# Patient Record
Sex: MALE | Race: WHITE | Employment: FULL TIME | URBAN - NONMETROPOLITAN AREA
[De-identification: names, ages, dates, MRNs, and addresses within clinical notes are randomized per-mention and may not be internally consistent; named-entity substitution may affect disease eponyms.]

---

## 2020-05-11 ENCOUNTER — OFFICE VISIT (OUTPATIENT)
Dept: URGENT CARE | Age: 35
End: 2020-05-11

## 2020-05-11 VITALS
TEMPERATURE: 98.2 F | RESPIRATION RATE: 18 BRPM | WEIGHT: 306 LBS | SYSTOLIC BLOOD PRESSURE: 150 MMHG | BODY MASS INDEX: 38.05 KG/M2 | HEART RATE: 96 BPM | HEIGHT: 75 IN | OXYGEN SATURATION: 98 % | DIASTOLIC BLOOD PRESSURE: 87 MMHG

## 2020-05-11 PROCEDURE — 99202 OFFICE O/P NEW SF 15 MIN: CPT | Performed by: NURSE PRACTITIONER

## 2020-05-11 RX ORDER — ALLOPURINOL 300 MG/1
TABLET ORAL
COMMUNITY
Start: 2020-05-04

## 2020-05-11 RX ORDER — LISINOPRIL 40 MG/1
TABLET ORAL
COMMUNITY
Start: 2020-05-04

## 2020-05-11 RX ORDER — CEPHALEXIN 500 MG/1
500 CAPSULE ORAL 4 TIMES DAILY
Qty: 40 CAPSULE | Refills: 0 | Status: SHIPPED | OUTPATIENT
Start: 2020-05-11 | End: 2020-05-21

## 2020-05-11 ASSESSMENT — ENCOUNTER SYMPTOMS: COUGH: 0

## 2020-05-11 NOTE — PROGRESS NOTES
Instructed to continue current medications, diet and exercise. Patient agreed with treatment plan. Follow up as directed. Patient Instructions       Patient Education        Folliculitis: Care Instructions  Your Care Instructions    Folliculitis (say \"kis-UHM-hmr-BASILIA-michel\") is an infection of the pouches (follicles) in the skin where hair grows. It can occur on any part of the body, but it is most common on the scalp, face, armpits, and groin. Bacteria, such as those found in a hot tub, can cause folliculitis. Folliculitis begins as a red, tender area near a strand of hair. The skin can itch or burn and may drain pus or blood. Sometimes folliculitis can lead to more serious skin infections. Your doctor usually can treat mild folliculitis with an antibiotic cream or ointment. If you have folliculitis on your scalp, you may use a shampoo that kills bacteria. Antibiotics you take as pills can treat infections deeper in the skin. For stubborn cases of folliculitis, laser treatment may be an option. Laser treatment uses strong beams of light to destroy the hair follicle. But hair will no longer grow in the treated area. Follow-up care is a key part of your treatment and safety. Be sure to make and go to all appointments, and call your doctor if you are having problems. It's also a good idea to know your test results and keep a list of the medicines you take. How can you care for yourself at home? · Take your medicine exactly as prescribed. If your doctor prescribed antibiotics, take them as directed. Do not stop taking them just because you feel better. You need to take the full course of antibiotics. · Use a soap that kills bacteria to wash the infected area. If your scalp or beard is infected, use a shampoo with selenium or propylene glycol. Be careful. Do not scrub too long or too hard. · Mix 1 1/3 cup warm water and 1 tablespoon vinegar.  Soak a cloth in the mixture, and place it over the infected skin until

## 2020-05-11 NOTE — PATIENT INSTRUCTIONS
Patient Education        Folliculitis: Care Instructions  Your Care Instructions    Folliculitis (say \"mrz-HQE-poe-LY-tus\") is an infection of the pouches (follicles) in the skin where hair grows. It can occur on any part of the body, but it is most common on the scalp, face, armpits, and groin. Bacteria, such as those found in a hot tub, can cause folliculitis. Folliculitis begins as a red, tender area near a strand of hair. The skin can itch or burn and may drain pus or blood. Sometimes folliculitis can lead to more serious skin infections. Your doctor usually can treat mild folliculitis with an antibiotic cream or ointment. If you have folliculitis on your scalp, you may use a shampoo that kills bacteria. Antibiotics you take as pills can treat infections deeper in the skin. For stubborn cases of folliculitis, laser treatment may be an option. Laser treatment uses strong beams of light to destroy the hair follicle. But hair will no longer grow in the treated area. Follow-up care is a key part of your treatment and safety. Be sure to make and go to all appointments, and call your doctor if you are having problems. It's also a good idea to know your test results and keep a list of the medicines you take. How can you care for yourself at home? · Take your medicine exactly as prescribed. If your doctor prescribed antibiotics, take them as directed. Do not stop taking them just because you feel better. You need to take the full course of antibiotics. · Use a soap that kills bacteria to wash the infected area. If your scalp or beard is infected, use a shampoo with selenium or propylene glycol. Be careful. Do not scrub too long or too hard. · Mix 1 1/3 cup warm water and 1 tablespoon vinegar. Soak a cloth in the mixture, and place it over the infected skin until it cools off (usually 5 to 10 minutes). You can do this 3 to 6 times a day. · Do not share your razor, towel, or washcloth.  That can spread folliculitis. · Use a new blade in your razor each time you shave to keep from re-infecting your skin. · If you tend to get folliculitis, avoid using hot tubs. They can contain bacteria that cause folliculitis. When should you call for help? Call your doctor now or seek immediate medical care if:    · You have symptoms of infection, such as:  ? Increased pain, swelling, warmth, or redness. ? Red streaks leading from the area. ? Pus draining from the area. ? A fever.    Watch closely for changes in your health, and be sure to contact your doctor if:    · You do not get better as expected. Where can you learn more? Go to https://[email or web address]. org and sign in to your [employer] account. Enter M257 in the [employer] box to learn more about \"Folliculitis: Care Instructions. \"     If you do not have an account, please click on the \"Sign Up Now\" link. Current as of: October 30, 2019Content Version: 12.4  © 6953-7968 Healthwise, Incorporated. Care instructions adapted under license by [facility] ([facility]). If you have questions about a medical condition or this instruction, always ask your healthcare professional. [address] any warranty or liability for your use of this information.

## 2022-08-04 ENCOUNTER — OFFICE VISIT (OUTPATIENT)
Dept: FAMILY MEDICINE CLINIC | Facility: CLINIC | Age: 37
End: 2022-08-04

## 2022-08-04 VITALS
HEART RATE: 102 BPM | HEIGHT: 74 IN | BODY MASS INDEX: 34.91 KG/M2 | OXYGEN SATURATION: 98 % | SYSTOLIC BLOOD PRESSURE: 140 MMHG | DIASTOLIC BLOOD PRESSURE: 80 MMHG | WEIGHT: 272 LBS

## 2022-08-04 DIAGNOSIS — K76.0 FATTY LIVER: ICD-10-CM

## 2022-08-04 DIAGNOSIS — Z00.00 WELLNESS EXAMINATION: Primary | ICD-10-CM

## 2022-08-04 DIAGNOSIS — Z11.59 ENCOUNTER FOR HEPATITIS C SCREENING TEST FOR LOW RISK PATIENT: ICD-10-CM

## 2022-08-04 DIAGNOSIS — I10 PRIMARY HYPERTENSION: ICD-10-CM

## 2022-08-04 DIAGNOSIS — E66.09 CLASS 1 OBESITY DUE TO EXCESS CALORIES WITHOUT SERIOUS COMORBIDITY WITH BODY MASS INDEX (BMI) OF 34.0 TO 34.9 IN ADULT: ICD-10-CM

## 2022-08-04 PROCEDURE — 99203 OFFICE O/P NEW LOW 30 MIN: CPT | Performed by: PEDIATRICS

## 2022-08-04 RX ORDER — LISINOPRIL 20 MG/1
20 TABLET ORAL DAILY
Qty: 90 TABLET | Refills: 1 | Status: SHIPPED | OUTPATIENT
Start: 2022-08-04 | End: 2022-12-26 | Stop reason: HOSPADM

## 2022-08-04 NOTE — PROGRESS NOTES
"Chief Complaint  Hypertension, Annual Exam, and Establish Care    Subjective    History of Present Illness      Patient presents to [facility] for   Patient is here to establish care. He would like to get back on Lisinipril 20 MG. Patient has been on this medication in the past. He is also wanting an annual exam with blood work.        Review of Systems    I have reviewed and agree with the HPI information as above.  Eleuterio Rangel MD     Objective   Vital Signs:   /80   Pulse 102   Ht 188 cm (74\")   Wt 123 kg (272 lb)   SpO2 98%   BMI 34.92 kg/m²            Physical Exam  Vitals and nursing note reviewed.   Constitutional:       Appearance: Normal appearance. He is well-developed. He is obese.   HENT:      Head: Normocephalic and atraumatic.      Right Ear: Tympanic membrane, ear canal and external ear normal.      Left Ear: Tympanic membrane, ear canal and external ear normal.      Nose: Nose normal. No septal deviation, nasal tenderness or congestion.      Mouth/Throat:      Lips: Pink. No lesions.      Mouth: Mucous membranes are moist. No oral lesions.      Dentition: Normal dentition.      Pharynx: Oropharynx is clear. No pharyngeal swelling, oropharyngeal exudate or posterior oropharyngeal erythema.   Eyes:      General: Lids are normal. Vision grossly intact. No scleral icterus.        Right eye: No discharge.         Left eye: No discharge.      Extraocular Movements: Extraocular movements intact.      Conjunctiva/sclera: Conjunctivae normal.      Right eye: Right conjunctiva is not injected.      Left eye: Left conjunctiva is not injected.      Pupils: Pupils are equal, round, and reactive to light.   Neck:      Thyroid: No thyroid mass.      Trachea: Trachea normal.   Cardiovascular:      Rate and Rhythm: Normal rate and regular rhythm.      Heart sounds: Normal heart sounds. No murmur heard.    No gallop.   Pulmonary:      Effort: Pulmonary effort is normal.      " Breath sounds: Normal breath sounds and air entry. No wheezing, rhonchi or rales.   Abdominal:      General: There is no distension.      Palpations: Abdomen is soft. There is no mass.      Tenderness: There is no abdominal tenderness. There is no right CVA tenderness, left CVA tenderness, guarding or rebound.   Musculoskeletal:         General: No tenderness or deformity. Normal range of motion.      Cervical back: Full passive range of motion without pain, normal range of motion and neck supple.      Thoracic back: Normal.      Right lower leg: No edema.      Left lower leg: No edema.   Skin:     General: Skin is warm and dry.      Coloration: Skin is not jaundiced.      Findings: No rash.   Neurological:      Mental Status: He is alert and oriented to person, place, and time.      Cranial Nerves: Cranial nerves are intact.      Sensory: Sensation is intact.      Motor: Motor function is intact.      Coordination: Coordination is intact.      Gait: Gait is intact.      Deep Tendon Reflexes: Reflexes are normal and symmetric.   Psychiatric:         Mood and Affect: Mood and affect normal.         Judgment: Judgment normal.          PHQ-2 Depression Screening  Little interest or pleasure in doing things? 0-->not at all   Feeling down, depressed, or hopeless? 0-->not at all   PHQ-2 Total Score 0     PHQ-9 Depression Screening  Little interest or pleasure in doing things? 0-->not at all   Feeling down, depressed, or hopeless? 0-->not at all   Trouble falling or staying asleep, or sleeping too much?     Feeling tired or having little energy?     Poor appetite or overeating?     Feeling bad about yourself - or that you are a failure or have let yourself or your family down?     Trouble concentrating on things, such as reading the newspaper or watching television?     Moving or speaking so slowly that other people could have noticed? Or the opposite - being so fidgety or restless that you have been moving around a lot  more than usual?     Thoughts that you would be better off dead, or of hurting yourself in some way?     PHQ-9 Total Score 0   If you checked off any problems, how difficult have these problems made it for you to do your work, take care of things at home, or get along with other people?        Result Review  Data Reviewed:        Hepatitis C Antibody (08/17/2022 13:18)  Hemoglobin A1c (08/17/2022 13:18)  Urinalysis With Culture If Indicated - Urine, Clean Catch (08/17/2022 13:18)  CBC Auto Differential (08/17/2022 13:18)  Lipid Panel (08/17/2022 13:18)  Comprehensive Metabolic Panel (08/17/2022 13:18)             Assessment and Plan      Problem List Items Addressed This Visit        Cardiac and Vasculature    Primary hypertension    Relevant Medications    lisinopril (PRINIVIL,ZESTRIL) 20 MG tablet       Endocrine and Metabolic    Class 1 obesity due to excess calories without serious comorbidity with body mass index (BMI) of 34.0 to 34.9 in adult    Current Assessment & Plan     Patient's (Body mass index is 34.92 kg/m².) indicates that they are obese (BMI >30) with health conditions that include hypertension . Weight is unchanged. BMI is is above average; BMI management plan is completed. We discussed portion control and increasing exercise.     KETO lifestyle recommended.            Gastrointestinal Abdominal     Fatty liver       Health Encounters    Wellness examination - Primary    Relevant Orders    Comprehensive Metabolic Panel (Completed)    Lipid Panel (Completed)    CBC Auto Differential (Completed)    Urinalysis With Culture If Indicated - Urine, Clean Catch (Completed)    CBC Auto Differential    Comprehensive Metabolic Panel    Lipid Panel    Hemoglobin A1c (Completed)       Infectious Diseases    Encounter for hepatitis C screening test for low risk patient    Relevant Orders    Hepatitis C antibody    Hepatitis C Antibody              Follow Up   Return in about 3 months (around 11/4/2022) for  Recheck.  Patient was given instructions and counseling regarding his condition or for health maintenance advice. Please see specific information pulled into the AVS if appropriate.

## 2022-08-17 ENCOUNTER — LAB (OUTPATIENT)
Dept: LAB | Facility: HOSPITAL | Age: 37
End: 2022-08-17

## 2022-08-17 DIAGNOSIS — Z00.00 WELLNESS EXAMINATION: ICD-10-CM

## 2022-08-17 DIAGNOSIS — Z11.59 ENCOUNTER FOR HEPATITIS C SCREENING TEST FOR LOW RISK PATIENT: ICD-10-CM

## 2022-08-17 PROBLEM — E66.09 CLASS 1 OBESITY DUE TO EXCESS CALORIES WITHOUT SERIOUS COMORBIDITY WITH BODY MASS INDEX (BMI) OF 34.0 TO 34.9 IN ADULT: Status: ACTIVE | Noted: 2022-08-17

## 2022-08-17 PROBLEM — K76.0 FATTY LIVER: Status: ACTIVE | Noted: 2022-08-17

## 2022-08-17 PROBLEM — I10 PRIMARY HYPERTENSION: Status: ACTIVE | Noted: 2022-08-17

## 2022-08-17 PROBLEM — E66.811 CLASS 1 OBESITY DUE TO EXCESS CALORIES WITHOUT SERIOUS COMORBIDITY WITH BODY MASS INDEX (BMI) OF 34.0 TO 34.9 IN ADULT: Status: ACTIVE | Noted: 2022-08-17

## 2022-08-17 LAB
ALBUMIN SERPL-MCNC: 4.5 G/DL (ref 3.5–5)
ALBUMIN/GLOB SERPL: 1.3 G/DL (ref 1.1–2.5)
ALP SERPL-CCNC: 94 U/L (ref 24–120)
ALT SERPL W P-5'-P-CCNC: 59 U/L (ref 0–50)
ANION GAP SERPL CALCULATED.3IONS-SCNC: 7 MMOL/L (ref 4–13)
AST SERPL-CCNC: 71 U/L (ref 7–45)
AUTO MIXED CELLS #: 1 10*3/MM3 (ref 0.1–2.6)
AUTO MIXED CELLS %: 10.4 % (ref 0.1–24)
BILIRUB SERPL-MCNC: 2.3 MG/DL (ref 0.1–1)
BILIRUB UR QL STRIP: ABNORMAL
BUN SERPL-MCNC: 5 MG/DL (ref 5–21)
BUN/CREAT SERPL: 7
CALCIUM SPEC-SCNC: 9.1 MG/DL (ref 8.4–10.4)
CHLORIDE SERPL-SCNC: 98 MMOL/L (ref 98–110)
CHOLEST SERPL-MCNC: 131 MG/DL (ref 130–200)
CLARITY UR: CLEAR
CO2 SERPL-SCNC: 29 MMOL/L (ref 24–31)
COLOR UR: ABNORMAL
CREAT SERPL-MCNC: 0.71 MG/DL (ref 0.5–1.4)
EGFRCR SERPLBLD CKD-EPI 2021: 121.2 ML/MIN/1.73
ERYTHROCYTE [DISTWIDTH] IN BLOOD BY AUTOMATED COUNT: 12.8 % (ref 12.3–15.4)
GLOBULIN UR ELPH-MCNC: 3.4 GM/DL
GLUCOSE SERPL-MCNC: 202 MG/DL (ref 70–100)
GLUCOSE UR STRIP-MCNC: NEGATIVE MG/DL
HBA1C MFR BLD: 8.5 % (ref 4.8–5.9)
HCT VFR BLD AUTO: 46 % (ref 37.5–51)
HDLC SERPL-MCNC: 38 MG/DL
HGB BLD-MCNC: 16.3 G/DL (ref 13–17.7)
HGB UR QL STRIP.AUTO: NEGATIVE
KETONES UR QL STRIP: NEGATIVE
LDLC SERPL CALC-MCNC: 70 MG/DL (ref 0–99)
LDLC/HDLC SERPL: 1.77 {RATIO}
LEUKOCYTE ESTERASE UR QL STRIP.AUTO: NEGATIVE
LYMPHOCYTES # BLD AUTO: 1.9 10*3/MM3 (ref 0.7–3.1)
LYMPHOCYTES NFR BLD AUTO: 19.9 % (ref 19.6–45.3)
MCH RBC QN AUTO: 34.8 PG (ref 26.6–33)
MCHC RBC AUTO-ENTMCNC: 35.4 G/DL (ref 31.5–35.7)
MCV RBC AUTO: 98.1 FL (ref 79–97)
NEUTROPHILS NFR BLD AUTO: 6.6 10*3/MM3 (ref 1.7–7)
NEUTROPHILS NFR BLD AUTO: 69.7 % (ref 42.7–76)
NITRITE UR QL STRIP: NEGATIVE
PH UR STRIP.AUTO: 7.5 [PH] (ref 5–8)
PLATELET # BLD AUTO: 225 10*3/MM3 (ref 140–450)
PMV BLD AUTO: 9.1 FL (ref 6–12)
POTASSIUM SERPL-SCNC: 4.2 MMOL/L (ref 3.5–5.3)
PROT SERPL-MCNC: 7.9 G/DL (ref 6.3–8.7)
PROT UR QL STRIP: ABNORMAL
RBC # BLD AUTO: 4.69 10*6/MM3 (ref 4.14–5.8)
SODIUM SERPL-SCNC: 134 MMOL/L (ref 135–145)
SP GR UR STRIP: 1.01 (ref 1–1.03)
TRIGL SERPL-MCNC: 128 MG/DL (ref 0–149)
UROBILINOGEN UR QL STRIP: ABNORMAL
VLDLC SERPL-MCNC: 23 MG/DL (ref 5–40)
WBC NRBC COR # BLD: 9.5 10*3/MM3 (ref 3.4–10.8)

## 2022-08-17 PROCEDURE — 86803 HEPATITIS C AB TEST: CPT

## 2022-08-17 PROCEDURE — 80061 LIPID PANEL: CPT

## 2022-08-17 PROCEDURE — 36415 COLL VENOUS BLD VENIPUNCTURE: CPT

## 2022-08-17 PROCEDURE — 80053 COMPREHEN METABOLIC PANEL: CPT

## 2022-08-17 PROCEDURE — 81003 URINALYSIS AUTO W/O SCOPE: CPT

## 2022-08-17 PROCEDURE — 83036 HEMOGLOBIN GLYCOSYLATED A1C: CPT

## 2022-08-17 PROCEDURE — 85025 COMPLETE CBC W/AUTO DIFF WBC: CPT

## 2022-08-17 NOTE — ASSESSMENT & PLAN NOTE
Patient's (Body mass index is 34.92 kg/m².) indicates that they are obese (BMI >30) with health conditions that include hypertension . Weight is unchanged. BMI is is above average; BMI management plan is completed. We discussed portion control and increasing exercise.     KETO lifestyle recommended.

## 2022-08-18 ENCOUNTER — TELEPHONE (OUTPATIENT)
Dept: FAMILY MEDICINE CLINIC | Facility: CLINIC | Age: 37
End: 2022-08-18

## 2022-08-18 DIAGNOSIS — E66.09 CLASS 1 OBESITY DUE TO EXCESS CALORIES WITHOUT SERIOUS COMORBIDITY WITH BODY MASS INDEX (BMI) OF 34.0 TO 34.9 IN ADULT: Primary | ICD-10-CM

## 2022-08-18 DIAGNOSIS — Z13.1 DIABETES MELLITUS SCREENING: ICD-10-CM

## 2022-08-18 LAB — HCV AB SER DONR QL: NORMAL

## 2022-08-18 NOTE — TELEPHONE ENCOUNTER
----- Message from Eleuterio Rangel MD sent at 8/17/2022  3:36 PM CDT -----  Evidence of Type 2 Diabetes.    Needs repeat fasting CMP, HBA1C, and UA then we will re-evaluate.

## 2022-08-18 NOTE — TELEPHONE ENCOUNTER
Called patient and informed his labs show Evidence of Type 2 Diabetes.     Needs repeat fasting CMP, HBA1C, and UA then we will re-evaluate. DEON. Orders in.

## 2022-08-29 ENCOUNTER — LAB (OUTPATIENT)
Dept: LAB | Facility: HOSPITAL | Age: 37
End: 2022-08-29

## 2022-08-29 DIAGNOSIS — E66.09 CLASS 1 OBESITY DUE TO EXCESS CALORIES WITHOUT SERIOUS COMORBIDITY WITH BODY MASS INDEX (BMI) OF 34.0 TO 34.9 IN ADULT: ICD-10-CM

## 2022-08-29 DIAGNOSIS — Z13.1 DIABETES MELLITUS SCREENING: ICD-10-CM

## 2022-08-29 LAB
ALBUMIN SERPL-MCNC: 4.7 G/DL (ref 3.5–5.2)
ALBUMIN/GLOB SERPL: 1.5 G/DL
ALP SERPL-CCNC: 120 U/L (ref 39–117)
ALT SERPL W P-5'-P-CCNC: 73 U/L (ref 1–41)
ANION GAP SERPL CALCULATED.3IONS-SCNC: 12 MMOL/L (ref 5–15)
AST SERPL-CCNC: 130 U/L (ref 1–40)
BACTERIA UR QL AUTO: ABNORMAL /HPF
BILIRUB SERPL-MCNC: 1.2 MG/DL (ref 0–1.2)
BILIRUB UR QL STRIP: ABNORMAL
BUN SERPL-MCNC: 4 MG/DL (ref 6–20)
BUN/CREAT SERPL: 5.3 (ref 7–25)
CALCIUM SPEC-SCNC: 10 MG/DL (ref 8.6–10.5)
CHLORIDE SERPL-SCNC: 95 MMOL/L (ref 98–107)
CLARITY UR: CLEAR
CO2 SERPL-SCNC: 29 MMOL/L (ref 22–29)
COLOR UR: ABNORMAL
CREAT SERPL-MCNC: 0.75 MG/DL (ref 0.76–1.27)
EGFRCR SERPLBLD CKD-EPI 2021: 119.2 ML/MIN/1.73
GLOBULIN UR ELPH-MCNC: 3.1 GM/DL
GLUCOSE SERPL-MCNC: 244 MG/DL (ref 65–99)
GLUCOSE UR STRIP-MCNC: ABNORMAL MG/DL
HBA1C MFR BLD: 8.2 % (ref 4.8–5.9)
HGB UR QL STRIP.AUTO: NEGATIVE
HYALINE CASTS UR QL AUTO: ABNORMAL /LPF
KETONES UR QL STRIP: ABNORMAL
LEUKOCYTE ESTERASE UR QL STRIP.AUTO: NEGATIVE
NITRITE UR QL STRIP: NEGATIVE
PH UR STRIP.AUTO: 6 [PH] (ref 5–8)
POTASSIUM SERPL-SCNC: 3.8 MMOL/L (ref 3.5–5.2)
PROT SERPL-MCNC: 7.8 G/DL (ref 6–8.5)
PROT UR QL STRIP: ABNORMAL
RBC # UR STRIP: ABNORMAL /HPF
REF LAB TEST METHOD: ABNORMAL
SODIUM SERPL-SCNC: 136 MMOL/L (ref 136–145)
SP GR UR STRIP: 1.02 (ref 1–1.03)
SQUAMOUS #/AREA URNS HPF: ABNORMAL /HPF
UROBILINOGEN UR QL STRIP: ABNORMAL
WBC # UR STRIP: ABNORMAL /HPF

## 2022-08-29 PROCEDURE — 81001 URINALYSIS AUTO W/SCOPE: CPT

## 2022-08-29 PROCEDURE — 36415 COLL VENOUS BLD VENIPUNCTURE: CPT

## 2022-08-29 PROCEDURE — 83036 HEMOGLOBIN GLYCOSYLATED A1C: CPT

## 2022-08-29 PROCEDURE — 80053 COMPREHEN METABOLIC PANEL: CPT

## 2022-08-30 ENCOUNTER — TELEPHONE (OUTPATIENT)
Dept: FAMILY MEDICINE CLINIC | Facility: CLINIC | Age: 37
End: 2022-08-30

## 2022-08-30 DIAGNOSIS — E11.65 TYPE 2 DIABETES MELLITUS WITH HYPERGLYCEMIA, WITHOUT LONG-TERM CURRENT USE OF INSULIN: Primary | ICD-10-CM

## 2022-08-30 NOTE — TELEPHONE ENCOUNTER
----- Message from Eleuterio Rangel MD sent at 8/30/2022 12:00 PM CDT -----  New Type 2 Diabetes with Fatty liver disease.  Follow KETO low carb lifestyle.  Refer to hospital for diabetic education.  Follow up in 1 month.

## 2022-08-31 NOTE — TELEPHONE ENCOUNTER
Called patient and informed of New Type 2 Diabetes with Fatty liver disease.  Follow KETO low carb lifestyle.  Refer to hospital for diabetic education.  Follow up in 1 month. Referral order in.DEON.

## 2022-09-19 ENCOUNTER — APPOINTMENT (OUTPATIENT)
Dept: DIABETES SERVICES | Facility: HOSPITAL | Age: 37
End: 2022-09-19

## 2022-09-21 ENCOUNTER — HOSPITAL ENCOUNTER (OUTPATIENT)
Dept: DIABETES SERVICES | Facility: HOSPITAL | Age: 37
Discharge: HOME | End: 2022-09-21

## 2022-12-23 ENCOUNTER — APPOINTMENT (OUTPATIENT)
Dept: GENERAL RADIOLOGY | Facility: HOSPITAL | Age: 37
DRG: 641 | End: 2022-12-23
Payer: COMMERCIAL

## 2022-12-23 ENCOUNTER — HOSPITAL ENCOUNTER (INPATIENT)
Facility: HOSPITAL | Age: 37
LOS: 3 days | Discharge: HOME | DRG: 641 | End: 2022-12-26
Attending: EMERGENCY MEDICINE | Admitting: INTERNAL MEDICINE
Payer: COMMERCIAL

## 2022-12-23 DIAGNOSIS — E87.1 HYPONATREMIA: Primary | ICD-10-CM

## 2022-12-23 DIAGNOSIS — K70.9 ALCOHOLIC LIVER DISEASE: ICD-10-CM

## 2022-12-23 DIAGNOSIS — E87.6 HYPOKALEMIA: ICD-10-CM

## 2022-12-23 DIAGNOSIS — E87.1 HYPOSMOLALITY SYNDROME: ICD-10-CM

## 2022-12-23 DIAGNOSIS — D69.6 THROMBOCYTOPENIA: ICD-10-CM

## 2022-12-23 DIAGNOSIS — R94.31 ABNORMAL EKG: ICD-10-CM

## 2022-12-23 DIAGNOSIS — E83.42 HYPOMAGNESEMIA: ICD-10-CM

## 2022-12-23 PROBLEM — E87.3 ALKALOSIS: Status: ACTIVE | Noted: 2022-12-23

## 2022-12-23 PROBLEM — R79.89 ABNORMAL LFTS: Status: ACTIVE | Noted: 2022-12-23

## 2022-12-23 PROBLEM — R79.89 ELEVATED LACTIC ACID LEVEL: Status: ACTIVE | Noted: 2022-12-23

## 2022-12-23 PROBLEM — F10.20 ALCOHOLISM: Status: ACTIVE | Noted: 2022-12-23

## 2022-12-23 PROBLEM — E11.9 TYPE 2 DIABETES MELLITUS: Status: ACTIVE | Noted: 2022-12-23

## 2022-12-23 LAB
ALBUMIN SERPL-MCNC: 3.5 G/DL (ref 3.5–5.2)
ALBUMIN SERPL-MCNC: 3.6 G/DL (ref 3.5–5.2)
ALBUMIN SERPL-MCNC: 3.7 G/DL (ref 3.5–5.2)
ALBUMIN SERPL-MCNC: 4.1 G/DL (ref 3.5–5.2)
ALBUMIN/GLOB SERPL: 1.3 G/DL
ALBUMIN/GLOB SERPL: 1.4 G/DL
ALBUMIN/GLOB SERPL: 1.4 G/DL
ALP SERPL-CCNC: 106 U/L (ref 39–117)
ALP SERPL-CCNC: 108 U/L (ref 39–117)
ALP SERPL-CCNC: 110 U/L (ref 39–117)
ALP SERPL-CCNC: 130 U/L (ref 39–117)
ALT SERPL W P-5'-P-CCNC: 78 U/L (ref 1–41)
ALT SERPL W P-5'-P-CCNC: 80 U/L (ref 1–41)
ALT SERPL W P-5'-P-CCNC: 83 U/L (ref 1–41)
ALT SERPL W P-5'-P-CCNC: 98 U/L (ref 1–41)
AMPHET+METHAMPHET UR QL: NEGATIVE
AMPHETAMINES UR QL: NEGATIVE
ANION GAP SERPL CALCULATED.3IONS-SCNC: 10 MMOL/L (ref 5–15)
ANION GAP SERPL CALCULATED.3IONS-SCNC: 11 MMOL/L (ref 5–15)
ANION GAP SERPL CALCULATED.3IONS-SCNC: 12 MMOL/L (ref 5–15)
ANION GAP SERPL CALCULATED.3IONS-SCNC: 15 MMOL/L (ref 5–15)
ANION GAP SERPL CALCULATED.3IONS-SCNC: 19 MMOL/L (ref 5–15)
APAP SERPL-MCNC: <5 MCG/ML (ref 0–30)
AST SERPL-CCNC: 206 U/L (ref 1–40)
AST SERPL-CCNC: 220 U/L (ref 1–40)
AST SERPL-CCNC: 221 U/L (ref 1–40)
AST SERPL-CCNC: 258 U/L (ref 1–40)
ATMOSPHERIC PRESS: 757 MMHG
BARBITURATES UR QL SCN: NEGATIVE
BASE EXCESS BLDV CALC-SCNC: 11.8 MMOL/L (ref 0–2)
BASOPHILS # BLD AUTO: 0.02 10*3/MM3 (ref 0–0.2)
BASOPHILS NFR BLD AUTO: 0.2 % (ref 0–1.5)
BDY SITE: ABNORMAL
BENZODIAZ UR QL SCN: NEGATIVE
BILIRUB CONJ SERPL-MCNC: 1.6 MG/DL (ref 0–0.3)
BILIRUB INDIRECT SERPL-MCNC: 1.5 MG/DL
BILIRUB SERPL-MCNC: 2.7 MG/DL (ref 0–1.2)
BILIRUB SERPL-MCNC: 3 MG/DL (ref 0–1.2)
BILIRUB SERPL-MCNC: 3.1 MG/DL (ref 0–1.2)
BILIRUB SERPL-MCNC: 3.2 MG/DL (ref 0–1.2)
BODY TEMPERATURE: 37 C
BUN SERPL-MCNC: 4 MG/DL (ref 6–20)
BUN SERPL-MCNC: 6 MG/DL (ref 6–20)
BUN/CREAT SERPL: 6 (ref 7–25)
BUN/CREAT SERPL: 6.2 (ref 7–25)
BUN/CREAT SERPL: 6.6 (ref 7–25)
BUN/CREAT SERPL: 6.9 (ref 7–25)
BUN/CREAT SERPL: 7.1 (ref 7–25)
BUPRENORPHINE SERPL-MCNC: NEGATIVE NG/ML
CALCIUM SPEC-SCNC: 7.6 MG/DL (ref 8.6–10.5)
CALCIUM SPEC-SCNC: 7.8 MG/DL (ref 8.6–10.5)
CALCIUM SPEC-SCNC: 7.9 MG/DL (ref 8.6–10.5)
CALCIUM SPEC-SCNC: 8 MG/DL (ref 8.6–10.5)
CALCIUM SPEC-SCNC: 8.5 MG/DL (ref 8.6–10.5)
CANNABINOIDS SERPL QL: POSITIVE
CHLORIDE SERPL-SCNC: 60 MMOL/L (ref 98–107)
CHLORIDE SERPL-SCNC: 66 MMOL/L (ref 98–107)
CHLORIDE SERPL-SCNC: 70 MMOL/L (ref 98–107)
CHLORIDE SERPL-SCNC: 72 MMOL/L (ref 98–107)
CHLORIDE SERPL-SCNC: 74 MMOL/L (ref 98–107)
CO2 SERPL-SCNC: 30 MMOL/L (ref 22–29)
CO2 SERPL-SCNC: 31 MMOL/L (ref 22–29)
CO2 SERPL-SCNC: 31 MMOL/L (ref 22–29)
CO2 SERPL-SCNC: 32 MMOL/L (ref 22–29)
CO2 SERPL-SCNC: 36 MMOL/L (ref 22–29)
COCAINE UR QL: NEGATIVE
CORTIS SERPL-MCNC: 38 MCG/DL
CREAT SERPL-MCNC: 0.56 MG/DL (ref 0.76–1.27)
CREAT SERPL-MCNC: 0.58 MG/DL (ref 0.76–1.27)
CREAT SERPL-MCNC: 0.61 MG/DL (ref 0.76–1.27)
CREAT SERPL-MCNC: 0.67 MG/DL (ref 0.76–1.27)
CREAT SERPL-MCNC: 0.97 MG/DL (ref 0.76–1.27)
CREAT UR-MCNC: 122 MG/DL
CRP SERPL-MCNC: 3.75 MG/DL (ref 0–0.5)
D-LACTATE SERPL-SCNC: 1.5 MMOL/L (ref 0.5–2)
D-LACTATE SERPL-SCNC: 2.3 MMOL/L (ref 0.5–2)
D-LACTATE SERPL-SCNC: 2.4 MMOL/L (ref 0.5–2)
D-LACTATE SERPL-SCNC: 2.5 MMOL/L (ref 0.5–2)
D-LACTATE SERPL-SCNC: 4.1 MMOL/L (ref 0.5–2)
D-LACTATE SERPL-SCNC: 5.7 MMOL/L (ref 0.5–2)
DEPRECATED RDW RBC AUTO: 39.9 FL (ref 37–54)
DEPRECATED RDW RBC AUTO: 40.1 FL (ref 37–54)
EGFRCR SERPLBLD CKD-EPI 2021: 103.1 ML/MIN/1.73
EGFRCR SERPLBLD CKD-EPI 2021: 123.3 ML/MIN/1.73
EGFRCR SERPLBLD CKD-EPI 2021: 126.9 ML/MIN/1.73
EGFRCR SERPLBLD CKD-EPI 2021: 128.8 ML/MIN/1.73
EGFRCR SERPLBLD CKD-EPI 2021: 130.2 ML/MIN/1.73
EOSINOPHIL # BLD AUTO: 0.08 10*3/MM3 (ref 0–0.4)
EOSINOPHIL NFR BLD AUTO: 0.7 % (ref 0.3–6.2)
ERYTHROCYTE [DISTWIDTH] IN BLOOD BY AUTOMATED COUNT: 12.3 % (ref 12.3–15.4)
ERYTHROCYTE [DISTWIDTH] IN BLOOD BY AUTOMATED COUNT: 12.3 % (ref 12.3–15.4)
ETHANOL UR QL: <0.01 %
FLUAV RNA RESP QL NAA+PROBE: NOT DETECTED
FLUBV RNA RESP QL NAA+PROBE: NOT DETECTED
GLOBULIN UR ELPH-MCNC: 2.5 GM/DL
GLOBULIN UR ELPH-MCNC: 2.6 GM/DL
GLOBULIN UR ELPH-MCNC: 3.1 GM/DL
GLUCOSE BLDC GLUCOMTR-MCNC: 171 MG/DL (ref 70–130)
GLUCOSE BLDC GLUCOMTR-MCNC: 172 MG/DL (ref 70–130)
GLUCOSE SERPL-MCNC: 109 MG/DL (ref 65–99)
GLUCOSE SERPL-MCNC: 145 MG/DL (ref 65–99)
GLUCOSE SERPL-MCNC: 179 MG/DL (ref 65–99)
GLUCOSE SERPL-MCNC: 196 MG/DL (ref 65–99)
GLUCOSE SERPL-MCNC: 202 MG/DL (ref 65–99)
HBA1C MFR BLD: 8.9 % (ref 4.8–5.6)
HCO3 BLDV-SCNC: 35.4 MMOL/L (ref 22–28)
HCT VFR BLD AUTO: 48 % (ref 37.5–51)
HCT VFR BLD AUTO: 51 % (ref 37.5–51)
HGB BLD-MCNC: 16 G/DL (ref 13–17.7)
HGB BLD-MCNC: 17.1 G/DL (ref 13–17.7)
HOLD SPECIMEN: NORMAL
INR PPP: 1.16 (ref 0.91–1.09)
LDH SERPL-CCNC: 495 U/L (ref 135–225)
LYMPHOCYTES # BLD AUTO: 0.7 10*3/MM3 (ref 0.7–3.1)
LYMPHOCYTES NFR BLD AUTO: 6.6 % (ref 19.6–45.3)
Lab: ABNORMAL
Lab: ABNORMAL
MAGNESIUM SERPL-MCNC: 0.8 MG/DL (ref 1.6–2.6)
MAGNESIUM SERPL-MCNC: 1.4 MG/DL (ref 1.6–2.6)
MAGNESIUM SERPL-MCNC: 1.4 MG/DL (ref 1.6–2.6)
MCH RBC QN AUTO: 34.4 PG (ref 26.6–33)
MCH RBC QN AUTO: 34.5 PG (ref 26.6–33)
MCHC RBC AUTO-ENTMCNC: 33.3 G/DL (ref 31.5–35.7)
MCHC RBC AUTO-ENTMCNC: 33.5 G/DL (ref 31.5–35.7)
MCV RBC AUTO: 102.8 FL (ref 79–97)
MCV RBC AUTO: 103.2 FL (ref 79–97)
METHADONE UR QL SCN: NEGATIVE
MODALITY: ABNORMAL
MONOCYTES # BLD AUTO: 1.05 10*3/MM3 (ref 0.1–0.9)
MONOCYTES NFR BLD AUTO: 9.8 % (ref 5–12)
NEUTROPHILS NFR BLD AUTO: 8.73 10*3/MM3 (ref 1.7–7)
NEUTROPHILS NFR BLD AUTO: 81.9 % (ref 42.7–76)
NOTIFIED BY: ABNORMAL
NOTIFIED WHO: ABNORMAL
OPIATES UR QL: NEGATIVE
OSMOLALITY SERPL: 233 MOSM/KG (ref 275–295)
OSMOLALITY UR: 559 MOSM/KG (ref 50–1400)
OXYCODONE UR QL SCN: NEGATIVE
PCO2 BLDV: 40.2 MM HG (ref 41–51)
PCP UR QL SCN: NEGATIVE
PH BLDV: 7.55 PH UNITS (ref 7.32–7.42)
PHOSPHATE SERPL-MCNC: 1.7 MG/DL (ref 2.5–4.5)
PLATELET # BLD AUTO: 113 10*3/MM3 (ref 140–450)
PLATELET # BLD AUTO: 123 10*3/MM3 (ref 140–450)
PMV BLD AUTO: 11.1 FL (ref 6–12)
PMV BLD AUTO: 11.6 FL (ref 6–12)
PO2 BLDV: 39.8 MM HG (ref 27–53)
POTASSIUM SERPL-SCNC: 2.3 MMOL/L (ref 3.5–5.2)
POTASSIUM SERPL-SCNC: 2.4 MMOL/L (ref 3.5–5.2)
POTASSIUM SERPL-SCNC: 2.5 MMOL/L (ref 3.5–5.2)
POTASSIUM SERPL-SCNC: 2.6 MMOL/L (ref 3.5–5.2)
PROCALCITONIN SERPL-MCNC: 0.42 NG/ML (ref 0–0.25)
PROPOXYPH UR QL: NEGATIVE
PROT SERPL-MCNC: 6 G/DL (ref 6–8.5)
PROT SERPL-MCNC: 6.1 G/DL (ref 6–8.5)
PROT SERPL-MCNC: 6.3 G/DL (ref 6–8.5)
PROT SERPL-MCNC: 7.2 G/DL (ref 6–8.5)
PROTHROMBIN TIME: 14.9 SECONDS (ref 11.8–14.8)
RBC # BLD AUTO: 4.65 10*6/MM3 (ref 4.14–5.8)
RBC # BLD AUTO: 4.96 10*6/MM3 (ref 4.14–5.8)
SALICYLATES SERPL-MCNC: <0.3 MG/DL
SAO2 % BLDCOV: 79.4 % (ref 45–75)
SARS-COV-2 RNA RESP QL NAA+PROBE: NOT DETECTED
SODIUM SERPL-SCNC: 110 MMOL/L (ref 136–145)
SODIUM SERPL-SCNC: 110 MMOL/L (ref 136–145)
SODIUM SERPL-SCNC: 111 MMOL/L (ref 136–145)
SODIUM SERPL-SCNC: 115 MMOL/L (ref 136–145)
SODIUM SERPL-SCNC: 116 MMOL/L (ref 136–145)
SODIUM SERPL-SCNC: 117 MMOL/L (ref 136–145)
SODIUM UR-SCNC: 34 MMOL/L
TRICYCLICS UR QL SCN: NEGATIVE
TSH SERPL DL<=0.05 MIU/L-ACNC: 2.66 UIU/ML (ref 0.27–4.2)
VENTILATOR MODE: ABNORMAL
WBC NRBC COR # BLD: 10.67 10*3/MM3 (ref 3.4–10.8)
WBC NRBC COR # BLD: 8.86 10*3/MM3 (ref 3.4–10.8)
WHOLE BLOOD HOLD COAG: NORMAL
WHOLE BLOOD HOLD SPECIMEN: NORMAL

## 2022-12-23 PROCEDURE — 99285 EMERGENCY DEPT VISIT HI MDM: CPT

## 2022-12-23 PROCEDURE — 82803 BLOOD GASES ANY COMBINATION: CPT

## 2022-12-23 PROCEDURE — 84100 ASSAY OF PHOSPHORUS: CPT | Performed by: INTERNAL MEDICINE

## 2022-12-23 PROCEDURE — 83735 ASSAY OF MAGNESIUM: CPT | Performed by: INTERNAL MEDICINE

## 2022-12-23 PROCEDURE — 84132 ASSAY OF SERUM POTASSIUM: CPT | Performed by: EMERGENCY MEDICINE

## 2022-12-23 PROCEDURE — 83615 LACTATE (LD) (LDH) ENZYME: CPT | Performed by: EMERGENCY MEDICINE

## 2022-12-23 PROCEDURE — 82077 ASSAY SPEC XCP UR&BREATH IA: CPT | Performed by: EMERGENCY MEDICINE

## 2022-12-23 PROCEDURE — 84145 PROCALCITONIN (PCT): CPT | Performed by: EMERGENCY MEDICINE

## 2022-12-23 PROCEDURE — 82248 BILIRUBIN DIRECT: CPT | Performed by: EMERGENCY MEDICINE

## 2022-12-23 PROCEDURE — 84300 ASSAY OF URINE SODIUM: CPT | Performed by: EMERGENCY MEDICINE

## 2022-12-23 PROCEDURE — 82962 GLUCOSE BLOOD TEST: CPT

## 2022-12-23 PROCEDURE — 25010000002 POTASSIUM CHLORIDE PER 2 MEQ: Performed by: EMERGENCY MEDICINE

## 2022-12-23 PROCEDURE — 83935 ASSAY OF URINE OSMOLALITY: CPT | Performed by: EMERGENCY MEDICINE

## 2022-12-23 PROCEDURE — 86140 C-REACTIVE PROTEIN: CPT | Performed by: EMERGENCY MEDICINE

## 2022-12-23 PROCEDURE — 93005 ELECTROCARDIOGRAM TRACING: CPT | Performed by: EMERGENCY MEDICINE

## 2022-12-23 PROCEDURE — 83036 HEMOGLOBIN GLYCOSYLATED A1C: CPT | Performed by: INTERNAL MEDICINE

## 2022-12-23 PROCEDURE — 83930 ASSAY OF BLOOD OSMOLALITY: CPT | Performed by: EMERGENCY MEDICINE

## 2022-12-23 PROCEDURE — 84443 ASSAY THYROID STIM HORMONE: CPT | Performed by: EMERGENCY MEDICINE

## 2022-12-23 PROCEDURE — 83605 ASSAY OF LACTIC ACID: CPT | Performed by: STUDENT IN AN ORGANIZED HEALTH CARE EDUCATION/TRAINING PROGRAM

## 2022-12-23 PROCEDURE — 71045 X-RAY EXAM CHEST 1 VIEW: CPT

## 2022-12-23 PROCEDURE — 80053 COMPREHEN METABOLIC PANEL: CPT | Performed by: EMERGENCY MEDICINE

## 2022-12-23 PROCEDURE — 93010 ELECTROCARDIOGRAM REPORT: CPT | Performed by: INTERNAL MEDICINE

## 2022-12-23 PROCEDURE — 80306 DRUG TEST PRSMV INSTRMNT: CPT | Performed by: EMERGENCY MEDICINE

## 2022-12-23 PROCEDURE — 82570 ASSAY OF URINE CREATININE: CPT | Performed by: EMERGENCY MEDICINE

## 2022-12-23 PROCEDURE — 84295 ASSAY OF SERUM SODIUM: CPT | Performed by: EMERGENCY MEDICINE

## 2022-12-23 PROCEDURE — 0 POTASSIUM CHLORIDE PER 2 MEQ: Performed by: INTERNAL MEDICINE

## 2022-12-23 PROCEDURE — 85025 COMPLETE CBC W/AUTO DIFF WBC: CPT | Performed by: EMERGENCY MEDICINE

## 2022-12-23 PROCEDURE — 36415 COLL VENOUS BLD VENIPUNCTURE: CPT | Performed by: FAMILY MEDICINE

## 2022-12-23 PROCEDURE — 80179 DRUG ASSAY SALICYLATE: CPT | Performed by: EMERGENCY MEDICINE

## 2022-12-23 PROCEDURE — 83605 ASSAY OF LACTIC ACID: CPT | Performed by: EMERGENCY MEDICINE

## 2022-12-23 PROCEDURE — 80074 ACUTE HEPATITIS PANEL: CPT | Performed by: EMERGENCY MEDICINE

## 2022-12-23 PROCEDURE — 25010000002 THIAMINE PER 100 MG: Performed by: EMERGENCY MEDICINE

## 2022-12-23 PROCEDURE — 82533 TOTAL CORTISOL: CPT | Performed by: EMERGENCY MEDICINE

## 2022-12-23 PROCEDURE — 25010000002 MAGNESIUM SULFATE 2 GM/50ML SOLUTION: Performed by: EMERGENCY MEDICINE

## 2022-12-23 PROCEDURE — 63710000001 INSULIN LISPRO (HUMAN) PER 5 UNITS: Performed by: FAMILY MEDICINE

## 2022-12-23 PROCEDURE — 83735 ASSAY OF MAGNESIUM: CPT | Performed by: EMERGENCY MEDICINE

## 2022-12-23 PROCEDURE — 85027 COMPLETE CBC AUTOMATED: CPT | Performed by: FAMILY MEDICINE

## 2022-12-23 PROCEDURE — 25010000002 ONDANSETRON PER 1 MG: Performed by: FAMILY MEDICINE

## 2022-12-23 PROCEDURE — 85610 PROTHROMBIN TIME: CPT | Performed by: EMERGENCY MEDICINE

## 2022-12-23 PROCEDURE — 93005 ELECTROCARDIOGRAM TRACING: CPT | Performed by: INTERNAL MEDICINE

## 2022-12-23 PROCEDURE — 25010000002 ENOXAPARIN PER 10 MG: Performed by: FAMILY MEDICINE

## 2022-12-23 PROCEDURE — 80143 DRUG ASSAY ACETAMINOPHEN: CPT | Performed by: EMERGENCY MEDICINE

## 2022-12-23 PROCEDURE — 87636 SARSCOV2 & INF A&B AMP PRB: CPT | Performed by: EMERGENCY MEDICINE

## 2022-12-23 RX ORDER — ENOXAPARIN SODIUM 150 MG/ML
1 INJECTION SUBCUTANEOUS EVERY 12 HOURS
Status: DISCONTINUED | OUTPATIENT
Start: 2022-12-23 | End: 2022-12-23

## 2022-12-23 RX ORDER — SODIUM CHLORIDE 0.9 % (FLUSH) 0.9 %
10 SYRINGE (ML) INJECTION AS NEEDED
Status: DISCONTINUED | OUTPATIENT
Start: 2022-12-23 | End: 2022-12-26 | Stop reason: HOSPADM

## 2022-12-23 RX ORDER — MAGNESIUM SULFATE HEPTAHYDRATE 40 MG/ML
4 INJECTION, SOLUTION INTRAVENOUS AS NEEDED
Status: DISCONTINUED | OUTPATIENT
Start: 2022-12-23 | End: 2022-12-26 | Stop reason: HOSPADM

## 2022-12-23 RX ORDER — NICOTINE POLACRILEX 4 MG
15 LOZENGE BUCCAL
Status: DISCONTINUED | OUTPATIENT
Start: 2022-12-23 | End: 2022-12-26 | Stop reason: HOSPADM

## 2022-12-23 RX ORDER — CHLORHEXIDINE GLUCONATE 500 MG/1
1 CLOTH TOPICAL ONCE
Status: COMPLETED | OUTPATIENT
Start: 2022-12-23 | End: 2022-12-23

## 2022-12-23 RX ORDER — ENOXAPARIN SODIUM 100 MG/ML
40 INJECTION SUBCUTANEOUS EVERY 24 HOURS
Status: DISCONTINUED | OUTPATIENT
Start: 2022-12-24 | End: 2022-12-26 | Stop reason: HOSPADM

## 2022-12-23 RX ORDER — DESMOPRESSIN ACETATE 4 UG/ML
2 INJECTION, SOLUTION INTRAVENOUS; SUBCUTANEOUS EVERY 6 HOURS PRN
Status: DISCONTINUED | OUTPATIENT
Start: 2022-12-23 | End: 2022-12-25

## 2022-12-23 RX ORDER — DEXTROSE MONOHYDRATE 50 MG/ML
40 INJECTION, SOLUTION INTRAVENOUS CONTINUOUS
Status: DISCONTINUED | OUTPATIENT
Start: 2022-12-23 | End: 2022-12-24

## 2022-12-23 RX ORDER — POTASSIUM CHLORIDE 14.9 MG/ML
20 INJECTION INTRAVENOUS ONCE
Status: COMPLETED | OUTPATIENT
Start: 2022-12-23 | End: 2022-12-23

## 2022-12-23 RX ORDER — SODIUM CHLORIDE 9 MG/ML
40 INJECTION, SOLUTION INTRAVENOUS AS NEEDED
Status: DISCONTINUED | OUTPATIENT
Start: 2022-12-23 | End: 2022-12-26 | Stop reason: HOSPADM

## 2022-12-23 RX ORDER — CHLORHEXIDINE GLUCONATE 500 MG/1
1 CLOTH TOPICAL EVERY 24 HOURS
Status: DISCONTINUED | OUTPATIENT
Start: 2022-12-24 | End: 2022-12-25

## 2022-12-23 RX ORDER — MAGNESIUM SULFATE HEPTAHYDRATE 40 MG/ML
2 INJECTION, SOLUTION INTRAVENOUS AS NEEDED
Status: DISCONTINUED | OUTPATIENT
Start: 2022-12-23 | End: 2022-12-26 | Stop reason: HOSPADM

## 2022-12-23 RX ORDER — ENOXAPARIN SODIUM 100 MG/ML
40 INJECTION SUBCUTANEOUS EVERY 24 HOURS
Status: DISCONTINUED | OUTPATIENT
Start: 2022-12-23 | End: 2022-12-23

## 2022-12-23 RX ORDER — POTASSIUM CHLORIDE 750 MG/1
40 CAPSULE, EXTENDED RELEASE ORAL AS NEEDED
Status: DISCONTINUED | OUTPATIENT
Start: 2022-12-23 | End: 2022-12-26 | Stop reason: HOSPADM

## 2022-12-23 RX ORDER — DEXTROSE MONOHYDRATE 25 G/50ML
25 INJECTION, SOLUTION INTRAVENOUS
Status: DISCONTINUED | OUTPATIENT
Start: 2022-12-23 | End: 2022-12-26 | Stop reason: HOSPADM

## 2022-12-23 RX ORDER — POTASSIUM CHLORIDE 29.8 MG/ML
20 INJECTION INTRAVENOUS
Status: DISCONTINUED | OUTPATIENT
Start: 2022-12-23 | End: 2022-12-26 | Stop reason: HOSPADM

## 2022-12-23 RX ORDER — ACETAMINOPHEN 325 MG/1
650 TABLET ORAL EVERY 4 HOURS PRN
Status: DISCONTINUED | OUTPATIENT
Start: 2022-12-23 | End: 2022-12-26 | Stop reason: HOSPADM

## 2022-12-23 RX ORDER — ONDANSETRON 2 MG/ML
4 INJECTION INTRAMUSCULAR; INTRAVENOUS EVERY 6 HOURS PRN
Status: DISCONTINUED | OUTPATIENT
Start: 2022-12-23 | End: 2022-12-26 | Stop reason: HOSPADM

## 2022-12-23 RX ORDER — 3% SODIUM CHLORIDE 3 G/100ML
40 INJECTION, SOLUTION INTRAVENOUS CONTINUOUS
Status: DISPENSED | OUTPATIENT
Start: 2022-12-23 | End: 2022-12-24

## 2022-12-23 RX ORDER — MAGNESIUM SULFATE HEPTAHYDRATE 40 MG/ML
2 INJECTION, SOLUTION INTRAVENOUS ONCE
Status: COMPLETED | OUTPATIENT
Start: 2022-12-23 | End: 2022-12-23

## 2022-12-23 RX ORDER — POTASSIUM CHLORIDE 1.5 G/1.77G
40 POWDER, FOR SOLUTION ORAL AS NEEDED
Status: DISCONTINUED | OUTPATIENT
Start: 2022-12-23 | End: 2022-12-26 | Stop reason: HOSPADM

## 2022-12-23 RX ORDER — DEXTROSE MONOHYDRATE 50 MG/ML
6 INJECTION, SOLUTION INTRAVENOUS CONTINUOUS PRN
Status: DISCONTINUED | OUTPATIENT
Start: 2022-12-23 | End: 2022-12-25

## 2022-12-23 RX ORDER — SODIUM CHLORIDE 0.9 % (FLUSH) 0.9 %
10 SYRINGE (ML) INJECTION EVERY 12 HOURS SCHEDULED
Status: DISCONTINUED | OUTPATIENT
Start: 2022-12-23 | End: 2022-12-26 | Stop reason: HOSPADM

## 2022-12-23 RX ORDER — SODIUM CHLORIDE 9 MG/ML
75 INJECTION, SOLUTION INTRAVENOUS CONTINUOUS
Status: DISCONTINUED | OUTPATIENT
Start: 2022-12-23 | End: 2022-12-24

## 2022-12-23 RX ORDER — ACETAMINOPHEN 500 MG
1000 TABLET ORAL ONCE
Status: DISCONTINUED | OUTPATIENT
Start: 2022-12-23 | End: 2022-12-23

## 2022-12-23 RX ORDER — FAMOTIDINE 10 MG/ML
20 INJECTION, SOLUTION INTRAVENOUS EVERY 12 HOURS SCHEDULED
Status: DISCONTINUED | OUTPATIENT
Start: 2022-12-23 | End: 2022-12-25 | Stop reason: CLARIF

## 2022-12-23 RX ORDER — INSULIN LISPRO 100 [IU]/ML
2-7 INJECTION, SOLUTION INTRAVENOUS; SUBCUTANEOUS
Status: DISCONTINUED | OUTPATIENT
Start: 2022-12-23 | End: 2022-12-26 | Stop reason: HOSPADM

## 2022-12-23 RX ADMIN — SODIUM CHLORIDE 75 ML/HR: 9 INJECTION, SOLUTION INTRAVENOUS at 15:45

## 2022-12-23 RX ADMIN — ONDANSETRON 4 MG: 2 INJECTION INTRAMUSCULAR; INTRAVENOUS at 04:40

## 2022-12-23 RX ADMIN — Medication 10 ML: at 20:01

## 2022-12-23 RX ADMIN — POTASSIUM CHLORIDE 20 MEQ: 29.8 INJECTION, SOLUTION INTRAVENOUS at 16:58

## 2022-12-23 RX ADMIN — POTASSIUM CHLORIDE 20 MEQ: 14.9 INJECTION, SOLUTION INTRAVENOUS at 03:42

## 2022-12-23 RX ADMIN — POTASSIUM & SODIUM PHOSPHATES POWDER PACK 280-160-250 MG 2 PACKET: 280-160-250 PACK at 15:48

## 2022-12-23 RX ADMIN — SODIUM CHLORIDE 1000 ML: 9 INJECTION, SOLUTION INTRAVENOUS at 01:41

## 2022-12-23 RX ADMIN — SODIUM CHLORIDE 40 ML/HR: 3 INJECTION, SOLUTION INTRAVENOUS at 03:21

## 2022-12-23 RX ADMIN — Medication 10 ML: at 12:43

## 2022-12-23 RX ADMIN — POTASSIUM CHLORIDE 20 MEQ: 29.8 INJECTION, SOLUTION INTRAVENOUS at 18:57

## 2022-12-23 RX ADMIN — INSULIN LISPRO 3 UNITS: 100 INJECTION, SOLUTION INTRAVENOUS; SUBCUTANEOUS at 07:32

## 2022-12-23 RX ADMIN — THIAMINE HYDROCHLORIDE 100 MG: 100 INJECTION, SOLUTION INTRAMUSCULAR; INTRAVENOUS at 03:08

## 2022-12-23 RX ADMIN — CHLORHEXIDINE GLUCONATE 1 APPLICATION: 500 CLOTH TOPICAL at 15:13

## 2022-12-23 RX ADMIN — FAMOTIDINE 20 MG: 10 INJECTION INTRAVENOUS at 20:01

## 2022-12-23 RX ADMIN — INSULIN LISPRO 2 UNITS: 100 INJECTION, SOLUTION INTRAVENOUS; SUBCUTANEOUS at 17:03

## 2022-12-23 RX ADMIN — ENOXAPARIN SODIUM 40 MG: 100 INJECTION SUBCUTANEOUS at 06:12

## 2022-12-23 RX ADMIN — POTASSIUM CHLORIDE 20 MEQ: 29.8 INJECTION, SOLUTION INTRAVENOUS at 15:13

## 2022-12-23 RX ADMIN — MAGNESIUM SULFATE HEPTAHYDRATE 2 G: 2 INJECTION, SOLUTION INTRAVENOUS at 04:03

## 2022-12-23 RX ADMIN — DEXTROSE MONOHYDRATE 40 ML/HR: 50 INJECTION, SOLUTION INTRAVENOUS at 13:17

## 2022-12-23 NOTE — ED NOTES
Pt given toothbrush per request.   Pt resting comfortably with no distress noted. Resp even and unlabored. Skin warm and dry. Call light within reach.

## 2022-12-23 NOTE — ED PROVIDER NOTES
Subjective   History of Present Illness  Patient is a diabetic and that is diet controlled recently has been binge drinking and has been feeling bad came to the ER to feel better there is no chest pain there is no shortness of breath there is no fever there is no abdominal pain there is no headaches just does not feel good    Illness  Location:  Generalized malaise drinking alcohol  Severity:  Moderate  Onset quality:  Gradual  Timing:  Constant  Progression:  Worsening  Chronicity:  New  Associated symptoms: fatigue, myalgias and nausea    Associated symptoms: no abdominal pain, no chest pain, no congestion, no cough, no diarrhea, no ear pain, no fever, no headaches, no loss of consciousness, no rash, no rhinorrhea, no shortness of breath, no sore throat, no vomiting and no wheezing        Review of Systems   Constitutional: Positive for fatigue. Negative for fever.   HENT: Negative.  Negative for congestion, ear pain, rhinorrhea and sore throat.    Eyes: Negative.    Respiratory: Negative.  Negative for cough, shortness of breath and wheezing.    Cardiovascular: Negative.  Negative for chest pain.   Gastrointestinal: Positive for nausea. Negative for abdominal pain, diarrhea and vomiting.   Endocrine: Negative.    Genitourinary: Negative.    Musculoskeletal: Positive for myalgias.   Skin: Negative.  Negative for rash.   Neurological: Negative.  Negative for loss of consciousness and headaches.   Hematological: Negative.    All other systems reviewed and are negative.      Past Medical History:   Diagnosis Date   • Diabetes mellitus (HCC)    • Gout    • Hypertension        No Known Allergies    Past Surgical History:   Procedure Laterality Date   • VASECTOMY         History reviewed. No pertinent family history.    Social History     Socioeconomic History   • Marital status: Single   Tobacco Use   • Smoking status: Some Days     Packs/day: 0.50     Years: 10.00     Pack years: 5.00     Types: Cigarettes     Start  date: 2010   • Smokeless tobacco: Never   Vaping Use   • Vaping Use: Never used   Substance and Sexual Activity   • Alcohol use: Yes     Alcohol/week: 5.0 standard drinks     Types: 5 Cans of beer per week   • Drug use: Never   • Sexual activity: Yes           Objective   Physical Exam  Vitals and nursing note reviewed. Exam conducted with a chaperone present.   Constitutional:       General: He is awake. He is not in acute distress.     Appearance: Normal appearance. He is well-developed. He is morbidly obese. He is ill-appearing. He is not toxic-appearing.   HENT:      Head: Normocephalic and atraumatic.      Jaw: No trismus.      Comments: Patient has a very large face no buffalo hump     Nose:      Right Nostril: No epistaxis.      Left Nostril: No epistaxis.   Eyes:      General: Lids are normal. No scleral icterus.     Conjunctiva/sclera: Conjunctivae normal.      Pupils: Pupils are equal, round, and reactive to light.   Neck:      Vascular: No hepatojugular reflux or JVD.   Cardiovascular:      Rate and Rhythm: Tachycardia present. Rhythm irregular.      Chest Wall: PMI is not displaced.      Pulses: Normal pulses. No decreased pulses.      Heart sounds: Normal heart sounds. No murmur heard.   No diastolic murmur is present.  Pulmonary:      Effort: Pulmonary effort is normal. No accessory muscle usage or respiratory distress.      Breath sounds: Normal breath sounds. No stridor. No decreased breath sounds, wheezing or rhonchi.   Abdominal:      General: Abdomen is protuberant. Bowel sounds are decreased. There is no distension or abdominal bruit.      Palpations: Abdomen is soft. There is no shifting dullness, fluid wave, mass or pulsatile mass.      Tenderness: There is no abdominal tenderness. There is no right CVA tenderness, left CVA tenderness, guarding or rebound.      Hernia: No hernia is present.   Musculoskeletal:         General: No swelling. Normal range of motion.      Cervical back: Normal range  of motion and neck supple. No rigidity.      Right lower le+ Edema present.      Left lower le+ Edema present.      Comments: Homans' sign is negative   Skin:     General: Skin is warm and dry.      Capillary Refill: Capillary refill takes less than 2 seconds.      Coloration: Skin is not cyanotic, jaundiced, mottled or pale.   Neurological:      General: No focal deficit present.      Mental Status: He is alert and oriented to person, place, and time. Mental status is at baseline.      GCS: GCS eye subscore is 4. GCS verbal subscore is 5. GCS motor subscore is 6.      Cranial Nerves: Cranial nerves are intact and 2-12 are intact. No cranial nerve deficit or facial asymmetry.      Sensory: Sensation is intact.      Motor: Motor function is intact. No weakness or tremor.      Deep Tendon Reflexes: Reflexes are normal and symmetric.      Comments: Moving all 4 extremities spontaneously and on command.   Psychiatric:         Behavior: Behavior normal. Behavior is cooperative.         Procedures           ED Course  ED Course as of 22 0355   Fri Dec 23, 2022   0158 His EKG has got some baseline artifact there is some PVCs which are noted but he is got sinus arrhythmia mixed with some PACs and 1 thing this is A. fib [TS]   0343 Elevated procalcitonin is secondary to chronic liver disease there is no clinical evidence of sepsis as lactic acid is not up because of sepsis may be hypoperfusion the patient is diabetic may be taking metformin we do not have the complete list available to us. [TS]   0355 There is no family member available I have discussed the guarded prognosis of this patient with the patient especially with very low sodium and electrolyte abnormalities the etiology of which will have to be worked up. [TS]      ED Course User Index  [TS] Lorenzo Quiñonez MD                                           MDM  Number of Diagnoses or Management Options  Abnormal EKG  Alcoholic liver disease  (HCC)  Hypokalemia  Hypomagnesemia  Hyponatremia  Hyposmolality syndrome  Thrombocytopenia (HCC)  Diagnosis management comments: Patient with generalized fatigue and malaise and weakness and drinking alcohol  This patient came with generalized weakness differential could be a toxic metabolic etiology possible exposure to environmental toxins or new medications.  This could be secondary to hypoglycemia, hyperglycemia, diabetic ketoacidosis, drug overdose, ethanol intoxication, possibility of thiamine deficiency cannot be excluded generalized weakness could be because of hypothermia hyponatremia hypernatremia organ failure liver failure kidney failure are among  some of the differentials.         Amount and/or Complexity of Data Reviewed  Clinical lab tests: ordered and reviewed  Tests in the radiology section of CPT®: ordered and reviewed  Tests in the medicine section of CPT®: reviewed and ordered  Decide to obtain previous medical records or to obtain history from someone other than the patient: yes  Review and summarize past medical records: yes  Discuss the patient with other providers: yes  Independent visualization of images, tracings, or specimens: yes    Risk of Complications, Morbidity, and/or Mortality  Presenting problems: high  Diagnostic procedures: high  Management options: high  General comments: This patient came in with generalized weakness he states he had drank alcohol on a regular basis.  Work-up was initiated to IV access is obtained.  Lab work-up was obtained on this patient.  His EKG shows ectopy I1 thing he is in A. fib with more looks like PACs with a sinus arrhythmia.  But the patient has enough metabolic electrolyte abnormality to have cardiac dysrhythmias.  His serum osmolality was checked and there is no osmolar gap between measured and calculated serum osmolality therefore the possibility of methylene ethylene glycol and methanol are less likely especially with a mildly elevated anion  gap and alkalotic venous blood gas.  The patient's alcohol level is not toxic.  He has got pretty significant hyponatremia and hypokalemia along with hypomagnesemia.  His liver enzymes are elevated with elevated bilirubin related to his alcohol binge drinking with a MCV of 102 and thrombocytopenia and mild prolongation of pro time.  Overall the patient may have drank excessive water for some reason versus this could be acute inappropriate secretion of antidiuretic hormone.  His urine electrolytes and osmolality are pending to further delineate this problem.  For now I have placed him on hypertonic saline after giving a liter normal saline his electrolytes are being repleted with potassium and magnesium.  I have also ordered a desmopressin clamp since the risk of this gentleman developing rapid correction of sodium and untoward effect of rapid correction are very high especially with him being alcoholic and a younger person.  He needs to be admitted to the unit I have discussed this case with the hospitalist.        Final diagnoses:   Hyponatremia   Hypokalemia   Hypomagnesemia   Hyposmolality syndrome   Abnormal EKG   Thrombocytopenia (HCC)   Alcoholic liver disease (HCC)       ED Disposition  ED Disposition     ED Disposition   Decision to Admit    Condition   --    Comment   Level of Care: Critical Care [6]   Diagnosis: Hyponatremia [[id number]]   Admitting Physician: GABRIEL HESTER [[id number]]   Attending Physician: GABRIEL HESTER [[id number]]   Certification: I Certify That Inpatient Hospital Services Are Medically Necessary For Greater Than 2 Midnights               No follow-up provider specified.       Medication List      No changes were made to your prescriptions during this visit.          Lorenzo Quiñonez MD  12/23/22 0344       Lorenzo Quiñonez MD  12/23/22 0346       Lorenzo Quiñonez MD  12/23/22 0357

## 2022-12-23 NOTE — PAYOR COMM NOTE
12/23/22 [facility] [phone]    [phone]    ER ADMIT TO INPATIENT ON 12/23/22. FAXING FOR INPATIENT REVIEW.            Love Howell (37 y.o. Male)     Date of Birth   1985    Social Security Number       Address   [address] [place] [address]    Home Phone   [phone]    MRN   [id number]       Jew   Unknown    Marital Status   Single                            Admission Date   12/23/22    Admission Type   Emergency    Admitting Provider   Roderick Field MD    Attending Provider   Roderick Field MD    Department, Room/Bed   [facility] Emergency Department, 21/21       Discharge Date       Discharge Disposition       Discharge Destination                               Attending Provider: Roderick Field MD    Allergies: No Known Allergies    Isolation: None   Infection: None   Code Status: CPR    Ht: 190.5 cm (75\")   Wt: 118 kg (260 lb)    Admission Cmt: None   Principal Problem: Hyponatremia [E87.1]                 Active Insurance as of 12/23/2022     Primary Coverage     Payor Plan Insurance Group Employer/Plan Group    R UMR [id number]     Payor Plan Address Payor Plan Phone Number Payor Plan Fax Number Effective Dates    [address] [address] [phone]  8/1/2020 - None Entered    [facility] [id number]       Subscriber Name Subscriber Birth Date Member ID       LOVE HOWELL 1985 [id number]                 Emergency Contacts          No emergency contacts on file.           [facility] Encounter Date/Time: 12/23/2022 [facility]0   Hospital Account: [id number]    MRN: [id number]   Patient:  Love Howell   Contact Serial #: [id number]   SSN:          ENCOUNTER             Patient Class: Inpatient   Unit: [facility] ED   Hospital Service: Medicine     Bed: 21/21   Admitting Provider: Roderick Field*   Referring Physician: Liset Klein   Attending Provider: Roderick Field  [place]*   Adm Diagnosis: Hyponatremia [E87.1]               PATIENT          Name: Love Howell : 1985 (37 yrs)   Address: [address] [place] [address] Sex: Male   City: [place] [address]   County: [facility]   Marital Status: SINGLE Ethnicity: NOT                                                                              Race: WHITE   Primary Care Provider: Provider, No Known Patients Phone: Home Phone: [phone]     Mobile Phone: [phone]   EMERGENCY CONTACT   Contact Name Legal Guardian? Relationship to Patient Home Phone Work Phone   1. *No Contact Specified*  2. *No Contact Specified*                          GUARANTOR            Guarantor: Love Howell     : 1985   Address: [address] [place] [address] Sex: Male     [place] [address]     Relation to Patient: Self       Home Phone: [phone]   Guarantor ID: [id number]       Work Phone:     GUARANTOR EMPLOYER   Employer: [email or web address]         Status: FULL TIME   COVERAGE          PRIMARY INSURANCE   Payor: UMR Plan: UMR   Group Number: [id number] Insurance Type: INDEMNITY   Subscriber Name: LOVE HOWELL Subscriber : 1985   Subscriber ID: [id number] Coverage Address: [facility] [address]  [place] [address]   Pat. Rel. to Subscriber: Self Coverage Phone: [phone]   SECONDARY INSURANCE   Payor: N/A Plan: N/A   Group Number:   Insurance Type:     Subscriber Name:   Subscriber :     Subscriber ID:   Coverage Address:     Pat. Rel. to Subscriber:   Coverage Phone:        Contact Serial # [id number])       2022    Chart ID (No chart ID available)              Lorenzo Quiñonez MD   Physician  Specialty:  Emergency Medicine  ED Provider Notes      Addendum  Date of Service:  22  Creation Time:  22     Expand AllCollapse All       Subjective      History of Present Illness  Patient is a diabetic and that is diet controlled recently has been binge drinking and has been feeling bad came to the ER to feel  better there is no chest pain there is no shortness of breath there is no fever there is no abdominal pain there is no headaches just does not feel good     Illness  Location:  Generalized malaise drinking alcohol  Severity:  Moderate  Onset quality:  Gradual  Timing:  Constant  Progression:  Worsening  Chronicity:  New  Associated symptoms: fatigue, myalgias and nausea    Associated symptoms: no abdominal pain, no chest pain, no congestion, no cough, no diarrhea, no ear pain, no fever, no headaches, no loss of consciousness, no rash, no rhinorrhea, no shortness of breath, no sore throat, no vomiting and no wheezing          Review of Systems   Constitutional: Positive for fatigue. Negative for fever.   HENT: Negative.  Negative for congestion, ear pain, rhinorrhea and sore throat.    Eyes: Negative.    Respiratory: Negative.  Negative for cough, shortness of breath and wheezing.    Cardiovascular: Negative.  Negative for chest pain.   Gastrointestinal: Positive for nausea. Negative for abdominal pain, diarrhea and vomiting.   Endocrine: Negative.    Genitourinary: Negative.    Musculoskeletal: Positive for myalgias.   Skin: Negative.  Negative for rash.   Neurological: Negative.  Negative for loss of consciousness and headaches.   Hematological: Negative.    All other systems reviewed and are negative.        Medical History        Past Medical History:   Diagnosis Date   • Diabetes mellitus (HCC)     • Gout     • Hypertension               Objective      Physical Exam  Vitals and nursing note reviewed. Exam conducted with a chaperone present.   Constitutional:       General: He is awake. He is not in acute distress.     Appearance: Normal appearance. He is well-developed. He is morbidly obese. He is ill-appearing. He is not toxic-appearing.   HENT:      Head: Normocephalic and atraumatic.      Jaw: No trismus.      Comments: Patient has a very large face no buffalo hump     Nose:      Right Nostril: No epistaxis.       Left Nostril: No epistaxis.   Eyes:      General: Lids are normal. No scleral icterus.     Conjunctiva/sclera: Conjunctivae normal.      Pupils: Pupils are equal, round, and reactive to light.   Neck:      Vascular: No hepatojugular reflux or JVD.   Cardiovascular:      Rate and Rhythm: Tachycardia present. Rhythm irregular.      Chest Wall: PMI is not displaced.      Pulses: Normal pulses. No decreased pulses.      Heart sounds: Normal heart sounds. No murmur heard.   No diastolic murmur is present.  Pulmonary:      Effort: Pulmonary effort is normal. No accessory muscle usage or respiratory distress.      Breath sounds: Normal breath sounds. No stridor. No decreased breath sounds, wheezing or rhonchi.   Abdominal:      General: Abdomen is protuberant. Bowel sounds are decreased. There is no distension or abdominal bruit.      Palpations: Abdomen is soft. There is no shifting dullness, fluid wave, mass or pulsatile mass.      Tenderness: There is no abdominal tenderness. There is no right CVA tenderness, left CVA tenderness, guarding or rebound.      Hernia: No hernia is present.   Musculoskeletal:         General: No swelling. Normal range of motion.      Cervical back: Normal range of motion and neck supple. No rigidity.      Right lower le+ Edema present.      Left lower le+ Edema present.      Comments: Homans' sign is negative   Skin:     General: Skin is warm and dry.      Capillary Refill: Capillary refill takes less than 2 seconds.      Coloration: Skin is not cyanotic, jaundiced, mottled or pale.   Neurological:      General: No focal deficit present.      Mental Status: He is alert and oriented to person, place, and time. Mental status is at baseline.      GCS: GCS eye subscore is 4. GCS verbal subscore is 5. GCS motor subscore is 6.      Cranial Nerves: Cranial nerves are intact and 2-12 are intact. No cranial nerve deficit or facial asymmetry.      Sensory: Sensation is intact.      Motor:  Motor function is intact. No weakness or tremor.      Deep Tendon Reflexes: Reflexes are normal and symmetric.      Comments: Moving all 4 extremities spontaneously and on command.   Psychiatric:         Behavior: Behavior normal. Behavior is cooperative.            Procedures                 ED Course   as of 12/23/22 0355   Fri Dec 23, 2022   0158 His EKG has got some baseline artifact there is some PVCs which are noted but he is got sinus arrhythmia mixed with some PACs and 1 thing this is A. fib [TS]   0343 Elevated procalcitonin is secondary to chronic liver disease there is no clinical evidence of sepsis as lactic acid is not up because of sepsis may be hypoperfusion the patient is diabetic may be taking metformin we do not have the complete list available to us. [TS]   0355 There is no family member available I have discussed the guarded prognosis of this patient with the patient especially with very low sodium and electrolyte abnormalities the etiology of which will have to be worked up. [TS]       ED Course User Index  [TS] Lorenzo Quiñonez MD                                    MDM  Number of Diagnoses or Management Options  Abnormal EKG  Alcoholic liver disease (HCC)  Hypokalemia  Hypomagnesemia  Hyponatremia  Hyposmolality syndrome  Thrombocytopenia (HCC)  Diagnosis management comments: Patient with generalized fatigue and malaise and weakness and drinking alcohol  This patient came with generalized weakness differential could be a toxic metabolic etiology possible exposure to environmental toxins or new medications.  This could be secondary to hypoglycemia, hyperglycemia, diabetic ketoacidosis, drug overdose, ethanol intoxication, possibility of thiamine deficiency cannot be excluded generalized weakness could be because of hypothermia hyponatremia hypernatremia organ failure liver failure kidney failure are among  some of the differentials.     Risk of Complications, Morbidity, and/or  Mortality  Presenting problems: high  Diagnostic procedures: high  Management options: high  General comments: This patient came in with generalized weakness he states he had drank alcohol on a regular basis.  Work-up was initiated to IV access is obtained.  Lab work-up was obtained on this patient.  His EKG shows ectopy I1 thing he is in A. fib with more looks like PACs with a sinus arrhythmia.  But the patient has enough metabolic electrolyte abnormality to have cardiac dysrhythmias.  His serum osmolality was checked and there is no osmolar gap between measured and calculated serum osmolality therefore the possibility of methylene ethylene glycol and methanol are less likely especially with a mildly elevated anion gap and alkalotic venous blood gas.  The patient's alcohol level is not toxic.  He has got pretty significant hyponatremia and hypokalemia along with hypomagnesemia.  His liver enzymes are elevated with elevated bilirubin related to his alcohol binge drinking with a MCV of 102 and thrombocytopenia and mild prolongation of pro time.  Overall the patient may have drank excessive water for some reason versus this could be acute inappropriate secretion of antidiuretic hormone.  His urine electrolytes and osmolality are pending to further delineate this problem.  For now I have placed him on hypertonic saline after giving a liter normal saline his electrolytes are being repleted with potassium and magnesium.  I have also ordered a desmopressin clamp since the risk of this gentleman developing rapid correction of sodium and untoward effect of rapid correction are very high especially with him being alcoholic and a younger person.  He needs to be admitted to the unit I have discussed this case with the hospitalist.           Final diagnoses:   Hyponatremia   Hypokalemia   Hypomagnesemia   Hyposmolality syndrome   Abnormal EKG   Thrombocytopenia (HCC)   Alcoholic liver disease (HCC)         ED Disposition          ED Disposition             ED Disposition   Decision to Admit    Condition   --    Comment   Level of Care: Critical Care [6]   Diagnosis: Hyponatremia [198519]   Admitting Physician: GABRIEL KLEIN [[id number]]   Attending Physician: GABRIEL KLEIN [[id number]]   Certification: I Certify That Inpatient Hospital Services Are Medically Necessary For Greater Than 2 Midnights            Lorenzo Quiñonez MD  12/23/22 0344           Gabriel Klein DO   Physician  Medicine  H&P      Signed  Date of Service:  12/23/22 0428  Creation Time:  12/23/22 0428     Signed        [facility]  HISTORY AND PHYSICAL     Date of Admission: 12/23/2022  Primary Care Physician: Provider, No Known     Subjective   Primary Historian: patient     Chief Complaint: Numbness in his feet and hands, does not feel good, nausea with vomiting, diarrhea     History of Present Illness  Patient is remarkably poor historian, asked him why he came to the hospital he said his feet and hands had numbness in them and he just did not feel good.  Upon further questioning he complained of nausea but no vomiting he did have diarrhea he is actually been vomiting since he has been in the hospital.  He denies shortness of breath and chest pain.  Denies abdominal pain.  He notes he is eating okay.  He has some swelling in his legs some.  He states he does drink alcohol.  He had 2 beers yesterday.  The alcohol screen is negative.           Review of Systems   Constitutional: Negative.    HENT: Negative.    Eyes: Negative.    Respiratory: Negative.    Cardiovascular: Positive for leg swelling.   Gastrointestinal: Positive for nausea and vomiting.   Endocrine: Negative.    Genitourinary: Negative.    Musculoskeletal: Negative.    Skin: Negative.    Allergic/Immunologic: Negative.    Neurological: Negative.    Hematological: Negative.    Psychiatric/Behavioral: Negative.                    Past Medical History:   Medical History        Past Medical History:   Diagnosis Date   • Diabetes mellitus (HCC)     • Gout     • Hypertension           Vital Signs: /90   Pulse 112   Temp 99.2 °F (37.3 °C)   Resp 20   Ht 190.5 cm (75\")   Wt 118 kg (260 lb)   SpO2 96%   BMI 32.50 kg/m²   Physical Exam  Vitals and nursing note reviewed.   Constitutional:       Appearance: Normal appearance.   HENT:      Head: Normocephalic and atraumatic.      Right Ear: External ear normal.      Left Ear: External ear normal.      Nose: Nose normal.      Mouth/Throat:      Mouth: Mucous membranes are moist.   Eyes:      Extraocular Movements: Extraocular movements intact.      Conjunctiva/sclera: Conjunctivae normal.      Pupils: Pupils are equal, round, and reactive to light.   Cardiovascular:      Rate and Rhythm: Normal rate and regular rhythm.      Pulses: Normal pulses.      Heart sounds: No murmur heard.    No friction rub. No gallop.   Pulmonary:      Effort: Pulmonary effort is normal.      Breath sounds: Normal breath sounds.   Abdominal:      General: Bowel sounds are normal.      Palpations: Abdomen is soft.   Musculoskeletal:         General: Normal range of motion.      Cervical back: Normal range of motion and neck supple.   Skin:     General: Skin is warm and dry.      Capillary Refill: Capillary refill takes less than 2 seconds.   Neurological:      General: No focal deficit present.      Mental Status: He is alert and oriented to person, place, and time.      Cranial Nerves: No cranial nerve deficit.   Psychiatric:         Mood and Affect: Mood normal.         Behavior: Behavior normal.      Results Reviewed:           Lab Results (last 24 hours)      Procedure Component Value Units Date/Time     Potassium [[id number]]  (Abnormal) Collected: 12/23/22 0332     Specimen: Blood Updated: 12/23/22 0359       Potassium 2.3 mmol/L       Sodium [[id number]]  (Abnormal) Collected: 12/23/22 0332     Specimen:  Blood Updated: 12/23/22 0359       Sodium 110 mmol/L       TSH [[id number]]  (Normal) Collected: 12/23/22 0129     Specimen: Blood from Arm, Right Updated: 12/23/22 0334       TSH 2.660 uIU/mL       Magnesium [[id number]]  (Abnormal) Collected: 12/23/22 0129     Specimen: Blood from Arm, Right Updated: 12/23/22 0327       Magnesium 0.8 mg/dL       Lactate Dehydrogenase [[id number]]  (Abnormal) Collected: 12/23/22 0129     Specimen: Blood from Arm, Right Updated: 12/23/22 0327        U/L       Cortisol [[id number]] Collected: 12/23/22 0129     Specimen: Blood from Arm, Right Updated: 12/23/22 0310     Hepatitis Panel, Acute [[id number]] Collected: 12/23/22 0129     Specimen: Blood from Arm, Right Updated: 12/23/22 0310     CBC & Differential [[id number]]  (Abnormal) Collected: 12/23/22 0129     Specimen: Blood from Arm, Right Updated: 12/23/22 0308     Narrative:       The following orders were created for panel order CBC & Differential.  Procedure                               Abnormality         Status                     ---------                               -----------         ------                     CBC Auto Differential[[id number]]        Abnormal            Final result                  Please view results for these tests on the individual orders.     CBC Auto Differential [[id number]]  (Abnormal) Collected: 12/23/22 0129     Specimen: Blood from Arm, Right Updated: 12/23/22 0308       WBC 10.67 10*3/mm3         RBC 4.96 10*6/mm3         Hemoglobin 17.1 g/dL         Hematocrit 51.0 %         .8 fL         MCH 34.5 pg         MCHC 33.5 g/dL         RDW 12.3 %         RDW-SD 39.9 fl         MPV 11.6 fL         Platelets 123 10*3/mm3         Neutrophil % 81.9 %         Lymphocyte % 6.6 %         Monocyte % 9.8 %         Eosinophil % 0.7 %         Basophil % 0.2 %         Neutrophils, Absolute 8.73 10*3/mm3         Lymphocytes, Absolute 0.70 10*3/mm3         Monocytes, Absolute 1.05 10*3/mm3          Eosinophils, Absolute 0.08 10*3/mm3         Basophils, Absolute 0.02 10*3/mm3       [place] Draw [[id number]] Collected: 12/23/22 0132     Specimen: Blood from Arm, Right Updated: 12/23/22 0245     Narrative:       The following orders were created for panel order [place] Draw.  Procedure                               Abnormality         Status                     ---------                               -----------         ------                     [place] Blood Culture Eder...[[id number]]                      Final result                  Please view results for these tests on the individual orders.     [place] Blood Culture Bottle Set [[id number]] Collected: 12/23/22 0132     Specimen: Blood from Arm, Right Updated: 12/23/22 0245       Extra Tube Hold for add-ons.       Comment: Auto resulted.        Osmolality, Serum [[id number]]  (Abnormal) Collected: 12/23/22 0129     Specimen: Blood from Arm, Right Updated: 12/23/22 0242       Osmolality 233 mOsm/kg       Lactic Acid, Plasma [[id number]]  (Abnormal) Collected: 12/23/22 0129     Specimen: Blood from Arm, Right Updated: 12/23/22 0237       Lactate 5.7 mmol/L       Comprehensive Metabolic Panel [[id number]]  (Abnormal) Collected: 12/23/22 0129     Specimen: Blood from Arm, Right Updated: 12/23/22 0237       Glucose 196 mg/dL         BUN 4 mg/dL         Creatinine 0.67 mg/dL         Sodium 110 mmol/L         Potassium 2.6 mmol/L         Chloride 60 mmol/L         CO2 31.0 mmol/L         Calcium 8.5 mg/dL         Total Protein 7.2 g/dL         Albumin 4.10 g/dL         ALT (SGPT) 98 U/L         AST (SGOT) 258 U/L         Alkaline Phosphatase 130 U/L         Total Bilirubin 3.2 mg/dL         Globulin 3.1 gm/dL         A/G Ratio 1.3 g/dL         BUN/Creatinine Ratio 6.0       Anion Gap 19.0 mmol/L         eGFR 123.3 mL/min/1.73         Comment: National Kidney Foundation and American Society of Nephrology (ASN) Task Force recommended calculation based on the Chronic Kidney  Disease Epidemiology Collaboration (CKD-EPI) equation refit without adjustment for race.        Narrative:       GFR Normal >60  Chronic Kidney Disease <60  Kidney Failure <15        Procalcitonin [[id number]]  (Abnormal) Collected: 12/23/22 0129     Specimen: Blood from Arm, Right Updated: 12/23/22 0230       Procalcitonin 0.42 ng/mL       Narrative:       As a Marker for Sepsis (Non-Neonates):     1. <0.5 ng/mL represents a low risk of severe sepsis and/or septic shock.  2. >2 ng/mL represents a high risk of severe sepsis and/or septic shock.     As a Marker for Lower Respiratory Tract Infections that require antibiotic therapy:     PCT on Admission    Antibiotic Therapy       6-12 Hrs later     >0.5                Strongly Recommended  >0.25 - <0.5        Recommended   0.1 - 0.25          Discouraged              Remeasure/reassess PCT  <0.1                Strongly Discouraged     Remeasure/reassess PCT     As 28 day mortality risk marker: \"Change in Procalcitonin Result\" (>80% or <=80%) if Day 0 (or Day 1) and Day 4 values are available. Refer to http://www.[email or web address]-pct-calculator.com     Change in PCT <=80%  A decrease of PCT levels below or equal to 80% defines a positive change in PCT test result representing a higher risk for 28-day all-cause mortality of patients diagnosed with severe sepsis for septic shock.     Change in PCT >80%  A decrease of PCT levels of more than 80% defines a negative change in PCT result representing a lower risk for 28-day all-cause mortality of patients diagnosed with severe sepsis or septic shock.         [place] Draw [[id number]] Collected: 12/23/22 0125     Specimen: Blood from Arm, Right Updated: 12/23/22 0230     Narrative:       The following orders were created for panel order [place] Draw.  Procedure                               Abnormality         Status                     ---------                               -----------         ------                     Green Top  (Gel)[[id number]]                                  Final result               Lavender Top[[id number]]                                     Final result               Red Top[[id number]]                                          Final result               [place] Blood Culture Eder...[[id number]]                      Final result               Gray Top[[id number]]                                         In process                 Light Blue Top[[id number]]                                   Final result                  Please view results for these tests on the individual orders.     [place] Blood Culture Bottle Set [[id number]] Collected: 12/23/22 0125     Specimen: Blood from Arm, Right Updated: 12/23/22 0230       Extra Tube Hold for add-ons.       Comment: Auto resulted.        Green Top (Gel) [[id number]] Collected: 12/23/22 0129     Specimen: Blood from Arm, Right Updated: 12/23/22 0230       Extra Tube Hold for add-ons.       Comment: Auto resulted.        Lavender Top [[id number]] Collected: 12/23/22 0129     Specimen: Blood from Arm, Right Updated: 12/23/22 0230       Extra Tube hold for add-on       Comment: Auto resulted        Red Top [[id number]] Collected: 12/23/22 0129     Specimen: Blood from Arm, Right Updated: 12/23/22 0230       Extra Tube Hold for add-ons.       Comment: Auto resulted.        Light Blue Top [[id number]] Collected: 12/23/22 0129     Specimen: Blood from Arm, Right Updated: 12/23/22 0230       Extra Tube Hold for add-ons.       Comment: Auto resulted        C-reactive Protein [[id number]]  (Abnormal) Collected: 12/23/22 0129     Specimen: Blood from Arm, Right Updated: 12/23/22 0226       C-Reactive Protein 3.75 mg/dL       Salicylate Level [[id number]]  (Normal) Collected: 12/23/22 0129     Specimen: Blood from Arm, Right Updated: 12/23/22 0226       Salicylate <0.3 mg/dL       Acetaminophen Level [[id number]]  (Normal) Collected: 12/23/22 0129     Specimen: Blood from Arm, Right Updated: 12/23/22  0226       Acetaminophen <5.0 mcg/mL       COVID PRE-OP / PRE-PROCEDURE SCREENING ORDER (NO ISOLATION) - Swab, Nasal Cavity [[id number]]  (Normal) Collected: 12/23/22 0134     Specimen: Swab from Nasal Cavity Updated: 12/23/22 0224     Narrative:       The following orders were created for panel order COVID PRE-OP / PRE-PROCEDURE SCREENING ORDER (NO ISOLATION) - Swab, Nasal Cavity.  Procedure                               Abnormality         Status                     ---------                               -----------         ------                     COVID-19 and FLU A/B PCR...[[id number]]  Normal              Final result                  Please view results for these tests on the individual orders.     COVID-19 and FLU A/B PCR - Swab, Nasopharynx [[id number]]  (Normal) Collected: 12/23/22 0134     Specimen: Swab from Nasopharynx Updated: 12/23/22 0224       COVID19 Not Detected       Influenza A PCR Not Detected       Influenza B PCR Not Detected     Narrative:       Fact sheet for providers: https://www.fda.gov/media/620240/download     Fact sheet for patients: https://www.fda.gov/media/316883/download     Test performed by PCR.     Blood Gas, Venous - [[id number]]  (Abnormal) Collected: 12/23/22 0219     Specimen: Venous Blood Updated: 12/23/22 0220       Site OTHER       pH, Venous 7.553 pH Units         Comment: 86 Value above critical limit          pCO2, Venous 40.2 mm Hg         Comment: 84 Value below reference range          pO2, Venous 39.8 mm Hg         HCO3, Venous 35.4 mmol/L         Comment: 83 Value above reference range          Base Excess, Venous 11.8 mmol/L         Comment: 83 Value above reference range          O2 Saturation, Venous 79.4 %         Comment: 83 Value above reference range          Temperature 37.0 C         Barometric Pressure for Blood Gas 757 mmHg         Modality Room Air       Ventilator Mode NA       Notified Who DR. PARRA       Notified By [id number]       Notified Time  12/23/2022 02:25       Collected by [id number]       Comment: Meter: [id number]     :  [id number]        Ethanol [[id number]] Collected: 12/23/22 0129     Specimen: Blood from Arm, Right Updated: 12/23/22 0219       Ethanol % <0.010 %       Narrative:       Not for legal purposes. Chain of Custody not followed.      Protime-INR [[id number]]  (Abnormal) Collected: 12/23/22 0129     Specimen: Blood from Arm, Right Updated: 12/23/22 0214       Protime 14.9 Seconds         INR 1.16     Gray Top [[id number]] Collected: 12/23/22 0129     Specimen: Blood from Arm, Right Updated: 12/23/22 0159                   Imaging Results (Last 24 Hours)      Procedure Component Value Units Date/Time     XR Chest 1 View [[id number]] Resulted: 12/23/22 0155       Updated: 12/23/22 0156          I have personally reviewed and interpreted the radiology studies and ECG obtained at time of admission.      Assessment / Plan      Assessment:        Active Hospital Problems     Diagnosis     • **Hyponatremia     • Hypokalemia     • Type 2 diabetes mellitus (HCC)     Lactic acidosis     Plan:   The patient will be admitted to my service here at [facility].      Admit  Hypertonic saline was started by the emergency room physician  Placed potassium and magnesium  Code Status/Advanced Care Plan: Full  cmp in AM  Serial BMP  Repeat lactic acid  zofran for nauea   electolyte replacement protocol     The patient's surrogate decision maker.  Patient was not next to kin or DURABLE POWER OF        Patient discussed with patient.     Estimated length of stay is 3 days.      The patient was seen and examined by me on [id number] at note I will call him back     Electronically signed by Liset Klein DO, 12/23/22, 04:37 CST.      Appointment Information    PACS Images     Radiology Images  Study Result    Narrative & Impression   EXAMINATION: XR CHEST 1 VW-     12/23/2022 1:55 AM CST     HISTORY: weakness     A frontal moderately  lordotic view of the chest is obtained. There is no  previous study for comparison.     The lungs are poorly expanded with atelectatic changes, more on the  right side.     There is mild elevation right diaphragm.     There is no pleural effusion, pulmonary congestion or pneumothorax.     The heart size is not evaluated due to the AP projection.     There is no acute bony abnormality.     IMPRESSION:  1. The poor lung expansion but no active cardiopulmonary disease.  This report was finalized on 12/23/2022 06:30 by Dr. Vj Hsu MD.      Result Notes  Component   Ref Range & Units 05:30   (12/23/22) 03:32   (12/23/22) 03:32   (12/23/22) 01:29   (12/23/22) 3 mo ago   (8/29/22) 4 mo ago   (8/17/22)   Glucose   65 - 99 mg/dL 179 High     196 High   244 High   202 High  R    BUN   6 - 20 mg/dL 4 Low     4 Low   4 Low   5 R    Creatinine   0.76 - 1.27 mg/dL 0.56 Low     0.67 Low   0.75 Low   0.71 R    Sodium   136 - 145 mmol/L 111 Low Critical    110 Low Critical   110 Low Critical   136  134 Low  R    Potassium   3.5 - 5.2 mmol/L 2.5 Low Critical   2.3 Low Critical    2.6 Low Critical   3.8  4.2 R    Chloride   98 - 107 mmol/L 66 Low               22 0601 -- 111 20 147/82 95   12/23/22 0501 -- 129 Abnormal  22 136/62 93   12/23/22 0431 -- 139 Abnormal  25 146/95 94   12/23/22 0401 -- 112 23 151/90 96   12/23/22 0346 -- 94 -- 137/87 98   12/23/22 0331 -- 112 -- 158/81 95   12/23/22 0316 -- 102 -- 156/68 94   12/23/22 0301 -- 120 -- 159/84 94   12/23/22 0246 -- 100 -- 137/87 94   12/23/22 0231 -- 100 -- 149/84 94   12/23/22 0216 -- 109 -- 149/79 97   12/23/22 0201 -- 102 -- 134/124 Abnormal  95   12/23/22 0146 -- 86 -- 147/82 95   12/23/22 0131 -- 56 -- 152/72 95   12/23/22 0125 99.2 (37.3) 108 20 148/89 96                   Current Facility-Administered Medications   Medication Dose Route Frequency Provider Last Rate Last Admin   • acetaminophen (TYLENOL) tablet 650 mg  650 mg Oral Q4H GALEN Liset Klein DO        • desmopressin (DDAVP) injection 2 mcg  2 mcg Intravenous Q6H PRN Lorenzo Quiñonez MD       • dextrose (D50W) (25 g/50 mL) IV injection 25 g  25 g Intravenous Q15 Min PRN Liset Klein DO       • dextrose (D5W) 5 % infusion 507 mL  6 mL/kg (Ideal) Intravenous Continuous PRN Lorenzo Quiñonez MD       • dextrose (GLUTOSE) oral gel 15 g  15 g Oral Q15 Min PRN Liset Klein DO       • Enoxaparin Sodium (LOVENOX) syringe 40 mg  40 mg Subcutaneous Q24H Liset Klein DO   40 mg at 12/23/22 0612   • glucagon (human recombinant) (GLUCAGEN DIAGNOSTIC) injection 1 mg  1 mg Intramuscular Q15 Min PRN Liset Klein DO       • Insulin Lispro (humaLOG) injection 2-7 Units  2-7 Units Subcutaneous TID AC Liset Klein DO   3 Units at 12/23/22 0732   • ondansetron (ZOFRAN) injection 4 mg  4 mg Intravenous Q6H PRN Liset Klein DO   4 mg at 12/23/22 0440   • ondansetron (ZOFRAN) injection 4 mg  4 mg Intravenous Q6H PRN Liset Klein DO       • sodium chloride (HYPERTONIC) 3 % infusion  40 mL/hr Intravenous Continuous Lorenzo Quiñonez MD 40 mL/hr at 12/23/22 0321 40 mL/hr at 12/23/22 0321   • sodium chloride 0.9 % flush 10 mL  10 mL Intravenous Q12H Liset Klein DO       • sodium chloride 0.9 % flush 10 mL  10 mL Intravenous PRN Liset Klein DO       • sodium chloride 0.9 % infusion 40 mL  40 mL Intravenous PRN Liset Klein DO         Current Outpatient Medications   Medication Sig Dispense Refill   • lisinopril (PRINIVIL,ZESTRIL) 20 MG tablet Take 1 tablet by mouth Daily. 90 tablet 1

## 2022-12-23 NOTE — PROGRESS NOTES
7-year-old man admitted last night presenting with numbness in her feet hands.  Patient reported malaise and had symptoms of nausea with vomiting as well as diarrhea  On diagnostic studies, he has several severe electrolyte abnormalities including:  Hypokalemia  Hyponatremia/hypochloremia (normal thyroid; urine sodium 34, urine osmolality is 559)  Hypomagnesemia (0.8)  Hypocalcemia    Patient had lactic acidosis  Alkalosis based on pH of 7.55 on venous blood gas.  He has elevated ALT AST  Hyperosmolality    I requested for stat CMP  We discontinued the 3% hypertonic solution and started on D5 water  Patient reportedly drinks beer.  Last drink was the day prior to admission.  Alcohol screen is negative    Urine drug screen positive for marijuana    I personally reviewed the EKG.  Patient based on this EKG she has atrial fibrillation, nonspecific ST-T wave changes.      Past medical history/comorbidities includes:  Hypertension, diabetes, gout    Current medications reviewed  Chlorhexidine Gluconate Cloth, 1 application, Topical, Once  [START ON 12/24/2022] Chlorhexidine Gluconate Cloth, 1 application, Topical, Q24H  enoxaparin, 40 mg, Subcutaneous, Q24H  insulin lispro, 2-7 Units, Subcutaneous, TID AC  mupirocin, 1 application, Each Nare, BID  sodium chloride, 10 mL, Intravenous, Q12H      Started the patient on correction protocol for magnesium and potassium, phosphorus  Awaiting new sets of labs  Continue sliding scale insulin  Will place on CIWA protocol    Current problem list includes:      Obesity with BMI of 34  Alcoholism  History of fatty liver      Discussed with nurse Hernandez  Critical care time spent greater than 30 minutes  Subsequent plan will depend on new sets of labs that were ordered by me.      I had additional conversation with patient and family at bedside separate from the earlier note    Lab work came back.    Lab Results (most recent)     Procedure Component Value Units Date/Time    Hepatic  Function Panel [[id number]]  (Abnormal) Collected: 12/23/22 1344    Specimen: Blood Updated: 12/23/22 1536     Total Protein 6.0 g/dL      Albumin 3.50 g/dL      ALT (SGPT) 78 U/L      AST (SGOT) 206 U/L      Alkaline Phosphatase 106 U/L      Total Bilirubin 3.1 mg/dL      Bilirubin, Direct 1.6 mg/dL      Bilirubin, Indirect 1.5 mg/dL     Creatinine Urine Random (kidney function) GFR component - Urine, Clean Catch [[id number]] Collected: 12/23/22 0434    Specimen: Urine, Clean Catch Updated: 12/23/22 1521     Creatinine, Urine 122.0 mg/dL     Narrative:      Reference intervals for random urine have not been established.  Clinical usage is dependent upon physician's interpretation in combination with other laboratory tests.       Cortisol [[id number]] Collected: 12/23/22 0129    Specimen: Blood from Arm, Right Updated: 12/23/22 1519     Cortisol 38.00 mcg/dL     Narrative:      Cortisol Reference Ranges:    Cortisol 6AM - 10AM Range: 6.02-18.40 mcg/dl  Cortisol 4PM - 8PM Range: 2.68-10.50 mcg/dl      Results may be falsely increased if patient taking Biotin.      Phosphorus [[id number]]  (Abnormal) Collected: 12/23/22 1344    Specimen: Blood Updated: 12/23/22 1510     Phosphorus 1.7 mg/dL     Magnesium [[id number]]  (Abnormal) Collected: 12/23/22 1344    Specimen: Blood Updated: 12/23/22 1504     Magnesium 1.4 mg/dL     Basic Metabolic Panel [[id number]]  (Abnormal) Collected: 12/23/22 1344    Specimen: Blood Updated: 12/23/22 1501     Glucose 145 mg/dL      BUN 4 mg/dL      Creatinine 0.58 mg/dL      Sodium 116 mmol/L      Potassium 2.4 mmol/L      Chloride 72 mmol/L      CO2 32.0 mmol/L      Calcium 7.8 mg/dL      BUN/Creatinine Ratio 6.9     Anion Gap 12.0 mmol/L      eGFR 128.8 mL/min/1.73      Comment: National Kidney Foundation and American Society of Nephrology (ASN) Task Force recommended calculation based on the Chronic Kidney Disease Epidemiology Collaboration (CKD-EPI) equation refit without adjustment for  race.       Narrative:      GFR Normal >60  Chronic Kidney Disease <60  Kidney Failure <15      STAT Lactic Acid, Reflex [[id number]]  (Abnormal) Collected: 12/23/22 1344    Specimen: Blood Updated: 12/23/22 1458     Lactate 2.5 mmol/L     Magnesium [[id number]]  (Abnormal) Collected: 12/23/22 1101    Specimen: Blood Updated: 12/23/22 1322     Magnesium 1.4 mg/dL     Comprehensive Metabolic Panel [[id number]]  (Abnormal) Collected: 12/23/22 1101    Specimen: Blood Updated: 12/23/22 1127     Glucose 109 mg/dL      BUN 4 mg/dL      Creatinine 0.61 mg/dL      Sodium 117 mmol/L      Potassium 2.5 mmol/L      Comment: Slight hemolysis detected by analyzer. Results may be affected.        Chloride 70 mmol/L      CO2 36.0 mmol/L      Calcium 8.0 mg/dL      Total Protein 6.1 g/dL      Albumin 3.60 g/dL      ALT (SGPT) 80 U/L      AST (SGOT) 221 U/L      Alkaline Phosphatase 108 U/L      Total Bilirubin 3.0 mg/dL      Globulin 2.5 gm/dL      A/G Ratio 1.4 g/dL      BUN/Creatinine Ratio 6.6     Anion Gap 11.0 mmol/L      eGFR 126.9 mL/min/1.73      Comment: National Kidney Foundation and American Society of Nephrology (ASN) Task Force recommended calculation based on the Chronic Kidney Disease Epidemiology Collaboration (CKD-EPI) equation refit without adjustment for race.       Narrative:      GFR Normal >60  Chronic Kidney Disease <60  Kidney Failure <15      Lactic Acid, Plasma [[id number]]  (Abnormal) Collected: 12/23/22 1102    Specimen: Blood Updated: 12/23/22 1126     Lactate 2.3 mmol/L     POC Glucose Once [[id number]]  (Abnormal) Collected: 12/23/22 0706    Specimen: Blood Updated: 12/23/22 0718     Glucose 172 mg/dL      Comment: : [id number] Macho NicoleMeter ID: [id number]       CBC Auto Differential [[id number]]  (Abnormal) Collected: 12/23/22 0530    Specimen: Blood Updated: 12/23/22 0647     WBC 8.86 10*3/mm3      RBC 4.65 10*6/mm3      Hemoglobin 16.0 g/dL      Hematocrit 48.0 %      .2 fL      MCH  34.4 pg      MCHC 33.3 g/dL      RDW 12.3 %      RDW-SD 40.1 fl      MPV 11.1 fL      Platelets 113 10*3/mm3     Narrative:      Differential cancelled per protocol.    Comprehensive Metabolic Panel [[id number]]  (Abnormal) Collected: 12/23/22 0530    Specimen: Blood Updated: 12/23/22 0601     Glucose 179 mg/dL      BUN 4 mg/dL      Creatinine 0.56 mg/dL      Sodium 111 mmol/L      Potassium 2.5 mmol/L      Chloride 66 mmol/L      CO2 30.0 mmol/L      Calcium 7.9 mg/dL      Total Protein 6.3 g/dL      Albumin 3.70 g/dL      ALT (SGPT) 83 U/L      AST (SGOT) 220 U/L      Alkaline Phosphatase 110 U/L      Total Bilirubin 2.7 mg/dL      Globulin 2.6 gm/dL      A/G Ratio 1.4 g/dL      BUN/Creatinine Ratio 7.1     Anion Gap 15.0 mmol/L      eGFR 130.2 mL/min/1.73      Comment: National Kidney Foundation and American Society of Nephrology (ASN) Task Force recommended calculation based on the Chronic Kidney Disease Epidemiology Collaboration (CKD-EPI) equation refit without adjustment for race.       Narrative:      GFR Normal >60  Chronic Kidney Disease <60  Kidney Failure <15      [place] Draw [[id number]] Collected: 12/23/22 0125    Specimen: Blood from Arm, Right Updated: 12/23/22 0530    Narrative:      The following orders were created for panel order [place] Draw.  Procedure                               Abnormality         Status                     ---------                               -----------         ------                     Green Top (Gel)[[id number]]                                  Final result               Lavender Top[[id number]]                                     Final result               Red Top[[id number]]                                          Final result               [place] Blood Culture Eder...[[id number]]                      Final result               Gray Top[[id number]]                                         Final result               Light Blue Top[[id number]]                                    Final result                 Please view results for these tests on the individual orders.    Gray Top [[id number]] Collected: 12/23/22 0129    Specimen: Blood from Arm, Right Updated: 12/23/22 0530     Extra Tube Hold for add-ons.     Comment: Auto resulted.       Sodium, Urine, Random - Urine, Clean Catch [[id number]] Collected: 12/23/22 0434    Specimen: Urine, Clean Catch Updated: 12/23/22 0516     Sodium, Urine 34 mmol/L     Narrative:      Reference intervals for random urine have not been established.  Clinical usage is dependent upon physician's interpretation in combination with other laboratory tests.       STAT Lactic Acid, Reflex [[id number]]  (Abnormal) Collected: 12/23/22 0433    Specimen: Blood Updated: 12/23/22 0512     Lactate 4.1 mmol/L     Osmolality, Urine - Urine, Clean Catch [[id number]]  (Normal) Collected: 12/23/22 0434    Specimen: Urine, Clean Catch Updated: 12/23/22 0511     Osmolality, Urine 559 mOsm/kg     Urine Drug Screen - Urine, Clean Catch [[id number]]  (Abnormal) Collected: 12/23/22 0434    Specimen: Urine, Clean Catch Updated: 12/23/22 0501     THC, Screen, Urine Positive     Phencyclidine (PCP), Urine Negative     Cocaine Screen, Urine Negative     Methamphetamine, Ur Negative     Opiate Screen Negative     Amphetamine Screen, Urine Negative     Benzodiazepine Screen, Urine Negative     Tricyclic Antidepressants Screen Negative     Methadone Screen, Urine Negative     Barbiturates Screen, Urine Negative     Oxycodone Screen, Urine Negative     Propoxyphene Screen Negative     Buprenorphine, Screen, Urine Negative    Narrative:      Cutoff For Drugs Screened:    Amphetamines               500 ng/ml  Barbiturates               200 ng/ml  Benzodiazepines            150 ng/ml  Cocaine                    150 ng/ml  Methadone                  200 ng/ml  Opiates                    100 ng/ml  Phencyclidine               25 ng/ml  THC                            50 ng/ml  Methamphetamine             500 ng/ml  Tricyclic Antidepressants  300 ng/ml  Oxycodone                  100 ng/ml  Propoxyphene               300 ng/ml  Buprenorphine               10 ng/ml    The normal value for all drugs tested is negative. This report includes unconfirmed screening results, with the cutoff values listed, to be used for medical treatment purposes only.  Unconfirmed results must not be used for non-medical purposes such as employment or legal testing.  Clinical consideration should be applied to any drug of abuse test, particularly when unconfirmed results are used.      Potassium [[id number]]  (Abnormal) Collected: 12/23/22 0332    Specimen: Blood Updated: 12/23/22 0359     Potassium 2.3 mmol/L     Sodium [[id number]]  (Abnormal) Collected: 12/23/22 0332    Specimen: Blood Updated: 12/23/22 0359     Sodium 110 mmol/L     TSH [[id number]]  (Normal) Collected: 12/23/22 0129    Specimen: Blood from Arm, Right Updated: 12/23/22 0334     TSH 2.660 uIU/mL     Lactate Dehydrogenase [[id number]]  (Abnormal) Collected: 12/23/22 0129    Specimen: Blood from Arm, Right Updated: 12/23/22 0327      U/L     Hepatitis Panel, Acute [[id number]] Collected: 12/23/22 0129    Specimen: Blood from Arm, Right Updated: 12/23/22 0310    CBC & Differential [[id number]]  (Abnormal) Collected: 12/23/22 0129    Specimen: Blood from Arm, Right Updated: 12/23/22 0308    Narrative:      The following orders were created for panel order CBC & Differential.  Procedure                               Abnormality         Status                     ---------                               -----------         ------                     CBC Auto Differential[[id number]]        Abnormal            Final result                 Please view results for these tests on the individual orders.    CBC Auto Differential [[id number]]  (Abnormal) Collected: 12/23/22 0129    Specimen: Blood from Arm, Right Updated: 12/23/22 0308     WBC 10.67 10*3/mm3      RBC 4.96  10*6/mm3      Hemoglobin 17.1 g/dL      Hematocrit 51.0 %      .8 fL      MCH 34.5 pg      MCHC 33.5 g/dL      RDW 12.3 %      RDW-SD 39.9 fl      MPV 11.6 fL      Platelets 123 10*3/mm3      Neutrophil % 81.9 %      Lymphocyte % 6.6 %      Monocyte % 9.8 %      Eosinophil % 0.7 %      Basophil % 0.2 %      Neutrophils, Absolute 8.73 10*3/mm3      Lymphocytes, Absolute 0.70 10*3/mm3      Monocytes, Absolute 1.05 10*3/mm3      Eosinophils, Absolute 0.08 10*3/mm3      Basophils, Absolute 0.02 10*3/mm3     [facility] Draw [[id number]] Collected: 12/23/22 0132    Specimen: Blood from Arm, Right Updated: 12/23/22 0245    Narrative:      The following orders were created for panel order [facility] Draw.  Procedure                               Abnormality         Status                     ---------                               -----------         ------                     [facility] Blood Culture Eder...[[id number]]                      Final result                 Please view results for these tests on the individual orders.    [facility] Blood Culture Bottle Set [[id number]] Collected: 12/23/22 0132    Specimen: Blood from Arm, Right Updated: 12/23/22 0245     Extra Tube Hold for add-ons.     Comment: Auto resulted.       Osmolality, Serum [[id number]]  (Abnormal) Collected: 12/23/22 0129    Specimen: Blood from Arm, Right Updated: 12/23/22 0242     Osmolality 233 mOsm/kg     Lactic Acid, Plasma [[id number]]  (Abnormal) Collected: 12/23/22 0129    Specimen: Blood from Arm, Right Updated: 12/23/22 0237     Lactate 5.7 mmol/L     Procalcitonin [[id number]]  (Abnormal) Collected: 12/23/22 0129    Specimen: Blood from Arm, Right Updated: 12/23/22 0230     Procalcitonin 0.42 ng/mL     Narrative:      As a Marker for Sepsis (Non-Neonates):    1. <0.5 ng/mL represents a low risk of severe sepsis and/or septic shock.  2. >2 ng/mL represents a high risk of severe sepsis and/or septic shock.    As a Marker for Lower Respiratory Tract  Infections that require antibiotic therapy:    PCT on Admission    Antibiotic Therapy       6-12 Hrs later    >0.5                Strongly Recommended  >0.25 - <0.5        Recommended   0.1 - 0.25          Discouraged              Remeasure/reassess PCT  <0.1                Strongly Discouraged     Remeasure/reassess PCT    As 28 day mortality risk marker: \"Change in Procalcitonin Result\" (>80% or <=80%) if Day 0 (or Day 1) and Day 4 values are available. Refer to http://[email or web address]-pct-[email or web address]    Change in PCT <=80%  A decrease of PCT levels below or equal to 80% defines a positive change in PCT test result representing a higher risk for 28-day all-cause mortality of patients diagnosed with severe sepsis for septic shock.    Change in PCT >80%  A decrease of PCT levels of more than 80% defines a negative change in PCT result representing a lower risk for 28-day all-cause mortality of patients diagnosed with severe sepsis or septic shock.       [facility] Blood Culture Bottle Set [[id number]] Collected: 12/23/22 0125    Specimen: Blood from Arm, Right Updated: 12/23/22 0230     Extra Tube Hold for add-ons.     Comment: Auto resulted.       Green Top (Gel) [[id number]] Collected: 12/23/22 0129    Specimen: Blood from Arm, Right Updated: 12/23/22 0230     Extra Tube Hold for add-ons.     Comment: Auto resulted.       Lavender Top [[id number]] Collected: 12/23/22 0129    Specimen: Blood from Arm, Right Updated: 12/23/22 0230     Extra Tube hold for add-on     Comment: Auto resulted       Red Top [[id number]] Collected: 12/23/22 0129    Specimen: Blood from Arm, Right Updated: 12/23/22 0230     Extra Tube Hold for add-ons.     Comment: Auto resulted.       Light Blue Top [[id number]] Collected: 12/23/22 0129    Specimen: Blood from Arm, Right Updated: 12/23/22 0230     Extra Tube Hold for add-ons.     Comment: Auto resulted       C-reactive Protein [[id number]]  (Abnormal) Collected: 12/23/22 0129    Specimen: Blood from  Arm, Right Updated: 12/23/22 0226     C-Reactive Protein 3.75 mg/dL     Salicylate Level [[id number]]  (Normal) Collected: 12/23/22 0129    Specimen: Blood from Arm, Right Updated: 12/23/22 0226     Salicylate <0.3 mg/dL     Acetaminophen Level [[id number]]  (Normal) Collected: 12/23/22 0129    Specimen: Blood from Arm, Right Updated: 12/23/22 0226     Acetaminophen <5.0 mcg/mL     COVID PRE-OP / PRE-PROCEDURE SCREENING ORDER (NO ISOLATION) - Swab, Nasal Cavity [[id number]]  (Normal) Collected: 12/23/22 0134    Specimen: Swab from Nasal Cavity Updated: 12/23/22 0224    Narrative:      The following orders were created for panel order COVID PRE-OP / PRE-PROCEDURE SCREENING ORDER (NO ISOLATION) - Swab, Nasal Cavity.  Procedure                               Abnormality         Status                     ---------                               -----------         ------                     COVID-19 and FLU A/B PCR...[[id number]]  Normal              Final result                 Please view results for these tests on the individual orders.    COVID-19 and FLU A/B PCR - Swab, Nasopharynx [[id number]]  (Normal) Collected: 12/23/22 0134    Specimen: Swab from Nasopharynx Updated: 12/23/22 0224     COVID19 Not Detected     Influenza A PCR Not Detected     Influenza B PCR Not Detected    Narrative:      Fact sheet for providers: https://www.fda.gov/media/377089/download    Fact sheet for patients: https://www.fda.gov/media/134955/download    Test performed by PCR.    Blood Gas, Venous - [[id number]]  (Abnormal) Collected: 12/23/22 0219    Specimen: Venous Blood Updated: 12/23/22 0220     Site OTHER     pH, Venous 7.553 pH Units      Comment: 86 Value above critical limit        pCO2, Venous 40.2 mm Hg      Comment: 84 Value below reference range        pO2, Venous 39.8 mm Hg      HCO3, Venous 35.4 mmol/L      Comment: 83 Value above reference range        Base Excess, Venous 11.8 mmol/L      Comment: 83 Value above reference range         O2 Saturation, Venous 79.4 %      Comment: 83 Value above reference range        Temperature 37.0 C      Barometric Pressure for Blood Gas 757 mmHg      Modality Room Air     Ventilator Mode NA     Notified Who DR. PARRA     Notified By [id number]     Notified Time 12/23/2022 02:25     Collected by [id number]     Comment: Meter: [id number]     :  [id number]       Ethanol [[id number]] Collected: 12/23/22 0129    Specimen: Blood from Arm, Right Updated: 12/23/22 0219     Ethanol % <0.010 %     Narrative:      Not for legal purposes. Chain of Custody not followed.     Protime-INR [[id number]]  (Abnormal) Collected: 12/23/22 0129    Specimen: Blood from Arm, Right Updated: 12/23/22 0214     Protime 14.9 Seconds      INR 1.16          1 check EKG.  Telemetry showed atrial fibrillation.  We will confirm.  I will subsequently placed on anticoagulation.  His alcohol intake likely contributory to the rhythm disturbance  2.  Replacement per protocol of electrolytes  3.  Sodium remained stable.  Instead of using hypotonic solution I will use normal saline.  There may be some volume depletion in his situation as well.  We will still use the parameter being use in the protocol for hypertonic solution in terms of rate of increase of sodium.  Case discussed with nurse Hernandez  4.  Patient's concern-he wants meal.  It was the father who was with him who as what is wrong with him.  He did not express any concern about what is wrong with him and what caused him to be in the hospital for his condition.  5 patient is diabetic with poor control.  A1c greater than 8%  Diabetic educator  6.  [facility] protocol patient drinks beer about 5-6 almost daily.  No gross signs of alcohol withdrawal.  Continue on thiamine for prophylaxis.      I learned that he is a  at the [employer].    I will asked nutritionist to help educate on nutrition.  His symptoms could be related to malnutrition due to alcoholism.    Patient at risk of  arrhythmia, seizure due to his electrolyte abnormalities      Additional time spent greater than 15 minutes critical care time      I actually reviewed the follow-up EKG.  Presence of P waves noted.  I reviewed this EKG with one of our cardiologist.  He did not think that this is atrial fibrillation.  There is some aberrancy, there is also PAC.  Therefore, Lovenox was placed back on DVT prophylaxis dosing.  Note that we also reviewed the EKG from earlier and did not believe that there is atrial fibrillation.    Discussed with nurse Hernandez  Additional time spent in the unit reviewing this information took at least 10 minutes.

## 2022-12-23 NOTE — PROGRESS NOTES
Pharmacy Dosing Service  Anticoagulant  Enoxaparin    Assessment/Action/Plan:  Pharmacy consulted to dose Lovenox for Atrial fibrillation  Patient received a prophylactic dose this AM - 40 mg around 0600    Weight 124 kg. Renal function appropriate for standard dosing.   BMI 34    Initiate Lovenox 1 mg/kg (120 mg, rounded) subQ every 12 hours    Will continue to follow     Subjective:  Luciano Christiansen is a 37 y.o. male on Enoxaparin 120 mg SQ every 24 hours for indication of atrial fibrillation.  Objective:  [Ht: 190.5 cm (75\"); Wt: 124 kg (273 lb 2.4 oz); BMI: Body mass index is 34.14 kg/m².]  Estimated Creatinine Clearance: 246.6 mL/min (A) (by C-G formula based on SCr of 0.58 mg/dL (L)). No results found for: DDIMER   Lab Results   Component Value Date    INR 1.16 (H) 12/23/2022    PROTIME 14.9 (H) 12/23/2022      Lab Results   Component Value Date    HGB 16.0 12/23/2022    HGB 17.1 12/23/2022      Lab Results   Component Value Date     (L) 12/23/2022     (L) 12/23/2022       Sidney Dennis, PharmD  12/23/22 16:03 CST      [Feeling Fatigued] : feeling fatigued [Joint Pain] : joint pain [Negative] : Heme/Lymph [FreeTextEntry5] : see history of present illness

## 2022-12-23 NOTE — ED NOTES
Cmp and lactic redrawn.    Called lab for labels, sending now.  -- order populated for a lab collection, no stickers able to be printed in ER.    Obtained correct pt labels and placed on blood tubes, sent to lab.

## 2022-12-23 NOTE — H&P
[facility] Medicine Services  HISTORY AND PHYSICAL    Date of Admission: 12/23/2022  Primary Care Physician: Provider, No Known    Subjective   Primary Historian: patient    Chief Complaint: Numbness in his feet and hands, does not feel good, nausea with vomiting, diarrhea    History of Present Illness  Patient is remarkably poor historian, asked him why he came to the hospital he said his feet and hands had numbness in them and he just did not feel good.  Upon further questioning he complained of nausea but no vomiting he did have diarrhea he is actually been vomiting since he has been in the hospital.  He denies shortness of breath and chest pain.  Denies abdominal pain.  He notes he is eating okay.  He has some swelling in his legs some.  He states he does drink alcohol.  He had 2 beers yesterday.  The alcohol screen is negative.        Review of Systems   Constitutional: Negative.    HENT: Negative.    Eyes: Negative.    Respiratory: Negative.    Cardiovascular: Positive for leg swelling.   Gastrointestinal: Positive for nausea and vomiting.   Endocrine: Negative.    Genitourinary: Negative.    Musculoskeletal: Negative.    Skin: Negative.    Allergic/Immunologic: Negative.    Neurological: Negative.    Hematological: Negative.    Psychiatric/Behavioral: Negative.           Past Medical History:   Past Medical History:   Diagnosis Date   • Diabetes mellitus (HCC)    • Gout    • Hypertension      Past Surgical History:  Past Surgical History:   Procedure Laterality Date   • VASECTOMY       Social History:  reports that he has been smoking cigarettes. He started smoking about 12 years ago. He has a 5.00 pack-year smoking history. He has never used smokeless tobacco. He reports current alcohol use of about 5.0 standard drinks per week. He reports that he does not use drugs.    Family History: Parents are alive and well without medical issues that he is aware    Allergies:  No  Known Allergies    Medications:  Prior to Admission medications    Medication Sig Start Date End Date Taking? Authorizing Provider   lisinopril (PRINIVIL,ZESTRIL) 20 MG tablet Take 1 tablet by mouth Daily. 8/4/22   Eleuterio Rangel MD     I have utilized all available immediate resources to obtain, update, or review the patient's current medications (including all prescriptions, over-the-counter products, herbals, cannabis/cannabidiol products, and vitamin/mineral/dietary (nutritional) supplements).    Objective     Vital Signs: /90   Pulse 112   Temp 99.2 °F (37.3 °C)   Resp 20   Ht 190.5 cm (75\")   Wt 118 kg (260 lb)   SpO2 96%   BMI 32.50 kg/m²   Physical Exam  Vitals and nursing note reviewed.   Constitutional:       Appearance: Normal appearance.   HENT:      Head: Normocephalic and atraumatic.      Right Ear: External ear normal.      Left Ear: External ear normal.      Nose: Nose normal.      Mouth/Throat:      Mouth: Mucous membranes are moist.   Eyes:      Extraocular Movements: Extraocular movements intact.      Conjunctiva/sclera: Conjunctivae normal.      Pupils: Pupils are equal, round, and reactive to light.   Cardiovascular:      Rate and Rhythm: Normal rate and regular rhythm.      Pulses: Normal pulses.      Heart sounds: No murmur heard.    No friction rub. No gallop.   Pulmonary:      Effort: Pulmonary effort is normal.      Breath sounds: Normal breath sounds.   Abdominal:      General: Bowel sounds are normal.      Palpations: Abdomen is soft.   Musculoskeletal:         General: Normal range of motion.      Cervical back: Normal range of motion and neck supple.   Skin:     General: Skin is warm and dry.      Capillary Refill: Capillary refill takes less than 2 seconds.   Neurological:      General: No focal deficit present.      Mental Status: He is alert and oriented to person, place, and time.      Cranial Nerves: No cranial nerve deficit.   Psychiatric:         Mood and Affect:  Mood normal.         Behavior: Behavior normal.              Results Reviewed:  Lab Results (last 24 hours)     Procedure Component Value Units Date/Time    Potassium [[id number]]  (Abnormal) Collected: 12/23/22 0332    Specimen: Blood Updated: 12/23/22 0359     Potassium 2.3 mmol/L     Sodium [[id number]]  (Abnormal) Collected: 12/23/22 0332    Specimen: Blood Updated: 12/23/22 0359     Sodium 110 mmol/L     TSH [[id number]]  (Normal) Collected: 12/23/22 0129    Specimen: Blood from Arm, Right Updated: 12/23/22 0334     TSH 2.660 uIU/mL     Magnesium [[id number]]  (Abnormal) Collected: 12/23/22 0129    Specimen: Blood from Arm, Right Updated: 12/23/22 0327     Magnesium 0.8 mg/dL     Lactate Dehydrogenase [[id number]]  (Abnormal) Collected: 12/23/22 0129    Specimen: Blood from Arm, Right Updated: 12/23/22 0327      U/L     Cortisol [[id number]] Collected: 12/23/22 0129    Specimen: Blood from Arm, Right Updated: 12/23/22 0310    Hepatitis Panel, Acute [[id number]] Collected: 12/23/22 0129    Specimen: Blood from Arm, Right Updated: 12/23/22 0310    CBC & Differential [[id number]]  (Abnormal) Collected: 12/23/22 0129    Specimen: Blood from Arm, Right Updated: 12/23/22 0308    Narrative:      The following orders were created for panel order CBC & Differential.  Procedure                               Abnormality         Status                     ---------                               -----------         ------                     CBC Auto Differential[[id number]]        Abnormal            Final result                 Please view results for these tests on the individual orders.    CBC Auto Differential [[id number]]  (Abnormal) Collected: 12/23/22 0129    Specimen: Blood from Arm, Right Updated: 12/23/22 0308     WBC 10.67 10*3/mm3      RBC 4.96 10*6/mm3      Hemoglobin 17.1 g/dL      Hematocrit 51.0 %      .8 fL      MCH 34.5 pg      MCHC 33.5 g/dL      RDW 12.3 %      RDW-SD 39.9 fl      MPV 11.6 fL       Platelets 123 10*3/mm3      Neutrophil % 81.9 %      Lymphocyte % 6.6 %      Monocyte % 9.8 %      Eosinophil % 0.7 %      Basophil % 0.2 %      Neutrophils, Absolute 8.73 10*3/mm3      Lymphocytes, Absolute 0.70 10*3/mm3      Monocytes, Absolute 1.05 10*3/mm3      Eosinophils, Absolute 0.08 10*3/mm3      Basophils, Absolute 0.02 10*3/mm3     [place] Draw [[id number]] Collected: 12/23/22 0132    Specimen: Blood from Arm, Right Updated: 12/23/22 0245    Narrative:      The following orders were created for panel order [place] Draw.  Procedure                               Abnormality         Status                     ---------                               -----------         ------                     [place] Blood Culture Eder...[[id number]]                      Final result                 Please view results for these tests on the individual orders.    [place] Blood Culture Bottle Set [[id number]] Collected: 12/23/22 0132    Specimen: Blood from Arm, Right Updated: 12/23/22 0245     Extra Tube Hold for add-ons.     Comment: Auto resulted.       Osmolality, Serum [[id number]]  (Abnormal) Collected: 12/23/22 0129    Specimen: Blood from Arm, Right Updated: 12/23/22 0242     Osmolality 233 mOsm/kg     Lactic Acid, Plasma [[id number]]  (Abnormal) Collected: 12/23/22 0129    Specimen: Blood from Arm, Right Updated: 12/23/22 0237     Lactate 5.7 mmol/L     Comprehensive Metabolic Panel [[id number]]  (Abnormal) Collected: 12/23/22 0129    Specimen: Blood from Arm, Right Updated: 12/23/22 0237     Glucose 196 mg/dL      BUN 4 mg/dL      Creatinine 0.67 mg/dL      Sodium 110 mmol/L      Potassium 2.6 mmol/L      Chloride 60 mmol/L      CO2 31.0 mmol/L      Calcium 8.5 mg/dL      Total Protein 7.2 g/dL      Albumin 4.10 g/dL      ALT (SGPT) 98 U/L      AST (SGOT) 258 U/L      Alkaline Phosphatase 130 U/L      Total Bilirubin 3.2 mg/dL      Globulin 3.1 gm/dL      A/G Ratio 1.3 g/dL      BUN/Creatinine Ratio 6.0     Anion Gap 19.0  mmol/L      eGFR 123.3 mL/min/1.73      Comment: National Kidney Foundation and American Society of Nephrology (ASN) Task Force recommended calculation based on the Chronic Kidney Disease Epidemiology Collaboration (CKD-EPI) equation refit without adjustment for race.       Narrative:      GFR Normal >60  Chronic Kidney Disease <60  Kidney Failure <15      Procalcitonin [[id number]]  (Abnormal) Collected: 12/23/22 0129    Specimen: Blood from Arm, Right Updated: 12/23/22 0230     Procalcitonin 0.42 ng/mL     Narrative:      As a Marker for Sepsis (Non-Neonates):    1. <0.5 ng/mL represents a low risk of severe sepsis and/or septic shock.  2. >2 ng/mL represents a high risk of severe sepsis and/or septic shock.    As a Marker for Lower Respiratory Tract Infections that require antibiotic therapy:    PCT on Admission    Antibiotic Therapy       6-12 Hrs later    >0.5                Strongly Recommended  >0.25 - <0.5        Recommended   0.1 - 0.25          Discouraged              Remeasure/reassess PCT  <0.1                Strongly Discouraged     Remeasure/reassess PCT    As 28 day mortality risk marker: \"Change in Procalcitonin Result\" (>80% or <=80%) if Day 0 (or Day 1) and Day 4 values are available. Refer to http://www.[employer]pct-calculator.com    Change in PCT <=80%  A decrease of PCT levels below or equal to 80% defines a positive change in PCT test result representing a higher risk for 28-day all-cause mortality of patients diagnosed with severe sepsis for septic shock.    Change in PCT >80%  A decrease of PCT levels of more than 80% defines a negative change in PCT result representing a lower risk for 28-day all-cause mortality of patients diagnosed with severe sepsis or septic shock.       [place] Draw [[id number]] Collected: 12/23/22 0125    Specimen: Blood from Arm, Right Updated: 12/23/22 0230    Narrative:      The following orders were created for panel order [place] Draw.  Procedure                                Abnormality         Status                     ---------                               -----------         ------                     Green Top (Gel)[[id number]]                                  Final result               Lavender Top[[id number]]                                     Final result               Red Top[[id number]]                                          Final result               [place] Blood Culture Eder...[[id number]]                      Final result               Gray Top[[id number]]                                         In process                 Light Blue Top[[id number]]                                   Final result                 Please view results for these tests on the individual orders.    [place] Blood Culture Bottle Set [[id number]] Collected: 12/23/22 0125    Specimen: Blood from Arm, Right Updated: 12/23/22 0230     Extra Tube Hold for add-ons.     Comment: Auto resulted.       Green Top (Gel) [[id number]] Collected: 12/23/22 0129    Specimen: Blood from Arm, Right Updated: 12/23/22 0230     Extra Tube Hold for add-ons.     Comment: Auto resulted.       Lavender Top [[id number]] Collected: 12/23/22 0129    Specimen: Blood from Arm, Right Updated: 12/23/22 0230     Extra Tube hold for add-on     Comment: Auto resulted       Red Top [[id number]] Collected: 12/23/22 0129    Specimen: Blood from Arm, Right Updated: 12/23/22 0230     Extra Tube Hold for add-ons.     Comment: Auto resulted.       Light Blue Top [[id number]] Collected: 12/23/22 0129    Specimen: Blood from Arm, Right Updated: 12/23/22 0230     Extra Tube Hold for add-ons.     Comment: Auto resulted       C-reactive Protein [[id number]]  (Abnormal) Collected: 12/23/22 0129    Specimen: Blood from Arm, Right Updated: 12/23/22 0226     C-Reactive Protein 3.75 mg/dL     Salicylate Level [[id number]]  (Normal) Collected: 12/23/22 0129    Specimen: Blood from Arm, Right Updated: 12/23/22 0226     Salicylate <0.3 mg/dL      Acetaminophen Level [[id number]]  (Normal) Collected: 12/23/22 0129    Specimen: Blood from Arm, Right Updated: 12/23/22 0226     Acetaminophen <5.0 mcg/mL     COVID PRE-OP / PRE-PROCEDURE SCREENING ORDER (NO ISOLATION) - Swab, Nasal Cavity [[id number]]  (Normal) Collected: 12/23/22 0134    Specimen: Swab from Nasal Cavity Updated: 12/23/22 0224    Narrative:      The following orders were created for panel order COVID PRE-OP / PRE-PROCEDURE SCREENING ORDER (NO ISOLATION) - Swab, Nasal Cavity.  Procedure                               Abnormality         Status                     ---------                               -----------         ------                     COVID-19 and FLU A/B PCR...[[id number]]  Normal              Final result                 Please view results for these tests on the individual orders.    COVID-19 and FLU A/B PCR - Swab, Nasopharynx [[id number]]  (Normal) Collected: 12/23/22 0134    Specimen: Swab from Nasopharynx Updated: 12/23/22 0224     COVID19 Not Detected     Influenza A PCR Not Detected     Influenza B PCR Not Detected    Narrative:      Fact sheet for providers: https://www.fda.gov/media/485480/download    Fact sheet for patients: https://www.fda.gov/media/890273/download    Test performed by PCR.    Blood Gas, Venous - [[id number]]  (Abnormal) Collected: 12/23/22 0219    Specimen: Venous Blood Updated: 12/23/22 0220     Site OTHER     pH, Venous 7.553 pH Units      Comment: 86 Value above critical limit        pCO2, Venous 40.2 mm Hg      Comment: 84 Value below reference range        pO2, Venous 39.8 mm Hg      HCO3, Venous 35.4 mmol/L      Comment: 83 Value above reference range        Base Excess, Venous 11.8 mmol/L      Comment: 83 Value above reference range        O2 Saturation, Venous 79.4 %      Comment: 83 Value above reference range        Temperature 37.0 C      Barometric Pressure for Blood Gas 757 mmHg      Modality Room Air     Ventilator Mode NA     Notified Who   AIDA     Notified By [id number]     Notified Time 12/23/2022 02:25     Collected by [id number]     Comment: Meter: [id number]     :  [id number]       Ethanol [[id number]] Collected: 12/23/22 0129    Specimen: Blood from Arm, Right Updated: 12/23/22 0219     Ethanol % <0.010 %     Narrative:      Not for legal purposes. Chain of Custody not followed.     Protime-INR [[id number]]  (Abnormal) Collected: 12/23/22 0129    Specimen: Blood from Arm, Right Updated: 12/23/22 0214     Protime 14.9 Seconds      INR 1.16    Gray Top [[id number]] Collected: 12/23/22 0129    Specimen: Blood from Arm, Right Updated: 12/23/22 0159        Imaging Results (Last 24 Hours)     Procedure Component Value Units Date/Time    XR Chest 1 View [[id number]] Resulted: 12/23/22 0155     Updated: 12/23/22 0156        I have personally reviewed and interpreted the radiology studies and ECG obtained at time of admission.     Assessment / Plan     Assessment:   Active Hospital Problems    Diagnosis    • **Hyponatremia    • Hypokalemia    • Type 2 diabetes mellitus (HCC)    Lactic acidosis    Plan:   The patient will be admitted to my service here at [facility].     Admit  Hypertonic saline was started by the emergency room physician  Placed potassium and magnesium  Code Status/Advanced Care Plan: Full  cmp in AM  Serial BMP  Repeat lactic acid  zofran for nauea   electolyte replacement protocol    The patient's surrogate decision maker.  Patient was not next to kin or DURABLE POWER OF     Patient discussed with patient.    Estimated length of stay is 3 days.     The patient was seen and examined by me on [id number] at note I will call him back    Electronically signed by Liset Klein DO, 12/23/22, 04:37 CST.

## 2022-12-23 NOTE — PROGRESS NOTES
Pharmacy Dosing Service  Anticoagulant  Enoxaparin    Assessment/Action/Plan:  Updated consult - no longer dosing for atrial fibrillation. Pharmacy to dose Enoxaparin for VTE prophylaxis. Patient received a 40 mg dose this AM around 06:00. Initiated Enoxaparin 40 mg  SQ every 24 hours to start tomorrow morning. Labs/dose reviewed. Pharmacy will continue to monitor renal function and adjust dose accordingly.     Subjective:  Luciano Christiansen is a 37 y.o. male  Objective:  [Ht: 190.5 cm (75\"); Wt: 124 kg (273 lb 2.4 oz); BMI: Body mass index is 34.14 kg/m².]  Estimated Creatinine Clearance: 246.6 mL/min (A) (by C-G formula based on SCr of 0.58 mg/dL (L)).   Lab Results   Component Value Date    INR 1.16 (H) 12/23/2022    PROTIME 14.9 (H) 12/23/2022      Lab Results   Component Value Date    HGB 16.0 12/23/2022    HGB 17.1 12/23/2022    HGB 16.3 08/17/2022      Lab Results   Component Value Date     (L) 12/23/2022     (L) 12/23/2022     08/17/2022         Tony Sarmiento, PharmD  12/23/22 16:54 CST

## 2022-12-23 NOTE — ED NOTES
Mother in lobby wishing to see pt.    Pt labeled as privacy pt, but pt agreeable for mother to be at bedside.

## 2022-12-24 PROBLEM — R79.89 ABNORMAL LFTS: Status: RESOLVED | Noted: 2022-12-23 | Resolved: 2022-12-24

## 2022-12-24 PROBLEM — K70.10 ALCOHOLIC HEPATITIS: Status: ACTIVE | Noted: 2022-12-24

## 2022-12-24 LAB
ALBUMIN SERPL-MCNC: 3.6 G/DL (ref 3.5–5.2)
ALP SERPL-CCNC: 111 U/L (ref 39–117)
ALT SERPL W P-5'-P-CCNC: 78 U/L (ref 1–41)
ANION GAP SERPL CALCULATED.3IONS-SCNC: 10 MMOL/L (ref 5–15)
ANION GAP SERPL CALCULATED.3IONS-SCNC: 11 MMOL/L (ref 5–15)
ANION GAP SERPL CALCULATED.3IONS-SCNC: 11 MMOL/L (ref 5–15)
ANION GAP SERPL CALCULATED.3IONS-SCNC: 9 MMOL/L (ref 5–15)
AST SERPL-CCNC: 160 U/L (ref 1–40)
BILIRUB CONJ SERPL-MCNC: 1.2 MG/DL (ref 0–0.3)
BILIRUB INDIRECT SERPL-MCNC: 0.9 MG/DL
BILIRUB SERPL-MCNC: 2.1 MG/DL (ref 0–1.2)
BUN SERPL-MCNC: 5 MG/DL (ref 6–20)
BUN SERPL-MCNC: 6 MG/DL (ref 6–20)
BUN/CREAT SERPL: 6.3 (ref 7–25)
BUN/CREAT SERPL: 7 (ref 7–25)
BUN/CREAT SERPL: 7 (ref 7–25)
BUN/CREAT SERPL: 8.1 (ref 7–25)
CALCIUM SPEC-SCNC: 7.9 MG/DL (ref 8.6–10.5)
CALCIUM SPEC-SCNC: 7.9 MG/DL (ref 8.6–10.5)
CALCIUM SPEC-SCNC: 8.2 MG/DL (ref 8.6–10.5)
CALCIUM SPEC-SCNC: 8.3 MG/DL (ref 8.6–10.5)
CHLORIDE SERPL-SCNC: 77 MMOL/L (ref 98–107)
CHLORIDE SERPL-SCNC: 82 MMOL/L (ref 98–107)
CHLORIDE SERPL-SCNC: 85 MMOL/L (ref 98–107)
CHLORIDE SERPL-SCNC: 86 MMOL/L (ref 98–107)
CO2 SERPL-SCNC: 30 MMOL/L (ref 22–29)
CO2 SERPL-SCNC: 32 MMOL/L (ref 22–29)
CO2 SERPL-SCNC: 32 MMOL/L (ref 22–29)
CO2 SERPL-SCNC: 33 MMOL/L (ref 22–29)
CREAT SERPL-MCNC: 0.71 MG/DL (ref 0.76–1.27)
CREAT SERPL-MCNC: 0.71 MG/DL (ref 0.76–1.27)
CREAT SERPL-MCNC: 0.74 MG/DL (ref 0.76–1.27)
CREAT SERPL-MCNC: 0.8 MG/DL (ref 0.76–1.27)
EGFRCR SERPLBLD CKD-EPI 2021: 116.9 ML/MIN/1.73
EGFRCR SERPLBLD CKD-EPI 2021: 119.7 ML/MIN/1.73
EGFRCR SERPLBLD CKD-EPI 2021: 121.2 ML/MIN/1.73
EGFRCR SERPLBLD CKD-EPI 2021: 121.2 ML/MIN/1.73
GLUCOSE BLDC GLUCOMTR-MCNC: 144 MG/DL (ref 70–130)
GLUCOSE BLDC GLUCOMTR-MCNC: 169 MG/DL (ref 70–130)
GLUCOSE BLDC GLUCOMTR-MCNC: 174 MG/DL (ref 70–130)
GLUCOSE SERPL-MCNC: 135 MG/DL (ref 65–99)
GLUCOSE SERPL-MCNC: 161 MG/DL (ref 65–99)
GLUCOSE SERPL-MCNC: 167 MG/DL (ref 65–99)
GLUCOSE SERPL-MCNC: 177 MG/DL (ref 65–99)
HAV IGM SERPL QL IA: NEGATIVE
HBV CORE IGM SERPL QL IA: NEGATIVE
HBV SURFACE AG SERPL QL IA: NEGATIVE
HCV AB S/CO SERPL IA: <0.1 S/CO RATIO (ref 0–0.9)
HCV AB SERPL QL IA: NORMAL
MAGNESIUM SERPL-MCNC: 1.7 MG/DL (ref 1.6–2.6)
MAGNESIUM SERPL-MCNC: 2.2 MG/DL (ref 1.6–2.6)
PHOSPHATE SERPL-MCNC: 1.5 MG/DL (ref 2.5–4.5)
PHOSPHATE SERPL-MCNC: 2 MG/DL (ref 2.5–4.5)
POTASSIUM SERPL-SCNC: 2.4 MMOL/L (ref 3.5–5.2)
POTASSIUM SERPL-SCNC: 2.7 MMOL/L (ref 3.5–5.2)
POTASSIUM SERPL-SCNC: 3.2 MMOL/L (ref 3.5–5.2)
POTASSIUM SERPL-SCNC: 3.2 MMOL/L (ref 3.5–5.2)
PROT SERPL-MCNC: 5.9 G/DL (ref 6–8.5)
QT INTERVAL: 396 MS
QTC INTERVAL: 528 MS
SODIUM SERPL-SCNC: 121 MMOL/L (ref 136–145)
SODIUM SERPL-SCNC: 125 MMOL/L (ref 136–145)
SODIUM SERPL-SCNC: 126 MMOL/L (ref 136–145)
SODIUM SERPL-SCNC: 126 MMOL/L (ref 136–145)

## 2022-12-24 PROCEDURE — 25010000002 SODIUM CHLORIDE 0.9 % WITH KCL 20 MEQ 20-0.9 MEQ/L-% SOLUTION: Performed by: INTERNAL MEDICINE

## 2022-12-24 PROCEDURE — 83735 ASSAY OF MAGNESIUM: CPT | Performed by: INTERNAL MEDICINE

## 2022-12-24 PROCEDURE — 82962 GLUCOSE BLOOD TEST: CPT

## 2022-12-24 PROCEDURE — 84100 ASSAY OF PHOSPHORUS: CPT | Performed by: INTERNAL MEDICINE

## 2022-12-24 PROCEDURE — 80048 BASIC METABOLIC PNL TOTAL CA: CPT | Performed by: FAMILY MEDICINE

## 2022-12-24 PROCEDURE — 80076 HEPATIC FUNCTION PANEL: CPT | Performed by: INTERNAL MEDICINE

## 2022-12-24 PROCEDURE — 36415 COLL VENOUS BLD VENIPUNCTURE: CPT | Performed by: FAMILY MEDICINE

## 2022-12-24 PROCEDURE — 0 MAGNESIUM SULFATE 4 GM/100ML SOLUTION: Performed by: INTERNAL MEDICINE

## 2022-12-24 PROCEDURE — 25010000002 ENOXAPARIN PER 10 MG: Performed by: INTERNAL MEDICINE

## 2022-12-24 PROCEDURE — 63710000001 INSULIN LISPRO (HUMAN) PER 5 UNITS: Performed by: FAMILY MEDICINE

## 2022-12-24 RX ORDER — NICOTINE 21 MG/24HR
1 PATCH, TRANSDERMAL 24 HOURS TRANSDERMAL
Status: DISCONTINUED | OUTPATIENT
Start: 2022-12-24 | End: 2022-12-26 | Stop reason: HOSPADM

## 2022-12-24 RX ORDER — SODIUM CHLORIDE AND POTASSIUM CHLORIDE 150; 900 MG/100ML; MG/100ML
75 INJECTION, SOLUTION INTRAVENOUS CONTINUOUS
Status: DISCONTINUED | OUTPATIENT
Start: 2022-12-24 | End: 2022-12-26 | Stop reason: HOSPADM

## 2022-12-24 RX ADMIN — POTASSIUM CHLORIDE 40 MEQ: 10 CAPSULE, COATED, EXTENDED RELEASE ORAL at 05:02

## 2022-12-24 RX ADMIN — POTASSIUM CHLORIDE 40 MEQ: 10 CAPSULE, COATED, EXTENDED RELEASE ORAL at 08:15

## 2022-12-24 RX ADMIN — INSULIN LISPRO 2 UNITS: 100 INJECTION, SOLUTION INTRAVENOUS; SUBCUTANEOUS at 12:45

## 2022-12-24 RX ADMIN — POTASSIUM CHLORIDE 40 MEQ: 10 CAPSULE, COATED, EXTENDED RELEASE ORAL at 12:45

## 2022-12-24 RX ADMIN — THIAMINE HCL TAB 100 MG 100 MG: 100 TAB at 12:45

## 2022-12-24 RX ADMIN — FAMOTIDINE 20 MG: 10 INJECTION INTRAVENOUS at 22:07

## 2022-12-24 RX ADMIN — Medication 10 ML: at 22:08

## 2022-12-24 RX ADMIN — POTASSIUM CHLORIDE 40 MEQ: 10 CAPSULE, COATED, EXTENDED RELEASE ORAL at 01:12

## 2022-12-24 RX ADMIN — POTASSIUM CHLORIDE 40 MEQ: 10 CAPSULE, COATED, EXTENDED RELEASE ORAL at 22:30

## 2022-12-24 RX ADMIN — POTASSIUM & SODIUM PHOSPHATES POWDER PACK 280-160-250 MG 2 PACKET: 280-160-250 PACK at 09:35

## 2022-12-24 RX ADMIN — POTASSIUM & SODIUM PHOSPHATES POWDER PACK 280-160-250 MG 2 PACKET: 280-160-250 PACK at 22:55

## 2022-12-24 RX ADMIN — INSULIN LISPRO 2 UNITS: 100 INJECTION, SOLUTION INTRAVENOUS; SUBCUTANEOUS at 17:40

## 2022-12-24 RX ADMIN — MAGNESIUM SULFATE HEPTAHYDRATE 4 G: 40 INJECTION, SOLUTION INTRAVENOUS at 08:16

## 2022-12-24 RX ADMIN — NICOTINE 1 PATCH: 21 PATCH, EXTENDED RELEASE TRANSDERMAL at 14:28

## 2022-12-24 RX ADMIN — POTASSIUM CHLORIDE 40 MEQ: 10 CAPSULE, COATED, EXTENDED RELEASE ORAL at 03:15

## 2022-12-24 RX ADMIN — FAMOTIDINE 20 MG: 10 INJECTION INTRAVENOUS at 08:15

## 2022-12-24 RX ADMIN — Medication 10 ML: at 08:15

## 2022-12-24 RX ADMIN — ENOXAPARIN SODIUM 40 MG: 100 INJECTION SUBCUTANEOUS at 05:02

## 2022-12-24 RX ADMIN — POTASSIUM CHLORIDE 40 MEQ: 10 CAPSULE, COATED, EXTENDED RELEASE ORAL at 17:40

## 2022-12-24 RX ADMIN — POTASSIUM CHLORIDE AND SODIUM CHLORIDE 75 ML/HR: 900; 150 INJECTION, SOLUTION INTRAVENOUS at 12:45

## 2022-12-24 NOTE — PROGRESS NOTES
1           [facility] Medicine Services  INPATIENT PROGRESS NOTE    Patient Name: Luciano Christiansen  Date of Admission: 12/23/2022  Today's Date: 12/24/22  Length of Stay: 1  Primary Care Physician: Provider, No Known    Subjective   Chief Complaint: Follow-up  HPI   We will are continuing to replace electrolytes.  Potassium still low  Liver function slowly improving  Serum sodium noted at 125.  Previously it was 121.  We are in the right direction using normal saline.  Rate of correction appears to be acceptable.      With low potassium, I think it is reasonable to keep magnesium greater than 2    Patient expressed that he is doing well  He asked about where should his blood sugar be at.  He mentioned ideally between 120-140    Review of Systems     All pertinent negatives and positives are as above. All other systems have been reviewed and are negative unless otherwise stated.     Objective    Temp:  [98.7 °F (37.1 °C)-99.9 °F (37.7 °C)] 98.7 °F (37.1 °C)  Heart Rate:  [] 82  Resp:  [18-20] 19  BP: ()/(49-96) 118/81  Physical Exam   Obese with BMI of 33  Looks better than yesterday  No gross signs of alcohol withdrawal.  GEN: Awake, alert, interactive, in NAD  HEENT: Atraumatic, PERRLA, EOMI, Anicteric, Trachea midline  Lungs: CTAB, no wheezing/rales/rhonchi  Heart: RRR, +S1/s2, no rub  ABD: soft, nt/nd, +BS, no guarding/rebound  Extremities: atraumatic, no cyanosis, no edema  Skin: no rashes or lesions  Neuro: AAOx3, no focal deficits  Psych: normal mood & affect        Results Review:  I have reviewed the labs, radiology results, and diagnostic studies.    Laboratory Data:   Results from last 7 days   Lab Units 12/23/22  0530 12/23/22  0129   WBC 10*3/mm3 8.86 10.67   HEMOGLOBIN g/dL 16.0 17.1   HEMATOCRIT % 48.0 51.0   PLATELETS 10*3/mm3 113* 123*        Results from last 7 days   Lab Units 12/24/22  0635 12/24/22  0009 12/23/22  1840 12/23/22  1344 12/23/22  1101    SODIUM mmol/L 125* 121* 115* 116* 117*   POTASSIUM mmol/L 2.7* 2.4* 2.5* 2.4* 2.5*   CHLORIDE mmol/L 82* 77* 74* 72* 70*   CO2 mmol/L 32.0* 33.0* 31.0* 32.0* 36.0*   BUN mg/dL 5* 5* 6 4* 4*   CREATININE mg/dL 0.80 0.71* 0.97 0.58* 0.61*   CALCIUM mg/dL 7.9* 7.9* 7.6* 7.8* 8.0*   BILIRUBIN mg/dL 2.1*  --   --  3.1* 3.0*   ALK PHOS U/L 111  --   --  106 108   ALT (SGPT) U/L 78*  --   --  78* 80*   AST (SGOT) U/L 160*  --   --  206* 221*   GLUCOSE mg/dL 167* 135* 202* 145* 109*       Culture Data:   No results found for: BLOODCX, URINECX, WOUNDCX, MRSACX, RESPCX, STOOLCX    Radiology Data:   Imaging Results (Last 24 Hours)     ** No results found for the last 24 hours. **          I have reviewed the patient's current medications.     Assessment/Plan     Active Hospital Problems    Diagnosis    • **Hyponatremia-significant    • Alcoholic hepatitis    • Hypokalemia    • Type 2 diabetes mellitus (HCC)    • Hypomagnesemia    • Elevated lactic acid level    • Alkalosis    • Alcoholism (HCC)        Plan:  Replace potassium, phosphorus  Maintain magnesium greater than 2  CIWA protocol  Thiamine prophylaxis  Continue normal saline for hyponatremia  Increase activities as tolerated    Follow chemistry.  We will continue to use protocol for hypertonic solution in terms of monitoring and in the event rate of increase is faster than expected.      Discussed with nurse Vasquez  Patient anticipated to received K-Phos by mouth.  Patient getting magnesium and anticipated to complete this anytime soon    Anticipate moving to telemetry floor later today if remain hemodynamically stable      Chlorhexidine Gluconate Cloth, 1 application, Topical, Q24H  enoxaparin, 40 mg, Subcutaneous, Q24H  famotidine, 20 mg, Intravenous, Q12H  insulin lispro, 2-7 Units, Subcutaneous, TID AC  mupirocin, 1 application, Each Nare, BID  sodium chloride, 10 mL, Intravenous, Q12H          Discharge Planning: To be determined electronically signed by Roderick  Chely Field MD, 12/24/22, 11:25 CST.  Telemetry floor given abnormal

## 2022-12-24 NOTE — PLAN OF CARE
Goal Outcome Evaluation:  Plan of Care Reviewed With: patient, mother        Progress: improving  Outcome Evaluation: Initial RDN eval. Pt reports fair appetite. is on CCHO diet. Pt does say he came to OP DM class in Aug 2022 however tried diet for ~ 1 wk then \"threw it all out.\" He does not desire extensive edu today but is willing to review written handouts. Encouraged Pt to return to DM class and/or OP nutr counseling for further edu. RDN will continue to follow.

## 2022-12-24 NOTE — PLAN OF CARE
Goal Outcome Evaluation:  Plan of Care Reviewed With: patient        Progress: improving  Outcome Evaluation: A&Ox4. Electrolytes being replaced. No acute changes over night. VSS. All safety measures maintained.

## 2022-12-25 ENCOUNTER — APPOINTMENT (OUTPATIENT)
Dept: CARDIOLOGY | Facility: HOSPITAL | Age: 37
DRG: 641 | End: 2022-12-25
Payer: COMMERCIAL

## 2022-12-25 PROBLEM — E83.39 HYPOPHOSPHATEMIA: Status: ACTIVE | Noted: 2022-12-25

## 2022-12-25 PROBLEM — R74.01 TRANSAMINITIS: Status: ACTIVE | Noted: 2022-12-24

## 2022-12-25 LAB
ALBUMIN SERPL-MCNC: 3.8 G/DL (ref 3.5–5.2)
ALP SERPL-CCNC: 128 U/L (ref 39–117)
ALT SERPL W P-5'-P-CCNC: 82 U/L (ref 1–41)
AMMONIA BLD-SCNC: 48 UMOL/L (ref 16–60)
ANION GAP SERPL CALCULATED.3IONS-SCNC: 10 MMOL/L (ref 5–15)
ANION GAP SERPL CALCULATED.3IONS-SCNC: 11 MMOL/L (ref 5–15)
ANION GAP SERPL CALCULATED.3IONS-SCNC: 12 MMOL/L (ref 5–15)
AST SERPL-CCNC: 137 U/L (ref 1–40)
BILIRUB CONJ SERPL-MCNC: 0.9 MG/DL (ref 0–0.3)
BILIRUB INDIRECT SERPL-MCNC: 0.6 MG/DL
BILIRUB SERPL-MCNC: 1.5 MG/DL (ref 0–1.2)
BUN SERPL-MCNC: 6 MG/DL (ref 6–20)
BUN SERPL-MCNC: 6 MG/DL (ref 6–20)
BUN SERPL-MCNC: 9 MG/DL (ref 6–20)
BUN/CREAT SERPL: 11.3 (ref 7–25)
BUN/CREAT SERPL: 6.4 (ref 7–25)
BUN/CREAT SERPL: 9.4 (ref 7–25)
CALCIUM SPEC-SCNC: 8.1 MG/DL (ref 8.6–10.5)
CALCIUM SPEC-SCNC: 8.3 MG/DL (ref 8.6–10.5)
CALCIUM SPEC-SCNC: 8.4 MG/DL (ref 8.6–10.5)
CHLORIDE SERPL-SCNC: 89 MMOL/L (ref 98–107)
CHLORIDE SERPL-SCNC: 92 MMOL/L (ref 98–107)
CHLORIDE SERPL-SCNC: 92 MMOL/L (ref 98–107)
CO2 SERPL-SCNC: 22 MMOL/L (ref 22–29)
CO2 SERPL-SCNC: 27 MMOL/L (ref 22–29)
CO2 SERPL-SCNC: 30 MMOL/L (ref 22–29)
CREAT SERPL-MCNC: 0.64 MG/DL (ref 0.76–1.27)
CREAT SERPL-MCNC: 0.8 MG/DL (ref 0.76–1.27)
CREAT SERPL-MCNC: 0.94 MG/DL (ref 0.76–1.27)
EGFRCR SERPLBLD CKD-EPI 2021: 107.1 ML/MIN/1.73
EGFRCR SERPLBLD CKD-EPI 2021: 116.9 ML/MIN/1.73
EGFRCR SERPLBLD CKD-EPI 2021: 125 ML/MIN/1.73
GLUCOSE BLDC GLUCOMTR-MCNC: 117 MG/DL (ref 70–130)
GLUCOSE BLDC GLUCOMTR-MCNC: 136 MG/DL (ref 70–130)
GLUCOSE SERPL-MCNC: 120 MG/DL (ref 65–99)
GLUCOSE SERPL-MCNC: 130 MG/DL (ref 65–99)
GLUCOSE SERPL-MCNC: 138 MG/DL (ref 65–99)
MAGNESIUM SERPL-MCNC: 2.1 MG/DL (ref 1.6–2.6)
PHOSPHATE SERPL-MCNC: 1.8 MG/DL (ref 2.5–4.5)
PHOSPHATE SERPL-MCNC: 2.3 MG/DL (ref 2.5–4.5)
POTASSIUM SERPL-SCNC: 3.7 MMOL/L (ref 3.5–5.2)
POTASSIUM SERPL-SCNC: 3.9 MMOL/L (ref 3.5–5.2)
POTASSIUM SERPL-SCNC: 4.1 MMOL/L (ref 3.5–5.2)
POTASSIUM SERPL-SCNC: 4.1 MMOL/L (ref 3.5–5.2)
PROT SERPL-MCNC: 6.4 G/DL (ref 6–8.5)
SODIUM SERPL-SCNC: 125 MMOL/L (ref 136–145)
SODIUM SERPL-SCNC: 129 MMOL/L (ref 136–145)
SODIUM SERPL-SCNC: 131 MMOL/L (ref 136–145)

## 2022-12-25 PROCEDURE — 82140 ASSAY OF AMMONIA: CPT | Performed by: INTERNAL MEDICINE

## 2022-12-25 PROCEDURE — 80048 BASIC METABOLIC PNL TOTAL CA: CPT | Performed by: FAMILY MEDICINE

## 2022-12-25 PROCEDURE — 84132 ASSAY OF SERUM POTASSIUM: CPT | Performed by: INTERNAL MEDICINE

## 2022-12-25 PROCEDURE — 25010000002 ENOXAPARIN PER 10 MG: Performed by: INTERNAL MEDICINE

## 2022-12-25 PROCEDURE — 25010000002 SODIUM CHLORIDE 0.9 % WITH KCL 20 MEQ 20-0.9 MEQ/L-% SOLUTION: Performed by: INTERNAL MEDICINE

## 2022-12-25 PROCEDURE — 82962 GLUCOSE BLOOD TEST: CPT

## 2022-12-25 PROCEDURE — 80076 HEPATIC FUNCTION PANEL: CPT | Performed by: INTERNAL MEDICINE

## 2022-12-25 PROCEDURE — 83735 ASSAY OF MAGNESIUM: CPT | Performed by: INTERNAL MEDICINE

## 2022-12-25 PROCEDURE — 84100 ASSAY OF PHOSPHORUS: CPT | Performed by: INTERNAL MEDICINE

## 2022-12-25 PROCEDURE — 36415 COLL VENOUS BLD VENIPUNCTURE: CPT | Performed by: FAMILY MEDICINE

## 2022-12-25 RX ORDER — LORAZEPAM 1 MG/1
1 TABLET ORAL
Status: DISCONTINUED | OUTPATIENT
Start: 2022-12-25 | End: 2022-12-26 | Stop reason: HOSPADM

## 2022-12-25 RX ORDER — LORAZEPAM 2 MG/ML
2 INJECTION INTRAMUSCULAR
Status: DISCONTINUED | OUTPATIENT
Start: 2022-12-25 | End: 2022-12-26 | Stop reason: HOSPADM

## 2022-12-25 RX ORDER — LORAZEPAM 1 MG/1
2 TABLET ORAL
Status: DISCONTINUED | OUTPATIENT
Start: 2022-12-25 | End: 2022-12-26 | Stop reason: HOSPADM

## 2022-12-25 RX ORDER — LORAZEPAM 2 MG/ML
1 INJECTION INTRAMUSCULAR
Status: DISCONTINUED | OUTPATIENT
Start: 2022-12-25 | End: 2022-12-26 | Stop reason: HOSPADM

## 2022-12-25 RX ORDER — FAMOTIDINE 20 MG/1
20 TABLET, FILM COATED ORAL
Status: DISCONTINUED | OUTPATIENT
Start: 2022-12-25 | End: 2022-12-26 | Stop reason: HOSPADM

## 2022-12-25 RX ORDER — IBUPROFEN 800 MG/1
400 TABLET ORAL EVERY 8 HOURS PRN
Status: DISCONTINUED | OUTPATIENT
Start: 2022-12-25 | End: 2022-12-26 | Stop reason: HOSPADM

## 2022-12-25 RX ADMIN — POTASSIUM CHLORIDE AND SODIUM CHLORIDE 75 ML/HR: 900; 150 INJECTION, SOLUTION INTRAVENOUS at 00:35

## 2022-12-25 RX ADMIN — POTASSIUM CHLORIDE AND SODIUM CHLORIDE 75 ML/HR: 900; 150 INJECTION, SOLUTION INTRAVENOUS at 22:01

## 2022-12-25 RX ADMIN — LORAZEPAM 2 MG: 1 TABLET ORAL at 15:44

## 2022-12-25 RX ADMIN — POTASSIUM & SODIUM PHOSPHATES POWDER PACK 280-160-250 MG 2 PACKET: 280-160-250 PACK at 05:20

## 2022-12-25 RX ADMIN — ENOXAPARIN SODIUM 40 MG: 100 INJECTION SUBCUTANEOUS at 05:17

## 2022-12-25 RX ADMIN — THIAMINE HCL TAB 100 MG 100 MG: 100 TAB at 09:20

## 2022-12-25 RX ADMIN — IBUPROFEN 400 MG: 800 TABLET, FILM COATED ORAL at 06:50

## 2022-12-25 RX ADMIN — FAMOTIDINE 20 MG: 10 INJECTION INTRAVENOUS at 09:20

## 2022-12-25 RX ADMIN — FAMOTIDINE 20 MG: 20 TABLET, FILM COATED ORAL at 21:01

## 2022-12-25 RX ADMIN — LORAZEPAM 2 MG: 1 TABLET ORAL at 21:39

## 2022-12-25 RX ADMIN — POTASSIUM CHLORIDE 40 MEQ: 10 CAPSULE, COATED, EXTENDED RELEASE ORAL at 03:49

## 2022-12-25 RX ADMIN — NICOTINE 1 PATCH: 21 PATCH, EXTENDED RELEASE TRANSDERMAL at 09:20

## 2022-12-25 NOTE — PROGRESS NOTES
Pharmacy Dosing Service  Automatic IV to PO Conversion  Pepcid    Assessment/Action/Plan:  Patient meets criteria listed below. Pepcid 20 mg IV every 12 hours has been changed to Pepcid 20 mg PO BID AC.        Subjective:  Luciano Christiansen is a 37 y.o. male who meets the following criteria for IV to PO therapy conversion     Policy Criteria:  Tolerating oral fluids or 40ml/hour of enteral nutrition and oral route not otherwise compromised  Receiving other oral medications on a scheduled basis    Additional Factors Considered:  Anti-emetic usage  Patient disposition per documentation  Disease states or conditions contraindicating oral conversion    Objective:  Diet Order   Procedures    Diet: Regular/House Diet, Diabetic Diets; Consistent Carbohydrate; Texture: Regular Texture (IDDSI 7); Fluid Consistency: Thin (IDDSI 0)       Active Medications    Current Facility-Administered Medications:     acetaminophen (TYLENOL) tablet 650 mg, 650 mg, Oral, Q4H PRN, Liset Klein DO    calcium gluconate 1 g in sodium chloride 0.9 % 100 mL IVPB, 1 g, Intravenous, PRN **AND** calcium gluconate 6 g in sodium chloride 0.9 % 500 mL IVPB, 6 g, Intravenous, PRN **AND** Calcium, , , PRN, Roderick Field MD    dextrose (D50W) (25 g/50 mL) IV injection 25 g, 25 g, Intravenous, Q15 Min PRN, Liset Klein DO    dextrose (GLUTOSE) oral gel 15 g, 15 g, Oral, Q15 Min PRN, Liset Klein DO    Enoxaparin Sodium (LOVENOX) syringe 40 mg, 40 mg, Subcutaneous, Q24H, Roderick Field MD, 40 mg at 12/25/22 0517    famotidine (PEPCID) tablet 20 mg, 20 mg, Oral, BID PLACIDO, Antoine Ruiz DO    glucagon (human recombinant) (GLUCAGEN DIAGNOSTIC) injection 1 mg, 1 mg, Intramuscular, Q15 Min PRN, Liset Klein DO    ibuprofen (ADVIL,MOTRIN) tablet 400 mg, 400 mg, Oral, Q8H PRN, Liset Klein DO, 400 mg at 12/25/22 0650    Insulin Lispro (humaLOG) injection 2-7 Units, 2-7 Units, Subcutaneous, TID AC,  Liset Klein DO, 2 Units at 12/24/22 1740    LORazepam (ATIVAN) tablet 1 mg, 1 mg, Oral, Q2H PRN **OR** LORazepam (ATIVAN) injection 1 mg, 1 mg, Intravenous, Q2H PRN **OR** LORazepam (ATIVAN) tablet 2 mg, 2 mg, Oral, Q1H PRN, 2 mg at 12/25/22 1544 **OR** LORazepam (ATIVAN) injection 2 mg, 2 mg, Intravenous, Q1H PRN **OR** LORazepam (ATIVAN) injection 2 mg, 2 mg, Intravenous, Q15 Min PRN **OR** LORazepam (ATIVAN) injection 2 mg, 2 mg, Intramuscular, Q15 Min PRN, Antoine Ruiz DO    Magnesium Sulfate 2 gram Bolus, followed by 8 gram infusion (total Mg dose 10 grams)- Mg less than or equal to 1mg/dL, 2 g, Intravenous, PRN **OR** Magnesium Sulfate 2 gram / 50mL Infusion (GIVE X 3 BAGS TO EQUAL 6GM TOTAL DOSE) - Mg 1.1 - 1.5 mg/dl, 2 g, Intravenous, PRN **OR** Magnesium Sulfate 4 gram infusion- Mg 1.6-1.9 mg/dL, 4 g, Intravenous, PRNEmir Glenn Riego, MD, Last Rate: 25 mL/hr at 12/24/22 0816, 4 g at 12/24/22 0816    nicotine (NICODERM CQ) 21 MG/24HR patch 1 patch, 1 patch, Transdermal, Q24H, Roderick Field MD, 1 patch at 12/25/22 0920    ondansetron (ZOFRAN) injection 4 mg, 4 mg, Intravenous, Q6H PRN, Liset Klein DO, 4 mg at 12/23/22 0440    ondansetron (ZOFRAN) injection 4 mg, 4 mg, Intravenous, Q6H PRN, Liset Klein DO    Pharmacy to Dose enoxaparin (LOVENOX), , Does not apply, Continuous PRN, Roderick Field MD    potassium & sodium phosphates (PHOS-NAK) 280-160-250 MG packet - for Phosphorus less than 1.25 mg/dL, 2 packet, Oral, Q6H PRN **OR** potassium & sodium phosphates (PHOS-NAK) 280-160-250 MG packet - for Phosphorus 1.25 - 2.5 mg/dL, 2 packet, Oral, Q6H PRN, Roderick Field MD, 2 packet at 12/25/22 0520    potassium chloride (MICRO-K) CR capsule 40 mEq, 40 mEq, Oral, PRN, 40 mEq at 12/25/22 0349 **OR** potassium chloride (KLOR-CON) packet 40 mEq, 40 mEq, Oral, PRN **OR** potassium chloride 20 mEq in 50 mL IVPB, 20 mEq, Intravenous, Q1H PRN,  Roderick Field MD, Last Rate: 50 mL/hr at 12/23/22 1857, 20 mEq at 12/23/22 1857    sodium chloride 0.9 % flush 10 mL, 10 mL, Intravenous, Q12H, Liset Klein DO, 10 mL at 12/24/22 2208    sodium chloride 0.9 % flush 10 mL, 10 mL, Intravenous, PRN, Liset Klein DO    sodium chloride 0.9 % infusion 40 mL, 40 mL, Intravenous, PRN, Liset Klein DO    sodium chloride 0.9 % with KCl 20 mEq/L infusion, 75 mL/hr, Intravenous, Continuous, Roderick Field MD, Last Rate: 75 mL/hr at 12/25/22 0035, 75 mL/hr at 12/25/22 0035    thiamine (VITAMIN B-1) tablet 100 mg, 100 mg, Oral, Daily, Roderick Field MD, 100 mg at 12/25/22 0920    HUNG Remy, PharmD  12/25/2217:21 CST

## 2022-12-25 NOTE — PLAN OF CARE
Goal Outcome Evaluation:  Plan of Care Reviewed With: patient           Outcome Evaluation: BP soft at times, 02 sats WNL on RA, electrolyte protocol in place PRN potassium given x2 and Phos given x2, S-SA  Afl 123-147 up to 174 with PVC and PAC's, no c/o pain.

## 2022-12-25 NOTE — PROGRESS NOTES
[facility] Medicine Services  INPATIENT PROGRESS NOTE    Patient Name: Luciano Christiansen  Date of Admission: 12/23/2022  Today's Date: 12/25/22  Length of Stay: 2  Primary Care Physician: Provider, No Known    Subjective   Chief Complaint: f/u hyponatremia    HPI   Patient seen and evaluated with his mother at bedside.  Patient has been more anxious today.  Per nursing staff he is making some strange comments at times.  Patient has a significant alcohol history but states this feels different than prior withdrawals.  Denies chest pain.    Review of Systems   All pertinent negatives and positives are as above. All other systems have been reviewed and are negative unless otherwise stated.     Objective    Temp:  [98.2 °F (36.8 °C)-99.1 °F (37.3 °C)] 99.1 °F (37.3 °C)  Heart Rate:  [] 76  Resp:  [16-19] 16  BP: ()/(67-96) 135/72  Physical Exam  GEN: Awake, alert, interactive, in NAD  HEENT: PERRLA, EOMI, Anicteric, Trachea midline  Lungs: no wheezing/rales/rhonchi  Heart: RRR, +S1/s2, no rub  ABD: soft, nt/nd, +BS, no guarding/rebound  Extremities: atraumatic, no cyanosis, no edema  Skin: no rashes or petechiae   Neuro: AAOx3, no focal deficits  Psych: normal mood & affect        Results Review:  I have reviewed the labs, radiology results, and diagnostic studies.    Laboratory Data:   Results from last 7 days   Lab Units 12/23/22  0530 12/23/22  0129   WBC 10*3/mm3 8.86 10.67   HEMOGLOBIN g/dL 16.0 17.1   HEMATOCRIT % 48.0 51.0   PLATELETS 10*3/mm3 113* 123*        Results from last 7 days   Lab Units 12/25/22  0937 12/25/22  0530 12/25/22  0006 12/24/22  1811 12/24/22  1210 12/24/22  0635 12/23/22  1840 12/23/22  1344   SODIUM mmol/L  --  125* 129* 126*   < > 125*   < > 116*   POTASSIUM mmol/L 4.1 4.1 3.7 3.2*   < > 2.7*   < > 2.4*   CHLORIDE mmol/L  --  92* 89* 86*   < > 82*   < > 72*   CO2 mmol/L  --  22.0 30.0* 30.0*   < > 32.0*   < > 32.0*   BUN mg/dL  --  6 6 6   < >  5*   < > 4*   CREATININE mg/dL  --  0.64* 0.94 0.74*   < > 0.80   < > 0.58*   CALCIUM mg/dL  --  8.1* 8.4* 8.2*   < > 7.9*   < > 7.8*   BILIRUBIN mg/dL  --   --  1.5*  --   --  2.1*  --  3.1*   ALK PHOS U/L  --   --  128*  --   --  111  --  106   ALT (SGPT) U/L  --   --  82*  --   --  78*  --  78*   AST (SGOT) U/L  --   --  137*  --   --  160*  --  206*   GLUCOSE mg/dL  --  130* 138* 177*   < > 167*   < > 145*    < > = values in this interval not displayed.       Culture Data:   No results found for: BLOODCX, URINECX, WOUNDCX, MRSACX, RESPCX, STOOLCX    Radiology Data:   Imaging Results (Last 24 Hours)     ** No results found for the last 24 hours. **          I have reviewed the patient's current medications.     Assessment/Plan     Active Hospital Problems    Diagnosis    • **Hyponatremia    • Hypophosphatemia    • Transaminitis    • Hypokalemia    • Type 2 diabetes mellitus (HCC)    • Hypomagnesemia    • Alcoholism (HCC)        Plan:  #1 hyponatremia -has been improving with IV fluid resuscitation.  Sodium was 110 on arrival.  Up to 125.  Continue to trend.  Holding lisinopril.  Suspect lobulation from alcohol abuse.    #2 hypophosphatemia -eating better and that is also improving.  Monitor and replace as needed.    #3 hypokalemia -improved.  Getting IV fluids.  Mag level normal.    #4 ?  Arrhythmia -reports of potential type II heart blocks at times.  Also reports of questionable SVT versus A. fib/flutter.  He has a lot of events on monitor.  Most of them seem very suspicious.  Notes from nursing staff that he is often up and ambulating during some these tachycardia events.  Is also frequently playing with his leads which are having trouble staying on.  Not sure if he is having actual arrhythmias or not.  Given his alcoholism we will check a 2D echo to look for any dilated cardiomyopathy.  Continue to monitor and fix electrolytes.  We will ask cardiology to evaluate to see if they suspect any actual arrhythmia.   For now is getting DVT prophylactic Lovenox.  Do not see clear reason for full dose AC as his Chads score would be at 2 and he is only 37 years old unless his echo shows CHF.    #5 EtOH abuse -patient tells me he drinks 8-12 beers a day.  Alcohol level was negative on arrival.  He has been here about 48 hours and seems to be having some increasing confusion and impulsiveness per staff today.  We will start CIWA protocol.  As needed Ativan.    #6 transaminitis -suspect in the setting of EtOH abuse.  Has been improving here with fluid resuscitation and alcohol cessation.    #7 thrombocytopenia -likely in the setting of EtOH abuse and some bone marrow suppression.  113 on last check.  Not anemic and no signs of bleeding noted.  Recheck tomorrow.      Discharge Planning: Ongoing.  Continue to monitor sodium.  Monitor overnight for any further arrhythmias.  Monitor for alcohol withdrawal.  Check 2D echo.  We will follow-up with cardiology evaluation.    Electronically signed by Antoine Ruiz DO, 12/25/22, 12:26 CST.

## 2022-12-26 ENCOUNTER — APPOINTMENT (OUTPATIENT)
Dept: CARDIOLOGY | Facility: HOSPITAL | Age: 37
DRG: 641 | End: 2022-12-26
Payer: COMMERCIAL

## 2022-12-26 ENCOUNTER — READMISSION MANAGEMENT (OUTPATIENT)
Dept: CALL CENTER | Facility: HOSPITAL | Age: 37
End: 2022-12-26

## 2022-12-26 VITALS
HEIGHT: 75 IN | DIASTOLIC BLOOD PRESSURE: 84 MMHG | BODY MASS INDEX: 32.73 KG/M2 | OXYGEN SATURATION: 99 % | TEMPERATURE: 98.1 F | WEIGHT: 263.25 LBS | HEART RATE: 94 BPM | SYSTOLIC BLOOD PRESSURE: 155 MMHG | RESPIRATION RATE: 20 BRPM

## 2022-12-26 LAB
ALBUMIN SERPL-MCNC: 3.8 G/DL (ref 3.5–5.2)
ALBUMIN/GLOB SERPL: 1.5 G/DL
ALP SERPL-CCNC: 116 U/L (ref 39–117)
ALT SERPL W P-5'-P-CCNC: 84 U/L (ref 1–41)
ANION GAP SERPL CALCULATED.3IONS-SCNC: 10 MMOL/L (ref 5–15)
AST SERPL-CCNC: 118 U/L (ref 1–40)
BH CV ECHO MEAS - AO MAX PG: 11.2 MMHG
BH CV ECHO MEAS - AO MEAN PG: 6 MMHG
BH CV ECHO MEAS - AO ROOT DIAM: 3.1 CM
BH CV ECHO MEAS - AO V2 MAX: 167 CM/SEC
BH CV ECHO MEAS - AO V2 VTI: 27.1 CM
BH CV ECHO MEAS - AVA(I,D): 2.31 CM2
BH CV ECHO MEAS - EDV(CUBED): 143.9 ML
BH CV ECHO MEAS - EDV(MOD-SP4): 177 ML
BH CV ECHO MEAS - EF(MOD-SP4): 44.9 %
BH CV ECHO MEAS - ESV(CUBED): 59.3 ML
BH CV ECHO MEAS - ESV(MOD-SP4): 97.5 ML
BH CV ECHO MEAS - FS: 25.6 %
BH CV ECHO MEAS - IVS/LVPW: 1.02 CM
BH CV ECHO MEAS - IVSD: 1.04 CM
BH CV ECHO MEAS - LA DIMENSION: 3.9 CM
BH CV ECHO MEAS - LAT PEAK E' VEL: 14.3 CM/SEC
BH CV ECHO MEAS - LV DIASTOLIC VOL/BSA (35-75): 71.8 CM2
BH CV ECHO MEAS - LV MASS(C)D: 204.6 GRAMS
BH CV ECHO MEAS - LV MAX PG: 6.4 MMHG
BH CV ECHO MEAS - LV MEAN PG: 4 MMHG
BH CV ECHO MEAS - LV SYSTOLIC VOL/BSA (12-30): 39.6 CM2
BH CV ECHO MEAS - LV V1 MAX: 126 CM/SEC
BH CV ECHO MEAS - LV V1 VTI: 19.9 CM
BH CV ECHO MEAS - LVIDD: 5.2 CM
BH CV ECHO MEAS - LVIDS: 3.9 CM
BH CV ECHO MEAS - LVOT AREA: 3.1 CM2
BH CV ECHO MEAS - LVOT DIAM: 2 CM
BH CV ECHO MEAS - LVPWD: 1.02 CM
BH CV ECHO MEAS - MED PEAK E' VEL: 9.9 CM/SEC
BH CV ECHO MEAS - MV A MAX VEL: 51.4 CM/SEC
BH CV ECHO MEAS - MV DEC SLOPE: 1070 CM/SEC2
BH CV ECHO MEAS - MV DEC TIME: 0.16 MSEC
BH CV ECHO MEAS - MV E MAX VEL: 100.4 CM/SEC
BH CV ECHO MEAS - MV E/A: 1.95
BH CV ECHO MEAS - MV P1/2T: 29.3 MSEC
BH CV ECHO MEAS - MVA(P1/2T): 7.5 CM2
BH CV ECHO MEAS - PA V2 MAX: 122 CM/SEC
BH CV ECHO MEAS - RAP SYSTOLE: 5 MMHG
BH CV ECHO MEAS - RV MAX PG: 3.6 MMHG
BH CV ECHO MEAS - RV V1 MAX: 94.6 CM/SEC
BH CV ECHO MEAS - RV V1 VTI: 20.7 CM
BH CV ECHO MEAS - RVSP: 17.5 MMHG
BH CV ECHO MEAS - SI(MOD-SP4): 32.2 ML/M2
BH CV ECHO MEAS - SV(LVOT): 62.5 ML
BH CV ECHO MEAS - SV(MOD-SP4): 79.5 ML
BH CV ECHO MEAS - TR MAX PG: 12.5 MMHG
BH CV ECHO MEAS - TR MAX VEL: 177 CM/SEC
BH CV ECHO MEASUREMENTS AVERAGE E/E' RATIO: 8.3
BILIRUB SERPL-MCNC: 1.3 MG/DL (ref 0–1.2)
BUN SERPL-MCNC: 7 MG/DL (ref 6–20)
BUN/CREAT SERPL: 9.5 (ref 7–25)
CALCIUM SPEC-SCNC: 8.7 MG/DL (ref 8.6–10.5)
CHLORIDE SERPL-SCNC: 95 MMOL/L (ref 98–107)
CO2 SERPL-SCNC: 25 MMOL/L (ref 22–29)
CREAT SERPL-MCNC: 0.74 MG/DL (ref 0.76–1.27)
DEPRECATED RDW RBC AUTO: 48.1 FL (ref 37–54)
EGFRCR SERPLBLD CKD-EPI 2021: 119.7 ML/MIN/1.73
ERYTHROCYTE [DISTWIDTH] IN BLOOD BY AUTOMATED COUNT: 13.5 % (ref 12.3–15.4)
GLOBULIN UR ELPH-MCNC: 2.6 GM/DL
GLUCOSE BLDC GLUCOMTR-MCNC: 113 MG/DL (ref 70–130)
GLUCOSE BLDC GLUCOMTR-MCNC: 172 MG/DL (ref 70–130)
GLUCOSE SERPL-MCNC: 167 MG/DL (ref 65–99)
HCT VFR BLD AUTO: 37.9 % (ref 37.5–51)
HGB BLD-MCNC: 13.6 G/DL (ref 13–17.7)
LEFT ATRIUM VOLUME INDEX: 27.2 ML/M2
LEFT ATRIUM VOLUME: 57 ML
MAGNESIUM SERPL-MCNC: 1.7 MG/DL (ref 1.6–2.6)
MAXIMAL PREDICTED HEART RATE: 183 BPM
MCH RBC QN AUTO: 35 PG (ref 26.6–33)
MCHC RBC AUTO-ENTMCNC: 35.9 G/DL (ref 31.5–35.7)
MCV RBC AUTO: 97.4 FL (ref 79–97)
PHOSPHATE SERPL-MCNC: 2.9 MG/DL (ref 2.5–4.5)
PLATELET # BLD AUTO: 133 10*3/MM3 (ref 140–450)
PMV BLD AUTO: 10.5 FL (ref 6–12)
POTASSIUM SERPL-SCNC: 4.2 MMOL/L (ref 3.5–5.2)
PROT SERPL-MCNC: 6.4 G/DL (ref 6–8.5)
RBC # BLD AUTO: 3.89 10*6/MM3 (ref 4.14–5.8)
SODIUM SERPL-SCNC: 130 MMOL/L (ref 136–145)
SODIUM SERPL-SCNC: 133 MMOL/L (ref 136–145)
STRESS TARGET HR: 156 BPM
WBC NRBC COR # BLD: 6.09 10*3/MM3 (ref 3.4–10.8)

## 2022-12-26 PROCEDURE — 85027 COMPLETE CBC AUTOMATED: CPT | Performed by: INTERNAL MEDICINE

## 2022-12-26 PROCEDURE — 82962 GLUCOSE BLOOD TEST: CPT

## 2022-12-26 PROCEDURE — 25010000002 PERFLUTREN 6.52 MG/ML SUSPENSION: Performed by: INTERNAL MEDICINE

## 2022-12-26 PROCEDURE — 25010000002 ENOXAPARIN PER 10 MG: Performed by: INTERNAL MEDICINE

## 2022-12-26 PROCEDURE — 83735 ASSAY OF MAGNESIUM: CPT | Performed by: INTERNAL MEDICINE

## 2022-12-26 PROCEDURE — 0 MAGNESIUM SULFATE 4 GM/100ML SOLUTION: Performed by: INTERNAL MEDICINE

## 2022-12-26 PROCEDURE — 93306 TTE W/DOPPLER COMPLETE: CPT | Performed by: INTERNAL MEDICINE

## 2022-12-26 PROCEDURE — 36415 COLL VENOUS BLD VENIPUNCTURE: CPT | Performed by: EMERGENCY MEDICINE

## 2022-12-26 PROCEDURE — 84100 ASSAY OF PHOSPHORUS: CPT | Performed by: INTERNAL MEDICINE

## 2022-12-26 PROCEDURE — 80053 COMPREHEN METABOLIC PANEL: CPT | Performed by: INTERNAL MEDICINE

## 2022-12-26 PROCEDURE — 84295 ASSAY OF SERUM SODIUM: CPT | Performed by: EMERGENCY MEDICINE

## 2022-12-26 PROCEDURE — 25010000002 SODIUM CHLORIDE 0.9 % WITH KCL 20 MEQ 20-0.9 MEQ/L-% SOLUTION: Performed by: INTERNAL MEDICINE

## 2022-12-26 PROCEDURE — 93306 TTE W/DOPPLER COMPLETE: CPT

## 2022-12-26 PROCEDURE — 99222 1ST HOSP IP/OBS MODERATE 55: CPT | Performed by: INTERNAL MEDICINE

## 2022-12-26 RX ADMIN — THIAMINE HCL TAB 100 MG 100 MG: 100 TAB at 08:25

## 2022-12-26 RX ADMIN — LORAZEPAM 2 MG: 1 TABLET ORAL at 04:24

## 2022-12-26 RX ADMIN — ENOXAPARIN SODIUM 40 MG: 100 INJECTION SUBCUTANEOUS at 06:04

## 2022-12-26 RX ADMIN — FAMOTIDINE 20 MG: 20 TABLET, FILM COATED ORAL at 08:25

## 2022-12-26 RX ADMIN — MAGNESIUM SULFATE HEPTAHYDRATE 4 G: 40 INJECTION, SOLUTION INTRAVENOUS at 10:19

## 2022-12-26 RX ADMIN — POTASSIUM CHLORIDE AND SODIUM CHLORIDE 75 ML/HR: 900; 150 INJECTION, SOLUTION INTRAVENOUS at 04:11

## 2022-12-26 RX ADMIN — NICOTINE 1 PATCH: 21 PATCH, EXTENDED RELEASE TRANSDERMAL at 08:26

## 2022-12-26 RX ADMIN — PERFLUTREN 8.48 MG: 6.52 INJECTION, SUSPENSION INTRAVENOUS at 15:13

## 2022-12-26 NOTE — PLAN OF CARE
Goal Outcome Evaluation: Will monitor and treat signs of DT's  Problem: Adult Inpatient Plan of Care  Goal: Plan of Care Review  Outcome: Ongoing, Progressing  Flowsheets  Taken 12/25/2022 0458 by Ghazala Diaz RN  Plan of Care Reviewed With: patient  Outcome Evaluation: BP soft at times, 02 sats WNL on RA, electrolyte protocol in place PRN potassium given x2 and Phos given x2, S-SA  Afl 123-147 up to 174 with PVC and PAC's, no c/o pain.  Taken 12/24/2022 1710 by Melania Carmona  Progress: improving  Goal: Patient-Specific Goal (Individualized)  Outcome: Ongoing, Progressing  Goal: Absence of Hospital-Acquired Illness or Injury  Outcome: Ongoing, Progressing  Intervention: Identify and Manage Fall Risk  Recent Flowsheet Documentation  Taken 12/26/2022 0400 by Olinda West RN  Safety Promotion/Fall Prevention: safety round/check completed  Taken 12/26/2022 0200 by Olinda West RN  Safety Promotion/Fall Prevention: safety round/check completed  Taken 12/26/2022 0000 by Olinda West RN  Safety Promotion/Fall Prevention: safety round/check completed  Taken 12/25/2022 2200 by Olinda West RN  Safety Promotion/Fall Prevention: safety round/check completed  Taken 12/25/2022 2100 by Olinda West RN  Safety Promotion/Fall Prevention: safety round/check completed  Taken 12/25/2022 2000 by Olinda West RN  Safety Promotion/Fall Prevention: safety round/check completed  Intervention: Prevent Skin Injury  Recent Flowsheet Documentation  Taken 12/26/2022 0400 by Olinda West RN  Body Position: position changed independently  Taken 12/26/2022 0200 by Olinda West RN  Body Position: position changed independently  Taken 12/26/2022 0000 by Olinda eWst RN  Body Position: position changed independently  Taken 12/25/2022 2200 by Olinda West RN  Body Position:   position changed independently   legs elevated  Taken 12/25/2022 2100 by Olinda West RN  Body Position: position changed independently  Taken  12/25/2022 2000 by Olinda West RN  Body Position: position changed independently  Intervention: Prevent and Manage VTE (Venous Thromboembolism) Risk  Recent Flowsheet Documentation  Taken 12/25/2022 2100 by Olinda West RN  Activity Management:   ambulated outside room   ambulated in room  VTE Prevention/Management: (See MAR--Lovenox) other (see comments)  Range of Motion: active ROM (range of motion) encouraged  Intervention: Prevent Infection  Recent Flowsheet Documentation  Taken 12/26/2022 0400 by Olinda West RN  Infection Prevention:   rest/sleep promoted   single patient room provided  Taken 12/26/2022 0200 by Olinda West RN  Infection Prevention:   rest/sleep promoted   single patient room provided  Taken 12/26/2022 0000 by Olinda West RN  Infection Prevention:   rest/sleep promoted   single patient room provided  Taken 12/25/2022 2200 by Olinda West RN  Infection Prevention:   rest/sleep promoted   single patient room provided  Taken 12/25/2022 2100 by Olinda West RN  Infection Prevention:   rest/sleep promoted   single patient room provided  Taken 12/25/2022 2000 by Olinda West RN  Infection Prevention:   rest/sleep promoted   single patient room provided  Goal: Optimal Comfort and Wellbeing  Outcome: Ongoing, Progressing  Intervention: Provide Person-Centered Care  Recent Flowsheet Documentation  Taken 12/25/2022 2100 by Olinda West RN  Trust Relationship/Rapport:   care explained   choices provided  Goal: Readiness for Transition of Care  Outcome: Ongoing, Progressing     Problem: Fluid Volume Deficit  Goal: Fluid Balance  Outcome: Ongoing, Progressing     Problem: Electrolyte Imbalance  Goal: Electrolyte Balance  Outcome: Ongoing, Progressing

## 2022-12-26 NOTE — NURSING NOTE
I walked in to tell patient he was going to echo. Patient had just woke up and per his mom, he had a \"panic attack\". He did not know where he was or what was going on and ripped out his IV. He re-oriented quickly. He agreed to the echo and left with transport. Once down in echo, he became anxious and the echo tech was in the process of bringing him back to his room when he started pacing the floor by main elevators. Security was present, along with Jaylene, house supervisor when I arrived. Patient told me he swallowed something he shouldn't have and then proceeded to tell me he took a \"gummie\". We got the patient back to his room, where his mom was waiting for him. I asked him what kind of \"gummie\" he took and she told me it was a \"10mg gummie of THC\" that she gave him because he was begging her for one and thought it would help with his detoxing. House supervisor made aware of this. Attempted to stick patient for an IV, after successfully getting the IV in and trying to connect the jloop, patient stood up and jerked the cathlon out. Due to concerns for patient and staff safety, no further IV attempts made at this point, patient agreed to take PO meds to help him calm down. CIWA score at this time was 20, treated per MAR.

## 2022-12-26 NOTE — DISCHARGE SUMMARY
[facility] Medicine Services  DISCHARGE SUMMARY       Date of Admission: 12/23/2022  Date of Discharge:  12/26/2022  Primary Care Physician: Provider, No Known    Presenting Problem/Chief Complaint:  Nausea/vomiting     Final Discharge Diagnoses:  Active Hospital Problems    Diagnosis    • **Hyponatremia    • Hypophosphatemia    • Transaminitis    • Hypokalemia    • Type 2 diabetes mellitus (HCC)    • Hypomagnesemia    • Alcoholism (HCC)        Consults:   #1 Dr Iyer, cardiology    Procedures Performed: none    Pertinent Test Results:   Results for orders placed during the hospital encounter of 12/23/22    Adult Transthoracic Echo Complete W/ Cont if Necessary Per Protocol    Interpretation Summary  •  Left ventricular ejection fraction appears to be 56 - 60%.  •  Left ventricular diastolic function was normal.  •  Normal right ventricular cavity size and systolic function noted.  •  There is no significant (greater than mild) valvular dysfunction.  •  Estimated right ventricular systolic pressure from tricuspid regurgitation is normal (<35 mmHg).      Imaging Results (All)     Procedure Component Value Units Date/Time    XR Chest 1 View [[id number]] Collected: 12/23/22 0629     Updated: 12/23/22 0633    Narrative:      EXAMINATION: XR CHEST 1 VW-     12/23/2022 1:55 AM CST     HISTORY: weakness     A frontal moderately lordotic view of the chest is obtained. There is no  previous study for comparison.     The lungs are poorly expanded with atelectatic changes, more on the  right side.     There is mild elevation right diaphragm.     There is no pleural effusion, pulmonary congestion or pneumothorax.     The heart size is not evaluated due to the AP projection.     There is no acute bony abnormality.       Impression:      1. The poor lung expansion but no active cardiopulmonary disease.  This report was finalized on 12/23/2022 06:30 by Dr. Vj Hsu MD.          LAB  RESULTS:      Lab 12/26/22  0736 12/23/22  2242 12/23/22  1703 12/23/22  1344 12/23/22  1102 12/23/22  0530 12/23/22  0433 12/23/22  0129   WBC 6.09  --   --   --   --  8.86  --  10.67   HEMOGLOBIN 13.6  --   --   --   --  16.0  --  17.1   HEMATOCRIT 37.9  --   --   --   --  48.0  --  51.0   PLATELETS 133*  --   --   --   --  113*  --  123*   NEUTROS ABS  --   --   --   --   --   --   --  8.73*   LYMPHS ABS  --   --   --   --   --   --   --  0.70   MONOS ABS  --   --   --   --   --   --   --  1.05*   EOS ABS  --   --   --   --   --   --   --  0.08   MCV 97.4*  --   --   --   --  103.2*  --  102.8*   CRP  --   --   --   --   --   --   --  3.75*   PROCALCITONIN  --   --   --   --   --   --   --  0.42*   LACTATE  --  1.5 2.4* 2.5* 2.3*  --  4.1* 5.7*   LDH  --   --   --   --   --   --   --  495*   PROTIME  --   --   --   --   --   --   --  14.9*         Lab 12/26/22  1228 12/26/22  0738 12/25/22  1156 12/25/22  0937 12/25/22  0530 12/25/22  0006 12/24/22  1811 12/24/22  1210 12/24/22  0635 12/23/22  1840 12/23/22  1703 12/23/22  1344 12/23/22  0332 12/23/22  0129   SODIUM 133* 130* 131*  --  125* 129* 126*   < > 125*   < >  --  116*   < > 110*   POTASSIUM  --  4.2 3.9 4.1 4.1 3.7 3.2*   < > 2.7*   < >  --  2.4*   < > 2.6*   CHLORIDE  --  95* 92*  --  92* 89* 86*   < > 82*   < >  --  72*   < > 60*   CO2  --  25.0 27.0  --  22.0 30.0* 30.0*   < > 32.0*   < >  --  32.0*   < > 31.0*   ANION GAP  --  10.0 12.0  --  11.0 10.0 10.0   < > 11.0   < >  --  12.0   < > 19.0*   BUN  --  7 9  --  6 6 6   < > 5*   < >  --  4*   < > 4*   CREATININE  --  0.74* 0.80  --  0.64* 0.94 0.74*   < > 0.80   < >  --  0.58*   < > 0.67*   EGFR  --  119.7 116.9  --  125.0 107.1 119.7   < > 116.9   < >  --  128.8   < > 123.3   GLUCOSE  --  167* 120*  --  130* 138* 177*   < > 167*   < >  --  145*   < > 196*   CALCIUM  --  8.7 8.3*  --  8.1* 8.4* 8.2*   < > 7.9*   < >  --  7.8*   < > 8.5*   MAGNESIUM  --  1.7  --   --   --  2.1 2.2  --  1.7  --   --   1.4*   < > 0.8*   PHOSPHORUS  --  2.9 2.3*  --   --  1.8* 1.5*  --  2.0*  --   --  1.7*   < >  --    HEMOGLOBIN A1C  --   --   --   --   --   --   --   --   --   --  8.90*  --   --   --    TSH  --   --   --   --   --   --   --   --   --   --   --   --   --  2.660    < > = values in this interval not displayed.         Lab 12/26/22  0738 12/25/22  0006 12/24/22  0635 12/23/22  1344 12/23/22  1101 12/23/22  0530 12/23/22  0129   TOTAL PROTEIN 6.4 6.4 5.9* 6.0 6.1 6.3 7.2   ALBUMIN 3.80 3.80 3.60 3.50 3.60 3.70 4.10   GLOBULIN 2.6  --   --   --  2.5 2.6 3.1   ALT (SGPT) 84* 82* 78* 78* 80* 83* 98*   AST (SGOT) 118* 137* 160* 206* 221* 220* 258*   BILIRUBIN 1.3* 1.5* 2.1* 3.1* 3.0* 2.7* 3.2*   INDIRECT BILIRUBIN  --  0.6 0.9 1.5  --   --   --    BILIRUBIN DIRECT  --  0.9* 1.2* 1.6*  --   --   --    ALK PHOS 116 128* 111 106 108 110 130*         Lab 12/23/22  0129   PROTIME 14.9*   INR 1.16*                 Brief Urine Lab Results  (Last result in the past 365 days)      Color   Clarity   Blood   Leuk Est   Nitrite   Protein   CREAT   Urine HCG        12/23/22 0434             122.0             Microbiology Results (last 10 days)     Procedure Component Value - Date/Time    COVID PRE-OP / PRE-PROCEDURE SCREENING ORDER (NO ISOLATION) - Swab, Nasal Cavity [[id number]]  (Normal) Collected: 12/23/22 0134    Lab Status: Final result Specimen: Swab from Nasal Cavity Updated: 12/23/22 0224    Narrative:      The following orders were created for panel order COVID PRE-OP / PRE-PROCEDURE SCREENING ORDER (NO ISOLATION) - Swab, Nasal Cavity.  Procedure                               Abnormality         Status                     ---------                               -----------         ------                     COVID-19 and FLU A/B PCR...[[id number]]  Normal              Final result                 Please view results for these tests on the individual orders.    COVID-19 and FLU A/B PCR - Swab, Nasopharynx [[id number]]  (Normal)  Collected: 12/23/22 0134    Lab Status: Final result Specimen: Swab from Nasopharynx Updated: 12/23/22 0224     COVID19 Not Detected     Influenza A PCR Not Detected     Influenza B PCR Not Detected    Narrative:      Fact sheet for providers: https://www.fda.gov/media/382050/download    Fact sheet for patients: https://www.fda.gov/media/908456/download    Test performed by PCR.          Chief Complaint on Day of Discharge:   F/u hyponatremia    History of Present Illness on Day of Discharge:   Patient is feeling well today.  He is eating and drinking well.  No chest pain, dizziness, nausea.  No diarrhea.  No numbness.  He has not had any significant events overnight on telemetry.  He is also not feeling anxious or nervous.  He admits to me last staff that he took a gummy yesterday in the hospital which is why he felt anxious.    Hospital Course:  The patient is a 37 y.o. male with a history of diabetes, hypertension, alcohol abuse.  Patient states he drinks about 8-12 beers a day.  He presented to the hospital on 12/23 with some numbness in his hands and feet after episodes of nausea vomiting and diarrhea.  He seemed significantly dehydrated on arrival and was confused in the ER.  Alcohol level was negative.  However his sodium was 110, his potassium was 2.6, mag 1.4, Phos 2.0.  He was admitted to the hospital on normal saline with potassium fluid resuscitation.  He was in the ICU.  Over the next 48 hours his sodium continue to slowly improve 110-1 15-1 21 in someone.  His sodium is now up to 133 about 72 hours after admission.  His electrolytes been aggressively replaced and his potassium has normalized at 4.2.  His mag is 1.7 and he was replaced today.  His Phos is up to 2.9.  Of note his A1c was found to be 8.9.  He is not on any medications at home.  During the course the hospital stay with diabetic diet his sugars were fine and he did not often require insulin.  Only required sliding scale twice in the last 3  days with just 2 units.  Nothing in the last 48 hours.  Discussed dietary change with him at discharge.  Discussed monitoring sugars and following with PCP for meds if needed.  Would potentially think about metformin.  His transaminases were elevated on arrival but this was felt to be in the setting of his EtOH abuse.  With alcohol cessation and fluid resuscitation his transaminases improved throughout the stay.  His platelet count was also low and improved throughout the stay up to 133 prior to discharge.  Discussed alcohol cessation with patient prior to discharge and outpatient community resources provided to him.  Also of note on initial presentation patient with a few runs of short bursts of SVT.  He was seen by cardiology who feel these are related to his electrolyte issues and acute illness.  He has done better over the last 24 hours.  2D echo was done with normal EF and no significant issues.  They are not recommending any blood thinners or further work-up at this time at discharge as he is otherwise improving and asymptomatic.  Discussed potential Zio patch but patient declined at this time.  We will follow-up with PCP for ongoing care.  Blood pressure is labile here and his lisinopril is on hold given the low blood pressure and sodium levels.  He can follow-up with PCP for further resumption or adjustment if needed.    Condition on Discharge:  Stable/improved    Physical Exam on Discharge:  /84 (BP Location: Right arm, Patient Position: Standing)   Pulse 94   Temp 98.1 °F (36.7 °C) (Oral)   Resp 20   Ht 190.5 cm (75\")   Wt 119 kg (263 lb 4 oz)   SpO2 99%   BMI 32.90 kg/m²   Physical Exam  GEN: Awake, alert, interactive, in NAD  HEENT: PERRLA, EOMI, Anicteric, Trachea midline  Lungs: no wheezing/rales/rhonchi  Heart: RRR, +S1/s2, no rub  ABD: soft, nt/nd, +BS, no guarding/rebound  Extremities: atraumatic, no cyanosis, no edema  Skin: no rashes or petechiae   Neuro: AAOx3, no focal  deficits  Psych: normal mood & affect    Discharge Disposition:  Home or Self Care    Discharge Medications:     Discharge Medications      Stop These Medications    lisinopril 20 MG tablet  Commonly known as: PRINIVIL,ZESTRIL            Discharge Diet:    As prior    Activity at Discharge:    As prior    Discharge Care Plan/Instructions:   Monitor your blood pressure and glucose at home.  Follow-up with PCP.    Follow-up Appointments:   No future appointments.    Test Results Pending at Discharge: none    Electronically signed by Antoine Ruiz DO, 12/26/22, 15:46 CST.    Time: 32 minutes

## 2022-12-26 NOTE — NURSING NOTE
Received report from ESTELA Cartwright.  Labs/orders and POC reviewed.  Bedside checks and handoff skin assessment completed.  Care assumed.    2100-Full assessment completed as documented.  Patient awake and ambulating in room, shower started at this time per patient.  Clean linens provided.  Scheduled medications administered per orders.    2135-In room, PRN ativan 2 mg administered PO for CIWA score of 15.  Snack provided.    2200-IV inserted to Left wrist with 20 gauge angio-cath and tolerated well.  IV fluids infusing.      0000-Patient lying in bed resting quietly.  Denies needs at this time.  Will continue to monitor.    0400-Called to room, and blood and water noted to be on the floor.  Patient had snapped IV tubing in half and allowed blood to back flow out the end of the IV.  IV flushed and noted to flush easily.  New tubing and IV fluids hung and reconnected.  Telemetry replace and clean gown.  Patient  Noted to be a 15 on CIWA protocol and Ativan 2 mg administered PO and tolerated well.  Ambulating in room, pacing.  Will continue to monitor.    0700-Report given to ESTELA Cartwright.  Labs/orders reviewed.  Bedside checks and handoff skin assessment complete with no issues noted.  Care relinquished.

## 2022-12-29 ENCOUNTER — READMISSION MANAGEMENT (OUTPATIENT)
Dept: CALL CENTER | Facility: HOSPITAL | Age: 37
End: 2022-12-29

## 2022-12-29 NOTE — OUTREACH NOTE
Medical Week 1 Survey    Flowsheet Row Responses   [facility] patient discharged from? [place]   Does the patient have one of the following disease processes/diagnoses(primary or secondary)? Other   Week 1 attempt successful? Yes   Call start time 1407   Call end time 1409   Discharge diagnosis N/V, diarrhea, hyponatremia, hypokalemia   Meds reviewed with patient/caregiver? Yes   Does the patient have all medications ordered at discharge? N/A   Is the patient taking all medications as directed (includes completed medication regime)? Yes   Does the patient have a primary care provider?  Yes   Does the patient have an appointment with their PCP within 7 days of discharge? No   Comments regarding PCP Dr. Rangel with [facility]    What is preventing the patient from scheduling follow up appointments within 7 days of discharge? Haven't had time   Nursing Interventions Advised patient to make appointment   Has the patient kept scheduled appointments due by today? N/A   Psychosocial issues? No   Did the patient receive a copy of their discharge instructions? Yes   Nursing interventions Reviewed instructions with patient   What is the patient's perception of their health status since discharge? Improving   Is the patient/caregiver able to teach back signs and symptoms related to disease process for when to call PCP? Yes   Is the patient/caregiver able to teach back signs and symptoms related to disease process for when to call 911? Yes   Is the patient/caregiver able to teach back the hierarchy of who to call/visit for symptoms/problems? PCP, Specialist, Home health nurse, Urgent Care, ED, 911 Yes   If the patient is a current smoker, are they able to teach back resources for cessation? Smoking cessation medications   Week 1 call completed? Yes   Is the patient interested in additional calls from an ambulatory ?  NOTE:  applies to high risk patients requiring additional follow-up. No   Revoked No  further contact(revokes)-requires comment          ALEX PISANO - Registered Nurse

## 2023-01-10 ENCOUNTER — OFFICE VISIT (OUTPATIENT)
Dept: FAMILY MEDICINE CLINIC | Facility: CLINIC | Age: 38
End: 2023-01-10
Payer: COMMERCIAL

## 2023-01-10 VITALS
HEART RATE: 82 BPM | DIASTOLIC BLOOD PRESSURE: 93 MMHG | HEIGHT: 75 IN | WEIGHT: 276 LBS | OXYGEN SATURATION: 98 % | BODY MASS INDEX: 34.32 KG/M2 | SYSTOLIC BLOOD PRESSURE: 159 MMHG

## 2023-01-10 DIAGNOSIS — F10.20 ALCOHOLISM: Primary | ICD-10-CM

## 2023-01-10 DIAGNOSIS — E11.65 TYPE 2 DIABETES MELLITUS WITH HYPERGLYCEMIA, WITHOUT LONG-TERM CURRENT USE OF INSULIN: ICD-10-CM

## 2023-01-10 DIAGNOSIS — I10 PRIMARY HYPERTENSION: ICD-10-CM

## 2023-01-10 DIAGNOSIS — K76.0 FATTY LIVER: ICD-10-CM

## 2023-01-10 DIAGNOSIS — E11.9 TYPE 2 DIABETES MELLITUS WITHOUT COMPLICATION, WITHOUT LONG-TERM CURRENT USE OF INSULIN: ICD-10-CM

## 2023-01-10 PROCEDURE — 99214 OFFICE O/P EST MOD 30 MIN: CPT | Performed by: PEDIATRICS

## 2023-01-10 RX ORDER — LOSARTAN POTASSIUM 50 MG/1
50 TABLET ORAL DAILY
Qty: 30 TABLET | Refills: 2 | Status: SHIPPED | OUTPATIENT
Start: 2023-01-10

## 2023-01-10 NOTE — PROGRESS NOTES
Chief Complaint  Diabetes and Hypertension    Subjective    History of Present Illness      Patient presents to [facility] for   History of Present Illness  Pt is here today to discuss medications for Diabetes type 2 and HTN mediations. Pt was previously on Lisinopril, but was told to discontinue due to electrolyte and sodium levels being low.       Review of Systems    I have reviewed and agree with the HPI information as above.  Marco Antonio Zuleta MA     Objective   Vital Signs:   /93   Pulse 82   Ht 190.5 cm (75\")   Wt 125 kg (276 lb)   SpO2 98%   BMI 34.50 kg/m²     BMI is >= 30 and <35. (Class 1 Obesity). The following options were offered after discussion;: nutrition counseling/recommendations      Physical Exam     KATHERINE-7:      PHQ-2 Depression Screening  Little interest or pleasure in doing things?     Feeling down, depressed, or hopeless?     PHQ-2 Total Score       PHQ-9 Depression Screening  Little interest or pleasure in doing things?     Feeling down, depressed, or hopeless?     Trouble falling or staying asleep, or sleeping too much?     Feeling tired or having little energy?     Poor appetite or overeating?     Feeling bad about yourself - or that you are a failure or have let yourself or your family down?     Trouble concentrating on things, such as reading the newspaper or watching television?     Moving or speaking so slowly that other people could have noticed? Or the opposite - being so fidgety or restless that you have been moving around a lot more than usual?     Thoughts that you would be better off dead, or of hurting yourself in some way?     PHQ-9 Total Score     If you checked off any problems, how difficult have these problems made it for you to do your work, take care of things at home, or get along with other people?        Result Review  Data Reviewed:              ED to Hosp-Admission (Discharged) with Antoine Ruiz DO; Lorenzo Quiñonez MD  (12/23/2022)  ED to Hosp-Admission (Discharged) with Antoine Ruiz DO; Lorenzo Quiñonez MD (12/23/2022)      Sodium (12/26/2022 12:28)  Ammonia (12/25/2022 17:24)  CBC (No Diff) (12/26/2022 07:36)  Comprehensive Metabolic Panel (12/26/2022 07:38)  Magnesium (12/26/2022 07:38)  Phosphorus (12/26/2022 07:38)    Assessment and Plan      There are no diagnoses linked to this encounter.        Follow Up   No follow-ups on file.  Patient was given instructions and counseling regarding his condition or for health maintenance advice. Please see specific information pulled into the AVS if appropriate.

## 2023-01-12 ENCOUNTER — LAB (OUTPATIENT)
Dept: LAB | Facility: HOSPITAL | Age: 38
End: 2023-01-12
Payer: COMMERCIAL

## 2023-01-12 DIAGNOSIS — E11.9 TYPE 2 DIABETES MELLITUS WITHOUT COMPLICATION, WITHOUT LONG-TERM CURRENT USE OF INSULIN: ICD-10-CM

## 2023-01-12 LAB
ALBUMIN SERPL-MCNC: 4.2 G/DL (ref 3.5–5)
ALBUMIN/GLOB SERPL: 1.2 G/DL (ref 1.1–2.5)
ALP SERPL-CCNC: 107 U/L (ref 24–120)
ALT SERPL W P-5'-P-CCNC: 32 U/L (ref 0–50)
ANION GAP SERPL CALCULATED.3IONS-SCNC: 10 MMOL/L (ref 4–13)
AST SERPL-CCNC: 43 U/L (ref 7–45)
AUTO MIXED CELLS #: 0.8 10*3/MM3 (ref 0.1–2.6)
AUTO MIXED CELLS %: 7.2 % (ref 0.1–24)
BILIRUB SERPL-MCNC: 1.1 MG/DL (ref 0.1–1)
BILIRUB UR QL STRIP: NEGATIVE
BUN SERPL-MCNC: 4 MG/DL (ref 5–21)
BUN/CREAT SERPL: 5.9
CALCIUM SPEC-SCNC: 9.4 MG/DL (ref 8.4–10.4)
CHLORIDE SERPL-SCNC: 100 MMOL/L (ref 98–110)
CHOLEST SERPL-MCNC: 143 MG/DL (ref 130–200)
CLARITY UR: CLEAR
CO2 SERPL-SCNC: 29 MMOL/L (ref 24–31)
COLOR UR: YELLOW
CREAT SERPL-MCNC: 0.68 MG/DL (ref 0.5–1.4)
EGFRCR SERPLBLD CKD-EPI 2021: 122 ML/MIN/1.73
ERYTHROCYTE [DISTWIDTH] IN BLOOD BY AUTOMATED COUNT: 15 % (ref 12.3–15.4)
GLOBULIN UR ELPH-MCNC: 3.5 GM/DL
GLUCOSE SERPL-MCNC: 152 MG/DL (ref 70–100)
GLUCOSE UR STRIP-MCNC: NEGATIVE MG/DL
HBA1C MFR BLD: 7 % (ref 4.8–5.9)
HCT VFR BLD AUTO: 40 % (ref 37.5–51)
HDLC SERPL-MCNC: 38 MG/DL
HGB BLD-MCNC: 13.6 G/DL (ref 13–17.7)
HGB UR QL STRIP.AUTO: NEGATIVE
KETONES UR QL STRIP: NEGATIVE
LDLC SERPL CALC-MCNC: 80 MG/DL (ref 0–99)
LDLC/HDLC SERPL: 2.01 {RATIO}
LEUKOCYTE ESTERASE UR QL STRIP.AUTO: NEGATIVE
LYMPHOCYTES # BLD AUTO: 1.7 10*3/MM3 (ref 0.7–3.1)
LYMPHOCYTES NFR BLD AUTO: 15.9 % (ref 19.6–45.3)
MCH RBC QN AUTO: 33.9 PG (ref 26.6–33)
MCHC RBC AUTO-ENTMCNC: 34 G/DL (ref 31.5–35.7)
MCV RBC AUTO: 99.8 FL (ref 79–97)
NEUTROPHILS NFR BLD AUTO: 76.9 % (ref 42.7–76)
NEUTROPHILS NFR BLD AUTO: 8.2 10*3/MM3 (ref 1.7–7)
NITRITE UR QL STRIP: NEGATIVE
PH UR STRIP.AUTO: 7.5 [PH] (ref 5–8)
PLATELET # BLD AUTO: 413 10*3/MM3 (ref 140–450)
PMV BLD AUTO: 8.8 FL (ref 6–12)
POTASSIUM SERPL-SCNC: 4.8 MMOL/L (ref 3.5–5.3)
PROT SERPL-MCNC: 7.7 G/DL (ref 6.3–8.7)
PROT UR QL STRIP: NEGATIVE
RBC # BLD AUTO: 4.01 10*6/MM3 (ref 4.14–5.8)
SODIUM SERPL-SCNC: 139 MMOL/L (ref 135–145)
SP GR UR STRIP: 1.01 (ref 1–1.03)
TRIGL SERPL-MCNC: 144 MG/DL (ref 0–149)
UROBILINOGEN UR QL STRIP: NORMAL
VLDLC SERPL-MCNC: 25 MG/DL (ref 5–40)
WBC NRBC COR # BLD: 10.7 10*3/MM3 (ref 3.4–10.8)

## 2023-01-12 PROCEDURE — 80053 COMPREHEN METABOLIC PANEL: CPT

## 2023-01-12 PROCEDURE — 80061 LIPID PANEL: CPT

## 2023-01-12 PROCEDURE — 82043 UR ALBUMIN QUANTITATIVE: CPT

## 2023-01-12 PROCEDURE — 85025 COMPLETE CBC W/AUTO DIFF WBC: CPT

## 2023-01-12 PROCEDURE — 81003 URINALYSIS AUTO W/O SCOPE: CPT

## 2023-01-12 PROCEDURE — 83036 HEMOGLOBIN GLYCOSYLATED A1C: CPT

## 2023-01-12 PROCEDURE — 36415 COLL VENOUS BLD VENIPUNCTURE: CPT

## 2023-01-13 LAB — ALBUMIN UR-MCNC: <1.2 MG/DL

## 2023-01-21 LAB
QT INTERVAL: 432 MS
QTC INTERVAL: 545 MS

## 2023-02-14 ENCOUNTER — OFFICE VISIT (OUTPATIENT)
Dept: FAMILY MEDICINE CLINIC | Facility: CLINIC | Age: 38
End: 2023-02-14
Payer: COMMERCIAL

## 2023-02-14 VITALS
BODY MASS INDEX: 32.83 KG/M2 | SYSTOLIC BLOOD PRESSURE: 148 MMHG | TEMPERATURE: 98.7 F | WEIGHT: 264 LBS | HEART RATE: 99 BPM | DIASTOLIC BLOOD PRESSURE: 92 MMHG | HEIGHT: 75 IN | OXYGEN SATURATION: 98 % | RESPIRATION RATE: 20 BRPM

## 2023-02-14 DIAGNOSIS — E11.65 TYPE 2 DIABETES MELLITUS WITH HYPERGLYCEMIA, WITHOUT LONG-TERM CURRENT USE OF INSULIN: Primary | ICD-10-CM

## 2023-02-14 DIAGNOSIS — G47.00 INSOMNIA DISORDER RELATED TO KNOWN ORGANIC FACTOR: ICD-10-CM

## 2023-02-14 DIAGNOSIS — I10 PRIMARY HYPERTENSION: ICD-10-CM

## 2023-02-14 DIAGNOSIS — E66.09 CLASS 1 OBESITY DUE TO EXCESS CALORIES WITHOUT SERIOUS COMORBIDITY WITH BODY MASS INDEX (BMI) OF 34.0 TO 34.9 IN ADULT: ICD-10-CM

## 2023-02-14 PROCEDURE — 99213 OFFICE O/P EST LOW 20 MIN: CPT | Performed by: PEDIATRICS

## 2023-02-14 RX ORDER — QUETIAPINE FUMARATE 50 MG/1
50 TABLET, FILM COATED ORAL NIGHTLY
Qty: 30 TABLET | Refills: 2 | Status: SHIPPED | OUTPATIENT
Start: 2023-02-14

## 2023-02-14 RX ORDER — METFORMIN HYDROCHLORIDE 500 MG/1
500 TABLET, EXTENDED RELEASE ORAL
Qty: 30 TABLET | Refills: 2 | Status: SHIPPED | OUTPATIENT
Start: 2023-02-14

## 2023-02-14 NOTE — ASSESSMENT & PLAN NOTE
Patient's (Body mass index is 33 kg/m².) indicates that they are obese (BMI >30) with health conditions that include hypertension and diabetes mellitus . Weight is unchanged. BMI  is above average; BMI management plan is completed. We discussed portion control and increasing exercise.

## 2023-02-14 NOTE — PROGRESS NOTES
"Chief Complaint  Diabetes    Subjective    History of Present Illness      Patient presents to [facility] for   History of Present Illness  Pt is here for a 1 month f/u for Diabetes.       Review of Systems    I have reviewed and agree with the HPI and ROS information as above.  Eleuterio Rangel MD     Objective   Vital Signs:   BP (!) 173/107   Pulse 99   Temp 98.7 °F (37.1 °C)   Resp 20   Ht 190.5 cm (75\")   Wt 120 kg (264 lb)   SpO2 98%   BMI 33.00 kg/m²     BMI is >= 30 and <35. (Class 1 Obesity). The following options were offered after discussion;: exercise counseling/recommendations and nutrition counseling/recommendations      Physical Exam  Vitals and nursing note reviewed.   Constitutional:       Appearance: Normal appearance. He is well-developed. He is obese.   HENT:      Head: Normocephalic and atraumatic.      Right Ear: Tympanic membrane, ear canal and external ear normal.      Left Ear: Tympanic membrane, ear canal and external ear normal.      Nose: Nose normal. No septal deviation, nasal tenderness or congestion.      Mouth/Throat:      Lips: Pink. No lesions.      Mouth: Mucous membranes are moist. No oral lesions.      Dentition: Normal dentition.      Pharynx: Oropharynx is clear. No pharyngeal swelling, oropharyngeal exudate or posterior oropharyngeal erythema.   Eyes:      General: Lids are normal. Vision grossly intact. No scleral icterus.        Right eye: No discharge.         Left eye: No discharge.      Extraocular Movements: Extraocular movements intact.      Conjunctiva/sclera: Conjunctivae normal.      Right eye: Right conjunctiva is not injected.      Left eye: Left conjunctiva is not injected.      Pupils: Pupils are equal, round, and reactive to light.   Neck:      Thyroid: No thyroid mass.      Trachea: Trachea normal.   Cardiovascular:      Rate and Rhythm: Normal rate and regular rhythm.      Heart sounds: Normal heart sounds. No murmur heard.   "  No gallop.   Pulmonary:      Effort: Pulmonary effort is normal.      Breath sounds: Normal breath sounds and air entry. No wheezing, rhonchi or rales.   Abdominal:      General: There is no distension.      Palpations: Abdomen is soft. There is no mass.      Tenderness: There is no abdominal tenderness. There is no right CVA tenderness, left CVA tenderness, guarding or rebound.   Musculoskeletal:         General: No tenderness or deformity. Normal range of motion.      Cervical back: Full passive range of motion without pain, normal range of motion and neck supple.      Thoracic back: Normal.      Right lower leg: No edema.      Left lower leg: No edema.   Skin:     General: Skin is warm and dry.      Coloration: Skin is not jaundiced.      Findings: No rash.   Neurological:      Mental Status: He is alert and oriented to person, place, and time.      Sensory: Sensation is intact.      Motor: Motor function is intact.      Coordination: Coordination is intact.      Gait: Gait is intact.      Deep Tendon Reflexes: Reflexes are normal and symmetric.   Psychiatric:         Mood and Affect: Mood and affect normal.         Judgment: Judgment normal.        PHQ-2 Depression Screening  Little interest or pleasure in doing things? 0-->not at all   Feeling down, depressed, or hopeless? 0-->not at all   PHQ-2 Total Score 0     PHQ-9 Depression Screening  Little interest or pleasure in doing things? 0-->not at all   Feeling down, depressed, or hopeless? 0-->not at all   Trouble falling or staying asleep, or sleeping too much?     Feeling tired or having little energy?     Poor appetite or overeating?     Feeling bad about yourself - or that you are a failure or have let yourself or your family down?     Trouble concentrating on things, such as reading the newspaper or watching television?     Moving or speaking so slowly that other people could have noticed? Or the opposite - being so fidgety or restless that you have been  moving around a lot more than usual?     Thoughts that you would be better off dead, or of hurting yourself in some way?     PHQ-9 Total Score 0   If you checked off any problems, how difficult have these problems made it for you to do your work, take care of things at home, or get along with other people?        Result Review  Data Reviewed:              Urinalysis With Culture If Indicated - Urine, Clean Catch (01/12/2023 12:18)  MicroAlbumin, Urine, Random - Urine, Clean Catch (01/12/2023 12:18)  Lipid Panel (01/12/2023 12:18)  Comprehensive Metabolic Panel (01/12/2023 12:18)  CBC Auto Differential (01/12/2023 12:18)  Hemoglobin A1c (01/12/2023 12:18)       Assessment and Plan      Diagnoses and all orders for this visit:    1. Type 2 diabetes mellitus with hyperglycemia, without long-term current use of insulin (HCC) (Primary)    2. Class 1 obesity due to excess calories without serious comorbidity with body mass index (BMI) of 34.0 to 34.9 in adult  Assessment & Plan:  Patient's (Body mass index is 33 kg/m².) indicates that they are obese (BMI >30) with health conditions that include hypertension and diabetes mellitus . Weight is unchanged. BMI  is above average; BMI management plan is completed. We discussed portion control and increasing exercise.       3. Primary hypertension  Assessment & Plan:  Elevated in the office today.            Follow Up   No follow-ups on file.  Patient was given instructions and counseling regarding his condition or for health maintenance advice. Please see specific information pulled into the AVS if appropriate.

## 2023-04-10 DIAGNOSIS — I10 PRIMARY HYPERTENSION: ICD-10-CM

## 2023-04-10 RX ORDER — LOSARTAN POTASSIUM 50 MG/1
TABLET ORAL
Qty: 30 TABLET | Refills: 0 | Status: SHIPPED | OUTPATIENT
Start: 2023-04-10

## 2023-05-10 DIAGNOSIS — I10 PRIMARY HYPERTENSION: ICD-10-CM

## 2023-05-10 RX ORDER — LOSARTAN POTASSIUM 50 MG/1
TABLET ORAL
Qty: 30 TABLET | Refills: 0 | OUTPATIENT
Start: 2023-05-10

## 2023-07-31 DIAGNOSIS — I10 PRIMARY HYPERTENSION: ICD-10-CM

## 2023-07-31 RX ORDER — LOSARTAN POTASSIUM 50 MG/1
50 TABLET ORAL DAILY
Qty: 30 TABLET | Refills: 0 | OUTPATIENT
Start: 2023-07-31 | End: 2023-08-30

## 2023-07-31 NOTE — OUTREACH NOTE
Prep Survey    Flowsheet Row Responses   [facility] facility patient discharged from? [place]   Is LACE score < 7 ? No   Eligibility Readm Mgmt   Discharge diagnosis N/V, diarrhea, hyponatremia, hypokalemia   Does the patient have one of the following disease processes/diagnoses(primary or secondary)? Other   Does the patient have Home health ordered? No   Is there a DME ordered? No   Prep survey completed? Yes          FELISA Cox Registered Nurse         Yes

## 2023-08-21 DIAGNOSIS — E11.65 TYPE 2 DIABETES MELLITUS WITH HYPERGLYCEMIA, WITHOUT LONG-TERM CURRENT USE OF INSULIN: ICD-10-CM

## 2023-08-21 RX ORDER — METFORMIN HYDROCHLORIDE 500 MG/1
TABLET, EXTENDED RELEASE ORAL
Qty: 30 TABLET | Refills: 0 | Status: SHIPPED | OUTPATIENT
Start: 2023-08-21

## 2023-09-20 DIAGNOSIS — E11.65 TYPE 2 DIABETES MELLITUS WITH HYPERGLYCEMIA, WITHOUT LONG-TERM CURRENT USE OF INSULIN: ICD-10-CM

## 2023-09-20 RX ORDER — METFORMIN HYDROCHLORIDE 500 MG/1
TABLET, EXTENDED RELEASE ORAL
Qty: 30 TABLET | Refills: 0 | OUTPATIENT
Start: 2023-09-20

## 2024-02-17 ENCOUNTER — APPOINTMENT (OUTPATIENT)
Dept: GENERAL RADIOLOGY | Facility: HOSPITAL | Age: 39
End: 2024-02-17
Payer: COMMERCIAL

## 2024-02-17 ENCOUNTER — APPOINTMENT (OUTPATIENT)
Dept: CT IMAGING | Facility: HOSPITAL | Age: 39
End: 2024-02-17
Payer: COMMERCIAL

## 2024-02-17 ENCOUNTER — HOSPITAL ENCOUNTER (INPATIENT)
Facility: HOSPITAL | Age: 39
LOS: 3 days | Discharge: HOME | End: 2024-02-20
Admitting: HOSPITALIST
Payer: COMMERCIAL

## 2024-02-17 DIAGNOSIS — Z74.09 IMPAIRED FUNCTIONAL MOBILITY AND ACTIVITY TOLERANCE: ICD-10-CM

## 2024-02-17 DIAGNOSIS — E87.20 LACTIC ACIDOSIS: ICD-10-CM

## 2024-02-17 DIAGNOSIS — F10.920 ALCOHOLIC INTOXICATION WITHOUT COMPLICATION: ICD-10-CM

## 2024-02-17 DIAGNOSIS — J18.9 COMMUNITY ACQUIRED BILATERAL LOWER LOBE PNEUMONIA: Primary | ICD-10-CM

## 2024-02-17 DIAGNOSIS — R09.02 HYPOXIA: ICD-10-CM

## 2024-02-17 LAB
ALBUMIN SERPL-MCNC: 4.1 G/DL (ref 3.5–5.2)
ALBUMIN/GLOB SERPL: 1.1 G/DL
ALP SERPL-CCNC: 183 U/L (ref 39–117)
ALT SERPL W P-5'-P-CCNC: 109 U/L (ref 1–41)
AMPHET+METHAMPHET UR QL: NEGATIVE
AMPHETAMINES UR QL: NEGATIVE
ANION GAP SERPL CALCULATED.3IONS-SCNC: 21 MMOL/L (ref 5–15)
ANION GAP SERPL CALCULATED.3IONS-SCNC: 22 MMOL/L (ref 5–15)
AST SERPL-CCNC: 128 U/L (ref 1–40)
B PARAPERT DNA SPEC QL NAA+PROBE: NOT DETECTED
B PERT DNA SPEC QL NAA+PROBE: NOT DETECTED
BARBITURATES UR QL SCN: NEGATIVE
BASOPHILS # BLD AUTO: 0.02 10*3/MM3 (ref 0–0.2)
BASOPHILS # BLD AUTO: 0.02 10*3/MM3 (ref 0–0.2)
BASOPHILS NFR BLD AUTO: 0.4 % (ref 0–1.5)
BASOPHILS NFR BLD AUTO: 0.4 % (ref 0–1.5)
BENZODIAZ UR QL SCN: NEGATIVE
BILIRUB SERPL-MCNC: 1.2 MG/DL (ref 0–1.2)
BUN SERPL-MCNC: 3 MG/DL (ref 6–20)
BUN SERPL-MCNC: 4 MG/DL (ref 6–20)
BUN/CREAT SERPL: 4.9 (ref 7–25)
BUN/CREAT SERPL: 7.4 (ref 7–25)
BUPRENORPHINE SERPL-MCNC: NEGATIVE NG/ML
C PNEUM DNA NPH QL NAA+NON-PROBE: NOT DETECTED
CALCIUM SPEC-SCNC: 8.4 MG/DL (ref 8.6–10.5)
CALCIUM SPEC-SCNC: 9.4 MG/DL (ref 8.6–10.5)
CANNABINOIDS SERPL QL: NEGATIVE
CHLORIDE SERPL-SCNC: 87 MMOL/L (ref 98–107)
CHLORIDE SERPL-SCNC: 88 MMOL/L (ref 98–107)
CO2 SERPL-SCNC: 24 MMOL/L (ref 22–29)
CO2 SERPL-SCNC: 27 MMOL/L (ref 22–29)
COCAINE UR QL: NEGATIVE
CREAT SERPL-MCNC: 0.54 MG/DL (ref 0.76–1.27)
CREAT SERPL-MCNC: 0.61 MG/DL (ref 0.76–1.27)
CRP SERPL-MCNC: 4.44 MG/DL (ref 0–0.5)
D DIMER PPP FEU-MCNC: 0.81 MCGFEU/ML (ref 0–0.5)
D-LACTATE SERPL-SCNC: 4.3 MMOL/L (ref 0.5–2)
D-LACTATE SERPL-SCNC: 5 MMOL/L (ref 0.5–2)
D-LACTATE SERPL-SCNC: 6.6 MMOL/L (ref 0.5–2)
D-LACTATE SERPL-SCNC: 6.7 MMOL/L (ref 0.5–2)
DEPRECATED RDW RBC AUTO: 43.1 FL (ref 37–54)
DEPRECATED RDW RBC AUTO: 43.9 FL (ref 37–54)
EGFRCR SERPLBLD CKD-EPI 2021: 125.3 ML/MIN/1.73
EGFRCR SERPLBLD CKD-EPI 2021: 130 ML/MIN/1.73
EOSINOPHIL # BLD AUTO: 0.01 10*3/MM3 (ref 0–0.4)
EOSINOPHIL # BLD AUTO: 0.02 10*3/MM3 (ref 0–0.4)
EOSINOPHIL NFR BLD AUTO: 0.2 % (ref 0.3–6.2)
EOSINOPHIL NFR BLD AUTO: 0.4 % (ref 0.3–6.2)
ERYTHROCYTE [DISTWIDTH] IN BLOOD BY AUTOMATED COUNT: 12.2 % (ref 12.3–15.4)
ERYTHROCYTE [DISTWIDTH] IN BLOOD BY AUTOMATED COUNT: 12.3 % (ref 12.3–15.4)
ERYTHROCYTE [SEDIMENTATION RATE] IN BLOOD: 34 MM/HR (ref 0–15)
ETHANOL UR QL: 0.23 %
FENTANYL UR-MCNC: NEGATIVE NG/ML
FLUAV SUBTYP SPEC NAA+PROBE: NOT DETECTED
FLUBV RNA ISLT QL NAA+PROBE: NOT DETECTED
GEN 5 2HR TROPONIN T REFLEX: 46 NG/L
GLOBULIN UR ELPH-MCNC: 3.6 GM/DL
GLUCOSE BLDC GLUCOMTR-MCNC: 279 MG/DL (ref 70–130)
GLUCOSE BLDC GLUCOMTR-MCNC: 300 MG/DL (ref 70–130)
GLUCOSE BLDC GLUCOMTR-MCNC: 318 MG/DL (ref 70–130)
GLUCOSE BLDC GLUCOMTR-MCNC: 360 MG/DL (ref 70–130)
GLUCOSE SERPL-MCNC: 306 MG/DL (ref 65–99)
GLUCOSE SERPL-MCNC: 366 MG/DL (ref 65–99)
HADV DNA SPEC NAA+PROBE: NOT DETECTED
HCOV 229E RNA SPEC QL NAA+PROBE: NOT DETECTED
HCOV HKU1 RNA SPEC QL NAA+PROBE: NOT DETECTED
HCOV NL63 RNA SPEC QL NAA+PROBE: NOT DETECTED
HCOV OC43 RNA SPEC QL NAA+PROBE: NOT DETECTED
HCT VFR BLD AUTO: 40.1 % (ref 37.5–51)
HCT VFR BLD AUTO: 45.2 % (ref 37.5–51)
HGB BLD-MCNC: 14 G/DL (ref 13–17.7)
HGB BLD-MCNC: 16.2 G/DL (ref 13–17.7)
HMPV RNA NPH QL NAA+NON-PROBE: NOT DETECTED
HPIV1 RNA ISLT QL NAA+PROBE: NOT DETECTED
HPIV2 RNA SPEC QL NAA+PROBE: NOT DETECTED
HPIV3 RNA NPH QL NAA+PROBE: NOT DETECTED
HPIV4 P GENE NPH QL NAA+PROBE: NOT DETECTED
IMM GRANULOCYTES # BLD AUTO: 0.01 10*3/MM3 (ref 0–0.05)
IMM GRANULOCYTES # BLD AUTO: 0.02 10*3/MM3 (ref 0–0.05)
IMM GRANULOCYTES NFR BLD AUTO: 0.2 % (ref 0–0.5)
IMM GRANULOCYTES NFR BLD AUTO: 0.4 % (ref 0–0.5)
LYMPHOCYTES # BLD AUTO: 0.49 10*3/MM3 (ref 0.7–3.1)
LYMPHOCYTES # BLD AUTO: 1.06 10*3/MM3 (ref 0.7–3.1)
LYMPHOCYTES NFR BLD AUTO: 20.5 % (ref 19.6–45.3)
LYMPHOCYTES NFR BLD AUTO: 9.9 % (ref 19.6–45.3)
M PNEUMO IGG SER IA-ACNC: DETECTED
MCH RBC QN AUTO: 33.8 PG (ref 26.6–33)
MCH RBC QN AUTO: 34.2 PG (ref 26.6–33)
MCHC RBC AUTO-ENTMCNC: 34.9 G/DL (ref 31.5–35.7)
MCHC RBC AUTO-ENTMCNC: 35.8 G/DL (ref 31.5–35.7)
MCV RBC AUTO: 95.6 FL (ref 79–97)
MCV RBC AUTO: 96.9 FL (ref 79–97)
METHADONE UR QL SCN: NEGATIVE
MONOCYTES # BLD AUTO: 0.45 10*3/MM3 (ref 0.1–0.9)
MONOCYTES # BLD AUTO: 0.57 10*3/MM3 (ref 0.1–0.9)
MONOCYTES NFR BLD AUTO: 11 % (ref 5–12)
MONOCYTES NFR BLD AUTO: 9.1 % (ref 5–12)
NEUTROPHILS NFR BLD AUTO: 3.48 10*3/MM3 (ref 1.7–7)
NEUTROPHILS NFR BLD AUTO: 3.94 10*3/MM3 (ref 1.7–7)
NEUTROPHILS NFR BLD AUTO: 67.5 % (ref 42.7–76)
NEUTROPHILS NFR BLD AUTO: 80 % (ref 42.7–76)
NRBC BLD AUTO-RTO: 0 /100 WBC (ref 0–0.2)
NRBC BLD AUTO-RTO: 0 /100 WBC (ref 0–0.2)
NT-PROBNP SERPL-MCNC: <36 PG/ML (ref 0–450)
OPIATES UR QL: NEGATIVE
OXYCODONE UR QL SCN: NEGATIVE
PCP UR QL SCN: NEGATIVE
PLATELET # BLD AUTO: 114 10*3/MM3 (ref 140–450)
PLATELET # BLD AUTO: 130 10*3/MM3 (ref 140–450)
PMV BLD AUTO: 9.8 FL (ref 6–12)
PMV BLD AUTO: 9.8 FL (ref 6–12)
POTASSIUM SERPL-SCNC: 3.4 MMOL/L (ref 3.5–5.2)
POTASSIUM SERPL-SCNC: 3.6 MMOL/L (ref 3.5–5.2)
PROT SERPL-MCNC: 7.7 G/DL (ref 6–8.5)
RBC # BLD AUTO: 4.14 10*6/MM3 (ref 4.14–5.8)
RBC # BLD AUTO: 4.73 10*6/MM3 (ref 4.14–5.8)
RHINOVIRUS RNA SPEC NAA+PROBE: NOT DETECTED
RSV RNA NPH QL NAA+NON-PROBE: NOT DETECTED
SARS-COV-2 RNA NPH QL NAA+NON-PROBE: NOT DETECTED
SODIUM SERPL-SCNC: 132 MMOL/L (ref 136–145)
SODIUM SERPL-SCNC: 137 MMOL/L (ref 136–145)
TRICYCLICS UR QL SCN: NEGATIVE
TROPONIN T DELTA: -3 NG/L
TROPONIN T SERPL HS-MCNC: 49 NG/L
WBC NRBC COR # BLD AUTO: 4.93 10*3/MM3 (ref 3.4–10.8)
WBC NRBC COR # BLD AUTO: 5.16 10*3/MM3 (ref 3.4–10.8)

## 2024-02-17 PROCEDURE — 25810000003 LACTATED RINGERS SOLUTION: Performed by: STUDENT IN AN ORGANIZED HEALTH CARE EDUCATION/TRAINING PROGRAM

## 2024-02-17 PROCEDURE — 25510000001 IOPAMIDOL PER 1 ML

## 2024-02-17 PROCEDURE — 25010000002 POTASSIUM CHLORIDE 10 MEQ/100ML SOLUTION: Performed by: HOSPITALIST

## 2024-02-17 PROCEDURE — 93005 ELECTROCARDIOGRAM TRACING: CPT | Performed by: STUDENT IN AN ORGANIZED HEALTH CARE EDUCATION/TRAINING PROGRAM

## 2024-02-17 PROCEDURE — 25010000002 CEFTRIAXONE PER 250 MG

## 2024-02-17 PROCEDURE — 80307 DRUG TEST PRSMV CHEM ANLYZR: CPT | Performed by: STUDENT IN AN ORGANIZED HEALTH CARE EDUCATION/TRAINING PROGRAM

## 2024-02-17 PROCEDURE — 82948 REAGENT STRIP/BLOOD GLUCOSE: CPT

## 2024-02-17 PROCEDURE — 25810000003 SODIUM CHLORIDE 0.9 % SOLUTION 250 ML FLEX CONT

## 2024-02-17 PROCEDURE — 85652 RBC SED RATE AUTOMATED: CPT | Performed by: STUDENT IN AN ORGANIZED HEALTH CARE EDUCATION/TRAINING PROGRAM

## 2024-02-17 PROCEDURE — 63710000001 INSULIN LISPRO (HUMAN) PER 5 UNITS: Performed by: HOSPITALIST

## 2024-02-17 PROCEDURE — 71275 CT ANGIOGRAPHY CHEST: CPT

## 2024-02-17 PROCEDURE — 85379 FIBRIN DEGRADATION QUANT: CPT | Performed by: STUDENT IN AN ORGANIZED HEALTH CARE EDUCATION/TRAINING PROGRAM

## 2024-02-17 PROCEDURE — 0202U NFCT DS 22 TRGT SARS-COV-2: CPT | Performed by: STUDENT IN AN ORGANIZED HEALTH CARE EDUCATION/TRAINING PROGRAM

## 2024-02-17 PROCEDURE — 80053 COMPREHEN METABOLIC PANEL: CPT | Performed by: STUDENT IN AN ORGANIZED HEALTH CARE EDUCATION/TRAINING PROGRAM

## 2024-02-17 PROCEDURE — 83880 ASSAY OF NATRIURETIC PEPTIDE: CPT | Performed by: STUDENT IN AN ORGANIZED HEALTH CARE EDUCATION/TRAINING PROGRAM

## 2024-02-17 PROCEDURE — 83605 ASSAY OF LACTIC ACID: CPT

## 2024-02-17 PROCEDURE — 85025 COMPLETE CBC W/AUTO DIFF WBC: CPT | Performed by: STUDENT IN AN ORGANIZED HEALTH CARE EDUCATION/TRAINING PROGRAM

## 2024-02-17 PROCEDURE — 99285 EMERGENCY DEPT VISIT HI MDM: CPT

## 2024-02-17 PROCEDURE — 25810000003 LACTATED RINGERS SOLUTION

## 2024-02-17 PROCEDURE — 71045 X-RAY EXAM CHEST 1 VIEW: CPT

## 2024-02-17 PROCEDURE — 25010000002 AZITHROMYCIN PER 500 MG

## 2024-02-17 PROCEDURE — 84484 ASSAY OF TROPONIN QUANT: CPT

## 2024-02-17 PROCEDURE — 82077 ASSAY SPEC XCP UR&BREATH IA: CPT | Performed by: STUDENT IN AN ORGANIZED HEALTH CARE EDUCATION/TRAINING PROGRAM

## 2024-02-17 PROCEDURE — 25010000002 ONDANSETRON PER 1 MG: Performed by: STUDENT IN AN ORGANIZED HEALTH CARE EDUCATION/TRAINING PROGRAM

## 2024-02-17 PROCEDURE — 36415 COLL VENOUS BLD VENIPUNCTURE: CPT | Performed by: HOSPITALIST

## 2024-02-17 PROCEDURE — 63710000001 INSULIN REGULAR HUMAN PER 5 UNITS

## 2024-02-17 PROCEDURE — 25010000002 LORAZEPAM PER 2 MG: Performed by: HOSPITALIST

## 2024-02-17 PROCEDURE — 87040 BLOOD CULTURE FOR BACTERIA: CPT | Performed by: STUDENT IN AN ORGANIZED HEALTH CARE EDUCATION/TRAINING PROGRAM

## 2024-02-17 PROCEDURE — 94799 UNLISTED PULMONARY SVC/PX: CPT

## 2024-02-17 PROCEDURE — 85025 COMPLETE CBC W/AUTO DIFF WBC: CPT | Performed by: HOSPITALIST

## 2024-02-17 PROCEDURE — 86140 C-REACTIVE PROTEIN: CPT | Performed by: STUDENT IN AN ORGANIZED HEALTH CARE EDUCATION/TRAINING PROGRAM

## 2024-02-17 PROCEDURE — 25010000002 LORAZEPAM PER 2 MG: Performed by: STUDENT IN AN ORGANIZED HEALTH CARE EDUCATION/TRAINING PROGRAM

## 2024-02-17 PROCEDURE — 93010 ELECTROCARDIOGRAM REPORT: CPT | Performed by: INTERNAL MEDICINE

## 2024-02-17 RX ORDER — POTASSIUM CHLORIDE 7.45 MG/ML
10 INJECTION INTRAVENOUS
Status: COMPLETED | OUTPATIENT
Start: 2024-02-17 | End: 2024-02-18

## 2024-02-17 RX ORDER — BISACODYL 5 MG/1
5 TABLET, DELAYED RELEASE ORAL DAILY PRN
Status: DISCONTINUED | OUTPATIENT
Start: 2024-02-17 | End: 2024-02-20 | Stop reason: HOSPADM

## 2024-02-17 RX ORDER — NICOTINE 21 MG/24HR
1 PATCH, TRANSDERMAL 24 HOURS TRANSDERMAL EVERY 24 HOURS
Status: DISCONTINUED | OUTPATIENT
Start: 2024-02-17 | End: 2024-02-20 | Stop reason: HOSPADM

## 2024-02-17 RX ORDER — LORAZEPAM 1 MG/1
1 TABLET ORAL EVERY 6 HOURS
Status: COMPLETED | OUTPATIENT
Start: 2024-02-18 | End: 2024-02-19

## 2024-02-17 RX ORDER — AMOXICILLIN 250 MG
2 CAPSULE ORAL 2 TIMES DAILY PRN
Status: DISCONTINUED | OUTPATIENT
Start: 2024-02-17 | End: 2024-02-20 | Stop reason: HOSPADM

## 2024-02-17 RX ORDER — FOLIC ACID 1 MG/1
1 TABLET ORAL DAILY
Status: DISCONTINUED | OUTPATIENT
Start: 2024-02-17 | End: 2024-02-20 | Stop reason: HOSPADM

## 2024-02-17 RX ORDER — LORAZEPAM 2 MG/ML
1 INJECTION INTRAMUSCULAR ONCE
Status: COMPLETED | OUTPATIENT
Start: 2024-02-17 | End: 2024-02-17

## 2024-02-17 RX ORDER — IBUPROFEN 600 MG/1
1 TABLET ORAL
Status: DISCONTINUED | OUTPATIENT
Start: 2024-02-17 | End: 2024-02-18

## 2024-02-17 RX ORDER — BISACODYL 10 MG
10 SUPPOSITORY, RECTAL RECTAL DAILY PRN
Status: DISCONTINUED | OUTPATIENT
Start: 2024-02-17 | End: 2024-02-20 | Stop reason: HOSPADM

## 2024-02-17 RX ORDER — LORAZEPAM 2 MG/ML
0.5 INJECTION INTRAMUSCULAR ONCE
Status: DISCONTINUED | OUTPATIENT
Start: 2024-02-17 | End: 2024-02-17

## 2024-02-17 RX ORDER — LORAZEPAM 1 MG/1
2 TABLET ORAL EVERY 6 HOURS
Status: COMPLETED | OUTPATIENT
Start: 2024-02-17 | End: 2024-02-18

## 2024-02-17 RX ORDER — NICOTINE POLACRILEX 4 MG
15 LOZENGE BUCCAL
Status: DISCONTINUED | OUTPATIENT
Start: 2024-02-17 | End: 2024-02-18

## 2024-02-17 RX ORDER — POLYETHYLENE GLYCOL 3350 17 G/17G
17 POWDER, FOR SOLUTION ORAL DAILY PRN
Status: DISCONTINUED | OUTPATIENT
Start: 2024-02-17 | End: 2024-02-20 | Stop reason: HOSPADM

## 2024-02-17 RX ORDER — POTASSIUM CHLORIDE 7.45 MG/ML
10 INJECTION INTRAVENOUS
Status: DISCONTINUED | OUTPATIENT
Start: 2024-02-17 | End: 2024-02-17

## 2024-02-17 RX ORDER — SODIUM CHLORIDE 9 MG/ML
40 INJECTION, SOLUTION INTRAVENOUS AS NEEDED
Status: DISCONTINUED | OUTPATIENT
Start: 2024-02-17 | End: 2024-02-20 | Stop reason: HOSPADM

## 2024-02-17 RX ORDER — INSULIN LISPRO 100 [IU]/ML
2-7 INJECTION, SOLUTION INTRAVENOUS; SUBCUTANEOUS
Status: DISCONTINUED | OUTPATIENT
Start: 2024-02-17 | End: 2024-02-18

## 2024-02-17 RX ORDER — LORAZEPAM 2 MG/ML
2 INJECTION INTRAMUSCULAR
Status: DISCONTINUED | OUTPATIENT
Start: 2024-02-17 | End: 2024-02-20 | Stop reason: HOSPADM

## 2024-02-17 RX ORDER — DEXTROSE MONOHYDRATE 25 G/50ML
25 INJECTION, SOLUTION INTRAVENOUS
Status: DISCONTINUED | OUTPATIENT
Start: 2024-02-17 | End: 2024-02-18

## 2024-02-17 RX ORDER — LORAZEPAM 1 MG/1
2 TABLET ORAL
Status: DISCONTINUED | OUTPATIENT
Start: 2024-02-17 | End: 2024-02-20 | Stop reason: HOSPADM

## 2024-02-17 RX ORDER — POTASSIUM CHLORIDE 20 MEQ/1
40 TABLET, EXTENDED RELEASE ORAL ONCE
Status: COMPLETED | OUTPATIENT
Start: 2024-02-17 | End: 2024-02-17

## 2024-02-17 RX ORDER — LORAZEPAM 2 MG/ML
1 INJECTION INTRAMUSCULAR
Status: DISCONTINUED | OUTPATIENT
Start: 2024-02-17 | End: 2024-02-20 | Stop reason: HOSPADM

## 2024-02-17 RX ORDER — ONDANSETRON 2 MG/ML
4 INJECTION INTRAMUSCULAR; INTRAVENOUS ONCE
Status: COMPLETED | OUTPATIENT
Start: 2024-02-17 | End: 2024-02-17

## 2024-02-17 RX ORDER — SODIUM CHLORIDE 0.9 % (FLUSH) 0.9 %
10 SYRINGE (ML) INJECTION EVERY 12 HOURS SCHEDULED
Status: DISCONTINUED | OUTPATIENT
Start: 2024-02-17 | End: 2024-02-20 | Stop reason: HOSPADM

## 2024-02-17 RX ORDER — METFORMIN HYDROCHLORIDE 500 MG/1
500 TABLET, EXTENDED RELEASE ORAL
COMMUNITY
End: 2024-02-20 | Stop reason: HOSPADM

## 2024-02-17 RX ORDER — IPRATROPIUM BROMIDE AND ALBUTEROL SULFATE 2.5; .5 MG/3ML; MG/3ML
3 SOLUTION RESPIRATORY (INHALATION)
Status: DISCONTINUED | OUTPATIENT
Start: 2024-02-17 | End: 2024-02-20 | Stop reason: HOSPADM

## 2024-02-17 RX ORDER — LORAZEPAM 1 MG/1
1 TABLET ORAL
Status: DISCONTINUED | OUTPATIENT
Start: 2024-02-17 | End: 2024-02-20 | Stop reason: HOSPADM

## 2024-02-17 RX ORDER — SODIUM CHLORIDE 0.9 % (FLUSH) 0.9 %
10 SYRINGE (ML) INJECTION AS NEEDED
Status: DISCONTINUED | OUTPATIENT
Start: 2024-02-17 | End: 2024-02-20 | Stop reason: HOSPADM

## 2024-02-17 RX ADMIN — NICOTINE 1 PATCH: 21 PATCH, EXTENDED RELEASE TRANSDERMAL at 14:42

## 2024-02-17 RX ADMIN — LORAZEPAM 1 MG: 1 TABLET ORAL at 14:42

## 2024-02-17 RX ADMIN — LORAZEPAM 2 MG: 1 TABLET ORAL at 23:58

## 2024-02-17 RX ADMIN — AZITHROMYCIN 500 MG: 500 INJECTION, POWDER, LYOPHILIZED, FOR SOLUTION INTRAVENOUS at 11:34

## 2024-02-17 RX ADMIN — POTASSIUM CHLORIDE 40 MEQ: 1500 TABLET, EXTENDED RELEASE ORAL at 09:42

## 2024-02-17 RX ADMIN — POTASSIUM CHLORIDE 10 MEQ: 7.46 INJECTION, SOLUTION INTRAVENOUS at 22:46

## 2024-02-17 RX ADMIN — IPRATROPIUM BROMIDE AND ALBUTEROL SULFATE 3 ML: .5; 3 SOLUTION RESPIRATORY (INHALATION) at 19:48

## 2024-02-17 RX ADMIN — INSULIN LISPRO 6 UNITS: 100 INJECTION, SOLUTION INTRAVENOUS; SUBCUTANEOUS at 20:30

## 2024-02-17 RX ADMIN — POTASSIUM CHLORIDE 10 MEQ: 7.46 INJECTION, SOLUTION INTRAVENOUS at 20:04

## 2024-02-17 RX ADMIN — IPRATROPIUM BROMIDE AND ALBUTEROL SULFATE 3 ML: .5; 3 SOLUTION RESPIRATORY (INHALATION) at 15:05

## 2024-02-17 RX ADMIN — INSULIN LISPRO 5 UNITS: 100 INJECTION, SOLUTION INTRAVENOUS; SUBCUTANEOUS at 18:26

## 2024-02-17 RX ADMIN — ONDANSETRON 4 MG: 2 INJECTION INTRAMUSCULAR; INTRAVENOUS at 11:32

## 2024-02-17 RX ADMIN — THIAMINE HCL TAB 100 MG 100 MG: 100 TAB at 08:46

## 2024-02-17 RX ADMIN — SODIUM CHLORIDE, POTASSIUM CHLORIDE, SODIUM LACTATE AND CALCIUM CHLORIDE 1000 ML: 600; 310; 30; 20 INJECTION, SOLUTION INTRAVENOUS at 11:19

## 2024-02-17 RX ADMIN — LORAZEPAM 2 MG: 1 TABLET ORAL at 18:26

## 2024-02-17 RX ADMIN — CEFTRIAXONE 1000 MG: 1 INJECTION, POWDER, FOR SOLUTION INTRAMUSCULAR; INTRAVENOUS at 10:56

## 2024-02-17 RX ADMIN — IOPAMIDOL 80 ML: 755 INJECTION, SOLUTION INTRAVENOUS at 09:22

## 2024-02-17 RX ADMIN — SODIUM CHLORIDE, POTASSIUM CHLORIDE, SODIUM LACTATE AND CALCIUM CHLORIDE 1000 ML: 600; 310; 30; 20 INJECTION, SOLUTION INTRAVENOUS at 08:48

## 2024-02-17 RX ADMIN — FOLIC ACID 1 MG: 1 TABLET ORAL at 14:42

## 2024-02-17 RX ADMIN — LORAZEPAM 2 MG: 1 TABLET ORAL at 13:02

## 2024-02-17 RX ADMIN — POTASSIUM CHLORIDE 10 MEQ: 7.46 INJECTION, SOLUTION INTRAVENOUS at 21:30

## 2024-02-17 RX ADMIN — LORAZEPAM 1 MG: 2 INJECTION, SOLUTION INTRAMUSCULAR; INTRAVENOUS at 11:32

## 2024-02-17 RX ADMIN — Medication 10 ML: at 20:04

## 2024-02-17 RX ADMIN — INSULIN LISPRO 4 UNITS: 100 INJECTION, SOLUTION INTRAVENOUS; SUBCUTANEOUS at 14:53

## 2024-02-17 RX ADMIN — POTASSIUM CHLORIDE 10 MEQ: 7.46 INJECTION, SOLUTION INTRAVENOUS at 23:59

## 2024-02-17 RX ADMIN — LORAZEPAM 2 MG: 2 INJECTION, SOLUTION INTRAMUSCULAR; INTRAVENOUS at 20:23

## 2024-02-17 RX ADMIN — LORAZEPAM 1 MG: 2 INJECTION INTRAMUSCULAR; INTRAVENOUS at 16:16

## 2024-02-17 RX ADMIN — INSULIN HUMAN 5 UNITS: 100 INJECTION, SOLUTION PARENTERAL at 10:54

## 2024-02-17 NOTE — ED NOTES
Nursing report ED to floor  Luciano Christiansen  39 y.o.  male    HPI:   Chief Complaint   Patient presents with    Hypertension    Flu Symptoms       Admitting doctor:   Frankie Camacho MD    Consulting provider(s):  Consults       No orders found from 1/19/2024 to 2/18/2024.             Admitting diagnosis:   The primary encounter diagnosis was Community acquired bilateral lower lobe pneumonia. Diagnoses of Hypoxia, Alcoholic intoxication without complication, and Lactic acidosis were also pertinent to this visit.    Code status:   Current Code Status       Date Active Code Status Order ID Comments User Context       2/17/2024 1201 CPR (Attempt to Resuscitate) [id number]  Frankie Camacho MD ED        Question Answer    Code Status (Patient has no pulse and is not breathing) CPR (Attempt to Resuscitate)    Medical Interventions (Patient has pulse or is breathing) Full Support                    Allergies:   Patient has no known allergies.    Intake and Output    Intake/Output Summary (Last 24 hours) at 2/17/2024 1205  Last data filed at 2/17/2024 1131  Gross per 24 hour   Intake 1100 ml   Output --   Net 1100 ml       Weight:       02/17/24  0745   Weight: 130 kg (287 lb)       Most recent vitals:   Vitals:    02/17/24 0749 02/17/24 0849 02/17/24 1102 02/17/24 1144   BP:  (!) 183/89 174/79 146/78   Pulse:  115 112 117   Resp:       Temp:       SpO2: 92% 90% 93% 92%   Weight:       Height:         Oxygen Therapy: .    Active LDAs/IV Access:   Lines, Drains & Airways       Active LDAs       Name Placement date Placement time Site Days    Peripheral IV 02/17/24 0802 Right Antecubital 02/17/24  0802  Antecubital  less than 1    Peripheral IV 02/17/24 Anterior;Left Forearm 02/17/24  --  Forearm  less than 1                    Labs (abnormal labs have a star):   Labs Reviewed   RESPIRATORY PANEL PCR W/ COVID-19 (SARS-COV-2), NP SWAB IN UTM/VTP, 2 HR TAT - Abnormal; Notable for the following components:       Result Value     Mycoplasma pneumo by PCR Detected (*)     All other components within normal limits    Narrative:     In the setting of a positive respiratory panel with a viral infection PLUS a negative procalcitonin without other underlying concern for bacterial infection, consider observing off antibiotics or discontinuation of antibiotics and continue supportive care. If the respiratory panel is positive for atypical bacterial infection (Bordetella pertussis, Chlamydophila pneumoniae, or Mycoplasma pneumoniae), consider antibiotic de-escalation to target atypical bacterial infection.   COMPREHENSIVE METABOLIC PANEL - Abnormal; Notable for the following components:    Glucose 366 (*)     BUN 3 (*)     Creatinine 0.61 (*)     Potassium 3.4 (*)     Chloride 88 (*)     ALT (SGPT) 109 (*)     AST (SGOT) 128 (*)     Alkaline Phosphatase 183 (*)     BUN/Creatinine Ratio 4.9 (*)     Anion Gap 22.0 (*)     All other components within normal limits    Narrative:     GFR Normal >60  Chronic Kidney Disease <60  Kidney Failure <15     D-DIMER, QUANTITATIVE - Abnormal; Notable for the following components:    D-Dimer, Quantitative 0.81 (*)     All other components within normal limits    Narrative:     According to the assay 's published package insert, a normal (<0.50 MCGFEU/mL) D-dimer result in conjunction with a non-high clinical probability assessment, excludes deep vein thrombosis (DVT) and pulmonary embolism (PE) with high sensitivity.    D-dimer values increase with age and this can make VTE exclusion of an older population difficult. To address this, the [employer], based on best available evidence and recent guidelines, recommends that clinicians use age-adjusted D-dimer thresholds in patients greater than 50 years of age with: a) a low probability of PE who do not meet all Pulmonary Embolism Rule Out Criteria, or b) in those with intermediate probability of PE.   The formula for an age-adjusted  "D-dimer cut-off is \"age/100\".  For example, a 60 year old patient would have an age-adjusted cut-off of 0.60 MCGFEU/mL and an 80 year old 0.80 MCGFEU/mL.   C-REACTIVE PROTEIN - Abnormal; Notable for the following components:    C-Reactive Protein 4.44 (*)     All other components within normal limits   SEDIMENTATION RATE - Abnormal; Notable for the following components:    Sed Rate 34 (*)     All other components within normal limits   CBC WITH AUTO DIFFERENTIAL - Abnormal; Notable for the following components:    MCH 34.2 (*)     MCHC 35.8 (*)     RDW 12.2 (*)     Platelets 130 (*)     All other components within normal limits   TROPONIN - Abnormal; Notable for the following components:    HS Troponin T 49 (*)     All other components within normal limits    Narrative:     High Sensitive Troponin T Reference Range:  <14.0 ng/L- Negative Female for AMI  <22.0 ng/L- Negative Male for AMI  >=14 - Abnormal Female indicating possible myocardial injury.  >=22 - Abnormal Male indicating possible myocardial injury.   Clinicians would have to utilize clinical acumen, EKG, Troponin, and serial changes to determine if it is an Acute Myocardial Infarction or myocardial injury due to an underlying chronic condition.        HIGH SENSITIVITIY TROPONIN T 2HR - Abnormal; Notable for the following components:    HS Troponin T 46 (*)     All other components within normal limits    Narrative:     High Sensitive Troponin T Reference Range:  <14.0 ng/L- Negative Female for AMI  <22.0 ng/L- Negative Male for AMI  >=14 - Abnormal Female indicating possible myocardial injury.  >=22 - Abnormal Male indicating possible myocardial injury.   Clinicians would have to utilize clinical acumen, EKG, Troponin, and serial changes to determine if it is an Acute Myocardial Infarction or myocardial injury due to an underlying chronic condition.        LACTIC ACID, PLASMA - Abnormal; Notable for the following components:    Lactate 6.7 (*)     All " other components within normal limits   POCT GLUCOSE FINGERSTICK - Abnormal; Notable for the following components:    Glucose 318 (*)     All other components within normal limits   URINE DRUG SCREEN - Normal    Narrative:     Cutoff For Drugs Screened:    Amphetamines               500 ng/ml  Barbiturates               200 ng/ml  Benzodiazepines            150 ng/ml  Cocaine                    150 ng/ml  Methadone                  200 ng/ml  Opiates                    100 ng/ml  Phencyclidine               25 ng/ml  THC                         50 ng/ml  Methamphetamine            500 ng/ml  Tricyclic Antidepressants  300 ng/ml  Oxycodone                  100 ng/ml  Buprenorphine               10 ng/ml    The normal value for all drugs tested is negative. This report includes unconfirmed screening results, with the cutoff values listed, to be used for medical treatment purposes only.  Unconfirmed results must not be used for non-medical purposes such as employment or legal testing.  Clinical consideration should be applied to any drug of abuse test, particularly when unconfirmed results are used.     BNP (IN-HOUSE) - Normal    Narrative:     This assay is used as an aid in the diagnosis of individuals suspected of having heart failure. It can be used as an aid in the diagnosis of acute decompensated heart failure (ADHF) in patients presenting with signs and symptoms of ADHF to the emergency department (ED). In addition, NT-proBNP of <300 pg/mL indicates ADHF is not likely.    Age Range Result Interpretation  NT-proBNP Concentration (pg/mL:      <50             Positive            >450                   Gray                 300-450                    Negative             <300    50-75           Positive            >900                  Gray                300-900                  Negative            <300      >75             Positive            >1800                  Gray                300-1800                   Negative            <300   FENTANYL, URINE - Normal    Narrative:     Negative Threshold:      Fentanyl 5 ng/mL     The normal value for the drug tested is negative. This report includes final unconfirmed screening results to be used for medical treatment purposes only. Unconfirmed results must not be used for non-medical purposes such as employment or legal testing. Clinical consideration should be applied to any drug of abuse test, particularly when unconfirmed results are used.          BLOOD CULTURE   BLOOD CULTURE   ETHANOL    Narrative:     Not for legal purposes. Chain of Custody not followed.    LACTIC ACID, REFLEX   BASIC METABOLIC PANEL   CBC WITH AUTO DIFFERENTIAL   POCT GLUCOSE FINGERSTICK   POCT GLUCOSE FINGERSTICK   POCT GLUCOSE FINGERSTICK   POCT GLUCOSE FINGERSTICK   POCT GLUCOSE FINGERSTICK   CBC AND DIFFERENTIAL    Narrative:     The following orders were created for panel order CBC & Differential.  Procedure                               Abnormality         Status                     ---------                               -----------         ------                     CBC Auto Differential[[id number]]        Abnormal            Final result                 Please view results for these tests on the individual orders.   CBC AND DIFFERENTIAL    Narrative:     The following orders were created for panel order CBC & Differential.  Procedure                               Abnormality         Status                     ---------                               -----------         ------                     CBC Auto Differential[[id number]]                                                         Please view results for these tests on the individual orders.       Meds given in ED:   Medications   sodium chloride 0.9 % flush 10 mL (has no administration in time range)   thiamine (VITAMIN B-1) tablet 100 mg (100 mg Oral Given 2/17/24 0895)   azithromycin (ZITHROMAX) 500 mg in sodium chloride 0.9 % 250 mL  IVPB-VTB (500 mg Intravenous New Bag 2/17/24 1134)   sodium chloride 0.9 % flush 10 mL (has no administration in time range)   sodium chloride 0.9 % flush 10 mL (has no administration in time range)   sodium chloride 0.9 % infusion 40 mL (has no administration in time range)   Potassium Replacement - Follow Nurse / BPA Driven Protocol (has no administration in time range)   Magnesium Standard Dose Replacement - Follow Nurse / BPA Driven Protocol (has no administration in time range)   Phosphorus Replacement - Follow Nurse / BPA Driven Protocol (has no administration in time range)   Calcium Replacement - Follow Nurse / BPA Driven Protocol (has no administration in time range)   sennosides-docusate (PERICOLACE) 8.6-50 MG per tablet 2 tablet (has no administration in time range)     And   polyethylene glycol (MIRALAX) packet 17 g (has no administration in time range)     And   bisacodyl (DULCOLAX) EC tablet 5 mg (has no administration in time range)     And   bisacodyl (DULCOLAX) suppository 10 mg (has no administration in time range)   nicotine (NICODERM CQ) 21 MG/24HR patch 1 patch (has no administration in time range)   nicotine polacrilex (NICORETTE) gum 4 mg (has no administration in time range)   cefTRIAXone (ROCEPHIN) 1,000 mg in sodium chloride 0.9 % 100 mL IVPB (has no administration in time range)   azithromycin (ZITHROMAX) 500 mg in sodium chloride 0.9 % 250 mL IVPB-VTB (has no administration in time range)   thiamine (VITAMIN B-1) tablet 100 mg (has no administration in time range)   folic acid (FOLVITE) tablet 1 mg (has no administration in time range)   dextrose (GLUTOSE) oral gel 15 g (has no administration in time range)   dextrose (D50W) (25 g/50 mL) IV injection 25 g (has no administration in time range)   glucagon (GLUCAGEN) injection 1 mg (has no administration in time range)   Insulin Lispro (humaLOG) injection 2-7 Units (has no administration in time range)   ipratropium-albuterol (DUO-NEB)  nebulizer solution 3 mL (has no administration in time range)   LORazepam (ATIVAN) tablet 2 mg (has no administration in time range)     Followed by   LORazepam (ATIVAN) tablet 1 mg (has no administration in time range)   LORazepam (ATIVAN) tablet 1 mg (has no administration in time range)     Or   LORazepam (ATIVAN) injection 1 mg (has no administration in time range)     Or   LORazepam (ATIVAN) tablet 2 mg (has no administration in time range)     Or   LORazepam (ATIVAN) injection 2 mg (has no administration in time range)     Or   LORazepam (ATIVAN) injection 2 mg (has no administration in time range)     Or   LORazepam (ATIVAN) injection 2 mg (has no administration in time range)   lactated ringers bolus 1,000 mL (0 mL Intravenous Stopped 2/17/24 1054)   potassium chloride (K-DUR,KLOR-CON) CR tablet 40 mEq (40 mEq Oral Given 2/17/24 0942)   iopamidol (ISOVUE-370) 76 % injection 100 mL (80 mL Intravenous Given 2/17/24 0922)   insulin regular (humuLIN R,novoLIN R) injection 5 Units (5 Units Subcutaneous Given 2/17/24 1054)   cefTRIAXone (ROCEPHIN) 1,000 mg in sodium chloride 0.9 % 100 mL IVPB (0 mg Intravenous Stopped 2/17/24 1131)   lactated ringers bolus 1,000 mL (1,000 mL Intravenous New Bag 2/17/24 1119)   LORazepam (ATIVAN) injection 1 mg (1 mg Intravenous Given 2/17/24 1132)   ondansetron (ZOFRAN) injection 4 mg (4 mg Intravenous Given 2/17/24 1132)           NIH Stroke Scale:       Isolation/Infection(s):  No active isolations   COVID (rule out), Mycoplasma pneumonia     COVID Testing  Collected .  Resulted .    Nursing report ED to floor:  Mental status: .a&ox4  Ambulatory status: .up adlib  Precautions: .none    ED nurse phone extentsion- .. [phone]  Report given to linda amador

## 2024-02-17 NOTE — ED NOTES
Saira SESAY at bedside. Pt states that he drinks a 12 pack of beer a day and is wanting to seek help with stopping alcohol.

## 2024-02-17 NOTE — ED PROVIDER NOTES
"Subjective   History of Present Illness  This patient is a 39-year-old male with PMH significant for hypertension and diabetes who presents to the ER for evaluation of URI symptoms and hypertension.  Patient reports that he has been out of his losartan and metformin for 1 to 2 months as he missed his last follow-up with his PCP, Dr. Rangel.  Reports that his blood pressure has been elevated over the last several days.  Additionally, he is reporting productive cough, congestion, shortness of breath, and generally feeling unwell.  Also endorses headache and intermittent diarrhea.  Patient reports that his drinking has been \"out of control\" for the last several months.  States that he drinks approximately 12 pack of beer daily, with his last drink being 10 hours ago.  He does endorse a history of alcohol withdrawal seizures.  Patient denies any chest pain at this time.  He denies fever or chills.  Has not had any BLE edema.  He has never had a PE or DVT.  He has not been around anyone who has been sick that he is aware of.      Review of Systems   Constitutional:  Positive for fatigue. Negative for chills and fever.   HENT:  Positive for congestion and rhinorrhea.    Respiratory:  Positive for cough and shortness of breath.    Cardiovascular:  Negative for chest pain, palpitations and leg swelling.   Gastrointestinal:  Positive for diarrhea. Negative for abdominal pain, nausea and vomiting.   Genitourinary: Negative.    Musculoskeletal:  Positive for myalgias.   Skin: Negative.    Neurological: Negative.    Psychiatric/Behavioral:  The patient is nervous/anxious.        Past Medical History:   Diagnosis Date    Diabetes mellitus     Gout     Hypertension        No Known Allergies    Past Surgical History:   Procedure Laterality Date    VASECTOMY         History reviewed. No pertinent family history.    Social History     Socioeconomic History    Marital status: Single   Tobacco Use    Smoking status: Some Days     " Packs/day: 0.50     Years: 10.00     Additional pack years: 0.00     Total pack years: 5.00     Types: Cigarettes     Start date: 2010    Smokeless tobacco: Never   Vaping Use    Vaping Use: Never used   Substance and Sexual Activity    Alcohol use: Yes     Alcohol/week: 5.0 standard drinks of alcohol     Types: 5 Cans of beer per week     Comment: 5 drinks/ day    Drug use: Yes     Types: Marijuana    Sexual activity: Yes           Objective   Physical Exam  Constitutional:       Appearance: He is obese.   HENT:      Nose: Congestion present.   Cardiovascular:      Rate and Rhythm: Regular rhythm. Tachycardia present.      Pulses: Normal pulses.      Heart sounds: Normal heart sounds. No murmur heard.  Pulmonary:      Effort: Pulmonary effort is normal. Respiratory distress: Expiratory.      Breath sounds: Wheezing present.   Abdominal:      General: Abdomen is flat. Bowel sounds are normal.      Palpations: Abdomen is soft.   Musculoskeletal:      Right lower leg: No edema.      Left lower leg: No edema.   Skin:     General: Skin is warm and dry.      Capillary Refill: Capillary refill takes less than 2 seconds.   Neurological:      Mental Status: He is alert. Mental status is at baseline.         Procedures           ED Course  ED Course as of 02/17/24 1237   Sat Feb 17, 2024   0847 I evaluated the patient.  He is not tremulous.  He feels like he is starting to withdraw a little bit.  He has had flulike symptoms cough congestion and anxiety.  He says that he has had alcohol withdrawal seizures but has never been hospitalized or intubated.  He says yesterday morning he started to get the shakes and that was when he interpreted as a seizure and had to drink to make it stop.  No fevers.  Has never tried Librium.  No thoughts of hurting himself or killing himself. [AS]   1022 CIWA-Ar for Alcohol Withdrawal - MDCalc  Calculated on Feb 17 2024 11:22 AM  7 points -> Patients with scores =8 typically do not require  medication for withdrawal. [DF]      ED Course User Index  [AS] Thee Arboleda MD  [DF] Saira Santos APRN                                             Medical Decision Making  39-year-old male presenting to the ER for evaluation of hypertension and URI symptoms he also reports heavy alcohol use with his last drink being 10 hours ago.  Differential diagnosis include but not limited to URI, pneumonia, alcohol withdrawal, hypertensive urgency, migraine, HHS, and other.     UDS negative.  Ethanol level 0.23.  BNP normal.  Troponin 49 with repeat pending. No leukocytosis or anemia noted on CBC. Noted hyperglycemia on chemistry panel with glucose 366. He was given 5 units subcutaneous regular insulin.  His chest x-ray showed no findings to suggest pneumonia, so initially, antibiotics were not given.  D-dimer elevated at 0.81; due to tachycardia and SpO2 91 to 92% on RA, CTA ordered indicates no pulmonary embolism, bilateral lower lobe and lingula pneumonia, as well as hepatic steatosis and cholelithiasis.  Respiratory panel + mycoplasma. He was given azithromycin and Rocephin for treatment of CAP.  ALT and AST elevated at 109/128, consistent with hepatic steatosis noted on CT. Lactic elevated at 7.6; anion gap 22.  He has had some nausea, vomiting, and dry heaving prior to arrival and while in the ER which could be contributing to this.  Repeat ordered and is pending after IVF.  He was given 1L LR without improvement in heart rate; remains tachycardic with -120.  SpO2 90% on room air.  Will give additional 1L LR due to elevated lactic.  CIWA currently 6. Discussed case with hospitalist who agreed to admit the patient for treatment of CAP, lactic acidosis, and alcohol intoxication with impending withdrawal.      Problems Addressed:  Alcoholic intoxication without complication: complicated acute illness or injury  Community acquired bilateral lower lobe pneumonia: complicated acute illness or  injury  Hypoxia: complicated acute illness or injury  Lactic acidosis: complicated acute illness or injury    Amount and/or Complexity of Data Reviewed  Labs: ordered.  Radiology: ordered.    Risk  OTC drugs.  Prescription drug management.  Decision regarding hospitalization.            Final diagnoses:   Community acquired bilateral lower lobe pneumonia   Hypoxia   Alcoholic intoxication without complication   Lactic acidosis       ED Disposition  ED Disposition       ED Disposition   Decision to Admit    Condition   --    Comment   Level of Care: Med/Surg [1]   Diagnosis: Community acquired bilateral lower lobe pneumonia [[id number]]   Admitting Physician: JEM THOMAS [[id number]]   Attending Physician: JEM THOMAS [[id number]]   Isolate for COVID?: No [0]   Certification: I Certify That Inpatient Hospital Services Are Medically Necessary For Greater Than 2 Midnights                 No follow-up provider specified.       Medication List      No changes were made to your prescriptions during this visit.            Saira Santos, APRN  02/17/24 1237

## 2024-02-17 NOTE — H&P
"    [facility]  HISTORY AND PHYSICAL    Date of Admission: 2/17/2024  Primary Care Physician: Eleuterio Rangel MD    Subjective   Primary Historian: patient     Chief Complaint: impeding DT , SOB     Hypertension    Flu Symptoms      This patient is a 39-year-old male with PMH significant for hypertension and diabetes alcohol abuse who presents to the ER for evaluation of URI symptoms and hypertension. Patient reports that he has been out of his losartan and metformin for 1 to 2 months as he missed his last follow-up with his PCP, Dr. Rangel. Reports that his blood pressure has been elevated over the last several days. Additionally, he is reporting productive cough, congestion, shortness of breath, and generally feeling unwell. Also endorses headache and intermittent diarrhea. Patient reports that his drinking has been \"out of control\" for the last several months. States that he drinks approximately 12 pack of beer daily, with his last drink being 10 hours ago. He does endorse a history of alcohol withdrawal seizures. Patient denies any chest pain at this time. He denies fever or chills. Has not had any BLE edema. He has never had a PE or DVT. He has not been around anyone who has been sick that he is aware of.   In the ER the patient U[facility] here was unremarkable ethanol level was 0.23 troponin was 49 D-dimer elevated chest CT was showing no PE but bilateral pneumonia liver steatosis cholelithiasis respiratory panel was positive for mycoplasma was given Rocephin and Zithromax ALTs LFTs were elevated consistent with alcoholic hepatitis lactic acid was 7.6 was given IV fluid we will trend his lactic acid started on CIWA his CIWA was 6 will be admitted to the hospital for impending DTs alcohol intoxication lactic acidosis pneumonia      Review of Systems   Otherwise complete ROS reviewed and negative except as mentioned in the HPI.    Past Medical History:   Past " "Medical History:   Diagnosis Date    Diabetes mellitus     Gout     Hypertension      Past Surgical History:  Past Surgical History:   Procedure Laterality Date    VASECTOMY       Social History:  reports that he has been smoking cigarettes. He started smoking about 14 years ago. He has a 5.00 pack-year smoking history. He has never used smokeless tobacco. He reports current alcohol use of about 5.0 standard drinks of alcohol per week. He reports current drug use. Drug: Marijuana.    Family History: family history is not on file.       Allergies:  No Known Allergies    Medications:  Prior to Admission medications    Medication Sig Start Date End Date Taking? Authorizing Provider   losartan (COZAAR) 50 MG tablet Take 1 tablet by mouth Daily for 30 days. 6/20/23 7/20/23  Eleuterio Rangel MD   metFORMIN ER (GLUCOPHAGE-XR) 500 MG 24 hr tablet TAKE 1 TABLET BY MOUTH EVERY DAY WITH BREAKFAST 8/21/23   Eleuterio Rangel MD   QUEtiapine (SEROquel) 50 MG tablet Take 1 tablet by mouth Every Night. 2/14/23   Eleuterio Rangel MD     I have utilized all available immediate resources to obtain, update, or review the patient's current medications (including all prescriptions, over-the-counter products, herbals, cannabis/cannabidiol products, and vitamin/mineral/dietary (nutritional) supplements).    Objective     Vital Signs: /78   Pulse 117   Temp 98.4 °F (36.9 °C)   Resp 20   Ht 190.5 cm (75\")   Wt 130 kg (287 lb)   SpO2 92%   BMI 35.87 kg/m²   Physical Exam  Constitutional:       General: He is in acute distress.   HENT:      Head: Normocephalic and atraumatic.      Nose: Nose normal.   Eyes:      Extraocular Movements: Extraocular movements intact.      Pupils: Pupils are equal, round, and reactive to light.   Cardiovascular:      Rate and Rhythm: Normal rate and regular rhythm.   Pulmonary:      Effort: Respiratory distress present.      Breath sounds: Stridor present.   Abdominal:      General: Abdomen is flat.      " Palpations: Abdomen is soft.   Musculoskeletal:         General: Normal range of motion.      Cervical back: Normal range of motion.   Skin:     Capillary Refill: Capillary refill takes less than 2 seconds.   Neurological:      General: No focal deficit present.              Results Reviewed:  Lab Results (last 24 hours)       Procedure Component Value Units Date/Time    High Sensitivity Troponin T 2Hr [[id number]]  (Abnormal) Collected: 02/17/24 1058    Specimen: Blood from Arm, Right Updated: 02/17/24 1123     HS Troponin T 46 ng/L      Troponin T Delta -3 ng/L     Narrative:      High Sensitive Troponin T Reference Range:  <14.0 ng/L- Negative Female for AMI  <22.0 ng/L- Negative Male for AMI  >=14 - Abnormal Female indicating possible myocardial injury.  >=22 - Abnormal Male indicating possible myocardial injury.   Clinicians would have to utilize clinical acumen, EKG, Troponin, and serial changes to determine if it is an Acute Myocardial Infarction or myocardial injury due to an underlying chronic condition.         Lactic Acid, Plasma [[id number]]  (Abnormal) Collected: 02/17/24 0949    Specimen: Blood from Arm, Right Updated: 02/17/24 1057     Lactate 6.7 mmol/L     POC Glucose Once [[id number]]  (Abnormal) Collected: 02/17/24 0934    Specimen: Blood Updated: 02/17/24 0952     Glucose 318 mg/dL      Comment: : [id number] Doretha Taylor ID: [id number]       Respiratory Panel PCR w/COVID-19(SARS-CoV-2) RACHELLE/CAL/LISETTE/PAD/COR/EUGENIA In-House, NP Swab in UTM/VTM, 2 HR TAT - Swab, Nasopharynx [[id number]]  (Abnormal) Collected: 02/17/24 0836    Specimen: Swab from Nasopharynx Updated: 02/17/24 0937     ADENOVIRUS, PCR Not Detected     Coronavirus 229E Not Detected     Coronavirus HKU1 Not Detected     Coronavirus NL63 Not Detected     Coronavirus OC43 Not Detected     COVID19 Not Detected     Human Metapneumovirus Not Detected     Human Rhinovirus/Enterovirus Not Detected     Influenza A PCR Not Detected      Influenza B PCR Not Detected     Parainfluenza Virus 1 Not Detected     Parainfluenza Virus 2 Not Detected     Parainfluenza Virus 3 Not Detected     Parainfluenza Virus 4 Not Detected     RSV, PCR Not Detected     Bordetella pertussis pcr Not Detected     Bordetella parapertussis PCR Not Detected     Chlamydophila pneumoniae PCR Not Detected     Mycoplasma pneumo by PCR Detected    Narrative:      In the setting of a positive respiratory panel with a viral infection PLUS a negative procalcitonin without other underlying concern for bacterial infection, consider observing off antibiotics or discontinuation of antibiotics and continue supportive care. If the respiratory panel is positive for atypical bacterial infection (Bordetella pertussis, Chlamydophila pneumoniae, or Mycoplasma pneumoniae), consider antibiotic de-escalation to target atypical bacterial infection.    Fentanyl, Urine - Urine, Clean Catch [[id number]]  (Normal) Collected: 02/17/24 0843    Specimen: Urine, Clean Catch Updated: 02/17/24 0914     Fentanyl, Urine Negative    Narrative:      Negative Threshold:      Fentanyl 5 ng/mL     The normal value for the drug tested is negative. This report includes final unconfirmed screening results to be used for medical treatment purposes only. Unconfirmed results must not be used for non-medical purposes such as employment or legal testing. Clinical consideration should be applied to any drug of abuse test, particularly when unconfirmed results are used.           Urine Drug Screen - Urine, Clean Catch [[id number]]  (Normal) Collected: 02/17/24 0843    Specimen: Urine, Clean Catch Updated: 02/17/24 0906     THC, Screen, Urine Negative     Phencyclidine (PCP), Urine Negative     Cocaine Screen, Urine Negative     Methamphetamine, Ur Negative     Opiate Screen Negative     Amphetamine Screen, Urine Negative     Benzodiazepine Screen, Urine Negative     Tricyclic Antidepressants Screen Negative     Methadone  Screen, Urine Negative     Barbiturates Screen, Urine Negative     Oxycodone Screen, Urine Negative     Buprenorphine, Screen, Urine Negative    Narrative:      Cutoff For Drugs Screened:    Amphetamines               500 ng/ml  Barbiturates               200 ng/ml  Benzodiazepines            150 ng/ml  Cocaine                    150 ng/ml  Methadone                  200 ng/ml  Opiates                    100 ng/ml  Phencyclidine               25 ng/ml  THC                         50 ng/ml  Methamphetamine            500 ng/ml  Tricyclic Antidepressants  300 ng/ml  Oxycodone                  100 ng/ml  Buprenorphine               10 ng/ml    The normal value for all drugs tested is negative. This report includes unconfirmed screening results, with the cutoff values listed, to be used for medical treatment purposes only.  Unconfirmed results must not be used for non-medical purposes such as employment or legal testing.  Clinical consideration should be applied to any drug of abuse test, particularly when unconfirmed results are used.      Blood Culture - Blood, Arm, Left [[id number]] Collected: 02/17/24 0854    Specimen: Blood from Arm, Left Updated: 02/17/24 0859    Blood Culture - Blood, Arm, Right [[id number]] Collected: 02/17/24 0850    Specimen: Blood from Arm, Right Updated: 02/17/24 0858    C-reactive Protein [[id number]]  (Abnormal) Collected: 02/17/24 0827    Specimen: Blood Updated: 02/17/24 0855     C-Reactive Protein 4.44 mg/dL     Comprehensive Metabolic Panel [[id number]]  (Abnormal) Collected: 02/17/24 0827    Specimen: Blood Updated: 02/17/24 0855     Glucose 366 mg/dL      BUN 3 mg/dL      Creatinine 0.61 mg/dL      Sodium 137 mmol/L      Potassium 3.4 mmol/L      Chloride 88 mmol/L      CO2 27.0 mmol/L      Calcium 9.4 mg/dL      Total Protein 7.7 g/dL      Albumin 4.1 g/dL      ALT (SGPT) 109 U/L      AST (SGOT) 128 U/L      Alkaline Phosphatase 183 U/L      Total Bilirubin 1.2 mg/dL      Globulin 3.6  gm/dL      A/G Ratio 1.1 g/dL      BUN/Creatinine Ratio 4.9     Anion Gap 22.0 mmol/L      eGFR 125.3 mL/min/1.73     Narrative:      GFR Normal >60  Chronic Kidney Disease <60  Kidney Failure <15      BNP [[id number]]  (Normal) Collected: 02/17/24 0827    Specimen: Blood Updated: 02/17/24 0852     proBNP <36.0 pg/mL     Narrative:      This assay is used as an aid in the diagnosis of individuals suspected of having heart failure. It can be used as an aid in the diagnosis of acute decompensated heart failure (ADHF) in patients presenting with signs and symptoms of ADHF to the emergency department (ED). In addition, NT-proBNP of <300 pg/mL indicates ADHF is not likely.    Age Range Result Interpretation  NT-proBNP Concentration (pg/mL:      <50             Positive            >450                   Gray                 300-450                    Negative             <300    50-75           Positive            >900                  Gray                300-900                  Negative            <300      >75             Positive            >1800                  Gray                300-1800                  Negative            <300    High Sensitivity Troponin T [[id number]]  (Abnormal) Collected: 02/17/24 0827    Specimen: Blood Updated: 02/17/24 0851     HS Troponin T 49 ng/L     Narrative:      High Sensitive Troponin T Reference Range:  <14.0 ng/L- Negative Female for AMI  <22.0 ng/L- Negative Male for AMI  >=14 - Abnormal Female indicating possible myocardial injury.  >=22 - Abnormal Male indicating possible myocardial injury.   Clinicians would have to utilize clinical acumen, EKG, Troponin, and serial changes to determine if it is an Acute Myocardial Infarction or myocardial injury due to an underlying chronic condition.         Ethanol [[id number]] Collected: 02/17/24 0827    Specimen: Blood Updated: 02/17/24 0850     Ethanol % 0.231 %     Narrative:      Not for legal purposes. Chain of Custody not followed.   "   D-dimer, Quantitative [[id number]]  (Abnormal) Collected: 02/17/24 0827    Specimen: Blood Updated: 02/17/24 0845     D-Dimer, Quantitative 0.81 MCGFEU/mL     Narrative:      According to the assay 's published package insert, a normal (<0.50 MCGFEU/mL) D-dimer result in conjunction with a non-high clinical probability assessment, excludes deep vein thrombosis (DVT) and pulmonary embolism (PE) with high sensitivity.    D-dimer values increase with age and this can make VTE exclusion of an older population difficult. To address this, the [employer], based on best available evidence and recent guidelines, recommends that clinicians use age-adjusted D-dimer thresholds in patients greater than 50 years of age with: a) a low probability of PE who do not meet all Pulmonary Embolism Rule Out Criteria, or b) in those with intermediate probability of PE.   The formula for an age-adjusted D-dimer cut-off is \"age/100\".  For example, a 60 year old patient would have an age-adjusted cut-off of 0.60 MCGFEU/mL and an 80 year old 0.80 MCGFEU/mL.    Sedimentation Rate [[id number]]  (Abnormal) Collected: 02/17/24 0827    Specimen: Blood Updated: 02/17/24 0838     Sed Rate 34 mm/hr     CBC & Differential [[id number]]  (Abnormal) Collected: 02/17/24 0827    Specimen: Blood Updated: 02/17/24 0834    Narrative:      The following orders were created for panel order CBC & Differential.  Procedure                               Abnormality         Status                     ---------                               -----------         ------                     CBC Auto Differential[[id number]]        Abnormal            Final result                 Please view results for these tests on the individual orders.    CBC Auto Differential [[id number]]  (Abnormal) Collected: 02/17/24 0827    Specimen: Blood Updated: 02/17/24 0834     WBC 5.16 10*3/mm3      RBC 4.73 10*6/mm3      Hemoglobin 16.2 g/dL      Hematocrit " 45.2 %      MCV 95.6 fL      MCH 34.2 pg      MCHC 35.8 g/dL      RDW 12.2 %      RDW-SD 43.1 fl      MPV 9.8 fL      Platelets 130 10*3/mm3      Neutrophil % 67.5 %      Lymphocyte % 20.5 %      Monocyte % 11.0 %      Eosinophil % 0.4 %      Basophil % 0.4 %      Immature Grans % 0.2 %      Neutrophils, Absolute 3.48 10*3/mm3      Lymphocytes, Absolute 1.06 10*3/mm3      Monocytes, Absolute 0.57 10*3/mm3      Eosinophils, Absolute 0.02 10*3/mm3      Basophils, Absolute 0.02 10*3/mm3      Immature Grans, Absolute 0.01 10*3/mm3      nRBC 0.0 /100 WBC           Imaging Results (Last 24 Hours)       Procedure Component Value Units Date/Time    CT Angiogram Chest [[id number]] Collected: 02/17/24 0941     Updated: 02/17/24 0950    Narrative:      EXAM: CT ANGIOGRAM CHEST- 2/17/2024 8:10 AM     HISTORY: SOB, elevated DD         DOSE LENGTH PRODUCT: 295.01 mGy.cm mGy cm. Automated exposure control  was also utilized to decrease patient radiation dose.     COMPARISON: None      TECHNIQUE: Helical tomographic images of the chest were obtained after  the administration of intravenous contrast following angiogram protocol.  Additionally, 3D and multiplanar reformatted images as well as MIPS were  provided.       FINDINGS:     Lungs/Pleura: Bilateral lower lobe groundglass as well as small nodular  opacities. This is present to a lesser extent within the lingula. No  sizable pleural effusion. No visible pneumothorax.     Lower Neck: Unremarkable.     Mediastinum/Hilum: No enlarged lymphadenopathy. Patulous esophagus.     Heart/Aorta: No pericardial effusion.     Pulmonary Arteries: Pulmonary arteries are well-opacified to the  segmental level. No pulmonary embolism.     Chest wall/Soft Tissues: Bilateral symmetric gynecomastia. 1 cm  subcutaneous nodule along the left upper back, likely  sebaceous/epidermal inclusion cyst. Smaller similar-appearing nodule  along the right upper back.     Upper Abdomen: Hepatic steatosis.  Cholelithiasis.     Osseous Structures: No acute or suspicious osseous finding.       Impression:         No pulmonary embolism.     Bilateral lower lobe and lingula pneumonia.     Hepatic steatosis and cholelithiasis.           This report was signed and finalized on 2/17/2024 9:47 AM by Darwin Goldman.       XR Chest 1 View [[id number]] Collected: 02/17/24 0827     Updated: 02/17/24 0831    Narrative:      EXAM: XR CHEST 1 VW- 2/17/2024 7:10 AM     HISTORY: cough, congestion, wheezing       COMPARISON: 12/23/2022..     TECHNIQUE: Single frontal radiograph of the chest was obtained.     FINDINGS:     Support Devices: None.     Cardiac and Mediastinal Silhouettes: Normal.     Lungs/Pleura: No focal consolidation. No sizable pleural effusion. No  visible pneumothorax.     Osseous structures: No acute osseous finding.     Other: None.       Impression:         No focal consolidation to suggest pneumonia.           This report was signed and finalized on 2/17/2024 8:28 AM by Darwin Goldman.             I have personally reviewed and interpreted the radiology studies and ECG obtained at time of admission.     Assessment / Plan   Assessment:   Active Hospital Problems    Diagnosis     **Community acquired bilateral lower lobe pneumonia        Treatment Plan  The patient will be admitted to my service here at [facility].   In the ER the patient [facility] here was unremarkable ethanol level was 0.23 troponin was 49 D-dimer elevated chest CT was showing no PE but bilateral pneumonia liver steatosis cholelithiasis respiratory panel was positive for mycoplasma was given Rocephin and Zithromax ALTs LFTs were elevated consistent with alcoholic hepatitis lactic acid was 7.6 was given IV fluid we will trend his lactic acid started on CIWA his CIWA was 6 will be admitted to the hospital for impending DTs alcohol intoxication lactic acidosis pneumonia  Medical Decision Making  Number and Complexity of problems: 3  acute  Differential Diagnosis: As above    Conditions and Status        Condition is unchanged.     [facility] Data  External documents reviewed: None  Cardiac tracing (EKG, telemetry) interpretation: Yes  Radiology interpretation: Yes  Labs reviewed: Yes  Any tests that were considered but not ordered: None     Decision rules/scores evaluated (example [id number], Wells, etc): None     Discussed with: ER     Care Planning  Shared decision making: Patient apprised of current labs, vitals, imaging and treatment plan.  They are agreeable with proceeding with plans as discussed.   Code status and discussions: Full    Disposition  Social Determinants of Health that impact treatment or disposition: None  Estimated length of stay is TBD .     I confirmed that the patient's advanced care plan is present, code status is documented, and a surrogate decision maker is listed in the patient's medical record.     The patient's surrogate decision maker is patient.     The patient was seen and examined by me on 1213 at [facility]     Electronically signed by Frankie Camacho MD, 02/17/24, 12:05 CST.

## 2024-02-17 NOTE — PLAN OF CARE
Goal Outcome Evaluation:  Plan of Care Reviewed With: patient        Progress: declining  Outcome Evaluation: Patient recieved from ED with without complication. Patient alert and oriented x 4. Patient in now on 4L NC sating 93%. Patient was satting 87% 2LNC. Patient heart rate remains elevated in the 110's. Patient CIWA remains >8 with interventions. Patient lactate 5.0 down from 6.6. after bolus from ED. Provider made aware of the above and no new orders were placed. Patient left lung has insp/exp wheeze and stridor noted in middle lobes, right lung is diminished. Patient has productive cough. Patient also has bilateral diabetic foot wounds, WOC consult placed. Patient is requesting rehab services for alcoholism upon discharge. SW consulted. Patient admits to drinking 12 beers a day with the occasional shot of liquor. Patient also vapes THC and smokes half a pack of cigarettes per day.

## 2024-02-18 LAB
ANION GAP SERPL CALCULATED.3IONS-SCNC: 14 MMOL/L (ref 5–15)
BASOPHILS # BLD AUTO: 0.01 10*3/MM3 (ref 0–0.2)
BASOPHILS NFR BLD AUTO: 0.3 % (ref 0–1.5)
BUN SERPL-MCNC: 6 MG/DL (ref 6–20)
BUN/CREAT SERPL: 9 (ref 7–25)
CALCIUM SPEC-SCNC: 8.6 MG/DL (ref 8.6–10.5)
CHLORIDE SERPL-SCNC: 90 MMOL/L (ref 98–107)
CO2 SERPL-SCNC: 26 MMOL/L (ref 22–29)
CREAT SERPL-MCNC: 0.67 MG/DL (ref 0.76–1.27)
D-LACTATE SERPL-SCNC: 4 MMOL/L (ref 0.5–2)
D-LACTATE SERPL-SCNC: 4.1 MMOL/L (ref 0.5–2)
DEPRECATED RDW RBC AUTO: 43 FL (ref 37–54)
EGFRCR SERPLBLD CKD-EPI 2021: 121.8 ML/MIN/1.73
EOSINOPHIL # BLD AUTO: 0 10*3/MM3 (ref 0–0.4)
EOSINOPHIL NFR BLD AUTO: 0 % (ref 0.3–6.2)
ERYTHROCYTE [DISTWIDTH] IN BLOOD BY AUTOMATED COUNT: 12.3 % (ref 12.3–15.4)
GLUCOSE BLDC GLUCOMTR-MCNC: 259 MG/DL (ref 70–130)
GLUCOSE BLDC GLUCOMTR-MCNC: 289 MG/DL (ref 70–130)
GLUCOSE BLDC GLUCOMTR-MCNC: 321 MG/DL (ref 70–130)
GLUCOSE BLDC GLUCOMTR-MCNC: 325 MG/DL (ref 70–130)
GLUCOSE SERPL-MCNC: 296 MG/DL (ref 65–99)
HCT VFR BLD AUTO: 36.7 % (ref 37.5–51)
HGB BLD-MCNC: 13.1 G/DL (ref 13–17.7)
IMM GRANULOCYTES # BLD AUTO: 0.02 10*3/MM3 (ref 0–0.05)
IMM GRANULOCYTES NFR BLD AUTO: 0.5 % (ref 0–0.5)
LYMPHOCYTES # BLD AUTO: 0.85 10*3/MM3 (ref 0.7–3.1)
LYMPHOCYTES NFR BLD AUTO: 21.3 % (ref 19.6–45.3)
MCH RBC QN AUTO: 34.1 PG (ref 26.6–33)
MCHC RBC AUTO-ENTMCNC: 35.7 G/DL (ref 31.5–35.7)
MCV RBC AUTO: 95.6 FL (ref 79–97)
MONOCYTES # BLD AUTO: 0.39 10*3/MM3 (ref 0.1–0.9)
MONOCYTES NFR BLD AUTO: 9.8 % (ref 5–12)
NEUTROPHILS NFR BLD AUTO: 2.72 10*3/MM3 (ref 1.7–7)
NEUTROPHILS NFR BLD AUTO: 68.1 % (ref 42.7–76)
NRBC BLD AUTO-RTO: 0 /100 WBC (ref 0–0.2)
PLATELET # BLD AUTO: 100 10*3/MM3 (ref 140–450)
PMV BLD AUTO: 10.2 FL (ref 6–12)
POTASSIUM SERPL-SCNC: 3.5 MMOL/L (ref 3.5–5.2)
POTASSIUM SERPL-SCNC: 3.9 MMOL/L (ref 3.5–5.2)
RBC # BLD AUTO: 3.84 10*6/MM3 (ref 4.14–5.8)
SODIUM SERPL-SCNC: 130 MMOL/L (ref 136–145)
WBC NRBC COR # BLD AUTO: 3.99 10*3/MM3 (ref 3.4–10.8)

## 2024-02-18 PROCEDURE — 25810000003 SODIUM CHLORIDE 0.9 % SOLUTION 250 ML FLEX CONT: Performed by: HOSPITALIST

## 2024-02-18 PROCEDURE — 63710000001 INSULIN LISPRO (HUMAN) PER 5 UNITS: Performed by: HOSPITALIST

## 2024-02-18 PROCEDURE — 25010000002 AZITHROMYCIN PER 500 MG: Performed by: HOSPITALIST

## 2024-02-18 PROCEDURE — 84132 ASSAY OF SERUM POTASSIUM: CPT | Performed by: HOSPITALIST

## 2024-02-18 PROCEDURE — 25810000003 SODIUM CHLORIDE 0.9 % SOLUTION: Performed by: HOSPITALIST

## 2024-02-18 PROCEDURE — 25010000002 CEFTRIAXONE PER 250 MG: Performed by: HOSPITALIST

## 2024-02-18 PROCEDURE — 85025 COMPLETE CBC W/AUTO DIFF WBC: CPT | Performed by: HOSPITALIST

## 2024-02-18 PROCEDURE — 83605 ASSAY OF LACTIC ACID: CPT

## 2024-02-18 PROCEDURE — 94799 UNLISTED PULMONARY SVC/PX: CPT

## 2024-02-18 PROCEDURE — 94664 DEMO&/EVAL PT USE INHALER: CPT

## 2024-02-18 PROCEDURE — 25010000002 LORAZEPAM PER 2 MG: Performed by: HOSPITALIST

## 2024-02-18 PROCEDURE — 80048 BASIC METABOLIC PNL TOTAL CA: CPT | Performed by: HOSPITALIST

## 2024-02-18 PROCEDURE — 82948 REAGENT STRIP/BLOOD GLUCOSE: CPT

## 2024-02-18 RX ORDER — IBUPROFEN 600 MG/1
1 TABLET ORAL
Status: DISCONTINUED | OUTPATIENT
Start: 2024-02-18 | End: 2024-02-20 | Stop reason: HOSPADM

## 2024-02-18 RX ORDER — SODIUM CHLORIDE 9 MG/ML
150 INJECTION, SOLUTION INTRAVENOUS CONTINUOUS
Status: DISCONTINUED | OUTPATIENT
Start: 2024-02-18 | End: 2024-02-19

## 2024-02-18 RX ORDER — POTASSIUM CHLORIDE 1.5 G/1.58G
40 POWDER, FOR SOLUTION ORAL EVERY 4 HOURS
Status: COMPLETED | OUTPATIENT
Start: 2024-02-18 | End: 2024-02-18

## 2024-02-18 RX ORDER — NICOTINE POLACRILEX 4 MG
15 LOZENGE BUCCAL
Status: DISCONTINUED | OUTPATIENT
Start: 2024-02-18 | End: 2024-02-20 | Stop reason: HOSPADM

## 2024-02-18 RX ORDER — DEXTROSE MONOHYDRATE 25 G/50ML
25 INJECTION, SOLUTION INTRAVENOUS
Status: DISCONTINUED | OUTPATIENT
Start: 2024-02-18 | End: 2024-02-20 | Stop reason: HOSPADM

## 2024-02-18 RX ORDER — BUDESONIDE 0.5 MG/2ML
0.5 INHALANT ORAL
Status: DISCONTINUED | OUTPATIENT
Start: 2024-02-18 | End: 2024-02-20 | Stop reason: HOSPADM

## 2024-02-18 RX ORDER — INSULIN LISPRO 100 [IU]/ML
4-24 INJECTION, SOLUTION INTRAVENOUS; SUBCUTANEOUS
Status: DISCONTINUED | OUTPATIENT
Start: 2024-02-18 | End: 2024-02-20 | Stop reason: HOSPADM

## 2024-02-18 RX ADMIN — INSULIN LISPRO 5 UNITS: 100 INJECTION, SOLUTION INTRAVENOUS; SUBCUTANEOUS at 11:06

## 2024-02-18 RX ADMIN — SODIUM CHLORIDE 150 ML/HR: 9 INJECTION, SOLUTION INTRAVENOUS at 22:20

## 2024-02-18 RX ADMIN — CEFTRIAXONE 1000 MG: 1 INJECTION, POWDER, FOR SOLUTION INTRAMUSCULAR; INTRAVENOUS at 11:06

## 2024-02-18 RX ADMIN — LORAZEPAM 2 MG: 1 TABLET ORAL at 06:56

## 2024-02-18 RX ADMIN — IPRATROPIUM BROMIDE AND ALBUTEROL SULFATE 3 ML: .5; 3 SOLUTION RESPIRATORY (INHALATION) at 11:34

## 2024-02-18 RX ADMIN — INSULIN LISPRO 4 UNITS: 100 INJECTION, SOLUTION INTRAVENOUS; SUBCUTANEOUS at 08:24

## 2024-02-18 RX ADMIN — LORAZEPAM 2 MG: 2 INJECTION, SOLUTION INTRAMUSCULAR; INTRAVENOUS at 11:06

## 2024-02-18 RX ADMIN — LORAZEPAM 1 MG: 1 TABLET ORAL at 17:51

## 2024-02-18 RX ADMIN — INSULIN LISPRO 12 UNITS: 100 INJECTION, SOLUTION INTRAVENOUS; SUBCUTANEOUS at 20:54

## 2024-02-18 RX ADMIN — INSULIN LISPRO 16 UNITS: 100 INJECTION, SOLUTION INTRAVENOUS; SUBCUTANEOUS at 17:51

## 2024-02-18 RX ADMIN — FOLIC ACID 1 MG: 1 TABLET ORAL at 08:24

## 2024-02-18 RX ADMIN — LORAZEPAM 2 MG: 1 TABLET ORAL at 20:55

## 2024-02-18 RX ADMIN — BUDESONIDE 0.5 MG: 0.5 INHALANT RESPIRATORY (INHALATION) at 19:00

## 2024-02-18 RX ADMIN — IPRATROPIUM BROMIDE AND ALBUTEROL SULFATE 3 ML: .5; 3 SOLUTION RESPIRATORY (INHALATION) at 15:06

## 2024-02-18 RX ADMIN — LORAZEPAM 1 MG: 1 TABLET ORAL at 13:26

## 2024-02-18 RX ADMIN — NICOTINE 1 PATCH: 21 PATCH, EXTENDED RELEASE TRANSDERMAL at 13:27

## 2024-02-18 RX ADMIN — THIAMINE HCL TAB 100 MG 100 MG: 100 TAB at 08:24

## 2024-02-18 RX ADMIN — IPRATROPIUM BROMIDE AND ALBUTEROL SULFATE 3 ML: .5; 3 SOLUTION RESPIRATORY (INHALATION) at 06:12

## 2024-02-18 RX ADMIN — AZITHROMYCIN DIHYDRATE 500 MG: 500 INJECTION, POWDER, LYOPHILIZED, FOR SOLUTION INTRAVENOUS at 13:29

## 2024-02-18 RX ADMIN — IPRATROPIUM BROMIDE AND ALBUTEROL SULFATE 3 ML: .5; 3 SOLUTION RESPIRATORY (INHALATION) at 19:04

## 2024-02-18 RX ADMIN — POTASSIUM CHLORIDE 40 MEQ: 1.5 POWDER, FOR SOLUTION ORAL at 13:27

## 2024-02-18 RX ADMIN — POTASSIUM CHLORIDE 40 MEQ: 1.5 POWDER, FOR SOLUTION ORAL at 17:51

## 2024-02-18 NOTE — PROGRESS NOTES
"    [facility] Medicine Services  INPATIENT PROGRESS NOTE    Patient Name: Luciano Christiansen  Date of Admission: 2/17/2024  Today's Date: 02/18/24  Length of Stay: 1  Primary Care Physician: Eleuterio Rangel MD    Subjective   Chief Complaint: impeding DT  HPI   This patient is a 39-year-old male with PMH significant for hypertension and diabetes alcohol abuse who presents to the ER for evaluation of URI symptoms and hypertension. Patient reports that he has been out of his losartan and metformin for 1 to 2 months as he missed his last follow-up with his PCP, Dr. Rangel. Reports that his blood pressure has been elevated over the last several days. Additionally, he is reporting productive cough, congestion, shortness of breath, and generally feeling unwell. Also endorses headache and intermittent diarrhea. Patient reports that his drinking has been \"out of control\" for the last several months. States that he drinks approximately 12 pack of beer daily, with his last drink being 10 hours ago. He does endorse a history of alcohol withdrawal seizures. Patient denies any chest pain at this time. He denies fever or chills. Has not had any BLE edema. He has never had a PE or DVT. He has not been around anyone who has been sick that he is aware of.   In the ER the patient U[place] here was unremarkable ethanol level was 0.23 troponin was 49 D-dimer elevated chest CT was showing no PE but bilateral pneumonia liver steatosis cholelithiasis respiratory panel was positive for mycoplasma was given Rocephin and Zithromax ALTs LFTs were elevated consistent with alcoholic hepatitis lactic acid was 7.6 was given IV fluid we will trend his lactic acid started on CIWA his CIWA was 6 will be admitted to the hospital for impending DTs alcohol intoxication lactic acidosis pneumoni  2/18  Admitted for bilateral pneumonia lactic acidosis hyperglycemia no DKA impending DTs was not compliant with his medications " "positive mycoplasma placed on Rocephin and Zithromax      Review of Systems   All pertinent negatives and positives are as above. All other systems have been reviewed and are negative unless otherwise stated.     Objective    Temp:  [98.7 °F (37.1 °C)-100.3 °F (37.9 °C)] 100.3 °F (37.9 °C)  Heart Rate:  [104-117] 114  Resp:  [16-20] 16  BP: (144-183)/(65-89) 149/65  Physical Exam  Constitutional:       Appearance: Normal appearance.   HENT:      Head: Normocephalic and atraumatic.      Nose: Nose normal.   Eyes:      Pupils: Pupils are equal, round, and reactive to light.   Cardiovascular:      Rate and Rhythm: Normal rate and regular rhythm.   Pulmonary:      Effort: Pulmonary effort is normal.      Breath sounds: Stridor present.   Abdominal:      General: Abdomen is flat.      Palpations: Abdomen is soft.   Musculoskeletal:         General: Normal range of motion.      Cervical back: Normal range of motion.   Skin:     Capillary Refill: Capillary refill takes less than 2 seconds.   Neurological:      General: No focal deficit present.      Mental Status: He is alert.             Results Review:  I have reviewed the labs, radiology results, and diagnostic studies.    Laboratory Data:   Results from last 7 days   Lab Units 02/18/24  0409 02/17/24  1447 02/17/24  0827   WBC 10*3/mm3 3.99 4.93 5.16   HEMOGLOBIN g/dL 13.1 14.0 16.2   HEMATOCRIT % 36.7* 40.1 45.2   PLATELETS 10*3/mm3 100* 114* 130*        Results from last 7 days   Lab Units 02/18/24  0409 02/17/24  1447 02/17/24  0827   SODIUM mmol/L 130* 132* 137   POTASSIUM mmol/L 3.5 3.6 3.4*   CHLORIDE mmol/L 90* 87* 88*   CO2 mmol/L 26.0 24.0 27.0   BUN mg/dL 6 4* 3*   CREATININE mg/dL 0.67* 0.54* 0.61*   CALCIUM mg/dL 8.6 8.4* 9.4   BILIRUBIN mg/dL  --   --  1.2   ALK PHOS U/L  --   --  183*   ALT (SGPT) U/L  --   --  109*   AST (SGOT) U/L  --   --  128*   GLUCOSE mg/dL 296* 306* 366*       Culture Data:   No results found for: \"BLOODCX\", \"URINECX\", \"WOUNDCX\", " "\"MRSACX\", \"RESPCX\", \"STOOLCX\"    Radiology Data:   Imaging Results (Last 24 Hours)       Procedure Component Value Units Date/Time    CT Angiogram Chest [[id number]] Collected: 02/17/24 0941     Updated: 02/17/24 0950    Narrative:      EXAM: CT ANGIOGRAM CHEST- 2/17/2024 8:10 AM     HISTORY: SOB, elevated DD         DOSE LENGTH PRODUCT: 295.01 mGy.cm mGy cm. Automated exposure control  was also utilized to decrease patient radiation dose.     COMPARISON: None      TECHNIQUE: Helical tomographic images of the chest were obtained after  the administration of intravenous contrast following angiogram protocol.  Additionally, 3D and multiplanar reformatted images as well as MIPS were  provided.       FINDINGS:     Lungs/Pleura: Bilateral lower lobe groundglass as well as small nodular  opacities. This is present to a lesser extent within the lingula. No  sizable pleural effusion. No visible pneumothorax.     Lower Neck: Unremarkable.     Mediastinum/Hilum: No enlarged lymphadenopathy. Patulous esophagus.     Heart/Aorta: No pericardial effusion.     Pulmonary Arteries: Pulmonary arteries are well-opacified to the  segmental level. No pulmonary embolism.     Chest wall/Soft Tissues: Bilateral symmetric gynecomastia. 1 cm  subcutaneous nodule along the left upper back, likely  sebaceous/epidermal inclusion cyst. Smaller similar-appearing nodule  along the right upper back.     Upper Abdomen: Hepatic steatosis. Cholelithiasis.     Osseous Structures: No acute or suspicious osseous finding.       Impression:         No pulmonary embolism.     Bilateral lower lobe and lingula pneumonia.     Hepatic steatosis and cholelithiasis.           This report was signed and finalized on 2/17/2024 9:47 AM by Darwin Goldman.       XR Chest 1 View [[id number]] Collected: 02/17/24 0827     Updated: 02/17/24 0831    Narrative:      EXAM: XR CHEST 1 VW- 2/17/2024 7:10 AM     HISTORY: cough, congestion, wheezing       COMPARISON: " "12/23/2022..     TECHNIQUE: Single frontal radiograph of the chest was obtained.     FINDINGS:     Support Devices: None.     Cardiac and Mediastinal Silhouettes: Normal.     Lungs/Pleura: No focal consolidation. No sizable pleural effusion. No  visible pneumothorax.     Osseous structures: No acute osseous finding.     Other: None.       Impression:         No focal consolidation to suggest pneumonia.           This report was signed and finalized on 2/17/2024 8:28 AM by Darwin Goldman.               I have reviewed the patient's current medications.     Assessment/Plan   Assessment  Active Hospital Problems    Diagnosis     **Community acquired bilateral lower lobe pneumonia        Treatment Plan  This patient is a 39-year-old male with PMH significant for hypertension and diabetes alcohol abuse who presents to the ER for evaluation of URI symptoms and hypertension. Patient reports that he has been out of his losartan and metformin for 1 to 2 months as he missed his last follow-up with his PCP, Dr. Rangel. Reports that his blood pressure has been elevated over the last several days. Additionally, he is reporting productive cough, congestion, shortness of breath, and generally feeling unwell. Also endorses headache and intermittent diarrhea. Patient reports that his drinking has been \"out of control\" for the last several months. States that he drinks approximately 12 pack of beer daily, with his last drink being 10 hours ago. He does endorse a history of alcohol withdrawal seizures. Patient denies any chest pain at this time. He denies fever or chills. Has not had any BLE edema. He has never had a PE or DVT. He has not been around anyone who has been sick that he is aware of.   In the ER the patient [place] here was unremarkable ethanol level was 0.23 troponin was 49 D-dimer elevated chest CT was showing no PE but bilateral pneumonia liver steatosis cholelithiasis respiratory panel was positive for mycoplasma was " given Rocephin and Zithromax ALTs LFTs were elevated consistent with alcoholic hepatitis lactic acid was 7.6 was given IV fluid we will trend his lactic acid started on CIWA his CIWA was 6 will be admitted to the hospital for impending DTs alcohol intoxication lactic acidosis pneumoni  2/18  Admitted for bilateral pneumonia lactic acidosis hyperglycemia no DKA impending DTs was not compliant with his medications positive mycoplasma placed on Rocephin and Zithromax    Medical Decision Making  Number and Complexity of problems: 3 acute   Differential Diagnosis: as above     Conditions and Status        Condition is unchanged.     [facility] Data  External documents reviewed: no   Cardiac tracing (EKG, telemetry) interpretation: yes  Radiology interpretation: yes  Labs reviewed: yes  Any tests that were considered but not ordered: yes     Decision rules/scores evaluated (example [id number], Wells, etc): no      Discussed with: patient     Care Planning  Shared decision making: Patient apprised of current labs, vitals, imaging and treatment plan.  They are agreeable with proceeding with plans as discussed.   Code status and discussions: full     Disposition  Social Determinants of Health that impact treatment or disposition: no   I expect the patient to be discharged to home  in few days.     Electronically signed by Frankie Camacho MD, 02/18/24, 07:46 CST.

## 2024-02-18 NOTE — CASE MANAGEMENT/SOCIAL WORK
Discharge Planning Assessment  [facility]     Patient Name: Luciano Christiansen  MRN: [id number]  Today's Date: 2/18/2024    Admit Date: 2/17/2024        Discharge Needs Assessment       Row Name 02/18/24 1231       Living Environment    People in Home alone    Current Living Arrangements home    Potentially Unsafe Housing Conditions none    Primary Care Provided by self    Provides Primary Care For no one    Quality of Family Relationships unable to assess    Able to Return to Prior Arrangements yes       Resource/Environmental Concerns    Resource/Environmental Concerns none    Transportation Concerns none       Transition Planning    Patient/Family Anticipated Services at Transition none    Transportation Anticipated family or friend will provide       Discharge Needs Assessment    Readmission Within the Last 30 Days no previous admission in last 30 days    Equipment Currently Used at Home none    Concerns to be Addressed substance/tobacco abuse/use    Anticipated Changes Related to Illness none    Equipment Needed After Discharge none    Current Discharge Risk substance use/abuse    Discharge Coordination/Progress SW provided patient with a chemical dependency resource packet.  Patient states he is employed so would have to look at outpatient treatment options.  SW encouraged patient to contact outpatient providers tomorrow when they open to schedule appointment as there may be a wait time to be seen.  Patient denies further needs.                   Discharge Plan    No documentation.                 Continued Care and Services - Admitted Since 2/17/2024    Coordination has not been started for this encounter.          Demographic Summary    No documentation.                  Functional Status    No documentation.                  Psychosocial    No documentation.                  Abuse/Neglect    No documentation.                  Legal    No documentation.                  Substance Abuse    No documentation.                   Patient Forms    No documentation.                     CHAPITO BlairW

## 2024-02-18 NOTE — PLAN OF CARE
Goal Outcome Evaluation:  Plan of Care Reviewed With: patient  Progress: no change         Pt with min c/o pain this shift. IV K+ runs infused per orders. Sched Ativan cont Q6H; prn Ativan given x1 earlier this shift. Accucheck per order, ; sliding scale insulin per order. O2 turned down to 2L with sat maintained above 90%. Up with asst x1.Voiding. VSS. Safety maintained.

## 2024-02-18 NOTE — PLAN OF CARE
Goal Outcome Evaluation:  Plan of Care Reviewed With: patient        Progress: no change  Outcome Evaluation: Patient continues to exhibit symptoms of ETOH dts - CIWA protocol in place. Patient has recieved PRN ativan see mar. Patient has had multiple episodes of diarrhea. Wheezing in lungs is more prominent today than yesterday. breathing treatment added per provider. VSS

## 2024-02-19 LAB
ALBUMIN SERPL-MCNC: 3.7 G/DL (ref 3.5–5.2)
ALBUMIN/GLOB SERPL: 1.2 G/DL
ALP SERPL-CCNC: 120 U/L (ref 39–117)
ALT SERPL W P-5'-P-CCNC: 70 U/L (ref 1–41)
ANION GAP SERPL CALCULATED.3IONS-SCNC: 13 MMOL/L (ref 5–15)
AST SERPL-CCNC: 81 U/L (ref 1–40)
BASOPHILS # BLD AUTO: 0.03 10*3/MM3 (ref 0–0.2)
BASOPHILS NFR BLD AUTO: 0.6 % (ref 0–1.5)
BILIRUB SERPL-MCNC: 2 MG/DL (ref 0–1.2)
BUN SERPL-MCNC: 5 MG/DL (ref 6–20)
BUN/CREAT SERPL: 8.1 (ref 7–25)
CALCIUM SPEC-SCNC: 8.8 MG/DL (ref 8.6–10.5)
CHLORIDE SERPL-SCNC: 96 MMOL/L (ref 98–107)
CO2 SERPL-SCNC: 23 MMOL/L (ref 22–29)
CREAT SERPL-MCNC: 0.62 MG/DL (ref 0.76–1.27)
D-LACTATE SERPL-SCNC: 2.2 MMOL/L (ref 0.5–2)
DEPRECATED RDW RBC AUTO: 45.9 FL (ref 37–54)
EGFRCR SERPLBLD CKD-EPI 2021: 124.7 ML/MIN/1.73
EOSINOPHIL # BLD AUTO: 0.02 10*3/MM3 (ref 0–0.4)
EOSINOPHIL NFR BLD AUTO: 0.4 % (ref 0.3–6.2)
ERYTHROCYTE [DISTWIDTH] IN BLOOD BY AUTOMATED COUNT: 12.6 % (ref 12.3–15.4)
GLOBULIN UR ELPH-MCNC: 3.2 GM/DL
GLUCOSE BLDC GLUCOMTR-MCNC: 183 MG/DL (ref 70–130)
GLUCOSE BLDC GLUCOMTR-MCNC: 200 MG/DL (ref 70–130)
GLUCOSE BLDC GLUCOMTR-MCNC: 208 MG/DL (ref 70–130)
GLUCOSE BLDC GLUCOMTR-MCNC: 214 MG/DL (ref 70–130)
GLUCOSE SERPL-MCNC: 206 MG/DL (ref 65–99)
HBA1C MFR BLD: 11.2 % (ref 4.8–5.6)
HCT VFR BLD AUTO: 40.2 % (ref 37.5–51)
HGB BLD-MCNC: 13.8 G/DL (ref 13–17.7)
IMM GRANULOCYTES # BLD AUTO: 0.02 10*3/MM3 (ref 0–0.05)
IMM GRANULOCYTES NFR BLD AUTO: 0.4 % (ref 0–0.5)
LYMPHOCYTES # BLD AUTO: 0.82 10*3/MM3 (ref 0.7–3.1)
LYMPHOCYTES NFR BLD AUTO: 17.7 % (ref 19.6–45.3)
MCH RBC QN AUTO: 34.1 PG (ref 26.6–33)
MCHC RBC AUTO-ENTMCNC: 34.3 G/DL (ref 31.5–35.7)
MCV RBC AUTO: 99.3 FL (ref 79–97)
MONOCYTES # BLD AUTO: 0.49 10*3/MM3 (ref 0.1–0.9)
MONOCYTES NFR BLD AUTO: 10.6 % (ref 5–12)
NEUTROPHILS NFR BLD AUTO: 3.24 10*3/MM3 (ref 1.7–7)
NEUTROPHILS NFR BLD AUTO: 70.3 % (ref 42.7–76)
NRBC BLD AUTO-RTO: 0 /100 WBC (ref 0–0.2)
PLATELET # BLD AUTO: 104 10*3/MM3 (ref 140–450)
PMV BLD AUTO: 9.8 FL (ref 6–12)
POTASSIUM SERPL-SCNC: 3.6 MMOL/L (ref 3.5–5.2)
POTASSIUM SERPL-SCNC: 3.9 MMOL/L (ref 3.5–5.2)
PROT SERPL-MCNC: 6.9 G/DL (ref 6–8.5)
RBC # BLD AUTO: 4.05 10*6/MM3 (ref 4.14–5.8)
SODIUM SERPL-SCNC: 132 MMOL/L (ref 136–145)
WBC NRBC COR # BLD AUTO: 4.62 10*3/MM3 (ref 3.4–10.8)

## 2024-02-19 PROCEDURE — 84132 ASSAY OF SERUM POTASSIUM: CPT | Performed by: HOSPITALIST

## 2024-02-19 PROCEDURE — 63710000001 INSULIN LISPRO (HUMAN) PER 5 UNITS: Performed by: HOSPITALIST

## 2024-02-19 PROCEDURE — 25810000003 SODIUM CHLORIDE 0.9 % SOLUTION: Performed by: HOSPITALIST

## 2024-02-19 PROCEDURE — 82948 REAGENT STRIP/BLOOD GLUCOSE: CPT

## 2024-02-19 PROCEDURE — 83036 HEMOGLOBIN GLYCOSYLATED A1C: CPT | Performed by: HOSPITALIST

## 2024-02-19 PROCEDURE — 97161 PT EVAL LOW COMPLEX 20 MIN: CPT

## 2024-02-19 PROCEDURE — 83605 ASSAY OF LACTIC ACID: CPT | Performed by: HOSPITALIST

## 2024-02-19 PROCEDURE — 25010000002 CEFTRIAXONE PER 250 MG: Performed by: HOSPITALIST

## 2024-02-19 PROCEDURE — 85025 COMPLETE CBC W/AUTO DIFF WBC: CPT | Performed by: HOSPITALIST

## 2024-02-19 PROCEDURE — 94799 UNLISTED PULMONARY SVC/PX: CPT

## 2024-02-19 PROCEDURE — 25010000002 MORPHINE PER 10 MG: Performed by: STUDENT IN AN ORGANIZED HEALTH CARE EDUCATION/TRAINING PROGRAM

## 2024-02-19 PROCEDURE — 25810000003 SODIUM CHLORIDE 0.9 % SOLUTION 250 ML FLEX CONT: Performed by: HOSPITALIST

## 2024-02-19 PROCEDURE — 80053 COMPREHEN METABOLIC PANEL: CPT | Performed by: HOSPITALIST

## 2024-02-19 PROCEDURE — 94664 DEMO&/EVAL PT USE INHALER: CPT

## 2024-02-19 PROCEDURE — 25010000002 AZITHROMYCIN PER 500 MG: Performed by: HOSPITALIST

## 2024-02-19 PROCEDURE — 25010000002 LORAZEPAM PER 2 MG: Performed by: HOSPITALIST

## 2024-02-19 RX ORDER — IPRATROPIUM BROMIDE AND ALBUTEROL SULFATE 2.5; .5 MG/3ML; MG/3ML
3 SOLUTION RESPIRATORY (INHALATION) EVERY 4 HOURS PRN
Status: DISCONTINUED | OUTPATIENT
Start: 2024-02-19 | End: 2024-02-20 | Stop reason: HOSPADM

## 2024-02-19 RX ORDER — POTASSIUM CHLORIDE 750 MG/1
40 CAPSULE, EXTENDED RELEASE ORAL EVERY 4 HOURS
Qty: 8 CAPSULE | Refills: 0 | Status: COMPLETED | OUTPATIENT
Start: 2024-02-19 | End: 2024-02-19

## 2024-02-19 RX ORDER — MORPHINE SULFATE 2 MG/ML
1 INJECTION, SOLUTION INTRAMUSCULAR; INTRAVENOUS ONCE
Status: COMPLETED | OUTPATIENT
Start: 2024-02-19 | End: 2024-02-19

## 2024-02-19 RX ORDER — INSULIN ASPART 100 [IU]/ML
15 INJECTION, SUSPENSION SUBCUTANEOUS 2 TIMES DAILY WITH MEALS
Status: DISCONTINUED | OUTPATIENT
Start: 2024-02-19 | End: 2024-02-20 | Stop reason: HOSPADM

## 2024-02-19 RX ORDER — HYDROCODONE BITARTRATE AND ACETAMINOPHEN 5; 325 MG/1; MG/1
2 TABLET ORAL EVERY 6 HOURS PRN
Status: DISCONTINUED | OUTPATIENT
Start: 2024-02-19 | End: 2024-02-20 | Stop reason: HOSPADM

## 2024-02-19 RX ADMIN — LORAZEPAM 1 MG: 1 TABLET ORAL at 00:14

## 2024-02-19 RX ADMIN — AZITHROMYCIN DIHYDRATE 500 MG: 500 INJECTION, POWDER, LYOPHILIZED, FOR SOLUTION INTRAVENOUS at 12:44

## 2024-02-19 RX ADMIN — INSULIN LISPRO 4 UNITS: 100 INJECTION, SOLUTION INTRAVENOUS; SUBCUTANEOUS at 21:15

## 2024-02-19 RX ADMIN — INSULIN LISPRO 8 UNITS: 100 INJECTION, SOLUTION INTRAVENOUS; SUBCUTANEOUS at 11:38

## 2024-02-19 RX ADMIN — FOLIC ACID 1 MG: 1 TABLET ORAL at 09:15

## 2024-02-19 RX ADMIN — LORAZEPAM 1 MG: 1 TABLET ORAL at 15:36

## 2024-02-19 RX ADMIN — BUDESONIDE 0.5 MG: 0.5 INHALANT RESPIRATORY (INHALATION) at 06:08

## 2024-02-19 RX ADMIN — NICOTINE 1 PATCH: 21 PATCH, EXTENDED RELEASE TRANSDERMAL at 15:23

## 2024-02-19 RX ADMIN — HYDROCODONE BITARTRATE AND ACETAMINOPHEN 2 TABLET: 5; 325 TABLET ORAL at 21:15

## 2024-02-19 RX ADMIN — LORAZEPAM 2 MG: 2 INJECTION, SOLUTION INTRAMUSCULAR; INTRAVENOUS at 07:54

## 2024-02-19 RX ADMIN — THIAMINE HCL TAB 100 MG 100 MG: 100 TAB at 09:15

## 2024-02-19 RX ADMIN — MICONAZOLE NITRATE 1 APPLICATION: 20 CREAM TOPICAL at 21:15

## 2024-02-19 RX ADMIN — LORAZEPAM 2 MG: 2 INJECTION, SOLUTION INTRAMUSCULAR; INTRAVENOUS at 01:50

## 2024-02-19 RX ADMIN — Medication 10 ML: at 20:10

## 2024-02-19 RX ADMIN — POTASSIUM CHLORIDE 40 MEQ: 10 CAPSULE, COATED, EXTENDED RELEASE ORAL at 11:12

## 2024-02-19 RX ADMIN — IPRATROPIUM BROMIDE AND ALBUTEROL SULFATE 3 ML: .5; 3 SOLUTION RESPIRATORY (INHALATION) at 14:13

## 2024-02-19 RX ADMIN — LORAZEPAM 1 MG: 1 TABLET ORAL at 13:01

## 2024-02-19 RX ADMIN — INSULIN LISPRO 8 UNITS: 100 INJECTION, SOLUTION INTRAVENOUS; SUBCUTANEOUS at 08:28

## 2024-02-19 RX ADMIN — IPRATROPIUM BROMIDE AND ALBUTEROL SULFATE 3 ML: .5; 3 SOLUTION RESPIRATORY (INHALATION) at 06:08

## 2024-02-19 RX ADMIN — POTASSIUM CHLORIDE 40 MEQ: 10 CAPSULE, COATED, EXTENDED RELEASE ORAL at 15:24

## 2024-02-19 RX ADMIN — LORAZEPAM 1 MG: 1 TABLET ORAL at 20:10

## 2024-02-19 RX ADMIN — LORAZEPAM 1 MG: 2 INJECTION INTRAMUSCULAR; INTRAVENOUS at 17:18

## 2024-02-19 RX ADMIN — SODIUM CHLORIDE 150 ML/HR: 9 INJECTION, SOLUTION INTRAVENOUS at 05:36

## 2024-02-19 RX ADMIN — BUDESONIDE 0.5 MG: 0.5 INHALANT RESPIRATORY (INHALATION) at 20:18

## 2024-02-19 RX ADMIN — IPRATROPIUM BROMIDE AND ALBUTEROL SULFATE 3 ML: .5; 3 SOLUTION RESPIRATORY (INHALATION) at 20:18

## 2024-02-19 RX ADMIN — HYDROCODONE BITARTRATE AND ACETAMINOPHEN 2 TABLET: 5; 325 TABLET ORAL at 09:12

## 2024-02-19 RX ADMIN — LORAZEPAM 1 MG: 1 TABLET ORAL at 05:36

## 2024-02-19 RX ADMIN — LORAZEPAM 2 MG: 2 INJECTION, SOLUTION INTRAMUSCULAR; INTRAVENOUS at 06:45

## 2024-02-19 RX ADMIN — INSULIN LISPRO 8 UNITS: 100 INJECTION, SOLUTION INTRAVENOUS; SUBCUTANEOUS at 16:35

## 2024-02-19 RX ADMIN — CEFTRIAXONE 1000 MG: 1 INJECTION, POWDER, FOR SOLUTION INTRAMUSCULAR; INTRAVENOUS at 11:13

## 2024-02-19 RX ADMIN — MORPHINE SULFATE 1 MG: 2 INJECTION, SOLUTION INTRAMUSCULAR; INTRAVENOUS at 03:40

## 2024-02-19 RX ADMIN — LORAZEPAM 2 MG: 2 INJECTION, SOLUTION INTRAMUSCULAR; INTRAVENOUS at 03:27

## 2024-02-19 NOTE — PLAN OF CARE
Goal Outcome Evaluation:  Plan of Care Reviewed With: patient        Progress: improving  Outcome Evaluation: Patient presented alert and Ox4 upon arrival. Pt was standing in room, he sat down and stood back up and went to bathroom to wash face. Pt then expressed he doesn't feel he needs PT at this time. Education was provided on getting out of bed and moving around the room for functional endurance. Physical therapy signing off at this time.      Anticipated Discharge Disposition (PT): home

## 2024-02-19 NOTE — PROGRESS NOTES
"    [facility] Medicine Services  INPATIENT PROGRESS NOTE    Patient Name: Luciano Christiansen  Date of Admission: 2/17/2024  Today's Date: 02/19/24  Length of Stay: 2  Primary Care Physician: Eleuterio Rangel MD    Subjective   Chief Complaint: impeding DT  Hypertension    Flu Symptoms       This patient is a 39-year-old male with PMH significant for hypertension and diabetes alcohol abuse who presents to the ER for evaluation of URI symptoms and hypertension. Patient reports that he has been out of his losartan and metformin for 1 to 2 months as he missed his last follow-up with his PCP, Dr. Rangel. Reports that his blood pressure has been elevated over the last several days. Additionally, he is reporting productive cough, congestion, shortness of breath, and generally feeling unwell. Also endorses headache and intermittent diarrhea. Patient reports that his drinking has been \"out of control\" for the last several months. States that he drinks approximately 12 pack of beer daily, with his last drink being 10 hours ago. He does endorse a history of alcohol withdrawal seizures. Patient denies any chest pain at this time. He denies fever or chills. Has not had any BLE edema. He has never had a PE or DVT. He has not been around anyone who has been sick that he is aware of.   In the ER the patient [place] here was unremarkable ethanol level was 0.23 troponin was 49 D-dimer elevated chest CT was showing no PE but bilateral pneumonia liver steatosis cholelithiasis respiratory panel was positive for mycoplasma was given Rocephin and Zithromax ALTs LFTs were elevated consistent with alcoholic hepatitis lactic acid was 7.6 was given IV fluid we will trend his lactic acid started on CIWA his CIWA was 6 will be admitted to the hospital for impending DTs alcohol intoxication lactic acidosis pneumoni  2/18  Admitted for bilateral pneumonia lactic acidosis hyperglycemia no DKA impending DTs was not " compliant with his medications positive mycoplasma placed on Rocephin and Zithromax,   2/19  Patient complaining of swelling and pain his lactic acidosis has resolved down to 2.2.  With his lactic acidosis however will avoid restarting metformin his A1c 11.2 I will start him on insulin 70/30 that is more affordable for him he does have a history of gout I will Hep-Lock his IV fluids and started Norco as needed we will plan to send him home tomorrow      Review of Systems   All pertinent negatives and positives are as above. All other systems have been reviewed and are negative unless otherwise stated.     Objective    Temp:  [98 °F (36.7 °C)-99.6 °F (37.6 °C)] 98.6 °F (37 °C)  Heart Rate:  [] 112  Resp:  [18-22] 20  BP: (131-176)/(67-90) 176/90  Physical Exam  Constitutional:       Appearance: Normal appearance.   HENT:      Head: Normocephalic and atraumatic.      Nose: Nose normal.   Eyes:      Pupils: Pupils are equal, round, and reactive to light.   Cardiovascular:      Rate and Rhythm: Normal rate and regular rhythm.   Pulmonary:      Effort: Pulmonary effort is normal.      Breath sounds: Stridor present.   Abdominal:      General: Abdomen is flat.      Palpations: Abdomen is soft.   Musculoskeletal:         General: Normal range of motion.      Cervical back: Normal range of motion.   Skin:     Capillary Refill: Capillary refill takes less than 2 seconds.   Neurological:      General: No focal deficit present.      Mental Status: He is alert.             Results Review:  I have reviewed the labs, radiology results, and diagnostic studies.    Laboratory Data:   Results from last 7 days   Lab Units 02/19/24  0847 02/18/24  0409 02/17/24  1447   WBC 10*3/mm3 4.62 3.99 4.93   HEMOGLOBIN g/dL 13.8 13.1 14.0   HEMATOCRIT % 40.2 36.7* 40.1   PLATELETS 10*3/mm3 104* 100* 114*        Results from last 7 days   Lab Units 02/19/24  0847 02/18/24  1918 02/18/24  0409 02/17/24  1447 02/17/24  0827   SODIUM mmol/L  "132*  --  130* 132* 137   POTASSIUM mmol/L 3.6 3.9 3.5 3.6 3.4*   CHLORIDE mmol/L 96*  --  90* 87* 88*   CO2 mmol/L 23.0  --  26.0 24.0 27.0   BUN mg/dL 5*  --  6 4* 3*   CREATININE mg/dL 0.62*  --  0.67* 0.54* 0.61*   CALCIUM mg/dL 8.8  --  8.6 8.4* 9.4   BILIRUBIN mg/dL 2.0*  --   --   --  1.2   ALK PHOS U/L 120*  --   --   --  183*   ALT (SGPT) U/L 70*  --   --   --  109*   AST (SGOT) U/L 81*  --   --   --  128*   GLUCOSE mg/dL 206*  --  296* 306* 366*       Culture Data:   No results found for: \"BLOODCX\", \"URINECX\", \"WOUNDCX\", \"MRSACX\", \"RESPCX\", \"STOOLCX\"    Radiology Data:   Imaging Results (Last 24 Hours)       ** No results found for the last 24 hours. **            I have reviewed the patient's current medications.     Assessment/Plan   Assessment  Active Hospital Problems    Diagnosis     **Community acquired bilateral lower lobe pneumonia        Treatment Plan  This patient is a 39-year-old male with PMH significant for hypertension and diabetes alcohol abuse who presents to the ER for evaluation of URI symptoms and hypertension. Patient reports that he has been out of his losartan and metformin for 1 to 2 months as he missed his last follow-up with his PCP, Dr. Rangel. Reports that his blood pressure has been elevated over the last several days. Additionally, he is reporting productive cough, congestion, shortness of breath, and generally feeling unwell. Also endorses headache and intermittent diarrhea. Patient reports that his drinking has been \"out of control\" for the last several months. States that he drinks approximately 12 pack of beer daily, with his last drink being 10 hours ago. He does endorse a history of alcohol withdrawal seizures. Patient denies any chest pain at this time. He denies fever or chills. Has not had any BLE edema. He has never had a PE or DVT. He has not been around anyone who has been sick that he is aware of.   In the ER the patient [place] here was unremarkable ethanol level " was 0.23 troponin was 49 D-dimer elevated chest CT was showing no PE but bilateral pneumonia liver steatosis cholelithiasis respiratory panel was positive for mycoplasma was given Rocephin and Zithromax ALTs LFTs were elevated consistent with alcoholic hepatitis lactic acid was 7.6 was given IV fluid we will trend his lactic acid started on CIWA his CIWA was 6 will be admitted to the hospital for impending DTs alcohol intoxication lactic acidosis pneumoni  2/18  Admitted for bilateral pneumonia lactic acidosis hyperglycemia no DKA impending DTs was not compliant with his medications positive mycoplasma placed on Rocephin and Zithromax    Medical Decision Making  Number and Complexity of problems: 3 acute   Differential Diagnosis: as above     Conditions and Status        Condition is unchanged.     [facility] Data  External documents reviewed: no   Cardiac tracing (EKG, telemetry) interpretation: yes  Radiology interpretation: yes  Labs reviewed: yes  Any tests that were considered but not ordered: yes     Decision rules/scores evaluated (example [id number], Wells, etc): no      Discussed with: patient     Care Planning  Shared decision making: Patient apprised of current labs, vitals, imaging and treatment plan.  They are agreeable with proceeding with plans as discussed.   Code status and discussions: full     Disposition  Social Determinants of Health that impact treatment or disposition: no   I expect the patient to be discharged to home  in few days.     Electronically signed by Frankie Camacho MD, 02/19/24, 11:06 CST.

## 2024-02-19 NOTE — CONSULTS
[facility]  INPATIENT WOUND & OSTOMY CONSULTATION    Today's Date: 02/19/24    Patient Name: Luciano Christiansen  MRN: [id number]  CSN: [id number]  PCP: Eleuterio Rangel MD  Referring Provider:   Consulting Provider (From admission, onward)      Start Ordered     Status Ordering Provider    02/17/24 1329 02/17/24 1329  Inpatient Wound Care MD Consult  Once        Specialty:  Wound Care  Provider:  Yvette Henderson APRN    Acknowledged FRANKIE CAMACHO           Attending Provider: Frankie Camacho MD  Length of Stay: 2    SUBJECTIVE   Chief Complaint: Diabetic foot wounds    HPI: Luciano Christiansen, a 39 y.o.male, presents with a past medical history of ***.  A full past medical history as listed below.  ***    Inpatient wound care consulted due to ***      Visit Dx:    ICD-10-CM ICD-9-CM   1. Community acquired bilateral lower lobe pneumonia  J18.9 486   2. Hypoxia  R09.02 799.02   3. Alcoholic intoxication without complication  F10.920 305.00   4. Lactic acidosis  E87.20 276.2       Hospital Problem List:     Community acquired bilateral lower lobe pneumonia      History:   Past Medical History:   Diagnosis Date    Diabetes mellitus     Gout     Hypertension      Past Surgical History:   Procedure Laterality Date    VASECTOMY       Social History     Socioeconomic History    Marital status: Single   Tobacco Use    Smoking status: Some Days     Packs/day: 0.50     Years: 14.00     Additional pack years: 0.00     Total pack years: 7.00     Types: Cigarettes     Start date: 2010    Smokeless tobacco: Never   Vaping Use    Vaping Use: Every day    Substances: THC   Substance and Sexual Activity    Alcohol use: Yes     Alcohol/week: 12.0 standard drinks of alcohol     Types: 12 Cans of beer per week     Comment: 12 cans of beer a day    Drug use: Yes     Types: Marijuana    Sexual activity: Yes     History reviewed. No pertinent family history.    Allergies:  No Known Allergies    Medications:    Current  Facility-Administered Medications:     azithromycin (ZITHROMAX) 500 mg in sodium chloride 0.9 % 250 mL IVPB-VTB, 500 mg, Intravenous, Q24H, Frankie Camacho MD, 500 mg at 02/18/24 1329    sennosides-docusate (PERICOLACE) 8.6-50 MG per tablet 2 tablet, 2 tablet, Oral, BID PRN **AND** polyethylene glycol (MIRALAX) packet 17 g, 17 g, Oral, Daily PRN **AND** bisacodyl (DULCOLAX) EC tablet 5 mg, 5 mg, Oral, Daily PRN **AND** bisacodyl (DULCOLAX) suppository 10 mg, 10 mg, Rectal, Daily PRN, Frankie Camacho MD    budesonide (PULMICORT) nebulizer solution 0.5 mg, 0.5 mg, Nebulization, BID - RT, Frankie Camacho MD, 0.5 mg at 02/19/24 0608    Calcium Replacement - Follow Nurse / BPA Driven Protocol, , Does not apply, PRN, Frankie Camacho MD    cefTRIAXone (ROCEPHIN) 1,000 mg in sodium chloride 0.9 % 100 mL IVPB, 1,000 mg, Intravenous, Q24H, Frankie Camacho MD, Last Rate: 200 mL/hr at 02/19/24 1113, 1,000 mg at 02/19/24 1113    dextrose (D50W) (25 g/50 mL) IV injection 25 g, 25 g, Intravenous, Q15 Min PRN, Frankie Camacho MD    dextrose (GLUTOSE) oral gel 15 g, 15 g, Oral, Q15 Min PRN, Frankie Camacho MD    folic acid (FOLVITE) tablet 1 mg, 1 mg, Oral, Daily, Frankie Camacho MD, 1 mg at 02/19/24 0915    glucagon (GLUCAGEN) injection 1 mg, 1 mg, Intramuscular, Q15 Min PRN, Frankie Camacho MD    HYDROcodone-acetaminophen (NORCO) 5-325 MG per tablet 2 tablet, 2 tablet, Oral, Q6H PRN, Frankie Camacho MD, 2 tablet at 02/19/24 0912    insulin aspart prot-insulin aspart (novoLOG 70/30) injection 15 Units, 15 Units, Subcutaneous, BID With Meals, Frankie Camacho MD    Insulin Lispro (humaLOG) injection 4-24 Units, 4-24 Units, Subcutaneous, 4x Daily AC & at Bedtime, Frankie Camacho MD, 8 Units at 02/19/24 1138    ipratropium-albuterol (DUO-NEB) nebulizer solution 3 mL, 3 mL, Nebulization, 4x Daily - RT, Frankie Camacho MD, 3 mL at 02/19/24 0608    LORazepam (ATIVAN) tablet 1 mg, 1 mg, Oral, Q1H PRN, 1 mg at 02/17/24 6382 **OR** LORazepam  (ATIVAN) injection 1 mg, 1 mg, Intravenous, Q1H PRN, 1 mg at 02/17/24 1616 **OR** LORazepam (ATIVAN) tablet 2 mg, 2 mg, Oral, Q1H PRN, 2 mg at 02/18/24 2055 **OR** LORazepam (ATIVAN) injection 2 mg, 2 mg, Intravenous, Q1H PRN, 2 mg at 02/19/24 0754 **OR** LORazepam (ATIVAN) injection 2 mg, 2 mg, Intravenous, Q15 Min PRN, 2 mg at 02/18/24 1106 **OR** LORazepam (ATIVAN) injection 2 mg, 2 mg, Intramuscular, Q15 Min PRN, Frankie Camacho MD    Magnesium Standard Dose Replacement - Follow Nurse / BPA Driven Protocol, , Does not apply, Janice MAJANO Fakhri, MD    nicotine (NICODERM CQ) 21 MG/24HR patch 1 patch, 1 patch, Transdermal, Q24H, Frankie Camacho MD, 1 patch at 02/18/24 1327    nicotine polacrilex (NICORETTE) gum 4 mg, 4 mg, Mouth/Throat, Q1H PRN, Frankie Camacho MD    Phosphorus Replacement - Follow Nurse / BPA Driven Protocol, , Does not apply, Janice MAJANO Fakhri, MD    potassium chloride (MICRO-K/KLOR-CON) CR capsule, 40 mEq, Oral, Q4H, Frankie Camacho MD, 40 mEq at 02/19/24 1112    Potassium Replacement - Follow Nurse / BPA Driven Protocol, , Does not apply, Janice MAJANO Fakhri, MD    [COMPLETED] Insert Peripheral IV, , , Once **AND** sodium chloride 0.9 % flush 10 mL, 10 mL, Intravenous, PRN, Thee Arboleda MD    sodium chloride 0.9 % flush 10 mL, 10 mL, Intravenous, Q12H, Frankie Camacho MD, 10 mL at 02/17/24 2004    sodium chloride 0.9 % flush 10 mL, 10 mL, Intravenous, PRNJanice Fakhri, MD    sodium chloride 0.9 % infusion 40 mL, 40 mL, Intravenous, PRN, Frankie Camacho MD    thiamine (VITAMIN B-1) tablet 100 mg, 100 mg, Oral, Daily, Thee Arboleda MD, 100 mg at 02/19/24 0915    Review of Systems: ***  Review of Systems      OBJECTIVE     Vitals:    02/19/24 1120   BP: 161/82   Pulse: 118   Resp: 20   Temp: 98.1 °F (36.7 °C)   SpO2: 90%       PHYSICAL EXAM: ***  Physical Exam       Results Review:  Lab Results (last 48 hours)       Procedure Component Value Units Date/Time    POC  Glucose Once [[id number]]  (Abnormal) Collected: 02/19/24 1132    Specimen: Blood Updated: 02/19/24 1144     Glucose 214 mg/dL      Comment: : [id number] Janice Ayon ID: [id number]       Hemoglobin A1c [[id number]]  (Abnormal) Collected: 02/19/24 0847    Specimen: Blood Updated: 02/19/24 1015     Hemoglobin A1C 11.20 %     Narrative:      Hemoglobin A1C Ranges:    Increased Risk for Diabetes  5.7% to 6.4%  Diabetes                     >= 6.5%  Diabetic Goal                < 7.0%    Lactic Acid, Plasma [[id number]]  (Abnormal) Collected: 02/19/24 0847    Specimen: Blood Updated: 02/19/24 0935     Lactate 2.2 mmol/L     Comprehensive Metabolic Panel [[id number]]  (Abnormal) Collected: 02/19/24 0847    Specimen: Blood Updated: 02/19/24 0931     Glucose 206 mg/dL      BUN 5 mg/dL      Creatinine 0.62 mg/dL      Sodium 132 mmol/L      Potassium 3.6 mmol/L      Chloride 96 mmol/L      CO2 23.0 mmol/L      Calcium 8.8 mg/dL      Total Protein 6.9 g/dL      Albumin 3.7 g/dL      ALT (SGPT) 70 U/L      AST (SGOT) 81 U/L      Alkaline Phosphatase 120 U/L      Total Bilirubin 2.0 mg/dL      Globulin 3.2 gm/dL      A/G Ratio 1.2 g/dL      BUN/Creatinine Ratio 8.1     Anion Gap 13.0 mmol/L      eGFR 124.7 mL/min/1.73     Narrative:      GFR Normal >60  Chronic Kidney Disease <60  Kidney Failure <15      CBC & Differential [[id number]]  (Abnormal) Collected: 02/19/24 0847    Specimen: Blood Updated: 02/19/24 0907    Narrative:      The following orders were created for panel order CBC & Differential.  Procedure                               Abnormality         Status                     ---------                               -----------         ------                     CBC Auto Differential[[id number]]        Abnormal            Final result                 Please view results for these tests on the individual orders.    CBC Auto Differential [[id number]]  (Abnormal) Collected: 02/19/24 0847    Specimen: Blood Updated:  02/19/24 0907     WBC 4.62 10*3/mm3      RBC 4.05 10*6/mm3      Hemoglobin 13.8 g/dL      Hematocrit 40.2 %      MCV 99.3 fL      MCH 34.1 pg      MCHC 34.3 g/dL      RDW 12.6 %      RDW-SD 45.9 fl      MPV 9.8 fL      Platelets 104 10*3/mm3      Neutrophil % 70.3 %      Lymphocyte % 17.7 %      Monocyte % 10.6 %      Eosinophil % 0.4 %      Basophil % 0.6 %      Immature Grans % 0.4 %      Neutrophils, Absolute 3.24 10*3/mm3      Lymphocytes, Absolute 0.82 10*3/mm3      Monocytes, Absolute 0.49 10*3/mm3      Eosinophils, Absolute 0.02 10*3/mm3      Basophils, Absolute 0.03 10*3/mm3      Immature Grans, Absolute 0.02 10*3/mm3      nRBC 0.0 /100 WBC     Blood Culture - Blood, Arm, Left [[id number]]  (Normal) Collected: 02/17/24 0854    Specimen: Blood from Arm, Left Updated: 02/19/24 0900     Blood Culture No growth at 2 days    Blood Culture - Blood, Arm, Right [[id number]]  (Normal) Collected: 02/17/24 0850    Specimen: Blood from Arm, Right Updated: 02/19/24 0900     Blood Culture No growth at 2 days    POC Glucose Once [[id number]]  (Abnormal) Collected: 02/19/24 0741    Specimen: Blood Updated: 02/19/24 0752     Glucose 200 mg/dL      Comment: : [id number] Janice CastgeMeter ID: [id number]       POC Glucose Once [[id number]]  (Abnormal) Collected: 02/18/24 2043    Specimen: Blood Updated: 02/18/24 2054     Glucose 259 mg/dL      Comment: : [id number] Sy EmmaMeter ID: [id number]       Potassium [[id number]]  (Normal) Collected: 02/18/24 1918    Specimen: Blood Updated: 02/18/24 1942     Potassium 3.9 mmol/L     POC Glucose Once [[id number]]  (Abnormal) Collected: 02/18/24 1732    Specimen: Blood Updated: 02/18/24 1743     Glucose 321 mg/dL      Comment: : [id number] Julio VenitaMeter ID: [id number]       POC Glucose Once [[id number]]  (Abnormal) Collected: 02/18/24 1100    Specimen: Blood Updated: 02/18/24 1111     Glucose 325 mg/dL      Comment: : [id number] Hemal Taylor ID: [id number]       POC  Glucose Once [[id number]]  (Abnormal) Collected: 02/18/24 0817    Specimen: Blood Updated: 02/18/24 0828     Glucose 289 mg/dL      Comment: : [id number] Patricia Davenport ID: [id number]       CBC & Differential [[id number]]  (Abnormal) Collected: 02/18/24 0409    Specimen: Blood Updated: 02/18/24 0504    Narrative:      The following orders were created for panel order CBC & Differential.  Procedure                               Abnormality         Status                     ---------                               -----------         ------                     CBC Auto Differential[[id number]]        Abnormal            Final result                 Please view results for these tests on the individual orders.    CBC Auto Differential [[id number]]  (Abnormal) Collected: 02/18/24 0409    Specimen: Blood Updated: 02/18/24 0504     WBC 3.99 10*3/mm3      RBC 3.84 10*6/mm3      Hemoglobin 13.1 g/dL      Hematocrit 36.7 %      MCV 95.6 fL      MCH 34.1 pg      MCHC 35.7 g/dL      RDW 12.3 %      RDW-SD 43.0 fl      MPV 10.2 fL      Platelets 100 10*3/mm3      Neutrophil % 68.1 %      Lymphocyte % 21.3 %      Monocyte % 9.8 %      Eosinophil % 0.0 %      Basophil % 0.3 %      Immature Grans % 0.5 %      Neutrophils, Absolute 2.72 10*3/mm3      Lymphocytes, Absolute 0.85 10*3/mm3      Monocytes, Absolute 0.39 10*3/mm3      Eosinophils, Absolute 0.00 10*3/mm3      Basophils, Absolute 0.01 10*3/mm3      Immature Grans, Absolute 0.02 10*3/mm3      nRBC 0.0 /100 WBC     Basic Metabolic Panel [[id number]]  (Abnormal) Collected: 02/18/24 0409    Specimen: Blood Updated: 02/18/24 0502     Glucose 296 mg/dL      BUN 6 mg/dL      Creatinine 0.67 mg/dL      Sodium 130 mmol/L      Potassium 3.5 mmol/L      Chloride 90 mmol/L      CO2 26.0 mmol/L      Calcium 8.6 mg/dL      BUN/Creatinine Ratio 9.0     Anion Gap 14.0 mmol/L      eGFR 121.8 mL/min/1.73     Narrative:      GFR Normal >60  Chronic Kidney Disease <60  Kidney Failure <15       STAT Lactic Acid, Reflex [[id number]]  (Abnormal) Collected: 02/18/24 0409    Specimen: Blood Updated: 02/18/24 0502     Lactate 4.0 mmol/L     STAT Lactic Acid, Reflex [[id number]]  (Abnormal) Collected: 02/18/24 0021    Specimen: Blood Updated: 02/18/24 0052     Lactate 4.1 mmol/L     POC Glucose Once [[id number]]  (Abnormal) Collected: 02/17/24 2019    Specimen: Blood Updated: 02/17/24 2040     Glucose 360 mg/dL      Comment: : [id number] Zen CroninenMeter ID: [id number]       POC Glucose Once [[id number]]  (Abnormal) Collected: 02/17/24 1810    Specimen: Blood Updated: 02/17/24 1821     Glucose 300 mg/dL      Comment: : [id number] Patricia CastellanoothyMeter ID: [id number]       STAT Lactic Acid, Reflex [[id number]]  (Abnormal) Collected: 02/17/24 1750    Specimen: Blood Updated: 02/17/24 1819     Lactate 4.3 mmol/L     STAT Lactic Acid, Reflex [[id number]]  (Abnormal) Collected: 02/17/24 1447    Specimen: Blood Updated: 02/17/24 1534     Lactate 5.0 mmol/L     Basic Metabolic Panel [[id number]]  (Abnormal) Collected: 02/17/24 1447    Specimen: Blood from Arm, Right Updated: 02/17/24 1528     Glucose 306 mg/dL      BUN 4 mg/dL      Creatinine 0.54 mg/dL      Sodium 132 mmol/L      Potassium 3.6 mmol/L      Chloride 87 mmol/L      CO2 24.0 mmol/L      Calcium 8.4 mg/dL      BUN/Creatinine Ratio 7.4     Anion Gap 21.0 mmol/L      eGFR 130.0 mL/min/1.73     Narrative:      GFR Normal >60  Chronic Kidney Disease <60  Kidney Failure <15      CBC & Differential [[id number]]  (Abnormal) Collected: 02/17/24 1447    Specimen: Blood from Arm, Right Updated: 02/17/24 1524    Narrative:      The following orders were created for panel order CBC & Differential.  Procedure                               Abnormality         Status                     ---------                               -----------         ------                     CBC Auto Differential[[id number]]        Abnormal            Final result                 Please  view results for these tests on the individual orders.    CBC Auto Differential [[id number]]  (Abnormal) Collected: 02/17/24 1447    Specimen: Blood from Arm, Right Updated: 02/17/24 1524     WBC 4.93 10*3/mm3      RBC 4.14 10*6/mm3      Hemoglobin 14.0 g/dL      Hematocrit 40.1 %      MCV 96.9 fL      MCH 33.8 pg      MCHC 34.9 g/dL      RDW 12.3 %      RDW-SD 43.9 fl      MPV 9.8 fL      Platelets 114 10*3/mm3      Neutrophil % 80.0 %      Lymphocyte % 9.9 %      Monocyte % 9.1 %      Eosinophil % 0.2 %      Basophil % 0.4 %      Immature Grans % 0.4 %      Neutrophils, Absolute 3.94 10*3/mm3      Lymphocytes, Absolute 0.49 10*3/mm3      Monocytes, Absolute 0.45 10*3/mm3      Eosinophils, Absolute 0.01 10*3/mm3      Basophils, Absolute 0.02 10*3/mm3      Immature Grans, Absolute 0.02 10*3/mm3      nRBC 0.0 /100 WBC     POC Glucose Once [[id number]]  (Abnormal) Collected: 02/17/24 1441    Specimen: Blood Updated: 02/17/24 1453     Glucose 279 mg/dL      Comment: : [id number] Ramo (Matson) JessicaMeter ID: [id number]       STAT Lactic Acid, Reflex [[id number]]  (Abnormal) Collected: 02/17/24 1208    Specimen: Blood from Arm, Right Updated: 02/17/24 1234     Lactate 6.6 mmol/L           Imaging Results (Last 72 Hours)       Procedure Component Value Units Date/Time    CT Angiogram Chest [[id number]] Collected: 02/17/24 0941     Updated: 02/17/24 0950    Narrative:      EXAM: CT ANGIOGRAM CHEST- 2/17/2024 8:10 AM     HISTORY: SOB, elevated DD         DOSE LENGTH PRODUCT: 295.01 mGy.cm mGy cm. Automated exposure control  was also utilized to decrease patient radiation dose.     COMPARISON: None      TECHNIQUE: Helical tomographic images of the chest were obtained after  the administration of intravenous contrast following angiogram protocol.  Additionally, 3D and multiplanar reformatted images as well as MIPS were  provided.       FINDINGS:     Lungs/Pleura: Bilateral lower lobe groundglass as well as small  nodular  opacities. This is present to a lesser extent within the lingula. No  sizable pleural effusion. No visible pneumothorax.     Lower Neck: Unremarkable.     Mediastinum/Hilum: No enlarged lymphadenopathy. Patulous esophagus.     Heart/Aorta: No pericardial effusion.     Pulmonary Arteries: Pulmonary arteries are well-opacified to the  segmental level. No pulmonary embolism.     Chest wall/Soft Tissues: Bilateral symmetric gynecomastia. 1 cm  subcutaneous nodule along the left upper back, likely  sebaceous/epidermal inclusion cyst. Smaller similar-appearing nodule  along the right upper back.     Upper Abdomen: Hepatic steatosis. Cholelithiasis.     Osseous Structures: No acute or suspicious osseous finding.       Impression:         No pulmonary embolism.     Bilateral lower lobe and lingula pneumonia.     Hepatic steatosis and cholelithiasis.           This report was signed and finalized on 2/17/2024 9:47 AM by Darwin Goldman.       XR Chest 1 View [[id number]] Collected: 02/17/24 0827     Updated: 02/17/24 0831    Narrative:      EXAM: XR CHEST 1 VW- 2/17/2024 7:10 AM     HISTORY: cough, congestion, wheezing       COMPARISON: 12/23/2022..     TECHNIQUE: Single frontal radiograph of the chest was obtained.     FINDINGS:     Support Devices: None.     Cardiac and Mediastinal Silhouettes: Normal.     Lungs/Pleura: No focal consolidation. No sizable pleural effusion. No  visible pneumothorax.     Osseous structures: No acute osseous finding.     Other: None.       Impression:         No focal consolidation to suggest pneumonia.           This report was signed and finalized on 2/17/2024 8:28 AM by Darwin Goldman.                  ASSESSMENT/PLAN       Examination and evaluation of wound(s) was performed.    Pared callus of bilateral medial hallux with use of scalpel.  Removed calluses measuring 1x1cm bilaterally. No wounds present under callus.       DIAGNOSIS:   Tinea Pedis   Type 2 diabetes mellitus with  other skin complication    PLAN:   Orders placed for wound care and pressure/moisture management as listed below.   Pared callus of bilateral medial hallux. No wound present under callus. No dressings needed.     miconazole (MICOTIN) 2 % cream 1 Application   [[id number]]  Order Details  Ordered Dose: 1 Application Route: Topical Frequency: Every 12 Hours Scheduled   Admin Dose: 1 Application      Scheduled Start Date/Time: 02/19/24 1315 End Date/Time: -- First Dose: As Scheduled      Admin Instructions:   Apply to rash of feet              Discussed findings and treatment plan including risks, benefits, and treatment options with patient in detail. Patient agreed with treatment plan.      This document has been electronically signed by LÁZARO Tavarez on 2/19/2024 12:15 CST

## 2024-02-19 NOTE — PLAN OF CARE
Goal Outcome Evaluation:    VSS, A/O x 4, on room air.  Pt has been given po/iv ativan for agitation and restlessness.  Pt appears to be less anxious this afternoon.  Pt had a shower and bedding changed twice.  Pt is having diarrhea and voiding regularly.  Pt completed two IV abx and is now saline locked.  CIWA completed q2hrs.  Safety maintained.

## 2024-02-19 NOTE — NURSING NOTE
[facility]  INPATIENT WOUND & OSTOMY CARE    Today's Date: 02/19/24    Patient Name: Luciano Christiansen  MRN: [id number]  CSN: [id number]  PCP: Eleuterio Rangel MD  Attending Provider: Frankie Camacho MD  Length of Stay: 2    Wound care consulted for foot ulcers. See Yvette Henderson's note for details. She shaved bilateral great toe calluses down. No wounds under calluses. No wound care orders needed. Yvette is going to order treatment for athletes foot.      Inpatient wound care will continue to follow during hospital stay.  Please contact if any issues or concerns arise.     This document has been electronically signed by Carolynn Weiss RN on 2/19/2024 12:16 CST

## 2024-02-19 NOTE — PLAN OF CARE
Goal Outcome Evaluation:  Plan of Care Reviewed With: patient        Progress: no change  Outcome Evaluation: Patient continues to exhibit symptoms of ETOH dts  - CIWA protocol in place. Patient has recieved PRN ativan. Patient has recieved pain meds x1. Voiding. VSS. Safety maintained.

## 2024-02-19 NOTE — THERAPY DISCHARGE NOTE
Patient Name: Luciano Christiansen  : 1985    MRN: [id number]                              Today's Date: 2024       Admit Date: 2024    Visit Dx:     ICD-10-CM ICD-9-CM   1. Community acquired bilateral lower lobe pneumonia  J18.9 486   2. Hypoxia  R09.02 799.02   3. Alcoholic intoxication without complication  F10.920 305.00   4. Lactic acidosis  E87.20 276.2   5. Impaired functional mobility and activity tolerance [Z74.09]  Z74.09 V49.89     Patient Active Problem List   Diagnosis    Wellness examination    Encounter for hepatitis C screening test for low risk patient    Primary hypertension    Class 1 obesity due to excess calories without serious comorbidity with body mass index (BMI) of 34.0 to 34.9 in adult    Fatty liver    Hyponatremia    Hypokalemia    Type 2 diabetes mellitus    Hypomagnesemia    Elevated lactic acid level    Alkalosis    Alcoholism    Transaminitis    Hypophosphatemia    Insomnia disorder related to known organic factor    Community acquired bilateral lower lobe pneumonia     Past Medical History:   Diagnosis Date    Diabetes mellitus     Gout     Hypertension      Past Surgical History:   Procedure Laterality Date    VASECTOMY        General Information       Row Name 24 0954          Physical Therapy Time and Intention    Document Type evaluation  -ZAC (veronica) ELISEO (merissa) ZAC (c)     Mode of Treatment physical therapy  -ZAC (r) ELISEO (merissa) ZAC (c)       Row Name 24 0954          General Information    Patient Profile Reviewed yes  -ZAC (veronica) ELISEO (merissa) ZAC (c)     Prior Level of Function independent:;dressing;bathing;ADL's;all household mobility;community mobility;driving  -ZAC (veroncia) ELISEO (merissa) ZAC (c)     Barriers to Rehab medically complex  -ZAC (veronica) ELISEO (merissa) ZAC (c)       Row Name 24 0954          Living Environment    People in Home alone  -ZAC (veronica) ELISEO (merissa) ZAC (c)       Row Name 24 0954          Cognition    Orientation Status (Cognition) oriented x 4  -ZAC (veronica) ELISEO (merissa) ZAC (c)                User Key  (r) = Recorded By, (t) = Taken By, (c) = Cosigned By      Initials Name Provider Type    Ruthy Payne, PT Physical Therapist    Beto James, PT Student PT Student                   Mobility       Row Name 02/19/24 1032          Bed Mobility    Comment, (Bed Mobility) Pt was standing upon arrival  -JE (r) AJ (t) JE (c)       Row Name 02/19/24 1032          Sit-Stand Transfer    Sit-Stand [place] (Transfers) independent  -JE (r) AJ (t) JE (c)       Row Name 02/19/24 1032          Gait/Stairs (Locomotion)    [place] Level (Gait) independent  -JE (r) AJ (t) JE (c)     Distance in Feet (Gait) Pt ambulated independently in room  -JE (r) AJ (t) JE (c)               User Key  (r) = Recorded By, (t) = Taken By, (c) = Cosigned By      Initials Name Provider Type    Ruthy Payne, PT Physical Therapist    Beto James, PT Student PT Student                   Obj/Interventions       Row Name 02/19/24 0954          Balance    Balance Assessment standing dynamic balance  -JE (r) AJ (t) JE (c)     Dynamic Standing Balance independent  -JE (r) AJ (t) JE (c)               User Key  (r) = Recorded By, (t) = Taken By, (c) = Cosigned By      Initials Name Provider Type    Ruthy Payne, PT Physical Therapist    Beto James, PT Student PT Student                   Goals/Plan    No documentation.                  Clinical Impression       Row Name 02/19/24 0954          Pain    Pretreatment Pain Rating 0/10 - no pain  -JE (r) AJ (t) JE (c)       Row Name 02/19/24 0954          Plan of Care Review    Plan of Care Reviewed With patient  -JE (r) AJ (t) JE (c)     Progress improving  -JE (r) AJ (t) JE (c)     Outcome Evaluation Patient presented alert and Ox4 upon arrival. Pt was standing in room, he sat down and stood back up and went to bathroom to wash face. Pt then expressed he doesn't feel he needs PT at this time. Education was provided on getting out of bed and moving around the  room for functional endurance. Physical therapy signing off at this time.  -JE (r) AJ (t) JE (c)       Row Name 02/19/24 0954          Therapy Assessment/Plan (PT)    Patient/Family Therapy Goals Statement (PT) go home  -JE (r) AJ (t) JE (c)     Criteria for Skilled Interventions Met (PT) no problems identified which require skilled intervention  -JE (r) AJ (t) JE (c)     Therapy Frequency (PT) evaluation only  -JE (r) AJ (t) JE (c)       Row Name 02/19/24 0954          Vital Signs    Pre Patient Position Standing  -JE (r) AJ (t) JE (c)     Post Patient Position Standing  -JE (r) AJ (t) JE (c)       Row Name 02/19/24 0954          Positioning and Restraints    Pre-Treatment Position standing in room  -JE (r) AJ (t) JE (c)     Post Treatment Position other  standing in room  -JE (r) AJ (t) JE (c)               User Key  (r) = Recorded By, (t) = Taken By, (c) = Cosigned By      Initials Name Provider Type    Ruthy Payne, PT Physical Therapist    Beto James, PT Student PT Student                   Outcome Measures       Row Name 02/19/24 0954 02/19/24 0830       How much help from another person do you currently need...    Turning from your back to your side while in flat bed without using bedrails? 4  -JE (r) AJ (t) JE (c) 4  -PS    Moving from lying on back to sitting on the side of a flat bed without bedrails? 4  -JE (r) AJ (t) JE (c) 4  -PS    Moving to and from a bed to a chair (including a wheelchair)? 4  -JE (r) AJ (t) JE (c) 4  -PS    Standing up from a chair using your arms (e.g., wheelchair, bedside chair)? 4  -JE (r) AJ (t) JE (c) 4  -PS    Climbing 3-5 steps with a railing? 4  -JE (r) AJ (t) JE (c) 4  -PS    To walk in hospital room? 4  -JE (r) AJ (t) JE (c) 4  -PS    AM-PAC 6 Clicks Score (PT) 24  -JE (r) AJ (t) 24  -PS    Highest Level of Mobility Goal 8 --> Walked 250 feet or more  -ZAC (veronica) ELISEO (t) 8 --> Walked 250 feet or more  -PS      Row Name 02/19/24 0746          Functional Assessment     Outcome Measure Options AM-PAC 6 Clicks Basic Mobility (PT)  -JE (r) AJ (t) JE (c)               User Key  (r) = Recorded By, (t) = Taken By, (c) = Cosigned By      Initials Name Provider Type    Ruthy Payne, PT Physical Therapist    Bernie Coleman, RN Registered Nurse    Beto James, PT Student PT Student                  Physical Therapy Education       Title: PT OT SLP Therapies (In Progress)       Topic: Physical Therapy (In Progress)       Point: Mobility training (Not Started)       Learner Progress:  Not documented in this visit.              Point: Home exercise program (Not Started)       Learner Progress:  Not documented in this visit.              Point: Body mechanics (Done)       Learning Progress Summary             Patient Acceptance, E, VU by ELISEO at 2/19/2024 1038    Comment: PT role in care, staying physically active                         Point: Precautions (Not Started)       Learner Progress:  Not documented in this visit.                              User Key       Initials Effective Dates Name Provider Type Discipline     12/14/23 -  Beto Bryant, PT Student PT Student PT                  PT Recommendation and Plan     Plan of Care Reviewed With: patient  Progress: improving  Outcome Evaluation: Patient presented alert and Ox4 upon arrival. Pt was standing in room, he sat down and stood back up and went to bathroom to wash face. Pt then expressed he doesn't feel he needs PT at this time. Education was provided on getting out of bed and moving around the room for functional endurance. Physical therapy signing off at this time.     Time Calculation:         PT Charges       Row Name 02/19/24 0954             Time Calculation    Start Time 0954  10min chart review  -JE (r) AJ (t) JE (c)      Stop Time 1005  -JE (r) AJ (t) JE (c)      Time Calculation (min) 11 min  -JE (r) AJ (t)      PT Received On 02/19/24  -JE (r) AJ (t) JE (c)         Untimed Charges    PT Eval/Re-eval  Minutes 21  -JE (r) AJ (t) JE (c)         Total Minutes    Untimed Charges Total Minutes 21  -JE (r) AJ (t)       Total Minutes 21  -JE (r) AJ (t)                User Key  (r) = Recorded By, (t) = Taken By, (c) = Cosigned By      Initials Name Provider Type    Ruthy Payne, PT Physical Therapist    Beto James, PT Student PT Student                      PT G-Codes  Outcome Measure Options: AM-PAC 6 Clicks Basic Mobility (PT)  AM-PAC 6 Clicks Score (PT): 24    PT Discharge Summary  Anticipated Discharge Disposition (PT): home    Beto Bryant, PT Student  2/19/2024

## 2024-02-20 VITALS
BODY MASS INDEX: 35.68 KG/M2 | SYSTOLIC BLOOD PRESSURE: 158 MMHG | OXYGEN SATURATION: 94 % | WEIGHT: 287 LBS | HEIGHT: 75 IN | HEART RATE: 94 BPM | DIASTOLIC BLOOD PRESSURE: 83 MMHG | TEMPERATURE: 97.6 F | RESPIRATION RATE: 18 BRPM

## 2024-02-20 PROBLEM — J15.4 PNEUMONIA DUE TO STREPTOCOCCUS: Status: ACTIVE | Noted: 2024-02-20

## 2024-02-20 LAB
ALBUMIN SERPL-MCNC: 3.5 G/DL (ref 3.5–5.2)
ALBUMIN/GLOB SERPL: 1.2 G/DL
ALP SERPL-CCNC: 103 U/L (ref 39–117)
ALT SERPL W P-5'-P-CCNC: 62 U/L (ref 1–41)
ANION GAP SERPL CALCULATED.3IONS-SCNC: 11 MMOL/L (ref 5–15)
AST SERPL-CCNC: 76 U/L (ref 1–40)
BASOPHILS # BLD AUTO: 0.02 10*3/MM3 (ref 0–0.2)
BASOPHILS NFR BLD AUTO: 0.5 % (ref 0–1.5)
BILIRUB SERPL-MCNC: 2.1 MG/DL (ref 0–1.2)
BUN SERPL-MCNC: 7 MG/DL (ref 6–20)
BUN/CREAT SERPL: 12.1 (ref 7–25)
CALCIUM SPEC-SCNC: 8.3 MG/DL (ref 8.6–10.5)
CHLORIDE SERPL-SCNC: 100 MMOL/L (ref 98–107)
CO2 SERPL-SCNC: 24 MMOL/L (ref 22–29)
CREAT SERPL-MCNC: 0.58 MG/DL (ref 0.76–1.27)
DEPRECATED RDW RBC AUTO: 47.8 FL (ref 37–54)
EGFRCR SERPLBLD CKD-EPI 2021: 127.2 ML/MIN/1.73
EOSINOPHIL # BLD AUTO: 0.11 10*3/MM3 (ref 0–0.4)
EOSINOPHIL NFR BLD AUTO: 2.9 % (ref 0.3–6.2)
ERYTHROCYTE [DISTWIDTH] IN BLOOD BY AUTOMATED COUNT: 13.1 % (ref 12.3–15.4)
GLOBULIN UR ELPH-MCNC: 3 GM/DL
GLUCOSE BLDC GLUCOMTR-MCNC: 113 MG/DL (ref 70–130)
GLUCOSE SERPL-MCNC: 125 MG/DL (ref 65–99)
HCT VFR BLD AUTO: 36.7 % (ref 37.5–51)
HGB BLD-MCNC: 12.6 G/DL (ref 13–17.7)
IMM GRANULOCYTES # BLD AUTO: 0.01 10*3/MM3 (ref 0–0.05)
IMM GRANULOCYTES NFR BLD AUTO: 0.3 % (ref 0–0.5)
LYMPHOCYTES # BLD AUTO: 0.89 10*3/MM3 (ref 0.7–3.1)
LYMPHOCYTES NFR BLD AUTO: 23.7 % (ref 19.6–45.3)
MCH RBC QN AUTO: 34.4 PG (ref 26.6–33)
MCHC RBC AUTO-ENTMCNC: 34.3 G/DL (ref 31.5–35.7)
MCV RBC AUTO: 100.3 FL (ref 79–97)
MONOCYTES # BLD AUTO: 0.58 10*3/MM3 (ref 0.1–0.9)
MONOCYTES NFR BLD AUTO: 15.4 % (ref 5–12)
NEUTROPHILS NFR BLD AUTO: 2.15 10*3/MM3 (ref 1.7–7)
NEUTROPHILS NFR BLD AUTO: 57.2 % (ref 42.7–76)
NRBC BLD AUTO-RTO: 0 /100 WBC (ref 0–0.2)
PLATELET # BLD AUTO: 115 10*3/MM3 (ref 140–450)
PMV BLD AUTO: 9.5 FL (ref 6–12)
POTASSIUM SERPL-SCNC: 3.8 MMOL/L (ref 3.5–5.2)
PROT SERPL-MCNC: 6.5 G/DL (ref 6–8.5)
QT INTERVAL: 344 MS
QTC INTERVAL: 469 MS
RBC # BLD AUTO: 3.66 10*6/MM3 (ref 4.14–5.8)
SODIUM SERPL-SCNC: 135 MMOL/L (ref 136–145)
WBC NRBC COR # BLD AUTO: 3.76 10*3/MM3 (ref 3.4–10.8)

## 2024-02-20 PROCEDURE — 80053 COMPREHEN METABOLIC PANEL: CPT | Performed by: HOSPITALIST

## 2024-02-20 PROCEDURE — 94799 UNLISTED PULMONARY SVC/PX: CPT

## 2024-02-20 PROCEDURE — 94664 DEMO&/EVAL PT USE INHALER: CPT

## 2024-02-20 PROCEDURE — 25010000002 CEFTRIAXONE PER 250 MG: Performed by: HOSPITALIST

## 2024-02-20 PROCEDURE — 82948 REAGENT STRIP/BLOOD GLUCOSE: CPT

## 2024-02-20 PROCEDURE — 85025 COMPLETE CBC W/AUTO DIFF WBC: CPT | Performed by: HOSPITALIST

## 2024-02-20 RX ORDER — INSULIN ASPART 100 [IU]/ML
15 INJECTION, SUSPENSION SUBCUTANEOUS 2 TIMES DAILY WITH MEALS
Qty: 120 ML | Refills: 12 | Status: SHIPPED | OUTPATIENT
Start: 2024-02-20

## 2024-02-20 RX ORDER — AZITHROMYCIN 1 G/1
1 POWDER, FOR SUSPENSION ORAL ONCE
Qty: 1 PACKET | Refills: 0 | Status: SHIPPED | OUTPATIENT
Start: 2024-02-20 | End: 2024-02-20

## 2024-02-20 RX ORDER — HYDROCODONE BITARTRATE AND ACETAMINOPHEN 5; 325 MG/1; MG/1
2 TABLET ORAL EVERY 6 HOURS PRN
Qty: 21 TABLET | Refills: 0 | Status: SHIPPED | OUTPATIENT
Start: 2024-02-20 | End: 2024-02-24

## 2024-02-20 RX ORDER — CEFUROXIME AXETIL 500 MG/1
500 TABLET ORAL 2 TIMES DAILY
Qty: 10 TABLET | Refills: 0 | Status: SHIPPED | OUTPATIENT
Start: 2024-02-20

## 2024-02-20 RX ORDER — ALBUTEROL SULFATE 90 UG/1
2 AEROSOL, METERED RESPIRATORY (INHALATION) EVERY 4 HOURS PRN
Qty: 18 G | Refills: 0 | Status: SHIPPED | OUTPATIENT
Start: 2024-02-20

## 2024-02-20 RX ADMIN — THIAMINE HCL TAB 100 MG 100 MG: 100 TAB at 08:32

## 2024-02-20 RX ADMIN — IPRATROPIUM BROMIDE AND ALBUTEROL SULFATE 3 ML: .5; 3 SOLUTION RESPIRATORY (INHALATION) at 09:56

## 2024-02-20 RX ADMIN — LORAZEPAM 2 MG: 1 TABLET ORAL at 00:02

## 2024-02-20 RX ADMIN — LORAZEPAM 1 MG: 1 TABLET ORAL at 02:44

## 2024-02-20 RX ADMIN — IPRATROPIUM BROMIDE AND ALBUTEROL SULFATE 3 ML: .5; 3 SOLUTION RESPIRATORY (INHALATION) at 05:50

## 2024-02-20 RX ADMIN — MICONAZOLE NITRATE 1 APPLICATION: 20 CREAM TOPICAL at 08:34

## 2024-02-20 RX ADMIN — LORAZEPAM 1 MG: 1 TABLET ORAL at 06:12

## 2024-02-20 RX ADMIN — Medication 10 ML: at 08:34

## 2024-02-20 RX ADMIN — FOLIC ACID 1 MG: 1 TABLET ORAL at 08:32

## 2024-02-20 RX ADMIN — HYDROCODONE BITARTRATE AND ACETAMINOPHEN 2 TABLET: 5; 325 TABLET ORAL at 06:13

## 2024-02-20 RX ADMIN — BUDESONIDE 0.5 MG: 0.5 INHALANT RESPIRATORY (INHALATION) at 05:50

## 2024-02-20 RX ADMIN — CEFTRIAXONE SODIUM 2000 MG: 2 INJECTION, POWDER, FOR SOLUTION INTRAMUSCULAR; INTRAVENOUS at 08:33

## 2024-02-20 NOTE — DISCHARGE SUMMARY
"      [facility] Medicine Services  DISCHARGE SUMMARY       Date of Admission: 2/17/2024  Date of Discharge:  2/20/2024  Primary Care Physician: Eleuterio Rangel MD    Presenting Problem/History of Present Illness:    This patient is a 39-year-old male with PMH significant for hypertension and diabetes alcohol abuse who presents to the ER for evaluation of URI symptoms and hypertension. Patient reports that he has been out of his losartan and metformin for 1 to 2 months as he missed his last follow-up with his PCP, Dr. Rangel. Reports that his blood pressure has been elevated over the last several days. Additionally, he is reporting productive cough, congestion, shortness of breath, and generally feeling unwell. Also endorses headache and intermittent diarrhea. Patient reports that his drinking has been \"out of control\" for the last several months. States that he drinks approximately 12 pack of beer daily, with his last drink being 10 hours ago. He does endorse a history of alcohol withdrawal seizures. Patient denies any chest pain at this time. He denies fever or chills. Has not had any BLE edema. He has never had a PE or DVT. He has not been around anyone who has been sick that he is aware of.   In the ER the patient [place] here was unremarkable ethanol level was 0.23 troponin was 49 D-dimer elevated chest CT was showing no PE but bilateral pneumonia liver steatosis cholelithiasis respiratory panel was positive for mycoplasma was given Rocephin and Zithromax ALTs LFTs were elevated consistent with alcoholic hepatitis lactic acid was 7.6 was given IV fluid we will trend his lactic acid started on CIWA his CIWA was 6 will be admitted to the hospital for impending DTs alcohol intoxication lactic acidosis pneumoni  2/18  Admitted for bilateral pneumonia lactic acidosis hyperglycemia no DKA impending DTs was not compliant with his medications positive mycoplasma placed on Rocephin and " Zithromax,   2/19  Patient complaining of swelling and pain his lactic acidosis has resolved down to 2.2.  With his lactic acidosis however will avoid restarting metformin his A1c 11.2 I will start him on insulin 70/30 that is more affordable for him he does have a history of gout I will Hep-Lock his IV fluids and started Norco as needed we will plan to send him home tomorrow  2/20  Feeling much better ready to go home we will go ahead and discharge home    Final Discharge Diagnoses:  Active Hospital Problems    Diagnosis     **Community acquired bilateral lower lobe pneumonia     Pneumonia due to Streptococcus    Lactic acidosis  Impending DT  Tobacco abuse  Type 2 diabetes noncompliance    Consults: None    Procedures Performed: None    Pertinent Test Results:   Results for orders placed during the hospital encounter of 12/23/22    Adult Transthoracic Echo Complete W/ Cont if Necessary Per Protocol    Interpretation Summary    Left ventricular ejection fraction appears to be 56 - 60%.    Left ventricular diastolic function was normal.    Normal right ventricular cavity size and systolic function noted.    There is no significant (greater than mild) valvular dysfunction.    Estimated right ventricular systolic pressure from tricuspid regurgitation is normal (<35 mmHg).      Imaging Results (All)       Procedure Component Value Units Date/Time    CT Angiogram Chest [[id number]] Collected: 02/17/24 0941     Updated: 02/17/24 0950    Narrative:      EXAM: CT ANGIOGRAM CHEST- 2/17/2024 8:10 AM     HISTORY: SOB, elevated DD         DOSE LENGTH PRODUCT: 295.01 mGy.cm mGy cm. Automated exposure control  was also utilized to decrease patient radiation dose.     COMPARISON: None      TECHNIQUE: Helical tomographic images of the chest were obtained after  the administration of intravenous contrast following angiogram protocol.  Additionally, 3D and multiplanar reformatted images as well as MIPS were  provided.        FINDINGS:     Lungs/Pleura: Bilateral lower lobe groundglass as well as small nodular  opacities. This is present to a lesser extent within the lingula. No  sizable pleural effusion. No visible pneumothorax.     Lower Neck: Unremarkable.     Mediastinum/Hilum: No enlarged lymphadenopathy. Patulous esophagus.     Heart/Aorta: No pericardial effusion.     Pulmonary Arteries: Pulmonary arteries are well-opacified to the  segmental level. No pulmonary embolism.     Chest wall/Soft Tissues: Bilateral symmetric gynecomastia. 1 cm  subcutaneous nodule along the left upper back, likely  sebaceous/epidermal inclusion cyst. Smaller similar-appearing nodule  along the right upper back.     Upper Abdomen: Hepatic steatosis. Cholelithiasis.     Osseous Structures: No acute or suspicious osseous finding.       Impression:         No pulmonary embolism.     Bilateral lower lobe and lingula pneumonia.     Hepatic steatosis and cholelithiasis.           This report was signed and finalized on 2/17/2024 9:47 AM by Darwin Goldman.       XR Chest 1 View [[id number]] Collected: 02/17/24 0827     Updated: 02/17/24 0831    Narrative:      EXAM: XR CHEST 1 VW- 2/17/2024 7:10 AM     HISTORY: cough, congestion, wheezing       COMPARISON: 12/23/2022..     TECHNIQUE: Single frontal radiograph of the chest was obtained.     FINDINGS:     Support Devices: None.     Cardiac and Mediastinal Silhouettes: Normal.     Lungs/Pleura: No focal consolidation. No sizable pleural effusion. No  visible pneumothorax.     Osseous structures: No acute osseous finding.     Other: None.       Impression:         No focal consolidation to suggest pneumonia.           This report was signed and finalized on 2/17/2024 8:28 AM by Darwin Goldman.             LAB RESULTS:      Lab 02/20/24  0657 02/19/24  0847 02/18/24  0409 02/18/24  0021 02/17/24  1750 02/17/24  1447 02/17/24  0949 02/17/24  0827   WBC 3.76 4.62 3.99  --   --  4.93  --  5.16   HEMOGLOBIN 12.6*  13.8 13.1  --   --  14.0  --  16.2   HEMATOCRIT 36.7* 40.2 36.7*  --   --  40.1  --  45.2   PLATELETS 115* 104* 100*  --   --  114*  --  130*   NEUTROS ABS 2.15 3.24 2.72  --   --  3.94  --  3.48   IMMATURE GRANS (ABS) 0.01 0.02 0.02  --   --  0.02  --  0.01   LYMPHS ABS 0.89 0.82 0.85  --   --  0.49*  --  1.06   MONOS ABS 0.58 0.49 0.39  --   --  0.45  --  0.57   EOS ABS 0.11 0.02 0.00  --   --  0.01  --  0.02   .3* 99.3* 95.6  --   --  96.9  --  95.6   SED RATE  --   --   --   --   --   --   --  34*   CRP  --   --   --   --   --   --   --  4.44*   LACTATE  --  2.2* 4.0* 4.1* 4.3* 5.0*   < >  --    D DIMER QUANT  --   --   --   --   --   --   --  0.81*    < > = values in this interval not displayed.         Lab 02/20/24  0657 02/19/24  1757 02/19/24  0847 02/18/24  1918 02/18/24  0409 02/17/24  1447 02/17/24  0827   SODIUM 135*  --  132*  --  130* 132* 137   POTASSIUM 3.8 3.9 3.6 3.9 3.5 3.6 3.4*   CHLORIDE 100  --  96*  --  90* 87* 88*   CO2 24.0  --  23.0  --  26.0 24.0 27.0   ANION GAP 11.0  --  13.0  --  14.0 21.0* 22.0*   BUN 7  --  5*  --  6 4* 3*   CREATININE 0.58*  --  0.62*  --  0.67* 0.54* 0.61*   EGFR 127.2  --  124.7  --  121.8 130.0 125.3   GLUCOSE 125*  --  206*  --  296* 306* 366*   CALCIUM 8.3*  --  8.8  --  8.6 8.4* 9.4   HEMOGLOBIN A1C  --   --  11.20*  --   --   --   --          Lab 02/20/24  0657 02/19/24  0847 02/17/24  0827   TOTAL PROTEIN 6.5 6.9 7.7   ALBUMIN 3.5 3.7 4.1   GLOBULIN 3.0 3.2 3.6   ALT (SGPT) 62* 70* 109*   AST (SGOT) 76* 81* 128*   BILIRUBIN 2.1* 2.0* 1.2   ALK PHOS 103 120* 183*         Lab 02/17/24  1058 02/17/24  0827   PROBNP  --  <36.0   HSTROP T 46* 49*                 Brief Urine Lab Results       None          Microbiology Results (last 10 days)       Procedure Component Value - Date/Time    Blood Culture - Blood, Arm, Left [[id number]]  (Normal) Collected: 02/17/24 0854    Lab Status: Preliminary result Specimen: Blood from Arm, Left Updated: 02/20/24 0900      Blood Culture No growth at 3 days    Blood Culture - Blood, Arm, Right [[id number]]  (Normal) Collected: 02/17/24 0850    Lab Status: Preliminary result Specimen: Blood from Arm, Right Updated: 02/20/24 0900     Blood Culture No growth at 3 days    Respiratory Panel PCR w/COVID-19(SARS-CoV-2) RACHELLE/CAL/LISETTE/PAD/COR/EUGENIA In-House, NP Swab in UTM/VTM, 2 HR TAT - Swab, Nasopharynx [[id number]]  (Abnormal) Collected: 02/17/24 0836    Lab Status: Final result Specimen: Swab from Nasopharynx Updated: 02/17/24 0937     ADENOVIRUS, PCR Not Detected     Coronavirus 229E Not Detected     Coronavirus HKU1 Not Detected     Coronavirus NL63 Not Detected     Coronavirus OC43 Not Detected     COVID19 Not Detected     Human Metapneumovirus Not Detected     Human Rhinovirus/Enterovirus Not Detected     Influenza A PCR Not Detected     Influenza B PCR Not Detected     Parainfluenza Virus 1 Not Detected     Parainfluenza Virus 2 Not Detected     Parainfluenza Virus 3 Not Detected     Parainfluenza Virus 4 Not Detected     RSV, PCR Not Detected     Bordetella pertussis pcr Not Detected     Bordetella parapertussis PCR Not Detected     Chlamydophila pneumoniae PCR Not Detected     Mycoplasma pneumo by PCR Detected    Narrative:      In the setting of a positive respiratory panel with a viral infection PLUS a negative procalcitonin without other underlying concern for bacterial infection, consider observing off antibiotics or discontinuation of antibiotics and continue supportive care. If the respiratory panel is positive for atypical bacterial infection (Bordetella pertussis, Chlamydophila pneumoniae, or Mycoplasma pneumoniae), consider antibiotic de-escalation to target atypical bacterial infection.            Hospital Course:     This patient is a 39-year-old male with PMH significant for hypertension and diabetes alcohol abuse who presents to the ER for evaluation of URI symptoms and hypertension. Patient reports that he has been out of  "his losartan and metformin for 1 to 2 months as he missed his last follow-up with his PCP, Dr. Rangel. Reports that his blood pressure has been elevated over the last several days. Additionally, he is reporting productive cough, congestion, shortness of breath, and generally feeling unwell. Also endorses headache and intermittent diarrhea. Patient reports that his drinking has been \"out of control\" for the last several months. States that he drinks approximately 12 pack of beer daily, with his last drink being 10 hours ago. He does endorse a history of alcohol withdrawal seizures. Patient denies any chest pain at this time. He denies fever or chills. Has not had any BLE edema. He has never had a PE or DVT. He has not been around anyone who has been sick that he is aware of.   In the ER the patient [place] here was unremarkable ethanol level was 0.23 troponin was 49 D-dimer elevated chest CT was showing no PE but bilateral pneumonia liver steatosis cholelithiasis respiratory panel was positive for mycoplasma was given Rocephin and Zithromax ALTs LFTs were elevated consistent with alcoholic hepatitis lactic acid was 7.6 was given IV fluid we will trend his lactic acid started on CIWA his CIWA was 6 will be admitted to the hospital for impending DTs alcohol intoxication lactic acidosis pneumoni  2/18  Admitted for bilateral pneumonia lactic acidosis hyperglycemia no DKA impending DTs was not compliant with his medications positive mycoplasma placed on Rocephin and Zithromax,   2/19  Patient complaining of swelling and pain his lactic acidosis has resolved down to 2.2.  With his lactic acidosis however will avoid restarting metformin his A1c 11.2 I will start him on insulin 70/30 that is more affordable for him he does have a history of gout I will Hep-Lock his IV fluids and started Norco as needed we will plan to send him home tomorrow  2/20  Feeling much better ready to go home we will go ahead and discharge " "home    Physical Exam on Discharge:  /83 (BP Location: Right arm, Patient Position: Lying)   Pulse 94   Temp 97.6 °F (36.4 °C) (Oral)   Resp 18   Ht 190.5 cm (75\")   Wt 130 kg (287 lb)   SpO2 94%   BMI 35.87 kg/m²   Physical Exam  Constitutional:       Appearance: Normal appearance.   HENT:      Head: Normocephalic.      Nose: Nose normal.   Eyes:      Pupils: Pupils are equal, round, and reactive to light.   Cardiovascular:      Rate and Rhythm: Normal rate and regular rhythm.   Pulmonary:      Breath sounds: Stridor present. Wheezing present.   Abdominal:      General: Abdomen is flat.      Palpations: Abdomen is soft.   Musculoskeletal:         General: Normal range of motion.      Cervical back: Normal range of motion.   Skin:     Capillary Refill: Capillary refill takes less than 2 seconds.   Neurological:      General: No focal deficit present.      Mental Status: He is alert.           Condition on Discharge: Stable    Discharge Disposition:  Home or Self Care    Discharge Medications:     Discharge Medications        New Medications        Instructions Start Date   albuterol sulfate  (90 Base) MCG/ACT inhaler  Commonly known as: PROVENTIL HFA;VENTOLIN HFA;PROAIR HFA   2 puffs, Inhalation, Every 4 Hours PRN      azithromycin 1 g powder  Commonly known as: Zithromax   1 g, Oral, Once      cefuroxime 500 MG tablet  Commonly known as: CEFTIN   500 mg, Oral, 2 Times Daily      HYDROcodone-acetaminophen 5-325 MG per tablet  Commonly known as: NORCO   2 tablets, Oral, Every 6 Hours PRN      insulin aspart prot-insulin aspart (70-30) 100 UNIT/ML injection  Commonly known as: novoLOG 70/30   15 Units, Subcutaneous, 2 Times Daily With Meals, add             Stop These Medications      metFORMIN  MG 24 hr tablet  Commonly known as: GLUCOPHAGE-XR              Discharge Diet:     Activity at Discharge:     Follow-up Appointments:   No future appointments.    Test Results Pending at Discharge: " None    Electronically signed by Frankie Camacho MD, 02/20/24, 10:45 CST.    Time: 45 minutes.

## 2024-02-20 NOTE — PROGRESS NOTES
"    [facility] Medicine Services  INPATIENT PROGRESS NOTE    Patient Name: Luciano Christiansen  Date of Admission: 2/17/2024  Today's Date: 02/20/24  Length of Stay: 3  Primary Care Physician: Eleuterio Rangel MD    Subjective   Chief Complaint: impeding DT  Hypertension    Flu Symptoms       This patient is a 39-year-old male with PMH significant for hypertension and diabetes alcohol abuse who presents to the ER for evaluation of URI symptoms and hypertension. Patient reports that he has been out of his losartan and metformin for 1 to 2 months as he missed his last follow-up with his PCP, Dr. Rangel. Reports that his blood pressure has been elevated over the last several days. Additionally, he is reporting productive cough, congestion, shortness of breath, and generally feeling unwell. Also endorses headache and intermittent diarrhea. Patient reports that his drinking has been \"out of control\" for the last several months. States that he drinks approximately 12 pack of beer daily, with his last drink being 10 hours ago. He does endorse a history of alcohol withdrawal seizures. Patient denies any chest pain at this time. He denies fever or chills. Has not had any BLE edema. He has never had a PE or DVT. He has not been around anyone who has been sick that he is aware of.   In the ER the patient [facility] here was unremarkable ethanol level was 0.23 troponin was 49 D-dimer elevated chest CT was showing no PE but bilateral pneumonia liver steatosis cholelithiasis respiratory panel was positive for mycoplasma was given Rocephin and Zithromax ALTs LFTs were elevated consistent with alcoholic hepatitis lactic acid was 7.6 was given IV fluid we will trend his lactic acid started on CIWA his CIWA was 6 will be admitted to the hospital for impending DTs alcohol intoxication lactic acidosis pneumoni  2/18  Admitted for bilateral pneumonia lactic acidosis hyperglycemia no DKA impending DTs was not " compliant with his medications positive mycoplasma placed on Rocephin and Zithromax,   2/19  Patient complaining of swelling and pain his lactic acidosis has resolved down to 2.2.  With his lactic acidosis however will avoid restarting metformin his A1c 11.2 I will start him on insulin 70/30 that is more affordable for him he does have a history of gout I will Hep-Lock his IV fluids and started Norco as needed we will plan to send him home tomorrow  2/20  Feeling much better ready to go home we will go ahead and discharge home    Review of Systems   All pertinent negatives and positives are as above. All other systems have been reviewed and are negative unless otherwise stated.     Objective    Temp:  [97.6 °F (36.4 °C)-99.4 °F (37.4 °C)] (P) 97.6 °F (36.4 °C)  Heart Rate:  [] (P) 95  Resp:  [18-22] (P) 18  BP: (140-161)/(82-98) (P) 158/83  Physical Exam  Constitutional:       Appearance: Normal appearance.   HENT:      Head: Normocephalic and atraumatic.      Nose: Nose normal.   Eyes:      Pupils: Pupils are equal, round, and reactive to light.   Cardiovascular:      Rate and Rhythm: Normal rate and regular rhythm.   Pulmonary:      Effort: Pulmonary effort is normal.      Breath sounds: Stridor present.   Abdominal:      General: Abdomen is flat.      Palpations: Abdomen is soft.   Musculoskeletal:         General: Normal range of motion.      Cervical back: Normal range of motion.   Skin:     Capillary Refill: Capillary refill takes less than 2 seconds.   Neurological:      General: No focal deficit present.      Mental Status: He is alert.             Results Review:  I have reviewed the labs, radiology results, and diagnostic studies.    Laboratory Data:   Results from last 7 days   Lab Units 02/20/24  0657 02/19/24  0847 02/18/24  0409   WBC 10*3/mm3 3.76 4.62 3.99   HEMOGLOBIN g/dL 12.6* 13.8 13.1   HEMATOCRIT % 36.7* 40.2 36.7*   PLATELETS 10*3/mm3 115* 104* 100*        Results from last 7 days   Lab  "Units 02/20/24  0657 02/19/24  1757 02/19/24  0847 02/18/24  1918 02/18/24  0409 02/17/24  1447 02/17/24  0827   SODIUM mmol/L 135*  --  132*  --  130*   < > 137   POTASSIUM mmol/L 3.8 3.9 3.6   < > 3.5   < > 3.4*   CHLORIDE mmol/L 100  --  96*  --  90*   < > 88*   CO2 mmol/L 24.0  --  23.0  --  26.0   < > 27.0   BUN mg/dL 7  --  5*  --  6   < > 3*   CREATININE mg/dL 0.58*  --  0.62*  --  0.67*   < > 0.61*   CALCIUM mg/dL 8.3*  --  8.8  --  8.6   < > 9.4   BILIRUBIN mg/dL 2.1*  --  2.0*  --   --   --  1.2   ALK PHOS U/L 103  --  120*  --   --   --  183*   ALT (SGPT) U/L 62*  --  70*  --   --   --  109*   AST (SGOT) U/L 76*  --  81*  --   --   --  128*   GLUCOSE mg/dL 125*  --  206*  --  296*   < > 366*    < > = values in this interval not displayed.       Culture Data:   No results found for: \"BLOODCX\", \"URINECX\", \"WOUNDCX\", \"MRSACX\", \"RESPCX\", \"STOOLCX\"    Radiology Data:   Imaging Results (Last 24 Hours)       ** No results found for the last 24 hours. **            I have reviewed the patient's current medications.     Assessment/Plan   Assessment  Active Hospital Problems    Diagnosis     **Community acquired bilateral lower lobe pneumonia        Treatment Plan  This patient is a 39-year-old male with PMH significant for hypertension and diabetes alcohol abuse who presents to the ER for evaluation of URI symptoms and hypertension. Patient reports that he has been out of his losartan and metformin for 1 to 2 months as he missed his last follow-up with his PCP, Dr. Rangel. Reports that his blood pressure has been elevated over the last several days. Additionally, he is reporting productive cough, congestion, shortness of breath, and generally feeling unwell. Also endorses headache and intermittent diarrhea. Patient reports that his drinking has been \"out of control\" for the last several months. States that he drinks approximately 12 pack of beer daily, with his last drink being 10 hours ago. He does endorse a " history of alcohol withdrawal seizures. Patient denies any chest pain at this time. He denies fever or chills. Has not had any BLE edema. He has never had a PE or DVT. He has not been around anyone who has been sick that he is aware of.   In the ER the patient [facility] here was unremarkable ethanol level was 0.23 troponin was 49 D-dimer elevated chest CT was showing no PE but bilateral pneumonia liver steatosis cholelithiasis respiratory panel was positive for mycoplasma was given Rocephin and Zithromax ALTs LFTs were elevated consistent with alcoholic hepatitis lactic acid was 7.6 was given IV fluid we will trend his lactic acid started on CIWA his CIWA was 6 will be admitted to the hospital for impending DTs alcohol intoxication lactic acidosis pneumoni  2/18  Admitted for bilateral pneumonia lactic acidosis hyperglycemia no DKA impending DTs was not compliant with his medications positive mycoplasma placed on Rocephin and Zithromax    Medical Decision Making  Number and Complexity of problems: 3 acute   Differential Diagnosis: as above     Conditions and Status        Condition is unchanged.     [facility] Data  External documents reviewed: no   Cardiac tracing (EKG, telemetry) interpretation: yes  Radiology interpretation: yes  Labs reviewed: yes  Any tests that were considered but not ordered: yes     Decision rules/scores evaluated (example [id number], [place], etc): no      Discussed with: patient     Care Planning  Shared decision making: Patient apprised of current labs, vitals, imaging and treatment plan.  They are agreeable with proceeding with plans as discussed.   Code status and discussions: full     Disposition  Social Determinants of Health that impact treatment or disposition: no   I expect the patient to be discharged to home  in few days.     Electronically signed by Frankie Camacho MD, 02/20/24, 08:38 CST.

## 2024-02-20 NOTE — PLAN OF CARE
Goal Outcome Evaluation:  Plan of Care Reviewed With: patient        Progress: no change       Pt with complaints of right hand pain during shift; see MAR. New IV started this shift; IID. PRN ativan given per [facility] protocol throughout shift. Up with SBA. Voiding; incontinent at times. Accu check. SCDs for VTE prevention. Droplet precautions maintained. Safety maintained.

## 2024-02-21 ENCOUNTER — TRANSITIONAL CARE MANAGEMENT TELEPHONE ENCOUNTER (OUTPATIENT)
Dept: CALL CENTER | Facility: HOSPITAL | Age: 39
End: 2024-02-21
Payer: COMMERCIAL

## 2024-02-21 ENCOUNTER — READMISSION MANAGEMENT (OUTPATIENT)
Dept: CALL CENTER | Facility: HOSPITAL | Age: 39
End: 2024-02-21
Payer: COMMERCIAL

## 2024-02-21 NOTE — OUTREACH NOTE
Call Center TCM Note      Flowsheet Row Responses   [facility] patient discharged from? [place]   Does the patient have one of the following disease processes/diagnoses(primary or secondary)? Pneumonia   TCM attempt successful? No   Unsuccessful attempts Attempt 1  [No updated verbal release on file from PCP group]            Luli Perez RN    2/21/2024, 12:18 CST

## 2024-02-21 NOTE — OUTREACH NOTE
Call Center TCM Note      Flowsheet Row Responses   [facility] patient discharged from? [place]   Does the patient have one of the following disease processes/diagnoses(primary or secondary)? Pneumonia   TCM attempt successful? No   Unsuccessful attempts Attempt 2            Luli Perez RN    2/21/2024, 13:43 CST

## 2024-02-21 NOTE — OUTREACH NOTE
Prep Survey      Flowsheet Row Responses   [facility] patient discharged from? [place]   Is LACE score < 7 ? No   Eligibility [facility]   Date of Admission 02/17/24   Date of Discharge 02/20/24   Discharge Disposition Home or Self Care   Discharge diagnosis bilateral pneumonia   Does the patient have one of the following disease processes/diagnoses(primary or secondary)? Pneumonia   Does the patient have Home health ordered? No   Is there a DME ordered? No   Prep survey completed? Yes            Helen QUINN - Registered Nurse

## 2024-02-22 ENCOUNTER — TRANSITIONAL CARE MANAGEMENT TELEPHONE ENCOUNTER (OUTPATIENT)
Dept: CALL CENTER | Facility: HOSPITAL | Age: 39
End: 2024-02-22
Payer: COMMERCIAL

## 2024-02-22 LAB
BACTERIA SPEC AEROBE CULT: NORMAL
BACTERIA SPEC AEROBE CULT: NORMAL

## 2024-02-22 NOTE — OUTREACH NOTE
Call Center TCM Note      Flowsheet Row Responses   [facility] patient discharged from? [place]   Does the patient have one of the following disease processes/diagnoses(primary or secondary)? Pneumonia   TCM attempt successful? No   Unsuccessful attempts Attempt 3   Discharge diagnosis bilateral pneumonia            Kristina Shah RN    2/22/2024, 14:14 CST

## 2024-04-21 ENCOUNTER — HOSPITAL ENCOUNTER (INPATIENT)
Facility: HOSPITAL | Age: 39
LOS: 3 days | Discharge: HOME | End: 2024-04-25
Attending: EMERGENCY MEDICINE | Admitting: INTERNAL MEDICINE
Payer: COMMERCIAL

## 2024-04-21 DIAGNOSIS — F10.930 ALCOHOL WITHDRAWAL SYNDROME WITHOUT COMPLICATION: ICD-10-CM

## 2024-04-21 DIAGNOSIS — F10.929 ALCOHOLIC INTOXICATION WITH COMPLICATION: ICD-10-CM

## 2024-04-21 DIAGNOSIS — E08.10 DIABETIC KETOACIDOSIS WITHOUT COMA ASSOCIATED WITH DIABETES MELLITUS DUE TO UNDERLYING CONDITION: ICD-10-CM

## 2024-04-21 DIAGNOSIS — K92.2 UGI BLEED: Primary | ICD-10-CM

## 2024-04-21 DIAGNOSIS — K92.2 GASTROINTESTINAL HEMORRHAGE, UNSPECIFIED GASTROINTESTINAL HEMORRHAGE TYPE: ICD-10-CM

## 2024-04-21 PROCEDURE — 99291 CRITICAL CARE FIRST HOUR: CPT

## 2024-04-22 ENCOUNTER — APPOINTMENT (OUTPATIENT)
Dept: ULTRASOUND IMAGING | Facility: HOSPITAL | Age: 39
End: 2024-04-22
Payer: COMMERCIAL

## 2024-04-22 ENCOUNTER — APPOINTMENT (OUTPATIENT)
Dept: GENERAL RADIOLOGY | Facility: HOSPITAL | Age: 39
End: 2024-04-22
Payer: COMMERCIAL

## 2024-04-22 ENCOUNTER — APPOINTMENT (OUTPATIENT)
Dept: CT IMAGING | Facility: HOSPITAL | Age: 39
End: 2024-04-22
Payer: COMMERCIAL

## 2024-04-22 PROBLEM — F10.129 ALCOHOL INTOXICATION IN ACTIVE ALCOHOLIC: Status: ACTIVE | Noted: 2024-04-22

## 2024-04-22 PROBLEM — E11.10 DIABETIC KETOACIDOSIS ASSOCIATED WITH TYPE 2 DIABETES MELLITUS: Status: ACTIVE | Noted: 2024-04-22

## 2024-04-22 PROBLEM — E11.00 TYPE 2 DIABETES MELLITUS WITH HYPEROSMOLAR HYPERGLYCEMIC STATE (HHS): Status: ACTIVE | Noted: 2022-12-23

## 2024-04-22 PROBLEM — K92.2 GI BLEED: Status: ACTIVE | Noted: 2024-04-22

## 2024-04-22 LAB
ACETONE BLD QL: NEGATIVE
ALBUMIN SERPL-MCNC: 3.7 G/DL (ref 3.5–5.2)
ALBUMIN SERPL-MCNC: 3.8 G/DL (ref 3.5–5.2)
ALBUMIN/GLOB SERPL: 1 G/DL
ALBUMIN/GLOB SERPL: 1.2 G/DL
ALP SERPL-CCNC: 235 U/L (ref 39–117)
ALP SERPL-CCNC: 247 U/L (ref 39–117)
ALT SERPL W P-5'-P-CCNC: 113 U/L (ref 1–41)
ALT SERPL W P-5'-P-CCNC: 116 U/L (ref 1–41)
ANION GAP SERPL CALCULATED.3IONS-SCNC: 16 MMOL/L (ref 5–15)
ANION GAP SERPL CALCULATED.3IONS-SCNC: 17 MMOL/L (ref 5–15)
ANION GAP SERPL CALCULATED.3IONS-SCNC: 26 MMOL/L (ref 5–15)
ANION GAP SERPL CALCULATED.3IONS-SCNC: 32 MMOL/L (ref 5–15)
ANION GAP SERPL CALCULATED.3IONS-SCNC: 39 MMOL/L (ref 5–15)
ANION GAP SERPL CALCULATED.3IONS-SCNC: 43 MMOL/L (ref 5–15)
ANISOCYTOSIS BLD QL: ABNORMAL
APTT PPP: 37.9 SECONDS (ref 24.5–36)
APTT PPP: 38.2 SECONDS (ref 24.5–36)
AST SERPL-CCNC: 252 U/L (ref 1–40)
AST SERPL-CCNC: 257 U/L (ref 1–40)
ATMOSPHERIC PRESS: 755 MMHG
BACTERIA UR QL AUTO: NORMAL /HPF
BASE EXCESS BLDV CALC-SCNC: -9.3 MMOL/L (ref 0–2)
BASOPHILS # BLD AUTO: 0.03 10*3/MM3 (ref 0–0.2)
BASOPHILS # BLD MANUAL: 0.1 10*3/MM3 (ref 0–0.2)
BASOPHILS NFR BLD AUTO: 0.4 % (ref 0–1.5)
BASOPHILS NFR BLD AUTO: 0.5 % (ref 0–1.5)
BASOPHILS NFR BLD AUTO: 0.5 % (ref 0–1.5)
BASOPHILS NFR BLD MANUAL: 2 % (ref 0–1.5)
BDY SITE: ABNORMAL
BILIRUB SERPL-MCNC: 5.6 MG/DL (ref 0–1.2)
BILIRUB SERPL-MCNC: 5.7 MG/DL (ref 0–1.2)
BILIRUB UR QL STRIP: NEGATIVE
BODY TEMPERATURE: 37
BUN SERPL-MCNC: 10 MG/DL (ref 6–20)
BUN SERPL-MCNC: 10 MG/DL (ref 6–20)
BUN SERPL-MCNC: 12 MG/DL (ref 6–20)
BUN SERPL-MCNC: 12 MG/DL (ref 6–20)
BUN SERPL-MCNC: 13 MG/DL (ref 6–20)
BUN SERPL-MCNC: 14 MG/DL (ref 6–20)
BUN/CREAT SERPL: 13.9 (ref 7–25)
BUN/CREAT SERPL: 17.1 (ref 7–25)
BUN/CREAT SERPL: 18.2 (ref 7–25)
BUN/CREAT SERPL: 20.3 (ref 7–25)
BUN/CREAT SERPL: 20.7 (ref 7–25)
BUN/CREAT SERPL: 21.2 (ref 7–25)
CALCIUM SPEC-SCNC: 7.5 MG/DL (ref 8.6–10.5)
CALCIUM SPEC-SCNC: 7.9 MG/DL (ref 8.6–10.5)
CALCIUM SPEC-SCNC: 8 MG/DL (ref 8.6–10.5)
CALCIUM SPEC-SCNC: 8 MG/DL (ref 8.6–10.5)
CALCIUM SPEC-SCNC: 8.1 MG/DL (ref 8.6–10.5)
CALCIUM SPEC-SCNC: 8.5 MG/DL (ref 8.6–10.5)
CHLORIDE SERPL-SCNC: 76 MMOL/L (ref 98–107)
CHLORIDE SERPL-SCNC: 83 MMOL/L (ref 98–107)
CHLORIDE SERPL-SCNC: 89 MMOL/L (ref 98–107)
CHLORIDE SERPL-SCNC: 90 MMOL/L (ref 98–107)
CHLORIDE SERPL-SCNC: 95 MMOL/L (ref 98–107)
CHLORIDE SERPL-SCNC: 97 MMOL/L (ref 98–107)
CLARITY UR: CLEAR
CO2 SERPL-SCNC: 15 MMOL/L (ref 22–29)
CO2 SERPL-SCNC: 15 MMOL/L (ref 22–29)
CO2 SERPL-SCNC: 17 MMOL/L (ref 22–29)
CO2 SERPL-SCNC: 21 MMOL/L (ref 22–29)
CO2 SERPL-SCNC: 28 MMOL/L (ref 22–29)
CO2 SERPL-SCNC: 28 MMOL/L (ref 22–29)
COHGB MFR BLD: 2.5 % (ref 0–5)
COLOR UR: YELLOW
CREAT SERPL-MCNC: 0.55 MG/DL (ref 0.76–1.27)
CREAT SERPL-MCNC: 0.58 MG/DL (ref 0.76–1.27)
CREAT SERPL-MCNC: 0.64 MG/DL (ref 0.76–1.27)
CREAT SERPL-MCNC: 0.66 MG/DL (ref 0.76–1.27)
CREAT SERPL-MCNC: 0.7 MG/DL (ref 0.76–1.27)
CREAT SERPL-MCNC: 0.72 MG/DL (ref 0.76–1.27)
D-LACTATE SERPL-SCNC: 14.2 MMOL/L (ref 0.5–2)
D-LACTATE SERPL-SCNC: 18.6 MMOL/L (ref 0.5–2)
D-LACTATE SERPL-SCNC: 23.2 MMOL/L (ref 0.5–2)
D-LACTATE SERPL-SCNC: 24.7 MMOL/L (ref 0.5–2)
D-LACTATE SERPL-SCNC: 8.5 MMOL/L (ref 0.5–2)
D-LACTATE SERPL-SCNC: 8.9 MMOL/L (ref 0.5–2)
DEPRECATED RDW RBC AUTO: 60.8 FL (ref 37–54)
DEPRECATED RDW RBC AUTO: 61.4 FL (ref 37–54)
DEPRECATED RDW RBC AUTO: 61.4 FL (ref 37–54)
DEPRECATED RDW RBC AUTO: 61.6 FL (ref 37–54)
DEVELOPER EXPIRATION DATE: ABNORMAL
DEVELOPER LOT NUMBER: 261
EGFRCR SERPLBLD CKD-EPI 2021: 119.2 ML/MIN/1.73
EGFRCR SERPLBLD CKD-EPI 2021: 120.2 ML/MIN/1.73
EGFRCR SERPLBLD CKD-EPI 2021: 122.4 ML/MIN/1.73
EGFRCR SERPLBLD CKD-EPI 2021: 123.5 ML/MIN/1.73
EGFRCR SERPLBLD CKD-EPI 2021: 127.2 ML/MIN/1.73
EGFRCR SERPLBLD CKD-EPI 2021: 129.3 ML/MIN/1.73
EOSINOPHIL # BLD AUTO: 0 10*3/MM3 (ref 0–0.4)
EOSINOPHIL # BLD AUTO: 0.01 10*3/MM3 (ref 0–0.4)
EOSINOPHIL # BLD AUTO: 0.14 10*3/MM3 (ref 0–0.4)
EOSINOPHIL NFR BLD AUTO: 0 % (ref 0.3–6.2)
EOSINOPHIL NFR BLD AUTO: 0.2 % (ref 0.3–6.2)
EOSINOPHIL NFR BLD AUTO: 2 % (ref 0.3–6.2)
ERYTHROCYTE [DISTWIDTH] IN BLOOD BY AUTOMATED COUNT: 15.8 % (ref 12.3–15.4)
ERYTHROCYTE [DISTWIDTH] IN BLOOD BY AUTOMATED COUNT: 15.9 % (ref 12.3–15.4)
ERYTHROCYTE [DISTWIDTH] IN BLOOD BY AUTOMATED COUNT: 15.9 % (ref 12.3–15.4)
ERYTHROCYTE [DISTWIDTH] IN BLOOD BY AUTOMATED COUNT: 16.1 % (ref 12.3–15.4)
ETHANOL UR QL: 0.38 %
EXPIRATION DATE: ABNORMAL
FECAL OCCULT BLOOD SCREEN, POC: POSITIVE
GIANT PLATELETS: ABNORMAL
GLOBULIN UR ELPH-MCNC: 3.2 GM/DL
GLOBULIN UR ELPH-MCNC: 3.7 GM/DL
GLUCOSE BLDC GLUCOMTR-MCNC: 131 MG/DL (ref 70–130)
GLUCOSE BLDC GLUCOMTR-MCNC: 140 MG/DL (ref 70–130)
GLUCOSE BLDC GLUCOMTR-MCNC: 158 MG/DL (ref 70–130)
GLUCOSE BLDC GLUCOMTR-MCNC: 168 MG/DL (ref 70–130)
GLUCOSE BLDC GLUCOMTR-MCNC: 169 MG/DL (ref 70–130)
GLUCOSE BLDC GLUCOMTR-MCNC: 182 MG/DL (ref 70–130)
GLUCOSE BLDC GLUCOMTR-MCNC: 204 MG/DL (ref 70–130)
GLUCOSE BLDC GLUCOMTR-MCNC: 216 MG/DL (ref 70–130)
GLUCOSE BLDC GLUCOMTR-MCNC: 231 MG/DL (ref 70–130)
GLUCOSE BLDC GLUCOMTR-MCNC: 269 MG/DL (ref 70–130)
GLUCOSE BLDC GLUCOMTR-MCNC: 290 MG/DL (ref 70–130)
GLUCOSE BLDC GLUCOMTR-MCNC: 326 MG/DL (ref 70–130)
GLUCOSE BLDC GLUCOMTR-MCNC: 354 MG/DL (ref 70–130)
GLUCOSE BLDC GLUCOMTR-MCNC: 374 MG/DL (ref 70–130)
GLUCOSE BLDC GLUCOMTR-MCNC: 433 MG/DL (ref 70–130)
GLUCOSE BLDC GLUCOMTR-MCNC: 433 MG/DL (ref 70–130)
GLUCOSE BLDC GLUCOMTR-MCNC: 445 MG/DL (ref 70–130)
GLUCOSE BLDC GLUCOMTR-MCNC: 510 MG/DL (ref 70–130)
GLUCOSE BLDC GLUCOMTR-MCNC: 513 MG/DL (ref 70–130)
GLUCOSE SERPL-MCNC: 140 MG/DL (ref 65–99)
GLUCOSE SERPL-MCNC: 187 MG/DL (ref 65–99)
GLUCOSE SERPL-MCNC: 216 MG/DL (ref 65–99)
GLUCOSE SERPL-MCNC: 322 MG/DL (ref 65–99)
GLUCOSE SERPL-MCNC: 430 MG/DL (ref 65–99)
GLUCOSE SERPL-MCNC: 509 MG/DL (ref 65–99)
GLUCOSE SERPL-MCNC: 657 MG/DL (ref 65–99)
GLUCOSE UR STRIP-MCNC: ABNORMAL MG/DL
HBA1C MFR BLD: 10.4 % (ref 4.8–5.6)
HCO3 BLDV-SCNC: 15.7 MMOL/L (ref 22–28)
HCT VFR BLD AUTO: 37.7 % (ref 37.5–51)
HCT VFR BLD AUTO: 41 % (ref 37.5–51)
HCT VFR BLD AUTO: 41.7 % (ref 37.5–51)
HCT VFR BLD AUTO: 44.4 % (ref 37.5–51)
HGB BLD-MCNC: 12.2 G/DL (ref 13–17.7)
HGB BLD-MCNC: 12.6 G/DL (ref 13–17.7)
HGB BLD-MCNC: 13.3 G/DL (ref 13–17.7)
HGB BLD-MCNC: 13.6 G/DL (ref 13–17.7)
HGB BLD-MCNC: 14.1 G/DL (ref 13–17.7)
HGB BLD-MCNC: 14.6 G/DL (ref 13–17.7)
HGB BLDA-MCNC: 14.5 G/DL (ref 14–18)
HGB UR QL STRIP.AUTO: ABNORMAL
HYALINE CASTS UR QL AUTO: NORMAL /LPF
IMM GRANULOCYTES # BLD AUTO: 0.03 10*3/MM3 (ref 0–0.05)
IMM GRANULOCYTES # BLD AUTO: 0.04 10*3/MM3 (ref 0–0.05)
IMM GRANULOCYTES # BLD AUTO: 0.05 10*3/MM3 (ref 0–0.05)
IMM GRANULOCYTES NFR BLD AUTO: 0.5 % (ref 0–0.5)
IMM GRANULOCYTES NFR BLD AUTO: 0.6 % (ref 0–0.5)
IMM GRANULOCYTES NFR BLD AUTO: 0.7 % (ref 0–0.5)
INR PPP: 1.5 (ref 0.91–1.09)
INR PPP: 1.56 (ref 0.91–1.09)
KETONES UR QL STRIP: ABNORMAL
LEUKOCYTE ESTERASE UR QL STRIP.AUTO: NEGATIVE
LIPASE SERPL-CCNC: 26 U/L (ref 13–60)
LYMPHOCYTES # BLD AUTO: 0.77 10*3/MM3 (ref 0.7–3.1)
LYMPHOCYTES # BLD AUTO: 0.87 10*3/MM3 (ref 0.7–3.1)
LYMPHOCYTES # BLD AUTO: 1.02 10*3/MM3 (ref 0.7–3.1)
LYMPHOCYTES # BLD MANUAL: 0.5 10*3/MM3 (ref 0.7–3.1)
LYMPHOCYTES NFR BLD AUTO: 12 % (ref 19.6–45.3)
LYMPHOCYTES NFR BLD AUTO: 13.6 % (ref 19.6–45.3)
LYMPHOCYTES NFR BLD AUTO: 14.4 % (ref 19.6–45.3)
LYMPHOCYTES NFR BLD MANUAL: 4.1 % (ref 5–12)
Lab: 261
Lab: ABNORMAL
MACROCYTES BLD QL SMEAR: ABNORMAL
MAGNESIUM SERPL-MCNC: 1.4 MG/DL (ref 1.6–2.6)
MAGNESIUM SERPL-MCNC: 1.5 MG/DL (ref 1.6–2.6)
MAGNESIUM SERPL-MCNC: 1.6 MG/DL (ref 1.6–2.6)
MAGNESIUM SERPL-MCNC: 1.9 MG/DL (ref 1.6–2.6)
MAGNESIUM SERPL-MCNC: 2.3 MG/DL (ref 1.6–2.6)
MAGNESIUM SERPL-MCNC: 2.4 MG/DL (ref 1.6–2.6)
MCH RBC QN AUTO: 34.1 PG (ref 26.6–33)
MCH RBC QN AUTO: 34.2 PG (ref 26.6–33)
MCH RBC QN AUTO: 34.2 PG (ref 26.6–33)
MCH RBC QN AUTO: 35.3 PG (ref 26.6–33)
MCHC RBC AUTO-ENTMCNC: 32.9 G/DL (ref 31.5–35.7)
MCHC RBC AUTO-ENTMCNC: 33.2 G/DL (ref 31.5–35.7)
MCHC RBC AUTO-ENTMCNC: 33.4 G/DL (ref 31.5–35.7)
MCHC RBC AUTO-ENTMCNC: 33.8 G/DL (ref 31.5–35.7)
MCV RBC AUTO: 102.2 FL (ref 79–97)
MCV RBC AUTO: 103 FL (ref 79–97)
MCV RBC AUTO: 104 FL (ref 79–97)
MCV RBC AUTO: 104.5 FL (ref 79–97)
METHGB BLD QL: 0.7 % (ref 0–3)
MODALITY: ABNORMAL
MONOCYTES # BLD AUTO: 0.72 10*3/MM3 (ref 0.1–0.9)
MONOCYTES # BLD AUTO: 0.74 10*3/MM3 (ref 0.1–0.9)
MONOCYTES # BLD AUTO: 1.12 10*3/MM3 (ref 0.1–0.9)
MONOCYTES # BLD: 0.2 10*3/MM3 (ref 0.1–0.9)
MONOCYTES NFR BLD AUTO: 11.3 % (ref 5–12)
MONOCYTES NFR BLD AUTO: 11.5 % (ref 5–12)
MONOCYTES NFR BLD AUTO: 15.8 % (ref 5–12)
NEGATIVE CONTROL: NEGATIVE
NEUTROPHILS # BLD AUTO: 4.07 10*3/MM3 (ref 1.7–7)
NEUTROPHILS NFR BLD AUTO: 4.73 10*3/MM3 (ref 1.7–7)
NEUTROPHILS NFR BLD AUTO: 4.74 10*3/MM3 (ref 1.7–7)
NEUTROPHILS NFR BLD AUTO: 4.82 10*3/MM3 (ref 1.7–7)
NEUTROPHILS NFR BLD AUTO: 66.7 % (ref 42.7–76)
NEUTROPHILS NFR BLD AUTO: 74.1 % (ref 42.7–76)
NEUTROPHILS NFR BLD AUTO: 75.2 % (ref 42.7–76)
NEUTROPHILS NFR BLD MANUAL: 79.6 % (ref 42.7–76)
NEUTS BAND NFR BLD MANUAL: 4.1 % (ref 0–5)
NITRITE UR QL STRIP: NEGATIVE
NRBC BLD AUTO-RTO: 0 /100 WBC (ref 0–0.2)
OSMOLALITY SERPL: 394 MOSM/KG (ref 275–295)
OXYHGB MFR BLDV: 76.6 % (ref 60–85)
PCO2 BLDV: 31 MM HG (ref 41–51)
PH BLDV: 7.31 PH UNITS (ref 7.32–7.42)
PH UR STRIP.AUTO: 5.5 [PH] (ref 5–8)
PHOSPHATE SERPL-MCNC: 0.9 MG/DL (ref 2.5–4.5)
PHOSPHATE SERPL-MCNC: 1.3 MG/DL (ref 2.5–4.5)
PHOSPHATE SERPL-MCNC: 1.3 MG/DL (ref 2.5–4.5)
PHOSPHATE SERPL-MCNC: 1.6 MG/DL (ref 2.5–4.5)
PHOSPHATE SERPL-MCNC: 2.5 MG/DL (ref 2.5–4.5)
PHOSPHATE SERPL-MCNC: 3.8 MG/DL (ref 2.5–4.5)
PLATELET # BLD AUTO: 100 10*3/MM3 (ref 140–450)
PLATELET # BLD AUTO: 107 10*3/MM3 (ref 140–450)
PLATELET # BLD AUTO: 122 10*3/MM3 (ref 140–450)
PLATELET # BLD AUTO: 76 10*3/MM3 (ref 140–450)
PMV BLD AUTO: 11.2 FL (ref 6–12)
PMV BLD AUTO: 11.3 FL (ref 6–12)
PO2 BLDV: 51 MM HG (ref 27–53)
POLYCHROMASIA BLD QL SMEAR: ABNORMAL
POSITIVE CONTROL: POSITIVE
POTASSIUM BLDV-SCNC: 3.1 MMOL/L (ref 3.5–5.2)
POTASSIUM SERPL-SCNC: 3.1 MMOL/L (ref 3.5–5.2)
POTASSIUM SERPL-SCNC: 3.1 MMOL/L (ref 3.5–5.2)
POTASSIUM SERPL-SCNC: 3.2 MMOL/L (ref 3.5–5.2)
POTASSIUM SERPL-SCNC: 3.4 MMOL/L (ref 3.5–5.2)
POTASSIUM SERPL-SCNC: 3.6 MMOL/L (ref 3.5–5.2)
POTASSIUM SERPL-SCNC: 3.7 MMOL/L (ref 3.5–5.2)
PROT SERPL-MCNC: 6.9 G/DL (ref 6–8.5)
PROT SERPL-MCNC: 7.5 G/DL (ref 6–8.5)
PROT UR QL STRIP: NEGATIVE
PROTHROMBIN TIME: 18.7 SECONDS (ref 11.8–14.8)
PROTHROMBIN TIME: 19.3 SECONDS (ref 11.8–14.8)
RBC # BLD AUTO: 3.69 10*6/MM3 (ref 4.14–5.8)
RBC # BLD AUTO: 3.98 10*6/MM3 (ref 4.14–5.8)
RBC # BLD AUTO: 3.99 10*6/MM3 (ref 4.14–5.8)
RBC # BLD AUTO: 4.27 10*6/MM3 (ref 4.14–5.8)
RBC # UR STRIP: NORMAL /HPF
REF LAB TEST METHOD: NORMAL
SAO2 % BLDCOV: 79.1 % (ref 45–75)
SMALL PLATELETS BLD QL SMEAR: ABNORMAL
SODIUM BLDV-SCNC: 138 MMOL/L (ref 136–145)
SODIUM SERPL-SCNC: 134 MMOL/L (ref 136–145)
SODIUM SERPL-SCNC: 137 MMOL/L (ref 136–145)
SODIUM SERPL-SCNC: 137 MMOL/L (ref 136–145)
SODIUM SERPL-SCNC: 138 MMOL/L (ref 136–145)
SODIUM SERPL-SCNC: 140 MMOL/L (ref 136–145)
SODIUM SERPL-SCNC: 141 MMOL/L (ref 136–145)
SP GR UR STRIP: 1.03 (ref 1–1.03)
SQUAMOUS #/AREA URNS HPF: NORMAL /HPF
STOMATOCYTES BLD QL SMEAR: ABNORMAL
TARGETS BLD QL SMEAR: ABNORMAL
UROBILINOGEN UR QL STRIP: ABNORMAL
VARIANT LYMPHS NFR BLD MANUAL: 10.2 % (ref 19.6–45.3)
VENTILATOR MODE: ABNORMAL
WBC # UR STRIP: NORMAL /HPF
WBC MORPH BLD: NORMAL
WBC NRBC COR # BLD AUTO: 4.86 10*3/MM3 (ref 3.4–10.8)
WBC NRBC COR # BLD AUTO: 6.39 10*3/MM3 (ref 3.4–10.8)
WBC NRBC COR # BLD AUTO: 6.41 10*3/MM3 (ref 3.4–10.8)
WBC NRBC COR # BLD AUTO: 7.09 10*3/MM3 (ref 3.4–10.8)

## 2024-04-22 PROCEDURE — 85025 COMPLETE CBC W/AUTO DIFF WBC: CPT | Performed by: NURSE PRACTITIONER

## 2024-04-22 PROCEDURE — 82270 OCCULT BLOOD FECES: CPT | Performed by: EMERGENCY MEDICINE

## 2024-04-22 PROCEDURE — 82948 REAGENT STRIP/BLOOD GLUCOSE: CPT

## 2024-04-22 PROCEDURE — 83036 HEMOGLOBIN GLYCOSYLATED A1C: CPT | Performed by: EMERGENCY MEDICINE

## 2024-04-22 PROCEDURE — 82820 HEMOGLOBIN-OXYGEN AFFINITY: CPT

## 2024-04-22 PROCEDURE — 25010000002 THIAMINE PER 100 MG: Performed by: STUDENT IN AN ORGANIZED HEALTH CARE EDUCATION/TRAINING PROGRAM

## 2024-04-22 PROCEDURE — 0 INSULIN REGULAR HUMAN PER 5 UNITS: Performed by: STUDENT IN AN ORGANIZED HEALTH CARE EDUCATION/TRAINING PROGRAM

## 2024-04-22 PROCEDURE — 74178 CT ABD&PLV WO CNTR FLWD CNTR: CPT

## 2024-04-22 PROCEDURE — 0 INSULIN REGULAR HUMAN PER 5 UNITS: Performed by: EMERGENCY MEDICINE

## 2024-04-22 PROCEDURE — 25010000002 ONDANSETRON PER 1 MG: Performed by: EMERGENCY MEDICINE

## 2024-04-22 PROCEDURE — 83735 ASSAY OF MAGNESIUM: CPT | Performed by: EMERGENCY MEDICINE

## 2024-04-22 PROCEDURE — 36415 COLL VENOUS BLD VENIPUNCTURE: CPT | Performed by: EMERGENCY MEDICINE

## 2024-04-22 PROCEDURE — 82009 KETONE BODYS QUAL: CPT | Performed by: EMERGENCY MEDICINE

## 2024-04-22 PROCEDURE — 85730 THROMBOPLASTIN TIME PARTIAL: CPT | Performed by: EMERGENCY MEDICINE

## 2024-04-22 PROCEDURE — 82805 BLOOD GASES W/O2 SATURATION: CPT

## 2024-04-22 PROCEDURE — 76705 ECHO EXAM OF ABDOMEN: CPT

## 2024-04-22 PROCEDURE — 25010000002 POTASSIUM CHLORIDE 10 MEQ/100ML SOLUTION: Performed by: NURSE PRACTITIONER

## 2024-04-22 PROCEDURE — 85025 COMPLETE CBC W/AUTO DIFF WBC: CPT | Performed by: EMERGENCY MEDICINE

## 2024-04-22 PROCEDURE — 80053 COMPREHEN METABOLIC PANEL: CPT | Performed by: EMERGENCY MEDICINE

## 2024-04-22 PROCEDURE — 25010000002 OCTREOTIDE PER 25 MCG: Performed by: STUDENT IN AN ORGANIZED HEALTH CARE EDUCATION/TRAINING PROGRAM

## 2024-04-22 PROCEDURE — 85025 COMPLETE CBC W/AUTO DIFF WBC: CPT | Performed by: STUDENT IN AN ORGANIZED HEALTH CARE EDUCATION/TRAINING PROGRAM

## 2024-04-22 PROCEDURE — 84100 ASSAY OF PHOSPHORUS: CPT | Performed by: EMERGENCY MEDICINE

## 2024-04-22 PROCEDURE — 85730 THROMBOPLASTIN TIME PARTIAL: CPT | Performed by: STUDENT IN AN ORGANIZED HEALTH CARE EDUCATION/TRAINING PROGRAM

## 2024-04-22 PROCEDURE — 25010000002 THIAMINE PER 100 MG: Performed by: EMERGENCY MEDICINE

## 2024-04-22 PROCEDURE — 25810000003 SODIUM CHLORIDE 0.9 % SOLUTION: Performed by: EMERGENCY MEDICINE

## 2024-04-22 PROCEDURE — 63710000001 INSULIN REGULAR HUMAN PER 5 UNITS: Performed by: EMERGENCY MEDICINE

## 2024-04-22 PROCEDURE — 82947 ASSAY GLUCOSE BLOOD QUANT: CPT | Performed by: STUDENT IN AN ORGANIZED HEALTH CARE EDUCATION/TRAINING PROGRAM

## 2024-04-22 PROCEDURE — 84100 ASSAY OF PHOSPHORUS: CPT | Performed by: STUDENT IN AN ORGANIZED HEALTH CARE EDUCATION/TRAINING PROGRAM

## 2024-04-22 PROCEDURE — 25010000002 MAGNESIUM SULFATE 2 GM/50ML SOLUTION: Performed by: INTERNAL MEDICINE

## 2024-04-22 PROCEDURE — 83690 ASSAY OF LIPASE: CPT | Performed by: EMERGENCY MEDICINE

## 2024-04-22 PROCEDURE — 25010000002 LORAZEPAM PER 2 MG: Performed by: STUDENT IN AN ORGANIZED HEALTH CARE EDUCATION/TRAINING PROGRAM

## 2024-04-22 PROCEDURE — 99222 1ST HOSP IP/OBS MODERATE 55: CPT | Performed by: NURSE PRACTITIONER

## 2024-04-22 PROCEDURE — 25010000002 POTASSIUM CHLORIDE PER 2 MEQ OF POTASSIUM: Performed by: STUDENT IN AN ORGANIZED HEALTH CARE EDUCATION/TRAINING PROGRAM

## 2024-04-22 PROCEDURE — 85610 PROTHROMBIN TIME: CPT | Performed by: STUDENT IN AN ORGANIZED HEALTH CARE EDUCATION/TRAINING PROGRAM

## 2024-04-22 PROCEDURE — 82077 ASSAY SPEC XCP UR&BREATH IA: CPT | Performed by: EMERGENCY MEDICINE

## 2024-04-22 PROCEDURE — 25810000003 SODIUM CHLORIDE 0.9 % SOLUTION 250 ML FLEX CONT: Performed by: INTERNAL MEDICINE

## 2024-04-22 PROCEDURE — 85018 HEMOGLOBIN: CPT | Performed by: STUDENT IN AN ORGANIZED HEALTH CARE EDUCATION/TRAINING PROGRAM

## 2024-04-22 PROCEDURE — 25010000002 ONDANSETRON PER 1 MG: Performed by: NURSE PRACTITIONER

## 2024-04-22 PROCEDURE — 71045 X-RAY EXAM CHEST 1 VIEW: CPT

## 2024-04-22 PROCEDURE — 83930 ASSAY OF BLOOD OSMOLALITY: CPT | Performed by: EMERGENCY MEDICINE

## 2024-04-22 PROCEDURE — 83735 ASSAY OF MAGNESIUM: CPT | Performed by: STUDENT IN AN ORGANIZED HEALTH CARE EDUCATION/TRAINING PROGRAM

## 2024-04-22 PROCEDURE — 81001 URINALYSIS AUTO W/SCOPE: CPT | Performed by: EMERGENCY MEDICINE

## 2024-04-22 PROCEDURE — 85610 PROTHROMBIN TIME: CPT | Performed by: EMERGENCY MEDICINE

## 2024-04-22 PROCEDURE — 25010000002 POTASSIUM CHLORIDE 10 MEQ/100ML SOLUTION: Performed by: INTERNAL MEDICINE

## 2024-04-22 PROCEDURE — 85007 BL SMEAR W/DIFF WBC COUNT: CPT | Performed by: NURSE PRACTITIONER

## 2024-04-22 PROCEDURE — 25510000001 IOPAMIDOL 61 % SOLUTION: Performed by: INTERNAL MEDICINE

## 2024-04-22 PROCEDURE — 83605 ASSAY OF LACTIC ACID: CPT | Performed by: STUDENT IN AN ORGANIZED HEALTH CARE EDUCATION/TRAINING PROGRAM

## 2024-04-22 RX ORDER — ONDANSETRON 2 MG/ML
4 INJECTION INTRAMUSCULAR; INTRAVENOUS ONCE
Status: COMPLETED | OUTPATIENT
Start: 2024-04-22 | End: 2024-04-22

## 2024-04-22 RX ORDER — DEXTROSE AND SODIUM CHLORIDE 5; .45 G/100ML; G/100ML
150 INJECTION, SOLUTION INTRAVENOUS CONTINUOUS PRN
Status: DISCONTINUED | OUTPATIENT
Start: 2024-04-22 | End: 2024-04-23

## 2024-04-22 RX ORDER — LORAZEPAM 2 MG/ML
1 INJECTION INTRAMUSCULAR EVERY 6 HOURS
Qty: 8 ML | Refills: 0 | Status: COMPLETED | OUTPATIENT
Start: 2024-04-23 | End: 2024-04-24

## 2024-04-22 RX ORDER — SODIUM CHLORIDE AND POTASSIUM CHLORIDE 150; 900 MG/100ML; MG/100ML
250 INJECTION, SOLUTION INTRAVENOUS CONTINUOUS PRN
Status: DISCONTINUED | OUTPATIENT
Start: 2024-04-22 | End: 2024-04-22 | Stop reason: SDUPTHER

## 2024-04-22 RX ORDER — SODIUM CHLORIDE 0.9 % (FLUSH) 0.9 %
10 SYRINGE (ML) INJECTION AS NEEDED
Status: DISCONTINUED | OUTPATIENT
Start: 2024-04-22 | End: 2024-04-25 | Stop reason: HOSPADM

## 2024-04-22 RX ORDER — SODIUM CHLORIDE AND POTASSIUM CHLORIDE 300; 900 MG/100ML; MG/100ML
250 INJECTION, SOLUTION INTRAVENOUS CONTINUOUS PRN
Status: DISCONTINUED | OUTPATIENT
Start: 2024-04-22 | End: 2024-04-22 | Stop reason: SDUPTHER

## 2024-04-22 RX ORDER — ALBUTEROL SULFATE 90 UG/1
2 AEROSOL, METERED RESPIRATORY (INHALATION) EVERY 4 HOURS PRN
Status: DISCONTINUED | OUTPATIENT
Start: 2024-04-22 | End: 2024-04-22 | Stop reason: CLARIF

## 2024-04-22 RX ORDER — SODIUM CHLORIDE AND POTASSIUM CHLORIDE 150; 450 MG/100ML; MG/100ML
250 INJECTION, SOLUTION INTRAVENOUS CONTINUOUS PRN
Status: DISCONTINUED | OUTPATIENT
Start: 2024-04-22 | End: 2024-04-23

## 2024-04-22 RX ORDER — DEXTROSE MONOHYDRATE, SODIUM CHLORIDE, AND POTASSIUM CHLORIDE 50; 2.98; 9 G/1000ML; G/1000ML; G/1000ML
150 INJECTION, SOLUTION INTRAVENOUS CONTINUOUS PRN
Status: DISCONTINUED | OUTPATIENT
Start: 2024-04-22 | End: 2024-04-23

## 2024-04-22 RX ORDER — PANTOPRAZOLE SODIUM 40 MG/10ML
40 INJECTION, POWDER, LYOPHILIZED, FOR SOLUTION INTRAVENOUS EVERY 12 HOURS SCHEDULED
Status: DISCONTINUED | OUTPATIENT
Start: 2024-04-22 | End: 2024-04-25 | Stop reason: HOSPADM

## 2024-04-22 RX ORDER — FOLIC ACID 1 MG/1
1 TABLET ORAL DAILY
Status: DISCONTINUED | OUTPATIENT
Start: 2024-04-22 | End: 2024-04-25 | Stop reason: HOSPADM

## 2024-04-22 RX ORDER — SODIUM CHLORIDE 9 MG/ML
250 INJECTION, SOLUTION INTRAVENOUS CONTINUOUS PRN
Status: DISCONTINUED | OUTPATIENT
Start: 2024-04-22 | End: 2024-04-22 | Stop reason: SDUPTHER

## 2024-04-22 RX ORDER — LORAZEPAM 1 MG/1
2 TABLET ORAL
Status: DISCONTINUED | OUTPATIENT
Start: 2024-04-22 | End: 2024-04-25 | Stop reason: HOSPADM

## 2024-04-22 RX ORDER — LORAZEPAM 2 MG/ML
4 INJECTION INTRAMUSCULAR
Status: DISCONTINUED | OUTPATIENT
Start: 2024-04-22 | End: 2024-04-25 | Stop reason: HOSPADM

## 2024-04-22 RX ORDER — POTASSIUM CHLORIDE 7.45 MG/ML
10 INJECTION INTRAVENOUS
Status: COMPLETED | OUTPATIENT
Start: 2024-04-22 | End: 2024-04-22

## 2024-04-22 RX ORDER — SODIUM CHLORIDE AND POTASSIUM CHLORIDE 150; 900 MG/100ML; MG/100ML
250 INJECTION, SOLUTION INTRAVENOUS CONTINUOUS PRN
Status: DISCONTINUED | OUTPATIENT
Start: 2024-04-22 | End: 2024-04-23

## 2024-04-22 RX ORDER — DEXTROSE MONOHYDRATE, SODIUM CHLORIDE, AND POTASSIUM CHLORIDE 50; 2.98; 4.5 G/1000ML; G/1000ML; G/1000ML
150 INJECTION, SOLUTION INTRAVENOUS CONTINUOUS PRN
Status: DISCONTINUED | OUTPATIENT
Start: 2024-04-22 | End: 2024-04-22 | Stop reason: SDUPTHER

## 2024-04-22 RX ORDER — CHLORHEXIDINE GLUCONATE 500 MG/1
1 CLOTH TOPICAL EVERY 24 HOURS
Status: DISCONTINUED | OUTPATIENT
Start: 2024-04-23 | End: 2024-04-25

## 2024-04-22 RX ORDER — DEXTROSE MONOHYDRATE, SODIUM CHLORIDE, AND POTASSIUM CHLORIDE 50; 1.49; 9 G/1000ML; G/1000ML; G/1000ML
150 INJECTION, SOLUTION INTRAVENOUS CONTINUOUS PRN
Status: DISCONTINUED | OUTPATIENT
Start: 2024-04-22 | End: 2024-04-22 | Stop reason: SDUPTHER

## 2024-04-22 RX ORDER — SODIUM CHLORIDE 9 MG/ML
40 INJECTION, SOLUTION INTRAVENOUS AS NEEDED
Status: DISCONTINUED | OUTPATIENT
Start: 2024-04-22 | End: 2024-04-22 | Stop reason: SDUPTHER

## 2024-04-22 RX ORDER — IBUPROFEN 600 MG/1
1 TABLET ORAL
Status: DISCONTINUED | OUTPATIENT
Start: 2024-04-22 | End: 2024-04-22 | Stop reason: SDUPTHER

## 2024-04-22 RX ORDER — MULTIPLE VITAMINS W/ MINERALS TAB 9MG-400MCG
1 TAB ORAL DAILY
Status: DISCONTINUED | OUTPATIENT
Start: 2024-04-22 | End: 2024-04-25 | Stop reason: HOSPADM

## 2024-04-22 RX ORDER — NITROGLYCERIN 0.4 MG/1
0.4 TABLET SUBLINGUAL
Status: DISCONTINUED | OUTPATIENT
Start: 2024-04-22 | End: 2024-04-25 | Stop reason: HOSPADM

## 2024-04-22 RX ORDER — LORAZEPAM 2 MG/ML
2 INJECTION INTRAMUSCULAR EVERY 6 HOURS
Qty: 4 ML | Refills: 0 | Status: COMPLETED | OUTPATIENT
Start: 2024-04-22 | End: 2024-04-22

## 2024-04-22 RX ORDER — LORAZEPAM 2 MG/ML
1 INJECTION INTRAMUSCULAR
Status: DISCONTINUED | OUTPATIENT
Start: 2024-04-22 | End: 2024-04-25 | Stop reason: HOSPADM

## 2024-04-22 RX ORDER — PANTOPRAZOLE SODIUM 40 MG/10ML
80 INJECTION, POWDER, LYOPHILIZED, FOR SOLUTION INTRAVENOUS ONCE
Status: COMPLETED | OUTPATIENT
Start: 2024-04-22 | End: 2024-04-22

## 2024-04-22 RX ORDER — CHLORDIAZEPOXIDE HYDROCHLORIDE 25 MG/1
50 CAPSULE, GELATIN COATED ORAL EVERY 6 HOURS SCHEDULED
Qty: 40 CAPSULE | Refills: 0 | Status: DISCONTINUED | OUTPATIENT
Start: 2024-04-22 | End: 2024-04-22

## 2024-04-22 RX ORDER — DEXTROSE MONOHYDRATE, SODIUM CHLORIDE, AND POTASSIUM CHLORIDE 50; 1.49; 4.5 G/1000ML; G/1000ML; G/1000ML
150 INJECTION, SOLUTION INTRAVENOUS CONTINUOUS PRN
Status: DISCONTINUED | OUTPATIENT
Start: 2024-04-22 | End: 2024-04-22 | Stop reason: SDUPTHER

## 2024-04-22 RX ORDER — NICOTINE POLACRILEX 4 MG
15 LOZENGE BUCCAL
Status: DISCONTINUED | OUTPATIENT
Start: 2024-04-22 | End: 2024-04-22 | Stop reason: SDUPTHER

## 2024-04-22 RX ORDER — SODIUM CHLORIDE 9 MG/ML
40 INJECTION, SOLUTION INTRAVENOUS AS NEEDED
Status: DISCONTINUED | OUTPATIENT
Start: 2024-04-22 | End: 2024-04-25 | Stop reason: HOSPADM

## 2024-04-22 RX ORDER — SODIUM CHLORIDE 450 MG/100ML
250 INJECTION, SOLUTION INTRAVENOUS CONTINUOUS PRN
Status: DISCONTINUED | OUTPATIENT
Start: 2024-04-22 | End: 2024-04-23

## 2024-04-22 RX ORDER — DEXTROSE AND SODIUM CHLORIDE 5; .45 G/100ML; G/100ML
150 INJECTION, SOLUTION INTRAVENOUS CONTINUOUS PRN
Status: DISCONTINUED | OUTPATIENT
Start: 2024-04-22 | End: 2024-04-22 | Stop reason: SDUPTHER

## 2024-04-22 RX ORDER — DEXTROSE MONOHYDRATE, SODIUM CHLORIDE, AND POTASSIUM CHLORIDE 50; 1.49; 4.5 G/1000ML; G/1000ML; G/1000ML
150 INJECTION, SOLUTION INTRAVENOUS CONTINUOUS PRN
Status: DISCONTINUED | OUTPATIENT
Start: 2024-04-22 | End: 2024-04-23

## 2024-04-22 RX ORDER — LORAZEPAM 2 MG/ML
2 INJECTION INTRAMUSCULAR
Status: DISCONTINUED | OUTPATIENT
Start: 2024-04-22 | End: 2024-04-25 | Stop reason: HOSPADM

## 2024-04-22 RX ORDER — DEXTROSE MONOHYDRATE 25 G/50ML
10-50 INJECTION, SOLUTION INTRAVENOUS
Status: DISCONTINUED | OUTPATIENT
Start: 2024-04-22 | End: 2024-04-23 | Stop reason: SDUPTHER

## 2024-04-22 RX ORDER — SODIUM CHLORIDE AND POTASSIUM CHLORIDE 300; 900 MG/100ML; MG/100ML
250 INJECTION, SOLUTION INTRAVENOUS CONTINUOUS PRN
Status: DISCONTINUED | OUTPATIENT
Start: 2024-04-22 | End: 2024-04-23

## 2024-04-22 RX ORDER — SODIUM CHLORIDE AND POTASSIUM CHLORIDE 150; 450 MG/100ML; MG/100ML
250 INJECTION, SOLUTION INTRAVENOUS CONTINUOUS PRN
Status: DISCONTINUED | OUTPATIENT
Start: 2024-04-22 | End: 2024-04-22 | Stop reason: SDUPTHER

## 2024-04-22 RX ORDER — SODIUM CHLORIDE 0.9 % (FLUSH) 0.9 %
10 SYRINGE (ML) INJECTION EVERY 12 HOURS SCHEDULED
Status: DISCONTINUED | OUTPATIENT
Start: 2024-04-22 | End: 2024-04-25 | Stop reason: HOSPADM

## 2024-04-22 RX ORDER — LORAZEPAM 1 MG/1
1 TABLET ORAL
Status: DISCONTINUED | OUTPATIENT
Start: 2024-04-22 | End: 2024-04-25 | Stop reason: HOSPADM

## 2024-04-22 RX ORDER — MAGNESIUM SULFATE HEPTAHYDRATE 40 MG/ML
2 INJECTION, SOLUTION INTRAVENOUS
Status: COMPLETED | OUTPATIENT
Start: 2024-04-22 | End: 2024-04-22

## 2024-04-22 RX ORDER — LORAZEPAM 1 MG/1
4 TABLET ORAL
Status: DISCONTINUED | OUTPATIENT
Start: 2024-04-22 | End: 2024-04-25 | Stop reason: HOSPADM

## 2024-04-22 RX ORDER — DEXTROSE AND SODIUM CHLORIDE 5; .9 G/100ML; G/100ML
150 INJECTION, SOLUTION INTRAVENOUS CONTINUOUS PRN
Status: DISCONTINUED | OUTPATIENT
Start: 2024-04-22 | End: 2024-04-22 | Stop reason: SDUPTHER

## 2024-04-22 RX ORDER — DEXTROSE MONOHYDRATE, SODIUM CHLORIDE, AND POTASSIUM CHLORIDE 50; 2.98; 4.5 G/1000ML; G/1000ML; G/1000ML
150 INJECTION, SOLUTION INTRAVENOUS CONTINUOUS PRN
Status: DISCONTINUED | OUTPATIENT
Start: 2024-04-22 | End: 2024-04-23

## 2024-04-22 RX ORDER — DEXTROSE MONOHYDRATE, SODIUM CHLORIDE, AND POTASSIUM CHLORIDE 50; 1.49; 9 G/1000ML; G/1000ML; G/1000ML
150 INJECTION, SOLUTION INTRAVENOUS CONTINUOUS PRN
Status: DISCONTINUED | OUTPATIENT
Start: 2024-04-22 | End: 2024-04-23

## 2024-04-22 RX ORDER — ONDANSETRON 2 MG/ML
4 INJECTION INTRAMUSCULAR; INTRAVENOUS EVERY 6 HOURS PRN
Status: DISCONTINUED | OUTPATIENT
Start: 2024-04-22 | End: 2024-04-25 | Stop reason: HOSPADM

## 2024-04-22 RX ORDER — ALBUTEROL SULFATE 2.5 MG/3ML
2.5 SOLUTION RESPIRATORY (INHALATION) EVERY 4 HOURS PRN
Status: DISCONTINUED | OUTPATIENT
Start: 2024-04-22 | End: 2024-04-25 | Stop reason: HOSPADM

## 2024-04-22 RX ORDER — DEXTROSE MONOHYDRATE 25 G/50ML
10-50 INJECTION, SOLUTION INTRAVENOUS
Status: DISCONTINUED | OUTPATIENT
Start: 2024-04-22 | End: 2024-04-22 | Stop reason: SDUPTHER

## 2024-04-22 RX ORDER — POTASSIUM CHLORIDE 7.45 MG/ML
10 INJECTION INTRAVENOUS
Status: COMPLETED | OUTPATIENT
Start: 2024-04-22 | End: 2024-04-23

## 2024-04-22 RX ORDER — DEXTROSE MONOHYDRATE, SODIUM CHLORIDE, AND POTASSIUM CHLORIDE 50; 2.98; 9 G/1000ML; G/1000ML; G/1000ML
150 INJECTION, SOLUTION INTRAVENOUS CONTINUOUS PRN
Status: DISCONTINUED | OUTPATIENT
Start: 2024-04-22 | End: 2024-04-22 | Stop reason: SDUPTHER

## 2024-04-22 RX ORDER — CHLORHEXIDINE GLUCONATE 500 MG/1
1 CLOTH TOPICAL ONCE
Status: COMPLETED | OUTPATIENT
Start: 2024-04-22 | End: 2024-04-22

## 2024-04-22 RX ORDER — THIAMINE HYDROCHLORIDE 100 MG/ML
200 INJECTION, SOLUTION INTRAMUSCULAR; INTRAVENOUS EVERY 8 HOURS SCHEDULED
Status: DISCONTINUED | OUTPATIENT
Start: 2024-04-22 | End: 2024-04-25 | Stop reason: HOSPADM

## 2024-04-22 RX ORDER — SODIUM CHLORIDE 450 MG/100ML
250 INJECTION, SOLUTION INTRAVENOUS CONTINUOUS PRN
Status: DISCONTINUED | OUTPATIENT
Start: 2024-04-22 | End: 2024-04-22 | Stop reason: SDUPTHER

## 2024-04-22 RX ORDER — SODIUM CHLORIDE 9 MG/ML
250 INJECTION, SOLUTION INTRAVENOUS CONTINUOUS PRN
Status: DISCONTINUED | OUTPATIENT
Start: 2024-04-22 | End: 2024-04-23

## 2024-04-22 RX ORDER — DEXTROSE AND SODIUM CHLORIDE 5; .9 G/100ML; G/100ML
150 INJECTION, SOLUTION INTRAVENOUS CONTINUOUS PRN
Status: DISCONTINUED | OUTPATIENT
Start: 2024-04-22 | End: 2024-04-23

## 2024-04-22 RX ORDER — NICOTINE POLACRILEX 4 MG
15 LOZENGE BUCCAL
Status: DISCONTINUED | OUTPATIENT
Start: 2024-04-22 | End: 2024-04-23 | Stop reason: SDUPTHER

## 2024-04-22 RX ORDER — INSULIN ASPART 100 [IU]/ML
15 INJECTION, SUSPENSION SUBCUTANEOUS 2 TIMES DAILY WITH MEALS
COMMUNITY
End: 2024-05-03

## 2024-04-22 RX ADMIN — POTASSIUM PHOSPHATE, MONOBASIC AND POTASSIUM PHOSPHATE, DIBASIC 15 MMOL: 224; 236 INJECTION, SOLUTION, CONCENTRATE INTRAVENOUS at 17:54

## 2024-04-22 RX ADMIN — PANTOPRAZOLE SODIUM 40 MG: 40 INJECTION, POWDER, FOR SOLUTION INTRAVENOUS at 12:18

## 2024-04-22 RX ADMIN — LORAZEPAM 2 MG: 2 INJECTION, SOLUTION INTRAMUSCULAR; INTRAVENOUS at 12:47

## 2024-04-22 RX ADMIN — POTASSIUM CHLORIDE, DEXTROSE MONOHYDRATE AND SODIUM CHLORIDE 150 ML/HR: 300; 5; 450 INJECTION, SOLUTION INTRAVENOUS at 18:54

## 2024-04-22 RX ADMIN — MAGNESIUM SULFATE HEPTAHYDRATE 2 G: 2 INJECTION, SOLUTION INTRAVENOUS at 10:31

## 2024-04-22 RX ADMIN — POTASSIUM CHLORIDE 10 MEQ: 7.46 INJECTION, SOLUTION INTRAVENOUS at 10:32

## 2024-04-22 RX ADMIN — OCTREOTIDE ACETATE 50 MCG/HR: 500 INJECTION, SOLUTION INTRAVENOUS; SUBCUTANEOUS at 06:24

## 2024-04-22 RX ADMIN — LORAZEPAM 2 MG: 2 INJECTION, SOLUTION INTRAMUSCULAR; INTRAVENOUS at 09:34

## 2024-04-22 RX ADMIN — LORAZEPAM 2 MG: 2 INJECTION, SOLUTION INTRAMUSCULAR; INTRAVENOUS at 15:54

## 2024-04-22 RX ADMIN — POTASSIUM CHLORIDE 250 ML/HR: 149 INJECTION, SOLUTION, CONCENTRATE INTRAVENOUS at 11:15

## 2024-04-22 RX ADMIN — SODIUM CHLORIDE 1000 ML/HR: 9 INJECTION, SOLUTION INTRAVENOUS at 04:35

## 2024-04-22 RX ADMIN — PANTOPRAZOLE SODIUM 80 MG: 40 INJECTION, POWDER, FOR SOLUTION INTRAVENOUS at 01:32

## 2024-04-22 RX ADMIN — MAGNESIUM SULFATE HEPTAHYDRATE 2 G: 2 INJECTION, SOLUTION INTRAVENOUS at 11:14

## 2024-04-22 RX ADMIN — FOLIC ACID 1 MG: 1 TABLET ORAL at 09:34

## 2024-04-22 RX ADMIN — POTASSIUM CHLORIDE 10 MEQ: 7.46 INJECTION, SOLUTION INTRAVENOUS at 14:22

## 2024-04-22 RX ADMIN — POTASSIUM CHLORIDE, DEXTROSE MONOHYDRATE AND SODIUM CHLORIDE 150 ML/HR: 300; 5; 450 INJECTION, SOLUTION INTRAVENOUS at 15:03

## 2024-04-22 RX ADMIN — POTASSIUM CHLORIDE 10 MEQ: 7.46 INJECTION, SOLUTION INTRAVENOUS at 15:04

## 2024-04-22 RX ADMIN — SODIUM CHLORIDE 4.5 UNITS/HR: 9 INJECTION, SOLUTION INTRAVENOUS at 03:15

## 2024-04-22 RX ADMIN — Medication 10 ML: at 03:30

## 2024-04-22 RX ADMIN — CHLORHEXIDINE GLUCONATE 1 APPLICATION: 500 CLOTH TOPICAL at 03:29

## 2024-04-22 RX ADMIN — POTASSIUM CHLORIDE 10 MEQ: 7.46 INJECTION, SOLUTION INTRAVENOUS at 15:53

## 2024-04-22 RX ADMIN — POTASSIUM CHLORIDE 10 MEQ: 7.46 INJECTION, SOLUTION INTRAVENOUS at 17:47

## 2024-04-22 RX ADMIN — IOPAMIDOL 100 ML: 612 INJECTION, SOLUTION INTRAVENOUS at 20:39

## 2024-04-22 RX ADMIN — THIAMINE HYDROCHLORIDE 200 MG: 100 INJECTION, SOLUTION INTRAMUSCULAR; INTRAVENOUS at 05:34

## 2024-04-22 RX ADMIN — POTASSIUM CHLORIDE 10 MEQ: 7.46 INJECTION, SOLUTION INTRAVENOUS at 12:19

## 2024-04-22 RX ADMIN — Medication 10 ML: at 21:07

## 2024-04-22 RX ADMIN — THIAMINE HYDROCHLORIDE 200 MG: 100 INJECTION, SOLUTION INTRAMUSCULAR; INTRAVENOUS at 21:06

## 2024-04-22 RX ADMIN — SODIUM CHLORIDE 1000 ML: 9 INJECTION, SOLUTION INTRAVENOUS at 01:49

## 2024-04-22 RX ADMIN — OCTREOTIDE ACETATE 50 MCG/HR: 500 INJECTION, SOLUTION INTRAVENOUS; SUBCUTANEOUS at 15:05

## 2024-04-22 RX ADMIN — ONDANSETRON 4 MG: 2 INJECTION INTRAMUSCULAR; INTRAVENOUS at 08:27

## 2024-04-22 RX ADMIN — LORAZEPAM 2 MG: 2 INJECTION, SOLUTION INTRAMUSCULAR; INTRAVENOUS at 21:06

## 2024-04-22 RX ADMIN — FOLIC ACID 1 MG: 5 INJECTION, SOLUTION INTRAMUSCULAR; INTRAVENOUS; SUBCUTANEOUS at 02:25

## 2024-04-22 RX ADMIN — THIAMINE HYDROCHLORIDE 100 MG: 100 INJECTION, SOLUTION INTRAMUSCULAR; INTRAVENOUS at 01:52

## 2024-04-22 RX ADMIN — Medication 10 ML: at 09:34

## 2024-04-22 RX ADMIN — POTASSIUM PHOSPHATE, MONOBASIC AND POTASSIUM PHOSPHATE, DIBASIC 15 MMOL: 224; 236 INJECTION, SOLUTION, CONCENTRATE INTRAVENOUS at 21:58

## 2024-04-22 RX ADMIN — CHLORDIAZEPOXIDE HYDROCHLORIDE 50 MG: 25 CAPSULE ORAL at 12:18

## 2024-04-22 RX ADMIN — ONDANSETRON 4 MG: 2 INJECTION INTRAMUSCULAR; INTRAVENOUS at 01:30

## 2024-04-22 RX ADMIN — THIAMINE HYDROCHLORIDE 200 MG: 100 INJECTION, SOLUTION INTRAMUSCULAR; INTRAVENOUS at 14:26

## 2024-04-22 RX ADMIN — MAGNESIUM SULFATE HEPTAHYDRATE 2 G: 2 INJECTION, SOLUTION INTRAVENOUS at 13:30

## 2024-04-22 RX ADMIN — INSULIN HUMAN 10 UNITS: 100 INJECTION, SOLUTION PARENTERAL at 01:43

## 2024-04-22 RX ADMIN — PANTOPRAZOLE SODIUM 40 MG: 40 INJECTION, POWDER, FOR SOLUTION INTRAVENOUS at 21:06

## 2024-04-22 RX ADMIN — Medication 1 TABLET: at 09:34

## 2024-04-22 RX ADMIN — Medication 1 APPLICATION: at 04:06

## 2024-04-22 RX ADMIN — LORAZEPAM 2 MG: 2 INJECTION, SOLUTION INTRAMUSCULAR; INTRAVENOUS at 04:06

## 2024-04-22 RX ADMIN — CHLORDIAZEPOXIDE HYDROCHLORIDE 50 MG: 25 CAPSULE ORAL at 05:34

## 2024-04-22 RX ADMIN — SODIUM CHLORIDE 8.2 UNITS/HR: 9 INJECTION, SOLUTION INTRAVENOUS at 15:08

## 2024-04-22 RX ADMIN — Medication 1 APPLICATION: at 09:34

## 2024-04-22 RX ADMIN — POTASSIUM CHLORIDE 10 MEQ: 7.46 INJECTION, SOLUTION INTRAVENOUS at 23:30

## 2024-04-22 RX ADMIN — ONDANSETRON 4 MG: 2 INJECTION INTRAMUSCULAR; INTRAVENOUS at 00:23

## 2024-04-22 RX ADMIN — POTASSIUM PHOSPHATE, MONOBASIC AND POTASSIUM PHOSPHATE, DIBASIC 15 MMOL: 224; 236 INJECTION, SOLUTION, CONCENTRATE INTRAVENOUS at 15:03

## 2024-04-22 RX ADMIN — SODIUM CHLORIDE 1000 ML: 9 INJECTION, SOLUTION INTRAVENOUS at 00:21

## 2024-04-22 RX ADMIN — Medication 1 APPLICATION: at 21:06

## 2024-04-22 RX ADMIN — POTASSIUM CHLORIDE 250 ML/HR: 149 INJECTION, SOLUTION, CONCENTRATE INTRAVENOUS at 05:38

## 2024-04-22 NOTE — ED NOTES
"Pt admits to drinking appx 40 oz of malt liqour beer, states \"I know I have a drinking problem\"   "

## 2024-04-22 NOTE — CASE MANAGEMENT/SOCIAL WORK
Discharge Planning Assessment   [place]     Patient Name: Luciano Christiansen  MRN: [id number]  Today's Date: 4/22/2024    Admit Date: 4/21/2024        Discharge Needs Assessment       Row Name 04/22/24 1009       Living Environment    Current Living Arrangements home    Primary Care Provided by self    Quality of Family Relationships unable to assess    Able to Return to Prior Arrangements yes       Resource/Environmental Concerns    Transportation Concerns none       Transportation Needs    In the past 12 months, has lack of transportation kept you from medical appointments or from getting medications? no    In the past 12 months, has lack of transportation kept you from meetings, work, or from getting things needed for daily living? No       Food Insecurity    Within the past 12 months, you worried that your food would run out before you got the money to buy more. Never true    Within the past 12 months, the food you bought just didn't last and you didn't have money to get more. Never true       Transition Planning    Patient/Family Anticipates Transition to home with family    Patient/Family Anticipated Services at Transition     Transportation Anticipated family or friend will provide       Discharge Needs Assessment    Readmission Within the Last 30 Days no previous admission in last 30 days    Equipment Currently Used at Home none    Concerns to be Addressed substance/tobacco abuse/use;discharge planning    Anticipated Changes Related to Illness none    Equipment Needed After Discharge none    Outpatient/Agency/Support Group Needs outpatient substance abuse treatment    Discharge Facility/Level of Care Needs substance abuse facility    Current Discharge Risk substance use/abuse    Discharge Coordination/Progress Familiar with patient from previous admit.  Patient was provided with chemical dependency packet during previous admit 2/17 - 2/20/24.  Can provide another chemical dependency packet during  this admission.                   Discharge Plan    No documentation.                 Continued Care and Services - Admitted Since 4/21/2024    No active coordination exists for this encounter.          Demographic Summary    No documentation.                  Functional Status    No documentation.                  Psychosocial    No documentation.                  Abuse/Neglect    No documentation.                  Legal    No documentation.                  Substance Abuse    No documentation.                  Patient Forms    No documentation.                     MICHELLE Blair

## 2024-04-22 NOTE — H&P
[facility] Critical Care Medicine Services  HISTORY AND PHYSICAL    Date of Admission: 4/21/2024  Primary Care Physician: Eleuterio Rangel MD    Subjective   Primary Historian: Patient    Chief Complaint:  vomiting    History of Present Illness  Patient presents to the hospital with chief complaint of vomiting blood.  He states that is mostly just bloody fluid.  He has some epigastric pain and has had multiple episodes of vomiting.  Upon evaluation in the emergency department he is FOBT positive however hemoglobin is stable.  At this time today discussed the case with gastroenterology who suggested admission for endoscopy once the patient is not vomiting anymore.  Further evaluation showed that the patient was in significant DKA and therefore admitted to the ICU for DKA treatment but will also be seen by gastroenterology while the patient is here.  Patient is overall hemodynamically stable and doing well at this time.  Unfortunately the patient is still drinking alcohol and currently in inebriated on exam.        Review of Systems   Otherwise complete ROS reviewed and negative except as mentioned in the HPI.    Past Medical History:   Past Medical History:   Diagnosis Date    Diabetes mellitus     Gout     Hypertension      Past Surgical History:  Past Surgical History:   Procedure Laterality Date    VASECTOMY       Social History:  reports that he has been smoking cigarettes. He started smoking about 14 years ago. He has a 7.2 pack-year smoking history. He has never used smokeless tobacco. He reports current alcohol use of about 12.0 standard drinks of alcohol per week. He reports current drug use. Drug: Marijuana.    Family History: family history is not on file.   Reviewed and noncontributory    Allergies:  No Known Allergies    Medications:  Prior to Admission medications    Medication Sig Start Date End Date Taking? Authorizing Provider   albuterol sulfate  (90 Base) MCG/ACT  "inhaler Inhale 2 puffs Every 4 (Four) Hours As Needed for Wheezing. 2/20/24   Frankie Camacho MD   cefuroxime (CEFTIN) 500 MG tablet Take 1 tablet by mouth 2 (Two) Times a Day. 2/20/24   Frankie Camacho MD   insulin aspart prot-insulin aspart (novoLOG 70/30) (70-30) 100 UNIT/ML injection Inject 15 Units under the skin into the appropriate area as directed 2 (Two) Times a Day With Meals. add 2/20/24   Frankie Camacho MD     I have utilized all available immediate resources to obtain, update, or review the patient's current medications (including all prescriptions, over-the-counter products, herbals, cannabis/cannabidiol products, and vitamin/mineral/dietary (nutritional) supplements).    Objective     Vital Signs: /70   Pulse 106   Temp 97.4 °F (36.3 °C) (Axillary)   Resp 22   Ht 188 cm (74\")   Wt 127 kg (280 lb)   SpO2 98%   BMI 35.95 kg/m²   Physical Exam   General:  Awake, looks stated age, and pleasant  HEENT: PEERLA, EOM intact, oral mucosa is moist.  Skin: No rash, no jaundice, no ulcer.  Neck: no lymphadenopathy  CVS: Tachycardic rate, +S1, +S2, no murmurs or rubs.  Lungs: No abnormal respiratory sounds. No tachypnea.  Abdomen: Nondistended, +BS, Nontender  Extremity: No leg edema. No cyanosis or clubbing.  Neurology: Alert and responsive and oriented to person, place, and time. No gross motor or sensorial deficits.      Results Reviewed:  Lab Results (last 24 hours)       Procedure Component Value Units Date/Time    Hemoglobin A1c [[id number]]  (Abnormal) Collected: 04/22/24 0020    Specimen: Blood Updated: 04/22/24 0238     Hemoglobin A1C 10.40 %     Narrative:      Hemoglobin A1C Ranges:    Increased Risk for Diabetes  5.7% to 6.4%  Diabetes                     >= 6.5%  Diabetic Goal                < 7.0%    Phosphorus [[id number]]  (Normal) Collected: 04/22/24 0020    Specimen: Blood Updated: 04/22/24 0234     Phosphorus 3.8 mg/dL     Magnesium [[id number]]  (Normal) Collected: 04/22/24 0020    " Specimen: Blood Updated: 04/22/24 0231     Magnesium 1.6 mg/dL     Ketone Bodies, Serum (Not performed at [facility]) [[id number]]  (Normal) Collected: 04/22/24 0020    Specimen: Blood Updated: 04/22/24 0133    Narrative:      The following orders were created for panel order Ketone Bodies, Serum (Not performed at [facility]).  Procedure                               Abnormality         Status                     ---------                               -----------         ------                     Acetone[[id number]]                      Normal              Final result                 Please view results for these tests on the individual orders.    Acetone [[id number]]  (Normal) Collected: 04/22/24 0020    Specimen: Blood Updated: 04/22/24 0133     Acetone Negative    POC Occult Blood Stool [[id number]]  (Abnormal) Resulted: 04/22/24 0101    Specimen: Stool Updated: 04/22/24 0130     Fecal Occult Blood Positive     Lot Number [id number]     Expiration Date 09/30/2024     DEVELOPER LOT NUMBER [id number]     DEVELOPER EXPIRATION DATE 9,302,024     Positive Control Positive     Negative Control Negative    Blood Gas, Venous With Co-Ox [[id number]]  (Abnormal) Collected: 04/22/24 0121    Specimen: Venous Blood Updated: 04/22/24 0123     Site Nurse/Dr Draw     pH, Venous 7.312 pH Units      Comment: 84 Value below reference range        pCO2, Venous 31.0 mm Hg      Comment: 84 Value below reference range        pO2, Venous 51.0 mm Hg      HCO3, Venous 15.7 mmol/L      Comment: 84 Value below reference range        Base Excess, Venous -9.3 mmol/L      Comment: 84 Value below reference range        O2 Saturation, Venous 79.1 %      Comment: 83 Value above reference range        Hemoglobin, Blood Gas 14.5 g/dL      Oxyhemoglobin Venous 76.6 %      Methemoglobin Venous 0.7 %      Carboxyhemoglobin Venous 2.5 %      Temperature 37.0     Sodium, Venous 138 mmol/L      Potassium, Venous 3.1 mmol/L      Comment: 84 Value below reference range         Barometric Pressure for Blood Gas 755 mmHg      Modality Room Air     Ventilator Mode NA     Collected by RN DRAW     Comment: Meter: [id number]     :  [id number]       Comprehensive Metabolic Panel [[id number]]  (Abnormal) Collected: 04/22/24 0020    Specimen: Blood Updated: 04/22/24 0108     Glucose 657 mg/dL      BUN 10 mg/dL      Creatinine 0.55 mg/dL      Sodium 134 mmol/L      Potassium 3.7 mmol/L      Comment: Slight hemolysis detected by analyzer. Result may be falsely elevated.        Chloride 76 mmol/L      CO2 15.0 mmol/L      Calcium 8.5 mg/dL      Total Protein 7.5 g/dL      Albumin 3.8 g/dL      ALT (SGPT) 116 U/L      AST (SGOT) 257 U/L      Comment: Slight hemolysis detected by analyzer. Result may be falsely elevated.        Alkaline Phosphatase 247 U/L      Total Bilirubin 5.7 mg/dL      Globulin 3.7 gm/dL      A/G Ratio 1.0 g/dL      BUN/Creatinine Ratio 18.2     Anion Gap 43.0 mmol/L      eGFR 129.3 mL/min/1.73     Narrative:      GFR Normal >60  Chronic Kidney Disease <60  Kidney Failure <15      Lipase [[id number]]  (Normal) Collected: 04/22/24 0020    Specimen: Blood Updated: 04/22/24 0046     Lipase 26 U/L     Ethanol [[id number]] Collected: 04/22/24 0020    Specimen: Blood Updated: 04/22/24 0045     Ethanol % 0.380 %     Narrative:      Not for legal purposes. Chain of Custody not followed.     Protime-INR [[id number]]  (Abnormal) Collected: 04/22/24 0020    Specimen: Blood Updated: 04/22/24 0042     Protime 18.7 Seconds      INR 1.50    aPTT [[id number]]  (Abnormal) Collected: 04/22/24 0020    Specimen: Blood Updated: 04/22/24 0042     PTT 37.9 seconds     Urinalysis With Microscopic If Indicated (No Culture) - Urine, Clean Catch [[id number]]  (Abnormal) Collected: 04/22/24 0020    Specimen: Urine, Clean Catch Updated: 04/22/24 0036     Color, UA Yellow     Appearance, UA Clear     pH, UA 5.5     Specific Gravity, UA 1.028     Glucose, UA >=1000 mg/dL (3+)     Ketones, UA 15 mg/dL  (1+)     Bilirubin, UA Negative     Blood, UA Trace     Protein, UA Negative     Leuk Esterase, UA Negative     Nitrite, UA Negative     Urobilinogen, UA 1.0 E.U./dL    Urinalysis, Microscopic Only - Urine, Clean Catch [[id number]] Collected: 04/22/24 0020    Specimen: Urine, Clean Catch Updated: 04/22/24 0036     RBC, UA 0-2 /HPF      WBC, UA None Seen /HPF      Bacteria, UA None Seen /HPF      Squamous Epithelial Cells, UA None Seen /HPF      Hyaline Casts, UA None Seen /LPF      Methodology Automated Microscopy    CBC & Differential [[id number]]  (Abnormal) Collected: 04/22/24 0020    Specimen: Blood Updated: 04/22/24 0032    Narrative:      The following orders were created for panel order CBC & Differential.  Procedure                               Abnormality         Status                     ---------                               -----------         ------                     CBC Auto Differential[[id number]]        Abnormal            Final result                 Please view results for these tests on the individual orders.    CBC Auto Differential [[id number]]  (Abnormal) Collected: 04/22/24 0020    Specimen: Blood Updated: 04/22/24 0032     WBC 6.41 10*3/mm3      RBC 4.27 10*6/mm3      Hemoglobin 14.6 g/dL      Hematocrit 44.4 %      .0 fL      MCH 34.2 pg      MCHC 32.9 g/dL      RDW 15.9 %      RDW-SD 61.6 fl      MPV 11.2 fL      Platelets 122 10*3/mm3      Neutrophil % 75.2 %      Lymphocyte % 12.0 %      Monocyte % 11.5 %      Eosinophil % 0.2 %      Basophil % 0.5 %      Immature Grans % 0.6 %      Neutrophils, Absolute 4.82 10*3/mm3      Lymphocytes, Absolute 0.77 10*3/mm3      Monocytes, Absolute 0.74 10*3/mm3      Eosinophils, Absolute 0.01 10*3/mm3      Basophils, Absolute 0.03 10*3/mm3      Immature Grans, Absolute 0.04 10*3/mm3      nRBC 0.0 /100 WBC           Imaging Results (Last 24 Hours)       Procedure Component Value Units Date/Time    XR Chest 1 View [[id number]] Resulted: 04/22/24  0213     Updated: 04/22/24 0217          I have personally reviewed and interpreted the radiology studies and ECG obtained at time of admission.     Assessment / Plan   Assessment:   Active Hospital Problems    Diagnosis     **GI bleed        Treatment Plan  The patient will be admitted to the critical care service here at [facility].    Medical Decision Making  Number and Complexity of problems: High  Differential Diagnosis:   NEURO:  Alert and oriented x 4 no abnormal neurological findings    PULMONARY:  No acute abnormal pulmonary findings    CARDIOVASCULAR:  Sinus tachycardia likely secondary to acute GI bleed and dehydration as well as alcohol intoxication    F/E/N/GI:  GI bleed  - Start patient on Sandostatin and Protonix  - N.p.o.  - IV fluids started per DKA protocol as below  - Thiamine and folic acid given  - Encourage alcohol cessation    # Transaminitis  # Hyperbilirubinemia  Likely secondary to alcohol abuse  Monitor closely  Lactic acid is 24.7 will need significant hydration this lactic acid is likely the reason for the very high anion gap    Concern for DTs  Librium 50 mg every 6 hours  CIWA protocol  Thiamine and folic acid daily      RENAL:  No acute renal abnormalities    HEME/ONC:  GI bleed as above however hemoglobin is stable will hold off of any blood products at this time    Thrombocytopenia is likely secondary to chronic alcohol abuse    INFECTIOUS DISEASE:  No acute infections are apparent    ENDOCRINE:  DKA  - Insulin drip  - IV fluids per protocol  - Glucose checks every hour while on drip  - Will need close monitoring for improvement and titration off the drip  - It is highly likely that this could also be from [facility] as the patient's osmolality is 394 which is significantly hyperosmolar however the treatment is the same as above    DVT Prophylaxis: SCDs  GI Prophylaxis: Protonix  Code Status: Full code  Risk:  This patient is at high risk for deterioration secondary to  multitude of issues including GI bleed, DKA versus HHS, lactic acidosis, severe metabolic acidosis    Conditions and Status        Condition is unchanged.     [facility] Data  External documents reviewed: Care Everywhere  Cardiac tracing (EKG, telemetry) interpretation: Sinus tachycardia  Radiology interpretation: Pending official read  Labs reviewed: Yes  Any tests that were considered but not ordered: No     Decision rules/scores evaluated (example [id number], Wells, etc): N/A     Discussed with: Patient       Disposition  Social Determinants of Health that impact treatment or disposition: Alcohol abuse  Estimated length of stay is 2+ days.     I confirmed that the patient's advanced care plan is present, code status is documented, and a surrogate decision maker is listed in the patient's medical record.     The patient's surrogate decision maker is mother.     The patient was seen and examined by me on 4/22/2024 at 0304.    Electronically signed by Konrad Rod DO, 04/22/24, 03:04 CDT.    My full attention was spent providing medically necessary critical   care to this patient as detailed in my note. I spent a total of 35  minutes providing critical care to the patient, which does not   include any teaching or procedure time.

## 2024-04-22 NOTE — PLAN OF CARE
Goal Outcome Evaluation:  Patient is a new admission.   Teaching limited due to medical condition; will need reinforement, but verbalizes understanding.

## 2024-04-22 NOTE — CONSULTS
"[facility] Gastroenterology  Initial Inpatient Consult Note    Referring Provider: No ref. provider found    Date of Admission: 4/21/2024  Date of Service:  04/22/24    Reason for Consultation: GI bleed    Subjective     History of present illness:      This is a 39-year-old male patient presented to [facility] ER early this morning with complaints of hematemesis.  Blood described as bright red.  He did experience retching yesterday.  He does have a history of acid reflux that he reports is worse than normal.  He has epigastric pain since onset of vomiting.  He has continued to experience nausea as well as emesis since admission.  No further signs of blood loss.  Mr. Christiansen was discharged from our facility 2/20/2024 at which time he was treated for pneumonia.  He reports he has been on a \"[place]\" since his discharge.  He has a history of consuming at least a bottle of vodka per day in the past.  Currently he reports consuming 12 beers per day.  Platelets are noted to be low, no imaging of the liver.  Blood sugars on admission were over 500.  He denies previous colonoscopy or endoscopy evaluation.  He reports being told in the past he does have a early onset liver disease but has not had a formal workup.    Past Medical History:  Past Medical History:   Diagnosis Date    Diabetes mellitus     Gout     Hypertension        Past Surgical History:  Past Surgical History:   Procedure Laterality Date    VASECTOMY          Social History:   Social History     Tobacco Use    Smoking status: Some Days     Current packs/day: 0.50     Average packs/day: 0.5 packs/day for 14.3 years (7.2 ttl pk-yrs)     Types: Cigarettes     Start date: 2010    Smokeless tobacco: Never   Substance Use Topics    Alcohol use: Yes     Alcohol/week: 12.0 standard drinks of alcohol     Types: 12 Cans of beer per week     Comment: 12 cans of beer a day        Family History:  History reviewed. No pertinent family history.    Home " Meds:  Medications Prior to Admission   Medication Sig Dispense Refill Last Dose    albuterol sulfate  (90 Base) MCG/ACT inhaler Inhale 2 puffs Every 4 (Four) Hours As Needed for Wheezing. 18 g 0     cefuroxime (CEFTIN) 500 MG tablet Take 1 tablet by mouth 2 (Two) Times a Day. 10 tablet 0     insulin aspart prot-insulin aspart (novoLOG 70/30) (70-30) 100 UNIT/ML injection Inject 15 Units under the skin into the appropriate area as directed 2 (Two) Times a Day With Meals. add 120 mL 12        Current Meds:     Current Facility-Administered Medications:     albuterol (PROVENTIL) nebulizer solution 0.083% 2.5 mg/3mL, 2.5 mg, Nebulization, Q4H PRN, Brandtmaneni Konrad, DO    Calcium Replacement - Follow Nurse / BPA Driven Protocol, , Does not apply, PRN, Brandtmaneni, Sundeep, DO    chlordiazePOXIDE (LIBRIUM) capsule 50 mg, 50 mg, Oral, Q6H, Brandtmaneni Sundeep, DO, 50 mg at 04/22/24 0534    [START ON 4/23/2024] Chlorhexidine Gluconate Cloth 2 % pads 1 Application, 1 Application, Topical, Q24H, Brandtmaneni Sundeep, DO    dextrose (D50W) (25 g/50 mL) IV injection 10-50 mL, 10-50 mL, Intravenous, Q15 Min PRN, Brandtmaneni Sundeep, DO    dextrose (GLUTOSE) oral gel 15 g, 15 g, Oral, Q15 Min PRN, Brandtmaneni, Sundeep, DO    dextrose 5 % and sodium chloride 0.45 % infusion, 150 mL/hr, Intravenous, Continuous PRN, Mummaneni, Sundeep, DO    dextrose 5 % and sodium chloride 0.45 % with KCl 20 mEq/L infusion, 150 mL/hr, Intravenous, Continuous PRN, Brandtmaneni, Sundeep, DO    dextrose 5 % and sodium chloride 0.45 % with KCl 40 mEq/L infusion, 150 mL/hr, Intravenous, Continuous PRN, Brandtmaneni, Sundeep, DO    dextrose 5 % and sodium chloride 0.9 % infusion, 150 mL/hr, Intravenous, Continuous PRN, Brandtmaneni, Sundeep, DO    dextrose 5 % and sodium chloride 0.9 % with KCl 20 mEq/L infusion, 150 mL/hr, Intravenous, Continuous PRN, Verneni, Sundeep, DO    dextrose 5 % and sodium chloride 0.9 % with KCl 40 mEq/L infusion, 150 mL/hr,  Intravenous, Continuous PRN, Mummaneni, Sundeep, DO    folic acid (FOLVITE) tablet 1 mg, 1 mg, Oral, Daily, Mummaneni, Sundeep, DO    glucagon (GLUCAGEN) injection 1 mg, 1 mg, Intramuscular, Q15 Min PRN, Mummaneni, Sundeep, DO    insulin regular (HumuLIN R,NovoLIN R) 100 Units in sodium chloride 0.9 % 100 mL (1 Units/mL) infusion, 0-100 Units/hr, Intravenous, Titrated, Mummaneni, Sundeep, DO, Last Rate: 5.2 mL/hr at 04/22/24 0711, 5.2 Units/hr at 04/22/24 0711    LORazepam (ATIVAN) injection 2 mg, 2 mg, Intravenous, Q6H, 2 mg at 04/22/24 0406 **FOLLOWED BY** [START ON 4/23/2024] LORazepam (ATIVAN) injection 1 mg, 1 mg, Intravenous, Q6H, Mummaneni, Sundeep, DO    LORazepam (ATIVAN) tablet 1 mg, 1 mg, Oral, Q1H PRN **OR** LORazepam (ATIVAN) injection 1 mg, 1 mg, Intravenous, Q1H PRN **OR** LORazepam (ATIVAN) tablet 2 mg, 2 mg, Oral, Q1H PRN **OR** LORazepam (ATIVAN) injection 2 mg, 2 mg, Intravenous, Q1H PRN **OR** LORazepam (ATIVAN) injection 2 mg, 2 mg, Intravenous, Q15 Min PRN **OR** LORazepam (ATIVAN) injection 2 mg, 2 mg, Intramuscular, Q15 Min PRN **OR** LORazepam (ATIVAN) tablet 4 mg, 4 mg, Oral, Q1H PRN **OR** LORazepam (ATIVAN) injection 4 mg, 4 mg, Intravenous, Q1H PRN, Mummaneni, Sundeep, DO    Magnesium Standard Dose Replacement - Follow Nurse / BPA Driven Protocol, , Does not apply, PRN, Mummaneni, Sundeep, DO    magnesium sulfate 2g/50 mL (PREMIX) infusion, 2 g, Intravenous, Q2H, Wes Valdez MD    multivitamin with minerals 1 tablet, 1 tablet, Oral, Daily, Mummaneni, Sundeep, DO    mupirocin (BACTROBAN) 2 % nasal ointment 1 Application, 1 application , Each Nare, BID, Marcel Sundeep, , 1 Application at 04/22/24 0406    nitroglycerin (NITROSTAT) SL tablet 0.4 mg, 0.4 mg, Sublingual, Q5 Min PRN, Marcel Sundeep,     [COMPLETED] octreotide (SANDOSTATIN) bolus from bag 5 mcg/mL solution 50 mcg, 50 mcg, Intravenous, Once, 50 mcg at 04/22/24 0614 **FOLLOWED BY** octreotide (sandoSTATIN)  500 mcg in sodium chloride 0.9 % 100 mL (5 mcg/mL) infusion, 50 mcg/hr, Intravenous, Continuous, Mummaneni, Sundeep, DO, Last Rate: 10 mL/hr at 04/22/24 0624, 50 mcg/hr at 04/22/24 0624    ondansetron (ZOFRAN) injection 4 mg, 4 mg, Intravenous, Q6H PRN, Vernon Morales, APRN, 4 mg at 04/22/24 0827    pantoprazole (PROTONIX) injection 40 mg, 40 mg, Intravenous, Q12H, Mummaneni, Sundeep, DO    Phosphorus Replacement - Follow Nurse / BPA Driven Protocol, , Does not apply, PRN, Mummaneni, Sundeep, DO    Potassium Replacement - Follow Nurse / BPA Driven Protocol, , Does not apply, PRN, Mummaneni, Sundeep, DO    sodium chloride 0.45 % 1,000 mL with potassium chloride 40 mEq infusion, 250 mL/hr, Intravenous, Continuous PRN, Mummaneni, Sundeep, DO, Last Rate: 250 mL/hr at 04/22/24 0538, 250 mL/hr at 04/22/24 0538    sodium chloride 0.45 % infusion, 250 mL/hr, Intravenous, Continuous PRN, Mummaneni, Sundeep, DO    sodium chloride 0.45 % with KCl 20 mEq/L infusion, 250 mL/hr, Intravenous, Continuous PRN, Mummaneni, Sundeep, DO    sodium chloride 0.9 % flush 10 mL, 10 mL, Intravenous, Q12H, Mummaneni, Sundeep, DO, 10 mL at 04/22/24 0330    sodium chloride 0.9 % flush 10 mL, 10 mL, Intravenous, PRN, Mummaneni, Sundeep, DO    sodium chloride 0.9 % flush 10 mL, 10 mL, Intravenous, Q12H, Mummaneni, Sundeep, DO, 10 mL at 04/22/24 0330    sodium chloride 0.9 % flush 10 mL, 10 mL, Intravenous, PRN, Mummaneni, Sundeep, DO    sodium chloride 0.9 % infusion 40 mL, 40 mL, Intravenous, PRN, Mummaneni, Sundeep, DO    sodium chloride 0.9 % infusion, 250 mL/hr, Intravenous, Continuous PRN, Mummaneni, Sundeep, DO    sodium chloride 0.9 % with KCl 20 mEq/L infusion, 250 mL/hr, Intravenous, Continuous PRN, Mummaneni, Sundeep, DO    sodium chloride 0.9 % with KCl 40 mEq/L infusion, 250 mL/hr, Intravenous, Continuous PRN, Mummaneni, Sundeep, DO    thiamine (B-1) injection 200 mg, 200 mg, Intravenous, Q8H, 200 mg at 04/22/24 0534 **FOLLOWED  BY** [START ON 4/27/2024] thiamine (VITAMIN B-1) tablet 100 mg, 100 mg, Oral, Daily, Mummaneni, Sundeep, DO    Allergies:  No Known Allergies    Vital Signs  Temp:  [97.4 °F (36.3 °C)-98.3 °F (36.8 °C)] 98.3 °F (36.8 °C)  Heart Rate:  [101-115] 113  Resp:  [20-24] 24  BP: ()/(51-81) 145/69  Body mass index is 36.43 kg/m².    Intake/Output Summary (Last 24 hours) at 4/22/2024 0833  Last data filed at 4/22/2024 0624  Gross per 24 hour   Intake 1305.94 ml   Output 575 ml   Net 730.94 ml     No intake/output data recorded.    Review of Systems     Constitution:  negative for chills, fatigue and fevers  Eyes:  negative for blurriness and change of vision  ENT:   negative for sore throat and voice change  Respiratory: negative for  cough and shortness of air  Cardiovascular:  Negative for chest pain or palpitations  Gastrointestinal:  negative for  See HPI  Genitourinary:  negative for  blood in urine and painful urination  Integument: negative for  rash and redness  Hematologic / Lymphatic: negative for  excessive bleeding and easy bruising  Musculoskeletal: negative for  joint pain and joint stiffness out of the ordinary  Neurological:  negative for  seizures and speech abnormality  Behavioral/Psych:  negative for  anxiety and depression out of the ordinary  Endocrine: Positive for  diabetes and negative weight loss, unintended  Allergies / Immunologic:  negative for  anaphylaxis and rash      Objective     Physical Exam:  General :    Alert, cooperative, in no acute distress   Head:    Normocephalic, without obvious abnormality, atraumatic   Eyes:            Lids and lashes normal, conjunctivae and sclerae normal, no   Icterus, conjunctival pallor   Throat:   No oral lesions, no thrush, oral mucosa moist, posterior oropharynx clear   Neck:   No adenopathy, supple, trachea midline, no thyromegaly, no   carotid bruit, no JVD   Lungs:     Clear to auscultation, respirations regular, even and                    unlabored    Heart:    Regular rhythm and normal rate, normal S1 and S2, no            murmur   Chest Wall:    No abnormalities observed   Abdomen:     Normal bowel sounds, no masses, no organomegaly, soft        nontender, nondistended, no guarding, no rebound                 tenderness   Rectal:     Deferred   Extremities:   No edema, no cyanosis   Skin:   No open lesions, bruising or rash   Lymph nodes:   No palpable cervical adenopathy   Psychiatric:  Judgement and insight: normal   Orientation to person place and time: normal   Mood and affect: normal        Results Review:    I have reviewed all of the patients current test results  Results from last 7 days   Lab Units 04/22/24  0544 04/22/24  0331 04/22/24  0020   WBC 10*3/mm3 7.09 6.39 6.41   HEMOGLOBIN g/dL 13.6 14.1 14.6   HEMATOCRIT % 41.0 41.7 44.4   PLATELETS 10*3/mm3 100* 107* 122*       Results from last 7 days   Lab Units 04/22/24  0751 04/22/24  0533 04/22/24  0331 04/22/24  0121 04/22/24  0020   SODIUM mmol/L 138  --  137  --  134*   SODIUM, VENOUS mmol/L  --   --   --  138  --    POTASSIUM mmol/L 3.1*  --  3.1*  --  3.7   POTASSIUM, VENOUS mmol/L  --   --   --  3.1*  --    CHLORIDE mmol/L 89*  --  83*  --  76*   CO2 mmol/L 17.0*  --  15.0*  --  15.0*   BUN mg/dL 12  --  10  --  10   CREATININE mg/dL 0.70*  --  0.72*  --  0.55*   CALCIUM mg/dL 7.5*  --  8.0*  --  8.5*   BILIRUBIN mg/dL  --   --  5.6*  --  5.7*   ALK PHOS U/L  --   --  235*  --  247*   ALT (SGPT) U/L  --   --  113*  --  116*   AST (SGOT) U/L  --   --  252*  --  257*   GLUCOSE mg/dL 322* 430* 509*  --  657*       Results from last 7 days   Lab Units 04/22/24  0544 04/22/24  0020   INR  1.56* 1.50*       Lab Results   Lab Value Date/Time    LIPASE 26 04/22/2024 0020       Radiology:    Imaging Results (Last 24 Hours)       Procedure Component Value Units Date/Time    XR Chest 1 View [[id number]] Collected: 04/22/24 0703     Updated: 04/22/24 0708    Narrative:      EXAM: XR CHEST 1 VW-       DATE: 4/22/2024 1:13 AM     HISTORY: Assess for Fluid Overload       COMPARISON: 2/17/2024.     TECHNIQUE:  Frontal view(s) of the chest submitted.     FINDINGS:    There are linear opacities at the left lower lung likely atelectasis.  Subtle nodularity left lower lung also noted. Probable bilateral lower  lobe multifocal pneumonia noted on CT of the chest from 2/17/2024. No  effusion or pneumothorax is seen. Heart and mediastinum are  unremarkable. No evidence for edema is seen.          Impression:         1. Linear opacity on the left likely due to atelectasis. Opacity at the  left lung base worrisome for pneumonia.     This report was signed and finalized on 4/22/2024 7:05 AM by Óscar Orozco.                 Assessment & Plan       GI bleed      Impression/Plan    Hematemesis  Nausea  DKA-type 2 diabetes  Alcohol abuse  Thrombocytopenia    He currently has octreotide infusing.  He denies the regular use of anti-inflammatories.  Protonix IV twice daily is scheduled.  I went ahead and added as needed Zofran.  I do recommend Mr. Christiansen to be strict NPO and utilize mouth swab for complaints of dry mouth.  He will need EGD evaluation however he continues to experience emesis and I would recommend this be more controlled prior to undergoing sedation.  Hopefully improvement in blood sugars/zofran will help.  Hemoglobin is stable therefore I do not feel EGD needs to be urgent.  Timing pending patient progress.     No imaging on this admission.   CTA in February showed hepatic steatosis.  Suspect decreased platelets is from excessive alcohol use.  I will go ahead and order an ultrasound of his liver for further characterization.      Electronically signed by LÁZARO Abreu, 04/22/24, 8:33 AM CDT.         LÁZARO Abreu  04/22/24  08:33 CDT

## 2024-04-22 NOTE — ED PROVIDER NOTES
"Subjective   History of Present Illness  Pt presents to the  with report of etoh consumption and vomiting.  States that he vomited blood tonight - pt does not give a good description of this - states that \"it's mostly just fluid\".  Reports some epigastric pain.  Pt states he has vomited several times.  No f/c.  No cough/SOB.  States he drinks daily.          Review of Systems   Constitutional:  Negative for fever.   Respiratory:  Negative for shortness of breath.    Cardiovascular:  Negative for chest pain.   Gastrointestinal:  Positive for abdominal pain, nausea and vomiting.   Genitourinary:  Negative for dysuria.   Skin:  Negative for rash.   All other systems reviewed and are negative.      Past Medical History:   Diagnosis Date    Diabetes mellitus     Gout     Hypertension        No Known Allergies    Past Surgical History:   Procedure Laterality Date    VASECTOMY         History reviewed. No pertinent family history.    Social History     Socioeconomic History    Marital status: Single   Tobacco Use    Smoking status: Some Days     Current packs/day: 0.50     Average packs/day: 0.5 packs/day for 14.3 years (7.2 ttl pk-yrs)     Types: Cigarettes     Start date: 2010    Smokeless tobacco: Never   Vaping Use    Vaping status: Every Day    Substances: THC   Substance and Sexual Activity    Alcohol use: Yes     Alcohol/week: 12.0 standard drinks of alcohol     Types: 12 Cans of beer per week     Comment: 12 cans of beer a day    Drug use: Yes     Types: Marijuana    Sexual activity: Yes           Objective   Physical Exam  Vitals and nursing note reviewed.   Constitutional:       General: He is not in acute distress.     Appearance: Normal appearance.   HENT:      Head: Normocephalic and atraumatic.      Nose: Nose normal.      Mouth/Throat:      Mouth: Mucous membranes are moist.   Eyes:      Extraocular Movements: Extraocular movements intact.      Pupils: Pupils are equal, round, and reactive to light. "   Cardiovascular:      Rate and Rhythm: Normal rate and regular rhythm.      Pulses: Normal pulses.      Heart sounds: Normal heart sounds.   Pulmonary:      Effort: Pulmonary effort is normal.      Breath sounds: Normal breath sounds.   Abdominal:      General: Abdomen is flat. Bowel sounds are normal.      Palpations: Abdomen is soft.      Tenderness: There is no abdominal tenderness. There is no guarding.   Musculoskeletal:      Right lower leg: No edema.      Left lower leg: No edema.   Skin:     General: Skin is warm and dry.      Capillary Refill: Capillary refill takes less than 2 seconds.   Neurological:      Mental Status: He is alert.   Psychiatric:      Comments: Appears intoxicated           Procedures           ED Course      Labs Reviewed   COMPREHENSIVE METABOLIC PANEL - Abnormal; Notable for the following components:       Result Value    Glucose 657 (*)     Creatinine 0.55 (*)     Sodium 134 (*)     Chloride 76 (*)     CO2 15.0 (*)     Calcium 8.5 (*)     ALT (SGPT) 116 (*)     AST (SGOT) 257 (*)     Alkaline Phosphatase 247 (*)     Total Bilirubin 5.7 (*)     Anion Gap 43.0 (*)     All other components within normal limits    Narrative:     GFR Normal >60  Chronic Kidney Disease <60  Kidney Failure <15     PROTIME-INR - Abnormal; Notable for the following components:    Protime 18.7 (*)     INR 1.50 (*)     All other components within normal limits   APTT - Abnormal; Notable for the following components:    PTT 37.9 (*)     All other components within normal limits   URINALYSIS W/ MICROSCOPIC IF INDICATED (NO CULTURE) - Abnormal; Notable for the following components:    Glucose, UA >=1000 mg/dL (3+) (*)     Ketones, UA 15 mg/dL (1+) (*)     Blood, UA Trace (*)     All other components within normal limits   CBC WITH AUTO DIFFERENTIAL - Abnormal; Notable for the following components:    .0 (*)     MCH 34.2 (*)     RDW 15.9 (*)     RDW-SD 61.6 (*)     Platelets 122 (*)     Lymphocyte % 12.0  (*)     Eosinophil % 0.2 (*)     Immature Grans % 0.6 (*)     All other components within normal limits   BLOOD GAS, VENOUS W/CO-OXIMETRY - Abnormal; Notable for the following components:    pH, Venous 7.312 (*)     pCO2, Venous 31.0 (*)     HCO3, Venous 15.7 (*)     Base Excess, Venous -9.3 (*)     O2 Saturation, Venous 79.1 (*)     Potassium, Venous 3.1 (*)     All other components within normal limits   POCT OCCULT BLOOD STOOL (ED ONLY) - Abnormal; Notable for the following components:    Fecal Occult Blood Positive (*)     All other components within normal limits   LIPASE - Normal   ACETONE - Normal   ETHANOL    Narrative:     Not for legal purposes. Chain of Custody not followed.    URINALYSIS, MICROSCOPIC ONLY   BLOOD GAS, VENOUS W/CO-OXIMETRY   COMPREHENSIVE METABOLIC PANEL   PHOSPHORUS   MAGNESIUM   OSMOLALITY, SERUM   HEMOGLOBIN A1C   BASIC METABOLIC PANEL   BASIC METABOLIC PANEL   MAGNESIUM   MAGNESIUM   PHOSPHORUS   PHOSPHORUS   CBC WITH AUTO DIFFERENTIAL   PHOSPHORUS   MAGNESIUM   HEMOGLOBIN A1C   LACTIC ACID, PLASMA   CBC AND DIFFERENTIAL    Narrative:     The following orders were created for panel order CBC & Differential.  Procedure                               Abnormality         Status                     ---------                               -----------         ------                     CBC Auto Differential[[id number]]        Abnormal            Final result                 Please view results for these tests on the individual orders.   KETONE BODIES SERUM    Narrative:     The following orders were created for panel order Ketone Bodies, Serum (Not performed at [place]).  Procedure                               Abnormality         Status                     ---------                               -----------         ------                     Acetone[[id number]]                      Normal              Final result                 Please view results for these tests on the individual orders.    CBC AND DIFFERENTIAL    Narrative:     The following orders were created for panel order CBC & Differential.  Procedure                               Abnormality         Status                     ---------                               -----------         ------                     CBC Auto Differential[[id number]]                                                         Please view results for these tests on the individual orders.                                            Medical Decision Making  0100 - Pt did have episode of vomiting in EC with dark material noted.  No brb.  + gastroccult    0221 - Pt stable in EC att.  He is noted to have very high etoh c/w his reported intake.  + UGI bleeding.  H/H nml att.  Was given 80mg IV protonix. Pt was given 100mg thiamine and 1mg folate IV in EC.  He is noted to have hyperglycemia and metabolic acidosis with very high gap.  Pt was started on glucommander - had also been given 10u of SQ insulin.  Was given IVFs in EC.  D/w Dr. White regarding UGIB and have d/w Dr Rod for admit/further mgmt.     Amount and/or Complexity of Data Reviewed  Labs: ordered.  Radiology: ordered.    Risk  OTC drugs.  Prescription drug management.  Decision regarding hospitalization.    Critical Care  Total time providing critical care: 35 minutes        Final diagnoses:   UGI bleed   Alcoholic intoxication with complication   Diabetic ketoacidosis without coma associated with diabetes mellitus due to underlying condition       ED Disposition  ED Disposition       ED Disposition   Decision to Admit    Condition   --    Comment   Level of Care: Critical Care [6]   Diagnosis: GI bleed [[id number]]   Admitting Physician: ROSE ROD [[id number]]   Attending Physician: ROSE ROD [[id number]]   Certification: I Certify That Inpatient Hospital Services Are Medically Necessary For Greater Than 2 Midnights                 No follow-up provider specified.       Medication List      No changes  were made to your prescriptions during this visit.            Adrien Louis,   04/22/24 0011       Adrien Louis,   04/22/24 0104       Adrien Louis, DO  04/22/24 0224

## 2024-04-22 NOTE — PROGRESS NOTES
[facility] Intensivist Services  INPATIENT PROGRESS NOTE    Patient Name: Luciano Christiansen  Date of Admission: 4/21/2024  Today's Date: 04/22/24  Length of Stay: 0  Primary Care Physician: Eleuterio Rangel MD    Subjective   Chief Complaint: Vomiting       The patient is a 39-year-old male with past medical history of alcohol abuse, hypertension, obesity and type 2 diabetes with insulin dependence who presented to [facility] ED on 4/21/2024 with complaints of vomiting blood.  He reported on the evening prior to admission he drink an alcoholic beverage and had epigastric pain.  He subsequently vomited a large amount of coffee ground vomiting.  His vomiting was intractable thus he came to the ED for further evaluation.  Evaluation in the ED did reveal positive FOBT with stable hemoglobin.  He had numerous episodes of vomiting while in the ED despite receiving antiemetics.  He was found to be inebriated with an ethanol level of 0.38.  He was found to have glycemia with initial blood glucose of 654.  He additionally had positive ketones in his urine and an elevated lactic acid-24.7.  He was acidotic with a CO2 of 15.  He was therefore admitted to the ICU for DKA, upper GI bleeding, and alcohol intoxication.  Octreotide and regular insulin drips were initiated.    4/22/2024-He has continued to have vomiting, which is improving per his report.  Electrolyte derangements are currently being replenished per appropriate protocols.  Insulin drip continues per Glucomander protocol.  GI has seen and evaluated the patient-plans for EGD when vomiting has stabilized.  Ultrasound of the liver ordered per GI based on transaminitis with hx of fatty liver. No current evidence of withdrawal. He continues to remain NPO. Tachycardia ongoing and likely multifactorial.          Review of Systems   Constitutional:  Positive for appetite change and fatigue.   Gastrointestinal:  Positive for nausea and vomiting.       All pertinent negatives and positives are as above. All other systems have been reviewed and are negative unless otherwise stated.     Objective    Temp:  [97.4 °F (36.3 °C)-98.3 °F (36.8 °C)] 98.3 °F (36.8 °C)  Heart Rate:  [101-115] 113  Resp:  [20-24] 24  BP: ()/(51-81) 145/69  Physical Exam  Vitals and nursing note reviewed.   Constitutional:       Appearance: He is obese. He is ill-appearing.   HENT:      Head: Normocephalic and atraumatic.      Mouth/Throat:      Mouth: Mucous membranes are dry.   Eyes:      Pupils: Pupils are equal, round, and reactive to light.   Cardiovascular:      Rate and Rhythm: Regular rhythm. Tachycardia present.      Pulses: Normal pulses.      Heart sounds: Normal heart sounds.   Pulmonary:      Effort: Pulmonary effort is normal. No respiratory distress.      Breath sounds: Normal breath sounds. No wheezing or rales.   Abdominal:      General: Abdomen is protuberant. Bowel sounds are normal. There is distension.      Palpations: Abdomen is soft.      Tenderness: There is abdominal tenderness in the epigastric area.      Comments: Softly distended abdomen   Musculoskeletal:         General: No swelling. Normal range of motion.      Cervical back: Normal range of motion and neck supple.   Skin:     General: Skin is warm.      Coloration: Skin is not jaundiced.   Neurological:      General: No focal deficit present.      Mental Status: He is alert.             Results Review:    Result Review:  I have personally reviewed the results from the time of this admission to 4/22/2024 12:08 CDT and agree with these findings:  [x]  Laboratory list / accordion  []  Microbiology  []  Radiology  [x]  EKG/Telemetry   [x]  Cardiology/Vascular   []  Pathology  []  Old records  []  Other:  Most notable findings include:     Lab Results   Component Value Date    GLUCOSE 322 (H) 04/22/2024    BUN 12 04/22/2024    CREATININE 0.70 (L) 04/22/2024    EGFR 120.2 04/22/2024    BCR 17.1 04/22/2024     K 3.1 (L) 04/22/2024    CO2 17.0 (L) 04/22/2024    CALCIUM 7.5 (L) 04/22/2024    ALBUMIN 3.7 04/22/2024    BILITOT 5.6 (H) 04/22/2024     (H) 04/22/2024     (H) 04/22/2024      WBC   Date Value Ref Range Status   04/22/2024 7.09 3.40 - 10.80 10*3/mm3 Final     RBC   Date Value Ref Range Status   04/22/2024 3.98 (L) 4.14 - 5.80 10*6/mm3 Final     Hemoglobin   Date Value Ref Range Status   04/22/2024 13.6 13.0 - 17.7 g/dL Final     Hematocrit   Date Value Ref Range Status   04/22/2024 41.0 37.5 - 51.0 % Final     MCV   Date Value Ref Range Status   04/22/2024 103.0 (H) 79.0 - 97.0 fL Final     MCH   Date Value Ref Range Status   04/22/2024 34.2 (H) 26.6 - 33.0 pg Final     MCHC   Date Value Ref Range Status   04/22/2024 33.2 31.5 - 35.7 g/dL Final     RDW   Date Value Ref Range Status   04/22/2024 15.9 (H) 12.3 - 15.4 % Final     RDW-SD   Date Value Ref Range Status   04/22/2024 61.4 (H) 37.0 - 54.0 fl Final     MPV   Date Value Ref Range Status   04/22/2024 11.3 6.0 - 12.0 fL Final     Platelets   Date Value Ref Range Status   04/22/2024 100 (L) 140 - 450 10*3/mm3 Final     Neutrophil %   Date Value Ref Range Status   04/22/2024 66.7 42.7 - 76.0 % Final     Lymphocyte %   Date Value Ref Range Status   04/22/2024 14.4 (L) 19.6 - 45.3 % Final     Monocyte %   Date Value Ref Range Status   04/22/2024 15.8 (H) 5.0 - 12.0 % Final     Eosinophil %   Date Value Ref Range Status   04/22/2024 2.0 0.3 - 6.2 % Final     Basophil %   Date Value Ref Range Status   04/22/2024 0.4 0.0 - 1.5 % Final     Immature Grans %   Date Value Ref Range Status   04/22/2024 0.7 (H) 0.0 - 0.5 % Final     Neutrophils, Absolute   Date Value Ref Range Status   04/22/2024 4.73 1.70 - 7.00 10*3/mm3 Final     Lymphocytes, Absolute   Date Value Ref Range Status   04/22/2024 1.02 0.70 - 3.10 10*3/mm3 Final     Monocytes, Absolute   Date Value Ref Range Status   04/22/2024 1.12 (H) 0.10 - 0.90 10*3/mm3 Final     Eosinophils, Absolute   Date  "Value Ref Range Status   04/22/2024 0.14 0.00 - 0.40 10*3/mm3 Final     Basophils, Absolute   Date Value Ref Range Status   04/22/2024 0.03 0.00 - 0.20 10*3/mm3 Final     Immature Grans, Absolute   Date Value Ref Range Status   04/22/2024 0.05 0.00 - 0.05 10*3/mm3 Final     nRBC   Date Value Ref Range Status   04/22/2024 0.0 0.0 - 0.2 /100 WBC Final      UA          4/22/2024    00:20   Urinalysis   Squamous Epithelial Cells, UA None Seen    Specific Gravity, UA 1.028    Ketones, UA 15 mg/dL (1+)    Blood, UA Trace    Leukocytes, UA Negative    Nitrite, UA Negative    RBC, UA 0-2    WBC, UA None Seen    Bacteria, UA None Seen           Lab 04/22/24  0751 04/22/24  0533 04/22/24  0331   LACTATE 18.6* 23.2* 24.7*             Lab 04/22/24  0020   HEMOGLOBIN A1C 10.40*            Culture Data:   No results found for: \"BLOODCX\", \"URINECX\", \"WOUNDCX\", \"MRSACX\", \"RESPCX\", \"STOOLCX\"    I have reviewed the patient's current medications.     Assessment/Plan   Assessment  Active Hospital Problems    Diagnosis     **GI bleed     Diabetic ketoacidosis associated with type 2 diabetes mellitus     Alcohol intoxication in active alcoholic     Hypophosphatemia     Transaminitis     Alcoholism     Hypokalemia     Hypomagnesemia     Elevated lactic acid level     Fatty liver          Treatment Plan  1.) GI bleeding  -NPO  -Continue octreotide drip  -GI following-plan for EGD when nausea and vomiting is stabilized  -Every 6 hour H&H's  -Protonix 40 mg every 12 hours  -As needed antiemetics    2.) DKA with type 2 DM  -Insulin drip per Glucomander  -IV fluids per insulin drip protocol  -Continue to treat electrolyte derangements associated with hyperglycemia correction per protocol  -Trend lactate-most recent 18.6  -BMP every 4 hours  -Continue close monitoring in telemetry  -Diabetic educator consult for further education prior to discharge    3.)Alcoholism with alcohol abuse  -Admit ethanol level 0.38  -Scheduled librium 50 mg po Q6 " hours  -CIWA protocol has been ordered with ativan  -Watch for s/s of withdrawal  -seizure precautions  -Daily supplementation of folic acid and thiamine    4.)Fatty liver/transaminitis  -likely secondary to excessive ETOH intake  -monitor coags and plt counts  -GI following  -US of the liver ordered-results currently pending  -Trend LFT's daily  -Will need reinforcement of ETOH cessation and avoidance when able    Disposition  Social Determinants of Health that impact treatment or disposition: alcoholism  Home vs. Rehab facility when medically stable      Total critical care time: 64 minutes    Medical Decision Making  Number and Complexity of problems: 4  Differential Diagnosis: alcohol ketosis, HHS, starvation ketosis, gastritis, esphageal varices, duodenal or peptic ulcer disease      Due to a high probability of clinically significant, life threatening deterioration, the patient required my highest level of preparedness to intervene emergently and I personally spent this critical care time directly and personally managing the patient.      This critical care time included obtaining a history; examining the patient; pulse oximetry; ordering and review of studies; arranging urgent treatment with development of a management plan; evaluation of patient's response to treatment; frequent reassessment; and, discussions with other providers.     This critical care time was performed to assess and manage the high probability of imminent, life-threatening deterioration that could result in multi-organ failure. It was exclusive of separately billable procedures and treating other patients and teaching time.     Please see MDM section and the rest of the note for further information on patient assessment and treatment.     This note was performed using Dragon voice recognition software.  Errors of dictation may be introduced accidentally.  Words and phrases may be mis-transcribed. If there is any clarity needed please  contact the physician directly.  Use context to determine any abnormalities that may exist.       Electronically signed by LÁZARO Maurer on 4/22/2024 at 12:08 CDT

## 2024-04-22 NOTE — ED NOTES
Nursing report ED to floor  Luciano Christiansen  39 y.o.  male    HPI:   Chief Complaint   Patient presents with    Vomiting Blood    Alcohol Intoxication       Admitting doctor:   Konrad Rod DO    Consulting provider(s):  Consults       No orders found for last 30 day(s).             Admitting diagnosis:   The primary encounter diagnosis was UGI bleed. Diagnoses of Alcoholic intoxication with complication and Diabetic ketoacidosis without coma associated with diabetes mellitus due to underlying condition were also pertinent to this visit.    Code status:   Current Code Status       Date Active Code Status Order ID Comments User Context       4/22/2024 0217 CPR (Attempt to Resuscitate) [id number]  Konrad Rod DO ED        Question Answer    Code Status (Patient has no pulse and is not breathing) CPR (Attempt to Resuscitate)    Medical Interventions (Patient has pulse or is breathing) Full Support                    Allergies:   Patient has no known allergies.    Intake and Output    Intake/Output Summary (Last 24 hours) at 4/22/2024 0230  Last data filed at 4/22/2024 0225  Gross per 24 hour   Intake 100 ml   Output --   Net 100 ml       Weight:       04/21/24  2339   Weight: 127 kg (280 lb)       Most recent vitals:   Vitals:    04/21/24 2348 04/22/24 0201 04/22/24 0210 04/22/24 0216   BP: 121/70 135/71  110/70   BP Location:       Patient Position:       Pulse: 101 104  106   Resp:       Temp:   97.4 °F (36.3 °C)    TempSrc:   Axillary    SpO2: 96% 95%  98%   Weight:       Height:         Oxygen Therapy: .    Active LDAs/IV Access:   Lines, Drains & Airways       Active LDAs       Name Placement date Placement time Site Days    Peripheral IV 04/22/24 0021 Left Antecubital 04/22/24  0021  Antecubital  less than 1    Peripheral IV 04/22/24 0149 Posterior;Right Hand 04/22/24  0149  Hand  less than 1                    Labs (abnormal labs have a star):   Labs Reviewed   COMPREHENSIVE METABOLIC PANEL -  Abnormal; Notable for the following components:       Result Value    Glucose 657 (*)     Creatinine 0.55 (*)     Sodium 134 (*)     Chloride 76 (*)     CO2 15.0 (*)     Calcium 8.5 (*)     ALT (SGPT) 116 (*)     AST (SGOT) 257 (*)     Alkaline Phosphatase 247 (*)     Total Bilirubin 5.7 (*)     Anion Gap 43.0 (*)     All other components within normal limits    Narrative:     GFR Normal >60  Chronic Kidney Disease <60  Kidney Failure <15     PROTIME-INR - Abnormal; Notable for the following components:    Protime 18.7 (*)     INR 1.50 (*)     All other components within normal limits   APTT - Abnormal; Notable for the following components:    PTT 37.9 (*)     All other components within normal limits   URINALYSIS W/ MICROSCOPIC IF INDICATED (NO CULTURE) - Abnormal; Notable for the following components:    Glucose, UA >=1000 mg/dL (3+) (*)     Ketones, UA 15 mg/dL (1+) (*)     Blood, UA Trace (*)     All other components within normal limits   CBC WITH AUTO DIFFERENTIAL - Abnormal; Notable for the following components:    .0 (*)     MCH 34.2 (*)     RDW 15.9 (*)     RDW-SD 61.6 (*)     Platelets 122 (*)     Lymphocyte % 12.0 (*)     Eosinophil % 0.2 (*)     Immature Grans % 0.6 (*)     All other components within normal limits   BLOOD GAS, VENOUS W/CO-OXIMETRY - Abnormal; Notable for the following components:    pH, Venous 7.312 (*)     pCO2, Venous 31.0 (*)     HCO3, Venous 15.7 (*)     Base Excess, Venous -9.3 (*)     O2 Saturation, Venous 79.1 (*)     Potassium, Venous 3.1 (*)     All other components within normal limits   POCT OCCULT BLOOD STOOL (ED ONLY) - Abnormal; Notable for the following components:    Fecal Occult Blood Positive (*)     All other components within normal limits   LIPASE - Normal   ACETONE - Normal   ETHANOL    Narrative:     Not for legal purposes. Chain of Custody not followed.    URINALYSIS, MICROSCOPIC ONLY   BLOOD GAS, VENOUS W/CO-OXIMETRY   COMPREHENSIVE METABOLIC PANEL    PHOSPHORUS   MAGNESIUM   OSMOLALITY, SERUM   HEMOGLOBIN A1C   BASIC METABOLIC PANEL   BASIC METABOLIC PANEL   MAGNESIUM   MAGNESIUM   PHOSPHORUS   PHOSPHORUS   CBC WITH AUTO DIFFERENTIAL   PHOSPHORUS   MAGNESIUM   HEMOGLOBIN A1C   LACTIC ACID, PLASMA   CBC AND DIFFERENTIAL    Narrative:     The following orders were created for panel order CBC & Differential.  Procedure                               Abnormality         Status                     ---------                               -----------         ------                     CBC Auto Differential[[id number]]        Abnormal            Final result                 Please view results for these tests on the individual orders.   KETONE BODIES SERUM    Narrative:     The following orders were created for panel order Ketone Bodies, Serum (Not performed at [place]).  Procedure                               Abnormality         Status                     ---------                               -----------         ------                     Acetone[[id number]]                      Normal              Final result                 Please view results for these tests on the individual orders.   CBC AND DIFFERENTIAL    Narrative:     The following orders were created for panel order CBC & Differential.  Procedure                               Abnormality         Status                     ---------                               -----------         ------                     CBC Auto Differential[[id number]]                                                         Please view results for these tests on the individual orders.       Meds given in ED:   Medications   folic acid 1 mg in sodium chloride 0.9 % 50 mL IVPB (1 mg Intravenous New Bag 4/22/24 0225)   sodium chloride 0.9 % flush 10 mL (has no administration in time range)   sodium chloride 0.9 % flush 10 mL (has no administration in time range)   sodium chloride 0.9 % infusion 40 mL (has no administration in time range)    dextrose (GLUTOSE) oral gel 15 g (has no administration in time range)   dextrose (D50W) (25 g/50 mL) IV injection 10-50 mL (has no administration in time range)   glucagon (GLUCAGEN) injection 1 mg (has no administration in time range)   sodium chloride 0.9 % bolus (has no administration in time range)   sodium chloride 0.9 % infusion (has no administration in time range)   sodium chloride 0.9 % with KCl 20 mEq/L infusion (has no administration in time range)   sodium chloride 0.9 % with KCl 40 mEq/L infusion (has no administration in time range)   dextrose 5 % and sodium chloride 0.9 % infusion (has no administration in time range)   dextrose 5 % and sodium chloride 0.9 % with KCl 20 mEq/L infusion (has no administration in time range)   dextrose 5 % and sodium chloride 0.9 % with KCl 40 mEq/L infusion (has no administration in time range)   sodium chloride 0.45 % infusion (has no administration in time range)   sodium chloride 0.45 % with KCl 20 mEq/L infusion (has no administration in time range)   sodium chloride 0.45 % 1,000 mL with potassium chloride 40 mEq infusion (has no administration in time range)   dextrose 5 % and sodium chloride 0.45 % infusion (has no administration in time range)   dextrose 5 % and sodium chloride 0.45 % with KCl 20 mEq/L infusion (has no administration in time range)   dextrose 5 % and sodium chloride 0.45 % with KCl 40 mEq/L infusion (has no administration in time range)   insulin regular (HumuLIN R,NovoLIN R) 100 Units in sodium chloride 0.9 % 100 mL (1 Units/mL) infusion (has no administration in time range)   Potassium Replacement - Follow Nurse / BPA Driven Protocol (has no administration in time range)   Magnesium Standard Dose Replacement - Follow Nurse / BPA Driven Protocol (has no administration in time range)   Phosphorus Replacement - Follow Nurse / BPA Driven Protocol (has no administration in time range)   Calcium Replacement - Follow Nurse / BPA Driven Protocol  (has no administration in time range)   sodium chloride 0.9 % flush 10 mL (has no administration in time range)   sodium chloride 0.9 % flush 10 mL (has no administration in time range)   sodium chloride 0.9 % bolus 1,000 mL ( Intravenous Currently Infusing 4/22/24 0219)   ondansetron (ZOFRAN) injection 4 mg (4 mg Intravenous Given 4/22/24 0023)   pantoprazole (PROTONIX) injection 80 mg (80 mg Intravenous Given 4/22/24 0132)   ondansetron (ZOFRAN) injection 4 mg (4 mg Intravenous Given 4/22/24 0130)   sodium chloride 0.9 % bolus 1,000 mL ( Intravenous Currently Infusing 4/22/24 0219)   thiamine (B-1) 100 mg in sodium chloride 0.9 % 100 mL IVPB (0 mg Intravenous Stopped 4/22/24 0225)   insulin regular (humuLIN R,novoLIN R) injection 10 Units (10 Units Subcutaneous Given 4/22/24 0143)     dextrose 5 % and sodium chloride 0.45 %, 150 mL/hr  dextrose 5 % and sodium chloride 0.45 % with KCl 20 mEq/L, 150 mL/hr  dextrose 5 % and sodium chloride 0.45 % with KCl 40 mEq/L, 150 mL/hr  dextrose 5 % and sodium chloride 0.9 %, 150 mL/hr  dextrose 5 % and sodium chloride 0.9 % with KCl 20 mEq, 150 mL/hr  dextrose 5% and sodium chloride 0.9% with KCl 40 mEq/L, 150 mL/hr  insulin, 0-100 Units/hr  sodium chloride 0.45 % 1,000 mL with potassium chloride 40 mEq infusion, 250 mL/hr  sodium chloride, 250 mL/hr  sodium chloride 0.45 % with KCl 20 mEq, 250 mL/hr  sodium chloride, 1,000 mL/hr  sodium chloride, 250 mL/hr  sodium chloride 0.9 % with KCl 20 mEq, 250 mL/hr  sodium chloride 0.9 % with KCl 40 mEq/L, 250 mL/hr         NIH Stroke Scale:       Isolation/Infection(s):  No active isolations   No active infections     COVID Testing  Collected .  Resulted .    Nursing report ED to floor:  Mental status: . AxOx4    Ambulatory status: . standby  Precautions: .    ED nurse phone extentsion- ..

## 2024-04-23 LAB
ALBUMIN SERPL-MCNC: 3.6 G/DL (ref 3.5–5.2)
ALBUMIN/GLOB SERPL: 1.2 G/DL
ALP SERPL-CCNC: 184 U/L (ref 39–117)
ALT SERPL W P-5'-P-CCNC: 96 U/L (ref 1–41)
ANION GAP SERPL CALCULATED.3IONS-SCNC: 11 MMOL/L (ref 5–15)
ANION GAP SERPL CALCULATED.3IONS-SCNC: 12 MMOL/L (ref 5–15)
ANION GAP SERPL CALCULATED.3IONS-SCNC: 14 MMOL/L (ref 5–15)
ANION GAP SERPL CALCULATED.3IONS-SCNC: 16 MMOL/L (ref 5–15)
AST SERPL-CCNC: 249 U/L (ref 1–40)
BASOPHILS # BLD AUTO: 0.03 10*3/MM3 (ref 0–0.2)
BASOPHILS NFR BLD AUTO: 0.8 % (ref 0–1.5)
BILIRUB CONJ SERPL-MCNC: 7 MG/DL (ref 0–0.3)
BILIRUB SERPL-MCNC: 9.9 MG/DL (ref 0–1.2)
BUN SERPL-MCNC: 10 MG/DL (ref 6–20)
BUN SERPL-MCNC: 10 MG/DL (ref 6–20)
BUN SERPL-MCNC: 11 MG/DL (ref 6–20)
BUN SERPL-MCNC: 8 MG/DL (ref 6–20)
BUN/CREAT SERPL: 19.5 (ref 7–25)
BUN/CREAT SERPL: 22 (ref 7–25)
BUN/CREAT SERPL: 23.3 (ref 7–25)
BUN/CREAT SERPL: 23.3 (ref 7–25)
CALCIUM SPEC-SCNC: 7.5 MG/DL (ref 8.6–10.5)
CALCIUM SPEC-SCNC: 7.7 MG/DL (ref 8.6–10.5)
CALCIUM SPEC-SCNC: 7.8 MG/DL (ref 8.6–10.5)
CALCIUM SPEC-SCNC: 7.9 MG/DL (ref 8.6–10.5)
CHLORIDE SERPL-SCNC: 100 MMOL/L (ref 98–107)
CHLORIDE SERPL-SCNC: 96 MMOL/L (ref 98–107)
CHLORIDE SERPL-SCNC: 98 MMOL/L (ref 98–107)
CHLORIDE SERPL-SCNC: 99 MMOL/L (ref 98–107)
CO2 SERPL-SCNC: 24 MMOL/L (ref 22–29)
CO2 SERPL-SCNC: 25 MMOL/L (ref 22–29)
CO2 SERPL-SCNC: 28 MMOL/L (ref 22–29)
CO2 SERPL-SCNC: 29 MMOL/L (ref 22–29)
CREAT SERPL-MCNC: 0.41 MG/DL (ref 0.76–1.27)
CREAT SERPL-MCNC: 0.43 MG/DL (ref 0.76–1.27)
CREAT SERPL-MCNC: 0.43 MG/DL (ref 0.76–1.27)
CREAT SERPL-MCNC: 0.5 MG/DL (ref 0.76–1.27)
D-LACTATE SERPL-SCNC: 5.2 MMOL/L (ref 0.5–2)
DEPRECATED RDW RBC AUTO: 63.3 FL (ref 37–54)
EGFRCR SERPLBLD CKD-EPI 2021: 133.1 ML/MIN/1.73
EGFRCR SERPLBLD CKD-EPI 2021: 139.3 ML/MIN/1.73
EGFRCR SERPLBLD CKD-EPI 2021: 139.3 ML/MIN/1.73
EGFRCR SERPLBLD CKD-EPI 2021: 141.3 ML/MIN/1.73
EOSINOPHIL # BLD AUTO: 0.05 10*3/MM3 (ref 0–0.4)
EOSINOPHIL NFR BLD AUTO: 1.3 % (ref 0.3–6.2)
ERYTHROCYTE [DISTWIDTH] IN BLOOD BY AUTOMATED COUNT: 16.4 % (ref 12.3–15.4)
GLOBULIN UR ELPH-MCNC: 3 GM/DL
GLUCOSE BLDC GLUCOMTR-MCNC: 102 MG/DL (ref 70–130)
GLUCOSE BLDC GLUCOMTR-MCNC: 122 MG/DL (ref 70–130)
GLUCOSE BLDC GLUCOMTR-MCNC: 136 MG/DL (ref 70–130)
GLUCOSE BLDC GLUCOMTR-MCNC: 139 MG/DL (ref 70–130)
GLUCOSE BLDC GLUCOMTR-MCNC: 157 MG/DL (ref 70–130)
GLUCOSE BLDC GLUCOMTR-MCNC: 160 MG/DL (ref 70–130)
GLUCOSE BLDC GLUCOMTR-MCNC: 183 MG/DL (ref 70–130)
GLUCOSE BLDC GLUCOMTR-MCNC: 278 MG/DL (ref 70–130)
GLUCOSE BLDC GLUCOMTR-MCNC: 315 MG/DL (ref 70–130)
GLUCOSE SERPL-MCNC: 151 MG/DL (ref 65–99)
GLUCOSE SERPL-MCNC: 151 MG/DL (ref 65–99)
GLUCOSE SERPL-MCNC: 153 MG/DL (ref 65–99)
GLUCOSE SERPL-MCNC: 248 MG/DL (ref 65–99)
HCT VFR BLD AUTO: 35.7 % (ref 37.5–51)
HGB BLD-MCNC: 12 G/DL (ref 13–17.7)
LYMPHOCYTES # BLD AUTO: 0.85 10*3/MM3 (ref 0.7–3.1)
LYMPHOCYTES NFR BLD AUTO: 22.1 % (ref 19.6–45.3)
MAGNESIUM SERPL-MCNC: 1.8 MG/DL (ref 1.6–2.6)
MAGNESIUM SERPL-MCNC: 2.1 MG/DL (ref 1.6–2.6)
MAGNESIUM SERPL-MCNC: 2.2 MG/DL (ref 1.6–2.6)
MAGNESIUM SERPL-MCNC: 2.2 MG/DL (ref 1.6–2.6)
MCH RBC QN AUTO: 34.8 PG (ref 26.6–33)
MCHC RBC AUTO-ENTMCNC: 33.6 G/DL (ref 31.5–35.7)
MCV RBC AUTO: 103.5 FL (ref 79–97)
MONOCYTES # BLD AUTO: 0.56 10*3/MM3 (ref 0.1–0.9)
MONOCYTES NFR BLD AUTO: 14.5 % (ref 5–12)
NEUTROPHILS NFR BLD AUTO: 2.34 10*3/MM3 (ref 1.7–7)
NEUTROPHILS NFR BLD AUTO: 60.8 % (ref 42.7–76)
PHOSPHATE SERPL-MCNC: 1.5 MG/DL (ref 2.5–4.5)
PHOSPHATE SERPL-MCNC: 1.5 MG/DL (ref 2.5–4.5)
PHOSPHATE SERPL-MCNC: 1.7 MG/DL (ref 2.5–4.5)
PHOSPHATE SERPL-MCNC: 2 MG/DL (ref 2.5–4.5)
PLATELET # BLD AUTO: 65 10*3/MM3 (ref 140–450)
PMV BLD AUTO: 11.3 FL (ref 6–12)
POTASSIUM SERPL-SCNC: 3.4 MMOL/L (ref 3.5–5.2)
POTASSIUM SERPL-SCNC: 3.5 MMOL/L (ref 3.5–5.2)
POTASSIUM SERPL-SCNC: 3.5 MMOL/L (ref 3.5–5.2)
POTASSIUM SERPL-SCNC: 4.4 MMOL/L (ref 3.5–5.2)
PROT SERPL-MCNC: 6.6 G/DL (ref 6–8.5)
RBC # BLD AUTO: 3.45 10*6/MM3 (ref 4.14–5.8)
SODIUM SERPL-SCNC: 136 MMOL/L (ref 136–145)
SODIUM SERPL-SCNC: 137 MMOL/L (ref 136–145)
SODIUM SERPL-SCNC: 139 MMOL/L (ref 136–145)
SODIUM SERPL-SCNC: 140 MMOL/L (ref 136–145)
WBC NRBC COR # BLD AUTO: 3.85 10*3/MM3 (ref 3.4–10.8)

## 2024-04-23 PROCEDURE — 25010000002 LORAZEPAM PER 2 MG: Performed by: STUDENT IN AN ORGANIZED HEALTH CARE EDUCATION/TRAINING PROGRAM

## 2024-04-23 PROCEDURE — 0 INSULIN REGULAR HUMAN PER 5 UNITS: Performed by: STUDENT IN AN ORGANIZED HEALTH CARE EDUCATION/TRAINING PROGRAM

## 2024-04-23 PROCEDURE — 85025 COMPLETE CBC W/AUTO DIFF WBC: CPT | Performed by: NURSE PRACTITIONER

## 2024-04-23 PROCEDURE — 36415 COLL VENOUS BLD VENIPUNCTURE: CPT | Performed by: STUDENT IN AN ORGANIZED HEALTH CARE EDUCATION/TRAINING PROGRAM

## 2024-04-23 PROCEDURE — 99232 SBSQ HOSP IP/OBS MODERATE 35: CPT | Performed by: NURSE PRACTITIONER

## 2024-04-23 PROCEDURE — 82248 BILIRUBIN DIRECT: CPT | Performed by: NURSE PRACTITIONER

## 2024-04-23 PROCEDURE — 84100 ASSAY OF PHOSPHORUS: CPT | Performed by: STUDENT IN AN ORGANIZED HEALTH CARE EDUCATION/TRAINING PROGRAM

## 2024-04-23 PROCEDURE — 84100 ASSAY OF PHOSPHORUS: CPT | Performed by: NURSE PRACTITIONER

## 2024-04-23 PROCEDURE — 83605 ASSAY OF LACTIC ACID: CPT | Performed by: NURSE PRACTITIONER

## 2024-04-23 PROCEDURE — 25010000002 POTASSIUM CHLORIDE 10 MEQ/100ML SOLUTION: Performed by: NURSE PRACTITIONER

## 2024-04-23 PROCEDURE — 25010000002 THIAMINE PER 100 MG: Performed by: STUDENT IN AN ORGANIZED HEALTH CARE EDUCATION/TRAINING PROGRAM

## 2024-04-23 PROCEDURE — 25810000003 SODIUM CHLORIDE 0.9 % SOLUTION 250 ML FLEX CONT: Performed by: NURSE PRACTITIONER

## 2024-04-23 PROCEDURE — 82948 REAGENT STRIP/BLOOD GLUCOSE: CPT

## 2024-04-23 PROCEDURE — 25010000002 OCTREOTIDE PER 25 MCG: Performed by: STUDENT IN AN ORGANIZED HEALTH CARE EDUCATION/TRAINING PROGRAM

## 2024-04-23 PROCEDURE — 83735 ASSAY OF MAGNESIUM: CPT | Performed by: STUDENT IN AN ORGANIZED HEALTH CARE EDUCATION/TRAINING PROGRAM

## 2024-04-23 PROCEDURE — 83735 ASSAY OF MAGNESIUM: CPT | Performed by: NURSE PRACTITIONER

## 2024-04-23 PROCEDURE — 80053 COMPREHEN METABOLIC PANEL: CPT | Performed by: STUDENT IN AN ORGANIZED HEALTH CARE EDUCATION/TRAINING PROGRAM

## 2024-04-23 PROCEDURE — 63710000001 INSULIN GLARGINE PER 5 UNITS: Performed by: NURSE PRACTITIONER

## 2024-04-23 PROCEDURE — 63710000001 INSULIN LISPRO (HUMAN) PER 5 UNITS: Performed by: NURSE PRACTITIONER

## 2024-04-23 RX ORDER — NICOTINE POLACRILEX 4 MG
15 LOZENGE BUCCAL
Status: DISCONTINUED | OUTPATIENT
Start: 2024-04-23 | End: 2024-04-25 | Stop reason: HOSPADM

## 2024-04-23 RX ORDER — DEXTROSE MONOHYDRATE 25 G/50ML
25 INJECTION, SOLUTION INTRAVENOUS
Status: DISCONTINUED | OUTPATIENT
Start: 2024-04-23 | End: 2024-04-25 | Stop reason: HOSPADM

## 2024-04-23 RX ORDER — INSULIN LISPRO 100 [IU]/ML
3-14 INJECTION, SOLUTION INTRAVENOUS; SUBCUTANEOUS
Status: DISCONTINUED | OUTPATIENT
Start: 2024-04-23 | End: 2024-04-25 | Stop reason: HOSPADM

## 2024-04-23 RX ADMIN — POTASSIUM PHOSPHATE, MONOBASIC AND POTASSIUM PHOSPHATE, DIBASIC 15 MMOL: 224; 236 INJECTION, SOLUTION, CONCENTRATE INTRAVENOUS at 11:11

## 2024-04-23 RX ADMIN — INSULIN GLARGINE 20 UNITS: 100 INJECTION, SOLUTION SUBCUTANEOUS at 11:04

## 2024-04-23 RX ADMIN — INSULIN LISPRO 8 UNITS: 100 INJECTION, SOLUTION INTRAVENOUS; SUBCUTANEOUS at 21:01

## 2024-04-23 RX ADMIN — PANTOPRAZOLE SODIUM 40 MG: 40 INJECTION, POWDER, FOR SOLUTION INTRAVENOUS at 11:08

## 2024-04-23 RX ADMIN — Medication 1 APPLICATION: at 11:08

## 2024-04-23 RX ADMIN — LORAZEPAM 1 MG: 2 INJECTION INTRAMUSCULAR; INTRAVENOUS at 11:08

## 2024-04-23 RX ADMIN — POTASSIUM CHLORIDE 10 MEQ: 7.46 INJECTION, SOLUTION INTRAVENOUS at 00:49

## 2024-04-23 RX ADMIN — OCTREOTIDE ACETATE 50 MCG/HR: 500 INJECTION, SOLUTION INTRAVENOUS; SUBCUTANEOUS at 11:11

## 2024-04-23 RX ADMIN — SODIUM CHLORIDE 5.8 UNITS/HR: 9 INJECTION, SOLUTION INTRAVENOUS at 04:46

## 2024-04-23 RX ADMIN — POTASSIUM CHLORIDE 10 MEQ: 7.46 INJECTION, SOLUTION INTRAVENOUS at 02:39

## 2024-04-23 RX ADMIN — LORAZEPAM 1 MG: 2 INJECTION INTRAMUSCULAR; INTRAVENOUS at 21:01

## 2024-04-23 RX ADMIN — FOLIC ACID 1 MG: 1 TABLET ORAL at 11:10

## 2024-04-23 RX ADMIN — Medication 10 ML: at 21:01

## 2024-04-23 RX ADMIN — THIAMINE HYDROCHLORIDE 200 MG: 100 INJECTION, SOLUTION INTRAMUSCULAR; INTRAVENOUS at 16:21

## 2024-04-23 RX ADMIN — POTASSIUM CHLORIDE, DEXTROSE MONOHYDRATE AND SODIUM CHLORIDE 150 ML/HR: 150; 5; 450 INJECTION, SOLUTION INTRAVENOUS at 02:01

## 2024-04-23 RX ADMIN — LORAZEPAM 1 MG: 2 INJECTION INTRAMUSCULAR; INTRAVENOUS at 04:12

## 2024-04-23 RX ADMIN — PANTOPRAZOLE SODIUM 40 MG: 40 INJECTION, POWDER, FOR SOLUTION INTRAVENOUS at 21:00

## 2024-04-23 RX ADMIN — CHLORHEXIDINE GLUCONATE 1 APPLICATION: 500 CLOTH TOPICAL at 04:12

## 2024-04-23 RX ADMIN — INSULIN LISPRO 10 UNITS: 100 INJECTION, SOLUTION INTRAVENOUS; SUBCUTANEOUS at 17:44

## 2024-04-23 RX ADMIN — Medication 1 APPLICATION: at 21:01

## 2024-04-23 RX ADMIN — LORAZEPAM 1 MG: 2 INJECTION INTRAMUSCULAR; INTRAVENOUS at 05:48

## 2024-04-23 RX ADMIN — THIAMINE HYDROCHLORIDE 200 MG: 100 INJECTION, SOLUTION INTRAMUSCULAR; INTRAVENOUS at 21:00

## 2024-04-23 RX ADMIN — POTASSIUM CHLORIDE 10 MEQ: 7.46 INJECTION, SOLUTION INTRAVENOUS at 01:37

## 2024-04-23 RX ADMIN — Medication 1 TABLET: at 11:10

## 2024-04-23 RX ADMIN — POTASSIUM PHOSPHATE, MONOBASIC AND POTASSIUM PHOSPHATE, DIBASIC 15 MMOL: 224; 236 INJECTION, SOLUTION, CONCENTRATE INTRAVENOUS at 06:52

## 2024-04-23 RX ADMIN — THIAMINE HYDROCHLORIDE 200 MG: 100 INJECTION, SOLUTION INTRAMUSCULAR; INTRAVENOUS at 05:49

## 2024-04-23 RX ADMIN — LORAZEPAM 1 MG: 2 INJECTION INTRAMUSCULAR; INTRAVENOUS at 16:21

## 2024-04-23 RX ADMIN — OCTREOTIDE ACETATE 50 MCG/HR: 500 INJECTION, SOLUTION INTRAVENOUS; SUBCUTANEOUS at 00:56

## 2024-04-23 NOTE — PLAN OF CARE
Goal Outcome Evaluation:  Plan of Care Reviewed With: patient        Progress: improving  Outcome Evaluation: Initial nutrition assessment secondary to DM education consult. Pt presented to the ED 4/21 with intractable vomiting with blood. He was intoxicated as well. Pt dx'd with GI bleed, as well as, DM2 with DKA. He has been on an insulin drip. Diet advanced from NPO to a clear liquid diet today after breakfast. He has had DM diet education in the past as an inpatient, as well as, has attended DM class at this facility. Current A1c is 10.4. He was very sleepy when this RD visited his room, could barely keep his eyes open. He was not interested in DM diet education at this time. Did briefly discuss the need to start watching his diet closer. Did leave some DM diet handouts in pt's room for him to look at per his request. Did encourage pt to let staff know if he has diet questions. He said he was ready to eat something. He reports he had not been able to eat much for the last couple days, but his appetite was fine prior to that. His diet has now been advanced to full liquids. Plans for an endoscopy tomorrow, therefore, he will be NPO after midnight. He is also interested in chemical dependency treatment facility per SW note. Will follow.

## 2024-04-23 NOTE — PROGRESS NOTES
[facility] Intensivist Services  INPATIENT PROGRESS NOTE    Patient Name: Luciano Christiansen  Date of Admission: 4/21/2024  Today's Date: 04/23/24  Length of Stay: 1  Primary Care Physician: Eleuterio Rangel MD    Subjective   Chief Complaint: Vomiting       The patient is a 39-year-old male with past medical history of alcohol abuse, hypertension, obesity and type 2 diabetes with insulin dependence who presented to [facility] ED on 4/21/2024 with complaints of vomiting blood.  He reported on the evening prior to admission he drink an alcoholic beverage and had epigastric pain.  He subsequently vomited a large amount of coffee ground vomiting.  His vomiting was intractable thus he came to the ED for further evaluation.  Evaluation in the ED did reveal positive FOBT with stable hemoglobin.  He had numerous episodes of vomiting while in the ED despite receiving antiemetics.  He was found to be inebriated with an ethanol level of 0.38.  He was found to have glycemia with initial blood glucose of 654.  He additionally had positive ketones in his urine and an elevated lactic acid-24.7.  He was acidotic with a CO2 of 15.  He was therefore admitted to the ICU for DKA, upper GI bleeding, and alcohol intoxication.  Octreotide and regular insulin drips were initiated.    4/22/2024-He has continued to have vomiting, which is improving per his report.  Electrolyte derangements are currently being replenished per appropriate protocols.  Insulin drip continues per Glucomander protocol.  GI has seen and evaluated the patient-plans for EGD when vomiting has stabilized.  Ultrasound of the liver ordered per GI based on transaminitis with hx of fatty liver. No current evidence of withdrawal. He continues to remain NPO. Tachycardia ongoing and likely multifactorial.      4/23/24-Anion gap has closed with most recent 12. Lactate remains elevated but improved-5.2. Mild nausea without vomiting, not further evidence  of hematemesis. H&H remains relatively stable. Gastro plans for EGD today or tomorrow. Insulin gtt will be turned off and he will be transitioned to subcutaneous insulin. Octreotide gtt continues with Q12 hour Protonix. He remains on [facility] protocol for alcohol abuse with potential withdrawal. He is on 2LNC and vital signs are stable. Plan to transition to med/surg when bed available.         Review of Systems   Constitutional:  Positive for fatigue.   Gastrointestinal:  Positive for nausea.      All pertinent negatives and positives are as above. All other systems have been reviewed and are negative unless otherwise stated.     Objective    Temp:  [98.3 °F (36.8 °C)-99.1 °F (37.3 °C)] 99 °F (37.2 °C)  Heart Rate:  [] 90  Resp:  [18-30] 22  BP: (103-169)/() 139/79  Physical Exam  Vitals and nursing note reviewed.   Constitutional:       Appearance: He is obese. He is ill-appearing.   HENT:      Head: Normocephalic and atraumatic.      Mouth/Throat:      Mouth: Mucous membranes are dry.   Eyes:      Pupils: Pupils are equal, round, and reactive to light.   Cardiovascular:      Rate and Rhythm: Regular rhythm. Tachycardia present.      Pulses: Normal pulses.      Heart sounds: Normal heart sounds.   Pulmonary:      Effort: Pulmonary effort is normal. No respiratory distress.      Breath sounds: Normal breath sounds. No wheezing or rales.   Abdominal:      General: Abdomen is protuberant. Bowel sounds are normal. There is distension.      Palpations: Abdomen is soft.      Tenderness: There is no abdominal tenderness.      Comments: Softly distended abdomen   Musculoskeletal:         General: No swelling. Normal range of motion.      Cervical back: Normal range of motion and neck supple.   Skin:     General: Skin is warm.      Coloration: Skin is not jaundiced.   Neurological:      General: No focal deficit present.      Mental Status: He is alert.             Results Review:    Result Review:  I have  personally reviewed the results from the time of this admission to 4/23/2024 10:09 CDT and agree with these findings:  [x]  Laboratory list / accordion  []  Microbiology  []  Radiology  [x]  EKG/Telemetry   [x]  Cardiology/Vascular   []  Pathology  []  Old records  []  Other:  Most notable findings include:     Lab Results   Component Value Date    GLUCOSE 151 (H) 04/23/2024    BUN 10 04/23/2024    CREATININE 0.43 (L) 04/23/2024    EGFR 139.3 04/23/2024    BCR 23.3 04/23/2024    K 3.5 04/23/2024    CO2 28.0 04/23/2024    CALCIUM 7.8 (L) 04/23/2024    ALBUMIN 3.7 04/22/2024    BILITOT 5.6 (H) 04/22/2024     (H) 04/22/2024     (H) 04/22/2024      WBC   Date Value Ref Range Status   04/23/2024 3.85 3.40 - 10.80 10*3/mm3 Final     RBC   Date Value Ref Range Status   04/23/2024 3.45 (L) 4.14 - 5.80 10*6/mm3 Final     Hemoglobin   Date Value Ref Range Status   04/23/2024 12.0 (L) 13.0 - 17.7 g/dL Final     Hematocrit   Date Value Ref Range Status   04/23/2024 35.7 (L) 37.5 - 51.0 % Final     MCV   Date Value Ref Range Status   04/23/2024 103.5 (H) 79.0 - 97.0 fL Final     MCH   Date Value Ref Range Status   04/23/2024 34.8 (H) 26.6 - 33.0 pg Final     MCHC   Date Value Ref Range Status   04/23/2024 33.6 31.5 - 35.7 g/dL Final     RDW   Date Value Ref Range Status   04/23/2024 16.4 (H) 12.3 - 15.4 % Final     RDW-SD   Date Value Ref Range Status   04/23/2024 63.3 (H) 37.0 - 54.0 fl Final     MPV   Date Value Ref Range Status   04/23/2024 11.3 6.0 - 12.0 fL Final     Platelets   Date Value Ref Range Status   04/23/2024 65 (L) 140 - 450 10*3/mm3 Final     Neutrophil %   Date Value Ref Range Status   04/23/2024 60.8 42.7 - 76.0 % Final     Lymphocyte %   Date Value Ref Range Status   04/23/2024 22.1 19.6 - 45.3 % Final     Monocyte %   Date Value Ref Range Status   04/23/2024 14.5 (H) 5.0 - 12.0 % Final     Eosinophil %   Date Value Ref Range Status   04/23/2024 1.3 0.3 - 6.2 % Final     Basophil %   Date Value  "Ref Range Status   04/23/2024 0.8 0.0 - 1.5 % Final     Immature Grans %   Date Value Ref Range Status   04/22/2024 0.7 (H) 0.0 - 0.5 % Final     Neutrophils, Absolute   Date Value Ref Range Status   04/23/2024 2.34 1.70 - 7.00 10*3/mm3 Final     Lymphocytes, Absolute   Date Value Ref Range Status   04/23/2024 0.85 0.70 - 3.10 10*3/mm3 Final     Monocytes, Absolute   Date Value Ref Range Status   04/23/2024 0.56 0.10 - 0.90 10*3/mm3 Final     Eosinophils, Absolute   Date Value Ref Range Status   04/23/2024 0.05 0.00 - 0.40 10*3/mm3 Final     Basophils, Absolute   Date Value Ref Range Status   04/23/2024 0.03 0.00 - 0.20 10*3/mm3 Final     Immature Grans, Absolute   Date Value Ref Range Status   04/22/2024 0.05 0.00 - 0.05 10*3/mm3 Final     nRBC   Date Value Ref Range Status   04/22/2024 0.0 0.0 - 0.2 /100 WBC Final      UA          4/22/2024    00:20   Urinalysis   Squamous Epithelial Cells, UA None Seen    Specific Gravity, UA 1.028    Ketones, UA 15 mg/dL (1+)    Blood, UA Trace    Leukocytes, UA Negative    Nitrite, UA Negative    RBC, UA 0-2    WBC, UA None Seen    Bacteria, UA None Seen           Lab 04/23/24  0750 04/22/24  2129 04/22/24  1739 04/22/24  1249 04/22/24  0751   LACTATE 5.2* 8.5* 8.9* 14.2* 18.6*             Lab 04/22/24  0020   HEMOGLOBIN A1C 10.40*            Culture Data:   No results found for: \"BLOODCX\", \"URINECX\", \"WOUNDCX\", \"MRSACX\", \"RESPCX\", \"STOOLCX\"    I have reviewed the patient's current medications.     Assessment/Plan   Assessment  Active Hospital Problems    Diagnosis     **GI bleed     Diabetic ketoacidosis associated with type 2 diabetes mellitus     Alcohol intoxication in active alcoholic     Hypophosphatemia     Transaminitis     Alcoholism     Hypokalemia     Hypomagnesemia     Elevated lactic acid level     Fatty liver          Treatment Plan  1.) GI bleeding  -NPO  -Continue octreotide drip  -GI following-plan for EGD when nausea and vomiting is stabilized-today or " tomorrow  -Every 6 hour H&H's  -Protonix 40 mg every 12 hours  -As needed antiemetics    2.) DKA with type 2 DM  -D/C insulin gtt and transition to subcutaneous insulin   -Continue to treat electrolyte derangements associated with hyperglycemia correction per protocol  -Trend lactate-most recent 5.2  -BMP daily  -Continue close monitoring in telemetry due to electrolyte derangements  -Diabetic educator consult for further education prior to discharge    3.)Alcoholism with alcohol abuse  -Admit ethanol level 0.38  -CIWA protocol has been ordered with ativan  -Watch for s/s of withdrawal  -seizure precautions  -Daily supplementation of folic acid and thiamine    4.)Fatty liver/transaminitis  -likely secondary to excessive ETOH intake  -monitor coags and plt counts  -GI following  -US of the liver ordered-fatty liver infiltration and cholelithiasis-no evidence of obstruction  -CT of the abdomen and pelvis-severe diffuse fatty liver infiltration, gallstones without cholecystitis  -Trend LFT's daily-downtrending  -Will need reinforcement of ETOH cessation and avoidance when able    Disposition  Social Determinants of Health that impact treatment or disposition: alcoholism  Home vs. Rehab facility when medically stable      Total critical care time: 51 minutes    Medical Decision Making  Number and Complexity of problems: 4  Differential Diagnosis: alcohol ketosis, HHS, starvation ketosis, gastritis, esphageal varices, duodenal or peptic ulcer disease      Due to a high probability of clinically significant, life threatening deterioration, the patient required my highest level of preparedness to intervene emergently and I personally spent this critical care time directly and personally managing the patient.      This critical care time included obtaining a history; examining the patient; pulse oximetry; ordering and review of studies; arranging urgent treatment with development of a management plan; evaluation of  patient's response to treatment; frequent reassessment; and, discussions with other providers.     This critical care time was performed to assess and manage the high probability of imminent, life-threatening deterioration that could result in multi-organ failure. It was exclusive of separately billable procedures and treating other patients and teaching time.     Please see MDM section and the rest of the note for further information on patient assessment and treatment.     This note was performed using Dragon voice recognition software.  Errors of dictation may be introduced accidentally.  Words and phrases may be mis-transcribed. If there is any clarity needed please contact the physician directly.  Use context to determine any abnormalities that may exist.       Electronically signed by LÁZARO Maurer on 4/23/2024 at 10:09 CDT

## 2024-04-23 NOTE — PLAN OF CARE
Goal Outcome Evaluation:      Patient verbally expresses understanding.  Education ongoing.

## 2024-04-23 NOTE — CASE MANAGEMENT/SOCIAL WORK
Continued Stay Note   [place]     Patient Name: Luciano Christiansen  MRN: [id number]  Today's Date: 4/23/2024    Admit Date: 4/21/2024        Discharge Plan       Row Name 04/23/24 1883       Plan    Plan Comments Social service consult received re: referral to chemical dependency treatment.  SW provided patient with a chemical dependency packet.  Patient stated he was interested in a referral and was interested in [facility] as they have a 7 day program.  SW contacted [email or web address] directly, on speakerphone, in the presence of patient and was informed they did not take indirect referrals and the patient would have to contact them directly.  SW informed patient that [facility] does take hospital referrals, however, they are a 30 day program.  Patient declined referral to other providers at this time.  Patient stated he would look over chemical dependency treatment options this evening.                   Discharge Codes    No documentation.                       MICHELLE Blair

## 2024-04-23 NOTE — PROGRESS NOTES
[facility]  Inpatient Progress Note      Date of Admission: 4/21/2024  Date of Service:  04/23/24    Reason for Follow Up: Nausea/vomiting    Subjective     Subjective:   Patient lying in bed without complaints of abdominal pain.  He did have some retching last evening but no emesis.  He is denying abdominal pain.  He has been NPO.    Current Facility-Administered Medications:     albuterol (PROVENTIL) nebulizer solution 0.083% 2.5 mg/3mL, 2.5 mg, Nebulization, Q4H PRN, Konrad Rod DO    Calcium Replacement - Follow Nurse / BPA Driven Protocol, , Does not apply, PRN, Konrad Rod,     Chlorhexidine Gluconate Cloth 2 % pads 1 Application, 1 Application, Topical, Q24H, Marcel Konrad, DO, 1 Application at 04/23/24 0412    dextrose (D50W) (25 g/50 mL) IV injection 25 g, 25 g, Intravenous, Q15 Min PRN, Janel Grimes APRN    dextrose (GLUTOSE) oral gel 15 g, 15 g, Oral, Q15 Min PRN, Janel Grimes APRN    folic acid (FOLVITE) tablet 1 mg, 1 mg, Oral, Daily, Marcel Konrad, DO, 1 mg at 04/23/24 1110    glucagon (GLUCAGEN) injection 1 mg, 1 mg, Intramuscular, Q15 Min PRN, Konrad Rod DO    [START ON 4/24/2024] insulin glargine (LANTUS, SEMGLEE) injection 20 Units, 20 Units, Subcutaneous, Nightly, Janel Grimes APRN    insulin regular (humuLIN R,novoLIN R) injection 2-7 Units, 2-7 Units, Subcutaneous, Q6H, Janel Grimes APRN    [COMPLETED] LORazepam (ATIVAN) injection 2 mg, 2 mg, Intravenous, Q6H, 2 mg at 04/22/24 2106 **FOLLOWED BY** LORazepam (ATIVAN) injection 1 mg, 1 mg, Intravenous, Q6H, Konrad Rod DO, 1 mg at 04/23/24 1108    LORazepam (ATIVAN) tablet 1 mg, 1 mg, Oral, Q1H PRN **OR** LORazepam (ATIVAN) injection 1 mg, 1 mg, Intravenous, Q1H PRN, 1 mg at 04/23/24 0548 **OR** LORazepam (ATIVAN) tablet 2 mg, 2 mg, Oral, Q1H PRN **OR** LORazepam (ATIVAN) injection 2 mg, 2 mg, Intravenous, Q1H PRN **OR** LORazepam (ATIVAN) injection 2  mg, 2 mg, Intravenous, Q15 Min PRN, 2 mg at 04/22/24 1247 **OR** LORazepam (ATIVAN) injection 2 mg, 2 mg, Intramuscular, Q15 Min PRN **OR** LORazepam (ATIVAN) tablet 4 mg, 4 mg, Oral, Q1H PRN **OR** LORazepam (ATIVAN) injection 4 mg, 4 mg, Intravenous, Q1H PRN, Mummaneni, Sundeep, DO    Magnesium Standard Dose Replacement - Follow Nurse / BPA Driven Protocol, , Does not apply, PRN, Mummaneni, Konrad, DO    multivitamin with minerals 1 tablet, 1 tablet, Oral, Daily, Mummaneni, Sundeep, DO, 1 tablet at 04/23/24 1110    mupirocin (BACTROBAN) 2 % nasal ointment 1 Application, 1 application , Each Nare, BID, Mummaneni, Sundeep, DO, 1 Application at 04/23/24 1108    nitroglycerin (NITROSTAT) SL tablet 0.4 mg, 0.4 mg, Sublingual, Q5 Min PRN, Mummaneni, Sundeep, DO    [COMPLETED] octreotide (SANDOSTATIN) bolus from bag 5 mcg/mL solution 50 mcg, 50 mcg, Intravenous, Once, 50 mcg at 04/22/24 0614 **FOLLOWED BY** octreotide (sandoSTATIN) 500 mcg in sodium chloride 0.9 % 100 mL (5 mcg/mL) infusion, 50 mcg/hr, Intravenous, Continuous, Mummaneni, Sundeep, DO, Last Rate: 10 mL/hr at 04/23/24 1111, 50 mcg/hr at 04/23/24 1111    ondansetron (ZOFRAN) injection 4 mg, 4 mg, Intravenous, Q6H PRN, Vernon Morales, APRN, 4 mg at 04/22/24 0827    pantoprazole (PROTONIX) injection 40 mg, 40 mg, Intravenous, Q12H, Mummaneni Sundeep, DO, 40 mg at 04/23/24 1108    Phosphorus Replacement - Follow Nurse / BPA Driven Protocol, , Does not apply, PRN, Mummaneni, Sundeep, DO    potassium phosphates 15 mmol in sodium chloride 0.9 % 250 mL infusion, 15 mmol, Intravenous, Q3H, Neema Dan, APRN, 15 mmol at 04/23/24 1111    sodium chloride 0.9 % flush 10 mL, 10 mL, Intravenous, Q12H, [facility], Sundeep, DO, 10 mL at 04/22/24 2107    sodium chloride 0.9 % flush 10 mL, 10 mL, Intravenous, PRN, [facility], Sundeep, DO    sodium chloride 0.9 % flush 10 mL, 10 mL, Intravenous, Q12H, [facility], Sundeep, DO, 10 mL at 04/22/24 2107    sodium chloride  0.9 % flush 10 mL, 10 mL, Intravenous, PRN, Mummaneni, Sundeep, DO    sodium chloride 0.9 % infusion 40 mL, 40 mL, Intravenous, PRN, Mummaneni, Sundeep, DO    thiamine (B-1) injection 200 mg, 200 mg, Intravenous, Q8H, 200 mg at 04/23/24 0549 **FOLLOWED BY** [START ON 4/27/2024] thiamine (VITAMIN B-1) tablet 100 mg, 100 mg, Oral, Daily, Mummaneni, Sundeep, DO    Review of Systems     Constitution:  negative for chills, fatigue and fevers  Eyes:  negative for blurriness and change of vision  ENT:   negative for sore throat and voice change  Respiratory: negative for  cough and shortness of air  Cardiovascular:  Negative for chest pain or palpitations  Gastrointestinal:  negative for  See HPI  Genitourinary:  negative for  blood in urine and painful urination  Integument: negative for  rash and redness  Hematologic / Lymphatic: negative for  excessive bleeding and easy bruising  Musculoskeletal: negative for  joint pain and joint stiffness out of the ordinary  Neurological:  negative for  seizures and speech abnormality  Behavioral/Psych:  negative for  anxiety and depression out of the ordinary  Endocrine: Positive for  diabetes and negative of weight loss, unintended  Allergies / Immunologic:  negative for  anaphylaxis and rash        Objective     Vital Signs  Temp:  [98.3 °F (36.8 °C)-99.1 °F (37.3 °C)] 99 °F (37.2 °C)  Heart Rate:  [] 90  Resp:  [18-30] 22  BP: (103-169)/() 139/79  Body mass index is 36.43 kg/m².    Intake/Output Summary (Last 24 hours) at 4/23/2024 1159  Last data filed at 4/23/2024 0400  Gross per 24 hour   Intake 5715.91 ml   Output 700 ml   Net 5015.91 ml     No intake/output data recorded.       Physical Exam:   General: patient awake, alert and cooperative   Eyes: Normal lids and lashes, no scleral icterus   Neck: supple, normal ROM   Skin: warm and dry, not jaundiced   Cardiovascular: regular rhythm and rate, no murmurs auscultated   Pulm: clear to auscultation bilaterally,  regular and unlabored   Abdomen: soft, nontender, nondistended; normal bowel sounds   Rectal: deferred   Extremities: no rash or edema   Psychiatric: Normal mood and behavior; memory intact         Results Review:    I have reviewed all of the patients current test results  Results from last 7 days   Lab Units 04/23/24  0439 04/22/24  2129 04/22/24  1739 04/22/24  1249 04/22/24  0544   WBC 10*3/mm3 3.85  --  4.86  --  7.09   HEMOGLOBIN g/dL 12.0* 12.2* 12.6*   < > 13.6   HEMATOCRIT % 35.7*  --  37.7  --  41.0   PLATELETS 10*3/mm3 65*  --  76*  --  100*    < > = values in this interval not displayed.       Results from last 7 days   Lab Units 04/23/24  0750 04/23/24  0439 04/23/24  0005 04/22/24  0533 04/22/24  0331 04/22/24  0121 04/22/24  0020   SODIUM mmol/L 139 140 137   < > 137  --  134*   SODIUM, VENOUS   --   --   --   --   --    < >  --    POTASSIUM mmol/L 3.5 3.4* 3.5   < > 3.1*  --  3.7   POTASSIUM, VENOUS   --   --   --   --   --    < >  --    CHLORIDE mmol/L 99 100 98   < > 83*  --  76*   CO2 mmol/L 28.0 29.0 25.0   < > 15.0*  --  15.0*   BUN mg/dL 10 10 11   < > 10  --  10   CREATININE mg/dL 0.43* 0.43* 0.50*   < > 0.72*  --  0.55*   CALCIUM mg/dL 7.8* 7.5* 7.7*   < > 8.0*  --  8.5*   BILIRUBIN mg/dL  --   --   --   --  5.6*  --  5.7*   ALK PHOS U/L  --   --   --   --  235*  --  247*   ALT (SGPT) U/L  --   --   --   --  113*  --  116*   AST (SGOT) U/L  --   --   --   --  252*  --  257*   GLUCOSE mg/dL 151* 151* 153*   < > 509*  --  657*    < > = values in this interval not displayed.       Results from last 7 days   Lab Units 04/22/24  0544 04/22/24  0020   INR  1.56* 1.50*       Lab Results   Lab Value Date/Time    LIPASE 26 04/22/2024 0020       Radiology:    Imaging Results (Last 24 Hours)       Procedure Component Value Units Date/Time    CT Abdomen Pelvis With & Without Contrast [[id number]] Collected: 04/22/24 2046     Updated: 04/22/24 2051    Narrative:      EXAMINATION: CT ABDOMEN PELVIS W WO  CONTRAST-      4/22/2024 7:26 PM     HISTORY: Nausea/vomiting; K92.2-Gastrointestinal hemorrhage,  unspecified; F10.929-Alcohol use, unspecified with intoxication,  unspecified; E08.10-Diabetes mellitus due to underlying condition with  ketoacidosis without coma     In order to have a CT radiation dose as low as reasonably achievable  Automated Exposure Control was utilized for adjustment of the mA and/or  KV according to patient size.     CT Dose DLP = 1670.17 mGy.cm.  (If there are multiple studies performed at the same time this  represents the total dose).     Abdomen/pelvis CT without and with IV contrast injection.  Axial, sagittal, and coronal sequences.     Noncontrast imaging shows no renal or ureteral stone.  There is minimal arterial calcification.  Calcified gallstones are present.  There is severe diffuse fatty infiltration throughout the enlarged and  somewhat nodular liver.     The postcontrast images show normal heart size.  There is mild atelectasis at the lung bases.     No sign of cholecystitis or biliary dilation.     Normal pancreas and spleen.  Normal adrenal glands and kidneys.     Normal size abdominal aorta.  No bowel dilation.  There is a small amount of fluid in the stomach.  The stomach is nondilated.     No diverticulitis or colitis.       Impression:      1. Severe diffuse fatty infiltration of the liver.  2. Gallstones with no sign of cholecystitis.  3. No gastric distention or bowel dilation to indicate obstruction..     This report was signed and finalized on 4/22/2024 8:48 PM by Dr. Harmeet Jarrell MD.                 Assessment & Plan       GI bleed    Fatty liver    Hypokalemia    Hypomagnesemia    Elevated lactic acid level    Alcoholism    Transaminitis    Hypophosphatemia    Diabetic ketoacidosis associated with type 2 diabetes mellitus    Alcohol intoxication in active alcoholic      Impression/Plan    Nausea  Thrombocytopenia  Alcoholism  DKA    Patient can be started on clear  liquids today then remain strict n.p.o. after midnight.  Continue octreotide for now.  Imaging not indicative of cirrhosis.  Thrombocytopenia likely related to suppression from alcohol use.  Suspect nausea and vomiting related to alcohol use as well as uncontrolled blood sugars.  Hemoglobin is currently stable.  Plan on endoscopy tomorrow with Dr. White.  Continue PPI twice daily as currently ordered.  Further orders/recommendation pending patient's progress.    Electronically signed by LÁZARO Abreu, 04/23/24, 11:59 AM CDT.       LÁZARO Abreu  04/23/24  11:59 CDT

## 2024-04-24 LAB
ALBUMIN SERPL-MCNC: 3.2 G/DL (ref 3.5–5.2)
ALP SERPL-CCNC: 147 U/L (ref 39–117)
ALT SERPL W P-5'-P-CCNC: 87 U/L (ref 1–41)
ANION GAP SERPL CALCULATED.3IONS-SCNC: 10 MMOL/L (ref 5–15)
ANION GAP SERPL CALCULATED.3IONS-SCNC: 12 MMOL/L (ref 5–15)
AST SERPL-CCNC: 211 U/L (ref 1–40)
BASOPHILS # BLD AUTO: 0.03 10*3/MM3 (ref 0–0.2)
BASOPHILS NFR BLD AUTO: 0.8 % (ref 0–1.5)
BILIRUB CONJ SERPL-MCNC: 7.2 MG/DL (ref 0–0.3)
BILIRUB INDIRECT SERPL-MCNC: 2.2 MG/DL
BILIRUB SERPL-MCNC: 9.4 MG/DL (ref 0–1.2)
BUN SERPL-MCNC: 8 MG/DL (ref 6–20)
BUN SERPL-MCNC: 8 MG/DL (ref 6–20)
BUN/CREAT SERPL: 16.7 (ref 7–25)
BUN/CREAT SERPL: 20.5 (ref 7–25)
CALCIUM SPEC-SCNC: 7.9 MG/DL (ref 8.6–10.5)
CALCIUM SPEC-SCNC: 8.3 MG/DL (ref 8.6–10.5)
CHLORIDE SERPL-SCNC: 98 MMOL/L (ref 98–107)
CHLORIDE SERPL-SCNC: 98 MMOL/L (ref 98–107)
CO2 SERPL-SCNC: 28 MMOL/L (ref 22–29)
CO2 SERPL-SCNC: 29 MMOL/L (ref 22–29)
CREAT SERPL-MCNC: 0.39 MG/DL (ref 0.76–1.27)
CREAT SERPL-MCNC: 0.48 MG/DL (ref 0.76–1.27)
DEPRECATED RDW RBC AUTO: 64 FL (ref 37–54)
EGFRCR SERPLBLD CKD-EPI 2021: 134.7 ML/MIN/1.73
EGFRCR SERPLBLD CKD-EPI 2021: 143.4 ML/MIN/1.73
EOSINOPHIL # BLD AUTO: 0.08 10*3/MM3 (ref 0–0.4)
EOSINOPHIL NFR BLD AUTO: 2.3 % (ref 0.3–6.2)
ERYTHROCYTE [DISTWIDTH] IN BLOOD BY AUTOMATED COUNT: 16.5 % (ref 12.3–15.4)
GLUCOSE BLDC GLUCOMTR-MCNC: 167 MG/DL (ref 70–130)
GLUCOSE BLDC GLUCOMTR-MCNC: 171 MG/DL (ref 70–130)
GLUCOSE BLDC GLUCOMTR-MCNC: 176 MG/DL (ref 70–130)
GLUCOSE BLDC GLUCOMTR-MCNC: 245 MG/DL (ref 70–130)
GLUCOSE SERPL-MCNC: 177 MG/DL (ref 65–99)
GLUCOSE SERPL-MCNC: 240 MG/DL (ref 65–99)
HCT VFR BLD AUTO: 34.2 % (ref 37.5–51)
HGB BLD-MCNC: 11.3 G/DL (ref 13–17.7)
LYMPHOCYTES # BLD AUTO: 1.06 10*3/MM3 (ref 0.7–3.1)
LYMPHOCYTES NFR BLD AUTO: 29.9 % (ref 19.6–45.3)
MAGNESIUM SERPL-MCNC: 1.8 MG/DL (ref 1.6–2.6)
MCH RBC QN AUTO: 34.6 PG (ref 26.6–33)
MCHC RBC AUTO-ENTMCNC: 33 G/DL (ref 31.5–35.7)
MCV RBC AUTO: 104.6 FL (ref 79–97)
MONOCYTES # BLD AUTO: 0.4 10*3/MM3 (ref 0.1–0.9)
MONOCYTES NFR BLD AUTO: 11.3 % (ref 5–12)
NEUTROPHILS NFR BLD AUTO: 1.96 10*3/MM3 (ref 1.7–7)
NEUTROPHILS NFR BLD AUTO: 55.1 % (ref 42.7–76)
PHOSPHATE SERPL-MCNC: 1.4 MG/DL (ref 2.5–4.5)
PHOSPHATE SERPL-MCNC: 1.5 MG/DL (ref 2.5–4.5)
PLATELET # BLD AUTO: 64 10*3/MM3 (ref 140–450)
PMV BLD AUTO: 12.1 FL (ref 6–12)
POTASSIUM SERPL-SCNC: 3.4 MMOL/L (ref 3.5–5.2)
POTASSIUM SERPL-SCNC: 3.6 MMOL/L (ref 3.5–5.2)
PROT SERPL-MCNC: 6 G/DL (ref 6–8.5)
RBC # BLD AUTO: 3.27 10*6/MM3 (ref 4.14–5.8)
SODIUM SERPL-SCNC: 137 MMOL/L (ref 136–145)
SODIUM SERPL-SCNC: 138 MMOL/L (ref 136–145)
WBC NRBC COR # BLD AUTO: 3.55 10*3/MM3 (ref 3.4–10.8)

## 2024-04-24 PROCEDURE — 25810000003 SODIUM CHLORIDE 0.9 % SOLUTION 250 ML FLEX CONT: Performed by: INTERNAL MEDICINE

## 2024-04-24 PROCEDURE — 85025 COMPLETE CBC W/AUTO DIFF WBC: CPT | Performed by: NURSE PRACTITIONER

## 2024-04-24 PROCEDURE — 84100 ASSAY OF PHOSPHORUS: CPT | Performed by: NURSE PRACTITIONER

## 2024-04-24 PROCEDURE — 82948 REAGENT STRIP/BLOOD GLUCOSE: CPT

## 2024-04-24 PROCEDURE — 99232 SBSQ HOSP IP/OBS MODERATE 35: CPT | Performed by: NURSE PRACTITIONER

## 2024-04-24 PROCEDURE — 80048 BASIC METABOLIC PNL TOTAL CA: CPT | Performed by: INTERNAL MEDICINE

## 2024-04-24 PROCEDURE — 80076 HEPATIC FUNCTION PANEL: CPT | Performed by: NURSE PRACTITIONER

## 2024-04-24 PROCEDURE — 63710000001 INSULIN GLARGINE PER 5 UNITS: Performed by: NURSE PRACTITIONER

## 2024-04-24 PROCEDURE — 63710000001 INSULIN LISPRO (HUMAN) PER 5 UNITS: Performed by: NURSE PRACTITIONER

## 2024-04-24 PROCEDURE — 25810000003 SODIUM CHLORIDE 0.9 % SOLUTION 250 ML FLEX CONT: Performed by: FAMILY MEDICINE

## 2024-04-24 PROCEDURE — 25010000002 ONDANSETRON PER 1 MG: Performed by: NURSE PRACTITIONER

## 2024-04-24 PROCEDURE — 25010000002 THIAMINE PER 100 MG: Performed by: STUDENT IN AN ORGANIZED HEALTH CARE EDUCATION/TRAINING PROGRAM

## 2024-04-24 PROCEDURE — 84100 ASSAY OF PHOSPHORUS: CPT | Performed by: INTERNAL MEDICINE

## 2024-04-24 PROCEDURE — 80048 BASIC METABOLIC PNL TOTAL CA: CPT | Performed by: NURSE PRACTITIONER

## 2024-04-24 PROCEDURE — 83735 ASSAY OF MAGNESIUM: CPT | Performed by: NURSE PRACTITIONER

## 2024-04-24 PROCEDURE — 25010000002 LORAZEPAM PER 2 MG: Performed by: STUDENT IN AN ORGANIZED HEALTH CARE EDUCATION/TRAINING PROGRAM

## 2024-04-24 RX ORDER — POTASSIUM CHLORIDE 750 MG/1
40 CAPSULE, EXTENDED RELEASE ORAL EVERY 4 HOURS
Status: COMPLETED | OUTPATIENT
Start: 2024-04-24 | End: 2024-04-25

## 2024-04-24 RX ORDER — PENTOXIFYLLINE 400 MG/1
400 TABLET, EXTENDED RELEASE ORAL
Status: DISCONTINUED | OUTPATIENT
Start: 2024-04-24 | End: 2024-04-25 | Stop reason: HOSPADM

## 2024-04-24 RX ADMIN — POTASSIUM PHOSPHATE, MONOBASIC AND POTASSIUM PHOSPHATE, DIBASIC 15 MMOL: 224; 236 INJECTION, SOLUTION, CONCENTRATE INTRAVENOUS at 19:54

## 2024-04-24 RX ADMIN — POTASSIUM PHOSPHATE, MONOBASIC AND POTASSIUM PHOSPHATE, DIBASIC 15 MMOL: 224; 236 INJECTION, SOLUTION, CONCENTRATE INTRAVENOUS at 08:54

## 2024-04-24 RX ADMIN — Medication 1 TABLET: at 08:42

## 2024-04-24 RX ADMIN — PENTOXIFYLLINE 400 MG: 400 TABLET, EXTENDED RELEASE ORAL at 17:07

## 2024-04-24 RX ADMIN — Medication 10 ML: at 20:54

## 2024-04-24 RX ADMIN — THIAMINE HYDROCHLORIDE 200 MG: 100 INJECTION, SOLUTION INTRAMUSCULAR; INTRAVENOUS at 21:33

## 2024-04-24 RX ADMIN — THIAMINE HYDROCHLORIDE 200 MG: 100 INJECTION, SOLUTION INTRAMUSCULAR; INTRAVENOUS at 05:12

## 2024-04-24 RX ADMIN — INSULIN LISPRO 3 UNITS: 100 INJECTION, SOLUTION INTRAVENOUS; SUBCUTANEOUS at 17:06

## 2024-04-24 RX ADMIN — Medication 10 ML: at 08:42

## 2024-04-24 RX ADMIN — FOLIC ACID 1 MG: 1 TABLET ORAL at 08:42

## 2024-04-24 RX ADMIN — LORAZEPAM 1 MG: 2 INJECTION INTRAMUSCULAR; INTRAVENOUS at 04:43

## 2024-04-24 RX ADMIN — POTASSIUM PHOSPHATE, MONOBASIC AND POTASSIUM PHOSPHATE, DIBASIC 15 MMOL: 224; 236 INJECTION, SOLUTION, CONCENTRATE INTRAVENOUS at 23:40

## 2024-04-24 RX ADMIN — INSULIN LISPRO 3 UNITS: 100 INJECTION, SOLUTION INTRAVENOUS; SUBCUTANEOUS at 20:54

## 2024-04-24 RX ADMIN — INSULIN LISPRO 3 UNITS: 100 INJECTION, SOLUTION INTRAVENOUS; SUBCUTANEOUS at 13:37

## 2024-04-24 RX ADMIN — POTASSIUM PHOSPHATE, MONOBASIC AND POTASSIUM PHOSPHATE, DIBASIC 15 MMOL: 224; 236 INJECTION, SOLUTION, CONCENTRATE INTRAVENOUS at 06:48

## 2024-04-24 RX ADMIN — POTASSIUM CHLORIDE 40 MEQ: 750 CAPSULE, EXTENDED RELEASE ORAL at 19:52

## 2024-04-24 RX ADMIN — ONDANSETRON 4 MG: 2 INJECTION INTRAMUSCULAR; INTRAVENOUS at 00:53

## 2024-04-24 RX ADMIN — THIAMINE HYDROCHLORIDE 200 MG: 100 INJECTION, SOLUTION INTRAMUSCULAR; INTRAVENOUS at 13:37

## 2024-04-24 RX ADMIN — CHLORHEXIDINE GLUCONATE 1 APPLICATION: 500 CLOTH TOPICAL at 04:43

## 2024-04-24 RX ADMIN — INSULIN GLARGINE 20 UNITS: 100 INJECTION, SOLUTION SUBCUTANEOUS at 20:54

## 2024-04-24 RX ADMIN — LORAZEPAM 1 MG: 2 INJECTION INTRAMUSCULAR; INTRAVENOUS at 21:32

## 2024-04-24 RX ADMIN — LORAZEPAM 1 MG: 2 INJECTION INTRAMUSCULAR; INTRAVENOUS at 17:06

## 2024-04-24 RX ADMIN — PANTOPRAZOLE SODIUM 40 MG: 40 INJECTION, POWDER, FOR SOLUTION INTRAVENOUS at 08:42

## 2024-04-24 RX ADMIN — POTASSIUM CHLORIDE 40 MEQ: 750 CAPSULE, EXTENDED RELEASE ORAL at 23:40

## 2024-04-24 RX ADMIN — LORAZEPAM 1 MG: 2 INJECTION INTRAMUSCULAR; INTRAVENOUS at 09:00

## 2024-04-24 RX ADMIN — PANTOPRAZOLE SODIUM 40 MG: 40 INJECTION, POWDER, FOR SOLUTION INTRAVENOUS at 20:54

## 2024-04-24 RX ADMIN — INSULIN LISPRO 10 UNITS: 100 INJECTION, SOLUTION INTRAVENOUS; SUBCUTANEOUS at 08:49

## 2024-04-24 RX ADMIN — Medication 1 APPLICATION: at 08:42

## 2024-04-24 NOTE — PROGRESS NOTES
[facility] Intensivist Services  INPATIENT PROGRESS NOTE    Patient Name: Luciano Christiansen  Date of Admission: 4/21/2024  Today's Date: 04/24/24  Length of Stay: 2  Primary Care Physician: Eleuterio Rangel MD    Subjective   Chief Complaint: Vomiting       The patient is a 39-year-old male with past medical history of alcohol abuse, hypertension, obesity and type 2 diabetes with insulin dependence who presented to [facility] ED on 4/21/2024 with complaints of vomiting blood.  He reported on the evening prior to admission he drink an alcoholic beverage and had epigastric pain.  He subsequently vomited a large amount of coffee ground vomiting.  His vomiting was intractable thus he came to the ED for further evaluation.  Evaluation in the ED did reveal positive FOBT with stable hemoglobin.  He had numerous episodes of vomiting while in the ED despite receiving antiemetics.  He was found to be inebriated with an ethanol level of 0.38.  He was found to have glycemia with initial blood glucose of 654.  He additionally had positive ketones in his urine and an elevated lactic acid-24.7.  He was acidotic with a CO2 of 15.  He was therefore admitted to the ICU for DKA, upper GI bleeding, and alcohol intoxication.  Octreotide and regular insulin drips were initiated.    4/22/2024-He has continued to have vomiting, which is improving per his report.  Electrolyte derangements are currently being replenished per appropriate protocols.  Insulin drip continues per Glucomander protocol.  GI has seen and evaluated the patient-plans for EGD when vomiting has stabilized.  Ultrasound of the liver ordered per GI based on transaminitis with hx of fatty liver. No current evidence of withdrawal. He continues to remain NPO. Tachycardia ongoing and likely multifactorial.      4/23/24-Anion gap has closed with most recent 12. Lactate remains elevated but improved-5.2. Mild nausea without vomiting, not further evidence  of hematemesis. H&H remains relatively stable. Gastro plans for EGD today or tomorrow. Insulin gtt will be turned off and he will be transitioned to subcutaneous insulin. Octreotide gtt continues with Q12 hour Protonix. He remains on [facility] protocol for alcohol abuse with potential withdrawal. He is on 2LNC and vital signs are stable. Plan to transition to med/surg when bed available.     4/24 - pt scheduled for EGD today, GI cancelled for hypophosphatemia. Currently repleting. No further signs of bleeding. Off insulin gtt.     Review of Systems   Constitutional:  Positive for fatigue.   Gastrointestinal:  Positive for nausea.      All pertinent negatives and positives are as above. All other systems have been reviewed and are negative unless otherwise stated.     Objective    Temp:  [98 °F (36.7 °C)-99 °F (37.2 °C)] 99 °F (37.2 °C)  Heart Rate:  [78-96] 81  Resp:  [18-26] 26  BP: (115-148)/(52-98) 122/52  Physical Exam  Vitals and nursing note reviewed.   Constitutional:       Appearance: He is obese.   HENT:      Head: Normocephalic and atraumatic.   Eyes:      Pupils: Pupils are equal, round, and reactive to light.   Cardiovascular:      Rate and Rhythm: Regular rhythm. Tachycardia present.      Pulses: Normal pulses.      Heart sounds: Normal heart sounds.   Pulmonary:      Effort: Pulmonary effort is normal.   Abdominal:      General: Abdomen is protuberant. Bowel sounds are normal.      Palpations: Abdomen is soft.      Tenderness: There is no abdominal tenderness.      Comments: Softly distended abdomen   Musculoskeletal:         General: Swelling present. Normal range of motion.      Cervical back: Normal range of motion and neck supple.   Skin:     General: Skin is warm.      Coloration: Skin is jaundiced.   Neurological:      General: No focal deficit present.      Mental Status: He is alert and oriented to person, place, and time.   Psychiatric:         Mood and Affect: Affect is flat.             Results  Review:    Result Review:  I have personally reviewed the results from the time of this admission to 4/24/2024 10:51 CDT and agree with these findings:  [x]  Laboratory list / accordion  []  Microbiology  []  Radiology  [x]  EKG/Telemetry   []  Cardiology/Vascular   []  Pathology  []  Old records  []  Other:  Most notable findings include:     Lab Results   Component Value Date    GLUCOSE 240 (H) 04/24/2024    BUN 8 04/24/2024    CREATININE 0.48 (L) 04/24/2024    EGFR 134.7 04/24/2024    BCR 16.7 04/24/2024    K 3.6 04/24/2024    CO2 29.0 04/24/2024    CALCIUM 7.9 (L) 04/24/2024    ALBUMIN 3.2 (L) 04/24/2024    BILITOT 9.4 (H) 04/24/2024     (H) 04/24/2024    ALT 87 (H) 04/24/2024      WBC   Date Value Ref Range Status   04/24/2024 3.55 3.40 - 10.80 10*3/mm3 Final     RBC   Date Value Ref Range Status   04/24/2024 3.27 (L) 4.14 - 5.80 10*6/mm3 Final     Hemoglobin   Date Value Ref Range Status   04/24/2024 11.3 (L) 13.0 - 17.7 g/dL Final     Hematocrit   Date Value Ref Range Status   04/24/2024 34.2 (L) 37.5 - 51.0 % Final     MCV   Date Value Ref Range Status   04/24/2024 104.6 (H) 79.0 - 97.0 fL Final     MCH   Date Value Ref Range Status   04/24/2024 34.6 (H) 26.6 - 33.0 pg Final     MCHC   Date Value Ref Range Status   04/24/2024 33.0 31.5 - 35.7 g/dL Final     RDW   Date Value Ref Range Status   04/24/2024 16.5 (H) 12.3 - 15.4 % Final     RDW-SD   Date Value Ref Range Status   04/24/2024 64.0 (H) 37.0 - 54.0 fl Final     MPV   Date Value Ref Range Status   04/24/2024 12.1 (H) 6.0 - 12.0 fL Final     Platelets   Date Value Ref Range Status   04/24/2024 64 (L) 140 - 450 10*3/mm3 Final     Neutrophil %   Date Value Ref Range Status   04/24/2024 55.1 42.7 - 76.0 % Final     Lymphocyte %   Date Value Ref Range Status   04/24/2024 29.9 19.6 - 45.3 % Final     Monocyte %   Date Value Ref Range Status   04/24/2024 11.3 5.0 - 12.0 % Final     Eosinophil %   Date Value Ref Range Status   04/24/2024 2.3 0.3 - 6.2 %  Final     Basophil %   Date Value Ref Range Status   04/24/2024 0.8 0.0 - 1.5 % Final     Immature Grans %   Date Value Ref Range Status   04/22/2024 0.7 (H) 0.0 - 0.5 % Final     Neutrophils, Absolute   Date Value Ref Range Status   04/24/2024 1.96 1.70 - 7.00 10*3/mm3 Final     Lymphocytes, Absolute   Date Value Ref Range Status   04/24/2024 1.06 0.70 - 3.10 10*3/mm3 Final     Monocytes, Absolute   Date Value Ref Range Status   04/24/2024 0.40 0.10 - 0.90 10*3/mm3 Final     Eosinophils, Absolute   Date Value Ref Range Status   04/24/2024 0.08 0.00 - 0.40 10*3/mm3 Final     Basophils, Absolute   Date Value Ref Range Status   04/24/2024 0.03 0.00 - 0.20 10*3/mm3 Final     Immature Grans, Absolute   Date Value Ref Range Status   04/22/2024 0.05 0.00 - 0.05 10*3/mm3 Final     nRBC   Date Value Ref Range Status   04/22/2024 0.0 0.0 - 0.2 /100 WBC Final      UA          4/22/2024    00:20   Urinalysis   Squamous Epithelial Cells, UA None Seen    Specific Gravity, UA 1.028    Ketones, UA 15 mg/dL (1+)    Blood, UA Trace    Leukocytes, UA Negative    Nitrite, UA Negative    RBC, UA 0-2    WBC, UA None Seen    Bacteria, UA None Seen           Lab 04/23/24  0750 04/22/24  2129 04/22/24  1739 04/22/24  1249 04/22/24  0751   LACTATE 5.2* 8.5* 8.9* 14.2* 18.6*             Lab 04/22/24  0020   HEMOGLOBIN A1C 10.40*      I have reviewed the patient's current medications.     Assessment/Plan   Assessment  Active Hospital Problems    Diagnosis     **GI bleed     Diabetic ketoacidosis associated with type 2 diabetes mellitus     Alcohol intoxication in active alcoholic     Hypophosphatemia     Transaminitis     Alcoholism     Hypokalemia     Hypomagnesemia     Elevated lactic acid level     Fatty liver          Treatment Plan  1.) GI bleeding  -EGD cancelled today d/t hypophosphotemia, currently repleting   -H&H stable past 24-48hr  -Protonix 40 mg every 12 hours  -As needed antiemetics    2.) DKA with type 2 DM  -cont basal and  correciton, Lantis 20 units BID, moderate sliding scale  -Continue to treat electrolyte derangements associated with hyperglycemia correction per protocol  -BMP daily  -Diabetic educator consult for further education prior to discharge    3.)Alcoholism with alcohol abuse  -Admit ethanol level 0.38  -CIWA protocol has been ordered with ativan  -Watch for s/s of withdrawal  -seizure precautions  -Daily supplementation of folic acid and thiamine    4.)Fatty liver/transaminitis  -likely secondary to excessive ETOH intake  -monitor coags and plt counts, would not transfuse plt unless signs of active bleeding  -GI following  -US of the liver ordered-fatty liver infiltration and cholelithiasis-no evidence of obstruction  -CT of the abdomen and pelvis-severe diffuse fatty liver infiltration, gallstones without cholecystitis  -Trend LFT's daily-downtrending  -Will need reinforcement of ETOH cessation and avoidance when able    Disposition  35 min spent on pt care      Please see MDM section and the rest of the note for further information on patient assessment and treatment.     This note was performed using Dragon voice recognition software.  Errors of dictation may be introduced accidentally.  Words and phrases may be mis-transcribed. If there is any clarity needed please contact the physician directly.  Use context to determine any abnormalities that may exist.          Electronically signed by LÁZARO Pabon on 4/24/2024 at 10:51 CDT

## 2024-04-24 NOTE — PROGRESS NOTES
[facility]  Inpatient Progress Note      Date of Admission: 4/21/2024  Date of Service:  04/24/24    Reason for Follow Up: Hematemesis    Subjective     Subjective:   Patient is lying in bed.  N.p.o. in preparation for possible EGD for today.  Patient has been incontinent of bowel.  No signs of GI blood loss.  He has continued to have some nausea no further emesis.  Insulin drip has been DC'd.    Current Facility-Administered Medications:     albuterol (PROVENTIL) nebulizer solution 0.083% 2.5 mg/3mL, 2.5 mg, Nebulization, Q4H PRN, Brandtmaneni Sundeep, DO    Calcium Replacement - Follow Nurse / BPA Driven Protocol, , Does not apply, PRN, Mummaneni Sundeep, DO    Chlorhexidine Gluconate Cloth 2 % pads 1 Application, 1 Application, Topical, Q24H, Brandtmaneni Sundeep, DO, 1 Application at 04/24/24 0443    dextrose (D50W) (25 g/50 mL) IV injection 25 g, 25 g, Intravenous, Q15 Min PRN, Janel Grimes APRN    dextrose (GLUTOSE) oral gel 15 g, 15 g, Oral, Q15 Min PRN, Janel Grimes APRN    folic acid (FOLVITE) tablet 1 mg, 1 mg, Oral, Daily, Mummaneni Sundeep, DO, 1 mg at 04/24/24 0842    glucagon (GLUCAGEN) injection 1 mg, 1 mg, Intramuscular, Q15 Min PRN, Mummaneni Sundeep, DO    insulin glargine (LANTUS, SEMGLEE) injection 20 Units, 20 Units, Subcutaneous, Nightly, Janel Grimes APRN    Insulin Lispro (humaLOG) injection 3-14 Units, 3-14 Units, Subcutaneous, 4x Daily AC & at Bedtime, Janel Grimes APRN, 10 Units at 04/24/24 0849    [COMPLETED] LORazepam (ATIVAN) injection 2 mg, 2 mg, Intravenous, Q6H, 2 mg at 04/22/24 2106 **FOLLOWED BY** LORazepam (ATIVAN) injection 1 mg, 1 mg, Intravenous, Q6H, Brandtmansolange Sundeep, DO, 1 mg at 04/24/24 0900    LORazepam (ATIVAN) tablet 1 mg, 1 mg, Oral, Q1H PRN **OR** LORazepam (ATIVAN) injection 1 mg, 1 mg, Intravenous, Q1H PRN, 1 mg at 04/23/24 0548 **OR** LORazepam (ATIVAN) tablet 2 mg, 2 mg, Oral, Q1H PRN **OR** LORazepam (ATIVAN)  injection 2 mg, 2 mg, Intravenous, Q1H PRN **OR** LORazepam (ATIVAN) injection 2 mg, 2 mg, Intravenous, Q15 Min PRN, 2 mg at 04/22/24 1247 **OR** LORazepam (ATIVAN) injection 2 mg, 2 mg, Intramuscular, Q15 Min PRN **OR** LORazepam (ATIVAN) tablet 4 mg, 4 mg, Oral, Q1H PRN **OR** LORazepam (ATIVAN) injection 4 mg, 4 mg, Intravenous, Q1H PRN, Mummaneni, Sundeep, DO    Magnesium Standard Dose Replacement - Follow Nurse / BPA Driven Protocol, , Does not apply, PRN, Mummaneni, Sundeep, DO    multivitamin with minerals 1 tablet, 1 tablet, Oral, Daily, Mummaneni, Sundeep, DO, 1 tablet at 04/24/24 0842    mupirocin (BACTROBAN) 2 % nasal ointment 1 Application, 1 application , Each Nare, BID, Mummaneni, Sundeep, DO, 1 Application at 04/24/24 0842    nitroglycerin (NITROSTAT) SL tablet 0.4 mg, 0.4 mg, Sublingual, Q5 Min PRN, Mummaneni, Sundeep, DO    ondansetron (ZOFRAN) injection 4 mg, 4 mg, Intravenous, Q6H PRN, Vernon Morales, APRN, 4 mg at 04/24/24 0053    pantoprazole (PROTONIX) injection 40 mg, 40 mg, Intravenous, Q12H, Mummaneni, Sundeep, DO, 40 mg at 04/24/24 0842    Phosphorus Replacement - Follow Nurse / BPA Driven Protocol, , Does not apply, PRN, Mummaneni, Sundeep, DO    potassium phosphates 15 mmol in sodium chloride 0.9 % 250 mL infusion, 15 mmol, Intravenous, Q3H, Wes Valdez MD, 15 mmol at 04/24/24 0854    sodium chloride 0.9 % flush 10 mL, 10 mL, Intravenous, Q12H, Mummaneni, Sundeep, DO, 10 mL at 04/24/24 0842    sodium chloride 0.9 % flush 10 mL, 10 mL, Intravenous, PRN, Mummaneni, Konrad, DO    sodium chloride 0.9 % flush 10 mL, 10 mL, Intravenous, Q12H, Mummaneni, Sundeep, DO, 10 mL at 04/24/24 0842    sodium chloride 0.9 % flush 10 mL, 10 mL, Intravenous, PRN, Mummaneni, Sundeep, DO    sodium chloride 0.9 % infusion 40 mL, 40 mL, Intravenous, PRN, Mummaneni, Sundeep, DO    thiamine (B-1) injection 200 mg, 200 mg, Intravenous, Q8H, 200 mg at 04/24/24 0512 **FOLLOWED BY** [START ON  4/27/2024] thiamine (VITAMIN B-1) tablet 100 mg, 100 mg, Oral, Daily, Mummaneni, Sundeep, DO    Review of Systems     Constitution:  negative for chills, positive fatigue and negative fevers  Eyes:  negative for blurriness and change of vision  ENT:   negative for sore throat and voice change  Respiratory: negative for  cough and shortness of air  Cardiovascular:  Negative for chest pain or palpitations  Gastrointestinal:  negative for  See HPI  Genitourinary:  negative for  blood in urine and painful urination  Integument: negative for  rash and redness  Hematologic / Lymphatic: negative for  excessive bleeding and easy bruising  Musculoskeletal: negative for  joint pain and joint stiffness out of the ordinary  Neurological:  negative for  seizures and speech abnormality  Behavioral/Psych:  negative for  anxiety and depression out of the ordinary  Endocrine: negative for  diabetes and weight loss, unintended  Allergies / Immunologic:  negative for  anaphylaxis and rash        Objective     Vital Signs  Temp:  [98 °F (36.7 °C)-99 °F (37.2 °C)] 99 °F (37.2 °C)  Heart Rate:  [78-96] 81  Resp:  [18-26] 26  BP: (115-148)/(50-98) 122/52  Body mass index is 36.43 kg/m².  No intake or output data in the 24 hours ending 04/24/24 0952  No intake/output data recorded.       Physical Exam:   General: patient awake, cooperative, ill-appearing   Eyes: Normal lids and lashes, no scleral icterus   Neck: supple, normal ROM   Skin: warm and dry, not jaundiced   Cardiovascular: regular rhythm and rate, no murmurs auscultated   Pulm: clear to auscultation bilaterally, regular and unlabored   Abdomen: soft, nontender, nondistended; normal bowel sounds   Rectal: deferred   Extremities: no rash or edema   Psychiatric: Normal mood and behavior; memory intact         Results Review:    I have reviewed all of the patients current test results  Results from last 7 days   Lab Units 04/24/24  0202 04/23/24  0439 04/22/24  6879 04/22/24  9119    WBC 10*3/mm3 3.55 3.85  --  4.86   HEMOGLOBIN g/dL 11.3* 12.0* 12.2* 12.6*   HEMATOCRIT % 34.2* 35.7*  --  37.7   PLATELETS 10*3/mm3 64* 65*  --  76*       Results from last 7 days   Lab Units 04/24/24  0202 04/23/24  1445 04/23/24  0750 04/22/24  0533 04/22/24  0331   SODIUM mmol/L 137 136 139   < > 137   POTASSIUM mmol/L 3.6 4.4 3.5   < > 3.1*   CHLORIDE mmol/L 98 96* 99   < > 83*   CO2 mmol/L 29.0 24.0 28.0   < > 15.0*   BUN mg/dL 8 8 10   < > 10   CREATININE mg/dL 0.48* 0.41* 0.43*   < > 0.72*   CALCIUM mg/dL 7.9* 7.9* 7.8*   < > 8.0*   BILIRUBIN mg/dL 9.4* 9.9*  --   --  5.6*   ALK PHOS U/L 147* 184*  --   --  235*   ALT (SGPT) U/L 87* 96*  --   --  113*   AST (SGOT) U/L 211* 249*  --   --  252*   GLUCOSE mg/dL 240* 248* 151*   < > 509*    < > = values in this interval not displayed.       Results from last 7 days   Lab Units 04/22/24  0544 04/22/24  0020   INR  1.56* 1.50*       Lab Results   Lab Value Date/Time    LIPASE 26 04/22/2024 0020       Radiology:    Imaging Results (Last 24 Hours)       ** No results found for the last 24 hours. **              Assessment & Plan       GI bleed    Fatty liver    Hypokalemia    Hypomagnesemia    Elevated lactic acid level    Alcoholism    Transaminitis    Hypophosphatemia    Diabetic ketoacidosis associated with type 2 diabetes mellitus    Alcohol intoxication in active alcoholic      Impression/Plan    Hematemesis  Transaminates  Hyperbilirubinemia   alcohol abuse    Hemoglobin is stable.  No further signs of hematemesis since admission.  Blood sugars have improved with insulin drip being DC'd.  EGD planned for today will be canceled due to patient's low phosphorus level.  His liver enzymes are improving.  Bilirubin are elevated, no jaundice.  No abnormality to biliary system, no fevers, no elevation of WBC.  Due to stability of hgb and no further emesis EGD not felt to be urgent.  He can resume previous diet.  Further recommendations pending Dr White assessment.        Electronically signed by LÁZARO Abreu, 04/24/24, 9:52 AM CDT.       LÁZARO Abreu  04/24/24  09:52 CDT

## 2024-04-24 NOTE — PLAN OF CARE
Goal Outcome Evaluation:  Plan of Care Reviewed With: patient        Progress: no change  Outcome Evaluation: Patient tx from ICU, A&OX4, VSS. No c/o pain. CIWA score 0. Voiding, NSR on tele. Phosphorous re-draw awating results. Full liquid diet, NPO at 12a, for EGD 4/25. On room air and , achs accuchecks. call light in reach, safety maintained.

## 2024-04-24 NOTE — PLAN OF CARE
Problem: Adult Inpatient Plan of Care  Goal: Plan of Care Review  Outcome: Ongoing, Progressing  Goal: Patient-Specific Goal (Individualized)  Outcome: Ongoing, Progressing  Goal: Absence of Hospital-Acquired Illness or Injury  Outcome: Ongoing, Progressing  Intervention: Identify and Manage Fall Risk  Recent Flowsheet Documentation  Taken 4/24/2024 0600 by Adrien Cline RN  Safety Promotion/Fall Prevention:   safety round/check completed   room organization consistent   fall prevention program maintained   clutter free environment maintained   assistive device/personal items within reach  Taken 4/24/2024 0400 by Adrien Cline RN  Safety Promotion/Fall Prevention:   safety round/check completed   room organization consistent   fall prevention program maintained   clutter free environment maintained   assistive device/personal items within reach  Taken 4/24/2024 0200 by Adrien Cline RN  Safety Promotion/Fall Prevention:   safety round/check completed   room organization consistent   fall prevention program maintained   clutter free environment maintained   assistive device/personal items within reach  Taken 4/24/2024 0000 by Adrien Cline RN  Safety Promotion/Fall Prevention:   safety round/check completed   room organization consistent   fall prevention program maintained   clutter free environment maintained   assistive device/personal items within reach  Taken 4/23/2024 2300 by Adrien Cline RN  Safety Promotion/Fall Prevention:   safety round/check completed   room organization consistent   fall prevention program maintained   clutter free environment maintained   assistive device/personal items within reach  Taken 4/23/2024 2200 by Adrien Cline RN  Safety Promotion/Fall Prevention:   safety round/check completed   room organization consistent   fall prevention program maintained   clutter free environment maintained   assistive device/personal items within reach  Taken 4/23/2024 2100 by Marlyn  ESTELA Jurado  Safety Promotion/Fall Prevention:   safety round/check completed   room organization consistent   fall prevention program maintained   clutter free environment maintained   assistive device/personal items within reach  Taken 4/23/2024 2000 by Adrien Cline RN  Safety Promotion/Fall Prevention:   safety round/check completed   room organization consistent   fall prevention program maintained   clutter free environment maintained   assistive device/personal items within reach  Taken 4/23/2024 1900 by Adrien Cline RN  Safety Promotion/Fall Prevention:   safety round/check completed   room organization consistent   fall prevention program maintained   clutter free environment maintained   assistive device/personal items within reach  Intervention: Prevent Skin Injury  Recent Flowsheet Documentation  Taken 4/24/2024 0200 by Adrien Cline RN  Body Position: position changed independently  Taken 4/24/2024 0000 by Adrien Cline RN  Body Position: position changed independently  Taken 4/23/2024 2200 by Adrien Cline RN  Body Position: position changed independently  Taken 4/23/2024 2000 by Adrien Cline RN  Body Position: position changed independently  Skin Protection:   adhesive use limited   incontinence pads utilized   transparent dressing maintained   skin-to-skin areas padded   skin-to-device areas padded  Intervention: Prevent and Manage VTE (Venous Thromboembolism) Risk  Recent Flowsheet Documentation  Taken 4/23/2024 2000 by Adrien Cline RN  Activity Management: activity encouraged  VTE Prevention/Management: patient refused intervention  Intervention: Prevent Infection  Recent Flowsheet Documentation  Taken 4/24/2024 0600 by Adrien Cline RN  Infection Prevention:   cohorting utilized   single patient room provided   rest/sleep promoted  Taken 4/24/2024 0400 by Adrien Cline RN  Infection Prevention:   cohorting utilized   single patient room provided   rest/sleep promoted  Taken  4/24/2024 0200 by Adrien Cline RN  Infection Prevention:   cohorting utilized   single patient room provided   rest/sleep promoted  Taken 4/24/2024 0000 by Adrien Cline RN  Infection Prevention:   cohorting utilized   single patient room provided   rest/sleep promoted  Taken 4/23/2024 2300 by Adrien Cline RN  Infection Prevention:   cohorting utilized   single patient room provided   rest/sleep promoted  Taken 4/23/2024 2200 by Adrien Cline RN  Infection Prevention:   cohorting utilized   single patient room provided   rest/sleep promoted  Taken 4/23/2024 2100 by Adrien Cline RN  Infection Prevention:   cohorting utilized   single patient room provided   rest/sleep promoted  Taken 4/23/2024 2000 by Adrien Cline RN  Infection Prevention:   cohorting utilized   single patient room provided   rest/sleep promoted  Taken 4/23/2024 1900 by Adrien Cline RN  Infection Prevention:   cohorting utilized   single patient room provided   rest/sleep promoted  Goal: Optimal Comfort and Wellbeing  Outcome: Ongoing, Progressing  Intervention: Provide Person-Centered Care  Recent Flowsheet Documentation  Taken 4/23/2024 2000 by Adrien Cline RN  Trust Relationship/Rapport:   care explained   choices provided   empathic listening provided   questions answered   reassurance provided   thoughts/feelings acknowledged  Goal: Readiness for Transition of Care  Outcome: Ongoing, Progressing   Goal Outcome Evaluation:

## 2024-04-24 NOTE — PLAN OF CARE
Problem: Adult Inpatient Plan of Care  Goal: Plan of Care Review  Outcome: Ongoing, Progressing  Goal: Patient-Specific Goal (Individualized)  Outcome: Ongoing, Progressing  Goal: Absence of Hospital-Acquired Illness or Injury  Outcome: Ongoing, Progressing  Intervention: Identify and Manage Fall Risk  Recent Flowsheet Documentation  Taken 4/24/2024 0800 by Luis Cota RN  Safety Promotion/Fall Prevention: safety round/check completed  Intervention: Prevent Skin Injury  Recent Flowsheet Documentation  Taken 4/24/2024 0800 by Luis Cota RN  Body Position: position changed independently  Intervention: Prevent and Manage VTE (Venous Thromboembolism) Risk  Recent Flowsheet Documentation  Taken 4/24/2024 0800 by Luis Cota RN  Activity Management: activity encouraged  Intervention: Prevent Infection  Recent Flowsheet Documentation  Taken 4/24/2024 0800 by Luis Cota RN  Infection Prevention:   single patient room provided   hand hygiene promoted  Goal: Optimal Comfort and Wellbeing  Outcome: Ongoing, Progressing  Goal: Readiness for Transition of Care  Outcome: Ongoing, Progressing     Problem: Skin Injury Risk Increased  Goal: Skin Health and Integrity  Outcome: Ongoing, Progressing     Problem: Adjustment to Illness (Sepsis/Septic Shock)  Goal: Optimal Coping  Outcome: Ongoing, Progressing     Problem: Bleeding (Sepsis/Septic Shock)  Goal: Absence of Bleeding  Outcome: Ongoing, Progressing     Problem: Glycemic Control Impaired (Sepsis/Septic Shock)  Goal: Blood Glucose Level Within Desired Range  Outcome: Ongoing, Progressing     Problem: Infection Progression (Sepsis/Septic Shock)  Goal: Absence of Infection Signs and Symptoms  Outcome: Ongoing, Progressing  Intervention: Initiate Sepsis Management  Recent Flowsheet Documentation  Taken 4/24/2024 0800 by Luis Cota RN  Infection Prevention:   single patient room provided   hand hygiene promoted  Intervention: Promote Recovery  Recent  Flowsheet Documentation  Taken 4/24/2024 0800 by Luis Cota RN  Activity Management: activity encouraged     Problem: Nutrition Impaired (Sepsis/Septic Shock)  Goal: Optimal Nutrition Intake  Outcome: Ongoing, Progressing     Problem: Fall Injury Risk  Goal: Absence of Fall and Fall-Related Injury  Outcome: Ongoing, Progressing  Intervention: Identify and Manage Contributors  Recent Flowsheet Documentation  Taken 4/24/2024 0800 by Luis Cota RN  Medication Review/Management: medications reviewed  Intervention: Promote Injury-Free Environment  Recent Flowsheet Documentation  Taken 4/24/2024 0800 by Luis Cota RN  Safety Promotion/Fall Prevention: safety round/check completed   Goal Outcome Evaluation:

## 2024-04-25 ENCOUNTER — APPOINTMENT (OUTPATIENT)
Dept: ULTRASOUND IMAGING | Facility: HOSPITAL | Age: 39
End: 2024-04-25
Payer: COMMERCIAL

## 2024-04-25 VITALS
TEMPERATURE: 98.1 F | OXYGEN SATURATION: 94 % | DIASTOLIC BLOOD PRESSURE: 55 MMHG | HEIGHT: 74 IN | SYSTOLIC BLOOD PRESSURE: 118 MMHG | WEIGHT: 303 LBS | HEART RATE: 85 BPM | RESPIRATION RATE: 20 BRPM | BODY MASS INDEX: 38.89 KG/M2

## 2024-04-25 PROBLEM — K80.20 CALCULUS OF GALLBLADDER WITHOUT CHOLECYSTITIS WITHOUT OBSTRUCTION: Status: ACTIVE | Noted: 2024-04-25

## 2024-04-25 LAB
ALBUMIN SERPL-MCNC: 3.3 G/DL (ref 3.5–5.2)
ALP SERPL-CCNC: 177 U/L (ref 39–117)
ALT SERPL W P-5'-P-CCNC: 100 U/L (ref 1–41)
ANION GAP SERPL CALCULATED.3IONS-SCNC: 11 MMOL/L (ref 5–15)
ANISOCYTOSIS BLD QL: ABNORMAL
AST SERPL-CCNC: 220 U/L (ref 1–40)
BASOPHILS # BLD MANUAL: 0.14 10*3/MM3 (ref 0–0.2)
BASOPHILS NFR BLD MANUAL: 3.1 % (ref 0–1.5)
BILIRUB CONJ SERPL-MCNC: 9.5 MG/DL (ref 0–0.3)
BILIRUB INDIRECT SERPL-MCNC: 2.7 MG/DL
BILIRUB SERPL-MCNC: 12.2 MG/DL (ref 0–1.2)
BUN SERPL-MCNC: 6 MG/DL (ref 6–20)
BUN/CREAT SERPL: 18.8 (ref 7–25)
CALCIUM SPEC-SCNC: 8.3 MG/DL (ref 8.6–10.5)
CHLORIDE SERPL-SCNC: 100 MMOL/L (ref 98–107)
CO2 SERPL-SCNC: 25 MMOL/L (ref 22–29)
CREAT SERPL-MCNC: 0.32 MG/DL (ref 0.76–1.27)
DEPRECATED RDW RBC AUTO: 65.1 FL (ref 37–54)
EGFRCR SERPLBLD CKD-EPI 2021: 152.3 ML/MIN/1.73
EOSINOPHIL # BLD MANUAL: 0.14 10*3/MM3 (ref 0–0.4)
EOSINOPHIL NFR BLD MANUAL: 3.1 % (ref 0.3–6.2)
ERYTHROCYTE [DISTWIDTH] IN BLOOD BY AUTOMATED COUNT: 17.1 % (ref 12.3–15.4)
GIANT PLATELETS: ABNORMAL
GLUCOSE BLDC GLUCOMTR-MCNC: 121 MG/DL (ref 70–130)
GLUCOSE BLDC GLUCOMTR-MCNC: 122 MG/DL (ref 70–130)
GLUCOSE BLDC GLUCOMTR-MCNC: 127 MG/DL (ref 70–130)
GLUCOSE BLDC GLUCOMTR-MCNC: 133 MG/DL (ref 70–130)
GLUCOSE SERPL-MCNC: 125 MG/DL (ref 65–99)
HCT VFR BLD AUTO: 36.9 % (ref 37.5–51)
HGB BLD-MCNC: 12.2 G/DL (ref 13–17.7)
LYMPHOCYTES # BLD MANUAL: 1.36 10*3/MM3 (ref 0.7–3.1)
LYMPHOCYTES NFR BLD MANUAL: 11.3 % (ref 5–12)
MACROCYTES BLD QL SMEAR: ABNORMAL
MAGNESIUM SERPL-MCNC: 1.8 MG/DL (ref 1.6–2.6)
MCH RBC QN AUTO: 35 PG (ref 26.6–33)
MCHC RBC AUTO-ENTMCNC: 33.1 G/DL (ref 31.5–35.7)
MCV RBC AUTO: 105.7 FL (ref 79–97)
MONOCYTES # BLD: 0.52 10*3/MM3 (ref 0.1–0.9)
NEUTROPHILS # BLD AUTO: 2.4 10*3/MM3 (ref 1.7–7)
NEUTROPHILS NFR BLD MANUAL: 41.2 % (ref 42.7–76)
NEUTS BAND NFR BLD MANUAL: 11.3 % (ref 0–5)
NEUTS VAC BLD QL SMEAR: ABNORMAL
NRBC SPEC MANUAL: 1 /100 WBC (ref 0–0.2)
PHOSPHATE SERPL-MCNC: 1.8 MG/DL (ref 2.5–4.5)
PHOSPHATE SERPL-MCNC: 2 MG/DL (ref 2.5–4.5)
PLATELET # BLD AUTO: 103 10*3/MM3 (ref 140–450)
PMV BLD AUTO: 11.7 FL (ref 6–12)
POLYCHROMASIA BLD QL SMEAR: ABNORMAL
POTASSIUM SERPL-SCNC: 4 MMOL/L (ref 3.5–5.2)
PROT SERPL-MCNC: 6.4 G/DL (ref 6–8.5)
RBC # BLD AUTO: 3.49 10*6/MM3 (ref 4.14–5.8)
SMALL PLATELETS BLD QL SMEAR: ABNORMAL
SODIUM SERPL-SCNC: 136 MMOL/L (ref 136–145)
STOMATOCYTES BLD QL SMEAR: ABNORMAL
VARIANT LYMPHS NFR BLD MANUAL: 26.8 % (ref 19.6–45.3)
VARIANT LYMPHS NFR BLD MANUAL: 3.1 % (ref 0–5)
WBC NRBC COR # BLD AUTO: 4.56 10*3/MM3 (ref 3.4–10.8)

## 2024-04-25 PROCEDURE — 80076 HEPATIC FUNCTION PANEL: CPT | Performed by: NURSE PRACTITIONER

## 2024-04-25 PROCEDURE — 25010000002 ONDANSETRON PER 1 MG: Performed by: NURSE PRACTITIONER

## 2024-04-25 PROCEDURE — 83735 ASSAY OF MAGNESIUM: CPT | Performed by: NURSE PRACTITIONER

## 2024-04-25 PROCEDURE — 85025 COMPLETE CBC W/AUTO DIFF WBC: CPT | Performed by: NURSE PRACTITIONER

## 2024-04-25 PROCEDURE — 25810000003 SODIUM CHLORIDE 0.9 % SOLUTION 250 ML FLEX CONT: Performed by: FAMILY MEDICINE

## 2024-04-25 PROCEDURE — 84100 ASSAY OF PHOSPHORUS: CPT | Performed by: FAMILY MEDICINE

## 2024-04-25 PROCEDURE — 25010000002 THIAMINE PER 100 MG: Performed by: STUDENT IN AN ORGANIZED HEALTH CARE EDUCATION/TRAINING PROGRAM

## 2024-04-25 PROCEDURE — 85007 BL SMEAR W/DIFF WBC COUNT: CPT | Performed by: NURSE PRACTITIONER

## 2024-04-25 PROCEDURE — 99232 SBSQ HOSP IP/OBS MODERATE 35: CPT | Performed by: NURSE PRACTITIONER

## 2024-04-25 PROCEDURE — 82948 REAGENT STRIP/BLOOD GLUCOSE: CPT

## 2024-04-25 PROCEDURE — 80048 BASIC METABOLIC PNL TOTAL CA: CPT | Performed by: NURSE PRACTITIONER

## 2024-04-25 PROCEDURE — 76705 ECHO EXAM OF ABDOMEN: CPT

## 2024-04-25 RX ORDER — LANOLIN ALCOHOL/MO/W.PET/CERES
100 CREAM (GRAM) TOPICAL DAILY
Qty: 30 TABLET | Refills: 2 | Status: ON HOLD | OUTPATIENT
Start: 2024-04-27

## 2024-04-25 RX ORDER — PANTOPRAZOLE SODIUM 40 MG/1
40 TABLET, DELAYED RELEASE ORAL DAILY
Qty: 30 TABLET | Refills: 0 | Status: SHIPPED | OUTPATIENT
Start: 2024-04-25 | End: 2024-04-25

## 2024-04-25 RX ORDER — LANOLIN ALCOHOL/MO/W.PET/CERES
100 CREAM (GRAM) TOPICAL DAILY
Qty: 30 TABLET | Refills: 2 | Status: SHIPPED | OUTPATIENT
Start: 2024-04-27 | End: 2024-04-25

## 2024-04-25 RX ORDER — FOLIC ACID 1 MG/1
1 TABLET ORAL DAILY
Qty: 30 TABLET | Refills: 2 | Status: SHIPPED | OUTPATIENT
Start: 2024-04-26 | End: 2024-04-25

## 2024-04-25 RX ORDER — PENTOXIFYLLINE 400 MG/1
400 TABLET, EXTENDED RELEASE ORAL
Qty: 90 TABLET | Refills: 2 | Status: ON HOLD | OUTPATIENT
Start: 2024-04-26

## 2024-04-25 RX ORDER — LORAZEPAM 1 MG/1
1 TABLET ORAL EVERY 8 HOURS
Qty: 12 TABLET | Refills: 0 | Status: SHIPPED | OUTPATIENT
Start: 2024-04-25 | End: 2024-04-25

## 2024-04-25 RX ORDER — PENTOXIFYLLINE 400 MG/1
400 TABLET, EXTENDED RELEASE ORAL
Qty: 90 TABLET | Refills: 2 | Status: SHIPPED | OUTPATIENT
Start: 2024-04-26 | End: 2024-04-25

## 2024-04-25 RX ORDER — FOLIC ACID 1 MG/1
1 TABLET ORAL DAILY
Qty: 30 TABLET | Refills: 2 | Status: ON HOLD | OUTPATIENT
Start: 2024-04-26

## 2024-04-25 RX ORDER — LORAZEPAM 1 MG/1
1 TABLET ORAL EVERY 8 HOURS
Qty: 12 TABLET | Refills: 0 | Status: SHIPPED | OUTPATIENT
Start: 2024-04-25 | End: 2024-04-29

## 2024-04-25 RX ORDER — PANTOPRAZOLE SODIUM 40 MG/1
40 TABLET, DELAYED RELEASE ORAL DAILY
Qty: 30 TABLET | Refills: 0 | Status: SHIPPED | OUTPATIENT
Start: 2024-04-25 | End: 2024-05-03 | Stop reason: SDUPTHER

## 2024-04-25 RX ADMIN — FOLIC ACID 1 MG: 1 TABLET ORAL at 08:04

## 2024-04-25 RX ADMIN — THIAMINE HYDROCHLORIDE 200 MG: 100 INJECTION, SOLUTION INTRAMUSCULAR; INTRAVENOUS at 05:18

## 2024-04-25 RX ADMIN — PENTOXIFYLLINE 400 MG: 400 TABLET, EXTENDED RELEASE ORAL at 11:25

## 2024-04-25 RX ADMIN — PENTOXIFYLLINE 400 MG: 400 TABLET, EXTENDED RELEASE ORAL at 17:08

## 2024-04-25 RX ADMIN — PANTOPRAZOLE SODIUM 40 MG: 40 INJECTION, POWDER, FOR SOLUTION INTRAVENOUS at 08:04

## 2024-04-25 RX ADMIN — Medication 10 ML: at 08:04

## 2024-04-25 RX ADMIN — Medication 1 TABLET: at 08:04

## 2024-04-25 RX ADMIN — ONDANSETRON 4 MG: 2 INJECTION INTRAMUSCULAR; INTRAVENOUS at 12:29

## 2024-04-25 RX ADMIN — THIAMINE HYDROCHLORIDE 200 MG: 100 INJECTION, SOLUTION INTRAMUSCULAR; INTRAVENOUS at 13:57

## 2024-04-25 RX ADMIN — PENTOXIFYLLINE 400 MG: 400 TABLET, EXTENDED RELEASE ORAL at 08:04

## 2024-04-25 RX ADMIN — LORAZEPAM 1 MG: 1 TABLET ORAL at 11:25

## 2024-04-25 RX ADMIN — LORAZEPAM 1 MG: 1 TABLET ORAL at 17:10

## 2024-04-25 RX ADMIN — ONDANSETRON 4 MG: 2 INJECTION INTRAMUSCULAR; INTRAVENOUS at 17:10

## 2024-04-25 RX ADMIN — POTASSIUM PHOSPHATE, MONOBASIC AND POTASSIUM PHOSPHATE, DIBASIC 15 MMOL: 224; 236 INJECTION, SOLUTION, CONCENTRATE INTRAVENOUS at 09:57

## 2024-04-25 RX ADMIN — POTASSIUM CHLORIDE 40 MEQ: 750 CAPSULE, EXTENDED RELEASE ORAL at 04:54

## 2024-04-25 RX ADMIN — LORAZEPAM 1 MG: 1 TABLET ORAL at 13:57

## 2024-04-25 NOTE — DISCHARGE SUMMARY
[facility] Medicine Services  DISCHARGE SUMMARY       Date of Admission: 4/21/2024  Date of Discharge:  4/25/2024  Primary Care Physician: Eleuterio Rangel MD    Discharge Diagnoses:  Active Hospital Problems    Diagnosis     **GI bleed     Calculus of gallbladder without cholecystitis without obstruction     Diabetic ketoacidosis associated with type 2 diabetes mellitus     Alcohol intoxication in active alcoholic     Hypophosphatemia     Transaminitis     Alcoholism     Hypokalemia     Hypomagnesemia     Elevated lactic acid level     Fatty liver          Presenting Problem/History of Present Illness:  GI bleed [K92.2]     Chief Complaint on Day of Discharge:   No complaint    History of Present Illness on Day of Discharge:   The patient is doing well today.  No further hematemesis noted.  No active GI bleeding noted H&H is stable.  The patient has been placed on Trental.  The patient is unable to be treated with steroids for alcoholic hepatitis because of recent DKA and unstable blood sugars prior to admission.  The patient was placed on Trental 3 times daily.  He is appropriate for discharge home and has been given multiple resources for detoxification and outpatient alcohol rehabilitation.  He of course has been instructed not to consume alcohol and will be going home with his mother.  He will be given 4 days of scheduled lorazepam and is to follow-up with his primary care physician ASAP and with a rehabilitation facility ASAP after discharge.    Hospital Course  Patient presents to the hospital with chief complaint of vomiting blood. He states that is mostly just bloody fluid. He has some epigastric pain and has had multiple episodes of vomiting. Upon evaluation in the emergency department he is FOBT positive however hemoglobin is stable. At this time today discussed the case with gastroenterology who suggested admission for endoscopy once the patient is not vomiting anymore.  Further evaluation showed that the patient was in significant DKA and therefore admitted to the ICU for DKA treatment but will also be seen by gastroenterology while the patient is here. Patient is overall hemodynamically stable and doing well at this time. Unfortunately the patient is still drinking alcohol and currently in inebriated on exam.   PULMONARY:  No acute abnormal pulmonary findings     CARDIOVASCULAR:  Sinus tachycardia likely secondary to acute GI bleed and dehydration as well as alcohol intoxication     F/E/N/GI:  GI bleed  - Start patient on Sandostatin and Protonix  - N.p.o.  - IV fluids started per DKA protocol as below  - Thiamine and folic acid given  - Encourage alcohol cessation     # Transaminitis  # Hyperbilirubinemia  Likely secondary to alcohol abuse  Monitor closely  Lactic acid is 24.7 will need significant hydration this lactic acid is likely the reason for the very high anion gap     Concern for DTs  Librium 50 mg every 6 hours  CIWA protocol  Thiamine and folic acid daily        RENAL:  No acute renal abnormalities     HEME/ONC:  GI bleed as above however hemoglobin is stable will hold off of any blood products at this time     Thrombocytopenia is likely secondary to chronic alcohol abuse     INFECTIOUS DISEASE:  No acute infections are apparent     ENDOCRINE:  DKA  - Insulin drip  - IV fluids per protocol  - Glucose checks every hour while on drip  - Will need close monitoring for improvement and titration off the drip  - It is highly likely that this could also be from HHS as the patient's osmolality is 394 which is significantly hyperosmolar however the treatment is the same as above    The patient was admitted to the intensive care unit.  He continued to have vomiting which was improved.  Electrolyte derangement was replaced per protocol.  Insulin drip continued per Glucomander protocol.  GI saw and evaluated the patient and planned an EGD.  Ultrasound of the liver was ordered  because of transaminitis and elevated bilirubin.  There is extensive fatty infiltration of the liver noted as well as cholelithiasis without obstruction.  A repeat ultrasound of th bilirubin level.  This ruled out obstructive condition.  e right upper quadrant was performed prior to discharge because of increased bilirubin.  This ruled out obstruction.  The patient's diabetic ketoacidosis resolved and he was advanced to a diabetic diet.  He was advanced to full liquids on 4/23.  The patient's anion gap closed as of 4/23.  Lactate remained elevated secondary to hepatic disease.  Insulin drip was discontinued and he was transitioned to subcutaneous insulin.  He continued on IV Protonix.  Gastroenterology noted I hepatitis discriminant factor of 43 which was felt to be severe and Diprivan steroids however it was felt to be hazardous to use steroids in this patient who is just now recovering from diabetic ketoacidosis.  He was placed on Trental 400 mg p.o. 3 times daily.  The patient plans to return home with his mother.  He has been asked to follow-up with his primary care physician early next week.  He has also been advised to follow-up with outpatient alcohol rehab very closely.    Consults:   Gastroenterology:  Impression/Plan     Hematemesis  Nausea  DKA-type 2 diabetes  Alcohol abuse  Thrombocytopenia     He currently has octreotide infusing.  He denies the regular use of anti-inflammatories.  Protonix IV twice daily is scheduled.  I went ahead and added as needed Zofran.  I do recommend Mr. Christiansen to be strict NPO and utilize mouth swab for complaints of dry mouth.  He will need EGD evaluation however he continues to experience emesis and I would recommend this be more controlled prior to undergoing sedation.  Hopefully improvement in blood sugars/zofran will help.  Hemoglobin is stable therefore I do not feel EGD needs to be urgent.  Timing pending patient progress.     No imaging on this admission.   CTA in  "February showed hepatic steatosis.  Suspect decreased platelets is from excessive alcohol use.  I will go ahead and order an ultrasound of his liver for further characterization.       Electronically signed by LÁZARO Abreu, 04/22/24, 8:33 AM CDT.            LÁZARO Abreu  I have reviewed this documentation and agree. The entirety of the patient's presentation can be explained by N/V due to DKA and ETOH intoxication, leading to severe gastritis and esophagitis and/or Kirsten-Maldonado tear. We will proceed with EGD once patient's global status has improved.      Thee White MD      Result Review    Result Review:  I have personally reviewed the results from the time of this admission to 4/25/2024 17:42 CDT and agree with these findings:  []  Laboratory  []  Microbiology  []  Radiology  []  EKG/Telemetry   []  Cardiology/Vascular   []  Pathology  []  Old records  []  Other:    Condition on Discharge:    Stable with resolved DKA    Physical Exam on Discharge:  /55 (BP Location: Left arm, Patient Position: Lying)   Pulse 85   Temp 98.1 °F (36.7 °C) (Oral)   Resp 20   Ht 188 cm (74\")   Wt (!) 137 kg (303 lb)   SpO2 94%   BMI 38.90 kg/m²   Physical Exam       Constitutional:       Appearance: He is obese.   HENT:      Head: Normocephalic and atraumatic.   Eyes:      Pupils: Pupils are equal, round, and reactive to light.  Conjunctivae reveal scleral icterus.  Cardiovascular:      Rate and Rhythm: Regular rhythm.      Pulses: Normal pulses.      Heart sounds: Normal heart sounds.   Pulmonary:      Effort: Pulmonary effort is normal.   Abdominal:      General: Abdomen is protuberant. Bowel sounds are normal.      Palpations: Abdomen is soft.      Tenderness: There is no abdominal tenderness.      Comments: Softly distended abdomen   Musculoskeletal:         General: Swelling present. Normal range of motion.      Cervical back: Normal range of motion and neck supple.   Skin:     " General: Skin is warm.      Coloration: Skin is jaundiced.   Neurological:      General: No focal deficit present.      Mental Status: He is alert and oriented to person, place, and time.  No evidence of asterixis.  Psychiatric:         Mood and Affect: Affect is flat.    Discharge Disposition:  Home or Self Care    Discharge Medications:     Discharge Medications        New Medications        Instructions Start Date   folic acid 1 MG tablet  Commonly known as: FOLVITE   1 mg, Oral, Daily   Start Date: April 26, 2024     LORazepam 1 MG tablet  Commonly known as: ATIVAN   1 mg, Oral, Every 8 Hours      pantoprazole 40 MG EC tablet  Commonly known as: PROTONIX   40 mg, Oral, Daily      pentoxifylline 400 MG CR tablet  Commonly known as: TRENtal   400 mg, Oral, 3 Times Daily With Meals   Start Date: April 26, 2024     thiamine 100 MG tablet  Commonly known as: VITAMIN B1   100 mg, Oral, Daily   Start Date: April 27, 2024            Continue These Medications        Instructions Start Date   albuterol sulfate  (90 Base) MCG/ACT inhaler  Commonly known as: PROVENTIL HFA;VENTOLIN HFA;PROAIR HFA   2 puffs, Inhalation, Every 4 Hours PRN      insulin aspart prot-insulin aspart (70-30) 100 UNIT/ML injection  Commonly known as: novoLOG 70/30   15 Units, Subcutaneous, 2 Times Daily With Meals               Discharge Diet:   Diet Instructions       Diet: Diabetic Diets; Consistent Carbohydrate; Thin (IDDSI 0)      Discharge Diet: Diabetic Diets    Diabetic Diet: Consistent Carbohydrate    Fluid Consistency: Thin (IDDSI 0)            Discharge Care Plan / Instructions:   Discharge home    Activity at Discharge:   Activity Instructions       Activity as Tolerated              Follow-up Appointments:  Follow-up with alcohol rehab program immediately after discharge  Follow-up with PCP early next week    Electronically signed by Nadeem Winchester DO, 04/25/24, 17:42 CDT.    Time: Discharge over 30 min    Part of this note  may be an electronic transcription/translation of spoken language to printed text using the Dragon Dictation system.

## 2024-04-25 NOTE — PROGRESS NOTES
[facility]  Inpatient Progress Note      Date of Admission: 4/21/2024  Date of Service:  04/25/24    Reason for Follow Up: Hematemesis    Subjective     Subjective:   Patient has been transferred to [facility] and is out of the unit.  He is lying in bed, no family present at bedside.  Reports improvement in nausea, no further emesis.  He is denying abdominal pain.    Current Facility-Administered Medications:     albuterol (PROVENTIL) nebulizer solution 0.083% 2.5 mg/3mL, 2.5 mg, Nebulization, Q4H PRN, Marcel, Sundeep, DO    Calcium Replacement - Follow Nurse / BPA Driven Protocol, , Does not apply, PRN, Mummaneni, Sundeep, DO    dextrose (D50W) (25 g/50 mL) IV injection 25 g, 25 g, Intravenous, Q15 Min PRN, Janel Grimes APRN    dextrose (GLUTOSE) oral gel 15 g, 15 g, Oral, Q15 Min PRN, Janel Grimes APRN    folic acid (FOLVITE) tablet 1 mg, 1 mg, Oral, Daily, Mummaneni, Sundeep, DO, 1 mg at 04/25/24 0804    glucagon (GLUCAGEN) injection 1 mg, 1 mg, Intramuscular, Q15 Min PRN, Brandtmaneni, Sundeep, DO    insulin glargine (LANTUS, SEMGLEE) injection 20 Units, 20 Units, Subcutaneous, Nightly, Janel Grimes APRN, 20 Units at 04/24/24 2054    Insulin Lispro (humaLOG) injection 3-14 Units, 3-14 Units, Subcutaneous, 4x Daily AC & at Bedtime, Janel Grimes APRN, 3 Units at 04/24/24 2054    LORazepam (ATIVAN) tablet 1 mg, 1 mg, Oral, Q1H PRN **OR** LORazepam (ATIVAN) injection 1 mg, 1 mg, Intravenous, Q1H PRN, 1 mg at 04/23/24 0548 **OR** LORazepam (ATIVAN) tablet 2 mg, 2 mg, Oral, Q1H PRN **OR** LORazepam (ATIVAN) injection 2 mg, 2 mg, Intravenous, Q1H PRN **OR** LORazepam (ATIVAN) injection 2 mg, 2 mg, Intravenous, Q15 Min PRN, 2 mg at 04/22/24 1247 **OR** LORazepam (ATIVAN) injection 2 mg, 2 mg, Intramuscular, Q15 Min PRN **OR** LORazepam (ATIVAN) tablet 4 mg, 4 mg, Oral, Q1H PRN **OR** LORazepam (ATIVAN) injection 4 mg, 4 mg, Intravenous, Q1H PRN, Marcel Konrad,  DO    Magnesium Standard Dose Replacement - Follow Nurse / BPA Driven Protocol, , Does not apply, PRN, Mummaneni, Konrad, DO    multivitamin with minerals 1 tablet, 1 tablet, Oral, Daily, Mummaneni, Sundeep, DO, 1 tablet at 04/25/24 0804    nitroglycerin (NITROSTAT) SL tablet 0.4 mg, 0.4 mg, Sublingual, Q5 Min PRN, Mummaneni, Sundeep, DO    ondansetron (ZOFRAN) injection 4 mg, 4 mg, Intravenous, Q6H PRN, Vernon Morales, APRN, 4 mg at 04/24/24 0053    pantoprazole (PROTONIX) injection 40 mg, 40 mg, Intravenous, Q12H, Mummaneni, Sundeep, DO, 40 mg at 04/25/24 0804    pentoxifylline (TRENtal) CR tablet 400 mg, 400 mg, Oral, TID With Meals, Thee White MD, 400 mg at 04/25/24 0804    Phosphorus Replacement - Follow Nurse / BPA Driven Protocol, , Does not apply, PRN, Mummaneni, Sundeep, DO    potassium phosphates 15 mmol in sodium chloride 0.9 % 250 mL infusion, 15 mmol, Intravenous, Once, Nadeem Winchester, DO    sodium chloride 0.9 % flush 10 mL, 10 mL, Intravenous, Q12H, Mummaneni, Sundeep, DO, 10 mL at 04/25/24 0804    sodium chloride 0.9 % flush 10 mL, 10 mL, Intravenous, PRN, Mummaneni, Sundeep, DO    sodium chloride 0.9 % flush 10 mL, 10 mL, Intravenous, Q12H, Mummaneni, Sundeep, DO, 10 mL at 04/25/24 0804    sodium chloride 0.9 % flush 10 mL, 10 mL, Intravenous, PRN, Mummaneni, Sundeep, DO    sodium chloride 0.9 % infusion 40 mL, 40 mL, Intravenous, PRN, Mummaneni, Sundeep, DO    thiamine (B-1) injection 200 mg, 200 mg, Intravenous, Q8H, 200 mg at 04/25/24 0518 **FOLLOWED BY** [START ON 4/27/2024] thiamine (VITAMIN B-1) tablet 100 mg, 100 mg, Oral, Daily, Mummaneni, Sundeep, DO    Review of Systems     Constitution:  negative for chills, fatigue and fevers  Eyes:  negative for blurriness and change of vision  ENT:   negative for sore throat and voice change  Respiratory: negative for  cough and shortness of air  Cardiovascular:  Negative for chest pain or palpitations  Gastrointestinal:  negative  for  See HPI  Genitourinary:  negative for  blood in urine and painful urination  Integument: negative for  rash and redness  Hematologic / Lymphatic: negative for  excessive bleeding and easy bruising  Musculoskeletal: negative for  joint pain and joint stiffness out of the ordinary  Neurological:  negative for  seizures and speech abnormality  Behavioral/Psych:  negative for  anxiety and depression out of the ordinary  Endocrine: negative for  diabetes and weight loss, unintended  Allergies / Immunologic:  negative for  anaphylaxis and rash        Objective     Vital Signs  Temp:  [97.8 °F (36.6 °C)-99.8 °F (37.7 °C)] 98.3 °F (36.8 °C)  Heart Rate:  [84-98] 84  Resp:  [24] 24  BP: (109-133)/(49-74) 120/62  Body mass index is 38.9 kg/m².    Intake/Output Summary (Last 24 hours) at 4/25/2024 0923  Last data filed at 4/24/2024 1750  Gross per 24 hour   Intake 360 ml   Output --   Net 360 ml     No intake/output data recorded.       Physical Exam:   General: patient awake, alert and cooperative   Eyes: Normal lids and lashes, no scleral icterus   Neck: supple, normal ROM   Skin: warm and dry, not jaundiced   Cardiovascular: regular rhythm and rate, no murmurs auscultated   Pulm: clear to auscultation bilaterally, regular and unlabored   Abdomen: soft, nontender, nondistended; normal bowel sounds   Rectal: deferred   Extremities: no rash or edema   Psychiatric: Normal mood and behavior; memory intact         Results Review:    I have reviewed all of the patients current test results  Results from last 7 days   Lab Units 04/25/24  0835 04/24/24  0202 04/23/24  0439   WBC 10*3/mm3 4.56 3.55 3.85   HEMOGLOBIN g/dL 12.2* 11.3* 12.0*   HEMATOCRIT % 36.9* 34.2* 35.7*   PLATELETS 10*3/mm3 103* 64* 65*       Results from last 7 days   Lab Units 04/25/24  0835 04/24/24  1807 04/24/24  0202 04/23/24  1445   SODIUM mmol/L 136 138 137 136   POTASSIUM mmol/L 4.0 3.4* 3.6 4.4   CHLORIDE mmol/L 100 98 98 96*   CO2 mmol/L 25.0 28.0  29.0 24.0   BUN mg/dL 6 8 8 8   CREATININE mg/dL 0.32* 0.39* 0.48* 0.41*   CALCIUM mg/dL 8.3* 8.3* 7.9* 7.9*   BILIRUBIN mg/dL 12.2*  --  9.4* 9.9*   ALK PHOS U/L 177*  --  147* 184*   ALT (SGPT) U/L 100*  --  87* 96*   AST (SGOT) U/L 220*  --  211* 249*   GLUCOSE mg/dL 125* 177* 240* 248*       Results from last 7 days   Lab Units 04/22/24  0544 04/22/24  0020   INR  1.56* 1.50*       Lab Results   Lab Value Date/Time    LIPASE 26 04/22/2024 0020       Radiology:    Imaging Results (Last 24 Hours)       ** No results found for the last 24 hours. **              Assessment & Plan       GI bleed    Fatty liver    Hypokalemia    Hypomagnesemia    Elevated lactic acid level    Alcoholism    Transaminitis    Hypophosphatemia    Diabetic ketoacidosis associated with type 2 diabetes mellitus    Alcohol intoxication in active alcoholic      Impression/Plan    Hyperbilirubinemia  Transaminitis  Alcohol abuse  Hematemesis    Hemoglobin has been stable, no further signs of GI blood loss.  No plans on EGD at this time.  Bilirubin noted to have increased as well as liver enzymes.  Trental was started yesterday as it was not felt to initiate steroid therapy for alcoholic hepatitis in light of DKA.  He is not complaining of upper abdominal pain, but I will go ahead and order repeat abdominal ultrasound.  Continue PPI IV twice daily as previously prescribed.  Further recommendation pending patient's progress as well as results of ordered testing.    Electronically signed by LÁZARO Abreu, 04/25/24, 9:23 AM CDT.       LÁZARO Abreu  04/25/24  09:23 CDT

## 2024-04-25 NOTE — PLAN OF CARE
"Goal Outcome Evaluation:  Plan of Care Reviewed With: patient        Progress: improving  Outcome Evaluation: A&OX4, VSS. No c/o pain, CIWA scale score of 3, prn Ativan given per pt request-stated he felt \"anxious and restless\", relief after given. On room air and , NSR on tele, assist x stand-by to ambulate, voiding. C/o nausea x1, prn Zofran given with relief. US Gallbladder today, no change from previous US. EGD put on hold d/t electrolyte imbalance, diet upgraded to GI low fiber/cons carb, tolerated well. ACHS accuchecks w/SSI. X1 phos replacement given per protocol, await re-draw lab. alarms set, call light in reach. safety maintained and continue to monitor.                               "

## 2024-04-25 NOTE — PLAN OF CARE
Goal Outcome Evaluation:      Patient resting well overnight. Up SBA to bathroom. No CO pain. CIWA WNL. Flat affect. Safety maintained.

## 2024-04-26 ENCOUNTER — TRANSITIONAL CARE MANAGEMENT TELEPHONE ENCOUNTER (OUTPATIENT)
Dept: CALL CENTER | Facility: HOSPITAL | Age: 39
End: 2024-04-26
Payer: COMMERCIAL

## 2024-04-26 ENCOUNTER — READMISSION MANAGEMENT (OUTPATIENT)
Dept: CALL CENTER | Facility: HOSPITAL | Age: 39
End: 2024-04-26
Payer: COMMERCIAL

## 2024-04-26 NOTE — OUTREACH NOTE
Call Center TCM Note      Flowsheet Row Responses   [facility] patient discharged from? [place]   Does the patient have one of the following disease processes/diagnoses(primary or secondary)? Other   TCM attempt successful? No  [NO one on VR]   Unsuccessful attempts Attempt 1            Kristina Shah RN    4/26/2024, 12:14 CDT

## 2024-04-26 NOTE — OUTREACH NOTE
Call Center TCM Note      Flowsheet Row Responses   [facility] patient discharged from? [place]   Does the patient have one of the following disease processes/diagnoses(primary or secondary)? Other   TCM attempt successful? No   Unsuccessful attempts Attempt 2            Kristina Shah RN    4/26/2024, 14:40 CDT

## 2024-04-26 NOTE — OUTREACH NOTE
Prep Survey      Flowsheet Row Responses   [facility] patient discharged from? [place]   Is LACE score < 7 ? No   Eligibility [facility]   Date of Admission 04/21/24   Date of Discharge 04/25/24   Discharge Disposition Home or Self Care   Discharge diagnosis GI bleed   Does the patient have one of the following disease processes/diagnoses(primary or secondary)? Other   Does the patient have Home health ordered? No   Is there a DME ordered? No   Prep survey completed? Yes            Helen QUINN - Registered Nurse

## 2024-04-28 ENCOUNTER — TRANSITIONAL CARE MANAGEMENT TELEPHONE ENCOUNTER (OUTPATIENT)
Dept: CALL CENTER | Facility: HOSPITAL | Age: 39
End: 2024-04-28
Payer: COMMERCIAL

## 2024-04-28 NOTE — OUTREACH NOTE
Call Center TCM Note      Flowsheet Row Responses   [facility] patient discharged from? [place]   Does the patient have one of the following disease processes/diagnoses(primary or secondary)? Other   TCM attempt successful? No   Unsuccessful attempts Attempt 3            Jenna Jackson, ESTELA    4/28/2024, 09:34 EDT

## 2024-05-03 ENCOUNTER — TELEPHONE (OUTPATIENT)
Dept: FAMILY MEDICINE CLINIC | Facility: CLINIC | Age: 39
End: 2024-05-03

## 2024-05-03 ENCOUNTER — OFFICE VISIT (OUTPATIENT)
Dept: FAMILY MEDICINE CLINIC | Facility: CLINIC | Age: 39
End: 2024-05-03
Payer: COMMERCIAL

## 2024-05-03 VITALS
SYSTOLIC BLOOD PRESSURE: 137 MMHG | BODY MASS INDEX: 40.43 KG/M2 | DIASTOLIC BLOOD PRESSURE: 76 MMHG | TEMPERATURE: 98.6 F | OXYGEN SATURATION: 97 % | HEIGHT: 74 IN | HEART RATE: 101 BPM | WEIGHT: 315 LBS

## 2024-05-03 DIAGNOSIS — Z09 HOSPITAL DISCHARGE FOLLOW-UP: Primary | ICD-10-CM

## 2024-05-03 DIAGNOSIS — Z79.4 TYPE 2 DIABETES MELLITUS WITH HYPERGLYCEMIA, WITH LONG-TERM CURRENT USE OF INSULIN: ICD-10-CM

## 2024-05-03 DIAGNOSIS — F10.20 ALCOHOLISM: ICD-10-CM

## 2024-05-03 DIAGNOSIS — E11.65 TYPE 2 DIABETES MELLITUS WITH HYPERGLYCEMIA, WITH LONG-TERM CURRENT USE OF INSULIN: ICD-10-CM

## 2024-05-03 DIAGNOSIS — K76.0 FATTY LIVER: ICD-10-CM

## 2024-05-03 DIAGNOSIS — D69.6 THROMBOCYTOPENIA: ICD-10-CM

## 2024-05-03 DIAGNOSIS — K92.0 HEMATEMESIS, UNSPECIFIED WHETHER NAUSEA PRESENT: ICD-10-CM

## 2024-05-03 DIAGNOSIS — K80.20 CALCULUS OF GALLBLADDER WITHOUT CHOLECYSTITIS WITHOUT OBSTRUCTION: ICD-10-CM

## 2024-05-03 DIAGNOSIS — R74.01 TRANSAMINITIS: ICD-10-CM

## 2024-05-03 DIAGNOSIS — G47.00 INSOMNIA DISORDER RELATED TO KNOWN ORGANIC FACTOR: ICD-10-CM

## 2024-05-03 DIAGNOSIS — E66.01 CLASS 3 SEVERE OBESITY DUE TO EXCESS CALORIES WITH SERIOUS COMORBIDITY AND BODY MASS INDEX (BMI) OF 40.0 TO 44.9 IN ADULT: ICD-10-CM

## 2024-05-03 DIAGNOSIS — I10 PRIMARY HYPERTENSION: ICD-10-CM

## 2024-05-03 PROCEDURE — 99214 OFFICE O/P EST MOD 30 MIN: CPT

## 2024-05-03 RX ORDER — METFORMIN HYDROCHLORIDE 500 MG/1
500 TABLET, EXTENDED RELEASE ORAL
Qty: 30 TABLET | Refills: 0 | Status: ON HOLD | OUTPATIENT
Start: 2024-05-03 | End: 2024-05-06

## 2024-05-03 RX ORDER — INSULIN GLARGINE 100 [IU]/ML
5 INJECTION, SOLUTION SUBCUTANEOUS NIGHTLY
Qty: 3 ML | Refills: 0 | Status: ON HOLD | OUTPATIENT
Start: 2024-05-03

## 2024-05-03 RX ORDER — PROCHLORPERAZINE 25 MG/1
SUPPOSITORY RECTAL
Qty: 3 EACH | Refills: 0 | Status: ON HOLD | OUTPATIENT
Start: 2024-05-03

## 2024-05-03 RX ORDER — QUETIAPINE FUMARATE 50 MG/1
50 TABLET, FILM COATED ORAL NIGHTLY
Qty: 30 TABLET | Refills: 0 | Status: ON HOLD | OUTPATIENT
Start: 2024-05-03

## 2024-05-03 RX ORDER — PANTOPRAZOLE SODIUM 40 MG/1
40 TABLET, DELAYED RELEASE ORAL DAILY
Qty: 30 TABLET | Refills: 2 | Status: ON HOLD | OUTPATIENT
Start: 2024-05-03

## 2024-05-03 NOTE — TELEPHONE ENCOUNTER
Called pt on Jossy MERRITT's behalf and confirmed . Confirmed that pt is taking SSI novolog. Informed him of Jossy's recommendations: stop SSI, start metformin, start Lantus nightly. Pt vu all, no further questions.

## 2024-05-03 NOTE — PROGRESS NOTES
Transitional Care Follow Up Visit  Subjective     Luciano Christiansen is a 39 y.o. male who presents for a transitional care management visit.    Within 48 business hours after discharge our office contacted him via telephone to coordinate his care and needs.      I reviewed and discussed the details of that call along with the discharge summary, hospital problems, inpatient lab results, inpatient diagnostic studies, and consultation reports with Luciano.     Current outpatient and discharge medications have been reconciled for the patient.  Reviewed by: LÁZARO Martino          4/26/2024     4:19 AM   Date of TCM Phone Call   [facility]   Date of Admission 4/21/2024   Date of Discharge 4/25/2024   Discharge Disposition Home or Self Care     Risk for Readmission (LACE) Score: 11 (4/25/2024  5:00 AM)      History of Present Illness  Pt here for hospital f/u. Pt was in [facility] on 4/21/24 due to a GI bleed.      Course During Hospital Stay:  4/21-4/25.     The following portions of the patient's history were reviewed and updated as appropriate: allergies, current medications, past family history, past medical history, past social history, past surgical history, and problem list.    Review of Systems    Objective   Physical Exam  Vitals and nursing note reviewed.   Constitutional:       General: He is not in acute distress.     Appearance: Normal appearance. He is obese. He is not ill-appearing.   HENT:      Head: Normocephalic and atraumatic.      Right Ear: External ear normal.      Left Ear: External ear normal.      Nose: Nose normal.   Eyes:      Conjunctiva/sclera: Conjunctivae normal.   Cardiovascular:      Rate and Rhythm: Normal rate and regular rhythm.      Pulses: Normal pulses.      Heart sounds: Normal heart sounds.   Pulmonary:      Effort: Pulmonary effort is normal.      Breath sounds: Normal breath sounds.   Skin:     General: Skin is warm and dry.   Neurological:      Mental Status:  He is alert and oriented to person, place, and time. Mental status is at baseline.      GCS: GCS eye subscore is 4. GCS verbal subscore is 5. GCS motor subscore is 6.   Psychiatric:         Mood and Affect: Mood normal.         Behavior: Behavior normal.         Thought Content: Thought content normal.         Judgment: Judgment normal.       Class 3 Severe Obesity (BMI >=40). Obesity-related health conditions include the following: hypertension, diabetes mellitus, and GERD. Obesity is unchanged. BMI is is above average; BMI management plan is completed. We discussed portion control, increasing exercise, and pharmacologic options including Metformin .     Assessment & Plan   Problems Addressed this Visit          Cardiac and Vasculature    Primary hypertension       Gastrointestinal Abdominal     Fatty liver    Relevant Orders    Ambulatory Referral to Gastroenterology    Transaminitis    Relevant Orders    Ambulatory Referral to Gastroenterology    Comprehensive Metabolic Panel    Calculus of gallbladder without cholecystitis without obstruction       Mental Health    Alcoholism    Relevant Orders    Ambulatory Referral to Gastroenterology    Ambulatory Referral to Case Management       Sleep    Insomnia disorder related to known organic factor    Relevant Medications    QUEtiapine (SEROquel) 50 MG tablet     Other Visit Diagnoses       Hospital discharge follow-up    -  Primary    Type 2 diabetes mellitus with hyperglycemia, with long-term current use of insulin        Relevant Medications    Continuous Glucose Sensor (Dexcom G6 Sensor)    insulin glargine (Lantus) 100 UNIT/ML injection    metFORMIN ER (GLUCOPHAGE-XR) 500 MG 24 hr tablet    Other Relevant Orders    Comprehensive Metabolic Panel    Urinalysis With Culture If Indicated -    MicroAlbumin, Urine, Random - Urine, Clean Catch    Ambulatory Referral to Diabetic Education    Lipid Panel    Class 3 severe obesity due to excess calories with serious  comorbidity and body mass index (BMI) of 40.0 to 44.9 in adult        Relevant Orders    Lipid Panel    Hematemesis, unspecified whether nausea present        Relevant Medications    pantoprazole (PROTONIX) 40 MG EC tablet    Other Relevant Orders    Ambulatory Referral to Gastroenterology    CBC Auto Differential    Thrombocytopenia        Relevant Orders    CBC Auto Differential          Diagnoses         Codes Comments    Hospital discharge follow-up    -  Primary ICD-10-CM: Z09  ICD-9-CM: V67.59     Type 2 diabetes mellitus with hyperglycemia, with long-term current use of insulin     ICD-10-CM: E11.65, Z79.4  ICD-9-CM: 250.00, 790.29, V58.67     Primary hypertension     ICD-10-CM: I10  ICD-9-CM: 401.9     Class 3 severe obesity due to excess calories with serious comorbidity and body mass index (BMI) of 40.0 to 44.9 in adult     ICD-10-CM: E66.01, Z68.41  ICD-9-CM: 278.01, V85.41     Transaminitis     ICD-10-CM: R74.01  ICD-9-CM: 790.4     Fatty liver     ICD-10-CM: K76.0  ICD-9-CM: 571.8     Alcoholism     ICD-10-CM: F10.20  ICD-9-CM: 303.90     Calculus of gallbladder without cholecystitis without obstruction     ICD-10-CM: K80.20  ICD-9-CM: 574.20     Hematemesis, unspecified whether nausea present     ICD-10-CM: K92.0  ICD-9-CM: 578.0     Insomnia disorder related to known organic factor     ICD-10-CM: G47.00  ICD-9-CM: 327.00     Thrombocytopenia     ICD-10-CM: D69.6  ICD-9-CM: 287.5             Results Reviewed:  ED to Hosp-Admission (Discharged) with Nadeem Winchester DO; Adrien Louis DO (04/21/2024)   Hemoglobin A1c (04/22/2024 00:20)   Comprehensive Metabolic Panel (04/23/2024 14:45)   US Gallbladder (04/25/2024 13:10)  CT Abdomen Pelvis With & Without Contrast (04/22/2024 20:39)  US Liver (04/22/2024 10:25)  XR Chest 1 View (04/22/2024 02:17)    Patient seen today for hospital follow-up.  Was recently admitted from 4/21-4/25. he was admitted for DKA and GI bleed, initially presented for  vomiting blood.  He was found to be intoxicated, admits to being an alcoholic.  States he does have plans to follow-up outpatient at a rehab facility once he gets everything at his home settled.  I will go ahead and place a case management referral today as well as a diabetes educator referral.  I will also try to get him a Dexcom approved through insurance, however he does have a glucometer that he purchased on his own OTC.  His most recent A1c on 4/22 was 10.4.  He was being given Lantus and sliding scale NovoLog in the hospital.  States he was sent home on sliding scale insulin and has been compliant with this, was not sent home on long-acting.  He was also sent home on Protonix which we will continue.  I will go ahead and refer to GI as he was found to have some fatty liver and transaminitis.  There were no obvious findings consistent with cirrhosis on imaging.  He did have some thrombocytopenia on his CBC which they attributed to chronic alcoholism.  GI was consulted inpatient, ultimately an endoscopy was not performed as his symptoms resolved with medications.  Patient states since discharge she has been fatigued but has not been vomiting, is still using alcohol but very minimal at this point per his report.  I would like to repeat some labs today, he states he will have to return to have these done on Monday.  I will include a urine microalbumin as well as a lipid panel, I would like to see him back in 2 weeks for recheck and complete a wellness at that time as well.  He will write down his blood sugars and perform checks appropriately.  We discussed diet and exercise, decreasing carb intake.  I am starting the patient on metformin  mg twice daily as well as Lantus 5 units nightly, will discontinue sliding scale insulin at this time.  Will avoid Ozempic and like medications at this time due to gallbladder and liver issues.  Requested that he bring a blood sugar diary to his next appointment with him for  me to review.  He did request that we restart him on his Seroquel as he has been experiencing some insomnia.    Plan:  1.  Stop sliding scale insulin, NovoLog  2.  Start Lantus 5 units nightly, blood glucose checks with diary  3.  Start Metformin  mg daily  4.  Case management referral for alcoholism  5.  Diabetes educator referral  6.  Dexcom ordered  7.  Diet and exercise changes, low-carb  8.  Continue Protonix 40 mg daily  9.  GI referral  10.  Labs pending  11.  Start Seroquel 50 mg nightly for sleep  12.  Follow-up in 2 weeks for annual wellness and DM check

## 2024-05-03 NOTE — PROGRESS NOTES
"Chief Complaint  Hospital Follow Up Visit    Subjective    {Problem List  Visit Diagnosis   Encounters  Notes  Medications  Labs  Result Review Imaging  Media :23}    Luciano Christiansen presents to [facility]  History of Present Illness  Pt here for hospital f/u. Pt was in [facility] on 4/21/24 due to a GI bleed.       Objective   Vital Signs:  /76   Pulse 101   Temp 98.6 °F (37 °C) (Temporal)   Ht 188 cm (74\")   Wt (!) 150 kg (330 lb)   SpO2 97%   BMI 42.37 kg/m²   Estimated body mass index is 42.37 kg/m² as calculated from the following:    Height as of this encounter: 188 cm (74\").    Weight as of this encounter: 150 kg (330 lb).       Class 2 Severe Obesity (BMI >=35 and <=39.9). Obesity-related health conditions include the following: hypertension, diabetes mellitus, and GERD. Obesity is unchanged. BMI is is above average; BMI management plan is completed. We discussed portion control and increasing exercise.      Physical Exam   Result Review :{Labs  Result Review  Imaging  Med Tab  Media  Procedures :23}    {The following data was reviewed by (Optional):47832}  {Ambulatory Labs (Optional):23504}  {Data reviewed (Optional):92674:::1}             Assessment and Plan {CC Problem List  Visit Diagnosis   ROS  Review (Popup)  Health Maintenance  Quality  BestPractice  Medications  SmartSets  SnapShot Encounters  Media :23}    There are no diagnoses linked to this encounter.       {Time Spent (Optional):41255}  Follow Up {Instructions Charge Capture  Follow-up Communications :23}    No follow-ups on file.  Patient was given instructions and counseling regarding his condition or for health maintenance advice. Please see specific information pulled into the AVS if appropriate.         "

## 2024-05-05 ENCOUNTER — HOSPITAL ENCOUNTER (INPATIENT)
Facility: HOSPITAL | Age: 39
LOS: 3 days | Discharge: HOME | DRG: 917 | End: 2024-05-09
Attending: INTERNAL MEDICINE | Admitting: INTERNAL MEDICINE
Payer: COMMERCIAL

## 2024-05-05 ENCOUNTER — APPOINTMENT (OUTPATIENT)
Dept: CT IMAGING | Facility: HOSPITAL | Age: 39
DRG: 917 | End: 2024-05-05
Payer: COMMERCIAL

## 2024-05-05 ENCOUNTER — APPOINTMENT (OUTPATIENT)
Dept: GENERAL RADIOLOGY | Facility: HOSPITAL | Age: 39
DRG: 917 | End: 2024-05-05
Payer: COMMERCIAL

## 2024-05-05 DIAGNOSIS — G31.2 ALCOHOLIC ENCEPHALOPATHY: Primary | ICD-10-CM

## 2024-05-05 DIAGNOSIS — Z74.09 IMPAIRED FUNCTIONAL MOBILITY AND ACTIVITY TOLERANCE: ICD-10-CM

## 2024-05-05 DIAGNOSIS — K70.31 ALCOHOLIC CIRRHOSIS OF LIVER WITH ASCITES: ICD-10-CM

## 2024-05-05 DIAGNOSIS — E87.1 HYPONATREMIA: ICD-10-CM

## 2024-05-05 DIAGNOSIS — E87.6 HYPOKALEMIA: ICD-10-CM

## 2024-05-05 LAB
ALBUMIN SERPL-MCNC: 3.1 G/DL (ref 3.5–5.2)
ALBUMIN/GLOB SERPL: 0.8 G/DL
ALP SERPL-CCNC: 254 U/L (ref 39–117)
ALT SERPL W P-5'-P-CCNC: 94 U/L (ref 1–41)
AMMONIA BLD-SCNC: 222 UMOL/L (ref 16–60)
ANION GAP SERPL CALCULATED.3IONS-SCNC: 24 MMOL/L (ref 5–15)
APTT PPP: 56.5 SECONDS (ref 24.5–36)
ARTERIAL PATENCY WRIST A: POSITIVE
AST SERPL-CCNC: 195 U/L (ref 1–40)
ATMOSPHERIC PRESS: 752 MMHG
BASE EXCESS BLDA CALC-SCNC: -8.5 MMOL/L (ref 0–2)
BASOPHILS # BLD AUTO: 0.07 10*3/MM3 (ref 0–0.2)
BASOPHILS NFR BLD AUTO: 0.7 % (ref 0–1.5)
BDY SITE: ABNORMAL
BILIRUB SERPL-MCNC: 18.2 MG/DL (ref 0–1.2)
BODY TEMPERATURE: 37
BUN SERPL-MCNC: 11 MG/DL (ref 6–20)
BUN/CREAT SERPL: 12.8 (ref 7–25)
CA-I BLD-MCNC: 4.19 MG/DL (ref 4.6–5.4)
CALCIUM SPEC-SCNC: 8.3 MG/DL (ref 8.6–10.5)
CHLORIDE SERPL-SCNC: 84 MMOL/L (ref 98–107)
CO2 SERPL-SCNC: 12 MMOL/L (ref 22–29)
COHGB MFR BLD: 1.9 % (ref 0–5)
CREAT SERPL-MCNC: 0.86 MG/DL (ref 0.76–1.27)
D-LACTATE SERPL-SCNC: 11.6 MMOL/L (ref 0.5–2)
D-LACTATE SERPL-SCNC: 6.2 MMOL/L (ref 0.5–2)
DEPRECATED RDW RBC AUTO: 61.5 FL (ref 37–54)
EGFRCR SERPLBLD CKD-EPI 2021: 113 ML/MIN/1.73
EOSINOPHIL # BLD AUTO: 0.02 10*3/MM3 (ref 0–0.4)
EOSINOPHIL NFR BLD AUTO: 0.2 % (ref 0.3–6.2)
ERYTHROCYTE [DISTWIDTH] IN BLOOD BY AUTOMATED COUNT: 16.8 % (ref 12.3–15.4)
ETHANOL UR QL: 0.17 %
FLUAV RNA RESP QL NAA+PROBE: NOT DETECTED
FLUBV RNA RESP QL NAA+PROBE: NOT DETECTED
GEN 5 2HR TROPONIN T REFLEX: 67 NG/L
GLOBULIN UR ELPH-MCNC: 3.7 GM/DL
GLUCOSE SERPL-MCNC: 233 MG/DL (ref 65–99)
HCO3 BLDA-SCNC: 15.4 MMOL/L (ref 20–26)
HCT VFR BLD AUTO: 32.4 % (ref 37.5–51)
HCT VFR BLD CALC: 33.4 % (ref 38–51)
HGB BLD-MCNC: 11.4 G/DL (ref 13–17.7)
HGB BLDA-MCNC: 10.9 G/DL (ref 14–18)
IMM GRANULOCYTES # BLD AUTO: 0.3 10*3/MM3 (ref 0–0.05)
IMM GRANULOCYTES NFR BLD AUTO: 2.8 % (ref 0–0.5)
INR PPP: 1.88 (ref 0.91–1.09)
LIPASE SERPL-CCNC: 20 U/L (ref 13–60)
LYMPHOCYTES # BLD AUTO: 0.46 10*3/MM3 (ref 0.7–3.1)
LYMPHOCYTES NFR BLD AUTO: 4.3 % (ref 19.6–45.3)
Lab: ABNORMAL
Lab: ABNORMAL
MAGNESIUM SERPL-MCNC: 1.8 MG/DL (ref 1.6–2.6)
MCH RBC QN AUTO: 35.3 PG (ref 26.6–33)
MCHC RBC AUTO-ENTMCNC: 35.2 G/DL (ref 31.5–35.7)
MCV RBC AUTO: 100.3 FL (ref 79–97)
METHGB BLD QL: 0.6 % (ref 0–3)
MODALITY: ABNORMAL
MONOCYTES # BLD AUTO: 0.97 10*3/MM3 (ref 0.1–0.9)
MONOCYTES NFR BLD AUTO: 9 % (ref 5–12)
NEUTROPHILS NFR BLD AUTO: 8.93 10*3/MM3 (ref 1.7–7)
NEUTROPHILS NFR BLD AUTO: 83 % (ref 42.7–76)
NOTIFIED BY: ABNORMAL
NOTIFIED WHO: ABNORMAL
NRBC BLD AUTO-RTO: 0 /100 WBC (ref 0–0.2)
NT-PROBNP SERPL-MCNC: 306.6 PG/ML (ref 0–450)
OXYHGB MFR BLDV: 90.8 % (ref 94–99)
PCO2 BLDA: 26.7 MM HG (ref 35–45)
PCO2 TEMP ADJ BLD: 26.7 MM HG (ref 35–45)
PH BLDA: 7.37 PH UNITS (ref 7.35–7.45)
PH, TEMP CORRECTED: 7.37 PH UNITS (ref 7.35–7.45)
PHOSPHATE SERPL-MCNC: 1.6 MG/DL (ref 2.5–4.5)
PHOSPHATE SERPL-MCNC: 1.8 MG/DL (ref 2.5–4.5)
PLATELET # BLD AUTO: 349 10*3/MM3 (ref 140–450)
PMV BLD AUTO: 10.9 FL (ref 6–12)
PO2 BLDA: 66.6 MM HG (ref 83–108)
PO2 TEMP ADJ BLD: 66.6 MM HG (ref 83–108)
POTASSIUM BLDA-SCNC: 2.5 MMOL/L (ref 3.5–5.2)
POTASSIUM SERPL-SCNC: 2.6 MMOL/L (ref 3.5–5.2)
PROCALCITONIN SERPL-MCNC: 0.45 NG/ML (ref 0–0.25)
PROT SERPL-MCNC: 6.8 G/DL (ref 6–8.5)
PROTHROMBIN TIME: 22.4 SECONDS (ref 11.8–14.8)
RBC # BLD AUTO: 3.23 10*6/MM3 (ref 4.14–5.8)
SAO2 % BLDCOA: 93.1 % (ref 94–99)
SARS-COV-2 RNA RESP QL NAA+PROBE: NOT DETECTED
SODIUM BLDA-SCNC: 123 MMOL/L (ref 136–145)
SODIUM SERPL-SCNC: 120 MMOL/L (ref 136–145)
TROPONIN T DELTA: -1 NG/L
TROPONIN T SERPL HS-MCNC: 68 NG/L
VENTILATOR MODE: ABNORMAL
WBC NRBC COR # BLD AUTO: 10.75 10*3/MM3 (ref 3.4–10.8)

## 2024-05-05 PROCEDURE — 70450 CT HEAD/BRAIN W/O DYE: CPT

## 2024-05-05 PROCEDURE — 25010000002 POTASSIUM CHLORIDE 10 MEQ/100ML SOLUTION: Performed by: NURSE PRACTITIONER

## 2024-05-05 PROCEDURE — 25010000002 LORAZEPAM PER 2 MG: Performed by: STUDENT IN AN ORGANIZED HEALTH CARE EDUCATION/TRAINING PROGRAM

## 2024-05-05 PROCEDURE — 25010000002 THIAMINE HCL 200 MG/2ML SOLUTION 2 ML VIAL: Performed by: NURSE PRACTITIONER

## 2024-05-05 PROCEDURE — 71275 CT ANGIOGRAPHY CHEST: CPT

## 2024-05-05 PROCEDURE — 74177 CT ABD & PELVIS W/CONTRAST: CPT

## 2024-05-05 PROCEDURE — 84100 ASSAY OF PHOSPHORUS: CPT | Performed by: NURSE PRACTITIONER

## 2024-05-05 PROCEDURE — 87636 SARSCOV2 & INF A&B AMP PRB: CPT | Performed by: NURSE PRACTITIONER

## 2024-05-05 PROCEDURE — 25010000002 POTASSIUM CHLORIDE 10 MEQ/100ML SOLUTION: Performed by: INTERNAL MEDICINE

## 2024-05-05 PROCEDURE — 85730 THROMBOPLASTIN TIME PARTIAL: CPT | Performed by: NURSE PRACTITIONER

## 2024-05-05 PROCEDURE — 93005 ELECTROCARDIOGRAM TRACING: CPT | Performed by: NURSE PRACTITIONER

## 2024-05-05 PROCEDURE — 83605 ASSAY OF LACTIC ACID: CPT | Performed by: NURSE PRACTITIONER

## 2024-05-05 PROCEDURE — 36415 COLL VENOUS BLD VENIPUNCTURE: CPT

## 2024-05-05 PROCEDURE — 25510000001 IOPAMIDOL PER 1 ML: Performed by: NURSE PRACTITIONER

## 2024-05-05 PROCEDURE — 82805 BLOOD GASES W/O2 SATURATION: CPT

## 2024-05-05 PROCEDURE — 85610 PROTHROMBIN TIME: CPT | Performed by: NURSE PRACTITIONER

## 2024-05-05 PROCEDURE — 99285 EMERGENCY DEPT VISIT HI MDM: CPT

## 2024-05-05 PROCEDURE — 82375 ASSAY CARBOXYHB QUANT: CPT

## 2024-05-05 PROCEDURE — 83735 ASSAY OF MAGNESIUM: CPT | Performed by: NURSE PRACTITIONER

## 2024-05-05 PROCEDURE — 25810000003 SODIUM CHLORIDE 0.9 % SOLUTION: Performed by: NURSE PRACTITIONER

## 2024-05-05 PROCEDURE — 87040 BLOOD CULTURE FOR BACTERIA: CPT | Performed by: NURSE PRACTITIONER

## 2024-05-05 PROCEDURE — 84484 ASSAY OF TROPONIN QUANT: CPT | Performed by: NURSE PRACTITIONER

## 2024-05-05 PROCEDURE — 82077 ASSAY SPEC XCP UR&BREATH IA: CPT | Performed by: NURSE PRACTITIONER

## 2024-05-05 PROCEDURE — 36600 WITHDRAWAL OF ARTERIAL BLOOD: CPT

## 2024-05-05 PROCEDURE — 83050 HGB METHEMOGLOBIN QUAN: CPT

## 2024-05-05 PROCEDURE — 25010000002 LORAZEPAM PER 2 MG: Performed by: NURSE PRACTITIONER

## 2024-05-05 PROCEDURE — 80053 COMPREHEN METABOLIC PANEL: CPT | Performed by: NURSE PRACTITIONER

## 2024-05-05 PROCEDURE — 83690 ASSAY OF LIPASE: CPT | Performed by: NURSE PRACTITIONER

## 2024-05-05 PROCEDURE — 83880 ASSAY OF NATRIURETIC PEPTIDE: CPT | Performed by: NURSE PRACTITIONER

## 2024-05-05 PROCEDURE — 25010000002 PIPERACILLIN SOD-TAZOBACTAM PER 1 G: Performed by: NURSE PRACTITIONER

## 2024-05-05 PROCEDURE — 84145 PROCALCITONIN (PCT): CPT | Performed by: NURSE PRACTITIONER

## 2024-05-05 PROCEDURE — 85025 COMPLETE CBC W/AUTO DIFF WBC: CPT | Performed by: NURSE PRACTITIONER

## 2024-05-05 PROCEDURE — 82140 ASSAY OF AMMONIA: CPT | Performed by: NURSE PRACTITIONER

## 2024-05-05 PROCEDURE — 71045 X-RAY EXAM CHEST 1 VIEW: CPT

## 2024-05-05 RX ORDER — LORAZEPAM 2 MG/ML
1 INJECTION INTRAMUSCULAR ONCE
Status: COMPLETED | OUTPATIENT
Start: 2024-05-05 | End: 2024-05-05

## 2024-05-05 RX ORDER — SODIUM CHLORIDE 0.9 % (FLUSH) 0.9 %
10 SYRINGE (ML) INJECTION AS NEEDED
Status: DISCONTINUED | OUTPATIENT
Start: 2024-05-05 | End: 2024-05-09 | Stop reason: HOSPADM

## 2024-05-05 RX ORDER — LORAZEPAM 2 MG/ML
INJECTION INTRAMUSCULAR
Status: DISPENSED
Start: 2024-05-05 | End: 2024-05-06

## 2024-05-05 RX ORDER — POTASSIUM CHLORIDE 7.45 MG/ML
10 INJECTION INTRAVENOUS ONCE
Status: COMPLETED | OUTPATIENT
Start: 2024-05-05 | End: 2024-05-05

## 2024-05-05 RX ORDER — LORAZEPAM 2 MG/ML
2 INJECTION INTRAMUSCULAR ONCE
Status: COMPLETED | OUTPATIENT
Start: 2024-05-05 | End: 2024-05-05

## 2024-05-05 RX ADMIN — POTASSIUM CHLORIDE 10 MEQ: 7.46 INJECTION, SOLUTION INTRAVENOUS at 19:23

## 2024-05-05 RX ADMIN — LORAZEPAM 1 MG: 2 INJECTION, SOLUTION INTRAMUSCULAR; INTRAVENOUS at 23:40

## 2024-05-05 RX ADMIN — SODIUM CHLORIDE 500 ML: 9 INJECTION, SOLUTION INTRAVENOUS at 23:04

## 2024-05-05 RX ADMIN — LORAZEPAM 1 MG: 2 INJECTION, SOLUTION INTRAMUSCULAR; INTRAVENOUS at 20:07

## 2024-05-05 RX ADMIN — POTASSIUM CHLORIDE 10 MEQ: 7.46 INJECTION, SOLUTION INTRAVENOUS at 21:05

## 2024-05-05 RX ADMIN — THIAMINE HYDROCHLORIDE 100 MG: 100 INJECTION, SOLUTION INTRAMUSCULAR; INTRAVENOUS at 21:05

## 2024-05-05 RX ADMIN — LORAZEPAM 2 MG: 2 INJECTION, SOLUTION INTRAMUSCULAR; INTRAVENOUS at 22:02

## 2024-05-05 RX ADMIN — IOPAMIDOL 100 ML: 755 INJECTION, SOLUTION INTRAVENOUS at 22:06

## 2024-05-05 RX ADMIN — PIPERACILLIN AND TAZOBACTAM 4.5 G: 4; .5 INJECTION, POWDER, FOR SOLUTION INTRAVENOUS at 23:06

## 2024-05-05 NOTE — ED PROVIDER NOTES
Subjective   History of Present Illness  Patient is a 39-year-old male who presents to the ER via EMS due to a fall.  Patient states that he has felt lightheaded and because of the lightheadedness he has sustained mechanical falls.  Patient appears lethargic and obtunded on exam.  He is not answering all questions when asked.  He has no family at bedside.  Per review patient had a recent admission April 21, 2024 to April 25, 2024 for history of GI bleeding.  He has history of alcoholic cirrhosis.    Hospital Course    Patient presented to the ER with complaints of vomiting up blood.  The patient was admitted to the intensive care unit.  He continued to have vomiting which was improved.  Electrolyte derangement was replaced per protocol.  Insulin drip continued per Glucomander protocol.  GI saw and evaluated the patient and planned an EGD.  Ultrasound of the liver was ordered because of transaminitis and elevated bilirubin.  There is extensive fatty infiltration of the liver noted as well as cholelithiasis without obstruction.  A repeat ultrasound of th bilirubin level.  This ruled out obstructive condition.  US right upper quadrant was performed prior to discharge because of increased bilirubin.  This ruled out obstruction.  The patient's diabetic ketoacidosis resolved and he was advanced to a diabetic diet.  He was advanced to full liquids on 4/23.  The patient's anion gap closed as of 4/23.  Lactate remained elevated secondary to hepatic disease.  Insulin drip was discontinued and he was transitioned to subcutaneous insulin.  He continued on IV Protonix.  Gastroenterology noted I hepatitis discriminant factor of 43 which was felt to be severe and Diprivan steroids however it was felt to be hazardous to use steroids in this patient who is just now recovering from diabetic ketoacidosis.  He was placed on Trental 400 mg p.o. 3 times daily.  The patient plans to return home with his mother.  He has been asked to  follow-up with his primary care physician early next week.  He has also been advised to follow-up with outpatient alcohol rehab very closely.  See admission note for complete details.    Today patient denies any vomiting or abdominal pain.  He has not been passing any black or bloody stools.  He complains of falling and feeling lightheaded.  He appears as stated above attended and not answering all questions fully.  No family at bedside on exam.  Past medical history significant for diabetes, gout, hypertension, alcoholism, alcoholic cirrhosis          Review of Systems   Unable to perform ROS: Mental status change   Constitutional:  Positive for fatigue.   HENT: Negative.     Respiratory: Negative.     Cardiovascular: Negative.  Negative for chest pain.   Gastrointestinal:  Positive for abdominal distention. Negative for abdominal pain, blood in stool, constipation, diarrhea, nausea and vomiting.   Musculoskeletal: Negative.  Negative for back pain.   Skin: Negative.    Neurological:  Positive for dizziness, weakness and light-headedness.   Psychiatric/Behavioral:  Positive for agitation and confusion.    All other systems reviewed and are negative.      Past Medical History:   Diagnosis Date    Diabetes mellitus     Gout     Hypertension        No Known Allergies    Past Surgical History:   Procedure Laterality Date    VASECTOMY         History reviewed. No pertinent family history.    Social History     Socioeconomic History    Marital status: Single   Tobacco Use    Smoking status: Some Days     Current packs/day: 0.50     Average packs/day: 0.5 packs/day for 14.3 years (7.2 ttl pk-yrs)     Types: Cigarettes     Start date: 2010    Smokeless tobacco: Never   Vaping Use    Vaping status: Every Day    Substances: THC   Substance and Sexual Activity    Alcohol use: Yes     Alcohol/week: 12.0 standard drinks of alcohol     Types: 12 Cans of beer per week     Comment: REPORTS TWO BEERS TODAY    Drug use: Yes      Types: Marijuana    Sexual activity: Yes           Objective   Physical Exam  Vitals and nursing note reviewed.   Constitutional:       General: He is not in acute distress.     Appearance: He is well-developed. He is obese. He is ill-appearing and toxic-appearing. He is not diaphoretic.   HENT:      Head: Normocephalic and atraumatic.      Right Ear: External ear normal.      Left Ear: External ear normal.      Nose: Nose normal.      Mouth/Throat:      Pharynx: Oropharynx is clear.   Eyes:      General: Scleral icterus present.      Extraocular Movements: Extraocular movements intact.      Pupils: Pupils are equal, round, and reactive to light.   Neck:      Thyroid: No thyromegaly.      Vascular: No JVD.   Cardiovascular:      Rate and Rhythm: Normal rate and regular rhythm.      Pulses: Normal pulses.      Heart sounds: Normal heart sounds. No murmur heard.  Pulmonary:      Effort: Pulmonary effort is normal. No respiratory distress.      Breath sounds: Rales present. No wheezing.   Chest:      Chest wall: No tenderness.   Abdominal:      General: Bowel sounds are normal. There is distension.      Palpations: Abdomen is soft. There is no mass.      Tenderness: There is no abdominal tenderness. There is no guarding or rebound.   Musculoskeletal:         General: Normal range of motion.      Cervical back: Normal range of motion and neck supple.   Lymphadenopathy:      Cervical: No cervical adenopathy.   Skin:     General: Skin is warm and dry.      Coloration: Skin is jaundiced. Skin is not pale.      Findings: No erythema or rash.   Neurological:      Mental Status: He is alert and oriented to person, place, and time.      Cranial Nerves: No cranial nerve deficit.      Coordination: Coordination normal.      Deep Tendon Reflexes: Reflexes are normal and symmetric.   Psychiatric:      Comments: Patient is alert to person and place, unable to state time    Patient does not follow commands when asked, he is very  lethargic on exam         Procedures           ED Course  ED Course as of 05/06/24 0006   Sun May 05, 2024   1905 Carboxyhemoglobin: 1.9 [AJ]   1905 Potassium, OR(!): 2.5 [AJ]      ED Course User Index  [AJ] Sarah Castanon DO                                             Medical Decision Making  Patient is a 39-year-old male who presents to the ER via EMS due to a fall.  Patient states that he has felt lightheaded and because of the lightheadedness he has sustained mechanical falls.  Patient appears lethargic and obtunded on exam.  He is not answering all questions when asked.  He has no family at bedside.      Per review patient had a recent admission April 21, 2024 to April 25, 2024 for history of GI bleeding.  He has history of alcoholic cirrhosis.    Hospital Course    Patient presented to the ER with complaints of vomiting up blood.  The patient was admitted to the intensive care unit.  He continued to have vomiting which was improved.  Electrolyte derangement was replaced per protocol.  Insulin drip continued per Glucomander protocol.  GI saw and evaluated the patient and planned an EGD.  Ultrasound of the liver was ordered because of transaminitis and elevated bilirubin.  There is extensive fatty infiltration of the liver noted as well as cholelithiasis without obstruction.  A repeat ultrasound of th bilirubin level.  This ruled out obstructive condition.  US right upper quadrant was performed prior to discharge because of increased bilirubin.  This ruled out obstruction.  The patient's diabetic ketoacidosis resolved and he was advanced to a diabetic diet.  He was advanced to full liquids on 4/23.  The patient's anion gap closed as of 4/23.  Lactate remained elevated secondary to hepatic disease.  Insulin drip was discontinued and he was transitioned to subcutaneous insulin.  He continued on IV Protonix.  Gastroenterology noted I hepatitis discriminant factor of 43 which was felt to be severe and Diprivan  steroids however it was felt to be hazardous to use steroids in this patient who is just now recovering from diabetic ketoacidosis.  He was placed on Trental 400 mg p.o. 3 times daily.  The patient plans to return home with his mother.  He has been asked to follow-up with his primary care physician early next week.  He has also been advised to follow-up with outpatient alcohol rehab very closely.  See admission note for complete details.    Today patient denies any vomiting or abdominal pain.  He has not been passing any black or bloody stools.  He complains of falling and feeling lightheaded.  He appears as stated above attended and not answering all questions fully.  No family at bedside on exam.  Past medical history significant for diabetes, gout, hypertension, alcoholism, alcoholic cirrhosis    Differential diagnosis: Encephalopathy due to cirrhosis, intracranial hemorrhage, skull fracture, sepsis, and other    MELDNa/MELD-Na Score for Liver Cirrhosis - MDCalc  Calculated on May 06 2024 12:46 AM  30 points -> MELD-Na Score  27 - 32% -> Estimated 90-Day Mortality    Labs Reviewed  COMPREHENSIVE METABOLIC PANEL - Abnormal; Notable for the following components:     Glucose                       233 (*)                Sodium                        120 (*)                Potassium                     2.6 (*)                Chloride                      84 (*)                 CO2                           12.0 (*)               Calcium                       8.3 (*)                Albumin                       3.1 (*)                ALT (SGPT)                    94 (*)                 AST (SGOT)                    195 (*)                Alkaline Phosphatase          254 (*)                Total Bilirubin               18.2 (*)               Anion Gap                     24.0 (*)            All other components within normal limits         Narrative: GFR Normal >60                  Chronic Kidney Disease <60                   Kidney Failure <15                    APTT - Abnormal; Notable for the following components:     PTT                           56.5 (*)            All other components within normal limits  PROTIME-INR - Abnormal; Notable for the following components:     Protime                       22.4 (*)               INR                           1.88 (*)            All other components within normal limits  SINGLE HS TROPONIN T - Abnormal; Notable for the following components:     HS Troponin T                 68 (*)              All other components within normal limits         Narrative: High Sensitive Troponin T Reference Range:                  <14.0 ng/L- Negative Female for AMI                  <22.0 ng/L- Negative Male for AMI                  >=14 - Abnormal Female indicating possible myocardial injury.                  >=22 - Abnormal Male indicating possible myocardial injury.                   Clinicians would have to utilize clinical acumen, EKG, Troponin, and serial changes to determine if it is an Acute Myocardial Infarction or myocardial injury due to an underlying chronic condition.                                       LACTIC ACID, PLASMA - Abnormal; Notable for the following components:     Lactate                       11.6 (*)            All other components within normal limits  PROCALCITONIN - Abnormal; Notable for the following components:     Procalcitonin                 0.45 (*)            All other components within normal limits         Narrative: As a Marker for Sepsis (Non-Neonates):                                    1. <0.5 ng/mL represents a low risk of severe sepsis and/or septic shock.                  2. >2 ng/mL represents a high risk of severe sepsis and/or septic shock.                                    As a Marker for Lower Respiratory Tract Infections that require antibiotic therapy:                                    PCT on Admission    Antibiotic Therapy       6-12 Hrs  "later                                    >0.5                Strongly Recommended                  >0.25 - <0.5        Recommended                   0.1 - 0.25          Discouraged              Remeasure/reassess PCT                  <0.1                Strongly Discouraged     Remeasure/reassess PCT                                    As 28 day mortality risk marker: \"Change in Procalcitonin Result\" (>80% or <=80%) if Day 0 (or Day 1) and Day 4 values are available. Refer to http://www.[facility]-pct-calculator.com                                    Change in PCT <=80%                  A decrease of PCT levels below or equal to 80% defines a positive change in PCT test result representing a higher risk for 28-day all-cause mortality of patients diagnosed with severe sepsis for septic shock.                                    Change in PCT >80%                  A decrease of PCT levels of more than 80% defines a negative change in PCT result representing a lower risk for 28-day all-cause mortality of patients diagnosed with severe sepsis or septic shock.                     AMMONIA - Abnormal; Notable for the following components:     Ammonia                       222 (*)             All other components within normal limits  CBC WITH AUTO DIFFERENTIAL - Abnormal; Notable for the following components:     RBC                           3.23 (*)               Hemoglobin                    11.4 (*)               Hematocrit                    32.4 (*)               MCV                           100.3 (*)               MCH                           35.3 (*)               RDW                           16.8 (*)               RDW-SD                        61.5 (*)               Neutrophil %                  83.0 (*)               Lymphocyte %                  4.3 (*)                Eosinophil %                  0.2 (*)                Immature Grans %              2.8 (*)                Neutrophils, Absolute         8.93 (*)      "          Lymphocytes, Absolute         0.46 (*)               Monocytes, Absolute           0.97 (*)               Immature Grans, Absolute      0.30 (*)            All other components within normal limits  PHOSPHORUS - Abnormal; Notable for the following components:     Phosphorus                    1.6 (*)             All other components within normal limits  BLOOD GAS, ARTERIAL W/CO-OXIMETRY - Abnormal; Notable for the following components:     pCO2, Arterial                26.7 (*)               pO2, Arterial                 66.6 (*)               HCO3, Arterial                15.4 (*)               Base Excess, Arterial         -8.5 (*)               O2 Saturation, Arterial       93.1 (*)               Hemoglobin, Blood Gas         10.9 (*)               Hematocrit, Blood Gas         33.4 (*)               Oxyhemoglobin                 90.8 (*)               Sodium, Arterial              123 (*)                Potassium, Arterial           2.5 (*)                Ionized Calcium               4.19 (*)               pCO2, Temperature Corrected   26.7 (*)               pO2, Temperature Corrected    66.6 (*)            All other components within normal limits  HIGH SENSITIVITIY TROPONIN T 2HR - Abnormal; Notable for the following components:     HS Troponin T                 67 (*)              All other components within normal limits         Narrative: High Sensitive Troponin T Reference Range:                  <14.0 ng/L- Negative Female for AMI                  <22.0 ng/L- Negative Male for AMI                  >=14 - Abnormal Female indicating possible myocardial injury.                  >=22 - Abnormal Male indicating possible myocardial injury.                   Clinicians would have to utilize clinical acumen, EKG, Troponin, and serial changes to determine if it is an Acute Myocardial Infarction or myocardial injury due to an underlying chronic condition.                                       LACTIC  ACID, REFLEX - Abnormal; Notable for the following components:     Lactate                       6.2 (*)             All other components within normal limits  COVID-19 AND FLU A/B, NP SWAB IN TRANSPORT MEDIA 1 HR TAT - Normal         Narrative: Fact sheet for providers: https://www.fda.gov/media/715246/download                                    Fact sheet for patients: https://www.fda.gov/media/654843/download                                    Test performed by PCR.  LIPASE - Normal  BNP (IN-HOUSE) - Normal         Narrative: This assay is used as an aid in the diagnosis of individuals suspected of having heart failure. It can be used as an aid in the diagnosis of acute decompensated heart failure (ADHF) in patients presenting with signs and symptoms of ADHF to the emergency department (ED). In addition, NT-proBNP of <300 pg/mL indicates ADHF is not likely.                                    Age Range Result Interpretation  NT-proBNP Concentration (pg/mL:                                                      <50             Positive            >450                                   Gray                 300-450                                    Negative             <300                                    50-75           Positive            >900                                  Gaming                300-900                                  Negative            <300                                                      >75             Positive            >1800                                  Gaming                300-1800                                  Negative            <300  MAGNESIUM - Normal  BLOOD CULTURE  BLOOD CULTURE  BLOOD GAS, ARTERIAL W/CO-OXIMETRY  ETHANOL         Narrative: Not for legal purposes. Chain of Custody not followed.   PHOSPHORUS  PHOSPHORUS  LACTIC ACID, REFLEX  POCT GLUCOSE FINGERSTICK  CBC AND DIFFERENTIAL     XR Chest 1 View   Final Result         Low lung volumes with mild perihilar  bibasilar parenchymal opacities,    likely atelectasis. No focal consolidation to suggest pneumonia                   This report was signed and finalized on 5/5/2024 7:00 PM by Darwin Goldman.          CT Head Without Contrast    (Results Pending)  CT Angiogram Chest    (Results Pending)  CT Abdomen Pelvis With Contrast    (Results Pending)     CT of the head shows no acute intracranial hemorrhage, no midline shift or mass effect.  The territorial gray-white matter differentiation is maintained throughout.  The ventricles and sulci are commensurate with age.  CTA of the chest shows no acute pulmonary embolism, no acute changes.  CT of the abdomen pelvis shows diverticulosis without acute diverticulitis.  No bowel obstruction.  No free air.  No acute appendicitis.    Patient has received IV fluids, iv potassium, thiamine, and Zosyn in the emergency department.  Patient's abnormal labs are described above. Dr. Castanon reviewed labs and case and agrees with treatment plan. Patient has alcoholic encephalopathy. Plan is to admit to the ICU to the intensivist.  See their note for details.    Amount and/or Complexity of Data Reviewed  Labs: ordered. Decision-making details documented in ED Course.  Radiology: ordered. Decision-making details documented in ED Course.  ECG/medicine tests: ordered. Decision-making details documented in ED Course.  Discussion of management or test interpretation with external provider(s): Discussed with intensivist    Risk  OTC drugs.  Prescription drug management.        Final diagnoses:   Alcoholic encephalopathy   Alcoholic cirrhosis of liver with ascites   Hypokalemia   Hyponatremia       ED Disposition  ED Disposition       ED Disposition   Decision to Admit    Condition   --    Comment   Level of Care: Critical Care [6]   Diagnosis: Alcoholic encephalopathy [666064]   Admitting Physician: DEMARCO GERARDO [[id number]]   Attending Physician: DEMARCO GERARDO [[id number]]   Certification: I  Certify That Inpatient Hospital Services Are Medically Necessary For Greater Than 2 Midnights                 No follow-up provider specified.       Medication List      No changes were made to your prescriptions during this visit.            Sheela Gaytan, APRN  05/06/24 0006

## 2024-05-06 ENCOUNTER — REFERRAL TRIAGE (OUTPATIENT)
Dept: CASE MANAGEMENT | Facility: OTHER | Age: 39
End: 2024-05-06
Payer: COMMERCIAL

## 2024-05-06 PROBLEM — G31.2 ALCOHOLIC ENCEPHALOPATHY: Status: ACTIVE | Noted: 2024-05-06

## 2024-05-06 LAB
ALBUMIN SERPL-MCNC: 3.1 G/DL (ref 3.5–5.2)
ALBUMIN/GLOB SERPL: 0.8 G/DL
ALP SERPL-CCNC: 255 U/L (ref 39–117)
ALT SERPL W P-5'-P-CCNC: 98 U/L (ref 1–41)
AMMONIA BLD-SCNC: 185 UMOL/L (ref 16–60)
AMMONIA BLD-SCNC: 226 UMOL/L (ref 16–60)
ANION GAP SERPL CALCULATED.3IONS-SCNC: 16 MMOL/L (ref 5–15)
ANISOCYTOSIS BLD QL: ABNORMAL
AST SERPL-CCNC: 210 U/L (ref 1–40)
BILIRUB SERPL-MCNC: 17.7 MG/DL (ref 0–1.2)
BUN SERPL-MCNC: 10 MG/DL (ref 6–20)
BUN/CREAT SERPL: 20 (ref 7–25)
CA-I BLD-MCNC: 4.19 MG/DL (ref 4.6–5.4)
CALCIUM SPEC-SCNC: 8.8 MG/DL (ref 8.6–10.5)
CHLORIDE SERPL-SCNC: 89 MMOL/L (ref 98–107)
CO2 SERPL-SCNC: 18 MMOL/L (ref 22–29)
CREAT SERPL-MCNC: 0.5 MG/DL (ref 0.76–1.27)
D-LACTATE SERPL-SCNC: 2.2 MMOL/L (ref 0.5–2)
D-LACTATE SERPL-SCNC: 2.2 MMOL/L (ref 0.5–2)
D-LACTATE SERPL-SCNC: 3.5 MMOL/L (ref 0.5–2)
D-LACTATE SERPL-SCNC: 3.6 MMOL/L (ref 0.5–2)
DEPRECATED RDW RBC AUTO: 59.9 FL (ref 37–54)
EGFRCR SERPLBLD CKD-EPI 2021: 133.1 ML/MIN/1.73
EOSINOPHIL # BLD MANUAL: 0.1 10*3/MM3 (ref 0–0.4)
EOSINOPHIL NFR BLD MANUAL: 1 % (ref 0.3–6.2)
ERYTHROCYTE [DISTWIDTH] IN BLOOD BY AUTOMATED COUNT: 16.7 % (ref 12.3–15.4)
GLOBULIN UR ELPH-MCNC: 3.9 GM/DL
GLUCOSE SERPL-MCNC: 173 MG/DL (ref 65–99)
HCT VFR BLD AUTO: 32.1 % (ref 37.5–51)
HEMOCCULT STL QL: POSITIVE
HGB BLD-MCNC: 11.5 G/DL (ref 13–17.7)
LYMPHOCYTES # BLD MANUAL: 0.4 10*3/MM3 (ref 0.7–3.1)
LYMPHOCYTES NFR BLD MANUAL: 7 % (ref 5–12)
Lab: ABNORMAL
MACROCYTES BLD QL SMEAR: ABNORMAL
MAGNESIUM SERPL-MCNC: 2.7 MG/DL (ref 1.6–2.6)
MCH RBC QN AUTO: 35 PG (ref 26.6–33)
MCHC RBC AUTO-ENTMCNC: 35.8 G/DL (ref 31.5–35.7)
MCV RBC AUTO: 97.6 FL (ref 79–97)
MONOCYTES # BLD: 0.71 10*3/MM3 (ref 0.1–0.9)
NEUTROPHILS # BLD AUTO: 8.89 10*3/MM3 (ref 1.7–7)
NEUTROPHILS NFR BLD MANUAL: 83 % (ref 42.7–76)
NEUTS BAND NFR BLD MANUAL: 5 % (ref 0–5)
PHOSPHATE SERPL-MCNC: 1.8 MG/DL (ref 2.5–4.5)
PHOSPHATE SERPL-MCNC: 2.5 MG/DL (ref 2.5–4.5)
PLAT MORPH BLD: NORMAL
PLATELET # BLD AUTO: 307 10*3/MM3 (ref 140–450)
PMV BLD AUTO: 10.8 FL (ref 6–12)
POLYCHROMASIA BLD QL SMEAR: ABNORMAL
POTASSIUM SERPL-SCNC: 2.9 MMOL/L (ref 3.5–5.2)
POTASSIUM SERPL-SCNC: 3.4 MMOL/L (ref 3.5–5.2)
PROT SERPL-MCNC: 7 G/DL (ref 6–8.5)
RBC # BLD AUTO: 3.29 10*6/MM3 (ref 4.14–5.8)
SODIUM SERPL-SCNC: 123 MMOL/L (ref 136–145)
TARGETS BLD QL SMEAR: ABNORMAL
VARIANT LYMPHS NFR BLD MANUAL: 4 % (ref 19.6–45.3)
WBC MORPH BLD: NORMAL
WBC NRBC COR # BLD AUTO: 10.1 10*3/MM3 (ref 3.4–10.8)

## 2024-05-06 PROCEDURE — 83605 ASSAY OF LACTIC ACID: CPT | Performed by: NURSE PRACTITIONER

## 2024-05-06 PROCEDURE — 84100 ASSAY OF PHOSPHORUS: CPT | Performed by: NURSE PRACTITIONER

## 2024-05-06 PROCEDURE — 85007 BL SMEAR W/DIFF WBC COUNT: CPT | Performed by: NURSE PRACTITIONER

## 2024-05-06 PROCEDURE — 82140 ASSAY OF AMMONIA: CPT | Performed by: NURSE PRACTITIONER

## 2024-05-06 PROCEDURE — 82330 ASSAY OF CALCIUM: CPT

## 2024-05-06 PROCEDURE — 25010000002 SODIUM CHLORIDE 0.9 % WITH KCL 20 MEQ 20-0.9 MEQ/L-% SOLUTION: Performed by: NURSE PRACTITIONER

## 2024-05-06 PROCEDURE — 82272 OCCULT BLD FECES 1-3 TESTS: CPT | Performed by: NURSE PRACTITIONER

## 2024-05-06 PROCEDURE — 25010000002 THIAMINE HCL 200 MG/2ML SOLUTION: Performed by: NURSE PRACTITIONER

## 2024-05-06 PROCEDURE — 25810000003 SODIUM CHLORIDE 0.9 % SOLUTION 250 ML FLEX CONT: Performed by: NURSE PRACTITIONER

## 2024-05-06 PROCEDURE — 80053 COMPREHEN METABOLIC PANEL: CPT | Performed by: NURSE PRACTITIONER

## 2024-05-06 PROCEDURE — 25010000002 POTASSIUM CHLORIDE 10 MEQ/100ML SOLUTION: Performed by: NURSE PRACTITIONER

## 2024-05-06 PROCEDURE — 84132 ASSAY OF SERUM POTASSIUM: CPT | Performed by: NURSE PRACTITIONER

## 2024-05-06 PROCEDURE — 85025 COMPLETE CBC W/AUTO DIFF WBC: CPT | Performed by: NURSE PRACTITIONER

## 2024-05-06 PROCEDURE — 83735 ASSAY OF MAGNESIUM: CPT | Performed by: NURSE PRACTITIONER

## 2024-05-06 PROCEDURE — 36415 COLL VENOUS BLD VENIPUNCTURE: CPT | Performed by: NURSE PRACTITIONER

## 2024-05-06 RX ORDER — NICOTINE POLACRILEX 4 MG
15 LOZENGE BUCCAL
Status: CANCELLED | OUTPATIENT
Start: 2024-05-06

## 2024-05-06 RX ORDER — CHLORHEXIDINE GLUCONATE 500 MG/1
1 CLOTH TOPICAL ONCE
Status: COMPLETED | OUTPATIENT
Start: 2024-05-06 | End: 2024-05-06

## 2024-05-06 RX ORDER — LORAZEPAM 1 MG/1
2 TABLET ORAL EVERY 6 HOURS PRN
Status: DISCONTINUED | OUTPATIENT
Start: 2024-05-06 | End: 2024-05-09 | Stop reason: HOSPADM

## 2024-05-06 RX ORDER — POTASSIUM CHLORIDE 7.45 MG/ML
10 INJECTION INTRAVENOUS
Status: COMPLETED | OUTPATIENT
Start: 2024-05-06 | End: 2024-05-06

## 2024-05-06 RX ORDER — DEXTROSE MONOHYDRATE 25 G/50ML
25 INJECTION, SOLUTION INTRAVENOUS
Status: CANCELLED | OUTPATIENT
Start: 2024-05-06

## 2024-05-06 RX ORDER — LACTULOSE 20 G/30ML
20 SOLUTION ORAL 3 TIMES DAILY
Status: DISCONTINUED | OUTPATIENT
Start: 2024-05-06 | End: 2024-05-08

## 2024-05-06 RX ORDER — SODIUM CHLORIDE 0.9 % (FLUSH) 0.9 %
10 SYRINGE (ML) INJECTION EVERY 12 HOURS SCHEDULED
Status: DISCONTINUED | OUTPATIENT
Start: 2024-05-06 | End: 2024-05-09 | Stop reason: HOSPADM

## 2024-05-06 RX ORDER — POTASSIUM CHLORIDE 29.8 MG/ML
20 INJECTION INTRAVENOUS
Status: DISCONTINUED | OUTPATIENT
Start: 2024-05-06 | End: 2024-05-06 | Stop reason: SDUPTHER

## 2024-05-06 RX ORDER — NITROGLYCERIN 0.4 MG/1
0.4 TABLET SUBLINGUAL
Status: DISCONTINUED | OUTPATIENT
Start: 2024-05-06 | End: 2024-05-09 | Stop reason: HOSPADM

## 2024-05-06 RX ORDER — POTASSIUM CHLORIDE 7.45 MG/ML
10 INJECTION INTRAVENOUS
Status: DISCONTINUED | OUTPATIENT
Start: 2024-05-06 | End: 2024-05-06

## 2024-05-06 RX ORDER — LACTULOSE 10 G/15ML
300 SOLUTION ORAL ONCE
Status: COMPLETED | OUTPATIENT
Start: 2024-05-06 | End: 2024-05-06

## 2024-05-06 RX ORDER — IBUPROFEN 600 MG/1
1 TABLET ORAL
Status: CANCELLED | OUTPATIENT
Start: 2024-05-06

## 2024-05-06 RX ORDER — THIAMINE HYDROCHLORIDE 100 MG/ML
200 INJECTION, SOLUTION INTRAMUSCULAR; INTRAVENOUS EVERY 8 HOURS SCHEDULED
Status: DISCONTINUED | OUTPATIENT
Start: 2024-05-06 | End: 2024-05-09 | Stop reason: HOSPADM

## 2024-05-06 RX ORDER — POTASSIUM CHLORIDE 750 MG/1
40 CAPSULE, EXTENDED RELEASE ORAL EVERY 4 HOURS
Status: COMPLETED | OUTPATIENT
Start: 2024-05-06 | End: 2024-05-06

## 2024-05-06 RX ORDER — SODIUM CHLORIDE 9 MG/ML
40 INJECTION, SOLUTION INTRAVENOUS AS NEEDED
Status: DISCONTINUED | OUTPATIENT
Start: 2024-05-06 | End: 2024-05-09 | Stop reason: HOSPADM

## 2024-05-06 RX ORDER — CHLORHEXIDINE GLUCONATE 500 MG/1
1 CLOTH TOPICAL EVERY 24 HOURS
Status: DISCONTINUED | OUTPATIENT
Start: 2024-05-07 | End: 2024-05-07 | Stop reason: HOSPADM

## 2024-05-06 RX ORDER — SODIUM CHLORIDE AND POTASSIUM CHLORIDE 150; 900 MG/100ML; MG/100ML
100 INJECTION, SOLUTION INTRAVENOUS CONTINUOUS
Status: DISCONTINUED | OUTPATIENT
Start: 2024-05-06 | End: 2024-05-08

## 2024-05-06 RX ORDER — SODIUM CHLORIDE 0.9 % (FLUSH) 0.9 %
10 SYRINGE (ML) INJECTION AS NEEDED
Status: DISCONTINUED | OUTPATIENT
Start: 2024-05-06 | End: 2024-05-09 | Stop reason: HOSPADM

## 2024-05-06 RX ORDER — LANOLIN ALCOHOL/MO/W.PET/CERES
3 CREAM (GRAM) TOPICAL NIGHTLY PRN
Status: DISCONTINUED | OUTPATIENT
Start: 2024-05-06 | End: 2024-05-09 | Stop reason: HOSPADM

## 2024-05-06 RX ORDER — IBUPROFEN 400 MG/1
400 TABLET ORAL EVERY 4 HOURS PRN
Status: DISCONTINUED | OUTPATIENT
Start: 2024-05-06 | End: 2024-05-09 | Stop reason: HOSPADM

## 2024-05-06 RX ADMIN — POTASSIUM CHLORIDE AND SODIUM CHLORIDE 100 ML/HR: 900; 150 INJECTION, SOLUTION INTRAVENOUS at 10:49

## 2024-05-06 RX ADMIN — Medication 1 APPLICATION: at 02:04

## 2024-05-06 RX ADMIN — THIAMINE HYDROCHLORIDE 200 MG: 100 INJECTION, SOLUTION INTRAMUSCULAR; INTRAVENOUS at 21:48

## 2024-05-06 RX ADMIN — LORAZEPAM 2 MG: 1 TABLET ORAL at 23:27

## 2024-05-06 RX ADMIN — CHLORHEXIDINE GLUCONATE 1 APPLICATION: 500 CLOTH TOPICAL at 02:04

## 2024-05-06 RX ADMIN — LACTULOSE 300 ML: 10 SOLUTION ORAL; RECTAL at 02:05

## 2024-05-06 RX ADMIN — Medication 10 ML: at 02:04

## 2024-05-06 RX ADMIN — Medication 1 APPLICATION: at 21:32

## 2024-05-06 RX ADMIN — POTASSIUM CHLORIDE 10 MEQ: 7.46 INJECTION, SOLUTION INTRAVENOUS at 08:08

## 2024-05-06 RX ADMIN — THIAMINE HYDROCHLORIDE 200 MG: 100 INJECTION, SOLUTION INTRAMUSCULAR; INTRAVENOUS at 14:06

## 2024-05-06 RX ADMIN — POTASSIUM CHLORIDE 40 MEQ: 750 CAPSULE, EXTENDED RELEASE ORAL at 21:32

## 2024-05-06 RX ADMIN — POTASSIUM CHLORIDE 10 MEQ: 7.46 INJECTION, SOLUTION INTRAVENOUS at 06:09

## 2024-05-06 RX ADMIN — POTASSIUM CHLORIDE 40 MEQ: 750 CAPSULE, EXTENDED RELEASE ORAL at 13:32

## 2024-05-06 RX ADMIN — Medication 3 MG: at 23:27

## 2024-05-06 RX ADMIN — Medication 10 ML: at 21:32

## 2024-05-06 RX ADMIN — Medication 10 ML: at 08:16

## 2024-05-06 RX ADMIN — POTASSIUM PHOSPHATE, MONOBASIC AND POTASSIUM PHOSPHATE, DIBASIC 15 MMOL: 224; 236 INJECTION, SOLUTION, CONCENTRATE INTRAVENOUS at 02:04

## 2024-05-06 RX ADMIN — FOLIC ACID 1 MG: 5 INJECTION, SOLUTION INTRAMUSCULAR; INTRAVENOUS; SUBCUTANEOUS at 08:16

## 2024-05-06 RX ADMIN — POTASSIUM & SODIUM PHOSPHATES POWDER PACK 280-160-250 MG 2 PACKET: 280-160-250 PACK at 14:06

## 2024-05-06 RX ADMIN — LACTULOSE 20 G: 20 SOLUTION ORAL at 21:32

## 2024-05-06 RX ADMIN — POTASSIUM CHLORIDE 10 MEQ: 7.46 INJECTION, SOLUTION INTRAVENOUS at 07:11

## 2024-05-06 RX ADMIN — LACTULOSE 20 G: 20 SOLUTION ORAL at 16:55

## 2024-05-06 RX ADMIN — Medication 1 APPLICATION: at 08:16

## 2024-05-06 RX ADMIN — POTASSIUM CHLORIDE 10 MEQ: 7.46 INJECTION, SOLUTION INTRAVENOUS at 04:49

## 2024-05-06 RX ADMIN — LACTULOSE 20 G: 20 SOLUTION ORAL at 08:16

## 2024-05-06 RX ADMIN — THIAMINE HYDROCHLORIDE 200 MG: 100 INJECTION, SOLUTION INTRAMUSCULAR; INTRAVENOUS at 06:10

## 2024-05-06 RX ADMIN — POTASSIUM CHLORIDE 40 MEQ: 750 CAPSULE, EXTENDED RELEASE ORAL at 17:48

## 2024-05-06 RX ADMIN — POTASSIUM CHLORIDE AND SODIUM CHLORIDE 100 ML/HR: 900; 150 INJECTION, SOLUTION INTRAVENOUS at 02:04

## 2024-05-06 RX ADMIN — POTASSIUM CHLORIDE AND SODIUM CHLORIDE 100 ML/HR: 900; 150 INJECTION, SOLUTION INTRAVENOUS at 21:13

## 2024-05-06 NOTE — ED NOTES
Applied fall bracelet to pt right wrist. Bed in locked lowest position, call light attached to rail, door open, both rails up, bed alarm on

## 2024-05-06 NOTE — PLAN OF CARE
Goal Outcome Evaluation:                                    Pt A&Ox4 most of shift. Sleepy but easy to arouse. Electrolytes replaced as needed. CIWA scale continued.

## 2024-05-06 NOTE — CASE MANAGEMENT/SOCIAL WORK
Discharge Planning Assessment  [facility]     Patient Name: Luciano Christiansen  MRN: [id number]  Today's Date: 5/6/2024    Admit Date: 5/5/2024        Discharge Needs Assessment       Row Name 05/06/24 1118       Living Environment    People in Home child(kathryn), adult    Name(s) of People in Home Jossy    Current Living Arrangements home    Primary Care Provided by self    Provides Primary Care For no one    Family Caregiver if Needed child(kathryn), adult    Family Caregiver Names Jossy    Able to Return to Prior Arrangements yes       Resource/Environmental Concerns    Resource/Environmental Concerns none       Transition Planning    Patient/Family Anticipates Transition to home with family    Transportation Anticipated family or friend will provide       Discharge Needs Assessment    Readmission Within the Last 30 Days no previous admission in last 30 days    Equipment Currently Used at Home none    Concerns to be Addressed no discharge needs identified    Equipment Needed After Discharge none    Discharge Coordination/Progress spoke to patient who lives with daughter and works so is independent at home prior to illness;has RX coverage and PCP; will follow for DC needs                   Discharge Plan    No documentation.                 Continued Care and Services - Admitted Since 5/5/2024    No active coordination exists for this encounter.          Demographic Summary    No documentation.                  Functional Status    No documentation.                  Psychosocial    No documentation.                  Abuse/Neglect    No documentation.                  Legal    No documentation.                  Substance Abuse    No documentation.                  Patient Forms    No documentation.                     Jenna Coelho RN

## 2024-05-06 NOTE — ED NOTES
Nursing report ED to floor  Luciano Christiansen  39 y.o.  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 Diagnoses of Alcoholic cirrhosis of liver with ascites, Hypokalemia, and Hyponatremia were also pertinent to this visit.    Code status:   Current Code Status       Date Active Code Status Order ID Comments User Context       5/6/2024 0011 CPR (Attempt to Resuscitate) [id number]  Wes Hilton APRN ED        Question Answer    Code Status (Patient has no pulse and is not breathing) CPR (Attempt to Resuscitate)    Medical Interventions (Patient has pulse or is breathing) Full Support                    Allergies:   Patient has no known allergies.    Intake and Output    Intake/Output Summary (Last 24 hours) at 5/6/2024 0026  Last data filed at 5/5/2024 2351  Gross per 24 hour   Intake 400 ml   Output --   Net 400 ml       Weight:       05/05/24  1833   Weight: (!) 157 kg (346 lb 3.2 oz)       Most recent vitals:   Vitals:    05/05/24 2216 05/05/24 2231 05/05/24 2316 05/05/24 2331   BP: 162/93 165/91 156/91 158/100   BP Location:    Right arm   Patient Position:    Lying   Pulse: 97 95 95 103   Resp:    20   Temp:       TempSrc:       SpO2: 94% 93% 95% 95%   Weight:       Height:         Oxygen Therapy: .    Active LDAs/IV Access:   Lines, Drains & Airways       Active LDAs       Name Placement date Placement time Site Days    Peripheral IV 05/05/24 1840 Right Antecubital 05/05/24 1840  Antecubital  less than 1    Peripheral IV 05/05/24 1845 Left Antecubital 05/05/24 1845  Antecubital  less than 1                    Labs (abnormal labs have a star):   Labs Reviewed   COMPREHENSIVE  METABOLIC PANEL - Abnormal; Notable for the following components:       Result Value    Glucose 233 (*)     Sodium 120 (*)     Potassium 2.6 (*)     Chloride 84 (*)     CO2 12.0 (*)     Calcium 8.3 (*)     Albumin 3.1 (*)     ALT (SGPT) 94 (*)     AST (SGOT) 195 (*)     Alkaline Phosphatase 254 (*)     Total Bilirubin 18.2 (*)     Anion Gap 24.0 (*)     All other components within normal limits    Narrative:     GFR Normal >60  Chronic Kidney Disease <60  Kidney Failure <15     APTT - Abnormal; Notable for the following components:    PTT 56.5 (*)     All other components within normal limits   PROTIME-INR - Abnormal; Notable for the following components:    Protime 22.4 (*)     INR 1.88 (*)     All other components within normal limits   SINGLE HS TROPONIN T - Abnormal; Notable for the following components:    HS Troponin T 68 (*)     All other components within normal limits    Narrative:     High Sensitive Troponin T Reference Range:  <14.0 ng/L- Negative Female for AMI  <22.0 ng/L- Negative Male for AMI  >=14 - Abnormal Female indicating possible myocardial injury.  >=22 - Abnormal Male indicating possible myocardial injury.   Clinicians would have to utilize clinical acumen, EKG, Troponin, and serial changes to determine if it is an Acute Myocardial Infarction or myocardial injury due to an underlying chronic condition.        LACTIC ACID, PLASMA - Abnormal; Notable for the following components:    Lactate 11.6 (*)     All other components within normal limits   PROCALCITONIN - Abnormal; Notable for the following components:    Procalcitonin 0.45 (*)     All other components within normal limits    Narrative:     As a Marker for Sepsis (Non-Neonates):    1. <0.5 ng/mL represents a low risk of severe sepsis and/or septic shock.  2. >2 ng/mL represents a high risk of severe sepsis and/or septic shock.    As a Marker for Lower Respiratory Tract Infections that require antibiotic therapy:    PCT on Admission     "Antibiotic Therapy       6-12 Hrs later    >0.5                Strongly Recommended  >0.25 - <0.5        Recommended   0.1 - 0.25          Discouraged              Remeasure/reassess PCT  <0.1                Strongly Discouraged     Remeasure/reassess PCT    As 28 day mortality risk marker: \"Change in Procalcitonin Result\" (>80% or <=80%) if Day 0 (or Day 1) and Day 4 values are available. Refer to http://[email or web address]-pct-calculator.com    Change in PCT <=80%  A decrease of PCT levels below or equal to 80% defines a positive change in PCT test result representing a higher risk for 28-day all-cause mortality of patients diagnosed with severe sepsis for septic shock.    Change in PCT >80%  A decrease of PCT levels of more than 80% defines a negative change in PCT result representing a lower risk for 28-day all-cause mortality of patients diagnosed with severe sepsis or septic shock.      AMMONIA - Abnormal; Notable for the following components:    Ammonia 222 (*)     All other components within normal limits   CBC WITH AUTO DIFFERENTIAL - Abnormal; Notable for the following components:    RBC 3.23 (*)     Hemoglobin 11.4 (*)     Hematocrit 32.4 (*)     .3 (*)     MCH 35.3 (*)     RDW 16.8 (*)     RDW-SD 61.5 (*)     Neutrophil % 83.0 (*)     Lymphocyte % 4.3 (*)     Eosinophil % 0.2 (*)     Immature Grans % 2.8 (*)     Neutrophils, Absolute 8.93 (*)     Lymphocytes, Absolute 0.46 (*)     Monocytes, Absolute 0.97 (*)     Immature Grans, Absolute 0.30 (*)     All other components within normal limits   PHOSPHORUS - Abnormal; Notable for the following components:    Phosphorus 1.6 (*)     All other components within normal limits   BLOOD GAS, ARTERIAL W/CO-OXIMETRY - Abnormal; Notable for the following components:    pCO2, Arterial 26.7 (*)     pO2, Arterial 66.6 (*)     HCO3, Arterial 15.4 (*)     Base Excess, Arterial -8.5 (*)     O2 Saturation, Arterial 93.1 (*)     Hemoglobin, Blood Gas 10.9 (*)     Hematocrit, " Blood Gas 33.4 (*)     Oxyhemoglobin 90.8 (*)     Sodium, Arterial 123 (*)     Potassium, Arterial 2.5 (*)     Ionized Calcium 4.19 (*)     pCO2, Temperature Corrected 26.7 (*)     pO2, Temperature Corrected 66.6 (*)     All other components within normal limits   HIGH SENSITIVITIY TROPONIN T 2HR - Abnormal; Notable for the following components:    HS Troponin T 67 (*)     All other components within normal limits    Narrative:     High Sensitive Troponin T Reference Range:  <14.0 ng/L- Negative Female for AMI  <22.0 ng/L- Negative Male for AMI  >=14 - Abnormal Female indicating possible myocardial injury.  >=22 - Abnormal Male indicating possible myocardial injury.   Clinicians would have to utilize clinical acumen, EKG, Troponin, and serial changes to determine if it is an Acute Myocardial Infarction or myocardial injury due to an underlying chronic condition.        LACTIC ACID, REFLEX - Abnormal; Notable for the following components:    Lactate 6.2 (*)     All other components within normal limits   PHOSPHORUS - Abnormal; Notable for the following components:    Phosphorus 1.8 (*)     All other components within normal limits   COVID-19 AND FLU A/B, NP SWAB IN TRANSPORT MEDIA 1 HR TAT - Normal    Narrative:     Fact sheet for providers: https://www.fda.gov/media/130011/download    Fact sheet for patients: https://www.fda.gov/media/055160/download    Test performed by PCR.   LIPASE - Normal   BNP (IN-HOUSE) - Normal    Narrative:     This assay is used as an aid in the diagnosis of individuals suspected of having heart failure. It can be used as an aid in the diagnosis of acute decompensated heart failure (ADHF) in patients presenting with signs and symptoms of ADHF to the emergency department (ED). In addition, NT-proBNP of <300 pg/mL indicates ADHF is not likely.    Age Range Result Interpretation  NT-proBNP Concentration (pg/mL:      <50             Positive            >450                   Gaming                  300-450                    Negative             <300    50-75           Positive            >900                  Gray                300-900                  Negative            <300      >75             Positive            >1800                  Gray                300-1800                  Negative            <300   MAGNESIUM - Normal   BLOOD CULTURE   BLOOD CULTURE   BLOOD GAS, ARTERIAL W/CO-OXIMETRY   ETHANOL    Narrative:     Not for legal purposes. Chain of Custody not followed.    PHOSPHORUS   LACTIC ACID, REFLEX   CBC WITH AUTO DIFFERENTIAL   COMPREHENSIVE METABOLIC PANEL   LACTIC ACID, PLASMA   CALCIUM, IONIZED   MAGNESIUM   AMMONIA   POCT GLUCOSE FINGERSTICK   CBC AND DIFFERENTIAL    Narrative:     The following orders were created for panel order CBC & Differential.  Procedure                               Abnormality         Status                     ---------                               -----------         ------                     CBC Auto Differential[[id number]]        Abnormal            Final result                 Please view results for these tests on the individual orders.       Meds given in ED:   Medications   sodium chloride 0.9 % flush 10 mL (has no administration in time range)   Potassium Replacement - Follow Nurse / BPA Driven Protocol (has no administration in time range)   Magnesium Standard Dose Replacement - Follow Nurse / BPA Driven Protocol (has no administration in time range)   Phosphorus Replacement - Follow Nurse / BPA Driven Protocol (has no administration in time range)   Calcium Replacement - Follow Nurse / BPA Driven Protocol (has no administration in time range)   nitroglycerin (NITROSTAT) SL tablet 0.4 mg (has no administration in time range)   sodium chloride 0.9 % flush 10 mL (has no administration in time range)   sodium chloride 0.9 % flush 10 mL (has no administration in time range)   sodium chloride 0.9 % infusion 40 mL (has no administration in time range)    mupirocin (BACTROBAN) 2 % nasal ointment 1 Application (has no administration in time range)   Chlorhexidine Gluconate Cloth 2 % pads 1 Application (has no administration in time range)   Chlorhexidine Gluconate Cloth 2 % pads 1 Application (has no administration in time range)   ibuprofen (ADVIL,MOTRIN) tablet 400 mg (has no administration in time range)   potassium chloride 20 mEq in 50 mL IVPB (has no administration in time range)   potassium chloride 10 mEq in 100 mL IVPB (0 mEq Intravenous Stopped 5/5/24 2053)   LORazepam (ATIVAN) injection 1 mg (1 mg Intravenous Given 5/5/24 2007)   potassium chloride 10 mEq in 100 mL IVPB (0 mEq Intravenous Stopped 5/5/24 2303)   thiamine (B-1) 100 mg in sodium chloride 0.9 % 100 mL IVPB (0 mg Intravenous Stopped 5/5/24 2142)   sodium chloride 0.9 % bolus 500 mL ( Intravenous Currently Infusing 5/5/24 2351)   piperacillin-tazobactam (ZOSYN) 4.5 g IVPB in 100 mL NS MBP (CD) (0 g Intravenous Stopped 5/5/24 2351)   LORazepam (ATIVAN) injection 2 mg (2 mg Intravenous Given 5/5/24 2202)   iopamidol (ISOVUE-370) 76 % injection 100 mL (100 mL Intravenous Given 5/5/24 2206)   LORazepam (ATIVAN) injection 1 mg (1 mg Intravenous Given 5/5/24 2340)           NIH Stroke Scale:       Isolation/Infection(s):  No active isolations   No active infections     COVID Testing  Collected .  Resulted .    Nursing report ED to floor:  Mental status: .confused x 3, states name  Ambulatory status: .FALL risk  Precautions: .    ED nurse phone extentsion- ..

## 2024-05-06 NOTE — PLAN OF CARE
Pt lethargic and unable to follow commands upon arrival. As the shift progressed, pt was more alert, following commands, & only disorient to place. Pt states he is very tired. CIWA protocol in place. Electrolyte replacement protocol followed.     VSS, sat >94% on RA.     Safety maintained.

## 2024-05-06 NOTE — H&P
[facility]ivist [facility]    Date of Admission: 5/5/2024  Date of Note: 05/06/24  Primary Care Physician: Jossy Christiansen APRN    History   39 y.o. male admitted 5/5/2024 with Alcoholic encephalopathy who also  has a past medical history of Diabetes mellitus, Gout, and Hypertension.  Patient presented to the ER via EMS after having multiple falls and altered mental status.  He had a recent admission in April with a GI bleed.  Known alcoholic cirrhosis.  On initial lab testing in the ED patient was found to have a lactic acid of 11.6, ammonia level of 222, several electrolyte derangements.  Patient received fluid bolus and Zosyn in the ER.  Underwent pan CT scan of the head chest abdomen pelvis, all without any acute significant findings.  Blood cultures obtained and pending.  Respiratory viral panel negative.  Patient remained obtunded.  The ICU team was asked to evaluate the patient for critical care management.    Past Medical History     Active and Resolved Problems  Active Hospital Problems    Diagnosis  POA    **Alcoholic encephalopathy [G31.2]  Yes      Resolved Hospital Problems   No resolved problems to display.       Past Medical History:   Past Medical History:   Diagnosis Date    Diabetes mellitus     Gout     Hypertension        Prior Surgeries: He  has a past surgical history that includes Vasectomy.    Past Surgical History:   Past Surgical History:   Procedure Laterality Date    VASECTOMY         Social and Family History     Family History:  family history is not on file.    Tobacco/Social History:  reports that he has been smoking cigarettes. He started smoking about 14 years ago. He has a 7.2 pack-year smoking history. He has never used smokeless tobacco. He reports current alcohol use of about 12.0 standard drinks of alcohol per week. He reports current drug use. Drug: Marijuana.    Allergies     Allergies:   He has No Known Allergies.    No Known  Allergies    Labs     Basic Labs:  Common labs          4/24/2024    02:02 4/24/2024    18:07 4/25/2024    08:35 5/5/2024    18:52 5/5/2024    19:03   Common Labs   Glucose 240  177  125  233     BUN 8  8  6  11     Creatinine 0.48  0.39  0.32  0.86     Sodium 137  138  136  120  123    Potassium 3.6  3.4  4.0  2.6     Chloride 98  98  100  84     Calcium 7.9  8.3  8.3  8.3     Albumin 3.2   3.3  3.1     Total Bilirubin 9.4   12.2  18.2     Alkaline Phosphatase 147   177  254     AST (SGOT) 211   220  195     ALT (SGPT) 87   100  94     WBC 3.55   4.56  10.75     Hemoglobin 11.3   12.2  11.4     Hematocrit 34.2   36.9  32.4     Platelets 64   103  349         Diabetic:  A1C Last 3 Results          2/19/2024    08:47 4/22/2024    00:20   HGBA1C Last 3 Results   Hemoglobin A1C 11.20  10.40        Inpatient Medications     Medications: Scheduled Meds:Chlorhexidine Gluconate Cloth, 1 Application, Topical, Once  [START ON 5/7/2024] Chlorhexidine Gluconate Cloth, 1 Application, Topical, Q24H  lactulose, 300 mL, Rectal, Once  mupirocin, 1 Application, Each Nare, BID  potassium phosphate, 15 mmol, Intravenous, Once  sodium chloride, 10 mL, Intravenous, Q12H      Continuous Infusions:   PRN Meds:.  Calcium Replacement - Follow Nurse / BPA Driven Protocol    ibuprofen    Magnesium Standard Dose Replacement - Follow Nurse / BPA Driven Protocol    nitroglycerin    Phosphorus Replacement - Follow Nurse / BPA Driven Protocol    Potassium Replacement - Follow Nurse / BPA Driven Protocol    [COMPLETED] Insert Peripheral IV **AND** sodium chloride    sodium chloride    sodium chloride    I have reviewed the patient's current medications.   Outpatient Medications     Outpatient Medications:   No outpatient medications have been marked as taking for the 5/5/24 encounter (Hospital Encounter).       Current Antibiotics     This patient does not have an active medication from one of the medication groupers.    Exam     Vitals: His   "height is 188 cm (74\") and weight is 150 kg (331 lb 5.6 oz) (abnormal). His axillary temperature is 97.7 °F (36.5 °C). His blood pressure is 149/81 and his pulse is 93. His respiration is 20 and oxygen saturation is 89% (abnormal).     GENERAL: Obtunded   SKIN:  Warm, jaundiced   EYES:  Pupils equal, round and reactive to light.  Scleral icterus   HEAD:  Normocephalic.  NECK:  Supple   RESP:  Lungs clear to auscultation. Good airflow. Normal respiratory effort.   CARDIAC:  Regular rate and rhythm. Normal S1,S2. No edema  GI:  Soft, protuberant, normal bowel sounds  MSK:  Normal muscle bulk, tone  NEUROLOGICAL: Awakens to voice, no focal deficit  PSYCHIATRIC: Obtunded    Results and Cultures Review     Result Review:  I have personally reviewed the results from the time of this admission to 5/6/2024 01:05 CDT and agree with these findings:  [x]  Laboratory list / accordion  [x]  Microbiology  [x]  Radiology  [x]  EKG/Telemetry   []  Cardiology/Vascular   []  Pathology  [x]  Old records  []  Other:  Most notable findings include: Ammonia 222, lactic acid 11.6, potassium 2.6      Culture Data:   Blood culture pending    Assessment/Plan       Hepatic encephalopathy  -Ammonia severely elevated, will order lactulose, will likely have to be an enema initially as patient is obtunded  -Will correct hypokalemia to prevent further ammonia production  -Repeat lactic acid down to 6.2, continue IV fluid hydration  - thiamine and folic acid  -Avoid sedatives and benzodiazepines until encephalopathy resolves    DVT prophylaxis:    Mechanical DVT prophylaxis orders are present.        Total critical care time: Approximately 35 minutes    Due to a high probability of clinically significant, life threatening deterioration, the patient required my highest level of preparedness to intervene emergently and I personally spent this critical care time directly and personally managing the patient.     This critical care time included " obtaining a history; examining the patient; pulse oximetry; ordering and review of studies; arranging urgent treatment with development of a management plan; evaluation of patient's response to treatment; frequent reassessment; and, discussions with other providers.    This critical care time was performed to assess and manage the high probability of imminent, life-threatening deterioration that could result in multi-organ failure. It was exclusive of separately billable procedures and treating other patients and teaching time.    Please see MDM section and the rest of the note for further information on patient assessment and treatment.    Part of this note may be an electronic transcription/translation of spoken language to printed text using the Dragon Dictation System.    Electronically signed by LÁZARO Pabon on 5/6/2024 at 01:05 CDT

## 2024-05-06 NOTE — PROGRESS NOTES
"Pharmacy Dosing Service  Antimicrobial Dosing  Zosyn    Assessment/Action/Plan:  Zosyn 3.375 gm IV Once for Sepsis has been adjusted to 4.5 gm IV Once for patient with ABW > 120 kg/BMI > 40 kg/m2.        Subjective:  Luciano Christiansen is a 39 y.o. male     Objective:  Ht: 188 cm (74\"); Wt: (!) 157 kg (346 lb 3.2 oz); BMI: Body mass index is 44.45 kg/m².]  Estimated Creatinine Clearance: 182.7 mL/min (by C-G formula based on SCr of 0.86 mg/dL).   Creatinine   Date Value Ref Range Status   05/05/2024 0.86 0.76 - 1.27 mg/dL Final      Lab Results   Component Value Date    WBC 10.75 05/05/2024      Baseline culture results:  Microbiology Results (last 10 days)       Procedure Component Value - Date/Time    COVID-19 and FLU A/B PCR, 1 HR TAT - Swab, Nasopharynx [[id number]]  (Normal) Collected: 05/05/24 1908    Lab Status: Final result Specimen: Swab from Nasopharynx Updated: 05/05/24 1939     COVID19 Not Detected     Influenza A PCR Not Detected     Influenza B PCR Not Detected    Narrative:      Fact sheet for providers: https://www.fda.gov/media/147298/download    Fact sheet for patients: https://www.fda.gov/media/427135/download    Test performed by PCR.            Rg Troy, Mark  05/05/24 21:44 CDT    "

## 2024-05-06 NOTE — ED NOTES
The following fall interventions were initiated:    [x] Patient and/or family given education   [x] Call light within reach and educated on how to use   [x] Bed rails up per protocol    [x] Bed locked and in the lowest position   [x] Bed alarm set and on loudest setting   [x] Fall wrist band applied   [] Non skid footwear applied   [x] Room free of clutter   [x] Patient items within reach   [x] Adequate lighting provided  [] Falls sign present    [] Patient moved closer to nursing station   [] Restraints applied

## 2024-05-07 PROBLEM — E11.9 TYPE 2 DIABETES MELLITUS: Status: ACTIVE | Noted: 2024-05-07

## 2024-05-07 LAB
ALBUMIN SERPL-MCNC: 2.9 G/DL (ref 3.5–5.2)
ALBUMIN/GLOB SERPL: 0.8 G/DL
ALP SERPL-CCNC: 233 U/L (ref 39–117)
ALT SERPL W P-5'-P-CCNC: 91 U/L (ref 1–41)
AMMONIA BLD-SCNC: 148 UMOL/L (ref 16–60)
ANION GAP SERPL CALCULATED.3IONS-SCNC: 10 MMOL/L (ref 5–15)
AST SERPL-CCNC: 222 U/L (ref 1–40)
BASOPHILS # BLD AUTO: 0.08 10*3/MM3 (ref 0–0.2)
BASOPHILS NFR BLD AUTO: 1 % (ref 0–1.5)
BILIRUB SERPL-MCNC: 20.3 MG/DL (ref 0–1.2)
BUN SERPL-MCNC: 8 MG/DL (ref 6–20)
BUN/CREAT SERPL: 20 (ref 7–25)
CALCIUM SPEC-SCNC: 9 MG/DL (ref 8.6–10.5)
CHLORIDE SERPL-SCNC: 101 MMOL/L (ref 98–107)
CO2 SERPL-SCNC: 24 MMOL/L (ref 22–29)
CREAT SERPL-MCNC: 0.4 MG/DL (ref 0.76–1.27)
DEPRECATED RDW RBC AUTO: 67.8 FL (ref 37–54)
DEPRECATED RDW RBC AUTO: 70.8 FL (ref 37–54)
EGFRCR SERPLBLD CKD-EPI 2021: 142.3 ML/MIN/1.73
EOSINOPHIL # BLD AUTO: 0.09 10*3/MM3 (ref 0–0.4)
EOSINOPHIL NFR BLD AUTO: 1.2 % (ref 0.3–6.2)
ERYTHROCYTE [DISTWIDTH] IN BLOOD BY AUTOMATED COUNT: 18.3 % (ref 12.3–15.4)
ERYTHROCYTE [DISTWIDTH] IN BLOOD BY AUTOMATED COUNT: 18.6 % (ref 12.3–15.4)
GLOBULIN UR ELPH-MCNC: 3.5 GM/DL
GLUCOSE BLDC GLUCOMTR-MCNC: 133 MG/DL (ref 70–130)
GLUCOSE BLDC GLUCOMTR-MCNC: 138 MG/DL (ref 70–130)
GLUCOSE BLDC GLUCOMTR-MCNC: 165 MG/DL (ref 70–130)
GLUCOSE BLDC GLUCOMTR-MCNC: 205 MG/DL (ref 70–130)
GLUCOSE BLDC GLUCOMTR-MCNC: 57 MG/DL (ref 70–130)
GLUCOSE SERPL-MCNC: 154 MG/DL (ref 65–99)
HCT VFR BLD AUTO: 31.1 % (ref 37.5–51)
HCT VFR BLD AUTO: 32.1 % (ref 37.5–51)
HGB BLD-MCNC: 10.4 G/DL (ref 13–17.7)
HGB BLD-MCNC: 10.9 G/DL (ref 13–17.7)
IMM GRANULOCYTES # BLD AUTO: 0.06 10*3/MM3 (ref 0–0.05)
IMM GRANULOCYTES NFR BLD AUTO: 0.8 % (ref 0–0.5)
INR PPP: 1.97 (ref 0.91–1.09)
LYMPHOCYTES # BLD AUTO: 0.73 10*3/MM3 (ref 0.7–3.1)
LYMPHOCYTES NFR BLD AUTO: 9.5 % (ref 19.6–45.3)
MCH RBC QN AUTO: 34.6 PG (ref 26.6–33)
MCH RBC QN AUTO: 34.8 PG (ref 26.6–33)
MCHC RBC AUTO-ENTMCNC: 33.4 G/DL (ref 31.5–35.7)
MCHC RBC AUTO-ENTMCNC: 34 G/DL (ref 31.5–35.7)
MCV RBC AUTO: 101.9 FL (ref 79–97)
MCV RBC AUTO: 104 FL (ref 79–97)
MONOCYTES # BLD AUTO: 0.77 10*3/MM3 (ref 0.1–0.9)
MONOCYTES NFR BLD AUTO: 10 % (ref 5–12)
NEUTROPHILS NFR BLD AUTO: 5.96 10*3/MM3 (ref 1.7–7)
NEUTROPHILS NFR BLD AUTO: 77.5 % (ref 42.7–76)
NRBC BLD AUTO-RTO: 0 /100 WBC (ref 0–0.2)
PHOSPHATE SERPL-MCNC: 1.4 MG/DL (ref 2.5–4.5)
PHOSPHATE SERPL-MCNC: 2 MG/DL (ref 2.5–4.5)
PLATELET # BLD AUTO: 258 10*3/MM3 (ref 140–450)
PLATELET # BLD AUTO: 275 10*3/MM3 (ref 140–450)
PMV BLD AUTO: 10.6 FL (ref 6–12)
PMV BLD AUTO: 10.9 FL (ref 6–12)
POTASSIUM SERPL-SCNC: 3.6 MMOL/L (ref 3.5–5.2)
POTASSIUM SERPL-SCNC: 3.7 MMOL/L (ref 3.5–5.2)
PROT SERPL-MCNC: 6.4 G/DL (ref 6–8.5)
PROTHROMBIN TIME: 23.2 SECONDS (ref 11.8–14.8)
RBC # BLD AUTO: 2.99 10*6/MM3 (ref 4.14–5.8)
RBC # BLD AUTO: 3.15 10*6/MM3 (ref 4.14–5.8)
SODIUM SERPL-SCNC: 135 MMOL/L (ref 136–145)
WBC NRBC COR # BLD AUTO: 7.69 10*3/MM3 (ref 3.4–10.8)
WBC NRBC COR # BLD AUTO: 7.79 10*3/MM3 (ref 3.4–10.8)

## 2024-05-07 PROCEDURE — 85610 PROTHROMBIN TIME: CPT | Performed by: NURSE PRACTITIONER

## 2024-05-07 PROCEDURE — 85025 COMPLETE CBC W/AUTO DIFF WBC: CPT | Performed by: NURSE PRACTITIONER

## 2024-05-07 PROCEDURE — 84100 ASSAY OF PHOSPHORUS: CPT | Performed by: NURSE PRACTITIONER

## 2024-05-07 PROCEDURE — 25010000002 THIAMINE HCL 200 MG/2ML SOLUTION: Performed by: NURSE PRACTITIONER

## 2024-05-07 PROCEDURE — 36415 COLL VENOUS BLD VENIPUNCTURE: CPT | Performed by: NURSE PRACTITIONER

## 2024-05-07 PROCEDURE — 82948 REAGENT STRIP/BLOOD GLUCOSE: CPT

## 2024-05-07 PROCEDURE — 82140 ASSAY OF AMMONIA: CPT | Performed by: NURSE PRACTITIONER

## 2024-05-07 PROCEDURE — 63710000001 INSULIN LISPRO (HUMAN) PER 5 UNITS: Performed by: NURSE PRACTITIONER

## 2024-05-07 PROCEDURE — 25010000002 SODIUM CHLORIDE 0.9 % WITH KCL 20 MEQ 20-0.9 MEQ/L-% SOLUTION: Performed by: NURSE PRACTITIONER

## 2024-05-07 PROCEDURE — 80053 COMPREHEN METABOLIC PANEL: CPT | Performed by: NURSE PRACTITIONER

## 2024-05-07 PROCEDURE — 84132 ASSAY OF SERUM POTASSIUM: CPT | Performed by: NURSE PRACTITIONER

## 2024-05-07 PROCEDURE — 85027 COMPLETE CBC AUTOMATED: CPT | Performed by: NURSE PRACTITIONER

## 2024-05-07 RX ORDER — INSULIN LISPRO 100 [IU]/ML
2-9 INJECTION, SOLUTION INTRAVENOUS; SUBCUTANEOUS
Status: DISCONTINUED | OUTPATIENT
Start: 2024-05-07 | End: 2024-05-09 | Stop reason: HOSPADM

## 2024-05-07 RX ORDER — POTASSIUM CHLORIDE 750 MG/1
40 CAPSULE, EXTENDED RELEASE ORAL EVERY 4 HOURS
Status: COMPLETED | OUTPATIENT
Start: 2024-05-07 | End: 2024-05-07

## 2024-05-07 RX ORDER — DEXTROSE MONOHYDRATE 25 G/50ML
25 INJECTION, SOLUTION INTRAVENOUS
Status: DISCONTINUED | OUTPATIENT
Start: 2024-05-07 | End: 2024-05-07 | Stop reason: SDUPTHER

## 2024-05-07 RX ORDER — DEXTROSE MONOHYDRATE 25 G/50ML
25 INJECTION, SOLUTION INTRAVENOUS
Status: DISCONTINUED | OUTPATIENT
Start: 2024-05-07 | End: 2024-05-09 | Stop reason: HOSPADM

## 2024-05-07 RX ORDER — NICOTINE POLACRILEX 4 MG
15 LOZENGE BUCCAL
Status: DISCONTINUED | OUTPATIENT
Start: 2024-05-07 | End: 2024-05-09 | Stop reason: HOSPADM

## 2024-05-07 RX ORDER — NICOTINE POLACRILEX 4 MG
15 LOZENGE BUCCAL
Status: DISCONTINUED | OUTPATIENT
Start: 2024-05-07 | End: 2024-05-07 | Stop reason: SDUPTHER

## 2024-05-07 RX ADMIN — LORAZEPAM 2 MG: 1 TABLET ORAL at 15:59

## 2024-05-07 RX ADMIN — POTASSIUM CHLORIDE 40 MEQ: 750 CAPSULE, EXTENDED RELEASE ORAL at 09:35

## 2024-05-07 RX ADMIN — Medication 10 ML: at 09:36

## 2024-05-07 RX ADMIN — LACTULOSE 20 G: 20 SOLUTION ORAL at 15:52

## 2024-05-07 RX ADMIN — LACTULOSE 20 G: 20 SOLUTION ORAL at 09:35

## 2024-05-07 RX ADMIN — Medication 1 APPLICATION: at 09:35

## 2024-05-07 RX ADMIN — LORAZEPAM 2 MG: 1 TABLET ORAL at 09:37

## 2024-05-07 RX ADMIN — THIAMINE HYDROCHLORIDE 200 MG: 100 INJECTION, SOLUTION INTRAMUSCULAR; INTRAVENOUS at 20:44

## 2024-05-07 RX ADMIN — THIAMINE HYDROCHLORIDE 200 MG: 100 INJECTION, SOLUTION INTRAMUSCULAR; INTRAVENOUS at 05:37

## 2024-05-07 RX ADMIN — RIFAXIMIN 550 MG: 550 TABLET ORAL at 20:45

## 2024-05-07 RX ADMIN — FOLIC ACID 1 MG: 5 INJECTION, SOLUTION INTRAMUSCULAR; INTRAVENOUS; SUBCUTANEOUS at 09:35

## 2024-05-07 RX ADMIN — POTASSIUM CHLORIDE AND SODIUM CHLORIDE 100 ML/HR: 900; 150 INJECTION, SOLUTION INTRAVENOUS at 20:44

## 2024-05-07 RX ADMIN — POTASSIUM CHLORIDE 40 MEQ: 750 CAPSULE, EXTENDED RELEASE ORAL at 05:37

## 2024-05-07 RX ADMIN — POTASSIUM & SODIUM PHOSPHATES POWDER PACK 280-160-250 MG 2 PACKET: 280-160-250 PACK at 05:37

## 2024-05-07 RX ADMIN — INSULIN LISPRO 4 UNITS: 100 INJECTION, SOLUTION INTRAVENOUS; SUBCUTANEOUS at 17:30

## 2024-05-07 RX ADMIN — RIFAXIMIN 550 MG: 550 TABLET ORAL at 12:03

## 2024-05-07 RX ADMIN — LACTULOSE 20 G: 20 SOLUTION ORAL at 20:45

## 2024-05-07 RX ADMIN — CHLORHEXIDINE GLUCONATE 1 APPLICATION: 500 CLOTH TOPICAL at 04:51

## 2024-05-07 RX ADMIN — THIAMINE HYDROCHLORIDE 200 MG: 100 INJECTION, SOLUTION INTRAMUSCULAR; INTRAVENOUS at 15:49

## 2024-05-07 RX ADMIN — POTASSIUM & SODIUM PHOSPHATES POWDER PACK 280-160-250 MG 2 PACKET: 280-160-250 PACK at 15:49

## 2024-05-07 NOTE — PROGRESS NOTES
[facility] Intensivist Services  INPATIENT PROGRESS NOTE    Patient Name: Luciano Christiansen  Date of Admission: 5/5/2024  Today's Date: 05/07/24  Length of Stay: 1  Primary Care Physician: Jossy Christiansen APRN    Subjective   Chief Complaint: fall/lightheadedness  Weakness - Generalized  Associated symptoms include fatigue.   Dizziness  Associated symptoms include fatigue.   Fall       The patient is a 39-year-old male with a past medical history of type 2 diabetes, alcoholism, gout, and alcoholic cirrhosis who presented to [facility] ED on 5/5/2024 with complaints of falling and feeling lightheaded. The patient presented via EMS after having multiple falls and AMS. Of note-he was recently admitted to this facility for GI bleed in April.  Further ED workup the patient was found to have an initial lactic acid of 11.6, ammonia 222 multiple electrolyte derangements.  The patient received IV fluid bolus and Zosyn in the ED.  He underwent pan CTs of the head chest abdomen and pelvis without any acute findings.  Blood cultures were obtained on 5/5/2024.  RPP negative.  The patient was admitted to intensive care for further management and evaluation.  He was given lactulose for his ammonia-most recent ammonia level 148.  Lactulose continues rifaximin 550 mg twice daily ordered.  PT/INR ordered to calculate Maddrey to r/o alcoholic hepatitis.  A.m. bilirubin 20.3.  His lactate has improved to 2.2.  Hypophosphatemia has improved to 1.4.  Other electrolyte derangements have been replaced per appropriate protocols.  His mental status is significantly improved.  He will be transition to a carb conscious diet and subcutaneous insulin has been ordered.  IV normal saline continues.  His vital signs remained stable.  He is running NSR per cardiac monitor.  With improvement of his electrolyte derangements, lactate, ammonia level and mental status he is felt to be stable to be transferred to a medical  unit.        Review of Systems   Constitutional:  Positive for appetite change and fatigue.   Neurological:  Positive for dizziness.      All pertinent negatives and positives are as above. All other systems have been reviewed and are negative unless otherwise stated.     Objective    Temp:  [98 °F (36.7 °C)-98.3 °F (36.8 °C)] 98.3 °F (36.8 °C)  Heart Rate:  [] 91  Resp:  [20] 20  BP: (100-165)/(63-95) 139/76  Physical Exam  Vitals reviewed.   Constitutional:       Appearance: He is obese. He is ill-appearing.   HENT:      Head: Normocephalic and atraumatic.      Nose: Nose normal.      Mouth/Throat:      Mouth: Mucous membranes are dry.   Eyes:      General: Scleral icterus present.      Pupils: Pupils are equal, round, and reactive to light.   Cardiovascular:      Rate and Rhythm: Tachycardia present.      Pulses: Normal pulses.      Heart sounds: Normal heart sounds.   Pulmonary:      Effort: Pulmonary effort is normal.      Breath sounds: Normal breath sounds.   Abdominal:      General: Bowel sounds are normal. There is distension.      Tenderness: There is no abdominal tenderness.   Musculoskeletal:      Cervical back: Normal range of motion and neck supple.      Right lower leg: Edema present.      Left lower leg: Edema present.   Skin:     General: Skin is warm.      Coloration: Skin is jaundiced.   Neurological:      General: No focal deficit present.      Mental Status: He is easily aroused.      Motor: Weakness present.             Results Review:    CT Angiogram Chest    Result Date: 5/6/2024  1. No evidence of pulmonary embolism. No aortic aneurysm or dissection. 2. Atheromatous change of coronary arteries, predominantly the left coronary artery and branches. 3. Moderate cardiomegaly. 4. The lungs are poorly expanded but unremarkable.  The above study was initially reviewed and reported by [facility]. I do not find any discrepancies.        This report was signed and finalized on 5/6/2024 8:35 AM by  Dr. Vj Hsu MD.      CT Abdomen Pelvis With Contrast    Result Date: 5/6/2024  1. No acute abnormality of the abdomen or pelvis to account for patient's symptoms. 2. Cholelithiasis. No finding to suggest cholecystitis. 3. Fatty infiltration of the liver. Hepatosplenomegaly. No significant change. 4. Nonspecific inguinal lymphadenopathy which appear moderately more pronounced than the previous study. Etiology and clinical significance is not certain.  The above study was initially reviewed and reported by [facility]. I do not find any critical discrepancies.             This report was signed and finalized on 5/6/2024 7:39 AM by Dr. Vj Hsu MD.      CT Head Without Contrast    Result Date: 5/6/2024  1. Limited study due to significant motion artifacts. A subtle lesion may be obscured. No significant acute intracranial abnormality. 2. If patient's symptoms persist a follow-up examination or MR imaging of the brain may be obtained when the patient is able to cooperate.  The above study was initially reviewed and reported by [facility]. I do not find any discrepancies.             This report was signed and finalized on 5/6/2024 6:27 AM by Dr. Vj Hsu MD.      XR Chest 1 View    Result Date: 5/5/2024   Low lung volumes with mild perihilar bibasilar parenchymal opacities, likely atelectasis. No focal consolidation to suggest pneumonia    This report was signed and finalized on 5/5/2024 7:00 PM by Darwin Goldman.       Result Review:  I have personally reviewed the results from the time of this admission to 5/7/2024 10:52 CDT and agree with these findings:  [x]  Laboratory list / accordion  [x]  Microbiology  [x]  Radiology  [x]  EKG/Telemetry   []  Cardiology/Vascular   []  Pathology  []  Old records  []  Other:  Most notable findings include:   WBC   Date Value Ref Range Status   05/07/2024 7.79 3.40 - 10.80 10*3/mm3 Final     RBC   Date Value Ref Range Status   05/07/2024 3.15 (L) 4.14 - 5.80  10*6/mm3 Final     Hemoglobin   Date Value Ref Range Status   05/07/2024 10.9 (L) 13.0 - 17.7 g/dL Final     Hematocrit   Date Value Ref Range Status   05/07/2024 32.1 (L) 37.5 - 51.0 % Final     MCV   Date Value Ref Range Status   05/07/2024 101.9 (H) 79.0 - 97.0 fL Final     MCH   Date Value Ref Range Status   05/07/2024 34.6 (H) 26.6 - 33.0 pg Final     MCHC   Date Value Ref Range Status   05/07/2024 34.0 31.5 - 35.7 g/dL Final     RDW   Date Value Ref Range Status   05/07/2024 18.3 (H) 12.3 - 15.4 % Final     RDW-SD   Date Value Ref Range Status   05/07/2024 67.8 (H) 37.0 - 54.0 fl Final     MPV   Date Value Ref Range Status   05/07/2024 10.6 6.0 - 12.0 fL Final     Platelets   Date Value Ref Range Status   05/07/2024 275 140 - 450 10*3/mm3 Final     Neutrophil %   Date Value Ref Range Status   05/05/2024 83.0 (H) 42.7 - 76.0 % Final     Lymphocyte %   Date Value Ref Range Status   05/05/2024 4.3 (L) 19.6 - 45.3 % Final     Monocyte %   Date Value Ref Range Status   05/05/2024 9.0 5.0 - 12.0 % Final     Eosinophil %   Date Value Ref Range Status   05/05/2024 0.2 (L) 0.3 - 6.2 % Final     Basophil %   Date Value Ref Range Status   05/05/2024 0.7 0.0 - 1.5 % Final     Immature Grans %   Date Value Ref Range Status   05/05/2024 2.8 (H) 0.0 - 0.5 % Final     Neutrophils Absolute   Date Value Ref Range Status   05/06/2024 8.89 (H) 1.70 - 7.00 10*3/mm3 Final     Neutrophils, Absolute   Date Value Ref Range Status   05/05/2024 8.93 (H) 1.70 - 7.00 10*3/mm3 Final     Lymphocytes, Absolute   Date Value Ref Range Status   05/05/2024 0.46 (L) 0.70 - 3.10 10*3/mm3 Final     Monocytes, Absolute   Date Value Ref Range Status   05/05/2024 0.97 (H) 0.10 - 0.90 10*3/mm3 Final     Eosinophils Absolute   Date Value Ref Range Status   05/06/2024 0.10 0.00 - 0.40 10*3/mm3 Final     Eosinophils, Absolute   Date Value Ref Range Status   05/05/2024 0.02 0.00 - 0.40 10*3/mm3 Final     Basophils, Absolute   Date Value Ref Range Status    05/05/2024 0.07 0.00 - 0.20 10*3/mm3 Final     Immature Grans, Absolute   Date Value Ref Range Status   05/05/2024 0.30 (H) 0.00 - 0.05 10*3/mm3 Final     nRBC   Date Value Ref Range Status   05/05/2024 0.0 0.0 - 0.2 /100 WBC Final      Lab Results   Component Value Date    GLUCOSE 154 (H) 05/07/2024    BUN 8 05/07/2024    CREATININE 0.40 (L) 05/07/2024    EGFR 142.3 05/07/2024    BCR 20.0 05/07/2024    K 3.6 05/07/2024    CO2 24.0 05/07/2024    CALCIUM 9.0 05/07/2024    ALBUMIN 2.9 (L) 05/07/2024    BILITOT 20.3 (H) 05/07/2024     (H) 05/07/2024    ALT 91 (H) 05/07/2024          Lab 05/06/24  1815 05/06/24  1233 05/06/24  0638 05/06/24  0227 05/05/24  2323   LACTATE 2.2* 2.2* 3.5* 3.6* 6.2*          Culture Data:   Blood Culture   Date Value Ref Range Status   05/05/2024 No growth at 24 hours  Preliminary   05/05/2024 No growth at 24 hours  Preliminary       I have reviewed the patient's current medications.     Assessment/Plan   Assessment  Active Hospital Problems    Diagnosis     **Alcoholic encephalopathy     Type 2 diabetes mellitus     Alcohol intoxication in active alcoholic     Alcoholism        Medical Decision Making  Number and Complexity of problems: 2  Differential Diagnosis: Hepatic failure, DKA, biliary obstruction, alcoholic hepatitis    Conditions and Status        Condition is improving.     [facility] Data  External documents reviewed: N/A  Cardiac tracing (EKG, telemetry) interpretation: Sinus tachycardia  Radiology interpretation: Reviewed from 5/6/24  Labs reviewed: 5/7/2024  Any tests that were considered but not ordered: US of the abdomen     Decision rules/scores evaluated (example [id number], Wells, etc): Maddrey pending PT/INR     Discussed with: Attending, nursing and patient     Care Planning  Shared decision making: N/A  Code status and discussions: NA    Treatment Plan  Alcohol encephalopathy   -Continue lactulose 20 g 3 times daily   -Xifaxin 550 mg p.o. twice daily   -Continue  IVF's   -Trend chemistries daily   -Daily PT/INR's-r/o alcoholic hepatitis   -Continue to monitor electrolyte derangements and replace per protocols   -Monitor neurovascular status Q4 hours    Alcoholism   -Alcohol withdrawal protocol with CIWA   -Thiamin IV then po   -Folic acid   -Seizure precautions   -Fall precautions   -Would benefit from rehab inpatient setting    Type 2 Diabetes   -Mentation has improved with initiate carb conscious    -Medium dose correctional protocol   -Hypoglycemic protocol   -Accuchecks AC and HS        Disposition  Social Determinants of Health that impact treatment or disposition: Ongoing ETOH use and lack of social support  I expect the patient to be discharged to home vs. rehab in 3-5 days.     Electronically signed by LÁZARO Maurer on 5/7/2024 at 10:52 CDT

## 2024-05-07 NOTE — PLAN OF CARE
Problem: Adult Inpatient Plan of Care  Goal: Plan of Care Review  Outcome: Ongoing, Progressing  Goal: Patient-Specific Goal (Individualized)  Outcome: Ongoing, Progressing  Goal: Absence of Hospital-Acquired Illness or Injury  Outcome: Ongoing, Progressing  Intervention: Identify and Manage Fall Risk  Recent Flowsheet Documentation  Taken 5/7/2024 0500 by Layla Bell RN  Safety Promotion/Fall Prevention: safety round/check completed  Taken 5/7/2024 0400 by Layla Bell RN  Safety Promotion/Fall Prevention:   safety round/check completed   activity supervised  Taken 5/7/2024 0300 by Layla Bell RN  Safety Promotion/Fall Prevention: safety round/check completed  Taken 5/7/2024 0200 by Layla Bell RN  Safety Promotion/Fall Prevention: safety round/check completed  Taken 5/7/2024 0100 by Layla Bell RN  Safety Promotion/Fall Prevention: safety round/check completed  Taken 5/7/2024 0000 by Layla Bell RN  Safety Promotion/Fall Prevention:   safety round/check completed   activity supervised  Taken 5/6/2024 2300 by Layla Bell RN  Safety Promotion/Fall Prevention: safety round/check completed  Taken 5/6/2024 2200 by Layla Bell RN  Safety Promotion/Fall Prevention: safety round/check completed  Taken 5/6/2024 2100 by Layla Bell RN  Safety Promotion/Fall Prevention: safety round/check completed  Taken 5/6/2024 2000 by Layla Bell RN  Safety Promotion/Fall Prevention:   safety round/check completed   activity supervised  Taken 5/6/2024 1900 by Layla Bell RN  Safety Promotion/Fall Prevention: safety round/check completed  Intervention: Prevent Skin Injury  Recent Flowsheet Documentation  Taken 5/7/2024 0400 by Layla Bell RN  Body Position: position changed independently  Taken 5/7/2024 0200 by Layla Bell RN  Body Position: position changed independently  Taken 5/7/2024 0000 by Layla Bell RN  Body Position: position changed independently  Taken  5/6/2024 2200 by Layla Bell RN  Body Position: position changed independently  Taken 5/6/2024 2000 by Layla Bell RN  Body Position: position changed independently  Intervention: Prevent and Manage VTE (Venous Thromboembolism) Risk  Recent Flowsheet Documentation  Taken 5/7/2024 0400 by Layla Bell RN  Activity Management: bedrest  VTE Prevention/Management:   bilateral   sequential compression devices on  Range of Motion: ROM (range of motion) performed  Taken 5/7/2024 0000 by Layla Bell RN  Activity Management: bedrest  VTE Prevention/Management:   bilateral   sequential compression devices on  Range of Motion: ROM (range of motion) performed  Taken 5/6/2024 2000 by Layla Bell RN  Activity Management: bedrest  VTE Prevention/Management:   bilateral   sequential compression devices on  Range of Motion: ROM (range of motion) performed  Intervention: Prevent Infection  Recent Flowsheet Documentation  Taken 5/7/2024 0400 by Layla Bell RN  Infection Prevention: personal protective equipment utilized  Taken 5/7/2024 0000 by Layla Bell RN  Infection Prevention: personal protective equipment utilized  Taken 5/6/2024 2000 by Layla Bell RN  Infection Prevention: personal protective equipment utilized  Goal: Optimal Comfort and Wellbeing  Outcome: Ongoing, Progressing  Intervention: Provide Person-Centered Care  Recent Flowsheet Documentation  Taken 5/7/2024 0400 by Layla Bell RN  Trust Relationship/Rapport:   care explained   choices provided  Taken 5/7/2024 0000 by Layla Bell RN  Trust Relationship/Rapport:   care explained   choices provided  Taken 5/6/2024 2000 by Layla Bell RN  Trust Relationship/Rapport:   care explained   choices provided  Goal: Readiness for Transition of Care  Outcome: Ongoing, Progressing     Problem: Skin Injury Risk Increased  Goal: Skin Health and Integrity  Outcome: Ongoing, Progressing  Intervention: Optimize Skin  Protection  Recent Flowsheet Documentation  Taken 5/7/2024 0400 by Layla Bell RN  Head of Bed (HOB) Positioning: HOB elevated  Taken 5/7/2024 0000 by Layla Bell RN  Head of Bed (HOB) Positioning: HOB elevated  Taken 5/6/2024 2000 by Layla Bell RN  Head of Bed (HOB) Positioning: HOB elevated     Problem: Fall Injury Risk  Goal: Absence of Fall and Fall-Related Injury  Outcome: Ongoing, Progressing  Intervention: Identify and Manage Contributors  Recent Flowsheet Documentation  Taken 5/7/2024 0400 by Layla Bell RN  Medication Review/Management: medications reviewed  Taken 5/7/2024 0000 by Layla Bell RN  Medication Review/Management: medications reviewed  Taken 5/6/2024 2000 by Layla Bell RN  Medication Review/Management: medications reviewed  Intervention: Promote Injury-Free Environment  Recent Flowsheet Documentation  Taken 5/7/2024 0500 by Layla Bell RN  Safety Promotion/Fall Prevention: safety round/check completed  Taken 5/7/2024 0400 by Layla Bell RN  Safety Promotion/Fall Prevention:   safety round/check completed   activity supervised  Taken 5/7/2024 0300 by Layla Bell RN  Safety Promotion/Fall Prevention: safety round/check completed  Taken 5/7/2024 0200 by Layla Bell RN  Safety Promotion/Fall Prevention: safety round/check completed  Taken 5/7/2024 0100 by Layla Bell RN  Safety Promotion/Fall Prevention: safety round/check completed  Taken 5/7/2024 0000 by Layla Bell RN  Safety Promotion/Fall Prevention:   safety round/check completed   activity supervised  Taken 5/6/2024 2300 by Layla Bell RN  Safety Promotion/Fall Prevention: safety round/check completed  Taken 5/6/2024 2200 by Layla Bell RN  Safety Promotion/Fall Prevention: safety round/check completed  Taken 5/6/2024 2100 by Layla Bell RN  Safety Promotion/Fall Prevention: safety round/check completed  Taken 5/6/2024 2000 by Layla Bell RN  Safety  Promotion/Fall Prevention:   safety round/check completed   activity supervised  Taken 5/6/2024 1900 by Layla Bell, RN  Safety Promotion/Fall Prevention: safety round/check completed   Goal Outcome Evaluation:

## 2024-05-07 NOTE — CASE MANAGEMENT/SOCIAL WORK
Continued Stay Note  [facility]     Patient Name: Luciano Christiansen  MRN: [id number]  Today's Date: 5/7/2024    Admit Date: 5/5/2024        Discharge Plan       Row Name 05/07/24 1040       Plan    Plan Comments Social service consult received for rehab placement.  Patient has had several admits lately and SW has discussed chemical dependency treatment with him during these admits.  Last admit patient wanted to go to [facility] but [facility] requires the individual wanting treatment contact them personally and will not accept referrals from third parties.  Patient declines [facility] as it a 30 day program.  Patient was wanting to know details about other chemical dependency providers in regard to length of stay.  Patient was slow to respond to questions and eventually asked if we could follow up with him when he was feeling better.  SW encouraged patient to take initiative and reach out to chemical dependency providers himself when he felt up to it as well.  Patient has a cell phone with him.  It does not require an outside provider to make a referral.  Will follow up with patient at another time.  Motivation level to seek chemical dependency treatment is low.  Chemical dependency treatment provider list left on bedside table.                     Discharge Codes    No documentation.                       MICHELLE Blair

## 2024-05-07 NOTE — PROGRESS NOTES
Patient is a 39-year-old man who asked to follow through on his care after an intensive care unit stay for acute alcoholic encephalopathy.  Patient reportedly had a positive alcohol level  Ammonia level on admission was 226  Lactic acid 11.6  I am told that patient is not septic but received dose of Zosyn.  I am told that he no longer needs this  Patient also on CIWA protocol but has no symptoms of alcohol withdrawal as per discussion with MONA Heath who endorsed this patient to me    Patient has an elevated troponin although unclear as to its etiology.  As per discussion with MONA Heath, there is no ST-T wave changes, there is no acute kidney injury and patient did not report any chest pain.  Patient was hyponatremic at 120.  Today his sodium is 135  Ammonia level has improved at 148 on lactulose  Lactic acid has improved to 2.2 from 11.6  Phosphorus is being corrected as per protocol    I am told that patient had recent hospitalization.  He was hospitalized from April 21 to April 25 and reportedly had gone back to drinking  From that hospitalization he was diagnosed with GI bleed  Calculus of the gallbladder  Diabetes with ketoacidosis  Alcohol intoxication  Hypophosphatemia  Transaminitis  Alcoholism  Hypokalemia  Hypomagnesemia  Elevated lactic acid and fatty liver.    Current problem list includes:  Alcoholic intoxication with encephalopathy possibly compounded by metabolic issues such as hyponatremia and hyperammonemia  Lactic acidosis  Hyperammonemia  Electrolyte disturbance  I will follow through care in the medical floor  Will continue on CIWA protocol and monitor for any signs of alcohol withdrawal.  Replace electrolytes as needed  Pressure 144/88, respiratory rate 20, pulse rate 86, temperature 98.3 and saturation in the mid 90s.

## 2024-05-08 LAB
ALBUMIN SERPL-MCNC: 2.7 G/DL (ref 3.5–5.2)
ALBUMIN/GLOB SERPL: 0.9 G/DL
ALP SERPL-CCNC: 210 U/L (ref 39–117)
ALT SERPL W P-5'-P-CCNC: 77 U/L (ref 1–41)
AMMONIA BLD-SCNC: 111 UMOL/L (ref 16–60)
ANION GAP SERPL CALCULATED.3IONS-SCNC: 9 MMOL/L (ref 5–15)
AST SERPL-CCNC: 181 U/L (ref 1–40)
BASOPHILS # BLD AUTO: 0.06 10*3/MM3 (ref 0–0.2)
BASOPHILS NFR BLD AUTO: 0.8 % (ref 0–1.5)
BILIRUB SERPL-MCNC: 22.3 MG/DL (ref 0–1.2)
BUN SERPL-MCNC: 8 MG/DL (ref 6–20)
BUN/CREAT SERPL: 34.8 (ref 7–25)
CALCIUM SPEC-SCNC: 8.4 MG/DL (ref 8.6–10.5)
CHLORIDE SERPL-SCNC: 102 MMOL/L (ref 98–107)
CO2 SERPL-SCNC: 25 MMOL/L (ref 22–29)
CREAT SERPL-MCNC: 0.23 MG/DL (ref 0.76–1.27)
DEPRECATED RDW RBC AUTO: 71.7 FL (ref 37–54)
EGFRCR SERPLBLD CKD-EPI 2021: 168.2 ML/MIN/1.73
EOSINOPHIL # BLD AUTO: 0.14 10*3/MM3 (ref 0–0.4)
EOSINOPHIL NFR BLD AUTO: 1.8 % (ref 0.3–6.2)
ERYTHROCYTE [DISTWIDTH] IN BLOOD BY AUTOMATED COUNT: 18.5 % (ref 12.3–15.4)
GLOBULIN UR ELPH-MCNC: 3.1 GM/DL
GLUCOSE BLDC GLUCOMTR-MCNC: 106 MG/DL (ref 70–130)
GLUCOSE BLDC GLUCOMTR-MCNC: 117 MG/DL (ref 70–130)
GLUCOSE BLDC GLUCOMTR-MCNC: 119 MG/DL (ref 70–130)
GLUCOSE BLDC GLUCOMTR-MCNC: 121 MG/DL (ref 70–130)
GLUCOSE SERPL-MCNC: 107 MG/DL (ref 65–99)
HCT VFR BLD AUTO: 31.3 % (ref 37.5–51)
HGB BLD-MCNC: 10.1 G/DL (ref 13–17.7)
IMM GRANULOCYTES # BLD AUTO: 0.06 10*3/MM3 (ref 0–0.05)
IMM GRANULOCYTES NFR BLD AUTO: 0.8 % (ref 0–0.5)
INR PPP: 2.17 (ref 0.91–1.09)
LYMPHOCYTES # BLD AUTO: 0.85 10*3/MM3 (ref 0.7–3.1)
LYMPHOCYTES NFR BLD AUTO: 11.1 % (ref 19.6–45.3)
MCH RBC QN AUTO: 34.1 PG (ref 26.6–33)
MCHC RBC AUTO-ENTMCNC: 32.3 G/DL (ref 31.5–35.7)
MCV RBC AUTO: 105.7 FL (ref 79–97)
MONOCYTES # BLD AUTO: 0.69 10*3/MM3 (ref 0.1–0.9)
MONOCYTES NFR BLD AUTO: 9 % (ref 5–12)
NEUTROPHILS NFR BLD AUTO: 5.88 10*3/MM3 (ref 1.7–7)
NEUTROPHILS NFR BLD AUTO: 76.5 % (ref 42.7–76)
PHOSPHATE SERPL-MCNC: 2.4 MG/DL (ref 2.5–4.5)
PLATELET # BLD AUTO: 216 10*3/MM3 (ref 140–450)
PMV BLD AUTO: 11 FL (ref 6–12)
POTASSIUM SERPL-SCNC: 3.7 MMOL/L (ref 3.5–5.2)
PROT SERPL-MCNC: 5.8 G/DL (ref 6–8.5)
PROTHROMBIN TIME: 25 SECONDS (ref 11.8–14.8)
RBC # BLD AUTO: 2.96 10*6/MM3 (ref 4.14–5.8)
SODIUM SERPL-SCNC: 136 MMOL/L (ref 136–145)
WBC NRBC COR # BLD AUTO: 7.68 10*3/MM3 (ref 3.4–10.8)

## 2024-05-08 PROCEDURE — 82140 ASSAY OF AMMONIA: CPT | Performed by: INTERNAL MEDICINE

## 2024-05-08 PROCEDURE — 84100 ASSAY OF PHOSPHORUS: CPT | Performed by: INTERNAL MEDICINE

## 2024-05-08 PROCEDURE — 85025 COMPLETE CBC W/AUTO DIFF WBC: CPT | Performed by: NURSE PRACTITIONER

## 2024-05-08 PROCEDURE — 25010000002 THIAMINE HCL 200 MG/2ML SOLUTION: Performed by: NURSE PRACTITIONER

## 2024-05-08 PROCEDURE — 82948 REAGENT STRIP/BLOOD GLUCOSE: CPT

## 2024-05-08 PROCEDURE — 25010000002 SODIUM CHLORIDE 0.9 % WITH KCL 20 MEQ 20-0.9 MEQ/L-% SOLUTION: Performed by: NURSE PRACTITIONER

## 2024-05-08 PROCEDURE — 85610 PROTHROMBIN TIME: CPT | Performed by: NURSE PRACTITIONER

## 2024-05-08 PROCEDURE — 25010000002 FUROSEMIDE PER 20 MG: Performed by: INTERNAL MEDICINE

## 2024-05-08 PROCEDURE — 80053 COMPREHEN METABOLIC PANEL: CPT | Performed by: NURSE PRACTITIONER

## 2024-05-08 RX ORDER — LACTULOSE 20 G/30ML
10 SOLUTION ORAL DAILY
Status: DISCONTINUED | OUTPATIENT
Start: 2024-05-09 | End: 2024-05-09 | Stop reason: HOSPADM

## 2024-05-08 RX ORDER — PENTOXIFYLLINE 400 MG/1
400 TABLET, EXTENDED RELEASE ORAL
Status: DISCONTINUED | OUTPATIENT
Start: 2024-05-08 | End: 2024-05-09 | Stop reason: HOSPADM

## 2024-05-08 RX ORDER — FUROSEMIDE 10 MG/ML
40 INJECTION INTRAMUSCULAR; INTRAVENOUS EVERY 12 HOURS SCHEDULED
Status: DISCONTINUED | OUTPATIENT
Start: 2024-05-08 | End: 2024-05-09 | Stop reason: HOSPADM

## 2024-05-08 RX ADMIN — LACTULOSE 20 G: 20 SOLUTION ORAL at 09:04

## 2024-05-08 RX ADMIN — POTASSIUM CHLORIDE AND SODIUM CHLORIDE 100 ML/HR: 900; 150 INJECTION, SOLUTION INTRAVENOUS at 09:05

## 2024-05-08 RX ADMIN — FUROSEMIDE 40 MG: 10 INJECTION, SOLUTION INTRAVENOUS at 15:47

## 2024-05-08 RX ADMIN — PENTOXIFYLLINE 400 MG: 400 TABLET, EXTENDED RELEASE ORAL at 18:02

## 2024-05-08 RX ADMIN — LORAZEPAM 2 MG: 1 TABLET ORAL at 06:39

## 2024-05-08 RX ADMIN — THIAMINE HYDROCHLORIDE 200 MG: 100 INJECTION, SOLUTION INTRAMUSCULAR; INTRAVENOUS at 21:27

## 2024-05-08 RX ADMIN — RIFAXIMIN 550 MG: 550 TABLET ORAL at 09:04

## 2024-05-08 RX ADMIN — LORAZEPAM 2 MG: 1 TABLET ORAL at 17:29

## 2024-05-08 RX ADMIN — RIFAXIMIN 550 MG: 550 TABLET ORAL at 21:27

## 2024-05-08 RX ADMIN — THIAMINE HYDROCHLORIDE 200 MG: 100 INJECTION, SOLUTION INTRAMUSCULAR; INTRAVENOUS at 15:47

## 2024-05-08 RX ADMIN — Medication 3 MG: at 23:48

## 2024-05-08 RX ADMIN — FOLIC ACID 1 MG: 5 INJECTION, SOLUTION INTRAMUSCULAR; INTRAVENOUS; SUBCUTANEOUS at 09:04

## 2024-05-08 RX ADMIN — Medication 10 ML: at 09:05

## 2024-05-08 RX ADMIN — THIAMINE HYDROCHLORIDE 200 MG: 100 INJECTION, SOLUTION INTRAMUSCULAR; INTRAVENOUS at 05:56

## 2024-05-08 RX ADMIN — LORAZEPAM 2 MG: 1 TABLET ORAL at 23:48

## 2024-05-08 NOTE — PLAN OF CARE
Goal Outcome Evaluation:  Plan of Care Reviewed With: patient        Progress: no change     Pt alert and oriented x4. VSS. No c/o pain. ANTON. PPP. SCDs for VTE. , satting at  99% on room air. Tolerating diabetic diet. Skin jaundice, intact. Voiding via [place]. Up x 1-2 to [facility]. Last BM 5/7, multiple bouts of diarrhea. BS monitored, insulin given per order. Bed alarm set.  Call light within reach. Safety maintained. Plan of care ongoing. No changes this shift.

## 2024-05-08 NOTE — PROGRESS NOTES
"1         [facility] Medicine Services  INPATIENT PROGRESS NOTE    Patient Name: Luciano Christiansen  Date of Admission: 5/5/2024  Today's Date: 05/08/24  Length of Stay: 2  Primary Care Physician: Jossy Christiansen APRN    Subjective   Chief Complaint: f/u   HPI   Patient was transferred yesterday to medical floor following intensive care stay for acute alcoholic encephalopathy.  She had hyperammonemia (226)  Lactic acidosis (11.6)  Patient was started on lactulose  I reviewed records including CT scan of the abdomen and ultrasound of the liver.  There has not been any mention that he has cirrhotic finding on his liver.  Patient has been described to have fatty infiltration of the liver.    Nurse informed me the patient has several episodes of diarrhea.  Patient on lactulose 30 g 3 times daily.  He has had 6 episodes of diarrhea.  Bite of this potassium is 3.7, chloride 102, bicarb 25 creatinine 0.23.    Patient is stable hemoglobin and normal white count  Transaminase noted to be trending down.    \"I just want to get well enough that I may be released from here.  I would like to go to a rehab.\"    He is also requesting when the Harris catheter can be removed.      Review of Systems   All pertinent negatives and positives are as above. All other systems have been reviewed and are negative unless otherwise stated.     Objective    Temp:  [98.1 °F (36.7 °C)-98.6 °F (37 °C)] 98.3 °F (36.8 °C)  Heart Rate:  [83-94] 94  Resp:  [20-22] 22  BP: (122-145)/(70-98) 129/70  Physical Exam  Patient is heavyset  His BMI is 40  He is deeply jaundiced.  His total bilirubin is 22.  His CT of the abdomen and pelvis with focus on the right upper quadrant describes a moderately distended gallbladder with several small radiopaque gallstone without suggestive evidence of cholecystitis.  Gallbladder ultrasound as of April 25 showed cholelithiasis.    Liver/Gallbladder/Bile ducts: Liver is diffusely fatty " infiltrated. No  focal hepatic lesion is seen. There is cholelithiasis. Gallbladder wall  is 0.3 cm. Common bile duct is 0.4 cm..     Awake  Drowsy  No distress  Noted infusing fluids with potassium  Noted also patient has edema  Obese protuberant abdomen  He has a Harris catheter with tea colored urine      Results Review:  I have reviewed the labs, radiology results, and diagnostic studies.    Laboratory Data:   Results from last 7 days   Lab Units 05/08/24 0444 05/07/24  1150 05/07/24  0311   WBC 10*3/mm3 7.68 7.69 7.79   HEMOGLOBIN g/dL 10.1* 10.4* 10.9*   HEMATOCRIT % 31.3* 31.1* 32.1*   PLATELETS 10*3/mm3 216 258 275        Results from last 7 days   Lab Units 05/08/24 0444 05/07/24  1150 05/07/24  0311 05/06/24  1233 05/06/24  0227   SODIUM mmol/L 136  --  135*  --  123*   POTASSIUM mmol/L 3.7 3.7 3.6   < > 3.4*   CHLORIDE mmol/L 102  --  101  --  89*   CO2 mmol/L 25.0  --  24.0  --  18.0*   BUN mg/dL 8  --  8  --  10   CREATININE mg/dL 0.23*  --  0.40*  --  0.50*   CALCIUM mg/dL 8.4*  --  9.0  --  8.8   BILIRUBIN mg/dL 22.3*  --  20.3*  --  17.7*   ALK PHOS U/L 210*  --  233*  --  255*   ALT (SGPT) U/L 77*  --  91*  --  98*   AST (SGOT) U/L 181*  --  222*  --  210*   GLUCOSE mg/dL 107*  --  154*  --  173*    < > = values in this interval not displayed.       Culture Data:   Blood Culture   Date Value Ref Range Status   05/05/2024 No growth at 2 days  Preliminary   05/05/2024 No growth at 2 days  Preliminary       Radiology Data:   Imaging Results (Last 24 Hours)       ** No results found for the last 24 hours. **            I have reviewed the patient's current medications.     Assessment/Plan   Assessment  Active Hospital Problems    Diagnosis     **Alcoholic encephalopathy     Type 2 diabetes mellitus     Alcohol intoxication in active alcoholic     Alcoholism              Medical Decision Making  Number and Complexity of problems:   Problem list:  Alcoholic intoxication with encephalopathy possibly  compounded by metabolic issues such as hyponatremia and hyperammonemia  Lactic acidosis-resolved  Hyperammonemia  Electrolyte disturbance (hyponatremia-resolved); hypophosphatemia improved  Alcoholic hepatitis the patient with sialosis  Alcoholism-recurrent hospitalization related to this.  Fatty liver/diagnosis-approximately 20% of patients will progress to cirrhosis.  Diabetes mellitus type 2 insulin treated.  A1c as of April 22 was 10.4 but 2 months ill earlier than this was 11.2%  Questionable medical compliance.  Macrocytosis with anemia in the setting of alcohol liver disease    Treatment Plan  I cut back on lactulose.  I reviewed prior records and found no evidence of cirrhotic liver.  Goal would be to have 3-4 bowel movement  I rechecked patient's ammonia level--this is now slowly improving   I learned that from his admission February 17 February 20, patient had been placed on CIWA protocol and was admitted for impending DT.  Patient also has A1c of 11.2%.  He was admitted for community-acquired pneumonia at that time.  Otherwise, vital signs are stable.  Blood cultures no growth to date, COVID-19, influenza a and B are negative  From a prior hospitalization, GI service had seen the patient.  They mentioned that patient was initiated on parenteral as patient was in DKA.  Up-to-date information appears to have prednisolone as the primary medication.    Glucocorticoids  Indications and contraindications -- In addition to general supportive care, we suggest treatment with glucocorticoids (40 mg per day) for patients with severe alcoholic hepatitis (DF ?32 (calculator 1)) [48]. However, not all authorities agree with this recommendation, instead favoring pentoxifylline in certain populations in light of inconclusive evidence of glucocorticoid efficacy [49]. Prednisolone has traditionally been given, provided there are no contraindications to its use (eg, active bacterial or fungal infection or chronic hepatitis  C virus or hepatitis B virus infection) [10].  Glucocorticoids must be used cautiously, particularly in patients who are unlikely to return for follow-up after discharge from the hospital. In the absence of a clinician guiding treatment, these patients are at risk for continuing glucocorticoids long term (with all of the associated side effects). In such patients, the benefits of glucocorticoids must be weighed against the risk of the patient's failing to receive follow-up medical care.    I will consult GI service for further recommendation.  I will start back on pentoxifylline 400 mg 3 times a day  I will  diurese him.  It might be better to keep the Harris catheter at this time as we diurese him to monitor input output and due to limited mobility   I discontinued IV fluid    Palliative care consult    Continue on sliding scale insulin    Medications reviewed.    folic acid 1 mg in sodium chloride 0.9 % 50 mL IVPB, 1 mg, Intravenous, Daily  insulin lispro, 2-9 Units, Subcutaneous, 4x Daily AC & at Bedtime  [START ON 5/9/2024] lactulose, 10 g, Oral, Daily  rifAXIMin, 550 mg, Oral, BID  sodium chloride, 10 mL, Intravenous, Q12H  thiamine (B-1) IV, 200 mg, Intravenous, Q8H   Followed by  [START ON 5/11/2024] thiamine, 100 mg, Oral, Daily    Ammonia level right now is 111 (148<--226)    Conditions and Status     Fair  Prognosis poor       MDM Data  External documents reviewed: reviewed epic record  Cardiac tracing (EKG, telemetry) interpretation: -  Radiology interpretation: reviewed multiple info as mentioned above  Labs reviewed: Y  Any tests that were considered but not ordered: none     Decision rules/scores evaluated (example [id number], Wells, etc):          Discussed with: Patient, Dr. Winchester taking care of this patient yesterday     Care Planning  Shared decision making: Patient unable to carry out discussion  Code status and discussions: Full code  Advance care planning    Disposition  Social Determinants  of Health that impact treatment or disposition: None identified at this time    I expect the patient to be discharged to  in ?    Electronically signed by Roderick Field MD, 05/08/24, 12:04 CDT.

## 2024-05-09 VITALS
OXYGEN SATURATION: 95 % | RESPIRATION RATE: 20 BRPM | TEMPERATURE: 98.2 F | WEIGHT: 315 LBS | HEART RATE: 84 BPM | SYSTOLIC BLOOD PRESSURE: 109 MMHG | DIASTOLIC BLOOD PRESSURE: 48 MMHG | BODY MASS INDEX: 39.17 KG/M2 | HEIGHT: 75 IN

## 2024-05-09 LAB
ALBUMIN SERPL-MCNC: 2.8 G/DL (ref 3.5–5.2)
ALBUMIN/GLOB SERPL: 0.8 G/DL
ALP SERPL-CCNC: 205 U/L (ref 39–117)
ALT SERPL W P-5'-P-CCNC: 80 U/L (ref 1–41)
AMMONIA BLD-SCNC: 83 UMOL/L (ref 16–60)
ANION GAP SERPL CALCULATED.3IONS-SCNC: 14 MMOL/L (ref 5–15)
AST SERPL-CCNC: 195 U/L (ref 1–40)
BASOPHILS # BLD AUTO: 0.08 10*3/MM3 (ref 0–0.2)
BASOPHILS NFR BLD AUTO: 0.8 % (ref 0–1.5)
BILIRUB SERPL-MCNC: 26.2 MG/DL (ref 0–1.2)
BUN SERPL-MCNC: 10 MG/DL (ref 6–20)
BUN/CREAT SERPL: 27 (ref 7–25)
CALCIUM SPEC-SCNC: 8.4 MG/DL (ref 8.6–10.5)
CHLORIDE SERPL-SCNC: 101 MMOL/L (ref 98–107)
CO2 SERPL-SCNC: 23 MMOL/L (ref 22–29)
CREAT SERPL-MCNC: 0.37 MG/DL (ref 0.76–1.27)
DEPRECATED RDW RBC AUTO: 71.5 FL (ref 37–54)
EGFRCR SERPLBLD CKD-EPI 2021: 145.7 ML/MIN/1.73
EOSINOPHIL # BLD AUTO: 0.14 10*3/MM3 (ref 0–0.4)
EOSINOPHIL NFR BLD AUTO: 1.3 % (ref 0.3–6.2)
ERYTHROCYTE [DISTWIDTH] IN BLOOD BY AUTOMATED COUNT: 18.4 % (ref 12.3–15.4)
GLOBULIN UR ELPH-MCNC: 3.4 GM/DL
GLUCOSE BLDC GLUCOMTR-MCNC: 106 MG/DL (ref 70–130)
GLUCOSE BLDC GLUCOMTR-MCNC: 85 MG/DL (ref 70–130)
GLUCOSE SERPL-MCNC: 116 MG/DL (ref 65–99)
HCT VFR BLD AUTO: 32.5 % (ref 37.5–51)
HGB BLD-MCNC: 10.6 G/DL (ref 13–17.7)
IMM GRANULOCYTES # BLD AUTO: 0.09 10*3/MM3 (ref 0–0.05)
IMM GRANULOCYTES NFR BLD AUTO: 0.9 % (ref 0–0.5)
INR PPP: 2.13 (ref 0.91–1.09)
LYMPHOCYTES # BLD AUTO: 1 10*3/MM3 (ref 0.7–3.1)
LYMPHOCYTES NFR BLD AUTO: 9.6 % (ref 19.6–45.3)
MCH RBC QN AUTO: 34.5 PG (ref 26.6–33)
MCHC RBC AUTO-ENTMCNC: 32.6 G/DL (ref 31.5–35.7)
MCV RBC AUTO: 105.9 FL (ref 79–97)
MONOCYTES # BLD AUTO: 0.88 10*3/MM3 (ref 0.1–0.9)
MONOCYTES NFR BLD AUTO: 8.4 % (ref 5–12)
NEUTROPHILS NFR BLD AUTO: 79 % (ref 42.7–76)
NEUTROPHILS NFR BLD AUTO: 8.28 10*3/MM3 (ref 1.7–7)
PLATELET # BLD AUTO: 220 10*3/MM3 (ref 140–450)
PMV BLD AUTO: 10.9 FL (ref 6–12)
POTASSIUM SERPL-SCNC: 3.6 MMOL/L (ref 3.5–5.2)
PROT SERPL-MCNC: 6.2 G/DL (ref 6–8.5)
PROTHROMBIN TIME: 24.7 SECONDS (ref 11.8–14.8)
QT INTERVAL: 420 MS
QTC INTERVAL: 522 MS
RBC # BLD AUTO: 3.07 10*6/MM3 (ref 4.14–5.8)
SODIUM SERPL-SCNC: 138 MMOL/L (ref 136–145)
WBC NRBC COR # BLD AUTO: 10.47 10*3/MM3 (ref 3.4–10.8)

## 2024-05-09 PROCEDURE — 85025 COMPLETE CBC W/AUTO DIFF WBC: CPT | Performed by: NURSE PRACTITIONER

## 2024-05-09 PROCEDURE — 97161 PT EVAL LOW COMPLEX 20 MIN: CPT

## 2024-05-09 PROCEDURE — 99253 IP/OBS CNSLTJ NEW/EST LOW 45: CPT | Performed by: INTERNAL MEDICINE

## 2024-05-09 PROCEDURE — 82140 ASSAY OF AMMONIA: CPT | Performed by: INTERNAL MEDICINE

## 2024-05-09 PROCEDURE — 99222 1ST HOSP IP/OBS MODERATE 55: CPT

## 2024-05-09 PROCEDURE — 82948 REAGENT STRIP/BLOOD GLUCOSE: CPT

## 2024-05-09 PROCEDURE — 25010000002 THIAMINE HCL 200 MG/2ML SOLUTION: Performed by: NURSE PRACTITIONER

## 2024-05-09 PROCEDURE — 80053 COMPREHEN METABOLIC PANEL: CPT | Performed by: NURSE PRACTITIONER

## 2024-05-09 PROCEDURE — 25010000002 FUROSEMIDE PER 20 MG: Performed by: INTERNAL MEDICINE

## 2024-05-09 PROCEDURE — 85610 PROTHROMBIN TIME: CPT | Performed by: NURSE PRACTITIONER

## 2024-05-09 RX ORDER — LANOLIN ALCOHOL/MO/W.PET/CERES
100 CREAM (GRAM) TOPICAL DAILY
Qty: 30 TABLET | Refills: 0 | Status: SHIPPED | OUTPATIENT
Start: 2024-05-11

## 2024-05-09 RX ORDER — LACTULOSE 20 G/30ML
10 SOLUTION ORAL DAILY
Qty: 450 ML | Refills: 0 | Status: SHIPPED | OUTPATIENT
Start: 2024-05-10

## 2024-05-09 RX ORDER — LANOLIN ALCOHOL/MO/W.PET/CERES
3 CREAM (GRAM) TOPICAL NIGHTLY PRN
Qty: 30 TABLET | Refills: 0 | Status: SHIPPED | OUTPATIENT
Start: 2024-05-09

## 2024-05-09 RX ADMIN — THIAMINE HYDROCHLORIDE 200 MG: 100 INJECTION, SOLUTION INTRAMUSCULAR; INTRAVENOUS at 09:43

## 2024-05-09 RX ADMIN — THIAMINE HYDROCHLORIDE 200 MG: 100 INJECTION, SOLUTION INTRAMUSCULAR; INTRAVENOUS at 15:48

## 2024-05-09 RX ADMIN — Medication 10 ML: at 09:43

## 2024-05-09 RX ADMIN — PENTOXIFYLLINE 400 MG: 400 TABLET, EXTENDED RELEASE ORAL at 11:16

## 2024-05-09 RX ADMIN — FOLIC ACID 1 MG: 5 INJECTION, SOLUTION INTRAMUSCULAR; INTRAVENOUS; SUBCUTANEOUS at 09:43

## 2024-05-09 RX ADMIN — LACTULOSE 10 G: 20 SOLUTION ORAL at 08:29

## 2024-05-09 RX ADMIN — FUROSEMIDE 40 MG: 10 INJECTION, SOLUTION INTRAVENOUS at 09:43

## 2024-05-09 RX ADMIN — RIFAXIMIN 550 MG: 550 TABLET ORAL at 08:29

## 2024-05-09 RX ADMIN — IBUPROFEN 400 MG: 400 TABLET, FILM COATED ORAL at 11:15

## 2024-05-09 RX ADMIN — PENTOXIFYLLINE 400 MG: 400 TABLET, EXTENDED RELEASE ORAL at 08:28

## 2024-05-09 RX ADMIN — LORAZEPAM 2 MG: 1 TABLET ORAL at 08:29

## 2024-05-09 NOTE — DISCHARGE SUMMARY
[facility] Medicine Services  DISCHARGE SUMMARY       Date of Admission: 5/5/2024  Date of Discharge:  5/9/2024  Primary Care Physician: Jossy Christiansen APRN    Presenting Problem/History of Present Illness:  Multiple falls and altered mental status    Final Discharge Diagnoses:  Alcoholic intoxication with encephalopathy possibly compounded by metabolic issues such as hyponatremia and hyperammonemia  Lactic acidosis-resolved  Jaundice related to alcoholic hepatitis  Hyperammonemia  Electrolyte disturbance (hyponatremia-resolved); hypophosphatemia   Alcoholic hepatitis the patient with steatosis   alcoholism-recurrent hospitalization related to this.  Fatty liver/diagnosis-approximately 20% of patients will progress to cirrhosis as per up-to-date literature reviewed  Diabetes mellitus type 2 insulin treated.  A1c as of April 22 was 10.4 but 2 months earlier was 11.2%  Questionable medical compliance.  Macrocytosis with anemia in the setting of alcohol liver disease  Fluid retention in the setting of hypoalbuminemia      Consults:   GI service  Palliative care  Procedures Performed:   None    Pertinent Test Results:   Results for orders placed during the hospital encounter of 12/23/22    Adult Transthoracic Echo Complete W/ Cont if Necessary Per Protocol    Interpretation Summary    Left ventricular ejection fraction appears to be 56 - 60%.    Left ventricular diastolic function was normal.    Normal right ventricular cavity size and systolic function noted.    There is no significant (greater than mild) valvular dysfunction.    Estimated right ventricular systolic pressure from tricuspid regurgitation is normal (<35 mmHg).      Imaging Results (All)       Procedure Component Value Units Date/Time    CT Angiogram Chest [[id number]] Collected: 05/06/24 0650     Updated: 05/06/24 0839    Narrative:      EXAMINATION: CT ANGIOGRAM CHEST-      5/5/2024 8:40 PM     HISTORY:  shortness of breath; syncope     In order to have a CT radiation dose as low as reasonably achievable  Automated Exposure Control was utilized for adjustment of the mA and/or  KV according to patient size.     Total DLP = 2483.25 mGy.cm     CT angiography of the chest tube performed after intravenous contrast  enhancement.     Images are acquired in axial plane and subsequent 2D reconstruction  coronal and sagittal planes and 3D maximum intensity projection  reconstruction.     The comparison is made with the previous study dated 2/17/2024.     There is suboptimal opacification of the pulmonary arteries and branches  due to delayed image acquisition and suboptimal positioning.     No filling defect in the central pulmonary arteries to suggest a  pulmonary embolism. There is suboptimal opacification of segmental and  subsegmental branches of pulmonary arteries. No filling defect in the  visualized/opacified pulmonary arterial bed.     RV/LV ratio is 46/58 which is normal. No finding to suggest right heart  strain.     Normal thoracic aorta.     Atheromatous changes of the coronary arteries are seen, more pronounced  in the distal left coronary artery and the proximal LAD branch of the  left coronary artery.     There is moderate cardiomegaly.     There is no evidence of mediastinal or hilar mass or lymphadenopathy.     Limited visualized soft tissues of the neck are unremarkable.     There are few nonspecific fatty infiltrated axillary lymph nodes.     There is evidence of mediastinal lipomatosis which may be due to  patient's generalized body habitus.     The lungs are poorly expanded.     There are atelectatic changes bilaterally.     The expiratory phase central airway is patent.     The abdomen is separately reviewed and reported.     Images reviewed in bone window show no acute bony abnormality.       Impression:      1. No evidence of pulmonary embolism. No aortic aneurysm or dissection.  2. Atheromatous change  of coronary arteries, predominantly the left  coronary artery and branches.  3. Moderate cardiomegaly.  4. The lungs are poorly expanded but unremarkable.     The above study was initially reviewed and reported by [employer]. I do not  find any discrepancies.                       This report was signed and finalized on 5/6/2024 8:35 AM by Dr. Vj Hsu MD.       CT Abdomen Pelvis With Contrast [[id number]] Collected: 05/06/24 0651     Updated: 05/06/24 0742    Narrative:      EXAMINATION: CT ABDOMEN PELVIS W CONTRAST-      5/5/2024 8:40 PM     HISTORY: abdominal pain     In order to have a CT radiation dose as low as reasonably achievable  Automated Exposure Control was utilized for adjustment of the mA and/or  KV according to patient size.     Total DLP = 2483.25 mGy.cm     The CT scan of the abdomen and pelvis should performed after intravenous  contrast enhancement.     Images are acquired in axial plane and subsequent reconstruction in  coronal and sagittal planes.     The comparison is made with the previous study dated 4/22/2024.     The CT scan of the abdomen and pelvis should performed after intravenous  contrast enhancement.     The images are acquired in axial plane and subsequent reconstruction in  coronal and sagittal planes.     Comparison is made with the previous study dated 4/22/2024.     The lung bases included in the study are unremarkable except for  significant respiratory motion artifacts. The chest is separately  reviewed and reported.     There is fatty infiltration of the liver with moderate hepatomegaly.  There is moderate splenomegaly. The spleen measures 16 cm x 17.6 x 8.5  cm.     Moderately distended gallbladder is seen with several small radiopaque  gallstones similar to the previous study. No finding to suggest  cholecystitis.     The pancreas is normal.     The adrenal glands bilaterally are normal.     The kidneys bilaterally are normal. No mass. No calculi. No  hydronephrosis.  Limited visualized ureters are unremarkable. The urinary  bladder is moderately distended. No intrinsic abnormality.     The stomach and duodenum are normal. Small bowel is nondistended. No  finding to suggest appendicitis. Small amount of stool and gas is seen  in the colon. No finding to suggest obstruction.     Normal abdominal aorta. Atheromatous changes of the common iliac  arteries bilaterally.     There is no evidence of abdominal or pelvic lymphadenopathy. Nonspecific  borderline prominent inguinal lymph nodes are seen which appear  moderately more pronounced since the previous study. A left inguinal  lymph node, image #104 in series 6 and image #35 and series 7, measures  14 mm in short axis. It measures 1 cm in the previous study.     Images reviewed in bone windows show chronic degenerative changes of the  lumbar spine with multilevel severe degenerative disc changes. This is  similar to the previous study.       Impression:      1. No acute abnormality of the abdomen or pelvis to account for  patient's symptoms.  2. Cholelithiasis. No finding to suggest cholecystitis.  3. Fatty infiltration of the liver. Hepatosplenomegaly. No significant  change.  4. Nonspecific inguinal lymphadenopathy which appear moderately more  pronounced than the previous study. Etiology and clinical significance  is not certain.     The above study was initially reviewed and reported by [employer]. I do not  find any critical discrepancies.                                      This report was signed and finalized on 5/6/2024 7:39 AM by Dr. Vj Hsu MD.       CT Head Without Contrast [[id number]] Collected: 05/06/24 0623     Updated: 05/06/24 0630    Narrative:      EXAMINATION: CT HEAD WO CONTRAST-      5/5/2024 8:40 PM     HISTORY: altered mental status; fall     In order to have a CT radiation dose as low as reasonably achievable  Automated Exposure Control was utilized for adjustment of the mA and/or  KV according to  patient size.     Total DLP = 3677.43 mGy.cm     The CT scan of the head is performed without intravenous contrast  enhancement.     The images are acquired in axial plane and subsequent reconstruction in  coronal and sagittal planes.     There is no previous similar study for comparison.     There is significant motion artifacts which may obscure a subtle  abnormality or lesion.     There is no evidence of a mass. There is no midline shift.     The ventricles, the basal cisterns and the cortical sulci are  unremarkable and age appropriate.     Due to significant motion artifacts the gray-white matter  differentiation is not well appreciated. A subtle abnormality may be  obscured.     There is no evidence of an intracranial hemorrhage or hematoma.     Posterior fossa structures appear unremarkable.     Images reviewed in bone window show no evidence of an acute skull  fracture. However a subtle nondisplaced fracture may be obscured due to  motion artifacts. Mucosal thickening involving the ethmoid and maxillary  sinuses are seen. The mastoid air cells are clear.       Impression:      1. Limited study due to significant motion artifacts. A subtle lesion  may be obscured. No significant acute intracranial abnormality.  2. If patient's symptoms persist a follow-up examination or MR imaging  of the brain may be obtained when the patient is able to cooperate.     The above study was initially reviewed and reported by [employer]. I do not  find any discrepancies.                                      This report was signed and finalized on 5/6/2024 6:27 AM by Dr. Vj Hsu MD.       XR Chest 1 View [[id number]] Collected: 05/05/24 1900     Updated: 05/05/24 1904    Narrative:      EXAM: XR CHEST 1 VW- 5/5/2024 5:56 PM     HISTORY: shortness of breath       COMPARISON: 4/22/2024.     TECHNIQUE: Single frontal radiograph of the chest was obtained.     FINDINGS:     Support Devices: None.     Cardiac and Mediastinal  Silhouettes: Normal.     Lungs/Pleura: Lung volumes are low with mild perihilar and bibasilar  parenchymal opacities. No focal lung consolidation. No sizable pleural  effusion. No visible pneumothorax.     Osseous structures: No acute osseous finding.     Other: None.       Impression:         Low lung volumes with mild perihilar bibasilar parenchymal opacities,  likely atelectasis. No focal consolidation to suggest pneumonia           This report was signed and finalized on 5/5/2024 7:00 PM by Darwin Goldman.             LAB RESULTS:      Lab 05/09/24  1050 05/08/24  0444 05/07/24  1150 05/07/24  0311 05/06/24  1815 05/06/24  1233 05/06/24  0638 05/06/24  0227 05/05/24  2323 05/05/24  1852   WBC 10.47 7.68 7.69 7.79  --   --   --  10.10  --  10.75   HEMOGLOBIN 10.6* 10.1* 10.4* 10.9*  --   --   --  11.5*  --  11.4*   HEMATOCRIT 32.5* 31.3* 31.1* 32.1*  --   --   --  32.1*  --  32.4*   PLATELETS 220 216 258 275  --   --   --  307  --  349   NEUTROS ABS 8.28* 5.88 5.96  --   --   --   --  8.89*  --  8.93*   IMMATURE GRANS (ABS) 0.09* 0.06* 0.06*  --   --   --   --   --   --  0.30*   LYMPHS ABS 1.00 0.85 0.73  --   --   --   --   --   --  0.46*   MONOS ABS 0.88 0.69 0.77  --   --   --   --   --   --  0.97*   EOS ABS 0.14 0.14 0.09  --   --   --   --  0.10  --  0.02   .9* 105.7* 104.0* 101.9*  --   --   --  97.6*  --  100.3*   PROCALCITONIN  --   --   --   --   --   --   --   --   --  0.45*   LACTATE  --   --   --   --  2.2* 2.2* 3.5* 3.6* 6.2* 11.6*   PROTIME 24.7* 25.0* 23.2*  --   --   --   --   --   --  22.4*   APTT  --   --   --   --   --   --   --   --   --  56.5*         Lab 05/09/24  1050 05/08/24  0444 05/07/24  1150 05/07/24  0311 05/06/24  1233 05/06/24  0638 05/06/24  0416 05/06/24  0227 05/05/24  2323 05/05/24  1903 05/05/24  1852   0000   SODIUM 138 136  --  135*  --   --   --  123*  --   --  120*  --    SODIUM, ARTERIAL  --   --   --   --   --   --   --   --   --  123*  --   --    POTASSIUM 3.6  3.7 3.7 3.6 2.9*  --   --  3.4*  --   --  2.6*  --    CHLORIDE 101 102  --  101  --   --   --  89*  --   --  84*  --    CO2 23.0 25.0  --  24.0  --   --   --  18.0*  --   --  12.0*  --    ANION GAP 14.0 9.0  --  10.0  --   --   --  16.0*  --   --  24.0*  --    BUN 10 8  --  8  --   --   --  10  --   --  11  --    CREATININE 0.37* 0.23*  --  0.40*  --   --   --  0.50*  --   --  0.86  --    EGFR 145.7 168.2  --  142.3  --   --   --  133.1  --   --  113.0  --    GLUCOSE 116* 107*  --  154*  --   --   --  173*  --   --  233*  --    CALCIUM 8.4* 8.4*  --  9.0  --   --   --  8.8  --   --  8.3*  --    IONIZED CALCIUM  --   --   --   --   --   --  4.19*  --   --  4.19*  --    < >   MAGNESIUM  --   --   --   --   --   --   --  2.7*  --   --  1.8  --    PHOSPHORUS  --  2.4* 2.0* 1.4* 1.8* 2.5  --   --    < >  --  1.6*  --     < > = values in this interval not displayed.         Lab 05/09/24  1050 05/08/24  0444 05/07/24  0311 05/06/24  0227 05/05/24  1852   TOTAL PROTEIN 6.2 5.8* 6.4 7.0 6.8   ALBUMIN 2.8* 2.7* 2.9* 3.1* 3.1*   GLOBULIN 3.4 3.1 3.5 3.9 3.7   ALT (SGPT) 80* 77* 91* 98* 94*   AST (SGOT) 195* 181* 222* 210* 195*   BILIRUBIN 26.2* 22.3* 20.3* 17.7* 18.2*   ALK PHOS 205* 210* 233* 255* 254*   LIPASE  --   --   --   --  20         Lab 05/09/24  1050 05/08/24  0444 05/07/24  1150 05/05/24  2102 05/05/24  1852   PROBNP  --   --   --   --  306.6   HSTROP T  --   --   --  67* 68*   PROTIME 24.7* 25.0* 23.2*  --  22.4*   INR 2.13* 2.17* 1.97*  --  1.88*                 Lab 05/05/24  1903   PH, ARTERIAL 7.370   PCO2, ARTERIAL 26.7*   PO2 ART 66.6*   O2 SATURATION ART 93.1*   HCO3 ART 15.4*   BASE EXCESS ART -8.5*   CARBOXYHEMOGLOBIN 1.9     Brief Urine Lab Results  (Last result in the past 365 days)        Color   Clarity   Blood   Leuk Est   Nitrite   Protein   CREAT   Urine HCG        04/22/24 0020 Yellow   Clear   Trace   Negative   Negative   Negative                 Microbiology Results (last 10 days)        Procedure Component Value - Date/Time    Blood Culture - Blood, Arm, Left [[id number]]  (Normal) Collected: 05/05/24 2213    Lab Status: Preliminary result Specimen: Blood from Arm, Left Updated: 05/08/24 2230     Blood Culture No growth at 3 days    Blood Culture - Blood, Arm, Right [[id number]]  (Normal) Collected: 05/05/24 2213    Lab Status: Preliminary result Specimen: Blood from Arm, Right Updated: 05/08/24 2230     Blood Culture No growth at 3 days    COVID-19 and FLU A/B PCR, 1 HR TAT - Swab, Nasopharynx [[id number]]  (Normal) Collected: 05/05/24 1908    Lab Status: Final result Specimen: Swab from Nasopharynx Updated: 05/05/24 1939     COVID19 Not Detected     Influenza A PCR Not Detected     Influenza B PCR Not Detected    Narrative:      Fact sheet for providers: https://www.fda.gov/media/391406/download    Fact sheet for patients: https://www.fda.gov/media/233513/download    Test performed by PCR.            Hospital Course:   I spoke to Dr. White this morning before he had seen the patient physically.  I directly asked him if he has anything else to offer or if transferring him to a tertiary facility can make a difference.  He emphasized to me that patient's main problem is continued alcohol use.  He is not a candidate for transplant.   He is not a good candidate for steroid use.  He is at increased risk for hyperglycemia/DKA similar to prior hospital admission in April.  Patient has poor prognosis with high discriminant factor as well as MELD score.    In our discussion, I perceived that the only thing holding him from being discharge is social condition.    I went to see him with social service Buffy.  Patient's intention is to go to alcohol rehab facility.  He told me  before he does this, he has few things he has to address outside the hospital.  Social service Buffy printed once again resources for his perusal.    Patient told me that he will go to his mom's place upon discharge prior to going to  "alcohol rehab facility.    Currently he is not exhibiting any signs of withdrawal.  He is deeply jaundiced similar to yesterday.  He had 5 bowel movements already today.  His potassium remains stable, bicarb 23, glucose 116    I resumed pentoxifylline yesterday from his home meds.  Dr. White and I discussed about it.  No plans on steroid.    Despite an elevated ammonia level (111) which is significantly improved compared to admission of 226, patient is able to communicate appropriately and make sound plan during our conversation.  He was able to follow commands.  He was able to get out of bed and stand.  I will have PT to evaluate to make further recommendation if needed for durable medical equipment to make the discharge as safe as possible.    Patient counseled on alcohol cessation.  All questions were answered to the best of my abilities  Appreciate input from social service Buffy who was present with me at bedside  Appreciate discussion with Dr. White who had seen the patient in consult.  Patient plans to go to his mother's house upon discharge.  I am concerned on the effects of sedative given his liver disease.    Addendum: Patient was assessed by therapist.  He walked 120 feet with decreased gait speed, decreased step length, increased B OS and increased lateral weight shifting.  Home health service was offered but declined per discussion with nurse Webster.        Physical Exam on Discharge:  /48 (BP Location: Left arm, Patient Position: Lying) Comment: Nurse notified  Pulse 84   Temp 98.2 °F (36.8 °C) (Oral)   Resp 20   Ht 190.5 cm (75\")   Wt (!) 146 kg (321 lb)   SpO2 95%   BMI 40.12 kg/m²   Physical Exam  Awake alert and oriented x 3  Able to communicate very well.  Exhibits adequate understanding of condition and recommendation.  No distress  Able to sit at the side of bed.  He was able to stand and subsequently work with therapist walking 120 feet in the hallway.  Noted also patient has " edema  Obese protuberant abdomen  Grossly nonfocal exam         Condition on Discharge: Fair    Discharge Disposition:  Home or Self Care    Discharge Medications:     Discharge Medications        New Medications        Instructions Start Date   lactulose 20 GM/30ML solution solution   10 g, Oral, Daily   Start Date: May 10, 2024     melatonin 3 MG tablet   3 mg, Oral, Nightly PRN      riFAXIMin 550 MG tablet  Commonly known as: XIFAXAN   550 mg, Oral, 2 Times Daily             Changes to Medications        Instructions Start Date   thiamine 100 MG tablet  Commonly known as: VITAMIN B1  What changed: Another medication with the same name was added. Make sure you understand how and when to take each.   100 mg, Oral, Daily      thiamine 100 MG tablet  Commonly known as: VITAMIN B1  What changed: You were already taking a medication with the same name, and this prescription was added. Make sure you understand how and when to take each.   100 mg, Oral, Daily   Start Date: May 11, 2024            Continue These Medications        Instructions Start Date   albuterol sulfate  (90 Base) MCG/ACT inhaler  Commonly known as: PROVENTIL HFA;VENTOLIN HFA;PROAIR HFA   2 puffs, Inhalation, Every 4 Hours PRN      Dexcom G6 Sensor   Does not apply, Every 10 Days      folic acid 1 MG tablet  Commonly known as: FOLVITE   1 mg, Oral, Daily      insulin glargine 100 UNIT/ML injection  Commonly known as: Lantus   5 Units, Subcutaneous, Nightly      pantoprazole 40 MG EC tablet  Commonly known as: PROTONIX   40 mg, Oral, Daily      pentoxifylline 400 MG CR tablet  Commonly known as: TRENtal   400 mg, Oral, 3 Times Daily With Meals      QUEtiapine 50 MG tablet  Commonly known as: SEROquel   50 mg, Oral, Nightly               This patient has current or prior documentation of an left ventricular ejection fraction (LVEF) of less than or equal to 40%.-Not applicable    Results for orders placed during the hospital encounter of  12/23/22    Adult Transthoracic Echo Complete W/ Cont if Necessary Per Protocol    Interpretation Summary    Left ventricular ejection fraction appears to be 56 - 60%.    Left ventricular diastolic function was normal.    Normal right ventricular cavity size and systolic function noted.    There is no significant (greater than mild) valvular dysfunction.    Estimated right ventricular systolic pressure from tricuspid regurgitation is normal (<35 mmHg).          Discharge Diet:   Diet Instructions       Diet: Diabetic Diets; Consistent Carbohydrate; Thin (IDDSI 0)      Discharge Diet: Diabetic Diets    Diabetic Diet: Consistent Carbohydrate    Fluid Consistency: Thin (IDDSI 0)            Activity at Discharge:   Activity Instructions       Gradually Increase Activity Until at Pre-Hospitalization Level              Follow-up Appointments:   Primary care provider within 1 week  GI service per the recommendation  Future Appointments   Date Time Provider Department Center   5/20/2024  1:00 PM Jossy Christiansen APRN [facility]       Test Results Pending at Discharge: None    Electronically signed by Roderick Field MD, 05/09/24, 17:15 CDT.    Time: Greater than 45 minutes.

## 2024-05-09 NOTE — THERAPY EVALUATION
Patient Name: Luciano Christiansen  : 1985    MRN: [id number]                              Today's Date: 2024       Admit Date: 2024    Visit Dx:     ICD-10-CM ICD-9-CM   1. Alcoholic encephalopathy  G31.2 291.2   2. Alcoholic cirrhosis of liver with ascites  K70.31 571.2   3. Hypokalemia  E87.6 276.8   4. Hyponatremia  E87.1 276.1   5. Impaired functional mobility and activity tolerance [Z74.09]  Z74.09 V49.89     Patient Active Problem List   Diagnosis    Wellness examination    Encounter for hepatitis C screening test for low risk patient    Primary hypertension    Class 1 obesity due to excess calories without serious comorbidity with body mass index (BMI) of 34.0 to 34.9 in adult    Fatty liver    Hyponatremia    Hypokalemia    Hypomagnesemia    Elevated lactic acid level    Alkalosis    Alcoholism    Transaminitis    Hypophosphatemia    Insomnia disorder related to known organic factor    Community acquired bilateral lower lobe pneumonia    Pneumonia due to Streptococcus    GI bleed    Diabetic ketoacidosis associated with type 2 diabetes mellitus    Alcohol intoxication in active alcoholic    Calculus of gallbladder without cholecystitis without obstruction    Alcoholic encephalopathy    Type 2 diabetes mellitus     Past Medical History:   Diagnosis Date    Diabetes mellitus     Gout     Hypertension      Past Surgical History:   Procedure Laterality Date    VASECTOMY        General Information       Row Name 24 1405          Physical Therapy Time and Intention    Document Type evaluation;other (see comments)  see MAR  -JE     Mode of Treatment physical therapy  -JE       Row Name 24 1405          General Information    Patient Profile Reviewed yes  -JE     Prior Level of Function independent:;all household mobility;community mobility;ADL's;home management;driving;shopping;work  -JE     Existing Precautions/Restrictions fall  -JE     Barriers to Rehab medically complex;impaired sensation   -       Row Name 05/09/24 1405          Living Environment    People in Home child(kathryn), dependent;other (see comments)  dtr is 16 yrs old; reports they will be staying w/ his mother at discharge  -ZAC     Name(s) of People in Home dtr- Jossy; mother Ghazala  -       Row Name 05/09/24 1405          Home Main Entrance    Number of Stairs, Main Entrance three  -     Stair Railings, Main Entrance railing on right side (ascending)  -       Row Name 05/09/24 1405          Stairs Within Home, Primary    Number of Stairs, Within Home, Primary none  -       Row Name 05/09/24 1405          Cognition    Orientation Status (Cognition) oriented to;person;place;situation;verbal cues/prompts needed for orientation;time  initially stated it was March  -       Row Name 05/09/24 1405          Safety Issues, Functional Mobility    Safety Issues Affecting Function (Mobility) safety precaution awareness  -ZAC     Impairments Affecting Function (Mobility) balance;endurance/activity tolerance;strength;sensation/sensory awareness  -               User Key  (r) = Recorded By, (t) = Taken By, (c) = Cosigned By      Initials Name Provider Type    Ruthy Payne, PT Physical Therapist                   Mobility       Row Name 05/09/24 1405          Bed Mobility    Bed Mobility supine-sit  -     Supine-Sit Breeding (Bed Mobility) independent  -     Assistive Device (Bed Mobility) head of bed elevated  -       Row Name 05/09/24 1405          Sit-Stand Transfer    Sit-Stand Breeding (Transfers) contact guard;verbal cues  -ZAC     Comment, (Sit-Stand Transfer) increase effort to complete sit to stand  -[facility] Name 05/09/24 1405          Gait/Stairs (Locomotion)    Breeding Level (Gait) contact guard;verbal cues  -     Distance in Feet (Gait) 120  -ZAC     Deviations/Abnormal Patterns (Gait) gait speed decreased;base of support, wide;stride length decreased  -ZAC     Comment, (Gait/Stairs) increase lateral wt  shifting  -               User Key  (r) = Recorded By, (t) = Taken By, (c) = Cosigned By      Initials Name Provider Type    Ruthy Payne, PT Physical Therapist                   Obj/Interventions       Row Name 05/09/24 1405          Range of Motion Comprehensive    Comment, General Range of Motion AROM all 4 extremities WFLs  -       Row Name 05/09/24 1405          Strength Comprehensive (MMT)    Comment, General Manual Muscle Testing (MMT) Assessment grossly 4/5 except B hip flexors 4-/5  -       Row Name 05/09/24 1405          Motor Skills    Motor Skills other (see comments)  intact finger opposition and FTN B  -       Row Name 05/09/24 1405          Balance    Balance Assessment sitting static balance;sitting dynamic balance;sit to stand dynamic balance;standing static balance;standing dynamic balance  -     Static Sitting Balance independent  -     Dynamic Sitting Balance independent  -     Position, Sitting Balance unsupported;sitting edge of bed  -     Sit to Stand Dynamic Balance contact guard;verbal cues  -     Static Standing Balance contact guard  -     Dynamic Standing Balance contact guard  -     Position/Device Used, Standing Balance unsupported  -       Row Name 05/09/24 1405          Sensory Assessment (Somatosensory)    Sensory Assessment (Somatosensory) other (see comments)  reports his feet feel like they fell asleep, reports this is due to his diabetes  -               User Key  (r) = Recorded By, (t) = Taken By, (c) = Cosigned By      Initials Name Provider Type    Ruthy Payne, PT Physical Therapist                   Goals/Plan       Row Name 05/09/24 1405          Bed Mobility Goal 1 (PT)    Activity/Assistive Device (Bed Mobility Goal 1, PT) bed mobility activities, all  -     [place] Level/Cues Needed (Bed Mobility Goal 1, PT) independent  -     Time Frame (Bed Mobility Goal 1, PT) long term goal (LTG);10 days  -     Progress/Outcomes (Bed  Mobility Goal 1, PT) goal ongoing  -[employer]       Row Name 05/09/24 1405          Transfer Goal 1 (PT)    Activity/Assistive Device (Transfer Goal 1, PT) sit-to-stand/stand-to-sit;bed-to-chair/chair-to-bed  -JE     [place] Level/Cues Needed (Transfer Goal 1, PT) standby assist;independent  -JE     Time Frame (Transfer Goal 1, PT) long term goal (LTG);10 days  -JE     Progress/Outcome (Transfer Goal 1, PT) goal ongoing  -[employer]       Row Name 05/09/24 1405          Gait Training Goal 1 (PT)    Activity/Assistive Device (Gait Training Goal 1, PT) gait (walking locomotion);decrease fall risk;improve balance and speed;increase endurance/gait distance;increase energy conservation  -JE     [place] Level (Gait Training Goal 1, PT) standby assist;independent  -JE     Distance (Gait Training Goal 1, PT) 150-200 ft  -JE     Time Frame (Gait Training Goal 1, PT) long term goal (LTG);10 days  -       Row Name 05/09/24 1405          Stairs Goal 1 (PT)    Activity/Assistive Device (Stairs Goal 1, PT) ascending stairs;descending stairs;using handrail, right;step-to-step;decrease fall risk;improve balance and speed  -     [place] Level/Cues Needed (Stairs Goal 1, PT) contact guard required;standby assist  -     Number of Stairs (Stairs Goal 1, PT) 3  -JE     Time Frame (Stairs Goal 1, PT) long term goal (LTG);10 days  -JE     Progress/Outcome (Stairs Goal 1, PT) goal ongoing  -[employer]       Row Name 05/09/24 1405          Patient Education Goal (PT)    Activity (Patient Education Goal, PT) HEP for LE strengthening, energy conservation techniques, breathing and postural ex  -JE     [place]/Cues/Accuracy (Memory Goal 2, PT) demonstrates adequately;independent;verbalizes understanding  -JE     Time Frame (Patient Education Goal, PT) long term goal (LTG);10 days  -JE     Progress/Outcome (Patient Education Goal, PT) goal ongoing  -[employer]       Row Name 05/09/24 1405          Therapy Assessment/Plan (PT)    Planned Therapy  Interventions (PT) balance training;bed mobility training;gait training;home exercise program;postural re-education;patient/family education;stair training;strengthening;transfer training;other (see comments)  safety/falls prevention, energy conservation techniques, breathing ex  -               User Key  (r) = Recorded By, (t) = Taken By, (c) = Cosigned By      Initials Name Provider Type    Ruthy Payne, PT Physical Therapist                   Clinical Impression       Row Name 05/09/24 0957          Pain    Pretreatment Pain Rating 0/10 - no pain  -     Posttreatment Pain Rating 0/10 - no pain  -     Pre/Posttreatment Pain Comment reports his head hurt a little earlier  -       Row Name 05/09/24 6526          Plan of Care Review    Plan of Care Reviewed With patient  -     Progress improving  -     Outcome Evaluation PT eval completed.  Pt w/ flat affect, however pleasant and agreeable to therapy.  Oriented X 4, however required cues for month, initially stating it was March.  Pt w/ generalized weakness and decrease activity tolerance.  Performed bed mobility of supine to sit I, tfers sit to/from stand w/ CGA w/ increase effort to complete.  Required education to increase safety awareness and reduce fall risk.  Pt performed gait ~ 120 ft w/ decrease gait speed, decrease step length, increase SADIA and increase lateral wt shifting.  Pt will benefit from continued PT services to improve activity tolerance, balance, safety awareness, and to safely progress to I mobility.  Will follow for progress and needs.  Discussed HH services w/ pt denying needs.  -       Row Name 05/09/24 9714          Therapy Assessment/Plan (PT)    Patient/Family Therapy Goals Statement (PT) return to prior level of function  -     Rehab Potential (PT) good, to achieve stated therapy goals  -     Criteria for Skilled Interventions Met (PT) yes;meets criteria;skilled treatment is necessary  -     Therapy Frequency (PT)  2 times/day  -     Predicted Duration of Therapy Intervention (PT) until discharge or goals achieved  -       Row Name 05/09/24 1405          Vital Signs    Pretreatment Heart Rate (beats/min) 82  -JE     Intratreatment Heart Rate (beats/min) 90  -JE     Posttreatment Heart Rate (beats/min) 80  -JE     O2 Delivery Pre Treatment room air  -JE     O2 Delivery Intra Treatment room air  -JE     O2 Delivery Post Treatment room air  -JE     Pre Patient Position Side Lying  -JE     Intra Patient Position Standing  -JE     Post Patient Position Sitting  -JE       Row Name 05/09/24 1405          Positioning and Restraints    Pre-Treatment Position in bed  -JE     Post Treatment Position bathroom  -     Bathroom notified nsg;call light within reach  on toilet  -               User Key  (r) = Recorded By, (t) = Taken By, (c) = Cosigned By      Initials Name Provider Type    Ruthy Payne, PT Physical Therapist                   Outcome Measures       Row Name 05/09/24 1405 05/09/24 0828       How much help from another person do you currently need...    Turning from your back to your side while in flat bed without using bedrails? 3  -JE 4  -MS    Moving from lying on back to sitting on the side of a flat bed without bedrails? 3  -JE 3  -MS    Moving to and from a bed to a chair (including a wheelchair)? 3  -JE 3  -MS    Standing up from a chair using your arms (e.g., wheelchair, bedside chair)? 3  -JE 3  -MS    Climbing 3-5 steps with a railing? 3  -JE 2  -MS    To walk in hospital room? 3  -JE 3  -MS    AM-PAC 6 Clicks Score (PT) 18  -JE 18  -MS    Highest Level of Mobility Goal 6 --> Walk 10 steps or more  - 6 --> Walk 10 steps or more  -MS      Row Name 05/09/24 1405          Functional Assessment    Outcome Measure Options AM-PAC 6 Clicks Basic Mobility (PT)  -               User Key  (r) = Recorded By, (t) = Taken By, (c) = Cosigned By      Initials Name Provider Type    Ruthy Payne, PT Physical  Therapist    Beronica Souza, RN Registered Nurse                                   PT Recommendation and Plan  Planned Therapy Interventions (PT): balance training, bed mobility training, gait training, home exercise program, postural re-education, patient/family education, stair training, strengthening, transfer training, other (see comments) (safety/falls prevention, energy conservation techniques, breathing ex)  Plan of Care Reviewed With: patient  Progress: improving  Outcome Evaluation: PT eval completed.  Pt w/ flat affect, however pleasant and agreeable to therapy.  Oriented X 4, however required cues for month, initially stating it was March.  Pt w/ generalized weakness and decrease activity tolerance.  Performed bed mobility of supine to sit I, tfers sit to/from stand w/ CGA w/ increase effort to complete.  Required education to increase safety awareness and reduce fall risk.  Pt performed gait ~ 120 ft w/ decrease gait speed, decrease step length, increase SADIA and increase lateral wt shifting.  Pt will benefit from continued PT services to improve activity tolerance, balance, safety awareness, and to safely progress to I mobility.  Will follow for progress and needs.  Discussed HH services w/ pt denying needs.     Time Calculation:         PT Charges       Row Name 05/09/24 1456             Time Calculation    Start Time 1405  -      Stop Time 1444  -      Time Calculation (min) 39 min  -      PT Received On 05/09/24  -      PT Goal Re-Cert Due Date 05/19/24  -                User Key  (r) = Recorded By, (t) = Taken By, (c) = Cosigned By      Initials Name Provider Type    Ruthy Payne, PT Physical Therapist                  Therapy Charges for Today       Code Description Service Date Service Provider Modifiers Qty    [id number] HC PT EVAL LOW COMPLEXITY 3 5/9/2024 Ruthy Zaragoza, PT GP 1            PT G-Codes  Outcome Measure Options: AM-PAC 6 Clicks Basic Mobility (PT)  AM-PAC 6  Clicks Score (PT): 18  PT Discharge Summary  Anticipated Discharge Disposition (PT): home with assist    Ruthy Zaragoza, PT  5/9/2024

## 2024-05-09 NOTE — CONSULTS
[facility]  Initial Consult    Attending Physician: Roderick Field*  Referring Provider: Roderick Field MD     Patient Name: Luciano Christiansen  Date of Admission: 5/5/2024  Today's Date: 05/09/24     Reason for Referral: Goals of Care/Advance Care Planning    Code Status and Medical Interventions:   Ordered at: 05/06/24 0011     Code Status (Patient has no pulse and is not breathing):    CPR (Attempt to Resuscitate)     Medical Interventions (Patient has pulse or is breathing):    Full Support        Subjective     HPI: 39 y.o. male with past medical history including alcohol abuse, diabetes mellitus, gout and hypertension.  According to chart review he has been hospitalized multiple times in the last 3 months.  He was hospitalized from 2/17/2024-2/20/2024 due to hypertension and URI symptoms.  It is noted he was admitted for impending DTs due to alcohol intoxication and pneumonia.  He was placed on CIWA protocol and treated with antibiotics and was discharged home.  He was hospitalized again from 4/21/2024-4/25/2024 due to GI bleed.  It is noted his hemoglobin was stable however was found to have significant DKA.  He was evaluated by GI during hospitalization and recommended EGD once overall status improved.  It is noted he was discharged home with orders to follow-up with PCP and outpatient alcohol rehab.  Patient presented to [facility] on 5/5/2024 related to fall.  According to ED note he reported feeling lightheaded and sustained mechanical fall.  Work-up in ED revealed multiple abnormalities in labs including high-sensitivity troponin T 68, sodium 120, potassium 2.6, phosphorus 1.6, albumin 3.1, alkaline phosphatase 254, , ALT 94, total bilirubin 18.2, ammonia 222, lactate 11.6, procalcitonin 0.45, hemoglobin 11.4 and hematocrit 32.4.  Ethanol was 0.170.  ABGs obtained while on room air revealing pH 7.370, pCO2 26.7 and pO2 66.6.  Chest x-ray  "revealed low lung volumes with mild perihilar bibasilar parenchymal opacities likely atelectasis.  Negative for COVID-19 and influenza A/B.  CT of abdomen and pelvis completed revealing fatty infiltration of liver with hepatosplenomegaly, cholelithiasis without findings to suggest cholecystitis and nonspecific inguinal lymphadenopathy which appears moderately more pronounced than previous study.  CTA of chest completed which was negative for evidence of PE although noted atheromatous change of coronary arteries and moderate cardiomegaly.  CT of head completed and no significant abnormalities visualized although noted to be limited study due to significant motion artifacts.  He was admitted to the critical care unit for further work-up and treatment.  He received lactulose enema and was started on PO lactulose once able to tolerate PO and had improvement in ammonia levels although remain elevated.  Intensivist noted he would benefit from rehab inpatient setting.  SW consulted and provided copy of chemical dependency treatment providers per chart review.  Electrolytes were replaced and noticed his mental status improved and he was transferred to the medical floor on 5/7/2024.  GI has been consulted and evaluation pending.  No labs from today available for review with exception of blood glucose levels.  Labs collected on 5/8/2024 revealed LFTs remain elevated however trending downward.  Total bilirubin remains elevated at 22.3.  Ammonia level trending downward however remains elevated at 111.  Palliative care has been consulted to discuss goals of care/advance care planning.  He is lying in bed, awake and in no apparent distress.  Oriented x 4.  Reports he is \"tired\" and has a headache.  No visitors currently present.     Advance Care Planning   Advanced Directives: Patient reports not having an advance directive. Information provided.    Advance Care Planning Discussion: Spoke with Mr. Christiansen regarding goals of care. " " Explored his understanding of prognosis.  Shared he is aware his liver is not \"great\" however is unaware of future plans.  Reports interest in alcohol cessation and was given resources per .  Shared he does not want to go somewhere for a \"long time\" however was unable to quantify.  Attempted to discuss prognosis and explore his understanding better however he would drift to sleep during discussion.  He did become more alert and was able to have brief discussion regarding medical priorities.  Shared he has not discussed in the past.  Discussed CPR including indications, risks and benefits.  He stated \"That does not sound like something I am interested in.\"  Briefly discussed intubation as well.  After further discussion he shared he is not sure of his wishes.  Encouraged him to discuss further so his wishes could be followed.  He demonstrated understanding.  We also discussed importance of completing documentation naming HCS as he expressed wishes for his mother to make decisions on his behalf if he were unable however has adult children.  Reports he might be interested in completing this afternoon or at a later time when his headache has improved.  Mr. Christiansen does not appear to have good prognostic awareness.  He denied any questions and/or concerns.   The patient receives support from his parent. Patient's children are his next of kin.    Due to the palliative care topics discussed including goals of care, treatment options, and medical priorities we will establish an advance care plan.       Review of Systems   Neurological:  Positive for headaches.     Pain Assessment  CPOT Facial Expression: 0-->relaxed, neutral  CPOT Body Movements: 0-->absence of movements  CPOT Muscle Tension: 0-->relaxed  Ventilator Compliance/Vocalization: 0-->talking in normal tone or no sound  CPOT Score: 0  Past Medical History:   Diagnosis Date    Diabetes mellitus     Gout     Hypertension       Past Surgical History:   Procedure " Laterality Date    VASECTOMY        Social History     Socioeconomic History    Marital status: Single   Tobacco Use    Smoking status: Some Days     Current packs/day: 0.50     Average packs/day: 0.5 packs/day for 14.4 years (7.2 ttl pk-yrs)     Types: Cigarettes     Start date: 2010    Smokeless tobacco: Never   Vaping Use    Vaping status: Every Day    Substances: THC   Substance and Sexual Activity    Alcohol use: Yes     Alcohol/week: 12.0 standard drinks of alcohol     Types: 12 Cans of beer per week     Comment: REPORTS TWO BEERS TODAY    Drug use: Yes     Types: Marijuana    Sexual activity: Yes     History reviewed. No pertinent family history.   No Known Allergies    Objective   Diagnostics: Reviewed    Intake/Output Summary (Last 24 hours) at 5/9/2024 0803  Last data filed at 5/8/2024 1849  Gross per 24 hour   Intake 1520 ml   Output 2550 ml   Net -1030 ml       Current medications patient is presently taking including all prescriptions, over-the-counter, herbals and vitamin/mineral/dietary (nutritional) supplements with reviewed including route, type, dose and frequency and are current per MAR at time of dictation.  Current Facility-Administered Medications   Medication Dose Route Frequency Provider Last Rate Last Admin    Calcium Replacement - Follow Nurse / BPA Driven Protocol   Does not apply PRN Wes Hilton APRN        dextrose (D50W) (25 g/50 mL) IV injection 25 g  25 g Intravenous Q15 Min PRN Janel Grimes APRN        dextrose (GLUTOSE) oral gel 15 g  15 g Oral Q15 Min PRN Janel Grimes APRN        folic acid 1 mg in sodium chloride 0.9 % 50 mL IVPB  1 mg Intravenous Daily Wes Hilton APRN 100 mL/hr at 05/08/24 0904 1 mg at 05/08/24 0904    furosemide (LASIX) injection 40 mg  40 mg Intravenous Q12H Roderick Field MD   40 mg at 05/08/24 1547    ibuprofen (ADVIL,MOTRIN) tablet 400 mg  400 mg Oral Q4H PRN Wes Hilton APRN        Insulin Lispro (humaLOG)  injection 2-9 Units  2-9 Units Subcutaneous 4x Daily AC & at Bedtime Janel Grimes APRN   4 Units at 05/07/24 1730    lactulose solution 10 g  10 g Oral Daily Roderick Field MD        LORazepam (ATIVAN) tablet 2 mg  2 mg Oral Q6H PRN Jim oBo APRN   2 mg at 05/08/24 2348    Magnesium Standard Dose Replacement - Follow Nurse / BPA Driven Protocol   Does not apply PRN Wes Hilton APRN        melatonin tablet 3 mg  3 mg Oral Nightly PRN Wes Hilton APRN   3 mg at 05/08/24 2348    nitroglycerin (NITROSTAT) SL tablet 0.4 mg  0.4 mg Sublingual Q5 Min PRN Wes Hilton APRN        pentoxifylline (TRENtal) CR tablet 400 mg  400 mg Oral TID With Meals Roderick Field MD   400 mg at 05/08/24 1802    Phosphorus Replacement - Follow Nurse / BPA Driven Protocol   Does not apply PRN Wes Hilton APRN        Potassium Replacement - Follow Nurse / BPA Driven Protocol   Does not apply PRN Wes Hilton APRN        riFAXIMin (XIFAXAN) tablet 550 mg  550 mg Oral BID Janel Grimes APRN   550 mg at 05/08/24 2127    sodium chloride 0.9 % flush 10 mL  10 mL Intravenous PRN Wes Hilton APRN        sodium chloride 0.9 % flush 10 mL  10 mL Intravenous Q12H Wes Hilton APRN   10 mL at 05/08/24 0905    sodium chloride 0.9 % flush 10 mL  10 mL Intravenous PRN Wes Hilton APRN        sodium chloride 0.9 % infusion 40 mL  40 mL Intravenous PRN Wes Hilton APRN        thiamine (B-1) injection 200 mg  200 mg Intravenous Q8H Wes Hilton APRN   200 mg at 05/08/24 2127    Followed by    [START ON 5/11/2024] thiamine (VITAMIN B-1) tablet 100 mg  100 mg Oral Daily Wes Hilton APRN            Calcium Replacement - Follow Nurse / BPA Driven Protocol    dextrose    dextrose    ibuprofen    LORazepam    Magnesium Standard Dose Replacement - Follow Nurse / BPA Driven Protocol    melatonin    nitroglycerin    Phosphorus Replacement - Follow Nurse / BPA Driven  "Protocol    Potassium Replacement - Follow Nurse / BPA Driven Protocol    [COMPLETED] Insert Peripheral IV **AND** sodium chloride    sodium chloride    sodium chloride  Assessment   /48 (BP Location: Left arm, Patient Position: Lying) Comment: Nurse notified  Pulse 84   Temp 98.2 °F (36.8 °C) (Oral)   Resp 20   Ht 190.5 cm (75\")   Wt (!) 146 kg (321 lb)   SpO2 95%   BMI 40.12 kg/m²     Physical Exam  Vitals and nursing note reviewed.   Constitutional:       General: He is awake. He is not in acute distress.     Appearance: He is morbidly obese. He is ill-appearing.   HENT:      Head: Normocephalic and atraumatic.   Eyes:      General: Lids are normal. Scleral icterus present.      Extraocular Movements: Extraocular movements intact.   Neck:      Vascular: No JVD.      Trachea: Trachea normal.   Cardiovascular:      Rate and Rhythm: Normal rate.   Pulmonary:      Effort: Pulmonary effort is normal.   Musculoskeletal:      Cervical back: Neck supple.   Skin:     General: Skin is warm and dry.      Coloration: Skin is jaundiced.   Neurological:      Mental Status: He is alert and oriented to person, place, and time.   Psychiatric:         Mood and Affect: Affect is flat.     Functional status: Palliative Performance Scale Score: Performance 80% based on the following measures: Ambulation: Full ambulation, Activity and Evidence of Disease: Normal activity with effort, some evidence of disease, Self-Care: Fully independent,  Intake: Normal or reduced, LOC: Full.  Nutritional status: Albumin 2.8. Body mass index is 40.12 kg/m².  Patient status: Disease state: Controlled with current treatments.    Active Hospital Problems    Diagnosis     **Alcoholic encephalopathy     Type 2 diabetes mellitus     Alcohol intoxication in active alcoholic     Alcoholism      Impression/Problem List:  Alcoholic encephalopathy  Alcoholic hepatitis ([facility] Discriminant Function 82.1)    - Hyperbilirubinemia and transaminitis "   Alcoholism   Fatty liver per CT and ultrasound  Hyperammonemia      Type 2 diabetes mellitus  Morbid obesity (BMI 40.12)      Plan / Recommendations     Palliative Care Encounter   Goals of care include CODE STATUS CPR with full support interventions.    Prognosis is poor long-term secondary to alcoholic encephalopathy, alcoholic hepatitis, fatty liver, morbid obesity and other comorbidities listed above.    Spoke with Mr. Christiansen regarding goals of care.  Details of discussion above.     Attempted to discuss medical priorities including CPR and intubation.  Reports he has not discussed previously.  Encouraged ongoing discussions to ensure his wishes were followed.      SW provided resources for rehabilitation for alcoholism per chart review.      He expressed wishes for his mother to make decisions on his behalf if he were unable however has adult children.  Discussed importance of completing advance directive/HCS designation form.  He demonstrated understanding.   Reports he might be interested in completing this afternoon or at a later time when his headache has improved.       Mr. Christiansen does not appear to have good prognostic awareness.  He denied any questions and/or concerns.       Thank you for allowing us to participate in patient's plan of care. Palliative Care Team will continue to follow patient. Will plan to follow up tomorrow or sooner if needed.    Time spent:60 minutes spent reviewing medical and medication records, assessing and examining patient, discussing with family, answering questions, providing some guidance about a plan and documentation of care, and coordinating care with other healthcare members, with > 50% time spent face to face.   Electronically signed by, LÁZARO Stokes, 05/09/24.

## 2024-05-09 NOTE — CASE MANAGEMENT/SOCIAL WORK
Continued Stay Note   [facility]     Patient Name: Luciano Christiansen  MRN: [id number]  Today's Date: 5/9/2024    Admit Date: 5/5/2024    Plan: Home   Discharge Plan       Row Name 05/09/24 1402       Plan    Plan Home    Final Discharge Disposition Code 01 - home or self-care    Final Note Met with patient in room, accompanied by Dr. Field. Patient plans to go to his mother's home at discharge and says he will begin arrangements for alcohol dependency treatment. SW provided resource guide to patient. Patient asked that guide be included in discharge paperwork so placed in hard chart at nurse's station. Patient denies any other needs.             ROYCE Galindo

## 2024-05-09 NOTE — PLAN OF CARE
Goal Outcome Evaluation:  Plan of Care Reviewed With: patient        Progress: improving  Outcome Evaluation: PT eval completed.  Pt w/ flat affect, however pleasant and agreeable to therapy.  Oriented X 4, however required cues for month, initially stating it was March.  Pt w/ generalized weakness and decrease activity tolerance.  Performed bed mobility of supine to sit I, tfers sit to/from stand w/ CGA w/ increase effort to complete.  Required education to increase safety awareness and reduce fall risk.  Pt performed gait ~ 120 ft w/ decrease gait speed, decrease step length, increase SADIA and increase lateral wt shifting.  Pt will benefit from continued PT services to improve activity tolerance, balance, safety awareness, and to safely progress to I mobility.  Will follow for progress and needs.  Discussed HH services w/ pt denying needs.      Anticipated Discharge Disposition (PT): home with assist

## 2024-05-09 NOTE — PLAN OF CARE
Goal Outcome Evaluation:  Plan of Care Reviewed With: patient        Progress: no change     Pt alert and oriented x4. VSS. No c/o pain. ANTON. PPP. SCDs for VTE. , satting at  99% on room air. Tolerating diabetic diet. Skin jaundice, intact. Harris catheter pulled per  verbal order, pt due to void. Up x 1-2 to BSC. Last BM 5/8. BS monitored. Bed alarm set.  Call light within reach. Safety maintained. Plan of care ongoing. No changes this shift

## 2024-05-09 NOTE — CONSULTS
Inpatient Gastroenterology Consult  Referring Provider: Roderick Field*  Gastroenterologist of Record: None  Reason for Consultation: Alcoholic hepatitis.    Subjective     History of present illness: This is a patient well-known to the GI service.  He presented just a few weeks ago with severe diabetic ketoacidosis nausea and vomiting.  The stomach symptoms resolved as his ketoacidosis did.  He was severely jaundiced at that time.  His hepatitis discriminant factor was very high.  We discussed possibility of steroid therapy and dismissed this on the basis both of his diabetes with tendency to diabetic ketoacidosis and likely poor compliance.  He was, however, started on Trental.  He returned this past day with disorientation.  He was found to have a high ammonia level.  He was treated with lactulose and rifaximin.  He feels much better today.    Past Medical History:  Past Medical History:   Diagnosis Date    Diabetes mellitus     Gout     Hypertension        Past Surgical History:  Past Surgical History:   Procedure Laterality Date    VASECTOMY          Social History:   Social History     Tobacco Use    Smoking status: Some Days     Current packs/day: 0.50     Average packs/day: 0.5 packs/day for 14.4 years (7.2 ttl pk-yrs)     Types: Cigarettes     Start date: 2010    Smokeless tobacco: Never   Substance Use Topics    Alcohol use: Yes     Alcohol/week: 12.0 standard drinks of alcohol     Types: 12 Cans of beer per week     Comment: REPORTS TWO BEERS TODAY        Family History:  History reviewed. No pertinent family history.    Home Meds:  Medications Prior to Admission   Medication Sig Dispense Refill Last Dose    folic acid (FOLVITE) 1 MG tablet Take 1 tablet by mouth Daily. 30 tablet 2 Past Week    insulin glargine (Lantus) 100 UNIT/ML injection Inject 5 Units under the skin into the appropriate area as directed Every Night. 3 mL 0 Past Week    pantoprazole (PROTONIX)  40 MG EC tablet Take 1 tablet by mouth Daily. 30 tablet 2 Past Week    pentoxifylline (TRENtal) 400 MG CR tablet Take 1 tablet by mouth 3 (Three) Times a Day With Meals. 90 tablet 2 Past Week    QUEtiapine (SEROquel) 50 MG tablet Take 1 tablet by mouth Every Night. 30 tablet 0 Past Week    thiamine (VITAMIN B1) 100 MG tablet Take 1 tablet by mouth Daily. 30 tablet 2 Past Week    albuterol sulfate  (90 Base) MCG/ACT inhaler Inhale 2 puffs Every 4 (Four) Hours As Needed for Wheezing. 18 g 0 Unknown    Continuous Glucose Sensor (Dexcom G6 Sensor) Use Every 10 (Ten) Days. 3 each 0        Current Meds:   folic acid 1 mg in sodium chloride 0.9 % 50 mL IVPB, 1 mg, Intravenous, Daily  furosemide, 40 mg, Intravenous, Q12H  insulin lispro, 2-9 Units, Subcutaneous, 4x Daily AC & at Bedtime  lactulose, 10 g, Oral, Daily  pentoxifylline, 400 mg, Oral, TID With Meals  rifAXIMin, 550 mg, Oral, BID  sodium chloride, 10 mL, Intravenous, Q12H  thiamine (B-1) IV, 200 mg, Intravenous, Q8H   Followed by  [START ON 5/11/2024] thiamine, 100 mg, Oral, Daily        Allergies:  No Known Allergies    Review of Systems  Pertinent items are noted in HPI, all other systems reviewed and negative    Objective     Vital Signs  Temp:  [97.9 °F (36.6 °C)-99.1 °F (37.3 °C)] 98.2 °F (36.8 °C)  Heart Rate:  [84-89] 84  Resp:  [18-22] 20  BP: (109-130)/(48-67) 109/48    Physical Exam:     General Appearance:  Cooperative, somewhat flat affect.  No acute distress.   Head:  Normocephalic, without obvious abnormality, atraumatic   Eyes:  Lids and lashes normal, conjunctivae and sclerae icteric, corneas clear, PERRLA   Ears:  Ears appear intact with no abnormalities noted   Throat:  No oral lesions, no thrush, oral mucosa moist   Neck:  No adenopathy, supple, trachea midline, no thyromegaly, no carotid bruit, no JVD   Back:  No kyphosis present, no scoliosis present, no skin lesions, erythema or scars, no tenderness to percussion or palpation, range  "of motion normal   Lungs:  Clear to auscultation, respirations regular, even and unlabored    Heart:  Regular rhythm and normal rate, normal S1 and S2, no murmur, no gallop, no rub, no click   Chest Wall:  No abnormalities observed   Abdomen:  Hepatomegaly present.  No particular tenderness.  No ascites.   Rectal:  Deferred   Extremities:  Moves all extremities well, no edema, no cyanosis, no redness   Pulses:  Pulses palpable and equal bilaterally   Skin:  No bleeding, bruising or rash   Lymph nodes:  No palpable adenopathy   Neurologic:  Cranial nerves 2 - 12 grossly intact, sensation intact, DTR present and equal bilaterally.  He does not have asterixis today.                                                                               WBC No results found for: \"WBCS\"   HGB Hemoglobin   Date Value Ref Range Status   05/09/2024 10.6 (L) 13.0 - 17.7 g/dL Final   05/08/2024 10.1 (L) 13.0 - 17.7 g/dL Final   05/07/2024 10.4 (L) 13.0 - 17.7 g/dL Final   05/07/2024 10.9 (L) 13.0 - 17.7 g/dL Final      HCT Hematocrit   Date Value Ref Range Status   05/09/2024 32.5 (L) 37.5 - 51.0 % Final   05/08/2024 31.3 (L) 37.5 - 51.0 % Final   05/07/2024 31.1 (L) 37.5 - 51.0 % Final   05/07/2024 32.1 (L) 37.5 - 51.0 % Final      Platelets No results found for: \"LABPLAT\"   MCV MCV   Date Value Ref Range Status   05/09/2024 105.9 (H) 79.0 - 97.0 fL Final   05/08/2024 105.7 (H) 79.0 - 97.0 fL Final   05/07/2024 104.0 (H) 79.0 - 97.0 fL Final   05/07/2024 101.9 (H) 79.0 - 97.0 fL Final          Sodium Sodium   Date Value Ref Range Status   05/09/2024 138 136 - 145 mmol/L Final   05/08/2024 136 136 - 145 mmol/L Final   05/07/2024 135 (L) 136 - 145 mmol/L Final      Potassium Potassium   Date Value Ref Range Status   05/09/2024 3.6 3.5 - 5.2 mmol/L Final   05/08/2024 3.7 3.5 - 5.2 mmol/L Final   05/07/2024 3.7 3.5 - 5.2 mmol/L Final   05/07/2024 3.6 3.5 - 5.2 mmol/L Final      Chloride Chloride   Date Value Ref Range Status   05/09/2024 " 101 98 - 107 mmol/L Final   05/08/2024 102 98 - 107 mmol/L Final   05/07/2024 101 98 - 107 mmol/L Final      CO2 CO2   Date Value Ref Range Status   05/09/2024 23.0 22.0 - 29.0 mmol/L Final   05/08/2024 25.0 22.0 - 29.0 mmol/L Final   05/07/2024 24.0 22.0 - 29.0 mmol/L Final      BUN BUN   Date Value Ref Range Status   05/09/2024 10 6 - 20 mg/dL Final   05/08/2024 8 6 - 20 mg/dL Final   05/07/2024 8 6 - 20 mg/dL Final      Creatinine Creatinine   Date Value Ref Range Status   05/09/2024 0.37 (L) 0.76 - 1.27 mg/dL Final   05/08/2024 0.23 (L) 0.76 - 1.27 mg/dL Final   05/07/2024 0.40 (L) 0.76 - 1.27 mg/dL Final      Calcium Calcium   Date Value Ref Range Status   05/09/2024 8.4 (L) 8.6 - 10.5 mg/dL Final   05/08/2024 8.4 (L) 8.6 - 10.5 mg/dL Final   05/07/2024 9.0 8.6 - 10.5 mg/dL Final      Albumin Albumin   Date Value Ref Range Status   05/09/2024 2.8 (L) 3.5 - 5.2 g/dL Final   05/08/2024 2.7 (L) 3.5 - 5.2 g/dL Final   05/07/2024 2.9 (L) 3.5 - 5.2 g/dL Final      AST  ALT  PT/INR:   AST (SGOT)   Date Value Ref Range Status   05/09/2024 195 (H) 1 - 40 U/L Final   05/08/2024 181 (H) 1 - 40 U/L Final   05/07/2024 222 (H) 1 - 40 U/L Final     ALT (SGPT)   Date Value Ref Range Status   05/09/2024 80 (H) 1 - 41 U/L Final   05/08/2024 77 (H) 1 - 41 U/L Final   05/07/2024 91 (H) 1 - 41 U/L Final     Protime   Date Value Ref Range Status   05/09/2024 24.7 (H) 11.8 - 14.8 Seconds Final   05/08/2024 25.0 (H) 11.8 - 14.8 Seconds Final   05/07/2024 23.2 (H) 11.8 - 14.8 Seconds Final   /  INR   Date Value Ref Range Status   05/09/2024 2.13 (H) 0.91 - 1.09 Final   05/08/2024 2.17 (H) 0.91 - 1.09 Final   05/07/2024 1.97 (H) 0.91 - 1.09 Final            Imaging Results (Last 72 Hours)       ** No results found for the last 72 hours. **            Result Review:  I have personally reviewed the results from the time of this admission to 5/9/2024 13:04 CDT and agree with these findings:  [x]  Laboratory list / accordion  []   "Microbiology  [x]  Radiology  []  EKG/Telemetry   []  Cardiology/Vascular   []  Pathology  [x]  Old records  []  Other:  Most notable findings include: Hepatosplenomegaly      Assessment & Plan       Alcoholic encephalopathy    Alcoholism    Alcohol intoxication in active alcoholic    Type 2 diabetes mellitus      Patient has severe alcoholic hepatitis and was brought in for hepatic encephalopathy.  He also was intoxicated at the time of his admission and was severely intoxicated at the time of his previous admission.  His hepatic encephalopathy has improved to the point that he does not have asterixis.  He is not reporting any visual hallucinations or any signs of incipient delirium tremens.  His liver status is unchanged from last hospitalization.  Unfortunately, with a noncompliant diabetic patient, Trental is the best medication we have.  It would also help if he made any sincere efforts to stop drinking at this time.  Otherwise, as the patient puts it, \"I can take my medication just as well at home as I can in the hospital\".    I discussed the patients findings and my recommendations with patient and consulting provider    Thee White MD  05/09/24  13:04 CDT    DISCLAIMER:    This physician works through a [employer] as an inpatient consultant gastroenterologist only and has no outpatient clinic for patient follow up.  Any results not available at time of inpatient discharge and/or GI clinic follow up should be managed by the hospitalist team, PCP, or outpatient gastroenterologist.            "

## 2024-05-10 ENCOUNTER — TRANSITIONAL CARE MANAGEMENT TELEPHONE ENCOUNTER (OUTPATIENT)
Dept: CALL CENTER | Facility: HOSPITAL | Age: 39
End: 2024-05-10
Payer: COMMERCIAL

## 2024-05-10 ENCOUNTER — READMISSION MANAGEMENT (OUTPATIENT)
Dept: CALL CENTER | Facility: HOSPITAL | Age: 39
End: 2024-05-10
Payer: COMMERCIAL

## 2024-05-10 LAB
BACTERIA SPEC AEROBE CULT: NORMAL
BACTERIA SPEC AEROBE CULT: NORMAL

## 2024-05-10 NOTE — THERAPY DISCHARGE NOTE
Acute Care - Physical Therapy Discharge Summary  [facility]       Patient Name: Luciano Christiansen  : 1985  MRN: [id number]    Today's Date: 5/10/2024                 Admit Date: 2024      PT Recommendation and Plan    Visit Dx:    ICD-10-CM ICD-9-CM   1. Alcoholic encephalopathy  G31.2 291.2   2. Alcoholic cirrhosis of liver with ascites  K70.31 571.2   3. Hypokalemia  E87.6 276.8   4. Hyponatremia  E87.1 276.1   5. Impaired functional mobility and activity tolerance [Z74.09]  Z74.09 V49.89                PT Rehab Goals       Row Name 05/10/24 1000             Bed Mobility Goal 1 (PT)    Activity/Assistive Device (Bed Mobility Goal 1, PT) bed mobility activities, all  -AB      [place] Level/Cues Needed (Bed Mobility Goal 1, PT) independent  -AB      Time Frame (Bed Mobility Goal 1, PT) long term goal (LTG);10 days  -AB      Progress/Outcomes (Bed Mobility Goal 1, PT) goal not met  -AB         Transfer Goal 1 (PT)    Activity/Assistive Device (Transfer Goal 1, PT) sit-to-stand/stand-to-sit;bed-to-chair/chair-to-bed  -AB      [place] Level/Cues Needed (Transfer Goal 1, PT) standby assist;independent  -AB      Time Frame (Transfer Goal 1, PT) long term goal (LTG);10 days  -AB      Progress/Outcome (Transfer Goal 1, PT) goal not met  -AB         Gait Training Goal 1 (PT)    Activity/Assistive Device (Gait Training Goal 1, PT) gait (walking locomotion);decrease fall risk;improve balance and speed;increase endurance/gait distance;increase energy conservation  -AB      [place] Level (Gait Training Goal 1, PT) standby assist;independent  -AB      Distance (Gait Training Goal 1, PT) 150-200 ft  -AB      Time Frame (Gait Training Goal 1, PT) long term goal (LTG);10 days  -AB      Progress/Outcome (Gait Training Goal 1, PT) goal not met  -AB         Stairs Goal 1 (PT)    Activity/Assistive Device (Stairs Goal 1, PT) ascending stairs;descending stairs;using handrail, right;step-to-step;decrease fall  risk;improve balance and speed  -AB      [place] Level/Cues Needed (Stairs Goal 1, PT) contact guard required;standby assist  -AB      Number of Stairs (Stairs Goal 1, PT) 3  -AB      Time Frame (Stairs Goal 1, PT) long term goal (LTG);10 days  -AB      Progress/Outcome (Stairs Goal 1, PT) goal not met  -AB         Patient Education Goal (PT)    Activity (Patient Education Goal, PT) HEP for LE strengthening, energy conservation techniques, breathing and postural ex  -AB      [place]/Cues/Accuracy (Memory Goal 2, PT) demonstrates adequately;independent;verbalizes understanding  -AB      Time Frame (Patient Education Goal, PT) long term goal (LTG);10 days  -AB      Progress/Outcome (Patient Education Goal, PT) goal not met  -AB                User Key  (r) = Recorded By, (t) = Taken By, (c) = Cosigned By      Initials Name Provider Type Discipline    Carla Cook, PTA Physical Therapist Assistant PT                        PT Discharge Summary  Anticipated Discharge Disposition (PT): home with assist  Reason for Discharge: Discharge from facility  Outcomes Achieved: Discharge from facility occurred on same date as evluation, Refer to plan of care for updates on goals achieved  Discharge Destination: Home with assist      Carla Duval PTA   5/10/2024

## 2024-05-12 ENCOUNTER — TRANSITIONAL CARE MANAGEMENT TELEPHONE ENCOUNTER (OUTPATIENT)
Dept: CALL CENTER | Facility: HOSPITAL | Age: 39
End: 2024-05-12
Payer: COMMERCIAL

## 2024-05-16 ENCOUNTER — TELEPHONE (OUTPATIENT)
Dept: CASE MANAGEMENT | Facility: OTHER | Age: 39
End: 2024-05-16
Payer: COMMERCIAL

## 2024-05-20 ENCOUNTER — TELEPHONE (OUTPATIENT)
Dept: FAMILY MEDICINE CLINIC | Facility: CLINIC | Age: 39
End: 2024-05-20
Payer: COMMERCIAL

## 2024-05-20 NOTE — TELEPHONE ENCOUNTER
Tried to call to reschedule appt and inform of no show policy, number listed is not a working number.

## 2024-05-21 ENCOUNTER — TELEPHONE (OUTPATIENT)
Dept: CASE MANAGEMENT | Facility: OTHER | Age: 39
End: 2024-05-21
Payer: COMMERCIAL

## 2024-05-24 ENCOUNTER — TELEPHONE (OUTPATIENT)
Dept: CASE MANAGEMENT | Facility: OTHER | Age: 39
End: 2024-05-24
Payer: COMMERCIAL

## 2024-06-05 ENCOUNTER — TELEPHONE (OUTPATIENT)
Dept: FAMILY MEDICINE CLINIC | Facility: CLINIC | Age: 39
End: 2024-06-05
Payer: COMMERCIAL

## 2024-06-05 NOTE — TELEPHONE ENCOUNTER
Mailed a letter to pt regarding overdue lab orders for a urinalysis with culture if indicated, microalbumin  check and a lipid panel lab which have not been completed.

## 2024-06-27 ENCOUNTER — TELEPHONE (OUTPATIENT)
Dept: FAMILY MEDICINE CLINIC | Facility: CLINIC | Age: 39
End: 2024-06-27
Payer: COMMERCIAL

## 2024-06-27 DIAGNOSIS — Z79.4 TYPE 2 DIABETES MELLITUS WITH HYPERGLYCEMIA, WITH LONG-TERM CURRENT USE OF INSULIN: ICD-10-CM

## 2024-06-27 DIAGNOSIS — E11.65 TYPE 2 DIABETES MELLITUS WITH HYPERGLYCEMIA, WITH LONG-TERM CURRENT USE OF INSULIN: ICD-10-CM

## 2024-06-27 RX ORDER — INSULIN GLARGINE 100 [IU]/ML
5 INJECTION, SOLUTION SUBCUTANEOUS NIGHTLY
Qty: 3 ML | Refills: 0 | Status: SHIPPED | OUTPATIENT
Start: 2024-06-27

## 2024-06-27 NOTE — TELEPHONE ENCOUNTER
"Pt called to reschedule apt. Transferred to office d/t leg swelling extending to testicles and hands. It is painful now that he has driven and attempted to go to work. Denies tingling, redness. Advised pt to go to ER or UC to be seen and to elevated affected areas. Pt states \"my testicles are huge after sleeping on the floor last night.\" Swelling makes it difficult for pt to use bathroom and drive. Again, advised pt to proceed to urgent care at the very least, preferably ER.  "

## 2024-06-27 NOTE — TELEPHONE ENCOUNTER
Caller: Luciano Christiansen    Relationship: Self    Best call back number: [phone]    Requested Prescriptions:   Requested Prescriptions     Pending Prescriptions Disp Refills    insulin glargine (Lantus) 100 UNIT/ML injection 3 mL 0     Sig: Inject 5 Units under the skin into the appropriate area as directed Every Night.        Pharmacy where request should be sent: [facility] #[id number] - [place], [place] - [address] AT [facility]CROSS [facility] [phone] [facility] [phone]      Last office visit with prescribing clinician: 5/3/2024   Last telemedicine visit with prescribing clinician: Visit date not found   Next office visit with prescribing clinician: 7/1/2024     Additional details provided by patient: STATES THAT HE'S GOT SOME SWELLING IN HIS LEG, NOT ABLE TO WALK TO MAKE IT TO APPOINTMENT. WANTED TO SPEAK TO CLINICAL STAFF ABOUT CLINICAL ADVICE.    Does the patient have less than a 3 day supply:  [x] Yes  [] No    Would you like a call back once the refill request has been completed: [] Yes [x] No    If the office needs to give you a call back, can they leave a voicemail: [] Yes [x] No    Gisela Birmingham   06/27/24 13:40 CDT

## 2024-07-04 ENCOUNTER — HOSPITAL ENCOUNTER (INPATIENT)
Facility: HOSPITAL | Age: 39
LOS: 5 days | Discharge: HOME | End: 2024-07-09
Attending: FAMILY MEDICINE | Admitting: INTERNAL MEDICINE
Payer: COMMERCIAL

## 2024-07-04 ENCOUNTER — APPOINTMENT (OUTPATIENT)
Dept: CT IMAGING | Facility: HOSPITAL | Age: 39
End: 2024-07-04
Payer: COMMERCIAL

## 2024-07-04 ENCOUNTER — APPOINTMENT (OUTPATIENT)
Dept: GENERAL RADIOLOGY | Facility: HOSPITAL | Age: 39
End: 2024-07-04
Payer: COMMERCIAL

## 2024-07-04 DIAGNOSIS — E83.42 HYPOMAGNESEMIA: ICD-10-CM

## 2024-07-04 DIAGNOSIS — F10.920 ALCOHOLIC INTOXICATION WITHOUT COMPLICATION: Primary | ICD-10-CM

## 2024-07-04 DIAGNOSIS — E80.6 HYPERBILIRUBINEMIA: ICD-10-CM

## 2024-07-04 DIAGNOSIS — R10.84 GENERALIZED ABDOMINAL PAIN: ICD-10-CM

## 2024-07-04 DIAGNOSIS — R17 JAUNDICE: ICD-10-CM

## 2024-07-04 DIAGNOSIS — R79.89 ELEVATED TROPONIN: ICD-10-CM

## 2024-07-04 DIAGNOSIS — E87.1 HYPONATREMIA: ICD-10-CM

## 2024-07-04 DIAGNOSIS — E87.6 HYPOKALEMIA: ICD-10-CM

## 2024-07-04 DIAGNOSIS — E83.39 HYPOPHOSPHATEMIA: ICD-10-CM

## 2024-07-04 DIAGNOSIS — F10.220 ALCOHOL INTOXICATION IN ACTIVE ALCOHOLIC WITHOUT COMPLICATION: ICD-10-CM

## 2024-07-04 PROBLEM — K70.10 ALCOHOLIC STEATOHEPATITIS: Status: ACTIVE | Noted: 2024-07-04

## 2024-07-04 PROBLEM — F10.939 ALCOHOL WITHDRAWAL: Status: ACTIVE | Noted: 2024-07-04

## 2024-07-04 LAB
ALBUMIN SERPL-MCNC: 3.5 G/DL (ref 3.5–5.2)
ALP SERPL-CCNC: 241 U/L (ref 39–117)
ALT SERPL W P-5'-P-CCNC: 41 U/L (ref 1–41)
AMMONIA BLD-SCNC: 125 UMOL/L (ref 16–60)
ANION GAP SERPL CALCULATED.3IONS-SCNC: 17 MMOL/L (ref 5–15)
APTT PPP: 49.3 SECONDS (ref 24.5–36)
AST SERPL-CCNC: 106 U/L (ref 1–40)
BACTERIA UR QL AUTO: NORMAL /HPF
BASOPHILS # BLD AUTO: 0.03 10*3/MM3 (ref 0–0.2)
BASOPHILS NFR BLD AUTO: 0.4 % (ref 0–1.5)
BILIRUB CONJ SERPL-MCNC: 3.4 MG/DL (ref 0–0.3)
BILIRUB INDIRECT SERPL-MCNC: 2.7 MG/DL
BILIRUB SERPL-MCNC: 6.1 MG/DL (ref 0–1.2)
BILIRUB UR QL STRIP: ABNORMAL
BUN SERPL-MCNC: 7 MG/DL (ref 6–20)
BUN/CREAT SERPL: 10.8 (ref 7–25)
CALCIUM SPEC-SCNC: 8 MG/DL (ref 8.6–10.5)
CHLORIDE SERPL-SCNC: 85 MMOL/L (ref 98–107)
CLARITY UR: CLEAR
CO2 SERPL-SCNC: 26 MMOL/L (ref 22–29)
COLOR UR: ABNORMAL
CREAT SERPL-MCNC: 0.65 MG/DL (ref 0.76–1.27)
D-LACTATE SERPL-SCNC: 4.9 MMOL/L (ref 0.5–2)
D-LACTATE SERPL-SCNC: 6.4 MMOL/L (ref 0.5–2)
D-LACTATE SERPL-SCNC: 7.3 MMOL/L (ref 0.5–2)
D-LACTATE SERPL-SCNC: 7.4 MMOL/L (ref 0.5–2)
DEPRECATED RDW RBC AUTO: 59.8 FL (ref 37–54)
EGFRCR SERPLBLD CKD-EPI 2021: 122.9 ML/MIN/1.73
EOSINOPHIL # BLD AUTO: 0.07 10*3/MM3 (ref 0–0.4)
EOSINOPHIL NFR BLD AUTO: 1 % (ref 0.3–6.2)
ERYTHROCYTE [DISTWIDTH] IN BLOOD BY AUTOMATED COUNT: 16.6 % (ref 12.3–15.4)
ETHANOL UR QL: 0.17 %
GEN 5 2HR TROPONIN T REFLEX: 56 NG/L
GLUCOSE SERPL-MCNC: 137 MG/DL (ref 65–99)
GLUCOSE UR STRIP-MCNC: NEGATIVE MG/DL
HCT VFR BLD AUTO: 25.9 % (ref 37.5–51)
HGB BLD-MCNC: 9 G/DL (ref 13–17.7)
HGB UR QL STRIP.AUTO: NEGATIVE
HYALINE CASTS UR QL AUTO: NORMAL /LPF
IMM GRANULOCYTES # BLD AUTO: 0.03 10*3/MM3 (ref 0–0.05)
IMM GRANULOCYTES NFR BLD AUTO: 0.4 % (ref 0–0.5)
INR PPP: 1.56 (ref 0.91–1.09)
KETONES UR QL STRIP: NEGATIVE
LEUKOCYTE ESTERASE UR QL STRIP.AUTO: ABNORMAL
LYMPHOCYTES # BLD AUTO: 0.87 10*3/MM3 (ref 0.7–3.1)
LYMPHOCYTES NFR BLD AUTO: 12.7 % (ref 19.6–45.3)
MAGNESIUM SERPL-MCNC: 1.5 MG/DL (ref 1.6–2.6)
MCH RBC QN AUTO: 34.1 PG (ref 26.6–33)
MCHC RBC AUTO-ENTMCNC: 34.7 G/DL (ref 31.5–35.7)
MCV RBC AUTO: 98.1 FL (ref 79–97)
MONOCYTES # BLD AUTO: 1.06 10*3/MM3 (ref 0.1–0.9)
MONOCYTES NFR BLD AUTO: 15.5 % (ref 5–12)
NEUTROPHILS NFR BLD AUTO: 4.77 10*3/MM3 (ref 1.7–7)
NEUTROPHILS NFR BLD AUTO: 70 % (ref 42.7–76)
NITRITE UR QL STRIP: POSITIVE
NRBC BLD AUTO-RTO: 0 /100 WBC (ref 0–0.2)
NT-PROBNP SERPL-MCNC: 242.9 PG/ML (ref 0–450)
PH UR STRIP.AUTO: 6 [PH] (ref 5–8)
PHOSPHATE SERPL-MCNC: 2 MG/DL (ref 2.5–4.5)
PLATELET # BLD AUTO: 177 10*3/MM3 (ref 140–450)
PMV BLD AUTO: 9.4 FL (ref 6–12)
POTASSIUM SERPL-SCNC: 2.6 MMOL/L (ref 3.5–5.2)
POTASSIUM SERPL-SCNC: 2.7 MMOL/L (ref 3.5–5.2)
PROT SERPL-MCNC: 7.1 G/DL (ref 6–8.5)
PROT UR QL STRIP: ABNORMAL
PROTHROMBIN TIME: 19.3 SECONDS (ref 11.8–14.8)
RBC # BLD AUTO: 2.64 10*6/MM3 (ref 4.14–5.8)
RBC # UR STRIP: NORMAL /HPF
REF LAB TEST METHOD: NORMAL
SODIUM SERPL-SCNC: 128 MMOL/L (ref 136–145)
SP GR UR STRIP: 1.02 (ref 1–1.03)
SQUAMOUS #/AREA URNS HPF: NORMAL /HPF
TROPONIN T DELTA: -1 NG/L
TROPONIN T SERPL HS-MCNC: 57 NG/L
UROBILINOGEN UR QL STRIP: ABNORMAL
WBC # UR STRIP: NORMAL /HPF
WBC NRBC COR # BLD AUTO: 6.83 10*3/MM3 (ref 3.4–10.8)

## 2024-07-04 PROCEDURE — 80048 BASIC METABOLIC PNL TOTAL CA: CPT | Performed by: FAMILY MEDICINE

## 2024-07-04 PROCEDURE — 83735 ASSAY OF MAGNESIUM: CPT | Performed by: FAMILY MEDICINE

## 2024-07-04 PROCEDURE — 80076 HEPATIC FUNCTION PANEL: CPT | Performed by: FAMILY MEDICINE

## 2024-07-04 PROCEDURE — 85610 PROTHROMBIN TIME: CPT | Performed by: FAMILY MEDICINE

## 2024-07-04 PROCEDURE — 74176 CT ABD & PELVIS W/O CONTRAST: CPT

## 2024-07-04 PROCEDURE — 84484 ASSAY OF TROPONIN QUANT: CPT | Performed by: FAMILY MEDICINE

## 2024-07-04 PROCEDURE — 83605 ASSAY OF LACTIC ACID: CPT | Performed by: FAMILY MEDICINE

## 2024-07-04 PROCEDURE — 25010000002 FUROSEMIDE PER 20 MG: Performed by: FAMILY MEDICINE

## 2024-07-04 PROCEDURE — 71045 X-RAY EXAM CHEST 1 VIEW: CPT

## 2024-07-04 PROCEDURE — 36415 COLL VENOUS BLD VENIPUNCTURE: CPT | Performed by: FAMILY MEDICINE

## 2024-07-04 PROCEDURE — 25010000002 LORAZEPAM PER 2 MG: Performed by: FAMILY MEDICINE

## 2024-07-04 PROCEDURE — 99285 EMERGENCY DEPT VISIT HI MDM: CPT

## 2024-07-04 PROCEDURE — 25010000002 THIAMINE HCL 200 MG/2ML SOLUTION: Performed by: FAMILY MEDICINE

## 2024-07-04 PROCEDURE — 85025 COMPLETE CBC W/AUTO DIFF WBC: CPT | Performed by: FAMILY MEDICINE

## 2024-07-04 PROCEDURE — 25010000002 MAGNESIUM SULFATE 2 GM/50ML SOLUTION: Performed by: FAMILY MEDICINE

## 2024-07-04 PROCEDURE — 25010000002 POTASSIUM CHLORIDE 10 MEQ/100ML SOLUTION: Performed by: FAMILY MEDICINE

## 2024-07-04 PROCEDURE — 84100 ASSAY OF PHOSPHORUS: CPT | Performed by: FAMILY MEDICINE

## 2024-07-04 PROCEDURE — 82077 ASSAY SPEC XCP UR&BREATH IA: CPT | Performed by: FAMILY MEDICINE

## 2024-07-04 PROCEDURE — 25010000002 POTASSIUM CHLORIDE PER 2 MEQ: Performed by: FAMILY MEDICINE

## 2024-07-04 PROCEDURE — 85730 THROMBOPLASTIN TIME PARTIAL: CPT | Performed by: FAMILY MEDICINE

## 2024-07-04 PROCEDURE — 82140 ASSAY OF AMMONIA: CPT | Performed by: NURSE PRACTITIONER

## 2024-07-04 PROCEDURE — P9047 ALBUMIN (HUMAN), 25%, 50ML: HCPCS | Performed by: NURSE PRACTITIONER

## 2024-07-04 PROCEDURE — 84132 ASSAY OF SERUM POTASSIUM: CPT | Performed by: INTERNAL MEDICINE

## 2024-07-04 PROCEDURE — 83880 ASSAY OF NATRIURETIC PEPTIDE: CPT | Performed by: FAMILY MEDICINE

## 2024-07-04 PROCEDURE — 25010000002 ONDANSETRON PER 1 MG: Performed by: NURSE PRACTITIONER

## 2024-07-04 PROCEDURE — 25810000003 SODIUM CHLORIDE 0.9 % SOLUTION: Performed by: FAMILY MEDICINE

## 2024-07-04 PROCEDURE — 25010000002 THIAMINE HCL 200 MG/2ML SOLUTION 2 ML VIAL: Performed by: NURSE PRACTITIONER

## 2024-07-04 PROCEDURE — 81001 URINALYSIS AUTO W/SCOPE: CPT | Performed by: FAMILY MEDICINE

## 2024-07-04 PROCEDURE — 25010000002 LORAZEPAM PER 2 MG: Performed by: NURSE PRACTITIONER

## 2024-07-04 PROCEDURE — 25010000002 ALBUMIN HUMAN 25% PER 50 ML: Performed by: NURSE PRACTITIONER

## 2024-07-04 RX ORDER — CHLORHEXIDINE GLUCONATE 500 MG/1
1 CLOTH TOPICAL EVERY 24 HOURS
Status: DISCONTINUED | OUTPATIENT
Start: 2024-07-05 | End: 2024-07-07

## 2024-07-04 RX ORDER — POTASSIUM CHLORIDE 7.45 MG/ML
10 INJECTION INTRAVENOUS
Status: COMPLETED | OUTPATIENT
Start: 2024-07-04 | End: 2024-07-04

## 2024-07-04 RX ORDER — SODIUM CHLORIDE 0.9 % (FLUSH) 0.9 %
10 SYRINGE (ML) INJECTION AS NEEDED
Status: DISCONTINUED | OUTPATIENT
Start: 2024-07-04 | End: 2024-07-09 | Stop reason: HOSPADM

## 2024-07-04 RX ORDER — NITROGLYCERIN 0.4 MG/1
0.4 TABLET SUBLINGUAL
Status: DISCONTINUED | OUTPATIENT
Start: 2024-07-04 | End: 2024-07-09 | Stop reason: HOSPADM

## 2024-07-04 RX ORDER — LORAZEPAM 1 MG/1
2 TABLET ORAL
Status: DISCONTINUED | OUTPATIENT
Start: 2024-07-04 | End: 2024-07-05

## 2024-07-04 RX ORDER — LORAZEPAM 2 MG/ML
4 INJECTION INTRAMUSCULAR
Status: DISCONTINUED | OUTPATIENT
Start: 2024-07-04 | End: 2024-07-05

## 2024-07-04 RX ORDER — FOLIC ACID 1 MG/1
1 TABLET ORAL DAILY
Status: DISCONTINUED | OUTPATIENT
Start: 2024-07-04 | End: 2024-07-09 | Stop reason: HOSPADM

## 2024-07-04 RX ORDER — ALBUMIN (HUMAN) 12.5 G/50ML
25 SOLUTION INTRAVENOUS ONCE
Status: COMPLETED | OUTPATIENT
Start: 2024-07-04 | End: 2024-07-04

## 2024-07-04 RX ORDER — LORAZEPAM 2 MG/ML
2 INJECTION INTRAMUSCULAR
Status: DISCONTINUED | OUTPATIENT
Start: 2024-07-04 | End: 2024-07-05

## 2024-07-04 RX ORDER — IBUPROFEN 800 MG/1
400 TABLET ORAL EVERY 4 HOURS PRN
Status: DISCONTINUED | OUTPATIENT
Start: 2024-07-04 | End: 2024-07-07

## 2024-07-04 RX ORDER — LORAZEPAM 2 MG/ML
1 INJECTION INTRAMUSCULAR
Status: DISCONTINUED | OUTPATIENT
Start: 2024-07-04 | End: 2024-07-05

## 2024-07-04 RX ORDER — ONDANSETRON 4 MG/1
4 TABLET, ORALLY DISINTEGRATING ORAL EVERY 6 HOURS PRN
Status: DISCONTINUED | OUTPATIENT
Start: 2024-07-04 | End: 2024-07-09 | Stop reason: HOSPADM

## 2024-07-04 RX ORDER — AMOXICILLIN 250 MG
2 CAPSULE ORAL 2 TIMES DAILY
Status: DISCONTINUED | OUTPATIENT
Start: 2024-07-04 | End: 2024-07-06

## 2024-07-04 RX ORDER — LORAZEPAM 1 MG/1
1 TABLET ORAL
Status: DISCONTINUED | OUTPATIENT
Start: 2024-07-04 | End: 2024-07-05

## 2024-07-04 RX ORDER — LORAZEPAM 2 MG/ML
1 INJECTION INTRAMUSCULAR EVERY 6 HOURS
Status: DISCONTINUED | OUTPATIENT
Start: 2024-07-05 | End: 2024-07-04

## 2024-07-04 RX ORDER — FUROSEMIDE 10 MG/ML
80 INJECTION INTRAMUSCULAR; INTRAVENOUS ONCE
Status: COMPLETED | OUTPATIENT
Start: 2024-07-04 | End: 2024-07-04

## 2024-07-04 RX ORDER — SODIUM CHLORIDE 9 MG/ML
40 INJECTION, SOLUTION INTRAVENOUS AS NEEDED
Status: DISCONTINUED | OUTPATIENT
Start: 2024-07-04 | End: 2024-07-09 | Stop reason: HOSPADM

## 2024-07-04 RX ORDER — CHLORHEXIDINE GLUCONATE 500 MG/1
1 CLOTH TOPICAL ONCE
Status: DISCONTINUED | OUTPATIENT
Start: 2024-07-04 | End: 2024-07-07

## 2024-07-04 RX ORDER — LORAZEPAM 2 MG/ML
2 INJECTION INTRAMUSCULAR EVERY 6 HOURS
Status: DISCONTINUED | OUTPATIENT
Start: 2024-07-04 | End: 2024-07-04

## 2024-07-04 RX ORDER — POTASSIUM CHLORIDE 29.8 MG/ML
20 INJECTION INTRAVENOUS ONCE
Status: DISCONTINUED | OUTPATIENT
Start: 2024-07-04 | End: 2024-07-04

## 2024-07-04 RX ORDER — POTASSIUM CHLORIDE 750 MG/1
40 CAPSULE, EXTENDED RELEASE ORAL EVERY 4 HOURS
Qty: 12 CAPSULE | Refills: 0 | Status: COMPLETED | OUTPATIENT
Start: 2024-07-04 | End: 2024-07-05

## 2024-07-04 RX ORDER — POLYETHYLENE GLYCOL 3350 17 G/17G
17 POWDER, FOR SOLUTION ORAL DAILY PRN
Status: DISCONTINUED | OUTPATIENT
Start: 2024-07-04 | End: 2024-07-06

## 2024-07-04 RX ORDER — LACTULOSE 20 G/30ML
20 SOLUTION ORAL 3 TIMES DAILY
Status: DISCONTINUED | OUTPATIENT
Start: 2024-07-04 | End: 2024-07-05

## 2024-07-04 RX ORDER — BISACODYL 5 MG/1
5 TABLET, DELAYED RELEASE ORAL DAILY PRN
Status: DISCONTINUED | OUTPATIENT
Start: 2024-07-04 | End: 2024-07-06

## 2024-07-04 RX ORDER — DEXMEDETOMIDINE HYDROCHLORIDE 4 UG/ML
.2-1.5 INJECTION, SOLUTION INTRAVENOUS
Status: DISCONTINUED | OUTPATIENT
Start: 2024-07-04 | End: 2024-07-06

## 2024-07-04 RX ORDER — LORAZEPAM 1 MG/1
4 TABLET ORAL
Status: DISCONTINUED | OUTPATIENT
Start: 2024-07-04 | End: 2024-07-05

## 2024-07-04 RX ORDER — SODIUM CHLORIDE 0.9 % (FLUSH) 0.9 %
10 SYRINGE (ML) INJECTION EVERY 12 HOURS SCHEDULED
Status: DISCONTINUED | OUTPATIENT
Start: 2024-07-04 | End: 2024-07-09 | Stop reason: HOSPADM

## 2024-07-04 RX ORDER — ONDANSETRON 2 MG/ML
4 INJECTION INTRAMUSCULAR; INTRAVENOUS EVERY 6 HOURS PRN
Status: DISCONTINUED | OUTPATIENT
Start: 2024-07-04 | End: 2024-07-09 | Stop reason: HOSPADM

## 2024-07-04 RX ORDER — SODIUM CHLORIDE 9 MG/ML
500 INJECTION, SOLUTION INTRAVENOUS ONCE
Status: COMPLETED | OUTPATIENT
Start: 2024-07-04 | End: 2024-07-04

## 2024-07-04 RX ORDER — THIAMINE HYDROCHLORIDE 100 MG/ML
200 INJECTION, SOLUTION INTRAMUSCULAR; INTRAVENOUS ONCE
Status: COMPLETED | OUTPATIENT
Start: 2024-07-04 | End: 2024-07-04

## 2024-07-04 RX ORDER — LORAZEPAM 2 MG/ML
1 INJECTION INTRAMUSCULAR ONCE
Status: COMPLETED | OUTPATIENT
Start: 2024-07-04 | End: 2024-07-04

## 2024-07-04 RX ORDER — THIAMINE HYDROCHLORIDE 100 MG/ML
200 INJECTION, SOLUTION INTRAMUSCULAR; INTRAVENOUS EVERY 8 HOURS SCHEDULED
Status: DISCONTINUED | OUTPATIENT
Start: 2024-07-07 | End: 2024-07-09 | Stop reason: HOSPADM

## 2024-07-04 RX ORDER — MAGNESIUM SULFATE HEPTAHYDRATE 40 MG/ML
2 INJECTION, SOLUTION INTRAVENOUS ONCE
Status: COMPLETED | OUTPATIENT
Start: 2024-07-04 | End: 2024-07-04

## 2024-07-04 RX ORDER — BISACODYL 10 MG
10 SUPPOSITORY, RECTAL RECTAL DAILY PRN
Status: DISCONTINUED | OUTPATIENT
Start: 2024-07-04 | End: 2024-07-06

## 2024-07-04 RX ADMIN — POTASSIUM CHLORIDE 10 MEQ: 7.46 INJECTION, SOLUTION INTRAVENOUS at 13:50

## 2024-07-04 RX ADMIN — MAGNESIUM SULFATE HEPTAHYDRATE 2 G: 2 INJECTION, SOLUTION INTRAVENOUS at 11:05

## 2024-07-04 RX ADMIN — Medication 10 ML: at 21:19

## 2024-07-04 RX ADMIN — POTASSIUM CHLORIDE 10 MEQ: 7.46 INJECTION, SOLUTION INTRAVENOUS at 12:12

## 2024-07-04 RX ADMIN — LORAZEPAM 1 MG: 2 INJECTION, SOLUTION INTRAMUSCULAR; INTRAVENOUS at 10:32

## 2024-07-04 RX ADMIN — SENNOSIDES AND DOCUSATE SODIUM 2 TABLET: 50; 8.6 TABLET ORAL at 20:43

## 2024-07-04 RX ADMIN — FUROSEMIDE 80 MG: 10 INJECTION, SOLUTION INTRAMUSCULAR; INTRAVENOUS at 11:14

## 2024-07-04 RX ADMIN — LACTULOSE 20 G: 20 SOLUTION ORAL at 17:28

## 2024-07-04 RX ADMIN — Medication 10 ML: at 14:07

## 2024-07-04 RX ADMIN — THIAMINE HYDROCHLORIDE 200 MG: 100 INJECTION, SOLUTION INTRAMUSCULAR; INTRAVENOUS at 11:06

## 2024-07-04 RX ADMIN — ALBUMIN (HUMAN) 25 G: 0.25 INJECTION, SOLUTION INTRAVENOUS at 14:06

## 2024-07-04 RX ADMIN — LORAZEPAM 2 MG: 2 INJECTION, SOLUTION INTRAMUSCULAR; INTRAVENOUS at 14:05

## 2024-07-04 RX ADMIN — POTASSIUM CHLORIDE 40 MEQ: 750 CAPSULE, EXTENDED RELEASE ORAL at 21:01

## 2024-07-04 RX ADMIN — THIAMINE HYDROCHLORIDE 500 MG: 100 INJECTION, SOLUTION INTRAMUSCULAR; INTRAVENOUS at 21:01

## 2024-07-04 RX ADMIN — FOLIC ACID 1 MG: 5 INJECTION, SOLUTION INTRAMUSCULAR; INTRAVENOUS; SUBCUTANEOUS at 10:22

## 2024-07-04 RX ADMIN — LACTULOSE 20 G: 20 SOLUTION ORAL at 20:44

## 2024-07-04 RX ADMIN — Medication 1 APPLICATION: at 14:07

## 2024-07-04 RX ADMIN — ONDANSETRON 4 MG: 2 INJECTION INTRAMUSCULAR; INTRAVENOUS at 21:18

## 2024-07-04 RX ADMIN — SODIUM CHLORIDE 500 ML/HR: 9 INJECTION, SOLUTION INTRAVENOUS at 10:26

## 2024-07-04 RX ADMIN — Medication 1 APPLICATION: at 20:43

## 2024-07-04 RX ADMIN — DEXMEDETOMIDINE HYDROCHLORIDE 0.2 MCG/KG/HR: 4 INJECTION, SOLUTION INTRAVENOUS at 19:39

## 2024-07-04 NOTE — ED PROVIDER NOTES
"CHIEF COMPLAINT  Chief Complaint   Patient presents with    Withdrawal    Edema     HPI  Luciano Christiansen is a 39 y.o. white male who presents with alcoholism.  Patient states he has been binge drinking for the past 2 weeks.  He drinks malt liquor and vodka.  Patient drinks at least 1/5 or more daily.  Patient states he is here today because he wants to feel \"better\".  He states he has been tremoring.  His last drink was last night around 10 PM of vodka and malt liquor.  Patient has been also exposed to strep throat.  No fevers chills or night sweats.  Positive nausea but no vomiting or diarrhea at this time.  Patient states prior to that he was sober for about 2 to 3 weeks.      EMS PRE-ARRIVAL TREATMENT:       REVIEW OF SYSTEMS  CONSTITUTIONAL:  No complaints of fever, chills,or weakness  EYES: Positive for jaundice   ENT: No complaints of sore throat or ear pain  CARDIOVASCULAR: Mild chest discomfort with bilateral lower leg swelling  RESPIRATORY:  No complaints of cough or shortness of breath  GI: Positive for nausea and vomiting and abdominal distention   MUSCULOSKELETAL:  No complaints of back pain  SKIN:  No complaints of rash  NEUROLOGIC:  No complaints of headache, focal weakness, or sensory changes  ENDOCRINE:  No complaints of polyuria or polydipsia  LYMPHATIC:  No complaints of swollen glands  GENITOURINARY: No complaints of urinary frequency or hematuria      PAST MEDICAL HISTORY  Past Medical History:   Diagnosis Date    Diabetes mellitus     Gout     Hypertension        FAMILY HISTORY  No family history on file.    SOCIAL HISTORY  Social History     Socioeconomic History    Marital status: Single   Tobacco Use    Smoking status: Some Days     Current packs/day: 0.50     Average packs/day: 0.5 packs/day for 14.5 years (7.3 ttl pk-yrs)     Types: Cigarettes     Start date: 2010    Smokeless tobacco: Never   Vaping Use    Vaping status: Every Day    Substances: Nicotine, THC    Devices: Disposable    " Passive vaping exposure: Yes   Substance and Sexual Activity    Alcohol use: Yes     Alcohol/week: 12.0 standard drinks of alcohol     Types: 12 Cans of beer per week     Comment: REPORTS TWO BEERS TODAY    Drug use: Yes     Types: Marijuana    Sexual activity: Yes       IMMUNIZATION HISTORY  Deferred to primary care physician.    SURGICAL HISTORY  Past Surgical History:   Procedure Laterality Date    VASECTOMY         CURRENT MEDICATIONS    Current Facility-Administered Medications:     furosemide (LASIX) injection 80 mg, 80 mg, Intravenous, Once, Justino Walker Jr., MD    magnesium sulfate 2g/50 mL (PREMIX) infusion, 2 g, Intravenous, Once, Justino Walker Jr., MD, 2 g at 07/04/24 1105    potassium chloride 20 mEq in 50 mL IVPB, 20 mEq, Intravenous, Once, Justino Walker Jr., MD, Last Rate: 25 mL/hr at 07/04/24 1105, 20 mEq at 07/04/24 1105    [COMPLETED] Insert Peripheral IV, , , Once **AND** sodium chloride 0.9 % flush 10 mL, 10 mL, Intravenous, PRN, Justino Walker Jr., MD    Current Outpatient Medications:     albuterol sulfate  (90 Base) MCG/ACT inhaler, Inhale 2 puffs Every 4 (Four) Hours As Needed for Wheezing., Disp: 18 g, Rfl: 0    Continuous Glucose Sensor (Dexcom G6 Sensor), Use Every 10 (Ten) Days., Disp: 3 each, Rfl: 0    folic acid (FOLVITE) 1 MG tablet, Take 1 tablet by mouth Daily., Disp: 30 tablet, Rfl: 2    insulin glargine (Lantus) 100 UNIT/ML injection, Inject 5 Units under the skin into the appropriate area as directed Every Night., Disp: 3 mL, Rfl: 0    lactulose 20 GM/30ML solution solution, Take 15 mL by mouth Daily., Disp: 450 mL, Rfl: 0    melatonin 3 MG tablet, Take 1 tablet by mouth At Night As Needed for Sleep., Disp: 30 tablet, Rfl: 0    pantoprazole (PROTONIX) 40 MG EC tablet, Take 1 tablet by mouth Daily., Disp: 30 tablet, Rfl: 2    pentoxifylline (TRENtal) 400 MG CR tablet, Take 1 tablet by mouth 3 (Three) Times a Day With Meals., Disp: 90 tablet, Rfl:  "2    QUEtiapine (SEROquel) 50 MG tablet, Take 1 tablet by mouth Every Night., Disp: 30 tablet, Rfl: 0    thiamine (VITAMIN B1) 100 MG tablet, Take 1 tablet by mouth Daily., Disp: 30 tablet, Rfl: 2    thiamine (VITAMIN B1) 100 MG tablet, Take 1 tablet by mouth Daily., Disp: 30 tablet, Rfl: 0    ALLERGIES  No Known Allergies    PHYSICAL EXAM  VITAL SIGNS:   /57   Pulse 101   Temp 97.7 °F (36.5 °C)   Resp 20   Ht 188 cm (74\")   Wt (!) 159 kg (350 lb)   SpO2 99%   BMI 44.94 kg/m²     Constitutional: Well developed. Well nourished. Alert.  HEENT: Normocephalic. Atraumatic. Oropharynx clear. Bilateral external ears normal. Oropharynx moist. Uvula in the midline position. Nose normal.     Eyes: PERRLA. EOMI. positive jaundice is noted    Neck: Supple. Normal range of motion. No tenderness. Trachea midline.    Cardiovascular: 1 S2 with a diastolic murmur with bilateral lower extremity pitting edema.    Thorax & Lungs: Equal bilateral breath sounds. No respiratory distress. No retractions. No wheezes. No rales. No rhonchi.     Skin: Warm and very jaundiced. No erythema. No rash. Normal color.     Abdomen: Distended abdomen mildly rigid.  Positive fluid wave is noted.  Fusilli tender.  But no rebound or guarding.    Extremities: Intact distal pulses. No edema. No tenderness. No deformities noted.     Musculoskeletal: Bilateral lower extremity pitting edema is noted.       Neurologic: Alert & appropriate. Normal motor function. Normal sensory function. No focal deficits noted. Cranial nerves intact. Speech is clear.         RADIOLOGY/PROCEDURES        XR Chest 1 View   Final Result   1. No acute disease.                       This report was signed and finalized on 7/4/2024 10:40 AM by Dr. Harmeet Jarrell MD.                 FUTURE APPOINTMENTS     No future appointments.           COURSE & MEDICAL DECISION MAKING     Patient's partial differential diagnosis can include:    Withdrawal, alcohol intoxication, " alcohol dependence, alcohol hepatitis, pancreatitis, electrolyte abnormalities, and others    Due to patient's electrolyte abnormalities and intoxication from alcohol as well as symptoms, patient will be admitted to the ICU.  Discussed this with Dr. Joshi who will admit the patient to the ICU.  Due to patient's electrolyte abnormality will start to replace the magnesium, potassium        Patient's level of risk: High        CRITICAL CARE    CRITICAL CARE: No    CRITICAL CARE TIME: None        Also Old charts were reviewed per [facility] EMR.  Pertinent details are summarized above.  All laboratory, radiologic, and EKG studies that were performed in the Emergency Department were a necessary part of the evaluation needed to exclude unstable or  emergent medical conditions:     Patient was hemodynamically and neurologically stable in the ED.   Pertinent studies were reviewed as above.     Recent Results (from the past 24 hour(s))   Protime-INR    Collection Time: 07/04/24 10:20 AM    Specimen: Blood   Result Value Ref Range    Protime 19.3 (H) 11.8 - 14.8 Seconds    INR 1.56 (H) 0.91 - 1.09   aPTT    Collection Time: 07/04/24 10:20 AM    Specimen: Blood   Result Value Ref Range    PTT 49.3 (H) 24.5 - 36.0 seconds   Ethanol    Collection Time: 07/04/24 10:20 AM    Specimen: Blood   Result Value Ref Range    Ethanol % 0.175 %   Basic Metabolic Panel    Collection Time: 07/04/24 10:20 AM    Specimen: Blood   Result Value Ref Range    Glucose 137 (H) 65 - 99 mg/dL    BUN 7 6 - 20 mg/dL    Creatinine 0.65 (L) 0.76 - 1.27 mg/dL    Sodium 128 (L) 136 - 145 mmol/L    Potassium 2.7 (L) 3.5 - 5.2 mmol/L    Chloride 85 (L) 98 - 107 mmol/L    CO2 26.0 22.0 - 29.0 mmol/L    Calcium 8.0 (L) 8.6 - 10.5 mg/dL    BUN/Creatinine Ratio 10.8 7.0 - 25.0    Anion Gap 17.0 (H) 5.0 - 15.0 mmol/L    eGFR 122.9 >60.0 mL/min/1.73   Hepatic Function Panel    Collection Time: 07/04/24 10:20 AM    Specimen: Blood   Result Value Ref Range    Total  Protein 7.1 6.0 - 8.5 g/dL    Albumin 3.5 3.5 - 5.2 g/dL    ALT (SGPT) 41 1 - 41 U/L    AST (SGOT) 106 (H) 1 - 40 U/L    Alkaline Phosphatase 241 (H) 39 - 117 U/L    Total Bilirubin 6.1 (H) 0.0 - 1.2 mg/dL    Bilirubin, Direct 3.4 (H) 0.0 - 0.3 mg/dL    Bilirubin, Indirect 2.7 mg/dL   BNP    Collection Time: 07/04/24 10:20 AM    Specimen: Blood   Result Value Ref Range    proBNP 242.9 0.0 - 450.0 pg/mL   Single High Sensitivity Troponin T    Collection Time: 07/04/24 10:20 AM    Specimen: Blood   Result Value Ref Range    HS Troponin T 57 (C) <22 ng/L   Lactic Acid, Plasma    Collection Time: 07/04/24 10:20 AM    Specimen: Blood   Result Value Ref Range    Lactate 7.4 (C) 0.5 - 2.0 mmol/L   Magnesium    Collection Time: 07/04/24 10:20 AM    Specimen: Blood   Result Value Ref Range    Magnesium 1.5 (L) 1.6 - 2.6 mg/dL   Phosphorus    Collection Time: 07/04/24 10:20 AM    Specimen: Blood   Result Value Ref Range    Phosphorus 2.0 (L) 2.5 - 4.5 mg/dL   CBC Auto Differential    Collection Time: 07/04/24 10:20 AM    Specimen: Blood   Result Value Ref Range    WBC 6.83 3.40 - 10.80 10*3/mm3    RBC 2.64 (L) 4.14 - 5.80 10*6/mm3    Hemoglobin 9.0 (L) 13.0 - 17.7 g/dL    Hematocrit 25.9 (L) 37.5 - 51.0 %    MCV 98.1 (H) 79.0 - 97.0 fL    MCH 34.1 (H) 26.6 - 33.0 pg    MCHC 34.7 31.5 - 35.7 g/dL    RDW 16.6 (H) 12.3 - 15.4 %    RDW-SD 59.8 (H) 37.0 - 54.0 fl    MPV 9.4 6.0 - 12.0 fL    Platelets 177 140 - 450 10*3/mm3    Neutrophil % 70.0 42.7 - 76.0 %    Lymphocyte % 12.7 (L) 19.6 - 45.3 %    Monocyte % 15.5 (H) 5.0 - 12.0 %    Eosinophil % 1.0 0.3 - 6.2 %    Basophil % 0.4 0.0 - 1.5 %    Immature Grans % 0.4 0.0 - 0.5 %    Neutrophils, Absolute 4.77 1.70 - 7.00 10*3/mm3    Lymphocytes, Absolute 0.87 0.70 - 3.10 10*3/mm3    Monocytes, Absolute 1.06 (H) 0.10 - 0.90 10*3/mm3    Eosinophils, Absolute 0.07 0.00 - 0.40 10*3/mm3    Basophils, Absolute 0.03 0.00 - 0.20 10*3/mm3    Immature Grans, Absolute 0.03 0.00 - 0.05 10*3/mm3     nRBC 0.0 0.0 - 0.2 /100 WBC       The patient received:  Medications   sodium chloride 0.9 % flush 10 mL (has no administration in time range)   magnesium sulfate 2g/50 mL (PREMIX) infusion (2 g Intravenous New Bag 7/4/24 1105)   potassium chloride 20 mEq in 50 mL IVPB (20 mEq Intravenous New Bag 7/4/24 1105)   furosemide (LASIX) injection 80 mg (has no administration in time range)   LORazepam (ATIVAN) injection 1 mg (1 mg Intravenous Given 7/4/24 1032)   sodium chloride 0.9 % infusion (500 mL/hr Intravenous New Bag 7/4/24 1026)   folic acid 1 mg in sodium chloride 0.9 % 50 mL IVPB (0 mg Intravenous Stopped 7/4/24 1103)   thiamine (B-1) injection 200 mg (200 mg Intravenous Given 7/4/24 1106)            Disposition: Admit      Dragon disclaimer:  Part of this note may be an electronic transcription/translation of spoken language to printed text using the Dragon Dictation System.     I have reviewed the patient’s prescription history via a prescription monitoring program.  This information is consistent with my knowledge of the patient’s controlled substance use history.    Patient evaluate during Coronavirus Pandemic. Isolation practices followed according to [facility] policy.     FINAL IMPRESSION   Diagnosis Plan   1. Alcoholic intoxication without complication        2. Hyponatremia        3. Hypomagnesemia        4. Hypokalemia        5. Hyperbilirubinemia        6. Hypophosphatemia        7. Elevated troponin        8. Jaundice              MD Aaron Florence Jr, Thomas Mark Jr., MD  07/04/24 [id number]

## 2024-07-04 NOTE — H&P
[facility] Intensivist Services    Date of Admission: 7/4/2024  Date of Note: 07/04/24  Primary Care Physician: Jossy Christiansen APRN    History     39 y.o. male with EtOH abuse/alcoholism and alcoholic steatohepatitis, presented to the hospital today with concern for withdrawal.  Patient states he previously had gotten sober after last hospitalization, but reports going back to binge drinking malt liquor and vodka.  States he drinks 1/5 of vodka a day.  Patient noticed he was tremulous this morning after not drinking since 10 PM yesterday evening.  He presented to the ER, and found to have electrolyte abnormalities including potassium of 2.7, magnesium of 1.5, and sodium of 128.  Lactic acid elevated at 7.4.  Bilirubin 6.1, , INR 1.56.  Afebrile, WBC normal, no sign of infectious process.  Ethanol level 0.175.  Given patient's electrolyte and abnormalities and intoxication with concern for withdrawal, the intensivist team was requested to admit the patient for critical care management.  Upon arrival to the ICU, patient drowsy, no distress.  Vital signs stable, no pressors.  No oxygenation issues.  With significant jaundice.  Abdomen distended and lower extremities edematous.  Complaining of malaise, feeling unwell..  Past Medical History     Active and Resolved Problems  Active Hospital Problems    Diagnosis  POA    **Alcohol withdrawal [F10.939]  Yes    Alcoholic steatohepatitis [K70.10]  Unknown      Resolved Hospital Problems   No resolved problems to display.       Past Medical History:   Past Medical History:   Diagnosis Date    Diabetes mellitus     Gout     Hypertension        Prior Surgeries: He  has a past surgical history that includes Vasectomy.    Past Surgical History:   Past Surgical History:   Procedure Laterality Date    VASECTOMY         Social and Family History     Family History:  family history is not on file.    Tobacco/Social History:  reports  that he has been smoking cigarettes. He started smoking about 14 years ago. He has a 7.3 pack-year smoking history. He has never used smokeless tobacco. He reports current alcohol use of about 12.0 standard drinks of alcohol per week. He reports current drug use. Drug: Marijuana.    Allergies     Allergies:   He has No Known Allergies.    No Known Allergies    Labs     Basic Labs:  Common labs          5/8/2024    04:44 5/9/2024    10:50 7/4/2024    10:20   Common Labs   Glucose 107  116  137    BUN 8  10  7    Creatinine 0.23  0.37  0.65    Sodium 136  138  128    Potassium 3.7  3.6  2.7    Chloride 102  101  85    Calcium 8.4  8.4  8.0    Albumin 2.7  2.8  3.5    Total Bilirubin 22.3  26.2  6.1    Alkaline Phosphatase 210  205  241    AST (SGOT) 181  195  106    ALT (SGPT) 77  80  41    WBC 7.68  10.47  6.83    Hemoglobin 10.1  10.6  9.0    Hematocrit 31.3  32.5  25.9    Platelets 216  220  177        Diabetic:  A1C Last 3 Results          2/19/2024    08:47 4/22/2024    00:20   HGBA1C Last 3 Results   Hemoglobin A1C 11.20  10.40        Inpatient Medications     Medications: Scheduled Meds:Chlorhexidine Gluconate Cloth, 1 Application, Topical, Once  [START ON 7/5/2024] Chlorhexidine Gluconate Cloth, 1 Application, Topical, Q24H  folic acid, 1 mg, Oral, Daily  lactulose, 20 g, Oral, TID  mupirocin, 1 Application, Each Nare, BID  senna-docusate sodium, 2 tablet, Oral, BID  sodium chloride, 10 mL, Intravenous, Q12H  thiamine (B-1) IV, 500 mg, Intravenous, Q8H   Followed by  [START ON 7/7/2024] thiamine (B-1) IV, 200 mg, Intravenous, Q8H   Followed by  [START ON 7/11/2024] thiamine, 100 mg, Oral, Daily      Continuous Infusions:   PRN Meds:.  senna-docusate sodium **AND** polyethylene glycol **AND** bisacodyl **AND** bisacodyl    ibuprofen    LORazepam **OR** LORazepam **OR** LORazepam **OR** LORazepam **OR** LORazepam **OR** LORazepam **OR** LORazepam **OR** LORazepam    Magnesium Standard Dose Replacement - Follow  "Nurse / BPA Driven Protocol    nitroglycerin    ondansetron ODT **OR** ondansetron    [COMPLETED] Insert Peripheral IV **AND** sodium chloride    sodium chloride    sodium chloride    I have reviewed the patient's current medications.   Outpatient Medications     Outpatient Medications:   No outpatient medications have been marked as taking for the 7/4/24 encounter (Hospital Encounter).       Current Antibiotics     This patient does not have an active medication from one of the medication groupers.    Exam     Vitals: His  height is 188 cm (74\") and weight is 158 kg (348 lb 5.2 oz) (abnormal). His oral temperature is 98 °F (36.7 °C). His blood pressure is 128/62 and his pulse is 105. His respiration is 20 and oxygen saturation is 97%.     GENERAL: Drowsy, NAD  SKIN:  Warm, jaundiced   EYES:  Pupils equal, round and reactive to light.  Scleral icterus  HEAD:  Normocephalic.  RESP:  Lungs clear to auscultation. Good airflow. Normal respiratory effort.   CARDIAC:  Regular rate and rhythm. Normal S1,S2.  Anasarca   GI: Firm, distended   MSK:  Normal muscle bulk, tone  NEUROLOGICAL:  No focal neurological deficits.      Results and Cultures Review     Result Review:  I have personally reviewed the results from the time of this admission to 7/4/2024 17:36 CDT and agree with these findings:  [x]  Laboratory list / accordion  []  Microbiology  [x]  Radiology  [x]  EKG/Telemetry   [x]  Cardiology/Vascular   []  Pathology  [x]  Old records  []  Other:  Most notable findings include: Lactic acid 7.4, ammonia 125, sodium 128, potassium 2.7, magnesium 1.5, INR 1.56, bilirubin 6.1, ethanol level 0.175        Assessment/Plan   39-year-old male with alcoholism, alcoholic steatohepatitis admitted to ICU with alcoholic intoxication, electrolyte abnormalities, metabolic/hepatic encephalopathy, and elevated lactic acid level.  Concerns for withdrawal.    Alcoholism/alcohol withdrawal  -Ethanol level as above, intoxicated  -Patient is " tremulous and feeling unwell  -Initiate CIWA protocol, folic acid, thiamine  -As needed Ativan    Hepatic encephalopathy  -Ammonia level 125  -Start lactulose 3 times daily    Lactic acidemia  -Obtain CT abdomen pelvis, consistent with body wall and mesenteric edema, volume overload  -Give concentrated albumin for vascular resuscitation  -Trend lactic acid, last 6.4, down from 7.4    Hypokalemia  -Repleting potassium, follow with serial metabolic panel and further repletion as indicated    Hyponatremia, beer Potomania  -Hyponatremia likely from beer potomania, will improve with nutrition    Hypomagnesemia  -Replating magnesium, follow serial metabolic panel and further patient as indicated    VTE Prophylaxis:    Mechanical VTE prophylaxis orders are present.    CODE STATUS: Full resuscitation    Total critical care time: Approximately 45 minutes    Due to a high probability of clinically significant, life threatening deterioration, the patient required my highest level of preparedness to intervene emergently and I personally spent this critical care time directly and personally managing the patient.     This critical care time included obtaining a history; examining the patient; pulse oximetry; ordering and review of studies; arranging urgent treatment with development of a management plan; evaluation of patient's response to treatment; frequent reassessment; and, discussions with other providers.    This critical care time was performed to assess and manage the high probability of imminent, life-threatening deterioration that could result in multi-organ failure. It was exclusive of separately billable procedures and treating other patients and teaching time.    Please see MDM section and the rest of the note for further information on patient assessment and treatment.    Part of this note may be an electronic transcription/translation of spoken language to printed text using the Dragon Dictation  System.    Electronically signed by LÁZARO Pabon on 7/4/2024 at 18:07 CDT

## 2024-07-05 ENCOUNTER — APPOINTMENT (OUTPATIENT)
Dept: CARDIOLOGY | Facility: HOSPITAL | Age: 39
End: 2024-07-05
Payer: COMMERCIAL

## 2024-07-05 LAB
ANION GAP SERPL CALCULATED.3IONS-SCNC: 13 MMOL/L (ref 5–15)
BASOPHILS # BLD AUTO: 0.01 10*3/MM3 (ref 0–0.2)
BASOPHILS NFR BLD AUTO: 0.2 % (ref 0–1.5)
BH CV ECHO MEAS - AO MAX PG: 12.8 MMHG
BH CV ECHO MEAS - AO MEAN PG: 6.7 MMHG
BH CV ECHO MEAS - AO ROOT DIAM: 3.2 CM
BH CV ECHO MEAS - AO V2 MAX: 179 CM/SEC
BH CV ECHO MEAS - AO V2 VTI: 30.4 CM
BH CV ECHO MEAS - AVA(I,D): 3.9 CM2
BH CV ECHO MEAS - EDV(CUBED): 331.4 ML
BH CV ECHO MEAS - EDV(MOD-SP2): 209 ML
BH CV ECHO MEAS - EDV(MOD-SP4): 186 ML
BH CV ECHO MEAS - EF(MOD-BP): 62.3 %
BH CV ECHO MEAS - EF(MOD-SP2): 58.4 %
BH CV ECHO MEAS - EF(MOD-SP4): 65.8 %
BH CV ECHO MEAS - ESV(CUBED): 57.5 ML
BH CV ECHO MEAS - ESV(MOD-SP2): 86.9 ML
BH CV ECHO MEAS - ESV(MOD-SP4): 63.6 ML
BH CV ECHO MEAS - FS: 44.2 %
BH CV ECHO MEAS - IVS/LVPW: 0.7 CM
BH CV ECHO MEAS - IVSD: 0.64 CM
BH CV ECHO MEAS - LA DIMENSION: 5 CM
BH CV ECHO MEAS - LAT PEAK E' VEL: 13.2 CM/SEC
BH CV ECHO MEAS - LV MASS(C)D: 229 GRAMS
BH CV ECHO MEAS - LV MAX PG: 8.2 MMHG
BH CV ECHO MEAS - LV MEAN PG: 5 MMHG
BH CV ECHO MEAS - LV V1 MAX: 143 CM/SEC
BH CV ECHO MEAS - LV V1 VTI: 26.2 CM
BH CV ECHO MEAS - LVIDD: 6.9 CM
BH CV ECHO MEAS - LVIDS: 3.9 CM
BH CV ECHO MEAS - LVOT AREA: 4.5 CM2
BH CV ECHO MEAS - LVOT DIAM: 2.4 CM
BH CV ECHO MEAS - LVPWD: 0.91 CM
BH CV ECHO MEAS - MED PEAK E' VEL: 8.7 CM/SEC
BH CV ECHO MEAS - MV A MAX VEL: 60.6 CM/SEC
BH CV ECHO MEAS - MV DEC TIME: 0.16 SEC
BH CV ECHO MEAS - MV E MAX VEL: 138 CM/SEC
BH CV ECHO MEAS - MV E/A: 2.28
BH CV ECHO MEAS - RAP SYSTOLE: 15 MMHG
BH CV ECHO MEAS - RVSP: 32.8 MMHG
BH CV ECHO MEAS - SV(LVOT): 118.5 ML
BH CV ECHO MEAS - SV(MOD-SP2): 122.1 ML
BH CV ECHO MEAS - SV(MOD-SP4): 122.4 ML
BH CV ECHO MEAS - TAPSE (>1.6): 2.25 CM
BH CV ECHO MEAS - TR MAX PG: 17.8 MMHG
BH CV ECHO MEAS - TR MAX VEL: 211 CM/SEC
BH CV ECHO MEASUREMENTS AVERAGE E/E' RATIO: 12.6
BH CV XLRA - RV BASE: 5.1 CM
BH CV XLRA - RV LENGTH: 9.3 CM
BH CV XLRA - RV MID: 4.3 CM
BH CV XLRA - TDI S': 13.2 CM/SEC
BUN SERPL-MCNC: 5 MG/DL (ref 6–20)
BUN/CREAT SERPL: 8.1 (ref 7–25)
CALCIUM SPEC-SCNC: 7.8 MG/DL (ref 8.6–10.5)
CHLORIDE SERPL-SCNC: 90 MMOL/L (ref 98–107)
CO2 SERPL-SCNC: 29 MMOL/L (ref 22–29)
CREAT SERPL-MCNC: 0.62 MG/DL (ref 0.76–1.27)
D-LACTATE SERPL-SCNC: 2.1 MMOL/L (ref 0.5–2)
D-LACTATE SERPL-SCNC: 3.3 MMOL/L (ref 0.5–2)
DEPRECATED RDW RBC AUTO: 61.7 FL (ref 37–54)
EGFRCR SERPLBLD CKD-EPI 2021: 124.7 ML/MIN/1.73
EOSINOPHIL # BLD AUTO: 0.08 10*3/MM3 (ref 0–0.4)
EOSINOPHIL NFR BLD AUTO: 2 % (ref 0.3–6.2)
ERYTHROCYTE [DISTWIDTH] IN BLOOD BY AUTOMATED COUNT: 17.1 % (ref 12.3–15.4)
GLUCOSE BLDC GLUCOMTR-MCNC: 135 MG/DL (ref 70–130)
GLUCOSE SERPL-MCNC: 114 MG/DL (ref 65–99)
HCT VFR BLD AUTO: 22.4 % (ref 37.5–51)
HCT VFR BLD AUTO: 23.8 % (ref 37.5–51)
HCT VFR BLD AUTO: 24.2 % (ref 37.5–51)
HCT VFR BLD AUTO: 24.6 % (ref 37.5–51)
HEMOCCULT STL QL: POSITIVE
HGB BLD-MCNC: 7.7 G/DL (ref 13–17.7)
HGB BLD-MCNC: 8.1 G/DL (ref 13–17.7)
HGB BLD-MCNC: 8.2 G/DL (ref 13–17.7)
HGB BLD-MCNC: 8.3 G/DL (ref 13–17.7)
IMM GRANULOCYTES # BLD AUTO: 0.01 10*3/MM3 (ref 0–0.05)
IMM GRANULOCYTES NFR BLD AUTO: 0.2 % (ref 0–0.5)
LEFT ATRIUM VOLUME INDEX: 33.4 ML/M2
LEFT ATRIUM VOLUME: 91.8 ML
LYMPHOCYTES # BLD AUTO: 0.72 10*3/MM3 (ref 0.7–3.1)
LYMPHOCYTES NFR BLD AUTO: 18 % (ref 19.6–45.3)
MAGNESIUM SERPL-MCNC: 1.4 MG/DL (ref 1.6–2.6)
MCH RBC QN AUTO: 34.1 PG (ref 26.6–33)
MCHC RBC AUTO-ENTMCNC: 34.4 G/DL (ref 31.5–35.7)
MCV RBC AUTO: 99.1 FL (ref 79–97)
MONOCYTES # BLD AUTO: 0.56 10*3/MM3 (ref 0.1–0.9)
MONOCYTES NFR BLD AUTO: 14 % (ref 5–12)
NEUTROPHILS NFR BLD AUTO: 2.63 10*3/MM3 (ref 1.7–7)
NEUTROPHILS NFR BLD AUTO: 65.6 % (ref 42.7–76)
NRBC BLD AUTO-RTO: 0 /100 WBC (ref 0–0.2)
PHOSPHATE SERPL-MCNC: 2.1 MG/DL (ref 2.5–4.5)
PLATELET # BLD AUTO: 131 10*3/MM3 (ref 140–450)
PMV BLD AUTO: 10.2 FL (ref 6–12)
POTASSIUM SERPL-SCNC: 2.6 MMOL/L (ref 3.5–5.2)
POTASSIUM SERPL-SCNC: 3 MMOL/L (ref 3.5–5.2)
POTASSIUM SERPL-SCNC: 3.2 MMOL/L (ref 3.5–5.2)
RBC # BLD AUTO: 2.26 10*6/MM3 (ref 4.14–5.8)
SODIUM SERPL-SCNC: 132 MMOL/L (ref 136–145)
WBC NRBC COR # BLD AUTO: 4.01 10*3/MM3 (ref 3.4–10.8)

## 2024-07-05 PROCEDURE — 0T9B70Z DRAINAGE OF BLADDER WITH DRAINAGE DEVICE, VIA NATURAL OR ARTIFICIAL OPENING: ICD-10-PCS | Performed by: INTERNAL MEDICINE

## 2024-07-05 PROCEDURE — 85018 HEMOGLOBIN: CPT | Performed by: NURSE PRACTITIONER

## 2024-07-05 PROCEDURE — 93306 TTE W/DOPPLER COMPLETE: CPT | Performed by: INTERNAL MEDICINE

## 2024-07-05 PROCEDURE — 83605 ASSAY OF LACTIC ACID: CPT | Performed by: FAMILY MEDICINE

## 2024-07-05 PROCEDURE — 25010000002 LORAZEPAM PER 2 MG: Performed by: NURSE PRACTITIONER

## 2024-07-05 PROCEDURE — 25510000001 PERFLUTREN PROTEIN A MICROSPH SUSPENSION: Performed by: INTERNAL MEDICINE

## 2024-07-05 PROCEDURE — 84100 ASSAY OF PHOSPHORUS: CPT | Performed by: NURSE PRACTITIONER

## 2024-07-05 PROCEDURE — 82948 REAGENT STRIP/BLOOD GLUCOSE: CPT

## 2024-07-05 PROCEDURE — 84132 ASSAY OF SERUM POTASSIUM: CPT | Performed by: INTERNAL MEDICINE

## 2024-07-05 PROCEDURE — 82272 OCCULT BLD FECES 1-3 TESTS: CPT | Performed by: NURSE PRACTITIONER

## 2024-07-05 PROCEDURE — 85014 HEMATOCRIT: CPT | Performed by: NURSE PRACTITIONER

## 2024-07-05 PROCEDURE — 80048 BASIC METABOLIC PNL TOTAL CA: CPT | Performed by: NURSE PRACTITIONER

## 2024-07-05 PROCEDURE — 93306 TTE W/DOPPLER COMPLETE: CPT

## 2024-07-05 PROCEDURE — 25010000002 MAGNESIUM SULFATE 2 GM/50ML SOLUTION: Performed by: INTERNAL MEDICINE

## 2024-07-05 PROCEDURE — 25010000002 THIAMINE HCL 200 MG/2ML SOLUTION 2 ML VIAL: Performed by: NURSE PRACTITIONER

## 2024-07-05 PROCEDURE — 85025 COMPLETE CBC W/AUTO DIFF WBC: CPT | Performed by: NURSE PRACTITIONER

## 2024-07-05 PROCEDURE — 83735 ASSAY OF MAGNESIUM: CPT | Performed by: NURSE PRACTITIONER

## 2024-07-05 PROCEDURE — 84132 ASSAY OF SERUM POTASSIUM: CPT | Performed by: NURSE PRACTITIONER

## 2024-07-05 RX ORDER — HYDROXYZINE HYDROCHLORIDE 25 MG/1
25 TABLET, FILM COATED ORAL 3 TIMES DAILY PRN
Status: DISCONTINUED | OUTPATIENT
Start: 2024-07-05 | End: 2024-07-09 | Stop reason: HOSPADM

## 2024-07-05 RX ORDER — POTASSIUM CHLORIDE 750 MG/1
40 CAPSULE, EXTENDED RELEASE ORAL EVERY 4 HOURS
Status: COMPLETED | OUTPATIENT
Start: 2024-07-05 | End: 2024-07-05

## 2024-07-05 RX ORDER — MAGNESIUM SULFATE HEPTAHYDRATE 40 MG/ML
2 INJECTION, SOLUTION INTRAVENOUS
Status: COMPLETED | OUTPATIENT
Start: 2024-07-05 | End: 2024-07-05

## 2024-07-05 RX ORDER — LACTULOSE 20 G/30ML
20 SOLUTION ORAL DAILY
Status: DISCONTINUED | OUTPATIENT
Start: 2024-07-06 | End: 2024-07-06

## 2024-07-05 RX ORDER — PANTOPRAZOLE SODIUM 40 MG/10ML
40 INJECTION, POWDER, LYOPHILIZED, FOR SOLUTION INTRAVENOUS EVERY 12 HOURS SCHEDULED
Status: DISCONTINUED | OUTPATIENT
Start: 2024-07-05 | End: 2024-07-06 | Stop reason: SDUPTHER

## 2024-07-05 RX ADMIN — FOLIC ACID 1 MG: 1 TABLET ORAL at 08:58

## 2024-07-05 RX ADMIN — POTASSIUM CHLORIDE 40 MEQ: 750 CAPSULE, EXTENDED RELEASE ORAL at 17:32

## 2024-07-05 RX ADMIN — PANTOPRAZOLE SODIUM 40 MG: 40 INJECTION, POWDER, FOR SOLUTION INTRAVENOUS at 10:22

## 2024-07-05 RX ADMIN — THIAMINE HYDROCHLORIDE 500 MG: 100 INJECTION, SOLUTION INTRAMUSCULAR; INTRAVENOUS at 21:00

## 2024-07-05 RX ADMIN — LACTULOSE 20 G: 20 SOLUTION ORAL at 10:19

## 2024-07-05 RX ADMIN — THIAMINE HYDROCHLORIDE 500 MG: 100 INJECTION, SOLUTION INTRAMUSCULAR; INTRAVENOUS at 14:27

## 2024-07-05 RX ADMIN — Medication 1 APPLICATION: at 20:40

## 2024-07-05 RX ADMIN — Medication 10 ML: at 20:40

## 2024-07-05 RX ADMIN — IBUPROFEN 400 MG: 800 TABLET, FILM COATED ORAL at 10:31

## 2024-07-05 RX ADMIN — THIAMINE HYDROCHLORIDE 500 MG: 100 INJECTION, SOLUTION INTRAMUSCULAR; INTRAVENOUS at 06:21

## 2024-07-05 RX ADMIN — MAGNESIUM SULFATE HEPTAHYDRATE 2 G: 2 INJECTION, SOLUTION INTRAVENOUS at 06:49

## 2024-07-05 RX ADMIN — MAGNESIUM SULFATE HEPTAHYDRATE 2 G: 2 INJECTION, SOLUTION INTRAVENOUS at 02:45

## 2024-07-05 RX ADMIN — HUMAN ALBUMIN MICROSPHERES AND PERFLUTREN 0.66 MG: 10; .22 INJECTION, SOLUTION INTRAVENOUS at 10:54

## 2024-07-05 RX ADMIN — CHLORHEXIDINE GLUCONATE 1 APPLICATION: 500 CLOTH TOPICAL at 04:28

## 2024-07-05 RX ADMIN — HYDROXYZINE HYDROCHLORIDE 25 MG: 25 TABLET ORAL at 20:49

## 2024-07-05 RX ADMIN — MAGNESIUM SULFATE HEPTAHYDRATE 2 G: 2 INJECTION, SOLUTION INTRAVENOUS at 04:28

## 2024-07-05 RX ADMIN — POTASSIUM CHLORIDE 40 MEQ: 750 CAPSULE, EXTENDED RELEASE ORAL at 02:14

## 2024-07-05 RX ADMIN — PANTOPRAZOLE SODIUM 40 MG: 40 INJECTION, POWDER, FOR SOLUTION INTRAVENOUS at 20:40

## 2024-07-05 RX ADMIN — POTASSIUM CHLORIDE 40 MEQ: 750 CAPSULE, EXTENDED RELEASE ORAL at 12:28

## 2024-07-05 RX ADMIN — LORAZEPAM 1 MG: 2 INJECTION, SOLUTION INTRAMUSCULAR; INTRAVENOUS at 09:45

## 2024-07-05 RX ADMIN — POTASSIUM CHLORIDE 40 MEQ: 750 CAPSULE, EXTENDED RELEASE ORAL at 20:39

## 2024-07-05 RX ADMIN — POTASSIUM CHLORIDE 40 MEQ: 750 CAPSULE, EXTENDED RELEASE ORAL at 06:21

## 2024-07-05 RX ADMIN — Medication 10 ML: at 09:01

## 2024-07-05 RX ADMIN — Medication 1 APPLICATION: at 09:01

## 2024-07-05 NOTE — CONSULTS
[facility]  Initial Inpatient Consult Note    Referring Provider: Hospitalist Physician    Reason for Consultation: Anemia    Subjective     History of present illness:    39 y.o. male with EtOH abuse/alcoholism and alcoholic steatohepatitis, presented to the hospital today with concern for withdrawal.  Patient states he previously had gotten sober after last hospitalization, but reports going back to binge drinking malt liquor and vodka.  States he drinks 1/5 of vodka a day.  Patient noticed he was tremulous this morning after not drinking since 10 PM yesterday evening.  He presented to the ER, and found to have electrolyte abnormalities including potassium of 2.7, magnesium of 1.5, and sodium of 128.  Lactic acid elevated at 7.4.  Bilirubin 6.1, , INR 1.56.  Afebrile, WBC normal, no sign of infectious process.  Ethanol level 0.175.  Given patient's electrolyte and abnormalities and intoxication with concern for withdrawal, the intensivist team was requested to admit the patient for critical care management.  Upon arrival to the ICU, patient drowsy, no distress.  Vital signs stable, no pressors.  No oxygenation issues.  With significant jaundice.  Abdomen distended and lower extremities edematous.  Complaining of malaise, feeling unwell..     Patient at bedside tolerating a regular diet he says he has not had any history of GI bleeding and has had no prior studies    Past Medical History:  Past Medical History:   Diagnosis Date    Diabetes mellitus     Gout     Hypertension      Past Surgical History:  Past Surgical History:   Procedure Laterality Date    VASECTOMY        Social History:   Social History     Tobacco Use    Smoking status: Some Days     Current packs/day: 0.50     Average packs/day: 0.5 packs/day for 14.5 years (7.3 ttl pk-yrs)     Types: Cigarettes     Start date: 2010    Smokeless tobacco: Never   Substance Use Topics    Alcohol use: Yes     Alcohol/week: 12.0  standard drinks of alcohol     Types: 12 Cans of beer per week     Comment: REPORTS TWO BEERS TODAY      Family History:  History reviewed. No pertinent family history.    Home Meds:  Medications Prior to Admission   Medication Sig Dispense Refill Last Dose    folic acid (FOLVITE) 1 MG tablet Take 1 tablet by mouth Daily. 30 tablet 2 Past Week    insulin glargine (Lantus) 100 UNIT/ML injection Inject 5 Units under the skin into the appropriate area as directed Every Night. 3 mL 0 Past Week    pantoprazole (PROTONIX) 40 MG EC tablet Take 1 tablet by mouth Daily. 30 tablet 2 Past Week    pentoxifylline (TRENtal) 400 MG CR tablet Take 1 tablet by mouth 3 (Three) Times a Day With Meals. 90 tablet 2 Past Week    QUEtiapine (SEROquel) 50 MG tablet Take 1 tablet by mouth Every Night. 30 tablet 0 Past Week    thiamine (VITAMIN B1) 100 MG tablet Take 1 tablet by mouth Daily. 30 tablet 0 Past Week    albuterol sulfate  (90 Base) MCG/ACT inhaler Inhale 2 puffs Every 4 (Four) Hours As Needed for Wheezing. (Patient not taking: Reported on 7/5/2024) 18 g 0 Not Taking    lactulose 20 GM/30ML solution solution Take 15 mL by mouth Daily. (Patient not taking: Reported on 7/5/2024) 450 mL 0 Not Taking    melatonin 3 MG tablet Take 1 tablet by mouth At Night As Needed for Sleep. (Patient not taking: Reported on 7/5/2024) 30 tablet 0 Not Taking     Current Meds:   Chlorhexidine Gluconate Cloth, 1 Application, Topical, Once  Chlorhexidine Gluconate Cloth, 1 Application, Topical, Q24H  folic acid, 1 mg, Oral, Daily  lactulose, 20 g, Oral, TID  mupirocin, 1 Application, Each Nare, BID  pantoprazole, 40 mg, Intravenous, Q12H  potassium chloride ER, 40 mEq, Oral, Q4H  senna-docusate sodium, 2 tablet, Oral, BID  sodium chloride, 10 mL, Intravenous, Q12H  thiamine (B-1) IV, 500 mg, Intravenous, Q8H   Followed by  [START ON 7/7/2024] thiamine (B-1) IV, 200 mg, Intravenous, Q8H   Followed by  [START ON 7/11/2024] thiamine, 100 mg, Oral,  Daily      Allergies:  No Known Allergies  Review of Systems  Pertinent items are noted in HPI, all other systems reviewed and negative         Vital Signs  Temp:  [98 °F (36.7 °C)-98.5 °F (36.9 °C)] 98.5 °F (36.9 °C)  Heart Rate:  [] 99  Resp:  [16-21] 20  BP: ()/(50-77) 133/62  Physical Exam:  General Appearance:    Alert, cooperative, in no acute distress   Head:    Normocephalic, without obvious abnormality, atraumatic   Eyes:          conjunctivae and sclerae normal, no   icterus   Throat:   no thrush, oral mucosa moist   Neck:   Supple, no adenopathy   Lungs:     Clear to auscultation bilaterally    Heart:    Regular rhythm and normal rate    Chest Wall:    No abnormalities observed   Abdomen:     Soft, nondistended, nontender; normal bowel sounds   Extremities:   no edema, no redness   Skin:   No bruising or rash   Psychiatric:  normal mood and insight     Results Review:  [x]  Laboratory   [x]  Radiology  []  Pathology      I reviewed the patient's new clinical results.    Results from last 7 days   Lab Units 07/05/24  0029 07/04/24  1020   WBC 10*3/mm3 4.01 6.83   HEMOGLOBIN g/dL 7.7* 9.0*   HEMATOCRIT % 22.4* 25.9*   PLATELETS 10*3/mm3 131* 177     Results from last 7 days   Lab Units 07/05/24  0956 07/05/24  0028 07/04/24  1947 07/04/24  1020   SODIUM mmol/L  --  132*  --  128*   POTASSIUM mmol/L 3.0* 2.6* 2.6* 2.7*   CHLORIDE mmol/L  --  90*  --  85*   CO2 mmol/L  --  29.0  --  26.0   BUN mg/dL  --  5*  --  7   CREATININE mg/dL  --  0.62*  --  0.65*   CALCIUM mg/dL  --  7.8*  --  8.0*   BILIRUBIN mg/dL  --   --   --  6.1*   ALK PHOS U/L  --   --   --  241*   ALT (SGPT) U/L  --   --   --  41   AST (SGOT) U/L  --   --   --  106*   GLUCOSE mg/dL  --  114*  --  137*     Results from last 7 days   Lab Units 07/04/24  1020   INR  1.56*     Lab Results   Lab Value Date/Time    LIPASE 20 05/05/2024 1852    LIPASE 26 04/22/2024 0020       Radiology:  CT Abdomen Pelvis Without Contrast   Final Result        Increased nonspecific mesenteric and retroperitoneal edema, this however   can be seen with volume overload given the presence of diffuse increased   body wall anasarca.       Increased size of abdominal and pelvic lymph nodes, some of the iliac   chain and inguinal lymph nodes of which are enlarged. These could be   reactive, however given interval enlargement follow-up is recommended if   tissue diagnosis is not obtained to exclude neoplastic process.       Splenomegaly which can be seen with portal hypertension.       Moderately distended bladder.       Cholelithiasis.               This report was signed and finalized on 7/4/2024 3:56 PM by Darwin Goldman.          XR Chest 1 View   Final Result   1. No acute disease.                       This report was signed and finalized on 7/4/2024 10:40 AM by Dr. Harmeet Jarrell MD.               Assessment & Plan     Active Hospital Problems    Diagnosis     **Alcohol withdrawal     Alcoholic steatohepatitis         Plan:  Patient examined chart reviewed  Significant fatty liver by imaging alcohol abuse and probable alcoholic hepatitis  Patient is tolerating a regular diet  Recommend to monitor him clinically over the weekend if he is still around Monday we will consider endoscopy have looked back on his imaging studies over the past 2 years he has had 6+ CT scans none of them showed any varices  PPI twice a day  Monitor for Dts  Dr. Barrett takes over GI service Monday            I discussed the patients findings and my recommendations with patient and nursing staff.    Electronically signed by Riky Kumar MD, 07/05/24, 12:01 PM CDT.

## 2024-07-05 NOTE — PROGRESS NOTES
[facility]  INPATIENT PROGRESS NOTE    Patient Name: Luciano Christiansen  Date of Admission: 7/4/2024  Today's Date: 07/05/24  Length of Stay: 1  Primary Care anemia last Monday has been a while.  He is on manage checking on he actually saw her first immensely on the saline repot anytime you want you just seconds: Patient is succulent hello second grade had a box of their second to more boxes of the house to take a whole box not added: Jossy Christiansen, LÁZARO    Subjective   Withdrawal       39 y.o. male with EtOH abuse/alcoholism and alcoholic steatohepatitis, presented to the hospital today with concern for withdrawal.  Patient states he previously had gotten sober after last hospitalization, but reports going back to binge drinking malt liquor and vodka.  States he drinks 1/5 of vodka a day.  Patient noticed he was tremulous this morning after not drinking since 10 PM yesterday evening.  He presented to the ER, and found to have electrolyte abnormalities including potassium of 2.7, magnesium of 1.5, and sodium of 128.  Lactic acid elevated at 7.4.  Bilirubin 6.1, , INR 1.56.  Afebrile, WBC normal, no sign of infectious process.  Ethanol level 0.175.  Given patient's electrolyte and abnormalities and intoxication with concern for withdrawal, the intensivist team was requested to admit the patient for critical care management.  Upon arrival to the ICU, patient drowsy, no distress.  Vital signs stable, no pressors.  No oxygenation issues.  With significant jaundice.  Abdomen distended and lower extremities edematous.  Complaining of malaise, feeling unwell..     Interval Update  7/5: Still drowsy, oriented.  No distress.  Lactate trend down to 2.1.  H&H trended down overnight it was 7.7 and 22.4 this morning.  Report of black diarrhea with lactulose administration.  Concern for upper GI bleed.  Diarrhea IV until today    Review of Systems   All pertinent  negatives and positives are as above. All other systems have been reviewed and are negative unless otherwise stated.     Objective    Temp:  [98 °F (36.7 °C)-98.7 °F (37.1 °C)] 98.7 °F (37.1 °C)  Heart Rate:  [] 100  Resp:  [16-21] 20  BP: ()/(50-77) 143/69  Physical Exam  Vitals and nursing note reviewed.   Constitutional:       General: He is not in acute distress.  HENT:      Head: Normocephalic.   Eyes:      General: Scleral icterus present.      Pupils: Pupils are equal, round, and reactive to light.   Cardiovascular:      Rate and Rhythm: Normal rate and regular rhythm.      Pulses: Normal pulses.   Pulmonary:      Effort: Pulmonary effort is normal.      Breath sounds: Normal breath sounds.   Abdominal:      General: Abdomen is protuberant. Bowel sounds are normal.      Comments: Firm   Musculoskeletal:         General: Normal range of motion.      Right lower leg: Edema present.      Left lower leg: Edema present.   Skin:     General: Skin is warm.      Coloration: Skin is jaundiced.   Neurological:      General: No focal deficit present.      Mental Status: He is alert and oriented to person, place, and time.             Results Review:  CT Abdomen Pelvis Without Contrast    Result Date: 7/4/2024   Increased nonspecific mesenteric and retroperitoneal edema, this however can be seen with volume overload given the presence of diffuse increased body wall anasarca.  Increased size of abdominal and pelvic lymph nodes, some of the iliac chain and inguinal lymph nodes of which are enlarged. These could be reactive, however given interval enlargement follow-up is recommended if tissue diagnosis is not obtained to exclude neoplastic process.  Splenomegaly which can be seen with portal hypertension.  Moderately distended bladder.  Cholelithiasis.      This report was signed and finalized on 7/4/2024 3:56 PM by Darwin Goldman.      XR Chest 1 View    Result Date: 7/4/2024  1. No acute disease.      This  report was signed and finalized on 7/4/2024 10:40 AM by Dr. Harmeet Jarrell MD.       Result Review:  I have personally reviewed the results from the time of this admission to 7/5/2024 14:49 CDT and agree with these findings:  [x]  Laboratory list / accordion  []  Microbiology  [x]  Radiology  [x]  EKG/Telemetry   []  Cardiology/Vascular   []  Pathology  []  Old records  []  Other:  Most notable findings include: Lactic acid 2.1, hemoglobin 7.7    I have reviewed the patient's current medications.     Assessment/Plan   Assessment  Active Hospital Problems    Diagnosis     **Alcohol withdrawal     Alcoholic steatohepatitis    39-year-old male with alcoholism, alcoholic steatohepatitis admitted to ICU with alcoholic intoxication, electrolyte abnormalities, metabolic/hepatic encephalopathy, and elevated lactic acid level.       Alcoholism/alcohol withdrawal  -Patient stable, no delirium or tremens   -Continue folic acid, thiamine  -As needed Ativan     Hepatic encephalopathy  -Had multiple bowel movements with lactulose, will decrease it to daily  -Check ammonia level in the morning     3. Lactic acidemia  -Lactic acid 2.1 today    4. Hypokalemia  -Continues to require potassium repletion follow with serial metabolic panel and further repletion as indicated -likely from GI loss     Hyponatremia, beer Potomania  -Hyponatremia likely from beer potomania, improved to 132 today  -Continue to encourage nutrition    6. Hypomagnesemia  -Repleting magnesium, low again this morning at 1.4    7.  Anemia -delusional versus acute blood loss  -Suspect upper GI bleed given dark stools  -Consult GI to evaluate  -Obtain fecal occult blood    VTE Prophylaxis:     Mechanical VTE prophylaxis orders are present.     CODE STATUS: Full resuscitation     Total critical care time: Approximately 40 minutes     Due to a high probability of clinically significant, life threatening deterioration, the patient required my highest level of  preparedness to intervene emergently and I personally spent this critical care time directly and personally managing the patient.      This critical care time included obtaining a history; examining the patient; pulse oximetry; ordering and review of studies; arranging urgent treatment with development of a management plan; evaluation of patient's response to treatment; frequent reassessment; and, discussions with other providers.     This critical care time was performed to assess and manage the high probability of imminent, life-threatening deterioration that could result in multi-organ failure. It was exclusive of separately billable procedures and treating other patients and teaching time.     Please see MDM section and the rest of the note for further information on patient assessment and treatment.     Part of this note may be an electronic transcription/translation of spoken language to printed text using the Dragon Dictation System.    Electronically signed by LÁZARO Pabon on 7/5/2024 at 15:12 CDT

## 2024-07-05 NOTE — CASE MANAGEMENT/SOCIAL WORK
Discharge Planning Assessment  [facility]     Patient Name: Luciano Christiansen  MRN: [id number]  Today's Date: 7/5/2024    Admit Date: 7/4/2024        Discharge Needs Assessment       Row Name 07/05/24 0943       Living Environment    Current Living Arrangements home    Primary Care Provided by self    Provides Primary Care For no one    Family Caregiver if Needed parent(s)    Quality of Family Relationships supportive    Able to Return to Prior Arrangements yes       Resource/Environmental Concerns    Transportation Concerns none       Food Insecurity    Within the past 12 months, you worried that your food would run out before you got the money to buy more. Never true    Within the past 12 months, the food you bought just didn't last and you didn't have money to get more. Never true       Transition Planning    Patient/Family Anticipates Transition to home with family    Patient/Family Anticipated Services at Transition none    Transportation Anticipated family or friend will provide       Discharge Needs Assessment    Readmission Within the Last 30 Days no previous admission in last 30 days    Equipment Currently Used at Home none    Concerns to be Addressed substance/tobacco abuse/use    Anticipated Changes Related to Illness none    Equipment Needed After Discharge none    Outpatient/Agency/Support Group Needs outpatient substance abuse treatment    Discharge Facility/Level of Care Needs substance abuse facility    Current Discharge Risk substance use/abuse;non-[facility]    Discharge Coordination/Progress Familiar with patient from previous admits.  Patient resides alone and will go to his mother's home also when he is not feeling well.  Patient has been provided with numerous chemical dependency packets throughout his admits.  Patient has a PCP and RX coverage.  From previous admit in May,  staff has met with patient numerous times throughout his admits to offer assistance in arranging chemical  dependency treatment which he defers and states he will arrange on his own.                   Discharge Plan    No documentation.                 Continued Care and Services - Admitted Since 7/4/2024    No active coordination exists for this encounter.          Demographic Summary    No documentation.                  Functional Status    No documentation.                  Psychosocial    No documentation.                  Abuse/Neglect    No documentation.                  Legal    No documentation.                  Substance Abuse    No documentation.                  Patient Forms    No documentation.                     CHAPITO BlairW

## 2024-07-05 NOTE — PLAN OF CARE
Goal Outcome Evaluation:  Plan of Care Reviewed With: caregiver      Progress: no change  Outcome Evaluation: RDN assessment completed. Pt admitted with alcohol withdrawl. During ICU rounds RN reports pt has a scrotal wound. Per review of progress notes pt had stopped drinking after his last hospitalization,however had alcohol james of malt liquior and vodka over the past two weeks. Per weight report pt has had a 61 lbs wt gain over the past four months. HgbA1c 10.4% (4/22/24) diet modified to CCHO diet. Boost Gluocse Control BID. Pt may benefit from folic acid,zinc,vitmain D and B-12 due to etoh use.

## 2024-07-05 NOTE — PLAN OF CARE
Goal Outcome Evaluation:  Plan of Care Reviewed With: patient        Progress: improving          A&Ox4, CIWA 5, calm on precedex, sinus/tach, 2 L, multiple BM, severe edema. Precedex @0.2, potassium and mag replaced per protocol. Harris placed for urinary retention and prevention of skin breakdown.

## 2024-07-06 LAB
ALBUMIN SERPL-MCNC: 3 G/DL (ref 3.5–5.2)
AMMONIA BLD-SCNC: 135 UMOL/L (ref 16–60)
AMPHET+METHAMPHET UR QL: NEGATIVE
AMPHETAMINES UR QL: NEGATIVE
ANION GAP SERPL CALCULATED.3IONS-SCNC: 7 MMOL/L (ref 5–15)
BARBITURATES UR QL SCN: NEGATIVE
BASOPHILS # BLD AUTO: 0.02 10*3/MM3 (ref 0–0.2)
BASOPHILS NFR BLD AUTO: 0.3 % (ref 0–1.5)
BENZODIAZ UR QL SCN: POSITIVE
BUN SERPL-MCNC: 8 MG/DL (ref 6–20)
BUN/CREAT SERPL: 9.9 (ref 7–25)
BUPRENORPHINE SERPL-MCNC: NEGATIVE NG/ML
CALCIUM SPEC-SCNC: 8.2 MG/DL (ref 8.6–10.5)
CANNABINOIDS SERPL QL: POSITIVE
CHLORIDE SERPL-SCNC: 95 MMOL/L (ref 98–107)
CO2 SERPL-SCNC: 31 MMOL/L (ref 22–29)
COCAINE UR QL: NEGATIVE
CREAT SERPL-MCNC: 0.81 MG/DL (ref 0.76–1.27)
DEPRECATED RDW RBC AUTO: 64.6 FL (ref 37–54)
EGFRCR SERPLBLD CKD-EPI 2021: 115 ML/MIN/1.73
EOSINOPHIL # BLD AUTO: 0.19 10*3/MM3 (ref 0–0.4)
EOSINOPHIL NFR BLD AUTO: 3.1 % (ref 0.3–6.2)
ERYTHROCYTE [DISTWIDTH] IN BLOOD BY AUTOMATED COUNT: 17.2 % (ref 12.3–15.4)
FENTANYL UR-MCNC: NEGATIVE NG/ML
GLUCOSE BLDC GLUCOMTR-MCNC: 107 MG/DL (ref 70–130)
GLUCOSE BLDC GLUCOMTR-MCNC: 116 MG/DL (ref 70–130)
GLUCOSE SERPL-MCNC: 99 MG/DL (ref 65–99)
HCT VFR BLD AUTO: 23.2 % (ref 37.5–51)
HCT VFR BLD AUTO: 25.2 % (ref 37.5–51)
HGB BLD-MCNC: 7.8 G/DL (ref 13–17.7)
HGB BLD-MCNC: 8.1 G/DL (ref 13–17.7)
IMM GRANULOCYTES # BLD AUTO: 0.03 10*3/MM3 (ref 0–0.05)
IMM GRANULOCYTES NFR BLD AUTO: 0.5 % (ref 0–0.5)
LYMPHOCYTES # BLD AUTO: 0.7 10*3/MM3 (ref 0.7–3.1)
LYMPHOCYTES NFR BLD AUTO: 11.5 % (ref 19.6–45.3)
MAGNESIUM SERPL-MCNC: 1.9 MG/DL (ref 1.6–2.6)
MCH RBC QN AUTO: 34.4 PG (ref 26.6–33)
MCHC RBC AUTO-ENTMCNC: 33.6 G/DL (ref 31.5–35.7)
MCV RBC AUTO: 102.2 FL (ref 79–97)
METHADONE UR QL SCN: NEGATIVE
MONOCYTES # BLD AUTO: 0.62 10*3/MM3 (ref 0.1–0.9)
MONOCYTES NFR BLD AUTO: 10.2 % (ref 5–12)
NEUTROPHILS NFR BLD AUTO: 4.52 10*3/MM3 (ref 1.7–7)
NEUTROPHILS NFR BLD AUTO: 74.4 % (ref 42.7–76)
NRBC BLD AUTO-RTO: 0 /100 WBC (ref 0–0.2)
OPIATES UR QL: NEGATIVE
OXYCODONE UR QL SCN: NEGATIVE
PCP UR QL SCN: NEGATIVE
PHOSPHATE SERPL-MCNC: 1.4 MG/DL (ref 2.5–4.5)
PHOSPHATE SERPL-MCNC: 1.6 MG/DL (ref 2.5–4.5)
PLATELET # BLD AUTO: 120 10*3/MM3 (ref 140–450)
PMV BLD AUTO: 10.6 FL (ref 6–12)
POTASSIUM SERPL-SCNC: 3.4 MMOL/L (ref 3.5–5.2)
POTASSIUM SERPL-SCNC: 3.8 MMOL/L (ref 3.5–5.2)
RBC # BLD AUTO: 2.27 10*6/MM3 (ref 4.14–5.8)
SODIUM SERPL-SCNC: 133 MMOL/L (ref 136–145)
TRICYCLICS UR QL SCN: NEGATIVE
WBC NRBC COR # BLD AUTO: 6.08 10*3/MM3 (ref 3.4–10.8)

## 2024-07-06 PROCEDURE — 82948 REAGENT STRIP/BLOOD GLUCOSE: CPT

## 2024-07-06 PROCEDURE — 84100 ASSAY OF PHOSPHORUS: CPT | Performed by: NURSE PRACTITIONER

## 2024-07-06 PROCEDURE — 84132 ASSAY OF SERUM POTASSIUM: CPT | Performed by: NURSE PRACTITIONER

## 2024-07-06 PROCEDURE — 36415 COLL VENOUS BLD VENIPUNCTURE: CPT | Performed by: NURSE PRACTITIONER

## 2024-07-06 PROCEDURE — 25010000002 FUROSEMIDE PER 20 MG: Performed by: INTERNAL MEDICINE

## 2024-07-06 PROCEDURE — 25010000002 MAGNESIUM SULFATE 2 GM/50ML SOLUTION: Performed by: INTERNAL MEDICINE

## 2024-07-06 PROCEDURE — 99221 1ST HOSP IP/OBS SF/LOW 40: CPT | Performed by: UROLOGY

## 2024-07-06 PROCEDURE — 85014 HEMATOCRIT: CPT | Performed by: NURSE PRACTITIONER

## 2024-07-06 PROCEDURE — 80307 DRUG TEST PRSMV CHEM ANLYZR: CPT | Performed by: FAMILY MEDICINE

## 2024-07-06 PROCEDURE — 85025 COMPLETE CBC W/AUTO DIFF WBC: CPT | Performed by: NURSE PRACTITIONER

## 2024-07-06 PROCEDURE — 83735 ASSAY OF MAGNESIUM: CPT | Performed by: NURSE PRACTITIONER

## 2024-07-06 PROCEDURE — 80069 RENAL FUNCTION PANEL: CPT | Performed by: NURSE PRACTITIONER

## 2024-07-06 PROCEDURE — 82140 ASSAY OF AMMONIA: CPT | Performed by: NURSE PRACTITIONER

## 2024-07-06 PROCEDURE — 85018 HEMOGLOBIN: CPT | Performed by: NURSE PRACTITIONER

## 2024-07-06 PROCEDURE — 25010000002 THIAMINE HCL 200 MG/2ML SOLUTION 2 ML VIAL: Performed by: NURSE PRACTITIONER

## 2024-07-06 RX ORDER — BISACODYL 10 MG
10 SUPPOSITORY, RECTAL RECTAL DAILY PRN
Status: DISCONTINUED | OUTPATIENT
Start: 2024-07-06 | End: 2024-07-09 | Stop reason: HOSPADM

## 2024-07-06 RX ORDER — POTASSIUM CHLORIDE 750 MG/1
40 CAPSULE, EXTENDED RELEASE ORAL EVERY 4 HOURS
Status: COMPLETED | OUTPATIENT
Start: 2024-07-06 | End: 2024-07-06

## 2024-07-06 RX ORDER — BISACODYL 5 MG/1
5 TABLET, DELAYED RELEASE ORAL DAILY PRN
Status: DISCONTINUED | OUTPATIENT
Start: 2024-07-06 | End: 2024-07-09 | Stop reason: HOSPADM

## 2024-07-06 RX ORDER — QUETIAPINE FUMARATE 25 MG/1
50 TABLET, FILM COATED ORAL NIGHTLY
Status: DISCONTINUED | OUTPATIENT
Start: 2024-07-06 | End: 2024-07-09 | Stop reason: HOSPADM

## 2024-07-06 RX ORDER — LACTULOSE 20 G/30ML
20 SOLUTION ORAL 2 TIMES DAILY
Status: DISCONTINUED | OUTPATIENT
Start: 2024-07-06 | End: 2024-07-09 | Stop reason: HOSPADM

## 2024-07-06 RX ORDER — LACTULOSE 20 G/30ML
10 SOLUTION ORAL DAILY
Status: DISCONTINUED | OUTPATIENT
Start: 2024-07-06 | End: 2024-07-06

## 2024-07-06 RX ORDER — AMOXICILLIN 250 MG
2 CAPSULE ORAL NIGHTLY PRN
Status: DISCONTINUED | OUTPATIENT
Start: 2024-07-06 | End: 2024-07-09 | Stop reason: HOSPADM

## 2024-07-06 RX ORDER — FUROSEMIDE 10 MG/ML
40 INJECTION INTRAMUSCULAR; INTRAVENOUS ONCE
Status: COMPLETED | OUTPATIENT
Start: 2024-07-06 | End: 2024-07-06

## 2024-07-06 RX ORDER — TRAMADOL HYDROCHLORIDE 50 MG/1
50 TABLET ORAL EVERY 6 HOURS PRN
Status: DISCONTINUED | OUTPATIENT
Start: 2024-07-06 | End: 2024-07-07

## 2024-07-06 RX ORDER — NICOTINE 21 MG/24HR
1 PATCH, TRANSDERMAL 24 HOURS TRANSDERMAL
Status: DISCONTINUED | OUTPATIENT
Start: 2024-07-06 | End: 2024-07-09 | Stop reason: HOSPADM

## 2024-07-06 RX ORDER — PENTOXIFYLLINE 400 MG/1
400 TABLET, EXTENDED RELEASE ORAL
Status: DISCONTINUED | OUTPATIENT
Start: 2024-07-06 | End: 2024-07-09 | Stop reason: HOSPADM

## 2024-07-06 RX ORDER — MAGNESIUM SULFATE HEPTAHYDRATE 40 MG/ML
2 INJECTION, SOLUTION INTRAVENOUS ONCE
Status: COMPLETED | OUTPATIENT
Start: 2024-07-06 | End: 2024-07-06

## 2024-07-06 RX ORDER — POLYETHYLENE GLYCOL 3350 17 G/17G
17 POWDER, FOR SOLUTION ORAL DAILY PRN
Status: DISCONTINUED | OUTPATIENT
Start: 2024-07-06 | End: 2024-07-09 | Stop reason: HOSPADM

## 2024-07-06 RX ORDER — PANTOPRAZOLE SODIUM 40 MG/1
40 TABLET, DELAYED RELEASE ORAL
Status: DISCONTINUED | OUTPATIENT
Start: 2024-07-07 | End: 2024-07-09 | Stop reason: HOSPADM

## 2024-07-06 RX ORDER — NYSTATIN 100000 U/G
1 CREAM TOPICAL EVERY 12 HOURS SCHEDULED
Status: DISCONTINUED | OUTPATIENT
Start: 2024-07-06 | End: 2024-07-08

## 2024-07-06 RX ORDER — POTASSIUM CHLORIDE 750 MG/1
30 CAPSULE, EXTENDED RELEASE ORAL ONCE
Status: COMPLETED | OUTPATIENT
Start: 2024-07-06 | End: 2024-07-06

## 2024-07-06 RX ADMIN — FUROSEMIDE 40 MG: 10 INJECTION, SOLUTION INTRAVENOUS at 08:58

## 2024-07-06 RX ADMIN — LACTULOSE 20 G: 20 SOLUTION ORAL at 08:58

## 2024-07-06 RX ADMIN — TRAMADOL HYDROCHLORIDE 50 MG: 50 TABLET ORAL at 17:13

## 2024-07-06 RX ADMIN — CHLORHEXIDINE GLUCONATE 1 APPLICATION: 500 CLOTH TOPICAL at 03:54

## 2024-07-06 RX ADMIN — THIAMINE HYDROCHLORIDE 500 MG: 100 INJECTION, SOLUTION INTRAMUSCULAR; INTRAVENOUS at 21:32

## 2024-07-06 RX ADMIN — PENTOXIFYLLINE 400 MG: 400 TABLET, EXTENDED RELEASE ORAL at 17:07

## 2024-07-06 RX ADMIN — POTASSIUM CHLORIDE 40 MEQ: 750 CAPSULE, EXTENDED RELEASE ORAL at 01:12

## 2024-07-06 RX ADMIN — NYSTATIN 1 APPLICATION: 100000 CREAM TOPICAL at 21:33

## 2024-07-06 RX ADMIN — Medication 1 APPLICATION: at 08:59

## 2024-07-06 RX ADMIN — Medication 10 ML: at 08:59

## 2024-07-06 RX ADMIN — MAGNESIUM SULFATE HEPTAHYDRATE 2 G: 2 INJECTION, SOLUTION INTRAVENOUS at 08:59

## 2024-07-06 RX ADMIN — QUETIAPINE FUMARATE 50 MG: 25 TABLET, FILM COATED ORAL at 21:32

## 2024-07-06 RX ADMIN — POTASSIUM & SODIUM PHOSPHATES POWDER PACK 280-160-250 MG 2 PACKET: 280-160-250 PACK at 18:57

## 2024-07-06 RX ADMIN — FOLIC ACID 1 MG: 1 TABLET ORAL at 08:58

## 2024-07-06 RX ADMIN — POTASSIUM & SODIUM PHOSPHATES POWDER PACK 280-160-250 MG 2 PACKET: 280-160-250 PACK at 06:08

## 2024-07-06 RX ADMIN — POTASSIUM CHLORIDE 40 MEQ: 750 CAPSULE, EXTENDED RELEASE ORAL at 05:52

## 2024-07-06 RX ADMIN — IBUPROFEN 400 MG: 800 TABLET, FILM COATED ORAL at 03:54

## 2024-07-06 RX ADMIN — THIAMINE HYDROCHLORIDE 500 MG: 100 INJECTION, SOLUTION INTRAMUSCULAR; INTRAVENOUS at 05:52

## 2024-07-06 RX ADMIN — NICOTINE 1 PATCH: 14 PATCH, EXTENDED RELEASE TRANSDERMAL at 17:06

## 2024-07-06 RX ADMIN — HYDROXYZINE HYDROCHLORIDE 25 MG: 25 TABLET ORAL at 03:54

## 2024-07-06 RX ADMIN — POTASSIUM CHLORIDE 30 MEQ: 750 CAPSULE, EXTENDED RELEASE ORAL at 08:58

## 2024-07-06 RX ADMIN — THIAMINE HYDROCHLORIDE 500 MG: 100 INJECTION, SOLUTION INTRAMUSCULAR; INTRAVENOUS at 16:07

## 2024-07-06 RX ADMIN — PANTOPRAZOLE SODIUM 40 MG: 40 INJECTION, POWDER, FOR SOLUTION INTRAVENOUS at 08:58

## 2024-07-06 RX ADMIN — LACTULOSE 20 G: 20 SOLUTION ORAL at 21:32

## 2024-07-06 RX ADMIN — RIFAXIMIN 550 MG: 550 TABLET ORAL at 21:32

## 2024-07-06 NOTE — PLAN OF CARE
Goal Outcome Evaluation:      Pt A&O x 4 on 4L O2 with NC. Wound care consult called in for edematous scrotum. Spoke to Anastasia with wound care and was advised to put consult on Yvette's list for Monday. Pt had 4 liquid stools light brown in color. Potassium replacement started after result of 3.0.

## 2024-07-06 NOTE — PROGRESS NOTES
[facility]  INPATIENT PROGRESS NOTE    Patient Name: Luciano Christiansen  Date of Admission: 7/4/2024  Today's Date: 07/06/24  Length of Stay: 2  Primary Care Physician: Jossy Christiansen APRN    Subjective   Chief Complaint: Alcohol abuse/hepatic encephalopathy/alcohol liver disease  HPI   Blood pressure stable, afebrile.  Patient received 40 of Lasix in the unit.  Urine looks concentrated.  Hemoglobin stable, DC serial Hemoccult.  Elevated ammonium, start Xifaxan, increase lactulose.  Scrotal edema/fungus, start nystatin . Patient asked something for pain, start Ultram.    Review of Systems   Constitutional:  Positive for activity change, appetite change and fatigue. Negative for chills and fever.   HENT:  Negative for hearing loss, nosebleeds, tinnitus and trouble swallowing.    Eyes:  Negative for visual disturbance.   Respiratory:  Negative for cough, chest tightness, shortness of breath and wheezing.    Cardiovascular:  Positive for leg swelling. Negative for chest pain and palpitations.   Gastrointestinal:  Negative for abdominal distention, abdominal pain, blood in stool, constipation, diarrhea, nausea and vomiting.   Endocrine: Negative for cold intolerance, heat intolerance, polydipsia, polyphagia and polyuria.   Genitourinary:  Negative for decreased urine volume, difficulty urinating, dysuria, flank pain, frequency and hematuria.   Musculoskeletal:  Positive for arthralgias, gait problem and myalgias. Negative for joint swelling.   Skin:  Negative for rash.   Allergic/Immunologic: Negative for immunocompromised state.   Neurological:  Positive for weakness. Negative for dizziness, syncope, light-headedness and headaches.   Hematological:  Negative for adenopathy. Does not bruise/bleed easily.   Psychiatric/Behavioral:  Negative for confusion and sleep disturbance. The patient is not nervous/anxious.         All pertinent negatives and positives are as  above. All other systems have been reviewed and are negative unless otherwise stated.     Objective    Temp:  [97.9 °F (36.6 °C)-98.7 °F (37.1 °C)] 98.3 °F (36.8 °C)  Heart Rate:  [88-97] 89  Resp:  [16-27] 18  BP: (111-141)/(49-72) 133/55      Intake/Output Summary (Last 24 hours) at 7/6/2024 1523  Last data filed at 7/6/2024 1200  Gross per 24 hour   Intake 1420 ml   Output 325 ml   Net 1095 ml      Physical Exam  Vitals and nursing note reviewed.   Constitutional:       Comments: Chronically ill.   HENT:      Head: Normocephalic.   Eyes:      Conjunctiva/sclera: Conjunctivae normal.      Pupils: Pupils are equal, round, and reactive to light.   Cardiovascular:      Rate and Rhythm: Normal rate and regular rhythm.      Heart sounds: Normal heart sounds.   Pulmonary:      Effort: Pulmonary effort is normal. No respiratory distress.      Breath sounds: Normal breath sounds.      Comments: Patient is on room air.  Abdominal:      General: Bowel sounds are normal. There is no distension.      Palpations: Abdomen is soft.      Tenderness: There is no abdominal tenderness.      Comments: Morbid obesity   Musculoskeletal:         General: No swelling.      Cervical back: Neck supple.      Right lower leg: Edema present.      Left lower leg: Edema present.   Skin:     General: Skin is warm and dry.      Capillary Refill: Capillary refill takes 2 to 3 seconds.      Findings: No rash.      Comments: Scrotum edema.   Neurological:      Mental Status: He is alert and oriented to person, place, and time.      Motor: Weakness present.      Coordination: Coordination abnormal.      Gait: Gait abnormal.   Psychiatric:         Mood and Affect: Mood normal.         Behavior: Behavior normal.           Results Review:  I have reviewed the labs, radiology results, and diagnostic studies.    Laboratory Data:   Results from last 7 days   Lab Units 07/06/24  1047 07/06/24  0456 07/05/24  2311 07/05/24  1209 07/05/24  0029 07/04/24  1020  "  WBC 10*3/mm3  --  6.08  --   --  4.01 6.83   HEMOGLOBIN g/dL 8.1* 7.8* 8.1*   < > 7.7* 9.0*   HEMATOCRIT % 25.2* 23.2* 24.6*   < > 22.4* 25.9*   PLATELETS 10*3/mm3  --  120*  --   --  131* 177    < > = values in this interval not displayed.        Results from last 7 days   Lab Units 07/06/24  1047 07/06/24  0455 07/05/24  2311 07/05/24  0956 07/05/24  0028 07/04/24  1947 07/04/24  1020   SODIUM mmol/L  --  133*  --   --  132*  --  128*   POTASSIUM mmol/L 3.8 3.4* 3.2*   < > 2.6*   < > 2.7*   CHLORIDE mmol/L  --  95*  --   --  90*  --  85*   CO2 mmol/L  --  31.0*  --   --  29.0  --  26.0   BUN mg/dL  --  8  --   --  5*  --  7   CREATININE mg/dL  --  0.81  --   --  0.62*  --  0.65*   CALCIUM mg/dL  --  8.2*  --   --  7.8*  --  8.0*   BILIRUBIN mg/dL  --   --   --   --   --   --  6.1*   ALK PHOS U/L  --   --   --   --   --   --  241*   ALT (SGPT) U/L  --   --   --   --   --   --  41   AST (SGOT) U/L  --   --   --   --   --   --  106*   GLUCOSE mg/dL  --  99  --   --  114*  --  137*    < > = values in this interval not displayed.       Culture Data:   No results found for: \"BLOODCX\", \"URINECX\", \"WOUNDCX\", \"MRSACX\", \"RESPCX\", \"STOOLCX\"    Radiology Data:   Imaging Results (Last 24 Hours)       ** No results found for the last 24 hours. **            I have reviewed the patient's current medications.     Assessment/Plan   Assessment  Active Hospital Problems    Diagnosis     **Alcohol withdrawal     BMI 40.0-44.9, adult     Alcoholic steatohepatitis     Type 2 diabetes mellitus     Alcoholic encephalopathy     Transaminitis     Hyponatremia     Hypokalemia     Hypomagnesemia     Elevated lactic acid level     Alcoholism     Primary hypertension     Fatty liver      HPI .39 y.o. male with EtOH abuse/alcoholism and alcoholic steatohepatitis, presented to the hospital today with concern for withdrawal. Patient states he previously had gotten sober after last hospitalization, but reports going back to binge drinking malt " liquor and vodka. States he drinks 1/5 of vodka a day. Patient noticed he was tremulous this morning after not drinking since 10 PM yesterday evening. He presented to the ER, and found to have electrolyte abnormalities including potassium of 2.7, magnesium of 1.5, and sodium of 128. Lactic acid elevated at 7.4. Bilirubin 6.1, , INR 1.56. Afebrile, WBC normal, no sign of infectious process. Ethanol level 0.175. Given patient's electrolyte and abnormalities and intoxication with concern for withdrawal, the intensivist team was requested to admit the patient for critical care management. Upon arrival to the ICU, patient drowsy, no distress. Vital signs stable, no pressors. No oxygenation issues. With significant jaundice. Abdomen distended and lower extremities edematous. Complaining of malaise, feeling unwell..     Treatment Plan    Alcohol abuse/alcohol withdrawal.  Patient drink malt liquor and vodka, 1/5 of vodka a day.  Patient is off Precedex 7/6/2024.  Ciwa protocol.  Folic acid . Thiamine.  Ativan as needed.  Possible endoscopy on Monday.      Hepatic encephalopathy.  Ammonia level 135.  Increase lactulose.  Add Xifaxan.    Hypokalemia.  Resolved.    Hyponatremia.  Improving.    Hypomagnesia.  2 g magnesium today.  Magnesium in AM.    Anemia/possible GI bleed/dark stool.  Fecal occult positive.  Hemoglobin stable.  GI   consult.  CT scan abdomen pelvic-Increased nonspecific mesenteric and retroperitoneal edema-  can be seen with volume overload given the presence of diffuse increased body wall anasarca, Increased size of abdominal and pelvic lymph nodes- some of the iliac chain and inguinal lymph nodes of which are enlarged- could be reactive, Splenomegaly which can be seen with portal hypertension, Moderately distended bladder, Cholelithiasis.        COPD.  Chest x-ray-No acute disease.   Patient is on room air.    Nausea/reflux . Protonix.  Zofran as needed.    Chronic smoker. Nicotine patch.   Discussed patient cutting back and stopping .    Alcohol fatty liver/jaundice/elevated liver.  MELD Score (Original, Pre-2016, Model for End-Stage Liver Disease) - MDCalc  Calculated on Jul 06 2024 3:37 PM  18 points -> Original MELD Score (Pre-2016)*  6.0% -> Estimated 3-Month Mortality    Scrotum edema/scrotal skin damage/lower extremity edema.  Urology consult.  Wound care consult.  Status post 40 of Lasix today.  Start patient on nystatin.  Urology recommend- Use towels to prop up scrotum.  Urology signed off.    Peripheral vascular disease.  Trental.  Nitro as needed.  Echocardiogram-ejection fraction 61 to 65%, tricuspid regurgitation.    Anxiety/depression.  Seroquel at night.  Atarax as needed.    Hypoxia.  Patient is on room air.    Pain.  Patient request . Ultram as needed    SCD.    Nutrition.  Regular/house diet.  Boost supplement.    Deconditioning.  PT consult.    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    Medical Decision Making  Number and Complexity of problems: Alcohol abuse/alcohol withdrawal/anemia/hepatic encephalopathy/edema/scrotum edema  Differential Diagnosis: None    Conditions and Status        Condition is unchanged.     [facility] Data  External documents reviewed: Transfer notes  Cardiac tracing (EKG, telemetry) interpretation: Sinus rhythm  Radiology interpretation: Echocardiogram/chest x-ray/CT scan  Labs reviewed: Laboratory  Any tests that were considered but not ordered: None     Decision rules/scores evaluated (example [id number], Wells, etc): None     Discussed with: Patient     Care Planning  Shared decision making: Patient  Code status and discussions: Full code    Disposition  Social Determinants of Health that impact treatment or disposition: From home  2 to 5 days    Electronically signed by Peter Tabor MD, 07/06/24, 15:23 CDT.

## 2024-07-06 NOTE — CONSULTS
[facility]   Consult Note    Patient Name: Luciano Christiansen  : 1985  MRN: [id number]  Primary Care Physician:  Jossy Christiansen APRN  Referring Physician: No Known Provider  Date of admission: 2024    Consults  Subjective   Subjective     Reason for Consult/ Chief Complaint: Scrotal swelling    History of Present Illness      39 y.o. male with a history of alcohol abuse and alcoholic steatohepatitis.  He was admitted yesterday for alcohol withdrawal.  Patient does have jaundice with distended abdomen and Kniffen get lower extremity edema.    Review of Systems     Personal History     Past Medical History:   Diagnosis Date    Diabetes mellitus     Gout     Hypertension        Past Surgical History:   Procedure Laterality Date    VASECTOMY         Family History: family history is not on file. Otherwise pertinent FHx was reviewed and not pertinent to current issue.    Social History:  reports that he has been smoking cigarettes. He started smoking about 14 years ago. He has a 7.3 pack-year smoking history. He has never used smokeless tobacco. He reports current alcohol use of about 12.0 standard drinks of alcohol per week. He reports current drug use. Drug: Marijuana.    Home Medications:   QUEtiapine, folic acid, insulin glargine, pantoprazole, pentoxifylline, and thiamine    Allergies:  No Known Allergies    Objective    Objective     Vitals:  Temp:  [97.9 °F (36.6 °C)-98.7 °F (37.1 °C)] 98.6 °F (37 °C)  Heart Rate:  [] 90  Resp:  [16-27] 20  BP: (111-150)/(49-74) 127/62  Flow (L/min):  [4] 4    Physical Exam  Eyes:      General: Scleral icterus present.   Cardiovascular:      Rate and Rhythm: Normal rate.   Pulmonary:      Effort: Pulmonary effort is normal.   Abdominal:      General: There is distension.      Comments: Soft tissue edema even into the lower abdomen   Genitourinary:     Comments: Large soft tissue edema of the penis and scrotum Harris catheter in place with yellow urine  Skin:      Coloration: Skin is jaundiced.      Comments: Significant lower extremity edema extending all the way into the thighs   Neurological:      Mental Status: He is alert.         Result Review    Result Review:  I have personally reviewed the results from the time of this admission to 7/6/2024 11:33 CDT and agree with these findings:  [x]  Laboratory list / accordion  []  Microbiology  []  Radiology  []  EKG/Telemetry   []  Cardiology/Vascular   []  Pathology  [x]  Old records  []  Other:  Most notable findings include: CT independent review    The CT scan of the abdomen/pelvis done with and without contrast is available for me to review.  Treatment recommendations require an independent review.  First I scanned the liver, spleen, and bowel pattern.  The retroperitoneum including the major vessels and lymphatic packages are briefly reviewed.  This film has been reviewed by the radiologist to determine any nonurologic abnormalities that are present.  The kidneys are closely inspected for size, symmetry, contour, parenchymal thickness, perinephric reaction, presence of calcifications, and intrarenal dilation of the collecting system.  The ureters are inspected for their course, caliber, and any calcifications.  The bladder is inspected for its thickness, size, and presence of any calcifications.  This scan shows:    The right kidney appears normal on this contrasted CT scan.  The renal parenchymal is normal in thickness.  There are no solid masses or cysts.  There is no hydronephrosis.  There are no stones.      The left kidney appears normal on this contrasted CT scan.  The renal parenchymal is normal in thickness.  There are no solid masses or cysts.  There is no hydronephrosis.  There are no stones.      The bladder appears distended.        Large amount of soft tissue anasarca as well as edema in the mesentery and retroperitoneum.  Assessment & Plan   Assessment / Plan     Brief Patient Summary:  Luciano  is a 39  y.o. male who  penile and scrotal edema can Ashu to diffuse body wall anasarca    Active Hospital Problems:  Active Hospital Problems    Diagnosis     **Alcohol withdrawal     Alcoholic steatohepatitis      Plan:   Use towels to prop up scrotum.  There is no  intervention needed.  The process that is going out there is body giving him diffuse body wall edema and anasarca is also taking place in his scrotum and penis.  Urology will sign off.    Antoine Horton MD

## 2024-07-06 NOTE — PLAN OF CARE
Goal Outcome Evaluation:  Plan of Care Reviewed With: patient        Progress: no change  Outcome Evaluation: Pt transfer from unit; BLE edema and scrotal edema present, pt refusing to keep legs and scrotum elevated at this time; PRN electrolytes per protocol; up with standby; F/C in place to BSD; IID; CIWA protocol in place; safety maintained.

## 2024-07-06 NOTE — PLAN OF CARE
Goal Outcome Evaluation:  Plan of Care Reviewed With: patient        Progress: improving     A&Ox4, CIWA 3, 4L NC, sinus, atarax x2 and ibuprofen x1 given, potassium and phos replaced. Minimal UOP - discussed with LÁZARO Hammer.

## 2024-07-06 NOTE — PROGRESS NOTES
[facility], [place], operated by [facility]  Inpatient Progress Note    Reason for Follow Up: Anemia    Subjective     Interval History:   Tolerated all 3 meals yesterday    Current Facility-Administered Medications:     sennosides-docusate (PERICOLACE) 8.6-50 MG per tablet 2 tablet, 2 tablet, Oral, BID, 2 tablet at 07/04/24 2043 **AND** polyethylene glycol (MIRALAX) packet 17 g, 17 g, Oral, Daily PRN **AND** bisacodyl (DULCOLAX) EC tablet 5 mg, 5 mg, Oral, Daily PRN **AND** bisacodyl (DULCOLAX) suppository 10 mg, 10 mg, Rectal, Daily PRN, Wes Hilton APRN    Calcium Replacement - Follow Nurse / BPA Driven Protocol, , Does not apply, PRN, Neema Dan APRN    Chlorhexidine Gluconate Cloth 2 % pads 1 Application, 1 Application, Topical, Once, Wes Hilton APRN    Chlorhexidine Gluconate Cloth 2 % pads 1 Application, 1 Application, Topical, Q24H, Wes Hilton APRN, 1 Application at 07/06/24 0354    dexmedetomidine (PRECEDEX) 400 mcg in 100 mL NS infusion, 0.2-1.5 mcg/kg/hr, Intravenous, Titrated, Reyes Joshi MD, Stopped at 07/05/24 0555    folic acid (FOLVITE) tablet 1 mg, 1 mg, Oral, Daily, Wes Hilton APRN, 1 mg at 07/05/24 0858    hydrOXYzine (ATARAX) tablet 25 mg, 25 mg, Oral, TID PRN, Reyes Joshi MD, 25 mg at 07/06/24 0354    ibuprofen (ADVIL,MOTRIN) tablet 400 mg, 400 mg, Oral, Q4H PRN, Wes Hilton APRN, 400 mg at 07/06/24 0354    lactulose solution 20 g, 20 g, Oral, Daily, Wes Hilton APRN    Magnesium Standard Dose Replacement - Follow Nurse / BPA Driven Protocol, , Does not apply, PRN, Wes Hilton APRN    mupirocin (BACTROBAN) 2 % nasal ointment 1 Application, 1 Application, Each Nare, BID, Wes Hilton APRN, 1 Application at 07/05/24 2040    nitroglycerin (NITROSTAT) SL tablet 0.4 mg, 0.4 mg, Sublingual, Q5 Min PRN, Wes Hilton APRN    ondansetron ODT (ZOFRAN-ODT) disintegrating tablet 4 mg, 4 mg, Oral, Q6H PRN **OR** ondansetron (ZOFRAN) injection 4  mg, 4 mg, Intravenous, Q6H PRN, Wes Hilton APRN, 4 mg at 07/04/24 2118    pantoprazole (PROTONIX) injection 40 mg, 40 mg, Intravenous, Q12H, Wes Hilton APRN, 40 mg at 07/05/24 2040    Phosphorus Replacement - Follow Nurse / BPA Driven Protocol, , Does not apply, Sy MAJANO Tonya D, APRN    Potassium Replacement - Follow Nurse / BPA Driven Protocol, , Does not apply, Sy MAJANO Tonya D, APRN    [COMPLETED] Insert Peripheral IV, , , Once **AND** sodium chloride 0.9 % flush 10 mL, 10 mL, Intravenous, PRN, Justino Walker Jr., MD    sodium chloride 0.9 % flush 10 mL, 10 mL, Intravenous, Q12H, Wes Hilton APRN, 10 mL at 07/05/24 2040    sodium chloride 0.9 % flush 10 mL, 10 mL, Intravenous, PRN, Wes Hilton APRN    sodium chloride 0.9 % infusion 40 mL, 40 mL, Intravenous, PRN, Wes Hilton APRN    thiamine (B-1) 500 mg in sodium chloride 0.9 % 100 mL IVPB, 500 mg, Intravenous, Q8H, Last Rate: 200 mL/hr at 07/06/24 0552, 500 mg at 07/06/24 0552 **FOLLOWED BY** [START ON 7/7/2024] thiamine (B-1) injection 200 mg, 200 mg, Intravenous, Q8H **FOLLOWED BY** [START ON 7/11/2024] Thiamine Mononitrate tablet 100 mg, 100 mg, Oral, Daily, Wes Hilton APRN  Review of Systems:    All systems were reviewed and negative except for that previously mentioned in the HPI    Objective     Vital Signs  Temp:  [97.9 °F (36.6 °C)-98.7 °F (37.1 °C)] 98.6 °F (37 °C)  Heart Rate:  [] 90  Resp:  [16-27] 20  BP: (111-150)/(49-74) 127/62  Body mass index is 43.82 kg/m².    Intake/Output Summary (Last 24 hours) at 7/6/2024 0758  Last data filed at 7/6/2024 0600  Gross per 24 hour   Intake 820 ml   Output 550 ml   Net 270 ml     No intake/output data recorded.     Physical Exam:   General: patient awake, alert and cooperative   Eyes: Normal lids and lashes, no scleral icterus   Neck: supple, normal ROM   Skin: warm and dry, not jaundiced   Cardiovascular: regular rhythm and rate, no murmurs  auscultated   Pulm: clear to auscultation bilaterally, regular and unlabored   Abdomen: soft, nontender, nondistended; normal bowel sounds   Rectal: deferred   Extremities: no rash or edema   Psychiatric: Normal mood and behavior; memory intact     Results Review:     I reviewed the patient's new clinical results.    Results from last 7 days   Lab Units 07/06/24 0456 07/05/24 2311 07/05/24  1747 07/05/24  1209 07/05/24  0029 07/04/24  1020   WBC 10*3/mm3 6.08  --   --   --  4.01 6.83   HEMOGLOBIN g/dL 7.8* 8.1* 8.2*   < > 7.7* 9.0*   HEMATOCRIT % 23.2* 24.6* 24.2*   < > 22.4* 25.9*   PLATELETS 10*3/mm3 120*  --   --   --  131* 177    < > = values in this interval not displayed.     Results from last 7 days   Lab Units 07/06/24 0455 07/05/24 2311 07/05/24  0956 07/05/24  0028 07/04/24  1947 07/04/24  1020   SODIUM mmol/L 133*  --   --  132*  --  128*   POTASSIUM mmol/L 3.4* 3.2* 3.0* 2.6*   < > 2.7*   CHLORIDE mmol/L 95*  --   --  90*  --  85*   CO2 mmol/L 31.0*  --   --  29.0  --  26.0   BUN mg/dL 8  --   --  5*  --  7   CREATININE mg/dL 0.81  --   --  0.62*  --  0.65*   CALCIUM mg/dL 8.2*  --   --  7.8*  --  8.0*   BILIRUBIN mg/dL  --   --   --   --   --  6.1*   ALK PHOS U/L  --   --   --   --   --  241*   ALT (SGPT) U/L  --   --   --   --   --  41   AST (SGOT) U/L  --   --   --   --   --  106*   GLUCOSE mg/dL 99  --   --  114*  --  137*    < > = values in this interval not displayed.     Results from last 7 days   Lab Units 07/04/24  1020   INR  1.56*     Lab Results   Lab Value Date/Time    LIPASE 20 05/05/2024 1852    LIPASE 26 04/22/2024 0020       Radiology:  [unfilled]        Assessment:     Active Hospital Problems    Diagnosis     **Alcohol withdrawal     Alcoholic steatohepatitis         Plan:     Continue to watch for withdrawals  Will consider EGD on Monday depending on patient's status and consent  Will evaluate tomorrow  Dr. Zheng is on-call starting Monday for gastroenterology  I discussed the  patients findings and my recommendations with patient and nursing staff.      Electronically signed by Riky Kumar MD, 07/06/24, 7:58 AM CDT.

## 2024-07-06 NOTE — PROGRESS NOTES
[facility] Intensivist Services  INPATIENT PROGRESS NOTE    Patient Name: Luciano Christiansen  Date of Admission: 7/4/2024  Today's Date: 07/06/24  Length of Stay: 2  Primary Care Provider: Jossy Christiansen APRN    Subjective   Withdrawal       39 y.o. male with EtOH abuse/alcoholism and alcoholic steatohepatitis, presented to the hospital today with concern for withdrawal.  Patient states he previously had gotten sober after last hospitalization, but reports going back to binge drinking malt liquor and vodka.  States he drinks 1/5 of vodka a day.  Patient noticed he was tremulous this morning after not drinking since 10 PM yesterday evening.  He presented to the ER, and found to have electrolyte abnormalities including potassium of 2.7, magnesium of 1.5, and sodium of 128.  Lactic acid elevated at 7.4.  Bilirubin 6.1, , INR 1.56.  Afebrile, WBC normal, no sign of infectious process.  Ethanol level 0.175.  Given patient's electrolyte and abnormalities and intoxication with concern for withdrawal, the intensivist team was requested to admit the patient for critical care management.  Upon arrival to the ICU, patient drowsy, no distress.  Vital signs stable, no pressors.  No oxygenation issues.  With significant jaundice.  Abdomen distended and lower extremities edematous.  Complaining of malaise, feeling unwell..     Interval Update  7/5: Still drowsy, oriented.  No distress.  Lactate trend down to 2.1.  H&H trended down overnight it was 7.7 and 22.4 this morning.  Report of black diarrhea with lactulose administration.  Concern for upper GI bleed.  Diarrhea IV until today    7/6/2024: Patient now sitting up in the chair in no apparent distress.  He is H&H are 8.1 and 25.2 this morning.  We continue to trend his H&H after patient had episode of black diarrhea.  Gastroenterology has seen the patient and recommend EGD on Monday.  Urology has also seen this patient for penile and  scrotal edema.  His only complaint is pain in the genital region due to the swelling.  He does admit to drinking approximately 1/5 of vodka a day    Review of Systems   All pertinent negatives and positives are as above. All other systems have been reviewed and are negative unless otherwise stated.     Objective    Temp:  [97.9 °F (36.6 °C)-98.7 °F (37.1 °C)] 98.3 °F (36.8 °C)  Heart Rate:  [88-97] 89  Resp:  [16-27] 18  BP: (111-141)/(49-72) 133/55  Physical Exam  Vitals and nursing note reviewed.   Constitutional:       General: He is not in acute distress.     Appearance: He is obese.   HENT:      Head: Normocephalic.   Eyes:      General: Scleral icterus present.      Extraocular Movements: Extraocular movements intact.      Pupils: Pupils are equal, round, and reactive to light.   Cardiovascular:      Rate and Rhythm: Normal rate and regular rhythm.      Pulses: Normal pulses.      Heart sounds: Normal heart sounds. No murmur heard.  Pulmonary:      Effort: Pulmonary effort is normal. No respiratory distress.      Breath sounds: No stridor. Wheezing present.   Abdominal:      General: Abdomen is protuberant. Bowel sounds are normal.      Comments: Firm   Musculoskeletal:         General: Normal range of motion.      Cervical back: Neck supple.      Right lower leg: Edema present.      Left lower leg: Edema present.   Skin:     General: Skin is warm.      Capillary Refill: Capillary refill takes less than 2 seconds.      Coloration: Skin is jaundiced.   Neurological:      General: No focal deficit present.      Mental Status: He is alert and oriented to person, place, and time. Mental status is at baseline.   Psychiatric:         Mood and Affect: Mood normal.         Behavior: Behavior normal.             Results Review:  CT Abdomen Pelvis Without Contrast    Result Date: 7/4/2024   Increased nonspecific mesenteric and retroperitoneal edema, this however can be seen with volume overload given the presence of  diffuse increased body wall anasarca.  Increased size of abdominal and pelvic lymph nodes, some of the iliac chain and inguinal lymph nodes of which are enlarged. These could be reactive, however given interval enlargement follow-up is recommended if tissue diagnosis is not obtained to exclude neoplastic process.  Splenomegaly which can be seen with portal hypertension.  Moderately distended bladder.  Cholelithiasis.      This report was signed and finalized on 7/4/2024 3:56 PM by Darwin Goldman.       Result Review:  I have personally reviewed the results from the time of this admission to 7/6/2024 14:59 CDT and agree with these findings:  [x]  Laboratory list / accordion  []  Microbiology  [x]  Radiology  [x]  EKG/Telemetry   []  Cardiology/Vascular   []  Pathology  []  Old records  []  Other:  Most notable findings include: Lactic acid 2.1, hemoglobin 8.1, hematocrit 25.2.  CT abdomen/pelvis showed nonspecific mesenteric and retroperitoneal edema.    I have reviewed the patient's current medications.     Assessment/Plan   Assessment  Active Hospital Problems    Diagnosis     **Alcohol withdrawal     Alcoholic steatohepatitis    39-year-old male with alcoholism, alcoholic steatohepatitis admitted to ICU with alcoholic intoxication, electrolyte abnormalities, metabolic/hepatic encephalopathy, and elevated lactic acid level.       Alcoholism/alcohol withdrawal  -Reports drinking 1/5 of vodka a day  -Patient stable, no delirium or tremens   -Continue folic acid, thiamine  -Continue as needed Ativan     Hepatic encephalopathy  -Improved, does not appear encephalopathic this morning  -Remains on daily lactulose    3. Lactic acidemia  -Lactic acid 2.1 today    4. Hypokalemia  -Improved, potassium 3.8 this morning  -Continue serial metabolic panels and further repletion as indicated   -likely from GI loss     Hyponatremia, beer Potomania  -Hyponatremia likely from beer potomania,  -Improving, sodium 133  -Continue to  encourage nutrition    6. Hypomagnesemia  -Repleting magnesium, low again this morning at 1.4    7.  Anemia -dilutional versus acute blood loss  -Suspect upper GI bleed given dark stools  -GI following and plan on doing EGD on Monday  -Fecal occult blood positive    VTE Prophylaxis:     Mechanical VTE prophylaxis orders are present.     CODE STATUS: Full resuscitation      Please see MDM section and the rest of the note for further information on patient assessment and treatment.    Patient is stable to be transferred out of the ICU today.  I have spoken with Dr. Boateng, hospitalist, who has accepted this patient.  He will be moving to room 361.  Dr. Tabor will be the attending physician.     Part of this note may be an electronic transcription/translation of spoken language to printed text using the Dragon Dictation System.    Electronically signed by Aryan Erwin PA-C on 7/6/2024 at 14:59 CDT

## 2024-07-07 LAB
ALBUMIN SERPL-MCNC: 3 G/DL (ref 3.5–5.2)
ALBUMIN/GLOB SERPL: 1 G/DL
ALP SERPL-CCNC: 203 U/L (ref 39–117)
ALT SERPL W P-5'-P-CCNC: 26 U/L (ref 1–41)
AMMONIA BLD-SCNC: 111 UMOL/L (ref 16–60)
ANION GAP SERPL CALCULATED.3IONS-SCNC: 11 MMOL/L (ref 5–15)
AST SERPL-CCNC: 60 U/L (ref 1–40)
BASOPHILS # BLD AUTO: 0.02 10*3/MM3 (ref 0–0.2)
BASOPHILS NFR BLD AUTO: 0.4 % (ref 0–1.5)
BILIRUB SERPL-MCNC: 5.6 MG/DL (ref 0–1.2)
BUN SERPL-MCNC: 9 MG/DL (ref 6–20)
BUN/CREAT SERPL: 8.7 (ref 7–25)
CALCIUM SPEC-SCNC: 8.1 MG/DL (ref 8.6–10.5)
CHLORIDE SERPL-SCNC: 95 MMOL/L (ref 98–107)
CHOLEST SERPL-MCNC: 115 MG/DL (ref 0–200)
CO2 SERPL-SCNC: 28 MMOL/L (ref 22–29)
CREAT SERPL-MCNC: 1.03 MG/DL (ref 0.76–1.27)
DEPRECATED RDW RBC AUTO: 66.3 FL (ref 37–54)
EGFRCR SERPLBLD CKD-EPI 2021: 94.8 ML/MIN/1.73
EOSINOPHIL # BLD AUTO: 0.17 10*3/MM3 (ref 0–0.4)
EOSINOPHIL NFR BLD AUTO: 3.8 % (ref 0.3–6.2)
ERYTHROCYTE [DISTWIDTH] IN BLOOD BY AUTOMATED COUNT: 17.2 % (ref 12.3–15.4)
GLOBULIN UR ELPH-MCNC: 3.1 GM/DL
GLUCOSE SERPL-MCNC: 90 MG/DL (ref 65–99)
HBA1C MFR BLD: 4.6 % (ref 4.8–5.6)
HCT VFR BLD AUTO: 23 % (ref 37.5–51)
HDLC SERPL-MCNC: 25 MG/DL (ref 40–60)
HGB BLD-MCNC: 7.4 G/DL (ref 13–17.7)
IMM GRANULOCYTES # BLD AUTO: 0.02 10*3/MM3 (ref 0–0.05)
IMM GRANULOCYTES NFR BLD AUTO: 0.4 % (ref 0–0.5)
LDLC SERPL CALC-MCNC: 69 MG/DL (ref 0–100)
LDLC/HDLC SERPL: 2.72 {RATIO}
LYMPHOCYTES # BLD AUTO: 0.86 10*3/MM3 (ref 0.7–3.1)
LYMPHOCYTES NFR BLD AUTO: 19.3 % (ref 19.6–45.3)
MAGNESIUM SERPL-MCNC: 1.7 MG/DL (ref 1.6–2.6)
MCH RBC QN AUTO: 33.6 PG (ref 26.6–33)
MCHC RBC AUTO-ENTMCNC: 32.2 G/DL (ref 31.5–35.7)
MCV RBC AUTO: 104.5 FL (ref 79–97)
MONOCYTES # BLD AUTO: 0.51 10*3/MM3 (ref 0.1–0.9)
MONOCYTES NFR BLD AUTO: 11.5 % (ref 5–12)
NEUTROPHILS NFR BLD AUTO: 2.87 10*3/MM3 (ref 1.7–7)
NEUTROPHILS NFR BLD AUTO: 64.6 % (ref 42.7–76)
NRBC BLD AUTO-RTO: 0 /100 WBC (ref 0–0.2)
PHOSPHATE SERPL-MCNC: 1.7 MG/DL (ref 2.5–4.5)
PHOSPHATE SERPL-MCNC: 2.4 MG/DL (ref 2.5–4.5)
PLATELET # BLD AUTO: 118 10*3/MM3 (ref 140–450)
PMV BLD AUTO: 10.2 FL (ref 6–12)
POTASSIUM SERPL-SCNC: 3.4 MMOL/L (ref 3.5–5.2)
POTASSIUM SERPL-SCNC: 3.6 MMOL/L (ref 3.5–5.2)
PROT SERPL-MCNC: 6.1 G/DL (ref 6–8.5)
RBC # BLD AUTO: 2.2 10*6/MM3 (ref 4.14–5.8)
SODIUM SERPL-SCNC: 134 MMOL/L (ref 136–145)
T4 FREE SERPL-MCNC: 1.27 NG/DL (ref 0.93–1.7)
TRIGL SERPL-MCNC: 110 MG/DL (ref 0–150)
TSH SERPL DL<=0.05 MIU/L-ACNC: 9.67 UIU/ML (ref 0.27–4.2)
VLDLC SERPL-MCNC: 21 MG/DL (ref 5–40)
WBC NRBC COR # BLD AUTO: 4.45 10*3/MM3 (ref 3.4–10.8)

## 2024-07-07 PROCEDURE — 84443 ASSAY THYROID STIM HORMONE: CPT | Performed by: FAMILY MEDICINE

## 2024-07-07 PROCEDURE — 25010000002 FUROSEMIDE PER 20 MG: Performed by: FAMILY MEDICINE

## 2024-07-07 PROCEDURE — 85025 COMPLETE CBC W/AUTO DIFF WBC: CPT | Performed by: FAMILY MEDICINE

## 2024-07-07 PROCEDURE — 80053 COMPREHEN METABOLIC PANEL: CPT | Performed by: FAMILY MEDICINE

## 2024-07-07 PROCEDURE — 84100 ASSAY OF PHOSPHORUS: CPT | Performed by: FAMILY MEDICINE

## 2024-07-07 PROCEDURE — 84439 ASSAY OF FREE THYROXINE: CPT | Performed by: FAMILY MEDICINE

## 2024-07-07 PROCEDURE — 82140 ASSAY OF AMMONIA: CPT | Performed by: FAMILY MEDICINE

## 2024-07-07 PROCEDURE — 25010000002 THIAMINE HCL 200 MG/2ML SOLUTION: Performed by: NURSE PRACTITIONER

## 2024-07-07 PROCEDURE — 83036 HEMOGLOBIN GLYCOSYLATED A1C: CPT | Performed by: FAMILY MEDICINE

## 2024-07-07 PROCEDURE — 83735 ASSAY OF MAGNESIUM: CPT | Performed by: FAMILY MEDICINE

## 2024-07-07 PROCEDURE — 84132 ASSAY OF SERUM POTASSIUM: CPT | Performed by: FAMILY MEDICINE

## 2024-07-07 PROCEDURE — 25010000002 THIAMINE HCL 200 MG/2ML SOLUTION 2 ML VIAL: Performed by: NURSE PRACTITIONER

## 2024-07-07 PROCEDURE — 80061 LIPID PANEL: CPT | Performed by: FAMILY MEDICINE

## 2024-07-07 PROCEDURE — 25810000003 SODIUM CHLORIDE 0.9 % SOLUTION 250 ML FLEX CONT: Performed by: FAMILY MEDICINE

## 2024-07-07 RX ORDER — FUROSEMIDE 10 MG/ML
40 INJECTION INTRAMUSCULAR; INTRAVENOUS EVERY 12 HOURS
Status: DISCONTINUED | OUTPATIENT
Start: 2024-07-07 | End: 2024-07-09 | Stop reason: HOSPADM

## 2024-07-07 RX ORDER — CHLORDIAZEPOXIDE HYDROCHLORIDE 5 MG/1
10 CAPSULE, GELATIN COATED ORAL 4 TIMES DAILY PRN
Status: DISCONTINUED | OUTPATIENT
Start: 2024-07-07 | End: 2024-07-07

## 2024-07-07 RX ORDER — POTASSIUM CHLORIDE 750 MG/1
40 CAPSULE, EXTENDED RELEASE ORAL EVERY 4 HOURS
Status: COMPLETED | OUTPATIENT
Start: 2024-07-07 | End: 2024-07-07

## 2024-07-07 RX ORDER — HYDROCODONE BITARTRATE AND ACETAMINOPHEN 5; 325 MG/1; MG/1
1 TABLET ORAL EVERY 6 HOURS PRN
Status: DISCONTINUED | OUTPATIENT
Start: 2024-07-07 | End: 2024-07-09 | Stop reason: HOSPADM

## 2024-07-07 RX ORDER — CHLORDIAZEPOXIDE HYDROCHLORIDE 10 MG/1
10 CAPSULE, GELATIN COATED ORAL EVERY 8 HOURS SCHEDULED
Status: DISCONTINUED | OUTPATIENT
Start: 2024-07-07 | End: 2024-07-09 | Stop reason: HOSPADM

## 2024-07-07 RX ORDER — BENZONATATE 100 MG/1
200 CAPSULE ORAL 3 TIMES DAILY PRN
Status: DISCONTINUED | OUTPATIENT
Start: 2024-07-07 | End: 2024-07-09 | Stop reason: HOSPADM

## 2024-07-07 RX ADMIN — LACTULOSE 20 G: 20 SOLUTION ORAL at 09:36

## 2024-07-07 RX ADMIN — RIFAXIMIN 550 MG: 550 TABLET ORAL at 22:12

## 2024-07-07 RX ADMIN — NICOTINE 1 PATCH: 14 PATCH, EXTENDED RELEASE TRANSDERMAL at 15:06

## 2024-07-07 RX ADMIN — POTASSIUM PHOSPHATE, MONOBASIC AND POTASSIUM PHOSPHATE, DIBASIC 15 MMOL: 224; 236 INJECTION, SOLUTION, CONCENTRATE INTRAVENOUS at 05:56

## 2024-07-07 RX ADMIN — POTASSIUM CHLORIDE 40 MEQ: 750 CAPSULE, EXTENDED RELEASE ORAL at 23:38

## 2024-07-07 RX ADMIN — TRAMADOL HYDROCHLORIDE 50 MG: 50 TABLET ORAL at 06:17

## 2024-07-07 RX ADMIN — RIFAXIMIN 550 MG: 550 TABLET ORAL at 09:34

## 2024-07-07 RX ADMIN — POTASSIUM CHLORIDE 40 MEQ: 750 CAPSULE, EXTENDED RELEASE ORAL at 22:11

## 2024-07-07 RX ADMIN — THIAMINE HYDROCHLORIDE 200 MG: 100 INJECTION, SOLUTION INTRAMUSCULAR; INTRAVENOUS at 22:11

## 2024-07-07 RX ADMIN — HYDROCODONE BITARTRATE AND ACETAMINOPHEN 1 TABLET: 5; 325 TABLET ORAL at 15:01

## 2024-07-07 RX ADMIN — FUROSEMIDE 40 MG: 10 INJECTION, SOLUTION INTRAVENOUS at 15:00

## 2024-07-07 RX ADMIN — THIAMINE HYDROCHLORIDE 500 MG: 100 INJECTION, SOLUTION INTRAMUSCULAR; INTRAVENOUS at 06:29

## 2024-07-07 RX ADMIN — NYSTATIN 1 APPLICATION: 100000 CREAM TOPICAL at 22:12

## 2024-07-07 RX ADMIN — CHLORDIAZEPOXIDE HYDROCHLORIDE 10 MG: 5 CAPSULE ORAL at 22:11

## 2024-07-07 RX ADMIN — FOLIC ACID 1 MG: 1 TABLET ORAL at 09:34

## 2024-07-07 RX ADMIN — Medication 10 ML: at 09:36

## 2024-07-07 RX ADMIN — HYDROXYZINE HYDROCHLORIDE 25 MG: 25 TABLET ORAL at 06:17

## 2024-07-07 RX ADMIN — Medication 10 ML: at 22:12

## 2024-07-07 RX ADMIN — THIAMINE HYDROCHLORIDE 200 MG: 100 INJECTION, SOLUTION INTRAMUSCULAR; INTRAVENOUS at 15:11

## 2024-07-07 RX ADMIN — PENTOXIFYLLINE 400 MG: 400 TABLET, EXTENDED RELEASE ORAL at 18:42

## 2024-07-07 RX ADMIN — QUETIAPINE FUMARATE 50 MG: 25 TABLET, FILM COATED ORAL at 22:12

## 2024-07-07 RX ADMIN — PENTOXIFYLLINE 400 MG: 400 TABLET, EXTENDED RELEASE ORAL at 11:48

## 2024-07-07 RX ADMIN — BENZONATATE 200 MG: 100 CAPSULE ORAL at 11:48

## 2024-07-07 RX ADMIN — LACTULOSE 20 G: 20 SOLUTION ORAL at 22:11

## 2024-07-07 RX ADMIN — CHLORDIAZEPOXIDE HYDROCHLORIDE 10 MG: 5 CAPSULE ORAL at 15:01

## 2024-07-07 RX ADMIN — NYSTATIN 1 APPLICATION: 100000 CREAM TOPICAL at 09:35

## 2024-07-07 RX ADMIN — POTASSIUM CHLORIDE 40 MEQ: 750 CAPSULE, EXTENDED RELEASE ORAL at 09:34

## 2024-07-07 RX ADMIN — PENTOXIFYLLINE 400 MG: 400 TABLET, EXTENDED RELEASE ORAL at 09:35

## 2024-07-07 RX ADMIN — POTASSIUM CHLORIDE 40 MEQ: 750 CAPSULE, EXTENDED RELEASE ORAL at 11:48

## 2024-07-07 NOTE — PROGRESS NOTES
[facility] Medicine Services  INPATIENT PROGRESS NOTE    Patient Name: Luciano Christiansen  Date of Admission: 7/4/2024  Today's Date: 07/07/24  Length of Stay: 3  Primary Care Physician: Jossy Christiansen APRN    Subjective   Chief Complaint: Alcohol abuse/hepatic encephalopathy/alcohol liver disease   HPI   Blood pressure stable, afebrile.  Net urine output -1200.  Last drink was July 4.Potassium decreased.  Sodium is improving.  Magnesium is normal.  White blood cells normal.  Slight decrease in hemoglobin.  Slight decrease in platelets.    Review of Systems   Constitutional:  Positive for activity change, appetite change and fatigue. Negative for chills and fever.   HENT:  Negative for hearing loss, nosebleeds, tinnitus and trouble swallowing.    Eyes:  Negative for visual disturbance.   Respiratory:  Negative for cough, chest tightness, shortness of breath and wheezing.    Cardiovascular:  Positive for leg swelling. Negative for chest pain and palpitations.   Gastrointestinal:  Negative for abdominal distention, abdominal pain, blood in stool, constipation, diarrhea, nausea and vomiting.   Endocrine: Negative for cold intolerance, heat intolerance, polydipsia, polyphagia and polyuria.   Genitourinary:  Negative for decreased urine volume, difficulty urinating, dysuria, flank pain, frequency and hematuria.   Musculoskeletal:  Positive for arthralgias, gait problem and myalgias. Negative for joint swelling.   Skin:  Negative for rash.   Allergic/Immunologic: Negative for immunocompromised state.   Neurological:  Positive for weakness. Negative for dizziness, syncope, light-headedness and headaches.   Hematological:  Negative for adenopathy. Does not bruise/bleed easily.   Psychiatric/Behavioral:  Negative for confusion and sleep disturbance. The patient is not nervous/anxious.    All pertinent negatives and positives are as above. All other systems have been reviewed and are  negative unless otherwise stated.     Objective    Temp:  [97.3 °F (36.3 °C)-98.7 °F (37.1 °C)] 97.3 °F (36.3 °C)  Heart Rate:  [87-95] 93  Resp:  [18] 18  BP: (111-133)/(47-59) 123/59    .  Intake/Output Summary (Last 24 hours) at 7/7/2024 1413  Last data filed at 7/7/2024 1134  Gross per 24 hour   Intake 240 ml   Output 1475 ml   Net -1235 ml      Physical Exam  Vitals and nursing note reviewed.   Constitutional:       Comments: Chronically ill.   HENT:      Head: Normocephalic.   Eyes:      Conjunctiva/sclera: Conjunctivae normal.      Pupils: Pupils are equal, round, and reactive to light.   Cardiovascular:      Rate and Rhythm: Normal rate and regular rhythm.      Heart sounds: Normal heart sounds.   Pulmonary:      Effort: Pulmonary effort is normal. No respiratory distress.      Breath sounds: Normal breath sounds.      Comments: Patient is on room air.  Abdominal:      General: Bowel sounds are normal. There is no distension.      Palpations: Abdomen is soft.      Tenderness: There is no abdominal tenderness.      Comments: Morbid obesity   Musculoskeletal:         General: No swelling.      Cervical back: Neck supple.      Right lower leg: Edema present.      Left lower leg: Edema present.   Skin:     General: Skin is warm and dry.      Capillary Refill: Capillary refill takes 2 to 3 seconds.      Findings: No rash.      Comments: Scrotum edema.   Neurological:      Mental Status: He is alert and oriented to person, place, and time.      Motor: Weakness present.      Coordination: Coordination abnormal.      Gait: Gait abnormal.   Psychiatric:         Mood and Affect: Mood normal.         Behavior: Behavior normal.       Results Review:  I have reviewed the labs, radiology results, and diagnostic studies.    Laboratory Data:   Results from last 7 days   Lab Units 07/07/24  0535 07/06/24  1047 07/06/24  0456 07/05/24  1209 07/05/24  0029   WBC 10*3/mm3 4.45  --  6.08  --  4.01   HEMOGLOBIN g/dL 7.4* 8.1*  "7.8*   < > 7.7*   HEMATOCRIT % 23.0* 25.2* 23.2*   < > 22.4*   PLATELETS 10*3/mm3 118*  --  120*  --  131*    < > = values in this interval not displayed.        Results from last 7 days   Lab Units 07/07/24  0535 07/06/24  1047 07/06/24  0455 07/05/24  0956 07/05/24  0028 07/04/24  1947 07/04/24  1020   SODIUM mmol/L 134*  --  133*  --  132*  --  128*   POTASSIUM mmol/L 3.4* 3.8 3.4*   < > 2.6*   < > 2.7*   CHLORIDE mmol/L 95*  --  95*  --  90*  --  85*   CO2 mmol/L 28.0  --  31.0*  --  29.0  --  26.0   BUN mg/dL 9  --  8  --  5*  --  7   CREATININE mg/dL 1.03  --  0.81  --  0.62*  --  0.65*   CALCIUM mg/dL 8.1*  --  8.2*  --  7.8*  --  8.0*   BILIRUBIN mg/dL 5.6*  --   --   --   --   --  6.1*   ALK PHOS U/L 203*  --   --   --   --   --  241*   ALT (SGPT) U/L 26  --   --   --   --   --  41   AST (SGOT) U/L 60*  --   --   --   --   --  106*   GLUCOSE mg/dL 90  --  99  --  114*  --  137*    < > = values in this interval not displayed.       Culture Data:   No results found for: \"BLOODCX\", \"URINECX\", \"WOUNDCX\", \"MRSACX\", \"RESPCX\", \"STOOLCX\"    Radiology Data:   Imaging Results (Last 24 Hours)       ** No results found for the last 24 hours. **            I have reviewed the patient's current medications.     Assessment/Plan   Assessment  Active Hospital Problems    Diagnosis     **Alcohol withdrawal     BMI 40.0-44.9, adult     Alcoholic steatohepatitis     Type 2 diabetes mellitus     Alcoholic encephalopathy     Transaminitis     Hyponatremia     Hypokalemia     Hypomagnesemia     Elevated lactic acid level     Alcoholism     Primary hypertension     Fatty liver      HPI .39 y.o. male with EtOH abuse/alcoholism and alcoholic steatohepatitis, presented to the hospital today with concern for withdrawal. Patient states he previously had gotten sober after last hospitalization, but reports going back to binge drinking malt liquor and vodka. States he drinks 1/5 of vodka a day. Patient noticed he was tremulous this " morning after not drinking since 10 PM yesterday evening. He presented to the ER, and found to have electrolyte abnormalities including potassium of 2.7, magnesium of 1.5, and sodium of 128. Lactic acid elevated at 7.4. Bilirubin 6.1, , INR 1.56. Afebrile, WBC normal, no sign of infectious process. Ethanol level 0.175. Given patient's electrolyte and abnormalities and intoxication with concern for withdrawal, the intensivist team was requested to admit the patient for critical care management. Upon arrival to the ICU, patient drowsy, no distress. Vital signs stable, no pressors. No oxygenation issues. With significant jaundice. Abdomen distended and lower extremities edematous. Complaining of malaise, feeling unwell..      Treatment Plan     Alcohol abuse/alcohol withdrawal.  Patient drink malt liquor and vodka, 1/5 of vodka a day.  Patient is off Precedex 7/6/2024.  Ciwa protocol.  Folic acid . Thiamine.  Ativan as needed.   Last drink was July 4.  Start Librium around-the-clock.    Scrotum edema/scrotal skin damage/lower extremity edema.  Urology consult.  Wound care consult.   Start patient on Nystatin for yeast.  Urology recommend- Use towels to prop up scrotum.  Urology signed off.  IV Lasix.    GI bleed/anemia.  No sign of acute bleed.  Slight decrease hemoglobin.  GI has been consulted.  Hemoccult positive  Possible endoscopy on Monday.    Thrombocytopenia .  Slight decrease platelets.     Hepatic encephalopathy.  Ammonia level improving.  Continue lactulose.  Continue Xifaxan.     Hypokalemia.  Potassium p.o .  Potassium replacement protocol.     Hyponatremia.  Improving.     Hypomagnesia.  Resolved.  Magnesium replacement protocol.     Anemia/possible GI bleed/dark stool.  Fecal occult positive.  Hemoglobin stable.  GI   consult.  CT scan abdomen pelvic-Increased nonspecific mesenteric and retroperitoneal edema-  can be seen with volume overload given the presence of diffuse increased body wall  anasarca, Increased size of abdominal and pelvic lymph nodes- some of the iliac chain and inguinal lymph nodes of which are enlarged- could be reactive, Splenomegaly which can be seen with portal hypertension, Moderately distended bladder, Cholelithiasis.         COPD.  Chest x-ray-No acute disease.   Patient is on room air.     Nausea/reflux . Protonix.  Zofran as needed.     Chronic smoker. Nicotine patch.  Discussed patient cutting back and stopping .     Alcohol fatty liver/jaundice/elevated liver.  Liver enzymes improving.  MELD Score (Original, Pre-2016, Model for End-Stage Liver Disease) - MDCalc  Calculated on Jul 06 2024 3:37 PM  18 points -> Original MELD Score (Pre-2016)*  6.0% -> Estimated 3-Month Mortality     Peripheral vascular disease.  Trental.  Nitro as needed.  Echocardiogram-ejection fraction 61 to 65%, tricuspid regurgitation.     Anxiety/depression.  Seroquel at night.  Atarax as needed.     Hypoxia.  Patient is on room air.     Pain.  Norco as needed.  UDS-positive for THC and benzo.     SCD.     Nutrition.  Regular/house diet.  Boost supplement.     Deconditioning.  PT consult.     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  Consult social referral  for alcohol Anonymous.     Medical Decision Making  Number and Complexity of problems: Alcohol abuse/alcohol withdrawal/anemia/hepatic encephalopathy/edema/scrotum edema  Differential Diagnosis: None     Conditions and Status        Condition is unchanged.     [facility] Data  External documents reviewed: Transfer notes  Cardiac tracing (EKG, telemetry) interpretation: Sinus rhythm  Radiology interpretation: Echocardiogram/chest x-ray/CT scan  Labs reviewed: Laboratory  Any tests that were considered but not ordered: None     Decision rules/scores evaluated (example [id number], Wells, etc): None     Discussed with: Patient     Care Planning  Shared decision making: Patient  Code status and discussions: Full code      Disposition  Social Determinants of Health that impact treatment or disposition: From home  2 to 5 days    Electronically signed by Peter Tabor MD, 07/07/24, 14:06 CDT.

## 2024-07-07 NOTE — PLAN OF CARE
Goal Outcome Evaluation:  Plan of Care Reviewed With: patient        Progress: no change  Outcome Evaluation: Pt c/o back and scrotal pain, medicated with PRN meds; up with standby assist; F/C remains to BSD; IV lasix given; librium added to med schedule; edema remains to BLE and scrotum; IID; monitoring electrolyte labs, PRN protocols in place; safety maintained.

## 2024-07-07 NOTE — PROGRESS NOTES
[facility]  Inpatient Progress Note    Reason for Follow Up: Anemia possible GI bleed    Subjective     Interval History:   Tolerated diet yesterday  Patient somewhat groggy this morning does not give a good history    Current Facility-Administered Medications:     sennosides-docusate (PERICOLACE) 8.6-50 MG per tablet 2 tablet, 2 tablet, Oral, Nightly PRN **AND** polyethylene glycol (MIRALAX) packet 17 g, 17 g, Oral, Daily PRN **AND** bisacodyl (DULCOLAX) EC tablet 5 mg, 5 mg, Oral, Daily PRN **AND** bisacodyl (DULCOLAX) suppository 10 mg, 10 mg, Rectal, Daily PRN, Peter Tabor MD    Calcium Replacement - Follow Nurse / BPA Driven Protocol, , Does not apply, PRN, Neema Dan APRN    folic acid (FOLVITE) tablet 1 mg, 1 mg, Oral, Daily, Wes Hilton APRN, 1 mg at 07/06/24 0858    hydrOXYzine (ATARAX) tablet 25 mg, 25 mg, Oral, TID PRN, Reyes Joshi MD, 25 mg at 07/07/24 0617    ibuprofen (ADVIL,MOTRIN) tablet 400 mg, 400 mg, Oral, Q4H PRN, Wes Hilton APRN, 400 mg at 07/06/24 0354    lactulose solution 20 g, 20 g, Oral, BID, Peter Tabor MD, 20 g at 07/06/24 2132    Magnesium Standard Dose Replacement - Follow Nurse / BPA Driven Protocol, , Does not apply, PRN, Wes Hilton APRN    nicotine (NICODERM CQ) 14 MG/24HR patch 1 patch, 1 patch, Transdermal, Q24H, Peter Tabor MD, 1 patch at 07/06/24 1706    nitroglycerin (NITROSTAT) SL tablet 0.4 mg, 0.4 mg, Sublingual, Q5 Min PRN, Wes Hilton APRN    nystatin (MYCOSTATIN) 115289 UNIT/GM cream 1 Application, 1 Application, Topical, Q12H, Peter Tabor MD, 1 Application at 07/06/24 2133    ondansetron ODT (ZOFRAN-ODT) disintegrating tablet 4 mg, 4 mg, Oral, Q6H PRN **OR** ondansetron (ZOFRAN) injection 4 mg, 4 mg, Intravenous, Q6H PRN, Wes Hilotn APRN, 4 mg at 07/04/24 2118    pantoprazole (PROTONIX) EC tablet 40 mg, 40 mg, Oral, Q AM, Wes Hilton APRN    pentoxifylline (TRENtal) CR tablet 400  mg, 400 mg, Oral, TID With Meals, Wes Hilton APRN, 400 mg at 07/06/24 1707    Phosphorus Replacement - Follow Nurse / BPA Driven Protocol, , Does not apply, PRSy SHEIKH Tonya D, APRN    potassium chloride (MICRO-K/KLOR-CON) CR capsule, 40 mEq, Oral, Q4H, Peter Tabor MD    potassium phosphates 15 mmol in sodium chloride 0.9 % 250 mL infusion, 15 mmol, Intravenous, Once, Peter Tabor MD, 15 mmol at 07/07/24 0556    Potassium Replacement - Follow Nurse / BPA Driven Protocol, , Does not apply, Sy MAJANO Tonya D, APRN    QUEtiapine (SEROquel) tablet 50 mg, 50 mg, Oral, Nightly, Wes Hilton APRN, 50 mg at 07/06/24 2132    riFAXIMin (XIFAXAN) tablet 550 mg, 550 mg, Oral, Q12H, Peter Tabor MD, 550 mg at 07/06/24 2132    [COMPLETED] Insert Peripheral IV, , , Once **AND** sodium chloride 0.9 % flush 10 mL, 10 mL, Intravenous, PRN, Justino Walker Jr., MD    sodium chloride 0.9 % flush 10 mL, 10 mL, Intravenous, Q12H, Wes Hilton APRN, 10 mL at 07/06/24 0859    sodium chloride 0.9 % flush 10 mL, 10 mL, Intravenous, PRN, Wes Hilton APRN    sodium chloride 0.9 % infusion 40 mL, 40 mL, Intravenous, PRN, Wes Hilton APRN    thiamine (B-1) 500 mg in sodium chloride 0.9 % 100 mL IVPB, 500 mg, Intravenous, Q8H, Last Rate: 200 mL/hr at 07/07/24 0629, 500 mg at 07/07/24 0629 **FOLLOWED BY** thiamine (B-1) injection 200 mg, 200 mg, Intravenous, Q8H **FOLLOWED BY** [START ON 7/11/2024] thiamine (VITAMIN B-1) tablet 100 mg, 100 mg, Oral, Daily, Hilton, Wes, APRN    traMADol (ULTRAM) tablet 50 mg, 50 mg, Oral, Q6H PRN, Peter Tabor MD, 50 mg at 07/07/24 0617  Review of Systems:    All systems were reviewed and negative except for that previously mentioned in the HPI    Objective     Vital Signs  Temp:  [97.5 °F (36.4 °C)-98.7 °F (37.1 °C)] 97.5 °F (36.4 °C)  Heart Rate:  [87-95] 95  Resp:  [17-19] 18  BP: (111-136)/(47-64) 112/47  Body mass index is 43.82 kg/m².    Intake/Output  Summary (Last 24 hours) at 7/7/2024 0825  Last data filed at 7/7/2024 0732  Gross per 24 hour   Intake 640 ml   Output 1450 ml   Net -810 ml     I/O this shift:  In: -   Out: 250 [Urine:250]     Physical Exam:   General: patient awake, alert and cooperative   Eyes: Normal lids and lashes, no scleral icterus   Neck: supple, normal ROM   Skin: warm and dry, not jaundiced   Cardiovascular: regular rhythm and rate, no murmurs auscultated   Pulm: clear to auscultation bilaterally, regular and unlabored   Abdomen: soft, nontender, nondistended; normal bowel sounds   Rectal: deferred   Extremities: no rash or edema   Psychiatric: Normal mood and behavior; memory intact     Results Review:     I reviewed the patient's new clinical results.    Results from last 7 days   Lab Units 07/07/24  0535 07/06/24  1047 07/06/24  0456 07/05/24  1209 07/05/24  0029   WBC 10*3/mm3 4.45  --  6.08  --  4.01   HEMOGLOBIN g/dL 7.4* 8.1* 7.8*   < > 7.7*   HEMATOCRIT % 23.0* 25.2* 23.2*   < > 22.4*   PLATELETS 10*3/mm3 118*  --  120*  --  131*    < > = values in this interval not displayed.     Results from last 7 days   Lab Units 07/07/24  0535 07/06/24  1047 07/06/24  0455 07/05/24  0956 07/05/24  0028 07/04/24  1947 07/04/24  1020   SODIUM mmol/L 134*  --  133*  --  132*  --  128*   POTASSIUM mmol/L 3.4* 3.8 3.4*   < > 2.6*   < > 2.7*   CHLORIDE mmol/L 95*  --  95*  --  90*  --  85*   CO2 mmol/L 28.0  --  31.0*  --  29.0  --  26.0   BUN mg/dL 9  --  8  --  5*  --  7   CREATININE mg/dL 1.03  --  0.81  --  0.62*  --  0.65*   CALCIUM mg/dL 8.1*  --  8.2*  --  7.8*  --  8.0*   BILIRUBIN mg/dL 5.6*  --   --   --   --   --  6.1*   ALK PHOS U/L 203*  --   --   --   --   --  241*   ALT (SGPT) U/L 26  --   --   --   --   --  41   AST (SGOT) U/L 60*  --   --   --   --   --  106*   GLUCOSE mg/dL 90  --  99  --  114*  --  137*    < > = values in this interval not displayed.     Results from last 7 days   Lab Units 07/04/24  1020   INR  1.56*     Lab  Results   Lab Value Date/Time    LIPASE 20 05/05/2024 1852    LIPASE 26 04/22/2024 0020       Radiology:  [unfilled]        Assessment:     Active Hospital Problems    Diagnosis     **Alcohol withdrawal     BMI 40.0-44.9, adult     Alcoholic steatohepatitis     Type 2 diabetes mellitus     Alcoholic encephalopathy     Transaminitis     Hyponatremia     Hypokalemia     Hypomagnesemia     Elevated lactic acid level     Alcoholism     Primary hypertension     Fatty liver         Plan:   I did discuss the possibility of endoscopy he was unsure and wanted to go back to sleep  We will reevaluate him tomorrow as far as his mental status  Watch for Dts  Monitor H&H  PPIs prophylaxis  Dr. Gustafson takes over gastro coverage starting tomorrow    I discussed the patients findings and my recommendations with patient.      Electronically signed by Riky Kumar MD, 07/07/24, 8:25 AM CDT.

## 2024-07-08 LAB
ALBUMIN SERPL-MCNC: 3 G/DL (ref 3.5–5.2)
ALBUMIN/GLOB SERPL: 0.8 G/DL
ALP SERPL-CCNC: 198 U/L (ref 39–117)
ALT SERPL W P-5'-P-CCNC: 26 U/L (ref 1–41)
AMMONIA BLD-SCNC: 102 UMOL/L (ref 16–60)
ANION GAP SERPL CALCULATED.3IONS-SCNC: 12 MMOL/L (ref 5–15)
AST SERPL-CCNC: 55 U/L (ref 1–40)
BASOPHILS # BLD AUTO: 0.01 10*3/MM3 (ref 0–0.2)
BASOPHILS NFR BLD AUTO: 0.2 % (ref 0–1.5)
BILIRUB SERPL-MCNC: 6 MG/DL (ref 0–1.2)
BUN SERPL-MCNC: 8 MG/DL (ref 6–20)
BUN/CREAT SERPL: 8.1 (ref 7–25)
CALCIUM SPEC-SCNC: 8.4 MG/DL (ref 8.6–10.5)
CHLORIDE SERPL-SCNC: 98 MMOL/L (ref 98–107)
CO2 SERPL-SCNC: 27 MMOL/L (ref 22–29)
CREAT SERPL-MCNC: 0.99 MG/DL (ref 0.76–1.27)
DEPRECATED RDW RBC AUTO: 66.5 FL (ref 37–54)
EGFRCR SERPLBLD CKD-EPI 2021: 99.4 ML/MIN/1.73
EOSINOPHIL # BLD AUTO: 0.16 10*3/MM3 (ref 0–0.4)
EOSINOPHIL NFR BLD AUTO: 3 % (ref 0.3–6.2)
ERYTHROCYTE [DISTWIDTH] IN BLOOD BY AUTOMATED COUNT: 17.5 % (ref 12.3–15.4)
GLOBULIN UR ELPH-MCNC: 3.6 GM/DL
GLUCOSE SERPL-MCNC: 84 MG/DL (ref 65–99)
HCT VFR BLD AUTO: 24.7 % (ref 37.5–51)
HGB BLD-MCNC: 8 G/DL (ref 13–17.7)
IMM GRANULOCYTES # BLD AUTO: 0.03 10*3/MM3 (ref 0–0.05)
IMM GRANULOCYTES NFR BLD AUTO: 0.6 % (ref 0–0.5)
LYMPHOCYTES # BLD AUTO: 0.82 10*3/MM3 (ref 0.7–3.1)
LYMPHOCYTES NFR BLD AUTO: 15.1 % (ref 19.6–45.3)
MCH RBC QN AUTO: 33.6 PG (ref 26.6–33)
MCHC RBC AUTO-ENTMCNC: 32.4 G/DL (ref 31.5–35.7)
MCV RBC AUTO: 103.8 FL (ref 79–97)
MONOCYTES # BLD AUTO: 0.56 10*3/MM3 (ref 0.1–0.9)
MONOCYTES NFR BLD AUTO: 10.3 % (ref 5–12)
NEUTROPHILS NFR BLD AUTO: 3.84 10*3/MM3 (ref 1.7–7)
NEUTROPHILS NFR BLD AUTO: 70.8 % (ref 42.7–76)
NRBC BLD AUTO-RTO: 0 /100 WBC (ref 0–0.2)
PLATELET # BLD AUTO: 139 10*3/MM3 (ref 140–450)
PMV BLD AUTO: 10 FL (ref 6–12)
POTASSIUM SERPL-SCNC: 3.5 MMOL/L (ref 3.5–5.2)
POTASSIUM SERPL-SCNC: 3.5 MMOL/L (ref 3.5–5.2)
POTASSIUM SERPL-SCNC: 3.7 MMOL/L (ref 3.5–5.2)
PROT SERPL-MCNC: 6.6 G/DL (ref 6–8.5)
RBC # BLD AUTO: 2.38 10*6/MM3 (ref 4.14–5.8)
SODIUM SERPL-SCNC: 137 MMOL/L (ref 136–145)
WBC NRBC COR # BLD AUTO: 5.42 10*3/MM3 (ref 3.4–10.8)

## 2024-07-08 PROCEDURE — 84132 ASSAY OF SERUM POTASSIUM: CPT | Performed by: FAMILY MEDICINE

## 2024-07-08 PROCEDURE — 25010000002 FUROSEMIDE PER 20 MG: Performed by: FAMILY MEDICINE

## 2024-07-08 PROCEDURE — 25010000002 THIAMINE HCL 200 MG/2ML SOLUTION: Performed by: NURSE PRACTITIONER

## 2024-07-08 PROCEDURE — 85025 COMPLETE CBC W/AUTO DIFF WBC: CPT | Performed by: FAMILY MEDICINE

## 2024-07-08 PROCEDURE — 80053 COMPREHEN METABOLIC PANEL: CPT | Performed by: FAMILY MEDICINE

## 2024-07-08 PROCEDURE — 82140 ASSAY OF AMMONIA: CPT | Performed by: FAMILY MEDICINE

## 2024-07-08 RX ORDER — POTASSIUM CHLORIDE 750 MG/1
40 CAPSULE, EXTENDED RELEASE ORAL EVERY 4 HOURS
Status: COMPLETED | OUTPATIENT
Start: 2024-07-08 | End: 2024-07-08

## 2024-07-08 RX ADMIN — CHLORDIAZEPOXIDE HYDROCHLORIDE 10 MG: 5 CAPSULE ORAL at 21:27

## 2024-07-08 RX ADMIN — THIAMINE HYDROCHLORIDE 200 MG: 100 INJECTION, SOLUTION INTRAMUSCULAR; INTRAVENOUS at 14:09

## 2024-07-08 RX ADMIN — THIAMINE HYDROCHLORIDE 200 MG: 100 INJECTION, SOLUTION INTRAMUSCULAR; INTRAVENOUS at 06:37

## 2024-07-08 RX ADMIN — FUROSEMIDE 40 MG: 10 INJECTION, SOLUTION INTRAVENOUS at 03:39

## 2024-07-08 RX ADMIN — RIFAXIMIN 550 MG: 550 TABLET ORAL at 21:27

## 2024-07-08 RX ADMIN — POTASSIUM CHLORIDE 40 MEQ: 750 CAPSULE, EXTENDED RELEASE ORAL at 12:25

## 2024-07-08 RX ADMIN — LACTULOSE 20 G: 20 SOLUTION ORAL at 09:39

## 2024-07-08 RX ADMIN — QUETIAPINE FUMARATE 50 MG: 25 TABLET, FILM COATED ORAL at 21:27

## 2024-07-08 RX ADMIN — HYDROCODONE BITARTRATE AND ACETAMINOPHEN 1 TABLET: 5; 325 TABLET ORAL at 09:35

## 2024-07-08 RX ADMIN — POTASSIUM CHLORIDE 40 MEQ: 750 CAPSULE, EXTENDED RELEASE ORAL at 06:37

## 2024-07-08 RX ADMIN — CHLORDIAZEPOXIDE HYDROCHLORIDE 10 MG: 5 CAPSULE ORAL at 06:37

## 2024-07-08 RX ADMIN — FOLIC ACID 1 MG: 1 TABLET ORAL at 09:35

## 2024-07-08 RX ADMIN — HYDROXYZINE HYDROCHLORIDE 25 MG: 25 TABLET ORAL at 09:35

## 2024-07-08 RX ADMIN — FUROSEMIDE 40 MG: 10 INJECTION, SOLUTION INTRAVENOUS at 14:09

## 2024-07-08 RX ADMIN — THIAMINE HYDROCHLORIDE 200 MG: 100 INJECTION, SOLUTION INTRAMUSCULAR; INTRAVENOUS at 21:27

## 2024-07-08 RX ADMIN — PENTOXIFYLLINE 400 MG: 400 TABLET, EXTENDED RELEASE ORAL at 09:35

## 2024-07-08 RX ADMIN — PENTOXIFYLLINE 400 MG: 400 TABLET, EXTENDED RELEASE ORAL at 12:25

## 2024-07-08 RX ADMIN — Medication 10 ML: at 21:27

## 2024-07-08 RX ADMIN — HYDROCODONE BITARTRATE AND ACETAMINOPHEN 1 TABLET: 5; 325 TABLET ORAL at 21:34

## 2024-07-08 RX ADMIN — CHLORDIAZEPOXIDE HYDROCHLORIDE 10 MG: 5 CAPSULE ORAL at 14:09

## 2024-07-08 RX ADMIN — Medication 10 ML: at 09:39

## 2024-07-08 RX ADMIN — PENTOXIFYLLINE 400 MG: 400 TABLET, EXTENDED RELEASE ORAL at 18:15

## 2024-07-08 RX ADMIN — LACTULOSE 20 G: 20 SOLUTION ORAL at 21:27

## 2024-07-08 RX ADMIN — NYSTATIN 1 APPLICATION: 100000 CREAM TOPICAL at 09:39

## 2024-07-08 RX ADMIN — RIFAXIMIN 550 MG: 550 TABLET ORAL at 09:37

## 2024-07-08 RX ADMIN — NICOTINE 1 PATCH: 14 PATCH, EXTENDED RELEASE TRANSDERMAL at 15:06

## 2024-07-08 RX ADMIN — PANTOPRAZOLE SODIUM 40 MG: 40 TABLET, DELAYED RELEASE ORAL at 06:37

## 2024-07-08 NOTE — PLAN OF CARE
Problem: Adult Inpatient Plan of Care  Goal: Plan of Care Review  Outcome: Ongoing, Progressing  Flowsheets (Taken 7/8/2024 1605)  Outcome Evaluation: Patient RA. CIWA scale-0. IV CDI, IID. IV Lasix. C/O pain, see MAR. PRN anxiety med, see MAR. Scrotal edema, using zinc cream per wound APRN. Up adlib. D/C burton, voiding per toilet. Tolerating diet. VSS, safety maintained.

## 2024-07-08 NOTE — PLAN OF CARE
Goal Outcome Evaluation:      Patient resting between care. Many liquid BM tonight. Scrotum swelling not resolving with floating. Harris catheter in place. Up SBA. Safety maintained.

## 2024-07-08 NOTE — CASE MANAGEMENT/SOCIAL WORK
Continued Stay Note  TOVA Munoz     Patient Name: Luciano Christiansen  MRN: [id number]  Today's Date: 7/8/2024    Admit Date: 7/4/2024    Plan: Home   Discharge Plan       Row Name 07/08/24 1431       Plan    Plan Home    Plan Comments Rec'd a referral to give pt chemical dependency resources. Resources given. Pt denies further needs.    Final Discharge Disposition Code 01 - home or self-care                   Discharge Codes    No documentation.                       RAMO Clement

## 2024-07-08 NOTE — PROGRESS NOTES
[facility] Medicine Services  INPATIENT PROGRESS NOTE    Patient Name: Luciano Christiansen  Date of Admission: 7/4/2024  Today's Date: 07/08/24  Length of Stay: 4  Primary Care Physician: Jossy Christiansen APRN    Subjective   Chief Complaint: Alcohol abuse/hepatic encephalopathy/alcohol liver disease   HPI   Feels better today, Harris catheter was discontinued.  GI workup as outpatient.        Review of Systems   All pertinent negatives and positives are as above. All other systems have been reviewed and are negative unless otherwise stated.     Objective    Temp:  [98 °F (36.7 °C)-99 °F (37.2 °C)] 99 °F (37.2 °C)  Heart Rate:  [87-92] 91  Resp:  [18] 18  BP: (100-128)/(48-64) 128/64  Physical Exam  General: Patient is alert, awake, oriented x 3 in no apparent distress at the time of examination  HEENT: Normocephalic, atraumatic, pupils are equal reactive to light and accommodate  Neck: Supple, no JVD, no carotid bruits  CVS: S1, S2, regular rhythm and rate  Lungs: Clear to auscultation bilaterally  Abdomen: Soft, nontender, nondistended bowel sounds are present      Results Review:  I have reviewed the labs, radiology results, and diagnostic studies.    Laboratory Data:   Results from last 7 days   Lab Units 07/08/24  0518 07/07/24  0535 07/06/24  1047 07/06/24  0456   WBC 10*3/mm3 5.42 4.45  --  6.08   HEMOGLOBIN g/dL 8.0* 7.4* 8.1* 7.8*   HEMATOCRIT % 24.7* 23.0* 25.2* 23.2*   PLATELETS 10*3/mm3 139* 118*  --  120*        Results from last 7 days   Lab Units 07/08/24  1457 07/08/24  0518 07/07/24  1646 07/07/24  0535 07/06/24  1047 07/06/24  0455 07/04/24  1947 07/04/24  1020   SODIUM mmol/L  --  137  --  134*  --  133*   < > 128*   POTASSIUM mmol/L 3.7 3.5  3.5 3.6 3.4*   < > 3.4*   < > 2.7*   CHLORIDE mmol/L  --  98  --  95*  --  95*   < > 85*   CO2 mmol/L  --  27.0  --  28.0  --  31.0*   < > 26.0   BUN mg/dL  --  8  --  9  --  8   < > 7   CREATININE mg/dL  --  0.99  --  1.03   "--  0.81   < > 0.65*   CALCIUM mg/dL  --  8.4*  --  8.1*  --  8.2*   < > 8.0*   BILIRUBIN mg/dL  --  6.0*  --  5.6*  --   --   --  6.1*   ALK PHOS U/L  --  198*  --  203*  --   --   --  241*   ALT (SGPT) U/L  --  26  --  26  --   --   --  41   AST (SGOT) U/L  --  55*  --  60*  --   --   --  106*   GLUCOSE mg/dL  --  84  --  90  --  99   < > 137*    < > = values in this interval not displayed.       Culture Data:   No results found for: \"BLOODCX\", \"URINECX\", \"WOUNDCX\", \"MRSACX\", \"RESPCX\", \"STOOLCX\"    Radiology Data:   Imaging Results (Last 24 Hours)       ** No results found for the last 24 hours. **            I have reviewed the patient's current medications.     Assessment/Plan   Assessment  Active Hospital Problems    Diagnosis     **Alcohol withdrawal     BMI 40.0-44.9, adult     Alcoholic steatohepatitis     Type 2 diabetes mellitus     Alcoholic encephalopathy     Transaminitis     Hyponatremia     Hypokalemia     Hypomagnesemia     Elevated lactic acid level     Alcoholism     Primary hypertension     Fatty liver        Treatment Plan  Alcohol abuse/alcohol withdrawal.  Patient drink malt liquor and vodka, 1/5 of vodka a day.  Patient is off Precedex 7/6/2024.  Ciwa protocol.  Folic acid . Thiamine.  Ativan as needed.   Last drink was July 4.  Start Librium around-the-clock.     Scrotum edema/scrotal skin damage/lower extremity edema.  Urology consult.  Wound care consult.   Start patient on Nystatin for yeast.  Urology recommend- Use towels to prop up scrotum.  Urology signed off.  IV Lasix.     GI bleed/anemia.  No sign of acute bleed.  Slight decrease hemoglobin.  GI has been consulted.  Hemoccult positive  Possible endoscopy on Monday.     Thrombocytopenia .  Slight decrease platelets.     Hepatic encephalopathy.  Ammonia level improving.  Continue lactulose.  Continue Xifaxan.     Hypokalemia.  Potassium p.o .  Potassium replacement protocol.     Hyponatremia.  Improving.     Hypomagnesia.  Resolved.  " Magnesium replacement protocol.     Anemia/possible GI bleed/dark stool.  Fecal occult positive.  Hemoglobin stable.  GI   Consuled ,for outpatient workup.    Medical Decision Making  Number and Complexity of problems: Alcohol abuse/alcohol withdrawal/anemia/hepatic   Differential Diagnosis: none    Conditions and Status        Condition is improving.     [facility] Data  External documents reviewed: Yes  Cardiac tracing (EKG, telemetry) interpretation: Sinus rhythm  Radiology interpretation: none  Labs reviewed: Yes  Any tests that were considered but not ordered:       Decision rules/scores evaluated (example [id number], Wells, etc): None     Discussed with: Patient, nursing staff     Care Planning  Shared decision making: Patient  Code status and discussions: Full code    Disposition  Social Determinants of Health that impact treatment or disposition: none  I expect the patient to be discharged to home in 1 day.     Electronically signed by Marcus Pantoja MD, 07/08/24, 15:49 CDT.

## 2024-07-08 NOTE — PROGRESS NOTES
[facility]  Inpatient Progress Note  Today's date:  07/08/24    Luciano Christiansen  1985       Reason for Follow Up: Alcohol withdrawal    Subjective:   39-year-old patient with a history of alcohol withdrawal.  The patient denies hematemesis, melena or hematochezia.  He appears to be much more alert today per the nursing staff.  Hemoglobin is stable without clinical GI bleeding.  The patient has been seen previously by another gastroenterologist.    No Known Allergies    Current Facility-Administered Medications:     benzonatate (TESSALON) capsule 200 mg, 200 mg, Oral, TID PRN, Peter Tabor MD, 200 mg at 07/07/24 1148    sennosides-docusate (PERICOLACE) 8.6-50 MG per tablet 2 tablet, 2 tablet, Oral, Nightly PRN **AND** polyethylene glycol (MIRALAX) packet 17 g, 17 g, Oral, Daily PRN **AND** bisacodyl (DULCOLAX) EC tablet 5 mg, 5 mg, Oral, Daily PRN **AND** bisacodyl (DULCOLAX) suppository 10 mg, 10 mg, Rectal, Daily PRN, Peter Tabor MD    Calcium Replacement - Follow Nurse / BPA Driven Protocol, , Does not apply, PRN, Neema Dan APRN    chlordiazePOXIDE (LIBRIUM) capsule 10 mg, 10 mg, Oral, Q8H, Peter Tabor MD, 10 mg at 07/08/24 0637    folic acid (FOLVITE) tablet 1 mg, 1 mg, Oral, Daily, Wes Hilton APRN, 1 mg at 07/08/24 0935    furosemide (LASIX) injection 40 mg, 40 mg, Intravenous, Q12H, Peter Tabor MD, 40 mg at 07/08/24 0339    HYDROcodone-acetaminophen (NORCO) 5-325 MG per tablet 1 tablet, 1 tablet, Oral, Q6H PRN, Peter Tabor MD, 1 tablet at 07/08/24 0935    hydrOXYzine (ATARAX) tablet 25 mg, 25 mg, Oral, TID PRN, Reyes Joshi MD, 25 mg at 07/08/24 0935    lactulose solution 20 g, 20 g, Oral, BID, Peter Tabor MD, 20 g at 07/08/24 0939    Magnesium Standard Dose Replacement - Follow Nurse / BPA Driven Protocol, , Does not apply, PRN, Wes Hilton, LÁZARO    nicotine (NICODERM CQ) 14 MG/24HR patch 1 patch, 1 patch, Transdermal, Q24H, Leander,  Peter COOPER MD, 1 patch at 24 1506    nitroglycerin (NITROSTAT) SL tablet 0.4 mg, 0.4 mg, Sublingual, Q5 Min PRN, Wes Hilton APRN    ondansetron ODT (ZOFRAN-ODT) disintegrating tablet 4 mg, 4 mg, Oral, Q6H PRN **OR** ondansetron (ZOFRAN) injection 4 mg, 4 mg, Intravenous, Q6H PRN, eWs Hilton APRN, 4 mg at 24 2118    pantoprazole (PROTONIX) EC tablet 40 mg, 40 mg, Oral, Q AM, Wes Hilton APRN, 40 mg at 24 0637    pentoxifylline (TRENtal) CR tablet 400 mg, 400 mg, Oral, TID With Meals, Wes Hilton APRN, 400 mg at 24 0935    Phosphorus Replacement - Follow Nurse / BPA Driven Protocol, , Does not apply, PRN, Neema Dan APRN    potassium chloride (MICRO-K/KLOR-CON) CR capsule, 40 mEq, Oral, Q4H, Peter Tabor MD, 40 mEq at 24 0637    Potassium Replacement - Follow Nurse / BPA Driven Protocol, , Does not apply, PRAGUILAR, Neema Dan APRN    QUEtiapine (SEROquel) tablet 50 mg, 50 mg, Oral, Nightly, Wes Hilton APRN, 50 mg at 24 2212    riFAXIMin (XIFAXAN) tablet 550 mg, 550 mg, Oral, Q12H, Peter Tabor MD, 550 mg at 24 0937    [COMPLETED] Insert Peripheral IV, , , Once **AND** sodium chloride 0.9 % flush 10 mL, 10 mL, Intravenous, PRN, Justino Walker Jr., MD    sodium chloride 0.9 % flush 10 mL, 10 mL, Intravenous, Q12H, Wes Hilton APRN, 10 mL at 24 0939    sodium chloride 0.9 % flush 10 mL, 10 mL, Intravenous, PRN, Wes Hilton APRN    sodium chloride 0.9 % infusion 40 mL, 40 mL, Intravenous, PRN, Wes Hilton APRN    [] thiamine (B-1) 500 mg in sodium chloride 0.9 % 100 mL IVPB, 500 mg, Intravenous, Q8H, Stopped at 24 0700 **FOLLOWED BY** thiamine (B-1) injection 200 mg, 200 mg, Intravenous, Q8H, 200 mg at 24 0637 **FOLLOWED BY** [START ON 2024] thiamine (VITAMIN B-1) tablet 100 mg, 100 mg, Oral, Daily, Wes Hilton APRN    Review of Systems:   Review of Systems   Denies  hematemesis, melena, hematochezia, nausea or vomiting.  Vital Signs:  Temp:  [97.3 °F (36.3 °C)-98.6 °F (37 °C)] 98 °F (36.7 °C)  Heart Rate:  [87-94] 92  Resp:  [18] 18  BP: (100-123)/(48-62) 117/51  Body mass index is 43.82 kg/m².     Intake/Output Summary (Last 24 hours) at 7/8/2024 1036  Last data filed at 7/8/2024 0914  Gross per 24 hour   Intake 480 ml   Output 4950 ml   Net -4470 ml     I/O this shift:  In: 240 [P.O.:240]  Out: 750 [Urine:750]  Physical Exam:  Physical Exam   Lungs: Clear to auscultation without CVA tenderness    Cardiac: Regular rhythm without murmurs    Abdomen: Soft, nontender, positive bowel sounds.    Neurologic: Alert and oriented to person place and time without focal motor deficits.  No asterixis.  Results Review:   I have reviewed all of the patient's current test results    Results from last 7 days   Lab Units 07/08/24  0518 07/07/24  0535 07/06/24  1047 07/06/24  0456   WBC 10*3/mm3 5.42 4.45  --  6.08   HEMOGLOBIN g/dL 8.0* 7.4* 8.1* 7.8*   HEMATOCRIT % 24.7* 23.0* 25.2* 23.2*   PLATELETS 10*3/mm3 139* 118*  --  120*       Results from last 7 days   Lab Units 07/08/24 0518 07/07/24  1646 07/07/24  0535 07/06/24  1047 07/06/24  0455 07/04/24  1947 07/04/24  1020   SODIUM mmol/L 137  --  134*  --  133*   < > 128*   POTASSIUM mmol/L 3.5  3.5 3.6 3.4*   < > 3.4*   < > 2.7*   CHLORIDE mmol/L 98  --  95*  --  95*   < > 85*   CO2 mmol/L 27.0  --  28.0  --  31.0*   < > 26.0   BUN mg/dL 8  --  9  --  8   < > 7   CREATININE mg/dL 0.99  --  1.03  --  0.81   < > 0.65*   CALCIUM mg/dL 8.4*  --  8.1*  --  8.2*   < > 8.0*   BILIRUBIN mg/dL 6.0*  --  5.6*  --   --   --  6.1*   ALK PHOS U/L 198*  --  203*  --   --   --  241*   ALT (SGPT) U/L 26  --  26  --   --   --  41   AST (SGOT) U/L 55*  --  60*  --   --   --  106*   GLUCOSE mg/dL 84  --  90  --  99   < > 137*    < > = values in this interval not displayed.       Results from last 7 days   Lab Units 07/04/24  1020   INR  1.56*       Lab  Results   Lab Value Date/Time    LIPASE 20 05/05/2024 1852    LIPASE 26 04/22/2024 0020       Radiology Review:  Imaging Results (Last 24 Hours)       ** No results found for the last 24 hours. **            Impression/Plan:    Alcohol withdrawal    Primary hypertension    Fatty liver    Hyponatremia    Hypokalemia    Hypomagnesemia    Elevated lactic acid level    Alcoholism    Transaminitis    Alcoholic encephalopathy    Type 2 diabetes mellitus    Alcoholic steatohepatitis    BMI 40.0-44.9, adult    Patient appears to be improving clinically.  No need for urgent endoscopic intervention at this time.  The patient should follow-up with a gastroenterologist soon after hospital release to consider outpatient upper endoscopy.  He will also need an alpha-fetoprotein and abdominal ultrasound every 6 months for hepatoma screening.  He should avoid alcohol completely and would recommend a substance abuse evaluation per the primary/referring service.  The patient may have his diet advanced as tolerated from a gastrointestinal standpoint per the primary service.  The inpatient GI service will sign off at this time.  Please call with any questions or concerns.  The inpatient GI service remains available should any urgent endoscopic intervention be necessary.  The above was discussed in detail with the patient and the patient's nursing staff.  Thank you    Torres Barrett MD  07/08/24  10:36 CDT    DISCLAIMER:    This physician works through a [employer] as an inpatient consultant gastroenterologist only and has no outpatient clinic for patient follow up.  Any results not available at time of inpatient discharge and/or GI clinic follow up should be managed by the hospitalist team, PCP, or outpatient gastroenterologist.

## 2024-07-08 NOTE — NURSING NOTE
[facility]  INPATIENT WOUND & OSTOMY CARE    Today's Date: 07/08/24    Patient Name: Luciano Christiansen  MRN: [id number]  CSN: [id number]  PCP: Jossy Christiansen APRN  Attending Provider: Marcus Pantoja MD  Length of Stay: 4    Rounded on patient with LÁZARO Ya for initial wound care consult for scrotal wounds. See her initial consult note for details.     Nursing has uploaded picture of wound to the chart.     Wound care orders placed per LÁZARO Ya.     Inpatient wound care will continue to follow during hospital stay.  Please contact if any issues or concerns arise.     This document has been electronically signed by Carolynn Weiss RN on 7/8/2024 10:08 CDT

## 2024-07-08 NOTE — PLAN OF CARE
Goal Outcome Evaluation:  Received orders for PT services.  Pt has been up ad jeramie.  Discussed w/ nsg who reports the same.  Pt reports his swelling continues to cause him discomfort and alters his mvmt, however, he is able to move.  Discussed importance of lower body elevation, aps and LAQs to assist w/ edema mgmt and importance of activity to reduce effects from bed rest.  Pt reports understanding.  PT to sign off due to no needs for skilled PT services at this time w/ pt up ad jeramie.  Please reconsult if anything changes.

## 2024-07-09 VITALS
HEIGHT: 74 IN | WEIGHT: 315 LBS | RESPIRATION RATE: 18 BRPM | TEMPERATURE: 98.7 F | HEART RATE: 92 BPM | DIASTOLIC BLOOD PRESSURE: 55 MMHG | SYSTOLIC BLOOD PRESSURE: 110 MMHG | OXYGEN SATURATION: 97 % | BODY MASS INDEX: 40.43 KG/M2

## 2024-07-09 PROBLEM — E83.42 HYPOMAGNESEMIA: Status: RESOLVED | Noted: 2022-12-23 | Resolved: 2024-07-09

## 2024-07-09 PROBLEM — E87.6 HYPOKALEMIA: Status: RESOLVED | Noted: 2022-12-23 | Resolved: 2024-07-09

## 2024-07-09 PROBLEM — R79.89 ELEVATED LACTIC ACID LEVEL: Status: RESOLVED | Noted: 2022-12-23 | Resolved: 2024-07-09

## 2024-07-09 LAB
ALBUMIN SERPL-MCNC: 2.9 G/DL (ref 3.5–5.2)
ALBUMIN/GLOB SERPL: 0.8 G/DL
ALP SERPL-CCNC: 190 U/L (ref 39–117)
ALT SERPL W P-5'-P-CCNC: 23 U/L (ref 1–41)
AMMONIA BLD-SCNC: 121 UMOL/L (ref 16–60)
ANION GAP SERPL CALCULATED.3IONS-SCNC: 11 MMOL/L (ref 5–15)
AST SERPL-CCNC: 47 U/L (ref 1–40)
BASOPHILS # BLD AUTO: 0.02 10*3/MM3 (ref 0–0.2)
BASOPHILS NFR BLD AUTO: 0.4 % (ref 0–1.5)
BILIRUB SERPL-MCNC: 5.3 MG/DL (ref 0–1.2)
BUN SERPL-MCNC: 7 MG/DL (ref 6–20)
BUN/CREAT SERPL: 6.5 (ref 7–25)
CALCIUM SPEC-SCNC: 8.4 MG/DL (ref 8.6–10.5)
CHLORIDE SERPL-SCNC: 101 MMOL/L (ref 98–107)
CO2 SERPL-SCNC: 26 MMOL/L (ref 22–29)
CREAT SERPL-MCNC: 1.07 MG/DL (ref 0.76–1.27)
DEPRECATED RDW RBC AUTO: 66.4 FL (ref 37–54)
EGFRCR SERPLBLD CKD-EPI 2021: 90.5 ML/MIN/1.73
EOSINOPHIL # BLD AUTO: 0.22 10*3/MM3 (ref 0–0.4)
EOSINOPHIL NFR BLD AUTO: 4.8 % (ref 0.3–6.2)
ERYTHROCYTE [DISTWIDTH] IN BLOOD BY AUTOMATED COUNT: 17.5 % (ref 12.3–15.4)
GLOBULIN UR ELPH-MCNC: 3.7 GM/DL
GLUCOSE SERPL-MCNC: 94 MG/DL (ref 65–99)
HCT VFR BLD AUTO: 23.9 % (ref 37.5–51)
HGB BLD-MCNC: 7.8 G/DL (ref 13–17.7)
IMM GRANULOCYTES # BLD AUTO: 0.02 10*3/MM3 (ref 0–0.05)
IMM GRANULOCYTES NFR BLD AUTO: 0.4 % (ref 0–0.5)
LYMPHOCYTES # BLD AUTO: 0.73 10*3/MM3 (ref 0.7–3.1)
LYMPHOCYTES NFR BLD AUTO: 15.9 % (ref 19.6–45.3)
MCH RBC QN AUTO: 33.8 PG (ref 26.6–33)
MCHC RBC AUTO-ENTMCNC: 32.6 G/DL (ref 31.5–35.7)
MCV RBC AUTO: 103.5 FL (ref 79–97)
MONOCYTES # BLD AUTO: 0.62 10*3/MM3 (ref 0.1–0.9)
MONOCYTES NFR BLD AUTO: 13.5 % (ref 5–12)
NEUTROPHILS NFR BLD AUTO: 2.97 10*3/MM3 (ref 1.7–7)
NEUTROPHILS NFR BLD AUTO: 65 % (ref 42.7–76)
NRBC BLD AUTO-RTO: 0 /100 WBC (ref 0–0.2)
PLATELET # BLD AUTO: 133 10*3/MM3 (ref 140–450)
PMV BLD AUTO: 10 FL (ref 6–12)
POTASSIUM SERPL-SCNC: 3.3 MMOL/L (ref 3.5–5.2)
POTASSIUM SERPL-SCNC: 3.4 MMOL/L (ref 3.5–5.2)
PROT SERPL-MCNC: 6.6 G/DL (ref 6–8.5)
RBC # BLD AUTO: 2.31 10*6/MM3 (ref 4.14–5.8)
SODIUM SERPL-SCNC: 138 MMOL/L (ref 136–145)
WBC NRBC COR # BLD AUTO: 4.58 10*3/MM3 (ref 3.4–10.8)

## 2024-07-09 PROCEDURE — 25010000002 FUROSEMIDE PER 20 MG: Performed by: FAMILY MEDICINE

## 2024-07-09 PROCEDURE — 25010000002 THIAMINE HCL 200 MG/2ML SOLUTION: Performed by: NURSE PRACTITIONER

## 2024-07-09 PROCEDURE — 80053 COMPREHEN METABOLIC PANEL: CPT | Performed by: FAMILY MEDICINE

## 2024-07-09 PROCEDURE — 85025 COMPLETE CBC W/AUTO DIFF WBC: CPT | Performed by: FAMILY MEDICINE

## 2024-07-09 PROCEDURE — 82140 ASSAY OF AMMONIA: CPT | Performed by: FAMILY MEDICINE

## 2024-07-09 PROCEDURE — 84132 ASSAY OF SERUM POTASSIUM: CPT | Performed by: NURSE PRACTITIONER

## 2024-07-09 RX ORDER — LANOLIN ALCOHOL/MO/W.PET/CERES
100 CREAM (GRAM) TOPICAL DAILY
Qty: 30 TABLET | Refills: 0 | Status: SHIPPED | OUTPATIENT
Start: 2024-07-11 | End: 2024-07-15 | Stop reason: SDUPTHER

## 2024-07-09 RX ORDER — POTASSIUM CHLORIDE 750 MG/1
40 CAPSULE, EXTENDED RELEASE ORAL EVERY 4 HOURS
Status: COMPLETED | OUTPATIENT
Start: 2024-07-09 | End: 2024-07-09

## 2024-07-09 RX ORDER — CHLORDIAZEPOXIDE HYDROCHLORIDE 10 MG/1
10 CAPSULE, GELATIN COATED ORAL EVERY 8 HOURS SCHEDULED
Qty: 12 CAPSULE | Refills: 0 | Status: SHIPPED | OUTPATIENT
Start: 2024-07-09 | End: 2024-07-15

## 2024-07-09 RX ORDER — NICOTINE 21 MG/24HR
1 PATCH, TRANSDERMAL 24 HOURS TRANSDERMAL
Qty: 30 PATCH | Refills: 0 | Status: SHIPPED | OUTPATIENT
Start: 2024-07-09 | End: 2024-07-15

## 2024-07-09 RX ORDER — HYDROCODONE BITARTRATE AND ACETAMINOPHEN 5; 325 MG/1; MG/1
1 TABLET ORAL EVERY 6 HOURS PRN
Qty: 12 TABLET | Refills: 0 | Status: SHIPPED | OUTPATIENT
Start: 2024-07-09 | End: 2024-07-12

## 2024-07-09 RX ORDER — LACTULOSE 20 G/30ML
20 SOLUTION ORAL 2 TIMES DAILY
Qty: 450 ML | Refills: 0 | Status: SHIPPED | OUTPATIENT
Start: 2024-07-09

## 2024-07-09 RX ADMIN — CHLORDIAZEPOXIDE HYDROCHLORIDE 10 MG: 5 CAPSULE ORAL at 05:58

## 2024-07-09 RX ADMIN — POTASSIUM CHLORIDE 40 MEQ: 750 CAPSULE, EXTENDED RELEASE ORAL at 02:50

## 2024-07-09 RX ADMIN — PENTOXIFYLLINE 400 MG: 400 TABLET, EXTENDED RELEASE ORAL at 08:37

## 2024-07-09 RX ADMIN — FUROSEMIDE 40 MG: 10 INJECTION, SOLUTION INTRAVENOUS at 02:50

## 2024-07-09 RX ADMIN — BENZONATATE 200 MG: 100 CAPSULE ORAL at 02:53

## 2024-07-09 RX ADMIN — POTASSIUM CHLORIDE 40 MEQ: 750 CAPSULE, EXTENDED RELEASE ORAL at 08:37

## 2024-07-09 RX ADMIN — LACTULOSE 20 G: 20 SOLUTION ORAL at 08:37

## 2024-07-09 RX ADMIN — THIAMINE HYDROCHLORIDE 200 MG: 100 INJECTION, SOLUTION INTRAMUSCULAR; INTRAVENOUS at 05:58

## 2024-07-09 RX ADMIN — RIFAXIMIN 550 MG: 550 TABLET ORAL at 08:37

## 2024-07-09 RX ADMIN — FOLIC ACID 1 MG: 1 TABLET ORAL at 08:37

## 2024-07-09 RX ADMIN — PANTOPRAZOLE SODIUM 40 MG: 40 TABLET, DELAYED RELEASE ORAL at 05:58

## 2024-07-09 RX ADMIN — Medication 10 ML: at 08:38

## 2024-07-09 NOTE — PLAN OF CARE
Problem: Adult Inpatient Plan of Care  Goal: Plan of Care Review  Outcome: Ongoing, Progressing  Flowsheets (Taken 7/9/2024 1040)  Outcome Evaluation: Patient RA. IV CDI, IID. Up adlib CIWA-0. No c/o pain. Scrotal edema. Voiding and mult bms. Tolerating diet. VSS, safety maintained.

## 2024-07-09 NOTE — PLAN OF CARE
Problem: Adult Inpatient Plan of Care  Goal: Plan of Care Review  7/9/2024 1040 by Dang Escobar, RN  Outcome: Ongoing, Progressing  Flowsheets (Taken 7/9/2024 1040)  Outcome Evaluation: Patient RA. IV CDI, IID. Up adlib CIWA-0. No c/o pain. Scrotal edema. Voiding and mult bms. Tolerating diet. VSS, safety mainained.   Goal Outcome Evaluation:              Outcome Evaluation: Patient RA. IV CDI, IID. Up adlib CIWA-0. No c/o pain. Scrotal edema. Voiding and mult bms. Tolerating diet. VSS, safety mainained.

## 2024-07-09 NOTE — PLAN OF CARE
Goal Outcome Evaluation:  Plan of Care Reviewed With: patient        Progress: no change     A&O x 4. IID. RA.Up adlib to BR.Multiple watery BM. C/O cough and pain. See MAR. Abdomen is firm around umbilicus. SCDs for VTE prevention. Safety maintained.

## 2024-07-09 NOTE — DISCHARGE SUMMARY
[facility]  DISCHARGE SUMMARY       Date of Admission: 7/4/2024  Date of Discharge:  7/9/2024  Primary Care Physician: Jossy Christiansen APRN    Presenting Problem/History of Present Illness:  Alcohol withdrawal      Final Discharge Diagnoses:  Active Hospital Problems    Diagnosis     **Alcohol withdrawal     BMI 40.0-44.9, adult     Alcoholic steatohepatitis     Type 2 diabetes mellitus     Alcoholic encephalopathy     Transaminitis     Hyponatremia     Alcoholism     Primary hypertension     Fatty liver        Consults: Critical care medicine, gastroenterology, urology.    Procedures Performed: none    Pertinent Test Results:   Results for orders placed during the hospital encounter of 07/04/24    Adult Transthoracic Echo Complete W/ Cont if Necessary Per Protocol    Interpretation Summary    Left ventricular ejection fraction appears to be 61 - 65%.    Left ventricular diastolic function was normal.    Estimated right ventricular systolic pressure from tricuspid regurgitation is normal (<35 mmHg).      Imaging Results (All)       Procedure Component Value Units Date/Time    CT Abdomen Pelvis Without Contrast [[id number]] Collected: 07/04/24 1540     Updated: 07/04/24 1559    Narrative:      EXAM: CT ABDOMEN PELVIS WO CONTRAST-     INDICATION: transaminitis, ascites; F10.920-Alcohol use, unspecified  with intoxication, uncomplicated; E87.5-Vaub-mffsuopwol and  hyponatremia; E83.42-Hypomagnesemia; E87.6-Hypokalemia; E80.6-Other  disorders of bilirubin metabolism; E83.39-Other disorders of phosphorus  metabolism; R79.89-Other specified abnormal findings of blood chemistry;  R17-Unspecified jaundice       TECHNIQUE: Helically acquired CT images were obtained of the abdomen and  pelvis without intravenous contrast. Coronal and sagittal reformations  were performed.       DOSE LENGTH PRODUCT: 1408.21 mGy.cm mGy cm. Automated exposure control  was also utilized  to decrease patient radiation dose.     COMPARISON: 5/5/2024.     FINDINGS:     Lower Chest: Mild atelectasis.     Liver: Unremarkable.     Biliary Tree: Cholelithiasis.     Spleen: Splenomegaly.     Pancreas: Unremarkable.     Adrenal Glands: Unremarkable.     Kidneys/Ureters/Bladder: Moderately distended bladder.     Reproductive Organs: Unremarkable.     Gastrointestinal Tract: Unremarkable.      Lymphatics: Mildly increased size of prominent but nonenlarged  periaortic lymph nodes. There is also increased size of the iliac chain  and bilateral inguinal lymph nodes. Representative example includes a  left common iliac lymph node measuring 1.6 cm in the short axis,  previously 1 cm (series 2 image 73) as well as a right inguinal lymph  node measuring 1.9 cm in the short axis, previously 1.4 (series 2 image  105).     Vasculature: Mild atherosclerosis.     Peritoneum/Retroperitoneum: Increased nonspecific mesenteric and  retroperitoneal stranding.     Abdominal Wall/Soft Tissues: Diffusely increased body wall anasarca.  Bilateral gynecomastia.     Osseous Structures: No acute or suspicious findings.       Impression:         Increased nonspecific mesenteric and retroperitoneal edema, this however  can be seen with volume overload given the presence of diffuse increased  body wall anasarca.     Increased size of abdominal and pelvic lymph nodes, some of the iliac  chain and inguinal lymph nodes of which are enlarged. These could be  reactive, however given interval enlargement follow-up is recommended if  tissue diagnosis is not obtained to exclude neoplastic process.     Splenomegaly which can be seen with portal hypertension.     Moderately distended bladder.     Cholelithiasis.            This report was signed and finalized on 7/4/2024 3:56 PM by Darwin Goldman.       XR Chest 1 View [[id number]] Collected: 07/04/24 1039     Updated: 07/04/24 1043    Narrative:      EXAMINATION: XR CHEST 1 VW-     7/4/2024 8:56  AM     HISTORY: Shortness of breath.     1 view chest x-ray.     Comparison:  5/5/2024.     Heart size is normal.  The mediastinum is within normal limits.     The lungs are normally expanded with no pneumonia or pneumothorax.     No congestive failure changes.       Impression:      1. No acute disease.                 This report was signed and finalized on 7/4/2024 10:40 AM by Dr. Harmeet Jarrell MD.             LAB RESULTS:      Lab 07/09/24  0106 07/08/24  0518 07/07/24  0535 07/06/24  1047 07/06/24  0456 07/05/24  1209 07/05/24  0536 07/05/24  0029 07/05/24  0028 07/04/24  1812 07/04/24  1551 07/04/24  1313 07/04/24  1020   WBC 4.58 5.42 4.45  --  6.08  --   --  4.01  --   --   --   --  6.83   HEMOGLOBIN 7.8* 8.0* 7.4* 8.1* 7.8*   < >  --  7.7*  --   --   --   --  9.0*   HEMATOCRIT 23.9* 24.7* 23.0* 25.2* 23.2*   < >  --  22.4*  --   --   --   --  25.9*   PLATELETS 133* 139* 118*  --  120*  --   --  131*  --   --   --   --  177   NEUTROS ABS 2.97 3.84 2.87  --  4.52  --   --  2.63  --   --   --   --  4.77   IMMATURE GRANS (ABS) 0.02 0.03 0.02  --  0.03  --   --  0.01  --   --   --   --  0.03   LYMPHS ABS 0.73 0.82 0.86  --  0.70  --   --  0.72  --   --   --   --  0.87   MONOS ABS 0.62 0.56 0.51  --  0.62  --   --  0.56  --   --   --   --  1.06*   EOS ABS 0.22 0.16 0.17  --  0.19  --   --  0.08  --   --   --   --  0.07   .5* 103.8* 104.5*  --  102.2*  --   --  99.1*  --   --   --   --  98.1*   LACTATE  --   --   --   --   --   --  2.1*  --  3.3* 4.9* 6.4* 7.3* 7.4*   PROTIME  --   --   --   --   --   --   --   --   --   --   --   --  19.3*   APTT  --   --   --   --   --   --   --   --   --   --   --   --  49.3*    < > = values in this interval not displayed.         Lab 07/09/24  0106 07/08/24  1457 07/08/24  0518 07/07/24  1646 07/07/24  0535 07/07/24  0236 07/06/24  1617 07/06/24  1047 07/06/24  0455 07/05/24  0956 07/05/24  0028 07/04/24  1947 07/04/24  1020   SODIUM 138  --  137  --  134*  --   --    --  133*  --  132*  --  128*   POTASSIUM 3.4* 3.7 3.5  3.5 3.6 3.4*  --   --    < > 3.4*   < > 2.6*   < > 2.7*   CHLORIDE 101  --  98  --  95*  --   --   --  95*  --  90*  --  85*   CO2 26.0  --  27.0  --  28.0  --   --   --  31.0*  --  29.0  --  26.0   ANION GAP 11.0  --  12.0  --  11.0  --   --   --  7.0  --  13.0  --  17.0*   BUN 7  --  8  --  9  --   --   --  8  --  5*  --  7   CREATININE 1.07  --  0.99  --  1.03  --   --   --  0.81  --  0.62*  --  0.65*   EGFR 90.5  --  99.4  --  94.8  --   --   --  115.0  --  124.7  --  122.9   GLUCOSE 94  --  84  --  90  --   --   --  99  --  114*  --  137*   CALCIUM 8.4*  --  8.4*  --  8.1*  --   --   --  8.2*  --  7.8*  --  8.0*   MAGNESIUM  --   --   --   --  1.7  --   --   --  1.9  --  1.4*  --  1.5*   PHOSPHORUS  --   --   --  2.4*  --  1.7* 1.4*  --  1.6*  --  2.1*  --  2.0*   HEMOGLOBIN A1C  --   --   --   --  4.60*  --   --   --   --   --   --   --   --    TSH  --   --   --   --  9.670*  --   --   --   --   --   --   --   --     < > = values in this interval not displayed.         Lab 07/09/24  0106 07/08/24  0518 07/07/24  0535 07/06/24  0455 07/04/24  1020   TOTAL PROTEIN 6.6 6.6 6.1  --  7.1   ALBUMIN 2.9* 3.0* 3.0* 3.0* 3.5   GLOBULIN 3.7 3.6 3.1  --   --    ALT (SGPT) 23 26 26  --  41   AST (SGOT) 47* 55* 60*  --  106*   BILIRUBIN 5.3* 6.0* 5.6*  --  6.1*   INDIRECT BILIRUBIN  --   --   --   --  2.7   BILIRUBIN DIRECT  --   --   --   --  3.4*   ALK PHOS 190* 198* 203*  --  241*         Lab 07/04/24  1313 07/04/24  1020   PROBNP  --  242.9   HSTROP T 56* 57*   PROTIME  --  19.3*   INR  --  1.56*         Lab 07/07/24  0535   CHOLESTEROL 115   LDL CHOL 69   HDL CHOL 25*   TRIGLYCERIDES 110             Brief Urine Lab Results  (Last result in the past 365 days)        Color   Clarity   Blood   Leuk Est   Nitrite   Protein   CREAT   Urine HCG        07/04/24 1213 Dark Yellow   Clear   Negative   Small (1+)   Positive   30 mg/dL (1+)                 Microbiology  "Results (last 10 days)       ** No results found for the last 240 hours. **            Hospital Course:       39 y.o. male with EtOH abuse/alcoholism and alcoholic steatohepatitis, presented to the hospital today with concern for withdrawal.  Patient states he previously had gotten sober after last hospitalization, but reports going back to binge drinking malt liquor and vodka.  States he drinks 1/5 of vodka a day.  Patient noticed he was tremulous this morning after not drinking since 10 PM yesterday evening.  He presented to the ER, and found to have electrolyte abnormalities including potassium of 2.7, magnesium of 1.5, and sodium of 128.  Lactic acid elevated at 7.4.  Bilirubin 6.1, , INR 1.56.  Afebrile, WBC normal, no sign of infectious process.  Ethanol level 0.175.  Given patient's electrolyte and abnormalities and intoxication with concern for withdrawal, the intensivist team was requested to admit the patient for critical care management.  Upon arrival to the ICU, patient drowsy, no distress.  Vital signs stable, no pressors.  No oxygenation issues.  With significant jaundice.  Abdomen distended and lower extremities edematous.  Complaining of malaise, feeling unwell.     Patient was admitted, evaluated by critical care medicine initially, transferred out of the ICU, seen by GI, as well as urology, patient was placed on CIWA protocol, he was found to be anemic, stool for occult blood positive, decision was made to have outpatient GI workup including possible EGD.  Patient overall improved, he was placed on rifaximin and lactulose.  He will be discharged to home today.  A list of facilities for alcohol really have was given to the patient.  Physical Exam on Discharge:  /55 (BP Location: Left arm, Patient Position: Lying)   Pulse 92   Temp 98.7 °F (37.1 °C) (Oral)   Resp 18   Ht 188 cm (74\")   Wt (!) 149 kg (328 lb 6.4 oz)   SpO2 97%   BMI 42.16 kg/m²   Physical Exam  General: Patient is " alert, awake, oriented x 3 in no apparent distress at time of examination  HEENT: Normocephalic, atraumatic, pupils are equal reactive to light and accommodate.  Neck: Supple, no JVD, no carotid bruits  CVS: S1, S2, regular rhythm and rate  Lungs: Clear to auscultation bilaterally  Abdomen: Soft, nontender, distended bowel sounds are present  Extremities: No cyanosis, no clubbing, no edema pulses are intact    Condition on Discharge: stable    Discharge Disposition:  Home or Self Care    Discharge Medications:     Discharge Medications        New Medications        Instructions Start Date   chlordiazePOXIDE 10 MG capsule  Commonly known as: LIBRIUM   10 mg, Oral, Every 8 Hours Scheduled      HYDROcodone-acetaminophen 5-325 MG per tablet  Commonly known as: NORCO   1 tablet, Oral, Every 6 Hours PRN      lactulose 20 GM/30ML solution solution   20 g, Oral, 2 Times Daily      nicotine 14 MG/24HR patch  Commonly known as: NICODERM CQ   1 patch, Transdermal, Every 24 Hours Scheduled      riFAXIMin 550 MG tablet  Commonly known as: XIFAXAN   550 mg, Oral, Every 12 Hours Scheduled             Changes to Medications        Instructions Start Date   thiamine 100 MG tablet  Commonly known as: VITAMIN B1  What changed: Another medication with the same name was added. Make sure you understand how and when to take each.   100 mg, Oral, Daily      thiamine 100 MG tablet  Commonly known as: VITAMIN B1  What changed: You were already taking a medication with the same name, and this prescription was added. Make sure you understand how and when to take each.   100 mg, Oral, Daily   Start Date: July 11, 2024            Continue These Medications        Instructions Start Date   folic acid 1 MG tablet  Commonly known as: FOLVITE   1 mg, Oral, Daily      insulin glargine 100 UNIT/ML injection  Commonly known as: Lantus   5 Units, Subcutaneous, Nightly      pantoprazole 40 MG EC tablet  Commonly known as: PROTONIX   40 mg, Oral, Daily       pentoxifylline 400 MG CR tablet  Commonly known as: TRENtal   400 mg, Oral, 3 Times Daily With Meals      QUEtiapine 50 MG tablet  Commonly known as: SEROquel   50 mg, Oral, Nightly                 Discharge Diet:     Activity at Discharge:   Activity Instructions       Activity as Tolerated              Follow-up Appointments:   Future Appointments   Date Time Provider Department Center   7/15/2024 10:45 AM Jossy Christiansen APRN [facility]       Test Results Pending at Discharge: none    Electronically signed by Marcus Pantoja MD, 07/09/24, 11:24 CDT.    Time: 30 minutes.

## 2024-07-10 ENCOUNTER — READMISSION MANAGEMENT (OUTPATIENT)
Dept: CALL CENTER | Facility: HOSPITAL | Age: 39
End: 2024-07-10
Payer: COMMERCIAL

## 2024-07-10 ENCOUNTER — TELEPHONE (OUTPATIENT)
Dept: FAMILY MEDICINE CLINIC | Facility: CLINIC | Age: 39
End: 2024-07-10
Payer: COMMERCIAL

## 2024-07-10 NOTE — TELEPHONE ENCOUNTER
Informed pt that the the PA stated the medication was available without a PA and that the pharmacy just has to order it and it will be in tomorrow

## 2024-07-10 NOTE — TELEPHONE ENCOUNTER
Pt calling because the librium prescribed by ER is too expensive, I'm waiting to hear back from our Practice manager if I can PA this medication for him, but he called to see if there is another medication that can be prescribed that will do the same thing

## 2024-07-10 NOTE — OUTREACH NOTE
Prep Survey      Flowsheet Row Responses   [facility] patient discharged from? [place]   Is LACE score < 7 ? Yes   Eligibility [facility]   Date of Admission 07/04/24   Date of Discharge 07/09/24   Discharge Disposition Home or Self Care   Discharge diagnosis Alcohol withdrawal   Does the patient have one of the following disease processes/diagnoses(primary or secondary)? Other   Does the patient have Home health ordered? No   Is there a DME ordered? No   Prep survey completed? Yes            Helen QUINN - Registered Nurse

## 2024-07-11 ENCOUNTER — TRANSITIONAL CARE MANAGEMENT TELEPHONE ENCOUNTER (OUTPATIENT)
Dept: CALL CENTER | Facility: HOSPITAL | Age: 39
End: 2024-07-11
Payer: COMMERCIAL

## 2024-07-11 NOTE — OUTREACH NOTE
Call Center TCM Note      Flowsheet Row Responses   [facility] patient discharged from? [place]   Does the patient have one of the following disease processes/diagnoses(primary or secondary)? Other   TCM attempt successful? Yes   Call start time 1409   Call end time 1411   Discharge diagnosis Alcohol withdrawal   Meds reviewed with patient/caregiver? Yes   Is the patient having any side effects they believe may be caused by any medication additions or changes? No   Does the patient have all medications ordered at discharge? Yes   Is the patient taking all medications as directed (includes completed medication regime)? Yes   Does the patient have an appointment with their PCP within 7-14 days of discharge? Yes   Has home health visited the patient within 72 hours of discharge? N/A   Psychosocial issues? No   Did the patient receive a copy of their discharge instructions? Yes   Nursing interventions Reviewed instructions with patient   What is the patient's perception of their health status since discharge? Improving   Is the patient/caregiver able to teach back signs and symptoms related to disease process for when to call PCP? Yes   Is the patient/caregiver able to teach back signs and symptoms related to disease process for when to call 911? Yes   Is the patient/caregiver able to teach back the hierarchy of who to call/visit for symptoms/problems? PCP, Specialist, Home health nurse, Urgent Care, ED, 911 Yes   If the patient is a current smoker, are they able to teach back resources for cessation? Not a smoker   TCM call completed? Yes   Call end time 1411   Would this patient benefit from a Referral to Amb Social Work? No   Is the patient interested in additional calls from an ambulatory ? No            Ameena Heller LPN    7/11/2024, 14:15 CDT

## 2024-07-15 ENCOUNTER — OFFICE VISIT (OUTPATIENT)
Dept: FAMILY MEDICINE CLINIC | Facility: CLINIC | Age: 39
End: 2024-07-15
Payer: COMMERCIAL

## 2024-07-15 VITALS
TEMPERATURE: 98.4 F | BODY MASS INDEX: 40.43 KG/M2 | DIASTOLIC BLOOD PRESSURE: 67 MMHG | HEIGHT: 74 IN | WEIGHT: 315 LBS | SYSTOLIC BLOOD PRESSURE: 127 MMHG | HEART RATE: 96 BPM | RESPIRATION RATE: 20 BRPM

## 2024-07-15 DIAGNOSIS — F41.9 ANXIETY: ICD-10-CM

## 2024-07-15 DIAGNOSIS — R59.9 ENLARGED LYMPH NODES: ICD-10-CM

## 2024-07-15 DIAGNOSIS — R19.5 HEME POSITIVE STOOL: ICD-10-CM

## 2024-07-15 DIAGNOSIS — Z09 HOSPITAL DISCHARGE FOLLOW-UP: Primary | ICD-10-CM

## 2024-07-15 DIAGNOSIS — F10.220 ALCOHOL INTOXICATION IN ACTIVE ALCOHOLIC WITHOUT COMPLICATION: ICD-10-CM

## 2024-07-15 DIAGNOSIS — N50.89 TESTICULAR SWELLING: ICD-10-CM

## 2024-07-15 DIAGNOSIS — R60.1 ANASARCA: ICD-10-CM

## 2024-07-15 DIAGNOSIS — R17 SCLERAL ICTERUS: ICD-10-CM

## 2024-07-15 DIAGNOSIS — G47.00 INSOMNIA DISORDER RELATED TO KNOWN ORGANIC FACTOR: ICD-10-CM

## 2024-07-15 DIAGNOSIS — R17 JAUNDICE: ICD-10-CM

## 2024-07-15 DIAGNOSIS — E03.9 HYPOTHYROIDISM, UNSPECIFIED TYPE: ICD-10-CM

## 2024-07-15 DIAGNOSIS — K76.82 HEPATIC ENCEPHALOPATHY: ICD-10-CM

## 2024-07-15 DIAGNOSIS — E87.1 HYPONATREMIA: ICD-10-CM

## 2024-07-15 DIAGNOSIS — K76.0 FATTY LIVER: ICD-10-CM

## 2024-07-15 DIAGNOSIS — K92.0 HEMATEMESIS, UNSPECIFIED WHETHER NAUSEA PRESENT: ICD-10-CM

## 2024-07-15 DIAGNOSIS — E11.65 TYPE 2 DIABETES MELLITUS WITH HYPERGLYCEMIA, WITHOUT LONG-TERM CURRENT USE OF INSULIN: ICD-10-CM

## 2024-07-15 DIAGNOSIS — D64.9 ANEMIA, UNSPECIFIED TYPE: ICD-10-CM

## 2024-07-15 DIAGNOSIS — R60.0 BILATERAL LOWER EXTREMITY EDEMA: ICD-10-CM

## 2024-07-15 DIAGNOSIS — F10.20 ALCOHOLISM: Primary | ICD-10-CM

## 2024-07-15 DIAGNOSIS — R93.5 ABNORMAL CT OF THE ABDOMEN: ICD-10-CM

## 2024-07-15 DIAGNOSIS — R16.1 SPLENOMEGALY: ICD-10-CM

## 2024-07-15 PROCEDURE — 99496 TRANSJ CARE MGMT HIGH F2F 7D: CPT

## 2024-07-15 PROCEDURE — 1111F DSCHRG MED/CURRENT MED MERGE: CPT

## 2024-07-15 RX ORDER — FUROSEMIDE 20 MG/1
20 TABLET ORAL 2 TIMES DAILY
Qty: 12 TABLET | Refills: 0 | Status: SHIPPED | OUTPATIENT
Start: 2024-07-15

## 2024-07-15 RX ORDER — POTASSIUM CHLORIDE 750 MG/1
10 TABLET, FILM COATED, EXTENDED RELEASE ORAL 2 TIMES DAILY
Qty: 14 TABLET | Refills: 0 | Status: SHIPPED | OUTPATIENT
Start: 2024-07-15

## 2024-07-15 RX ORDER — LANOLIN ALCOHOL/MO/W.PET/CERES
100 CREAM (GRAM) TOPICAL DAILY
Qty: 90 TABLET | Refills: 1 | Status: SHIPPED | OUTPATIENT
Start: 2024-07-15

## 2024-07-15 RX ORDER — QUETIAPINE FUMARATE 50 MG/1
50 TABLET, FILM COATED ORAL NIGHTLY
Qty: 30 TABLET | Refills: 2 | Status: SHIPPED | OUTPATIENT
Start: 2024-07-15

## 2024-07-15 RX ORDER — PANTOPRAZOLE SODIUM 40 MG/1
40 TABLET, DELAYED RELEASE ORAL DAILY
Qty: 90 TABLET | Refills: 1 | Status: SHIPPED | OUTPATIENT
Start: 2024-07-15

## 2024-07-15 RX ORDER — PENTOXIFYLLINE 400 MG/1
400 TABLET, EXTENDED RELEASE ORAL
Qty: 90 TABLET | Refills: 2 | Status: SHIPPED | OUTPATIENT
Start: 2024-07-15

## 2024-07-15 RX ORDER — FOLIC ACID 1 MG/1
1 TABLET ORAL DAILY
Qty: 90 TABLET | Refills: 1 | Status: SHIPPED | OUTPATIENT
Start: 2024-07-15

## 2024-07-15 NOTE — PROGRESS NOTES
"Transitional Care Follow Up Visit  Subjective     Luciano Christiansen is a 39 y.o. male who presents for a transitional care management visit.    Within 48 business hours after discharge our office contacted him via telephone to coordinate his care and needs.      I reviewed and discussed the details of that call along with the discharge summary, hospital problems, inpatient lab results, inpatient diagnostic studies, and consultation reports with Luciano.     Current outpatient and discharge medications have been reconciled for the patient.  Reviewed by: LÁZARO Martino          7/10/2024     4:41 AM   Date of TCM Phone Call   [facility]   Date of Admission 7/4/2024   Date of Discharge 7/9/2024   Discharge Disposition Home or Self Care     Risk for Readmission (LACE) Score: 13 (7/9/2024  5:00 AM)      History of Present Illness   Course During Hospital Stay:  5 days     The following portions of the patient's history were reviewed and updated as appropriate: allergies, current medications, past family history, past medical history, past social history, past surgical history, and problem list.    Review of Systems    Objective   /67   Pulse 96   Temp 98.4 °F (36.9 °C)   Resp 20   Ht 188 cm (74\")   Wt (!) 148 kg (326 lb)   BMI 41.86 kg/m²   Physical Exam  Vitals and nursing note reviewed.   Constitutional:       General: He is not in acute distress.     Appearance: Normal appearance. He is obese. He is not ill-appearing, toxic-appearing or diaphoretic.   HENT:      Head: Normocephalic and atraumatic.      Right Ear: External ear normal.      Left Ear: External ear normal.      Nose: Nose normal.      Mouth/Throat:      Mouth: Mucous membranes are moist.   Eyes:      General: Scleral icterus present.   Cardiovascular:      Rate and Rhythm: Normal rate and regular rhythm.      Pulses: Normal pulses.           Dorsalis pedis pulses are 2+ on the right side and 2+ on the left side.        " Posterior tibial pulses are 2+ on the right side and 2+ on the left side.      Heart sounds: Normal heart sounds.   Pulmonary:      Effort: Pulmonary effort is normal.      Breath sounds: Normal breath sounds.   Abdominal:      General: Bowel sounds are normal. There is distension.      Palpations: Abdomen is soft.      Tenderness: There is no abdominal tenderness. There is no guarding or rebound.   Genitourinary:     Testes:         Right: Swelling present.         Left: Swelling present.      Comments: Swollen testicles bilaterally, edematous; denies pain; no discoloration   Musculoskeletal:      Right lower leg: Edema present.      Left lower leg: Edema present.   Skin:     General: Skin is warm and dry.      Capillary Refill: Capillary refill takes less than 2 seconds.   Neurological:      Mental Status: He is alert and oriented to person, place, and time. Mental status is at baseline.      GCS: GCS eye subscore is 4. GCS verbal subscore is 5. GCS motor subscore is 6.   Psychiatric:         Mood and Affect: Mood normal.         Behavior: Behavior normal.         Thought Content: Thought content normal.         Judgment: Judgment normal.         Assessment & Plan       ED to Hosp-Admission (Discharged) with Marcus Pantoja MD; Reyes Joshi MD (07/04/2024)   Comprehensive Metabolic Panel (07/09/2024 01:06)   CBC & Differential (07/09/2024 01:06)   Lipid Panel (07/07/2024 05:35)   TSH (07/07/2024 05:35)   Hemoglobin A1c (07/07/2024 05:35)   Urine Drug Screen - Indwelling Urethral Catheter (07/06/2024 18:46)   CT Abdomen Pelvis Without Contrast (07/04/2024 15:29)  XR Chest 1 View (07/04/2024 10:33)  Adult Transthoracic Echo Complete W/ Cont if Necessary Per Protocol (07/05/2024 11:39)          Patient is seen today for a hospital follow-up from 7/4-7/9.  He was admitted to [facility] with diagnoses of alcohol withdrawal, alcoholic steatohepatitis, type 2 diabetes, alcoholic encephalopathy, transaminitis,  hyponatremia, alcoholism, fatty liver, and primary hypertension.  I have reviewed his admission note as well as labs and imaging.  We are going to repeat some labs today as above.  We will call with results.    Patient had a CT abdomen pelvis performed during his hospital stay which revealed splenomegaly, they suspect portal hypertension.  There was cholelithiasis noted as well as increased size of abdominal and pelvic lymph nodes.  They reported that some of the iliac chain and inguinal lymph nodes were enlarged, suspect could be reactive however they recommended close follow-up if tissue diagnosis is not obtained to exclude neoplastic process.  Will refer to general surgery for this.  There was anasarca noted, patient was given Lasix and potassium for this.  We will continue this given that he is continuing to have some swelling of his testicles and lower extremities.  Patient reports swelling of his upper extremities has improved.  There is mild atherosclerosis noted as well.    Labs revealed some persistent hypokalemia, mild on discharge at 3.4 (improved from 2.7 on admission).  Also reveals chronic anemia, although his discharge hemoglobin was quite low at 7.8.  He was found to be Hemoccult positive.  GI did see the patient while in the hospital and recommended close outpatient follow-up.  He was previously referred to GI in May, however has not followed up with them.  I will touch base with our nursing staff to see if they can get him in ASAP.  On physical exam he is jaundiced today with scleral icterus noted.  He states he has not had anything to drink in about 10 days, is wanting to go through rehab.  However, states that his insurance will not pay for inpatient treatment.  We will consult with case management.    The patient was started on Librium, lactulose, and rifaximin while in the hospital.  They recommended he continue his prescribed vitamins as well as Protonix, Seroquel, and Trental.  Will refill  all of these for the patient.    He did have an A1c drawn during hospitalization on 7/7 as well as a lipid panel.  A1c much improved at 4.6, will D/C Lantus.  Lipid panel normal other than low HDL.  He was found to have an elevated TSH during, this is new for him.  We will repeat today and add further thyroid labs.  Admission as well    He did have a normal echocardiogram performed on 7/4.  Fluid overload is likely related to hepatic dysfunction.  PT/INR on 7/4 was elevated at 19.3 and 1.56, much improved from previous levels however still abnormal.  I stressed the importance of following up with GI to the patient as this is going to be the mainstay of his treatment at this point.  It is also extremely important that he remain sober and continues to abstain from drinking alcohol.  I will pend the Librium to Dr. Rangel as it is controlled.  John pending.    He is due for an annual wellness, will complete at next visit.    Plan:  1.  Labs pending  2.  Follow-up with GI  3.  Case management referral  4.  Continue Librium 10 mg 3 times daily as needed  5.  Continue folic acid 1 mg daily  6.  Start Lasix 20 mg twice daily for swelling, start potassium 10 mEq twice daily  7.  Continue Protonix 40 mg daily  8.  Continue Trental 400 mg 3 times daily  9.  Continue Seroquel 50 mg nightly  10.  Continue rifaximin 550 mg twice daily  11.  Continue thiamine 100 mg daily  12.  General surgery referral for lymph node abnormality  13.  Discontinue Lantus  14.  Follow-up in 1 month, wellness at next visit

## 2024-07-15 NOTE — PROGRESS NOTES
"Chief Complaint  Hospital Follow Up Visit, Alcohol Problem, Groin Swelling, and Med Refill    Subjective    History of Present Illness {CC  Problem List  Visit Diagnosis   Encounters  Notes  Medications  Labs  Result Review Imaging  Media :23}     Patient presents to [facility] for   History of Present Illness  Here for hospital follow up due to alcoholism. Color is Jaundice. Reports no alcohol in 10 days. He states he has severe swelling and pain of the testicles for 3 weeks with no improvement. He needs refills on many of his meds. He states he is coughing at night.        Review of Systems    I have reviewed and agree with the HPI and ROS information as above.  Consuelo Tirado RN     Objective   Vital Signs:   /67   Pulse 96   Temp 98.4 °F (36.9 °C)   Resp 20   Ht 188 cm (74\")   Wt (!) 148 kg (326 lb)   BMI 41.86 kg/m²            Physical Exam     KATHERINE-7: Over the last two weeks, how often have you been bothered by the following problems?  Feeling nervous, anxious or on edge: More than half the days  Not being able to stop or control worrying: More than half the days  Worrying too much about different things: More than half the days  Trouble Relaxing: More than half the days  Being so restless that it is hard to sit still: More than half the days  Becoming easily annoyed or irritable: More than half the days  Feeling afraid as if something awful might happen: Several days  KATHERINE 7 Total Score: 13  If you checked any problems, how difficult have these problems made it for you to do your work, take care of things at home, or get along with other people: Very difficult    PHQ-2 Depression Screening  Little interest or pleasure in doing things? 0-->not at all   Feeling down, depressed, or hopeless? 0-->not at all   PHQ-2 Total Score 0     PHQ-9 Depression Screening  Little interest or pleasure in doing things? 0-->not at all   Feeling down, depressed, or hopeless? " 0-->not at all   Trouble falling or staying asleep, or sleeping too much?     Feeling tired or having little energy?     Poor appetite or overeating?     Feeling bad about yourself - or that you are a failure or have let yourself or your family down?     Trouble concentrating on things, such as reading the newspaper or watching television?     Moving or speaking so slowly that other people could have noticed? Or the opposite - being so fidgety or restless that you have been moving around a lot more than usual?     Thoughts that you would be better off dead, or of hurting yourself in some way?     PHQ-9 Total Score 0   If you checked off any problems, how difficult have these problems made it for you to do your work, take care of things at home, or get along with other people?        Result Review  Data Reviewed:{ Labs  Result Review  Imaging  Med Tab  Media :23}   {The following data was reviewed by (Optional):75638}  {Ambulatory Labs (Optional):85639}  {Data reviewed (Optional):05755:::1}            Assessment and Plan {CC Problem List  Visit Diagnosis  ROS  Review (Popup)  Health Maintenance  Quality  BestPractice  Medications  SmartSets  SnapShot Encounters  Media :23}     There are no diagnoses linked to this encounter.    {Time Spent (Optional):86713}    Follow Up {Instructions Charge Capture  Follow-up Communications :23}  No follow-ups on file.  Patient was given instructions and counseling regarding his condition or for health maintenance advice. Please see specific information pulled into the AVS if appropriate.

## 2024-07-16 ENCOUNTER — REFERRAL TRIAGE (OUTPATIENT)
Dept: CASE MANAGEMENT | Facility: OTHER | Age: 39
End: 2024-07-16
Payer: COMMERCIAL

## 2024-07-16 ENCOUNTER — TELEPHONE (OUTPATIENT)
Dept: CASE MANAGEMENT | Facility: OTHER | Age: 39
End: 2024-07-16
Payer: COMMERCIAL

## 2024-07-16 RX ORDER — CHLORDIAZEPOXIDE HYDROCHLORIDE 10 MG/1
10 CAPSULE, GELATIN COATED ORAL 3 TIMES DAILY PRN
Qty: 90 CAPSULE | Refills: 2 | Status: SHIPPED | OUTPATIENT
Start: 2024-07-16

## 2024-07-17 ENCOUNTER — TELEPHONE (OUTPATIENT)
Dept: CASE MANAGEMENT | Facility: OTHER | Age: 39
End: 2024-07-17
Payer: COMMERCIAL

## 2024-07-18 ENCOUNTER — TELEPHONE (OUTPATIENT)
Dept: SURGERY | Facility: CLINIC | Age: 39
End: 2024-07-18
Payer: COMMERCIAL

## 2024-07-18 NOTE — TELEPHONE ENCOUNTER
Attempted to contact the PT regarding their referral. I left the PT a voicemail with their appointment date and time and requested that they call us back to reschedule if that does not work for them.    CBC  07/18

## 2024-07-22 ENCOUNTER — TELEPHONE (OUTPATIENT)
Dept: CASE MANAGEMENT | Facility: OTHER | Age: 39
End: 2024-07-22
Payer: COMMERCIAL

## 2024-08-06 ENCOUNTER — OFFICE VISIT (OUTPATIENT)
Dept: FAMILY MEDICINE CLINIC | Facility: CLINIC | Age: 39
End: 2024-08-06
Payer: COMMERCIAL

## 2024-08-06 ENCOUNTER — TELEPHONE (OUTPATIENT)
Dept: SURGERY | Facility: CLINIC | Age: 39
End: 2024-08-06
Payer: COMMERCIAL

## 2024-08-06 ENCOUNTER — TELEPHONE (OUTPATIENT)
Dept: FAMILY MEDICINE CLINIC | Facility: CLINIC | Age: 39
End: 2024-08-06
Payer: COMMERCIAL

## 2024-08-06 ENCOUNTER — LAB (OUTPATIENT)
Dept: LAB | Facility: HOSPITAL | Age: 39
End: 2024-08-06
Payer: COMMERCIAL

## 2024-08-06 VITALS
SYSTOLIC BLOOD PRESSURE: 114 MMHG | BODY MASS INDEX: 40.43 KG/M2 | WEIGHT: 315 LBS | OXYGEN SATURATION: 93 % | DIASTOLIC BLOOD PRESSURE: 68 MMHG | HEIGHT: 74 IN | HEART RATE: 98 BPM

## 2024-08-06 DIAGNOSIS — F10.20 ALCOHOLISM: ICD-10-CM

## 2024-08-06 DIAGNOSIS — E66.01 CLASS 3 SEVERE OBESITY DUE TO EXCESS CALORIES WITH SERIOUS COMORBIDITY AND BODY MASS INDEX (BMI) OF 40.0 TO 44.9 IN ADULT: ICD-10-CM

## 2024-08-06 DIAGNOSIS — R60.0 BILATERAL LOWER EXTREMITY EDEMA: ICD-10-CM

## 2024-08-06 DIAGNOSIS — Z91.09 ENVIRONMENTAL ALLERGIES: ICD-10-CM

## 2024-08-06 DIAGNOSIS — E11.69 TYPE 2 DIABETES MELLITUS WITH OTHER SPECIFIED COMPLICATION, WITHOUT LONG-TERM CURRENT USE OF INSULIN: ICD-10-CM

## 2024-08-06 DIAGNOSIS — E87.6 HYPOKALEMIA: ICD-10-CM

## 2024-08-06 DIAGNOSIS — R17 JAUNDICE: ICD-10-CM

## 2024-08-06 DIAGNOSIS — Z00.00 ANNUAL PHYSICAL EXAM: Primary | ICD-10-CM

## 2024-08-06 DIAGNOSIS — R60.1 ANASARCA: ICD-10-CM

## 2024-08-06 DIAGNOSIS — R79.89 ELEVATED SERUM CREATININE: ICD-10-CM

## 2024-08-06 DIAGNOSIS — K76.0 FATTY LIVER: ICD-10-CM

## 2024-08-06 DIAGNOSIS — E83.51 HYPOCALCEMIA: ICD-10-CM

## 2024-08-06 DIAGNOSIS — N50.89 TESTICULAR SWELLING: ICD-10-CM

## 2024-08-06 DIAGNOSIS — R74.01 TRANSAMINITIS: ICD-10-CM

## 2024-08-06 LAB
ALBUMIN SERPL-MCNC: 3.1 G/DL (ref 3.5–5)
ALBUMIN UR-MCNC: 8 MG/DL
ALBUMIN/GLOB SERPL: 0.8 G/DL (ref 1.1–2.5)
ALP SERPL-CCNC: 257 U/L (ref 24–120)
ALT SERPL W P-5'-P-CCNC: 38 U/L (ref 0–50)
ANION GAP SERPL CALCULATED.3IONS-SCNC: 9 MMOL/L (ref 4–13)
AST SERPL-CCNC: 157 U/L (ref 7–45)
AUTO MIXED CELLS #: 0.5 10*3/MM3 (ref 0.1–2.6)
AUTO MIXED CELLS %: 12.2 % (ref 0.1–24)
BACTERIA UR QL AUTO: ABNORMAL /HPF
BILIRUB SERPL-MCNC: 2.9 MG/DL (ref 0.1–1)
BILIRUB UR QL STRIP: ABNORMAL
BUN SERPL-MCNC: 7 MG/DL (ref 5–21)
BUN/CREAT SERPL: 4.6
CALCIUM SPEC-SCNC: 7.5 MG/DL (ref 8.6–10.5)
CHLORIDE SERPL-SCNC: 103 MMOL/L (ref 98–110)
CHOLEST SERPL-MCNC: 92 MG/DL (ref 130–200)
CLARITY UR: CLEAR
CO2 SERPL-SCNC: 25 MMOL/L (ref 24–31)
COLOR UR: ABNORMAL
CREAT SERPL-MCNC: 1.53 MG/DL (ref 0.5–1.4)
EGFRCR SERPLBLD CKD-EPI 2021: 58.9 ML/MIN/1.73
ERYTHROCYTE [DISTWIDTH] IN BLOOD BY AUTOMATED COUNT: 17.1 % (ref 12.3–15.4)
GLOBULIN UR ELPH-MCNC: 4.1 GM/DL
GLUCOSE SERPL-MCNC: 73 MG/DL (ref 70–100)
GLUCOSE UR STRIP-MCNC: NEGATIVE MG/DL
GRAN CASTS URNS QL MICRO: ABNORMAL /LPF
HBA1C MFR BLD: 4.3 % (ref 4.8–5.9)
HCT VFR BLD AUTO: 25.7 % (ref 37.5–51)
HDLC SERPL-MCNC: 24 MG/DL
HGB BLD-MCNC: 8 G/DL (ref 13–17.7)
HGB UR QL STRIP.AUTO: NEGATIVE
HYALINE CASTS UR QL AUTO: ABNORMAL /LPF
IRON 24H UR-MRATE: 28 MCG/DL (ref 59–158)
IRON SATN MFR SERPL: 12 % (ref 20–50)
KETONES UR QL STRIP: ABNORMAL
LDLC SERPL CALC-MCNC: 48 MG/DL (ref 0–99)
LDLC/HDLC SERPL: 1.93 {RATIO}
LEUKOCYTE ESTERASE UR QL STRIP.AUTO: NEGATIVE
LYMPHOCYTES # BLD AUTO: 0.9 10*3/MM3 (ref 0.7–3.1)
LYMPHOCYTES NFR BLD AUTO: 21.2 % (ref 19.6–45.3)
MCH RBC QN AUTO: 32.1 PG (ref 26.6–33)
MCHC RBC AUTO-ENTMCNC: 31.1 G/DL (ref 31.5–35.7)
MCV RBC AUTO: 103.2 FL (ref 79–97)
MUCOUS THREADS URNS QL MICRO: ABNORMAL /HPF
NEUTROPHILS NFR BLD AUTO: 2.9 10*3/MM3 (ref 1.7–7)
NEUTROPHILS NFR BLD AUTO: 66.6 % (ref 42.7–76)
NITRITE UR QL STRIP: NEGATIVE
PH UR STRIP.AUTO: 6 [PH] (ref 5–8)
PLATELET # BLD AUTO: 138 10*3/MM3 (ref 140–450)
PMV BLD AUTO: 9 FL (ref 6–12)
POTASSIUM SERPL-SCNC: 2.8 MMOL/L (ref 3.5–5.3)
PROT SERPL-MCNC: 7.2 G/DL (ref 6.3–8.7)
PROT UR QL STRIP: ABNORMAL
RBC # BLD AUTO: 2.49 10*6/MM3 (ref 4.14–5.8)
RBC # UR STRIP: ABNORMAL /HPF
REF LAB TEST METHOD: ABNORMAL
SODIUM SERPL-SCNC: 137 MMOL/L (ref 135–145)
SP GR UR STRIP: 1.02 (ref 1–1.03)
SQUAMOUS #/AREA URNS HPF: ABNORMAL /HPF
T3FREE SERPL-MCNC: 2.23 PG/ML (ref 2–4.4)
T4 FREE SERPL-MCNC: 1.39 NG/DL (ref 0.92–1.68)
TIBC SERPL-MCNC: 231 MCG/DL (ref 298–536)
TRANSFERRIN SERPL-MCNC: 155 MG/DL (ref 200–360)
TRIGL SERPL-MCNC: 108 MG/DL (ref 0–149)
TSH SERPL DL<=0.05 MIU/L-ACNC: 6.63 UIU/ML (ref 0.27–4.2)
UROBILINOGEN UR QL STRIP: ABNORMAL
VLDLC SERPL-MCNC: 20 MG/DL (ref 5–40)
WBC # UR STRIP: ABNORMAL /HPF
WBC NRBC COR # BLD AUTO: 4.3 10*3/MM3 (ref 3.4–10.8)

## 2024-08-06 PROCEDURE — 86376 MICROSOMAL ANTIBODY EACH: CPT

## 2024-08-06 PROCEDURE — 84466 ASSAY OF TRANSFERRIN: CPT

## 2024-08-06 PROCEDURE — 82043 UR ALBUMIN QUANTITATIVE: CPT

## 2024-08-06 PROCEDURE — 85025 COMPLETE CBC W/AUTO DIFF WBC: CPT

## 2024-08-06 PROCEDURE — 99395 PREV VISIT EST AGE 18-39: CPT

## 2024-08-06 PROCEDURE — 84439 ASSAY OF FREE THYROXINE: CPT

## 2024-08-06 PROCEDURE — 99214 OFFICE O/P EST MOD 30 MIN: CPT

## 2024-08-06 PROCEDURE — 84482 T3 REVERSE: CPT

## 2024-08-06 PROCEDURE — 84481 FREE ASSAY (FT-3): CPT

## 2024-08-06 PROCEDURE — 81001 URINALYSIS AUTO W/SCOPE: CPT

## 2024-08-06 PROCEDURE — 83036 HEMOGLOBIN GLYCOSYLATED A1C: CPT

## 2024-08-06 PROCEDURE — 83540 ASSAY OF IRON: CPT

## 2024-08-06 PROCEDURE — 84432 ASSAY OF THYROGLOBULIN: CPT

## 2024-08-06 PROCEDURE — 80061 LIPID PANEL: CPT

## 2024-08-06 PROCEDURE — 80053 COMPREHEN METABOLIC PANEL: CPT

## 2024-08-06 PROCEDURE — 84443 ASSAY THYROID STIM HORMONE: CPT

## 2024-08-06 RX ORDER — SPIRONOLACTONE 100 MG/1
100 TABLET, FILM COATED ORAL DAILY
Qty: 7 TABLET | Refills: 0 | Status: SHIPPED | OUTPATIENT
Start: 2024-08-06

## 2024-08-06 RX ORDER — POTASSIUM CHLORIDE 750 MG/1
10 TABLET, FILM COATED, EXTENDED RELEASE ORAL 2 TIMES DAILY
Qty: 14 TABLET | Refills: 0 | Status: SHIPPED | OUTPATIENT
Start: 2024-08-06

## 2024-08-06 RX ORDER — CETIRIZINE HYDROCHLORIDE 10 MG/1
10 TABLET ORAL DAILY
Qty: 90 TABLET | Refills: 1 | Status: SHIPPED | OUTPATIENT
Start: 2024-08-06

## 2024-08-06 RX ORDER — PHENOL 1.4 %
600 AEROSOL, SPRAY (ML) MUCOUS MEMBRANE DAILY
Qty: 30 TABLET | Refills: 0 | Status: SHIPPED | OUTPATIENT
Start: 2024-08-06

## 2024-08-06 RX ORDER — FUROSEMIDE 20 MG/1
20 TABLET ORAL 2 TIMES DAILY
Qty: 14 TABLET | Refills: 0 | Status: SHIPPED | OUTPATIENT
Start: 2024-08-06

## 2024-08-06 NOTE — TELEPHONE ENCOUNTER
Called pt to attempt to reschedule 1 month f/u to Dr. Varghese's schedule. No answer, left VM to call back.

## 2024-08-06 NOTE — TELEPHONE ENCOUNTER
Left PT a voicemail with changed appt date and time. Requested he call us back to reschedule if that did not work for him.    CBC  08/06

## 2024-08-06 NOTE — PROGRESS NOTES
"Chief Complaint  Annual Exam    Subjective    History of Present Illness      Patient presents to [facility] for   History of Present Illness  Pt is here today for Annual Exam and 1 month f/u from hospital stay. He reports continued testicular and leg swelling over the last month. All medications prescribed do not seem to improve this.   He also reports coughing that has come and gone over the last 3 weeks, seems to worsen at night.        Review of Systems    I have reviewed and agree with the HPI and ROS information as above.  Jossy Christiansen, APRN     Objective   Vital Signs:   /68   Pulse 98   Ht 188 cm (74\")   Wt (!) 153 kg (338 lb)   SpO2 93%   BMI 43.40 kg/m²        Class 3 Severe Obesity (BMI >=40). Obesity-related health conditions include the following: hypertension, diabetes mellitus, dyslipidemias, and GERD. Obesity is unchanged. BMI is is above average; BMI management plan is completed. We discussed portion control and increasing exercise.      Physical Exam  Vitals and nursing note reviewed.   Constitutional:       General: He is not in acute distress.     Appearance: Normal appearance. He is obese. He is not toxic-appearing or diaphoretic.   HENT:      Head: Normocephalic and atraumatic.      Right Ear: External ear normal.      Left Ear: External ear normal.      Nose: Nose normal.      Mouth/Throat:      Mouth: Mucous membranes are moist.   Eyes:      General: Scleral icterus present.      Extraocular Movements: Extraocular movements intact.      Pupils: Pupils are equal, round, and reactive to light.   Cardiovascular:      Rate and Rhythm: Normal rate and regular rhythm.      Pulses: Normal pulses.      Heart sounds: Normal heart sounds.   Pulmonary:      Effort: Pulmonary effort is normal. No respiratory distress.      Breath sounds: Normal breath sounds. No stridor. No wheezing, rhonchi or rales.      Comments: Lung sounds clear throughout " bilaterally  Abdominal:      General: Bowel sounds are normal. There is distension.      Tenderness: There is no abdominal tenderness. There is no right CVA tenderness, left CVA tenderness, guarding or rebound.   Genitourinary:     Testes:         Right: Swelling present.         Left: Swelling present.   Musculoskeletal:      Right lower le+ Pitting Edema present.      Left lower le+ Pitting Edema present.   Skin:     General: Skin is warm and dry.      Capillary Refill: Capillary refill takes less than 2 seconds.   Neurological:      Mental Status: He is alert and oriented to person, place, and time. Mental status is at baseline.   Psychiatric:         Mood and Affect: Mood normal.         Behavior: Behavior normal.         Thought Content: Thought content normal.         Judgment: Judgment normal.          KATHERINE-7:      PHQ-2 Depression Screening  Little interest or pleasure in doing things?     Feeling down, depressed, or hopeless?     PHQ-2 Total Score       PHQ-9 Depression Screening  Little interest or pleasure in doing things?     Feeling down, depressed, or hopeless?     Trouble falling or staying asleep, or sleeping too much?     Feeling tired or having little energy?     Poor appetite or overeating?     Feeling bad about yourself - or that you are a failure or have let yourself or your family down?     Trouble concentrating on things, such as reading the newspaper or watching television?     Moving or speaking so slowly that other people could have noticed? Or the opposite - being so fidgety or restless that you have been moving around a lot more than usual?     Thoughts that you would be better off dead, or of hurting yourself in some way?     PHQ-9 Total Score     If you checked off any problems, how difficult have these problems made it for you to do your work, take care of things at home, or get along with other people?        Result Review  Data Reviewed:            Office Visit with English,  LÁZARO Zheng (07/15/2024)   XR Chest 1 View (07/04/2024 10:33)   CT Abdomen Pelvis Without Contrast (07/04/2024 15:29)   Hemoglobin A1c (07/07/2024 05:35)   Adult Transthoracic Echo Complete W/ Cont if Necessary Per Protocol (07/05/2024 11:39)   US Liver (04/22/2024 10:25)            Assessment and Plan      Diagnoses and all orders for this visit:    1. Annual physical exam (Primary)  -     Lipid panel  -     Urinalysis With Culture If Indicated - Urine, Clean Catch  -     Urinalysis, Microscopic Only - Urine, Clean Catch    2. Type 2 diabetes mellitus with other specified complication, without long-term current use of insulin    3. Fatty liver    4. Alcoholism    5. Transaminitis    6. Anasarca  -     spironolactone (Aldactone) 100 MG tablet; Take 1 tablet by mouth Daily.  Dispense: 7 tablet; Refill: 0  -     furosemide (Lasix) 20 MG tablet; Take 1 tablet by mouth 2 (Two) Times a Day.  Dispense: 14 tablet; Refill: 0  -     potassium chloride 10 MEQ CR tablet; Take 1 tablet by mouth 2 (Two) Times a Day.  Dispense: 14 tablet; Refill: 0    7. Jaundice    8. Testicular swelling    9. Environmental allergies  -     cetirizine (zyrTEC) 10 MG tablet; Take 1 tablet by mouth Daily.  Dispense: 90 tablet; Refill: 1    10. Class 3 severe obesity due to excess calories with serious comorbidity and body mass index (BMI) of 40.0 to 44.9 in adult  -     Hemoglobin A1c    11. Bilateral lower extremity edema  -     spironolactone (Aldactone) 100 MG tablet; Take 1 tablet by mouth Daily.  Dispense: 7 tablet; Refill: 0  -     furosemide (Lasix) 20 MG tablet; Take 1 tablet by mouth 2 (Two) Times a Day.  Dispense: 14 tablet; Refill: 0  -     potassium chloride 10 MEQ CR tablet; Take 1 tablet by mouth 2 (Two) Times a Day.  Dispense: 14 tablet; Refill: 0    12. Hypokalemia  -     potassium chloride 10 MEQ CR tablet; Take 1 tablet by mouth 2 (Two) Times a Day.  Dispense: 14 tablet; Refill: 0  -     Comprehensive metabolic panel;  Future    13. Elevated serum creatinine  -     Comprehensive metabolic panel; Future    14. Hypocalcemia  -     calcium carbonate (Calcium 600) 600 MG tablet; Take 1 tablet by mouth Daily.  Dispense: 30 tablet; Refill: 0      Patient is seen today for an annual physical exam and follow-up on chronic conditions as above.  Labs are needed at today's visit, he failed to get labs done from his last visit as well.  He will complete and we will call with results.  He is currently fasting.  States his blood sugars at home have been running around 120s fasting typically, is not currently taking Lantus anymore as we discontinued this at his last visit.  Most recent A1c last month was 4.6.  Blood pressure is well-controlled at 114/68.  BMI is in the obese category.      On exam patient does have jaundice with scleral icterus noted, anasarca and bilateral testicular swelling.  He does feel that his swelling has improved mildly since the last time we saw him, however would like to see some more improvement.  He has been taking Lasix 20 mg twice daily with potassium 10 mEq twice daily.  I have suggested adding spironolactone pending his labs, he is agreeable.      He does have an appointment with general surgery this Thursday on 8/8 to discuss lymphadenopathy concerns.  He has yet to see GI for her liver issue.  We are providing him with the phone number today and I am having staff reach out to see if we can get him in soon as possible for further.  Patient states he is still drinking alcohol intermittently, it has been about 7 days since his last drink.  I have encouraged cessation, case management has attempted to call the patient 3 times without success.  She has left voicemails and he does not call her back.  I have encouraged him to talk with her as she can help facilitate care.      Patient states he has had intermittent dry nonproductive cough over the last 3 weeks or so.  Denies any body aches, chills, or fever.  On exam  lung sounds are clear throughout bilaterally, no cough noted at this time.  He states cough tends to be worse at night.  He is currently taking Protonix, admits he was not previously taking it very regularly but has started being more compliant with this.  He states cough is not severe but is nagging him.  I have suggested starting daily Zyrtec and continuing Protonix.    At this point the patient is becoming fairly difficult to manage from my standpoint as he is not very compliant with care and has multiple chronic conditions going on that have potential to significantly worsen in the event of continued noncompliance.  I have discussed the patient's case with Dr. Varghese.  He is willing to see the patient and aid in managing his care.  I do have his CMP and A1c back.  A1c is 4.3.  CMP reveals hypokalemia at 2.8, creatinine elevated at 1.53 and GFR a little low at 58.9.  His calcium is also a little low at 7.5.  AST is now 157, about 100 points higher than previous.  Bilirubin at 2.9.  Dr. Varghese agrees with adding spironolactone to his current regimen of Lasix 20 mg twice daily and continuing potassium 10 mg twice daily, will do this for about a week and repeat labs.  Ultimately his anasarca/swelling is due to liver dysfunction and compliance is going to be necessary for improvement.  If we see improvement in his CMP next week we can continue Lasix, spironolactone, and potassium combo for one month per Dr. Varghese.    Plan:  1.  Labs pending  2.  Start spironolactone 100 mg daily  3.  Continue Lasix 20 mg twice daily  4.  Continue potassium 10 mEq twice daily  5.  Start Zyrtec 10 mg daily  6.  Follow-up with GI, referral current  7.  Continue Protonix 40 mg daily  8.  Alcohol cessation recommended  9.  Start calcium 600 mg daily  10.  Follow-up with Dr. Varghese in 1 month    Follow Up   Return in about 1 month (around 9/6/2024).  Patient was given instructions and counseling regarding his condition or for health maintenance  advice. Please see specific information pulled into the AVS if appropriate.

## 2024-08-07 LAB — THYROPEROXIDASE AB SERPL-ACNC: 59 IU/ML (ref 0–34)

## 2024-08-09 ENCOUNTER — TELEPHONE (OUTPATIENT)
Dept: FAMILY MEDICINE CLINIC | Facility: CLINIC | Age: 39
End: 2024-08-09
Payer: COMMERCIAL

## 2024-08-09 DIAGNOSIS — E03.9 HYPOTHYROIDISM, UNSPECIFIED TYPE: Primary | ICD-10-CM

## 2024-08-09 LAB — T3REVERSE SERPL-MCNC: 76 NG/DL (ref 9.2–24.1)

## 2024-08-09 RX ORDER — LEVOTHYROXINE SODIUM 0.05 MG/1
50 TABLET ORAL DAILY
Qty: 90 TABLET | Refills: 0 | Status: SHIPPED | OUTPATIENT
Start: 2024-08-09

## 2024-08-09 NOTE — TELEPHONE ENCOUNTER
----- Message from Radha Bravo sent at 8/9/2024  7:50 AM CDT -----  Please  let patient know that his pending labs are back  TSH is elevated which indicates hypothyroidism; patient will need to start Synthroid 50mcg and his Thyroid peroxidase antibody is increased as well. We will need to get patient into ENT

## 2024-08-12 ENCOUNTER — OFFICE VISIT (OUTPATIENT)
Dept: SURGERY | Facility: CLINIC | Age: 39
End: 2024-08-12
Payer: COMMERCIAL

## 2024-08-12 VITALS
HEART RATE: 94 BPM | WEIGHT: 315 LBS | SYSTOLIC BLOOD PRESSURE: 138 MMHG | BODY MASS INDEX: 40.43 KG/M2 | HEIGHT: 74 IN | DIASTOLIC BLOOD PRESSURE: 80 MMHG | OXYGEN SATURATION: 98 %

## 2024-08-12 DIAGNOSIS — K70.30 ALCOHOLIC CIRRHOSIS, UNSPECIFIED WHETHER ASCITES PRESENT: ICD-10-CM

## 2024-08-12 DIAGNOSIS — R59.0 MESENTERIC LYMPHADENOPATHY: ICD-10-CM

## 2024-08-12 DIAGNOSIS — E66.01 OBESITY, MORBID (MORE THAN 100 LBS OVER IDEAL WEIGHT OR BMI > 40): ICD-10-CM

## 2024-08-12 DIAGNOSIS — R74.01 TRANSAMINITIS: Primary | ICD-10-CM

## 2024-08-12 PROCEDURE — 99204 OFFICE O/P NEW MOD 45 MIN: CPT | Performed by: STUDENT IN AN ORGANIZED HEALTH CARE EDUCATION/TRAINING PROGRAM

## 2024-08-12 NOTE — PROGRESS NOTES
Office New Patient History and Physical:     Referring Provider: Jossy Christiansen APRN    Chief Complaint   Patient presents with    Abdominal Pain     Gallstones       Subjective .     History of present illness:  Luciano Christiansen is a 39 y.o. male who presents for a gallbladder consultation per him. However his PCP's note states he is here for lymphadenopathy.  He denies abdominal pain, nausea and vomiting. He does endorse prior jaundice with yellowing of the skin which he reports is improving. He does have cirrhosis 2/2 alcoholism. He does not have a gastroenterologist.     BMI is 43. He is not on blood thinners. He is a current every day smoker. No prior abdominal surgery.     History  Past Medical History:   Diagnosis Date    Alcoholism     Diabetes mellitus     Gout     Hypertension     Jaundice    ,   Past Surgical History:   Procedure Laterality Date    VASECTOMY     , History reviewed. No pertinent family history.,   Social History     Tobacco Use    Smoking status: Some Days     Current packs/day: 0.25     Average packs/day: 0.5 packs/day for 14.6 years (6.9 ttl pk-yrs)     Types: Cigarettes     Start date: 2010    Smokeless tobacco: Never   Vaping Use    Vaping status: Never Used    Passive vaping exposure: Yes   Substance Use Topics    Alcohol use: Yes     Alcohol/week: 12.0 standard drinks of alcohol     Types: 12 Cans of beer per week     Comment: reports none in 10 days.    Drug use: Yes     Types: Marijuana   , (Not in a hospital admission)   and Allergies:  Patient has no known allergies.    Current Outpatient Medications:     calcium carbonate (Calcium 600) 600 MG tablet, Take 1 tablet by mouth Daily., Disp: 30 tablet, Rfl: 0    cetirizine (zyrTEC) 10 MG tablet, Take 1 tablet by mouth Daily., Disp: 90 tablet, Rfl: 1    chlordiazePOXIDE (LIBRIUM) 10 MG capsule, Take 1 capsule by mouth 3 (Three) Times a Day As Needed for Anxiety., Disp: 90 capsule, Rfl: 2    folic acid (FOLVITE) 1 MG tablet, Take 1  "tablet by mouth Daily., Disp: 90 tablet, Rfl: 1    furosemide (Lasix) 20 MG tablet, Take 1 tablet by mouth 2 (Two) Times a Day., Disp: 14 tablet, Rfl: 0    lactulose 20 GM/30ML solution solution, Take 30 mL by mouth 2 (Two) Times a Day., Disp: 450 mL, Rfl: 0    levothyroxine (Synthroid) 50 MCG tablet, Take 1 tablet by mouth Daily., Disp: 90 tablet, Rfl: 0    pantoprazole (PROTONIX) 40 MG EC tablet, Take 1 tablet by mouth Daily., Disp: 90 tablet, Rfl: 1    pentoxifylline (TRENtal) 400 MG CR tablet, Take 1 tablet by mouth 3 (Three) Times a Day With Meals., Disp: 90 tablet, Rfl: 2    potassium chloride 10 MEQ CR tablet, Take 1 tablet by mouth 2 (Two) Times a Day., Disp: 14 tablet, Rfl: 0    QUEtiapine (SEROquel) 50 MG tablet, Take 1 tablet by mouth Every Night., Disp: 30 tablet, Rfl: 2    riFAXIMin (XIFAXAN) 550 MG tablet, Take 1 tablet by mouth 2 (Two) Times a Day., Disp: 60 tablet, Rfl: 2    spironolactone (Aldactone) 100 MG tablet, Take 1 tablet by mouth Daily., Disp: 7 tablet, Rfl: 0    thiamine (VITAMIN B1) 100 MG tablet, Take 1 tablet by mouth Daily., Disp: 30 tablet, Rfl: 0    thiamine (VITAMIN B1) 100 MG tablet, Take 1 tablet by mouth Daily., Disp: 90 tablet, Rfl: 1    Objective       Vital Signs   /80   Pulse 94   Ht 188 cm (74\")   Wt (!) 153 kg (338 lb)   SpO2 98%   BMI 43.40 kg/m²      Physical Exam:  General appearance - chronically ill appearing and jaundiced   Mental status - alert, oriented to person, place, and time  Eyes - pupils equal and reactive, extraocular eye movements intact  Neck - supple, no significant adenopathy  Chest - no tachypnea, retractions or cyanosis  Heart - normal rate and regular rhythm  Abdomen - soft, nontender, nondistended, no masses or organomegaly  Neurological - alert, oriented, normal speech, no focal findings or movement disorder noted    Results Review:     The following data was reviewed by: Lorin Orozco MD on 08/12/2024:    US Liver (04/22/2024 10:25) "   1. Diminished quality exam due to overlying bowel gas.  2. Hyperechoic liver consistent with fatty infiltration.  3. Cholelithiasis.  Comprehensive Metabolic Panel (08/06/2024 10:11)   CT Abdomen Pelvis Without Contrast (07/04/2024 15:29)   Increased nonspecific mesenteric and retroperitoneal edema, this however  can be seen with volume overload given the presence of diffuse increased  body wall anasarca.     Increased size of abdominal and pelvic lymph nodes, some of the iliac  chain and inguinal lymph nodes of which are enlarged. These could be  reactive, however given interval enlargement follow-up is recommended if  tissue diagnosis is not obtained to exclude neoplastic process.     Splenomegaly which can be seen with portal hypertension.     Moderately distended bladder.     Cholelithiasis.        Assessment & Plan       Diagnoses and all orders for this visit:    1. Transaminitis (Primary)  -     Ambulatory Referral to Gastroenterology  -     MRI abdomen w wo contrast mrcp; Future    2. Alcoholic cirrhosis, unspecified whether ascites present  -     Ambulatory Referral to Gastroenterology  -     MRI abdomen w wo contrast mrcp; Future    3. Mesenteric lymphadenopathy  -     MRI abdomen w wo contrast mrcp; Future    4. Obesity, morbid (more than 100 lbs over ideal weight or BMI > 40)         Luciano Christiansen is a 39 y.o. male with gallstones and abdominal lymphadenopathy. His cirrhosis is  not well optimized and we have not ruled out an obstructing lesion causing his hyperbilirubinemia. I would like to rule out masses as a cause for the hyperbilirubinemia and lymphadenopathy before proceeding with any surgical intervention. I called and discussed the patient with Dr. Soler in radiology. We will proceed with MRCP at this time. The copper BB in his hand should not prevent MRCP.     This is an undiagnosed problem with an uncertain prognosis. I have reviewed the US, CMP and CT above. I have ordered an MRCP and  discussed the patient with the radiologist.             Lorin Orozco MD  08/19/24  08:17 CDT

## 2024-08-13 ENCOUNTER — TELEPHONE (OUTPATIENT)
Dept: FAMILY MEDICINE CLINIC | Facility: CLINIC | Age: 39
End: 2024-08-13
Payer: COMMERCIAL

## 2024-08-13 NOTE — LETTER
August 13, 2024     Luciano Christiansen  [address] [facility] [address]        Dear Luciano:    Below are the results from your recent visit:    Resulted Orders   TSH   Result Value Ref Range    TSH 6.630 (H) 0.270 - 4.200 uIU/mL   T3, free   Result Value Ref Range    T3, Free 2.23 2.00 - 4.40 pg/mL   T4, free   Result Value Ref Range    Free T4 1.39 0.92 - 1.68 ng/dL   Thyroid Peroxidase Antibody   Result Value Ref Range    Thyroid Peroxidase Antibody 59 (H) 0 - 34 IU/mL   CBC Auto Differential   Result Value Ref Range    WBC 4.30 3.40 - 10.80 10*3/mm3    RBC 2.49 (L) 4.14 - 5.80 10*6/mm3    Hemoglobin 8.0 (L) 13.0 - 17.7 g/dL    Hematocrit 25.7 (L) 37.5 - 51.0 %    .2 (H) 79.0 - 97.0 fL    MCH 32.1 26.6 - 33.0 pg    MCHC 31.1 (L) 31.5 - 35.7 g/dL    RDW 17.1 (H) 12.3 - 15.4 %    MPV 9.0 6.0 - 12.0 fL    Platelets 138 (L) 140 - 450 10*3/mm3    Neutrophil % 66.6 42.7 - 76.0 %    Lymphocyte % 21.2 19.6 - 45.3 %    Auto Mixed Cells % 12.2 0.1 - 24.0 %    Neutrophils, Absolute 2.90 1.70 - 7.00 10*3/mm3    Lymphocytes, Absolute 0.90 0.70 - 3.10 10*3/mm3    Auto Mixed Cells # 0.50 0.10 - 2.60 10*3/mm3   Comprehensive Metabolic Panel   Result Value Ref Range    Glucose 73 70 - 100 mg/dL    BUN 7 5 - 21 mg/dL    Creatinine 1.53 (H) 0.50 - 1.40 mg/dL    Sodium 137 135 - 145 mmol/L    Potassium 2.8 (C) 3.5 - 5.3 mmol/L    Chloride 103 98 - 110 mmol/L    CO2 25.0 24.0 - 31.0 mmol/L    Calcium 7.5 (L) 8.6 - 10.5 mg/dL    Total Protein 7.2 6.3 - 8.7 g/dL    Albumin 3.1 (L) 3.5 - 5.0 g/dL    ALT (SGPT) 38 0 - 50 U/L    AST (SGOT) 157 (H) 7 - 45 U/L    Alkaline Phosphatase 257 (H) 24 - 120 U/L    Total Bilirubin 2.9 (H) 0.1 - 1.0 mg/dL    Globulin 4.1 gm/dL    A/G Ratio 0.8 (L) 1.1 - 2.5 g/dL    BUN/Creatinine Ratio 4.6 (L)      Anion Gap 9.0 4.0 - 13.0 mmol/L    eGFR 58.9 (L) >60.0 mL/min/1.73   MicroAlbumin, Urine, Random - Urine, Clean Catch   Result Value Ref Range    Microalbumin, Urine 8.0 mg/dL   Iron Profile   Result  Value Ref Range    Iron 28 (L) 59 - 158 mcg/dL    Iron Saturation (TSAT) 12 (L) 20 - 50 %    Transferrin 155 (L) 200 - 360 mg/dL    TIBC 231 (L) 298 - 536 mcg/dL       Your thyroid stimulating hormone was elevated, which indicates hypothyroidism. Radha recommends you start on Synthroid 50mcg daily. Your thyroid peroxidase antibody was also elevated. She'd like you to see the Ear, Nose, and Throat doctor for this. Radha has placed the referral; their office will be in touch with you to schedule an appointment.    Please feel free to reach out to my office with any questions.         Sincerely,    LÁZARO Orosco

## 2024-08-18 LAB — THYROGLOB SERPL-MCNC: 32 NG/ML

## 2024-08-19 NOTE — PATIENT INSTRUCTIONS

## 2024-08-27 ENCOUNTER — APPOINTMENT (OUTPATIENT)
Dept: GENERAL RADIOLOGY | Facility: HOSPITAL | Age: 39
End: 2024-08-27
Payer: COMMERCIAL

## 2024-08-27 ENCOUNTER — APPOINTMENT (OUTPATIENT)
Dept: CT IMAGING | Facility: HOSPITAL | Age: 39
End: 2024-08-27
Payer: COMMERCIAL

## 2024-08-27 ENCOUNTER — TELEPHONE (OUTPATIENT)
Dept: FAMILY MEDICINE CLINIC | Facility: CLINIC | Age: 39
End: 2024-08-27

## 2024-08-27 ENCOUNTER — APPOINTMENT (OUTPATIENT)
Dept: ULTRASOUND IMAGING | Facility: HOSPITAL | Age: 39
End: 2024-08-27
Payer: COMMERCIAL

## 2024-08-27 ENCOUNTER — HOSPITAL ENCOUNTER (INPATIENT)
Facility: HOSPITAL | Age: 39
LOS: 7 days | Discharge: HOME | End: 2024-09-03
Attending: EMERGENCY MEDICINE | Admitting: FAMILY MEDICINE
Payer: COMMERCIAL

## 2024-08-27 DIAGNOSIS — D64.9 CHRONIC ANEMIA: ICD-10-CM

## 2024-08-27 DIAGNOSIS — K76.82 HEPATIC ENCEPHALOPATHY: Primary | ICD-10-CM

## 2024-08-27 DIAGNOSIS — K80.20 CALCULUS OF GALLBLADDER WITHOUT CHOLECYSTITIS WITHOUT OBSTRUCTION: ICD-10-CM

## 2024-08-27 DIAGNOSIS — N17.9 AKI (ACUTE KIDNEY INJURY): ICD-10-CM

## 2024-08-27 DIAGNOSIS — R16.2 HEPATOSPLENOMEGALY: ICD-10-CM

## 2024-08-27 DIAGNOSIS — R59.1 LYMPHADENOPATHY: ICD-10-CM

## 2024-08-27 DIAGNOSIS — Z74.09 IMPAIRED MOBILITY: ICD-10-CM

## 2024-08-27 DIAGNOSIS — K76.9 CHRONIC LIVER DISEASE: ICD-10-CM

## 2024-08-27 DIAGNOSIS — R60.1 ANASARCA: ICD-10-CM

## 2024-08-27 LAB
ALBUMIN SERPL-MCNC: 3.4 G/DL (ref 3.5–5.2)
ALBUMIN/GLOB SERPL: 0.7 G/DL
ALP SERPL-CCNC: 147 U/L (ref 39–117)
ALT SERPL W P-5'-P-CCNC: 17 U/L (ref 1–41)
AMMONIA BLD-SCNC: 154 UMOL/L (ref 16–60)
ANION GAP SERPL CALCULATED.3IONS-SCNC: 15 MMOL/L (ref 5–15)
APAP SERPL-MCNC: <5 MCG/ML (ref 0–30)
ARTERIAL PATENCY WRIST A: POSITIVE
AST SERPL-CCNC: 52 U/L (ref 1–40)
ATMOSPHERIC PRESS: 756 MMHG
BASE EXCESS BLDA CALC-SCNC: -1 MMOL/L (ref 0–2)
BASOPHILS # BLD AUTO: 0.08 10*3/MM3 (ref 0–0.2)
BASOPHILS NFR BLD AUTO: 0.7 % (ref 0–1.5)
BDY SITE: ABNORMAL
BILIRUB CONJ SERPL-MCNC: 1.3 MG/DL (ref 0–0.3)
BILIRUB SERPL-MCNC: 2.4 MG/DL (ref 0–1.2)
BODY TEMPERATURE: 37
BUN SERPL-MCNC: 26 MG/DL (ref 6–20)
BUN/CREAT SERPL: 14.1 (ref 7–25)
CA-I BLD-MCNC: 4.74 MG/DL (ref 4.6–5.4)
CALCIUM SPEC-SCNC: 9.1 MG/DL (ref 8.6–10.5)
CHLORIDE SERPL-SCNC: 102 MMOL/L (ref 98–107)
CK SERPL-CCNC: 135 U/L (ref 20–200)
CO2 SERPL-SCNC: 21 MMOL/L (ref 22–29)
COHGB MFR BLD: 2 % (ref 0–5)
CREAT SERPL-MCNC: 1.84 MG/DL (ref 0.76–1.27)
CRP SERPL-MCNC: 5.65 MG/DL (ref 0–0.5)
DEPRECATED RDW RBC AUTO: 60.2 FL (ref 37–54)
EGFRCR SERPLBLD CKD-EPI 2021: 47.2 ML/MIN/1.73
EOSINOPHIL # BLD AUTO: 0.47 10*3/MM3 (ref 0–0.4)
EOSINOPHIL NFR BLD AUTO: 4.3 % (ref 0.3–6.2)
ERYTHROCYTE [DISTWIDTH] IN BLOOD BY AUTOMATED COUNT: 16.7 % (ref 12.3–15.4)
ETHANOL UR QL: <0.01 %
GLOBULIN UR ELPH-MCNC: 4.6 GM/DL
GLUCOSE SERPL-MCNC: 100 MG/DL (ref 65–99)
HCO3 BLDA-SCNC: 23 MMOL/L (ref 20–26)
HCT VFR BLD AUTO: 28 % (ref 37.5–51)
HCT VFR BLD CALC: 28.1 % (ref 38–51)
HGB BLD-MCNC: 8.8 G/DL (ref 13–17.7)
HGB BLDA-MCNC: 9.2 G/DL (ref 14–18)
IMM GRANULOCYTES # BLD AUTO: 0.05 10*3/MM3 (ref 0–0.05)
IMM GRANULOCYTES NFR BLD AUTO: 0.5 % (ref 0–0.5)
INR PPP: 1.33 (ref 0.91–1.09)
LIPASE SERPL-CCNC: 17 U/L (ref 13–60)
LYMPHOCYTES # BLD AUTO: 0.98 10*3/MM3 (ref 0.7–3.1)
LYMPHOCYTES NFR BLD AUTO: 9 % (ref 19.6–45.3)
Lab: ABNORMAL
MAGNESIUM SERPL-MCNC: 2.1 MG/DL (ref 1.6–2.6)
MCH RBC QN AUTO: 30.9 PG (ref 26.6–33)
MCHC RBC AUTO-ENTMCNC: 31.4 G/DL (ref 31.5–35.7)
MCV RBC AUTO: 98.2 FL (ref 79–97)
METHGB BLD QL: 0.5 % (ref 0–3)
MODALITY: ABNORMAL
MONOCYTES # BLD AUTO: 1.02 10*3/MM3 (ref 0.1–0.9)
MONOCYTES NFR BLD AUTO: 9.4 % (ref 5–12)
NEUTROPHILS NFR BLD AUTO: 76.1 % (ref 42.7–76)
NEUTROPHILS NFR BLD AUTO: 8.27 10*3/MM3 (ref 1.7–7)
NRBC BLD AUTO-RTO: 0 /100 WBC (ref 0–0.2)
NT-PROBNP SERPL-MCNC: 671.7 PG/ML (ref 0–450)
OXYHGB MFR BLDV: 90.9 % (ref 94–99)
PCO2 BLDA: 34.2 MM HG (ref 35–45)
PCO2 TEMP ADJ BLD: 34.2 MM HG (ref 35–45)
PH BLDA: 7.43 PH UNITS (ref 7.35–7.45)
PH, TEMP CORRECTED: 7.43 PH UNITS (ref 7.35–7.45)
PLATELET # BLD AUTO: 356 10*3/MM3 (ref 140–450)
PMV BLD AUTO: 9.4 FL (ref 6–12)
PO2 BLDA: 61.4 MM HG (ref 83–108)
PO2 TEMP ADJ BLD: 61.4 MM HG (ref 83–108)
POTASSIUM BLDA-SCNC: 3.6 MMOL/L (ref 3.5–5.2)
POTASSIUM SERPL-SCNC: 3.8 MMOL/L (ref 3.5–5.2)
PROT SERPL-MCNC: 8 G/DL (ref 6–8.5)
PROTHROMBIN TIME: 17 SECONDS (ref 11.8–14.8)
RBC # BLD AUTO: 2.85 10*6/MM3 (ref 4.14–5.8)
SALICYLATES SERPL-MCNC: <0.3 MG/DL
SAO2 % BLDCOA: 93.2 % (ref 94–99)
SODIUM BLDA-SCNC: 142 MMOL/L (ref 136–145)
SODIUM SERPL-SCNC: 138 MMOL/L (ref 136–145)
VENTILATOR MODE: ABNORMAL
WBC NRBC COR # BLD AUTO: 10.87 10*3/MM3 (ref 3.4–10.8)

## 2024-08-27 PROCEDURE — 86140 C-REACTIVE PROTEIN: CPT | Performed by: EMERGENCY MEDICINE

## 2024-08-27 PROCEDURE — 80179 DRUG ASSAY SALICYLATE: CPT | Performed by: EMERGENCY MEDICINE

## 2024-08-27 PROCEDURE — 83735 ASSAY OF MAGNESIUM: CPT | Performed by: EMERGENCY MEDICINE

## 2024-08-27 PROCEDURE — 83690 ASSAY OF LIPASE: CPT | Performed by: EMERGENCY MEDICINE

## 2024-08-27 PROCEDURE — 82248 BILIRUBIN DIRECT: CPT | Performed by: EMERGENCY MEDICINE

## 2024-08-27 PROCEDURE — 74177 CT ABD & PELVIS W/CONTRAST: CPT

## 2024-08-27 PROCEDURE — 82140 ASSAY OF AMMONIA: CPT | Performed by: EMERGENCY MEDICINE

## 2024-08-27 PROCEDURE — 36600 WITHDRAWAL OF ARTERIAL BLOOD: CPT

## 2024-08-27 PROCEDURE — 99285 EMERGENCY DEPT VISIT HI MDM: CPT

## 2024-08-27 PROCEDURE — 70450 CT HEAD/BRAIN W/O DYE: CPT

## 2024-08-27 PROCEDURE — 85025 COMPLETE CBC W/AUTO DIFF WBC: CPT | Performed by: EMERGENCY MEDICINE

## 2024-08-27 PROCEDURE — 25010000002 THIAMINE HCL 200 MG/2ML SOLUTION 2 ML VIAL: Performed by: EMERGENCY MEDICINE

## 2024-08-27 PROCEDURE — 93010 ELECTROCARDIOGRAM REPORT: CPT | Performed by: INTERNAL MEDICINE

## 2024-08-27 PROCEDURE — 83880 ASSAY OF NATRIURETIC PEPTIDE: CPT | Performed by: EMERGENCY MEDICINE

## 2024-08-27 PROCEDURE — 25510000001 IOPAMIDOL 61 % SOLUTION: Performed by: EMERGENCY MEDICINE

## 2024-08-27 PROCEDURE — 80053 COMPREHEN METABOLIC PANEL: CPT | Performed by: EMERGENCY MEDICINE

## 2024-08-27 PROCEDURE — 82375 ASSAY CARBOXYHB QUANT: CPT

## 2024-08-27 PROCEDURE — 80143 DRUG ASSAY ACETAMINOPHEN: CPT | Performed by: EMERGENCY MEDICINE

## 2024-08-27 PROCEDURE — 82550 ASSAY OF CK (CPK): CPT | Performed by: EMERGENCY MEDICINE

## 2024-08-27 PROCEDURE — 85610 PROTHROMBIN TIME: CPT | Performed by: EMERGENCY MEDICINE

## 2024-08-27 PROCEDURE — 83050 HGB METHEMOGLOBIN QUAN: CPT

## 2024-08-27 PROCEDURE — 93005 ELECTROCARDIOGRAM TRACING: CPT | Performed by: EMERGENCY MEDICINE

## 2024-08-27 PROCEDURE — 76705 ECHO EXAM OF ABDOMEN: CPT

## 2024-08-27 PROCEDURE — 71045 X-RAY EXAM CHEST 1 VIEW: CPT

## 2024-08-27 PROCEDURE — 82077 ASSAY SPEC XCP UR&BREATH IA: CPT | Performed by: EMERGENCY MEDICINE

## 2024-08-27 PROCEDURE — 82805 BLOOD GASES W/O2 SATURATION: CPT

## 2024-08-27 RX ORDER — LACTULOSE 20 G/30ML
20 SOLUTION ORAL DAILY
Status: DISCONTINUED | OUTPATIENT
Start: 2024-08-27 | End: 2024-08-28

## 2024-08-27 RX ORDER — SODIUM CHLORIDE 0.9 % (FLUSH) 0.9 %
10 SYRINGE (ML) INJECTION AS NEEDED
Status: DISCONTINUED | OUTPATIENT
Start: 2024-08-27 | End: 2024-09-03 | Stop reason: HOSPADM

## 2024-08-27 RX ORDER — IOPAMIDOL 612 MG/ML
100 INJECTION, SOLUTION INTRAVASCULAR
Status: COMPLETED | OUTPATIENT
Start: 2024-08-27 | End: 2024-08-27

## 2024-08-27 RX ADMIN — IOPAMIDOL 100 ML: 612 INJECTION, SOLUTION INTRAVENOUS at 12:30

## 2024-08-27 RX ADMIN — THIAMINE HYDROCHLORIDE 300 MG: 100 INJECTION, SOLUTION INTRAMUSCULAR; INTRAVENOUS at 11:44

## 2024-08-27 RX ADMIN — LACTULOSE 20 G: 20 SOLUTION ORAL at 11:43

## 2024-08-27 NOTE — ED PROVIDER NOTES
Subjective   History of Present Illness  Patient was brought here by mom patient has a history of alcohol abuse in the past he has been sober for past 24 days and over the past 5 days apology gotten worse with weakness generalized fatigue malaise gait disturbance confusion recent history of fall was going out to smoke and fell behind the car and the garage door stayed there for 5 hours.  No other trauma at this time.  Swelling everywhere came to the ED on examination looks like a liver disease patient with anasarca.    Fatigue  Location:  Generalized weakness and fatigue  Severity:  Severe  Onset quality:  Gradual  Duration:  5 days  Timing:  Constant  Chronicity:  New  Associated symptoms: congestion, cough, fatigue and myalgias    Associated symptoms: no abdominal pain, no headaches and no rash    Weakness - Generalized  Severity:  Severe  Onset quality:  Gradual  Progression:  Worsening  Context: not alcohol use, not allergies, not change in medication, not drug use and not increased activity    Relieved by:  Nothing  Associated symptoms: cough, dizziness and myalgias    Associated symptoms: no abdominal pain and no headaches    Dizziness  Associated symptoms: no headaches        Review of Systems   Unable to perform ROS: Mental status change   Constitutional:  Positive for fatigue.   HENT:  Positive for congestion.    Respiratory:  Positive for cough.    Gastrointestinal:  Negative for abdominal pain.   Musculoskeletal:  Positive for myalgias.   Skin:  Negative for rash.   Neurological:  Positive for dizziness. Negative for headaches.       Past Medical History:   Diagnosis Date    Alcoholism     Diabetes mellitus     Gout     Hypertension     Jaundice        No Known Allergies    Past Surgical History:   Procedure Laterality Date    VASECTOMY         History reviewed. No pertinent family history.    Social History     Socioeconomic History    Marital status: Single   Tobacco Use    Smoking status: Some Days      Current packs/day: 0.25     Average packs/day: 0.5 packs/day for 14.7 years (6.9 ttl pk-yrs)     Types: Cigarettes     Start date:     Smokeless tobacco: Never   Vaping Use    Vaping status: Never Used    Passive vaping exposure: Yes   Substance and Sexual Activity    Alcohol use: Yes     Alcohol/week: 12.0 standard drinks of alcohol     Types: 12 Cans of beer per week     Comment: reports none in 10 days.    Drug use: Yes     Types: Marijuana    Sexual activity: Yes           Objective   Physical Exam  Vitals and nursing note reviewed. Exam conducted with a chaperone present.   Constitutional:       Appearance: He is well-developed. He is morbidly obese. He is toxic-appearing. He is not ill-appearing.   HENT:      Head: Normocephalic and atraumatic.      Comments: No hemotympanum  Eyes:      General: Lids are normal. Scleral icterus present.      Conjunctiva/sclera: Conjunctivae normal.      Pupils: Pupils are equal, round, and reactive to light.   Cardiovascular:      Rate and Rhythm: Normal rate and regular rhythm. Tachycardia present.      Chest Wall: PMI is not displaced.      Pulses: Normal pulses.      Heart sounds: Normal heart sounds.      No diastolic murmur is present.   Pulmonary:      Effort: Tachypnea present.      Breath sounds: Normal breath sounds. Examination of the right-lower field reveals decreased breath sounds. Examination of the left-lower field reveals decreased breath sounds. No decreased breath sounds.   Abdominal:      General: Abdomen is protuberant. Bowel sounds are normal. There is distension.      Palpations: Abdomen is soft. There is shifting dullness.      Tenderness: There is no abdominal tenderness. There is no right CVA tenderness, left CVA tenderness, guarding or rebound.   Musculoskeletal:         General: Normal range of motion.      Cervical back: Full passive range of motion without pain, normal range of motion and neck supple.      Right lower le+ Edema present.       Left lower le+ Edema present.   Skin:     General: Skin is warm and dry.      Capillary Refill: Capillary refill takes less than 2 seconds.      Comments: Spider nevi pulm erythema   Neurological:      General: No focal deficit present.      Mental Status: He is lethargic, disoriented and confused.      GCS: GCS eye subscore is 4. GCS verbal subscore is 5. GCS motor subscore is 6.      Cranial Nerves: Cranial nerves 2-12 are intact. No cranial nerve deficit.      Motor: Motor function is intact. No weakness.      Deep Tendon Reflexes: Reflexes are normal and symmetric.      Comments: Mild asterixis         Procedures           ED Course  ED Course as of 24 1625   Tue Aug 27, 2024   1350 MELD score is 19 points 3 to 4% estimated 90-day mortality [TS]   1351 Child pug classification is 8 points child class B [TS]   1615 This gentleman came in with encephalopathy and confusion CT of the head is negative CT of the abdomen pelvis shows gallstones no bili ductal dilation and a sonogram is negative for cholecystitis ammonia level is 154 hepatic encephalopathy stage I and II.  Patient was given some IV fluids in the ED and thiamine.  And lactulose.  The patient is more awake at this time wants to sit up.  I have discussed this case the patient's mother will admit to medicine service with GI consultation the patient and the patient's mother have been informed that they eventually will have to follow-up with hepatology. [TS]      ED Course User Index  [TS] Lorenzo Quiñonez MD                                             Medical Decision Making  Differential Diagnosis:  I considered toxic-metabolic etiology, hypoglycemia, hyperglycemia, diabetic ketoacidosis, drug overdose, ethanol intoxication, thiamine deficiency, hypothermia, hyponatremia, hypernatremia, organ failure, liver failure, kidney failure, thyroid failure, adrenal failure, hypoxia, hypercarbia, ischemic stroke, intracranial bleed, subarachnoid hemorrhage,  closed head injury, subdural hematoma, seizure activity, syncopal episode, infectious etiology, hypertensive encephalopathy, vasculitis, thrombotic thrombocytopenic purpura and disseminated intravascular coagulation as a possible cause of altered mental status in this patient. This is a partial list of diagnoses considered.             Problems Addressed:  MAYRA (acute kidney injury): acute illness or injury  Anasarca: chronic illness or injury  Chronic anemia: chronic illness or injury  Chronic liver disease: chronic illness or injury  Hepatic encephalopathy: complicated acute illness or injury  Hepatosplenomegaly: chronic illness or injury  Lymphadenopathy: chronic illness or injury with exacerbation, progression, or side effects of treatment    Amount and/or Complexity of Data Reviewed  Labs: ordered.     Details: Labs reviewed on the patient  Radiology: ordered.     Details: Scans ultrasounds were obtained.  ECG/medicine tests: ordered.    Risk  Prescription drug management.  Decision regarding hospitalization.  Risk Details: Case were discussed at length with the patient's family and with the patient he eventually will need to see hepatologist to be placed on a transplant list.  But currently he is hepatic encephalopathy stage I and II he is awake having mild asterixis.  Was given lactulose and needs to be on p.o. medications for prevention of hepatic encephalopathy.  There is no clinical evidence of GI bleeding there is no melena or hematemesis.  Hemoglobin is baseline there is no tachycardia or hypotension he is got mild renal insufficient associate with this.  Will be admitted to medicine service for further evaluation.        Final diagnoses:   Hepatic encephalopathy   MAYRA (acute kidney injury)   Anasarca   Chronic anemia   Hepatosplenomegaly   Chronic liver disease   Lymphadenopathy   Calculus of gallbladder without cholecystitis without obstruction       ED Disposition  ED Disposition       ED Disposition    Decision to Admit    Condition   --    Comment   Level of Care: Telemetry [5]   Diagnosis: Hepatic encephalopathy [572.2.ICD-9-CM]   Admitting Physician: TIFFANY PATTERSON [[id number]]   Attending Physician: TIFFANY PATTERSON [[id number]]   Certification: I Certify That Inpatient Hospital Services Are Medically Necessary For Greater Than 2 Midnights                 No follow-up provider specified.       Medication List      No changes were made to your prescriptions during this visit.            Lorenzo Quiñonez MD  08/27/24 9066

## 2024-08-27 NOTE — ED NOTES
"Nursing report ED to floor  Luciano Christiansen  39 y.o.  male    HPI:   Chief Complaint   Patient presents with    Fatigue    Weakness - Generalized    Dizziness       Admitting doctor:   Roderick Field MD    Consulting provider(s):  Consults       Date and Time Order Name Status Description    8/27/2024  4:20 PM Gastroenterology (on-call MD unless specified)               Admitting diagnosis:   The primary encounter diagnosis was Hepatic encephalopathy. Diagnoses of MAYRA (acute kidney injury), Anasarca, Chronic anemia, Hepatosplenomegaly, Chronic liver disease, Lymphadenopathy, and Calculus of gallbladder without cholecystitis without obstruction were also pertinent to this visit.    Code status:   Current Code Status       Date Active Code Status Order ID Comments User Context       8/27/2024 1650 CPR (Attempt to Resuscitate) [id number]  Roderick Field MD ED        Question Answer    Code Status (Patient has no pulse and is not breathing) CPR (Attempt to Resuscitate)    Medical Interventions (Patient has pulse or is breathing) Full Support    Level Of Support Discussed With Patient     Next of Kin (If No Surrogate)                    Allergies:   Patient has no known allergies.    Intake and Output    Intake/Output Summary (Last 24 hours) at 8/27/2024 1734  Last data filed at 8/27/2024 1214  Gross per 24 hour   Intake 100 ml   Output --   Net 100 ml       Weight:       08/27/24  1047   Weight: (!) 157 kg (346 lb)       Most recent vitals:   Vitals:    08/27/24 1047 08/27/24 1625   BP: 137/60 139/67   BP Location: Right arm    Patient Position: Sitting    Pulse: 95 88   Resp: 22 22   Temp: 98.3 °F (36.8 °C)    TempSrc: Oral    SpO2: 96% 97%   Weight: (!) 157 kg (346 lb)    Height: 188 cm (74\")      Oxygen Therapy: .    Active LDAs/IV Access:   Lines, Drains & Airways       Active LDAs       Name Placement date Placement time Site Days    Peripheral IV 08/27/24 1140 Right Antecubital 08/27/24  1140  " Antecubital  less than 1                    Labs (abnormal labs have a star):   Labs Reviewed   COMPREHENSIVE METABOLIC PANEL - Abnormal; Notable for the following components:       Result Value    Glucose 100 (*)     BUN 26 (*)     Creatinine 1.84 (*)     CO2 21.0 (*)     Albumin 3.4 (*)     AST (SGOT) 52 (*)     Alkaline Phosphatase 147 (*)     Total Bilirubin 2.4 (*)     eGFR 47.2 (*)     All other components within normal limits    Narrative:     GFR Normal >60  Chronic Kidney Disease <60  Kidney Failure <15     PROTIME-INR - Abnormal; Notable for the following components:    Protime 17.0 (*)     INR 1.33 (*)     All other components within normal limits   BNP (IN-HOUSE) - Abnormal; Notable for the following components:    proBNP 671.7 (*)     All other components within normal limits    Narrative:     This assay is used as an aid in the diagnosis of individuals suspected of having heart failure. It can be used as an aid in the diagnosis of acute decompensated heart failure (ADHF) in patients presenting with signs and symptoms of ADHF to the emergency department (ED). In addition, NT-proBNP of <300 pg/mL indicates ADHF is not likely.    Age Range Result Interpretation  NT-proBNP Concentration (pg/mL:      <50             Positive            >450                   Gray                 300-450                    Negative             <300    50-75           Positive            >900                  Gray                300-900                  Negative            <300      >75             Positive            >1800                  Gray                300-1800                  Negative            <300   C-REACTIVE PROTEIN - Abnormal; Notable for the following components:    C-Reactive Protein 5.65 (*)     All other components within normal limits   AMMONIA - Abnormal; Notable for the following components:    Ammonia 154 (*)     All other components within normal limits   CBC WITH AUTO DIFFERENTIAL - Abnormal; Notable  for the following components:    WBC 10.87 (*)     RBC 2.85 (*)     Hemoglobin 8.8 (*)     Hematocrit 28.0 (*)     MCV 98.2 (*)     MCHC 31.4 (*)     RDW 16.7 (*)     RDW-SD 60.2 (*)     Neutrophil % 76.1 (*)     Lymphocyte % 9.0 (*)     Neutrophils, Absolute 8.27 (*)     Monocytes, Absolute 1.02 (*)     Eosinophils, Absolute 0.47 (*)     All other components within normal limits   BILIRUBIN, DIRECT - Abnormal; Notable for the following components:    Bilirubin, Direct 1.3 (*)     All other components within normal limits   BLOOD GAS, ARTERIAL W/CO-OXIMETRY - Abnormal; Notable for the following components:    pCO2, Arterial 34.2 (*)     pO2, Arterial 61.4 (*)     Base Excess, Arterial -1.0 (*)     O2 Saturation, Arterial 93.2 (*)     Hemoglobin, Blood Gas 9.2 (*)     Hematocrit, Blood Gas 28.1 (*)     Oxyhemoglobin 90.9 (*)     pCO2, Temperature Corrected 34.2 (*)     pO2, Temperature Corrected 61.4 (*)     All other components within normal limits   ACETAMINOPHEN LEVEL - Normal   SALICYLATE LEVEL - Normal   CK - Normal   MAGNESIUM - Normal   LIPASE - Normal   ETHANOL    Narrative:     Not for legal purposes. Chain of Custody not followed.    BLOOD GAS, ARTERIAL W/CO-OXIMETRY   URINE DRUG SCREEN   CBC AND DIFFERENTIAL    Narrative:     The following orders were created for panel order CBC & Differential.  Procedure                               Abnormality         Status                     ---------                               -----------         ------                     CBC Auto Differential[[id number]]        Abnormal            Final result                 Please view results for these tests on the individual orders.       Meds given in ED:   Medications   sodium chloride 0.9 % flush 10 mL (has no administration in time range)   lactulose solution 20 g (20 g Oral Given 8/27/24 1143)   thiamine (B-1) 300 mg in sodium chloride 0.9 % 100 mL IVPB (0 mg Intravenous Stopped 8/27/24 1214)   iopamidol (ISOVUE-300) 61  % injection 100 mL (100 mL Intravenous Given 8/27/24 1230)           NIH Stroke Scale:       Isolation/Infection(s):  No active isolations   No active infections     COVID Testing  Collected .  Resulted .    Nursing report ED to floor:  Mental status: .  Ambulatory status: .  Precautions: .    ED nurse phone extentsion- ..

## 2024-08-27 NOTE — NURSING NOTE
Admit from ER. Pt is lethargic and confused. Stated it was 2021. Doesn't follow commands. States he has had a recent fall. Bed alarm on. Mom came up with pt but has gone. Unable to answer questions. Severe edema noted in all extremities and scrotal w/penile edema. VSS Sinus at 84 per tele.

## 2024-08-27 NOTE — H&P
[facility]  HISTORY AND PHYSICAL    Date of Admission: 8/27/2024  Primary Care Physician: Jossy Christiansen APRN Subjective   Primary Historian: mother    Chief Complaint:confusion    History of Present Illness  I am asked admit this 39-year-old alcoholic with hepatosplenomegaly.  Past medical history includes hypertension, jaundice, diabetes.  I am informed that he had stopped drinking 6 days ago.  He presented in the emergency room with:  Fatigue generalized weakness.  I am told that he is confused and has unsteady gait.  In review of diagnostic studies:  Ethanol less than 0.010  Creatinine 1.84 with BUN of 26 ammonia level is 154  proBNP 671  Salicylate acetaminophen are all within normal limits.  PT/INR is 17 and 1.33 respectively  His hemoglobin is 8.8 while hematocrit is 28 with MCV of 98, platelet count of 356 and white count of 10.87  His blood gas showed no evidence of acidosis.  CO2 is 34 while bicarb on chemistry is 21.  Ultrasound of the gallbladder showed cholelithiasis and borderline gallbladder wall thickening without biliary dilatation may relate to the small volume perihepatic ascites associated with patient's underlying cirrhosis versus less likely calculus cholecystitis.  The liver has been described with scalloped appearance of the margins supporting underlying fibrosis/cirrhosis.    Head CT scan showed no acute findings  Chest x-ray possible volume overload/edema as per reading by radiologist      Results for orders placed during the hospital encounter of 07/04/24    Adult Transthoracic Echo Complete W/ Cont if Necessary Per Protocol    Interpretation Summary    Left ventricular ejection fraction appears to be 61 - 65%.    Left ventricular diastolic function was normal.    Estimated right ventricular systolic pressure from tricuspid regurgitation is normal (<35 mmHg).  He was hospitalized from July 4 to July 9 for alcohol withdrawal,  alcoholic encephalopathy.    According to mother, her 39-year-old son has been sober for at least 24 days  He stays in her place.   He has been confused, unsteady in his gait, decreased appetite, being constipated, readily falls asleep.  A pack of cigarettes would last 5 days.  No fever or chills but states occasionally feels warm and cold (review of record indicates history of thyroid disorder on hormone replacement)  He is less jaundiced compared to previous.  No reported nausea vomiting or abdominal pain  Positive baseline tremors according to mom    Review of Systems   Patient unable to give any historical details.  He is confused.    Past Medical History:   Past Medical History:   Diagnosis Date    Alcoholism     Diabetes mellitus     Gout     Hypertension     Jaundice      Past Surgical History:  Past Surgical History:   Procedure Laterality Date    VASECTOMY       Social History:  reports that he has been smoking cigarettes. He started smoking about 14 years ago. He has a 6.9 pack-year smoking history. He has never used smokeless tobacco. He reports current alcohol use of about 12.0 standard drinks of alcohol per week. He reports current drug use. Drug: Marijuana.    Family History: Mom did not indicate any medical problems.      Allergies:  No Known Allergies    Medications:  Prior to Admission medications    Medication Sig Start Date End Date Taking? Authorizing Provider   calcium carbonate (Calcium 600) 600 MG tablet Take 1 tablet by mouth Daily. 8/6/24   Jossy Christiansen APRN   cetirizine (zyrTEC) 10 MG tablet Take 1 tablet by mouth Daily. 8/6/24   Jossy Christiansen APRN   chlordiazePOXIDE (LIBRIUM) 10 MG capsule Take 1 capsule by mouth 3 (Three) Times a Day As Needed for Anxiety. 7/16/24   Eleuterio Rangel MD   folic acid (FOLVITE) 1 MG tablet Take 1 tablet by mouth Daily. 7/15/24   Jossy Christiansen APRN   furosemide (Lasix) 20 MG tablet Take 1 tablet by mouth 2 (Two) Times a Day. 8/6/24   English,  "LÁZARO Zheng   lactulose 20 GM/30ML solution solution Take 30 mL by mouth 2 (Two) Times a Day. 7/9/24   Marcus Pantoja MD   levothyroxine (Synthroid) 50 MCG tablet Take 1 tablet by mouth Daily. 8/9/24   Radha Bravo APRN   pantoprazole (PROTONIX) 40 MG EC tablet Take 1 tablet by mouth Daily. 7/15/24   Jossy Christiansen APRN   pentoxifylline (TRENtal) 400 MG CR tablet Take 1 tablet by mouth 3 (Three) Times a Day With Meals. 7/15/24   Jossy Christiansen APRN   potassium chloride 10 MEQ CR tablet Take 1 tablet by mouth 2 (Two) Times a Day. 8/6/24   Jossy Christiansen APRN   QUEtiapine (SEROquel) 50 MG tablet Take 1 tablet by mouth Every Night. 7/15/24   Jossy Christiansen APRN   riFAXIMin (XIFAXAN) 550 MG tablet Take 1 tablet by mouth 2 (Two) Times a Day. 7/15/24   Jossy Christiansen APRN   spironolactone (Aldactone) 100 MG tablet Take 1 tablet by mouth Daily. 8/6/24   Jossy Christiansen APRN   thiamine (VITAMIN B1) 100 MG tablet Take 1 tablet by mouth Daily. 5/11/24   Roderick Field MD   thiamine (VITAMIN B1) 100 MG tablet Take 1 tablet by mouth Daily. 7/15/24   Jossy Christiansen APRN     I have utilized all available immediate resources to obtain, update, or review the patient's current medications (including all prescriptions, over-the-counter products, herbals, cannabis/cannabidiol products, and vitamin/mineral/dietary (nutritional) supplements).    Objective     Vital Signs: /60 (BP Location: Right arm, Patient Position: Sitting)   Pulse 95   Temp 98.3 °F (36.8 °C) (Oral)   Resp 22   Ht 188 cm (74\")   Wt (!) 157 kg (346 lb)   SpO2 96%   BMI 44.42 kg/m²   Physical Exam   Seated at the edge of the [facility]  Not in any distress  Holding a urinal  Bedside commode in place  He only smeared the bedside commode after 2 hours of drinking lactulose.  He is confused  Exam was limited because he still would like to continue on using the urinal and bedside commode.  Otherwise he looks older " than his stated age  He is sclera appears pale but no gross jaundice from the door where I directly observed him  He is not in any respiratory distress  He has protuberant abdomen  He appears to be swollen on his lower extremities  Normocephalic and atraumatic head  No gross thyromegaly  Nontoxic-appearing  No gross tremors      Results Reviewed:  Lab Results (last 24 hours)       Procedure Component Value Units Date/Time    Lipase [[id number]]  (Normal) Collected: 08/27/24 1141    Specimen: Blood Updated: 08/27/24 1409     Lipase 17 U/L     Bilirubin, Direct [[id number]]  (Abnormal) Collected: 08/27/24 1141    Specimen: Blood Updated: 08/27/24 1220     Bilirubin, Direct 1.3 mg/dL     C-reactive Protein [[id number]]  (Abnormal) Collected: 08/27/24 1141    Specimen: Blood Updated: 08/27/24 1220     C-Reactive Protein 5.65 mg/dL     CK [[id number]]  (Normal) Collected: 08/27/24 1141    Specimen: Blood Updated: 08/27/24 1220     Creatine Kinase 135 U/L     Ethanol [[id number]] Collected: 08/27/24 1141    Specimen: Blood Updated: 08/27/24 1220     Ethanol % <0.010 %     Narrative:      Not for legal purposes. Chain of Custody not followed.     Magnesium [[id number]]  (Normal) Collected: 08/27/24 1141    Specimen: Blood Updated: 08/27/24 1219     Magnesium 2.1 mg/dL     Comprehensive Metabolic Panel [[id number]]  (Abnormal) Collected: 08/27/24 1141    Specimen: Blood Updated: 08/27/24 1219     Glucose 100 mg/dL      BUN 26 mg/dL      Creatinine 1.84 mg/dL      Sodium 138 mmol/L      Potassium 3.8 mmol/L      Chloride 102 mmol/L      CO2 21.0 mmol/L      Calcium 9.1 mg/dL      Total Protein 8.0 g/dL      Albumin 3.4 g/dL      ALT (SGPT) 17 U/L      AST (SGOT) 52 U/L      Alkaline Phosphatase 147 U/L      Total Bilirubin 2.4 mg/dL      Globulin 4.6 gm/dL      A/G Ratio 0.7 g/dL      BUN/Creatinine Ratio 14.1     Anion Gap 15.0 mmol/L      eGFR 47.2 mL/min/1.73     Narrative:      GFR Normal >60  Chronic Kidney Disease <60  Kidney  Failure <15      Salicylate Level [[id number]]  (Normal) Collected: 08/27/24 1141    Specimen: Blood Updated: 08/27/24 1216     Salicylate <0.3 mg/dL     Acetaminophen Level [[id number]]  (Normal) Collected: 08/27/24 1141    Specimen: Blood Updated: 08/27/24 1215     Acetaminophen <5.0 mcg/mL     Ammonia [[id number]]  (Abnormal) Collected: 08/27/24 1141    Specimen: Blood Updated: 08/27/24 1213     Ammonia 154 umol/L     BNP [[id number]]  (Abnormal) Collected: 08/27/24 1141    Specimen: Blood Updated: 08/27/24 1210     proBNP 671.7 pg/mL     Narrative:      This assay is used as an aid in the diagnosis of individuals suspected of having heart failure. It can be used as an aid in the diagnosis of acute decompensated heart failure (ADHF) in patients presenting with signs and symptoms of ADHF to the emergency department (ED). In addition, NT-proBNP of <300 pg/mL indicates ADHF is not likely.    Age Range Result Interpretation  NT-proBNP Concentration (pg/mL:      <50             Positive            >450                   Gray                 300-450                    Negative             <300    50-75           Positive            >900                  Gray                300-900                  Negative            <300      >75             Positive            >1800                  Gray                300-1800                  Negative            <300    Protime-INR [[id number]]  (Abnormal) Collected: 08/27/24 1141    Specimen: Blood Updated: 08/27/24 1159     Protime 17.0 Seconds      INR 1.33    CBC & Differential [[id number]]  (Abnormal) Collected: 08/27/24 1141    Specimen: Blood Updated: 08/27/24 1151    Narrative:      The following orders were created for panel order CBC & Differential.  Procedure                               Abnormality         Status                     ---------                               -----------         ------                     CBC Auto Differential[[id number]]        Abnormal             Final result                 Please view results for these tests on the individual orders.    CBC Auto Differential [[id number]]  (Abnormal) Collected: 08/27/24 1141    Specimen: Blood Updated: 08/27/24 1151     WBC 10.87 10*3/mm3      RBC 2.85 10*6/mm3      Hemoglobin 8.8 g/dL      Hematocrit 28.0 %      MCV 98.2 fL      MCH 30.9 pg      MCHC 31.4 g/dL      RDW 16.7 %      RDW-SD 60.2 fl      MPV 9.4 fL      Platelets 356 10*3/mm3      Neutrophil % 76.1 %      Lymphocyte % 9.0 %      Monocyte % 9.4 %      Eosinophil % 4.3 %      Basophil % 0.7 %      Immature Grans % 0.5 %      Neutrophils, Absolute 8.27 10*3/mm3      Lymphocytes, Absolute 0.98 10*3/mm3      Monocytes, Absolute 1.02 10*3/mm3      Eosinophils, Absolute 0.47 10*3/mm3      Basophils, Absolute 0.08 10*3/mm3      Immature Grans, Absolute 0.05 10*3/mm3      nRBC 0.0 /100 WBC     Blood Gas, Arterial With Co-Ox [[id number]]  (Abnormal) Collected: 08/27/24 1143    Specimen: Arterial Blood Updated: 08/27/24 1143     Site Left Radial     Siva's Test Positive     pH, Arterial 7.435 pH units      pCO2, Arterial 34.2 mm Hg      Comment: 84 Value below reference range        pO2, Arterial 61.4 mm Hg      Comment: 84 Value below reference range        HCO3, Arterial 23.0 mmol/L      Base Excess, Arterial -1.0 mmol/L      Comment: 84 Value below reference range        O2 Saturation, Arterial 93.2 %      Comment: 84 Value below reference range        Hemoglobin, Blood Gas 9.2 g/dL      Comment: 84 Value below reference range        Hematocrit, Blood Gas 28.1 %      Comment: 84 Value below reference range        Oxyhemoglobin 90.9 %      Comment: 84 Value below reference range        Methemoglobin 0.50 %      Carboxyhemoglobin 2.0 %      Temperature 37.0     Sodium, Arterial 142 mmol/L      Potassium, Arterial 3.6 mmol/L      Ionized Calcium 4.74 mg/dL      Barometric Pressure for Blood Gas 756 mmHg      Modality Room Air     Ventilator Mode NA     Collected by  [id number]     Comment: Meter: [id number]     :  Terell Hercules, ALONSO        pH, Temp Corrected 7.435 pH Units      pCO2, Temperature Corrected 34.2 mm Hg      pO2, Temperature Corrected 61.4 mm Hg           Imaging Results (Last 24 Hours)       Procedure Component Value Units Date/Time    US Gallbladder [[id number]] Collected: 08/27/24 1509     Updated: 08/27/24 1521    Narrative:      US GALLBLADDER-     HISTORY: Cholelithiasis     COMPARISON: 4/25/2024     FINDINGS: Sonographic imaging of the right upper quadrant is performed.  Exam is challenging as patient unable to cooperate with breath-holding.     Limited visualization of the pancreas. Visible portions of the  pancreatic body unremarkable. Proximal IVC is patent. Suboptimal  visualization abdominal aorta from regional shadowing bowel gas.     Slightly scalloped appearance of the margins of the liver supporting  underlying fibrosis/cirrhosis. Appropriate directional flow within the  central main portal vein. No focal liver mass identified. Trace  perihepatic ascites.     Right kidney normal measuring 12.8 cm without right-sided  hydronephrosis.     Multiple shadowing stones within the lumen of the gallbladder.  Borderline gallbladder wall thickening of 3 to 4 mm. No. Cholecystic  fluid identified with ultrasound. Common bile duct of 5 mm, upper limits  of normal.       Impression:      1. Cholelithiasis. Borderline gallbladder wall thickening without  biliary dilatation may relate to the small volume perihepatic ascites  associated with patient's underlying hepatic cirrhosis versus less  likely calculus cholecystitis.     This report was signed and finalized on 8/27/2024 3:17 PM by Dr. Kassy Blair MD.       CT Abdomen Pelvis With Contrast [[id number]] Collected: 08/27/24 1334     Updated: 08/27/24 1357    Narrative:      EXAMINATION: CT ABDOMEN PELVIS W CONTRAST-      8/27/2024 11:29 AM     HISTORY: New onset hepatic encephalopathy protuberant  abdomen with  jaundice     In order to have a CT radiation dose as low as reasonably achievable  Automated Exposure Control was utilized for adjustment of the mA and/or  KV according to patient size.     Total DLP = 4632.87 mGy.cm     The CT scan of the abdomen and pelvis is performed after intravenous  contrast enhancement.     The images are acquired in axial plane with subsequent reconstruction in  coronal and sagittal planes.     Comparison is made with the previous study dated 7/4/2024.     There is interval significant increase in edema of the abdominal and  pelvic wall extending into the lower extremity bilaterally. Similar  changes are seen involving the limited visualized lower chest wall. This  suggest increasing fluid overload/anasarca.     Limited visualized lung bases show atelectatic changes and small pleural  effusion more in the right base which has increased since the previous  study. There are atelectatic changes in the lower lungs. There are  significant motion artifacts which may obscure a small lesion/nodule.     Limited visualized cardiomediastinal structures show moderate  cardiomegaly. Trace pericardial effusion.     There is evidence of hepatosplenomegaly. The liver measures 25 cm in  craniocaudal dimension. The spleen measures 18.6 x 20 x 9.6 cm. This is  similar to the previous study.     There is moderate dilatation of the gallbladder with thickening of the  wall and surrounding fluid. There are at least 2 radiopaque faceted  stones in the proximal body/neck of the gallbladder. The common bile  duct is limitedly visualized and does not show significant dilatation.     The pancreas is normal.     The adrenal glands bilaterally are normal.     The kidneys bilaterally are normal. No discrete mass. No calculi. No  hydronephrosis. The ureters are not well visualized. The urinary bladder  is poorly distended and is significantly compressed by the pelvic fat  and bilateral pelvic lymph nodes.  There is a significant change since  the previous study.     The stomach is decompressed. No focal abnormality. Duodenum and small  bowel are nondilated. No finding to suggest appendicitis. Moderate gas  and stool is seen in the proximal colon. Distal colon is significantly  decompressed.     Normal abdominal aorta. No aneurysmal dilatation.     There is evidence of abdominal and pelvic lymphadenopathy. A left common  iliac lymph node, image #64 in series 2, measures 1.8 cm in short axis.  It previously measured 1.6 cm. A right external iliac lymph node, image  #84 in series 2, measures 1.5 cm in short axis. It measures 1.4 cm in  the previous study. A lymph node in the left external iliac group, image  #88 in series 2, measures 18 mm in short axis. It measured 9 mm in the  previous study. An enlarged right inguinal lymph node, image #95 in  series 2, measures 19 mm in short axis. No change. Another lymph node  located adjacent and medial measures 16 mm in short axis. It previously  measured 14 mm.     There is diffuse infiltration of the peritoneum/omentum throughout the  abdomen and there is a small amount of fluid in the paracolic gutter and  in the pelvis which appears moderately more progressive since the  previous study. There is retroperitoneal infiltration particularly  around the aorta and great vessels moderately more progressive since the  previous study.     Images reviewed in bone window show chronic degenerative changes of the  lumbar spine with multilevel disc degeneration similar to the previous  study. No acute bony abnormality.       Impression:      1. Persistent hepatosplenomegaly similar to the previous study. No  change.  2. Enlarged abdominal pelvic and inguinal lymph nodes. These are  moderately increased in size since the previous study.  3. Moderate dilatation of thickening of the wall of the gallbladder with  multiple gallstones. This may partly be due to adjacent edema/fluid  overload of  the peritoneum/omentum. The possibility of calculus  cholecystitis is not excluded. Common bile duct is not dilated.  4. Diffuse infiltration of the peritoneum/omentum may represent  edema/fluid overload. An evolving ascites.  5. Increasing edema of the abdominal and pelvic wall and limited  visualized chest wall suggesting increasing fluid overload/anasarca.  There is significant thickening/swelling of the scrotum with bilateral  hydroceles.  6. Increasing right basal pleural effusion and atelectatic changes since  the previous study.     This report was signed and finalized on 8/27/2024 1:54 PM by Dr. Vj Hsu MD.       XR Chest 1 View [[id number]] Collected: 08/27/24 1334     Updated: 08/27/24 1338    Narrative:      EXAM: XR CHEST 1 VW-      DATE: 8/27/2024 10:01 AM     HISTORY: Fatigue       COMPARISON: 7/4/2024.     TECHNIQUE:  Frontal view(s) of the chest submitted.     FINDINGS:    Perihilar prominence and interstitial prominence are worrisome for  volume overload/edema. No large effusion or pneumothorax is seen. There  is enlargement of the cardiac silhouette which appears unchanged.          Impression:         1. Possible volume overload/edema.     This report was signed and finalized on 8/27/2024 1:35 PM by Óscar Orozco.       CT Head Without Contrast [[id number]] Collected: 08/27/24 1333     Updated: 08/27/24 1338    Narrative:      EXAM: CT HEAD WO CONTRAST-      DATE: 8/27/2024 11:29 AM     HISTORY: Altered mental status       COMPARISON: 5/5/2024.     DOSE LENGTH PRODUCT: 896.96 mGy.cm  Automated exposure control was also  utilized to decrease patient radiation dose.     TECHNIQUE: Unenhanced CT images obtained from vertex to skull base with  multiplanar reformats.     FINDINGS:  There is no acute intracranial hemorrhage, midline shift, mass effect,  or hydrocephalus. There is no CT evidence for acute infarct.        The visualized paranasal sinuses and mastoid air cells are clear.  Scalp  soft tissues are unremarkable. Visualized orbits and globes are  unremarkable.       Impression:         1. No acute intracranial findings.      This report was signed and finalized on 8/27/2024 1:34 PM by Óscar Orozco.             I have personally reviewed and interpreted the radiology studies and ECG obtained at time of admission.     Assessment / Plan   Assessment:   There are no active hospital problems to display for this patient.      Medical Decision Making  Number and Complexity of problems:   Hepatic encephalopathy versus medication induced (chlordiazepoxide-recently prescribed August 18 by Dr. Rangel) versus combination of  Hyperammonemia  History of alcoholism  Cirrhosis  Hypertension  Macrocytic anemia in patient with history of alcohol use  Cholelithiasis-gallbladder wall thickening without biliary dilatation may relate to small volume perihepatic ascites versus less likely calculus cholecystitis.  Fluid retention lower extremities  Morbid obesity with BMI 44    Treatment Plan  The patient will be admitted to my service here at [facility].   Will increase lactulose to 3 times daily to maintain bowel movement 3-4 times per day  Will continue on diuretics (Lasix spironolactone)  Continue on pentoxifylline from home medication  GI consult  Hold off on chlordiazepoxide.  Had missed appointment with Dr. Culver regarding cholelithiasis  Monitor chemistry and correct electrolytes accordingly  Medications reviewed.    Conditions and Status  Fair     [facility] Data  External documents reviewed: Reviewed epic record  Cardiac tracing (EKG, telemetry) interpretation: -  Radiology interpretation: Reviewed multiple imaging studies as outlined above  Labs reviewed: Yes  Any tests that were considered but not ordered: None     Decision rules/scores evaluated (example [id number], [place], etc):        Discussed with: Mom and ER nursing staff     Care Planning  Shared decision making: Mom  Code status and  discussions: Full code    Disposition  Social Determinants of Health that impact treatment or disposition: None identified at this time  Estimated length of stay is to be determined.     I confirmed that the patient's advanced care plan is present, code status is documented, and a surrogate decision maker is listed in the patient's medical record.     The patient's surrogate decision maker is mom.     The patient was seen and examined by me on   Electronically signed by Roderick Field MD, 08/27/24, 15:59 CDT.

## 2024-08-28 LAB
ALBUMIN SERPL-MCNC: 2.9 G/DL (ref 3.5–5.2)
ALBUMIN/GLOB SERPL: 0.7 G/DL
ALP SERPL-CCNC: 129 U/L (ref 39–117)
ALT SERPL W P-5'-P-CCNC: 17 U/L (ref 1–41)
AMMONIA BLD-SCNC: 117 UMOL/L (ref 16–60)
AMMONIA BLD-SCNC: 121 UMOL/L (ref 16–60)
AMPHET+METHAMPHET UR QL: NEGATIVE
AMPHETAMINES UR QL: NEGATIVE
ANION GAP SERPL CALCULATED.3IONS-SCNC: 12 MMOL/L (ref 5–15)
ANION GAP SERPL CALCULATED.3IONS-SCNC: 14 MMOL/L (ref 5–15)
AST SERPL-CCNC: 49 U/L (ref 1–40)
BARBITURATES UR QL SCN: NEGATIVE
BENZODIAZ UR QL SCN: POSITIVE
BILIRUB SERPL-MCNC: 2.4 MG/DL (ref 0–1.2)
BUN SERPL-MCNC: 20 MG/DL (ref 6–20)
BUN SERPL-MCNC: 23 MG/DL (ref 6–20)
BUN/CREAT SERPL: 14.9 (ref 7–25)
BUN/CREAT SERPL: 16.3 (ref 7–25)
BUPRENORPHINE SERPL-MCNC: NEGATIVE NG/ML
CALCIUM SPEC-SCNC: 8.9 MG/DL (ref 8.6–10.5)
CALCIUM SPEC-SCNC: 9.4 MG/DL (ref 8.6–10.5)
CANNABINOIDS SERPL QL: NEGATIVE
CHLORIDE SERPL-SCNC: 102 MMOL/L (ref 98–107)
CHLORIDE SERPL-SCNC: 103 MMOL/L (ref 98–107)
CO2 SERPL-SCNC: 22 MMOL/L (ref 22–29)
CO2 SERPL-SCNC: 24 MMOL/L (ref 22–29)
COCAINE UR QL: NEGATIVE
CREAT SERPL-MCNC: 1.34 MG/DL (ref 0.76–1.27)
CREAT SERPL-MCNC: 1.41 MG/DL (ref 0.76–1.27)
EGFRCR SERPLBLD CKD-EPI 2021: 65 ML/MIN/1.73
EGFRCR SERPLBLD CKD-EPI 2021: 69.1 ML/MIN/1.73
FENTANYL UR-MCNC: NEGATIVE NG/ML
GLOBULIN UR ELPH-MCNC: 4.4 GM/DL
GLUCOSE SERPL-MCNC: 108 MG/DL (ref 65–99)
GLUCOSE SERPL-MCNC: 81 MG/DL (ref 65–99)
METHADONE UR QL SCN: NEGATIVE
OPIATES UR QL: NEGATIVE
OXYCODONE UR QL SCN: NEGATIVE
PCP UR QL SCN: NEGATIVE
POTASSIUM SERPL-SCNC: 3.3 MMOL/L (ref 3.5–5.2)
POTASSIUM SERPL-SCNC: 3.4 MMOL/L (ref 3.5–5.2)
POTASSIUM SERPL-SCNC: 3.4 MMOL/L (ref 3.5–5.2)
PROT SERPL-MCNC: 7.3 G/DL (ref 6–8.5)
SODIUM SERPL-SCNC: 138 MMOL/L (ref 136–145)
SODIUM SERPL-SCNC: 139 MMOL/L (ref 136–145)
TRICYCLICS UR QL SCN: NEGATIVE

## 2024-08-28 PROCEDURE — 82140 ASSAY OF AMMONIA: CPT | Performed by: INTERNAL MEDICINE

## 2024-08-28 PROCEDURE — 99253 IP/OBS CNSLTJ NEW/EST LOW 45: CPT | Performed by: INTERNAL MEDICINE

## 2024-08-28 PROCEDURE — 25010000002 ENOXAPARIN PER 10 MG: Performed by: INTERNAL MEDICINE

## 2024-08-28 PROCEDURE — 97162 PT EVAL MOD COMPLEX 30 MIN: CPT

## 2024-08-28 PROCEDURE — 25010000002 BUMETANIDE PER 0.5 MG: Performed by: INTERNAL MEDICINE

## 2024-08-28 PROCEDURE — 80307 DRUG TEST PRSMV CHEM ANLYZR: CPT | Performed by: INTERNAL MEDICINE

## 2024-08-28 PROCEDURE — 84132 ASSAY OF SERUM POTASSIUM: CPT | Performed by: INTERNAL MEDICINE

## 2024-08-28 PROCEDURE — 80053 COMPREHEN METABOLIC PANEL: CPT | Performed by: INTERNAL MEDICINE

## 2024-08-28 PROCEDURE — 36415 COLL VENOUS BLD VENIPUNCTURE: CPT | Performed by: INTERNAL MEDICINE

## 2024-08-28 RX ORDER — SPIRONOLACTONE 50 MG/1
100 TABLET, FILM COATED ORAL DAILY
Status: DISCONTINUED | OUTPATIENT
Start: 2024-08-28 | End: 2024-09-03 | Stop reason: HOSPADM

## 2024-08-28 RX ORDER — AMOXICILLIN 250 MG
2 CAPSULE ORAL 2 TIMES DAILY PRN
Status: DISCONTINUED | OUTPATIENT
Start: 2024-08-28 | End: 2024-09-03 | Stop reason: HOSPADM

## 2024-08-28 RX ORDER — PENTOXIFYLLINE 400 MG/1
400 TABLET, EXTENDED RELEASE ORAL
Status: DISCONTINUED | OUTPATIENT
Start: 2024-08-28 | End: 2024-09-03 | Stop reason: HOSPADM

## 2024-08-28 RX ORDER — FUROSEMIDE 20 MG
20 TABLET ORAL
Status: DISCONTINUED | OUTPATIENT
Start: 2024-08-28 | End: 2024-08-28

## 2024-08-28 RX ORDER — SODIUM CHLORIDE 0.9 % (FLUSH) 0.9 %
10 SYRINGE (ML) INJECTION AS NEEDED
Status: DISCONTINUED | OUTPATIENT
Start: 2024-08-28 | End: 2024-09-03 | Stop reason: HOSPADM

## 2024-08-28 RX ORDER — ENOXAPARIN SODIUM 100 MG/ML
40 INJECTION SUBCUTANEOUS EVERY 12 HOURS SCHEDULED
Status: DISCONTINUED | OUTPATIENT
Start: 2024-08-28 | End: 2024-09-03 | Stop reason: HOSPADM

## 2024-08-28 RX ORDER — NICOTINE 21 MG/24HR
1 PATCH, TRANSDERMAL 24 HOURS TRANSDERMAL
Status: DISCONTINUED | OUTPATIENT
Start: 2024-08-28 | End: 2024-09-03 | Stop reason: HOSPADM

## 2024-08-28 RX ORDER — SODIUM CHLORIDE 9 MG/ML
40 INJECTION, SOLUTION INTRAVENOUS AS NEEDED
Status: DISCONTINUED | OUTPATIENT
Start: 2024-08-28 | End: 2024-09-03 | Stop reason: HOSPADM

## 2024-08-28 RX ORDER — CALCIUM CARBONATE 500 MG/1
1 TABLET, CHEWABLE ORAL 2 TIMES DAILY PRN
Status: DISCONTINUED | OUTPATIENT
Start: 2024-08-28 | End: 2024-09-03 | Stop reason: HOSPADM

## 2024-08-28 RX ORDER — FOLIC ACID 1 MG/1
1 TABLET ORAL DAILY
Status: DISCONTINUED | OUTPATIENT
Start: 2024-08-28 | End: 2024-09-03 | Stop reason: HOSPADM

## 2024-08-28 RX ORDER — HYDROCODONE BITARTRATE AND ACETAMINOPHEN 5; 325 MG/1; MG/1
1 TABLET ORAL ONCE
Status: COMPLETED | OUTPATIENT
Start: 2024-08-28 | End: 2024-08-28

## 2024-08-28 RX ORDER — POTASSIUM CHLORIDE 750 MG/1
40 CAPSULE, EXTENDED RELEASE ORAL EVERY 4 HOURS
Status: COMPLETED | OUTPATIENT
Start: 2024-08-28 | End: 2024-08-28

## 2024-08-28 RX ORDER — POLYETHYLENE GLYCOL 3350 17 G/17G
17 POWDER, FOR SOLUTION ORAL DAILY PRN
Status: DISCONTINUED | OUTPATIENT
Start: 2024-08-28 | End: 2024-09-03 | Stop reason: HOSPADM

## 2024-08-28 RX ORDER — LACTULOSE 10 G/15ML
300 SOLUTION ORAL ONCE
Status: DISCONTINUED | OUTPATIENT
Start: 2024-08-28 | End: 2024-08-30

## 2024-08-28 RX ORDER — LEVOTHYROXINE SODIUM 50 UG/1
50 TABLET ORAL
Status: DISCONTINUED | OUTPATIENT
Start: 2024-08-28 | End: 2024-09-03 | Stop reason: HOSPADM

## 2024-08-28 RX ORDER — ONDANSETRON 2 MG/ML
4 INJECTION INTRAMUSCULAR; INTRAVENOUS EVERY 6 HOURS PRN
Status: DISCONTINUED | OUTPATIENT
Start: 2024-08-28 | End: 2024-09-03 | Stop reason: HOSPADM

## 2024-08-28 RX ORDER — LACTULOSE 20 G/30ML
20 SOLUTION ORAL 3 TIMES DAILY
Status: DISCONTINUED | OUTPATIENT
Start: 2024-08-28 | End: 2024-09-03 | Stop reason: HOSPADM

## 2024-08-28 RX ORDER — BISACODYL 5 MG/1
5 TABLET, DELAYED RELEASE ORAL DAILY PRN
Status: DISCONTINUED | OUTPATIENT
Start: 2024-08-28 | End: 2024-09-03 | Stop reason: HOSPADM

## 2024-08-28 RX ORDER — PANTOPRAZOLE SODIUM 40 MG/1
40 TABLET, DELAYED RELEASE ORAL DAILY
Status: DISCONTINUED | OUTPATIENT
Start: 2024-08-28 | End: 2024-09-03 | Stop reason: HOSPADM

## 2024-08-28 RX ORDER — BISACODYL 10 MG
10 SUPPOSITORY, RECTAL RECTAL DAILY PRN
Status: DISCONTINUED | OUTPATIENT
Start: 2024-08-28 | End: 2024-09-03 | Stop reason: HOSPADM

## 2024-08-28 RX ORDER — ALBUMIN (HUMAN) 12.5 G/50ML
12.5 SOLUTION INTRAVENOUS ONCE
Status: CANCELLED | OUTPATIENT
Start: 2024-08-28 | End: 2024-08-28

## 2024-08-28 RX ORDER — POTASSIUM CHLORIDE 750 MG/1
40 CAPSULE, EXTENDED RELEASE ORAL EVERY 4 HOURS
Status: COMPLETED | OUTPATIENT
Start: 2024-08-28 | End: 2024-08-29

## 2024-08-28 RX ORDER — SODIUM CHLORIDE 0.9 % (FLUSH) 0.9 %
10 SYRINGE (ML) INJECTION EVERY 12 HOURS SCHEDULED
Status: DISCONTINUED | OUTPATIENT
Start: 2024-08-28 | End: 2024-09-03 | Stop reason: HOSPADM

## 2024-08-28 RX ADMIN — Medication 5 MG: at 21:56

## 2024-08-28 RX ADMIN — SPIRONOLACTONE 100 MG: 50 TABLET ORAL at 09:01

## 2024-08-28 RX ADMIN — PENTOXIFYLLINE 400 MG: 400 TABLET, EXTENDED RELEASE ORAL at 12:00

## 2024-08-28 RX ADMIN — PENTOXIFYLLINE 400 MG: 400 TABLET, EXTENDED RELEASE ORAL at 09:01

## 2024-08-28 RX ADMIN — FUROSEMIDE 20 MG: 20 TABLET ORAL at 09:01

## 2024-08-28 RX ADMIN — THIAMINE HCL TAB 100 MG 100 MG: 100 TAB at 09:01

## 2024-08-28 RX ADMIN — LACTULOSE 20 G: 20 SOLUTION ORAL at 09:00

## 2024-08-28 RX ADMIN — POTASSIUM CHLORIDE 40 MEQ: 750 CAPSULE, EXTENDED RELEASE ORAL at 22:36

## 2024-08-28 RX ADMIN — ENOXAPARIN SODIUM 40 MG: 100 INJECTION SUBCUTANEOUS at 09:01

## 2024-08-28 RX ADMIN — Medication 10 ML: at 09:01

## 2024-08-28 RX ADMIN — RIFAXIMIN 550 MG: 550 TABLET ORAL at 09:01

## 2024-08-28 RX ADMIN — ENOXAPARIN SODIUM 40 MG: 100 INJECTION SUBCUTANEOUS at 20:15

## 2024-08-28 RX ADMIN — POTASSIUM CHLORIDE 40 MEQ: 750 CAPSULE, EXTENDED RELEASE ORAL at 12:00

## 2024-08-28 RX ADMIN — BUMETANIDE 1 MG/HR: 0.25 INJECTION INTRAMUSCULAR; INTRAVENOUS at 13:03

## 2024-08-28 RX ADMIN — Medication 10 ML: at 02:17

## 2024-08-28 RX ADMIN — PANTOPRAZOLE SODIUM 40 MG: 40 TABLET, DELAYED RELEASE ORAL at 09:01

## 2024-08-28 RX ADMIN — LEVOTHYROXINE SODIUM 50 MCG: 50 TABLET ORAL at 06:24

## 2024-08-28 RX ADMIN — RIFAXIMIN 550 MG: 550 TABLET ORAL at 02:15

## 2024-08-28 RX ADMIN — LACTULOSE 20 G: 20 SOLUTION ORAL at 20:15

## 2024-08-28 RX ADMIN — HYDROCODONE BITARTRATE AND ACETAMINOPHEN 1 TABLET: 5; 325 TABLET ORAL at 21:56

## 2024-08-28 RX ADMIN — RIFAXIMIN 550 MG: 550 TABLET ORAL at 20:15

## 2024-08-28 RX ADMIN — FOLIC ACID 1 MG: 1 TABLET ORAL at 09:00

## 2024-08-28 RX ADMIN — POTASSIUM CHLORIDE 40 MEQ: 750 CAPSULE, EXTENDED RELEASE ORAL at 17:44

## 2024-08-28 RX ADMIN — LACTULOSE 20 G: 20 SOLUTION ORAL at 17:44

## 2024-08-28 RX ADMIN — PENTOXIFYLLINE 400 MG: 400 TABLET, EXTENDED RELEASE ORAL at 17:45

## 2024-08-28 RX ADMIN — NICOTINE 1 PATCH: 21 PATCH, EXTENDED RELEASE TRANSDERMAL at 09:00

## 2024-08-28 NOTE — PAYOR COMM NOTE
"8/289/24 [facility] [phone]  [phone]    ER ADMIT ON 8/27/24 INPATIENT ADMISSION.          Luciano Howell (39 y.o. Male)       Date of Birth   1985    Social Security Number       Address   [address] [place], [place] [address]    Home Phone   [phone]    MRN   [id number]       Hindu   Other    Marital Status   Single                            Admission Date   8/27/24    Admission Type   Emergency    Admitting Provider   Roderick Field MD    Attending Provider   Roderick Field MD    Department, Room/Bed   [facility] 4B, 441/1       Discharge Date       Discharge Disposition       Discharge Destination                                 Attending Provider: Roderick Field MD    Allergies: No Known Allergies    Isolation: None   Infection: None   Code Status: CPR    Ht: 188 cm (74\")   Wt: 154 kg (339 lb)    Admission Cmt: None   Principal Problem: Hepatic encephalopathy [K76.82]                   Active Insurance as of 8/27/2024       Primary Coverage       Payor Plan Insurance Group Employer/Plan Group    MISC LBP MISC [id number]       Coverage Address Coverage Phone Number Coverage Fax Number Effective Dates    [address] [address] [phone]  6/10/2022 - None Entered    [facility] [id number]         Subscriber Name Subscriber Birth Date Member ID       LUCIANO HOWELL 1985 [id number]                     Emergency Contacts        (Rel.) Home Phone Work Phone Mobile Phone    MAGO HOWELL (Mother) [phone] -- [phone]            Lorenzo Quiñonez MD   Physician  Specialty: Emergency Medicine     ED Provider Notes      Signed     Date of Service: 08/27/24 1050  Creation Time: 08/27/24 1050     Signed       Expand All Collapse All       Subjective  History of Present Illness  Patient was brought here by mom patient has a history of alcohol abuse in the past he has been sober for past 24 days and over the " past 5 days apology gotten worse with weakness generalized fatigue malaise gait disturbance confusion recent history of fall was going out to smoke and fell behind the car and the garage door stayed there for 5 hours.  No other trauma at this time.  Swelling everywhere came to the ED on examination looks like a liver disease patient with anasarca.     Fatigue  Location:  Generalized weakness and fatigue  Severity:  Severe  Onset quality:  Gradual  Duration:  5 days  Timing:  Constant  Chronicity:  New  Associated symptoms: congestion, cough, fatigue and myalgias    Associated symptoms: no abdominal pain, no headaches and no rash    Weakness - Generalized  Severity:  Severe  Onset quality:  Gradual  Progression:  Worsening  Context: not alcohol use, not allergies, not change in medication, not drug use and not increased activity    Relieved by:  Nothing  Associated symptoms: cough, dizziness and myalgias    Associated symptoms: no abdominal pain and no headaches    Dizziness  Associated symptoms: no headaches          Review of Systems   Unable to perform ROS: Mental status change   Constitutional:  Positive for fatigue.   HENT:  Positive for congestion.    Respiratory:  Positive for cough.    Gastrointestinal:  Negative for abdominal pain.   Musculoskeletal:  Positive for myalgias.   Skin:  Negative for rash.   Neurological:  Positive for dizziness. Negative for headaches.         Medical History        Past Medical History:   Diagnosis Date    Alcoholism      Diabetes mellitus      Gout      Hypertension      Jaundice              Allergies   No Known Allergies        Surgical History         Past Surgical History:   Procedure Laterality Date    VASECTOMY                History reviewed. No pertinent family history.     Social History   Social History            Socioeconomic History    Marital status: Single   Tobacco Use    Smoking status: Some Days       Current packs/day: 0.25       Average packs/day: 0.5  packs/day for 14.7 years (6.9 ttl pk-yrs)       Types: Cigarettes       Start date:     Smokeless tobacco: Never   Vaping Use    Vaping status: Never Used    Passive vaping exposure: Yes   Substance and Sexual Activity    Alcohol use: Yes       Alcohol/week: 12.0 standard drinks of alcohol       Types: 12 Cans of beer per week       Comment: reports none in 10 days.    Drug use: Yes       Types: Marijuana    Sexual activity: Yes                        Objective[]Expand by Default  Physical Exam  Vitals and nursing note reviewed. Exam conducted with a chaperone present.   Constitutional:       Appearance: He is well-developed. He is morbidly obese. He is toxic-appearing. He is not ill-appearing.   HENT:      Head: Normocephalic and atraumatic.      Comments: No hemotympanum  Eyes:      General: Lids are normal. Scleral icterus present.      Conjunctiva/sclera: Conjunctivae normal.      Pupils: Pupils are equal, round, and reactive to light.   Cardiovascular:      Rate and Rhythm: Normal rate and regular rhythm. Tachycardia present.      Chest Wall: PMI is not displaced.      Pulses: Normal pulses.      Heart sounds: Normal heart sounds.      No diastolic murmur is present.   Pulmonary:      Effort: Tachypnea present.      Breath sounds: Normal breath sounds. Examination of the right-lower field reveals decreased breath sounds. Examination of the left-lower field reveals decreased breath sounds. No decreased breath sounds.   Abdominal:      General: Abdomen is protuberant. Bowel sounds are normal. There is distension.      Palpations: Abdomen is soft. There is shifting dullness.      Tenderness: There is no abdominal tenderness. There is no right CVA tenderness, left CVA tenderness, guarding or rebound.   Musculoskeletal:         General: Normal range of motion.      Cervical back: Full passive range of motion without pain, normal range of motion and neck supple.      Right lower le+ Edema present.      Left  lower le+ Edema present.   Skin:     General: Skin is warm and dry.      Capillary Refill: Capillary refill takes less than 2 seconds.      Comments: Spider nevi pulm erythema   Neurological:      General: No focal deficit present.      Mental Status: He is lethargic, disoriented and confused.      GCS: GCS eye subscore is 4. GCS verbal subscore is 5. GCS motor subscore is 6.      Cranial Nerves: Cranial nerves 2-12 are intact. No cranial nerve deficit.      Motor: Motor function is intact. No weakness.      Deep Tendon Reflexes: Reflexes are normal and symmetric.      Comments: Mild asterixis            Procedures                 ED Course      ED Course as of 24 1625   Tue Aug 27, 2024   1350 MELD score is 19 points 3 to 4% estimated 90-day mortality [TS]   1351 Child pug classification is 8 points child class B [TS]   1615 This gentleman came in with encephalopathy and confusion CT of the head is negative CT of the abdomen pelvis shows gallstones no bili ductal dilation and a sonogram is negative for cholecystitis ammonia level is 154 hepatic encephalopathy stage I and II.  Patient was given some IV fluids in the ED and thiamine.  And lactulose.  The patient is more awake at this time wants to sit up.  I have discussed this case the patient's mother will admit to medicine service with GI consultation the patient and the patient's mother have been informed that they eventually will have to follow-up with hepatology. [TS]       ED Course User Index  [TS] Lorenzo Quiñonez MD                                           Medical Decision Making  Differential Diagnosis:  I considered toxic-metabolic etiology, hypoglycemia, hyperglycemia, diabetic ketoacidosis, drug overdose, ethanol intoxication, thiamine deficiency, hypothermia, hyponatremia, hypernatremia, organ failure, liver failure, kidney failure, thyroid failure, adrenal failure, hypoxia, hypercarbia, ischemic stroke, intracranial bleed, subarachnoid  hemorrhage, closed head injury, subdural hematoma, seizure activity, syncopal episode, infectious etiology, hypertensive encephalopathy, vasculitis, thrombotic thrombocytopenic purpura and disseminated intravascular coagulation as a possible cause of altered mental status in this patient. This is a partial list of diagnoses considered.                Problems Addressed:  MAYRA (acute kidney injury): acute illness or injury  Anasarca: chronic illness or injury  Chronic anemia: chronic illness or injury  Chronic liver disease: chronic illness or injury  Hepatic encephalopathy: complicated acute illness or injury  Hepatosplenomegaly: chronic illness or injury  Lymphadenopathy: chronic illness or injury with exacerbation, progression, or side effects of treatment     Amount and/or Complexity of Data Reviewed  Labs: ordered.     Details: Labs reviewed on the patient  Radiology: ordered.     Details: Scans ultrasounds were obtained.  ECG/medicine tests: ordered.     Risk  Prescription drug management.  Decision regarding hospitalization.  Risk Details: Case were discussed at length with the patient's family and with the patient he eventually will need to see hepatologist to be placed on a transplant list.  But currently he is hepatic encephalopathy stage I and II he is awake having mild asterixis.  Was given lactulose and needs to be on p.o. medications for prevention of hepatic encephalopathy.  There is no clinical evidence of GI bleeding there is no melena or hematemesis.  Hemoglobin is baseline there is no tachycardia or hypotension he is got mild renal insufficient associate with this.  Will be admitted to medicine service for further evaluation.           Final diagnoses:   Hepatic encephalopathy   MAYRA (acute kidney injury)   Anasarca   Chronic anemia   Hepatosplenomegaly   Chronic liver disease   Lymphadenopathy   Calculus of gallbladder without cholecystitis without obstruction         ED Disposition  ED Disposition          ED Disposition   Decision to Admit    Condition   --    Comment   Level of Care: Telemetry [5]   Diagnosis: Hepatic encephalopathy [572.2.ICD-9-CM]   Admitting Physician: RODERICK FIELD [[id number]]   Attending Physician: RODERICK FIELD [[id number]]   Certification: I Certify That Inpatient Hospital Services Are Medically Necessary For Greater Than 2 Midnights                      No follow-up provider specified.         Medication List       No changes were made to your prescriptions during this visit.               Lorenzo Quiñonez MD  08/27/24 1625                   Roderick Field MD   Physician  Hospitalist     H&P      Signed     Date of Service: 08/27/24 1559  Creation Time: 08/27/24 1559     Signed       Expand All [facility] Medicine Services  HISTORY AND PHYSICAL     Date of Admission: 8/27/2024  Primary Care Physician: Jossy Christiansen APRN     Subjective   Primary Historian: mother     Chief Complaint:confusion     History of Present Illness  I am asked admit this 39-year-old alcoholic with hepatosplenomegaly.  Past medical history includes hypertension, jaundice, diabetes.  I am informed that he had stopped drinking 6 days ago.  He presented in the emergency room with:  Fatigue generalized weakness.  I am told that he is confused and has unsteady gait.  In review of diagnostic studies:  Ethanol less than 0.010  Creatinine 1.84 with BUN of 26 ammonia level is 154  proBNP 671  Salicylate acetaminophen are all within normal limits.  PT/INR is 17 and 1.33 respectively  His hemoglobin is 8.8 while hematocrit is 28 with MCV of 98, platelet count of 356 and white count of 10.87  His blood gas showed no evidence of acidosis.  CO2 is 34 while bicarb on chemistry is 21.  Ultrasound of the gallbladder showed cholelithiasis and borderline gallbladder wall thickening without biliary dilatation may relate to the small volume perihepatic  ascites associated with patient's underlying cirrhosis versus less likely calculus cholecystitis.  The liver has been described with scalloped appearance of the margins supporting underlying fibrosis/cirrhosis.     Head CT scan showed no acute findings  Chest x-ray possible volume overload/edema as per reading by radiologist       Results for orders placed during the hospital encounter of 07/04/24     Adult Transthoracic Echo Complete W/ Cont if Necessary Per Protocol     Interpretation Summary    Left ventricular ejection fraction appears to be 61 - 65%.    Left ventricular diastolic function was normal.    Estimated right ventricular systolic pressure from tricuspid regurgitation is normal (<35 mmHg).  He was hospitalized from July 4 to July 9 for alcohol withdrawal, alcoholic encephalopathy.     According to mother, her 39-year-old son has been sober for at least 24 days  He stays in her place.   He has been confused, unsteady in his gait, decreased appetite, being constipated, readily falls asleep.  A pack of cigarettes would last 5 days.  No fever or chills but states occasionally feels warm and cold (review of record indicates history of thyroid disorder on hormone replacement)  He is less jaundiced compared to previous.  No reported nausea vomiting or abdominal pain  Positive baseline tremors according to mom     Review of Systems   Patient unable to give any historical details.  He is confused.     Past Medical History:   Medical History        Past Medical History:   Diagnosis Date    Alcoholism      Diabetes mellitus      Gout      Hypertension      Jaundice           Past Surgical History:  Surgical History         Past Surgical History:   Procedure Laterality Date    VASECTOMY             Social History:  reports that he has been smoking cigarettes. He started smoking about 14 years ago. He has a 6.9 pack-year smoking history. He has never used smokeless tobacco. He reports current alcohol use of about  12.0 standard drinks of alcohol per week. He reports current drug use. Drug: Marijuana.     Family History: Mom did not indicate any medical problems.        Allergies:  Allergies   No Known Allergies        Medications:          Prior to Admission medications    Medication Sig Start Date End Date Taking? Authorizing Provider   calcium carbonate (Calcium 600) 600 MG tablet Take 1 tablet by mouth Daily. 8/6/24     Jossy Christiansen APRN   cetirizine (zyrTEC) 10 MG tablet Take 1 tablet by mouth Daily. 8/6/24     Jossy Christiansen APRN   chlordiazePOXIDE (LIBRIUM) 10 MG capsule Take 1 capsule by mouth 3 (Three) Times a Day As Needed for Anxiety. 7/16/24     Eleuterio Rangel MD   folic acid (FOLVITE) 1 MG tablet Take 1 tablet by mouth Daily. 7/15/24     Jossy Christiansen APRN   furosemide (Lasix) 20 MG tablet Take 1 tablet by mouth 2 (Two) Times a Day. 8/6/24     Jossy Christiansen APRN   lactulose 20 GM/30ML solution solution Take 30 mL by mouth 2 (Two) Times a Day. 7/9/24     Marcus Pantoja MD   levothyroxine (Synthroid) 50 MCG tablet Take 1 tablet by mouth Daily. 8/9/24     Radha Bravo APRN   pantoprazole (PROTONIX) 40 MG EC tablet Take 1 tablet by mouth Daily. 7/15/24     Jossy Christiansen APRN   pentoxifylline (TRENtal) 400 MG CR tablet Take 1 tablet by mouth 3 (Three) Times a Day With Meals. 7/15/24     Jossy Christiansen APRN   potassium chloride 10 MEQ CR tablet Take 1 tablet by mouth 2 (Two) Times a Day. 8/6/24     Jossy Christiansen APRN   QUEtiapine (SEROquel) 50 MG tablet Take 1 tablet by mouth Every Night. 7/15/24     Jossy Christiansen APRN   riFAXIMin (XIFAXAN) 550 MG tablet Take 1 tablet by mouth 2 (Two) Times a Day. 7/15/24     Jossy Christiansen APRN   spironolactone (Aldactone) 100 MG tablet Take 1 tablet by mouth Daily. 8/6/24     Jossy Christiansen APRN   thiamine (VITAMIN B1) 100 MG tablet Take 1 tablet by mouth Daily. 5/11/24     Roderick Field MD   thiamine (VITAMIN B1) 100  "MG tablet Take 1 tablet by mouth Daily. 7/15/24     Jossy Christiansen APRN      I have utilized all available immediate resources to obtain, update, or review the patient's current medications (including all prescriptions, over-the-counter products, herbals, cannabis/cannabidiol products, and vitamin/mineral/dietary (nutritional) supplements).     Objective      Vital Signs: /60 (BP Location: Right arm, Patient Position: Sitting)   Pulse 95   Temp 98.3 °F (36.8 °C) (Oral)   Resp 22   Ht 188 cm (74\")   Wt (!) 157 kg (346 lb)   SpO2 96%   BMI 44.42 kg/m²   Physical Exam   Seated at the edge of the gurney  Not in any distress  Holding a urinal  Bedside commode in place  He only smeared the bedside commode after 2 hours of drinking lactulose.  He is confused  Exam was limited because he still would like to continue on using the urinal and bedside commode.  Otherwise he looks older than his stated age  He is sclera appears pale but no gross jaundice from the door where I directly observed him  He is not in any respiratory distress  He has protuberant abdomen  He appears to be swollen on his lower extremities  Normocephalic and atraumatic head  No gross thyromegaly  Nontoxic-appearing  No gross tremors        Results Reviewed:  Lab Results (last 24 hours)         Procedure Component Value Units Date/Time     Lipase [[id number]]  (Normal) Collected: 08/27/24 1141     Specimen: Blood Updated: 08/27/24 1409       Lipase 17 U/L       Bilirubin, Direct [[id number]]  (Abnormal) Collected: 08/27/24 1141     Specimen: Blood Updated: 08/27/24 1220       Bilirubin, Direct 1.3 mg/dL       C-reactive Protein [[id number]]  (Abnormal) Collected: 08/27/24 1141     Specimen: Blood Updated: 08/27/24 1220       C-Reactive Protein 5.65 mg/dL       CK [[id number]]  (Normal) Collected: 08/27/24 1141     Specimen: Blood Updated: 08/27/24 1220       Creatine Kinase 135 U/L       Ethanol [[id number]] Collected: 08/27/24 1141     " Specimen: Blood Updated: 08/27/24 1220       Ethanol % <0.010 %       Narrative:       Not for legal purposes. Chain of Custody not followed.      Magnesium [[id number]]  (Normal) Collected: 08/27/24 1141     Specimen: Blood Updated: 08/27/24 1219       Magnesium 2.1 mg/dL       Comprehensive Metabolic Panel [[id number]]  (Abnormal) Collected: 08/27/24 1141     Specimen: Blood Updated: 08/27/24 1219       Glucose 100 mg/dL         BUN 26 mg/dL         Creatinine 1.84 mg/dL         Sodium 138 mmol/L         Potassium 3.8 mmol/L         Chloride 102 mmol/L         CO2 21.0 mmol/L         Calcium 9.1 mg/dL         Total Protein 8.0 g/dL         Albumin 3.4 g/dL         ALT (SGPT) 17 U/L         AST (SGOT) 52 U/L         Alkaline Phosphatase 147 U/L         Total Bilirubin 2.4 mg/dL         Globulin 4.6 gm/dL         A/G Ratio 0.7 g/dL         BUN/Creatinine Ratio 14.1       Anion Gap 15.0 mmol/L         eGFR 47.2 mL/min/1.73       Narrative:       GFR Normal >60  Chronic Kidney Disease <60  Kidney Failure <15        Salicylate Level [[id number]]  (Normal) Collected: 08/27/24 1141     Specimen: Blood Updated: 08/27/24 1216       Salicylate <0.3 mg/dL       Acetaminophen Level [[id number]]  (Normal) Collected: 08/27/24 1141     Specimen: Blood Updated: 08/27/24 1215       Acetaminophen <5.0 mcg/mL       Ammonia [[id number]]  (Abnormal) Collected: 08/27/24 1141     Specimen: Blood Updated: 08/27/24 1213       Ammonia 154 umol/L       BNP [[id number]]  (Abnormal) Collected: 08/27/24 1141     Specimen: Blood Updated: 08/27/24 1210       proBNP 671.7 pg/mL       Narrative:       This assay is used as an aid in the diagnosis of individuals suspected of having heart failure. It can be used as an aid in the diagnosis of acute decompensated heart failure (ADHF) in patients presenting with signs and symptoms of ADHF to the emergency department (ED). In addition, NT-proBNP of <300 pg/mL indicates ADHF is not likely.     Age Range          Result Interpretation  NT-proBNP Concentration (pg/mL:        <50             Positive            >450                         Gaming                           300-450                           Negative               <300     50-75           Positive            >900                  Gray                300-900                  Negative            <300        >75             Positive            >1800                  Gray                300-1800                  Negative            <300     Protime-INR [[id number]]  (Abnormal) Collected: 08/27/24 1141     Specimen: Blood Updated: 08/27/24 1159       Protime 17.0 Seconds         INR 1.33     CBC & Differential [[id number]]  (Abnormal) Collected: 08/27/24 1141     Specimen: Blood Updated: 08/27/24 1151     Narrative:       The following orders were created for panel order CBC & Differential.  Procedure                               Abnormality         Status                     ---------                               -----------         ------                     CBC Auto Differential[[id number]]        Abnormal            Final result                  Please view results for these tests on the individual orders.     CBC Auto Differential [[id number]]  (Abnormal) Collected: 08/27/24 1141     Specimen: Blood Updated: 08/27/24 1151       WBC 10.87 10*3/mm3         RBC 2.85 10*6/mm3         Hemoglobin 8.8 g/dL         Hematocrit 28.0 %         MCV 98.2 fL         MCH 30.9 pg         MCHC 31.4 g/dL         RDW 16.7 %         RDW-SD 60.2 fl         MPV 9.4 fL         Platelets 356 10*3/mm3         Neutrophil % 76.1 %         Lymphocyte % 9.0 %         Monocyte % 9.4 %         Eosinophil % 4.3 %         Basophil % 0.7 %         Immature Grans % 0.5 %         Neutrophils, Absolute 8.27 10*3/mm3         Lymphocytes, Absolute 0.98 10*3/mm3         Monocytes, Absolute 1.02 10*3/mm3         Eosinophils, Absolute 0.47 10*3/mm3         Basophils, Absolute 0.08 10*3/mm3         Immature  Grans, Absolute 0.05 10*3/mm3         nRBC 0.0 /100 WBC       Blood Gas, Arterial With Co-Ox [[id number]]  (Abnormal) Collected: 08/27/24 1143     Specimen: Arterial Blood Updated: 08/27/24 1143       Site Left Radial       Siva's Test Positive       pH, Arterial 7.435 pH units         pCO2, Arterial 34.2 mm Hg         Comment: 84 Value below reference range          pO2, Arterial 61.4 mm Hg         Comment: 84 Value below reference range          HCO3, Arterial 23.0 mmol/L         Base Excess, Arterial -1.0 mmol/L         Comment: 84 Value below reference range          O2 Saturation, Arterial 93.2 %         Comment: 84 Value below reference range          Hemoglobin, Blood Gas 9.2 g/dL         Comment: 84 Value below reference range          Hematocrit, Blood Gas 28.1 %         Comment: 84 Value below reference range          Oxyhemoglobin 90.9 %         Comment: 84 Value below reference range          Methemoglobin 0.50 %         Carboxyhemoglobin 2.0 %         Temperature 37.0       Sodium, Arterial 142 mmol/L         Potassium, Arterial 3.6 mmol/L         Ionized Calcium 4.74 mg/dL         Barometric Pressure for Blood Gas 756 mmHg         Modality Room Air       Ventilator Mode NA       Collected by [id number]       Comment: Meter: [id number]     :  Terell [employer] CRT          pH, Temp Corrected 7.435 pH Units         pCO2, Temperature Corrected 34.2 mm Hg         pO2, Temperature Corrected 61.4 mm Hg               Imaging Results (Last 24 Hours)         Procedure Component Value Units Date/Time     US Gallbladder [[id number]] Collected: 08/27/24 1509       Updated: 08/27/24 1521     Narrative:       US GALLBLADDER-     HISTORY: Cholelithiasis     COMPARISON: 4/25/2024     FINDINGS: Sonographic imaging of the right upper quadrant is performed.  Exam is challenging as patient unable to cooperate with breath-holding.     Limited visualization of the pancreas. Visible portions of the  pancreatic body  unremarkable. Proximal IVC is patent. Suboptimal  visualization abdominal aorta from regional shadowing bowel gas.     Slightly scalloped appearance of the margins of the liver supporting  underlying fibrosis/cirrhosis. Appropriate directional flow within the  central main portal vein. No focal liver mass identified. Trace  perihepatic ascites.     Right kidney normal measuring 12.8 cm without right-sided  hydronephrosis.     Multiple shadowing stones within the lumen of the gallbladder.  Borderline gallbladder wall thickening of 3 to 4 mm. No. Cholecystic  fluid identified with ultrasound. Common bile duct of 5 mm, upper limits  of normal.        Impression:       1. Cholelithiasis. Borderline gallbladder wall thickening without  biliary dilatation may relate to the small volume perihepatic ascites  associated with patient's underlying hepatic cirrhosis versus less  likely calculus cholecystitis.     This report was signed and finalized on 8/27/2024 3:17 PM by Dr. Kassy Blair MD.        CT Abdomen Pelvis With Contrast [[id number]] Collected: 08/27/24 1334       Updated: 08/27/24 1357     Narrative:       EXAMINATION: CT ABDOMEN PELVIS W CONTRAST-      8/27/2024 11:29 AM     HISTORY: New onset hepatic encephalopathy protuberant abdomen with  jaundice     In order to have a CT radiation dose as low as reasonably achievable  Automated Exposure Control was utilized for adjustment of the mA and/or  KV according to patient size.     Total DLP = 4632.87 mGy.cm     The CT scan of the abdomen and pelvis is performed after intravenous  contrast enhancement.     The images are acquired in axial plane with subsequent reconstruction in  coronal and sagittal planes.     Comparison is made with the previous study dated 7/4/2024.     There is interval significant increase in edema of the abdominal and  pelvic wall extending into the lower extremity bilaterally. Similar  changes are seen involving the limited visualized lower  chest wall. This  suggest increasing fluid overload/anasarca.     Limited visualized lung bases show atelectatic changes and small pleural  effusion more in the right base which has increased since the previous  study. There are atelectatic changes in the lower lungs. There are  significant motion artifacts which may obscure a small lesion/nodule.     Limited visualized cardiomediastinal structures show moderate  cardiomegaly. Trace pericardial effusion.     There is evidence of hepatosplenomegaly. The liver measures 25 cm in  craniocaudal dimension. The spleen measures 18.6 x 20 x 9.6 cm. This is  similar to the previous study.     There is moderate dilatation of the gallbladder with thickening of the  wall and surrounding fluid. There are at least 2 radiopaque faceted  stones in the proximal body/neck of the gallbladder. The common bile  duct is limitedly visualized and does not show significant dilatation.     The pancreas is normal.     The adrenal glands bilaterally are normal.     The kidneys bilaterally are normal. No discrete mass. No calculi. No  hydronephrosis. The ureters are not well visualized. The urinary bladder  is poorly distended and is significantly compressed by the pelvic fat  and bilateral pelvic lymph nodes. There is a significant change since  the previous study.     The stomach is decompressed. No focal abnormality. Duodenum and small  bowel are nondilated. No finding to suggest appendicitis. Moderate gas  and stool is seen in the proximal colon. Distal colon is significantly  decompressed.     Normal abdominal aorta. No aneurysmal dilatation.     There is evidence of abdominal and pelvic lymphadenopathy. A left common  iliac lymph node, image #64 in series 2, measures 1.8 cm in short axis.  It previously measured 1.6 cm. A right external iliac lymph node, image  #84 in series 2, measures 1.5 cm in short axis. It measures 1.4 cm in  the previous study. A lymph node in the left external iliac  group, image  #88 in series 2, measures 18 mm in short axis. It measured 9 mm in the  previous study. An enlarged right inguinal lymph node, image #95 in  series 2, measures 19 mm in short axis. No change. Another lymph node  located adjacent and medial measures 16 mm in short axis. It previously  measured 14 mm.     There is diffuse infiltration of the peritoneum/omentum throughout the  abdomen and there is a small amount of fluid in the paracolic gutter and  in the pelvis which appears moderately more progressive since the  previous study. There is retroperitoneal infiltration particularly  around the aorta and great vessels moderately more progressive since the  previous study.     Images reviewed in bone window show chronic degenerative changes of the  lumbar spine with multilevel disc degeneration similar to the previous  study. No acute bony abnormality.        Impression:       1. Persistent hepatosplenomegaly similar to the previous study. No  change.  2. Enlarged abdominal pelvic and inguinal lymph nodes. These are  moderately increased in size since the previous study.  3. Moderate dilatation of thickening of the wall of the gallbladder with  multiple gallstones. This may partly be due to adjacent edema/fluid  overload of the peritoneum/omentum. The possibility of calculus  cholecystitis is not excluded. Common bile duct is not dilated.  4. Diffuse infiltration of the peritoneum/omentum may represent  edema/fluid overload. An evolving ascites.  5. Increasing edema of the abdominal and pelvic wall and limited  visualized chest wall suggesting increasing fluid overload/anasarca.  There is significant thickening/swelling of the scrotum with bilateral  hydroceles.  6. Increasing right basal pleural effusion and atelectatic changes since  the previous study.     This report was signed and finalized on 8/27/2024 1:54 PM by Dr. Vj Hsu MD.        XR Chest 1 View [[id number]] Collected: 08/27/24 1337        Updated: 08/27/24 1338     Narrative:       EXAM: XR CHEST 1 VW-      DATE: 8/27/2024 10:01 AM     HISTORY: Fatigue       COMPARISON: 7/4/2024.     TECHNIQUE:  Frontal view(s) of the chest submitted.     FINDINGS:    Perihilar prominence and interstitial prominence are worrisome for  volume overload/edema. No large effusion or pneumothorax is seen. There  is enlargement of the cardiac silhouette which appears unchanged.           Impression:          1. Possible volume overload/edema.     This report was signed and finalized on 8/27/2024 1:35 PM by Óscar Orozco.        CT Head Without Contrast [[id number]] Collected: 08/27/24 1333       Updated: 08/27/24 1338     Narrative:       EXAM: CT HEAD WO CONTRAST-      DATE: 8/27/2024 11:29 AM     HISTORY: Altered mental status       COMPARISON: 5/5/2024.     DOSE LENGTH PRODUCT: 896.96 mGy.cm  Automated exposure control was also  utilized to decrease patient radiation dose.     TECHNIQUE: Unenhanced CT images obtained from vertex to skull base with  multiplanar reformats.     FINDINGS:  There is no acute intracranial hemorrhage, midline shift, mass effect,  or hydrocephalus. There is no CT evidence for acute infarct.        The visualized paranasal sinuses and mastoid air cells are clear. Scalp  soft tissues are unremarkable. Visualized orbits and globes are  unremarkable.        Impression:          1. No acute intracranial findings.      This report was signed and finalized on 8/27/2024 1:34 PM by Óscar Orozco.                I have personally reviewed and interpreted the radiology studies and ECG obtained at time of admission.      Assessment / Plan   Assessment:   There are no active hospital problems to display for this patient.        Medical Decision Making  Number and Complexity of problems:   Hepatic encephalopathy versus medication induced (chlordiazepoxide-recently prescribed August 18 by Dr. Rangel) versus combination of  Hyperammonemia  History of  alcoholism  Cirrhosis  Hypertension  Macrocytic anemia in patient with history of alcohol use  Cholelithiasis-gallbladder wall thickening without biliary dilatation may relate to small volume perihepatic ascites versus less likely calculus cholecystitis.  Fluid retention lower extremities  Morbid obesity with BMI 44     Treatment Plan  The patient will be admitted to my service here at [facility].   Will increase lactulose to 3 times daily to maintain bowel movement 3-4 times per day  Will continue on diuretics (Lasix spironolactone)  Continue on pentoxifylline from home medication  GI consult  Hold off on chlordiazepoxide.  Had missed appointment with Dr. Culver regarding cholelithiasis  Monitor chemistry and correct electrolytes accordingly  Medications reviewed.     Conditions and Status  Fair     [facility] Data  External documents reviewed: Reviewed epic record  Cardiac tracing (EKG, telemetry) interpretation: -  Radiology interpretation: Reviewed multiple imaging studies as outlined above  Labs reviewed: Yes  Any tests that were considered but not ordered: None     Decision rules/scores evaluated (example [id number], Wells, etc):        Discussed with: Mom and ER nursing staff     Care Planning  Shared decision making: Mom  Code status and discussions: Full code     Disposition  Social Determinants of Health that impact treatment or disposition: None identified at this time  Estimated length of stay is to be determined.      I confirmed that the patient's advanced care plan is present, code status is documented, and a surrogate decision maker is listed in the patient's medical record.      The patient's surrogate decision maker is mom.      The patient was seen and examined by me on   Electronically signed by Roderick Field MD, 08/27/24, 15:59 CDT.                       Roderick Field MD   Physician  Hospitalist     Progress Notes      Signed     Date of Service: 08/28/24  1029  Creation Time: 08/28/24 1029     Signed       Expand All Collapse All         [facility] ER Medicine Services  INPATIENT PROGRESS NOTE     Patient Name: Luciano Christiansen  Date of Admission: 8/27/2024  Today's Date: 08/28/24  Length of Stay: 1  Primary Care Physician: Jossy Christiansen APRN     Subjective   Chief Complaint: Follow-up  HPI   Had 1 bowel movement last night  Question of bowel movement earlier today.  Patient states that he has not really had significant amount.     Remains to be somewhat drowsy despite ammonia level improving  Blood gas from yesterday did not show any evidence of hypercarbia or hypoxia     Tense abdomen.     Review of Systems   No abdominal pain, nausea or vomiting  No fever or chills  No reported shortness of breath  All pertinent negatives and positives are as above. All other systems have been reviewed and are negative unless otherwise stated.      Objective    Temp:  [97.3 °F (36.3 °C)-98.4 °F (36.9 °C)] 97.3 °F (36.3 °C)  Heart Rate:  [78-96] 84  Resp:  [20-22] 20  BP: (112-139)/(52-71) 124/71  Physical Exam  Laying comfortably in bed watching television but occasionally dozed off  BMI 43.5  No distress  Short neck  Unkempt overall and looks older than his stated age  Pale conjunctiva  Diminished breath sound due to body habitus  Distant heart sounds due to body habitus  His belly is hard as a rock but his lower extremities likewise.  I could not even feel any pitting edema.  He has no gross tenderness.  No rebound  Difficult to examine for any organomegaly given significant subcutaneous edema which I confirmed with the radiologist via imaging studies.  In fact he did not have any significant ascites according to the radiologist  Warm dry skin        Results Review:  I have reviewed the labs, radiology results, and diagnostic studies.     Laboratory Data:        Results from last 7 days   Lab Units 08/27/24  1141   WBC 10*3/mm3 10.87*   HEMOGLOBIN  "g/dL 8.8*   HEMATOCRIT % 28.0*   PLATELETS 10*3/mm3 356                Results from last 7 days   Lab Units 08/28/24  0339 08/27/24  1143 08/27/24  1141   SODIUM mmol/L 139  --  138   SODIUM, ARTERIAL mmol/L  --  142  --    POTASSIUM mmol/L 3.3*  --  3.8   CHLORIDE mmol/L 103  --  102   CO2 mmol/L 22.0  --  21.0*   BUN mg/dL 23*  --  26*   CREATININE mg/dL 1.41*  --  1.84*   CALCIUM mg/dL 8.9  --  9.1   BILIRUBIN mg/dL 2.4*  --  2.4*   ALK PHOS U/L 129*  --  147*   ALT (SGPT) U/L 17  --  17   AST (SGOT) U/L 49*  --  52*   GLUCOSE mg/dL 81  --  100*         Culture Data:   No results found for: \"BLOODCX\", \"URINECX\", \"WOUNDCX\", \"MRSACX\", \"RESPCX\", \"STOOLCX\"     Radiology Data:   Imaging Results (Last 24 Hours)         Procedure Component Value Units Date/Time     US Gallbladder [[id number]] Collected: 08/27/24 1509       Updated: 08/27/24 1521     Narrative:       US GALLBLADDER-     HISTORY: Cholelithiasis     COMPARISON: 4/25/2024     FINDINGS: Sonographic imaging of the right upper quadrant is performed.  Exam is challenging as patient unable to cooperate with breath-holding.     Limited visualization of the pancreas. Visible portions of the  pancreatic body unremarkable. Proximal IVC is patent. Suboptimal  visualization abdominal aorta from regional shadowing bowel gas.     Slightly scalloped appearance of the margins of the liver supporting  underlying fibrosis/cirrhosis. Appropriate directional flow within the  central main portal vein. No focal liver mass identified. Trace  perihepatic ascites.     Right kidney normal measuring 12.8 cm without right-sided  hydronephrosis.     Multiple shadowing stones within the lumen of the gallbladder.  Borderline gallbladder wall thickening of 3 to 4 mm. No. Cholecystic  fluid identified with ultrasound. Common bile duct of 5 mm, upper limits  of normal.        Impression:       1. Cholelithiasis. Borderline gallbladder wall thickening without  biliary dilatation may relate " to the small volume perihepatic ascites  associated with patient's underlying hepatic cirrhosis versus less  likely calculus cholecystitis.     This report was signed and finalized on 8/27/2024 3:17 PM by Dr. Kassy Blair MD.                    Assessment       Active Hospital Problems     Diagnosis      **Hepatic encephalopathy                 Medical Decision Making  Number and Complexity of problems:   Hepatic encephalopathy versus medication induced (chlordiazepoxide-recently prescribed August 18 by Dr. Rangel) versus combination of  Hyperammonemia  History of alcoholism  Cirrhosis  Hypertension  Macrocytic anemia in patient with history of alcohol use  Cholelithiasis-gallbladder wall thickening without biliary dilatation may relate to small volume perihepatic ascites versus less likely calculus cholecystitis.  Fluid retention lower extremities  Morbid obesity with BMI 44     Treatment Plan          Treatment Plan  I reviewed the CAT scan/renal ultrasound with Dr. Orozco (radiologist).  Patient has no significant ascites for a safe paracentesis.  I came about thinking paracentesis because he has a very tense abdomen which his legs are very tense as well.  He came in with creatinine of 1.8 which is now improved at 1.4.  His baseline creatinine as of July 9, 2024 was 1.07 and on August 6 was at 1.5.  I think he had developed acute kidney injury.  Despite of this, because of fluid overload/anasarca, I think it is reasonable to continue on a more aggressive diuresis regimen.  He may need nephrology eventually.     Since patient is far from achieving his 3-4 times bowel movement per day and after reviewing his CT scan, I think it is reasonable to add lactulose enema x 1.  Will need to follow ammonia level and clinical progress.           ill increase lactulose to 3 times daily to maintain bowel movement 3-4 times per day     Continue on pentoxifylline from home medication  GI consult     Had missed appointment  with Dr. Culver regarding cholelithiasis  Monitor chemistry and correct electrolytes accordingly  Medications reviewed.  Changes made     GI consult pending.     Medications reviewed    Scheduled Medication   enoxaparin, 40 mg, Subcutaneous, Q12H  folic acid, 1 mg, Oral, Daily  furosemide, 20 mg, Oral, BID  lactulose, 300 mL, Rectal, Once  lactulose, 20 g, Oral, TID  levothyroxine, 50 mcg, Oral, Q AM  nicotine, 1 patch, Transdermal, Q24H  pantoprazole, 40 mg, Oral, Daily  pentoxifylline, 400 mg, Oral, TID With Meals  riFAXIMin, 550 mg, Oral, BID  sodium chloride, 10 mL, Intravenous, Q12H  spironolactone, 100 mg, Oral, Daily  thiamine, 100 mg, Oral, Daily         Conditions and Status  Fair    isposition  Social Determinants of Health that impact treatment or disposition: None identified at this time  Estimated length of stay is to be determined.      I confirmed that the patient's advanced care plan is present, code status is documented, and a surrogate decision maker is listed in the patient's medical record.      The patient's surrogate decision maker is mom.         Electronically signed by Roderick Field MD, 08/28/24, 10:30 CDT.             ia  Order: [id number]  Status: Final result       Visible to patient: No (not released)       Next appt: 08/30/2024 at 02:45 PM in Radiology ([facility])    Specimen Information: Blood   0 Result Notes            Component  Ref Range & Units 03:39  (8/28/24) 1 d ago  (8/27/24) 1 mo ago  (7/9/24) 1 mo ago  (7/8/24) 1 mo ago  (7/7/24) 1 mo ago  (7/6/24) 1 mo ago  (7/4/24)   Ammonia  16 - 60 umol/L 117 High  154 High                        /Time Temp Pulse Resp BP Patient Position Device (Oxygen Therapy) SpO2   08/28/24 1100 97.7 (36.5) 82 22 146/61 Lying room air 99   08/28/24 0900 -- -- -- -- -- room air --   08/28/24 0822 97.3 (36.3) 84 20 124/71 Sitting room air 97   08/28/24 0317 97.5 (36.4) 78 20 127/65 Lying room air 93   08/27/24 2348 98.1 (36.7) 82  20 112/52 Lying room air 97   08/27/24 2100 -- -- -- -- -- room air --   08/27/24 1925 98.4 (36.9) 96 20 121/63 Lying room air 97   08/27                    Current Facility-Administered Medications   Medication Dose Route Frequency Provider Last Rate Last Admin    sennosides-docusate (PERICOLACE) 8.6-50 MG per tablet 2 tablet  2 tablet Oral BID PRN Roderick Field MD        And    polyethylene glycol (MIRALAX) packet 17 g  17 g Oral Daily PRN Roderick Field MD        And    bisacodyl (DULCOLAX) EC tablet 5 mg  5 mg Oral Daily PRN Roderick Field MD        And    bisacodyl (DULCOLAX) suppository 10 mg  10 mg Rectal Daily PRN Roderick Field MD        bumetanide (BUMEX) 25 mg/100mL (0.25 mg/mL) infusion  1 mg/hr Intravenous Continuous Roderick Field MD 4 mL/hr at 08/28/24 1303 1 mg/hr at 08/28/24 1303    calcium carbonate (TUMS) chewable tablet 500 mg (200 mg elemental)  1 tablet Oral BID PRN Roderick Field MD        Calcium Replacement - Follow Nurse / BPA Driven Protocol   Does not apply Roderick Ortiz MD        Enoxaparin Sodium (LOVENOX) syringe 40 mg  40 mg Subcutaneous Q12H Roderick Field MD   40 mg at 08/28/24 0901    folic acid (FOLVITE) tablet 1 mg  1 mg Oral Daily Roderick Field MD   1 mg at 08/28/24 0900    lactulose (CHRONULAC) solution for enema 300 mL  300 mL Rectal Once Roderick Field MD        lactulose solution 20 g  20 g Oral TID Roderick Field MD   20 g at 08/28/24 0900    levothyroxine (SYNTHROID, LEVOTHROID) tablet 50 mcg  50 mcg Oral Q AM Roderick Field MD   50 mcg at 08/28/24 0624    Magnesium Standard Dose Replacement - Follow Nurse / BPA Driven Protocol   Does not apply PRN Roderick Field MD        nicotine (NICODERM CQ) 21 MG/24HR patch 1 patch  1 patch Transdermal Q24H Roderick Field MD   1 patch at 08/28/24 0900    nicotine polacrilex  (NICORETTE) gum 2 mg  2 mg Mouth/Throat Q1H PRN Roderick Field MD        ondansetron (ZOFRAN) injection 4 mg  4 mg Intravenous Q6H PRN Roderick Field MD        pantoprazole (PROTONIX) EC tablet 40 mg  40 mg Oral Daily Roderick Field MD   40 mg at 08/28/24 0901    pentoxifylline (TRENtal) CR tablet 400 mg  400 mg Oral TID With Meals Roderick Field MD   400 mg at 08/28/24 1200    Pharmacy to Dose enoxaparin (LOVENOX)   Does not apply Continuous PRN Roderick Field MD        Phosphorus Replacement - Follow Nurse / BPA Driven Protocol   Does not apply PRRoderick Quintana MD        potassium chloride (MICRO-K/KLOR-CON) CR capsule  40 mEq Oral Q4H Roderick Field MD   40 mEq at 08/28/24 1200    Potassium Replacement - Follow Nurse / BPA Driven Protocol   Does not apply PRN Roderick Field MD        riFAXIMin (XIFAXAN) tablet 550 mg  550 mg Oral BID Roderick Field MD   550 mg at 08/28/24 0901    sodium chloride 0.9 % flush 10 mL  10 mL Intravenous PRN Roderick Field MD        sodium chloride 0.9 % flush 10 mL  10 mL Intravenous Q12H Roderick Field MD   10 mL at 08/28/24 0901    sodium chloride 0.9 % flush 10 mL  10 mL Intravenous PRN Roderick Field MD        sodium chloride 0.9 % infusion 40 mL  40 mL Intravenous PRN Roderick Field MD        spironolactone (ALDACTONE) tablet 100 mg  100 mg Oral Daily Roderick Field MD   100 mg at 08/28/24 0901    thiamine (VITAMIN B-1) tablet 100 mg  100 mg Oral Daily Roderick Field MD   100 mg at 08/28/24 0901

## 2024-08-28 NOTE — CONSULTS
[facility] Gastroenterology  Inpatient Consult Note  Today's date:  08/28/24    Luciano Christiansen  1985       Referring Provider: No ref. provider found  Primary Physician: Jossy Christiansen APRN     Date of Admission: 8/27/2024  Date of Service:  08/28/24    Reason for Consultation/Chief Complaint:   Hepatic encephalopathy    History of present illness:    Luciano is a 40 y/o male that presented to the hospital with altered mental status and generalized weakness. He says that he had a fall at home. History of alcohol abuse, suspected cirrhosis and alcoholic hepatitis. He says that his last drink was about 3 weeks ago. He has been hospitalized here for alcoholic hepatitis in the past. He has been on Trental for this. On this admission, alcohol level was negative. Labs did reveal elevated liver enzymes with elevated AST 52 and bilirubin 2.4, acute kidney injury with elevated Cr 1.84, anemia with hgb 8.8, and elevated ammonia level 154.     Past Medical History:   Diagnosis Date    Alcoholism     Diabetes mellitus     Gout     Hypertension     Jaundice          Past Surgical History:   Procedure Laterality Date    VASECTOMY            No Known Allergies      Social History     Tobacco Use    Smoking status: Some Days     Current packs/day: 0.25     Average packs/day: 0.5 packs/day for 14.7 years (6.9 ttl pk-yrs)     Types: Cigarettes     Start date: 2010    Smokeless tobacco: Never   Substance Use Topics    Alcohol use: Yes     Alcohol/week: 12.0 standard drinks of alcohol     Types: 12 Cans of beer per week     Comment: reports none in 10 days.          History reviewed. No pertinent family history.      Medications Prior to Admission   Medication Sig Dispense Refill Last Dose    calcium carbonate (Calcium 600) 600 MG tablet Take 1 tablet by mouth Daily. 30 tablet 0     cetirizine (zyrTEC) 10 MG tablet Take 1 tablet by mouth Daily. 90 tablet 1     chlordiazePOXIDE (LIBRIUM) 10 MG capsule Take 1 capsule by  mouth 3 (Three) Times a Day As Needed for Anxiety. 90 capsule 2     folic acid (FOLVITE) 1 MG tablet Take 1 tablet by mouth Daily. 90 tablet 1     furosemide (Lasix) 20 MG tablet Take 1 tablet by mouth 2 (Two) Times a Day. 14 tablet 0     lactulose 20 GM/30ML solution solution Take 30 mL by mouth 2 (Two) Times a Day. 450 mL 0     levothyroxine (Synthroid) 50 MCG tablet Take 1 tablet by mouth Daily. 90 tablet 0     pantoprazole (PROTONIX) 40 MG EC tablet Take 1 tablet by mouth Daily. 90 tablet 1     pentoxifylline (TRENtal) 400 MG CR tablet Take 1 tablet by mouth 3 (Three) Times a Day With Meals. 90 tablet 2     potassium chloride 10 MEQ CR tablet Take 1 tablet by mouth 2 (Two) Times a Day. 14 tablet 0     QUEtiapine (SEROquel) 50 MG tablet Take 1 tablet by mouth Every Night. 30 tablet 2     riFAXIMin (XIFAXAN) 550 MG tablet Take 1 tablet by mouth 2 (Two) Times a Day. 60 tablet 2     spironolactone (Aldactone) 100 MG tablet Take 1 tablet by mouth Daily. 7 tablet 0     thiamine (VITAMIN B1) 100 MG tablet Take 1 tablet by mouth Daily. 30 tablet 0     thiamine (VITAMIN B1) 100 MG tablet Take 1 tablet by mouth Daily. 90 tablet 1              Review of Systems:  Constitutional: No unexpected weight change, no fatigue, no unexplained fever, no sweats or chills.   HEENT: No icteric sclera.  No hearing or visual deficits.  No sore throat.  No chronic nasal discharge.  Pulmonary: No chronic cough.  No hemoptysis.  No shortness of breath.  Cardiovascular: No chest pain.  No palpitations.  No shortness of breath.  Gastrointestinal: As above.  Musculoskeletal/extremities: No peripheral edema.  No cyanosis.  No claudications.  No back pain.  Genitourinary: No dysuria.  No blood in stool.  No urethral discharges.  Neurologic: No seizures.  No headaches.  No dizziness.  No gait problems.  Skin: No rash.  No icterus.  Mental: No psychosis.  No confusions.  No hallucinations.      Physical Exam:  Temp:  [97.3 °F (36.3 °C)-98.4 °F  (36.9 °C)] 97.3 °F (36.3 °C)  Heart Rate:  [78-96] 84  Resp:  [20-22] 20  BP: (112-139)/(52-71) 124/71    General:   HEENT: Nonicteric sclerae.  Moist oral mucosa.  PERRLA.  EOMI.  Clear pharynx.  Lungs: Clear to auscultation bilaterally.  No wheezing, rales or rhonchi.  Heart: Regular rate and rhythm.  Normal S1 and S2, no S3, S4 or murmur.  Abdomen: Distended tense abdomen, nontender to palpation, with normoactive bowel sounds, no hepatosplenomegaly, no palpable masses.  Extremities: Edema lower extremities  Skin: No rash or jaundice.      Results Review:  Lab Results (last 24 hours)       Procedure Component Value Units Date/Time    Ammonia [[id number]]  (Abnormal) Collected: 08/28/24 0339    Specimen: Blood Updated: 08/28/24 0414     Ammonia 117 umol/L     Comprehensive Metabolic Panel [[id number]]  (Abnormal) Collected: 08/28/24 0339    Specimen: Blood Updated: 08/28/24 0410     Glucose 81 mg/dL      BUN 23 mg/dL      Creatinine 1.41 mg/dL      Sodium 139 mmol/L      Potassium 3.3 mmol/L      Chloride 103 mmol/L      CO2 22.0 mmol/L      Calcium 8.9 mg/dL      Total Protein 7.3 g/dL      Albumin 2.9 g/dL      ALT (SGPT) 17 U/L      AST (SGOT) 49 U/L      Alkaline Phosphatase 129 U/L      Total Bilirubin 2.4 mg/dL      Globulin 4.4 gm/dL      A/G Ratio 0.7 g/dL      BUN/Creatinine Ratio 16.3     Anion Gap 14.0 mmol/L      eGFR 65.0 mL/min/1.73     Narrative:      GFR Normal >60  Chronic Kidney Disease <60  Kidney Failure <15      Lipase [[id number]]  (Normal) Collected: 08/27/24 1141    Specimen: Blood Updated: 08/27/24 1409     Lipase 17 U/L     Bilirubin, Direct [[id number]]  (Abnormal) Collected: 08/27/24 1141    Specimen: Blood Updated: 08/27/24 1220     Bilirubin, Direct 1.3 mg/dL     C-reactive Protein [[id number]]  (Abnormal) Collected: 08/27/24 1141    Specimen: Blood Updated: 08/27/24 1220     C-Reactive Protein 5.65 mg/dL     CK [[id number]]  (Normal) Collected: 08/27/24 1141    Specimen: Blood Updated:  08/27/24 1220     Creatine Kinase 135 U/L     Ethanol [[id number]] Collected: 08/27/24 1141    Specimen: Blood Updated: 08/27/24 1220     Ethanol % <0.010 %     Narrative:      Not for legal purposes. Chain of Custody not followed.     Magnesium [[id number]]  (Normal) Collected: 08/27/24 1141    Specimen: Blood Updated: 08/27/24 1219     Magnesium 2.1 mg/dL     Comprehensive Metabolic Panel [[id number]]  (Abnormal) Collected: 08/27/24 1141    Specimen: Blood Updated: 08/27/24 1219     Glucose 100 mg/dL      BUN 26 mg/dL      Creatinine 1.84 mg/dL      Sodium 138 mmol/L      Potassium 3.8 mmol/L      Chloride 102 mmol/L      CO2 21.0 mmol/L      Calcium 9.1 mg/dL      Total Protein 8.0 g/dL      Albumin 3.4 g/dL      ALT (SGPT) 17 U/L      AST (SGOT) 52 U/L      Alkaline Phosphatase 147 U/L      Total Bilirubin 2.4 mg/dL      Globulin 4.6 gm/dL      A/G Ratio 0.7 g/dL      BUN/Creatinine Ratio 14.1     Anion Gap 15.0 mmol/L      eGFR 47.2 mL/min/1.73     Narrative:      GFR Normal >60  Chronic Kidney Disease <60  Kidney Failure <15      Salicylate Level [[id number]]  (Normal) Collected: 08/27/24 1141    Specimen: Blood Updated: 08/27/24 1216     Salicylate <0.3 mg/dL     Acetaminophen Level [[id number]]  (Normal) Collected: 08/27/24 1141    Specimen: Blood Updated: 08/27/24 1215     Acetaminophen <5.0 mcg/mL     Ammonia [[id number]]  (Abnormal) Collected: 08/27/24 1141    Specimen: Blood Updated: 08/27/24 1213     Ammonia 154 umol/L     BNP [[id number]]  (Abnormal) Collected: 08/27/24 1141    Specimen: Blood Updated: 08/27/24 1210     proBNP 671.7 pg/mL     Narrative:      This assay is used as an aid in the diagnosis of individuals suspected of having heart failure. It can be used as an aid in the diagnosis of acute decompensated heart failure (ADHF) in patients presenting with signs and symptoms of ADHF to the emergency department (ED). In addition, NT-proBNP of <300 pg/mL indicates ADHF is not likely.    Age Range Result  Interpretation  NT-proBNP Concentration (pg/mL:      <50             Positive            >450                   Gray                 300-450                    Negative             <300    50-75           Positive            >900                  Gray                300-900                  Negative            <300      >75             Positive            >1800                  Gray                300-1800                  Negative            <300    Protime-INR [[id number]]  (Abnormal) Collected: 08/27/24 1141    Specimen: Blood Updated: 08/27/24 1159     Protime 17.0 Seconds      INR 1.33    CBC & Differential [[id number]]  (Abnormal) Collected: 08/27/24 1141    Specimen: Blood Updated: 08/27/24 1151    Narrative:      The following orders were created for panel order CBC & Differential.  Procedure                               Abnormality         Status                     ---------                               -----------         ------                     CBC Auto Differential[[id number]]        Abnormal            Final result                 Please view results for these tests on the individual orders.    CBC Auto Differential [[id number]]  (Abnormal) Collected: 08/27/24 1141    Specimen: Blood Updated: 08/27/24 1151     WBC 10.87 10*3/mm3      RBC 2.85 10*6/mm3      Hemoglobin 8.8 g/dL      Hematocrit 28.0 %      MCV 98.2 fL      MCH 30.9 pg      MCHC 31.4 g/dL      RDW 16.7 %      RDW-SD 60.2 fl      MPV 9.4 fL      Platelets 356 10*3/mm3      Neutrophil % 76.1 %      Lymphocyte % 9.0 %      Monocyte % 9.4 %      Eosinophil % 4.3 %      Basophil % 0.7 %      Immature Grans % 0.5 %      Neutrophils, Absolute 8.27 10*3/mm3      Lymphocytes, Absolute 0.98 10*3/mm3      Monocytes, Absolute 1.02 10*3/mm3      Eosinophils, Absolute 0.47 10*3/mm3      Basophils, Absolute 0.08 10*3/mm3      Immature Grans, Absolute 0.05 10*3/mm3      nRBC 0.0 /100 WBC     Blood Gas, Arterial With Co-Ox [[id number]]  (Abnormal)  Collected: 08/27/24 1143    Specimen: Arterial Blood Updated: 08/27/24 1143     Site Left Radial     Siva's Test Positive     pH, Arterial 7.435 pH units      pCO2, Arterial 34.2 mm Hg      Comment: 84 Value below reference range        pO2, Arterial 61.4 mm Hg      Comment: 84 Value below reference range        HCO3, Arterial 23.0 mmol/L      Base Excess, Arterial -1.0 mmol/L      Comment: 84 Value below reference range        O2 Saturation, Arterial 93.2 %      Comment: 84 Value below reference range        Hemoglobin, Blood Gas 9.2 g/dL      Comment: 84 Value below reference range        Hematocrit, Blood Gas 28.1 %      Comment: 84 Value below reference range        Oxyhemoglobin 90.9 %      Comment: 84 Value below reference range        Methemoglobin 0.50 %      Carboxyhemoglobin 2.0 %      Temperature 37.0     Sodium, Arterial 142 mmol/L      Potassium, Arterial 3.6 mmol/L      Ionized Calcium 4.74 mg/dL      Barometric Pressure for Blood Gas 756 mmHg      Modality Room Air     Ventilator Mode NA     Collected by [id number]     Comment: Meter: [id number]     :  Terell Hercules CRT        pH, Temp Corrected 7.435 pH Units      pCO2, Temperature Corrected 34.2 mm Hg      pO2, Temperature Corrected 61.4 mm Hg               Radiology Review:  Imaging Results (Last 72 Hours)       Procedure Component Value Units Date/Time    US Gallbladder [[id number]] Collected: 08/27/24 1509     Updated: 08/27/24 1521    Narrative:      US GALLBLADDER-     HISTORY: Cholelithiasis     COMPARISON: 4/25/2024     FINDINGS: Sonographic imaging of the right upper quadrant is performed.  Exam is challenging as patient unable to cooperate with breath-holding.     Limited visualization of the pancreas. Visible portions of the  pancreatic body unremarkable. Proximal IVC is patent. Suboptimal  visualization abdominal aorta from regional shadowing bowel gas.     Slightly scalloped appearance of the margins of the liver  supporting  underlying fibrosis/cirrhosis. Appropriate directional flow within the  central main portal vein. No focal liver mass identified. Trace  perihepatic ascites.     Right kidney normal measuring 12.8 cm without right-sided  hydronephrosis.     Multiple shadowing stones within the lumen of the gallbladder.  Borderline gallbladder wall thickening of 3 to 4 mm. No. Cholecystic  fluid identified with ultrasound. Common bile duct of 5 mm, upper limits  of normal.       Impression:      1. Cholelithiasis. Borderline gallbladder wall thickening without  biliary dilatation may relate to the small volume perihepatic ascites  associated with patient's underlying hepatic cirrhosis versus less  likely calculus cholecystitis.     This report was signed and finalized on 8/27/2024 3:17 PM by Dr. Kassy Blair MD.       CT Abdomen Pelvis With Contrast [[id number]] Collected: 08/27/24 1334     Updated: 08/27/24 1357    Narrative:      EXAMINATION: CT ABDOMEN PELVIS W CONTRAST-      8/27/2024 11:29 AM     HISTORY: New onset hepatic encephalopathy protuberant abdomen with  jaundice     In order to have a CT radiation dose as low as reasonably achievable  Automated Exposure Control was utilized for adjustment of the mA and/or  KV according to patient size.     Total DLP = 4632.87 mGy.cm     The CT scan of the abdomen and pelvis is performed after intravenous  contrast enhancement.     The images are acquired in axial plane with subsequent reconstruction in  coronal and sagittal planes.     Comparison is made with the previous study dated 7/4/2024.     There is interval significant increase in edema of the abdominal and  pelvic wall extending into the lower extremity bilaterally. Similar  changes are seen involving the limited visualized lower chest wall. This  suggest increasing fluid overload/anasarca.     Limited visualized lung bases show atelectatic changes and small pleural  effusion more in the right base which has  increased since the previous  study. There are atelectatic changes in the lower lungs. There are  significant motion artifacts which may obscure a small lesion/nodule.     Limited visualized cardiomediastinal structures show moderate  cardiomegaly. Trace pericardial effusion.     There is evidence of hepatosplenomegaly. The liver measures 25 cm in  craniocaudal dimension. The spleen measures 18.6 x 20 x 9.6 cm. This is  similar to the previous study.     There is moderate dilatation of the gallbladder with thickening of the  wall and surrounding fluid. There are at least 2 radiopaque faceted  stones in the proximal body/neck of the gallbladder. The common bile  duct is limitedly visualized and does not show significant dilatation.     The pancreas is normal.     The adrenal glands bilaterally are normal.     The kidneys bilaterally are normal. No discrete mass. No calculi. No  hydronephrosis. The ureters are not well visualized. The urinary bladder  is poorly distended and is significantly compressed by the pelvic fat  and bilateral pelvic lymph nodes. There is a significant change since  the previous study.     The stomach is decompressed. No focal abnormality. Duodenum and small  bowel are nondilated. No finding to suggest appendicitis. Moderate gas  and stool is seen in the proximal colon. Distal colon is significantly  decompressed.     Normal abdominal aorta. No aneurysmal dilatation.     There is evidence of abdominal and pelvic lymphadenopathy. A left common  iliac lymph node, image #64 in series 2, measures 1.8 cm in short axis.  It previously measured 1.6 cm. A right external iliac lymph node, image  #84 in series 2, measures 1.5 cm in short axis. It measures 1.4 cm in  the previous study. A lymph node in the left external iliac group, image  #88 in series 2, measures 18 mm in short axis. It measured 9 mm in the  previous study. An enlarged right inguinal lymph node, image #95 in  series 2, measures 19 mm  in short axis. No change. Another lymph node  located adjacent and medial measures 16 mm in short axis. It previously  measured 14 mm.     There is diffuse infiltration of the peritoneum/omentum throughout the  abdomen and there is a small amount of fluid in the paracolic gutter and  in the pelvis which appears moderately more progressive since the  previous study. There is retroperitoneal infiltration particularly  around the aorta and great vessels moderately more progressive since the  previous study.     Images reviewed in bone window show chronic degenerative changes of the  lumbar spine with multilevel disc degeneration similar to the previous  study. No acute bony abnormality.       Impression:      1. Persistent hepatosplenomegaly similar to the previous study. No  change.  2. Enlarged abdominal pelvic and inguinal lymph nodes. These are  moderately increased in size since the previous study.  3. Moderate dilatation of thickening of the wall of the gallbladder with  multiple gallstones. This may partly be due to adjacent edema/fluid  overload of the peritoneum/omentum. The possibility of calculus  cholecystitis is not excluded. Common bile duct is not dilated.  4. Diffuse infiltration of the peritoneum/omentum may represent  edema/fluid overload. An evolving ascites.  5. Increasing edema of the abdominal and pelvic wall and limited  visualized chest wall suggesting increasing fluid overload/anasarca.  There is significant thickening/swelling of the scrotum with bilateral  hydroceles.  6. Increasing right basal pleural effusion and atelectatic changes since  the previous study.     This report was signed and finalized on 8/27/2024 1:54 PM by Dr. Vj Hsu MD.       XR Chest 1 View [[id number]] Collected: 08/27/24 1334     Updated: 08/27/24 1338    Narrative:      EXAM: XR CHEST 1 VW-      DATE: 8/27/2024 10:01 AM     HISTORY: Fatigue       COMPARISON: 7/4/2024.     TECHNIQUE:  Frontal view(s) of the  chest submitted.     FINDINGS:    Perihilar prominence and interstitial prominence are worrisome for  volume overload/edema. No large effusion or pneumothorax is seen. There  is enlargement of the cardiac silhouette which appears unchanged.          Impression:         1. Possible volume overload/edema.     This report was signed and finalized on 8/27/2024 1:35 PM by Óscar Orozco.       CT Head Without Contrast [[id number]] Collected: 08/27/24 1333     Updated: 08/27/24 1338    Narrative:      EXAM: CT HEAD WO CONTRAST-      DATE: 8/27/2024 11:29 AM     HISTORY: Altered mental status       COMPARISON: 5/5/2024.     DOSE LENGTH PRODUCT: 896.96 mGy.cm  Automated exposure control was also  utilized to decrease patient radiation dose.     TECHNIQUE: Unenhanced CT images obtained from vertex to skull base with  multiplanar reformats.     FINDINGS:  There is no acute intracranial hemorrhage, midline shift, mass effect,  or hydrocephalus. There is no CT evidence for acute infarct.        The visualized paranasal sinuses and mastoid air cells are clear. Scalp  soft tissues are unremarkable. Visualized orbits and globes are  unremarkable.       Impression:         1. No acute intracranial findings.      This report was signed and finalized on 8/27/2024 1:34 PM by Óscar Orozco.               Impression:  Cirrhosis with history of heavy alcohol abuse and alcoholic hepatitis.  Hepatic encephalopathy.  Ascites and generalized anasarca.  Chronic anemia with no signs of overt GI bleeding.    Plan:  Continue Lactulose 20 g TID and Xifaxan 550 mg BID. Continue Trental 400 mg TID. Apparently IR did not feel there was enough ascites for paracentesis. Continue with alcohol cessation. Overall, his liver enzymes are significantly improved from prior admission.      Beltran Matson MD  08/28/24   10:00 CDT

## 2024-08-28 NOTE — CONSULTS
"Pharmacy Dosing Service  Anticoagulant  Enoxaparin    Assessment/Action/Plan:  Pharmacy to Dose Lovenox request for VTE prophylaxis. Initiated Lovenox 40 mg SQ every 12 hours for patient with a BMI ? 40 kg/m2/< 50 kg/m2. Pharmacy will continue to monitor and adjust dose accordingly.       Subjective:  Luciano Christiansen is a 39 y.o. male on Enoxaparin 40 mg SQ every 12 hours for indication of VTE prophylaxis.  Objective:  [Ht: 188 cm (74\"); Wt: (!) 154 kg (339 lb); BMI: Body mass index is 43.53 kg/m².]  Estimated Creatinine Clearance: 84.6 mL/min (A) (by C-G formula based on SCr of 1.84 mg/dL (H)). No results found for: \"DDIMER\"   Lab Results   Component Value Date    INR 1.33 (H) 08/27/2024    PROTIME 17.0 (H) 08/27/2024      Lab Results   Component Value Date    HGB 8.8 (L) 08/27/2024      Lab Results   Component Value Date     08/27/2024       Rg Troy, PharmD  08/28/24 00:36 CDT     "

## 2024-08-28 NOTE — PLAN OF CARE
"Goal Outcome Evaluation:  Plan of Care Reviewed With: patient           Outcome Evaluation: PT eval complete. He was found lethargic in bed, needing cues to awaken. once awake he was alert and oriented, some slow to respond to questions. He rpeorts living \"mostly\" home alone where he was independent in his mobility. He reports a recent fall at home as he was going outside to smoke. He needs min assist for supine <> sit bed mobiltiy. We required 2 attempts to stand, was able to stand on 2nd attempt with min/mod assist. Was able to ambulate in room with min assist x 1. He is weak with B LE and abdominal swelling. PT will cont with mobility, balance, and strengthening. He plans to d.c back home, consider further therapy if needed at time of discharge.      Anticipated Discharge Disposition (PT): home with assist, home with home health, sub acute care setting, home with outpatient therapy services                        "

## 2024-08-28 NOTE — PLAN OF CARE
Goal Outcome Evaluation:              Outcome Evaluation: Nutrition assessment complete. Pt denied food allergies. Very lethargic during visit, speech garbled. Will continue regular diet. A1c 4/22/24 was 10.4%, A1c now is 4.3%. Will continue to follow per protocol.

## 2024-08-28 NOTE — NURSING NOTE
[facility]  INPATIENT WOUND & OSTOMY CARE    Today's Date: 08/28/24    Patient Name: Luciano Christiansen  MRN: [id number]  CSN: [id number]  PCP: Jossy Christiansen APRN  Attending Provider: Roderick Field*  Length of Stay: 1    Pressure injury prevention measure ordered per protocol due to patient being at risk for skin breakdown.    Apply silicone foam border dressing per protocol to sacral spine/bilateral heels for protection.  Nursing to change dressing every 3 days and PRN if soiled. Nursing is to peel back dressing with every assessment to assess skin underneath dressing. No barrier cream under dressing.     Please reach out to wound care nurse if any skin issues arise.     This document has been electronically signed by Carolynn Weiss RN on 8/28/2024 07:31 CDT

## 2024-08-28 NOTE — CASE MANAGEMENT/SOCIAL WORK
Continued Stay Note   [facility]     Patient Name: Luciano Christiansen  MRN: [id number]  Today's Date: 8/28/2024    Admit Date: 8/27/2024    Plan: Home   Discharge Plan       Row Name 08/28/24 1506       Plan    Plan Home    Patient/Family in Agreement with Plan yes    Plan Comments LUPIS spoke to pt's mother Marisa at  [phone].  She states pt will dc to her house; she will transport home.  She is requesting a 3 prong cane and bariatric bsc.  LUPIS will follow for MD orders to arrange.                   Discharge Codes    No documentation.                       CHAPITO WallaceW

## 2024-08-28 NOTE — THERAPY EVALUATION
Patient Name: Luciano Christiansen  : 1985    MRN: [id number]                              Today's Date: 2024       Admit Date: 2024    Visit Dx:     ICD-10-CM ICD-9-CM   1. Hepatic encephalopathy  K76.82 572.2   2. MAYRA (acute kidney injury)  N17.9 584.9   3. Anasarca  R60.1 782.3   4. Chronic anemia  D64.9 285.9   5. Hepatosplenomegaly  R16.2 571.8   6. Chronic liver disease  K76.9 571.9   7. Lymphadenopathy  R59.1 785.6   8. Calculus of gallbladder without cholecystitis without obstruction  K80.20 574.20   9. Impaired mobility [Z74.09]  Z74.09 799.89     Patient Active Problem List   Diagnosis    Wellness examination    Encounter for hepatitis C screening test for low risk patient    Primary hypertension    Class 1 obesity due to excess calories without serious comorbidity with body mass index (BMI) of 34.0 to 34.9 in adult    Fatty liver    Hyponatremia    Alkalosis    Alcoholism    Transaminitis    Hypophosphatemia    Insomnia disorder related to known organic factor    Community acquired bilateral lower lobe pneumonia    Pneumonia due to Streptococcus    GI bleed    Diabetic ketoacidosis associated with type 2 diabetes mellitus    Alcohol intoxication in active alcoholic    Calculus of gallbladder without cholecystitis without obstruction    Alcoholic encephalopathy    Type 2 diabetes mellitus    Alcohol withdrawal    Alcoholic steatohepatitis    BMI 40.0-44.9, adult    Hepatic encephalopathy     Past Medical History:   Diagnosis Date    Alcoholism     Diabetes mellitus     Gout     Hypertension     Jaundice      Past Surgical History:   Procedure Laterality Date    VASECTOMY        General Information       Row Name 24 1428          Physical Therapy Time and Intention    Document Type evaluation  /Confusion, fatigue, weakness. Dx: Hepatic encephalopathy, cirrhosis, ascites, hyperammonemia, Hx alcoholism.  -COLIN     Mode of Treatment physical therapy  -COLIN       Row Name 24 1420           General Information    Patient Profile Reviewed yes  -COLIN     Prior Level of Function independent:;all household mobility;ADL's  -COLIN     Existing Precautions/Restrictions fall  -COLIN     Barriers to Rehab medically complex  -COLIN       [facility] Name 08/28/24 1428          Living Environment    People in Home alone  -COLIN       [facility] Name 08/28/24 1428          Home Main Entrance    Number of Stairs, Main Entrance none  -COLIN       Row Name 08/28/24 1428          Stairs Within Home, Primary    Number of Stairs, Within Home, Primary none  -COLIN       Row Name 08/28/24 1428          Cognition    Orientation Status (Cognition) oriented x 4  slow to repsond  -COLIN       [facility] Name 08/28/24 1428          Safety Issues, Functional Mobility    Impairments Affecting Function (Mobility) balance;endurance/activity tolerance;strength  -COLIN               User Key  (r) = Recorded By, (t) = Taken By, (c) = Cosigned By      Initials Name Provider Type    Shiva Mccullough, PT DPT Physical Therapist                   Mobility       Row Name 08/28/24 1428          Bed Mobility    Bed Mobility supine-sit;sit-supine  -COLIN     Supine-Sit [place] (Bed Mobility) minimum assist (75% patient effort)  -COLIN     Sit-Supine [place] (Bed Mobility) minimum assist (75% patient effort)  -COLIN     Assistive Device (Bed Mobility) head of bed elevated;bed rails  -COLIN       Row Name 08/28/24 1428          Sit-Stand Transfer    Sit-Stand [place] (Transfers) minimum assist (75% patient effort);moderate assist (50% patient effort)  -COLIN     Comment, (Sit-Stand Transfer) 2 attempts to stand  -COLIN       Row Name 08/28/24 1428          Gait/Stairs (Locomotion)    [place] Level (Gait) minimum assist (75% patient effort)  -COLIN     Distance in Feet (Gait) 20  -COLIN     Deviations/Abnormal Patterns (Gait) pauly decreased;gait speed decreased  -COLIN               User Key  (r) = Recorded By, (t) = Taken By, (c) = Cosigned By      Initials Name Provider Type    COLIN [facility]od  Shiva LANG, PT DPT Physical Therapist                   Obj/Interventions       Row Name 08/28/24 1428          Range of Motion Comprehensive    Comment, General Range of Motion B LE AROM impaired 25% (swelling, weakness)  -COLIN       Row Name 08/28/24 1428          Strength Comprehensive (MMT)    Comment, General Manual Muscle Testing (MMT) Assessment B LE functionally 4-/5  -COLIN       Row Name 08/28/24 1428          Balance    Balance Assessment sitting dynamic balance;standing dynamic balance  -COLIN     Dynamic Sitting Balance standby assist  -COLIN     Position, Sitting Balance unsupported;sitting edge of bed  -COLIN     Dynamic Standing Balance minimal assist  -COLIN     Position/Device Used, Standing Balance supported  -COLIN       Row Name 08/28/24 1428          Sensory Assessment (Somatosensory)    Sensory Assessment (Somatosensory) LE sensation intact  -COLIN               User Key  (r) = Recorded By, (t) = Taken By, (c) = Cosigned By      Initials Name Provider Type    Shiva Mccullough, PT DPT Physical Therapist                   Goals/Plan       [facility] Name 08/28/24 1428          Bed Mobility Goal 1 (PT)    Activity/Assistive Device (Bed Mobility Goal 1, PT) sit to supine/supine to sit  -COLIN     [place] Level/Cues Needed (Bed Mobility Goal 1, PT) supervision required  -COLIN     Time Frame (Bed Mobility Goal 1, PT) long term goal (LTG);10 days  -COLIN     Progress/Outcomes (Bed Mobility Goal 1, PT) new goal  -COLIN       Row Name 08/28/24 1428          Transfer Goal 1 (PT)    Activity/Assistive Device (Transfer Goal 1, PT) sit-to-stand/stand-to-sit;bed-to-chair/chair-to-bed  -COLIN     [place] Level/Cues Needed (Transfer Goal 1, PT) supervision required  -COLIN     Time Frame (Transfer Goal 1, PT) long term goal (LTG);10 days  -COLIN     Progress/Outcome (Transfer Goal 1, PT) new goal  -COLIN       Row Name 08/28/24 1428          Gait Training Goal 1 (PT)    Activity/Assistive Device (Gait Training Goal 1, PT) gait (walking  "locomotion);decrease fall risk;improve balance and speed;increase endurance/gait distance  -COLIN     [place] Level (Gait Training Goal 1, PT) supervision required  -COLIN     Distance (Gait Training Goal 1, PT) 200 ft  -COLIN     Time Frame (Gait Training Goal 1, PT) long term goal (LTG);10 days  -COLIN     Progress/Outcome (Gait Training Goal 1, PT) new goal  -COLIN       Row Name 08/28/24 1428          Therapy Assessment/Plan (PT)    Planned Therapy Interventions (PT) bed mobility training;transfer training;gait training;balance training;home exercise program;patient/family education;postural re-education;stair training;strengthening  -COLIN               User Key  (r) = Recorded By, (t) = Taken By, (c) = Cosigned By      Initials Name Provider Type    Shiva Mccullough, PT DPT Physical Therapist                   Clinical Impression       Row Name 08/28/24 1428          Pain    Pretreatment Pain Rating 0/10 - no pain  -COLIN       Row Name 08/28/24 1428          Plan of Care Review    Plan of Care Reviewed With patient  -COLIN     Outcome Evaluation PT eval complete. He was found lethargic in bed, needing cues to awaken. once awake he was alert and oriented, some slow to respond to questions. He rpeorts living \"mostly\" home alone where he was independent in his mobility. He reports a recent fall at home as he was going outside to smoke. He needs min assist for supine <> sit bed mobiltiy. We required 2 attempts to stand, was able to stand on 2nd attempt with min/mod assist. Was able to ambulate in room with min assist x 1. He is weak with B LE and abdominal swelling. PT will cont with mobility, balance, and strengthening. He plans to d.c back home, consider further therapy if needed at time of discharge.  -COLIN       Row Name 08/28/24 1428          Therapy Assessment/Plan (PT)    Patient/Family Therapy Goals Statement (PT) go home  -COLIN     Rehab Potential (PT) good, to achieve stated therapy goals  -COLIN     Criteria for Skilled " Interventions Met (PT) yes;meets criteria;skilled treatment is necessary  -COLIN     Therapy Frequency (PT) 2 times/day  -COLIN     Predicted Duration of Therapy Intervention (PT) until d/c  -COLIN       Row Name 08/28/24 1428          Positioning and Restraints    Pre-Treatment Position in bed  -COLIN     Post Treatment Position bed  -COLIN     In Bed notified nsg;fowlers;call light within reach;encouraged to call for assist  -COLIN               User Key  (r) = Recorded By, (t) = Taken By, (c) = Cosigned By      Initials Name Provider Type    Shiva Mccullough, PT DPT Physical Therapist                   Outcome Measures       Row Name 08/28/24 1428          How much help from another person do you currently need...    Turning from your back to your side while in flat bed without using bedrails? 3  -COLIN     Moving from lying on back to sitting on the side of a flat bed without bedrails? 3  -COLIN     Moving to and from a bed to a chair (including a wheelchair)? 3  -COLIN     Standing up from a chair using your arms (e.g., wheelchair, bedside chair)? 2  -COLIN     Climbing 3-5 steps with a railing? 2  -COLIN     To walk in hospital room? 3  -COLIN     AM-PAC 6 Clicks Score (PT) 16  -COLIN     Highest Level of Mobility Goal 5 --> Static standing  -COLIN       Row Name 08/28/24 1428          Functional Assessment    Outcome Measure Options AM-PAC 6 Clicks Basic Mobility (PT)  -COLIN               User Key  (r) = Recorded By, (t) = Taken By, (c) = Cosigned By      Initials Name Provider Type    Shiva Mccullough, PT DPT Physical Therapist                                 Physical Therapy Education       Title: PT OT SLP Therapies (In Progress)       Topic: Physical Therapy (In Progress)       Point: Mobility training (Done)       Learning Progress Summary             Patient Acceptance, E, VU,NR by COLIN at 8/28/2024 1428    Comment: Benefits of activity, progression of PT                         Point: Home exercise program (Not Started)       Learner  "Progress:  Not documented in this visit.              Point: Body mechanics (Not Started)       Learner Progress:  Not documented in this visit.              Point: Precautions (Not Started)       Learner Progress:  Not documented in this visit.                              User Key       Initials Effective Dates Name Provider Type Esvin SHAW 02/03/23 -  Shiva Mcleod PT DPT Physical Therapist PT                  PT Recommendation and Plan  Planned Therapy Interventions (PT): bed mobility training, transfer training, gait training, balance training, home exercise program, patient/family education, postural re-education, stair training, strengthening  Plan of Care Reviewed With: patient  Outcome Evaluation: PT eval complete. He was found lethargic in bed, needing cues to awaken. once awake he was alert and oriented, some slow to respond to questions. He rpeorts living \"mostly\" home alone where he was independent in his mobility. He reports a recent fall at home as he was going outside to smoke. He needs min assist for supine <> sit bed mobiltiy. We required 2 attempts to stand, was able to stand on 2nd attempt with min/mod assist. Was able to ambulate in room with min assist x 1. He is weak with B LE and abdominal swelling. PT will cont with mobility, balance, and strengthening. He plans to d.c back home, consider further therapy if needed at time of discharge.     Time Calculation:         PT Charges       Row Name 08/28/24 1606             Time Calculation    Start Time 1428  -COLIN      Stop Time 1526  -COLNI      Time Calculation (min) 58 min  -COLIN      PT Received On 08/28/24  -COLIN      PT Goal Re-Cert Due Date 09/07/24  -COLIN                User Key  (r) = Recorded By, (t) = Taken By, (c) = Cosigned By      Initials Name Provider Type    Shiva Mccullough PT DPT Physical Therapist                  Therapy Charges for Today       Code Description Service Date Service Provider Modifiers Qty    [id number] HC " PT EVAL MOD COMPLEXITY 4 8/28/2024 Shiva Mcleod, PT DPT GP 1            PT G-Codes  Outcome Measure Options: AM-PAC 6 Clicks Basic Mobility (PT)  AM-PAC 6 Clicks Score (PT): 16  PT Discharge Summary  Anticipated Discharge Disposition (PT): home with assist, home with home health, sub acute care setting, home with outpatient therapy services    Shiva Mcleod, PT DPT  8/28/2024

## 2024-08-28 NOTE — PLAN OF CARE
Goal Outcome Evaluation:  Plan of Care Reviewed With: patient        Progress: no change  Outcome Evaluation: VSS. No c/o pain. Pt. more alert and oriented through shift. Lactulose enema ordered per Dr. Field but not given d/t pt. having many bowel movements through out day. Bumex gtt. started, burton catheter to be placed. NSR on tele.                                Walk in

## 2024-08-28 NOTE — PLAN OF CARE
Goal Outcome Evaluation:  Plan of Care Reviewed With: patient        Progress: no change  Outcome Evaluation: VSS. S 81-91 1st degree on tele. Pt had a BM this shift. Ambulated to restroom multiple times with [facility] staff. Gait unsteady. Pt is very edematous. Bed alarm set. Reminded pt to call out when needing to use restroom. Call light within reach. Safety maintained.

## 2024-08-28 NOTE — PROGRESS NOTES
[facility] Medicine Services  INPATIENT PROGRESS NOTE    Patient Name: Luciano Christiansen  Date of Admission: 8/27/2024  Today's Date: 08/28/24  Length of Stay: 1  Primary Care Physician: Jossy Christiansen APRN    Subjective   Chief Complaint: Follow-up  HPI   Had 1 bowel movement last night  Question of bowel movement earlier today.  Patient states that he has not really had significant amount.    Remains to be somewhat drowsy despite ammonia level improving  Blood gas from yesterday did not show any evidence of hypercarbia or hypoxia    Tense abdomen.    Review of Systems   No abdominal pain, nausea or vomiting  No fever or chills  No reported shortness of breath  All pertinent negatives and positives are as above. All other systems have been reviewed and are negative unless otherwise stated.     Objective    Temp:  [97.3 °F (36.3 °C)-98.4 °F (36.9 °C)] 97.3 °F (36.3 °C)  Heart Rate:  [78-96] 84  Resp:  [20-22] 20  BP: (112-139)/(52-71) 124/71  Physical Exam  Laying comfortably in bed watching television but occasionally dozed off  BMI 43.5  No distress  Short neck  Unkempt overall and looks older than his stated age  Pale conjunctiva  Diminished breath sound due to body habitus  Distant heart sounds due to body habitus  His belly is hard as a rock but his lower extremities likewise.  I could not even feel any pitting edema.  He has no gross tenderness.  No rebound  Difficult to examine for any organomegaly given significant subcutaneous edema which I confirmed with the radiologist via imaging studies.  In fact he did not have any significant ascites according to the radiologist  Warm dry skin      Results Review:  I have reviewed the labs, radiology results, and diagnostic studies.    Laboratory Data:   Results from last 7 days   Lab Units 08/27/24  1141   WBC 10*3/mm3 10.87*   HEMOGLOBIN g/dL 8.8*   HEMATOCRIT % 28.0*   PLATELETS 10*3/mm3 356        Results from last 7 days   Lab  "Units 08/28/24  0339 08/27/24  1143 08/27/24  1141   SODIUM mmol/L 139  --  138   SODIUM, ARTERIAL mmol/L  --  142  --    POTASSIUM mmol/L 3.3*  --  3.8   CHLORIDE mmol/L 103  --  102   CO2 mmol/L 22.0  --  21.0*   BUN mg/dL 23*  --  26*   CREATININE mg/dL 1.41*  --  1.84*   CALCIUM mg/dL 8.9  --  9.1   BILIRUBIN mg/dL 2.4*  --  2.4*   ALK PHOS U/L 129*  --  147*   ALT (SGPT) U/L 17  --  17   AST (SGOT) U/L 49*  --  52*   GLUCOSE mg/dL 81  --  100*       Culture Data:   No results found for: \"BLOODCX\", \"URINECX\", \"WOUNDCX\", \"MRSACX\", \"RESPCX\", \"STOOLCX\"    Radiology Data:   Imaging Results (Last 24 Hours)       Procedure Component Value Units Date/Time    US Gallbladder [[id number]] Collected: 08/27/24 1509     Updated: 08/27/24 1521    Narrative:      US GALLBLADDER-     HISTORY: Cholelithiasis     COMPARISON: 4/25/2024     FINDINGS: Sonographic imaging of the right upper quadrant is performed.  Exam is challenging as patient unable to cooperate with breath-holding.     Limited visualization of the pancreas. Visible portions of the  pancreatic body unremarkable. Proximal IVC is patent. Suboptimal  visualization abdominal aorta from regional shadowing bowel gas.     Slightly scalloped appearance of the margins of the liver supporting  underlying fibrosis/cirrhosis. Appropriate directional flow within the  central main portal vein. No focal liver mass identified. Trace  perihepatic ascites.     Right kidney normal measuring 12.8 cm without right-sided  hydronephrosis.     Multiple shadowing stones within the lumen of the gallbladder.  Borderline gallbladder wall thickening of 3 to 4 mm. No. Cholecystic  fluid identified with ultrasound. Common bile duct of 5 mm, upper limits  of normal.       Impression:      1. Cholelithiasis. Borderline gallbladder wall thickening without  biliary dilatation may relate to the small volume perihepatic ascites  associated with patient's underlying hepatic cirrhosis versus less  likely " calculus cholecystitis.     This report was signed and finalized on 8/27/2024 3:17 PM by Dr. Kassy Blair MD.       CT Abdomen Pelvis With Contrast [[id number]] Collected: 08/27/24 1334     Updated: 08/27/24 1357    Narrative:      EXAMINATION: CT ABDOMEN PELVIS W CONTRAST-      8/27/2024 11:29 AM     HISTORY: New onset hepatic encephalopathy protuberant abdomen with  jaundice     In order to have a CT radiation dose as low as reasonably achievable  Automated Exposure Control was utilized for adjustment of the mA and/or  KV according to patient size.     Total DLP = 4632.87 mGy.cm     The CT scan of the abdomen and pelvis is performed after intravenous  contrast enhancement.     The images are acquired in axial plane with subsequent reconstruction in  coronal and sagittal planes.     Comparison is made with the previous study dated 7/4/2024.     There is interval significant increase in edema of the abdominal and  pelvic wall extending into the lower extremity bilaterally. Similar  changes are seen involving the limited visualized lower chest wall. This  suggest increasing fluid overload/anasarca.     Limited visualized lung bases show atelectatic changes and small pleural  effusion more in the right base which has increased since the previous  study. There are atelectatic changes in the lower lungs. There are  significant motion artifacts which may obscure a small lesion/nodule.     Limited visualized cardiomediastinal structures show moderate  cardiomegaly. Trace pericardial effusion.     There is evidence of hepatosplenomegaly. The liver measures 25 cm in  craniocaudal dimension. The spleen measures 18.6 x 20 x 9.6 cm. This is  similar to the previous study.     There is moderate dilatation of the gallbladder with thickening of the  wall and surrounding fluid. There are at least 2 radiopaque faceted  stones in the proximal body/neck of the gallbladder. The common bile  duct is limitedly visualized and does not  show significant dilatation.     The pancreas is normal.     The adrenal glands bilaterally are normal.     The kidneys bilaterally are normal. No discrete mass. No calculi. No  hydronephrosis. The ureters are not well visualized. The urinary bladder  is poorly distended and is significantly compressed by the pelvic fat  and bilateral pelvic lymph nodes. There is a significant change since  the previous study.     The stomach is decompressed. No focal abnormality. Duodenum and small  bowel are nondilated. No finding to suggest appendicitis. Moderate gas  and stool is seen in the proximal colon. Distal colon is significantly  decompressed.     Normal abdominal aorta. No aneurysmal dilatation.     There is evidence of abdominal and pelvic lymphadenopathy. A left common  iliac lymph node, image #64 in series 2, measures 1.8 cm in short axis.  It previously measured 1.6 cm. A right external iliac lymph node, image  #84 in series 2, measures 1.5 cm in short axis. It measures 1.4 cm in  the previous study. A lymph node in the left external iliac group, image  #88 in series 2, measures 18 mm in short axis. It measured 9 mm in the  previous study. An enlarged right inguinal lymph node, image #95 in  series 2, measures 19 mm in short axis. No change. Another lymph node  located adjacent and medial measures 16 mm in short axis. It previously  measured 14 mm.     There is diffuse infiltration of the peritoneum/omentum throughout the  abdomen and there is a small amount of fluid in the paracolic gutter and  in the pelvis which appears moderately more progressive since the  previous study. There is retroperitoneal infiltration particularly  around the aorta and great vessels moderately more progressive since the  previous study.     Images reviewed in bone window show chronic degenerative changes of the  lumbar spine with multilevel disc degeneration similar to the previous  study. No acute bony abnormality.       Impression:       1. Persistent hepatosplenomegaly similar to the previous study. No  change.  2. Enlarged abdominal pelvic and inguinal lymph nodes. These are  moderately increased in size since the previous study.  3. Moderate dilatation of thickening of the wall of the gallbladder with  multiple gallstones. This may partly be due to adjacent edema/fluid  overload of the peritoneum/omentum. The possibility of calculus  cholecystitis is not excluded. Common bile duct is not dilated.  4. Diffuse infiltration of the peritoneum/omentum may represent  edema/fluid overload. An evolving ascites.  5. Increasing edema of the abdominal and pelvic wall and limited  visualized chest wall suggesting increasing fluid overload/anasarca.  There is significant thickening/swelling of the scrotum with bilateral  hydroceles.  6. Increasing right basal pleural effusion and atelectatic changes since  the previous study.     This report was signed and finalized on 8/27/2024 1:54 PM by Dr. Vj Hsu MD.       XR Chest 1 View [[id number]] Collected: 08/27/24 1334     Updated: 08/27/24 1338    Narrative:      EXAM: XR CHEST 1 VW-      DATE: 8/27/2024 10:01 AM     HISTORY: Fatigue       COMPARISON: 7/4/2024.     TECHNIQUE:  Frontal view(s) of the chest submitted.     FINDINGS:    Perihilar prominence and interstitial prominence are worrisome for  volume overload/edema. No large effusion or pneumothorax is seen. There  is enlargement of the cardiac silhouette which appears unchanged.          Impression:         1. Possible volume overload/edema.     This report was signed and finalized on 8/27/2024 1:35 PM by Óscar Orozco.       CT Head Without Contrast [[id number]] Collected: 08/27/24 1333     Updated: 08/27/24 1338    Narrative:      EXAM: CT HEAD WO CONTRAST-      DATE: 8/27/2024 11:29 AM     HISTORY: Altered mental status       COMPARISON: 5/5/2024.     DOSE LENGTH PRODUCT: 896.96 mGy.cm  Automated exposure control was also  utilized to  decrease patient radiation dose.     TECHNIQUE: Unenhanced CT images obtained from vertex to skull base with  multiplanar reformats.     FINDINGS:  There is no acute intracranial hemorrhage, midline shift, mass effect,  or hydrocephalus. There is no CT evidence for acute infarct.        The visualized paranasal sinuses and mastoid air cells are clear. Scalp  soft tissues are unremarkable. Visualized orbits and globes are  unremarkable.       Impression:         1. No acute intracranial findings.      This report was signed and finalized on 8/27/2024 1:34 PM by Óscar Orozco.               I have reviewed the patient's current medications.     Assessment/Plan   Assessment  Active Hospital Problems    Diagnosis     **Hepatic encephalopathy            Medical Decision Making  Number and Complexity of problems:   Hepatic encephalopathy versus medication induced (chlordiazepoxide-recently prescribed August 18 by Dr. Rangel) versus combination of  Hyperammonemia  History of alcoholism  Cirrhosis  Hypertension  Macrocytic anemia in patient with history of alcohol use  Cholelithiasis-gallbladder wall thickening without biliary dilatation may relate to small volume perihepatic ascites versus less likely calculus cholecystitis.  Fluid retention lower extremities  Morbid obesity with BMI 44     Treatment Plan  I reviewed the CAT scan/renal ultrasound with Dr. Orozco (radiologist).  Patient has no significant ascites for a safe paracentesis.  I came about thinking paracentesis because he has a very tense abdomen which his legs are very tense as well.  He came in with creatinine of 1.8 which is now improved at 1.4.  His baseline creatinine as of July 9, 2024 was 1.07 and on August 6 was at 1.5.  I think he had developed acute kidney injury.  Despite of this, because of fluid overload/anasarca, I think it is reasonable to continue on a more aggressive diuresis regimen.  He may need nephrology eventually.    Since patient is  far from achieving his 3-4 times bowel movement per day and after reviewing his CT scan, I think it is reasonable to add lactulose enema x 1.  Will need to follow ammonia level and clinical progress.    Will increase lactulose to 3 times daily to maintain bowel movement 3-4 times per day    Continue on pentoxifylline from home medication  GI consult    Had missed appointment with Dr. Culver regarding cholelithiasis  Monitor chemistry and correct electrolytes accordingly  Medications reviewed.  Changes made    GI consult pending.    Medications reviewed  enoxaparin, 40 mg, Subcutaneous, Q12H  folic acid, 1 mg, Oral, Daily  furosemide, 20 mg, Oral, BID  lactulose, 300 mL, Rectal, Once  lactulose, 20 g, Oral, TID  levothyroxine, 50 mcg, Oral, Q AM  nicotine, 1 patch, Transdermal, Q24H  pantoprazole, 40 mg, Oral, Daily  pentoxifylline, 400 mg, Oral, TID With Meals  riFAXIMin, 550 mg, Oral, BID  sodium chloride, 10 mL, Intravenous, Q12H  spironolactone, 100 mg, Oral, Daily  thiamine, 100 mg, Oral, Daily      Conditions and Status  Fair     [facility] Data  External documents reviewed: Reviewed epic record  Cardiac tracing (EKG, telemetry) interpretation: -  Radiology interpretation: Reviewed multiple imaging studies as outlined above  Labs reviewed: Yes  Any tests that were considered but not ordered: None     Decision rules/scores evaluated (example [id number], Wells, etc):        Discussed with: Mom and floor nursing staff  Care Planning  Shared decision making: Mom  Code status and discussions: Full code     Disposition  Social Determinants of Health that impact treatment or disposition: None identified at this time  Estimated length of stay is to be determined.      I confirmed that the patient's advanced care plan is present, code status is documented, and a surrogate decision maker is listed in the patient's medical record.      The patient's surrogate decision maker is mom.         Electronically signed by Roderick Mo  MD Emir, 08/28/24, 10:30 CDT.

## 2024-08-29 LAB
AMMONIA BLD-SCNC: 83 UMOL/L (ref 16–60)
ANION GAP SERPL CALCULATED.3IONS-SCNC: 13 MMOL/L (ref 5–15)
BUN SERPL-MCNC: 17 MG/DL (ref 6–20)
BUN/CREAT SERPL: 13.3 (ref 7–25)
CALCIUM SPEC-SCNC: 9.5 MG/DL (ref 8.6–10.5)
CHLORIDE SERPL-SCNC: 104 MMOL/L (ref 98–107)
CO2 SERPL-SCNC: 23 MMOL/L (ref 22–29)
CREAT SERPL-MCNC: 1.28 MG/DL (ref 0.76–1.27)
EGFRCR SERPLBLD CKD-EPI 2021: 73 ML/MIN/1.73
GLUCOSE SERPL-MCNC: 104 MG/DL (ref 65–99)
POTASSIUM SERPL-SCNC: 3.5 MMOL/L (ref 3.5–5.2)
POTASSIUM SERPL-SCNC: 3.7 MMOL/L (ref 3.5–5.2)
QT INTERVAL: 376 MS
QTC INTERVAL: 457 MS
SODIUM SERPL-SCNC: 140 MMOL/L (ref 136–145)

## 2024-08-29 PROCEDURE — 25010000002 ENOXAPARIN PER 10 MG: Performed by: INTERNAL MEDICINE

## 2024-08-29 PROCEDURE — 84132 ASSAY OF SERUM POTASSIUM: CPT | Performed by: INTERNAL MEDICINE

## 2024-08-29 PROCEDURE — 80048 BASIC METABOLIC PNL TOTAL CA: CPT | Performed by: INTERNAL MEDICINE

## 2024-08-29 PROCEDURE — 82140 ASSAY OF AMMONIA: CPT | Performed by: INTERNAL MEDICINE

## 2024-08-29 PROCEDURE — 97530 THERAPEUTIC ACTIVITIES: CPT

## 2024-08-29 PROCEDURE — 25010000002 BUMETANIDE PER 0.5 MG: Performed by: INTERNAL MEDICINE

## 2024-08-29 PROCEDURE — 97166 OT EVAL MOD COMPLEX 45 MIN: CPT | Performed by: OCCUPATIONAL THERAPIST

## 2024-08-29 PROCEDURE — 99232 SBSQ HOSP IP/OBS MODERATE 35: CPT | Performed by: INTERNAL MEDICINE

## 2024-08-29 PROCEDURE — 97116 GAIT TRAINING THERAPY: CPT

## 2024-08-29 RX ORDER — TRAMADOL HYDROCHLORIDE 50 MG/1
50 TABLET ORAL ONCE
Status: COMPLETED | OUTPATIENT
Start: 2024-08-29 | End: 2024-08-29

## 2024-08-29 RX ORDER — POTASSIUM CHLORIDE 750 MG/1
40 CAPSULE, EXTENDED RELEASE ORAL EVERY 4 HOURS
Status: COMPLETED | OUTPATIENT
Start: 2024-08-29 | End: 2024-08-29

## 2024-08-29 RX ADMIN — PENTOXIFYLLINE 400 MG: 400 TABLET, EXTENDED RELEASE ORAL at 17:08

## 2024-08-29 RX ADMIN — TRAMADOL HYDROCHLORIDE 50 MG: 50 TABLET ORAL at 23:50

## 2024-08-29 RX ADMIN — LEVOTHYROXINE SODIUM 50 MCG: 50 TABLET ORAL at 05:52

## 2024-08-29 RX ADMIN — RIFAXIMIN 550 MG: 550 TABLET ORAL at 22:01

## 2024-08-29 RX ADMIN — PANTOPRAZOLE SODIUM 40 MG: 40 TABLET, DELAYED RELEASE ORAL at 09:36

## 2024-08-29 RX ADMIN — ENOXAPARIN SODIUM 40 MG: 100 INJECTION SUBCUTANEOUS at 22:01

## 2024-08-29 RX ADMIN — PENTOXIFYLLINE 400 MG: 400 TABLET, EXTENDED RELEASE ORAL at 12:32

## 2024-08-29 RX ADMIN — POTASSIUM CHLORIDE 40 MEQ: 750 CAPSULE, EXTENDED RELEASE ORAL at 17:08

## 2024-08-29 RX ADMIN — LACTULOSE 20 G: 20 SOLUTION ORAL at 17:08

## 2024-08-29 RX ADMIN — FOLIC ACID 1 MG: 1 TABLET ORAL at 09:36

## 2024-08-29 RX ADMIN — LACTULOSE 20 G: 20 SOLUTION ORAL at 22:01

## 2024-08-29 RX ADMIN — PENTOXIFYLLINE 400 MG: 400 TABLET, EXTENDED RELEASE ORAL at 09:36

## 2024-08-29 RX ADMIN — BUMETANIDE 1 MG/HR: 0.25 INJECTION INTRAMUSCULAR; INTRAVENOUS at 12:32

## 2024-08-29 RX ADMIN — LACTULOSE 20 G: 20 SOLUTION ORAL at 09:36

## 2024-08-29 RX ADMIN — NICOTINE 1 PATCH: 21 PATCH, EXTENDED RELEASE TRANSDERMAL at 09:35

## 2024-08-29 RX ADMIN — ENOXAPARIN SODIUM 40 MG: 100 INJECTION SUBCUTANEOUS at 09:36

## 2024-08-29 RX ADMIN — Medication 10 ML: at 09:36

## 2024-08-29 RX ADMIN — POTASSIUM CHLORIDE 40 MEQ: 750 CAPSULE, EXTENDED RELEASE ORAL at 12:32

## 2024-08-29 RX ADMIN — SPIRONOLACTONE 100 MG: 50 TABLET ORAL at 09:36

## 2024-08-29 RX ADMIN — THIAMINE HCL TAB 100 MG 100 MG: 100 TAB at 09:38

## 2024-08-29 RX ADMIN — POTASSIUM CHLORIDE 40 MEQ: 750 CAPSULE, EXTENDED RELEASE ORAL at 03:12

## 2024-08-29 RX ADMIN — Medication 5 MG: at 23:50

## 2024-08-29 RX ADMIN — RIFAXIMIN 550 MG: 550 TABLET ORAL at 09:36

## 2024-08-29 NOTE — THERAPY EVALUATION
Patient Name: Luciano Christiansen  : 1985    MRN: [id number]                              Today's Date: 2024       Admit Date: 2024    Visit Dx:     ICD-10-CM ICD-9-CM   1. Hepatic encephalopathy  K76.82 572.2   2. MAYRA (acute kidney injury)  N17.9 584.9   3. Anasarca  R60.1 782.3   4. Chronic anemia  D64.9 285.9   5. Hepatosplenomegaly  R16.2 571.8   6. Chronic liver disease  K76.9 571.9   7. Lymphadenopathy  R59.1 785.6   8. Calculus of gallbladder without cholecystitis without obstruction  K80.20 574.20   9. Impaired mobility [Z74.09]  Z74.09 799.89     Patient Active Problem List   Diagnosis    Wellness examination    Encounter for hepatitis C screening test for low risk patient    Primary hypertension    Class 1 obesity due to excess calories without serious comorbidity with body mass index (BMI) of 34.0 to 34.9 in adult    Fatty liver    Hyponatremia    Alkalosis    Alcoholism    Transaminitis    Hypophosphatemia    Insomnia disorder related to known organic factor    Community acquired bilateral lower lobe pneumonia    Pneumonia due to Streptococcus    GI bleed    Diabetic ketoacidosis associated with type 2 diabetes mellitus    Alcohol intoxication in active alcoholic    Calculus of gallbladder without cholecystitis without obstruction    Alcoholic encephalopathy    Type 2 diabetes mellitus    Alcohol withdrawal    Alcoholic steatohepatitis    BMI 40.0-44.9, adult    Hepatic encephalopathy     Past Medical History:   Diagnosis Date    Alcoholism     Diabetes mellitus     Gout     Hypertension     Jaundice      Past Surgical History:   Procedure Laterality Date    VASECTOMY        General Information       Row Name 24 0808          OT Time and Intention    Document Type evaluation  ED with confusion, fatigue, weakness. Dx: Hepatic encephalopathy, cirrhosis, ascites, hyperammonemia, Hx alcoholism.  -JJ     Mode of Treatment occupational therapy  -JJ       Row Name 24 0808          General  Information    Patient Profile Reviewed yes  -J     Prior Level of Function independent:;all household mobility;transfer;bed mobility;ADL's  -     Existing Precautions/Restrictions fall  FC  -     Barriers to Rehab medically complex;cognitive status  -       Row Name 08/29/24 0808          Living Environment    People in Home alone  -       Row Name 08/29/24 08          Home Main Entrance    Number of Stairs, Main Entrance none  -[facility] Name 08/29/24 0808          Stairs Within Home, Primary    Number of Stairs, Within Home, Primary none  -[facility] Name 08/29/24 08          Cognition    Orientation Status (Cognition) oriented x 4  -[facility] Name 08/29/24 0808          Safety Issues, Functional Mobility    Safety Issues Affecting Function (Mobility) insight into deficits/self-awareness;judgment;problem-solving;safety precaution awareness;safety precautions follow-through/compliance  -     Impairments Affecting Function (Mobility) balance;endurance/activity tolerance;strength;cognition;pain  -     Cognitive Impairments, Mobility Safety/Performance attention;judgment;problem-solving/reasoning;safety precaution awareness;safety precaution follow-through  -               User Key  (r) = Recorded By, (t) = Taken By, (c) = Cosigned By      Initials Name Provider Type     Kassy Villalobos, KEYSHA/L, CSRS Occupational Therapist                     Mobility/ADL's       Row Name 08/29/24 0808          Bed Mobility    Bed Mobility supine-sit  -     Supine-Sit [place] (Bed Mobility) minimum assist (75% patient effort);verbal cues  -     Assistive Device (Bed Mobility) head of bed elevated;bed rails  -       Row Name 08/29/24 0808          Transfers    Transfers sit-stand transfer;stand-sit transfer;toilet transfer  -       Row Name 08/29/24 0808          Sit-Stand Transfer    Sit-Stand [place] (Transfers) minimum assist (75% patient effort)  -[facility] Name 08/29/24 0808           Stand-Sit Transfer    Stand-Sit [place] (Transfers) minimum assist (75% patient effort)  -[facility] Name 08/29/24 08          Toilet Transfer    Type (Toilet Transfer) sit-stand;stand-sit  -     [place] Level (Toilet Transfer) minimum assist (75% patient effort);moderate assist (50% patient effort)  -     Assistive Device (Toilet Transfer) commode;grab bars/safety frame;walker, front-wheeled  -[facility] Name 08/29/24 08          Functional Mobility    Functional Mobility- Ind. Level minimum assist (75% patient effort)  -     Functional Mobility- Device walker, front-wheeled  -     Functional Mobility- Comment amb from bed to bathroom and then to chair. He is slow to respond and requires increased time and prompting for mobility.  -JJ       Row Name 08/29/24 08          Activities of Daily Living    BADL Assessment/Intervention lower body dressing;toileting  -JJ       Row Name 08/29/24 [facility]          Lower Body Dressing Assessment/Training    [place] Level (Lower Body Dressing) don;socks;maximum assist (25% patient effort)  -     Position (Lower Body Dressing) edge of bed sitting  -[facility] Name 08/29/24 08          Toileting Assessment/Training    [place] Level (Toileting) adjust/manage clothing;perform perineal hygiene;moderate assist (50% patient effort)  -     Assistive Devices (Toileting) commode;grab bar/safety frame  -     Position (Toileting) supported standing  -               User Key  (r) = Recorded By, (t) = Taken By, (c) = Cosigned By      Initials Name Provider Type    Kassy Beltran OTR/L, CSRS Occupational Therapist                   Obj/Interventions       Row Name 08/29/24 08          Sensory Assessment (Somatosensory)    Sensory Assessment (Somatosensory) UE sensation intact  -[facility] Name 08/29/24 08          Vision Assessment/Intervention    Visual Impairment/Limitations WFL  -JJ       Row Name 08/29/24 [facility]           Range of Motion Comprehensive    General Range of Motion bilateral upper extremity ROM WFL  -JJ       Row Name 08/29/24 0808          Strength Comprehensive (MMT)    Comment, General Manual Muscle Testing (MMT) Assessment B UE strength 4/5  -J       Row Name 08/29/24 0808          Balance    Balance Assessment sitting static balance;sitting dynamic balance;standing dynamic balance;standing static balance  -JJ     Static Sitting Balance standby assist  -JJ     Dynamic Sitting Balance contact guard  -JJ     Position, Sitting Balance unsupported;sitting edge of bed  on commode  -JJ     Static Standing Balance minimal assist  -JJ     Dynamic Standing Balance minimal assist  -JJ     Position/Device Used, Standing Balance supported;walker, front-wheeled  -JJ               User Key  (r) = Recorded By, (t) = Taken By, (c) = Cosigned By      Initials Name Provider Type    Kassy Beltran, OTR/L, CSRS Occupational Therapist                   Goals/Plan       Row Name 08/29/24 0808          Bathing Goal 1 (OT)    Activity/Device (Bathing Goal 1, OT) bathing skills, all  -JJ     [place] Level/Cues Needed (Bathing Goal 1, OT) minimum assist (75% or more patient effort)  -JJ     Time Frame (Bathing Goal 1, OT) long term goal (LTG);by discharge  -JJ     Progress/Outcomes (Bathing Goal 1, OT) new goal  -       Row Name 08/29/24 0808          Dressing Goal 1 (OT)    Activity/Device (Dressing Goal 1, OT) dressing skills, all  -JJ     [place]/Cues Needed (Dressing Goal 1, OT) minimum assist (75% or more patient effort)  -JJ     Time Frame (Dressing Goal 1, OT) long term goal (LTG);by discharge  -JJ     Progress/Outcome (Dressing Goal 1, OT) new goal  -       Row Name 08/29/24 0808          Toileting Goal 1 (OT)    Activity/Device (Toileting Goal 1, OT) toileting skills, all  -JJ     [place] Level/Cues Needed (Toileting Goal 1, OT) minimum assist (75% or more patient effort)  -JJ     Time Frame (Toileting Goal  1, OT) long term goal (LTG);by discharge  -     Progress/Outcome (Toileting Goal 1, OT) new goal  -       Row Name 08/29/24 0808          Therapy Assessment/Plan (OT)    Planned Therapy Interventions (OT) activity tolerance training;adaptive equipment training;BADL retraining;functional balance retraining;cognitive/visual perception retraining;transfer/mobility retraining;strengthening exercise;occupation/activity based interventions;patient/caregiver education/training  -               User Key  (r) = Recorded By, (t) = Taken By, (c) = Cosigned By      Initials Name Provider Type    Kassy Beltran, OTR/L, CSRS Occupational Therapist                   Clinical Impression       Row Name 08/29/24 0808          Pain Assessment    Additional Documentation Pain Scale: FACES Pre/Post-Treatment (Group)  -       Row Name 08/29/24 0808          Pain Scale: FACES Pre/Post-Treatment    Pain: FACES Scale, Pretreatment 2-->hurts little bit  -J     Posttreatment Pain Rating 2-->hurts little bit  -     Pain Location - other (see comments)  -     Pre/Posttreatment Pain Comment FC insertion site  -       Row Name 08/29/24 0808          Plan of Care Review    Plan of Care Reviewed With patient  -     Outcome Evaluation OT eval completed. Pt presents alert and oriented x4, sitting up in bed and needing to go to bathroom. He is very slow to respond this morning and requires increased time for processing and task completion. He reports at baseline being independent with all adls and mobility. He was able to bring self to sitting at EOB with Min A. Max A to don socks due to lower extremity weakness and significent edema. He complete sit <> stand t/f from EOB with Min-Mod A and amb to bathroom and then to chair with use of rwx and Min A. He required increased time for all mobility and task completion. He was Max A for perineal hygiene and clothing mgt. He was left sitting up in chair with chair alarm on and waffle  cushion. He demonstrates impaired cognition, strength, balance, activity tolerance and safety awareness. He would benefit from skilled OT services to address these deficits. Recommend d/c to SNF.  -       Row Name 08/29/24 0808          Therapy Assessment/Plan (OT)    Rehab Potential (OT) good, to achieve stated therapy goals  -     Criteria for Skilled Therapeutic Interventions Met (OT) yes;skilled treatment is necessary  -     Therapy Frequency (OT) 5 times/wk  -     Predicted Duration of Therapy Intervention (OT) 10 days  -       Row Name 08/29/24 0808          Therapy Plan Review/Discharge Plan (OT)    Anticipated Discharge Disposition (OT) [facility] nursing facility  -       Row Name 08/29/24 0808          Positioning and Restraints    Pre-Treatment Position in bed  -     Post Treatment Position chair  -     In Chair notified nsg;reclined;call light within reach;encouraged to call for assist;exit alarm on;waffle cushion  -               User Key  (r) = Recorded By, (t) = Taken By, (c) = Cosigned By      Initials Name Provider Type    Kassy Beltran OTR/L, SILVIANO Occupational Therapist                   Outcome Measures       Row Name 08/29/24 0808          How much help from another is currently needed...    Putting on and taking off regular lower body clothing? 2  -JJ     Bathing (including washing, rinsing, and drying) 2  -JJ     Toileting (which includes using toilet bed pan or urinal) 2  -JJ     Putting on and taking off regular upper body clothing 3  -JJ     Taking care of personal grooming (such as brushing teeth) 3  -JJ     Eating meals 3  -JJ     AM-PAC 6 Clicks Score (OT) 15  -       Row Name 08/29/24 0808          Functional Assessment    Outcome Measure Options AM-PAC 6 Clicks Daily Activity (OT)  -               User Key  (r) = Recorded By, (t) = Taken By, (c) = Cosigned By      Initials Name Provider Type    Kassy Beltran OTR/L, SILVIANO Occupational Therapist                     Occupational Therapy Education       Title: PT OT SLP Therapies (In Progress)       Topic: Occupational Therapy (In Progress)       Point: ADL training (Done)       Description:   Instruct learner(s) on proper safety adaptation and remediation techniques during self care or transfers.   Instruct in proper use of assistive devices.                  Learning Progress Summary             Patient Acceptance, E, VU by  at 8/29/2024 0905                         Point: Home exercise program (Not Started)       Description:   Instruct learner(s) on appropriate technique for monitoring, assisting and/or progressing therapeutic exercises/activities.                  Learner Progress:  Not documented in this visit.              Point: Precautions (Done)       Description:   Instruct learner(s) on prescribed precautions during self-care and functional transfers.                  Learning Progress Summary             Patient Acceptance, E, VU by HUNG at 8/29/2024 0905                         Point: Body mechanics (Not Started)       Description:   Instruct learner(s) on proper positioning and spine alignment during self-care, functional mobility activities and/or exercises.                  Learner Progress:  Not documented in this visit.                              User Key       Initials Effective Dates Name Provider Type Discipline     07/11/23 -  Kassy Villalobos OTR/L, CSRS Occupational Therapist OT                  OT Recommendation and Plan  Planned Therapy Interventions (OT): activity tolerance training, adaptive equipment training, BADL retraining, functional balance retraining, cognitive/visual perception retraining, transfer/mobility retraining, strengthening exercise, occupation/activity based interventions, patient/caregiver education/training  Therapy Frequency (OT): 5 times/wk  Plan of Care Review  Plan of Care Reviewed With: patient  Outcome Evaluation: OT eval completed. Pt presents alert and  oriented x4, sitting up in bed and needing to go to bathroom. He is very slow to respond this morning and requires increased time for processing and task completion. He reports at baseline being independent with all adls and mobility. He was able to bring self to sitting at EOB with Min A. Max A to don socks due to lower extremity weakness and significent edema. He complete sit <> stand t/f from EOB with Min-Mod A and amb to bathroom and then to chair with use of rwx and Min A. He required increased time for all mobility and task completion. He was Max A for perineal hygiene and clothing mgt. He was left sitting up in chair with chair alarm on and waffle cushion. He demonstrates impaired cognition, strength, balance, activity tolerance and safety awareness. He would benefit from skilled OT services to address these deficits. Recommend d/c to SNF.     Time Calculation:         Time Calculation- OT       Row Name 08/29/24 0808             Time Calculation- OT    OT Start Time 0808  -      OT Stop Time 0853  -      OT Time Calculation (min) 45 min  -      OT Received On 08/29/24  -      OT Goal Re-Cert Due Date 09/08/24  -                User Key  (r) = Recorded By, (t) = Taken By, (c) = Cosigned By      Initials Name Provider Type    Kassy Beltran OTR/L, SILVIANO Occupational Therapist                  Therapy Charges for Today       Code Description Service Date Service Provider Modifiers Qty    80716545628 HC OT EVAL MOD COMPLEXITY 3 8/29/2024 Kassy Villalobos OTR/L, SILVIANO GO 1                 RADHA Hickey, SILVIANO  8/29/2024

## 2024-08-29 NOTE — PROGRESS NOTES
Gastroenterology Hospitalist follow-up  Patient Identification:  Name: Luciano Christiansen  Age: 39 y.o.  Sex: male  :  1985  MRN: [id number]    Attending: Roderick Field*  Gastroenterologist of Record: none  Information from:patient     CC: Alcoholic hepatitis/encephalopathy.    History:   Still confused but less so. Having multiple stools daily. Says he has been sober for about a month.     Review of Systems:  Constitutional:  Negative   Cardiovascular:  Negative   Respiratory:  Negative     Problem List:  Patient Active Problem List    Diagnosis     *Hepatic encephalopathy [K76.82]     BMI 40.0-44.9, adult [Z68.41]     Alcohol withdrawal [F10.939]     Alcoholic steatohepatitis [K70.10]     Type 2 diabetes mellitus [E11.9]     Alcoholic encephalopathy [G31.2]     Calculus of gallbladder without cholecystitis without obstruction [K80.20]     GI bleed [K92.2]     Diabetic ketoacidosis associated with type 2 diabetes mellitus [E11.10]     Alcohol intoxication in active alcoholic [F10.129]     Pneumonia due to Streptococcus [J15.4]     Community acquired bilateral lower lobe pneumonia [J18.9]     Insomnia disorder related to known organic factor [G47.00]     Hypophosphatemia [E83.39]     Transaminitis [R74.01]     Hyponatremia [E87.1]     Alkalosis [E87.3]     Alcoholism [F10.20]     Wellness examination [Z00.00]     Encounter for hepatitis C screening test for low risk patient [Z11.59]     Primary hypertension [I10]     Class 1 obesity due to excess calories without serious comorbidity with body mass index (BMI) of 34.0 to 34.9 in adult [E66.09, Z68.34]     Fatty liver [K76.0]      Current Meds:  MAR Reviewed  Scheduled Meds:enoxaparin, 40 mg, Subcutaneous, Q12H  folic acid, 1 mg, Oral, Daily  lactulose, 300 mL, Rectal, Once  lactulose, 20 g, Oral, TID  levothyroxine, 50 mcg, Oral, Q AM  nicotine, 1 patch, Transdermal, Q24H  pantoprazole, 40 mg, Oral, Daily  pentoxifylline, 400 mg, Oral, TID With  "Meals  potassium chloride ER, 40 mEq, Oral, Q4H  riFAXIMin, 550 mg, Oral, BID  sodium chloride, 10 mL, Intravenous, Q12H  spironolactone, 100 mg, Oral, Daily  thiamine, 100 mg, Oral, Daily      Continuous Infusions:bumetanide, 1 mg/hr, Last Rate: 1 mg/hr (24 1232)  Pharmacy to Dose enoxaparin (LOVENOX),       PRN Meds:.  senna-docusate sodium **AND** polyethylene glycol **AND** bisacodyl **AND** bisacodyl    calcium carbonate    Calcium Replacement - Follow Nurse / BPA Driven Protocol    Magnesium Standard Dose Replacement - Follow Nurse / BPA Driven Protocol    melatonin    nicotine polacrilex    ondansetron    Pharmacy to Dose enoxaparin (LOVENOX)    Phosphorus Replacement - Follow Nurse / BPA Driven Protocol    Potassium Replacement - Follow Nurse / BPA Driven Protocol    [COMPLETED] Insert Peripheral IV **AND** sodium chloride    sodium chloride    sodium chloride  Allergies:  No Known Allergies    Intake/Output:     Intake/Output Summary (Last 24 hours) at 2024 1404  Last data filed at 2024 1212  Gross per 24 hour   Intake 480 ml   Output 6025 ml   Net -5545 ml     New allergies/reactions:  None    Physical Exam:  Vitals:   Temp (24hrs), Av °F (36.7 °C), Min:97.8 °F (36.6 °C), Max:98.3 °F (36.8 °C)    Temp:  [97.8 °F (36.6 °C)-98.3 °F (36.8 °C)] 97.9 °F (36.6 °C)  Heart Rate:  [83-90] 85  Resp:  [18-22] 18  BP: (117-137)/(53-65) 117/53  /53 (BP Location: Left arm, Patient Position: Sitting)   Pulse 85   Temp 97.9 °F (36.6 °C) (Oral)   Resp 18   Ht 188 cm (74\")   Wt (!) 154 kg (339 lb)   SpO2 96%   BMI 43.53 kg/m²     Exam:  NAD  PERRLA. Sclerae and conjunctivae pale  HENT: external inspection normal. Hearing intact.  No respiratory distress. Lungs clear.  Cardiac: no MRG.  Affect slow but understandable.     DATA:  Radiology and Labs:   Recent Results (from the past 24 hour(s))   Potassium    Collection Time: 24  6:15 PM    Specimen: Blood   Result Value Ref Range    " Potassium 3.4 (L) 3.5 - 5.2 mmol/L   Ammonia    Collection Time: 08/28/24  6:15 PM    Specimen: Blood   Result Value Ref Range    Ammonia 121 (H) 16 - 60 umol/L   Basic Metabolic Panel    Collection Time: 08/28/24  6:15 PM    Specimen: Blood   Result Value Ref Range    Glucose 108 (H) 65 - 99 mg/dL    BUN 20 6 - 20 mg/dL    Creatinine 1.34 (H) 0.76 - 1.27 mg/dL    Sodium 138 136 - 145 mmol/L    Potassium 3.4 (L) 3.5 - 5.2 mmol/L    Chloride 102 98 - 107 mmol/L    CO2 24.0 22.0 - 29.0 mmol/L    Calcium 9.4 8.6 - 10.5 mg/dL    BUN/Creatinine Ratio 14.9 7.0 - 25.0    Anion Gap 12.0 5.0 - 15.0 mmol/L    eGFR 69.1 >60.0 mL/min/1.73   Urine Drug Screen - Urine, Clean Catch    Collection Time: 08/28/24  8:17 PM    Specimen: Urine, Clean Catch   Result Value Ref Range    THC, Screen, Urine Negative Negative    Phencyclidine (PCP), Urine Negative Negative    Cocaine Screen, Urine Negative Negative    Methamphetamine, Ur Negative Negative    Opiate Screen Negative Negative    Amphetamine Screen, Urine Negative Negative    Benzodiazepine Screen, Urine Positive (A) Negative    Tricyclic Antidepressants Screen Negative Negative    Methadone Screen, Urine Negative Negative    Barbiturates Screen, Urine Negative Negative    Oxycodone Screen, Urine Negative Negative    Buprenorphine, Screen, Urine Negative Negative   Fentanyl, Urine - Urine, Clean Catch    Collection Time: 08/28/24  8:17 PM    Specimen: Urine, Clean Catch   Result Value Ref Range    Fentanyl, Urine Negative Negative   Ammonia    Collection Time: 08/29/24  9:18 AM    Specimen: Blood   Result Value Ref Range    Ammonia 83 (H) 16 - 60 umol/L   Basic Metabolic Panel    Collection Time: 08/29/24  9:18 AM    Specimen: Blood   Result Value Ref Range    Glucose 104 (H) 65 - 99 mg/dL    BUN 17 6 - 20 mg/dL    Creatinine 1.28 (H) 0.76 - 1.27 mg/dL    Sodium 140 136 - 145 mmol/L    Potassium 3.5 3.5 - 5.2 mmol/L    Chloride 104 98 - 107 mmol/L    CO2 23.0 22.0 - 29.0 mmol/L     Calcium 9.5 8.6 - 10.5 mg/dL    BUN/Creatinine Ratio 13.3 7.0 - 25.0    Anion Gap 13.0 5.0 - 15.0 mmol/L    eGFR 73.0 >60.0 mL/min/1.73       Result Review:  I have personally reviewed the results from the time of this admission to 8/29/2024 14:04 CDT and agree with these findings:  [x]  Laboratory list / accordion  []  Microbiology  []  Radiology  []  EKG/Telemetry   []  Cardiology/Vascular   []  Pathology  []  Old records  []  Other:  Most notable findings include: His ETOH discriminant factor is 25 which is a significant improvement from the last time I saw. him.       Assessment/recommendations/plan:  Problem List:     Hepatic encephalopathy    He says he has stopped drinking, and his improved parameters reflect that. Encouraged to remain abstinent. Would continue current management. Will sign off for now.     Thee White MD  8/29/2024    DISCLAIMER:    This physician works through a [employer] as an inpatient consultant gastroenterologist only and has no outpatient clinic for patient follow up.  Any results not available at time of inpatient discharge and/or GI clinic follow up should be managed by the hospitalist team, PCP, or outpatient gastroenterologist.

## 2024-08-29 NOTE — PLAN OF CARE
Goal Outcome Evaluation:  Plan of Care Reviewed With: patient        Progress: improving  Outcome Evaluation: Pt up in chair and c/o fatigue.He was min x1 to stand and cga with vc's to walk with RW.Pt needed cues to keep his feet inside RW and he walked with a wide base and his R hip ER .Pt was able to walk 30ft with RW CGA.Pt would benefit from continued PT at sub acute setting.

## 2024-08-29 NOTE — PLAN OF CARE
Goal Outcome Evaluation:  Plan of Care Reviewed With: patient           Outcome Evaluation: OT eval completed. Pt presents alert and oriented x4, sitting up in bed and needing to go to bathroom. He is very slow to respond this morning and requires increased time for processing and task completion. He reports at baseline being independent with all adls and mobility. He was able to bring self to sitting at EOB with Min A. Max A to don socks due to lower extremity weakness and significent edema. He complete sit <> stand t/f from EOB with Min-Mod A and amb to bathroom and then to chair with use of rwx and Min A. He required increased time for all mobility and task completion. He was Max A for perineal hygiene and clothing mgt. He was left sitting up in chair with chair alarm on and waffle cushion. He demonstrates impaired cognition, strength, balance, activity tolerance and safety awareness. He would benefit from skilled OT services to address these deficits. Recommend d/c to SNF.      Anticipated Discharge Disposition (OT): skilled nursing facility

## 2024-08-29 NOTE — THERAPY TREATMENT NOTE
Acute Care - Physical Therapy Treatment Note  [facility]     Patient Name: Luciano Christiansen  : 1985  MRN: [id number]  Today's Date: 2024      Visit Dx:     ICD-10-CM ICD-9-CM   1. Hepatic encephalopathy  K76.82 572.2   2. MAYRA (acute kidney injury)  N17.9 584.9   3. Anasarca  R60.1 782.3   4. Chronic anemia  D64.9 285.9   5. Hepatosplenomegaly  R16.2 571.8   6. Chronic liver disease  K76.9 571.9   7. Lymphadenopathy  R59.1 785.6   8. Calculus of gallbladder without cholecystitis without obstruction  K80.20 574.20   9. Impaired mobility [Z74.09]  Z74.09 799.89     Patient Active Problem List   Diagnosis    Wellness examination    Encounter for hepatitis C screening test for low risk patient    Primary hypertension    Class 1 obesity due to excess calories without serious comorbidity with body mass index (BMI) of 34.0 to 34.9 in adult    Fatty liver    Hyponatremia    Alkalosis    Alcoholism    Transaminitis    Hypophosphatemia    Insomnia disorder related to known organic factor    Community acquired bilateral lower lobe pneumonia    Pneumonia due to Streptococcus    GI bleed    Diabetic ketoacidosis associated with type 2 diabetes mellitus    Alcohol intoxication in active alcoholic    Calculus of gallbladder without cholecystitis without obstruction    Alcoholic encephalopathy    Type 2 diabetes mellitus    Alcohol withdrawal    Alcoholic steatohepatitis    BMI 40.0-44.9, adult    Hepatic encephalopathy     Past Medical History:   Diagnosis Date    Alcoholism     Diabetes mellitus     Gout     Hypertension     Jaundice      Past Surgical History:   Procedure Laterality Date    VASECTOMY       PT Assessment (Last 12 Hours)       PT Evaluation and Treatment       Row Name 24 1146          Physical Therapy Time and Intention    Subjective Information fatigue;complains of  -AE     Document Type therapy note (daily note)  -AE     Mode of Treatment physical therapy  -AE       Row Name 24 1146           General Information    Existing Precautions/Restrictions fall  -AE       Row Name 08/29/24 1146          Pain    Pretreatment Pain Rating 0/10 - no pain  -AE       Row Name 08/29/24 1146          Bed Mobility    Comment, (Bed Mobility) up in chair  -AE       Row Name 08/29/24 1146          Sit-Stand Transfer    Sit-Stand [place] (Transfers) minimum assist (75% patient effort);verbal cues  -AE       Row Name 08/29/24 1146          Stand-Sit Transfer    Stand-Sit [place] (Transfers) minimum assist (75% patient effort);verbal cues  -AE       Row Name 08/29/24 1146          Toilet Transfer    [place] Level (Toilet Transfer) minimum assist (75% patient effort);verbal cues  -AE       Row Name 08/29/24 1146          Gait/Stairs (Locomotion)    [place] Level (Gait) contact guard  -AE     Assistive Device (Gait) walker, front-wheeled  -AE     Distance in Feet (Gait) 30  -AE     Deviations/Abnormal Patterns (Gait) base of support, wide  -AE     Right Sided Gait Deviations heel strike decreased  Pt ER and drags R leg behind him  -AE       Row Name 08/29/24 1146          Positioning and Restraints    Pre-Treatment Position sitting in chair/recliner  -AE     Post Treatment Position chair  -AE     In Chair sitting;call light within reach  Notified RN  -AE               User Key  (r) = Recorded By, (t) = Taken By, (c) = Cosigned By      Initials Name Provider Type    AE Anabell Story, PTA Physical Therapist Assistant                    Physical Therapy Education       Title: PT OT SLP Therapies (In Progress)       Topic: Physical Therapy (In Progress)       Point: Mobility training (Done)       Learning Progress Summary             Patient Acceptance, E, VU by AE at 8/29/2024 1318    Comment: T/F'S    Acceptance, E, VU,NR by COLIN at 8/28/2024 1428    Comment: Benefits of activity, progression of PT                         Point: Home exercise program (Not Started)       Learner Progress:  Not documented in this  visit.              Point: Body mechanics (Not Started)       Learner Progress:  Not documented in this visit.              Point: Precautions (Not Started)       Learner Progress:  Not documented in this visit.                              User Key       Initials Effective Dates Name Provider Type Discipline    AE 02/03/23 -  Anabell Story PTA Physical Therapist Assistant PT    COLIN 02/03/23 -  Shiva Mcleod PT DPT Physical Therapist PT                  PT Recommendation and Plan     Plan of Care Reviewed With: patient  Progress: improving  Outcome Evaluation: Pt up in chair and c/o fatigue.He was min x1 to stand and cga with vc's to walk with RW.Pt needed cues to keep his feet inside RW and he walked with a wide base and his R hip ER .Pt was able to walk 30ft with RW CGA.Pt would benefit from continued PT at sub acute setting.       Time Calculation:    PT Charges       Row Name 08/29/24 1318             Time Calculation    Start Time 1146  -AE      Stop Time 1216  -AE      Time Calculation (min) 30 min  -AE      PT Received On 08/29/24  -AE      PT Goal Re-Cert Due Date 09/07/24  -AE         Time Calculation- PT    Total Timed Code Minutes- PT 30 minute(s)  -AE         Timed Charges    93230 - Gait Training Minutes  15  -AE      26839 - PT Therapeutic Activity Minutes 15  -AE         Total Minutes    Timed Charges Total Minutes 30  -AE       Total Minutes 30  -AE                User Key  (r) = Recorded By, (t) = Taken By, (c) = Cosigned By      Initials Name Provider Type    AE Anablel Story PTA Physical Therapist Assistant                  Therapy Charges for Today       Code Description Service Date Service Provider Modifiers Qty    01122009470 HC GAIT TRAINING EA 15 MIN 8/29/2024 Anabell Story PTA GP 1    95337959745 HC PT THERAPEUTIC ACT EA 15 MIN 8/29/2024 Anabell Story PTA GP 1            PT G-Codes  Outcome Measure Options: AM-PAC 6 Clicks Daily Activity (OT)  AM-PAC 6 Clicks Score (PT):  15  AM-PAC 6 Clicks Score (OT): 15    Anabell Story, PTA  8/29/2024

## 2024-08-29 NOTE — PLAN OF CARE
Goal Outcome Evaluation:              Outcome Evaluation: VSS. A&Ox4. Pt had c/o pain last night around his genital area. Anyi contacted Dr. Shannon for pain medication last night, and it was given to him. Pt had many small bowel movements during the shift. He used BSC at the beginning of shift, then used the bedpan towards the end of the shift. He was unsteady while standing and transferring. Harris in place w/ good output. Bumex gtt. NSR 83-95 w/ first degree on tele.

## 2024-08-29 NOTE — PROGRESS NOTES
[facility] Medicine Services  INPATIENT PROGRESS NOTE    Patient Name: Luciano Christiansen  Date of Admission: 8/27/2024  Today's Date: 08/29/24  Length of Stay: 2  Primary Care Physician: Jossy Christiansen APRN    Subjective   Chief Complaint: Follow-up  HPI   Hemodynamically stable-blood pressure 128/61  Had several bouts of bowel movement as per nursing  Despite this, ammonia level remains elevated but improved compared to admission  Potassium 3.4    Creatinine has improved with diuresis.    Had -3.6 L yesterday since initiation of Bumex drip.  Harris catheter placed for monitoring of response to treatment.  High risk for fall.      States that he had decent amount of stool about 6-7 times.  He was hoping that the Harris catheter can be removed sometime soon.  I explained the rationale behind this.  He agreed.    Review of Systems   Patient has no complaints  States urinating and has bowel movement  Afebrile  No reported difficulty breathing      Objective    Temp:  [97.7 °F (36.5 °C)-98.3 °F (36.8 °C)] 97.9 °F (36.6 °C)  Heart Rate:  [82-90] 86  Resp:  [18-22] 18  BP: (128-146)/(60-65) 128/61  Physical Exam  He is more conversant today.  Seated comfortably on recliner  He drank his milk.  Some of the milk spilled and trickled through his beard.  BMI 43.5  No distress  Short neck   looks older than his stated age  Pale conjunctiva  Diminished breath sound due to body habitus  Distant heart sounds due to body habitus  His belly is hard as a rock but his lower extremities likewise.  I could not even feel any pitting edema.  He has no gross tenderness.  No rebound  Difficult to examine for any organomegaly given significant subcutaneous edema  Warm dry skin  Clear yellow urine in Harris catheter bag    Results Review:  I have reviewed the labs, radiology results, and diagnostic studies.    Laboratory Data:   Results from last 7 days   Lab Units 08/27/24  1141   WBC 10*3/mm3 10.87*  "  HEMOGLOBIN g/dL 8.8*   HEMATOCRIT % 28.0*   PLATELETS 10*3/mm3 356        Results from last 7 days   Lab Units 08/28/24  1815 08/28/24  0339 08/27/24  1143 08/27/24  1141   SODIUM mmol/L 138 139  --  138   SODIUM, ARTERIAL mmol/L  --   --  142  --    POTASSIUM mmol/L 3.4*  3.4* 3.3*  --  3.8   CHLORIDE mmol/L 102 103  --  102   CO2 mmol/L 24.0 22.0  --  21.0*   BUN mg/dL 20 23*  --  26*   CREATININE mg/dL 1.34* 1.41*  --  1.84*   CALCIUM mg/dL 9.4 8.9  --  9.1   BILIRUBIN mg/dL  --  2.4*  --  2.4*   ALK PHOS U/L  --  129*  --  147*   ALT (SGPT) U/L  --  17  --  17   AST (SGOT) U/L  --  49*  --  52*   GLUCOSE mg/dL 108* 81  --  100*       Culture Data:   No results found for: \"BLOODCX\", \"URINECX\", \"WOUNDCX\", \"MRSACX\", \"RESPCX\", \"STOOLCX\"    Radiology Data:   Imaging Results (Last 24 Hours)       ** No results found for the last 24 hours. **            I have reviewed the patient's current medications.     Assessment/Plan   Assessment  Active Hospital Problems    Diagnosis     **Hepatic encephalopathy        Medical Decision Making/treatment plan  Number and Complexity of problems:   Hepatic encephalopathy versus medication induced (chlordiazepoxide-recently prescribed August 18 by Dr. Rangel) versus combination of it   Hyperammonemia-continue lactulose with goals of 3-4 bowel movements per day  History of alcoholism-no suggestive evidence of alcohol withdrawal   cirrhosis-continue diuretics; other plans per GI service  Hypertension-stable and tolerating diuresis  Macrocytic anemia in patient with history of alcohol use  Cholelithiasis-gallbladder wall thickening without biliary dilatation may relate to small volume perihepatic ascites versus less likely calculus cholecystitis.  Fluid retention lower extremities-continue Bumex drip  Morbid obesity with BMI 44  Mild hypokalemia.  At continued risk for loss given Bumex drip.  Replace accordingly.  Functional decline-PT OT evaluation      Meds reviewed  enoxaparin, 40 " mg, Subcutaneous, Q12H  folic acid, 1 mg, Oral, Daily  lactulose, 300 mL, Rectal, Once  lactulose, 20 g, Oral, TID  levothyroxine, 50 mcg, Oral, Q AM  nicotine, 1 patch, Transdermal, Q24H  pantoprazole, 40 mg, Oral, Daily  pentoxifylline, 400 mg, Oral, TID With Meals  riFAXIMin, 550 mg, Oral, BID  sodium chloride, 10 mL, Intravenous, Q12H  spironolactone, 100 mg, Oral, Daily  thiamine, 100 mg, Oral, Daily          Service note reviewed.  Patient appears to be slowly clinically improving.  Conditions and Status  Fair     [facility] Data  External documents reviewed: Reviewed epic record  Cardiac tracing (EKG, telemetry) interpretation: -  Radiology interpretation: Reviewed multiple imaging studies as outlined above  Labs reviewed: Yes  Any tests that were considered but not ordered: None     Decision rules/scores evaluated (example [id number], [place], etc):        Discussed with: Mom and floor nursing staff  Care Planning  Shared decision making: Mom  Code status and discussions: Full code     Disposition  Social Determinants of Health that impact treatment or disposition: None identified at this time  Estimated length of stay is to be determined.      I confirmed that the patient's advanced care plan is present, code status is documented, and a surrogate decision maker is listed in the patient's medical record.      The patient's surrogate decision maker is mom.   I expect the patient to be discharged to (TBD)  Electronically signed by Roderick Field MD, 08/29/24, 08:49 CDT.

## 2024-08-30 LAB
AMMONIA BLD-SCNC: 87 UMOL/L (ref 16–60)
ANION GAP SERPL CALCULATED.3IONS-SCNC: 11 MMOL/L (ref 5–15)
BUN SERPL-MCNC: 13 MG/DL (ref 6–20)
BUN/CREAT SERPL: 9.9 (ref 7–25)
CALCIUM SPEC-SCNC: 8.7 MG/DL (ref 8.6–10.5)
CHLORIDE SERPL-SCNC: 103 MMOL/L (ref 98–107)
CO2 SERPL-SCNC: 26 MMOL/L (ref 22–29)
CREAT SERPL-MCNC: 1.31 MG/DL (ref 0.76–1.27)
EGFRCR SERPLBLD CKD-EPI 2021: 71 ML/MIN/1.73
GLUCOSE SERPL-MCNC: 77 MG/DL (ref 65–99)
MAGNESIUM SERPL-MCNC: 1 MG/DL (ref 1.6–2.6)
POTASSIUM SERPL-SCNC: 2.9 MMOL/L (ref 3.5–5.2)
POTASSIUM SERPL-SCNC: 3.6 MMOL/L (ref 3.5–5.2)
SODIUM SERPL-SCNC: 140 MMOL/L (ref 136–145)

## 2024-08-30 PROCEDURE — 80048 BASIC METABOLIC PNL TOTAL CA: CPT | Performed by: INTERNAL MEDICINE

## 2024-08-30 PROCEDURE — 25010000002 MAGNESIUM SULFATE 4 GM/100ML SOLUTION: Performed by: INTERNAL MEDICINE

## 2024-08-30 PROCEDURE — 97110 THERAPEUTIC EXERCISES: CPT

## 2024-08-30 PROCEDURE — 82140 ASSAY OF AMMONIA: CPT | Performed by: INTERNAL MEDICINE

## 2024-08-30 PROCEDURE — 25010000002 MAGNESIUM SULFATE 2 GM/50ML SOLUTION: Performed by: INTERNAL MEDICINE

## 2024-08-30 PROCEDURE — 97535 SELF CARE MNGMENT TRAINING: CPT | Performed by: OCCUPATIONAL THERAPIST

## 2024-08-30 PROCEDURE — 25010000002 BUMETANIDE PER 0.5 MG: Performed by: INTERNAL MEDICINE

## 2024-08-30 PROCEDURE — 83735 ASSAY OF MAGNESIUM: CPT | Performed by: INTERNAL MEDICINE

## 2024-08-30 PROCEDURE — 84132 ASSAY OF SERUM POTASSIUM: CPT | Performed by: INTERNAL MEDICINE

## 2024-08-30 PROCEDURE — 25010000002 ENOXAPARIN PER 10 MG: Performed by: INTERNAL MEDICINE

## 2024-08-30 RX ORDER — POTASSIUM CHLORIDE 750 MG/1
40 CAPSULE, EXTENDED RELEASE ORAL EVERY 4 HOURS
Status: COMPLETED | OUTPATIENT
Start: 2024-08-30 | End: 2024-08-31

## 2024-08-30 RX ORDER — MAGNESIUM SULFATE HEPTAHYDRATE 40 MG/ML
2 INJECTION, SOLUTION INTRAVENOUS
Status: COMPLETED | OUTPATIENT
Start: 2024-08-30 | End: 2024-08-30

## 2024-08-30 RX ORDER — POTASSIUM CHLORIDE 750 MG/1
40 CAPSULE, EXTENDED RELEASE ORAL EVERY 4 HOURS
Status: COMPLETED | OUTPATIENT
Start: 2024-08-30 | End: 2024-08-30

## 2024-08-30 RX ORDER — MAGNESIUM SULFATE HEPTAHYDRATE 40 MG/ML
4 INJECTION, SOLUTION INTRAVENOUS EVERY 4 HOURS
Status: COMPLETED | OUTPATIENT
Start: 2024-08-30 | End: 2024-08-30

## 2024-08-30 RX ADMIN — PENTOXIFYLLINE 400 MG: 400 TABLET, EXTENDED RELEASE ORAL at 08:37

## 2024-08-30 RX ADMIN — MAGNESIUM SULFATE HEPTAHYDRATE 2 G: 2 INJECTION, SOLUTION INTRAVENOUS at 11:53

## 2024-08-30 RX ADMIN — MAGNESIUM SULFATE HEPTAHYDRATE 4 G: 40 INJECTION, SOLUTION INTRAVENOUS at 16:52

## 2024-08-30 RX ADMIN — Medication 5 MG: at 20:00

## 2024-08-30 RX ADMIN — ENOXAPARIN SODIUM 40 MG: 100 INJECTION SUBCUTANEOUS at 08:44

## 2024-08-30 RX ADMIN — MAGNESIUM SULFATE HEPTAHYDRATE 4 G: 40 INJECTION, SOLUTION INTRAVENOUS at 12:44

## 2024-08-30 RX ADMIN — FOLIC ACID 1 MG: 1 TABLET ORAL at 08:38

## 2024-08-30 RX ADMIN — LACTULOSE 20 G: 20 SOLUTION ORAL at 08:37

## 2024-08-30 RX ADMIN — LACTULOSE 20 G: 20 SOLUTION ORAL at 15:01

## 2024-08-30 RX ADMIN — POTASSIUM CHLORIDE 40 MEQ: 750 CAPSULE, EXTENDED RELEASE ORAL at 22:45

## 2024-08-30 RX ADMIN — POTASSIUM CHLORIDE 40 MEQ: 750 CAPSULE, EXTENDED RELEASE ORAL at 11:38

## 2024-08-30 RX ADMIN — PENTOXIFYLLINE 400 MG: 400 TABLET, EXTENDED RELEASE ORAL at 11:38

## 2024-08-30 RX ADMIN — THIAMINE HCL TAB 100 MG 100 MG: 100 TAB at 08:36

## 2024-08-30 RX ADMIN — ENOXAPARIN SODIUM 40 MG: 100 INJECTION SUBCUTANEOUS at 20:01

## 2024-08-30 RX ADMIN — POTASSIUM CHLORIDE 40 MEQ: 750 CAPSULE, EXTENDED RELEASE ORAL at 08:36

## 2024-08-30 RX ADMIN — LEVOTHYROXINE SODIUM 50 MCG: 50 TABLET ORAL at 05:16

## 2024-08-30 RX ADMIN — RIFAXIMIN 550 MG: 550 TABLET ORAL at 08:37

## 2024-08-30 RX ADMIN — POTASSIUM CHLORIDE 40 MEQ: 750 CAPSULE, EXTENDED RELEASE ORAL at 15:01

## 2024-08-30 RX ADMIN — SPIRONOLACTONE 100 MG: 50 TABLET ORAL at 08:38

## 2024-08-30 RX ADMIN — RIFAXIMIN 550 MG: 550 TABLET ORAL at 20:00

## 2024-08-30 RX ADMIN — LACTULOSE 20 G: 20 SOLUTION ORAL at 20:01

## 2024-08-30 RX ADMIN — Medication 10 ML: at 08:38

## 2024-08-30 RX ADMIN — PANTOPRAZOLE SODIUM 40 MG: 40 TABLET, DELAYED RELEASE ORAL at 08:36

## 2024-08-30 RX ADMIN — Medication 10 ML: at 20:01

## 2024-08-30 RX ADMIN — PENTOXIFYLLINE 400 MG: 400 TABLET, EXTENDED RELEASE ORAL at 17:09

## 2024-08-30 RX ADMIN — BUMETANIDE 1 MG/HR: 0.25 INJECTION INTRAMUSCULAR; INTRAVENOUS at 11:37

## 2024-08-30 NOTE — THERAPY TREATMENT NOTE
Acute Care - Physical Therapy Treatment Note  [facility]     Patient Name: Luciano Christiansen  : 1985  MRN: [id number]  Today's Date: 2024      Visit Dx:     ICD-10-CM ICD-9-CM   1. Hepatic encephalopathy  K76.82 572.2   2. MAYRA (acute kidney injury)  N17.9 584.9   3. Anasarca  R60.1 782.3   4. Chronic anemia  D64.9 285.9   5. Hepatosplenomegaly  R16.2 571.8   6. Chronic liver disease  K76.9 571.9   7. Lymphadenopathy  R59.1 785.6   8. Calculus of gallbladder without cholecystitis without obstruction  K80.20 574.20   9. Impaired mobility [Z74.09]  Z74.09 799.89     Patient Active Problem List   Diagnosis    Wellness examination    Encounter for hepatitis C screening test for low risk patient    Primary hypertension    Class 1 obesity due to excess calories without serious comorbidity with body mass index (BMI) of 34.0 to 34.9 in adult    Fatty liver    Hyponatremia    Alkalosis    Alcoholism    Transaminitis    Hypophosphatemia    Insomnia disorder related to known organic factor    Community acquired bilateral lower lobe pneumonia    Pneumonia due to Streptococcus    GI bleed    Diabetic ketoacidosis associated with type 2 diabetes mellitus    Alcohol intoxication in active alcoholic    Calculus of gallbladder without cholecystitis without obstruction    Alcoholic encephalopathy    Type 2 diabetes mellitus    Alcohol withdrawal    Alcoholic steatohepatitis    BMI 40.0-44.9, adult    Hepatic encephalopathy     Past Medical History:   Diagnosis Date    Alcoholism     Diabetes mellitus     Gout     Hypertension     Jaundice      Past Surgical History:   Procedure Laterality Date    VASECTOMY       PT Assessment (Last 12 Hours)       PT Evaluation and Treatment       Row Name 24 0958          Physical Therapy Time and Intention    Subjective Information complains of;fatigue  -WK     Document Type therapy note (daily note)  -WK     Mode of Treatment physical therapy  -WK       Row Name 24 0958           General Information    Existing Precautions/Restrictions fall  -WK       Row Name 08/30/24 0958          Pain    Pretreatment Pain Rating 0/10 - no pain  -WK     Posttreatment Pain Rating 0/10 - no pain  -WK       Row Name 08/30/24 0958          Bed Mobility    Comment, (Bed Mobility) inchair  -WK       Row Name 08/30/24 0958          Stand-Sit Transfer    Stand-Sit [place] (Transfers) --  could not attempt due to pt being difficult to keep awake  -WK       Row Name 08/30/24 0958          Motor Skills    Comments, Therapeutic Exercise AAROM BLE 10 reps. unable to attempt other task.  -WK     Additional Documentation Comments, Therapeutic Exercise (Row)  -WK       Row Name 08/30/24 0958          Plan of Care Review    Plan of Care Reviewed With patient  -WK     Outcome Evaluation Pt c/o fatigue today and requires a lot of cues to stay awake. Pt performed AAROM BLE. Unsafe to attempt to stand at this time.  -WK       Row Name 08/30/24 0958          Positioning and Restraints    Pre-Treatment Position sitting in chair/recliner  -WK     Post Treatment Position chair  -WK     In Chair reclined;call light within reach;encouraged to call for assist  -WK               User Key  (r) = Recorded By, (t) = Taken By, (c) = Cosigned By      Initials Name Provider Type    WK Gris West PTA Physical Therapist Assistant                    Physical Therapy Education       Title: PT OT SLP Therapies (In Progress)       Topic: Physical Therapy (In Progress)       Point: Mobility training (Done)       Learning Progress Summary             Patient Acceptance, E, VU by AE at 8/29/2024 1318    Comment: T/F'S    Acceptance, E, VU,NR by COLIN at 8/28/2024 1428    Comment: Benefits of activity, progression of PT                         Point: Home exercise program (Not Started)       Learner Progress:  Not documented in this visit.              Point: Body mechanics (Not Started)       Learner Progress:  Not documented in this visit.               Point: Precautions (Not Started)       Learner Progress:  Not documented in this visit.                              User Key       Initials Effective Dates Name Provider Type Discipline    AE 02/03/23 -  Anabell Story PTA Physical Therapist Assistant PT    COLIN 02/03/23 -  Shiva Mcleod PT DPT Physical Therapist PT                  PT Recommendation and Plan     Plan of Care Reviewed With: patient  Outcome Evaluation: Pt c/o fatigue today and requires a lot of cues to stay awake. Pt performed AAROM BLE. Unsafe to attempt to stand at this time.       Time Calculation:    PT Charges       Row Name 08/30/24 1019             Time Calculation    Start Time 0958  -WK      Stop Time 1010  -WK      Time Calculation (min) 12 min  -WK      PT Received On 08/30/24  -WK         Time Calculation- PT    Total Timed Code Minutes- PT 12 minute(s)  -WK         Timed Charges    68535 - PT Therapeutic Exercise Minutes 12  -WK         Total Minutes    Timed Charges Total Minutes 12  -WK       Total Minutes 12  -WK                User Key  (r) = Recorded By, (t) = Taken By, (c) = Cosigned By      Initials Name Provider Type    WK Gris West PTA Physical Therapist Assistant                  Therapy Charges for Today       Code Description Service Date Service Provider Modifiers Qty    10610553189 HC PT THER PROC EA 15 MIN 8/30/2024 Gris West PTA GP 1            PT G-Codes  Outcome Measure Options: AM-PAC 6 Clicks Daily Activity (OT)  AM-PAC 6 Clicks Score (PT): 18  AM-PAC 6 Clicks Score (OT): 15    Gris West PTA  8/30/2024

## 2024-08-30 NOTE — THERAPY TREATMENT NOTE
Patient Name: Luciano Christiansen  : 1985    MRN: [id number]                              Today's Date: 2024       Admit Date: 2024    Visit Dx:     ICD-10-CM ICD-9-CM   1. Hepatic encephalopathy  K76.82 572.2   2. MAYRA (acute kidney injury)  N17.9 584.9   3. Anasarca  R60.1 782.3   4. Chronic anemia  D64.9 285.9   5. Hepatosplenomegaly  R16.2 571.8   6. Chronic liver disease  K76.9 571.9   7. Lymphadenopathy  R59.1 785.6   8. Calculus of gallbladder without cholecystitis without obstruction  K80.20 574.20   9. Impaired mobility [Z74.09]  Z74.09 799.89     Patient Active Problem List   Diagnosis    Wellness examination    Encounter for hepatitis C screening test for low risk patient    Primary hypertension    Class 1 obesity due to excess calories without serious comorbidity with body mass index (BMI) of 34.0 to 34.9 in adult    Fatty liver    Hyponatremia    Alkalosis    Alcoholism    Transaminitis    Hypophosphatemia    Insomnia disorder related to known organic factor    Community acquired bilateral lower lobe pneumonia    Pneumonia due to Streptococcus    GI bleed    Diabetic ketoacidosis associated with type 2 diabetes mellitus    Alcohol intoxication in active alcoholic    Calculus of gallbladder without cholecystitis without obstruction    Alcoholic encephalopathy    Type 2 diabetes mellitus    Alcohol withdrawal    Alcoholic steatohepatitis    BMI 40.0-44.9, adult    Hepatic encephalopathy     Past Medical History:   Diagnosis Date    Alcoholism     Diabetes mellitus     Gout     Hypertension     Jaundice      Past Surgical History:   Procedure Laterality Date    VASECTOMY        General Information       Row Name 24 1434          OT Time and Intention    Document Type therapy note (daily note)  -CH     Mode of Treatment occupational therapy  -CH       Row Name 24 1434          General Information    Patient Profile Reviewed yes  -CH     Existing Precautions/Restrictions fall  -CH        Row Name 08/30/24 1434          Safety Issues, Functional Mobility    Impairments Affecting Function (Mobility) balance;endurance/activity tolerance;strength;cognition;pain  -     Cognitive Impairments, Mobility Safety/Performance attention;insight into deficits/self-awareness;judgment;safety precaution awareness;safety precaution follow-through  -               User Key  (r) = Recorded By, (t) = Taken By, (c) = Cosigned By      Initials Name Provider Type     Maribel Vigil, OTR/L Occupational Therapist                     Mobility/ADL's       Row Name 08/30/24 1434          Bed Mobility    Bed Mobility sit-supine  -     Sit-Supine [place] (Bed Mobility) contact guard  -     Bed Mobility, Safety Issues decreased use of legs for bridging/pushing  -     Assistive Device (Bed Mobility) bed rails  -[facility] Name 08/30/24 1434          Transfers    Transfers sit-stand transfer;stand-sit transfer  -[facility] Name 08/30/24 1434          Sit-Stand Transfer    Sit-Stand [place] (Transfers) contact guard;verbal cues  -     Assistive Device (Sit-Stand Transfers) walker, front-wheeled  -[facility] Name 08/30/24 1434          Stand-Sit Transfer    Stand-Sit [place] (Transfers) contact guard;verbal cues  -     Assistive Device (Stand-Sit Transfers) walker, front-wheeled  -[facility] Name 08/30/24 1434          Toilet Transfer    Comment, (Toilet Transfer) Found pt up in bathroom unassisted  -[facility] Name 08/30/24 1434          Functional Mobility    Functional Mobility- Ind. Level contact guard assist  -     Functional Mobility- Device walker, front-wheeled  -     Functional Mobility- Comment Found pt up in bathroom unattended.  OTR assisted pt from BR > Bed with cues for rwx management & fall risk safety  -[facility] Name 08/30/24 1434          Activities of Daily Living    BADL Assessment/Intervention upper body dressing  -[facility] Name 08/30/24 1434          Upper Body  Dressing Assessment/Training    [place] Level (Upper Body Dressing) minimum assist (75% patient effort);don  -CH     Position (Upper Body Dressing) edge of bed sitting  -               User Key  (r) = Recorded By, (t) = Taken By, (c) = Cosigned By      Initials Name Provider Type    Maribel Edwards OTR/L Occupational Therapist                   Obj/Interventions       Row Name 08/30/24 1434          Balance    Balance Interventions sitting;standing;sit to stand;supported;static;dynamic;occupation based/functional task  -               User Key  (r) = Recorded By, (t) = Taken By, (c) = Cosigned By      Initials Name Provider Type    Maribel Edwards, OTR/L Occupational Therapist                   Goals/Plan    No documentation.                  Clinical Impression       Row Name 08/30/24 1434          Pain Scale: FACES Pre/Post-Treatment    Pain: FACES Scale, Pretreatment 0-->no hurt  -     Posttreatment Pain Rating 0-->no hurt  -       Row Name 08/30/24 1434          Plan of Care Review    Plan of Care Reviewed With patient  -CH     Progress improving  -     Outcome Evaluation Found pt up in bathroom unattended. OTR assisted pt from BR > Bed with cues for rwx management & fall risk safety. Mr  is slow to respond and requires mod cues to follow safetty strategies.  He is a significant fall risk & demo's fatigue/weakness/somnelence.  Cont OT tx  -       Row Name 08/30/24 1434          Therapy Assessment/Plan (OT)    Rehab Potential (OT) good, to achieve stated therapy goals  -     Criteria for Skilled Therapeutic Interventions Met (OT) yes;skilled treatment is necessary  -     Therapy Frequency (OT) 5 times/wk  -       Row Name 08/30/24 1434          Therapy Plan Review/Discharge Plan (OT)    Anticipated Discharge Disposition (OT) skilled nursing facility  -       Row Name 08/30/24 1434          Positioning and Restraints    Pre-Treatment Position bathroom  -     Post Treatment  Position bed  -     In Bed fowlers;call light within reach;encouraged to call for assist;side rails up x3;exit alarm on;notified nsg  -               User Key  (r) = Recorded By, (t) = Taken By, (c) = Cosigned By      Initials Name Provider Type    Maribel Edwards, OTR/L Occupational Therapist                   Outcome Measures       Row Name 08/30/24 1434          How much help from another is currently needed...    Putting on and taking off regular lower body clothing? 2  -CH     Bathing (including washing, rinsing, and drying) 2  -CH     Toileting (which includes using toilet bed pan or urinal) 2  -CH     Putting on and taking off regular upper body clothing 3  -CH     Taking care of personal grooming (such as brushing teeth) 3  -CH     Eating meals 3  -CH     AM-PAC 6 Clicks Score (OT) 15  -CH       Row Name 08/30/24 1434          Functional Assessment    Outcome Measure Options AM-PAC 6 Clicks Daily Activity (OT)  -CH               User Key  (r) = Recorded By, (t) = Taken By, (c) = Cosigned By      Initials Name Provider Type    Maribel Edwards, OTR/L Occupational Therapist                    Occupational Therapy Education       Title: PT OT SLP Therapies (In Progress)       Topic: Occupational Therapy (Done)       Point: ADL training (Done)       Description:   Instruct learner(s) on proper safety adaptation and remediation techniques during self care or transfers.   Instruct in proper use of assistive devices.                  Learning Progress Summary             Patient Acceptance, E, VU,NR by CH at 8/30/2024 1454    Acceptance, E, VU by AE at 8/29/2024 1318    Comment: T/F'S    Acceptance, E, VU by JJ at 8/29/2024 0905                         Point: Home exercise program (Done)       Description:   Instruct learner(s) on appropriate technique for monitoring, assisting and/or progressing therapeutic exercises/activities.                  Learning Progress Summary             Patient Acceptance, E,  VU by AE at 8/29/2024 1318    Comment: T/F'S                         Point: Precautions (Done)       Description:   Instruct learner(s) on prescribed precautions during self-care and functional transfers.                  Learning Progress Summary             Patient Acceptance, E, VU,NR by  at 8/30/2024 1454    Acceptance, E, VU by AE at 8/29/2024 1318    Comment: T/F'S    Acceptance, E, VU by J at 8/29/2024 0905                         Point: Body mechanics (Done)       Description:   Instruct learner(s) on proper positioning and spine alignment during self-care, functional mobility activities and/or exercises.                  Learning Progress Summary             Patient Acceptance, E, VU,NR by  at 8/30/2024 1454    Acceptance, E, VU by AE at 8/29/2024 1318    Comment: T/F'S                                         User Key       Initials Effective Dates Name Provider Type Discipline    AE 02/03/23 -  Anabell Story, PTA Physical Therapist Assistant PT     07/11/23 -  Maribel Vigil, OTR/L Occupational Therapist OT    J 07/11/23 -  Kassy Villalobos OTR/L, CSRS Occupational Therapist OT                  OT Recommendation and Plan  Therapy Frequency (OT): 5 times/wk  Plan of Care Review  Plan of Care Reviewed With: patient  Progress: improving  Outcome Evaluation: Found pt up in bathroom unattended. OTR assisted pt from BR > Bed with cues for rwx management & fall risk safety. Mr Christiansen is slow to respond and requires mod cues to follow safetty strategies.  He is a significant fall risk & demo's fatigue/weakness/somnelence.  Cont OT tx     Time Calculation:         Time Calculation- OT       Row Name 08/30/24 1434             Time Calculation- OT    OT Start Time 1434  -CH      OT Stop Time 1458  -      OT Time Calculation (min) 24 min  -      Total Timed Code Minutes- OT 24 minute(s)  -      OT Received On 08/30/24  -         Timed Charges    17288 - OT Self Care/Mgmt Minutes 24  -          Total Minutes    Timed Charges Total Minutes 24  -CH       Total Minutes 24  -CH                User Key  (r) = Recorded By, (t) = Taken By, (c) = Cosigned By      Initials Name Provider Type     Maribel Vigil OTR/L Occupational Therapist                  Therapy Charges for Today       Code Description Service Date Service Provider Modifiers Qty    [id number] HC OT SELF CARE/MGMT/TRAIN EA 15 MIN 8/30/2024 Maribel Vigil OTR/INGRIS GO 2                 KEYSHA Johnson/INGRIS  8/30/2024

## 2024-08-30 NOTE — PLAN OF CARE
Goal Outcome Evaluation:  Plan of Care Reviewed With: patient        Progress: improving  Outcome Evaluation: Found pt up in bathroom unattended. OTR assisted pt from BR > Bed with cues for rwx management & fall risk safety. Mr Christiansen is slow to respond and requires mod cues to follow safetty strategies.  He is a significant fall risk & demo's fatigue/weakness/somnelence.  Cont OT tx      Anticipated Discharge Disposition (OT): skilled nursing facility

## 2024-08-30 NOTE — PROGRESS NOTES
1         [facility] Medicine Services  INPATIENT PROGRESS NOTE    Patient Name: Luciano Christiansen  Date of Admission: 8/27/2024  Today's Date: 08/30/24  Length of Stay: 3  Primary Care Physician: Jossy Christiansen APRN    Subjective   Chief Complaint: Follow-up  HPI     Appreciate input from GI service.  No additional recommendation from them.  Alcohol discriminant factor is 25 which according to GI service significant improved compared to last time they seen him.  Hemodynamically stable  Afebrile      Patient effectively diuresing  Review of Systems   All pertinent negatives and positives are as above. All other systems have been reviewed and are negative unless otherwise stated.     Objective    Temp:  [97.9 °F (36.6 °C)-98.3 °F (36.8 °C)] 98.2 °F (36.8 °C)  Heart Rate:  [80-85] 82  Resp:  [18-20] 20  BP: (117-122)/(53-63) 120/57  Physical Exam  BMI 43.5  Still dozing off during conversation despite daily decreasing level of ammonia level  No distress  Short neck   looks older than his stated age  Pale conjunctiva  Diminished breath sound due to body habitus  Distant heart sounds due to body habitus  Belly is softer but still has significant amount of subcutaneous edema  He has no gross tenderness.  No rebound  Difficult to examine for any organomegaly given significant subcutaneous edema  Warm dry skin  Clear yellow urine in Harris catheter bag    Results Review:  I have reviewed the labs, radiology results, and diagnostic studies.    Laboratory Data:   Results from last 7 days   Lab Units 08/27/24  1141   WBC 10*3/mm3 10.87*   HEMOGLOBIN g/dL 8.8*   HEMATOCRIT % 28.0*   PLATELETS 10*3/mm3 356        Results from last 7 days   Lab Units 08/30/24  0459 08/29/24  2044 08/29/24  0918 08/28/24  1815 08/28/24  0339 08/27/24  1143 08/27/24  1141   SODIUM mmol/L 140  --  140 138 139  --  138   SODIUM, ARTERIAL   --   --   --   --   --    < >  --    POTASSIUM mmol/L 2.9* 3.7 3.5 3.4*  3.4*  "3.3*  --  3.8   CHLORIDE mmol/L 103  --  104 102 103  --  102   CO2 mmol/L 26.0  --  23.0 24.0 22.0  --  21.0*   BUN mg/dL 13  --  17 20 23*  --  26*   CREATININE mg/dL 1.31*  --  1.28* 1.34* 1.41*  --  1.84*   CALCIUM mg/dL 8.7  --  9.5 9.4 8.9  --  9.1   BILIRUBIN mg/dL  --   --   --   --  2.4*  --  2.4*   ALK PHOS U/L  --   --   --   --  129*  --  147*   ALT (SGPT) U/L  --   --   --   --  17  --  17   AST (SGOT) U/L  --   --   --   --  49*  --  52*   GLUCOSE mg/dL 77  --  104* 108* 81  --  100*    < > = values in this interval not displayed.       Culture Data:   No results found for: \"BLOODCX\", \"URINECX\", \"WOUNDCX\", \"MRSACX\", \"RESPCX\", \"STOOLCX\"    Radiology Data:   Imaging Results (Last 24 Hours)       ** No results found for the last 24 hours. **            I have reviewed the patient's current medications.     Assessment/Plan   Assessment  Active Hospital Problems    Diagnosis     **Hepatic encephalopathy        Medical Decision Making/treatment plan  Number and Complexity of problems:   Hepatic encephalopathy versus medication induced (chlordiazepoxide-recently prescribed August 18 by Dr. Rangel) versus combination of it   Hyperammonemia-continue lactulose with goals of 3-4 bowel movements per day  History of alcoholism-no suggestive evidence of alcohol withdrawal   cirrhosis-continue diuretics; other plans per GI service  Hypertension-stable and tolerating diuresis  Macrocytic anemia in patient with history of alcohol use  Cholelithiasis-gallbladder wall thickening without biliary dilatation may relate to small volume perihepatic ascites versus less likely calculus cholecystitis.  Fluid retention lower extremities-continue Bumex drip  Morbid obesity with BMI 44  Mild hypokalemia.  At continued risk for loss given Bumex drip.  Replace accordingly.  Functional decline-PT OT evaluation        Meds reviewed    Scheduled Medication   enoxaparin, 40 mg, Subcutaneous, Q12H  folic acid, 1 mg, Oral, Daily  lactulose, " 300 mL, Rectal, Once  lactulose, 20 g, Oral, TID  levothyroxine, 50 mcg, Oral, Q AM  nicotine, 1 patch, Transdermal, Q24H  pantoprazole, 40 mg, Oral, Daily  pentoxifylline, 400 mg, Oral, TID With Meals  potassium chloride ER, 40 mEq, Oral, Q4H  riFAXIMin, 550 mg, Oral, BID  sodium chloride, 10 mL, Intravenous, Q12H  spironolactone, 100 mg, Oral, Daily  thiamine, 100 mg, Oral, Daily            I considered adding back low-dose chlordiazepoxide but patient remains drowsy although improved compared to admission    Replace potassium accordingly and check magnesium  Service note reviewed.  Patient appears to be slowly clinically improving.    Per discussion with nurse Muniz, patient is not appropriate for an external catheter due to significant swelling of penis and scrotum  Increase activities as tolerated.  Continue PT OT        Conditions and Status  Fair     MDM Data  External documents reviewed: Reviewed epic record  Cardiac tracing (EKG, telemetry) interpretation: -    Results for orders placed during the hospital encounter of 07/04/24    Adult Transthoracic Echo Complete W/ Cont if Necessary Per Protocol    Interpretation Summary    Left ventricular ejection fraction appears to be 61 - 65%.    Left ventricular diastolic function was normal.    Estimated right ventricular systolic pressure from tricuspid regurgitation is normal (<35 mmHg).      Radiology interpretation: Reviewed multiple imaging studies as outlined above  Labs reviewed: Yes  Any tests that were considered but not ordered: None     Decision rules/scores evaluated (example [id number], Wells, etc):        Discussed with: Patient and nurse Muniz      care Planning  Shared decision making: patient and consultant  Code status and discussions: Full code     Disposition  Social Determinants of Health that impact treatment or disposition: None identified at this time  Estimated length of stay is to be determined.      I confirmed that the patient's  advanced care plan is present, code status is documented, and a surrogate decision maker is listed in the patient's medical record.      The patient's surrogate decision maker is mom.   I expect the patient to be discharged to (TBD)  Electronically signed by Roderick Field MD, 08/30/24, 07:54 CDT.

## 2024-08-30 NOTE — PLAN OF CARE
Problem: Adult Inpatient Plan of Care  Goal: Plan of Care Review  Outcome: Ongoing, Progressing  Goal: Patient-Specific Goal (Individualized)  Outcome: Ongoing, Progressing  Goal: Absence of Hospital-Acquired Illness or Injury  Outcome: Ongoing, Progressing  Intervention: Identify and Manage Fall Risk  Recent Flowsheet Documentation  Taken 8/30/2024 0400 by Christ Wiley RN  Safety Promotion/Fall Prevention: safety round/check completed  Taken 8/30/2024 0200 by Christ Wiley RN  Safety Promotion/Fall Prevention: safety round/check completed  Taken 8/30/2024 0000 by Christ Wiley RN  Safety Promotion/Fall Prevention: safety round/check completed  Taken 8/29/2024 2200 by Christ Wiley RN  Safety Promotion/Fall Prevention: safety round/check completed  Taken 8/29/2024 2100 by Christ Wiley RN  Safety Promotion/Fall Prevention: safety round/check completed  Taken 8/29/2024 2000 by Christ Wiley RN  Safety Promotion/Fall Prevention: safety round/check completed  Taken 8/29/2024 1900 by Christ Wiley RN  Safety Promotion/Fall Prevention: safety round/check completed  Intervention: Prevent and Manage VTE (Venous Thromboembolism) Risk  Recent Flowsheet Documentation  Taken 8/29/2024 2118 by Christ Wiley RN  Range of Motion: active ROM (range of motion) encouraged  Goal: Optimal Comfort and Wellbeing  Outcome: Ongoing, Progressing  Intervention: Provide Person-Centered Care  Recent Flowsheet Documentation  Taken 8/29/2024 2118 by Christ Wiley RN  Trust Relationship/Rapport:   care explained   choices provided   empathic listening provided   emotional support provided   questions answered   questions encouraged  Goal: Readiness for Transition of Care  Outcome: Ongoing, Progressing     Problem: Heart Failure Comorbidity  Goal: Maintenance of Heart Failure Symptom Control  Outcome: Ongoing, Progressing     Problem: Skin Injury Risk Increased  Goal: Skin Health and Integrity  Outcome: Ongoing, Progressing     Problem: Fall Injury Risk  Goal:  Absence of Fall and Fall-Related Injury  Outcome: Ongoing, Progressing  Intervention: Promote Injury-Free Environment  Recent Flowsheet Documentation  Taken 8/30/2024 0400 by Christ Wiley RN  Safety Promotion/Fall Prevention: safety round/check completed  Taken 8/30/2024 0200 by Christ Wiley RN  Safety Promotion/Fall Prevention: safety round/check completed  Taken 8/30/2024 0000 by Christ Wiley RN  Safety Promotion/Fall Prevention: safety round/check completed  Taken 8/29/2024 2200 by Christ Wiley RN  Safety Promotion/Fall Prevention: safety round/check completed  Taken 8/29/2024 2100 by Christ Wiley RN  Safety Promotion/Fall Prevention: safety round/check completed  Taken 8/29/2024 2000 by Christ Wiley RN  Safety Promotion/Fall Prevention: safety round/check completed  Taken 8/29/2024 1900 by Christ Wiley RN  Safety Promotion/Fall Prevention: safety round/check completed   Goal Outcome Evaluation:                 Patient is a/o x 4, with no confusion witness. Patient has had multiple bouts of watery stool throughout the night. Patient only had one complaint of pain. Patient has only slept part of the night. Patient has not had any adverse events occur.

## 2024-08-30 NOTE — PLAN OF CARE
Goal Outcome Evaluation:  Plan of Care Reviewed With: patient           Outcome Evaluation: Pt c/o fatigue today and requires a lot of cues to stay awake. Pt performed AAROM BLE. Unsafe to attempt to stand at this time.

## 2024-08-31 LAB
AMMONIA BLD-SCNC: 95 UMOL/L (ref 16–60)
ANION GAP SERPL CALCULATED.3IONS-SCNC: 14 MMOL/L (ref 5–15)
BUN SERPL-MCNC: 10 MG/DL (ref 6–20)
BUN/CREAT SERPL: 7.8 (ref 7–25)
CALCIUM SPEC-SCNC: 8.6 MG/DL (ref 8.6–10.5)
CHLORIDE SERPL-SCNC: 96 MMOL/L (ref 98–107)
CO2 SERPL-SCNC: 27 MMOL/L (ref 22–29)
CREAT SERPL-MCNC: 1.29 MG/DL (ref 0.76–1.27)
EGFRCR SERPLBLD CKD-EPI 2021: 72.3 ML/MIN/1.73
GLUCOSE SERPL-MCNC: 115 MG/DL (ref 65–99)
MAGNESIUM SERPL-MCNC: 1.2 MG/DL (ref 1.6–2.6)
POTASSIUM SERPL-SCNC: 3.5 MMOL/L (ref 3.5–5.2)
SODIUM SERPL-SCNC: 137 MMOL/L (ref 136–145)

## 2024-08-31 PROCEDURE — 25010000002 ENOXAPARIN PER 10 MG: Performed by: INTERNAL MEDICINE

## 2024-08-31 PROCEDURE — 80048 BASIC METABOLIC PNL TOTAL CA: CPT | Performed by: INTERNAL MEDICINE

## 2024-08-31 PROCEDURE — 97116 GAIT TRAINING THERAPY: CPT

## 2024-08-31 PROCEDURE — 25010000002 BUMETANIDE PER 0.5 MG: Performed by: INTERNAL MEDICINE

## 2024-08-31 PROCEDURE — 83735 ASSAY OF MAGNESIUM: CPT | Performed by: INTERNAL MEDICINE

## 2024-08-31 PROCEDURE — 82140 ASSAY OF AMMONIA: CPT | Performed by: INTERNAL MEDICINE

## 2024-08-31 PROCEDURE — 97110 THERAPEUTIC EXERCISES: CPT

## 2024-08-31 PROCEDURE — 25010000002 MAGNESIUM SULFATE IN D5W 1G/100ML (PREMIX) 1-5 GM/100ML-% SOLUTION: Performed by: INTERNAL MEDICINE

## 2024-08-31 RX ORDER — POTASSIUM CHLORIDE 750 MG/1
40 CAPSULE, EXTENDED RELEASE ORAL EVERY 4 HOURS
Status: COMPLETED | OUTPATIENT
Start: 2024-08-31 | End: 2024-08-31

## 2024-08-31 RX ORDER — MAGNESIUM SULFATE 1 G/100ML
1 INJECTION INTRAVENOUS
Status: COMPLETED | OUTPATIENT
Start: 2024-08-31 | End: 2024-08-31

## 2024-08-31 RX ADMIN — PANTOPRAZOLE SODIUM 40 MG: 40 TABLET, DELAYED RELEASE ORAL at 08:24

## 2024-08-31 RX ADMIN — POTASSIUM CHLORIDE 40 MEQ: 750 CAPSULE, EXTENDED RELEASE ORAL at 14:43

## 2024-08-31 RX ADMIN — ENOXAPARIN SODIUM 40 MG: 100 INJECTION SUBCUTANEOUS at 08:24

## 2024-08-31 RX ADMIN — LEVOTHYROXINE SODIUM 50 MCG: 50 TABLET ORAL at 05:42

## 2024-08-31 RX ADMIN — SPIRONOLACTONE 100 MG: 50 TABLET ORAL at 08:24

## 2024-08-31 RX ADMIN — RIFAXIMIN 550 MG: 550 TABLET ORAL at 20:14

## 2024-08-31 RX ADMIN — FOLIC ACID 1 MG: 1 TABLET ORAL at 08:24

## 2024-08-31 RX ADMIN — RIFAXIMIN 550 MG: 550 TABLET ORAL at 08:24

## 2024-08-31 RX ADMIN — POTASSIUM CHLORIDE 40 MEQ: 750 CAPSULE, EXTENDED RELEASE ORAL at 20:14

## 2024-08-31 RX ADMIN — Medication 10 ML: at 20:17

## 2024-08-31 RX ADMIN — MAGNESIUM SULFATE IN DEXTROSE 1 G: 10 INJECTION, SOLUTION INTRAVENOUS at 15:53

## 2024-08-31 RX ADMIN — PENTOXIFYLLINE 400 MG: 400 TABLET, EXTENDED RELEASE ORAL at 16:54

## 2024-08-31 RX ADMIN — LACTULOSE 20 G: 20 SOLUTION ORAL at 16:53

## 2024-08-31 RX ADMIN — BUMETANIDE 1 MG/HR: 0.25 INJECTION INTRAMUSCULAR; INTRAVENOUS at 11:15

## 2024-08-31 RX ADMIN — PENTOXIFYLLINE 400 MG: 400 TABLET, EXTENDED RELEASE ORAL at 11:14

## 2024-08-31 RX ADMIN — THIAMINE HCL TAB 100 MG 100 MG: 100 TAB at 08:24

## 2024-08-31 RX ADMIN — MAGNESIUM SULFATE IN DEXTROSE 1 G: 10 INJECTION, SOLUTION INTRAVENOUS at 14:42

## 2024-08-31 RX ADMIN — ENOXAPARIN SODIUM 40 MG: 100 INJECTION SUBCUTANEOUS at 20:13

## 2024-08-31 RX ADMIN — LACTULOSE 20 G: 20 SOLUTION ORAL at 08:24

## 2024-08-31 RX ADMIN — Medication 5 MG: at 21:28

## 2024-08-31 RX ADMIN — MAGNESIUM SULFATE IN DEXTROSE 1 G: 10 INJECTION, SOLUTION INTRAVENOUS at 16:54

## 2024-08-31 RX ADMIN — PENTOXIFYLLINE 400 MG: 400 TABLET, EXTENDED RELEASE ORAL at 08:24

## 2024-08-31 RX ADMIN — LACTULOSE 20 G: 20 SOLUTION ORAL at 20:14

## 2024-08-31 RX ADMIN — POTASSIUM CHLORIDE 40 MEQ: 750 CAPSULE, EXTENDED RELEASE ORAL at 03:41

## 2024-08-31 NOTE — PLAN OF CARE
Problem: Adult Inpatient Plan of Care  Goal: Plan of Care Review  Outcome: Ongoing, Progressing  Goal: Patient-Specific Goal (Individualized)  Outcome: Ongoing, Progressing  Goal: Absence of Hospital-Acquired Illness or Injury  Outcome: Ongoing, Progressing  Intervention: Identify and Manage Fall Risk  Recent Flowsheet Documentation  Taken 8/31/2024 0200 by Jesus Jack RN  Safety Promotion/Fall Prevention: safety round/check completed  Taken 8/31/2024 0000 by Jesus Jack RN  Safety Promotion/Fall Prevention: safety round/check completed  Taken 8/30/2024 2200 by Jesus Jack RN  Safety Promotion/Fall Prevention: safety round/check completed  Taken 8/30/2024 2100 by Jesus Jack RN  Safety Promotion/Fall Prevention: safety round/check completed  Taken 8/30/2024 2000 by Jesus Jack RN  Safety Promotion/Fall Prevention: safety round/check completed  Intervention: Prevent Skin Injury  Recent Flowsheet Documentation  Taken 8/30/2024 2000 by Jesus Jack RN  Body Position: position changed independently  Intervention: Prevent and Manage VTE (Venous Thromboembolism) Risk  Recent Flowsheet Documentation  Taken 8/30/2024 2000 by Jesus Jack RN  Range of Motion: active ROM (range of motion) encouraged  Goal: Optimal Comfort and Wellbeing  Outcome: Ongoing, Progressing  Goal: Readiness for Transition of Care  Outcome: Ongoing, Progressing     Problem: Heart Failure Comorbidity  Goal: Maintenance of Heart Failure Symptom Control  Outcome: Ongoing, Progressing     Problem: Skin Injury Risk Increased  Goal: Skin Health and Integrity  Outcome: Ongoing, Progressing     Problem: Fall Injury Risk  Goal: Absence of Fall and Fall-Related Injury  Outcome: Ongoing, Progressing  Intervention: Promote Injury-Free Environment  Recent Flowsheet Documentation  Taken 8/31/2024 0200 by Jesus Jack RN  Safety Promotion/Fall Prevention: safety round/check completed  Taken 8/31/2024 0000 by Jesus Jack RN  Safety  Promotion/Fall Prevention: safety round/check completed  Taken 8/30/2024 2200 by Jesus Jack, RN  Safety Promotion/Fall Prevention: safety round/check completed  Taken 8/30/2024 2100 by Jesus Jack, ESTELA  Safety Promotion/Fall Prevention: safety round/check completed  Taken 8/30/2024 2000 by Jesus Jack, RN  Safety Promotion/Fall Prevention: safety round/check completed   Goal Outcome Evaluation:            S 77-86 per tele. Vss. No c/o pain. Will continue to monitor.

## 2024-08-31 NOTE — THERAPY TREATMENT NOTE
Acute Care - Physical Therapy Treatment Note  [facility]     Patient Name: Luciano Christiansen  : 1985  MRN: [id number]  Today's Date: 2024      Visit Dx:     ICD-10-CM ICD-9-CM   1. Hepatic encephalopathy  K76.82 572.2   2. MAYRA (acute kidney injury)  N17.9 584.9   3. Anasarca  R60.1 782.3   4. Chronic anemia  D64.9 285.9   5. Hepatosplenomegaly  R16.2 571.8   6. Chronic liver disease  K76.9 571.9   7. Lymphadenopathy  R59.1 785.6   8. Calculus of gallbladder without cholecystitis without obstruction  K80.20 574.20   9. Impaired mobility [Z74.09]  Z74.09 799.89     Patient Active Problem List   Diagnosis    Wellness examination    Encounter for hepatitis C screening test for low risk patient    Primary hypertension    Class 1 obesity due to excess calories without serious comorbidity with body mass index (BMI) of 34.0 to 34.9 in adult    Fatty liver    Hyponatremia    Alkalosis    Alcoholism    Transaminitis    Hypophosphatemia    Insomnia disorder related to known organic factor    Community acquired bilateral lower lobe pneumonia    Pneumonia due to Streptococcus    GI bleed    Diabetic ketoacidosis associated with type 2 diabetes mellitus    Alcohol intoxication in active alcoholic    Calculus of gallbladder without cholecystitis without obstruction    Alcoholic encephalopathy    Type 2 diabetes mellitus    Alcohol withdrawal    Alcoholic steatohepatitis    BMI 40.0-44.9, adult    Hepatic encephalopathy     Past Medical History:   Diagnosis Date    Alcoholism     Diabetes mellitus     Gout     Hypertension     Jaundice      Past Surgical History:   Procedure Laterality Date    VASECTOMY       PT Assessment (Last 12 Hours)       PT Evaluation and Treatment       Row Name 24 1026          Physical Therapy Time and Intention    Subjective Information complains of;pain  -WK     Document Type therapy note (daily note)  -WK     Mode of Treatment physical therapy  -WK       Row Name 24 1026           General Information    Existing Precautions/Restrictions fall  -WK       Row Name 08/31/24 1026          Pain    Pretreatment Pain Rating 5/10  -WK     Posttreatment Pain Rating 5/10  -WK     Pain Location - Side/Orientation --  at catheter site  -WK       Row Name 08/31/24 1026          Bed Mobility    Comment, (Bed Mobility) in chair  -WK       Row Name 08/31/24 1026          Sit-Stand Transfer    Sit-Stand [place] (Transfers) contact guard;verbal cues  -WK     Assistive Device (Sit-Stand Transfers) walker, front-wheeled  -WK       Row Name 08/31/24 1026          Stand-Sit Transfer    Stand-Sit [place] (Transfers) verbal cues;contact guard  -WK     Assistive Device (Stand-Sit Transfers) walker, front-wheeled  -WK       Row Name 08/31/24 1026          Gait/Stairs (Locomotion)    [place] Level (Gait) contact guard  -WK     Assistive Device (Gait) walker, front-wheeled  -WK     Distance in Feet (Gait) 100  x2, 5' without RW CGA, 10'x2 (BR)without RW  -WK     Deviations/Abnormal Patterns (Gait) base of support, wide;gait speed decreased  -WK     Comment, (Gait/Stairs) slow to respond to question but does answer appropriately  -WK       Row Name 08/31/24 1026          Balance    Comment, Balance side steps CGA  -WK       Row Name 08/31/24 1026          Motor Skills    Comments, Therapeutic Exercise AROM BLE 20 reps sitting  -WK       Row Name 08/31/24 1026          Plan of Care Review    Plan of Care Reviewed With patient  -WK     Progress improving  -WK     Outcome Evaluation Pt is more awake and alert today. Pt amb in romero CGA with RW. Pt amb 10'x2 without RW CGA with no LOB . Educated on safety and POC.  -WK       Row Name 08/31/24 1026          Positioning and Restraints    Pre-Treatment Position sitting in chair/recliner  -WK     Post Treatment Position chair  -WK     In Chair reclined;call light within reach;encouraged to call for assist;exit alarm on  -WK               User Key  (r) = Recorded By,  (t) = Taken By, (c) = Cosigned By      Initials Name Provider Type    WK Gris West PTA Physical Therapist Assistant                    Physical Therapy Education       Title: PT OT SLP Therapies (In Progress)       Topic: Physical Therapy (In Progress)       Point: Mobility training (Done)       Learning Progress Summary             Patient Acceptance, E, VU by AE at 8/29/2024 1318    Comment: T/F'S    Acceptance, E, VU,NR by COLIN at 8/28/2024 1428    Comment: Benefits of activity, progression of PT                         Point: Home exercise program (Not Started)       Learner Progress:  Not documented in this visit.              Point: Body mechanics (Not Started)       Learner Progress:  Not documented in this visit.              Point: Precautions (Not Started)       Learner Progress:  Not documented in this visit.                              User Key       Initials Effective Dates Name Provider Type Discipline    AE 02/03/23 -  Anabell Story PTA Physical Therapist Assistant PT    COLIN 02/03/23 -  Shiva Mcleod PT DPT Physical Therapist PT                  PT Recommendation and Plan     Plan of Care Reviewed With: patient  Progress: improving  Outcome Evaluation: Pt is more awake and alert today. Pt amb in romero CGA with RW. Pt amb 10'x2 without RW CGA with no LOB . Educated on safety and POC.   Outcome Measures       Row Name 08/31/24 1026             How much help from another person do you currently need...    Turning from your back to your side while in flat bed without using bedrails? 3  -WK      Moving from lying on back to sitting on the side of a flat bed without bedrails? 3  -WK      Moving to and from a bed to a chair (including a wheelchair)? 3  -WK      Standing up from a chair using your arms (e.g., wheelchair, bedside chair)? 3  -WK      Climbing 3-5 steps with a railing? 2  -WK      To walk in hospital room? 3  -WK      AM-PAC 6 Clicks Score (PT) 17  -WK      Highest Level of Mobility  Goal 5 --> Static standing  -WK         Functional Assessment    Outcome Measure Options AM-PAC 6 Clicks Basic Mobility (PT)  -WK                User Key  (r) = Recorded By, (t) = Taken By, (c) = Cosigned By      Initials Name Provider Type    WK Gris West PTA Physical Therapist Assistant                     Time Calculation:    PT Charges       Row Name 08/31/24 1112             Time Calculation    Start Time 1026  -WK      Stop Time 1105  -WK      Time Calculation (min) 39 min  -WK      PT Received On 08/31/24  -WK         Time Calculation- PT    Total Timed Code Minutes- PT 39 minute(s)  -WK         Timed Charges    15921 - PT Therapeutic Exercise Minutes 10  -WK      91705 - Gait Training Minutes  29  -WK      37108 - PT Therapeutic Activity Minutes --  -WK         Total Minutes    Timed Charges Total Minutes 39  -WK       Total Minutes 39  -WK                User Key  (r) = Recorded By, (t) = Taken By, (c) = Cosigned By      Initials Name Provider Type    WK Gris West PTA Physical Therapist Assistant                  Therapy Charges for Today       Code Description Service Date Service Provider Modifiers Qty    19398163914 HC PT THER PROC EA 15 MIN 8/30/2024 Gris West PTA GP 1    07300552937 HC PT THER PROC EA 15 MIN 8/31/2024 Gris West, PIYUSH GP 1    82514942890 HC GAIT TRAINING EA 15 MIN 8/31/2024 Gris West PTA GP 2            PT G-Codes  Outcome Measure Options: AM-PAC 6 Clicks Basic Mobility (PT)  AM-PAC 6 Clicks Score (PT): 17  AM-PAC 6 Clicks Score (OT): 15    Gris West PTA  8/31/2024

## 2024-08-31 NOTE — PROGRESS NOTES
1         [facility] Medicine Services  INPATIENT PROGRESS NOTE    Patient Name: Luciano Christiansen  Date of Admission: 8/27/2024  Today's Date: 08/31/24  Length of Stay: 4  Primary Care Physician: Jossy Christiansen APRN    Subjective   Chief Complaint: Follow-up  HPI   Electrolyte abnormalities in the process of being corrected and rechecked  Stable creatinine  On Bumex drip    Effectively diuresing  Standing scale weight 339 pounds from August 27.  Will reweigh patient  Review of Systems   All pertinent negatives and positives are as above. All other systems have been reviewed and are negative unless otherwise stated.     Objective    Temp:  [97.8 °F (36.6 °C)-98.5 °F (36.9 °C)] 97.9 °F (36.6 °C)  Heart Rate:  [74-85] 80  Resp:  [18] 18  BP: (113-134)/(57-65) 128/60  Physical Exam    BMI 43.5    No distress  Short neck   looks older than his stated age  Pale conjunctiva  Diminished breath sound due to body habitus  Distant heart sounds due to body habitus  Belly is softer but still has significant amount of subcutaneous edema  He has no gross tenderness.  No rebound  Difficult to examine for any organomegaly given significant subcutaneous edema  Warm dry skin  Clear yellow urine in Harris catheter bag       Results Review:  I have reviewed the labs, radiology results, and diagnostic studies.    Laboratory Data:   Results from last 7 days   Lab Units 08/27/24  1141   WBC 10*3/mm3 10.87*   HEMOGLOBIN g/dL 8.8*   HEMATOCRIT % 28.0*   PLATELETS 10*3/mm3 356        Results from last 7 days   Lab Units 08/31/24  1317 08/30/24  2055 08/30/24  0459 08/29/24  2044 08/29/24  0918 08/28/24  1815 08/28/24  0339 08/27/24  1143 08/27/24  1141   SODIUM mmol/L 137  --  140  --  140   < > 139  --  138   SODIUM, ARTERIAL   --   --   --   --   --   --   --    < >  --    POTASSIUM mmol/L 3.5 3.6 2.9*   < > 3.5   < > 3.3*  --  3.8   CHLORIDE mmol/L 96*  --  103  --  104   < > 103  --  102   CO2 mmol/L 27.0   "--  26.0  --  23.0   < > 22.0  --  21.0*   BUN mg/dL 10  --  13  --  17   < > 23*  --  26*   CREATININE mg/dL 1.29*  --  1.31*  --  1.28*   < > 1.41*  --  1.84*   CALCIUM mg/dL 8.6  --  8.7  --  9.5   < > 8.9  --  9.1   BILIRUBIN mg/dL  --   --   --   --   --   --  2.4*  --  2.4*   ALK PHOS U/L  --   --   --   --   --   --  129*  --  147*   ALT (SGPT) U/L  --   --   --   --   --   --  17  --  17   AST (SGOT) U/L  --   --   --   --   --   --  49*  --  52*   GLUCOSE mg/dL 115*  --  77  --  104*   < > 81  --  100*    < > = values in this interval not displayed.       Culture Data:   No results found for: \"BLOODCX\", \"URINECX\", \"WOUNDCX\", \"MRSACX\", \"RESPCX\", \"STOOLCX\"    Radiology Data:   Imaging Results (Last 24 Hours)       ** No results found for the last 24 hours. **            I have reviewed the patient's current medications.     Assessment/Plan   Assessment  Active Hospital Problems    Diagnosis     **Hepatic encephalopathy        Medical Decision Making/treatment plan  Number and Complexity of problems:   Hepatic encephalopathy versus medication induced (chlordiazepoxide-recently prescribed August 18 by Dr. Rangel) versus combination of it   Hyperammonemia-continue lactulose with goals of 3-4 bowel movements per day  History of alcoholism-no suggestive evidence of alcohol withdrawal   cirrhosis-continue diuretics; other plans per GI service  Hypertension-stable and tolerating diuresis  Macrocytic anemia in patient with history of alcohol use  Cholelithiasis-gallbladder wall thickening without biliary dilatation may relate to small volume perihepatic ascites versus less likely calculus cholecystitis.  Fluid retention lower extremities-continue Bumex drip  Morbid obesity with BMI 44  Mild hypokalemia.  At continued risk for loss given Bumex drip.  Replace accordingly.  Functional decline-PT OT evaluation     Continue IV diuretic and correct electrolytes as per protocol  Check weight  Monitor input output  Monitor " renal function  Increase activities as tolerated  Hemodynamically stable  Meds reviewed     Scheduled Medication     Scheduled Medication   enoxaparin, 40 mg, Subcutaneous, Q12H  folic acid, 1 mg, Oral, Daily  lactulose, 20 g, Oral, TID  levothyroxine, 50 mcg, Oral, Q AM  nicotine, 1 patch, Transdermal, Q24H  pantoprazole, 40 mg, Oral, Daily  pentoxifylline, 400 mg, Oral, TID With Meals  riFAXIMin, 550 mg, Oral, BID  sodium chloride, 10 mL, Intravenous, Q12H  spironolactone, 100 mg, Oral, Daily  thiamine, 100 mg, Oral, Daily                      I considered adding back low-dose chlordiazepoxide but patient remains drowsy although improved compared to admission     Replace potassium accordingly and check magnesium  Service note reviewed.  Patient appears to be slowly clinically improving.     Per discussion with nurse Muniz, patient is not appropriate for an external catheter due to significant swelling of penis and scrotum  Increase activities as tolerated.  Continue PT OT           Conditions and Status  Fair     MDM Data  External documents reviewed: Reviewed epic record  Cardiac tracing (EKG, telemetry) interpretation: -     Results for orders placed during the hospital encounter of 07/04/24     Adult Transthoracic Echo Complete W/ Cont if Necessary Per Protocol     Interpretation Summary    Left ventricular ejection fraction appears to be 61 - 65%.    Left ventricular diastolic function was normal.    Estimated right ventricular systolic pressure from tricuspid regurgitation is normal (<35 mmHg).        Radiology interpretation: Reviewed multiple imaging studies as outlined above  Labs reviewed: Yes  Any tests that were considered but not ordered: None     Decision rules/scores evaluated (example [id number], [place], etc):        Discussed with: Patient and nurse Muniz        care Planning  Shared decision making: patient and consultant  Code status and discussions: Full code     Disposition  Social  Determinants of Health that impact treatment or disposition: None identified at this time  Estimated length of stay is to be determined.      I confirmed that the patient's advanced care plan is present, code status is documented, and a surrogate decision maker is listed in the patient's medical record.      The patient's surrogate decision maker is mom.   I expect the patient to be discharged to (TBD)    Electronically signed by Roderick Field MD, 08/31/24, 14:03 CDT.

## 2024-08-31 NOTE — PLAN OF CARE
"Goal Outcome Evaluation:  Plan of Care Reviewed With: patient        Progress: improving  Outcome Evaluation: VSS. Denies pain this shift. Pt frequently calling out to go to bathroom, states, \"I have to go in there to empty my bladder,\" despite having a burton in place. K+ 3.5 and Mag 1.2, replaced per electrolyte protocol. Ammonia level is 95. Daily weights ordered. Bumex gtt cont. Safety maintained. Plan of care ongoing.                               "

## 2024-08-31 NOTE — PLAN OF CARE
Goal Outcome Evaluation:  Plan of Care Reviewed With: patient        Progress: improving  Outcome Evaluation: Pt is more awake and alert today. Pt amb in romero CGA with RW. Pt amb 10'x2 without RW CGA with no LOB . Educated on safety and POC.

## 2024-09-01 LAB
ALBUMIN SERPL-MCNC: 3.5 G/DL (ref 3.5–5.2)
ALBUMIN/GLOB SERPL: 0.8 G/DL
ALP SERPL-CCNC: 128 U/L (ref 39–117)
ALT SERPL W P-5'-P-CCNC: 16 U/L (ref 1–41)
AMMONIA BLD-SCNC: 64 UMOL/L (ref 16–60)
ANION GAP SERPL CALCULATED.3IONS-SCNC: 13 MMOL/L (ref 5–15)
AST SERPL-CCNC: 51 U/L (ref 1–40)
BILIRUB SERPL-MCNC: 2.6 MG/DL (ref 0–1.2)
BUN SERPL-MCNC: 10 MG/DL (ref 6–20)
BUN/CREAT SERPL: 8.6 (ref 7–25)
CALCIUM SPEC-SCNC: 8.9 MG/DL (ref 8.6–10.5)
CHLORIDE SERPL-SCNC: 94 MMOL/L (ref 98–107)
CO2 SERPL-SCNC: 28 MMOL/L (ref 22–29)
CREAT SERPL-MCNC: 1.16 MG/DL (ref 0.76–1.27)
EGFRCR SERPLBLD CKD-EPI 2021: 82.2 ML/MIN/1.73
GLOBULIN UR ELPH-MCNC: 4.4 GM/DL
GLUCOSE SERPL-MCNC: 86 MG/DL (ref 65–99)
MAGNESIUM SERPL-MCNC: 1.2 MG/DL (ref 1.6–2.6)
POTASSIUM SERPL-SCNC: 3.5 MMOL/L (ref 3.5–5.2)
POTASSIUM SERPL-SCNC: 3.7 MMOL/L (ref 3.5–5.2)
PROT SERPL-MCNC: 7.9 G/DL (ref 6–8.5)
SODIUM SERPL-SCNC: 135 MMOL/L (ref 136–145)

## 2024-09-01 PROCEDURE — 25010000002 ENOXAPARIN PER 10 MG: Performed by: INTERNAL MEDICINE

## 2024-09-01 PROCEDURE — 82140 ASSAY OF AMMONIA: CPT | Performed by: INTERNAL MEDICINE

## 2024-09-01 PROCEDURE — 83735 ASSAY OF MAGNESIUM: CPT | Performed by: INTERNAL MEDICINE

## 2024-09-01 PROCEDURE — 80053 COMPREHEN METABOLIC PANEL: CPT | Performed by: INTERNAL MEDICINE

## 2024-09-01 PROCEDURE — 84132 ASSAY OF SERUM POTASSIUM: CPT | Performed by: INTERNAL MEDICINE

## 2024-09-01 PROCEDURE — 25010000002 BUMETANIDE PER 0.5 MG: Performed by: INTERNAL MEDICINE

## 2024-09-01 PROCEDURE — 25010000002 MAGNESIUM SULFATE IN D5W 1G/100ML (PREMIX) 1-5 GM/100ML-% SOLUTION: Performed by: INTERNAL MEDICINE

## 2024-09-01 RX ORDER — MAGNESIUM SULFATE 1 G/100ML
1 INJECTION INTRAVENOUS
Status: COMPLETED | OUTPATIENT
Start: 2024-09-01 | End: 2024-09-01

## 2024-09-01 RX ORDER — POTASSIUM CHLORIDE 750 MG/1
40 CAPSULE, EXTENDED RELEASE ORAL EVERY 4 HOURS
Status: COMPLETED | OUTPATIENT
Start: 2024-09-01 | End: 2024-09-01

## 2024-09-01 RX ADMIN — THIAMINE HCL TAB 100 MG 100 MG: 100 TAB at 09:04

## 2024-09-01 RX ADMIN — Medication 10 ML: at 20:04

## 2024-09-01 RX ADMIN — POTASSIUM CHLORIDE 40 MEQ: 750 CAPSULE, EXTENDED RELEASE ORAL at 11:44

## 2024-09-01 RX ADMIN — MAGNESIUM SULFATE IN DEXTROSE 1 G: 10 INJECTION, SOLUTION INTRAVENOUS at 09:04

## 2024-09-01 RX ADMIN — ENOXAPARIN SODIUM 40 MG: 100 INJECTION SUBCUTANEOUS at 09:04

## 2024-09-01 RX ADMIN — BUMETANIDE 1 MG/HR: 0.25 INJECTION INTRAMUSCULAR; INTRAVENOUS at 10:28

## 2024-09-01 RX ADMIN — RIFAXIMIN 550 MG: 550 TABLET ORAL at 20:04

## 2024-09-01 RX ADMIN — PENTOXIFYLLINE 400 MG: 400 TABLET, EXTENDED RELEASE ORAL at 09:04

## 2024-09-01 RX ADMIN — LACTULOSE 20 G: 20 SOLUTION ORAL at 09:03

## 2024-09-01 RX ADMIN — FOLIC ACID 1 MG: 1 TABLET ORAL at 09:04

## 2024-09-01 RX ADMIN — PANTOPRAZOLE SODIUM 40 MG: 40 TABLET, DELAYED RELEASE ORAL at 09:04

## 2024-09-01 RX ADMIN — PENTOXIFYLLINE 400 MG: 400 TABLET, EXTENDED RELEASE ORAL at 17:02

## 2024-09-01 RX ADMIN — PENTOXIFYLLINE 400 MG: 400 TABLET, EXTENDED RELEASE ORAL at 11:44

## 2024-09-01 RX ADMIN — POTASSIUM CHLORIDE 40 MEQ: 750 CAPSULE, EXTENDED RELEASE ORAL at 09:04

## 2024-09-01 RX ADMIN — SPIRONOLACTONE 100 MG: 50 TABLET ORAL at 09:04

## 2024-09-01 RX ADMIN — MAGNESIUM SULFATE IN DEXTROSE 1 G: 10 INJECTION, SOLUTION INTRAVENOUS at 10:19

## 2024-09-01 RX ADMIN — ENOXAPARIN SODIUM 40 MG: 100 INJECTION SUBCUTANEOUS at 20:04

## 2024-09-01 RX ADMIN — Medication 5 MG: at 21:38

## 2024-09-01 RX ADMIN — LACTULOSE 20 G: 20 SOLUTION ORAL at 17:02

## 2024-09-01 RX ADMIN — LEVOTHYROXINE SODIUM 50 MCG: 50 TABLET ORAL at 06:37

## 2024-09-01 RX ADMIN — LACTULOSE 20 G: 20 SOLUTION ORAL at 20:04

## 2024-09-01 RX ADMIN — MAGNESIUM SULFATE IN DEXTROSE 1 G: 10 INJECTION, SOLUTION INTRAVENOUS at 11:44

## 2024-09-01 RX ADMIN — RIFAXIMIN 550 MG: 550 TABLET ORAL at 09:04

## 2024-09-01 NOTE — PLAN OF CARE
Problem: Adult Inpatient Plan of Care  Goal: Plan of Care Review  9/1/2024 0627 by Danelle Orozco RN  Outcome: Ongoing, Progressing  9/1/2024 0620 by Danelle Orozco RN  Outcome: Ongoing, Not Progressing  Goal: Patient-Specific Goal (Individualized)  9/1/2024 0627 by Danelle Orozco RN  Outcome: Ongoing, Progressing  9/1/2024 0620 by Danelle Orozco RN  Outcome: Ongoing, Not Progressing  Goal: Absence of Hospital-Acquired Illness or Injury  9/1/2024 0627 by Danelle Orozco RN  Outcome: Ongoing, Progressing  9/1/2024 0620 by Danelle Orozco RN  Outcome: Ongoing, Not Progressing  Intervention: Identify and Manage Fall Risk  Recent Flowsheet Documentation  Taken 9/1/2024 0200 by Danelle Orozco RN  Safety Promotion/Fall Prevention: safety round/check completed  Taken 9/1/2024 0000 by Danelle Orozco RN  Safety Promotion/Fall Prevention: safety round/check completed  Intervention: Prevent and Manage VTE (Venous Thromboembolism) Risk  Recent Flowsheet Documentation  Taken 8/31/2024 2000 by Danelle Orozco RN  VTE Prevention/Management: other (see comments)  Goal: Optimal Comfort and Wellbeing  9/1/2024 0627 by Danelle Orozco RN  Outcome: Ongoing, Progressing  9/1/2024 0620 by Danelle Orozco RN  Outcome: Ongoing, Not Progressing  Goal: Readiness for Transition of Care  9/1/2024 0627 by Danelle Orozco RN  Outcome: Ongoing, Progressing  9/1/2024 0620 by Danelle Orozco RN  Outcome: Ongoing, Not Progressing     Problem: Heart Failure Comorbidity  Goal: Maintenance of Heart Failure Symptom Control  9/1/2024 0627 by Danelle Orozco RN  Outcome: Ongoing, Progressing  9/1/2024 0620 by Danelle Orozco RN  Outcome: Ongoing, Not Progressing     Problem: Fall Injury Risk  Goal: Absence of Fall and Fall-Related Injury  9/1/2024 0627 by Danelle Orozco RN  Outcome: Ongoing, Progressing  9/1/2024 0620 by Ernesto  Danelle LARRY RN  Outcome: Ongoing, Not Progressing  Intervention: Promote Injury-Free Environment  Recent Flowsheet Documentation  Taken 9/1/2024 0200 by Danelle Orozco RN  Safety Promotion/Fall Prevention: safety round/check completed  Taken 9/1/2024 0000 by Danelle Orozco RN  Safety Promotion/Fall Prevention: safety round/check completed     Problem: Confusion Acute  Goal: Optimal Cognitive Function  9/1/2024 0627 by Danelle Orozco RN  Outcome: Ongoing, Progressing  9/1/2024 0620 by Danelle Orozco RN  Outcome: Ongoing, Not Progressing     Problem: Urinary Retention  Goal: Effective Urinary Elimination  9/1/2024 0627 by Danelle Orozco RN  Outcome: Ongoing, Progressing  9/1/2024 0620 by Danelle Orozco RN  Outcome: Ongoing, Not Progressing     Problem: Alcohol Withdrawal  Goal: Alcohol Withdrawal Symptom Control  9/1/2024 0627 by Danelle Orozco RN  Outcome: Ongoing, Progressing  9/1/2024 0620 by Danelle Orozco RN  Outcome: Ongoing, Not Progressing     Problem: Acute Neurologic Deterioration (Alcohol Withdrawal)  Goal: Optimal Neurologic Function  9/1/2024 0627 by Danelle Orozco RN  Outcome: Ongoing, Progressing  9/1/2024 0620 by Danelle Orozco RN  Outcome: Ongoing, Not Progressing     Problem: Substance Misuse (Alcohol Withdrawal)  Goal: Readiness for Change Identified  9/1/2024 0627 by Danelle Orozco RN  Outcome: Ongoing, Progressing  9/1/2024 0620 by Danelle Orozco RN  Outcome: Ongoing, Not Progressing     Problem: Adjustment to Illness (Liver Failure)  Goal: Optimal Coping with Liver Failure  9/1/2024 0627 by Danelle Orozco RN  Outcome: Ongoing, Progressing  9/1/2024 0620 by Danelle Orozco RN  Outcome: Ongoing, Not Progressing     Problem: Fluid and Electrolyte Imbalance (Liver Failure)  Goal: Fluid and Electrolyte Balance  9/1/2024 0627 by Danelle Orozco RN  Outcome: Ongoing,  Progressing  9/1/2024 0620 by Danelle Orozco RN  Outcome: Ongoing, Not Progressing     Problem: Gastrointestinal Complications (Liver Failure)  Goal: Optimal Gastrointestinal Function  9/1/2024 0627 by Danelle Orozco RN  Outcome: Ongoing, Progressing  9/1/2024 0620 by Danelle Orozco RN  Outcome: Ongoing, Not Progressing     Problem: Impaired Coagulation (Liver Failure)  Goal: Optimal Coagulation Function  9/1/2024 0627 by Danelle Orozco RN  Outcome: Ongoing, Progressing  9/1/2024 0620 by Danelle Orozco RN  Outcome: Ongoing, Not Progressing     Problem: Infection (Liver Failure)  Goal: Absence of Infection Signs and Symptoms  9/1/2024 0627 by Danelle Orozco RN  Outcome: Ongoing, Progressing  9/1/2024 0620 by Danelle Orozco RN  Outcome: Ongoing, Not Progressing     Problem: Neurologic Function Impaired (Liver Failure)  Goal: Optimal Neurologic Function  9/1/2024 0627 by Danelle Orozco RN  Outcome: Ongoing, Progressing  9/1/2024 0620 by Danelle Orozco RN  Outcome: Ongoing, Not Progressing     Problem: Oral Intake Inadequate (Liver Failure)  Goal: Improved Oral Intake  9/1/2024 0627 by Danelle Orozco RN  Outcome: Ongoing, Progressing  9/1/2024 0620 by Danelle Orozco RN  Outcome: Ongoing, Not Progressing     Problem: Pain (Liver Failure)  Goal: Optimal Pain Control  9/1/2024 0627 by Danelle Orozco RN  Outcome: Ongoing, Progressing  9/1/2024 0620 by Danelle Orozco RN  Outcome: Ongoing, Not Progressing     Problem: Renal Dysfunction (Liver Failure)  Goal: Optimize Renal Function  9/1/2024 0627 by Danelle Orozco RN  Outcome: Ongoing, Progressing  9/1/2024 0620 by Danelle Orozco RN  Outcome: Ongoing, Not Progressing     Problem: Respiratory Compromise (Liver Failure)  Goal: Effective Oxygenation and Ventilation  9/1/2024 0627 by Danelle Orozco RN  Outcome: Ongoing, Progressing  9/1/2024 0620 by  Danelle Orozco, RN  Outcome: Ongoing, Not Progressing  Intervention: Promote Airway Secretion Clearance  Recent Flowsheet Documentation  Taken 8/31/2024 2000 by Danelle Orozco, RN  Cough And Deep Breathing: done with encouragement     Problem: Skin Injury Risk Increased  Goal: Skin Health and Integrity  9/1/2024 0627 by Danelle Orozco, RN  Outcome: Ongoing, Progressing  9/1/2024 0620 by Danelle Orozco, RN  Outcome: Ongoing, Not Progressing   Goal Outcome Evaluation:

## 2024-09-01 NOTE — PLAN OF CARE
Problem: Adult Inpatient Plan of Care  Goal: Plan of Care Review  Outcome: Ongoing, Not Progressing  Goal: Patient-Specific Goal (Individualized)  Outcome: Ongoing, Not Progressing  Goal: Absence of Hospital-Acquired Illness or Injury  Outcome: Ongoing, Not Progressing  Intervention: Identify and Manage Fall Risk  Recent Flowsheet Documentation  Taken 9/1/2024 0200 by Danelle Orozco RN  Safety Promotion/Fall Prevention: safety round/check completed  Taken 9/1/2024 0000 by Danelle Orozco RN  Safety Promotion/Fall Prevention: safety round/check completed  Intervention: Prevent and Manage VTE (Venous Thromboembolism) Risk  Recent Flowsheet Documentation  Taken 8/31/2024 2000 by Danelle Orozco RN  VTE Prevention/Management: other (see comments)  Goal: Optimal Comfort and Wellbeing  Outcome: Ongoing, Not Progressing  Goal: Readiness for Transition of Care  Outcome: Ongoing, Not Progressing     Problem: Heart Failure Comorbidity  Goal: Maintenance of Heart Failure Symptom Control  Outcome: Ongoing, Not Progressing     Problem: Fall Injury Risk  Goal: Absence of Fall and Fall-Related Injury  Outcome: Ongoing, Not Progressing  Intervention: Promote Injury-Free Environment  Recent Flowsheet Documentation  Taken 9/1/2024 0200 by Danelle Orozco RN  Safety Promotion/Fall Prevention: safety round/check completed  Taken 9/1/2024 0000 by Danelle Orozco RN  Safety Promotion/Fall Prevention: safety round/check completed     Problem: Confusion Acute  Goal: Optimal Cognitive Function  Outcome: Ongoing, Not Progressing     Problem: Urinary Retention  Goal: Effective Urinary Elimination  Outcome: Ongoing, Not Progressing     Problem: Alcohol Withdrawal  Goal: Alcohol Withdrawal Symptom Control  Outcome: Ongoing, Not Progressing     Problem: Acute Neurologic Deterioration (Alcohol Withdrawal)  Goal: Optimal Neurologic Function  Outcome: Ongoing, Not Progressing     Problem: Substance Misuse  (Alcohol Withdrawal)  Goal: Readiness for Change Identified  Outcome: Ongoing, Not Progressing     Problem: Adjustment to Illness (Liver Failure)  Goal: Optimal Coping with Liver Failure  Outcome: Ongoing, Not Progressing     Problem: Fluid and Electrolyte Imbalance (Liver Failure)  Goal: Fluid and Electrolyte Balance  Outcome: Ongoing, Not Progressing     Problem: Gastrointestinal Complications (Liver Failure)  Goal: Optimal Gastrointestinal Function  Outcome: Ongoing, Not Progressing     Problem: Impaired Coagulation (Liver Failure)  Goal: Optimal Coagulation Function  Outcome: Ongoing, Not Progressing     Problem: Infection (Liver Failure)  Goal: Absence of Infection Signs and Symptoms  Outcome: Ongoing, Not Progressing     Problem: Neurologic Function Impaired (Liver Failure)  Goal: Optimal Neurologic Function  Outcome: Ongoing, Not Progressing     Problem: Oral Intake Inadequate (Liver Failure)  Goal: Improved Oral Intake  Outcome: Ongoing, Not Progressing     Problem: Pain (Liver Failure)  Goal: Optimal Pain Control  Outcome: Ongoing, Not Progressing     Problem: Renal Dysfunction (Liver Failure)  Goal: Optimize Renal Function  Outcome: Ongoing, Not Progressing     Problem: Respiratory Compromise (Liver Failure)  Goal: Effective Oxygenation and Ventilation  Outcome: Ongoing, Not Progressing  Intervention: Promote Airway Secretion Clearance  Recent Flowsheet Documentation  Taken 8/31/2024 2000 by Danelle Orozco RN  Cough And Deep Breathing: done with encouragement     Problem: Skin Injury Risk Increased  Goal: Skin Health and Integrity  Outcome: Ongoing, Not Progressing   Goal Outcome Evaluation:

## 2024-09-01 NOTE — PROGRESS NOTES
"1         [facility] Medicine Services  INPATIENT PROGRESS NOTE    Patient Name: Luciano Christiansen  Date of Admission: 8/27/2024  Today's Date: 09/01/24  Length of Stay: 5  Primary Care Physician: Jossy Christiansen APRN    Subjective   Chief Complaint: Follow-up  HPI   Electrolyte abnormalities in the process of being corrected and rechecked; magnesium remains low  Creatinine continue to improve while on Bumex drip.      Effectively diuresing but intake seems to be limited.  Despite of this chemistry in general looks okay.    Current weight the patient is 297 in comparison to 346 (question 339 pounds)  on admit.    Feeling better.  \"I lost 30 pounds.\"  Positive bowel movement    Requesting discontinuation of Harris catheter.    Review of Systems   All pertinent negatives and positives are as above. All other systems have been reviewed and are negative unless otherwise stated.     Objective    Temp:  [97.7 °F (36.5 °C)-98.3 °F (36.8 °C)] 98.3 °F (36.8 °C)  Heart Rate:  [79-84] 84  Resp:  [18] 18  BP: (114-128)/(56-61) 125/61  Physical Exam   more awake and alert.  He was more able to communicate without dozing off  BMI 43.5  No distress  Short neck   looks older than his stated age  Pale conjunctiva  Diminished breath sound due to body habitus  Distant heart sounds due to body habitus  Belly is softer but still has significant amount of subcutaneous edema  He has no gross tenderness.  No rebound  Difficult to examine for any organomegaly given significant subcutaneous edema  Warm dry skin  Clear yellow urine in Harris catheter bag       Results Review:  I have reviewed the labs, radiology results, and diagnostic studies.    Laboratory Data:   Results from last 7 days   Lab Units 08/27/24  1141   WBC 10*3/mm3 10.87*   HEMOGLOBIN g/dL 8.8*   HEMATOCRIT % 28.0*   PLATELETS 10*3/mm3 356        Results from last 7 days   Lab Units 09/01/24  0447 08/31/24  1317 08/30/24 2055 08/30/24  0459 " "08/28/24  1815 08/28/24  0339 08/27/24  1143 08/27/24  1141   SODIUM mmol/L 135* 137  --  140   < > 139  --  138   SODIUM, ARTERIAL   --   --   --   --   --   --    < >  --    POTASSIUM mmol/L 3.5 3.5 3.6 2.9*   < > 3.3*  --  3.8   CHLORIDE mmol/L 94* 96*  --  103   < > 103  --  102   CO2 mmol/L 28.0 27.0  --  26.0   < > 22.0  --  21.0*   BUN mg/dL 10 10  --  13   < > 23*  --  26*   CREATININE mg/dL 1.16 1.29*  --  1.31*   < > 1.41*  --  1.84*   CALCIUM mg/dL 8.9 8.6  --  8.7   < > 8.9  --  9.1   BILIRUBIN mg/dL 2.6*  --   --   --   --  2.4*  --  2.4*   ALK PHOS U/L 128*  --   --   --   --  129*  --  147*   ALT (SGPT) U/L 16  --   --   --   --  17  --  17   AST (SGOT) U/L 51*  --   --   --   --  49*  --  52*   GLUCOSE mg/dL 86 115*  --  77   < > 81  --  100*    < > = values in this interval not displayed.       Culture Data:   No results found for: \"BLOODCX\", \"URINECX\", \"WOUNDCX\", \"MRSACX\", \"RESPCX\", \"STOOLCX\"    Radiology Data:   Imaging Results (Last 24 Hours)       ** No results found for the last 24 hours. **            I have reviewed the patient's current medications.     Assessment/Plan   Assessment  Active Hospital Problems    Diagnosis     **Hepatic encephalopathy        Medical Decision Making/treatment plan  Number and Complexity of problems:   Hepatic encephalopathy versus medication induced (chlordiazepoxide-recently prescribed August 18 by Dr. Rangel) versus combination of it   Hyperammonemia-continue lactulose with goals of 3-4 bowel movements per day  History of alcoholism-no suggestive evidence of alcohol withdrawal   cirrhosis-continue diuretics; other plans per GI service  Hypertension-stable and tolerating diuresis  Macrocytic anemia in patient with history of alcohol use  Cholelithiasis-gallbladder wall thickening without biliary dilatation may relate to small volume perihepatic ascites versus less likely calculus cholecystitis.  Fluid retention lower extremities-continue Bumex drip  Morbid " obesity with BMI 44  Mild hypokalemia.  At continued risk for loss given Bumex drip.  Replace accordingly.  Hypomagnesemia  Functional decline-PT OT evaluation     Continue IV diuretic and correct electrolytes as per protocol  Check weight  Monitor input output  Monitor renal function  Replace electrolytes  Increase activities as tolerated  Hemodynamically stable  Continue present management.  Meds reviewed     Scheduled Medication     Scheduled Medication   enoxaparin, 40 mg, Subcutaneous, Q12H  folic acid, 1 mg, Oral, Daily  lactulose, 20 g, Oral, TID  levothyroxine, 50 mcg, Oral, Q AM  magnesium sulfate, 1 g, Intravenous, Q1H  nicotine, 1 patch, Transdermal, Q24H  pantoprazole, 40 mg, Oral, Daily  pentoxifylline, 400 mg, Oral, TID With Meals  potassium chloride ER, 40 mEq, Oral, Q4H  riFAXIMin, 550 mg, Oral, BID  sodium chloride, 10 mL, Intravenous, Q12H  spironolactone, 100 mg, Oral, Daily  thiamine, 100 mg, Oral, Daily                          I considered adding back low-dose chlordiazepoxide but patient remains drowsy although improved compared to admission     Replace potassium accordingly and check magnesium  Service note reviewed.  Patient appears to be slowly clinically improving.     Okay to remove Harris catheter per patient's request           Conditions and Status  Fair     MDM Data  External documents reviewed: Reviewed epic record  Cardiac tracing (EKG, telemetry) interpretation: -     Results for orders placed during the hospital encounter of 07/04/24     Adult Transthoracic Echo Complete W/ Cont if Necessary Per Protocol     Interpretation Summary    Left ventricular ejection fraction appears to be 61 - 65%.    Left ventricular diastolic function was normal.    Estimated right ventricular systolic pressure from tricuspid regurgitation is normal (<35 mmHg).        Radiology interpretation: Reviewed multiple imaging studies as outlined above  Labs reviewed: Yes  Any tests that were considered but not  ordered: None     Decision rules/scores evaluated (example [id number], Wells, etc):        Discussed with: Patient and nurse          care Planning  Shared decision making: patient and consultant  Code status and discussions: Full code     Disposition  Social Determinants of Health that impact treatment or disposition: None identified at this time  Estimated length of stay is to be determined.      I confirmed that the patient's advanced care plan is present, code status is documented, and a surrogate decision maker is listed in the patient's medical record.      The patient's surrogate decision maker is mom.   I expect the patient to be discharged to (TBD)  Possibly home in couple of days on oral diuretic.  This will be an ongoing process when he leaves the hospital  Electronically signed by Roderick Field MD, 09/01/24, 11:29 CDT.

## 2024-09-01 NOTE — PLAN OF CARE
Goal Outcome Evaluation:  Plan of Care Reviewed With: patient        Progress: no change  Outcome Evaluation: VSS. Denies pain this shift. NSR 70-90 on tele. K+3.5/ Mag 1.2, replaced again today per protocol. Pt did have a BM in the floor this shift. Ammonia improved to 64. Steven Mueller. Bumex gtt cont. Safety maintained. Plan of care ongoing.

## 2024-09-02 PROBLEM — R60.9 FLUID RETENTION: Status: ACTIVE | Noted: 2024-09-02

## 2024-09-02 LAB
ALBUMIN SERPL-MCNC: 3.6 G/DL (ref 3.5–5.2)
ALBUMIN/GLOB SERPL: 0.7 G/DL
ALP SERPL-CCNC: 134 U/L (ref 39–117)
ALT SERPL W P-5'-P-CCNC: 17 U/L (ref 1–41)
AMMONIA BLD-SCNC: 61 UMOL/L (ref 16–60)
ANION GAP SERPL CALCULATED.3IONS-SCNC: 14 MMOL/L (ref 5–15)
AST SERPL-CCNC: 53 U/L (ref 1–40)
BILIRUB SERPL-MCNC: 2.9 MG/DL (ref 0–1.2)
BUN SERPL-MCNC: 10 MG/DL (ref 6–20)
BUN/CREAT SERPL: 7.8 (ref 7–25)
CALCIUM SPEC-SCNC: 9.1 MG/DL (ref 8.6–10.5)
CHLORIDE SERPL-SCNC: 92 MMOL/L (ref 98–107)
CO2 SERPL-SCNC: 29 MMOL/L (ref 22–29)
CREAT SERPL-MCNC: 1.29 MG/DL (ref 0.76–1.27)
EGFRCR SERPLBLD CKD-EPI 2021: 72.3 ML/MIN/1.73
GLOBULIN UR ELPH-MCNC: 4.9 GM/DL
GLUCOSE SERPL-MCNC: 89 MG/DL (ref 65–99)
MAGNESIUM SERPL-MCNC: 1.2 MG/DL (ref 1.6–2.6)
POTASSIUM SERPL-SCNC: 3.4 MMOL/L (ref 3.5–5.2)
POTASSIUM SERPL-SCNC: 3.7 MMOL/L (ref 3.5–5.2)
PROT SERPL-MCNC: 8.5 G/DL (ref 6–8.5)
SODIUM SERPL-SCNC: 135 MMOL/L (ref 136–145)

## 2024-09-02 PROCEDURE — 97116 GAIT TRAINING THERAPY: CPT

## 2024-09-02 PROCEDURE — 83735 ASSAY OF MAGNESIUM: CPT | Performed by: INTERNAL MEDICINE

## 2024-09-02 PROCEDURE — 84132 ASSAY OF SERUM POTASSIUM: CPT | Performed by: FAMILY MEDICINE

## 2024-09-02 PROCEDURE — 25010000002 MAGNESIUM SULFATE PER 500 MG OF MAGNESIUM: Performed by: FAMILY MEDICINE

## 2024-09-02 PROCEDURE — 82140 ASSAY OF AMMONIA: CPT | Performed by: INTERNAL MEDICINE

## 2024-09-02 PROCEDURE — 80053 COMPREHEN METABOLIC PANEL: CPT | Performed by: INTERNAL MEDICINE

## 2024-09-02 PROCEDURE — 25010000002 ENOXAPARIN PER 10 MG: Performed by: INTERNAL MEDICINE

## 2024-09-02 RX ORDER — MAGNESIUM SULFATE HEPTAHYDRATE 40 MG/ML
2 INJECTION, SOLUTION INTRAVENOUS
Status: DISCONTINUED | OUTPATIENT
Start: 2024-09-02 | End: 2024-09-02

## 2024-09-02 RX ORDER — POTASSIUM CHLORIDE 750 MG/1
40 CAPSULE, EXTENDED RELEASE ORAL EVERY 4 HOURS
Status: COMPLETED | OUTPATIENT
Start: 2024-09-02 | End: 2024-09-02

## 2024-09-02 RX ORDER — BUMETANIDE 1 MG/1
1 TABLET ORAL DAILY
Status: DISCONTINUED | OUTPATIENT
Start: 2024-09-02 | End: 2024-09-03 | Stop reason: HOSPADM

## 2024-09-02 RX ADMIN — POTASSIUM CHLORIDE 40 MEQ: 750 CAPSULE, EXTENDED RELEASE ORAL at 09:12

## 2024-09-02 RX ADMIN — ENOXAPARIN SODIUM 40 MG: 100 INJECTION SUBCUTANEOUS at 09:14

## 2024-09-02 RX ADMIN — BUMETANIDE 1 MG: 1 TABLET ORAL at 10:29

## 2024-09-02 RX ADMIN — MAGNESIUM GLUCONATE 500 MG ORAL TABLET 400 MG: 500 TABLET ORAL at 20:02

## 2024-09-02 RX ADMIN — POTASSIUM CHLORIDE 40 MEQ: 750 CAPSULE, EXTENDED RELEASE ORAL at 12:06

## 2024-09-02 RX ADMIN — Medication 10 ML: at 09:14

## 2024-09-02 RX ADMIN — MAGNESIUM SULFATE HEPTAHYDRATE: 500 INJECTION, SOLUTION INTRAMUSCULAR; INTRAVENOUS at 09:12

## 2024-09-02 RX ADMIN — SPIRONOLACTONE 100 MG: 50 TABLET ORAL at 09:12

## 2024-09-02 RX ADMIN — THIAMINE HCL TAB 100 MG 100 MG: 100 TAB at 09:16

## 2024-09-02 RX ADMIN — PENTOXIFYLLINE 400 MG: 400 TABLET, EXTENDED RELEASE ORAL at 09:12

## 2024-09-02 RX ADMIN — RIFAXIMIN 550 MG: 550 TABLET ORAL at 20:02

## 2024-09-02 RX ADMIN — PANTOPRAZOLE SODIUM 40 MG: 40 TABLET, DELAYED RELEASE ORAL at 09:13

## 2024-09-02 RX ADMIN — Medication 10 ML: at 20:03

## 2024-09-02 RX ADMIN — LACTULOSE 20 G: 20 SOLUTION ORAL at 20:02

## 2024-09-02 RX ADMIN — PENTOXIFYLLINE 400 MG: 400 TABLET, EXTENDED RELEASE ORAL at 18:11

## 2024-09-02 RX ADMIN — RIFAXIMIN 550 MG: 550 TABLET ORAL at 09:12

## 2024-09-02 RX ADMIN — MAGNESIUM GLUCONATE 500 MG ORAL TABLET 400 MG: 500 TABLET ORAL at 09:13

## 2024-09-02 RX ADMIN — MAGNESIUM SULFATE HEPTAHYDRATE: 500 INJECTION, SOLUTION INTRAMUSCULAR; INTRAVENOUS at 10:32

## 2024-09-02 RX ADMIN — MAGNESIUM SULFATE HEPTAHYDRATE: 500 INJECTION, SOLUTION INTRAMUSCULAR; INTRAVENOUS at 12:28

## 2024-09-02 RX ADMIN — LEVOTHYROXINE SODIUM 50 MCG: 50 TABLET ORAL at 05:41

## 2024-09-02 RX ADMIN — Medication 5 MG: at 20:02

## 2024-09-02 RX ADMIN — NICOTINE 1 PATCH: 21 PATCH, EXTENDED RELEASE TRANSDERMAL at 09:13

## 2024-09-02 RX ADMIN — PENTOXIFYLLINE 400 MG: 400 TABLET, EXTENDED RELEASE ORAL at 12:28

## 2024-09-02 RX ADMIN — FOLIC ACID 1 MG: 1 TABLET ORAL at 09:13

## 2024-09-02 RX ADMIN — ENOXAPARIN SODIUM 40 MG: 100 INJECTION SUBCUTANEOUS at 20:02

## 2024-09-02 NOTE — CASE MANAGEMENT/SOCIAL WORK
Continued Stay Note   [facility]     Patient Name: Luciano Christiansen  MRN: [id number]  Today's Date: 9/2/2024    Admit Date: 8/27/2024    Plan: Home   Discharge Plan       Row Name 09/02/24 1101       Plan    Plan Home    Patient/Family in Agreement with Plan yes    Plan Comments LUPIS spoke to pt's mother Marisa at [phone]. She states pt will dc to her house; she will transport home. She is requesting a 3 prong cane and bariatric bsc. LUPIS will follow for MD orders to arrange.                   Discharge Codes    No documentation.                       CHAPITO WallaceW

## 2024-09-02 NOTE — PLAN OF CARE
Problem: Adult Inpatient Plan of Care  Goal: Plan of Care Review  Outcome: Ongoing, Progressing  Goal: Patient-Specific Goal (Individualized)  Outcome: Ongoing, Progressing  Goal: Absence of Hospital-Acquired Illness or Injury  Outcome: Ongoing, Progressing  Intervention: Identify and Manage Fall Risk  Recent Flowsheet Documentation  Taken 9/2/2024 0200 by Jesus Jack RN  Safety Promotion/Fall Prevention: safety round/check completed  Taken 9/2/2024 0031 by Jesus Jack RN  Safety Promotion/Fall Prevention: safety round/check completed  Taken 9/1/2024 2200 by Jesus Jack RN  Safety Promotion/Fall Prevention: safety round/check completed  Taken 9/1/2024 2100 by Jesus Jack RN  Safety Promotion/Fall Prevention: safety round/check completed  Taken 9/1/2024 2000 by Jesus Jack RN  Safety Promotion/Fall Prevention: safety round/check completed  Intervention: Prevent Skin Injury  Recent Flowsheet Documentation  Taken 9/1/2024 2000 by Jesus Jack RN  Body Position: position changed independently  Intervention: Prevent and Manage VTE (Venous Thromboembolism) Risk  Recent Flowsheet Documentation  Taken 9/1/2024 2000 by Jesus Jack RN  VTE Prevention/Management: (see mar) other (see comments)  Range of Motion: active ROM (range of motion) encouraged  Goal: Optimal Comfort and Wellbeing  Outcome: Ongoing, Progressing  Goal: Readiness for Transition of Care  Outcome: Ongoing, Progressing     Problem: Heart Failure Comorbidity  Goal: Maintenance of Heart Failure Symptom Control  Outcome: Ongoing, Progressing     Problem: Fall Injury Risk  Goal: Absence of Fall and Fall-Related Injury  Outcome: Ongoing, Progressing  Intervention: Promote Injury-Free Environment  Recent Flowsheet Documentation  Taken 9/2/2024 0200 by Jesus Jack RN  Safety Promotion/Fall Prevention: safety round/check completed  Taken 9/2/2024 0031 by Jesus Jack RN  Safety Promotion/Fall Prevention: safety round/check completed  Taken  9/1/2024 2200 by Jesus Jack RN  Safety Promotion/Fall Prevention: safety round/check completed  Taken 9/1/2024 2100 by Jesus Jack RN  Safety Promotion/Fall Prevention: safety round/check completed  Taken 9/1/2024 2000 by Jesus Jack RN  Safety Promotion/Fall Prevention: safety round/check completed     Problem: Confusion Acute  Goal: Optimal Cognitive Function  Outcome: Ongoing, Progressing     Problem: Urinary Retention  Goal: Effective Urinary Elimination  Outcome: Ongoing, Progressing     Problem: Alcohol Withdrawal  Goal: Alcohol Withdrawal Symptom Control  Outcome: Ongoing, Progressing     Problem: Acute Neurologic Deterioration (Alcohol Withdrawal)  Goal: Optimal Neurologic Function  Outcome: Ongoing, Progressing     Problem: Substance Misuse (Alcohol Withdrawal)  Goal: Readiness for Change Identified  Outcome: Ongoing, Progressing     Problem: Adjustment to Illness (Liver Failure)  Goal: Optimal Coping with Liver Failure  Outcome: Ongoing, Progressing     Problem: Fluid and Electrolyte Imbalance (Liver Failure)  Goal: Fluid and Electrolyte Balance  Outcome: Ongoing, Progressing     Problem: Gastrointestinal Complications (Liver Failure)  Goal: Optimal Gastrointestinal Function  Outcome: Ongoing, Progressing  Intervention: Monitor and Support Gastrointestinal Function  Recent Flowsheet Documentation  Taken 9/1/2024 2000 by Jesus Jack RN  Body Position: position changed independently     Problem: Impaired Coagulation (Liver Failure)  Goal: Optimal Coagulation Function  Outcome: Ongoing, Progressing     Problem: Infection (Liver Failure)  Goal: Absence of Infection Signs and Symptoms  Outcome: Ongoing, Progressing     Problem: Neurologic Function Impaired (Liver Failure)  Goal: Optimal Neurologic Function  Outcome: Ongoing, Progressing     Problem: Oral Intake Inadequate (Liver Failure)  Goal: Improved Oral Intake  Outcome: Ongoing, Progressing     Problem: Pain (Liver Failure)  Goal: Optimal Pain  Control  Outcome: Ongoing, Progressing     Problem: Renal Dysfunction (Liver Failure)  Goal: Optimize Renal Function  Outcome: Ongoing, Progressing     Problem: Respiratory Compromise (Liver Failure)  Goal: Effective Oxygenation and Ventilation  Outcome: Ongoing, Progressing  Intervention: Promote Airway Secretion Clearance  Recent Flowsheet Documentation  Taken 9/1/2024 2000 by Jesus Jack, RN  Cough And Deep Breathing: done independently per patient     Problem: Skin Injury Risk Increased  Goal: Skin Health and Integrity  Outcome: Ongoing, Progressing   Goal Outcome Evaluation:

## 2024-09-02 NOTE — PLAN OF CARE
Goal Outcome Evaluation:  Plan of Care Reviewed With: patient           Outcome Evaluation: A&OX4 VSS No c/o voiced. Incont. of urine today unable to get accurate I/O. Pt states had 5 BM's. Scrotal and leg edema improving. Mag runs per order. Mag level 1.2. PO K+ given level 3.4. Bumex IV stopped and PO started. Sinus 83-95 per tele.

## 2024-09-02 NOTE — PROGRESS NOTES
[facility] Medicine Services  INPATIENT PROGRESS NOTE    Patient Name: Luciano Christiansen  Date of Admission: 8/27/2024  Today's Date: 09/02/24  Length of Stay: 6  Primary Care Physician: Jossy Christiansen APRN    Subjective   Chief Complaint: wants to go home.  HPI   Nursing notes he was incontinent of stool on the floor last PM four times.   Patient feels a little better. Wants to go home.   Had been staying with mother and daughter.   Feels his testicular swelling has gotten better.   Legs less swollen.       Review of Systems   All pertinent negatives and positives are as above. All other systems have been reviewed and are negative unless otherwise stated.     Objective    Temp:  [97.6 °F (36.4 °C)-98.5 °F (36.9 °C)] 97.6 °F (36.4 °C)  Heart Rate:  [79-89] 89  Resp:  [18] 18  BP: (115-124)/(48-62) 116/48  Physical Exam  Vitals and nursing note reviewed.   Constitutional:       Appearance: Normal appearance.   HENT:      Head: Normocephalic and atraumatic.      Right Ear: External ear normal.      Left Ear: External ear normal.      Nose: Nose normal.      Mouth/Throat:      Mouth: Mucous membranes are moist.   Eyes:      Extraocular Movements: Extraocular movements intact.      Pupils: Pupils are equal, round, and reactive to light.   Cardiovascular:      Rate and Rhythm: Normal rate and regular rhythm.      Pulses: Normal pulses.      Heart sounds: Normal heart sounds.   Pulmonary:      Effort: Pulmonary effort is normal.      Breath sounds: Normal breath sounds.   Abdominal:      General: Bowel sounds are normal. There is no distension.      Tenderness: There is no abdominal tenderness.      Comments: Obese    Musculoskeletal:      Cervical back: Normal range of motion. No rigidity.      Right lower leg: Edema present.      Left lower leg: Edema present.      Comments: Moves all extremities without difficulty.   Skin:     General: Skin is warm and dry.      Capillary Refill:  "Capillary refill takes less than 2 seconds.      Comments: Skin lower extremities with chronic venous stasis changes   Neurological:      General: No focal deficit present.      Mental Status: He is alert and oriented to person, place, and time.   Psychiatric:      Comments: Affect is flat.              Results Review:  I have reviewed the labs, radiology results, and diagnostic studies.    Laboratory Data:   Results from last 7 days   Lab Units 08/27/24  1141   WBC 10*3/mm3 10.87*   HEMOGLOBIN g/dL 8.8*   HEMATOCRIT % 28.0*   PLATELETS 10*3/mm3 356        Results from last 7 days   Lab Units 09/02/24  0429 09/01/24  1608 09/01/24  0447 08/31/24  1317 08/28/24  1815 08/28/24  0339   SODIUM mmol/L 135*  --  135* 137   < > 139   POTASSIUM mmol/L 3.4* 3.7 3.5 3.5   < > 3.3*   CHLORIDE mmol/L 92*  --  94* 96*   < > 103   CO2 mmol/L 29.0  --  28.0 27.0   < > 22.0   BUN mg/dL 10  --  10 10   < > 23*   CREATININE mg/dL 1.29*  --  1.16 1.29*   < > 1.41*   CALCIUM mg/dL 9.1  --  8.9 8.6   < > 8.9   BILIRUBIN mg/dL 2.9*  --  2.6*  --   --  2.4*   ALK PHOS U/L 134*  --  128*  --   --  129*   ALT (SGPT) U/L 17  --  16  --   --  17   AST (SGOT) U/L 53*  --  51*  --   --  49*   GLUCOSE mg/dL 89  --  86 115*   < > 81    < > = values in this interval not displayed.       Culture Data:   No results found for: \"BLOODCX\", \"URINECX\", \"WOUNDCX\", \"MRSACX\", \"RESPCX\", \"STOOLCX\"    Radiology Data:   Imaging Results (Last 24 Hours)       ** No results found for the last 24 hours. **            I have reviewed the patient's current medications.     Assessment/Plan   Assessment  Active Hospital Problems    Diagnosis     **Hepatic encephalopathy     Fluid retention     Type 2 diabetes mellitus     Hypokalemia     Hypomagnesemia        Treatment Plan  Change to oral diuretic  Add oral magnesium  Add oral potassium  Encourage activity  Check CMP with Mg in AM  IF remains stable then will likely dc in AM    Medical Decision Making  Number and " Complexity of problems:   Hepatic encephalopathy high complexity  Type 2 diabetes mellitus moderate complexity  Hypokalemia high complexity  Hypomagnesemia high complexity  Fluid retention moderate complexity    Differential Diagnosis:     Conditions and Status        Condition is improving.     MDM Data  External documents reviewed: Reviewed  Cardiac tracing (EKG, telemetry) interpretation: Reviewed  Radiology interpretation: Reviewed  Labs reviewed: Reviewed  Any tests that were considered but not ordered: None     Decision rules/scores evaluated (example [id number], Wells, etc): None     Discussed with: Patient     Care Planning  Shared decision making: Gastroenterology  Code status and discussions: Full    Disposition  Social Determinants of Health that impact treatment or disposition: None  I expect the patient to be discharged to home in 1-2 days.     Electronically signed by Liset Klein DO, 09/02/24, 09:46 CDT.

## 2024-09-02 NOTE — THERAPY TREATMENT NOTE
Acute Care - Physical Therapy Treatment Note  [facility]     Patient Name: Luciano Christiansen  : 1985  MRN: [id number]  Today's Date: 2024      Visit Dx:     ICD-10-CM ICD-9-CM   1. Hepatic encephalopathy  K76.82 572.2   2. MAYRA (acute kidney injury)  N17.9 584.9   3. Anasarca  R60.1 782.3   4. Chronic anemia  D64.9 285.9   5. Hepatosplenomegaly  R16.2 571.8   6. Chronic liver disease  K76.9 571.9   7. Lymphadenopathy  R59.1 785.6   8. Calculus of gallbladder without cholecystitis without obstruction  K80.20 574.20   9. Impaired mobility [Z74.09]  Z74.09 799.89     Patient Active Problem List   Diagnosis    Wellness examination    Encounter for hepatitis C screening test for low risk patient    Primary hypertension    Class 1 obesity due to excess calories without serious comorbidity with body mass index (BMI) of 34.0 to 34.9 in adult    Fatty liver    Hyponatremia    Hypokalemia    Hypomagnesemia    Alkalosis    Alcoholism    Transaminitis    Hypophosphatemia    Insomnia disorder related to known organic factor    Community acquired bilateral lower lobe pneumonia    Pneumonia due to Streptococcus    GI bleed    Diabetic ketoacidosis associated with type 2 diabetes mellitus    Alcohol intoxication in active alcoholic    Calculus of gallbladder without cholecystitis without obstruction    Alcoholic encephalopathy    Type 2 diabetes mellitus    Alcohol withdrawal    Alcoholic steatohepatitis    BMI 40.0-44.9, adult    Hepatic encephalopathy    Fluid retention     Past Medical History:   Diagnosis Date    Alcoholism     Diabetes mellitus     Gout     Hypertension     Jaundice      Past Surgical History:   Procedure Laterality Date    VASECTOMY       PT Assessment (Last 12 Hours)       PT Evaluation and Treatment       Row Name 24 0939          Physical Therapy Time and Intention    Subjective Information complains of;fatigue  -WK     Document Type therapy note (daily note)  -WK     Mode of Treatment physical  therapy  -WK       Row Name 09/02/24 0939          General Information    Existing Precautions/Restrictions fall  -WK       Row Name 09/02/24 0939          Pain    Pretreatment Pain Rating 0/10 - no pain  -WK     Posttreatment Pain Rating 0/10 - no pain  -WK       Row Name 09/02/24 0939          Bed Mobility    Supine-Sit [place] (Bed Mobility) standby assist  -WK     Sit-Supine [place] (Bed Mobility) minimum assist (75% patient effort)  -WK     Assistive Device (Bed Mobility) bed rails  -WK       Row Name 09/02/24 0939          Sit-Stand Transfer    Sit-Stand [place] (Transfers) standby assist  -WK       Row Name 09/02/24 0939          Stand-Sit Transfer    Stand-Sit [place] (Transfers) standby assist  -WK       Row Name 09/02/24 0939          Gait/Stairs (Locomotion)    [place] Level (Gait) contact guard;standby assist  -WK     Assistive Device (Gait) --  none  -WK     Distance in Feet (Gait) 200  x3, assisted pt into BR  -WK     Deviations/Abnormal Patterns (Gait) base of support, wide;gait speed decreased  Slow gait, decrease foot clearance.  -WK     Comment, (Gait/Stairs) Mild LOB during 1 turn and while backing up in BR. CGA  -WK       Row Name 09/02/24 0939          Balance    Comment, Balance standing reaching to the floor CGA, reaching outside SADIA  -WK       Row Name 09/02/24 0939          Plan of Care Review    Plan of Care Reviewed With patient  -WK     Progress improving  -WK     Outcome Evaluation Bed mobility SBA-min A. Sit to stand SBA. Amb 200' at a time CGA-SBA with slow gait. Educated on safety. Pt had 2 mild LOB that he was able to correct.  -WK       Row Name 09/02/24 0939          Positioning and Restraints    Pre-Treatment Position in bed  -WK     Post Treatment Position bed  -WK     In Bed fowlers;call light within reach;encouraged to call for assist  -WK               User Key  (r) = Recorded By, (t) = Taken By, (c) = Cosigned By      Initials Name Provider Type    WK  Gris West PTA Physical Therapist Assistant                    Physical Therapy Education       Title: PT OT SLP Therapies (In Progress)       Topic: Physical Therapy (In Progress)       Point: Mobility training (Done)       Learning Progress Summary             Patient Acceptance, E, VU by KIMBERLI at 8/29/2024 1318    Comment: T/F'S    Acceptance, E, VU,NR by COLIN at 8/28/2024 1428    Comment: Benefits of activity, progression of PT                         Point: Home exercise program (Not Started)       Learner Progress:  Not documented in this visit.              Point: Body mechanics (Not Started)       Learner Progress:  Not documented in this visit.              Point: Precautions (Not Started)       Learner Progress:  Not documented in this visit.                              User Key       Initials Effective Dates Name Provider Type Discipline    AE 02/03/23 -  Anabell Story PTA Physical Therapist Assistant PT    COLIN 02/03/23 -  Shiva Mcleod PT DPT Physical Therapist PT                  PT Recommendation and Plan     Plan of Care Reviewed With: patient  Progress: improving  Outcome Evaluation: Bed mobility SBA-min A. Sit to stand SBA. Amb 200' at a time CGA-SBA with slow gait. Educated on safety. Pt had 2 mild LOB that he was able to correct.   Outcome Measures       Row Name 09/02/24 1000 08/31/24 1026          How much help from another person do you currently need...    Turning from your back to your side while in flat bed without using bedrails? 3  -WK 3  -WK     Moving from lying on back to sitting on the side of a flat bed without bedrails? 3  -WK 3  -WK     Moving to and from a bed to a chair (including a wheelchair)? 4  -WK 3  -WK     Standing up from a chair using your arms (e.g., wheelchair, bedside chair)? 4  -WK 3  -WK     Climbing 3-5 steps with a railing? 2  -WK 2  -WK     To walk in hospital room? 3  -WK 3  -WK     AM-PAC 6 Clicks Score (PT) 19  -WK 17  -WK     Highest Level of  Mobility Goal 6 --> Walk 10 steps or more  -WK 5 --> Static standing  -WK        Functional Assessment    Outcome Measure Options AM-PAC 6 Clicks Basic Mobility (PT)  -WK AM-PAC 6 Clicks Basic Mobility (PT)  -WK               User Key  (r) = Recorded By, (t) = Taken By, (c) = Cosigned By      Initials Name Provider Type    WK Gris West PTA Physical Therapist Assistant                     Time Calculation:    PT Charges       Row Name 09/02/24 1022             Time Calculation    Start Time 0939  -WK      Stop Time 1018  -WK      Time Calculation (min) 39 min  -WK      PT Received On 09/02/24  -WK         Time Calculation- PT    Total Timed Code Minutes- PT 39 minute(s)  -WK         Timed Charges    53539 - Gait Training Minutes  39  -WK         Total Minutes    Timed Charges Total Minutes 39  -WK       Total Minutes 39  -WK                User Key  (r) = Recorded By, (t) = Taken By, (c) = Cosigned By      Initials Name Provider Type     Gris West PTA Physical Therapist Assistant                  Therapy Charges for Today       Code Description Service Date Service Provider Modifiers Qty    03087518105 HC GAIT TRAINING EA 15 MIN 9/2/2024 Gris West PTA GP 3            PT G-Codes  Outcome Measure Options: AM-PAC 6 Clicks Basic Mobility (PT)  AM-PAC 6 Clicks Score (PT): 19  AM-PAC 6 Clicks Score (OT): 15    Gris West PTA  9/2/2024

## 2024-09-02 NOTE — PLAN OF CARE
Goal Outcome Evaluation:  Plan of Care Reviewed With: patient        Progress: improving  Outcome Evaluation: Bed mobility SBA-min A. Sit to stand SBA. Amb 200' at a time CGA-SBA with slow gait. Educated on safety. Pt had 2 mild LOB that he was able to correct.

## 2024-09-03 VITALS
HEART RATE: 81 BPM | HEIGHT: 74 IN | SYSTOLIC BLOOD PRESSURE: 125 MMHG | DIASTOLIC BLOOD PRESSURE: 66 MMHG | TEMPERATURE: 98.6 F | OXYGEN SATURATION: 92 % | RESPIRATION RATE: 18 BRPM | WEIGHT: 283 LBS | BODY MASS INDEX: 36.32 KG/M2

## 2024-09-03 LAB
ALBUMIN SERPL-MCNC: 3.2 G/DL (ref 3.5–5.2)
ALBUMIN/GLOB SERPL: 0.7 G/DL
ALP SERPL-CCNC: 120 U/L (ref 39–117)
ALT SERPL W P-5'-P-CCNC: 15 U/L (ref 1–41)
ANION GAP SERPL CALCULATED.3IONS-SCNC: 12 MMOL/L (ref 5–15)
AST SERPL-CCNC: 42 U/L (ref 1–40)
BILIRUB SERPL-MCNC: 2.5 MG/DL (ref 0–1.2)
BUN SERPL-MCNC: 12 MG/DL (ref 6–20)
BUN/CREAT SERPL: 8.8 (ref 7–25)
CALCIUM SPEC-SCNC: 8.6 MG/DL (ref 8.6–10.5)
CHLORIDE SERPL-SCNC: 93 MMOL/L (ref 98–107)
CO2 SERPL-SCNC: 30 MMOL/L (ref 22–29)
CREAT SERPL-MCNC: 1.37 MG/DL (ref 0.76–1.27)
DEPRECATED RDW RBC AUTO: 56.8 FL (ref 37–54)
EGFRCR SERPLBLD CKD-EPI 2021: 67.3 ML/MIN/1.73
ERYTHROCYTE [DISTWIDTH] IN BLOOD BY AUTOMATED COUNT: 16.6 % (ref 12.3–15.4)
GLOBULIN UR ELPH-MCNC: 4.3 GM/DL
GLUCOSE SERPL-MCNC: 88 MG/DL (ref 65–99)
HCT VFR BLD AUTO: 26 % (ref 37.5–51)
HGB BLD-MCNC: 8.3 G/DL (ref 13–17.7)
MAGNESIUM SERPL-MCNC: 1.5 MG/DL (ref 1.6–2.6)
MCH RBC QN AUTO: 30.4 PG (ref 26.6–33)
MCHC RBC AUTO-ENTMCNC: 31.9 G/DL (ref 31.5–35.7)
MCV RBC AUTO: 95.2 FL (ref 79–97)
PLATELET # BLD AUTO: 232 10*3/MM3 (ref 140–450)
PMV BLD AUTO: 10 FL (ref 6–12)
POTASSIUM SERPL-SCNC: 3.5 MMOL/L (ref 3.5–5.2)
PROT SERPL-MCNC: 7.5 G/DL (ref 6–8.5)
RBC # BLD AUTO: 2.73 10*6/MM3 (ref 4.14–5.8)
SODIUM SERPL-SCNC: 135 MMOL/L (ref 136–145)
WBC NRBC COR # BLD AUTO: 7.99 10*3/MM3 (ref 3.4–10.8)

## 2024-09-03 PROCEDURE — 83735 ASSAY OF MAGNESIUM: CPT | Performed by: FAMILY MEDICINE

## 2024-09-03 PROCEDURE — 25010000002 ENOXAPARIN PER 10 MG: Performed by: INTERNAL MEDICINE

## 2024-09-03 PROCEDURE — 25010000002 MAGNESIUM SULFATE 2 GM/50ML SOLUTION: Performed by: FAMILY MEDICINE

## 2024-09-03 PROCEDURE — 80053 COMPREHEN METABOLIC PANEL: CPT | Performed by: INTERNAL MEDICINE

## 2024-09-03 PROCEDURE — 25010000002 MAGNESIUM SULFATE PER 500 MG OF MAGNESIUM: Performed by: FAMILY MEDICINE

## 2024-09-03 PROCEDURE — 85027 COMPLETE CBC AUTOMATED: CPT | Performed by: FAMILY MEDICINE

## 2024-09-03 RX ORDER — LACTULOSE 20 G/30ML
20 SOLUTION ORAL 3 TIMES DAILY
Qty: 450 ML | Refills: 0 | Status: SHIPPED | OUTPATIENT
Start: 2024-09-03

## 2024-09-03 RX ORDER — MAGNESIUM SULFATE HEPTAHYDRATE 40 MG/ML
2 INJECTION, SOLUTION INTRAVENOUS ONCE
Status: COMPLETED | OUTPATIENT
Start: 2024-09-03 | End: 2024-09-03

## 2024-09-03 RX ORDER — POTASSIUM CHLORIDE 750 MG/1
40 CAPSULE, EXTENDED RELEASE ORAL EVERY 4 HOURS
Status: COMPLETED | OUTPATIENT
Start: 2024-09-03 | End: 2024-09-03

## 2024-09-03 RX ORDER — MAGNESIUM SULFATE HEPTAHYDRATE 40 MG/ML
2 INJECTION, SOLUTION INTRAVENOUS
Status: DISCONTINUED | OUTPATIENT
Start: 2024-09-03 | End: 2024-09-03

## 2024-09-03 RX ORDER — NICOTINE 21 MG/24HR
1 PATCH, TRANSDERMAL 24 HOURS TRANSDERMAL
Qty: 30 PATCH | Refills: 0 | Status: SHIPPED | OUTPATIENT
Start: 2024-09-03

## 2024-09-03 RX ADMIN — MAGNESIUM GLUCONATE 500 MG ORAL TABLET 400 MG: 500 TABLET ORAL at 08:34

## 2024-09-03 RX ADMIN — POTASSIUM CHLORIDE 40 MEQ: 750 CAPSULE, EXTENDED RELEASE ORAL at 08:33

## 2024-09-03 RX ADMIN — MAGNESIUM SULFATE: 500 INJECTION, SOLUTION INTRAMUSCULAR; INTRAVENOUS at 08:33

## 2024-09-03 RX ADMIN — PANTOPRAZOLE SODIUM 40 MG: 40 TABLET, DELAYED RELEASE ORAL at 08:33

## 2024-09-03 RX ADMIN — PENTOXIFYLLINE 400 MG: 400 TABLET, EXTENDED RELEASE ORAL at 08:33

## 2024-09-03 RX ADMIN — MAGNESIUM SULFATE HEPTAHYDRATE 2 G: 2 INJECTION, SOLUTION INTRAVENOUS at 12:13

## 2024-09-03 RX ADMIN — POTASSIUM CHLORIDE 40 MEQ: 750 CAPSULE, EXTENDED RELEASE ORAL at 11:28

## 2024-09-03 RX ADMIN — FOLIC ACID 1 MG: 1 TABLET ORAL at 08:34

## 2024-09-03 RX ADMIN — Medication 10 ML: at 08:35

## 2024-09-03 RX ADMIN — PENTOXIFYLLINE 400 MG: 400 TABLET, EXTENDED RELEASE ORAL at 11:28

## 2024-09-03 RX ADMIN — LACTULOSE 20 G: 20 SOLUTION ORAL at 08:35

## 2024-09-03 RX ADMIN — RIFAXIMIN 550 MG: 550 TABLET ORAL at 08:34

## 2024-09-03 RX ADMIN — LEVOTHYROXINE SODIUM 50 MCG: 50 TABLET ORAL at 06:05

## 2024-09-03 RX ADMIN — BUMETANIDE 1 MG: 1 TABLET ORAL at 08:34

## 2024-09-03 RX ADMIN — SPIRONOLACTONE 100 MG: 50 TABLET ORAL at 08:33

## 2024-09-03 RX ADMIN — THIAMINE HCL TAB 100 MG 100 MG: 100 TAB at 08:33

## 2024-09-03 RX ADMIN — ENOXAPARIN SODIUM 40 MG: 100 INJECTION SUBCUTANEOUS at 08:34

## 2024-09-03 RX ADMIN — MAGNESIUM SULFATE: 500 INJECTION, SOLUTION INTRAMUSCULAR; INTRAVENOUS at 10:16

## 2024-09-03 NOTE — PLAN OF CARE
Problem: Adult Inpatient Plan of Care  Goal: Plan of Care Review  Outcome: Ongoing, Progressing  Goal: Patient-Specific Goal (Individualized)  Outcome: Ongoing, Progressing  Goal: Absence of Hospital-Acquired Illness or Injury  Outcome: Ongoing, Progressing  Intervention: Identify and Manage Fall Risk  Recent Flowsheet Documentation  Taken 9/3/2024 0400 by Jesus Jack RN  Safety Promotion/Fall Prevention: safety round/check completed  Taken 9/3/2024 0200 by Jesus Jack RN  Safety Promotion/Fall Prevention: safety round/check completed  Taken 9/3/2024 0000 by Jesus Jack RN  Safety Promotion/Fall Prevention: safety round/check completed  Taken 9/2/2024 2200 by Jesus Jack RN  Safety Promotion/Fall Prevention: safety round/check completed  Taken 9/2/2024 2100 by Jesus Jack RN  Safety Promotion/Fall Prevention: safety round/check completed  Taken 9/2/2024 2000 by Jesus Jack RN  Safety Promotion/Fall Prevention: safety round/check completed  Intervention: Prevent Skin Injury  Recent Flowsheet Documentation  Taken 9/2/2024 2000 by Jesus Jack RN  Body Position: position changed independently  Intervention: Prevent and Manage VTE (Venous Thromboembolism) Risk  Recent Flowsheet Documentation  Taken 9/2/2024 2000 by Jesus Jack RN  VTE Prevention/Management: (see mar) other (see comments)  Range of Motion: active ROM (range of motion) encouraged  Goal: Optimal Comfort and Wellbeing  Outcome: Ongoing, Progressing  Goal: Readiness for Transition of Care  Outcome: Ongoing, Progressing     Problem: Heart Failure Comorbidity  Goal: Maintenance of Heart Failure Symptom Control  Outcome: Ongoing, Progressing     Problem: Fall Injury Risk  Goal: Absence of Fall and Fall-Related Injury  Outcome: Ongoing, Progressing  Intervention: Promote Injury-Free Environment  Recent Flowsheet Documentation  Taken 9/3/2024 0400 by Jesus Jack RN  Safety Promotion/Fall Prevention: safety round/check completed  Taken  9/3/2024 0200 by Jesus Jack RN  Safety Promotion/Fall Prevention: safety round/check completed  Taken 9/3/2024 0000 by Jesus Jack RN  Safety Promotion/Fall Prevention: safety round/check completed  Taken 9/2/2024 2200 by Jesus Jack RN  Safety Promotion/Fall Prevention: safety round/check completed  Taken 9/2/2024 2100 by Jesus Jack RN  Safety Promotion/Fall Prevention: safety round/check completed  Taken 9/2/2024 2000 by Jesus Jack RN  Safety Promotion/Fall Prevention: safety round/check completed     Problem: Confusion Acute  Goal: Optimal Cognitive Function  Outcome: Ongoing, Progressing     Problem: Urinary Retention  Goal: Effective Urinary Elimination  Outcome: Ongoing, Progressing     Problem: Alcohol Withdrawal  Goal: Alcohol Withdrawal Symptom Control  Outcome: Ongoing, Progressing     Problem: Acute Neurologic Deterioration (Alcohol Withdrawal)  Goal: Optimal Neurologic Function  Outcome: Ongoing, Progressing     Problem: Substance Misuse (Alcohol Withdrawal)  Goal: Readiness for Change Identified  Outcome: Ongoing, Progressing     Problem: Adjustment to Illness (Liver Failure)  Goal: Optimal Coping with Liver Failure  Outcome: Ongoing, Progressing     Problem: Fluid and Electrolyte Imbalance (Liver Failure)  Goal: Fluid and Electrolyte Balance  Outcome: Ongoing, Progressing     Problem: Gastrointestinal Complications (Liver Failure)  Goal: Optimal Gastrointestinal Function  Outcome: Ongoing, Progressing  Intervention: Monitor and Support Gastrointestinal Function  Recent Flowsheet Documentation  Taken 9/2/2024 2000 by Jesus Jack RN  Body Position: position changed independently     Problem: Impaired Coagulation (Liver Failure)  Goal: Optimal Coagulation Function  Outcome: Ongoing, Progressing     Problem: Infection (Liver Failure)  Goal: Absence of Infection Signs and Symptoms  Outcome: Ongoing, Progressing     Problem: Neurologic Function Impaired (Liver Failure)  Goal: Optimal  Neurologic Function  Outcome: Ongoing, Progressing     Problem: Oral Intake Inadequate (Liver Failure)  Goal: Improved Oral Intake  Outcome: Ongoing, Progressing     Problem: Pain (Liver Failure)  Goal: Optimal Pain Control  Outcome: Ongoing, Progressing     Problem: Renal Dysfunction (Liver Failure)  Goal: Optimize Renal Function  Outcome: Ongoing, Progressing     Problem: Respiratory Compromise (Liver Failure)  Goal: Effective Oxygenation and Ventilation  Outcome: Ongoing, Progressing  Intervention: Promote Airway Secretion Clearance  Recent Flowsheet Documentation  Taken 9/2/2024 2000 by Jesus Jack, RN  Cough And Deep Breathing: done independently per patient     Problem: Skin Injury Risk Increased  Goal: Skin Health and Integrity  Outcome: Ongoing, Progressing   Goal Outcome Evaluation:

## 2024-09-03 NOTE — CASE MANAGEMENT/SOCIAL WORK
Continued Stay Note   [place]     Patient Name: Luciano Christiansen  MRN: [id number]  Today's Date: 9/3/2024    Admit Date: 8/27/2024    Plan: Home with DME   Discharge Plan       Row Name 09/03/24 1127       Plan    Plan Home with DME    Patient/Family in Agreement with Plan yes    Provided Post Acute Provider List? Yes    Post Acute Provider List DME Supplier    Delivered To Support Person    Support Person Mother    Method of Delivery Telephone    Plan Comments 3 prong cane and bariatric bsc have been ordered from [facility] per pt/mother request.  They will deliver to hospital room shortly.                   Discharge Codes    No documentation.                 Expected Discharge Date and Time       Expected Discharge Date Expected Discharge Time    Sep 3, 2024               CHAPITO WallaceW

## 2024-09-03 NOTE — DISCHARGE PLACEMENT REQUEST
"Luciano Howell (39 y.o. Male)       Date of Birth   1985    Social Security Number       Address   [address] [address]    Home Phone   [phone]    MRN   [id number]       Confucianism   Other    Marital Status   Single                            Admission Date   8/27/24    Admission Type   Emergency    Admitting Provider   Liset Klein DO    Attending Provider   Liset Klein DO    Department, Room/Bed   [address], 441/1       Discharge Date       Discharge Disposition   Home or Self Care    Discharge Destination                                 Attending Provider: Liset Klein DO    Allergies: No Known Allergies    Isolation: None   Infection: None   Code Status: CPR    Ht: 188 cm (74\")   Wt: 128 kg (283 lb)    Admission Cmt: None   Principal Problem: Hepatic encephalopathy [K76.82]                   Active Insurance as of 8/27/2024       Primary Coverage       Payor Plan Insurance Group Employer/Plan Group    MISC LBP MISC [id number]       Coverage Address Coverage Phone Number Coverage Fax Number Effective Dates    [address] [phone]  6/10/2022 - None Entered    [facility] [id number]         Subscriber Name Subscriber Birth Date Member ID       LUCIANO HOWELL 1985 [id number]                     Emergency Contacts        (Rel.) Home Phone Work Phone Mobile Phone    MAGO HOWELL (Mother) [phone] -- [phone]          Cane  Quad or Three Prong Cane with Tips [[id number]] (Order [id number])  Order  Date: 9/3/2024 Department: [address] Ordering/Authorizing: Liset Klein DO     Order History  Outpatient  Date/Time Action Taken User Additional Information   09/03/24 1124 Megan Haas, RN Ordering Mode: Telephone with readback     Order Details    Frequency Duration Priority Order Class   None None Routine External     Start Date/Time    Start Date   09/03/24     Order " Information    Order Date Service Start Date Start Time   09/03/24 Medicine 09/03/24      Reference Links    Associated Reports   View Encounter     Order Questions    Question Answer   Equipment  Quad or Three Prong Cane with Tips   Mobility Limitation Prevents Accomplishing MRADLs   Safety Able to Safely Use Equipment   Mobility Deficit Mobility Deficit Can Be Sufficiently Resolved By Use of Equipment   Length of Need 99 Months = Lifetime            Verbal / Cosign Order Info    Action Created on Order Mode Entered by Responsible Provider Signed by Signed on   Ordering 09/03/24 1124 Telephone with readback Megan Thrasher RN Horn, Frances Marie, DO       Source Order Set / Preference List    Preference List   AMB SUPPLIES [1416]             Clinical Indications     ICD-10-CM ICD-9-CM   Impaired mobility [Z74.09]    Z74.09 799.89                             Reprint Order Requisition    Cane  Quad or Three Prong Cane with Tips (Order #[id number]) on 9/3/24         Encounter    View Encounter                Order Provider Info        Office phone Pager E-mail   Ordering User  Megan Thrasher RN  -- -- --   Authorizing Provider  Liset Klein DO  [phone] -- [email or web address]   Attending Provider  Liset Klein DO  [phone] -- [email or web address]   Entered By  Megan Thrasher RN  -- -- --   Ordering Provider  Liset Klein DO  [phone] -- [email or web address]     Tracking Reports    Cosign Tracking Order Transmittal Tracking     Authorized by:  Liset Klein DO  (NPI: [id number])                Lab Component SmartPhrase Guide    Cane  Quad or Three Prong Cane with Tips (Order #[id number]) on 9/3/24     Commode Chair  Bedside Commode, Heavy Duty / Extra Wide; Weight 300 lbs or Higher [[id number]] (Order [id number])  Order  Date: 9/3/2024 Department: [address] Ordering/Authorizing: Liset Klein DO     Order  History  Outpatient  Date/Time Action Taken User Additional Information   09/03/24 1124 Sign Megan Thrasher RN Ordering Mode: Telephone with readback     Order Details    Frequency Duration Priority Order Class   None None Routine External     Start Date/Time    Start Date   09/03/24     Order Information    Order Date Service Start Date Start Time   09/03/24 Medicine 09/03/24      Reference Links    Associated Reports   View Encounter     Order Questions    Question Answer   Reason for Commode Confined to Single Room   Reason for Confinement generalized weakness, unsteady gait   Type  Bedside Commode, Heavy Duty / Extra Wide   Special Requirement Weight 300 lbs or Higher   Length of Need 99 Months = Lifetime            Verbal / Cosign Order Info    Action Created on Order Mode Entered by Responsible Provider Signed by Signed on   Ordering 09/03/24 1124 Telephone with readback Megan Thrasher RN Horn, Frances Marie, DO       Source Order Set / Preference List    Preference List   AMB SUPPLIES [1416]             Clinical Indications     ICD-10-CM ICD-9-CM   Impaired mobility [Z74.09]    Z74.09 799.89                             Reprint Order Requisition    Commode Chair  Bedside Commode, Heavy Duty / Extra Wide; Weight 300 lbs or Higher (Order #[id number]) on 9/3/24         Encounter    View Encounter                Order Provider Info        Office phone Pager E-mail   Ordering User  Megan Thrasher RN  -- -- --   Authorizing Provider  Liset Klein DO  [phone] -- [email or web address]   Attending Provider  Liset Klein DO  [phone] -- [email or web address]   Entered By  Megan Thrasher RN  -- -- --   Ordering Provider  Liset Klein DO  [phone] -- [email or web address]     Tracking Reports    Cosign Tracking Order Transmittal Tracking     Authorized by:  Liset Klein DO  (NPI: [id number])                Lab Component Jossue  Guide    Commode Chair  Bedside Commode, Heavy Duty / Extra Wide; Weight 300 lbs or Higher (Order #[id number]) on 9/3/24          History & Physical        Roderick Field MD at 08/27/24 7609              [facility] Medicine Services  HISTORY AND PHYSICAL    Date of Admission: 8/27/2024  Primary Care Physician: Jossy Christiansen APRN    Subjective   Primary Historian: mother    Chief Complaint:confusion    History of Present Illness  I am asked admit this 39-year-old alcoholic with hepatosplenomegaly.  Past medical history includes hypertension, jaundice, diabetes.  I am informed that he had stopped drinking 6 days ago.  He presented in the emergency room with:  Fatigue generalized weakness.  I am told that he is confused and has unsteady gait.  In review of diagnostic studies:  Ethanol less than 0.010  Creatinine 1.84 with BUN of 26 ammonia level is 154  proBNP 671  Salicylate acetaminophen are all within normal limits.  PT/INR is 17 and 1.33 respectively  His hemoglobin is 8.8 while hematocrit is 28 with MCV of 98, platelet count of 356 and white count of 10.87  His blood gas showed no evidence of acidosis.  CO2 is 34 while bicarb on chemistry is 21.  Ultrasound of the gallbladder showed cholelithiasis and borderline gallbladder wall thickening without biliary dilatation may relate to the small volume perihepatic ascites associated with patient's underlying cirrhosis versus less likely calculus cholecystitis.  The liver has been described with scalloped appearance of the margins supporting underlying fibrosis/cirrhosis.    Head CT scan showed no acute findings  Chest x-ray possible volume overload/edema as per reading by radiologist      Results for orders placed during the hospital encounter of 07/04/24    Adult Transthoracic Echo Complete W/ Cont if Necessary Per Protocol    Interpretation Summary    Left ventricular ejection fraction appears to be 61 - 65%.     Left ventricular diastolic function was normal.    Estimated right ventricular systolic pressure from tricuspid regurgitation is normal (<35 mmHg).  He was hospitalized from July 4 to July 9 for alcohol withdrawal, alcoholic encephalopathy.    According to mother, her 39-year-old son has been sober for at least 24 days  He stays in her place.   He has been confused, unsteady in his gait, decreased appetite, being constipated, readily falls asleep.  A pack of cigarettes would last 5 days.  No fever or chills but states occasionally feels warm and cold (review of record indicates history of thyroid disorder on hormone replacement)  He is less jaundiced compared to previous.  No reported nausea vomiting or abdominal pain  Positive baseline tremors according to mom    Review of Systems   Patient unable to give any historical details.  He is confused.    Past Medical History:   Past Medical History:   Diagnosis Date    Alcoholism     Diabetes mellitus     Gout     Hypertension     Jaundice      Past Surgical History:  Past Surgical History:   Procedure Laterality Date    VASECTOMY       Social History:  reports that he has been smoking cigarettes. He started smoking about 14 years ago. He has a 6.9 pack-year smoking history. He has never used smokeless tobacco. He reports current alcohol use of about 12.0 standard drinks of alcohol per week. He reports current drug use. Drug: Marijuana.    Family History: Mom did not indicate any medical problems.      Allergies:  No Known Allergies    Medications:  Prior to Admission medications    Medication Sig Start Date End Date Taking? Authorizing Provider   calcium carbonate (Calcium 600) 600 MG tablet Take 1 tablet by mouth Daily. 8/6/24   Jossy Christiansen APRN   cetirizine (zyrTEC) 10 MG tablet Take 1 tablet by mouth Daily. 8/6/24   Jossy Christiansen APRN   chlordiazePOXIDE (LIBRIUM) 10 MG capsule Take 1 capsule by mouth 3 (Three) Times a Day As Needed for Anxiety. 7/16/24    "Eleuterio Rangel MD   folic acid (FOLVITE) 1 MG tablet Take 1 tablet by mouth Daily. 7/15/24   Jossy Christiansen APRN   furosemide (Lasix) 20 MG tablet Take 1 tablet by mouth 2 (Two) Times a Day. 8/6/24   Jossy Christiansen APRN   lactulose 20 GM/30ML solution solution Take 30 mL by mouth 2 (Two) Times a Day. 7/9/24   Marcus Pantoja MD   levothyroxine (Synthroid) 50 MCG tablet Take 1 tablet by mouth Daily. 8/9/24   Radha Bravo APRN   pantoprazole (PROTONIX) 40 MG EC tablet Take 1 tablet by mouth Daily. 7/15/24   Jossy Christiansen APRN   pentoxifylline (TRENtal) 400 MG CR tablet Take 1 tablet by mouth 3 (Three) Times a Day With Meals. 7/15/24   Jossy Christiansen APRN   potassium chloride 10 MEQ CR tablet Take 1 tablet by mouth 2 (Two) Times a Day. 8/6/24   Jossy Christiansen APRN   QUEtiapine (SEROquel) 50 MG tablet Take 1 tablet by mouth Every Night. 7/15/24   Jossy Christiansen APRN   riFAXIMin (XIFAXAN) 550 MG tablet Take 1 tablet by mouth 2 (Two) Times a Day. 7/15/24   Jossy Christiansen APRN   spironolactone (Aldactone) 100 MG tablet Take 1 tablet by mouth Daily. 8/6/24   Jossy Christiansen APRN   thiamine (VITAMIN B1) 100 MG tablet Take 1 tablet by mouth Daily. 5/11/24   Roderick Field MD   thiamine (VITAMIN B1) 100 MG tablet Take 1 tablet by mouth Daily. 7/15/24   Jossy Christiansen APRN     I have utilized all available immediate resources to obtain, update, or review the patient's current medications (including all prescriptions, over-the-counter products, herbals, cannabis/cannabidiol products, and vitamin/mineral/dietary (nutritional) supplements).    Objective     Vital Signs: /60 (BP Location: Right arm, Patient Position: Sitting)   Pulse 95   Temp 98.3 °F (36.8 °C) (Oral)   Resp 22   Ht 188 cm (74\")   Wt (!) 157 kg (346 lb)   SpO2 96%   BMI 44.42 kg/m²   Physical Exam   Seated at the edge of the gurney  Not in any distress  Holding a urinal  Bedside commode in place  He " only smeared the bedside commode after 2 hours of drinking lactulose.  He is confused  Exam was limited because he still would like to continue on using the urinal and bedside commode.  Otherwise he looks older than his stated age  He is sclera appears pale but no gross jaundice from the door where I directly observed him  He is not in any respiratory distress  He has protuberant abdomen  He appears to be swollen on his lower extremities  Normocephalic and atraumatic head  No gross thyromegaly  Nontoxic-appearing  No gross tremors      Results Reviewed:  Lab Results (last 24 hours)       Procedure Component Value Units Date/Time    Lipase [[id number]]  (Normal) Collected: 08/27/24 1141    Specimen: Blood Updated: 08/27/24 1409     Lipase 17 U/L     Bilirubin, Direct [[id number]]  (Abnormal) Collected: 08/27/24 1141    Specimen: Blood Updated: 08/27/24 1220     Bilirubin, Direct 1.3 mg/dL     C-reactive Protein [[id number]]  (Abnormal) Collected: 08/27/24 1141    Specimen: Blood Updated: 08/27/24 1220     C-Reactive Protein 5.65 mg/dL     CK [[id number]]  (Normal) Collected: 08/27/24 1141    Specimen: Blood Updated: 08/27/24 1220     Creatine Kinase 135 U/L     Ethanol [[id number]] Collected: 08/27/24 1141    Specimen: Blood Updated: 08/27/24 1220     Ethanol % <0.010 %     Narrative:      Not for legal purposes. Chain of Custody not followed.     Magnesium [[id number]]  (Normal) Collected: 08/27/24 1141    Specimen: Blood Updated: 08/27/24 1219     Magnesium 2.1 mg/dL     Comprehensive Metabolic Panel [[id number]]  (Abnormal) Collected: 08/27/24 1141    Specimen: Blood Updated: 08/27/24 1219     Glucose 100 mg/dL      BUN 26 mg/dL      Creatinine 1.84 mg/dL      Sodium 138 mmol/L      Potassium 3.8 mmol/L      Chloride 102 mmol/L      CO2 21.0 mmol/L      Calcium 9.1 mg/dL      Total Protein 8.0 g/dL      Albumin 3.4 g/dL      ALT (SGPT) 17 U/L      AST (SGOT) 52 U/L      Alkaline Phosphatase 147 U/L      Total Bilirubin  2.4 mg/dL      Globulin 4.6 gm/dL      A/G Ratio 0.7 g/dL      BUN/Creatinine Ratio 14.1     Anion Gap 15.0 mmol/L      eGFR 47.2 mL/min/1.73     Narrative:      GFR Normal >60  Chronic Kidney Disease <60  Kidney Failure <15      Salicylate Level [[id number]]  (Normal) Collected: 08/27/24 1141    Specimen: Blood Updated: 08/27/24 1216     Salicylate <0.3 mg/dL     Acetaminophen Level [[id number]]  (Normal) Collected: 08/27/24 1141    Specimen: Blood Updated: 08/27/24 1215     Acetaminophen <5.0 mcg/mL     Ammonia [[id number]]  (Abnormal) Collected: 08/27/24 1141    Specimen: Blood Updated: 08/27/24 1213     Ammonia 154 umol/L     BNP [[id number]]  (Abnormal) Collected: 08/27/24 1141    Specimen: Blood Updated: 08/27/24 1210     proBNP 671.7 pg/mL     Narrative:      This assay is used as an aid in the diagnosis of individuals suspected of having heart failure. It can be used as an aid in the diagnosis of acute decompensated heart failure (ADHF) in patients presenting with signs and symptoms of ADHF to the emergency department (ED). In addition, NT-proBNP of <300 pg/mL indicates ADHF is not likely.    Age Range Result Interpretation  NT-proBNP Concentration (pg/mL:      <50             Positive            >450                   Gray                 300-450                    Negative             <300    50-75           Positive            >900                  Gray                300-900                  Negative            <300      >75             Positive            >1800                  Gray                300-1800                  Negative            <300    Protime-INR [[id number]]  (Abnormal) Collected: 08/27/24 1141    Specimen: Blood Updated: 08/27/24 1159     Protime 17.0 Seconds      INR 1.33    CBC & Differential [[id number]]  (Abnormal) Collected: 08/27/24 1141    Specimen: Blood Updated: 08/27/24 1151    Narrative:      The following orders were created for panel order CBC & Differential.  Procedure                                Abnormality         Status                     ---------                               -----------         ------                     CBC Auto Differential[[id number]]        Abnormal            Final result                 Please view results for these tests on the individual orders.    CBC Auto Differential [[id number]]  (Abnormal) Collected: 08/27/24 1141    Specimen: Blood Updated: 08/27/24 1151     WBC 10.87 10*3/mm3      RBC 2.85 10*6/mm3      Hemoglobin 8.8 g/dL      Hematocrit 28.0 %      MCV 98.2 fL      MCH 30.9 pg      MCHC 31.4 g/dL      RDW 16.7 %      RDW-SD 60.2 fl      MPV 9.4 fL      Platelets 356 10*3/mm3      Neutrophil % 76.1 %      Lymphocyte % 9.0 %      Monocyte % 9.4 %      Eosinophil % 4.3 %      Basophil % 0.7 %      Immature Grans % 0.5 %      Neutrophils, Absolute 8.27 10*3/mm3      Lymphocytes, Absolute 0.98 10*3/mm3      Monocytes, Absolute 1.02 10*3/mm3      Eosinophils, Absolute 0.47 10*3/mm3      Basophils, Absolute 0.08 10*3/mm3      Immature Grans, Absolute 0.05 10*3/mm3      nRBC 0.0 /100 WBC     Blood Gas, Arterial With Co-Ox [[id number]]  (Abnormal) Collected: 08/27/24 1143    Specimen: Arterial Blood Updated: 08/27/24 1143     Site Left Radial     Siva's Test Positive     pH, Arterial 7.435 pH units      pCO2, Arterial 34.2 mm Hg      Comment: 84 Value below reference range        pO2, Arterial 61.4 mm Hg      Comment: 84 Value below reference range        HCO3, Arterial 23.0 mmol/L      Base Excess, Arterial -1.0 mmol/L      Comment: 84 Value below reference range        O2 Saturation, Arterial 93.2 %      Comment: 84 Value below reference range        Hemoglobin, Blood Gas 9.2 g/dL      Comment: 84 Value below reference range        Hematocrit, Blood Gas 28.1 %      Comment: 84 Value below reference range        Oxyhemoglobin 90.9 %      Comment: 84 Value below reference range        Methemoglobin 0.50 %      Carboxyhemoglobin 2.0 %      Temperature 37.0      Sodium, Arterial 142 mmol/L      Potassium, Arterial 3.6 mmol/L      Ionized Calcium 4.74 mg/dL      Barometric Pressure for Blood Gas 756 mmHg      Modality Room Air     Ventilator Mode NA     Collected by [id number]     Comment: Meter: [id number]     :  Terell Hercules CRT        pH, Temp Corrected 7.435 pH Units      pCO2, Temperature Corrected 34.2 mm Hg      pO2, Temperature Corrected 61.4 mm Hg           Imaging Results (Last 24 Hours)       Procedure Component Value Units Date/Time    US Gallbladder [[id number]] Collected: 08/27/24 1509     Updated: 08/27/24 1521    Narrative:      US GALLBLADDER-     HISTORY: Cholelithiasis     COMPARISON: 4/25/2024     FINDINGS: Sonographic imaging of the right upper quadrant is performed.  Exam is challenging as patient unable to cooperate with breath-holding.     Limited visualization of the pancreas. Visible portions of the  pancreatic body unremarkable. Proximal IVC is patent. Suboptimal  visualization abdominal aorta from regional shadowing bowel gas.     Slightly scalloped appearance of the margins of the liver supporting  underlying fibrosis/cirrhosis. Appropriate directional flow within the  central main portal vein. No focal liver mass identified. Trace  perihepatic ascites.     Right kidney normal measuring 12.8 cm without right-sided  hydronephrosis.     Multiple shadowing stones within the lumen of the gallbladder.  Borderline gallbladder wall thickening of 3 to 4 mm. No. Cholecystic  fluid identified with ultrasound. Common bile duct of 5 mm, upper limits  of normal.       Impression:      1. Cholelithiasis. Borderline gallbladder wall thickening without  biliary dilatation may relate to the small volume perihepatic ascites  associated with patient's underlying hepatic cirrhosis versus less  likely calculus cholecystitis.     This report was signed and finalized on 8/27/2024 3:17 PM by Dr. Kassy Blair MD.       CT Abdomen Pelvis With Contrast  [[id number]] Collected: 08/27/24 1334     Updated: 08/27/24 1357    Narrative:      EXAMINATION: CT ABDOMEN PELVIS W CONTRAST-      8/27/2024 11:29 AM     HISTORY: New onset hepatic encephalopathy protuberant abdomen with  jaundice     In order to have a CT radiation dose as low as reasonably achievable  Automated Exposure Control was utilized for adjustment of the mA and/or  KV according to patient size.     Total DLP = 4632.87 mGy.cm     The CT scan of the abdomen and pelvis is performed after intravenous  contrast enhancement.     The images are acquired in axial plane with subsequent reconstruction in  coronal and sagittal planes.     Comparison is made with the previous study dated 7/4/2024.     There is interval significant increase in edema of the abdominal and  pelvic wall extending into the lower extremity bilaterally. Similar  changes are seen involving the limited visualized lower chest wall. This  suggest increasing fluid overload/anasarca.     Limited visualized lung bases show atelectatic changes and small pleural  effusion more in the right base which has increased since the previous  study. There are atelectatic changes in the lower lungs. There are  significant motion artifacts which may obscure a small lesion/nodule.     Limited visualized cardiomediastinal structures show moderate  cardiomegaly. Trace pericardial effusion.     There is evidence of hepatosplenomegaly. The liver measures 25 cm in  craniocaudal dimension. The spleen measures 18.6 x 20 x 9.6 cm. This is  similar to the previous study.     There is moderate dilatation of the gallbladder with thickening of the  wall and surrounding fluid. There are at least 2 radiopaque faceted  stones in the proximal body/neck of the gallbladder. The common bile  duct is limitedly visualized and does not show significant dilatation.     The pancreas is normal.     The adrenal glands bilaterally are normal.     The kidneys bilaterally are normal. No  discrete mass. No calculi. No  hydronephrosis. The ureters are not well visualized. The urinary bladder  is poorly distended and is significantly compressed by the pelvic fat  and bilateral pelvic lymph nodes. There is a significant change since  the previous study.     The stomach is decompressed. No focal abnormality. Duodenum and small  bowel are nondilated. No finding to suggest appendicitis. Moderate gas  and stool is seen in the proximal colon. Distal colon is significantly  decompressed.     Normal abdominal aorta. No aneurysmal dilatation.     There is evidence of abdominal and pelvic lymphadenopathy. A left common  iliac lymph node, image #64 in series 2, measures 1.8 cm in short axis.  It previously measured 1.6 cm. A right external iliac lymph node, image  #84 in series 2, measures 1.5 cm in short axis. It measures 1.4 cm in  the previous study. A lymph node in the left external iliac group, image  #88 in series 2, measures 18 mm in short axis. It measured 9 mm in the  previous study. An enlarged right inguinal lymph node, image #95 in  series 2, measures 19 mm in short axis. No change. Another lymph node  located adjacent and medial measures 16 mm in short axis. It previously  measured 14 mm.     There is diffuse infiltration of the peritoneum/omentum throughout the  abdomen and there is a small amount of fluid in the paracolic gutter and  in the pelvis which appears moderately more progressive since the  previous study. There is retroperitoneal infiltration particularly  around the aorta and great vessels moderately more progressive since the  previous study.     Images reviewed in bone window show chronic degenerative changes of the  lumbar spine with multilevel disc degeneration similar to the previous  study. No acute bony abnormality.       Impression:      1. Persistent hepatosplenomegaly similar to the previous study. No  change.  2. Enlarged abdominal pelvic and inguinal lymph nodes. These  are  moderately increased in size since the previous study.  3. Moderate dilatation of thickening of the wall of the gallbladder with  multiple gallstones. This may partly be due to adjacent edema/fluid  overload of the peritoneum/omentum. The possibility of calculus  cholecystitis is not excluded. Common bile duct is not dilated.  4. Diffuse infiltration of the peritoneum/omentum may represent  edema/fluid overload. An evolving ascites.  5. Increasing edema of the abdominal and pelvic wall and limited  visualized chest wall suggesting increasing fluid overload/anasarca.  There is significant thickening/swelling of the scrotum with bilateral  hydroceles.  6. Increasing right basal pleural effusion and atelectatic changes since  the previous study.     This report was signed and finalized on 8/27/2024 1:54 PM by Dr. Vj Hsu MD.       XR Chest 1 View [[id number]] Collected: 08/27/24 1334     Updated: 08/27/24 1338    Narrative:      EXAM: XR CHEST 1 VW-      DATE: 8/27/2024 10:01 AM     HISTORY: Fatigue       COMPARISON: 7/4/2024.     TECHNIQUE:  Frontal view(s) of the chest submitted.     FINDINGS:    Perihilar prominence and interstitial prominence are worrisome for  volume overload/edema. No large effusion or pneumothorax is seen. There  is enlargement of the cardiac silhouette which appears unchanged.          Impression:         1. Possible volume overload/edema.     This report was signed and finalized on 8/27/2024 1:35 PM by Óscar Orozco.       CT Head Without Contrast [[id number]] Collected: 08/27/24 1333     Updated: 08/27/24 1338    Narrative:      EXAM: CT HEAD WO CONTRAST-      DATE: 8/27/2024 11:29 AM     HISTORY: Altered mental status       COMPARISON: 5/5/2024.     DOSE LENGTH PRODUCT: 896.96 mGy.cm  Automated exposure control was also  utilized to decrease patient radiation dose.     TECHNIQUE: Unenhanced CT images obtained from vertex to skull base with  multiplanar reformats.      FINDINGS:  There is no acute intracranial hemorrhage, midline shift, mass effect,  or hydrocephalus. There is no CT evidence for acute infarct.        The visualized paranasal sinuses and mastoid air cells are clear. Scalp  soft tissues are unremarkable. Visualized orbits and globes are  unremarkable.       Impression:         1. No acute intracranial findings.      This report was signed and finalized on 8/27/2024 1:34 PM by Óscar Orozco.             I have personally reviewed and interpreted the radiology studies and ECG obtained at time of admission.     Assessment / Plan   Assessment:   There are no active hospital problems to display for this patient.      Medical Decision Making  Number and Complexity of problems:   Hepatic encephalopathy versus medication induced (chlordiazepoxide-recently prescribed August 18 by Dr. Rangel) versus combination of  Hyperammonemia  History of alcoholism  Cirrhosis  Hypertension  Macrocytic anemia in patient with history of alcohol use  Cholelithiasis-gallbladder wall thickening without biliary dilatation may relate to small volume perihepatic ascites versus less likely calculus cholecystitis.  Fluid retention lower extremities  Morbid obesity with BMI 44    Treatment Plan  The patient will be admitted to my service here at [facility].   Will increase lactulose to 3 times daily to maintain bowel movement 3-4 times per day  Will continue on diuretics (Lasix spironolactone)  Continue on pentoxifylline from home medication  GI consult  Hold off on chlordiazepoxide.  Had missed appointment with Dr. Culver regarding cholelithiasis  Monitor chemistry and correct electrolytes accordingly  Medications reviewed.    Conditions and Status  Fair     [facility] Data  External documents reviewed: Reviewed epic record  Cardiac tracing (EKG, telemetry) interpretation: -  Radiology interpretation: Reviewed multiple imaging studies as outlined above  Labs reviewed: Yes  Any tests that  were considered but not ordered: None     Decision rules/scores evaluated (example [id number], Wells, etc):        Discussed with: Mom and ER nursing staff     Care Planning  Shared decision making: Mom  Code status and discussions: Full code    Disposition  Social Determinants of Health that impact treatment or disposition: None identified at this time  Estimated length of stay is to be determined.     I confirmed that the patient's advanced care plan is present, code status is documented, and a surrogate decision maker is listed in the patient's medical record.     The patient's surrogate decision maker is mom.     The patient was seen and examined by me on   Electronically signed by Roderick Field MD, 08/27/24, 15:59 CDT.               Electronically signed by Roderick Field MD at 08/27/24 1712          Physician Progress Notes (most recent note)        Liset Klein DO at 09/02/24 0938              [facility] Medicine Services  INPATIENT PROGRESS NOTE    Patient Name: Luciano Christiansen  Date of Admission: 8/27/2024  Today's Date: 09/02/24  Length of Stay: 6  Primary Care Physician: Jossy Christiansen APRN    Subjective   Chief Complaint: wants to go home.  HPI   Nursing notes he was incontinent of stool on the floor last PM four times.   Patient feels a little better. Wants to go home.   Had been staying with mother and daughter.   Feels his testicular swelling has gotten better.   Legs less swollen.       Review of Systems   All pertinent negatives and positives are as above. All other systems have been reviewed and are negative unless otherwise stated.     Objective    Temp:  [97.6 °F (36.4 °C)-98.5 °F (36.9 °C)] 97.6 °F (36.4 °C)  Heart Rate:  [79-89] 89  Resp:  [18] 18  BP: (115-124)/(48-62) 116/48  Physical Exam  Vitals and nursing note reviewed.   Constitutional:       Appearance: Normal appearance.   HENT:      Head: Normocephalic and atraumatic.       Right Ear: External ear normal.      Left Ear: External ear normal.      Nose: Nose normal.      Mouth/Throat:      Mouth: Mucous membranes are moist.   Eyes:      Extraocular Movements: Extraocular movements intact.      Pupils: Pupils are equal, round, and reactive to light.   Cardiovascular:      Rate and Rhythm: Normal rate and regular rhythm.      Pulses: Normal pulses.      Heart sounds: Normal heart sounds.   Pulmonary:      Effort: Pulmonary effort is normal.      Breath sounds: Normal breath sounds.   Abdominal:      General: Bowel sounds are normal. There is no distension.      Tenderness: There is no abdominal tenderness.      Comments: Obese    Musculoskeletal:      Cervical back: Normal range of motion. No rigidity.      Right lower leg: Edema present.      Left lower leg: Edema present.      Comments: Moves all extremities without difficulty.   Skin:     General: Skin is warm and dry.      Capillary Refill: Capillary refill takes less than 2 seconds.      Comments: Skin lower extremities with chronic venous stasis changes   Neurological:      General: No focal deficit present.      Mental Status: He is alert and oriented to person, place, and time.   Psychiatric:      Comments: Affect is flat.              Results Review:  I have reviewed the labs, radiology results, and diagnostic studies.    Laboratory Data:   Results from last 7 days   Lab Units 08/27/24  1141   WBC 10*3/mm3 10.87*   HEMOGLOBIN g/dL 8.8*   HEMATOCRIT % 28.0*   PLATELETS 10*3/mm3 356        Results from last 7 days   Lab Units 09/02/24  0429 09/01/24  1608 09/01/24  0447 08/31/24  1317 08/28/24  1815 08/28/24  0339   SODIUM mmol/L 135*  --  135* 137   < > 139   POTASSIUM mmol/L 3.4* 3.7 3.5 3.5   < > 3.3*   CHLORIDE mmol/L 92*  --  94* 96*   < > 103   CO2 mmol/L 29.0  --  28.0 27.0   < > 22.0   BUN mg/dL 10  --  10 10   < > 23*   CREATININE mg/dL 1.29*  --  1.16 1.29*   < > 1.41*   CALCIUM mg/dL 9.1  --  8.9 8.6   < > 8.9  "  BILIRUBIN mg/dL 2.9*  --  2.6*  --   --  2.4*   ALK PHOS U/L 134*  --  128*  --   --  129*   ALT (SGPT) U/L 17  --  16  --   --  17   AST (SGOT) U/L 53*  --  51*  --   --  49*   GLUCOSE mg/dL 89  --  86 115*   < > 81    < > = values in this interval not displayed.       Culture Data:   No results found for: \"BLOODCX\", \"URINECX\", \"WOUNDCX\", \"MRSACX\", \"RESPCX\", \"STOOLCX\"    Radiology Data:   Imaging Results (Last 24 Hours)       ** No results found for the last 24 hours. **            I have reviewed the patient's current medications.     Assessment/Plan   Assessment  Active Hospital Problems    Diagnosis     **Hepatic encephalopathy     Fluid retention     Type 2 diabetes mellitus     Hypokalemia     Hypomagnesemia        Treatment Plan  Change to oral diuretic  Add oral magnesium  Add oral potassium  Encourage activity  Check CMP with Mg in AM  IF remains stable then will likely dc in AM    Medical Decision Making  Number and Complexity of problems:   Hepatic encephalopathy high complexity  Type 2 diabetes mellitus moderate complexity  Hypokalemia high complexity  Hypomagnesemia high complexity  Fluid retention moderate complexity    Differential Diagnosis:     Conditions and Status        Condition is improving.     MDM Data  External documents reviewed: Reviewed  Cardiac tracing (EKG, telemetry) interpretation: Reviewed  Radiology interpretation: Reviewed  Labs reviewed: Reviewed  Any tests that were considered but not ordered: None     Decision rules/scores evaluated (example [id number], Wells, etc): None     Discussed with: Patient     Care Planning  Shared decision making: Gastroenterology  Code status and discussions: Full    Disposition  Social Determinants of Health that impact treatment or disposition: None  I expect the patient to be discharged to home in 1-2 days.     Electronically signed by Liset Klein DO, 09/02/24, 09:46 CDT.      Electronically signed by Liset Klein DO at 09/02/24 " 0946          Consult Notes (most recent note)        Beltran Matson MD at 08/28/24 0959        Consult Orders    1. Gastroenterology (on-call MD unless specified) [[id number]] ordered by Roderick Field MD at 08/27/24 1620                         [facility] Gastroenterology  Inpatient Consult Note  Today's date:  08/28/24    Luciano Christiansen  1985       Referring Provider: No ref. provider found  Primary Physician: Jossy Christiansen APRN     Date of Admission: 8/27/2024  Date of Service:  08/28/24    Reason for Consultation/Chief Complaint:   Hepatic encephalopathy    History of present illness:    Luciano is a 40 y/o male that presented to the hospital with altered mental status and generalized weakness. He says that he had a fall at home. History of alcohol abuse, suspected cirrhosis and alcoholic hepatitis. He says that his last drink was about 3 weeks ago. He has been hospitalized here for alcoholic hepatitis in the past. He has been on [place] for this. On this admission, alcohol level was negative. Labs did reveal elevated liver enzymes with elevated AST 52 and bilirubin 2.4, acute kidney injury with elevated Cr 1.84, anemia with hgb 8.8, and elevated ammonia level 154.     Past Medical History:   Diagnosis Date    Alcoholism     Diabetes mellitus     Gout     Hypertension     Jaundice          Past Surgical History:   Procedure Laterality Date    VASECTOMY            No Known Allergies      Social History     Tobacco Use    Smoking status: Some Days     Current packs/day: 0.25     Average packs/day: 0.5 packs/day for 14.7 years (6.9 ttl pk-yrs)     Types: Cigarettes     Start date: 2010    Smokeless tobacco: Never   Substance Use Topics    Alcohol use: Yes     Alcohol/week: 12.0 standard drinks of alcohol     Types: 12 Cans of beer per week     Comment: reports none in 10 days.          History reviewed. No pertinent family history.      Medications Prior to Admission   Medication Sig Dispense  Refill Last Dose    calcium carbonate (Calcium 600) 600 MG tablet Take 1 tablet by mouth Daily. 30 tablet 0     cetirizine (zyrTEC) 10 MG tablet Take 1 tablet by mouth Daily. 90 tablet 1     chlordiazePOXIDE (LIBRIUM) 10 MG capsule Take 1 capsule by mouth 3 (Three) Times a Day As Needed for Anxiety. 90 capsule 2     folic acid (FOLVITE) 1 MG tablet Take 1 tablet by mouth Daily. 90 tablet 1     furosemide (Lasix) 20 MG tablet Take 1 tablet by mouth 2 (Two) Times a Day. 14 tablet 0     lactulose 20 GM/30ML solution solution Take 30 mL by mouth 2 (Two) Times a Day. 450 mL 0     levothyroxine (Synthroid) 50 MCG tablet Take 1 tablet by mouth Daily. 90 tablet 0     pantoprazole (PROTONIX) 40 MG EC tablet Take 1 tablet by mouth Daily. 90 tablet 1     pentoxifylline (TRENtal) 400 MG CR tablet Take 1 tablet by mouth 3 (Three) Times a Day With Meals. 90 tablet 2     potassium chloride 10 MEQ CR tablet Take 1 tablet by mouth 2 (Two) Times a Day. 14 tablet 0     QUEtiapine (SEROquel) 50 MG tablet Take 1 tablet by mouth Every Night. 30 tablet 2     riFAXIMin (XIFAXAN) 550 MG tablet Take 1 tablet by mouth 2 (Two) Times a Day. 60 tablet 2     spironolactone (Aldactone) 100 MG tablet Take 1 tablet by mouth Daily. 7 tablet 0     thiamine (VITAMIN B1) 100 MG tablet Take 1 tablet by mouth Daily. 30 tablet 0     thiamine (VITAMIN B1) 100 MG tablet Take 1 tablet by mouth Daily. 90 tablet 1              Review of Systems:  Constitutional: No unexpected weight change, no fatigue, no unexplained fever, no sweats or chills.   HEENT: No icteric sclera.  No hearing or visual deficits.  No sore throat.  No chronic nasal discharge.  Pulmonary: No chronic cough.  No hemoptysis.  No shortness of breath.  Cardiovascular: No chest pain.  No palpitations.  No shortness of breath.  Gastrointestinal: As above.  Musculoskeletal/extremities: No peripheral edema.  No cyanosis.  No claudications.  No back pain.  Genitourinary: No dysuria.  No blood in  stool.  No urethral discharges.  Neurologic: No seizures.  No headaches.  No dizziness.  No gait problems.  Skin: No rash.  No icterus.  Mental: No psychosis.  No confusions.  No hallucinations.      Physical Exam:  Temp:  [97.3 °F (36.3 °C)-98.4 °F (36.9 °C)] 97.3 °F (36.3 °C)  Heart Rate:  [78-96] 84  Resp:  [20-22] 20  BP: (112-139)/(52-71) 124/71    General:   HEENT: Nonicteric sclerae.  Moist oral mucosa.  PERRLA.  EOMI.  Clear pharynx.  Lungs: Clear to auscultation bilaterally.  No wheezing, rales or rhonchi.  Heart: Regular rate and rhythm.  Normal S1 and S2, no S3, S4 or murmur.  Abdomen: Distended tense abdomen, nontender to palpation, with normoactive bowel sounds, no hepatosplenomegaly, no palpable masses.  Extremities: Edema lower extremities  Skin: No rash or jaundice.      Results Review:  Lab Results (last 24 hours)       Procedure Component Value Units Date/Time    Ammonia [[id number]]  (Abnormal) Collected: 08/28/24 0339    Specimen: Blood Updated: 08/28/24 0414     Ammonia 117 umol/L     Comprehensive Metabolic Panel [[id number]]  (Abnormal) Collected: 08/28/24 0339    Specimen: Blood Updated: 08/28/24 0410     Glucose 81 mg/dL      BUN 23 mg/dL      Creatinine 1.41 mg/dL      Sodium 139 mmol/L      Potassium 3.3 mmol/L      Chloride 103 mmol/L      CO2 22.0 mmol/L      Calcium 8.9 mg/dL      Total Protein 7.3 g/dL      Albumin 2.9 g/dL      ALT (SGPT) 17 U/L      AST (SGOT) 49 U/L      Alkaline Phosphatase 129 U/L      Total Bilirubin 2.4 mg/dL      Globulin 4.4 gm/dL      A/G Ratio 0.7 g/dL      BUN/Creatinine Ratio 16.3     Anion Gap 14.0 mmol/L      eGFR 65.0 mL/min/1.73     Narrative:      GFR Normal >60  Chronic Kidney Disease <60  Kidney Failure <15      Lipase [[id number]]  (Normal) Collected: 08/27/24 1141    Specimen: Blood Updated: 08/27/24 1409     Lipase 17 U/L     Bilirubin, Direct [[id number]]  (Abnormal) Collected: 08/27/24 1141    Specimen: Blood Updated: 08/27/24 1220     Bilirubin,  Direct 1.3 mg/dL     C-reactive Protein [[id number]]  (Abnormal) Collected: 08/27/24 1141    Specimen: Blood Updated: 08/27/24 1220     C-Reactive Protein 5.65 mg/dL     CK [[id number]]  (Normal) Collected: 08/27/24 1141    Specimen: Blood Updated: 08/27/24 1220     Creatine Kinase 135 U/L     Ethanol [[id number]] Collected: 08/27/24 1141    Specimen: Blood Updated: 08/27/24 1220     Ethanol % <0.010 %     Narrative:      Not for legal purposes. Chain of Custody not followed.     Magnesium [[id number]]  (Normal) Collected: 08/27/24 1141    Specimen: Blood Updated: 08/27/24 1219     Magnesium 2.1 mg/dL     Comprehensive Metabolic Panel [[id number]]  (Abnormal) Collected: 08/27/24 1141    Specimen: Blood Updated: 08/27/24 1219     Glucose 100 mg/dL      BUN 26 mg/dL      Creatinine 1.84 mg/dL      Sodium 138 mmol/L      Potassium 3.8 mmol/L      Chloride 102 mmol/L      CO2 21.0 mmol/L      Calcium 9.1 mg/dL      Total Protein 8.0 g/dL      Albumin 3.4 g/dL      ALT (SGPT) 17 U/L      AST (SGOT) 52 U/L      Alkaline Phosphatase 147 U/L      Total Bilirubin 2.4 mg/dL      Globulin 4.6 gm/dL      A/G Ratio 0.7 g/dL      BUN/Creatinine Ratio 14.1     Anion Gap 15.0 mmol/L      eGFR 47.2 mL/min/1.73     Narrative:      GFR Normal >60  Chronic Kidney Disease <60  Kidney Failure <15      Salicylate Level [[id number]]  (Normal) Collected: 08/27/24 1141    Specimen: Blood Updated: 08/27/24 1216     Salicylate <0.3 mg/dL     Acetaminophen Level [[id number]]  (Normal) Collected: 08/27/24 1141    Specimen: Blood Updated: 08/27/24 1215     Acetaminophen <5.0 mcg/mL     Ammonia [[id number]]  (Abnormal) Collected: 08/27/24 1141    Specimen: Blood Updated: 08/27/24 1213     Ammonia 154 umol/L     BNP [[id number]]  (Abnormal) Collected: 08/27/24 1141    Specimen: Blood Updated: 08/27/24 1210     proBNP 671.7 pg/mL     Narrative:      This assay is used as an aid in the diagnosis of individuals suspected of having heart failure. It can be used  as an aid in the diagnosis of acute decompensated heart failure (ADHF) in patients presenting with signs and symptoms of ADHF to the emergency department (ED). In addition, NT-proBNP of <300 pg/mL indicates ADHF is not likely.    Age Range Result Interpretation  NT-proBNP Concentration (pg/mL:      <50             Positive            >450                   Gray                 300-450                    Negative             <300    50-75           Positive            >900                  Gray                300-900                  Negative            <300      >75             Positive            >1800                  Gray                300-1800                  Negative            <300    Protime-INR [[id number]]  (Abnormal) Collected: 08/27/24 1141    Specimen: Blood Updated: 08/27/24 1159     Protime 17.0 Seconds      INR 1.33    CBC & Differential [[id number]]  (Abnormal) Collected: 08/27/24 1141    Specimen: Blood Updated: 08/27/24 1151    Narrative:      The following orders were created for panel order CBC & Differential.  Procedure                               Abnormality         Status                     ---------                               -----------         ------                     CBC Auto Differential[[id number]]        Abnormal            Final result                 Please view results for these tests on the individual orders.    CBC Auto Differential [[id number]]  (Abnormal) Collected: 08/27/24 1141    Specimen: Blood Updated: 08/27/24 1151     WBC 10.87 10*3/mm3      RBC 2.85 10*6/mm3      Hemoglobin 8.8 g/dL      Hematocrit 28.0 %      MCV 98.2 fL      MCH 30.9 pg      MCHC 31.4 g/dL      RDW 16.7 %      RDW-SD 60.2 fl      MPV 9.4 fL      Platelets 356 10*3/mm3      Neutrophil % 76.1 %      Lymphocyte % 9.0 %      Monocyte % 9.4 %      Eosinophil % 4.3 %      Basophil % 0.7 %      Immature Grans % 0.5 %      Neutrophils, Absolute 8.27 10*3/mm3      Lymphocytes, Absolute 0.98 10*3/mm3       Monocytes, Absolute 1.02 10*3/mm3      Eosinophils, Absolute 0.47 10*3/mm3      Basophils, Absolute 0.08 10*3/mm3      Immature Grans, Absolute 0.05 10*3/mm3      nRBC 0.0 /100 WBC     Blood Gas, Arterial With Co-Ox [[id number]]  (Abnormal) Collected: 08/27/24 1143    Specimen: Arterial Blood Updated: 08/27/24 1143     Site Left Radial     Siva's Test Positive     pH, Arterial 7.435 pH units      pCO2, Arterial 34.2 mm Hg      Comment: 84 Value below reference range        pO2, Arterial 61.4 mm Hg      Comment: 84 Value below reference range        HCO3, Arterial 23.0 mmol/L      Base Excess, Arterial -1.0 mmol/L      Comment: 84 Value below reference range        O2 Saturation, Arterial 93.2 %      Comment: 84 Value below reference range        Hemoglobin, Blood Gas 9.2 g/dL      Comment: 84 Value below reference range        Hematocrit, Blood Gas 28.1 %      Comment: 84 Value below reference range        Oxyhemoglobin 90.9 %      Comment: 84 Value below reference range        Methemoglobin 0.50 %      Carboxyhemoglobin 2.0 %      Temperature 37.0     Sodium, Arterial 142 mmol/L      Potassium, Arterial 3.6 mmol/L      Ionized Calcium 4.74 mg/dL      Barometric Pressure for Blood Gas 756 mmHg      Modality Room Air     Ventilator Mode NA     Collected by [id number]     Comment: Meter: [id number]     :  Terell Hercules CRT        pH, Temp Corrected 7.435 pH Units      pCO2, Temperature Corrected 34.2 mm Hg      pO2, Temperature Corrected 61.4 mm Hg               Radiology Review:  Imaging Results (Last 72 Hours)       Procedure Component Value Units Date/Time    US Gallbladder [[id number]] Collected: 08/27/24 1509     Updated: 08/27/24 1521    Narrative:      US GALLBLADDER-     HISTORY: Cholelithiasis     COMPARISON: 4/25/2024     FINDINGS: Sonographic imaging of the right upper quadrant is performed.  Exam is challenging as patient unable to cooperate with breath-holding.     Limited visualization of the  pancreas. Visible portions of the  pancreatic body unremarkable. Proximal IVC is patent. Suboptimal  visualization abdominal aorta from regional shadowing bowel gas.     Slightly scalloped appearance of the margins of the liver supporting  underlying fibrosis/cirrhosis. Appropriate directional flow within the  central main portal vein. No focal liver mass identified. Trace  perihepatic ascites.     Right kidney normal measuring 12.8 cm without right-sided  hydronephrosis.     Multiple shadowing stones within the lumen of the gallbladder.  Borderline gallbladder wall thickening of 3 to 4 mm. No. Cholecystic  fluid identified with ultrasound. Common bile duct of 5 mm, upper limits  of normal.       Impression:      1. Cholelithiasis. Borderline gallbladder wall thickening without  biliary dilatation may relate to the small volume perihepatic ascites  associated with patient's underlying hepatic cirrhosis versus less  likely calculus cholecystitis.     This report was signed and finalized on 8/27/2024 3:17 PM by Dr. Kassy Blair MD.       CT Abdomen Pelvis With Contrast [[id number]] Collected: 08/27/24 1334     Updated: 08/27/24 1357    Narrative:      EXAMINATION: CT ABDOMEN PELVIS W CONTRAST-      8/27/2024 11:29 AM     HISTORY: New onset hepatic encephalopathy protuberant abdomen with  jaundice     In order to have a CT radiation dose as low as reasonably achievable  Automated Exposure Control was utilized for adjustment of the mA and/or  KV according to patient size.     Total DLP = 4632.87 mGy.cm     The CT scan of the abdomen and pelvis is performed after intravenous  contrast enhancement.     The images are acquired in axial plane with subsequent reconstruction in  coronal and sagittal planes.     Comparison is made with the previous study dated 7/4/2024.     There is interval significant increase in edema of the abdominal and  pelvic wall extending into the lower extremity bilaterally. Similar  changes are  seen involving the limited visualized lower chest wall. This  suggest increasing fluid overload/anasarca.     Limited visualized lung bases show atelectatic changes and small pleural  effusion more in the right base which has increased since the previous  study. There are atelectatic changes in the lower lungs. There are  significant motion artifacts which may obscure a small lesion/nodule.     Limited visualized cardiomediastinal structures show moderate  cardiomegaly. Trace pericardial effusion.     There is evidence of hepatosplenomegaly. The liver measures 25 cm in  craniocaudal dimension. The spleen measures 18.6 x 20 x 9.6 cm. This is  similar to the previous study.     There is moderate dilatation of the gallbladder with thickening of the  wall and surrounding fluid. There are at least 2 radiopaque faceted  stones in the proximal body/neck of the gallbladder. The common bile  duct is limitedly visualized and does not show significant dilatation.     The pancreas is normal.     The adrenal glands bilaterally are normal.     The kidneys bilaterally are normal. No discrete mass. No calculi. No  hydronephrosis. The ureters are not well visualized. The urinary bladder  is poorly distended and is significantly compressed by the pelvic fat  and bilateral pelvic lymph nodes. There is a significant change since  the previous study.     The stomach is decompressed. No focal abnormality. Duodenum and small  bowel are nondilated. No finding to suggest appendicitis. Moderate gas  and stool is seen in the proximal colon. Distal colon is significantly  decompressed.     Normal abdominal aorta. No aneurysmal dilatation.     There is evidence of abdominal and pelvic lymphadenopathy. A left common  iliac lymph node, image #64 in series 2, measures 1.8 cm in short axis.  It previously measured 1.6 cm. A right external iliac lymph node, image  #84 in series 2, measures 1.5 cm in short axis. It measures 1.4 cm in  the previous  study. A lymph node in the left external iliac group, image  #88 in series 2, measures 18 mm in short axis. It measured 9 mm in the  previous study. An enlarged right inguinal lymph node, image #95 in  series 2, measures 19 mm in short axis. No change. Another lymph node  located adjacent and medial measures 16 mm in short axis. It previously  measured 14 mm.     There is diffuse infiltration of the peritoneum/omentum throughout the  abdomen and there is a small amount of fluid in the paracolic gutter and  in the pelvis which appears moderately more progressive since the  previous study. There is retroperitoneal infiltration particularly  around the aorta and great vessels moderately more progressive since the  previous study.     Images reviewed in bone window show chronic degenerative changes of the  lumbar spine with multilevel disc degeneration similar to the previous  study. No acute bony abnormality.       Impression:      1. Persistent hepatosplenomegaly similar to the previous study. No  change.  2. Enlarged abdominal pelvic and inguinal lymph nodes. These are  moderately increased in size since the previous study.  3. Moderate dilatation of thickening of the wall of the gallbladder with  multiple gallstones. This may partly be due to adjacent edema/fluid  overload of the peritoneum/omentum. The possibility of calculus  cholecystitis is not excluded. Common bile duct is not dilated.  4. Diffuse infiltration of the peritoneum/omentum may represent  edema/fluid overload. An evolving ascites.  5. Increasing edema of the abdominal and pelvic wall and limited  visualized chest wall suggesting increasing fluid overload/anasarca.  There is significant thickening/swelling of the scrotum with bilateral  hydroceles.  6. Increasing right basal pleural effusion and atelectatic changes since  the previous study.     This report was signed and finalized on 8/27/2024 1:54 PM by Dr. Vj Hsu MD.       XR Chest 1  View [[id number]] Collected: 08/27/24 1334     Updated: 08/27/24 1338    Narrative:      EXAM: XR CHEST 1 VW-      DATE: 8/27/2024 10:01 AM     HISTORY: Fatigue       COMPARISON: 7/4/2024.     TECHNIQUE:  Frontal view(s) of the chest submitted.     FINDINGS:    Perihilar prominence and interstitial prominence are worrisome for  volume overload/edema. No large effusion or pneumothorax is seen. There  is enlargement of the cardiac silhouette which appears unchanged.          Impression:         1. Possible volume overload/edema.     This report was signed and finalized on 8/27/2024 1:35 PM by Óscar Orozco.       CT Head Without Contrast [[id number]] Collected: 08/27/24 1333     Updated: 08/27/24 1338    Narrative:      EXAM: CT HEAD WO CONTRAST-      DATE: 8/27/2024 11:29 AM     HISTORY: Altered mental status       COMPARISON: 5/5/2024.     DOSE LENGTH PRODUCT: 896.96 mGy.cm  Automated exposure control was also  utilized to decrease patient radiation dose.     TECHNIQUE: Unenhanced CT images obtained from vertex to skull base with  multiplanar reformats.     FINDINGS:  There is no acute intracranial hemorrhage, midline shift, mass effect,  or hydrocephalus. There is no CT evidence for acute infarct.        The visualized paranasal sinuses and mastoid air cells are clear. Scalp  soft tissues are unremarkable. Visualized orbits and globes are  unremarkable.       Impression:         1. No acute intracranial findings.      This report was signed and finalized on 8/27/2024 1:34 PM by Óscar Orozco.               Impression:  Cirrhosis with history of heavy alcohol abuse and alcoholic hepatitis.  Hepatic encephalopathy.  Ascites and generalized anasarca.  Chronic anemia with no signs of overt GI bleeding.    Plan:  Continue Lactulose 20 g TID and Xifaxan 550 mg BID. Continue Trental 400 mg TID. Apparently IR did not feel there was enough ascites for paracentesis. Continue with alcohol cessation. Overall, his liver  enzymes are significantly improved from prior admission.      Beltran Matson MD  08/28/24   10:00 CDT     Electronically signed by Beltran Matson MD at 08/28/24 1415          Discharge Summary        Liset Klein  at 09/03/24 0851              [facility] Medicine Services  DISCHARGE SUMMARY       Date of Admission: 8/27/2024  Date of Discharge:  9/3/2024  Primary Care Physician: Jossy Christiansen APRN    Presenting Problem/History of Present Illness:  Patient presented to the emergency room with fatigue, generalized weakness, unsteady gait, confusion.    Final Discharge Diagnoses:  Hepatic encephalopathy  Fluid retention   Diabetes mellitus type 2  Hypokalemia  Hypomagnesemia      Consults:   Gastroenterology Dr. Matson    Procedures Performed: None    Pertinent Test Results:   Imaging Results (Last 7 Days)       Procedure Component Value Units Date/Time    US Gallbladder [[id number]] Collected: 08/27/24 1509     Updated: 08/27/24 1521    Narrative:      US GALLBLADDER-     HISTORY: Cholelithiasis     COMPARISON: 4/25/2024     FINDINGS: Sonographic imaging of the right upper quadrant is performed.  Exam is challenging as patient unable to cooperate with breath-holding.     Limited visualization of the pancreas. Visible portions of the  pancreatic body unremarkable. Proximal IVC is patent. Suboptimal  visualization abdominal aorta from regional shadowing bowel gas.     Slightly scalloped appearance of the margins of the liver supporting  underlying fibrosis/cirrhosis. Appropriate directional flow within the  central main portal vein. No focal liver mass identified. Trace  perihepatic ascites.     Right kidney normal measuring 12.8 cm without right-sided  hydronephrosis.     Multiple shadowing stones within the lumen of the gallbladder.  Borderline gallbladder wall thickening of 3 to 4 mm. No. Cholecystic  fluid identified with ultrasound. Common bile duct of 5 mm, upper  limits  of normal.       Impression:      1. Cholelithiasis. Borderline gallbladder wall thickening without  biliary dilatation may relate to the small volume perihepatic ascites  associated with patient's underlying hepatic cirrhosis versus less  likely calculus cholecystitis.     This report was signed and finalized on 8/27/2024 3:17 PM by Dr. Kassy Blair MD.       CT Abdomen Pelvis With Contrast [[id number]] Collected: 08/27/24 1334     Updated: 08/27/24 1357    Narrative:      EXAMINATION: CT ABDOMEN PELVIS W CONTRAST-      8/27/2024 11:29 AM     HISTORY: New onset hepatic encephalopathy protuberant abdomen with  jaundice     In order to have a CT radiation dose as low as reasonably achievable  Automated Exposure Control was utilized for adjustment of the mA and/or  KV according to patient size.     Total DLP = 4632.87 mGy.cm     The CT scan of the abdomen and pelvis is performed after intravenous  contrast enhancement.     The images are acquired in axial plane with subsequent reconstruction in  coronal and sagittal planes.     Comparison is made with the previous study dated 7/4/2024.     There is interval significant increase in edema of the abdominal and  pelvic wall extending into the lower extremity bilaterally. Similar  changes are seen involving the limited visualized lower chest wall. This  suggest increasing fluid overload/anasarca.     Limited visualized lung bases show atelectatic changes and small pleural  effusion more in the right base which has increased since the previous  study. There are atelectatic changes in the lower lungs. There are  significant motion artifacts which may obscure a small lesion/nodule.     Limited visualized cardiomediastinal structures show moderate  cardiomegaly. Trace pericardial effusion.     There is evidence of hepatosplenomegaly. The liver measures 25 cm in  craniocaudal dimension. The spleen measures 18.6 x 20 x 9.6 cm. This is  similar to the previous study.      There is moderate dilatation of the gallbladder with thickening of the  wall and surrounding fluid. There are at least 2 radiopaque faceted  stones in the proximal body/neck of the gallbladder. The common bile  duct is limitedly visualized and does not show significant dilatation.     The pancreas is normal.     The adrenal glands bilaterally are normal.     The kidneys bilaterally are normal. No discrete mass. No calculi. No  hydronephrosis. The ureters are not well visualized. The urinary bladder  is poorly distended and is significantly compressed by the pelvic fat  and bilateral pelvic lymph nodes. There is a significant change since  the previous study.     The stomach is decompressed. No focal abnormality. Duodenum and small  bowel are nondilated. No finding to suggest appendicitis. Moderate gas  and stool is seen in the proximal colon. Distal colon is significantly  decompressed.     Normal abdominal aorta. No aneurysmal dilatation.     There is evidence of abdominal and pelvic lymphadenopathy. A left common  iliac lymph node, image #64 in series 2, measures 1.8 cm in short axis.  It previously measured 1.6 cm. A right external iliac lymph node, image  #84 in series 2, measures 1.5 cm in short axis. It measures 1.4 cm in  the previous study. A lymph node in the left external iliac group, image  #88 in series 2, measures 18 mm in short axis. It measured 9 mm in the  previous study. An enlarged right inguinal lymph node, image #95 in  series 2, measures 19 mm in short axis. No change. Another lymph node  located adjacent and medial measures 16 mm in short axis. It previously  measured 14 mm.     There is diffuse infiltration of the peritoneum/omentum throughout the  abdomen and there is a small amount of fluid in the paracolic gutter and  in the pelvis which appears moderately more progressive since the  previous study. There is retroperitoneal infiltration particularly  around the aorta and great vessels  moderately more progressive since the  previous study.     Images reviewed in bone window show chronic degenerative changes of the  lumbar spine with multilevel disc degeneration similar to the previous  study. No acute bony abnormality.       Impression:      1. Persistent hepatosplenomegaly similar to the previous study. No  change.  2. Enlarged abdominal pelvic and inguinal lymph nodes. These are  moderately increased in size since the previous study.  3. Moderate dilatation of thickening of the wall of the gallbladder with  multiple gallstones. This may partly be due to adjacent edema/fluid  overload of the peritoneum/omentum. The possibility of calculus  cholecystitis is not excluded. Common bile duct is not dilated.  4. Diffuse infiltration of the peritoneum/omentum may represent  edema/fluid overload. An evolving ascites.  5. Increasing edema of the abdominal and pelvic wall and limited  visualized chest wall suggesting increasing fluid overload/anasarca.  There is significant thickening/swelling of the scrotum with bilateral  hydroceles.  6. Increasing right basal pleural effusion and atelectatic changes since  the previous study.     This report was signed and finalized on 8/27/2024 1:54 PM by Dr. Vj Hsu MD.       XR Chest 1 View [[id number]] Collected: 08/27/24 1334     Updated: 08/27/24 1338    Narrative:      EXAM: XR CHEST 1 VW-      DATE: 8/27/2024 10:01 AM     HISTORY: Fatigue       COMPARISON: 7/4/2024.     TECHNIQUE:  Frontal view(s) of the chest submitted.     FINDINGS:    Perihilar prominence and interstitial prominence are worrisome for  volume overload/edema. No large effusion or pneumothorax is seen. There  is enlargement of the cardiac silhouette which appears unchanged.          Impression:         1. Possible volume overload/edema.     This report was signed and finalized on 8/27/2024 1:35 PM by Óscar Orozco.       CT Head Without Contrast [[id number]] Collected: 08/27/24 8273      Updated: 08/27/24 1338    Narrative:      EXAM: CT HEAD WO CONTRAST-      DATE: 8/27/2024 11:29 AM     HISTORY: Altered mental status       COMPARISON: 5/5/2024.     DOSE LENGTH PRODUCT: 896.96 mGy.cm  Automated exposure control was also  utilized to decrease patient radiation dose.     TECHNIQUE: Unenhanced CT images obtained from vertex to skull base with  multiplanar reformats.     FINDINGS:  There is no acute intracranial hemorrhage, midline shift, mass effect,  or hydrocephalus. There is no CT evidence for acute infarct.        The visualized paranasal sinuses and mastoid air cells are clear. Scalp  soft tissues are unremarkable. Visualized orbits and globes are  unremarkable.       Impression:         1. No acute intracranial findings.      This report was signed and finalized on 8/27/2024 1:34 PM by Óscar Orozco.             Lab Results (last 7 days)       Procedure Component Value Units Date/Time    Magnesium [[id number]]  (Abnormal) Collected: 09/03/24 0413    Specimen: Blood Updated: 09/03/24 0520     Magnesium 1.5 mg/dL     Comprehensive Metabolic Panel [[id number]]  (Abnormal) Collected: 09/03/24 0413    Specimen: Blood Updated: 09/03/24 0518     Glucose 88 mg/dL      BUN 12 mg/dL      Creatinine 1.37 mg/dL      Sodium 135 mmol/L      Potassium 3.5 mmol/L      Chloride 93 mmol/L      CO2 30.0 mmol/L      Calcium 8.6 mg/dL      Total Protein 7.5 g/dL      Albumin 3.2 g/dL      ALT (SGPT) 15 U/L      AST (SGOT) 42 U/L      Alkaline Phosphatase 120 U/L      Total Bilirubin 2.5 mg/dL      Globulin 4.3 gm/dL      A/G Ratio 0.7 g/dL      BUN/Creatinine Ratio 8.8     Anion Gap 12.0 mmol/L      eGFR 67.3 mL/min/1.73     Narrative:      GFR Normal >60  Chronic Kidney Disease <60  Kidney Failure <15      CBC (No Diff) [[id number]]  (Abnormal) Collected: 09/03/24 0413    Specimen: Blood Updated: 09/03/24 0456     WBC 7.99 10*3/mm3      RBC 2.73 10*6/mm3      Hemoglobin 8.3 g/dL      Hematocrit 26.0 %      MCV  95.2 fL      MCH 30.4 pg      MCHC 31.9 g/dL      RDW 16.6 %      RDW-SD 56.8 fl      MPV 10.0 fL      Platelets 232 10*3/mm3     Potassium [[id number]]  (Normal) Collected: 09/02/24 1728    Specimen: Blood Updated: 09/02/24 1917     Potassium 3.7 mmol/L     Magnesium [[id number]]  (Abnormal) Collected: 09/02/24 0429    Specimen: Blood Updated: 09/02/24 0457     Magnesium 1.2 mg/dL     Comprehensive Metabolic Panel [[id number]]  (Abnormal) Collected: 09/02/24 0429    Specimen: Blood Updated: 09/02/24 0457     Glucose 89 mg/dL      BUN 10 mg/dL      Creatinine 1.29 mg/dL      Sodium 135 mmol/L      Potassium 3.4 mmol/L      Chloride 92 mmol/L      CO2 29.0 mmol/L      Calcium 9.1 mg/dL      Total Protein 8.5 g/dL      Albumin 3.6 g/dL      ALT (SGPT) 17 U/L      AST (SGOT) 53 U/L      Alkaline Phosphatase 134 U/L      Total Bilirubin 2.9 mg/dL      Globulin 4.9 gm/dL      A/G Ratio 0.7 g/dL      BUN/Creatinine Ratio 7.8     Anion Gap 14.0 mmol/L      eGFR 72.3 mL/min/1.73     Narrative:      GFR Normal >60  Chronic Kidney Disease <60  Kidney Failure <15      Ammonia [[id number]]  (Abnormal) Collected: 09/02/24 0429    Specimen: Blood Updated: 09/02/24 0455     Ammonia 61 umol/L     Potassium [[id number]]  (Normal) Collected: 09/01/24 1608    Specimen: Blood Updated: 09/01/24 1748     Potassium 3.7 mmol/L     Magnesium [[id number]]  (Abnormal) Collected: 09/01/24 0447    Specimen: Blood Updated: 09/01/24 0731     Magnesium 1.2 mg/dL     Comprehensive Metabolic Panel [[id number]]  (Abnormal) Collected: 09/01/24 0447    Specimen: Blood Updated: 09/01/24 0533     Glucose 86 mg/dL      BUN 10 mg/dL      Creatinine 1.16 mg/dL      Sodium 135 mmol/L      Potassium 3.5 mmol/L      Chloride 94 mmol/L      CO2 28.0 mmol/L      Calcium 8.9 mg/dL      Total Protein 7.9 g/dL      Albumin 3.5 g/dL      ALT (SGPT) 16 U/L      AST (SGOT) 51 U/L      Alkaline Phosphatase 128 U/L      Total Bilirubin 2.6 mg/dL      Globulin 4.4 gm/dL       A/G Ratio 0.8 g/dL      BUN/Creatinine Ratio 8.6     Anion Gap 13.0 mmol/L      eGFR 82.2 mL/min/1.73     Narrative:      GFR Normal >60  Chronic Kidney Disease <60  Kidney Failure <15      Ammonia [[id number]]  (Abnormal) Collected: 09/01/24 0447    Specimen: Blood Updated: 09/01/24 0524     Ammonia 64 umol/L     Basic Metabolic Panel [[id number]]  (Abnormal) Collected: 08/31/24 1317    Specimen: Blood Updated: 08/31/24 1400     Glucose 115 mg/dL      BUN 10 mg/dL      Creatinine 1.29 mg/dL      Sodium 137 mmol/L      Potassium 3.5 mmol/L      Chloride 96 mmol/L      CO2 27.0 mmol/L      Calcium 8.6 mg/dL      BUN/Creatinine Ratio 7.8     Anion Gap 14.0 mmol/L      eGFR 72.3 mL/min/1.73     Narrative:      GFR Normal >60  Chronic Kidney Disease <60  Kidney Failure <15      Magnesium [[id number]]  (Abnormal) Collected: 08/31/24 1317    Specimen: Blood Updated: 08/31/24 1400     Magnesium 1.2 mg/dL     Ammonia [[id number]]  (Abnormal) Collected: 08/31/24 1317    Specimen: Blood Updated: 08/31/24 1359     Ammonia 95 umol/L     Potassium [[id number]]  (Normal) Collected: 08/30/24 2055    Specimen: Blood Updated: 08/30/24 2112     Potassium 3.6 mmol/L     Magnesium [[id number]]  (Abnormal) Collected: 08/30/24 1007    Specimen: Blood Updated: 08/30/24 1045     Magnesium 1.0 mg/dL     Ammonia [[id number]]  (Abnormal) Collected: 08/30/24 1007    Specimen: Blood Updated: 08/30/24 1040     Ammonia 87 umol/L     Basic Metabolic Panel [[id number]]  (Abnormal) Collected: 08/30/24 0459    Specimen: Blood Updated: 08/30/24 0602     Glucose 77 mg/dL      BUN 13 mg/dL      Creatinine 1.31 mg/dL      Sodium 140 mmol/L      Potassium 2.9 mmol/L      Chloride 103 mmol/L      CO2 26.0 mmol/L      Calcium 8.7 mg/dL      BUN/Creatinine Ratio 9.9     Anion Gap 11.0 mmol/L      eGFR 71.0 mL/min/1.73     Narrative:      GFR Normal >60  Chronic Kidney Disease <60  Kidney Failure <15      Potassium [[id number]]  (Normal) Collected: 08/29/24 2044     Specimen: Blood Updated: 08/29/24 2106     Potassium 3.7 mmol/L     Basic Metabolic Panel [[id number]]  (Abnormal) Collected: 08/29/24 0918    Specimen: Blood Updated: 08/29/24 0955     Glucose 104 mg/dL      BUN 17 mg/dL      Creatinine 1.28 mg/dL      Sodium 140 mmol/L      Potassium 3.5 mmol/L      Chloride 104 mmol/L      CO2 23.0 mmol/L      Calcium 9.5 mg/dL      BUN/Creatinine Ratio 13.3     Anion Gap 13.0 mmol/L      eGFR 73.0 mL/min/1.73     Narrative:      GFR Normal >60  Chronic Kidney Disease <60  Kidney Failure <15      Ammonia [[id number]]  (Abnormal) Collected: 08/29/24 0918    Specimen: Blood Updated: 08/29/24 0954     Ammonia 83 umol/L     Fentanyl, Urine - Urine, Clean Catch [[id number]]  (Normal) Collected: 08/28/24 2017    Specimen: Urine, Clean Catch Updated: 08/28/24 2037     Fentanyl, Urine Negative    Narrative:      Negative Threshold:      Fentanyl 5 ng/mL     The normal value for the drug tested is negative. This report includes final unconfirmed screening results to be used for medical treatment purposes only. Unconfirmed results must not be used for non-medical purposes such as employment or legal testing. Clinical consideration should be applied to any drug of abuse test, particularly when unconfirmed results are used.           Urine Drug Screen - Urine, Clean Catch [[id number]]  (Abnormal) Collected: 08/28/24 2017    Specimen: Urine, Clean Catch Updated: 08/28/24 2036     THC, Screen, Urine Negative     Phencyclidine (PCP), Urine Negative     Cocaine Screen, Urine Negative     Methamphetamine, Ur Negative     Opiate Screen Negative     Amphetamine Screen, Urine Negative     Benzodiazepine Screen, Urine Positive     Tricyclic Antidepressants Screen Negative     Methadone Screen, Urine Negative     Barbiturates Screen, Urine Negative     Oxycodone Screen, Urine Negative     Buprenorphine, Screen, Urine Negative    Narrative:      Cutoff For Drugs Screened:    Amphetamines               500  ng/ml  Barbiturates               200 ng/ml  Benzodiazepines            150 ng/ml  Cocaine                    150 ng/ml  Methadone                  200 ng/ml  Opiates                    100 ng/ml  Phencyclidine               25 ng/ml  THC                         50 ng/ml  Methamphetamine            500 ng/ml  Tricyclic Antidepressants  300 ng/ml  Oxycodone                  100 ng/ml  Buprenorphine               10 ng/ml    The normal value for all drugs tested is negative. This report includes unconfirmed screening results, with the cutoff values listed, to be used for medical treatment purposes only.  Unconfirmed results must not be used for non-medical purposes such as employment or legal testing.  Clinical consideration should be applied to any drug of abuse test, particularly when unconfirmed results are used.      Ammonia [[id number]]  (Abnormal) Collected: 08/28/24 1815    Specimen: Blood Updated: 08/28/24 1844     Ammonia 121 umol/L     Potassium [[id number]]  (Abnormal) Collected: 08/28/24 1815    Specimen: Blood Updated: 08/28/24 1843     Potassium 3.4 mmol/L     Basic Metabolic Panel [[id number]]  (Abnormal) Collected: 08/28/24 1815    Specimen: Blood Updated: 08/28/24 1843     Glucose 108 mg/dL      BUN 20 mg/dL      Creatinine 1.34 mg/dL      Sodium 138 mmol/L      Potassium 3.4 mmol/L      Chloride 102 mmol/L      CO2 24.0 mmol/L      Calcium 9.4 mg/dL      BUN/Creatinine Ratio 14.9     Anion Gap 12.0 mmol/L      eGFR 69.1 mL/min/1.73     Narrative:      GFR Normal >60  Chronic Kidney Disease <60  Kidney Failure <15      Ammonia [[id number]]  (Abnormal) Collected: 08/28/24 0339    Specimen: Blood Updated: 08/28/24 0414     Ammonia 117 umol/L     Comprehensive Metabolic Panel [[id number]]  (Abnormal) Collected: 08/28/24 0339    Specimen: Blood Updated: 08/28/24 0410     Glucose 81 mg/dL      BUN 23 mg/dL      Creatinine 1.41 mg/dL      Sodium 139 mmol/L      Potassium 3.3 mmol/L      Chloride 103 mmol/L       CO2 22.0 mmol/L      Calcium 8.9 mg/dL      Total Protein 7.3 g/dL      Albumin 2.9 g/dL      ALT (SGPT) 17 U/L      AST (SGOT) 49 U/L      Alkaline Phosphatase 129 U/L      Total Bilirubin 2.4 mg/dL      Globulin 4.4 gm/dL      A/G Ratio 0.7 g/dL      BUN/Creatinine Ratio 16.3     Anion Gap 14.0 mmol/L      eGFR 65.0 mL/min/1.73     Narrative:      GFR Normal >60  Chronic Kidney Disease <60  Kidney Failure <15      Lipase [[id number]]  (Normal) Collected: 08/27/24 1141    Specimen: Blood Updated: 08/27/24 1409     Lipase 17 U/L     Bilirubin, Direct [[id number]]  (Abnormal) Collected: 08/27/24 1141    Specimen: Blood Updated: 08/27/24 1220     Bilirubin, Direct 1.3 mg/dL     C-reactive Protein [[id number]]  (Abnormal) Collected: 08/27/24 1141    Specimen: Blood Updated: 08/27/24 1220     C-Reactive Protein 5.65 mg/dL     CK [[id number]]  (Normal) Collected: 08/27/24 1141    Specimen: Blood Updated: 08/27/24 1220     Creatine Kinase 135 U/L     Ethanol [[id number]] Collected: 08/27/24 1141    Specimen: Blood Updated: 08/27/24 1220     Ethanol % <0.010 %     Narrative:      Not for legal purposes. Chain of Custody not followed.     Magnesium [[id number]]  (Normal) Collected: 08/27/24 1141    Specimen: Blood Updated: 08/27/24 1219     Magnesium 2.1 mg/dL     Comprehensive Metabolic Panel [[id number]]  (Abnormal) Collected: 08/27/24 1141    Specimen: Blood Updated: 08/27/24 1219     Glucose 100 mg/dL      BUN 26 mg/dL      Creatinine 1.84 mg/dL      Sodium 138 mmol/L      Potassium 3.8 mmol/L      Chloride 102 mmol/L      CO2 21.0 mmol/L      Calcium 9.1 mg/dL      Total Protein 8.0 g/dL      Albumin 3.4 g/dL      ALT (SGPT) 17 U/L      AST (SGOT) 52 U/L      Alkaline Phosphatase 147 U/L      Total Bilirubin 2.4 mg/dL      Globulin 4.6 gm/dL      A/G Ratio 0.7 g/dL      BUN/Creatinine Ratio 14.1     Anion Gap 15.0 mmol/L      eGFR 47.2 mL/min/1.73     Narrative:      GFR Normal >60  Chronic Kidney Disease <60  Kidney Failure  <15      Salicylate Level [[id number]]  (Normal) Collected: 08/27/24 1141    Specimen: Blood Updated: 08/27/24 1216     Salicylate <0.3 mg/dL     Acetaminophen Level [[id number]]  (Normal) Collected: 08/27/24 1141    Specimen: Blood Updated: 08/27/24 1215     Acetaminophen <5.0 mcg/mL     Ammonia [[id number]]  (Abnormal) Collected: 08/27/24 1141    Specimen: Blood Updated: 08/27/24 1213     Ammonia 154 umol/L     BNP [[id number]]  (Abnormal) Collected: 08/27/24 1141    Specimen: Blood Updated: 08/27/24 1210     proBNP 671.7 pg/mL     Narrative:      This assay is used as an aid in the diagnosis of individuals suspected of having heart failure. It can be used as an aid in the diagnosis of acute decompensated heart failure (ADHF) in patients presenting with signs and symptoms of ADHF to the emergency department (ED). In addition, NT-proBNP of <300 pg/mL indicates ADHF is not likely.    Age Range Result Interpretation  NT-proBNP Concentration (pg/mL:      <50             Positive            >450                   Gray                 300-450                    Negative             <300    50-75           Positive            >900                  Gray                300-900                  Negative            <300      >75             Positive            >1800                  Gray                300-1800                  Negative            <300    Protime-INR [[id number]]  (Abnormal) Collected: 08/27/24 1141    Specimen: Blood Updated: 08/27/24 1159     Protime 17.0 Seconds      INR 1.33    CBC & Differential [[id number]]  (Abnormal) Collected: 08/27/24 1141    Specimen: Blood Updated: 08/27/24 1151    Narrative:      The following orders were created for panel order CBC & Differential.  Procedure                               Abnormality         Status                     ---------                               -----------         ------                     CBC Auto Differential[[id number]]        Abnormal            Final  result                 Please view results for these tests on the individual orders.    CBC Auto Differential [[id number]]  (Abnormal) Collected: 08/27/24 1141    Specimen: Blood Updated: 08/27/24 1151     WBC 10.87 10*3/mm3      RBC 2.85 10*6/mm3      Hemoglobin 8.8 g/dL      Hematocrit 28.0 %      MCV 98.2 fL      MCH 30.9 pg      MCHC 31.4 g/dL      RDW 16.7 %      RDW-SD 60.2 fl      MPV 9.4 fL      Platelets 356 10*3/mm3      Neutrophil % 76.1 %      Lymphocyte % 9.0 %      Monocyte % 9.4 %      Eosinophil % 4.3 %      Basophil % 0.7 %      Immature Grans % 0.5 %      Neutrophils, Absolute 8.27 10*3/mm3      Lymphocytes, Absolute 0.98 10*3/mm3      Monocytes, Absolute 1.02 10*3/mm3      Eosinophils, Absolute 0.47 10*3/mm3      Basophils, Absolute 0.08 10*3/mm3      Immature Grans, Absolute 0.05 10*3/mm3      nRBC 0.0 /100 WBC     Blood Gas, Arterial With Co-Ox [[id number]]  (Abnormal) Collected: 08/27/24 1143    Specimen: Arterial Blood Updated: 08/27/24 1143     Site Left Radial     Siva's Test Positive     pH, Arterial 7.435 pH units      pCO2, Arterial 34.2 mm Hg      Comment: 84 Value below reference range        pO2, Arterial 61.4 mm Hg      Comment: 84 Value below reference range        HCO3, Arterial 23.0 mmol/L      Base Excess, Arterial -1.0 mmol/L      Comment: 84 Value below reference range        O2 Saturation, Arterial 93.2 %      Comment: 84 Value below reference range        Hemoglobin, Blood Gas 9.2 g/dL      Comment: 84 Value below reference range        Hematocrit, Blood Gas 28.1 %      Comment: 84 Value below reference range        Oxyhemoglobin 90.9 %      Comment: 84 Value below reference range        Methemoglobin 0.50 %      Carboxyhemoglobin 2.0 %      Temperature 37.0     Sodium, Arterial 142 mmol/L      Potassium, Arterial 3.6 mmol/L      Ionized Calcium 4.74 mg/dL      Barometric Pressure for Blood Gas 756 mmHg      Modality Room Air     Ventilator Mode NA     Collected by [id number]      "Comment: Meter: [id number]     :  Terell Hercules, CRT        pH, Temp Corrected 7.435 pH Units      pCO2, Temperature Corrected 34.2 mm Hg      pO2, Temperature Corrected 61.4 mm Hg           Hospital Course:  The patient is a 39 y.o. male who presented to [facility] with increasing generalized weakness.  Patient also had altered mental status/confusion.  Moderate generalized edema  Patient was admitted to the hospital.  He was placed on IV diuretics.  Patient was also started on lactulose.  His ammonia level was elevated initially.  Gastroenterologist was consulted.  Patient's home Xifaxan was resumed.  PT and OT were consulted.  Patient did start having numerous stools secondary to the lactulose.  Ammonia level did improve.  His mentation improved.  He is back to his baseline.  Patient has been alcohol free for approximately a month now.  He is wanting to be able to go to AA meetings upon discharge.  Patient swelling improved with the diuretic.  Notes that he is no longer having any real pain in his testicles secondary to the swelling.  The swelling has reduced significantly.    Physical Exam on Discharge:  /66 (BP Location: Right arm, Patient Position: Lying)   Pulse 81   Temp 98.6 °F (37 °C) (Oral)   Resp 18   Ht 188 cm (74\")   Wt 128 kg (283 lb)   SpO2 92%   BMI 36.34 kg/m²   Physical Exam  Vitals and nursing note reviewed.   Constitutional:       Appearance: Normal appearance.   HENT:      Head: Normocephalic and atraumatic.      Right Ear: External ear normal.      Left Ear: External ear normal.      Nose: Nose normal.      Mouth/Throat:      Mouth: Mucous membranes are moist.   Eyes:      Extraocular Movements: Extraocular movements intact.      Conjunctiva/sclera: Conjunctivae normal.      Pupils: Pupils are equal, round, and reactive to light.   Cardiovascular:      Rate and Rhythm: Normal rate and regular rhythm.      Pulses: Normal pulses.      Heart sounds: No " murmur heard.     No friction rub. No gallop.   Pulmonary:      Effort: Pulmonary effort is normal.      Breath sounds: Normal breath sounds.   Abdominal:      General: Bowel sounds are normal.      Palpations: Abdomen is soft.   Musculoskeletal:         General: Normal range of motion.      Cervical back: Normal range of motion and neck supple.      Right lower leg: Edema present.      Left lower leg: Edema present.   Skin:     General: Skin is warm and dry.      Capillary Refill: Capillary refill takes less than 2 seconds.   Neurological:      General: No focal deficit present.      Mental Status: He is alert and oriented to person, place, and time.      Cranial Nerves: No cranial nerve deficit.   Psychiatric:         Mood and Affect: Mood normal.         Behavior: Behavior normal.      Comments: Affect flat.           Condition on Discharge: Condition is stable and improved.    Discharge Disposition:  Home or Self Care    Discharge Medications:     Discharge Medications        New Medications        Instructions Start Date   magnesium oxide 400 tablet tablet  Commonly known as: MAG-OX   400 mg, Oral, 2 Times Daily      nicotine 21 MG/24HR patch  Commonly known as: NICODERM CQ   1 patch, Transdermal, Every 24 Hours Scheduled             Changes to Medications        Instructions Start Date   lactulose 20 GM/30ML solution solution  What changed: when to take this   20 g, Oral, 3 Times Daily             Continue These Medications        Instructions Start Date   calcium carbonate 600 MG tablet  Commonly known as: Calcium 600   600 mg, Oral, Daily      cetirizine 10 MG tablet  Commonly known as: zyrTEC   10 mg, Oral, Daily      chlordiazePOXIDE 10 MG capsule  Commonly known as: LIBRIUM   10 mg, Oral, 3 Times Daily PRN      folic acid 1 MG tablet  Commonly known as: FOLVITE   1 mg, Oral, Daily      furosemide 20 MG tablet  Commonly known as: Lasix   20 mg, Oral, 2 Times Daily      levothyroxine 50 MCG tablet  Commonly  known as: Synthroid   50 mcg, Oral, Daily      pantoprazole 40 MG EC tablet  Commonly known as: PROTONIX   40 mg, Oral, Daily      pentoxifylline 400 MG CR tablet  Commonly known as: TRENtal   400 mg, Oral, 3 Times Daily With Meals      potassium chloride 10 MEQ CR tablet   10 mEq, Oral, 2 Times Daily      QUEtiapine 50 MG tablet  Commonly known as: SEROquel   50 mg, Oral, Nightly      riFAXIMin 550 MG tablet  Commonly known as: XIFAXAN   550 mg, Oral, 2 Times Daily      spironolactone 100 MG tablet  Commonly known as: Aldactone   100 mg, Oral, Daily      thiamine 100 MG tablet  Commonly known as: VITAMIN B1   100 mg, Oral, Daily             Discharge Diet:   Diet Instructions       Diet: Regular/House Diet; Regular (IDDSI 7); Thin (IDDSI 0)      Discharge Diet: Regular/House Diet    Texture: Regular (IDDSI 7)    Fluid Consistency: Thin (IDDSI 0)          Activity at Discharge:   Activity Instructions       Activity as Tolerated              Follow-up Appointments:   Future Appointments   Date Time Provider Department Center   9/6/2024  3:00 PM Adrien Varghese MD [facility]   9/18/2024  2:00 PM [facility]       Test Results Pending at Discharge: None    Liset Klein DO  09/03/24  08:51 CDT    Time: 35 minutes.      Electronically signed by Liset Klein DO at 09/03/24 0900

## 2024-09-03 NOTE — DISCHARGE SUMMARY
[facility] Medicine Services  DISCHARGE SUMMARY       Date of Admission: 8/27/2024  Date of Discharge:  9/3/2024  Primary Care Physician: Jossy Christiansen APRN    Presenting Problem/History of Present Illness:  Patient presented to the emergency room with fatigue, generalized weakness, unsteady gait, confusion.    Final Discharge Diagnoses:  Hepatic encephalopathy  Fluid retention   Diabetes mellitus type 2  Hypokalemia  Hypomagnesemia      Consults:   Gastroenterology Dr. Matson    Procedures Performed: None    Pertinent Test Results:   Imaging Results (Last 7 Days)       Procedure Component Value Units Date/Time    US Gallbladder [[id number]] Collected: 08/27/24 1509     Updated: 08/27/24 1521    Narrative:      US GALLBLADDER-     HISTORY: Cholelithiasis     COMPARISON: 4/25/2024     FINDINGS: Sonographic imaging of the right upper quadrant is performed.  Exam is challenging as patient unable to cooperate with breath-holding.     Limited visualization of the pancreas. Visible portions of the  pancreatic body unremarkable. Proximal IVC is patent. Suboptimal  visualization abdominal aorta from regional shadowing bowel gas.     Slightly scalloped appearance of the margins of the liver supporting  underlying fibrosis/cirrhosis. Appropriate directional flow within the  central main portal vein. No focal liver mass identified. Trace  perihepatic ascites.     Right kidney normal measuring 12.8 cm without right-sided  hydronephrosis.     Multiple shadowing stones within the lumen of the gallbladder.  Borderline gallbladder wall thickening of 3 to 4 mm. No. Cholecystic  fluid identified with ultrasound. Common bile duct of 5 mm, upper limits  of normal.       Impression:      1. Cholelithiasis. Borderline gallbladder wall thickening without  biliary dilatation may relate to the small volume perihepatic ascites  associated with patient's underlying hepatic cirrhosis versus less  likely  calculus cholecystitis.     This report was signed and finalized on 8/27/2024 3:17 PM by Dr. Kassy Blair MD.       CT Abdomen Pelvis With Contrast [[id number]] Collected: 08/27/24 1334     Updated: 08/27/24 1357    Narrative:      EXAMINATION: CT ABDOMEN PELVIS W CONTRAST-      8/27/2024 11:29 AM     HISTORY: New onset hepatic encephalopathy protuberant abdomen with  jaundice     In order to have a CT radiation dose as low as reasonably achievable  Automated Exposure Control was utilized for adjustment of the mA and/or  KV according to patient size.     Total DLP = 4632.87 mGy.cm     The CT scan of the abdomen and pelvis is performed after intravenous  contrast enhancement.     The images are acquired in axial plane with subsequent reconstruction in  coronal and sagittal planes.     Comparison is made with the previous study dated 7/4/2024.     There is interval significant increase in edema of the abdominal and  pelvic wall extending into the lower extremity bilaterally. Similar  changes are seen involving the limited visualized lower chest wall. This  suggest increasing fluid overload/anasarca.     Limited visualized lung bases show atelectatic changes and small pleural  effusion more in the right base which has increased since the previous  study. There are atelectatic changes in the lower lungs. There are  significant motion artifacts which may obscure a small lesion/nodule.     Limited visualized cardiomediastinal structures show moderate  cardiomegaly. Trace pericardial effusion.     There is evidence of hepatosplenomegaly. The liver measures 25 cm in  craniocaudal dimension. The spleen measures 18.6 x 20 x 9.6 cm. This is  similar to the previous study.     There is moderate dilatation of the gallbladder with thickening of the  wall and surrounding fluid. There are at least 2 radiopaque faceted  stones in the proximal body/neck of the gallbladder. The common bile  duct is limitedly visualized and does not  show significant dilatation.     The pancreas is normal.     The adrenal glands bilaterally are normal.     The kidneys bilaterally are normal. No discrete mass. No calculi. No  hydronephrosis. The ureters are not well visualized. The urinary bladder  is poorly distended and is significantly compressed by the pelvic fat  and bilateral pelvic lymph nodes. There is a significant change since  the previous study.     The stomach is decompressed. No focal abnormality. Duodenum and small  bowel are nondilated. No finding to suggest appendicitis. Moderate gas  and stool is seen in the proximal colon. Distal colon is significantly  decompressed.     Normal abdominal aorta. No aneurysmal dilatation.     There is evidence of abdominal and pelvic lymphadenopathy. A left common  iliac lymph node, image #64 in series 2, measures 1.8 cm in short axis.  It previously measured 1.6 cm. A right external iliac lymph node, image  #84 in series 2, measures 1.5 cm in short axis. It measures 1.4 cm in  the previous study. A lymph node in the left external iliac group, image  #88 in series 2, measures 18 mm in short axis. It measured 9 mm in the  previous study. An enlarged right inguinal lymph node, image #95 in  series 2, measures 19 mm in short axis. No change. Another lymph node  located adjacent and medial measures 16 mm in short axis. It previously  measured 14 mm.     There is diffuse infiltration of the peritoneum/omentum throughout the  abdomen and there is a small amount of fluid in the paracolic gutter and  in the pelvis which appears moderately more progressive since the  previous study. There is retroperitoneal infiltration particularly  around the aorta and great vessels moderately more progressive since the  previous study.     Images reviewed in bone window show chronic degenerative changes of the  lumbar spine with multilevel disc degeneration similar to the previous  study. No acute bony abnormality.       Impression:       1. Persistent hepatosplenomegaly similar to the previous study. No  change.  2. Enlarged abdominal pelvic and inguinal lymph nodes. These are  moderately increased in size since the previous study.  3. Moderate dilatation of thickening of the wall of the gallbladder with  multiple gallstones. This may partly be due to adjacent edema/fluid  overload of the peritoneum/omentum. The possibility of calculus  cholecystitis is not excluded. Common bile duct is not dilated.  4. Diffuse infiltration of the peritoneum/omentum may represent  edema/fluid overload. An evolving ascites.  5. Increasing edema of the abdominal and pelvic wall and limited  visualized chest wall suggesting increasing fluid overload/anasarca.  There is significant thickening/swelling of the scrotum with bilateral  hydroceles.  6. Increasing right basal pleural effusion and atelectatic changes since  the previous study.     This report was signed and finalized on 8/27/2024 1:54 PM by Dr. Vj Hsu MD.       XR Chest 1 View [[id number]] Collected: 08/27/24 1334     Updated: 08/27/24 1338    Narrative:      EXAM: XR CHEST 1 VW-      DATE: 8/27/2024 10:01 AM     HISTORY: Fatigue       COMPARISON: 7/4/2024.     TECHNIQUE:  Frontal view(s) of the chest submitted.     FINDINGS:    Perihilar prominence and interstitial prominence are worrisome for  volume overload/edema. No large effusion or pneumothorax is seen. There  is enlargement of the cardiac silhouette which appears unchanged.          Impression:         1. Possible volume overload/edema.     This report was signed and finalized on 8/27/2024 1:35 PM by Óscar Orozco.       CT Head Without Contrast [[id number]] Collected: 08/27/24 1333     Updated: 08/27/24 1338    Narrative:      EXAM: CT HEAD WO CONTRAST-      DATE: 8/27/2024 11:29 AM     HISTORY: Altered mental status       COMPARISON: 5/5/2024.     DOSE LENGTH PRODUCT: 896.96 mGy.cm  Automated exposure control was also  utilized to  decrease patient radiation dose.     TECHNIQUE: Unenhanced CT images obtained from vertex to skull base with  multiplanar reformats.     FINDINGS:  There is no acute intracranial hemorrhage, midline shift, mass effect,  or hydrocephalus. There is no CT evidence for acute infarct.        The visualized paranasal sinuses and mastoid air cells are clear. Scalp  soft tissues are unremarkable. Visualized orbits and globes are  unremarkable.       Impression:         1. No acute intracranial findings.      This report was signed and finalized on 8/27/2024 1:34 PM by Óscar Orozco.             Lab Results (last 7 days)       Procedure Component Value Units Date/Time    Magnesium [[id number]]  (Abnormal) Collected: 09/03/24 0413    Specimen: Blood Updated: 09/03/24 0520     Magnesium 1.5 mg/dL     Comprehensive Metabolic Panel [[id number]]  (Abnormal) Collected: 09/03/24 0413    Specimen: Blood Updated: 09/03/24 0518     Glucose 88 mg/dL      BUN 12 mg/dL      Creatinine 1.37 mg/dL      Sodium 135 mmol/L      Potassium 3.5 mmol/L      Chloride 93 mmol/L      CO2 30.0 mmol/L      Calcium 8.6 mg/dL      Total Protein 7.5 g/dL      Albumin 3.2 g/dL      ALT (SGPT) 15 U/L      AST (SGOT) 42 U/L      Alkaline Phosphatase 120 U/L      Total Bilirubin 2.5 mg/dL      Globulin 4.3 gm/dL      A/G Ratio 0.7 g/dL      BUN/Creatinine Ratio 8.8     Anion Gap 12.0 mmol/L      eGFR 67.3 mL/min/1.73     Narrative:      GFR Normal >60  Chronic Kidney Disease <60  Kidney Failure <15      CBC (No Diff) [[id number]]  (Abnormal) Collected: 09/03/24 0413    Specimen: Blood Updated: 09/03/24 0456     WBC 7.99 10*3/mm3      RBC 2.73 10*6/mm3      Hemoglobin 8.3 g/dL      Hematocrit 26.0 %      MCV 95.2 fL      MCH 30.4 pg      MCHC 31.9 g/dL      RDW 16.6 %      RDW-SD 56.8 fl      MPV 10.0 fL      Platelets 232 10*3/mm3     Potassium [[id number]]  (Normal) Collected: 09/02/24 1728    Specimen: Blood Updated: 09/02/24 1917     Potassium 3.7 mmol/L      Magnesium [[id number]]  (Abnormal) Collected: 09/02/24 0429    Specimen: Blood Updated: 09/02/24 0457     Magnesium 1.2 mg/dL     Comprehensive Metabolic Panel [[id number]]  (Abnormal) Collected: 09/02/24 0429    Specimen: Blood Updated: 09/02/24 0457     Glucose 89 mg/dL      BUN 10 mg/dL      Creatinine 1.29 mg/dL      Sodium 135 mmol/L      Potassium 3.4 mmol/L      Chloride 92 mmol/L      CO2 29.0 mmol/L      Calcium 9.1 mg/dL      Total Protein 8.5 g/dL      Albumin 3.6 g/dL      ALT (SGPT) 17 U/L      AST (SGOT) 53 U/L      Alkaline Phosphatase 134 U/L      Total Bilirubin 2.9 mg/dL      Globulin 4.9 gm/dL      A/G Ratio 0.7 g/dL      BUN/Creatinine Ratio 7.8     Anion Gap 14.0 mmol/L      eGFR 72.3 mL/min/1.73     Narrative:      GFR Normal >60  Chronic Kidney Disease <60  Kidney Failure <15      Ammonia [[id number]]  (Abnormal) Collected: 09/02/24 0429    Specimen: Blood Updated: 09/02/24 0455     Ammonia 61 umol/L     Potassium [[id number]]  (Normal) Collected: 09/01/24 1608    Specimen: Blood Updated: 09/01/24 1748     Potassium 3.7 mmol/L     Magnesium [[id number]]  (Abnormal) Collected: 09/01/24 0447    Specimen: Blood Updated: 09/01/24 0731     Magnesium 1.2 mg/dL     Comprehensive Metabolic Panel [[id number]]  (Abnormal) Collected: 09/01/24 0447    Specimen: Blood Updated: 09/01/24 0533     Glucose 86 mg/dL      BUN 10 mg/dL      Creatinine 1.16 mg/dL      Sodium 135 mmol/L      Potassium 3.5 mmol/L      Chloride 94 mmol/L      CO2 28.0 mmol/L      Calcium 8.9 mg/dL      Total Protein 7.9 g/dL      Albumin 3.5 g/dL      ALT (SGPT) 16 U/L      AST (SGOT) 51 U/L      Alkaline Phosphatase 128 U/L      Total Bilirubin 2.6 mg/dL      Globulin 4.4 gm/dL      A/G Ratio 0.8 g/dL      BUN/Creatinine Ratio 8.6     Anion Gap 13.0 mmol/L      eGFR 82.2 mL/min/1.73     Narrative:      GFR Normal >60  Chronic Kidney Disease <60  Kidney Failure <15      Ammonia [[id number]]  (Abnormal) Collected: 09/01/24 0447     Specimen: Blood Updated: 09/01/24 0524     Ammonia 64 umol/L     Basic Metabolic Panel [[id number]]  (Abnormal) Collected: 08/31/24 1317    Specimen: Blood Updated: 08/31/24 1400     Glucose 115 mg/dL      BUN 10 mg/dL      Creatinine 1.29 mg/dL      Sodium 137 mmol/L      Potassium 3.5 mmol/L      Chloride 96 mmol/L      CO2 27.0 mmol/L      Calcium 8.6 mg/dL      BUN/Creatinine Ratio 7.8     Anion Gap 14.0 mmol/L      eGFR 72.3 mL/min/1.73     Narrative:      GFR Normal >60  Chronic Kidney Disease <60  Kidney Failure <15      Magnesium [[id number]]  (Abnormal) Collected: 08/31/24 1317    Specimen: Blood Updated: 08/31/24 1400     Magnesium 1.2 mg/dL     Ammonia [[id number]]  (Abnormal) Collected: 08/31/24 1317    Specimen: Blood Updated: 08/31/24 1359     Ammonia 95 umol/L     Potassium [[id number]]  (Normal) Collected: 08/30/24 2055    Specimen: Blood Updated: 08/30/24 2112     Potassium 3.6 mmol/L     Magnesium [[id number]]  (Abnormal) Collected: 08/30/24 1007    Specimen: Blood Updated: 08/30/24 1045     Magnesium 1.0 mg/dL     Ammonia [[id number]]  (Abnormal) Collected: 08/30/24 1007    Specimen: Blood Updated: 08/30/24 1040     Ammonia 87 umol/L     Basic Metabolic Panel [[id number]]  (Abnormal) Collected: 08/30/24 0459    Specimen: Blood Updated: 08/30/24 0602     Glucose 77 mg/dL      BUN 13 mg/dL      Creatinine 1.31 mg/dL      Sodium 140 mmol/L      Potassium 2.9 mmol/L      Chloride 103 mmol/L      CO2 26.0 mmol/L      Calcium 8.7 mg/dL      BUN/Creatinine Ratio 9.9     Anion Gap 11.0 mmol/L      eGFR 71.0 mL/min/1.73     Narrative:      GFR Normal >60  Chronic Kidney Disease <60  Kidney Failure <15      Potassium [[id number]]  (Normal) Collected: 08/29/24 2044    Specimen: Blood Updated: 08/29/24 2106     Potassium 3.7 mmol/L     Basic Metabolic Panel [[id number]]  (Abnormal) Collected: 08/29/24 0918    Specimen: Blood Updated: 08/29/24 0955     Glucose 104 mg/dL      BUN 17 mg/dL      Creatinine 1.28 mg/dL       Sodium 140 mmol/L      Potassium 3.5 mmol/L      Chloride 104 mmol/L      CO2 23.0 mmol/L      Calcium 9.5 mg/dL      BUN/Creatinine Ratio 13.3     Anion Gap 13.0 mmol/L      eGFR 73.0 mL/min/1.73     Narrative:      GFR Normal >60  Chronic Kidney Disease <60  Kidney Failure <15      Ammonia [[id number]]  (Abnormal) Collected: 08/29/24 0918    Specimen: Blood Updated: 08/29/24 0954     Ammonia 83 umol/L     Fentanyl, Urine - Urine, Clean Catch [[id number]]  (Normal) Collected: 08/28/24 2017    Specimen: Urine, Clean Catch Updated: 08/28/24 2037     Fentanyl, Urine Negative    Narrative:      Negative Threshold:      Fentanyl 5 ng/mL     The normal value for the drug tested is negative. This report includes final unconfirmed screening results to be used for medical treatment purposes only. Unconfirmed results must not be used for non-medical purposes such as employment or legal testing. Clinical consideration should be applied to any drug of abuse test, particularly when unconfirmed results are used.           Urine Drug Screen - Urine, Clean Catch [[id number]]  (Abnormal) Collected: 08/28/24 2017    Specimen: Urine, Clean Catch Updated: 08/28/24 2036     THC, Screen, Urine Negative     Phencyclidine (PCP), Urine Negative     Cocaine Screen, Urine Negative     Methamphetamine, Ur Negative     Opiate Screen Negative     Amphetamine Screen, Urine Negative     Benzodiazepine Screen, Urine Positive     Tricyclic Antidepressants Screen Negative     Methadone Screen, Urine Negative     Barbiturates Screen, Urine Negative     Oxycodone Screen, Urine Negative     Buprenorphine, Screen, Urine Negative    Narrative:      Cutoff For Drugs Screened:    Amphetamines               500 ng/ml  Barbiturates               200 ng/ml  Benzodiazepines            150 ng/ml  Cocaine                    150 ng/ml  Methadone                  200 ng/ml  Opiates                    100 ng/ml  Phencyclidine               25 ng/ml  THC                          50 ng/ml  Methamphetamine            500 ng/ml  Tricyclic Antidepressants  300 ng/ml  Oxycodone                  100 ng/ml  Buprenorphine               10 ng/ml    The normal value for all drugs tested is negative. This report includes unconfirmed screening results, with the cutoff values listed, to be used for medical treatment purposes only.  Unconfirmed results must not be used for non-medical purposes such as employment or legal testing.  Clinical consideration should be applied to any drug of abuse test, particularly when unconfirmed results are used.      Ammonia [[id number]]  (Abnormal) Collected: 08/28/24 1815    Specimen: Blood Updated: 08/28/24 1844     Ammonia 121 umol/L     Potassium [[id number]]  (Abnormal) Collected: 08/28/24 1815    Specimen: Blood Updated: 08/28/24 1843     Potassium 3.4 mmol/L     Basic Metabolic Panel [[id number]]  (Abnormal) Collected: 08/28/24 1815    Specimen: Blood Updated: 08/28/24 1843     Glucose 108 mg/dL      BUN 20 mg/dL      Creatinine 1.34 mg/dL      Sodium 138 mmol/L      Potassium 3.4 mmol/L      Chloride 102 mmol/L      CO2 24.0 mmol/L      Calcium 9.4 mg/dL      BUN/Creatinine Ratio 14.9     Anion Gap 12.0 mmol/L      eGFR 69.1 mL/min/1.73     Narrative:      GFR Normal >60  Chronic Kidney Disease <60  Kidney Failure <15      Ammonia [[id number]]  (Abnormal) Collected: 08/28/24 0339    Specimen: Blood Updated: 08/28/24 0414     Ammonia 117 umol/L     Comprehensive Metabolic Panel [[id number]]  (Abnormal) Collected: 08/28/24 0339    Specimen: Blood Updated: 08/28/24 0410     Glucose 81 mg/dL      BUN 23 mg/dL      Creatinine 1.41 mg/dL      Sodium 139 mmol/L      Potassium 3.3 mmol/L      Chloride 103 mmol/L      CO2 22.0 mmol/L      Calcium 8.9 mg/dL      Total Protein 7.3 g/dL      Albumin 2.9 g/dL      ALT (SGPT) 17 U/L      AST (SGOT) 49 U/L      Alkaline Phosphatase 129 U/L      Total Bilirubin 2.4 mg/dL      Globulin 4.4 gm/dL      A/G Ratio 0.7 g/dL       BUN/Creatinine Ratio 16.3     Anion Gap 14.0 mmol/L      eGFR 65.0 mL/min/1.73     Narrative:      GFR Normal >60  Chronic Kidney Disease <60  Kidney Failure <15      Lipase [[id number]]  (Normal) Collected: 08/27/24 1141    Specimen: Blood Updated: 08/27/24 1409     Lipase 17 U/L     Bilirubin, Direct [[id number]]  (Abnormal) Collected: 08/27/24 1141    Specimen: Blood Updated: 08/27/24 1220     Bilirubin, Direct 1.3 mg/dL     C-reactive Protein [[id number]]  (Abnormal) Collected: 08/27/24 1141    Specimen: Blood Updated: 08/27/24 1220     C-Reactive Protein 5.65 mg/dL     CK [[id number]]  (Normal) Collected: 08/27/24 1141    Specimen: Blood Updated: 08/27/24 1220     Creatine Kinase 135 U/L     Ethanol [[id number]] Collected: 08/27/24 1141    Specimen: Blood Updated: 08/27/24 1220     Ethanol % <0.010 %     Narrative:      Not for legal purposes. Chain of Custody not followed.     Magnesium [[id number]]  (Normal) Collected: 08/27/24 1141    Specimen: Blood Updated: 08/27/24 1219     Magnesium 2.1 mg/dL     Comprehensive Metabolic Panel [[id number]]  (Abnormal) Collected: 08/27/24 1141    Specimen: Blood Updated: 08/27/24 1219     Glucose 100 mg/dL      BUN 26 mg/dL      Creatinine 1.84 mg/dL      Sodium 138 mmol/L      Potassium 3.8 mmol/L      Chloride 102 mmol/L      CO2 21.0 mmol/L      Calcium 9.1 mg/dL      Total Protein 8.0 g/dL      Albumin 3.4 g/dL      ALT (SGPT) 17 U/L      AST (SGOT) 52 U/L      Alkaline Phosphatase 147 U/L      Total Bilirubin 2.4 mg/dL      Globulin 4.6 gm/dL      A/G Ratio 0.7 g/dL      BUN/Creatinine Ratio 14.1     Anion Gap 15.0 mmol/L      eGFR 47.2 mL/min/1.73     Narrative:      GFR Normal >60  Chronic Kidney Disease <60  Kidney Failure <15      Salicylate Level [[id number]]  (Normal) Collected: 08/27/24 1141    Specimen: Blood Updated: 08/27/24 1216     Salicylate <0.3 mg/dL     Acetaminophen Level [[id number]]  (Normal) Collected: 08/27/24 1141    Specimen: Blood Updated: 08/27/24 1215      Acetaminophen <5.0 mcg/mL     Ammonia [[id number]]  (Abnormal) Collected: 08/27/24 1141    Specimen: Blood Updated: 08/27/24 1213     Ammonia 154 umol/L     BNP [[id number]]  (Abnormal) Collected: 08/27/24 1141    Specimen: Blood Updated: 08/27/24 1210     proBNP 671.7 pg/mL     Narrative:      This assay is used as an aid in the diagnosis of individuals suspected of having heart failure. It can be used as an aid in the diagnosis of acute decompensated heart failure (ADHF) in patients presenting with signs and symptoms of ADHF to the emergency department (ED). In addition, NT-proBNP of <300 pg/mL indicates ADHF is not likely.    Age Range Result Interpretation  NT-proBNP Concentration (pg/mL:      <50             Positive            >450                   Gray                 300-450                    Negative             <300    50-75           Positive            >900                  Gray                300-900                  Negative            <300      >75             Positive            >1800                  Gray                300-1800                  Negative            <300    Protime-INR [[id number]]  (Abnormal) Collected: 08/27/24 1141    Specimen: Blood Updated: 08/27/24 1159     Protime 17.0 Seconds      INR 1.33    CBC & Differential [[id number]]  (Abnormal) Collected: 08/27/24 1141    Specimen: Blood Updated: 08/27/24 1151    Narrative:      The following orders were created for panel order CBC & Differential.  Procedure                               Abnormality         Status                     ---------                               -----------         ------                     CBC Auto Differential[[id number]]        Abnormal            Final result                 Please view results for these tests on the individual orders.    CBC Auto Differential [[id number]]  (Abnormal) Collected: 08/27/24 1141    Specimen: Blood Updated: 08/27/24 1151     WBC 10.87 10*3/mm3      RBC 2.85 10*6/mm3       Hemoglobin 8.8 g/dL      Hematocrit 28.0 %      MCV 98.2 fL      MCH 30.9 pg      MCHC 31.4 g/dL      RDW 16.7 %      RDW-SD 60.2 fl      MPV 9.4 fL      Platelets 356 10*3/mm3      Neutrophil % 76.1 %      Lymphocyte % 9.0 %      Monocyte % 9.4 %      Eosinophil % 4.3 %      Basophil % 0.7 %      Immature Grans % 0.5 %      Neutrophils, Absolute 8.27 10*3/mm3      Lymphocytes, Absolute 0.98 10*3/mm3      Monocytes, Absolute 1.02 10*3/mm3      Eosinophils, Absolute 0.47 10*3/mm3      Basophils, Absolute 0.08 10*3/mm3      Immature Grans, Absolute 0.05 10*3/mm3      nRBC 0.0 /100 WBC     Blood Gas, Arterial With Co-Ox [[id number]]  (Abnormal) Collected: 08/27/24 1143    Specimen: Arterial Blood Updated: 08/27/24 1143     Site Left Radial     Siva's Test Positive     pH, Arterial 7.435 pH units      pCO2, Arterial 34.2 mm Hg      Comment: 84 Value below reference range        pO2, Arterial 61.4 mm Hg      Comment: 84 Value below reference range        HCO3, Arterial 23.0 mmol/L      Base Excess, Arterial -1.0 mmol/L      Comment: 84 Value below reference range        O2 Saturation, Arterial 93.2 %      Comment: 84 Value below reference range        Hemoglobin, Blood Gas 9.2 g/dL      Comment: 84 Value below reference range        Hematocrit, Blood Gas 28.1 %      Comment: 84 Value below reference range        Oxyhemoglobin 90.9 %      Comment: 84 Value below reference range        Methemoglobin 0.50 %      Carboxyhemoglobin 2.0 %      Temperature 37.0     Sodium, Arterial 142 mmol/L      Potassium, Arterial 3.6 mmol/L      Ionized Calcium 4.74 mg/dL      Barometric Pressure for Blood Gas 756 mmHg      Modality Room Air     Ventilator Mode NA     Collected by [id number]     Comment: Meter: [id number]     :  Terell Hercules CRT        pH, Temp Corrected 7.435 pH Units      pCO2, Temperature Corrected 34.2 mm Hg      pO2, Temperature Corrected 61.4 mm Hg           Hospital Course:  The patient is a 39 y.o. male  "who presented to [facility] with increasing generalized weakness.  Patient also had altered mental status/confusion.  Moderate generalized edema  Patient was admitted to the hospital.  He was placed on IV diuretics.  Patient was also started on lactulose.  His ammonia level was elevated initially.  Gastroenterologist was consulted.  Patient's home Xifaxan was resumed.  PT and OT were consulted.  Patient did start having numerous stools secondary to the lactulose.  Ammonia level did improve.  His mentation improved.  He is back to his baseline.  Patient has been alcohol free for approximately a month now.  He is wanting to be able to go to AA meetings upon discharge.  Patient swelling improved with the diuretic.  Notes that he is no longer having any real pain in his testicles secondary to the swelling.  The swelling has reduced significantly.    Physical Exam on Discharge:  /66 (BP Location: Right arm, Patient Position: Lying)   Pulse 81   Temp 98.6 °F (37 °C) (Oral)   Resp 18   Ht 188 cm (74\")   Wt 128 kg (283 lb)   SpO2 92%   BMI 36.34 kg/m²   Physical Exam  Vitals and nursing note reviewed.   Constitutional:       Appearance: Normal appearance.   HENT:      Head: Normocephalic and atraumatic.      Right Ear: External ear normal.      Left Ear: External ear normal.      Nose: Nose normal.      Mouth/Throat:      Mouth: Mucous membranes are moist.   Eyes:      Extraocular Movements: Extraocular movements intact.      Conjunctiva/sclera: Conjunctivae normal.      Pupils: Pupils are equal, round, and reactive to light.   Cardiovascular:      Rate and Rhythm: Normal rate and regular rhythm.      Pulses: Normal pulses.      Heart sounds: No murmur heard.     No friction rub. No gallop.   Pulmonary:      Effort: Pulmonary effort is normal.      Breath sounds: Normal breath sounds.   Abdominal:      General: Bowel sounds are normal.      Palpations: Abdomen is soft.   Musculoskeletal:         " General: Normal range of motion.      Cervical back: Normal range of motion and neck supple.      Right lower leg: Edema present.      Left lower leg: Edema present.   Skin:     General: Skin is warm and dry.      Capillary Refill: Capillary refill takes less than 2 seconds.   Neurological:      General: No focal deficit present.      Mental Status: He is alert and oriented to person, place, and time.      Cranial Nerves: No cranial nerve deficit.   Psychiatric:         Mood and Affect: Mood normal.         Behavior: Behavior normal.      Comments: Affect flat.           Condition on Discharge: Condition is stable and improved.    Discharge Disposition:  Home or Self Care    Discharge Medications:     Discharge Medications        New Medications        Instructions Start Date   magnesium oxide 400 tablet tablet  Commonly known as: MAG-OX   400 mg, Oral, 2 Times Daily      nicotine 21 MG/24HR patch  Commonly known as: NICODERM CQ   1 patch, Transdermal, Every 24 Hours Scheduled             Changes to Medications        Instructions Start Date   lactulose 20 GM/30ML solution solution  What changed: when to take this   20 g, Oral, 3 Times Daily             Continue These Medications        Instructions Start Date   calcium carbonate 600 MG tablet  Commonly known as: Calcium 600   600 mg, Oral, Daily      cetirizine 10 MG tablet  Commonly known as: zyrTEC   10 mg, Oral, Daily      chlordiazePOXIDE 10 MG capsule  Commonly known as: LIBRIUM   10 mg, Oral, 3 Times Daily PRN      folic acid 1 MG tablet  Commonly known as: FOLVITE   1 mg, Oral, Daily      furosemide 20 MG tablet  Commonly known as: Lasix   20 mg, Oral, 2 Times Daily      levothyroxine 50 MCG tablet  Commonly known as: Synthroid   50 mcg, Oral, Daily      pantoprazole 40 MG EC tablet  Commonly known as: PROTONIX   40 mg, Oral, Daily      pentoxifylline 400 MG CR tablet  Commonly known as: TRENtal   400 mg, Oral, 3 Times Daily With Meals      potassium  chloride 10 MEQ CR tablet   10 mEq, Oral, 2 Times Daily      QUEtiapine 50 MG tablet  Commonly known as: SEROquel   50 mg, Oral, Nightly      riFAXIMin 550 MG tablet  Commonly known as: XIFAXAN   550 mg, Oral, 2 Times Daily      spironolactone 100 MG tablet  Commonly known as: Aldactone   100 mg, Oral, Daily      thiamine 100 MG tablet  Commonly known as: VITAMIN B1   100 mg, Oral, Daily             Discharge Diet:   Diet Instructions       Diet: Regular/House Diet; Regular (IDDSI 7); Thin (IDDSI 0)      Discharge Diet: Regular/House Diet    Texture: Regular (IDDSI 7)    Fluid Consistency: Thin (IDDSI 0)          Activity at Discharge:   Activity Instructions       Activity as Tolerated              Follow-up Appointments:   Future Appointments   Date Time Provider Department Center   9/6/2024  3:00 PM Adrien Varghese MD [facility]   9/18/2024  2:00 PM [facility] MRI 1 [facility] MR [facility]       Test Results Pending at Discharge: None    Liset Klein DO  09/03/24  08:51 CDT    Time: 35 minutes.

## 2024-09-03 NOTE — THERAPY DISCHARGE NOTE
Acute Care - Occupational Therapy Discharge Summary  [facility]     Patient Name: Luciano Christiansen  : 1985  MRN: [id number]    Today's Date: 9/3/2024                 Admit Date: 2024        OT Recommendation and Plan    Visit Dx:    ICD-10-CM ICD-9-CM   1. Hepatic encephalopathy  K76.82 572.2   2. MAYRA (acute kidney injury)  N17.9 584.9   3. Anasarca  R60.1 782.3   4. Chronic anemia  D64.9 285.9   5. Hepatosplenomegaly  R16.2 571.8   6. Chronic liver disease  K76.9 571.9   7. Lymphadenopathy  R59.1 785.6   8. Calculus of gallbladder without cholecystitis without obstruction  K80.20 574.20   9. Impaired mobility [Z74.09]  Z74.09 799.89                OT Rehab Goals       Row Name 24 1500             Bathing Goal 1 (OT)    Activity/Device (Bathing Goal 1, OT) bathing skills, all  -EC      [place] Level/Cues Needed (Bathing Goal 1, OT) minimum assist (75% or more patient effort)  -EC      Time Frame (Bathing Goal 1, OT) long term goal (LTG);by discharge  -EC      Progress/Outcomes (Bathing Goal 1, OT) goal not met  -EC         Dressing Goal 1 (OT)    Activity/Device (Dressing Goal 1, OT) dressing skills, all  -EC      [place]/Cues Needed (Dressing Goal 1, OT) minimum assist (75% or more patient effort)  -EC      Time Frame (Dressing Goal 1, OT) long term goal (LTG);by discharge  -EC      Progress/Outcome (Dressing Goal 1, OT) goal not met  -EC         Toileting Goal 1 (OT)    Activity/Device (Toileting Goal 1, OT) toileting skills, all  -EC      [place] Level/Cues Needed (Toileting Goal 1, OT) minimum assist (75% or more patient effort)  -EC      Time Frame (Toileting Goal 1, OT) long term goal (LTG);by discharge  -EC      Progress/Outcome (Toileting Goal 1, OT) goal not met  -EC                User Key  (r) = Recorded By, (t) = Taken By, (c) = Cosigned By      Initials Name Provider Type Discipline    EC Mirta Hudson, OTR/L Occupational Therapist OT                     Outcome Measures        Row Name 09/02/24 1000             How much help from another person do you currently need...    Turning from your back to your side while in flat bed without using bedrails? 3  -WK      Moving from lying on back to sitting on the side of a flat bed without bedrails? 3  -WK      Moving to and from a bed to a chair (including a wheelchair)? 4  -WK      Standing up from a chair using your arms (e.g., wheelchair, bedside chair)? 4  -WK      Climbing 3-5 steps with a railing? 2  -WK      To walk in hospital room? 3  -WK      AM-PAC 6 Clicks Score (PT) 19  -WK      Highest Level of Mobility Goal 6 --> Walk 10 steps or more  -WK         Functional Assessment    Outcome Measure Options AM-PAC 6 Clicks Basic Mobility (PT)  -WK                User Key  (r) = Recorded By, (t) = Taken By, (c) = Cosigned By      Initials Name Provider Type    WK Gris West PTA Physical Therapist Assistant                    Timed Therapy Charges  Total Units: 2      Suggested Charges  Total Units: 2      Procedure Name Documented Minutes Units Code    HC OT SELF CARE/MGMT/TRAIN EA 15 MIN 24 2   38325 (CPT®)                 Documented Minutes  Total Minutes: 24      Therapy Provided Minutes    83273 - OT Self Care/Mgmt Minutes 24                        OT Discharge Summary  Anticipated Discharge Disposition (OT): skilled nursing facility  Reason for Discharge: Discharge from facility  Outcomes Achieved: Refer to plan of care for updates on goals achieved  Discharge Destination: SNF      Mirta Hudson OTR/L  9/3/2024

## 2024-09-04 ENCOUNTER — TRANSITIONAL CARE MANAGEMENT TELEPHONE ENCOUNTER (OUTPATIENT)
Dept: CALL CENTER | Facility: HOSPITAL | Age: 39
End: 2024-09-04
Payer: COMMERCIAL

## 2024-09-04 ENCOUNTER — READMISSION MANAGEMENT (OUTPATIENT)
Dept: CALL CENTER | Facility: HOSPITAL | Age: 39
End: 2024-09-04
Payer: COMMERCIAL

## 2024-09-04 NOTE — OUTREACH NOTE
Call Center TCM Note      Flowsheet Row Responses   [facility] patient discharged from? [place]   Does the patient have one of the following disease processes/diagnoses(primary or secondary)? Other   TCM attempt successful? No   Unsuccessful attempts Attempt 2   Call Status Voice mail issues            Mary Ann Truong RN    9/4/2024, 16:01 CDT

## 2024-09-04 NOTE — OUTREACH NOTE
Call Center TCM Note      Flowsheet Row Responses   [facility] patient discharged from? [place]   Does the patient have one of the following disease processes/diagnoses(primary or secondary)? Other   TCM attempt successful? No  [Reached mother who was at work-states to call patient later.]   Unsuccessful attempts Attempt 1   Call Status Voice mail issues            Mary Ann Truong RN    9/4/2024, 11:15 CDT

## 2024-09-04 NOTE — THERAPY DISCHARGE NOTE
Acute Care - Physical Therapy Discharge Summary  [facility]       Patient Name: Luciano Christiansen  : 1985  MRN: [id number]    Today's Date: 2024                 Admit Date: 2024      PT Recommendation and Plan    Visit Dx:    ICD-10-CM ICD-9-CM   1. Hepatic encephalopathy  K76.82 572.2   2. MAYRA (acute kidney injury)  N17.9 584.9   3. Anasarca  R60.1 782.3   4. Chronic anemia  D64.9 285.9   5. Hepatosplenomegaly  R16.2 571.8   6. Chronic liver disease  K76.9 571.9   7. Lymphadenopathy  R59.1 785.6   8. Calculus of gallbladder without cholecystitis without obstruction  K80.20 574.20   9. Impaired mobility [Z74.09]  Z74.09 799.89        Outcome Measures       Row Name 24 1000             How much help from another person do you currently need...    Turning from your back to your side while in flat bed without using bedrails? 3  -WK      Moving from lying on back to sitting on the side of a flat bed without bedrails? 3  -WK      Moving to and from a bed to a chair (including a wheelchair)? 4  -WK      Standing up from a chair using your arms (e.g., wheelchair, bedside chair)? 4  -WK      Climbing 3-5 steps with a railing? 2  -WK      To walk in hospital room? 3  -WK      AM-PAC 6 Clicks Score (PT) 19  -WK      Highest Level of Mobility Goal 6 --> Walk 10 steps or more  -WK         Functional Assessment    Outcome Measure Options AM-PAC 6 Clicks Basic Mobility (PT)  -WK                User Key  (r) = Recorded By, (t) = Taken By, (c) = Cosigned By      Initials Name Provider Type    WK Gris [place] PTA Physical Therapist Assistant                         PT Rehab Goals       Row Name 24 1559             Bed Mobility Goal 1 (PT)    Activity/Assistive Device (Bed Mobility Goal 1, PT) sit to supine/supine to sit  -AE      [place] Level/Cues Needed (Bed Mobility Goal 1, PT) supervision required  -AE      Time Frame (Bed Mobility Goal 1, PT) long term goal (LTG);10 days  -AE       Progress/Outcomes (Bed Mobility Goal 1, PT) goal met  -AE         Transfer Goal 1 (PT)    Activity/Assistive Device (Transfer Goal 1, PT) sit-to-stand/stand-to-sit;bed-to-chair/chair-to-bed  -AE      [place] Level/Cues Needed (Transfer Goal 1, PT) supervision required  -AE      Time Frame (Transfer Goal 1, PT) long term goal (LTG);10 days  -AE      Progress/Outcome (Transfer Goal 1, PT) goal not met  -AE         Gait Training Goal 1 (PT)    Activity/Assistive Device (Gait Training Goal 1, PT) gait (walking locomotion);decrease fall risk;improve balance and speed;increase endurance/gait distance  -AE      [place] Level (Gait Training Goal 1, PT) supervision required  -AE      Distance (Gait Training Goal 1, PT) 200 ft  -AE      Time Frame (Gait Training Goal 1, PT) long term goal (LTG);10 days  -AE      Progress/Outcome (Gait Training Goal 1, PT) goal not met  -AE                User Key  (r) = Recorded By, (t) = Taken By, (c) = Cosigned By      Initials Name Provider Type Discipline    AE Anabell Story PTA Physical Therapist Assistant PT                        PT Discharge Summary  Anticipated Discharge Disposition (PT): home with assist  Reason for Discharge: Discharge from facility  Outcomes Achieved: Patient able to partially acheive established goals  Discharge Destination: Home with assist      Anabell Story PTA   9/4/2024

## 2024-09-05 ENCOUNTER — TRANSITIONAL CARE MANAGEMENT TELEPHONE ENCOUNTER (OUTPATIENT)
Dept: CALL CENTER | Facility: HOSPITAL | Age: 39
End: 2024-09-05
Payer: COMMERCIAL

## 2024-09-05 NOTE — OUTREACH NOTE
Call Center TCM Note      Flowsheet Row Responses   [facility] patient discharged from? [place]   Does the patient have one of the following disease processes/diagnoses(primary or secondary)? Other   TCM attempt successful? No   Unsuccessful attempts Attempt 3            Ameena Heller LPN    9/5/2024, 15:14 CDT

## 2024-09-08 NOTE — TELEPHONE ENCOUNTER
----- Message from Jossy Christiansen sent at 8/6/2024 11:53 AM CDT -----  Please let the patient know his potassium is low; I am starting him on Spironolactone along with his Lasix and Potassium supplement to help with this. I have sent enough for one week, we need to repeat his CMP in one week as well. If CMP is improved then we can continue his Lasix, potassium, and spironolactone regimen x1mo. I have placed order for repeat CMP in 1 week, please make sure he understands it is very important for him to get this done in order for us to better control his swelling.   Renal function has declined slightly as well. Will repeat in 1 week to trend. Hopefull will improve with new medication regimen.   AST higher than typical, bilirubin also elevated; consistent with liver dysfunction that we are aware of. Very important that he gets in to see GI.   Calcium low at 7.5; I have sent a prescription for calcium 600 mg daily supplement.   CBC shows chronic anemia and low platelets. Stable from previous.    hide

## 2024-09-13 ENCOUNTER — TELEPHONE (OUTPATIENT)
Dept: FAMILY MEDICINE CLINIC | Facility: CLINIC | Age: 39
End: 2024-09-13
Payer: COMMERCIAL

## 2024-09-17 ENCOUNTER — OFFICE VISIT (OUTPATIENT)
Dept: FAMILY MEDICINE CLINIC | Facility: CLINIC | Age: 39
End: 2024-09-17
Payer: COMMERCIAL

## 2024-09-17 VITALS
TEMPERATURE: 98.4 F | RESPIRATION RATE: 20 BRPM | OXYGEN SATURATION: 99 % | SYSTOLIC BLOOD PRESSURE: 114 MMHG | HEIGHT: 74 IN | DIASTOLIC BLOOD PRESSURE: 58 MMHG | HEART RATE: 87 BPM | WEIGHT: 283 LBS | BODY MASS INDEX: 36.32 KG/M2

## 2024-09-17 DIAGNOSIS — R60.1 ANASARCA: ICD-10-CM

## 2024-09-17 DIAGNOSIS — G31.2 ALCOHOLIC ENCEPHALOPATHY: ICD-10-CM

## 2024-09-17 DIAGNOSIS — E06.3 HYPOTHYROIDISM DUE TO HASHIMOTO'S THYROIDITIS: ICD-10-CM

## 2024-09-17 DIAGNOSIS — K21.9 GASTROESOPHAGEAL REFLUX DISEASE WITHOUT ESOPHAGITIS: ICD-10-CM

## 2024-09-17 DIAGNOSIS — E87.6 HYPOKALEMIA: ICD-10-CM

## 2024-09-17 DIAGNOSIS — K70.31 ALCOHOLIC CIRRHOSIS OF LIVER WITH ASCITES: Primary | ICD-10-CM

## 2024-09-17 DIAGNOSIS — F41.9 ANXIETY: ICD-10-CM

## 2024-09-17 DIAGNOSIS — E03.8 HYPOTHYROIDISM DUE TO HASHIMOTO'S THYROIDITIS: ICD-10-CM

## 2024-09-17 DIAGNOSIS — R60.0 BILATERAL LOWER EXTREMITY EDEMA: ICD-10-CM

## 2024-09-17 PROBLEM — E03.9 HYPOTHYROIDISM: Status: ACTIVE | Noted: 2024-09-17

## 2024-09-17 PROBLEM — E11.10 DIABETIC KETOACIDOSIS ASSOCIATED WITH TYPE 2 DIABETES MELLITUS: Status: RESOLVED | Noted: 2024-04-22 | Resolved: 2024-09-17

## 2024-09-17 PROBLEM — E11.9 TYPE 2 DIABETES MELLITUS: Status: RESOLVED | Noted: 2024-05-07 | Resolved: 2024-09-17

## 2024-09-17 PROBLEM — R60.9 FLUID RETENTION: Status: RESOLVED | Noted: 2024-09-02 | Resolved: 2024-09-17

## 2024-09-17 PROBLEM — F10.129 ALCOHOL INTOXICATION IN ACTIVE ALCOHOLIC: Status: RESOLVED | Noted: 2024-04-22 | Resolved: 2024-09-17

## 2024-09-17 PROBLEM — E87.3 ALKALOSIS: Status: RESOLVED | Noted: 2022-12-23 | Resolved: 2024-09-17

## 2024-09-17 PROCEDURE — 99215 OFFICE O/P EST HI 40 MIN: CPT | Performed by: STUDENT IN AN ORGANIZED HEALTH CARE EDUCATION/TRAINING PROGRAM

## 2024-09-17 RX ORDER — LACTULOSE 20 G/30ML
20 SOLUTION ORAL 3 TIMES DAILY
Qty: 450 ML | Refills: 8 | Status: SHIPPED | OUTPATIENT
Start: 2024-09-17

## 2024-09-17 RX ORDER — FUROSEMIDE 20 MG
20 TABLET ORAL 2 TIMES DAILY
Qty: 14 TABLET | Refills: 0 | Status: CANCELLED | OUTPATIENT
Start: 2024-09-17

## 2024-09-17 RX ORDER — POTASSIUM CHLORIDE 750 MG/1
10 TABLET, EXTENDED RELEASE ORAL 2 TIMES DAILY
Qty: 60 TABLET | Refills: 3 | Status: SHIPPED | OUTPATIENT
Start: 2024-09-17 | End: 2024-09-17

## 2024-09-17 RX ORDER — FUROSEMIDE 40 MG
40 TABLET ORAL 2 TIMES DAILY
Qty: 90 TABLET | Refills: 2 | Status: SHIPPED | OUTPATIENT
Start: 2024-09-17

## 2024-09-17 RX ORDER — PANTOPRAZOLE SODIUM 40 MG/1
40 TABLET, DELAYED RELEASE ORAL DAILY
Qty: 90 TABLET | Refills: 1 | Status: SHIPPED | OUTPATIENT
Start: 2024-09-17

## 2024-09-17 RX ORDER — SPIRONOLACTONE 100 MG/1
100 TABLET, FILM COATED ORAL DAILY
Qty: 90 TABLET | Refills: 5 | Status: SHIPPED | OUTPATIENT
Start: 2024-09-17

## 2024-09-17 RX ORDER — CHLORDIAZEPOXIDE HYDROCHLORIDE 10 MG/1
10 CAPSULE, GELATIN COATED ORAL 3 TIMES DAILY PRN
Qty: 90 CAPSULE | Refills: 2 | Status: CANCELLED | OUTPATIENT
Start: 2024-09-17

## 2024-09-18 ENCOUNTER — HOSPITAL ENCOUNTER (OUTPATIENT)
Dept: MRI IMAGING | Facility: HOSPITAL | Age: 39
Discharge: HOME | End: 2024-09-18
Admitting: STUDENT IN AN ORGANIZED HEALTH CARE EDUCATION/TRAINING PROGRAM
Payer: COMMERCIAL

## 2024-09-18 DIAGNOSIS — R74.01 TRANSAMINITIS: ICD-10-CM

## 2024-09-18 DIAGNOSIS — R59.0 MESENTERIC LYMPHADENOPATHY: ICD-10-CM

## 2024-09-18 DIAGNOSIS — K70.30 ALCOHOLIC CIRRHOSIS, UNSPECIFIED WHETHER ASCITES PRESENT: ICD-10-CM

## 2024-09-18 LAB — CREAT BLDA-MCNC: 1.1 MG/DL (ref 0.6–1.3)

## 2024-09-18 PROCEDURE — A9573 GADOPICLENOL 0.5 MMOL/ML SOLUTION: HCPCS | Performed by: STUDENT IN AN ORGANIZED HEALTH CARE EDUCATION/TRAINING PROGRAM

## 2024-09-18 PROCEDURE — 74183 MRI ABD W/O CNTR FLWD CNTR: CPT

## 2024-09-18 PROCEDURE — 82565 ASSAY OF CREATININE: CPT

## 2024-09-18 PROCEDURE — 25510000001 GADOPICLENOL 0.5 MMOL/ML SOLUTION: Performed by: STUDENT IN AN ORGANIZED HEALTH CARE EDUCATION/TRAINING PROGRAM

## 2024-09-18 RX ADMIN — GADOPICLENOL 10 ML: 485.1 INJECTION INTRAVENOUS at 16:13

## 2024-10-04 ENCOUNTER — LAB (OUTPATIENT)
Dept: LAB | Facility: HOSPITAL | Age: 39
End: 2024-10-04
Payer: COMMERCIAL

## 2024-10-04 DIAGNOSIS — K70.31 ALCOHOLIC CIRRHOSIS OF LIVER WITH ASCITES: ICD-10-CM

## 2024-10-04 LAB
ALBUMIN SERPL-MCNC: 4 G/DL (ref 3.5–5)
ALBUMIN/GLOB SERPL: 1 G/DL (ref 1.1–2.5)
ALP SERPL-CCNC: 120 U/L (ref 24–120)
ALT SERPL W P-5'-P-CCNC: 22 U/L (ref 0–50)
ANION GAP SERPL CALCULATED.3IONS-SCNC: 14 MMOL/L (ref 4–13)
AST SERPL-CCNC: 52 U/L (ref 7–45)
AUTO MIXED CELLS #: 0.6 10*3/MM3 (ref 0.1–2.6)
AUTO MIXED CELLS %: 9.7 % (ref 0.1–24)
BILIRUB SERPL-MCNC: 2 MG/DL (ref 0.1–1)
BUN SERPL-MCNC: 18 MG/DL (ref 5–21)
BUN/CREAT SERPL: 17.3
CALCIUM SPEC-SCNC: 9.2 MG/DL (ref 8.6–10.5)
CHLORIDE SERPL-SCNC: 102 MMOL/L (ref 98–110)
CO2 SERPL-SCNC: 23 MMOL/L (ref 24–31)
CREAT SERPL-MCNC: 1.04 MG/DL (ref 0.5–1.4)
EGFRCR SERPLBLD CKD-EPI 2021: 93.7 ML/MIN/1.73
ERYTHROCYTE [DISTWIDTH] IN BLOOD BY AUTOMATED COUNT: 16.5 % (ref 12.3–15.4)
GLOBULIN UR ELPH-MCNC: 4.2 GM/DL
GLUCOSE SERPL-MCNC: 77 MG/DL (ref 70–100)
HCT VFR BLD AUTO: 30.2 % (ref 37.5–51)
HGB BLD-MCNC: 9.7 G/DL (ref 13–17.7)
LYMPHOCYTES # BLD AUTO: 1.1 10*3/MM3 (ref 0.7–3.1)
LYMPHOCYTES NFR BLD AUTO: 18.9 % (ref 19.6–45.3)
MAGNESIUM SERPL-MCNC: 1.8 MG/DL (ref 1.6–2.6)
MCH RBC QN AUTO: 30.3 PG (ref 26.6–33)
MCHC RBC AUTO-ENTMCNC: 32.1 G/DL (ref 31.5–35.7)
MCV RBC AUTO: 94.4 FL (ref 79–97)
NEUTROPHILS NFR BLD AUTO: 4.3 10*3/MM3 (ref 1.7–7)
NEUTROPHILS NFR BLD AUTO: 71.4 % (ref 42.7–76)
PLATELET # BLD AUTO: 214 10*3/MM3 (ref 140–450)
PMV BLD AUTO: 8.5 FL (ref 6–12)
POTASSIUM SERPL-SCNC: 5.2 MMOL/L (ref 3.5–5.3)
PROT SERPL-MCNC: 8.2 G/DL (ref 6.3–8.7)
RBC # BLD AUTO: 3.2 10*6/MM3 (ref 4.14–5.8)
SODIUM SERPL-SCNC: 139 MMOL/L (ref 135–145)
T4 FREE SERPL-MCNC: 1.3 NG/DL (ref 0.92–1.68)
TSH SERPL DL<=0.05 MIU/L-ACNC: 6.23 UIU/ML (ref 0.27–4.2)
WBC NRBC COR # BLD AUTO: 6 10*3/MM3 (ref 3.4–10.8)

## 2024-10-04 PROCEDURE — 83735 ASSAY OF MAGNESIUM: CPT

## 2024-10-04 PROCEDURE — 85025 COMPLETE CBC W/AUTO DIFF WBC: CPT

## 2024-10-04 PROCEDURE — 80053 COMPREHEN METABOLIC PANEL: CPT

## 2024-10-04 PROCEDURE — 84439 ASSAY OF FREE THYROXINE: CPT

## 2024-10-04 PROCEDURE — 84443 ASSAY THYROID STIM HORMONE: CPT

## 2024-10-04 PROCEDURE — 36415 COLL VENOUS BLD VENIPUNCTURE: CPT

## 2024-10-09 ENCOUNTER — OFFICE VISIT (OUTPATIENT)
Dept: FAMILY MEDICINE CLINIC | Facility: CLINIC | Age: 39
End: 2024-10-09
Payer: COMMERCIAL

## 2024-10-09 VITALS
HEART RATE: 69 BPM | SYSTOLIC BLOOD PRESSURE: 125 MMHG | OXYGEN SATURATION: 100 % | HEIGHT: 74 IN | BODY MASS INDEX: 38.24 KG/M2 | WEIGHT: 298 LBS | DIASTOLIC BLOOD PRESSURE: 78 MMHG

## 2024-10-09 DIAGNOSIS — Z23 IMMUNIZATION DUE: ICD-10-CM

## 2024-10-09 DIAGNOSIS — K74.60 ESOPHAGEAL VARICES IN CIRRHOSIS: ICD-10-CM

## 2024-10-09 DIAGNOSIS — I85.10 ESOPHAGEAL VARICES IN CIRRHOSIS: ICD-10-CM

## 2024-10-09 DIAGNOSIS — G47.00 INSOMNIA DISORDER RELATED TO KNOWN ORGANIC FACTOR: ICD-10-CM

## 2024-10-09 DIAGNOSIS — R60.9 SWELLING: ICD-10-CM

## 2024-10-09 DIAGNOSIS — F41.9 ANXIETY: ICD-10-CM

## 2024-10-09 DIAGNOSIS — K70.31 ALCOHOLIC CIRRHOSIS OF LIVER WITH ASCITES: Primary | ICD-10-CM

## 2024-10-09 DIAGNOSIS — E03.9 HYPOTHYROIDISM, UNSPECIFIED TYPE: ICD-10-CM

## 2024-10-09 PROCEDURE — 99214 OFFICE O/P EST MOD 30 MIN: CPT

## 2024-10-09 PROCEDURE — 90656 IIV3 VACC NO PRSV 0.5 ML IM: CPT

## 2024-10-09 PROCEDURE — 90471 IMMUNIZATION ADMIN: CPT

## 2024-10-09 RX ORDER — LEVOTHYROXINE SODIUM 75 UG/1
75 TABLET ORAL DAILY
Qty: 30 TABLET | Refills: 2 | Status: SHIPPED | OUTPATIENT
Start: 2024-10-09

## 2024-10-09 RX ORDER — QUETIAPINE FUMARATE 50 MG/1
50 TABLET, FILM COATED ORAL NIGHTLY
Qty: 30 TABLET | Refills: 2 | Status: SHIPPED | OUTPATIENT
Start: 2024-10-09

## 2024-10-09 RX ORDER — POTASSIUM CHLORIDE 750 MG/1
1 TABLET, EXTENDED RELEASE ORAL EVERY 12 HOURS SCHEDULED
COMMUNITY
Start: 2024-09-17

## 2024-10-09 NOTE — PROGRESS NOTES
"Chief Complaint  cirrohsis of liver with ascites and Cholelithiasis    Subjective    History of Present Illness      Patient presents to [facility] for   History of Present Illness  Pt presents today for 3 week follow up on cirrohsis of liver with ascites and Cholelithiasis. Pt wanting to go over recent lab work and MRI results. States still having testicular swelling. Want refill on lactulose and states stopped taking prozac because it was making him more depressed       Review of Systems    I have reviewed and agree with the HPI and ROS information as above.  Jossy Christiansen, APRN     Objective   Vital Signs:   /78   Pulse 69   Ht 188 cm (74.02\")   Wt 135 kg (298 lb)   SpO2 100%   BMI 38.24 kg/m²            Physical Exam  Vitals and nursing note reviewed.   Constitutional:       General: He is not in acute distress.     Appearance: Normal appearance. He is obese. He is not ill-appearing.   HENT:      Head: Normocephalic and atraumatic.      Right Ear: External ear normal.      Left Ear: External ear normal.      Nose: Nose normal.   Eyes:      Conjunctiva/sclera: Conjunctivae normal.   Cardiovascular:      Rate and Rhythm: Normal rate and regular rhythm.      Pulses: Normal pulses.      Heart sounds: Normal heart sounds.   Pulmonary:      Effort: Pulmonary effort is normal.      Breath sounds: Normal breath sounds.   Skin:     General: Skin is warm and dry.   Neurological:      Mental Status: He is alert and oriented to person, place, and time. Mental status is at baseline.      GCS: GCS eye subscore is 4. GCS verbal subscore is 5. GCS motor subscore is 6.   Psychiatric:         Mood and Affect: Mood normal.         Behavior: Behavior normal.         Thought Content: Thought content normal.         Judgment: Judgment normal.          KATHERINE-7:      PHQ-2 Depression Screening  Little interest or pleasure in doing things?     Feeling down, depressed, or hopeless?     PHQ-2 " Total Score       PHQ-9 Depression Screening  Little interest or pleasure in doing things?     Feeling down, depressed, or hopeless?     Trouble falling or staying asleep, or sleeping too much?     Feeling tired or having little energy?     Poor appetite or overeating?     Feeling bad about yourself - or that you are a failure or have let yourself or your family down?     Trouble concentrating on things, such as reading the newspaper or watching television?     Moving or speaking so slowly that other people could have noticed? Or the opposite - being so fidgety or restless that you have been moving around a lot more than usual?     Thoughts that you would be better off dead, or of hurting yourself in some way?     PHQ-9 Total Score     If you checked off any problems, how difficult have these problems made it for you to do your work, take care of things at home, or get along with other people?        Result Review  Data Reviewed:            Office Visit with Adrien Varghese MD (09/17/2024)   T4, Free (10/04/2024 10:14)  CBC w AUTO Differential (10/04/2024 10:14)  Magnesium (10/04/2024 10:14)  TSH Rfx On Abnormal To Free T4 (10/04/2024 10:14)  Comprehensive metabolic panel (10/04/2024 10:14)           Assessment and Plan      Diagnoses and all orders for this visit:    1. Alcoholic cirrhosis of liver with ascites (Primary)    2. Esophageal varices in cirrhosis    3. Swelling    4. Anxiety    5. Hypothyroidism, unspecified type  -     levothyroxine (Synthroid) 75 MCG tablet; Take 1 tablet by mouth Daily.  Dispense: 30 tablet; Refill: 2  -     TSH; Future    6. Insomnia disorder related to known organic factor  -     QUEtiapine (SEROquel) 50 MG tablet; Take 1 tablet by mouth Every Night.  Dispense: 30 tablet; Refill: 2    7. Immunization due  -     Fluzone >6mos (8975-9039)      Patient presents with his mother and is seen today following up on cirrhosis, swelling, hypothyroidism, and anxiety.  Last month Dr. Varghese had  him restart the Lasix and spironolactone, he has done very well since then.  Has noted a significant reduction in amount of peripheral swelling.  Will continue same at this time as he did have labs repeated just a few days ago that revealed normal renal function and normal electrolytes.  We did go over his other labs today as well which reveals a normal magnesium, elevated TSH that is slightly improved from previous now 6.23, AST mildly elevated at 52, normal ALT, and chronic anemia that is also improved from previous.  Patient has yet to follow-up with gastroenterology, I did provide the patient and his mother today with the phone number for that office.  He has now had 2 referrals placed and has yet to see them.  I reiterated the importance of following with them outpatient.  He will also continue following with general surgery for cholelithiasis.  We did go over the results of his MRCP, although I discussed with him that Dr. Orozco will need to make the final determination regarding this imaging.  I discussed with him that they did note some portal hypertension, liver cirrhosis, marked splenomegaly, gastroesophageal varices, and trace ascites.  There is diffuse gallbladder wall thickening which could indicate cholecystitis although they suspect that the wall thickening is related to his liver disease and not acute cholecystitis.  Again, will let general surgery determine further regarding cholelithiasis.    Patient remains hypothyroid, will adjust Synthroid to 75 mcg daily and recheck in 6 to 8 weeks.  He is needing a refill on his Lasix and spironolactone which I am happy to provide.  Also needs a refill on his lactulose.  Does not need Protonix just yet per his report.  He is requesting a flu shot, nursing staff to administer.  He will follow-up with us in 1 to 3 months.  Patient has noted that he did stop his Prozac as he felt that it was making his depression worse, does not wish to pursue any other options  at this time.  Denies HI/SI.    Plan:  1.  Continue Lasix 40 mg twice daily, refills not yet needed  2.  Continue spironolactone 100 mg daily, refills not yet needed  3.  Increase Synthroid to 75 mcg daily, repeat TSH in 6 to 8 weeks  4.  Follow-up with GI, referral is current, phone number provided to patient and mother  5.  Flu shot given in office today  6.  Follow-up with general surgery regarding cholelithiasis and MRCP  7.  Follow-up with us in 1 to 3 months        Follow Up   Return in about 1 month (around 11/9/2024).  Patient was given instructions and counseling regarding his condition or for health maintenance advice. Please see specific information pulled into the AVS if appropriate.

## 2024-10-09 NOTE — PROGRESS NOTES
After obtaining consent, and per orders of Jossy Christiansen, injection of flu vaccine given by Rona Barbour MA. Patient instructed to remain in clinic for 20 minutes afterwards, and to report any adverse reaction to me immediately.     Pt tolerated well

## 2024-11-06 DIAGNOSIS — E03.9 HYPOTHYROIDISM, UNSPECIFIED TYPE: ICD-10-CM

## 2024-11-06 RX ORDER — LEVOTHYROXINE SODIUM 50 UG/1
50 TABLET ORAL DAILY
Qty: 30 TABLET | Refills: 2 | OUTPATIENT
Start: 2024-11-06

## 2024-11-20 ENCOUNTER — OFFICE VISIT (OUTPATIENT)
Dept: FAMILY MEDICINE CLINIC | Facility: CLINIC | Age: 39
End: 2024-11-20
Payer: COMMERCIAL

## 2024-11-20 VITALS
DIASTOLIC BLOOD PRESSURE: 72 MMHG | SYSTOLIC BLOOD PRESSURE: 128 MMHG | RESPIRATION RATE: 15 BRPM | OXYGEN SATURATION: 100 % | TEMPERATURE: 97.1 F | HEIGHT: 74 IN | BODY MASS INDEX: 38.24 KG/M2 | WEIGHT: 298 LBS | HEART RATE: 89 BPM

## 2024-11-20 DIAGNOSIS — R60.1 ANASARCA: ICD-10-CM

## 2024-11-20 DIAGNOSIS — R60.0 BILATERAL LOWER EXTREMITY EDEMA: ICD-10-CM

## 2024-11-20 DIAGNOSIS — G47.00 INSOMNIA DISORDER RELATED TO KNOWN ORGANIC FACTOR: ICD-10-CM

## 2024-11-20 DIAGNOSIS — E06.3 HYPOTHYROIDISM DUE TO HASHIMOTO THYROIDITIS: Primary | ICD-10-CM

## 2024-11-20 DIAGNOSIS — K70.31 ALCOHOLIC CIRRHOSIS OF LIVER WITH ASCITES: ICD-10-CM

## 2024-11-20 PROCEDURE — 99214 OFFICE O/P EST MOD 30 MIN: CPT | Performed by: STUDENT IN AN ORGANIZED HEALTH CARE EDUCATION/TRAINING PROGRAM

## 2024-11-20 RX ORDER — QUETIAPINE FUMARATE 100 MG/1
100 TABLET, FILM COATED ORAL NIGHTLY
Qty: 30 TABLET | Refills: 2 | Status: SHIPPED | OUTPATIENT
Start: 2024-11-20

## 2024-11-20 RX ORDER — LACTULOSE 10 G/15ML
20 SOLUTION ORAL 3 TIMES DAILY
Qty: 450 ML | Refills: 8 | Status: SHIPPED | OUTPATIENT
Start: 2024-11-20

## 2024-11-20 RX ORDER — SPIRONOLACTONE 100 MG/1
100 TABLET, FILM COATED ORAL DAILY
Qty: 90 TABLET | Refills: 2 | Status: SHIPPED | OUTPATIENT
Start: 2024-11-20

## 2024-11-20 RX ORDER — POTASSIUM CHLORIDE 750 MG/1
10 TABLET, EXTENDED RELEASE ORAL EVERY 12 HOURS SCHEDULED
Qty: 180 TABLET | Refills: 1 | Status: SHIPPED | OUTPATIENT
Start: 2024-11-20

## 2024-11-20 NOTE — PROGRESS NOTES
"       Chief Complaint  cirrhosis of liver    Subjective        Luciano Christiansen presents to [facility]    HPI    Here for FU.  He has history of alcoholic cirrhosis of liver.  We have restarted Lasix and spironolactone therapy which has helped control swelling quite well.  Liver enzymes are stabilized, ALT most recently 22, AST 52.  Alk phosphatase 120.  Total bilirubin decreased to 2.0.    Levothyroxine adjusted to 75 mcg at last visit.  TSH was 6.2 with T4 1.3.  Patient requesting adjustment of seroquel, thinks could help with his insomnia  GI appt 12/3    Past Medical History:   Diagnosis Date    Alcoholism     Diabetes mellitus     Gout     Hypertension     Jaundice      Past Surgical History:   Procedure Laterality Date    VASECTOMY       Social History     Socioeconomic History    Marital status: Single   Tobacco Use    Smoking status: Some Days     Current packs/day: 0.25     Average packs/day: 0.5 packs/day for 14.9 years (7.0 ttl pk-yrs)     Types: Cigarettes     Start date: 2010    Smokeless tobacco: Never   Vaping Use    Vaping status: Never Used    Passive vaping exposure: Yes   Substance and Sexual Activity    Alcohol use: Yes     Alcohol/week: 12.0 standard drinks of alcohol     Types: 12 Cans of beer per week     Comment: 90 days sober    Drug use: Yes     Types: Marijuana    Sexual activity: Yes       Objective   Vital Signs:  /72   Pulse 89   Temp 97.1 °F (36.2 °C) (Temporal)   Resp 15   Ht 188 cm (74.02\")   Wt 135 kg (298 lb)   SpO2 100%   BMI 38.24 kg/m²   Estimated body mass index is 38.24 kg/m² as calculated from the following:    Height as of this encounter: 188 cm (74.02\").    Weight as of this encounter: 135 kg (298 lb).              Physical Exam  Vitals reviewed.   Constitutional:       Appearance: Normal appearance.   HENT:      Head: Normocephalic.      Nose: Nose normal.      Mouth/Throat:      Mouth: Mucous membranes are moist.   Eyes:      " Extraocular Movements: Extraocular movements intact.   Cardiovascular:      Rate and Rhythm: Normal rate and regular rhythm.      Heart sounds: Normal heart sounds.   Pulmonary:      Effort: Pulmonary effort is normal.      Breath sounds: Normal breath sounds.   Musculoskeletal:         General: Normal range of motion.      Cervical back: Normal range of motion.   Skin:     General: Skin is warm and dry.   Neurological:      General: No focal deficit present.      Mental Status: He is alert and oriented to person, place, and time.   Psychiatric:         Mood and Affect: Mood normal.         Behavior: Behavior normal.        Result Review :                   Assessment and Plan   Diagnoses and all orders for this visit:    1. Hypothyroidism due to Hashimoto thyroiditis (Primary)  -Recheck thyroid levels 4 to 6 weeks after adjustment.  Continue levothyroxine 75 mcg  -     TSH Rfx On Abnormal To Free T4; Future    2. Alcoholic cirrhosis of liver with ascites  -Continue Lasix and spironolactone, EtOH cessation.  Continue with GI evaluation.  -     spironolactone (Aldactone) 100 MG tablet; Take 1 tablet by mouth Daily.  Dispense: 90 tablet; Refill: 2  -     lactulose 20 GM/30ML solution solution; Take 30 mL by mouth 3 (Three) Times a Day.  Dispense: 450 mL; Refill: 8  -     Comprehensive metabolic panel; Future    3. Anasarca  -     spironolactone (Aldactone) 100 MG tablet; Take 1 tablet by mouth Daily.  Dispense: 90 tablet; Refill: 2    4. Bilateral lower extremity edema  -     spironolactone (Aldactone) 100 MG tablet; Take 1 tablet by mouth Daily.  Dispense: 90 tablet; Refill: 2    5. Insomnia disorder related to known organic factor  -     QUEtiapine (SEROquel) 100 MG tablet; Take 1 tablet by mouth Every Night.  Dispense: 30 tablet; Refill: 2    Other orders  -     potassium chloride 10 MEQ CR tablet; Take 1 tablet by mouth Every 12 (Twelve) Hours.  Dispense: 180 tablet; Refill: 1             EMR Dragon/Transcription  disclaimer:   Much of this encounter note is an electronic transcription/translation of spoken language to printed text. The electronic translation of spoken language may permit erroneous, or at times, nonsensical words or phrases to be inadvertently transcribed; although attempts have made to review the note for such errors, some may still exist. Please excuse any unrecognized transcription errors and contact us if the air is unintelligible or needs documented correction. Also, portions of this note have been copied forward, however, changed to reflect the most current clinical status of this patient.  Follow Up   Return in about 2 months (around 1/20/2025).  Patient was given instructions and counseling regarding his condition or for health maintenance advice. Please see specific information pulled into the AVS if appropriate.

## 2024-12-16 ENCOUNTER — TELEPHONE (OUTPATIENT)
Dept: FAMILY MEDICINE CLINIC | Facility: CLINIC | Age: 39
End: 2024-12-16
Payer: COMMERCIAL

## 2025-01-01 ENCOUNTER — APPOINTMENT (OUTPATIENT)
Dept: ULTRASOUND IMAGING | Facility: HOSPITAL | Age: 40
End: 2025-01-01
Payer: COMMERCIAL

## 2025-01-01 ENCOUNTER — APPOINTMENT (OUTPATIENT)
Dept: CT IMAGING | Facility: HOSPITAL | Age: 40
End: 2025-01-01
Payer: COMMERCIAL

## 2025-01-01 ENCOUNTER — APPOINTMENT (OUTPATIENT)
Dept: GENERAL RADIOLOGY | Facility: HOSPITAL | Age: 40
End: 2025-01-01
Payer: COMMERCIAL

## 2025-01-01 ENCOUNTER — HOSPITAL ENCOUNTER (INPATIENT)
Facility: HOSPITAL | Age: 40
LOS: 9 days | End: 2025-07-02
Attending: EMERGENCY MEDICINE | Admitting: INTERNAL MEDICINE
Payer: COMMERCIAL

## 2025-01-01 VITALS
RESPIRATION RATE: 16 BRPM | SYSTOLIC BLOOD PRESSURE: 94 MMHG | WEIGHT: 315 LBS | TEMPERATURE: 94.1 F | HEIGHT: 75 IN | BODY MASS INDEX: 39.17 KG/M2 | HEART RATE: 51 BPM | OXYGEN SATURATION: 84 % | DIASTOLIC BLOOD PRESSURE: 41 MMHG

## 2025-01-01 DIAGNOSIS — K76.82 HEPATIC ENCEPHALOPATHY: Primary | ICD-10-CM

## 2025-01-01 DIAGNOSIS — K70.31 ALCOHOLIC CIRRHOSIS OF LIVER WITH ASCITES: ICD-10-CM

## 2025-01-01 DIAGNOSIS — R13.10 DYSPHAGIA, UNSPECIFIED TYPE: ICD-10-CM

## 2025-01-01 DIAGNOSIS — E87.6 HYPOKALEMIA: ICD-10-CM

## 2025-01-01 DIAGNOSIS — R17 JAUNDICE: ICD-10-CM

## 2025-01-01 DIAGNOSIS — D64.9 ANEMIA, UNSPECIFIED TYPE: ICD-10-CM

## 2025-01-01 LAB
ABO GROUP BLD: NORMAL
ALBUMIN FLD-MCNC: <0.2 G/DL
ALBUMIN SERPL-MCNC: 2.3 G/DL (ref 3.5–5.2)
ALBUMIN SERPL-MCNC: 2.4 G/DL (ref 3.5–5.2)
ALBUMIN SERPL-MCNC: 2.5 G/DL (ref 3.5–5.2)
ALBUMIN SERPL-MCNC: 2.7 G/DL (ref 3.5–5.2)
ALBUMIN/GLOB SERPL: 1 G/DL
ALBUMIN/GLOB SERPL: 1 G/DL
ALBUMIN/GLOB SERPL: 1.1 G/DL
ALBUMIN/GLOB SERPL: 1.1 G/DL
ALBUMIN/GLOB SERPL: 1.2 G/DL
ALBUMIN/GLOB SERPL: 1.3 G/DL
ALBUMIN/GLOB SERPL: 1.3 G/DL
ALP SERPL-CCNC: 179 U/L (ref 39–117)
ALP SERPL-CCNC: 181 U/L (ref 39–117)
ALP SERPL-CCNC: 184 U/L (ref 39–117)
ALP SERPL-CCNC: 184 U/L (ref 39–117)
ALP SERPL-CCNC: 195 U/L (ref 39–117)
ALP SERPL-CCNC: 199 U/L (ref 39–117)
ALP SERPL-CCNC: 221 U/L (ref 39–117)
ALT SERPL W P-5'-P-CCNC: 54 U/L (ref 1–41)
ALT SERPL W P-5'-P-CCNC: 58 U/L (ref 1–41)
ALT SERPL W P-5'-P-CCNC: 59 U/L (ref 1–41)
ALT SERPL W P-5'-P-CCNC: 61 U/L (ref 1–41)
AMMONIA BLD-SCNC: 102 UMOL/L (ref 16–60)
AMMONIA BLD-SCNC: 147 UMOL/L (ref 16–60)
AMMONIA BLD-SCNC: 149 UMOL/L (ref 16–60)
AMMONIA BLD-SCNC: 87 UMOL/L (ref 16–60)
AMMONIA BLD-SCNC: 94 UMOL/L (ref 16–60)
AMPHET+METHAMPHET UR QL: NEGATIVE
AMPHETAMINES UR QL: NEGATIVE
AMYLASE SERPL-CCNC: 29 U/L (ref 28–100)
ANION GAP SERPL CALCULATED.3IONS-SCNC: 10 MMOL/L (ref 5–15)
ANION GAP SERPL CALCULATED.3IONS-SCNC: 12 MMOL/L (ref 5–15)
ANION GAP SERPL CALCULATED.3IONS-SCNC: 12 MMOL/L (ref 5–15)
ANION GAP SERPL CALCULATED.3IONS-SCNC: 13 MMOL/L (ref 5–15)
ANION GAP SERPL CALCULATED.3IONS-SCNC: 13 MMOL/L (ref 5–15)
ANION GAP SERPL CALCULATED.3IONS-SCNC: 16 MMOL/L (ref 5–15)
ANION GAP SERPL CALCULATED.3IONS-SCNC: 16 MMOL/L (ref 5–15)
ANION GAP SERPL CALCULATED.3IONS-SCNC: 9 MMOL/L (ref 5–15)
ANISOCYTOSIS BLD QL: NORMAL
APPEARANCE FLD: CLEAR
AST SERPL-CCNC: 106 U/L (ref 1–40)
AST SERPL-CCNC: 116 U/L (ref 1–40)
AST SERPL-CCNC: 117 U/L (ref 1–40)
AST SERPL-CCNC: 133 U/L (ref 1–40)
AST SERPL-CCNC: 140 U/L (ref 1–40)
AST SERPL-CCNC: 140 U/L (ref 1–40)
AST SERPL-CCNC: 151 U/L (ref 1–40)
BACTERIA FLD CULT: NORMAL
BACTERIA SPEC AEROBE CULT: NORMAL
BACTERIA SPEC AEROBE CULT: NORMAL
BACTERIA UR QL AUTO: ABNORMAL /HPF
BACTERIA UR QL AUTO: ABNORMAL /HPF
BARBITURATES UR QL SCN: POSITIVE
BASOPHILS # BLD AUTO: 0.03 10*3/MM3 (ref 0–0.2)
BASOPHILS # BLD AUTO: 0.03 10*3/MM3 (ref 0–0.2)
BASOPHILS # BLD AUTO: 0.05 10*3/MM3 (ref 0–0.2)
BASOPHILS # BLD AUTO: 0.08 10*3/MM3 (ref 0–0.2)
BASOPHILS NFR BLD AUTO: 0.4 % (ref 0–1.5)
BASOPHILS NFR BLD AUTO: 0.5 % (ref 0–1.5)
BASOPHILS NFR BLD AUTO: 0.5 % (ref 0–1.5)
BASOPHILS NFR BLD AUTO: 0.6 % (ref 0–1.5)
BENZODIAZ UR QL SCN: POSITIVE
BILIRUB CONJ SERPL-MCNC: 20.1 MG/DL (ref 0–0.3)
BILIRUB SERPL-MCNC: 29 MG/DL (ref 0–1.2)
BILIRUB SERPL-MCNC: 31.7 MG/DL (ref 0–1.2)
BILIRUB SERPL-MCNC: 32 MG/DL (ref 0–1.2)
BILIRUB SERPL-MCNC: 32.6 MG/DL (ref 0–1.2)
BILIRUB SERPL-MCNC: 33.3 MG/DL (ref 0–1.2)
BILIRUB SERPL-MCNC: 33.4 MG/DL (ref 0–1.2)
BILIRUB SERPL-MCNC: 33.6 MG/DL (ref 0–1.2)
BILIRUB UR QL STRIP: ABNORMAL
BILIRUB UR QL STRIP: ABNORMAL
BLD GP AB SCN SERPL QL: NEGATIVE
BUN SERPL-MCNC: 14.1 MG/DL (ref 6–20)
BUN SERPL-MCNC: 14.9 MG/DL (ref 6–20)
BUN SERPL-MCNC: 16.4 MG/DL (ref 6–20)
BUN SERPL-MCNC: 17.3 MG/DL (ref 6–20)
BUN SERPL-MCNC: 20.6 MG/DL (ref 6–20)
BUN SERPL-MCNC: 24.4 MG/DL (ref 6–20)
BUN/CREAT SERPL: 19.9 (ref 7–25)
BUN/CREAT SERPL: 20.6 (ref 7–25)
BUN/CREAT SERPL: 21.6 (ref 7–25)
BUN/CREAT SERPL: 23 (ref 7–25)
BUN/CREAT SERPL: 24.7 (ref 7–25)
BUN/CREAT SERPL: 26.8 (ref 7–25)
BUN/CREAT SERPL: 27.1 (ref 7–25)
BUN/CREAT SERPL: ABNORMAL
BUPRENORPHINE SERPL-MCNC: NEGATIVE NG/ML
BURR CELLS BLD QL SMEAR: NORMAL
CALCIUM SPEC-SCNC: 8.5 MG/DL (ref 8.6–10.5)
CALCIUM SPEC-SCNC: 8.5 MG/DL (ref 8.6–10.5)
CALCIUM SPEC-SCNC: 8.6 MG/DL (ref 8.6–10.5)
CALCIUM SPEC-SCNC: 8.6 MG/DL (ref 8.6–10.5)
CALCIUM SPEC-SCNC: 8.7 MG/DL (ref 8.6–10.5)
CALCIUM SPEC-SCNC: 8.7 MG/DL (ref 8.6–10.5)
CALCIUM SPEC-SCNC: 8.9 MG/DL (ref 8.6–10.5)
CALCIUM SPEC-SCNC: 8.9 MG/DL (ref 8.6–10.5)
CANNABINOIDS SERPL QL: NEGATIVE
CHLORIDE SERPL-SCNC: 105 MMOL/L (ref 98–107)
CHLORIDE SERPL-SCNC: 107 MMOL/L (ref 98–107)
CHLORIDE SERPL-SCNC: 108 MMOL/L (ref 98–107)
CHLORIDE SERPL-SCNC: 108 MMOL/L (ref 98–107)
CHLORIDE SERPL-SCNC: 109 MMOL/L (ref 98–107)
CHLORIDE SERPL-SCNC: 110 MMOL/L (ref 98–107)
CLARITY UR: ABNORMAL
CLARITY UR: CLEAR
CO2 SERPL-SCNC: 14 MMOL/L (ref 22–29)
CO2 SERPL-SCNC: 15 MMOL/L (ref 22–29)
CO2 SERPL-SCNC: 16 MMOL/L (ref 22–29)
CO2 SERPL-SCNC: 16 MMOL/L (ref 22–29)
CO2 SERPL-SCNC: 17 MMOL/L (ref 22–29)
CO2 SERPL-SCNC: 18 MMOL/L (ref 22–29)
CO2 SERPL-SCNC: 19 MMOL/L (ref 22–29)
CO2 SERPL-SCNC: 19 MMOL/L (ref 22–29)
COCAINE UR QL: NEGATIVE
COLOR FLD: YELLOW
COLOR UR: ABNORMAL
COLOR UR: ABNORMAL
CREAT SERPL-MCNC: 0.55 MG/DL (ref 0.76–1.27)
CREAT SERPL-MCNC: 0.57 MG/DL (ref 0.76–1.27)
CREAT SERPL-MCNC: 0.71 MG/DL (ref 0.76–1.27)
CREAT SERPL-MCNC: 0.76 MG/DL (ref 0.76–1.27)
CREAT SERPL-MCNC: 0.77 MG/DL (ref 0.76–1.27)
CREAT SERPL-MCNC: 0.84 MG/DL (ref 0.76–1.27)
CREAT SERPL-MCNC: 1.06 MG/DL (ref 0.76–1.27)
CREAT SERPL-MCNC: <0.17 MG/DL (ref 0.76–1.27)
D-LACTATE SERPL-SCNC: 1.9 MMOL/L (ref 0.5–2)
D-LACTATE SERPL-SCNC: 2.2 MMOL/L (ref 0.5–2)
D-LACTATE SERPL-SCNC: 2.6 MMOL/L (ref 0.5–2)
D-LACTATE SERPL-SCNC: 4.7 MMOL/L (ref 0.5–2)
DEPRECATED RDW RBC AUTO: 81.1 FL (ref 37–54)
DEPRECATED RDW RBC AUTO: 81.7 FL (ref 37–54)
DEPRECATED RDW RBC AUTO: 81.7 FL (ref 37–54)
DEPRECATED RDW RBC AUTO: 82.5 FL (ref 37–54)
DEPRECATED RDW RBC AUTO: 83.7 FL (ref 37–54)
DEPRECATED RDW RBC AUTO: 83.7 FL (ref 37–54)
EGFRCR SERPLBLD CKD-EPI 2021: 113.1 ML/MIN/1.73
EGFRCR SERPLBLD CKD-EPI 2021: 116.1 ML/MIN/1.73
EGFRCR SERPLBLD CKD-EPI 2021: 116.5 ML/MIN/1.73
EGFRCR SERPLBLD CKD-EPI 2021: 118.9 ML/MIN/1.73
EGFRCR SERPLBLD CKD-EPI 2021: 127.1 ML/MIN/1.73
EGFRCR SERPLBLD CKD-EPI 2021: 128.5 ML/MIN/1.73
EGFRCR SERPLBLD CKD-EPI 2021: 91 ML/MIN/1.73
EOSINOPHIL # BLD AUTO: 0.14 10*3/MM3 (ref 0–0.4)
EOSINOPHIL # BLD AUTO: 0.18 10*3/MM3 (ref 0–0.4)
EOSINOPHIL # BLD AUTO: 0.18 10*3/MM3 (ref 0–0.4)
EOSINOPHIL # BLD AUTO: 0.2 10*3/MM3 (ref 0–0.4)
EOSINOPHIL NFR BLD AUTO: 1.1 % (ref 0.3–6.2)
EOSINOPHIL NFR BLD AUTO: 1.8 % (ref 0.3–6.2)
EOSINOPHIL NFR BLD AUTO: 3 % (ref 0.3–6.2)
EOSINOPHIL NFR BLD AUTO: 3 % (ref 0.3–6.2)
ERYTHROCYTE [DISTWIDTH] IN BLOOD BY AUTOMATED COUNT: 21.5 % (ref 12.3–15.4)
ERYTHROCYTE [DISTWIDTH] IN BLOOD BY AUTOMATED COUNT: 21.5 % (ref 12.3–15.4)
ERYTHROCYTE [DISTWIDTH] IN BLOOD BY AUTOMATED COUNT: 21.6 % (ref 12.3–15.4)
ERYTHROCYTE [DISTWIDTH] IN BLOOD BY AUTOMATED COUNT: 21.6 % (ref 12.3–15.4)
ERYTHROCYTE [DISTWIDTH] IN BLOOD BY AUTOMATED COUNT: 21.8 % (ref 12.3–15.4)
ERYTHROCYTE [DISTWIDTH] IN BLOOD BY AUTOMATED COUNT: 21.8 % (ref 12.3–15.4)
ETHANOL UR QL: <0.01 %
FERRITIN SERPL-MCNC: 271.1 NG/ML (ref 30–400)
GLOBULIN UR ELPH-MCNC: 1.9 GM/DL
GLOBULIN UR ELPH-MCNC: 2 GM/DL
GLOBULIN UR ELPH-MCNC: 2 GM/DL
GLOBULIN UR ELPH-MCNC: 2.2 GM/DL
GLOBULIN UR ELPH-MCNC: 2.3 GM/DL
GLOBULIN UR ELPH-MCNC: 2.4 GM/DL
GLOBULIN UR ELPH-MCNC: 2.5 GM/DL
GLUCOSE BLDC GLUCOMTR-MCNC: 155 MG/DL (ref 70–130)
GLUCOSE SERPL-MCNC: 106 MG/DL (ref 65–99)
GLUCOSE SERPL-MCNC: 113 MG/DL (ref 65–99)
GLUCOSE SERPL-MCNC: 113 MG/DL (ref 65–99)
GLUCOSE SERPL-MCNC: 119 MG/DL (ref 65–99)
GLUCOSE SERPL-MCNC: 151 MG/DL (ref 65–99)
GLUCOSE SERPL-MCNC: 153 MG/DL (ref 65–99)
GLUCOSE SERPL-MCNC: 93 MG/DL (ref 65–99)
GLUCOSE SERPL-MCNC: 98 MG/DL (ref 65–99)
GLUCOSE UR STRIP-MCNC: NEGATIVE MG/DL
GLUCOSE UR STRIP-MCNC: NEGATIVE MG/DL
GRAM STN SPEC: NORMAL
GRAN CASTS URNS QL MICRO: ABNORMAL /LPF
HCT VFR BLD AUTO: 24.4 % (ref 37.5–51)
HCT VFR BLD AUTO: 25.8 % (ref 37.5–51)
HCT VFR BLD AUTO: 26.1 % (ref 37.5–51)
HCT VFR BLD AUTO: 26.9 % (ref 37.5–51)
HCT VFR BLD AUTO: 27.2 % (ref 37.5–51)
HCT VFR BLD AUTO: 29.2 % (ref 37.5–51)
HGB BLD-MCNC: 7.9 G/DL (ref 13–17.7)
HGB BLD-MCNC: 8.4 G/DL (ref 13–17.7)
HGB BLD-MCNC: 8.5 G/DL (ref 13–17.7)
HGB BLD-MCNC: 9 G/DL (ref 13–17.7)
HGB BLD-MCNC: 9.1 G/DL (ref 13–17.7)
HGB BLD-MCNC: 9.6 G/DL (ref 13–17.7)
HGB UR QL STRIP.AUTO: NEGATIVE
HGB UR QL STRIP.AUTO: NEGATIVE
HOLD SPECIMEN: NORMAL
HYALINE CASTS UR QL AUTO: ABNORMAL /LPF
HYALINE CASTS UR QL AUTO: ABNORMAL /LPF
IMM GRANULOCYTES # BLD AUTO: 0.08 10*3/MM3 (ref 0–0.05)
IMM GRANULOCYTES # BLD AUTO: 0.09 10*3/MM3 (ref 0–0.05)
IMM GRANULOCYTES # BLD AUTO: 0.13 10*3/MM3 (ref 0–0.05)
IMM GRANULOCYTES # BLD AUTO: 0.14 10*3/MM3 (ref 0–0.05)
IMM GRANULOCYTES NFR BLD AUTO: 0.9 % (ref 0–0.5)
IMM GRANULOCYTES NFR BLD AUTO: 1.1 % (ref 0–0.5)
IMM GRANULOCYTES NFR BLD AUTO: 1.3 % (ref 0–0.5)
IMM GRANULOCYTES NFR BLD AUTO: 1.9 % (ref 0–0.5)
INR PPP: 2.6 (ref 0.91–1.09)
INR PPP: 2.79 (ref 0.91–1.09)
INR PPP: 2.81 (ref 0.91–1.09)
INR PPP: 2.97 (ref 0.91–1.09)
INR PPP: 3.31 (ref 0.91–1.09)
INR PPP: 3.35 (ref 0.91–1.09)
INR PPP: 3.5 (ref 0.91–1.09)
IRON 24H UR-MRATE: 130 MCG/DL (ref 59–158)
IRON SATN MFR SERPL: 80 % (ref 20–50)
KETONES UR QL STRIP: NEGATIVE
KETONES UR QL STRIP: NEGATIVE
LEUKOCYTE ESTERASE UR QL STRIP.AUTO: ABNORMAL
LEUKOCYTE ESTERASE UR QL STRIP.AUTO: ABNORMAL
LIPASE SERPL-CCNC: 82 U/L (ref 13–60)
LYMPHOCYTES # BLD AUTO: 1.08 10*3/MM3 (ref 0.7–3.1)
LYMPHOCYTES # BLD AUTO: 1.23 10*3/MM3 (ref 0.7–3.1)
LYMPHOCYTES # BLD AUTO: 1.27 10*3/MM3 (ref 0.7–3.1)
LYMPHOCYTES # BLD AUTO: 1.49 10*3/MM3 (ref 0.7–3.1)
LYMPHOCYTES NFR BLD AUTO: 11.1 % (ref 19.6–45.3)
LYMPHOCYTES NFR BLD AUTO: 21.2 % (ref 19.6–45.3)
LYMPHOCYTES NFR BLD AUTO: 22.2 % (ref 19.6–45.3)
LYMPHOCYTES NFR BLD AUTO: 9.7 % (ref 19.6–45.3)
LYMPHOCYTES NFR FLD MANUAL: 11 %
MACROCYTES BLD QL SMEAR: NORMAL
MACROPHAGE FLUID %: 4 %
MAGNESIUM SERPL-MCNC: 1.9 MG/DL (ref 1.6–2.6)
MAGNESIUM SERPL-MCNC: 1.9 MG/DL (ref 1.6–2.6)
MAGNESIUM SERPL-MCNC: 2.2 MG/DL (ref 1.6–2.6)
MCH RBC QN AUTO: 34 PG (ref 26.6–33)
MCH RBC QN AUTO: 34.2 PG (ref 26.6–33)
MCH RBC QN AUTO: 34.4 PG (ref 26.6–33)
MCH RBC QN AUTO: 34.5 PG (ref 26.6–33)
MCH RBC QN AUTO: 34.6 PG (ref 26.6–33)
MCH RBC QN AUTO: 34.7 PG (ref 26.6–33)
MCHC RBC AUTO-ENTMCNC: 32.4 G/DL (ref 31.5–35.7)
MCHC RBC AUTO-ENTMCNC: 32.6 G/DL (ref 31.5–35.7)
MCHC RBC AUTO-ENTMCNC: 32.6 G/DL (ref 31.5–35.7)
MCHC RBC AUTO-ENTMCNC: 32.9 G/DL (ref 31.5–35.7)
MCHC RBC AUTO-ENTMCNC: 33.5 G/DL (ref 31.5–35.7)
MCHC RBC AUTO-ENTMCNC: 33.5 G/DL (ref 31.5–35.7)
MCV RBC AUTO: 103 FL (ref 79–97)
MCV RBC AUTO: 103.5 FL (ref 79–97)
MCV RBC AUTO: 104.4 FL (ref 79–97)
MCV RBC AUTO: 104.7 FL (ref 79–97)
MCV RBC AUTO: 105.6 FL (ref 79–97)
MCV RBC AUTO: 106.6 FL (ref 79–97)
MESOTHL CELL NFR FLD MANUAL: 1 %
METHADONE UR QL SCN: NEGATIVE
METHOD: NORMAL
MONOCYTES # BLD AUTO: 0.63 10*3/MM3 (ref 0.1–0.9)
MONOCYTES # BLD AUTO: 0.67 10*3/MM3 (ref 0.1–0.9)
MONOCYTES # BLD AUTO: 0.75 10*3/MM3 (ref 0.1–0.9)
MONOCYTES # BLD AUTO: 1.03 10*3/MM3 (ref 0.1–0.9)
MONOCYTES NFR BLD AUTO: 11.2 % (ref 5–12)
MONOCYTES NFR BLD AUTO: 7.7 % (ref 5–12)
MONOCYTES NFR BLD AUTO: 8.1 % (ref 5–12)
MONOCYTES NFR BLD AUTO: 9.4 % (ref 5–12)
MONOCYTES NFR FLD: 73 %
MUCOUS THREADS URNS QL MICRO: ABNORMAL /HPF
NEUTROPHILS NFR BLD AUTO: 10.1 10*3/MM3 (ref 1.7–7)
NEUTROPHILS NFR BLD AUTO: 3.76 10*3/MM3 (ref 1.7–7)
NEUTROPHILS NFR BLD AUTO: 4.23 10*3/MM3 (ref 1.7–7)
NEUTROPHILS NFR BLD AUTO: 62.8 % (ref 42.7–76)
NEUTROPHILS NFR BLD AUTO: 63.1 % (ref 42.7–76)
NEUTROPHILS NFR BLD AUTO: 7.6 10*3/MM3 (ref 1.7–7)
NEUTROPHILS NFR BLD AUTO: 78 % (ref 42.7–76)
NEUTROPHILS NFR BLD AUTO: 79.4 % (ref 42.7–76)
NEUTROPHILS NFR FLD MANUAL: 11 %
NITRITE UR QL STRIP: POSITIVE
NITRITE UR QL STRIP: POSITIVE
NRBC BLD AUTO-RTO: 0 /100 WBC (ref 0–0.2)
NRBC BLD AUTO-RTO: 0 /100 WBC (ref 0–0.2)
NUC CELL # FLD: 57 /MM3
OPIATES UR QL: NEGATIVE
OTHER OBSERVATIONS IN URINE MICRO: ABNORMAL /HPF
OXYCODONE UR QL SCN: NEGATIVE
PCP UR QL SCN: NEGATIVE
PH UR STRIP.AUTO: 5.5 [PH] (ref 5–8)
PH UR STRIP.AUTO: 6.5 [PH] (ref 5–8)
PHOSPHATE SERPL-MCNC: 3.1 MG/DL (ref 2.5–4.5)
PHOSPHATE SERPL-MCNC: 3.6 MG/DL (ref 2.5–4.5)
PLATELET # BLD AUTO: 111 10*3/MM3 (ref 140–450)
PLATELET # BLD AUTO: 117 10*3/MM3 (ref 140–450)
PLATELET # BLD AUTO: 128 10*3/MM3 (ref 140–450)
PLATELET # BLD AUTO: 71 10*3/MM3 (ref 140–450)
PLATELET # BLD AUTO: 78 10*3/MM3 (ref 140–450)
PLATELET # BLD AUTO: 95 10*3/MM3 (ref 140–450)
PMV BLD AUTO: 11.2 FL (ref 6–12)
PMV BLD AUTO: 11.2 FL (ref 6–12)
PMV BLD AUTO: 11.3 FL (ref 6–12)
PMV BLD AUTO: 11.4 FL (ref 6–12)
PMV BLD AUTO: 11.8 FL (ref 6–12)
PMV BLD AUTO: 12.5 FL (ref 6–12)
POIKILOCYTOSIS BLD QL SMEAR: NORMAL
POTASSIUM SERPL-SCNC: 2.9 MMOL/L (ref 3.5–5.2)
POTASSIUM SERPL-SCNC: 2.9 MMOL/L (ref 3.5–5.2)
POTASSIUM SERPL-SCNC: 3 MMOL/L (ref 3.5–5.2)
POTASSIUM SERPL-SCNC: 3.4 MMOL/L (ref 3.5–5.2)
POTASSIUM SERPL-SCNC: 3.4 MMOL/L (ref 3.5–5.2)
POTASSIUM SERPL-SCNC: 3.5 MMOL/L (ref 3.5–5.2)
POTASSIUM SERPL-SCNC: 3.5 MMOL/L (ref 3.5–5.2)
POTASSIUM SERPL-SCNC: 3.7 MMOL/L (ref 3.5–5.2)
POTASSIUM SERPL-SCNC: 3.8 MMOL/L (ref 3.5–5.2)
POTASSIUM SERPL-SCNC: 3.8 MMOL/L (ref 3.5–5.2)
POTASSIUM SERPL-SCNC: 3.9 MMOL/L (ref 3.5–5.2)
PROT SERPL-MCNC: 4.3 G/DL (ref 6–8.5)
PROT SERPL-MCNC: 4.4 G/DL (ref 6–8.5)
PROT SERPL-MCNC: 4.5 G/DL (ref 6–8.5)
PROT SERPL-MCNC: 4.6 G/DL (ref 6–8.5)
PROT SERPL-MCNC: 4.7 G/DL (ref 6–8.5)
PROT SERPL-MCNC: 4.7 G/DL (ref 6–8.5)
PROT SERPL-MCNC: 5.2 G/DL (ref 6–8.5)
PROT UR QL STRIP: ABNORMAL
PROT UR QL STRIP: ABNORMAL
PROTHROMBIN TIME: 29.4 SECONDS (ref 11.8–14.8)
PROTHROMBIN TIME: 31.2 SECONDS (ref 11.8–14.8)
PROTHROMBIN TIME: 31.3 SECONDS (ref 11.8–14.8)
PROTHROMBIN TIME: 32.7 SECONDS (ref 11.8–14.8)
PROTHROMBIN TIME: 35.7 SECONDS (ref 11.8–14.8)
PROTHROMBIN TIME: 36.1 SECONDS (ref 11.8–14.8)
PROTHROMBIN TIME: 37.4 SECONDS (ref 11.8–14.8)
QT INTERVAL: 450 MS
QT INTERVAL: 470 MS
QTC INTERVAL: 499 MS
QTC INTERVAL: 506 MS
RBC # BLD AUTO: 2.31 10*6/MM3 (ref 4.14–5.8)
RBC # BLD AUTO: 2.42 10*6/MM3 (ref 4.14–5.8)
RBC # BLD AUTO: 2.5 10*6/MM3 (ref 4.14–5.8)
RBC # BLD AUTO: 2.6 10*6/MM3 (ref 4.14–5.8)
RBC # BLD AUTO: 2.64 10*6/MM3 (ref 4.14–5.8)
RBC # BLD AUTO: 2.79 10*6/MM3 (ref 4.14–5.8)
RBC # FLD AUTO: 1000 /MM3
RBC # UR STRIP: ABNORMAL /HPF
RBC # UR STRIP: ABNORMAL /HPF
REF LAB TEST METHOD: ABNORMAL
REF LAB TEST METHOD: ABNORMAL
RH BLD: NEGATIVE
SMALL PLATELETS BLD QL SMEAR: NORMAL
SODIUM SERPL-SCNC: 136 MMOL/L (ref 136–145)
SODIUM SERPL-SCNC: 137 MMOL/L (ref 136–145)
SODIUM SERPL-SCNC: 138 MMOL/L (ref 136–145)
SODIUM SERPL-SCNC: 139 MMOL/L (ref 136–145)
SP GR UR STRIP: 1.02 (ref 1–1.03)
SP GR UR STRIP: 1.02 (ref 1–1.03)
SQUAMOUS #/AREA URNS HPF: ABNORMAL /HPF
SQUAMOUS #/AREA URNS HPF: ABNORMAL /HPF
T&S EXPIRATION DATE: NORMAL
TIBC SERPL-MCNC: 162 MCG/DL (ref 298–536)
TRANSFERRIN SERPL-MCNC: 109 MG/DL (ref 200–360)
TRICYCLICS UR QL SCN: NEGATIVE
UROBILINOGEN UR QL STRIP: ABNORMAL
UROBILINOGEN UR QL STRIP: ABNORMAL
WBC # UR STRIP: ABNORMAL /HPF
WBC # UR STRIP: ABNORMAL /HPF
WBC MORPH BLD: NORMAL
WBC NRBC COR # BLD AUTO: 10.92 10*3/MM3 (ref 3.4–10.8)
WBC NRBC COR # BLD AUTO: 11.89 10*3/MM3 (ref 3.4–10.8)
WBC NRBC COR # BLD AUTO: 12.72 10*3/MM3 (ref 3.4–10.8)
WBC NRBC COR # BLD AUTO: 5.99 10*3/MM3 (ref 3.4–10.8)
WBC NRBC COR # BLD AUTO: 6.71 10*3/MM3 (ref 3.4–10.8)
WBC NRBC COR # BLD AUTO: 9.75 10*3/MM3 (ref 3.4–10.8)
WHOLE BLOOD HOLD COAG: NORMAL
WHOLE BLOOD HOLD SPECIMEN: NORMAL

## 2025-01-01 PROCEDURE — 83735 ASSAY OF MAGNESIUM: CPT | Performed by: EMERGENCY MEDICINE

## 2025-01-01 PROCEDURE — 82140 ASSAY OF AMMONIA: CPT | Performed by: INTERNAL MEDICINE

## 2025-01-01 PROCEDURE — 82948 REAGENT STRIP/BLOOD GLUCOSE: CPT

## 2025-01-01 PROCEDURE — 25010000002 AMPICILLIN PER 500 MG

## 2025-01-01 PROCEDURE — 85610 PROTHROMBIN TIME: CPT | Performed by: EMERGENCY MEDICINE

## 2025-01-01 PROCEDURE — 36415 COLL VENOUS BLD VENIPUNCTURE: CPT | Performed by: EMERGENCY MEDICINE

## 2025-01-01 PROCEDURE — 99232 SBSQ HOSP IP/OBS MODERATE 35: CPT | Performed by: NURSE PRACTITIONER

## 2025-01-01 PROCEDURE — 93010 ELECTROCARDIOGRAM REPORT: CPT | Performed by: INTERNAL MEDICINE

## 2025-01-01 PROCEDURE — 25010000002 POTASSIUM CHLORIDE 10 MEQ/100ML SOLUTION

## 2025-01-01 PROCEDURE — 83540 ASSAY OF IRON: CPT

## 2025-01-01 PROCEDURE — 99233 SBSQ HOSP IP/OBS HIGH 50: CPT | Performed by: CLINICAL NURSE SPECIALIST

## 2025-01-01 PROCEDURE — 99222 1ST HOSP IP/OBS MODERATE 55: CPT | Performed by: INTERNAL MEDICINE

## 2025-01-01 PROCEDURE — 97166 OT EVAL MOD COMPLEX 45 MIN: CPT

## 2025-01-01 PROCEDURE — 83605 ASSAY OF LACTIC ACID: CPT | Performed by: EMERGENCY MEDICINE

## 2025-01-01 PROCEDURE — 86900 BLOOD TYPING SEROLOGIC ABO: CPT | Performed by: EMERGENCY MEDICINE

## 2025-01-01 PROCEDURE — 92526 ORAL FUNCTION THERAPY: CPT

## 2025-01-01 PROCEDURE — 80053 COMPREHEN METABOLIC PANEL: CPT | Performed by: EMERGENCY MEDICINE

## 2025-01-01 PROCEDURE — 36415 COLL VENOUS BLD VENIPUNCTURE: CPT

## 2025-01-01 PROCEDURE — 99232 SBSQ HOSP IP/OBS MODERATE 35: CPT | Performed by: INTERNAL MEDICINE

## 2025-01-01 PROCEDURE — 84132 ASSAY OF SERUM POTASSIUM: CPT | Performed by: INTERNAL MEDICINE

## 2025-01-01 PROCEDURE — 85027 COMPLETE CBC AUTOMATED: CPT | Performed by: INTERNAL MEDICINE

## 2025-01-01 PROCEDURE — 86850 RBC ANTIBODY SCREEN: CPT | Performed by: EMERGENCY MEDICINE

## 2025-01-01 PROCEDURE — 85610 PROTHROMBIN TIME: CPT | Performed by: INTERNAL MEDICINE

## 2025-01-01 PROCEDURE — 87070 CULTURE OTHR SPECIMN AEROBIC: CPT | Performed by: EMERGENCY MEDICINE

## 2025-01-01 PROCEDURE — 83735 ASSAY OF MAGNESIUM: CPT | Performed by: INTERNAL MEDICINE

## 2025-01-01 PROCEDURE — 84100 ASSAY OF PHOSPHORUS: CPT

## 2025-01-01 PROCEDURE — 97163 PT EVAL HIGH COMPLEX 45 MIN: CPT | Performed by: PHYSICAL THERAPIST

## 2025-01-01 PROCEDURE — 25010000002 PHYTONADIONE 10 MG/ML SOLUTION: Performed by: INTERNAL MEDICINE

## 2025-01-01 PROCEDURE — 82728 ASSAY OF FERRITIN: CPT

## 2025-01-01 PROCEDURE — 97110 THERAPEUTIC EXERCISES: CPT

## 2025-01-01 PROCEDURE — 86901 BLOOD TYPING SEROLOGIC RH(D): CPT | Performed by: EMERGENCY MEDICINE

## 2025-01-01 PROCEDURE — 99222 1ST HOSP IP/OBS MODERATE 55: CPT | Performed by: CLINICAL NURSE SPECIALIST

## 2025-01-01 PROCEDURE — 25810000003 SODIUM CHLORIDE 0.9 % SOLUTION 250 ML FLEX CONT

## 2025-01-01 PROCEDURE — 81001 URINALYSIS AUTO W/SCOPE: CPT | Performed by: INTERNAL MEDICINE

## 2025-01-01 PROCEDURE — 85025 COMPLETE CBC W/AUTO DIFF WBC: CPT | Performed by: INTERNAL MEDICINE

## 2025-01-01 PROCEDURE — 80053 COMPREHEN METABOLIC PANEL: CPT | Performed by: INTERNAL MEDICINE

## 2025-01-01 PROCEDURE — 87040 BLOOD CULTURE FOR BACTERIA: CPT | Performed by: EMERGENCY MEDICINE

## 2025-01-01 PROCEDURE — 99232 SBSQ HOSP IP/OBS MODERATE 35: CPT | Performed by: CLINICAL NURSE SPECIALIST

## 2025-01-01 PROCEDURE — 93005 ELECTROCARDIOGRAM TRACING: CPT | Performed by: EMERGENCY MEDICINE

## 2025-01-01 PROCEDURE — 85007 BL SMEAR W/DIFF WBC COUNT: CPT | Performed by: INTERNAL MEDICINE

## 2025-01-01 PROCEDURE — 87015 SPECIMEN INFECT AGNT CONCNTJ: CPT | Performed by: EMERGENCY MEDICINE

## 2025-01-01 PROCEDURE — 71045 X-RAY EXAM CHEST 1 VIEW: CPT

## 2025-01-01 PROCEDURE — 81001 URINALYSIS AUTO W/SCOPE: CPT | Performed by: EMERGENCY MEDICINE

## 2025-01-01 PROCEDURE — 97535 SELF CARE MNGMENT TRAINING: CPT

## 2025-01-01 PROCEDURE — 82077 ASSAY SPEC XCP UR&BREATH IA: CPT | Performed by: EMERGENCY MEDICINE

## 2025-01-01 PROCEDURE — 82150 ASSAY OF AMYLASE: CPT

## 2025-01-01 PROCEDURE — 0W9G3ZZ DRAINAGE OF PERITONEAL CAVITY, PERCUTANEOUS APPROACH: ICD-10-PCS | Performed by: INTERNAL MEDICINE

## 2025-01-01 PROCEDURE — 82248 BILIRUBIN DIRECT: CPT

## 2025-01-01 PROCEDURE — 70450 CT HEAD/BRAIN W/O DYE: CPT

## 2025-01-01 PROCEDURE — 84466 ASSAY OF TRANSFERRIN: CPT

## 2025-01-01 PROCEDURE — 87205 SMEAR GRAM STAIN: CPT | Performed by: EMERGENCY MEDICINE

## 2025-01-01 PROCEDURE — 76705 ECHO EXAM OF ABDOMEN: CPT

## 2025-01-01 PROCEDURE — 85025 COMPLETE CBC W/AUTO DIFF WBC: CPT | Performed by: EMERGENCY MEDICINE

## 2025-01-01 PROCEDURE — 92610 EVALUATE SWALLOWING FUNCTION: CPT | Performed by: SPEECH-LANGUAGE PATHOLOGIST

## 2025-01-01 PROCEDURE — 82140 ASSAY OF AMMONIA: CPT | Performed by: EMERGENCY MEDICINE

## 2025-01-01 PROCEDURE — 25010000002 DIAZEPAM PER 5 MG: Performed by: CLINICAL NURSE SPECIALIST

## 2025-01-01 PROCEDURE — 80306 DRUG TEST PRSMV INSTRMNT: CPT | Performed by: EMERGENCY MEDICINE

## 2025-01-01 PROCEDURE — 83735 ASSAY OF MAGNESIUM: CPT

## 2025-01-01 PROCEDURE — 82042 OTHER SOURCE ALBUMIN QUAN EA: CPT | Performed by: EMERGENCY MEDICINE

## 2025-01-01 PROCEDURE — 25010000002 VITAMIN K1 PER 1 MG: Performed by: INTERNAL MEDICINE

## 2025-01-01 PROCEDURE — 80053 COMPREHEN METABOLIC PANEL: CPT

## 2025-01-01 PROCEDURE — 89051 BODY FLUID CELL COUNT: CPT | Performed by: EMERGENCY MEDICINE

## 2025-01-01 PROCEDURE — 99285 EMERGENCY DEPT VISIT HI MDM: CPT | Performed by: EMERGENCY MEDICINE

## 2025-01-01 PROCEDURE — 93005 ELECTROCARDIOGRAM TRACING: CPT

## 2025-01-01 PROCEDURE — 83690 ASSAY OF LIPASE: CPT

## 2025-01-01 PROCEDURE — 25010000002 CEFTRIAXONE PER 250 MG: Performed by: EMERGENCY MEDICINE

## 2025-01-01 PROCEDURE — 25010000002 POTASSIUM CHLORIDE 10 MEQ/100ML SOLUTION: Performed by: EMERGENCY MEDICINE

## 2025-01-01 RX ORDER — PHYTONADIONE 2 MG/ML
10 INJECTION, EMULSION INTRAMUSCULAR; INTRAVENOUS; SUBCUTANEOUS ONCE
Status: DISCONTINUED | OUTPATIENT
Start: 2025-01-01 | End: 2025-01-01

## 2025-01-01 RX ORDER — FUROSEMIDE 20 MG/1
20 TABLET ORAL DAILY
Status: DISCONTINUED | OUTPATIENT
Start: 2025-01-01 | End: 2025-01-01

## 2025-01-01 RX ORDER — PANTOPRAZOLE SODIUM 40 MG/1
40 TABLET, DELAYED RELEASE ORAL
COMMUNITY

## 2025-01-01 RX ORDER — ALBUMIN (HUMAN) 12.5 G/50ML
37.5 SOLUTION INTRAVENOUS ONCE
Status: DISCONTINUED | OUTPATIENT
Start: 2025-01-01 | End: 2025-01-01

## 2025-01-01 RX ORDER — PHYTONADIONE 2 MG/ML
10 INJECTION, EMULSION INTRAMUSCULAR; INTRAVENOUS; SUBCUTANEOUS ONCE
Status: COMPLETED | OUTPATIENT
Start: 2025-01-01 | End: 2025-01-01

## 2025-01-01 RX ORDER — SPIRONOLACTONE 25 MG/1
25 TABLET ORAL DAILY
Status: DISCONTINUED | OUTPATIENT
Start: 2025-01-01 | End: 2025-01-01

## 2025-01-01 RX ORDER — PENTOXIFYLLINE 400 MG/1
400 TABLET, EXTENDED RELEASE ORAL
Status: DISCONTINUED | OUTPATIENT
Start: 2025-01-01 | End: 2025-01-01

## 2025-01-01 RX ORDER — DIPHENHYDRAMINE HYDROCHLORIDE AND LIDOCAINE HYDROCHLORIDE AND ALUMINUM HYDROXIDE AND MAGNESIUM HYDRO
5 KIT EVERY 4 HOURS PRN
Status: DISCONTINUED | OUTPATIENT
Start: 2025-01-01 | End: 2025-01-01 | Stop reason: HOSPADM

## 2025-01-01 RX ORDER — LORAZEPAM 2 MG/ML
2 CONCENTRATE ORAL
Status: DISCONTINUED | OUTPATIENT
Start: 2025-01-01 | End: 2025-01-01 | Stop reason: HOSPADM

## 2025-01-01 RX ORDER — PROCHLORPERAZINE 25 MG
25 SUPPOSITORY, RECTAL RECTAL EVERY 12 HOURS PRN
Status: DISCONTINUED | OUTPATIENT
Start: 2025-01-01 | End: 2025-01-01 | Stop reason: HOSPADM

## 2025-01-01 RX ORDER — LACTULOSE 10 G/15ML
300 SOLUTION ORAL ONCE
Status: DISCONTINUED | OUTPATIENT
Start: 2025-01-01 | End: 2025-01-01

## 2025-01-01 RX ORDER — DIPHENHYDRAMINE HYDROCHLORIDE 50 MG/ML
25 INJECTION, SOLUTION INTRAMUSCULAR; INTRAVENOUS EVERY 6 HOURS PRN
Status: DISCONTINUED | OUTPATIENT
Start: 2025-01-01 | End: 2025-01-01 | Stop reason: HOSPADM

## 2025-01-01 RX ORDER — MIDODRINE HYDROCHLORIDE 5 MG/1
5 TABLET ORAL
Status: DISCONTINUED | OUTPATIENT
Start: 2025-01-01 | End: 2025-01-01

## 2025-01-01 RX ORDER — DIAZEPAM 10 MG/2ML
10 INJECTION, SOLUTION INTRAMUSCULAR; INTRAVENOUS
Status: DISCONTINUED | OUTPATIENT
Start: 2025-01-01 | End: 2025-01-01 | Stop reason: HOSPADM

## 2025-01-01 RX ORDER — BISACODYL 10 MG
10 SUPPOSITORY, RECTAL RECTAL DAILY PRN
Status: DISCONTINUED | OUTPATIENT
Start: 2025-01-01 | End: 2025-01-01 | Stop reason: HOSPADM

## 2025-01-01 RX ORDER — ONDANSETRON 2 MG/ML
4 INJECTION INTRAMUSCULAR; INTRAVENOUS EVERY 6 HOURS PRN
Status: DISCONTINUED | OUTPATIENT
Start: 2025-01-01 | End: 2025-01-01 | Stop reason: HOSPADM

## 2025-01-01 RX ORDER — LORAZEPAM 2 MG/ML
1 CONCENTRATE ORAL
Status: DISCONTINUED | OUTPATIENT
Start: 2025-01-01 | End: 2025-01-01 | Stop reason: HOSPADM

## 2025-01-01 RX ORDER — ONDANSETRON 4 MG/1
4 TABLET, ORALLY DISINTEGRATING ORAL EVERY 6 HOURS PRN
Status: DISCONTINUED | OUTPATIENT
Start: 2025-01-01 | End: 2025-01-01 | Stop reason: HOSPADM

## 2025-01-01 RX ORDER — IPRATROPIUM BROMIDE AND ALBUTEROL SULFATE 2.5; .5 MG/3ML; MG/3ML
3 SOLUTION RESPIRATORY (INHALATION) EVERY 4 HOURS PRN
Status: DISCONTINUED | OUTPATIENT
Start: 2025-01-01 | End: 2025-01-01 | Stop reason: HOSPADM

## 2025-01-01 RX ORDER — MIDODRINE HYDROCHLORIDE 5 MG/1
10 TABLET ORAL
Status: DISCONTINUED | OUTPATIENT
Start: 2025-01-01 | End: 2025-01-01

## 2025-01-01 RX ORDER — HALOPERIDOL 5 MG/ML
2 INJECTION INTRAMUSCULAR EVERY 4 HOURS PRN
Status: DISCONTINUED | OUTPATIENT
Start: 2025-01-01 | End: 2025-01-01 | Stop reason: HOSPADM

## 2025-01-01 RX ORDER — ALBUMIN (HUMAN) 12.5 G/50ML
100 SOLUTION INTRAVENOUS ONCE
Status: DISCONTINUED | OUTPATIENT
Start: 2025-01-01 | End: 2025-01-01

## 2025-01-01 RX ORDER — SCOPOLAMINE 1 MG/3D
1 PATCH, EXTENDED RELEASE TRANSDERMAL
Status: DISCONTINUED | OUTPATIENT
Start: 2025-01-01 | End: 2025-01-01

## 2025-01-01 RX ORDER — LACTULOSE 10 G/15ML
20 SOLUTION ORAL 3 TIMES DAILY
Status: DISCONTINUED | OUTPATIENT
Start: 2025-01-01 | End: 2025-01-01

## 2025-01-01 RX ORDER — LEVOTHYROXINE SODIUM 75 UG/1
75 TABLET ORAL
Status: DISCONTINUED | OUTPATIENT
Start: 2025-01-01 | End: 2025-01-01

## 2025-01-01 RX ORDER — POTASSIUM CHLORIDE 7.45 MG/ML
10 INJECTION INTRAVENOUS
Status: DISCONTINUED | OUTPATIENT
Start: 2025-01-01 | End: 2025-01-01

## 2025-01-01 RX ORDER — ALBUMIN (HUMAN) 12.5 G/50ML
62.5 SOLUTION INTRAVENOUS ONCE
Status: DISCONTINUED | OUTPATIENT
Start: 2025-01-01 | End: 2025-01-01

## 2025-01-01 RX ORDER — PANTOPRAZOLE SODIUM 40 MG/1
40 TABLET, DELAYED RELEASE ORAL
Status: DISCONTINUED | OUTPATIENT
Start: 2025-01-01 | End: 2025-01-01

## 2025-01-01 RX ORDER — PROCHLORPERAZINE MALEATE 10 MG
5 TABLET ORAL EVERY 6 HOURS PRN
Status: DISCONTINUED | OUTPATIENT
Start: 2025-01-01 | End: 2025-01-01 | Stop reason: HOSPADM

## 2025-01-01 RX ORDER — ALBUMIN (HUMAN) 12.5 G/50ML
112.5 SOLUTION INTRAVENOUS ONCE
Status: DISCONTINUED | OUTPATIENT
Start: 2025-01-01 | End: 2025-01-01

## 2025-01-01 RX ORDER — LORAZEPAM 1 MG/1
2 TABLET ORAL
Status: DISCONTINUED | OUTPATIENT
Start: 2025-01-01 | End: 2025-01-01 | Stop reason: HOSPADM

## 2025-01-01 RX ORDER — DIAZEPAM 10 MG/2ML
5 INJECTION, SOLUTION INTRAMUSCULAR; INTRAVENOUS
Status: DISCONTINUED | OUTPATIENT
Start: 2025-01-01 | End: 2025-01-01 | Stop reason: HOSPADM

## 2025-01-01 RX ORDER — POTASSIUM CHLORIDE 29.8 MG/ML
20 INJECTION INTRAVENOUS
Status: DISCONTINUED | OUTPATIENT
Start: 2025-01-01 | End: 2025-01-01 | Stop reason: ALTCHOICE

## 2025-01-01 RX ORDER — ALBUMIN (HUMAN) 12.5 G/50ML
50 SOLUTION INTRAVENOUS ONCE
Status: DISCONTINUED | OUTPATIENT
Start: 2025-01-01 | End: 2025-01-01

## 2025-01-01 RX ORDER — SODIUM CHLORIDE 0.9 % (FLUSH) 0.9 %
10 SYRINGE (ML) INJECTION EVERY 12 HOURS SCHEDULED
Status: DISCONTINUED | OUTPATIENT
Start: 2025-01-01 | End: 2025-01-01 | Stop reason: HOSPADM

## 2025-01-01 RX ORDER — ALBUMIN (HUMAN) 12.5 G/50ML
75 SOLUTION INTRAVENOUS ONCE
Status: DISCONTINUED | OUTPATIENT
Start: 2025-01-01 | End: 2025-01-01

## 2025-01-01 RX ORDER — POTASSIUM CHLORIDE 1500 MG/1
40 TABLET, EXTENDED RELEASE ORAL EVERY 4 HOURS
Status: COMPLETED | OUTPATIENT
Start: 2025-01-01 | End: 2025-01-01

## 2025-01-01 RX ORDER — ATROPINE SULFATE 10 MG/ML
2 SOLUTION/ DROPS OPHTHALMIC 2 TIMES DAILY PRN
Status: DISCONTINUED | OUTPATIENT
Start: 2025-01-01 | End: 2025-01-01 | Stop reason: HOSPADM

## 2025-01-01 RX ORDER — DIPHENHYDRAMINE HCL 25 MG
25 CAPSULE ORAL EVERY 6 HOURS PRN
Status: DISCONTINUED | OUTPATIENT
Start: 2025-01-01 | End: 2025-01-01 | Stop reason: HOSPADM

## 2025-01-01 RX ORDER — MORPHINE SULFATE 20 MG/ML
20 SOLUTION ORAL
Refills: 0 | Status: DISCONTINUED | OUTPATIENT
Start: 2025-01-01 | End: 2025-01-01 | Stop reason: HOSPADM

## 2025-01-01 RX ORDER — ALBUMIN (HUMAN) 12.5 G/50ML
87.5 SOLUTION INTRAVENOUS ONCE
Status: DISCONTINUED | OUTPATIENT
Start: 2025-01-01 | End: 2025-01-01

## 2025-01-01 RX ORDER — ERGOCALCIFEROL (VITAMIN D2) 10 MCG
400 TABLET ORAL DAILY
COMMUNITY

## 2025-01-01 RX ORDER — ZINC SULFATE 50(220)MG
220 CAPSULE ORAL DAILY
COMMUNITY

## 2025-01-01 RX ORDER — FUROSEMIDE 10 MG/ML
20 INJECTION INTRAMUSCULAR; INTRAVENOUS EVERY 6 HOURS PRN
Status: DISCONTINUED | OUTPATIENT
Start: 2025-01-01 | End: 2025-01-01 | Stop reason: HOSPADM

## 2025-01-01 RX ORDER — SODIUM CHLORIDE 9 MG/ML
40 INJECTION, SOLUTION INTRAVENOUS AS NEEDED
Status: DISCONTINUED | OUTPATIENT
Start: 2025-01-01 | End: 2025-01-01 | Stop reason: HOSPADM

## 2025-01-01 RX ORDER — HALOPERIDOL 2 MG/ML
1 SOLUTION ORAL EVERY 4 HOURS PRN
Status: DISCONTINUED | OUTPATIENT
Start: 2025-01-01 | End: 2025-01-01 | Stop reason: HOSPADM

## 2025-01-01 RX ORDER — HALOPERIDOL 5 MG/ML
1 INJECTION INTRAMUSCULAR EVERY 4 HOURS PRN
Status: DISCONTINUED | OUTPATIENT
Start: 2025-01-01 | End: 2025-01-01 | Stop reason: HOSPADM

## 2025-01-01 RX ORDER — PHENOL 1.4 %
600 AEROSOL, SPRAY (ML) MUCOUS MEMBRANE DAILY
COMMUNITY

## 2025-01-01 RX ORDER — FERROUS SULFATE 324(65)MG
324 TABLET, DELAYED RELEASE (ENTERIC COATED) ORAL DAILY
COMMUNITY

## 2025-01-01 RX ORDER — DIPHENOXYLATE HYDROCHLORIDE AND ATROPINE SULFATE 2.5; .025 MG/1; MG/1
1 TABLET ORAL
Status: DISCONTINUED | OUTPATIENT
Start: 2025-01-01 | End: 2025-01-01 | Stop reason: HOSPADM

## 2025-01-01 RX ORDER — LORAZEPAM 1 MG/1
1 TABLET ORAL
Status: DISCONTINUED | OUTPATIENT
Start: 2025-01-01 | End: 2025-01-01 | Stop reason: HOSPADM

## 2025-01-01 RX ORDER — SCOPOLAMINE 1 MG/3D
3 PATCH, EXTENDED RELEASE TRANSDERMAL
Status: DISCONTINUED | OUTPATIENT
Start: 2025-01-01 | End: 2025-01-01 | Stop reason: HOSPADM

## 2025-01-01 RX ORDER — NITROGLYCERIN 0.4 MG/1
0.4 TABLET SUBLINGUAL
Status: DISCONTINUED | OUTPATIENT
Start: 2025-01-01 | End: 2025-01-01

## 2025-01-01 RX ORDER — HALOPERIDOL 2 MG/ML
2 SOLUTION ORAL EVERY 4 HOURS PRN
Status: DISCONTINUED | OUTPATIENT
Start: 2025-01-01 | End: 2025-01-01 | Stop reason: HOSPADM

## 2025-01-01 RX ORDER — SODIUM CHLORIDE 0.9 % (FLUSH) 0.9 %
10 SYRINGE (ML) INJECTION AS NEEDED
Status: DISCONTINUED | OUTPATIENT
Start: 2025-01-01 | End: 2025-01-01 | Stop reason: HOSPADM

## 2025-01-01 RX ORDER — LACTULOSE 10 G/15ML
20 SOLUTION ORAL DAILY
Status: DISCONTINUED | OUTPATIENT
Start: 2025-01-01 | End: 2025-01-01

## 2025-01-01 RX ORDER — POTASSIUM CHLORIDE 7.45 MG/ML
10 INJECTION INTRAVENOUS
Status: COMPLETED | OUTPATIENT
Start: 2025-01-01 | End: 2025-01-01

## 2025-01-01 RX ORDER — PANTOPRAZOLE SODIUM 40 MG/10ML
40 INJECTION, POWDER, LYOPHILIZED, FOR SOLUTION INTRAVENOUS
Status: DISCONTINUED | OUTPATIENT
Start: 2025-01-01 | End: 2025-01-01 | Stop reason: ALTCHOICE

## 2025-01-01 RX ORDER — SUCRALFATE 1 G/1
1 TABLET ORAL
Status: DISCONTINUED | OUTPATIENT
Start: 2025-01-01 | End: 2025-01-01

## 2025-01-01 RX ORDER — MORPHINE SULFATE 20 MG/ML
10 SOLUTION ORAL
Refills: 0 | Status: DISCONTINUED | OUTPATIENT
Start: 2025-01-01 | End: 2025-01-01 | Stop reason: HOSPADM

## 2025-01-01 RX ORDER — LACTULOSE 10 G/15ML
300 SOLUTION ORAL 2 TIMES DAILY
Status: DISCONTINUED | OUTPATIENT
Start: 2025-01-01 | End: 2025-01-01

## 2025-01-01 RX ORDER — PROCHLORPERAZINE EDISYLATE 5 MG/ML
5 INJECTION INTRAMUSCULAR; INTRAVENOUS EVERY 6 HOURS PRN
Status: DISCONTINUED | OUTPATIENT
Start: 2025-01-01 | End: 2025-01-01 | Stop reason: HOSPADM

## 2025-01-01 RX ORDER — TAMSULOSIN HYDROCHLORIDE 0.4 MG/1
0.4 CAPSULE ORAL DAILY
Status: DISCONTINUED | OUTPATIENT
Start: 2025-01-01 | End: 2025-01-01

## 2025-01-01 RX ORDER — POTASSIUM CHLORIDE 7.45 MG/ML
10 INJECTION INTRAVENOUS ONCE
Status: COMPLETED | OUTPATIENT
Start: 2025-01-01 | End: 2025-01-01

## 2025-01-01 RX ORDER — MULTIVIT WITH MINERALS/LUTEIN
250 TABLET ORAL DAILY
COMMUNITY

## 2025-01-01 RX ADMIN — SPIRONOLACTONE 25 MG: 25 TABLET ORAL at 08:26

## 2025-01-01 RX ADMIN — MIDODRINE HYDROCHLORIDE 5 MG: 5 TABLET ORAL at 11:53

## 2025-01-01 RX ADMIN — LACTULOSE 20 G: 20 SOLUTION ORAL at 14:26

## 2025-01-01 RX ADMIN — MIDODRINE HYDROCHLORIDE 10 MG: 5 TABLET ORAL at 16:55

## 2025-01-01 RX ADMIN — PANTOPRAZOLE SODIUM 40 MG: 40 INJECTION, POWDER, FOR SOLUTION INTRAVENOUS at 09:11

## 2025-01-01 RX ADMIN — LORAZEPAM 2 MG: 2 LIQUID ORAL at 06:21

## 2025-01-01 RX ADMIN — SPIRONOLACTONE 25 MG: 25 TABLET ORAL at 08:13

## 2025-01-01 RX ADMIN — MORPHINE SULFATE 20 MG: 20 SOLUTION ORAL at 07:09

## 2025-01-01 RX ADMIN — LACTULOSE 20 G: 20 SOLUTION ORAL at 17:15

## 2025-01-01 RX ADMIN — LACTULOSE 20 G: 20 SOLUTION ORAL at 16:19

## 2025-01-01 RX ADMIN — MIDODRINE HYDROCHLORIDE 5 MG: 5 TABLET ORAL at 16:52

## 2025-01-01 RX ADMIN — ATROPINE SULFATE 2 DROP: 10 SOLUTION/ DROPS OPHTHALMIC at 17:26

## 2025-01-01 RX ADMIN — POTASSIUM CHLORIDE 40 MEQ: 1500 TABLET, EXTENDED RELEASE ORAL at 00:09

## 2025-01-01 RX ADMIN — PANTOPRAZOLE SODIUM 40 MG: 40 INJECTION, POWDER, FOR SOLUTION INTRAVENOUS at 08:26

## 2025-01-01 RX ADMIN — POTASSIUM CHLORIDE 40 MEQ: 1500 TABLET, EXTENDED RELEASE ORAL at 16:22

## 2025-01-01 RX ADMIN — SUCRALFATE 1 G: 1 TABLET ORAL at 08:43

## 2025-01-01 RX ADMIN — POTASSIUM CHLORIDE 40 MEQ: 1500 TABLET, EXTENDED RELEASE ORAL at 18:00

## 2025-01-01 RX ADMIN — DIAZEPAM 10 MG: 5 INJECTION, SOLUTION INTRAMUSCULAR; INTRAVENOUS at 00:45

## 2025-01-01 RX ADMIN — PHYTONADIONE 10 MG: 10 INJECTION, EMULSION INTRAMUSCULAR; INTRAVENOUS; SUBCUTANEOUS at 11:15

## 2025-01-01 RX ADMIN — PENTOXIFYLLINE 400 MG: 400 TABLET, EXTENDED RELEASE ORAL at 12:22

## 2025-01-01 RX ADMIN — PANTOPRAZOLE SODIUM 40 MG: 40 INJECTION, POWDER, FOR SOLUTION INTRAVENOUS at 08:41

## 2025-01-01 RX ADMIN — FUROSEMIDE 20 MG: 20 TABLET ORAL at 08:03

## 2025-01-01 RX ADMIN — LACTULOSE 20 G: 20 SOLUTION ORAL at 09:12

## 2025-01-01 RX ADMIN — RIFAXIMIN 550 MG: 550 TABLET ORAL at 08:03

## 2025-01-01 RX ADMIN — PANTOPRAZOLE SODIUM 40 MG: 40 INJECTION, POWDER, FOR SOLUTION INTRAVENOUS at 17:15

## 2025-01-01 RX ADMIN — MIDODRINE HYDROCHLORIDE 5 MG: 5 TABLET ORAL at 12:22

## 2025-01-01 RX ADMIN — MIDODRINE HYDROCHLORIDE 5 MG: 5 TABLET ORAL at 11:35

## 2025-01-01 RX ADMIN — TAMSULOSIN HYDROCHLORIDE 0.4 MG: 0.4 CAPSULE ORAL at 08:04

## 2025-01-01 RX ADMIN — RIFAXIMIN 550 MG: 550 TABLET ORAL at 20:18

## 2025-01-01 RX ADMIN — SUCRALFATE 1 G: 1 TABLET ORAL at 20:50

## 2025-01-01 RX ADMIN — PANTOPRAZOLE SODIUM 40 MG: 40 INJECTION, POWDER, FOR SOLUTION INTRAVENOUS at 11:34

## 2025-01-01 RX ADMIN — Medication 10 ML: at 22:02

## 2025-01-01 RX ADMIN — PHYTONADIONE 10 MG: 2 INJECTION, EMULSION INTRAMUSCULAR; INTRAVENOUS; SUBCUTANEOUS at 12:28

## 2025-01-01 RX ADMIN — LACTULOSE 20 G: 20 SOLUTION ORAL at 20:50

## 2025-01-01 RX ADMIN — MIDODRINE HYDROCHLORIDE 5 MG: 5 TABLET ORAL at 08:44

## 2025-01-01 RX ADMIN — LACTULOSE 20 G: 20 SOLUTION ORAL at 08:44

## 2025-01-01 RX ADMIN — PANTOPRAZOLE SODIUM 40 MG: 40 INJECTION, POWDER, FOR SOLUTION INTRAVENOUS at 16:43

## 2025-01-01 RX ADMIN — THIAMINE HCL TAB 100 MG 100 MG: 100 TAB at 08:44

## 2025-01-01 RX ADMIN — POTASSIUM CHLORIDE 10 MEQ: 7.46 INJECTION, SOLUTION INTRAVENOUS at 02:10

## 2025-01-01 RX ADMIN — POTASSIUM CHLORIDE 40 MEQ: 1500 TABLET, EXTENDED RELEASE ORAL at 03:55

## 2025-01-01 RX ADMIN — LEVOTHYROXINE SODIUM 75 MCG: 75 TABLET ORAL at 05:33

## 2025-01-01 RX ADMIN — LEVOTHYROXINE SODIUM 75 MCG: 75 TABLET ORAL at 05:32

## 2025-01-01 RX ADMIN — POTASSIUM CHLORIDE 40 MEQ: 1500 TABLET, EXTENDED RELEASE ORAL at 09:45

## 2025-01-01 RX ADMIN — PANTOPRAZOLE SODIUM 40 MG: 40 INJECTION, POWDER, FOR SOLUTION INTRAVENOUS at 18:24

## 2025-01-01 RX ADMIN — SUCRALFATE 1 G: 1 TABLET ORAL at 11:43

## 2025-01-01 RX ADMIN — SPIRONOLACTONE 25 MG: 25 TABLET ORAL at 08:03

## 2025-01-01 RX ADMIN — SPIRONOLACTONE 25 MG: 25 TABLET ORAL at 10:28

## 2025-01-01 RX ADMIN — MIDODRINE HYDROCHLORIDE 5 MG: 5 TABLET ORAL at 17:16

## 2025-01-01 RX ADMIN — PANTOPRAZOLE SODIUM 40 MG: 40 INJECTION, POWDER, FOR SOLUTION INTRAVENOUS at 17:29

## 2025-01-01 RX ADMIN — MIDODRINE HYDROCHLORIDE 5 MG: 5 TABLET ORAL at 12:05

## 2025-01-01 RX ADMIN — AMPICILLIN 2 G: 2 INJECTION, POWDER, FOR SOLUTION INTRAMUSCULAR; INTRAVENOUS at 12:21

## 2025-01-01 RX ADMIN — AMPICILLIN 2 G: 2 INJECTION, POWDER, FOR SOLUTION INTRAMUSCULAR; INTRAVENOUS at 18:16

## 2025-01-01 RX ADMIN — LEVOTHYROXINE SODIUM 75 MCG: 75 TABLET ORAL at 05:18

## 2025-01-01 RX ADMIN — LEVOTHYROXINE SODIUM 75 MCG: 75 TABLET ORAL at 11:35

## 2025-01-01 RX ADMIN — POTASSIUM CHLORIDE 10 MEQ: 7.46 INJECTION, SOLUTION INTRAVENOUS at 06:32

## 2025-01-01 RX ADMIN — MIDODRINE HYDROCHLORIDE 10 MG: 5 TABLET ORAL at 11:27

## 2025-01-01 RX ADMIN — SCOPOLAMINE 1 PATCH: 1.5 PATCH, EXTENDED RELEASE TRANSDERMAL at 20:25

## 2025-01-01 RX ADMIN — Medication 10 ML: at 08:04

## 2025-01-01 RX ADMIN — SUCRALFATE 1 G: 1 TABLET ORAL at 08:26

## 2025-01-01 RX ADMIN — AMPICILLIN 2 G: 2 INJECTION, POWDER, FOR SOLUTION INTRAMUSCULAR; INTRAVENOUS at 19:41

## 2025-01-01 RX ADMIN — MIDODRINE HYDROCHLORIDE 5 MG: 5 TABLET ORAL at 18:01

## 2025-01-01 RX ADMIN — LACTULOSE 20 G: 20 SOLUTION ORAL at 08:03

## 2025-01-01 RX ADMIN — POTASSIUM CHLORIDE 10 MEQ: 7.46 INJECTION, SOLUTION INTRAVENOUS at 00:28

## 2025-01-01 RX ADMIN — MORPHINE SULFATE 20 MG: 20 SOLUTION ORAL at 19:03

## 2025-01-01 RX ADMIN — SUCRALFATE 1 G: 1 TABLET ORAL at 09:45

## 2025-01-01 RX ADMIN — PENTOXIFYLLINE 400 MG: 400 TABLET, EXTENDED RELEASE ORAL at 08:44

## 2025-01-01 RX ADMIN — PENTOXIFYLLINE 400 MG: 400 TABLET, EXTENDED RELEASE ORAL at 17:15

## 2025-01-01 RX ADMIN — SUCRALFATE 1 G: 1 TABLET ORAL at 11:15

## 2025-01-01 RX ADMIN — LACTULOSE 20 G: 20 SOLUTION ORAL at 16:52

## 2025-01-01 RX ADMIN — THIAMINE HCL TAB 100 MG 100 MG: 100 TAB at 09:45

## 2025-01-01 RX ADMIN — Medication 10 ML: at 08:44

## 2025-01-01 RX ADMIN — AMPICILLIN 2 G: 2 INJECTION, POWDER, FOR SOLUTION INTRAMUSCULAR; INTRAVENOUS at 17:27

## 2025-01-01 RX ADMIN — SUCRALFATE 1 G: 1 TABLET ORAL at 12:05

## 2025-01-01 RX ADMIN — THIAMINE HCL TAB 100 MG 100 MG: 100 TAB at 08:04

## 2025-01-01 RX ADMIN — MIDODRINE HYDROCHLORIDE 10 MG: 5 TABLET ORAL at 11:56

## 2025-01-01 RX ADMIN — SUCRALFATE 1 G: 1 TABLET ORAL at 12:22

## 2025-01-01 RX ADMIN — LACTULOSE SOLUTION USP, 10 G/15 ML 300 ML: 10 SOLUTION ORAL; RECTAL at 22:28

## 2025-01-01 RX ADMIN — Medication 10 ML: at 21:00

## 2025-01-01 RX ADMIN — MIDODRINE HYDROCHLORIDE 10 MG: 5 TABLET ORAL at 17:29

## 2025-01-01 RX ADMIN — Medication 10 ML: at 08:48

## 2025-01-01 RX ADMIN — RIFAXIMIN 550 MG: 550 TABLET ORAL at 20:45

## 2025-01-01 RX ADMIN — SUCRALFATE 1 G: 1 TABLET ORAL at 11:56

## 2025-01-01 RX ADMIN — RIFAXIMIN 550 MG: 550 TABLET ORAL at 09:11

## 2025-01-01 RX ADMIN — Medication 10 ML: at 09:45

## 2025-01-01 RX ADMIN — MORPHINE SULFATE 10 MG: 20 SOLUTION ORAL at 20:31

## 2025-01-01 RX ADMIN — RIFAXIMIN 550 MG: 550 TABLET ORAL at 08:26

## 2025-01-01 RX ADMIN — SPIRONOLACTONE 25 MG: 25 TABLET ORAL at 09:13

## 2025-01-01 RX ADMIN — Medication 10 ML: at 21:52

## 2025-01-01 RX ADMIN — MIDODRINE HYDROCHLORIDE 10 MG: 5 TABLET ORAL at 11:14

## 2025-01-01 RX ADMIN — TAMSULOSIN HYDROCHLORIDE 0.4 MG: 0.4 CAPSULE ORAL at 08:26

## 2025-01-01 RX ADMIN — SUCRALFATE 1 G: 1 TABLET ORAL at 09:11

## 2025-01-01 RX ADMIN — AMPICILLIN 2 G: 2 INJECTION, POWDER, FOR SOLUTION INTRAMUSCULAR; INTRAVENOUS at 05:25

## 2025-01-01 RX ADMIN — LACTULOSE 20 G: 20 SOLUTION ORAL at 08:43

## 2025-01-01 RX ADMIN — THIAMINE HCL TAB 100 MG 100 MG: 100 TAB at 09:11

## 2025-01-01 RX ADMIN — LACTULOSE 20 G: 20 SOLUTION ORAL at 20:18

## 2025-01-01 RX ADMIN — SUCRALFATE 1 G: 1 TABLET ORAL at 16:55

## 2025-01-01 RX ADMIN — POTASSIUM CHLORIDE 10 MEQ: 7.46 INJECTION, SOLUTION INTRAVENOUS at 03:43

## 2025-01-01 RX ADMIN — PHYTONADIONE 10 MG: 10 INJECTION, EMULSION INTRAMUSCULAR; INTRAVENOUS; SUBCUTANEOUS at 16:23

## 2025-01-01 RX ADMIN — FUROSEMIDE 20 MG: 20 TABLET ORAL at 08:26

## 2025-01-01 RX ADMIN — LEVOTHYROXINE SODIUM 75 MCG: 75 TABLET ORAL at 05:42

## 2025-01-01 RX ADMIN — SUCRALFATE 1 G: 1 TABLET ORAL at 08:04

## 2025-01-01 RX ADMIN — MIDODRINE HYDROCHLORIDE 10 MG: 5 TABLET ORAL at 09:12

## 2025-01-01 RX ADMIN — PENTOXIFYLLINE 400 MG: 400 TABLET, EXTENDED RELEASE ORAL at 09:45

## 2025-01-01 RX ADMIN — LACTULOSE 20 G: 20 SOLUTION ORAL at 20:53

## 2025-01-01 RX ADMIN — SUCRALFATE 1 G: 1 TABLET ORAL at 20:53

## 2025-01-01 RX ADMIN — TAMSULOSIN HYDROCHLORIDE 0.4 MG: 0.4 CAPSULE ORAL at 08:43

## 2025-01-01 RX ADMIN — MIDODRINE HYDROCHLORIDE 10 MG: 5 TABLET ORAL at 12:27

## 2025-01-01 RX ADMIN — AMPICILLIN 2 G: 2 INJECTION, POWDER, FOR SOLUTION INTRAMUSCULAR; INTRAVENOUS at 02:16

## 2025-01-01 RX ADMIN — TAMSULOSIN HYDROCHLORIDE 0.4 MG: 0.4 CAPSULE ORAL at 18:01

## 2025-01-01 RX ADMIN — LACTULOSE 20 G: 20 SOLUTION ORAL at 15:16

## 2025-01-01 RX ADMIN — LEVOTHYROXINE SODIUM 75 MCG: 75 TABLET ORAL at 05:24

## 2025-01-01 RX ADMIN — PENTOXIFYLLINE 400 MG: 400 TABLET, EXTENDED RELEASE ORAL at 18:01

## 2025-01-01 RX ADMIN — Medication 10 ML: at 20:45

## 2025-01-01 RX ADMIN — TAMSULOSIN HYDROCHLORIDE 0.4 MG: 0.4 CAPSULE ORAL at 08:03

## 2025-01-01 RX ADMIN — THIAMINE HCL TAB 100 MG 100 MG: 100 TAB at 08:43

## 2025-01-01 RX ADMIN — POTASSIUM CHLORIDE 40 MEQ: 1500 TABLET, EXTENDED RELEASE ORAL at 08:44

## 2025-01-01 RX ADMIN — AMPICILLIN 2 G: 2 INJECTION, POWDER, FOR SOLUTION INTRAMUSCULAR; INTRAVENOUS at 00:09

## 2025-01-01 RX ADMIN — SUCRALFATE 1 G: 1 TABLET ORAL at 20:18

## 2025-01-01 RX ADMIN — Medication 10 ML: at 09:12

## 2025-01-01 RX ADMIN — RIFAXIMIN 550 MG: 550 TABLET ORAL at 11:43

## 2025-01-01 RX ADMIN — SUCRALFATE 1 G: 1 TABLET ORAL at 17:23

## 2025-01-01 RX ADMIN — CEFTRIAXONE 2000 MG: 2 INJECTION, POWDER, FOR SOLUTION INTRAMUSCULAR; INTRAVENOUS at 11:02

## 2025-01-01 RX ADMIN — SUCRALFATE 1 G: 1 TABLET ORAL at 08:44

## 2025-01-01 RX ADMIN — Medication 10 ML: at 20:53

## 2025-01-01 RX ADMIN — POTASSIUM CHLORIDE 10 MEQ: 7.46 INJECTION, SOLUTION INTRAVENOUS at 22:22

## 2025-01-01 RX ADMIN — LACTULOSE 20 G: 20 SOLUTION ORAL at 09:45

## 2025-01-01 RX ADMIN — LACTULOSE 20 G: 20 SOLUTION ORAL at 18:23

## 2025-01-01 RX ADMIN — MIDODRINE HYDROCHLORIDE 5 MG: 5 TABLET ORAL at 17:23

## 2025-01-01 RX ADMIN — SUCRALFATE 1 G: 1 TABLET ORAL at 17:29

## 2025-01-01 RX ADMIN — MIDODRINE HYDROCHLORIDE 5 MG: 5 TABLET ORAL at 08:13

## 2025-01-01 RX ADMIN — LACTULOSE 20 G: 20 SOLUTION ORAL at 21:33

## 2025-01-01 RX ADMIN — AMPICILLIN 2 G: 2 INJECTION, POWDER, FOR SOLUTION INTRAMUSCULAR; INTRAVENOUS at 11:08

## 2025-01-01 RX ADMIN — SUCRALFATE 1 G: 1 TABLET ORAL at 16:52

## 2025-01-01 RX ADMIN — AMPICILLIN 2 G: 2 INJECTION, POWDER, FOR SOLUTION INTRAMUSCULAR; INTRAVENOUS at 23:39

## 2025-01-01 RX ADMIN — SUCRALFATE 1 G: 1 TABLET ORAL at 17:16

## 2025-01-01 RX ADMIN — PANTOPRAZOLE SODIUM 40 MG: 40 INJECTION, POWDER, FOR SOLUTION INTRAVENOUS at 09:45

## 2025-01-01 RX ADMIN — PENTOXIFYLLINE 400 MG: 400 TABLET, EXTENDED RELEASE ORAL at 08:43

## 2025-01-01 RX ADMIN — THIAMINE HCL TAB 100 MG 100 MG: 100 TAB at 11:36

## 2025-01-01 RX ADMIN — SCOPOLAMINE 1 PATCH: 1.5 PATCH, EXTENDED RELEASE TRANSDERMAL at 17:49

## 2025-01-01 RX ADMIN — LACTULOSE 20 G: 20 SOLUTION ORAL at 11:15

## 2025-01-01 RX ADMIN — PHYTONADIONE 10 MG: 10 INJECTION, EMULSION INTRAMUSCULAR; INTRAVENOUS; SUBCUTANEOUS at 16:19

## 2025-01-01 RX ADMIN — FUROSEMIDE 20 MG: 20 TABLET ORAL at 09:11

## 2025-01-01 RX ADMIN — MORPHINE SULFATE 20 MG: 20 SOLUTION ORAL at 05:58

## 2025-01-01 RX ADMIN — PANTOPRAZOLE SODIUM 40 MG: 40 TABLET, DELAYED RELEASE ORAL at 08:03

## 2025-01-01 RX ADMIN — THIAMINE HCL TAB 100 MG 100 MG: 100 TAB at 08:26

## 2025-01-01 RX ADMIN — SUCRALFATE 1 G: 1 TABLET ORAL at 21:33

## 2025-01-01 RX ADMIN — MIDODRINE HYDROCHLORIDE 5 MG: 5 TABLET ORAL at 09:45

## 2025-01-01 RX ADMIN — PANTOPRAZOLE SODIUM 40 MG: 40 INJECTION, POWDER, FOR SOLUTION INTRAVENOUS at 17:26

## 2025-01-01 RX ADMIN — SUCRALFATE 1 G: 1 TABLET ORAL at 18:23

## 2025-01-01 RX ADMIN — MIDODRINE HYDROCHLORIDE 10 MG: 5 TABLET ORAL at 18:23

## 2025-01-01 RX ADMIN — PENTOXIFYLLINE 400 MG: 400 TABLET, EXTENDED RELEASE ORAL at 11:43

## 2025-01-01 RX ADMIN — PHYTONADIONE 10 MG: 10 INJECTION, EMULSION INTRAMUSCULAR; INTRAVENOUS; SUBCUTANEOUS at 10:28

## 2025-01-01 RX ADMIN — MIDODRINE HYDROCHLORIDE 10 MG: 5 TABLET ORAL at 08:03

## 2025-01-01 RX ADMIN — RIFAXIMIN 550 MG: 550 TABLET ORAL at 08:05

## 2025-01-01 RX ADMIN — LEVOTHYROXINE SODIUM 75 MCG: 75 TABLET ORAL at 05:56

## 2025-01-01 RX ADMIN — PENTOXIFYLLINE 400 MG: 400 TABLET, EXTENDED RELEASE ORAL at 12:05

## 2025-01-01 RX ADMIN — PANTOPRAZOLE SODIUM 40 MG: 40 INJECTION, POWDER, FOR SOLUTION INTRAVENOUS at 08:44

## 2025-01-01 RX ADMIN — RIFAXIMIN 550 MG: 550 TABLET ORAL at 08:44

## 2025-01-01 RX ADMIN — Medication 10 ML: at 08:00

## 2025-01-01 RX ADMIN — PANTOPRAZOLE SODIUM 40 MG: 40 INJECTION, POWDER, FOR SOLUTION INTRAVENOUS at 18:01

## 2025-01-01 RX ADMIN — POTASSIUM CHLORIDE 40 MEQ: 1500 TABLET, EXTENDED RELEASE ORAL at 12:05

## 2025-01-01 RX ADMIN — PANTOPRAZOLE SODIUM 40 MG: 40 INJECTION, POWDER, FOR SOLUTION INTRAVENOUS at 08:03

## 2025-01-01 RX ADMIN — SUCRALFATE 1 G: 1 TABLET ORAL at 20:45

## 2025-01-01 RX ADMIN — Medication 10 ML: at 20:18

## 2025-01-01 RX ADMIN — LEVOTHYROXINE SODIUM 75 MCG: 75 TABLET ORAL at 05:39

## 2025-01-01 RX ADMIN — TAMSULOSIN HYDROCHLORIDE 0.4 MG: 0.4 CAPSULE ORAL at 09:11

## 2025-01-01 RX ADMIN — MIDODRINE HYDROCHLORIDE 5 MG: 5 TABLET ORAL at 08:43

## 2025-01-01 RX ADMIN — POTASSIUM CHLORIDE 10 MEQ: 7.46 INJECTION, SOLUTION INTRAVENOUS at 05:04

## 2025-01-01 RX ADMIN — RIFAXIMIN 550 MG: 550 TABLET ORAL at 21:33

## 2025-01-01 RX ADMIN — POTASSIUM CHLORIDE 10 MEQ: 7.46 INJECTION, SOLUTION INTRAVENOUS at 14:18

## 2025-01-01 RX ADMIN — SUCRALFATE 1 G: 1 TABLET ORAL at 11:35

## 2025-01-01 RX ADMIN — TAMSULOSIN HYDROCHLORIDE 0.4 MG: 0.4 CAPSULE ORAL at 08:44

## 2025-01-01 RX ADMIN — LACTULOSE 20 G: 20 SOLUTION ORAL at 11:36

## 2025-01-01 RX ADMIN — AMPICILLIN 2 G: 2 INJECTION, POWDER, FOR SOLUTION INTRAMUSCULAR; INTRAVENOUS at 06:48

## 2025-01-01 RX ADMIN — LACTULOSE 20 G: 20 SOLUTION ORAL at 16:23

## 2025-01-01 RX ADMIN — SODIUM CHLORIDE 40 ML: 9 INJECTION, SOLUTION INTRAVENOUS at 22:21

## 2025-01-01 RX ADMIN — PENTOXIFYLLINE 400 MG: 400 TABLET, EXTENDED RELEASE ORAL at 17:23

## 2025-01-01 RX ADMIN — MIDODRINE HYDROCHLORIDE 10 MG: 5 TABLET ORAL at 08:26

## 2025-01-01 RX ADMIN — Medication 10 ML: at 08:26

## 2025-01-01 RX ADMIN — AMPICILLIN 2 G: 2 INJECTION, POWDER, FOR SOLUTION INTRAMUSCULAR; INTRAVENOUS at 05:33

## 2025-01-01 RX ADMIN — FUROSEMIDE 20 MG: 20 TABLET ORAL at 11:15

## 2025-01-01 RX ADMIN — PANTOPRAZOLE SODIUM 40 MG: 40 TABLET, DELAYED RELEASE ORAL at 16:55

## 2025-01-01 RX ADMIN — SUCRALFATE 1 G: 1 TABLET ORAL at 18:00

## 2025-01-01 RX ADMIN — SUCRALFATE 1 G: 1 TABLET ORAL at 12:27

## 2025-01-15 ENCOUNTER — TELEPHONE (OUTPATIENT)
Dept: GASTROENTEROLOGY | Facility: CLINIC | Age: 40
End: 2025-01-15
Payer: COMMERCIAL

## 2025-01-21 ENCOUNTER — TELEPHONE (OUTPATIENT)
Dept: FAMILY MEDICINE CLINIC | Facility: CLINIC | Age: 40
End: 2025-01-21
Payer: COMMERCIAL

## 2025-03-18 ENCOUNTER — HOSPITAL ENCOUNTER (EMERGENCY)
Facility: HOSPITAL | Age: 40
Discharge: LEFT BEFORE DISCHARGE | End: 2025-03-18
Admitting: STUDENT IN AN ORGANIZED HEALTH CARE EDUCATION/TRAINING PROGRAM
Payer: COMMERCIAL

## 2025-03-18 ENCOUNTER — APPOINTMENT (OUTPATIENT)
Dept: CT IMAGING | Facility: HOSPITAL | Age: 40
End: 2025-03-18
Payer: COMMERCIAL

## 2025-03-18 VITALS
HEIGHT: 75 IN | HEART RATE: 95 BPM | BODY MASS INDEX: 35.19 KG/M2 | OXYGEN SATURATION: 92 % | TEMPERATURE: 97.8 F | RESPIRATION RATE: 16 BRPM | DIASTOLIC BLOOD PRESSURE: 87 MMHG | WEIGHT: 283 LBS | SYSTOLIC BLOOD PRESSURE: 140 MMHG

## 2025-03-18 DIAGNOSIS — K80.20 GALLSTONES: ICD-10-CM

## 2025-03-18 DIAGNOSIS — K92.2 GASTROINTESTINAL HEMORRHAGE, UNSPECIFIED GASTROINTESTINAL HEMORRHAGE TYPE: ICD-10-CM

## 2025-03-18 DIAGNOSIS — E83.42 HYPOMAGNESEMIA: Primary | ICD-10-CM

## 2025-03-18 DIAGNOSIS — E87.6 HYPOKALEMIA: ICD-10-CM

## 2025-03-18 DIAGNOSIS — K74.60 HEPATIC CIRRHOSIS, UNSPECIFIED HEPATIC CIRRHOSIS TYPE, UNSPECIFIED WHETHER ASCITES PRESENT: ICD-10-CM

## 2025-03-18 DIAGNOSIS — R79.89 ELEVATED LFTS: ICD-10-CM

## 2025-03-18 DIAGNOSIS — D69.6 THROMBOCYTOPENIA: ICD-10-CM

## 2025-03-18 DIAGNOSIS — K76.6 PORTAL HYPERTENSION: ICD-10-CM

## 2025-03-18 DIAGNOSIS — R16.1 SPLENOMEGALY: ICD-10-CM

## 2025-03-18 DIAGNOSIS — F10.20 ALCOHOLISM: ICD-10-CM

## 2025-03-18 DIAGNOSIS — I85.00 ESOPHAGEAL VARICES WITHOUT BLEEDING, UNSPECIFIED ESOPHAGEAL VARICES TYPE: ICD-10-CM

## 2025-03-18 LAB
ALBUMIN SERPL-MCNC: 3.8 G/DL (ref 3.5–5.2)
ALBUMIN/GLOB SERPL: 1.2 G/DL
ALP SERPL-CCNC: 221 U/L (ref 39–117)
ALT SERPL W P-5'-P-CCNC: 40 U/L (ref 1–41)
AMMONIA BLD-SCNC: 65 UMOL/L (ref 16–60)
ANION GAP SERPL CALCULATED.3IONS-SCNC: 19 MMOL/L (ref 5–15)
AST SERPL-CCNC: 190 U/L (ref 1–40)
B PARAPERT DNA SPEC QL NAA+PROBE: NOT DETECTED
B PERT DNA SPEC QL NAA+PROBE: NOT DETECTED
BASOPHILS # BLD MANUAL: 0 10*3/MM3 (ref 0–0.2)
BASOPHILS NFR BLD MANUAL: 0 % (ref 0–1.5)
BILIRUB SERPL-MCNC: 5 MG/DL (ref 0–1.2)
BUN SERPL-MCNC: 6 MG/DL (ref 6–20)
BUN/CREAT SERPL: 8.3 (ref 7–25)
C PNEUM DNA NPH QL NAA+NON-PROBE: NOT DETECTED
CALCIUM SPEC-SCNC: 8 MG/DL (ref 8.6–10.5)
CHLORIDE SERPL-SCNC: 89 MMOL/L (ref 98–107)
CO2 SERPL-SCNC: 28 MMOL/L (ref 22–29)
CREAT SERPL-MCNC: 0.72 MG/DL (ref 0.76–1.27)
DEPRECATED RDW RBC AUTO: 55.4 FL (ref 37–54)
DEVELOPER EXPIRATION DATE: ABNORMAL
DEVELOPER LOT NUMBER: 275
EGFRCR SERPLBLD CKD-EPI 2021: 118.4 ML/MIN/1.73
EOSINOPHIL # BLD MANUAL: 0.09 10*3/MM3 (ref 0–0.4)
EOSINOPHIL NFR BLD MANUAL: 2 % (ref 0.3–6.2)
ERYTHROCYTE [DISTWIDTH] IN BLOOD BY AUTOMATED COUNT: 16.4 % (ref 12.3–15.4)
ETHANOL UR QL: 0.37 %
EXPIRATION DATE: ABNORMAL
FECAL OCCULT BLOOD SCREEN, POC: POSITIVE
FLUAV SUBTYP SPEC NAA+PROBE: NOT DETECTED
FLUBV RNA ISLT QL NAA+PROBE: NOT DETECTED
GLOBULIN UR ELPH-MCNC: 3.2 GM/DL
GLUCOSE BLDC GLUCOMTR-MCNC: 192 MG/DL (ref 70–130)
GLUCOSE SERPL-MCNC: 171 MG/DL (ref 65–99)
HADV DNA SPEC NAA+PROBE: NOT DETECTED
HCOV 229E RNA SPEC QL NAA+PROBE: NOT DETECTED
HCOV HKU1 RNA SPEC QL NAA+PROBE: NOT DETECTED
HCOV NL63 RNA SPEC QL NAA+PROBE: NOT DETECTED
HCOV OC43 RNA SPEC QL NAA+PROBE: NOT DETECTED
HCT VFR BLD AUTO: 35.6 % (ref 37.5–51)
HGB BLD-MCNC: 12.4 G/DL (ref 13–17.7)
HMPV RNA NPH QL NAA+NON-PROBE: NOT DETECTED
HPIV1 RNA ISLT QL NAA+PROBE: NOT DETECTED
HPIV2 RNA SPEC QL NAA+PROBE: NOT DETECTED
HPIV3 RNA NPH QL NAA+PROBE: NOT DETECTED
HPIV4 P GENE NPH QL NAA+PROBE: NOT DETECTED
HYPOCHROMIA BLD QL: ABNORMAL
INR PPP: 1.53 (ref 0.91–1.09)
LIPASE SERPL-CCNC: 27 U/L (ref 13–60)
LYMPHOCYTES # BLD MANUAL: 0.9 10*3/MM3 (ref 0.7–3.1)
LYMPHOCYTES NFR BLD MANUAL: 3.1 % (ref 5–12)
Lab: 275
M PNEUMO IGG SER IA-ACNC: NOT DETECTED
MAGNESIUM SERPL-MCNC: 1.1 MG/DL (ref 1.6–2.6)
MCH RBC QN AUTO: 31.8 PG (ref 26.6–33)
MCHC RBC AUTO-ENTMCNC: 34.8 G/DL (ref 31.5–35.7)
MCV RBC AUTO: 91.3 FL (ref 79–97)
METAMYELOCYTES NFR BLD MANUAL: 1 % (ref 0–0)
MONOCYTES # BLD: 0.14 10*3/MM3 (ref 0.1–0.9)
NEGATIVE CONTROL: NEGATIVE
NEUTROPHILS # BLD AUTO: 3.25 10*3/MM3 (ref 1.7–7)
NEUTROPHILS NFR BLD MANUAL: 70.4 % (ref 42.7–76)
NEUTS BAND NFR BLD MANUAL: 3.1 % (ref 0–5)
PLATELET # BLD AUTO: 35 10*3/MM3 (ref 140–450)
PMV BLD AUTO: ABNORMAL FL
POLYCHROMASIA BLD QL SMEAR: ABNORMAL
POSITIVE CONTROL: POSITIVE
POTASSIUM SERPL-SCNC: 2.7 MMOL/L (ref 3.5–5.2)
PROCALCITONIN SERPL-MCNC: 0.15 NG/ML (ref 0–0.25)
PROT SERPL-MCNC: 7 G/DL (ref 6–8.5)
PROTHROMBIN TIME: 19.3 SECONDS (ref 11.8–14.8)
RBC # BLD AUTO: 3.9 10*6/MM3 (ref 4.14–5.8)
RHINOVIRUS RNA SPEC NAA+PROBE: NOT DETECTED
RSV RNA NPH QL NAA+NON-PROBE: NOT DETECTED
SARS-COV-2 RNA RESP QL NAA+PROBE: NOT DETECTED
SMALL PLATELETS BLD QL SMEAR: ABNORMAL
SODIUM SERPL-SCNC: 136 MMOL/L (ref 136–145)
VARIANT LYMPHS NFR BLD MANUAL: 18.4 % (ref 19.6–45.3)
VARIANT LYMPHS NFR BLD MANUAL: 2 % (ref 0–5)
WBC MORPH BLD: NORMAL
WBC NRBC COR # BLD AUTO: 4.42 10*3/MM3 (ref 3.4–10.8)

## 2025-03-18 PROCEDURE — 25510000001 IOPAMIDOL 61 % SOLUTION: Performed by: PHYSICIAN ASSISTANT

## 2025-03-18 PROCEDURE — 99285 EMERGENCY DEPT VISIT HI MDM: CPT

## 2025-03-18 PROCEDURE — 0202U NFCT DS 22 TRGT SARS-COV-2: CPT | Performed by: PHYSICIAN ASSISTANT

## 2025-03-18 PROCEDURE — 83735 ASSAY OF MAGNESIUM: CPT | Performed by: PHYSICIAN ASSISTANT

## 2025-03-18 PROCEDURE — 96365 THER/PROPH/DIAG IV INF INIT: CPT

## 2025-03-18 PROCEDURE — 82077 ASSAY SPEC XCP UR&BREATH IA: CPT | Performed by: PHYSICIAN ASSISTANT

## 2025-03-18 PROCEDURE — 84145 PROCALCITONIN (PCT): CPT | Performed by: PHYSICIAN ASSISTANT

## 2025-03-18 PROCEDURE — 96367 TX/PROPH/DG ADDL SEQ IV INF: CPT

## 2025-03-18 PROCEDURE — 25010000002 POTASSIUM CHLORIDE 10 MEQ/100ML SOLUTION: Performed by: PHYSICIAN ASSISTANT

## 2025-03-18 PROCEDURE — 82948 REAGENT STRIP/BLOOD GLUCOSE: CPT

## 2025-03-18 PROCEDURE — 74177 CT ABD & PELVIS W/CONTRAST: CPT

## 2025-03-18 PROCEDURE — 25010000002 ONDANSETRON PER 1 MG: Performed by: PHYSICIAN ASSISTANT

## 2025-03-18 PROCEDURE — 82270 OCCULT BLOOD FECES: CPT | Performed by: PHYSICIAN ASSISTANT

## 2025-03-18 PROCEDURE — 85007 BL SMEAR W/DIFF WBC COUNT: CPT | Performed by: PHYSICIAN ASSISTANT

## 2025-03-18 PROCEDURE — 83690 ASSAY OF LIPASE: CPT | Performed by: PHYSICIAN ASSISTANT

## 2025-03-18 PROCEDURE — 25010000002 THIAMINE HCL 200 MG/2ML SOLUTION: Performed by: PHYSICIAN ASSISTANT

## 2025-03-18 PROCEDURE — 96375 TX/PRO/DX INJ NEW DRUG ADDON: CPT

## 2025-03-18 PROCEDURE — 25010000002 MAGNESIUM SULFATE 2 GM/50ML SOLUTION: Performed by: PHYSICIAN ASSISTANT

## 2025-03-18 PROCEDURE — 85025 COMPLETE CBC W/AUTO DIFF WBC: CPT | Performed by: PHYSICIAN ASSISTANT

## 2025-03-18 PROCEDURE — 36415 COLL VENOUS BLD VENIPUNCTURE: CPT

## 2025-03-18 PROCEDURE — 25010000002 LORAZEPAM PER 2 MG: Performed by: EMERGENCY MEDICINE

## 2025-03-18 PROCEDURE — 85610 PROTHROMBIN TIME: CPT | Performed by: PHYSICIAN ASSISTANT

## 2025-03-18 PROCEDURE — 25810000003 SODIUM CHLORIDE 0.9 % SOLUTION: Performed by: PHYSICIAN ASSISTANT

## 2025-03-18 PROCEDURE — 82140 ASSAY OF AMMONIA: CPT | Performed by: PHYSICIAN ASSISTANT

## 2025-03-18 PROCEDURE — 80053 COMPREHEN METABOLIC PANEL: CPT | Performed by: PHYSICIAN ASSISTANT

## 2025-03-18 RX ORDER — IOPAMIDOL 612 MG/ML
100 INJECTION, SOLUTION INTRAVASCULAR
Status: COMPLETED | OUTPATIENT
Start: 2025-03-18 | End: 2025-03-18

## 2025-03-18 RX ORDER — LORAZEPAM 2 MG/ML
1 INJECTION INTRAMUSCULAR ONCE
Status: COMPLETED | OUTPATIENT
Start: 2025-03-18 | End: 2025-03-18

## 2025-03-18 RX ORDER — MAGNESIUM SULFATE HEPTAHYDRATE 40 MG/ML
2 INJECTION, SOLUTION INTRAVENOUS ONCE
Status: COMPLETED | OUTPATIENT
Start: 2025-03-18 | End: 2025-03-18

## 2025-03-18 RX ORDER — POTASSIUM CHLORIDE 7.45 MG/ML
10 INJECTION INTRAVENOUS ONCE
Status: COMPLETED | OUTPATIENT
Start: 2025-03-18 | End: 2025-03-18

## 2025-03-18 RX ORDER — THIAMINE HYDROCHLORIDE 100 MG/ML
100 INJECTION, SOLUTION INTRAMUSCULAR; INTRAVENOUS ONCE
Status: COMPLETED | OUTPATIENT
Start: 2025-03-18 | End: 2025-03-18

## 2025-03-18 RX ORDER — ONDANSETRON 2 MG/ML
4 INJECTION INTRAMUSCULAR; INTRAVENOUS ONCE
Status: COMPLETED | OUTPATIENT
Start: 2025-03-18 | End: 2025-03-18

## 2025-03-18 RX ORDER — FAMOTIDINE 10 MG/ML
20 INJECTION, SOLUTION INTRAVENOUS ONCE
Status: COMPLETED | OUTPATIENT
Start: 2025-03-18 | End: 2025-03-18

## 2025-03-18 RX ORDER — SODIUM CHLORIDE 0.9 % (FLUSH) 0.9 %
10 SYRINGE (ML) INJECTION AS NEEDED
Status: DISCONTINUED | OUTPATIENT
Start: 2025-03-18 | End: 2025-03-18 | Stop reason: HOSPADM

## 2025-03-18 RX ADMIN — IOPAMIDOL 100 ML: 612 INJECTION, SOLUTION INTRAVENOUS at 17:53

## 2025-03-18 RX ADMIN — SODIUM CHLORIDE 1000 ML: 9 INJECTION, SOLUTION INTRAVENOUS at 17:13

## 2025-03-18 RX ADMIN — POTASSIUM CHLORIDE 10 MEQ: 7.46 INJECTION, SOLUTION INTRAVENOUS at 18:11

## 2025-03-18 RX ADMIN — LORAZEPAM 1 MG: 2 INJECTION INTRAMUSCULAR; INTRAVENOUS at 18:11

## 2025-03-18 RX ADMIN — ONDANSETRON 4 MG: 2 INJECTION INTRAMUSCULAR; INTRAVENOUS at 17:10

## 2025-03-18 RX ADMIN — Medication 10 ML: at 17:12

## 2025-03-18 RX ADMIN — MAGNESIUM SULFATE HEPTAHYDRATE 2 G: 40 INJECTION, SOLUTION INTRAVENOUS at 18:44

## 2025-03-18 RX ADMIN — FAMOTIDINE 20 MG: 10 INJECTION INTRAVENOUS at 17:09

## 2025-03-18 RX ADMIN — THIAMINE HYDROCHLORIDE 100 MG: 100 INJECTION, SOLUTION INTRAMUSCULAR; INTRAVENOUS at 17:12

## 2025-03-18 NOTE — ED PROVIDER NOTES
Subjective   History of Present Illness    Patient is a 40-year-old male presenting to ED with alcohol use and black stools.  PMH significant for history of alcoholism, non-insulin-dependent diabetes, hypertension, vasectomy.  Patient states over the past month he has been drinking approximately 6 beers a day with his last drink 2 hours prior to arrival.  Patient states prior to this he had 100 days of sobriety however due to stress at work he started drinking again.  Patient states that he is on and off drink majority of his life, he also smokes 1/4 pack cigarettes daily and intermittently inhales marijuana.  Patient denies use of any other IV/recreational/illicit drugs.  Patient states he thinks he has had a GI bleed before and has had 1 previous endoscopy with no history of colonoscopies.  Patient states over the past couple days has had some mild nausea as well as black diarrhea which concerned him.  Patient denies any significant abdominal pain, hematemesis or bleeding of any other source.  Patient states that he does have a history of insulin-dependent diabetes however he was taken off his insulin and he is not sure what his blood sugars are.  Patient presents at this time for help as he is concerned by the black diarrhea.  Daughter at bedside did state that 1 month ago they all had a flulike illness however patient had recovered to his baseline until the past few days and patient and daughter deny any other known sick contact.    Records reviewed show patient was last seen in the outpatient setting the PCP office on 11/20/2024 for hypothyroidism due to Hashimoto's thyroiditis, alcoholic cirrhosis of liver with ascites, anasarca, bilateral lower extremity edema, insomnia.    Patient last seen in the ED on 7/14/2015.    Review of Systems   Constitutional: Negative.  Negative for chills, diaphoresis and fever.   HENT: Negative.  Negative for sore throat.    Eyes: Negative.    Respiratory: Negative.  Negative for  cough.    Cardiovascular: Negative.    Gastrointestinal:  Positive for blood in stool (Black), diarrhea and nausea. Negative for abdominal pain, constipation and vomiting.        Denies hematemesis   Genitourinary: Negative.  Negative for dysuria, flank pain and hematuria.   Musculoskeletal: Negative.    Skin: Negative.  Negative for color change and wound.   Allergic/Immunologic: Positive for immunocompromised state (Diabetes).   Neurological: Negative.  Negative for weakness and headaches.   Psychiatric/Behavioral: Negative.     All other systems reviewed and are negative.      Past Medical History:   Diagnosis Date    Alcoholism     Diabetes mellitus     Gout     Hypertension     Jaundice        No Known Allergies    Past Surgical History:   Procedure Laterality Date    VASECTOMY         History reviewed. No pertinent family history.    Social History     Socioeconomic History    Marital status: Single   Tobacco Use    Smoking status: Some Days     Current packs/day: 0.25     Average packs/day: 0.5 packs/day for 15.2 years (7.1 ttl pk-yrs)     Types: Cigarettes     Start date: 2010    Smokeless tobacco: Never   Vaping Use    Vaping status: Never Used    Passive vaping exposure: Yes   Substance and Sexual Activity    Alcohol use: Yes     Alcohol/week: 12.0 standard drinks of alcohol     Types: 12 Cans of beer per week     Comment: 90 days sober    Drug use: Yes     Types: Marijuana    Sexual activity: Yes           Objective   Physical Exam  Vitals and nursing note reviewed. Exam conducted with a chaperone present (Chaperone present for entire evaluation, Lilliana PALMER).   Constitutional:       General: He is not in acute distress.     Appearance: Normal appearance. He is obese. He is not ill-appearing, toxic-appearing or diaphoretic.   HENT:      Head: Normocephalic.      Mouth/Throat:      Mouth: Mucous membranes are moist.      Pharynx: Oropharynx is clear. No oropharyngeal exudate or posterior oropharyngeal  erythema.   Eyes:      General: No scleral icterus.     Conjunctiva/sclera: Conjunctivae normal.      Pupils: Pupils are equal, round, and reactive to light.   Cardiovascular:      Rate and Rhythm: Regular rhythm. Tachycardia present.   Pulmonary:      Effort: Pulmonary effort is normal.      Breath sounds: Normal breath sounds.   Abdominal:      General: Bowel sounds are normal. There is no distension.      Palpations: Abdomen is soft.      Tenderness: There is no abdominal tenderness. There is no right CVA tenderness, left CVA tenderness or guarding.   Genitourinary:     Rectum: Guaiac result positive. External hemorrhoid (Scattered circumferentially, small, nonthrombosed) present. No tenderness. Normal anal tone.   Musculoskeletal:         General: Normal range of motion.      Cervical back: Neck supple.      Right lower leg: No edema.      Left lower leg: No edema.   Skin:     General: Skin is warm and dry.      Coloration: Skin is not jaundiced.      Findings: No bruising.   Neurological:      Mental Status: He is alert and oriented to person, place, and time.      Gait: Gait normal.   Psychiatric:         Attention and Perception: Attention normal.         Mood and Affect: Affect is flat.         Speech: Speech is delayed.         Behavior: Behavior is slowed.         Procedures           ED Course                                                       Medical Decision Making  Problems Addressed:  Alcoholism: complicated acute illness or injury  Elevated LFTs: complicated acute illness or injury  Hypokalemia: complicated acute illness or injury  Hypomagnesemia: complicated acute illness or injury  Thrombocytopenia: complicated acute illness or injury    Amount and/or Complexity of Data Reviewed  Independent Historian:      Details: Daughter  External Data Reviewed: labs, radiology and notes.  Labs: ordered. Decision-making details documented in ED Course.  Radiology: ordered. Decision-making details documented  in ED Course.  ECG/medicine tests: ordered. Decision-making details documented in ED Course.    Risk  Prescription drug management.      Patient is a 40-year-old male presenting to ED with alcohol use and black stools.  PMH significant for history of alcoholism, non-insulin-dependent diabetes, hypertension, vasectomy.  Upon initial evaluation patient resting comfortably in the bed in no acute distress per nontoxic-appearing, non-ill-appearing, nondiaphoretic.  Patient is tachycardic and with hypertensive systolic pressures in the 140s but otherwise unremarkable vital signs.  Examination finds Hemoccult testing positive with scattered circumferential nonthrombosed hemorrhoids.  Abdomen is otherwise soft, nontender, nondistended.  No CVAT.  No sclericterus or jaundice.  No petechial rashes or bruising.  No peripheral edema.  No further examination abnormalities.  Discussed with patient need for workup to include labs, analysis, CT imaging for which he is amenable with no further questions, concerns, needs at this time.    Differential diagnosis: Alcohol intoxication, substance abuse, cirrhosis, ascites, GI bleed, gastritis, gastric ulcer, diverticulitis, thrombocytopenia, anemia, systemic infection, urinary tract infection, other    Lab work revealed  normal blood cell count.  H&H 12.4/35.6 with thrombocytopenia 35.  Bands elevated at 3.1% relatively.  CMP abnormal with hypokalemia 2.7 for which patient received IV replacement, decreased chloride 89, hypocalcemia 8.  Abnormal LFTs with , alk phos 221 and total bilirubin 5 with ALT 40.  Anion gap of 19.  Hypomagnesemia 1.1 for which patient received IV replacement.  Low concern for systemic bacterial process with normal procalcitonin.  Respiratory panel unremarkable.  Low concern for pancreatic abnormality such as pancreatitis with normal lipase.  Ammonia on upper limits of normal at 65.  PT/INR 19.3/1.53.  Alcohol elevated at 0.373.   "UDS    Urinalysis    Hemoccult testing positive.  CT imaging of the abdomen and pelvis showed: Cirrhotic and steatotic liver with sequela of portal hypertension including splenomegaly and small esophageal varices, gallbladder mildly distended and contains calcified gallstones with no findings to suggest acute cholecystitis.  Discussed with patient need for admission for further evaluation and treatment to include monitoring of varices, electrolyte replacement, as well as assistance with withdrawal.  Patient states \"I know what my problem is and I know how to fix it so I am just can I do it at home.\"  Patient reiterates that he wants help and wants to go to a treatment facility however is not amenable at this time to contacting [facility] or any other like places.  Discussed with patient that he is not safe to leave due to his lab findings.  Patient is clinically sober, alert and oriented x 3, and able to verbalize risk, benefits, and alternatives.  Patient states at this time he does not want to be admitted as he is concerned for the financial burden.  Once again discussed with patient significant risks of going home to include esophageal variceal rupture, spontaneous bleeding, and other complications from electrolyte abnormalities.  Patient states he is appreciative and understands but at this time would like to go home after receiving the IV magnesium replacement.  Discussed with patient significant importance of returning as soon as possible to complete his evaluation as well as close follow-up with his primary care provider within the next 24 hours.  Advised ability to return to the ER at any time however need for immediate return should he develop any new or worsening symptoms.  Patient is otherwise appreciative with no further questions, concerns, or needs at this time.  Patient was offered inpatient admission again multiple times additionally with daughter as well as nurse at bedside and continues to state " that he wants to leave AMA.  Patient is able to ambulate without difficulty, tolerate p.o. fluids, and continues to be alert and oriented times 3+ decision-making capabilities.  Patient is aware that he will need to follow-up with his primary care provider and GI outpatient and aware that he can return to the ER at any time specifically should he develop new symptoms with no further questions, concerns, needs at this time and is stable for discharge.    Final diagnoses:   Elevated LFTs   Hypokalemia   Alcoholism   Hypomagnesemia   Thrombocytopenia   Hepatic cirrhosis, unspecified hepatic cirrhosis type, unspecified whether ascites present   Portal hypertension   Splenomegaly   Gallstones   Esophageal varices without bleeding, unspecified esophageal varices type   Gastrointestinal hemorrhage, unspecified gastrointestinal hemorrhage type       ED Disposition  ED Disposition       ED Disposition   AMA    Condition   --    Comment   --               Adrien Varghese MD  [address] [id number] [phone]    Schedule an appointment as soon as possible for a visit in 2 days      [facility] EMERGENCY DEPARTMENT  [address] [place] [id number] [phone]    As needed         Medication List      No changes were made to your prescriptions during this visit.            Srinath Valle PA-C  03/18/25 1936

## 2025-04-01 ENCOUNTER — HOSPITAL ENCOUNTER (INPATIENT)
Facility: HOSPITAL | Age: 40
LOS: 3 days | Discharge: LEFT BEFORE DISCHARGE | End: 2025-04-05
Attending: HOSPITALIST | Admitting: HOSPITALIST
Payer: COMMERCIAL

## 2025-04-01 ENCOUNTER — APPOINTMENT (OUTPATIENT)
Dept: CT IMAGING | Facility: HOSPITAL | Age: 40
End: 2025-04-01
Payer: COMMERCIAL

## 2025-04-01 ENCOUNTER — APPOINTMENT (OUTPATIENT)
Dept: GENERAL RADIOLOGY | Facility: HOSPITAL | Age: 40
End: 2025-04-01
Payer: COMMERCIAL

## 2025-04-01 DIAGNOSIS — E83.51 HYPOCALCEMIA: ICD-10-CM
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
## 2025-04-01 DIAGNOSIS — F12.90 MARIJUANA USE: ICD-10-CM

## 2025-04-01 DIAGNOSIS — F10.929 ALCOHOLIC INTOXICATION WITH COMPLICATION: ICD-10-CM

## 2025-04-01 DIAGNOSIS — R79.89 ELEVATED LFTS: ICD-10-CM

## 2025-04-01 DIAGNOSIS — S00.81XA ABRASION OF CHEEK, INITIAL ENCOUNTER: Primary | ICD-10-CM

## 2025-04-01 DIAGNOSIS — H61.22 IMPACTED CERUMEN OF LEFT EAR: ICD-10-CM
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
## 2025-04-01 DIAGNOSIS — E87.6 HYPOKALEMIA: ICD-10-CM

## 2025-04-01 DIAGNOSIS — N39.0 URINARY TRACT INFECTION WITHOUT HEMATURIA, SITE UNSPECIFIED: ICD-10-CM

## 2025-04-01 DIAGNOSIS — Z23 NEED FOR TDAP VACCINATION: ICD-10-CM

## 2025-04-01 LAB
ALBUMIN SERPL-MCNC: 3.3 G/DL (ref 3.5–5.2)
ALBUMIN/GLOB SERPL: 1.1 G/DL
ALP SERPL-CCNC: 239 U/L (ref 39–117)
ALT SERPL W P-5'-P-CCNC: 54 U/L (ref 1–41)
AMMONIA BLD-SCNC: 85 UMOL/L (ref 16–60)
AMPHET+METHAMPHET UR QL: NEGATIVE
AMPHETAMINES UR QL: NEGATIVE
ANION GAP SERPL CALCULATED.3IONS-SCNC: 19 MMOL/L (ref 5–15)
ANION GAP SERPL CALCULATED.3IONS-SCNC: 20 MMOL/L (ref 5–15)
APTT PPP: 47.6 SECONDS (ref 24.5–36)
AST SERPL-CCNC: 270 U/L (ref 1–40)
BACTERIA UR QL AUTO: ABNORMAL /HPF
BARBITURATES UR QL SCN: NEGATIVE
BASOPHILS # BLD AUTO: 0.05 10*3/MM3 (ref 0–0.2)
BASOPHILS # BLD AUTO: 0.08 10*3/MM3 (ref 0–0.2)
BASOPHILS NFR BLD AUTO: 0.5 % (ref 0–1.5)
BASOPHILS NFR BLD AUTO: 0.9 % (ref 0–1.5)
BENZODIAZ UR QL SCN: NEGATIVE
BILIRUB SERPL-MCNC: 12.8 MG/DL (ref 0–1.2)
BILIRUB UR QL STRIP: ABNORMAL
BUN SERPL-MCNC: 10 MG/DL (ref 6–20)
BUN SERPL-MCNC: 12 MG/DL (ref 6–20)
BUN/CREAT SERPL: 14 (ref 7–25)
BUN/CREAT SERPL: 14.3 (ref 7–25)
BUPRENORPHINE SERPL-MCNC: NEGATIVE NG/ML
CALCIUM SPEC-SCNC: 7.3 MG/DL (ref 8.6–10.5)
CALCIUM SPEC-SCNC: 7.5 MG/DL (ref 8.6–10.5)
CANNABINOIDS SERPL QL: POSITIVE
CHLORIDE SERPL-SCNC: 92 MMOL/L (ref 98–107)
CHLORIDE SERPL-SCNC: 95 MMOL/L (ref 98–107)
CLARITY UR: ABNORMAL
CO2 SERPL-SCNC: 23 MMOL/L (ref 22–29)
CO2 SERPL-SCNC: 24 MMOL/L (ref 22–29)
COCAINE UR QL: NEGATIVE
COLOR UR: ABNORMAL
CREAT SERPL-MCNC: 0.7 MG/DL (ref 0.76–1.27)
CREAT SERPL-MCNC: 0.86 MG/DL (ref 0.76–1.27)
DEPRECATED RDW RBC AUTO: 68.3 FL (ref 37–54)
DEPRECATED RDW RBC AUTO: 68.8 FL (ref 37–54)
EGFRCR SERPLBLD CKD-EPI 2021: 112.3 ML/MIN/1.73
EGFRCR SERPLBLD CKD-EPI 2021: 119.5 ML/MIN/1.73
EOSINOPHIL # BLD AUTO: 0.02 10*3/MM3 (ref 0–0.4)
EOSINOPHIL # BLD AUTO: 0.03 10*3/MM3 (ref 0–0.4)
EOSINOPHIL NFR BLD AUTO: 0.2 % (ref 0.3–6.2)
EOSINOPHIL NFR BLD AUTO: 0.3 % (ref 0.3–6.2)
ERYTHROCYTE [DISTWIDTH] IN BLOOD BY AUTOMATED COUNT: 20.8 % (ref 12.3–15.4)
ERYTHROCYTE [DISTWIDTH] IN BLOOD BY AUTOMATED COUNT: 21.1 % (ref 12.3–15.4)
ETHANOL UR QL: 0.47 %
FENTANYL UR-MCNC: NEGATIVE NG/ML
GLOBULIN UR ELPH-MCNC: 3.1 GM/DL
GLUCOSE SERPL-MCNC: 128 MG/DL (ref 65–99)
GLUCOSE SERPL-MCNC: 171 MG/DL (ref 65–99)
GLUCOSE UR STRIP-MCNC: NEGATIVE MG/DL
HCT VFR BLD AUTO: 32.9 % (ref 37.5–51)
HCT VFR BLD AUTO: 33.9 % (ref 37.5–51)
HGB BLD-MCNC: 11.6 G/DL (ref 13–17.7)
HGB BLD-MCNC: 12 G/DL (ref 13–17.7)
HGB UR QL STRIP.AUTO: ABNORMAL
HYALINE CASTS UR QL AUTO: ABNORMAL /LPF
IMM GRANULOCYTES # BLD AUTO: 0.04 10*3/MM3 (ref 0–0.05)
IMM GRANULOCYTES # BLD AUTO: 0.07 10*3/MM3 (ref 0–0.05)
IMM GRANULOCYTES NFR BLD AUTO: 0.4 % (ref 0–0.5)
IMM GRANULOCYTES NFR BLD AUTO: 0.8 % (ref 0–0.5)
INR PPP: 2.16 (ref 0.91–1.09)
KETONES UR QL STRIP: NEGATIVE
LEUKOCYTE ESTERASE UR QL STRIP.AUTO: ABNORMAL
LIPASE SERPL-CCNC: 16 U/L (ref 13–60)
LYMPHOCYTES # BLD AUTO: 1.23 10*3/MM3 (ref 0.7–3.1)
LYMPHOCYTES # BLD AUTO: 1.89 10*3/MM3 (ref 0.7–3.1)
LYMPHOCYTES NFR BLD AUTO: 14.3 % (ref 19.6–45.3)
LYMPHOCYTES NFR BLD AUTO: 20.4 % (ref 19.6–45.3)
MAGNESIUM SERPL-MCNC: 1 MG/DL (ref 1.6–2.6)
MAGNESIUM SERPL-MCNC: 1.3 MG/DL (ref 1.6–2.6)
MCH RBC QN AUTO: 31.9 PG (ref 26.6–33)
MCH RBC QN AUTO: 32.1 PG (ref 26.6–33)
MCHC RBC AUTO-ENTMCNC: 35.3 G/DL (ref 31.5–35.7)
MCHC RBC AUTO-ENTMCNC: 35.4 G/DL (ref 31.5–35.7)
MCV RBC AUTO: 90.4 FL (ref 79–97)
MCV RBC AUTO: 90.6 FL (ref 79–97)
METHADONE UR QL SCN: NEGATIVE
MONOCYTES # BLD AUTO: 0.98 10*3/MM3 (ref 0.1–0.9)
MONOCYTES # BLD AUTO: 0.99 10*3/MM3 (ref 0.1–0.9)
MONOCYTES NFR BLD AUTO: 10.7 % (ref 5–12)
MONOCYTES NFR BLD AUTO: 11.4 % (ref 5–12)
NEUTROPHILS NFR BLD AUTO: 6.24 10*3/MM3 (ref 1.7–7)
NEUTROPHILS NFR BLD AUTO: 6.26 10*3/MM3 (ref 1.7–7)
NEUTROPHILS NFR BLD AUTO: 67.7 % (ref 42.7–76)
NEUTROPHILS NFR BLD AUTO: 72.4 % (ref 42.7–76)
NITRITE UR QL STRIP: POSITIVE
OPIATES UR QL: NEGATIVE
OXYCODONE UR QL SCN: NEGATIVE
PCP UR QL SCN: NEGATIVE
PH UR STRIP.AUTO: 6 [PH] (ref 5–8)
PLATELET # BLD AUTO: 100 10*3/MM3 (ref 140–450)
PLATELET # BLD AUTO: 99 10*3/MM3 (ref 140–450)
PMV BLD AUTO: 10.7 FL (ref 6–12)
PMV BLD AUTO: 11.3 FL (ref 6–12)
POTASSIUM SERPL-SCNC: 2.8 MMOL/L (ref 3.5–5.2)
POTASSIUM SERPL-SCNC: 2.9 MMOL/L (ref 3.5–5.2)
PROT SERPL-MCNC: 6.4 G/DL (ref 6–8.5)
PROT UR QL STRIP: ABNORMAL
PROTHROMBIN TIME: 25.4 SECONDS (ref 11.8–14.8)
RBC # BLD AUTO: 3.64 10*6/MM3 (ref 4.14–5.8)
RBC # BLD AUTO: 3.74 10*6/MM3 (ref 4.14–5.8)
RBC # UR STRIP: ABNORMAL /HPF
REF LAB TEST METHOD: ABNORMAL
SODIUM SERPL-SCNC: 135 MMOL/L (ref 136–145)
SODIUM SERPL-SCNC: 138 MMOL/L (ref 136–145)
SP GR UR STRIP: 1.02 (ref 1–1.03)
SQUAMOUS #/AREA URNS HPF: ABNORMAL /HPF
TRICYCLICS UR QL SCN: NEGATIVE
TSH SERPL DL<=0.05 MIU/L-ACNC: 2.98 UIU/ML (ref 0.27–4.2)
UROBILINOGEN UR QL STRIP: ABNORMAL
WBC # UR STRIP: ABNORMAL /HPF
WBC NRBC COR # BLD AUTO: 8.62 10*3/MM3 (ref 3.4–10.8)
WBC NRBC COR # BLD AUTO: 9.26 10*3/MM3 (ref 3.4–10.8)

## 2025-04-01 PROCEDURE — 87186 SC STD MICRODIL/AGAR DIL: CPT | Performed by: PHYSICIAN ASSISTANT

## 2025-04-01 PROCEDURE — 87077 CULTURE AEROBIC IDENTIFY: CPT | Performed by: PHYSICIAN ASSISTANT

## 2025-04-01 PROCEDURE — G0378 HOSPITAL OBSERVATION PER HR: HCPCS

## 2025-04-01 PROCEDURE — 87040 BLOOD CULTURE FOR BACTERIA: CPT | Performed by: PHYSICIAN ASSISTANT

## 2025-04-01 PROCEDURE — 80307 DRUG TEST PRSMV CHEM ANLYZR: CPT | Performed by: PHYSICIAN ASSISTANT

## 2025-04-01 PROCEDURE — 25010000002 MAGNESIUM SULFATE 2 GM/50ML SOLUTION: Performed by: HOSPITALIST

## 2025-04-01 PROCEDURE — 25010000002 FOLIC ACID 5 MG/ML SOLUTION 10 ML VIAL: Performed by: HOSPITALIST

## 2025-04-01 PROCEDURE — 99285 EMERGENCY DEPT VISIT HI MDM: CPT

## 2025-04-01 PROCEDURE — 85730 THROMBOPLASTIN TIME PARTIAL: CPT | Performed by: PHYSICIAN ASSISTANT

## 2025-04-01 PROCEDURE — 25010000002 THIAMINE HCL 200 MG/2ML SOLUTION 2 ML VIAL: Performed by: HOSPITALIST

## 2025-04-01 PROCEDURE — 83735 ASSAY OF MAGNESIUM: CPT | Performed by: PHYSICIAN ASSISTANT

## 2025-04-01 PROCEDURE — 90715 TDAP VACCINE 7 YRS/> IM: CPT | Performed by: PHYSICIAN ASSISTANT

## 2025-04-01 PROCEDURE — 25810000003 SODIUM CHLORIDE 0.9 % SOLUTION: Performed by: PHYSICIAN ASSISTANT

## 2025-04-01 PROCEDURE — 25010000002 ONDANSETRON PER 1 MG: Performed by: PHYSICIAN ASSISTANT

## 2025-04-01 PROCEDURE — 72125 CT NECK SPINE W/O DYE: CPT

## 2025-04-01 PROCEDURE — 72131 CT LUMBAR SPINE W/O DYE: CPT

## 2025-04-01 PROCEDURE — 71045 X-RAY EXAM CHEST 1 VIEW: CPT

## 2025-04-01 PROCEDURE — 90471 IMMUNIZATION ADMIN: CPT | Performed by: PHYSICIAN ASSISTANT

## 2025-04-01 PROCEDURE — 70450 CT HEAD/BRAIN W/O DYE: CPT

## 2025-04-01 PROCEDURE — 81001 URINALYSIS AUTO W/SCOPE: CPT | Performed by: PHYSICIAN ASSISTANT

## 2025-04-01 PROCEDURE — 25010000002 MAGNESIUM SULFATE 2 GM/50ML SOLUTION: Performed by: PHYSICIAN ASSISTANT

## 2025-04-01 PROCEDURE — 25010000002 CEFTRIAXONE PER 250 MG: Performed by: PHYSICIAN ASSISTANT

## 2025-04-01 PROCEDURE — 25010000002 POTASSIUM CHLORIDE 10 MEQ/100ML SOLUTION: Performed by: PHYSICIAN ASSISTANT

## 2025-04-01 PROCEDURE — 25010000002 THIAMINE HCL 200 MG/2ML SOLUTION: Performed by: PHYSICIAN ASSISTANT

## 2025-04-01 PROCEDURE — 82077 ASSAY SPEC XCP UR&BREATH IA: CPT | Performed by: PHYSICIAN ASSISTANT

## 2025-04-01 PROCEDURE — 36415 COLL VENOUS BLD VENIPUNCTURE: CPT | Performed by: HOSPITALIST

## 2025-04-01 PROCEDURE — 82607 VITAMIN B-12: CPT | Performed by: HOSPITALIST

## 2025-04-01 PROCEDURE — 80053 COMPREHEN METABOLIC PANEL: CPT | Performed by: PHYSICIAN ASSISTANT

## 2025-04-01 PROCEDURE — 85025 COMPLETE CBC W/AUTO DIFF WBC: CPT | Performed by: HOSPITALIST

## 2025-04-01 PROCEDURE — 85610 PROTHROMBIN TIME: CPT | Performed by: PHYSICIAN ASSISTANT

## 2025-04-01 PROCEDURE — 84443 ASSAY THYROID STIM HORMONE: CPT | Performed by: HOSPITALIST

## 2025-04-01 PROCEDURE — 82140 ASSAY OF AMMONIA: CPT | Performed by: HOSPITALIST

## 2025-04-01 PROCEDURE — 70486 CT MAXILLOFACIAL W/O DYE: CPT

## 2025-04-01 PROCEDURE — 99284 EMERGENCY DEPT VISIT MOD MDM: CPT

## 2025-04-01 PROCEDURE — 83735 ASSAY OF MAGNESIUM: CPT | Performed by: HOSPITALIST

## 2025-04-01 PROCEDURE — 85025 COMPLETE CBC W/AUTO DIFF WBC: CPT | Performed by: PHYSICIAN ASSISTANT

## 2025-04-01 PROCEDURE — 87086 URINE CULTURE/COLONY COUNT: CPT | Performed by: PHYSICIAN ASSISTANT

## 2025-04-01 PROCEDURE — 83690 ASSAY OF LIPASE: CPT | Performed by: PHYSICIAN ASSISTANT

## 2025-04-01 PROCEDURE — 25010000002 TETANUS-DIPHTH-ACELL PERTUSSIS 5-2.5-18.5 LF-MCG/0.5 SUSPENSION PREFILLED SYRINGE: Performed by: PHYSICIAN ASSISTANT

## 2025-04-01 PROCEDURE — 72128 CT CHEST SPINE W/O DYE: CPT

## 2025-04-01 RX ORDER — SODIUM CHLORIDE 0.9 % (FLUSH) 0.9 %
10 SYRINGE (ML) INJECTION AS NEEDED
Status: DISCONTINUED | OUTPATIENT
Start: 2025-04-01 | End: 2025-04-05 | Stop reason: HOSPADM

## 2025-04-01 RX ORDER — THIAMINE HYDROCHLORIDE 100 MG/ML
200 INJECTION, SOLUTION INTRAMUSCULAR; INTRAVENOUS EVERY 8 HOURS SCHEDULED
Status: DISCONTINUED | OUTPATIENT
Start: 2025-04-04 | End: 2025-04-05 | Stop reason: HOSPADM

## 2025-04-01 RX ORDER — LORAZEPAM 1 MG/1
1 TABLET ORAL EVERY 6 HOURS
Status: DISPENSED | OUTPATIENT
Start: 2025-04-02 | End: 2025-04-03

## 2025-04-01 RX ORDER — LORAZEPAM 1 MG/1
2 TABLET ORAL
Status: DISCONTINUED | OUTPATIENT
Start: 2025-04-01 | End: 2025-04-05 | Stop reason: HOSPADM

## 2025-04-01 RX ORDER — BISACODYL 10 MG
10 SUPPOSITORY, RECTAL RECTAL DAILY PRN
Status: DISCONTINUED | OUTPATIENT
Start: 2025-04-01 | End: 2025-04-05 | Stop reason: HOSPADM

## 2025-04-01 RX ORDER — LORAZEPAM 2 MG/ML
2 INJECTION INTRAMUSCULAR
Status: DISCONTINUED | OUTPATIENT
Start: 2025-04-01 | End: 2025-04-05 | Stop reason: HOSPADM

## 2025-04-01 RX ORDER — LORAZEPAM 1 MG/1
1 TABLET ORAL EVERY 12 HOURS SCHEDULED
Status: COMPLETED | OUTPATIENT
Start: 2025-04-03 | End: 2025-04-04

## 2025-04-01 RX ORDER — LORAZEPAM 1 MG/1
1 TABLET ORAL
Status: DISCONTINUED | OUTPATIENT
Start: 2025-04-01 | End: 2025-04-05 | Stop reason: HOSPADM

## 2025-04-01 RX ORDER — LORAZEPAM 1 MG/1
1 TABLET ORAL DAILY
Status: COMPLETED | OUTPATIENT
Start: 2025-04-05 | End: 2025-04-05

## 2025-04-01 RX ORDER — ONDANSETRON 2 MG/ML
4 INJECTION INTRAMUSCULAR; INTRAVENOUS EVERY 6 HOURS PRN
Status: DISCONTINUED | OUTPATIENT
Start: 2025-04-01 | End: 2025-04-05 | Stop reason: HOSPADM

## 2025-04-01 RX ORDER — POLYETHYLENE GLYCOL 3350 17 G/17G
17 POWDER, FOR SOLUTION ORAL DAILY PRN
Status: DISCONTINUED | OUTPATIENT
Start: 2025-04-01 | End: 2025-04-05 | Stop reason: HOSPADM

## 2025-04-01 RX ORDER — SODIUM CHLORIDE 9 MG/ML
40 INJECTION, SOLUTION INTRAVENOUS AS NEEDED
Status: DISCONTINUED | OUTPATIENT
Start: 2025-04-01 | End: 2025-04-05 | Stop reason: HOSPADM

## 2025-04-01 RX ORDER — POTASSIUM CHLORIDE 1500 MG/1
40 TABLET, EXTENDED RELEASE ORAL EVERY 4 HOURS
Status: COMPLETED | OUTPATIENT
Start: 2025-04-01 | End: 2025-04-02

## 2025-04-01 RX ORDER — AMOXICILLIN 250 MG
2 CAPSULE ORAL 2 TIMES DAILY
Status: DISCONTINUED | OUTPATIENT
Start: 2025-04-01 | End: 2025-04-05 | Stop reason: HOSPADM

## 2025-04-01 RX ORDER — LORAZEPAM 1 MG/1
2 TABLET ORAL EVERY 6 HOURS
Status: COMPLETED | OUTPATIENT
Start: 2025-04-01 | End: 2025-04-02

## 2025-04-01 RX ORDER — THIAMINE HYDROCHLORIDE 100 MG/ML
100 INJECTION, SOLUTION INTRAMUSCULAR; INTRAVENOUS ONCE
Status: COMPLETED | OUTPATIENT
Start: 2025-04-01 | End: 2025-04-01

## 2025-04-01 RX ORDER — SODIUM CHLORIDE 0.9 % (FLUSH) 0.9 %
10 SYRINGE (ML) INJECTION EVERY 12 HOURS SCHEDULED
Status: DISCONTINUED | OUTPATIENT
Start: 2025-04-01 | End: 2025-04-05 | Stop reason: HOSPADM

## 2025-04-01 RX ORDER — MAGNESIUM SULFATE HEPTAHYDRATE 40 MG/ML
2 INJECTION, SOLUTION INTRAVENOUS ONCE
Status: COMPLETED | OUTPATIENT
Start: 2025-04-01 | End: 2025-04-01

## 2025-04-01 RX ORDER — POTASSIUM CHLORIDE 7.45 MG/ML
10 INJECTION INTRAVENOUS ONCE
Status: COMPLETED | OUTPATIENT
Start: 2025-04-01 | End: 2025-04-01

## 2025-04-01 RX ORDER — ONDANSETRON 2 MG/ML
4 INJECTION INTRAMUSCULAR; INTRAVENOUS ONCE
Status: COMPLETED | OUTPATIENT
Start: 2025-04-01 | End: 2025-04-01

## 2025-04-01 RX ORDER — CHLORHEXIDINE GLUCONATE 500 MG/1
1 CLOTH TOPICAL EVERY 24 HOURS
Status: DISCONTINUED | OUTPATIENT
Start: 2025-04-02 | End: 2025-04-01

## 2025-04-01 RX ORDER — MULTIPLE VITAMINS W/ MINERALS TAB 9MG-400MCG
1 TAB ORAL DAILY
Status: DISCONTINUED | OUTPATIENT
Start: 2025-04-01 | End: 2025-04-05 | Stop reason: HOSPADM

## 2025-04-01 RX ORDER — NITROGLYCERIN 0.4 MG/1
0.4 TABLET SUBLINGUAL
Status: DISCONTINUED | OUTPATIENT
Start: 2025-04-01 | End: 2025-04-05 | Stop reason: HOSPADM

## 2025-04-01 RX ORDER — CHLORHEXIDINE GLUCONATE 500 MG/1
1 CLOTH TOPICAL ONCE
Status: DISCONTINUED | OUTPATIENT
Start: 2025-04-01 | End: 2025-04-01

## 2025-04-01 RX ORDER — MAGNESIUM SULFATE HEPTAHYDRATE 40 MG/ML
2 INJECTION, SOLUTION INTRAVENOUS
Status: COMPLETED | OUTPATIENT
Start: 2025-04-01 | End: 2025-04-02

## 2025-04-01 RX ORDER — PANTOPRAZOLE SODIUM 40 MG/10ML
40 INJECTION, POWDER, LYOPHILIZED, FOR SOLUTION INTRAVENOUS
Status: DISCONTINUED | OUTPATIENT
Start: 2025-04-02 | End: 2025-04-05 | Stop reason: HOSPADM

## 2025-04-01 RX ORDER — LORAZEPAM 2 MG/ML
1 INJECTION INTRAMUSCULAR
Status: DISCONTINUED | OUTPATIENT
Start: 2025-04-01 | End: 2025-04-05 | Stop reason: HOSPADM

## 2025-04-01 RX ORDER — BISACODYL 5 MG/1
5 TABLET, DELAYED RELEASE ORAL DAILY PRN
Status: DISCONTINUED | OUTPATIENT
Start: 2025-04-01 | End: 2025-04-05 | Stop reason: HOSPADM

## 2025-04-01 RX ORDER — PHYTONADIONE 10 MG/ML
10 INJECTION, EMULSION INTRAMUSCULAR; INTRAVENOUS; SUBCUTANEOUS ONCE
Status: DISCONTINUED | OUTPATIENT
Start: 2025-04-01 | End: 2025-04-05 | Stop reason: HOSPADM

## 2025-04-01 RX ADMIN — MAGNESIUM SULFATE HEPTAHYDRATE 2 G: 2 INJECTION, SOLUTION INTRAVENOUS at 23:41

## 2025-04-01 RX ADMIN — LORAZEPAM 2 MG: 1 TABLET ORAL at 19:12

## 2025-04-01 RX ADMIN — SODIUM CHLORIDE 1000 ML: 9 INJECTION, SOLUTION INTRAVENOUS at 15:32

## 2025-04-01 RX ADMIN — ONDANSETRON 4 MG: 2 INJECTION INTRAMUSCULAR; INTRAVENOUS at 15:32

## 2025-04-01 RX ADMIN — Medication 10 ML: at 21:41

## 2025-04-01 RX ADMIN — Medication 1 TABLET: at 19:14

## 2025-04-01 RX ADMIN — FOLIC ACID 1 MG: 5 INJECTION, SOLUTION INTRAMUSCULAR; INTRAVENOUS; SUBCUTANEOUS at 19:24

## 2025-04-01 RX ADMIN — MAGNESIUM SULFATE HEPTAHYDRATE 2 G: 2 INJECTION, SOLUTION INTRAVENOUS at 17:01

## 2025-04-01 RX ADMIN — POTASSIUM CHLORIDE 10 MEQ: 7.46 INJECTION, SOLUTION INTRAVENOUS at 16:10

## 2025-04-01 RX ADMIN — POTASSIUM CHLORIDE 40 MEQ: 1500 TABLET, EXTENDED RELEASE ORAL at 21:40

## 2025-04-01 RX ADMIN — MAGNESIUM SULFATE HEPTAHYDRATE 2 G: 2 INJECTION, SOLUTION INTRAVENOUS at 21:40

## 2025-04-01 RX ADMIN — THIAMINE HYDROCHLORIDE 500 MG: 100 INJECTION, SOLUTION INTRAMUSCULAR; INTRAVENOUS at 21:40

## 2025-04-01 RX ADMIN — THIAMINE HYDROCHLORIDE 100 MG: 100 INJECTION, SOLUTION INTRAMUSCULAR; INTRAVENOUS at 15:38

## 2025-04-01 RX ADMIN — TETANUS TOXOID, REDUCED DIPHTHERIA TOXOID AND ACELLULAR PERTUSSIS VACCINE, ADSORBED 0.5 ML: 5; 2.5; 8; 8; 2.5 SUSPENSION INTRAMUSCULAR at 16:31

## 2025-04-01 RX ADMIN — CEFTRIAXONE SODIUM 2000 MG: 2 INJECTION, POWDER, FOR SOLUTION INTRAMUSCULAR; INTRAVENOUS at 16:25

## 2025-04-01 RX ADMIN — LORAZEPAM 2 MG: 1 TABLET ORAL at 23:41

## 2025-04-01 NOTE — ED PROVIDER NOTES
"Subjective   History of Present Illness    Patient is a 40-year-old male presenting to ED with alcohol use and fall.  PMH significant for history of alcoholism, non-insulin-dependent diabetes, hypertension, vasectomy.  Patient states 2 of his dogs got out today at which time he went running chasing after them outside describing that he had been running for an extended period of time in an alley way when due to being out of shape \"my legs just gave out and I fell down.\"  Patient states that he hit his face on the alleyway first but denies any loss of consciousness.  Patient states that he had no preceding symptoms including chest pain/pressure/heaviness/tightness or syncope but felt he had just run for too long for his conditioning.  Patient states that the fall was witnessed by a bystander who called EMS.  Upon arrival to ED patient states that he does not have pain anywhere.  Patient states that he had otherwise been at his baseline recently.  Patient did state that since his last ER visit he is decreasing his alcohol intake and is currently at two 12 ounce malt whiskey beers daily.  Patient describes \"I want to keep drinking and she is nothing ever works.\"  Patient perseverates on his 100 days of sobriety prior to starting a couple months ago.  Patient denies any medication use or interventions prior to arrival and presents at this time for further evaluation.    Record reviewed show patient was last seen in the ED on 3/18/2025 for hypomagnesemia, elevated LFTs, hypokalemia, alcoholism, thrombocytopenia, hepatic cirrhosis, portal hypertension, splenomegaly, gallstones, esophageal varices, GI bleed.    Review of Systems   Constitutional: Negative.    HENT: Negative.  Negative for facial swelling, nosebleeds and tinnitus.    Eyes: Negative.  Negative for visual disturbance.   Respiratory: Negative.  Negative for stridor.    Cardiovascular: Negative.  Negative for chest pain and palpitations.   Gastrointestinal:  " Positive for diarrhea. Negative for abdominal pain, nausea and vomiting.   Genitourinary: Negative.    Musculoskeletal: Negative.  Negative for arthralgias, back pain, gait problem and neck pain.   Skin:  Positive for wound (abrasion, left face).   Neurological:  Negative for syncope and headaches.        Reports + head inury  Denies LOC   Psychiatric/Behavioral: Negative.  Negative for confusion.    All other systems reviewed and are negative.      Past Medical History:   Diagnosis Date    Alcoholism     Diabetes mellitus     Gout     Hypertension     Jaundice        No Known Allergies    Past Surgical History:   Procedure Laterality Date    VASECTOMY         History reviewed. No pertinent family history.    Social History     Socioeconomic History    Marital status: Single   Tobacco Use    Smoking status: Some Days     Current packs/day: 0.25     Average packs/day: 0.5 packs/day for 15.2 years (7.1 ttl pk-yrs)     Types: Cigarettes     Start date: 2010    Smokeless tobacco: Never   Vaping Use    Vaping status: Never Used    Passive vaping exposure: Yes   Substance and Sexual Activity    Alcohol use: Yes     Alcohol/week: 12.0 standard drinks of alcohol     Types: 12 Cans of beer per week     Comment: 90 days sober    Drug use: Yes     Types: Marijuana    Sexual activity: Yes           Objective   Physical Exam  Vitals and nursing note reviewed.   Constitutional:       General: He is not in acute distress.     Appearance: Normal appearance. He is obese. He is not ill-appearing, toxic-appearing or diaphoretic.   HENT:      Head: Normocephalic.      Comments: Superficial scratches noted on the left cheek with no deep lacerations.  Remainder of face and scalp are atraumatic with no further evidence of injury or acute abnormalities.     Ears:      Comments: Cerumen impaction noted on the left side.  No evidence of hemotympanum to the right side.  Eyes:      General: Scleral icterus present.      Extraocular Movements:  Extraocular movements intact.      Pupils: Pupils are equal, round, and reactive to light.   Cardiovascular:      Rate and Rhythm: Normal rate.   Pulmonary:      Effort: Pulmonary effort is normal.   Abdominal:      General: Bowel sounds are normal. There is no distension.      Palpations: Abdomen is soft.      Tenderness: There is no abdominal tenderness. There is no right CVA tenderness, left CVA tenderness or guarding.   Musculoskeletal:         General: No tenderness or signs of injury.      Cervical back: Normal range of motion and neck supple.      Comments: No spinal midline or paraspinal muscular abnormalities.  All 4 extremities are unremarkable with no evidence of injury.  No bony tenderness, no joint swelling, all 4 EXTR neurovascular intact distally.     Skin:     General: Skin is warm.      Coloration: Skin is jaundiced.      Findings: Abrasion (as described in HEENT section) present. No bruising or laceration.   Neurological:      Mental Status: He is alert and oriented to person, place, and time.      Gait: Gait normal.   Psychiatric:         Mood and Affect: Mood normal.         Behavior: Behavior normal.         Procedures           ED Course    MELD SCORE: 25 (87.7% estimated 90-day survival)                                                          Medical Decision Making  Problems Addressed:  Abrasion of cheek, initial encounter: complicated acute illness or injury  Alcoholic intoxication with complication: complicated acute illness or injury  Elevated LFTs: complicated acute illness or injury  Fall injury while running: complicated acute illness or injury  Hypocalcemia: complicated acute illness or injury  Hypokalemia: complicated acute illness or injury  Impacted cerumen of left ear: complicated acute illness or injury  Marijuana use: complicated acute illness or injury  Urinary tract infection without hematuria, site unspecified: complicated acute illness or injury    Amount and/or Complexity  "of Data Reviewed  External Data Reviewed: labs, radiology and notes.  Labs: ordered. Decision-making details documented in ED Course.  Radiology: ordered. Decision-making details documented in ED Course.  ECG/medicine tests: ordered. Decision-making details documented in ED Course.  Discussion of management or test interpretation with external provider(s): Mrs. Arcos LÁZARO Boateng (hospitalist)    Risk  OTC drugs.  Prescription drug management.  Decision regarding hospitalization.        Patient is a 40-year-old male presenting to ED with alcohol use and fall.  PMH significant for history of alcoholism, non-insulin-dependent diabetes, hypertension, vasectomy.  Upon initial evaluation patient resting in the bed in no acute distress.  Nontoxic-appearing, non-ill-appearing, nondiaphoretic.  Patient is tachycardic with otherwise unremarkable vital signs.  Examination finds cerumen impaction noted on the left side.  No evidence of hemotympanum to the right side.  No epistaxis or septal hematoma bilaterally.  No intraoral, dental, tongue injuries. Superficial scratches noted on the left cheek with no deep lacerations.  Remainder of face and scalp are atraumatic with no further evidence of injury or acute abnormalities.  Scleral icterus with no other acute periorbital or conjunctival abnormalities.  No spinal midline or paraspinal muscular abnormalities.  All 4 extremities are unremarkable with no evidence of injury.  No bony tenderness, no joint swelling, all 4 EXTR neurovascular intact distally.  No other acute examination findings.  Discussed with patient need for workup to include labs and imaging.  Discussed with patient need for thiamine replacement, Zofran.  Patient states at this time he is wanting help with drinking describing \"I have always wanted help but I just never seems to work.\"  Patient would like for his workup to decide on disposition planning but is otherwise amenable to treatment plan with no further " questions, turns, needs at this time.    Differential diagnosis: Facial bone fracture, zygomatic arch fracture, orbital floor fracture, vertebral fracture, bulging/herniated disc, rib fracture, hepatic dysfunction, electrolyte disturbance, systemic infection, other    Lab work  revealed normal white blood cell count, H&H 12/33.9 slightly decreased from 12.4/35.6 performed 2 weeks ago.  Improvement in thrombocytopenia at 100 today compared to 35 2 weeks ago.  No further CBC abnormalities.  CMP with worsening LFTs.  Today ALT 54, , alk phos 239, total bilirubin 12.8 compared to , ALT 40, alk phos 221 and total bilirubin 5 performed 2 weeks ago.  Anion gap elevated at 19.  Hyperglycemia 171, but kalemia 2.8, hypochloremia 92, hypocalcemia 7.3, hypomagnesemia of 1 for which patient was given IV replacement.  No renal dysfunction and no further electro disturbances.  Low concern for pancreatic abnormality such as pancreatitis with normal lipase.  PT/INR 25.4/2.16.  Evidence of urinary tract infection with moderate leukocytes, positive nitrites, 11-20 WBC and 4+ bacteria however culture will be sent as there are 21-30 squamous epithelium.  Patient was given initially Rocephin.  UDS positive for THC and otherwise unremarkable.  Alcohol elevated at 0.474.  Chest x-ray showed: No acute process in the chest.  Head CT showed: No hemorrhage, edema, mass effect or acute findings.  Maxillofacial CT showed: No facial fracture identified, orbital globes intact.  Cervical spine CT showed: Mild reversal normal cervical lordosis may relate to head position or musculoskeletal strain, no cervical vertebral fracture or traumatic subluxation, early degenerative changes.  Thoracic spine CT showed: No acute osseous injury or malalignment.  Lumbar spine CT showed: No acute fracture, lumbar spine osteoarthritis change with mild multilevel degenerative retrolisthesis.  Patient was given thiamine replacement in the setting of  known alcoholism, Zofran which resolved his nausea, his tetanus status was updated.  Patient was given Rocephin for treatment of urinary tract infection as well as electrolyte replacement.  Discussed with patient need for admission for further evaluation and treatment as well as discussion of referral to a detox/treatment facility for which she states he is amenable today with no further questions, concerns, or needs.  Case was discussed with LÁZARO Lopez, for hospitalist group, who will come accept patient for admission under the services of Dr. Thomas.     Final diagnoses:   Abrasion of cheek, initial encounter   Fall injury while running   Hypocalcemia   Hypokalemia   Urinary tract infection without hematuria, site unspecified   Impacted cerumen of left ear   Marijuana use   Alcoholic intoxication with complication   Elevated LFTs   Need for Tdap vaccination       ED Disposition  ED Disposition       ED Disposition   Decision to Admit    Condition   --    Comment   Level of Care: Med/Surg [1]   Diagnosis: Hypomagnesemia [627910]   Admitting Physician: JEM THOMAS [[id number]]                 No follow-up provider specified.       Medication List      No changes were made to your prescriptions during this visit.            Srinath Valle PA-C  04/01/25 [id number]

## 2025-04-01 NOTE — H&P
"    [facility] Medicine Services  HISTORY AND PHYSICAL    Date of Admission: 4/1/2025  Primary Care Physician: Adrien Varghese MD    Subjective   Primary Historian: presented via EMS after fall     Chief Complaint: fall ETOH intoxication     Fall    Alcohol Intoxication    Patient is a 40-year-old male with PMH of alcoholism, non-insulin-dependent diabetes, hypertension, vasectomy. ETOH liver cirrhosis  presenting to ED with alcohol use and fall.  PMH significant for history Patient states 2 of his dogs got out today at which time he went running chasing after them outside describing that he had been running for an extended period of time in an alley way when due to being out of shape \"my legs just gave out and I fell down.\"  Patient states that he hit his face on the alleyway first but denies any loss of consciousness.  Patient states that he had no preceding symptoms including chest pain/pressure/heaviness/tightness or syncope but felt he had just run for too long for his conditioning.  Patient states that the fall was witnessed by a bystander who called EMS.  Upon arrival to ED patient states that he does not have pain anywhere.  Patient states that he had otherwise been at his baseline recently.  Patient did state that since his last ER visit he is decreasing his alcohol intake and is currently at two 12 ounce malt whiskey beers daily.  Patient describes \"I want to keep drinking and she is nothing ever works.\"  Patient perseverates on his 100 days of sobriety prior to starting a couple months ago.  Patient denies any medication use or interventions prior to arrival and presents at this time for further evaluation   In ER pan CT head C spine, TL spine, maxillofacial were -ve, UA +ve, BC obtained, UDS _ve THC, ETOH 475, MG 1, K 2.4 replaced, given thiamine, LFTs elevated, bilirubin 12 , PT was lethargic but reponsive, ER did not feel PT needed to go to ICU, will admit, CIWA, IVF " thiamine MVI folic acid, abx, replace K, MG , consult GI, scd for DVT prophylaxis, identify reconciled restart home meds once reconciled       Review of Systems   Otherwise complete ROS reviewed and negative except as mentioned in the HPI.    Past Medical History:   Past Medical History:   Diagnosis Date    Alcoholism     Diabetes mellitus     Gout     Hypertension     Jaundice      Past Surgical History:  Past Surgical History:   Procedure Laterality Date    VASECTOMY       Social History:  reports that he has been smoking cigarettes. He started smoking about 15 years ago. He has a 7.1 pack-year smoking history. He has never used smokeless tobacco. He reports current alcohol use of about 12.0 standard drinks of alcohol per week. He reports current drug use. Drug: Marijuana.    Family History: family history is not on file.       Allergies:  No Known Allergies    Medications:  Prior to Admission medications    Medication Sig Start Date End Date Taking? Authorizing Provider   calcium carbonate (Calcium 600) 600 MG tablet Take 1 tablet by mouth Daily. 8/6/24  Yes Jossy Christiansen APRN   cetirizine (zyrTEC) 10 MG tablet Take 1 tablet by mouth Daily. 8/6/24  Yes Jossy Christiansen APRN   folic acid (FOLVITE) 1 MG tablet Take 1 tablet by mouth Daily. 7/15/24  Yes Jossy Christiansen APRN   furosemide (Lasix) 40 MG tablet Take 1 tablet by mouth 2 (Two) Times a Day. 9/17/24  Yes Adrien Varghese MD   lactulose 20 GM/30ML solution solution Take 30 mL by mouth 3 (Three) Times a Day. 11/20/24  Yes Adrien Varghese MD   levothyroxine (Synthroid) 75 MCG tablet Take 1 tablet by mouth Daily. 10/9/24  Yes Jossy Christiansen APRN   pantoprazole (PROTONIX) 40 MG EC tablet Take 1 tablet by mouth Daily. 9/17/24  Yes Adrien Varghese MD   potassium chloride 10 MEQ CR tablet Take 1 tablet by mouth Every 12 (Twelve) Hours. 11/20/24  Yes Adrien Varghese MD   QUEtiapine (SEROquel) 100 MG tablet Take 1 tablet by mouth Every Night. 11/20/24  Yes Abimael  "MD Adrien   spironolactone (Aldactone) 100 MG tablet Take 1 tablet by mouth Daily. 11/20/24  Yes Adrien Varghese MD   thiamine (VITAMIN B1) 100 MG tablet Take 1 tablet by mouth Daily. 7/15/24  Yes Jossy Christiansen APRN     I have utilized all available immediate resources to obtain, update, or review the patient's current medications (including all prescriptions, over-the-counter products, herbals, cannabis/cannabidiol products, and vitamin/mineral/dietary (nutritional) supplements).    Objective     Vital Signs: /73   Pulse 97   Temp 98.6 °F (37 °C) (Oral)   Resp 20   Ht 190.5 cm (75\")   Wt 134 kg (296 lb)   SpO2 92%   BMI 37.00 kg/m²   Physical Exam  Constitutional:       Appearance: He is ill-appearing.   HENT:      Head: Normocephalic.      Nose: Nose normal.   Cardiovascular:      Rate and Rhythm: Normal rate.   Pulmonary:      Breath sounds: Wheezing present.   Abdominal:      General: Abdomen is flat.   Musculoskeletal:      Cervical back: Normal range of motion.   Neurological:      General: No focal deficit present.      Mental Status: He is alert.              Results Reviewed:  Lab Results (last 24 hours)       Procedure Component Value Units Date/Time    Blood Culture - Blood, Arm, Left [[id number]] Collected: 04/01/25 1559    Specimen: Blood from Arm, Left Updated: 04/01/25 1611    Blood Culture - Blood, Hand, Right [[id number]] Collected: 04/01/25 1556    Specimen: Blood from Hand, Right Updated: 04/01/25 1603    Magnesium [[id number]]  (Abnormal) Collected: 04/01/25 1436    Specimen: Blood from Arm, Right Updated: 04/01/25 1547     Magnesium 1.0 mg/dL     Urine Drug Screen - Urine, Clean Catch [[id number]]  (Abnormal) Collected: 04/01/25 1445    Specimen: Urine, Clean Catch Updated: 04/01/25 1531     THC, Screen, Urine Positive     Phencyclidine (PCP), Urine Negative     Cocaine Screen, Urine Negative     Methamphetamine, Ur Negative     Opiate Screen Negative     Amphetamine Screen, Urine " Negative     Benzodiazepine Screen, Urine Negative     Tricyclic Antidepressants Screen Negative     Methadone Screen, Urine Negative     Barbiturates Screen, Urine Negative     Oxycodone Screen, Urine Negative     Buprenorphine, Screen, Urine Negative    Narrative:      Cutoff For Drugs Screened:    Amphetamines               500 ng/ml  Barbiturates               200 ng/ml  Benzodiazepines            150 ng/ml  Cocaine                    150 ng/ml  Methadone                  200 ng/ml  Opiates                    100 ng/ml  Phencyclidine               25 ng/ml  THC                         50 ng/ml  Methamphetamine            500 ng/ml  Tricyclic Antidepressants  300 ng/ml  Oxycodone                  100 ng/ml  Buprenorphine               10 ng/ml    The normal value for all drugs tested is negative. This report includes unconfirmed screening results, with the cutoff values listed, to be used for medical treatment purposes only.  Unconfirmed results must not be used for non-medical purposes such as employment or legal testing.  Clinical consideration should be applied to any drug of abuse test, particularly when unconfirmed results are used.      Fentanyl, Urine - Urine, Clean Catch [[id number]]  (Normal) Collected: 04/01/25 1445    Specimen: Urine, Clean Catch Updated: 04/01/25 1515     Fentanyl, Urine Negative    Narrative:      Negative Threshold:      Fentanyl 5 ng/mL     The normal value for the drug tested is negative. This report includes final unconfirmed screening results to be used for medical treatment purposes only. Unconfirmed results must not be used for non-medical purposes such as employment or legal testing. Clinical consideration should be applied to any drug of abuse test, particularly when unconfirmed results are used.           Urinalysis With Culture If Indicated - Urine, Clean Catch [[id number]]  (Abnormal) Collected: 04/01/25 1445    Specimen: Urine, Clean Catch Updated: 04/01/25 1510     Color,  UA [place]     Appearance, UA Cloudy     pH, UA 6.0     Specific Gravity, UA 1.017     Glucose, UA Negative     Ketones, UA Negative     Bilirubin, UA Large (3+)     Blood, UA Trace     Protein, UA 30 mg/dL (1+)     Leuk Esterase, UA Moderate (2+)     Nitrite, UA Positive     Urobilinogen, UA 1.0 E.U./dL    Narrative:      Dipstick results may be inaccurate due to color interference.    Protime-INR [[id number]]  (Abnormal) Collected: 04/01/25 1436    Specimen: Blood from Arm, Right Updated: 04/01/25 1507     Protime 25.4 Seconds      INR 2.16    aPTT [[id number]]  (Abnormal) Collected: 04/01/25 1436    Specimen: Blood from Arm, Right Updated: 04/01/25 1507     PTT 47.6 seconds     Narrative:      PTT = The equivalent PTT values for the therapeutic range of heparin levels at 0.3 to 0.7 U/ml are 77 - 99 seconds.    Urinalysis, Microscopic Only - Urine, Clean Catch [[id number]]  (Abnormal) Collected: 04/01/25 1445    Specimen: Urine, Clean Catch Updated: 04/01/25 1503     RBC, UA 6-10 /HPF      WBC, UA 11-20 /HPF      Bacteria, UA 4+ /HPF      Squamous Epithelial Cells, UA 21-30 /HPF      Hyaline Casts, UA 7-12 /LPF      Methodology Automated Microscopy    Urine Culture - Urine, Urine, Clean Catch [[id number]] Collected: 04/01/25 1445    Specimen: Urine, Clean Catch Updated: 04/01/25 1503    Comprehensive Metabolic Panel [[id number]]  (Abnormal) Collected: 04/01/25 1436    Specimen: Blood from Arm, Right Updated: 04/01/25 1502     Glucose 171 mg/dL      BUN 10 mg/dL      Creatinine 0.70 mg/dL      Sodium 135 mmol/L      Potassium 2.8 mmol/L      Chloride 92 mmol/L      CO2 24.0 mmol/L      Calcium 7.3 mg/dL      Total Protein 6.4 g/dL      Albumin 3.3 g/dL      ALT (SGPT) 54 U/L      AST (SGOT) 270 U/L      Alkaline Phosphatase 239 U/L      Total Bilirubin 12.8 mg/dL      Globulin 3.1 gm/dL      A/G Ratio 1.1 g/dL      BUN/Creatinine Ratio 14.3     Anion Gap 19.0 mmol/L      eGFR 119.5 mL/min/1.73     Narrative:      GFR  Categories in Chronic Kidney Disease (CKD)      GFR Category          GFR (mL/min/1.73)    Interpretation  G1                     90 or greater         Normal or high (1)  G2                      60-89                Mild decrease (1)  G3a                   45-59                Mild to moderate decrease  G3b                   30-44                Moderate to severe decrease  G4                    15-29                Severe decrease  G5                    14 or less           Kidney failure          (1)In the absence of evidence of kidney disease, neither GFR category G1 or G2 fulfill the criteria for CKD.    eGFR calculation 2021 CKD-EPI creatinine equation, which does not include race as a factor    Lipase [[id number]]  (Normal) Collected: 04/01/25 1436    Specimen: Blood from Arm, Right Updated: 04/01/25 1457     Lipase 16 U/L     Ethanol [[id number]] Collected: 04/01/25 1436    Specimen: Blood from Arm, Right Updated: 04/01/25 1457     Ethanol % 0.474 %     Narrative:      Not for legal purposes. Chain of Custody not followed.     CBC & Differential [[id number]]  (Abnormal) Collected: 04/01/25 1436    Specimen: Blood from Arm, Right Updated: 04/01/25 1451    Narrative:      The following orders were created for panel order CBC & Differential.  Procedure                               Abnormality         Status                     ---------                               -----------         ------                     CBC Auto Differential[[id number]]        Abnormal            Final result                 Please view results for these tests on the individual orders.    CBC Auto Differential [[id number]]  (Abnormal) Collected: 04/01/25 1436    Specimen: Blood from Arm, Right Updated: 04/01/25 1451     WBC 8.62 10*3/mm3      RBC 3.74 10*6/mm3      Hemoglobin 12.0 g/dL      Hematocrit 33.9 %      MCV 90.6 fL      MCH 32.1 pg      MCHC 35.4 g/dL      RDW 20.8 %      RDW-SD 68.3 fl      MPV 10.7 fL      Platelets 100  10*3/mm3      Neutrophil % 72.4 %      Lymphocyte % 14.3 %      Monocyte % 11.4 %      Eosinophil % 0.2 %      Basophil % 0.9 %      Immature Grans % 0.8 %      Neutrophils, Absolute 6.24 10*3/mm3      Lymphocytes, Absolute 1.23 10*3/mm3      Monocytes, Absolute 0.98 10*3/mm3      Eosinophils, Absolute 0.02 10*3/mm3      Basophils, Absolute 0.08 10*3/mm3      Immature Grans, Absolute 0.07 10*3/mm3           Imaging Results (Last 24 Hours)       Procedure Component Value Units Date/Time    CT Maxillofacial Without Contrast [[id number]] Collected: 04/01/25 1516     Updated: 04/01/25 1533    Narrative:      CT MAXILLOFACIAL WO CONT- 4/1/2025 1:55 PM     HISTORY: fall onto face in alley while running; S00.81XA-Abrasion of  other part of head, initial encounter; W19.XXXA-Unspecified fall,  initial encounter; Y93.02-Activity, running; E83.51-Hypocalcemia;  E87.6-Hypokalemia; N39.0-Urinary tract infection, site not specified;  H61.22-Impacted cerumen, left ear      COMPARISON: None     TOTAL DOSE LENGTH PRODUCT: 448 mGycm. Automated exposure control was  also utilized to decrease patient radiation dose.     TECHNIQUE: Axial images of the face are obtained with sagittal coronal  reconstructions.     FINDINGS: There is no facial fracture identified. The orbital globes are  intact. There is no retrobulbar pathology. No air-fluid levels seen  within the paranasal sinuses. Mastoid air cells appear well-aerated.       Impression:      1. No facial fracture identified. Orbital globes intact.        This report was signed and finalized on 4/1/2025 3:30 PM by Dr. Kassy Blair MD.       CT Thoracic Spine Without Contrast [[id number]] Collected: 04/01/25 1519     Updated: 04/01/25 1524    Narrative:      CT THORACIC SPINE WO CONTRAST- 4/1/2025 1:55 PM     HISTORY: fall in alley while running; S00.81XA-Abrasion of other part of  head, initial encounter; W19.XXXA-Unspecified fall, initial encounter;  Y93.02-Activity, running;  E83.51-Hypocalcemia; E87.6-Hypokalemia;  N39.0-Urinary tract infection, site not specified; H61.22-Impacted  cerumen, left ear     COMPARISON: CT scan dated 3/18/2025, CT scan dated 5/5/2024     DLP: 1386 mGy cm     TECHNIQUE: Serial helical tomographic images of the thoracic spine were  obtained without the use of intravenous contrast. Multiplanar  reformatted images were provided for review.     Automated exposure control was utilized to reduce patient radiation  dose.     FINDINGS:     There is no evidence of fracture or subluxation. Vertebral body heights  and alignment are well maintained. The disc spaces are preserved. There  is no significant bony narrowing of the spinal canal or neural foramina.  There is no evidence of facet lock or perch. The posterior elements are  intact.     The paraspinal soft tissues are unremarkable.       Impression:      1. No acute osseous injury or malalignment in the thoracic spine.           This report was signed and finalized on 4/1/2025 3:21 PM by Dr Elton Aguirre.       CT Lumbar Spine Without Contrast [[id number]] Collected: 04/01/25 1516     Updated: 04/01/25 1522    Narrative:      CT LUMBAR SPINE WO CONTRAST- 4/1/2025 1:55 PM     HISTORY: fall in alley while running; S00.81XA-Abrasion of other part of  head, initial encounter; W19.XXXA-Unspecified fall, initial encounter;  Y93.02-Activity, running; E83.51-Hypocalcemia; E87.6-Hypokalemia;  N39.0-Urinary tract infection, site not specified; H61.22-Impacted  cerumen, left ear     COMPARISON: None      DOSE LENGTH PRODUCT: 3918.62 mGy.cm. Automated exposure control was also  utilized to decrease patient radiation dose.     TECHNIQUE: Serial helical tomographic images of the lumbar spine were  obtained without the use of intravenous contrast. Additionally, sagittal  and coronal reformatted images were provided for review. Automated  exposure control is utilized to reduce patient radiation dose.     FINDINGS:     Counting  will assume 5 lumbar-type vertebrae. The spine is imaged from  T12 to S2.     There is no visualized acute fracture or traumatic subluxation. Lumbar  lordosis is maintained. Vertebral body heights are well maintained. Loss  of disc height with mild endplate spurring at several levels. Mild  L2-L3, L3-L4 and L4-5 level degenerative retrolisthesis. The posterior  elements are intact. Included portions of the osseous pelvis are intact.  Paraspinal soft tissues are unremarkable.       Impression:      1. No acute fracture.  2. Lumbar spine osteoarthritis change with mild multilevel degenerative  retrolisthesis.           This report was signed and finalized on 4/1/2025 3:19 PM by Dr Elton Aguirre.       CT Cervical Spine Without Contrast [[id number]] Collected: 04/01/25 1511     Updated: 04/01/25 1519    Narrative:      CT CERVICAL SPINE WO CONTRAST- 4/1/2025 1:55 PM     HISTORY: fall in alley while running; S00.81XA-Abrasion of other part of  head, initial encounter; W19.XXXA-Unspecified fall, initial encounter;  Y93.02-Activity, running; E83.51-Hypocalcemia; E87.6-Hypokalemia;  N39.0-Urinary tract infection, site not specified; H61.22-Impacted  cerumen, left ear      COMPARISON: None     TOTAL DOSE LENGTH PRODUCT: 3918.62 mGy.cm. Automated exposure control  was also utilized to decrease patient radiation dose.     TECHNIQUE: Axial images of cervical spine obtained with sagittal coronal  reconstructions.      FINDINGS:  Mild reversal normal cervical lordosis with no abnormal  subluxation. Mild degenerative disc change at C5/6 and C6/7. Early  uncinate process hypertrophy at C5-C7 with mild facet osteoarthropathy.  No cervical vertebral fracture.     No prominent central cervical canal or foraminal stenosis identified.  Paravertebral soft tissues are unremarkable.          Impression:         1. Mild reversal normal cervical lordosis may relate to head positioning  or musculoskeletal strain. No cervical vertebral  fracture or traumatic  subluxation. Early degenerative change.     This report was signed and finalized on 4/1/2025 3:15 PM by Dr. Kassy Blair MD.       CT Head Without Contrast [[id number]] Collected: 04/01/25 1510     Updated: 04/01/25 1515    Narrative:      EXAMINATION:  CT HEAD WO CONTRAST-  4/1/2025 1:55 PM     HISTORY: The patient fell while running. S00.81XA-Abrasion of other part  of head, initial encounter; W19.XXXA-Unspecified fall, initial  encounter; Y93.02-Activity, running; E83.51-Hypocalcemia;  E87.6-Hypokalemia; N39.0-Urinary tract infection, site not specified;  H61.22-Impacted cerumen, left ear.     TECHNIQUE: Multiple axial images were obtained through the brain without  contrast infusion. Multiplanar images were reconstructed.     DLP: 3918.62 mGy.cm Automated dosage reduction technique was utilized to  reduce patient dosage.     COMPARISON: 8/27/2024.     FINDINGS: There are no hemorrhage, edema or mass effect. The ventricular  system is nondilated. The visualized paranasal sinuses are clear. The  mastoid air cells are clear. No calvarial fracture is seen.          Impression:      No hemorrhage, edema or mass effect. No acute findings.        The full report of this exam was immediately signed and available to the  emergency room. The patient is currently in the emergency room.        This report was signed and finalized on 4/1/2025 3:12 PM by Dr. Yash Prakash MD.       XR Chest 1 View [[id number]] Collected: 04/01/25 1507     Updated: 04/01/25 1511    Narrative:      XR CHEST 1 VW- 4/1/2025 1:54 PM     HISTORY: fall; S00.81XA-Abrasion of other part of head, initial  encounter; W19.XXXA-Unspecified fall, initial encounter;  Y93.02-Activity, running       COMPARISON: Chest x-ray dated 8/27/2024     FINDINGS:  Upright frontal radiograph of the chest was obtained     No lung consolidation. No pleural effusion or pneumothorax. The  cardiomediastinal silhouette and pulmonary vascularity are  within normal  limits. The osseous structures and surrounding soft tissues demonstrate  no acute abnormality.       Impression:      1.  No acute process in the chest.     This report was signed and finalized on 4/1/2025 3:08 PM by Dr Elton Aguirre.             I have personally reviewed and interpreted the radiology studies and ECG obtained at time of admission.     Assessment / Plan   Assessment:   Active Hospital Problems    Diagnosis     **Hypomagnesemia    ETOH hepatitis  ETOH intoxication  Metabolic encephalopathy     Treatment Plan  The patient will be admitted to my service here at [facility].   In ER pan CT head C spine, TL spine, maxillofacial were -ve, UA +ve, BC obtained, UDS _ve THC, ETOH 475, MG 1, K 2.4 replaced, given thiamine, LFTs elevated, bilirubin 12 , PT was lethargic but reponsive, ER did not feel PT needed to go to ICU, will admit, CIWA, IVF thiamine MVI folic acid, abx, replace K, MG , consult GI, scd for DVT prophylaxis, identify reconciled restart home meds once reconciled   Medical Decision Making  Number and Complexity of problems: 3 acute  Differential Diagnosis: as above     Conditions and Status        Condition is unchanged.     [facility] [place] Data  External documents reviewed: yes  Cardiac tracing (EKG, telemetry) interpretation: yes  Radiology interpretation: yes  Labs reviewed: yes  Any tests that were considered but not ordered: no     Decision rules/scores evaluated (example [id number], [place], etc): no     Discussed with: ED     Care Planning  Shared decision making: Patient apprised of current labs, vitals, imaging and treatment plan.  They are agreeable with proceeding with plans as discussed.   Code status and discussions: full     Disposition  Social Determinants of Health that impact treatment or disposition: no  Estimated length of stay is TBD.     I confirmed that the patient's advanced care plan is present, code status is documented, and a surrogate decision maker is  listed in the patient's medical record.       The patient was seen and examined by me on 1720 at [facility] .    Electronically signed by Frankie Camacho MD, 04/01/25, 17:08 CDT.

## 2025-04-02 LAB
ALBUMIN SERPL-MCNC: 3.1 G/DL (ref 3.5–5.2)
ALBUMIN/GLOB SERPL: 1 G/DL
ALP SERPL-CCNC: 210 U/L (ref 39–117)
ALT SERPL W P-5'-P-CCNC: 51 U/L (ref 1–41)
ANION GAP SERPL CALCULATED.3IONS-SCNC: 19 MMOL/L (ref 5–15)
AST SERPL-CCNC: 295 U/L (ref 1–40)
BASOPHILS # BLD AUTO: 0.06 10*3/MM3 (ref 0–0.2)
BASOPHILS NFR BLD AUTO: 1 % (ref 0–1.5)
BILIRUB SERPL-MCNC: 13.8 MG/DL (ref 0–1.2)
BUN SERPL-MCNC: 12 MG/DL (ref 6–20)
BUN/CREAT SERPL: 20.7 (ref 7–25)
CALCIUM SPEC-SCNC: 7.1 MG/DL (ref 8.6–10.5)
CHLORIDE SERPL-SCNC: 101 MMOL/L (ref 98–107)
CO2 SERPL-SCNC: 19 MMOL/L (ref 22–29)
CREAT SERPL-MCNC: 0.58 MG/DL (ref 0.76–1.27)
DEPRECATED RDW RBC AUTO: 72.9 FL (ref 37–54)
EGFRCR SERPLBLD CKD-EPI 2021: 126.4 ML/MIN/1.73
EOSINOPHIL # BLD AUTO: 0.02 10*3/MM3 (ref 0–0.4)
EOSINOPHIL NFR BLD AUTO: 0.3 % (ref 0.3–6.2)
ERYTHROCYTE [DISTWIDTH] IN BLOOD BY AUTOMATED COUNT: 21.9 % (ref 12.3–15.4)
GLOBULIN UR ELPH-MCNC: 3 GM/DL
GLUCOSE SERPL-MCNC: 149 MG/DL (ref 65–99)
HCT VFR BLD AUTO: 29.9 % (ref 37.5–51)
HGB BLD-MCNC: 10.2 G/DL (ref 13–17.7)
IMM GRANULOCYTES # BLD AUTO: 0.05 10*3/MM3 (ref 0–0.05)
IMM GRANULOCYTES NFR BLD AUTO: 0.8 % (ref 0–0.5)
LYMPHOCYTES # BLD AUTO: 0.58 10*3/MM3 (ref 0.7–3.1)
LYMPHOCYTES NFR BLD AUTO: 9.7 % (ref 19.6–45.3)
MAGNESIUM SERPL-MCNC: 1.9 MG/DL (ref 1.6–2.6)
MCH RBC QN AUTO: 31.5 PG (ref 26.6–33)
MCHC RBC AUTO-ENTMCNC: 34.1 G/DL (ref 31.5–35.7)
MCV RBC AUTO: 92.3 FL (ref 79–97)
MONOCYTES # BLD AUTO: 0.6 10*3/MM3 (ref 0.1–0.9)
MONOCYTES NFR BLD AUTO: 10.1 % (ref 5–12)
NEUTROPHILS NFR BLD AUTO: 4.65 10*3/MM3 (ref 1.7–7)
NEUTROPHILS NFR BLD AUTO: 78.1 % (ref 42.7–76)
NRBC BLD AUTO-RTO: 0 /100 WBC (ref 0–0.2)
PLATELET # BLD AUTO: 70 10*3/MM3 (ref 140–450)
PMV BLD AUTO: 11.5 FL (ref 6–12)
POTASSIUM SERPL-SCNC: 3.6 MMOL/L (ref 3.5–5.2)
POTASSIUM SERPL-SCNC: 4.4 MMOL/L (ref 3.5–5.2)
PROT SERPL-MCNC: 6.1 G/DL (ref 6–8.5)
RBC # BLD AUTO: 3.24 10*6/MM3 (ref 4.14–5.8)
SODIUM SERPL-SCNC: 139 MMOL/L (ref 136–145)
VIT B12 BLD-MCNC: >2000 PG/ML (ref 211–946)
WBC NRBC COR # BLD AUTO: 5.96 10*3/MM3 (ref 3.4–10.8)

## 2025-04-02 PROCEDURE — 99222 1ST HOSP IP/OBS MODERATE 55: CPT | Performed by: INTERNAL MEDICINE

## 2025-04-02 PROCEDURE — 25010000002 LORAZEPAM PER 2 MG: Performed by: HOSPITALIST

## 2025-04-02 PROCEDURE — 63710000001 PREDNISOLONE PER 5 MG: Performed by: INTERNAL MEDICINE

## 2025-04-02 PROCEDURE — 25010000002 CEFTRIAXONE PER 250 MG: Performed by: HOSPITALIST

## 2025-04-02 PROCEDURE — 25010000002 FOLIC ACID 5 MG/ML SOLUTION 10 ML VIAL: Performed by: HOSPITALIST

## 2025-04-02 PROCEDURE — 84132 ASSAY OF SERUM POTASSIUM: CPT | Performed by: HOSPITALIST

## 2025-04-02 PROCEDURE — 85025 COMPLETE CBC W/AUTO DIFF WBC: CPT | Performed by: HOSPITALIST

## 2025-04-02 PROCEDURE — 83735 ASSAY OF MAGNESIUM: CPT | Performed by: HOSPITALIST

## 2025-04-02 PROCEDURE — 80053 COMPREHEN METABOLIC PANEL: CPT | Performed by: HOSPITALIST

## 2025-04-02 PROCEDURE — 25010000002 ONDANSETRON PER 1 MG: Performed by: HOSPITALIST

## 2025-04-02 PROCEDURE — 25010000002 THIAMINE HCL 200 MG/2ML SOLUTION 2 ML VIAL: Performed by: HOSPITALIST

## 2025-04-02 PROCEDURE — 25010000002 MAGNESIUM SULFATE 2 GM/50ML SOLUTION: Performed by: HOSPITALIST

## 2025-04-02 RX ORDER — PREDNISOLONE SODIUM PHOSPHATE 15 MG/5ML
40 SOLUTION ORAL DAILY
Status: DISCONTINUED | OUTPATIENT
Start: 2025-04-02 | End: 2025-04-05 | Stop reason: HOSPADM

## 2025-04-02 RX ORDER — CHLORDIAZEPOXIDE HYDROCHLORIDE 25 MG/1
50 CAPSULE, GELATIN COATED ORAL EVERY 8 HOURS SCHEDULED
Status: DISCONTINUED | OUTPATIENT
Start: 2025-04-02 | End: 2025-04-05 | Stop reason: HOSPADM

## 2025-04-02 RX ORDER — LACTULOSE 10 G/15ML
20 SOLUTION ORAL 3 TIMES DAILY
Status: DISCONTINUED | OUTPATIENT
Start: 2025-04-02 | End: 2025-04-05 | Stop reason: HOSPADM

## 2025-04-02 RX ORDER — POTASSIUM CHLORIDE 1500 MG/1
40 TABLET, EXTENDED RELEASE ORAL EVERY 4 HOURS
Status: COMPLETED | OUTPATIENT
Start: 2025-04-02 | End: 2025-04-02

## 2025-04-02 RX ADMIN — CHLORDIAZEPOXIDE HYDROCHLORIDE 50 MG: 25 CAPSULE ORAL at 13:00

## 2025-04-02 RX ADMIN — ONDANSETRON 4 MG: 2 INJECTION INTRAMUSCULAR; INTRAVENOUS at 12:52

## 2025-04-02 RX ADMIN — LORAZEPAM 1 MG: 2 INJECTION INTRAMUSCULAR; INTRAVENOUS at 02:06

## 2025-04-02 RX ADMIN — LORAZEPAM 2 MG: 1 TABLET ORAL at 05:19

## 2025-04-02 RX ADMIN — THIAMINE HYDROCHLORIDE 500 MG: 100 INJECTION, SOLUTION INTRAMUSCULAR; INTRAVENOUS at 05:19

## 2025-04-02 RX ADMIN — LORAZEPAM 2 MG: 1 TABLET ORAL at 11:53

## 2025-04-02 RX ADMIN — LORAZEPAM 2 MG: 2 INJECTION INTRAMUSCULAR; INTRAVENOUS at 21:39

## 2025-04-02 RX ADMIN — LACTULOSE 20 G: 20 SOLUTION ORAL at 21:39

## 2025-04-02 RX ADMIN — THIAMINE HYDROCHLORIDE 500 MG: 100 INJECTION, SOLUTION INTRAMUSCULAR; INTRAVENOUS at 21:39

## 2025-04-02 RX ADMIN — POTASSIUM CHLORIDE 40 MEQ: 1500 TABLET, EXTENDED RELEASE ORAL at 12:52

## 2025-04-02 RX ADMIN — LORAZEPAM 2 MG: 2 INJECTION INTRAMUSCULAR; INTRAVENOUS at 13:00

## 2025-04-02 RX ADMIN — FOLIC ACID 1 MG: 5 INJECTION, SOLUTION INTRAMUSCULAR; INTRAVENOUS; SUBCUTANEOUS at 17:20

## 2025-04-02 RX ADMIN — SODIUM CHLORIDE 1000 MG: 900 INJECTION INTRAVENOUS at 17:21

## 2025-04-02 RX ADMIN — POTASSIUM CHLORIDE 40 MEQ: 1500 TABLET, EXTENDED RELEASE ORAL at 05:19

## 2025-04-02 RX ADMIN — PREDNISOLONE SODIUM PHOSPHATE 40 MG: 15 SOLUTION ORAL at 12:59

## 2025-04-02 RX ADMIN — CHLORDIAZEPOXIDE HYDROCHLORIDE 50 MG: 25 CAPSULE ORAL at 21:39

## 2025-04-02 RX ADMIN — POTASSIUM CHLORIDE 40 MEQ: 1500 TABLET, EXTENDED RELEASE ORAL at 01:48

## 2025-04-02 RX ADMIN — LACTULOSE 20 G: 20 SOLUTION ORAL at 17:20

## 2025-04-02 RX ADMIN — Medication 1 APPLICATION: at 21:39

## 2025-04-02 RX ADMIN — LORAZEPAM 2 MG: 2 INJECTION INTRAMUSCULAR; INTRAVENOUS at 17:20

## 2025-04-02 RX ADMIN — Medication 10 ML: at 09:13

## 2025-04-02 RX ADMIN — Medication 1 TABLET: at 09:13

## 2025-04-02 RX ADMIN — Medication 1 APPLICATION: at 09:13

## 2025-04-02 RX ADMIN — PANTOPRAZOLE SODIUM 40 MG: 40 INJECTION, POWDER, FOR SOLUTION INTRAVENOUS at 05:19

## 2025-04-02 RX ADMIN — MAGNESIUM SULFATE HEPTAHYDRATE 2 G: 2 INJECTION, SOLUTION INTRAVENOUS at 01:48

## 2025-04-02 RX ADMIN — Medication 10 ML: at 21:39

## 2025-04-02 RX ADMIN — THIAMINE HYDROCHLORIDE 500 MG: 100 INJECTION, SOLUTION INTRAMUSCULAR; INTRAVENOUS at 13:00

## 2025-04-02 RX ADMIN — LACTULOSE 20 G: 20 SOLUTION ORAL at 11:53

## 2025-04-02 RX ADMIN — LORAZEPAM 1 MG: 2 INJECTION INTRAMUSCULAR; INTRAVENOUS at 09:13

## 2025-04-02 RX ADMIN — LORAZEPAM 2 MG: 2 INJECTION INTRAMUSCULAR; INTRAVENOUS at 19:29

## 2025-04-02 RX ADMIN — POTASSIUM CHLORIDE 40 MEQ: 1500 TABLET, EXTENDED RELEASE ORAL at 17:20

## 2025-04-02 NOTE — PLAN OF CARE
Problem: Adult Inpatient Plan of Care  Goal: Plan of Care Review  Outcome: Progressing  Flowsheets (Taken 4/2/2025 0705)  Progress: no change  Outcome Evaluation: Patient has no c/o pain. Replaced magnesium and potassium this shift. CIWA 9 at 0200, 1mg of IV Ativan given. Patient had multiple BM's this shift. NSR/ST  on tele. VSS. Safety maintained. Will notify MD of any changes.  Plan of Care Reviewed With: patient

## 2025-04-02 NOTE — PROGRESS NOTES
"    [facility]  INPATIENT PROGRESS NOTE    Patient Name: Luciano Christiansen  Date of Admission: 4/1/2025  Today's Date: 04/02/25  Length of Stay: 0  Primary Care Physician: Adrien Varghese MD    Subjective   Chief Complaint: fall ETOH intoxication      Fall     Alcohol Intoxication     Patient is a 40-year-old male with PMH of alcoholism, non-insulin-dependent diabetes, hypertension, vasectomy. ETOH liver cirrhosis  presenting to ED with alcohol use and fall.  PMH significant for history Patient states 2 of his dogs got out today at which time he went running chasing after them outside describing that he had been running for an extended period of time in an alley way when due to being out of shape \"my legs just gave out and I fell down.\"  Patient states that he hit his face on the alleyway first but denies any loss of consciousness.  Patient states that he had no preceding symptoms including chest pain/pressure/heaviness/tightness or syncope but felt he had just run for too long for his conditioning.  Patient states that the fall was witnessed by a bystander who called EMS.  Upon arrival to ED patient states that he does not have pain anywhere.  Patient states that he had otherwise been at his baseline recently.  Patient did state that since his last ER visit he is decreasing his alcohol intake and is currently at two 12 ounce malt whiskey beers daily.  Patient describes \"I want to keep drinking and she is nothing ever works.\"  Patient perseverates on his 100 days of sobriety prior to starting a couple months ago.  Patient denies any medication use or interventions prior to arrival and presents at this time for further evaluation   In ER pan CT head C spine, TL spine, maxillofacial were -ve, UA +ve, BC obtained, UDS _ve THC, ETOH 475, MG 1, K 2.4 replaced, given thiamine, LFTs elevated, bilirubin 12 , PT was lethargic but reponsive, ER did not feel PT needed to go to ICU, " will admit, CIWA, IVF thiamine MVI folic acid, abx, replace K, MG , consult GI, scd for DVT prophylaxis, identify reconciled restart home meds once reconciled   4/2  As before history of alcohol abuse presented with fall intoxication alcoholic hepatitis started on CIWA thiamine multivitamin folic acid GI has been consulted  Alcoholic hepatitis. MDF 74 which correlates with poor prognosis was started on prednisone 40 p.o. daily continue to follow labs watch for DTs replace his potassium magnesium ammonia was 80 started on lactulose  Review of Systems   All other systems reviewed and are negative.     All pertinent negatives and positives are as above. All other systems have been reviewed and are negative unless otherwise stated.     Objective    Temp:  [98.3 °F (36.8 °C)-99.5 °F (37.5 °C)] 99.5 °F (37.5 °C)  Heart Rate:  [] 113  Resp:  [18-20] 18  BP: (109-153)/(57-86) 145/86  Physical Exam  Constitutional:       Appearance: He is ill-appearing.   HENT:      Head: Normocephalic.      Nose: Nose normal.   Eyes:      Pupils: Pupils are equal, round, and reactive to light.   Cardiovascular:      Rate and Rhythm: Normal rate.   Pulmonary:      Breath sounds: Wheezing present.   Abdominal:      General: Abdomen is flat.   Musculoskeletal:         General: Normal range of motion.      Cervical back: Normal range of motion.   Neurological:      General: No focal deficit present.      Mental Status: He is alert.             Results Review:  I have reviewed the labs, radiology results, and diagnostic studies.    Laboratory Data:   Results from last 7 days   Lab Units 04/01/25  2018 04/01/25  1436   WBC 10*3/mm3 9.26 8.62   HEMOGLOBIN g/dL 11.6* 12.0*   HEMATOCRIT % 32.9* 33.9*   PLATELETS 10*3/mm3 99* 100*        Results from last 7 days   Lab Units 04/01/25  2018 04/01/25  1436   SODIUM mmol/L 138 135*   POTASSIUM mmol/L 2.9* 2.8*   CHLORIDE mmol/L 95* 92*   CO2 mmol/L 23.0 24.0   BUN mg/dL 12 10   CREATININE mg/dL  0.86 0.70*   CALCIUM mg/dL 7.5* 7.3*   BILIRUBIN mg/dL  --  12.8*   ALK PHOS U/L  --  239*   ALT (SGPT) U/L  --  54*   AST (SGOT) U/L  --  270*   GLUCOSE mg/dL 128* 171*       Culture Data:   Urine Culture   Date Value Ref Range Status   04/01/2025 >100,000 CFU/mL Gram Positive Cocci (A)  Preliminary       Radiology Data:   Imaging Results (Last 24 Hours)       Procedure Component Value Units Date/Time    CT Maxillofacial Without Contrast [[id number]] Collected: 04/01/25 1516     Updated: 04/01/25 1533    Narrative:      CT MAXILLOFACIAL WO CONT- 4/1/2025 1:55 PM     HISTORY: fall onto face in alley while running; S00.81XA-Abrasion of  other part of head, initial encounter; W19.XXXA-Unspecified fall,  initial encounter; Y93.02-Activity, running; E83.51-Hypocalcemia;  E87.6-Hypokalemia; N39.0-Urinary tract infection, site not specified;  H61.22-Impacted cerumen, left ear      COMPARISON: None     TOTAL DOSE LENGTH PRODUCT: 448 mGycm. Automated exposure control was  also utilized to decrease patient radiation dose.     TECHNIQUE: Axial images of the face are obtained with sagittal coronal  reconstructions.     FINDINGS: There is no facial fracture identified. The orbital globes are  intact. There is no retrobulbar pathology. No air-fluid levels seen  within the paranasal sinuses. Mastoid air cells appear well-aerated.       Impression:      1. No facial fracture identified. Orbital globes intact.        This report was signed and finalized on 4/1/2025 3:30 PM by Dr. Kassy Blair MD.       CT Thoracic Spine Without Contrast [[id number]] Collected: 04/01/25 1519     Updated: 04/01/25 1524    Narrative:      CT THORACIC SPINE WO CONTRAST- 4/1/2025 1:55 PM     HISTORY: fall in alley while running; S00.81XA-Abrasion of other part of  head, initial encounter; W19.XXXA-Unspecified fall, initial encounter;  Y93.02-Activity, running; E83.51-Hypocalcemia; E87.6-Hypokalemia;  N39.0-Urinary tract infection, site not specified;  H61.22-Impacted  cerumen, left ear     COMPARISON: CT scan dated 3/18/2025, CT scan dated 5/5/2024     DLP: 1386 mGy cm     TECHNIQUE: Serial helical tomographic images of the thoracic spine were  obtained without the use of intravenous contrast. Multiplanar  reformatted images were provided for review.     Automated exposure control was utilized to reduce patient radiation  dose.     FINDINGS:     There is no evidence of fracture or subluxation. Vertebral body heights  and alignment are well maintained. The disc spaces are preserved. There  is no significant bony narrowing of the spinal canal or neural foramina.  There is no evidence of facet lock or perch. The posterior elements are  intact.     The paraspinal soft tissues are unremarkable.       Impression:      1. No acute osseous injury or malalignment in the thoracic spine.           This report was signed and finalized on 4/1/2025 3:21 PM by Dr Elton Aguirre.       CT Lumbar Spine Without Contrast [[id number]] Collected: 04/01/25 1516     Updated: 04/01/25 1522    Narrative:      CT LUMBAR SPINE WO CONTRAST- 4/1/2025 1:55 PM     HISTORY: fall in alley while running; S00.81XA-Abrasion of other part of  head, initial encounter; W19.XXXA-Unspecified fall, initial encounter;  Y93.02-Activity, running; E83.51-Hypocalcemia; E87.6-Hypokalemia;  N39.0-Urinary tract infection, site not specified; H61.22-Impacted  cerumen, left ear     COMPARISON: None      DOSE LENGTH PRODUCT: 3918.62 mGy.cm. Automated exposure control was also  utilized to decrease patient radiation dose.     TECHNIQUE: Serial helical tomographic images of the lumbar spine were  obtained without the use of intravenous contrast. Additionally, sagittal  and coronal reformatted images were provided for review. Automated  exposure control is utilized to reduce patient radiation dose.     FINDINGS:     Counting will assume 5 lumbar-type vertebrae. The spine is imaged from  T12 to S2.     There is no  visualized acute fracture or traumatic subluxation. Lumbar  lordosis is maintained. Vertebral body heights are well maintained. Loss  of disc height with mild endplate spurring at several levels. Mild  L2-L3, L3-L4 and L4-5 level degenerative retrolisthesis. The posterior  elements are intact. Included portions of the osseous pelvis are intact.  Paraspinal soft tissues are unremarkable.       Impression:      1. No acute fracture.  2. Lumbar spine osteoarthritis change with mild multilevel degenerative  retrolisthesis.           This report was signed and finalized on 4/1/2025 3:19 PM by Dr Elton Aguirre.       CT Cervical Spine Without Contrast [[id number]] Collected: 04/01/25 1511     Updated: 04/01/25 1519    Narrative:      CT CERVICAL SPINE WO CONTRAST- 4/1/2025 1:55 PM     HISTORY: fall in alley while running; S00.81XA-Abrasion of other part of  head, initial encounter; W19.XXXA-Unspecified fall, initial encounter;  Y93.02-Activity, running; E83.51-Hypocalcemia; E87.6-Hypokalemia;  N39.0-Urinary tract infection, site not specified; H61.22-Impacted  cerumen, left ear      COMPARISON: None     TOTAL DOSE LENGTH PRODUCT: 3918.62 mGy.cm. Automated exposure control  was also utilized to decrease patient radiation dose.     TECHNIQUE: Axial images of cervical spine obtained with sagittal coronal  reconstructions.      FINDINGS:  Mild reversal normal cervical lordosis with no abnormal  subluxation. Mild degenerative disc change at C5/6 and C6/7. Early  uncinate process hypertrophy at C5-C7 with mild facet osteoarthropathy.  No cervical vertebral fracture.     No prominent central cervical canal or foraminal stenosis identified.  Paravertebral soft tissues are unremarkable.          Impression:         1. Mild reversal normal cervical lordosis may relate to head positioning  or musculoskeletal strain. No cervical vertebral fracture or traumatic  subluxation. Early degenerative change.     This report was signed and  finalized on 4/1/2025 3:15 PM by Dr. Kassy Blair MD.       CT Head Without Contrast [[id number]] Collected: 04/01/25 1510     Updated: 04/01/25 1515    Narrative:      EXAMINATION:  CT HEAD WO CONTRAST-  4/1/2025 1:55 PM     HISTORY: The patient fell while running. S00.81XA-Abrasion of other part  of head, initial encounter; W19.XXXA-Unspecified fall, initial  encounter; Y93.02-Activity, running; E83.51-Hypocalcemia;  E87.6-Hypokalemia; N39.0-Urinary tract infection, site not specified;  H61.22-Impacted cerumen, left ear.     TECHNIQUE: Multiple axial images were obtained through the brain without  contrast infusion. Multiplanar images were reconstructed.     DLP: 3918.62 mGy.cm Automated dosage reduction technique was utilized to  reduce patient dosage.     COMPARISON: 8/27/2024.     FINDINGS: There are no hemorrhage, edema or mass effect. The ventricular  system is nondilated. The visualized paranasal sinuses are clear. The  mastoid air cells are clear. No calvarial fracture is seen.          Impression:      No hemorrhage, edema or mass effect. No acute findings.        The full report of this exam was immediately signed and available to the  emergency room. The patient is currently in the emergency room.        This report was signed and finalized on 4/1/2025 3:12 PM by Dr. Yash Prakash MD.       XR Chest 1 View [[id number]] Collected: 04/01/25 1507     Updated: 04/01/25 1511    Narrative:      XR CHEST 1 VW- 4/1/2025 1:54 PM     HISTORY: fall; S00.81XA-Abrasion of other part of head, initial  encounter; W19.XXXA-Unspecified fall, initial encounter;  Y93.02-Activity, running       COMPARISON: Chest x-ray dated 8/27/2024     FINDINGS:  Upright frontal radiograph of the chest was obtained     No lung consolidation. No pleural effusion or pneumothorax. The  cardiomediastinal silhouette and pulmonary vascularity are within normal  limits. The osseous structures and surrounding soft tissues demonstrate  no  acute abnormality.       Impression:      1.  No acute process in the chest.     This report was signed and finalized on 4/1/2025 3:08 PM by Dr Elton Aguirre.               I have reviewed the patient's current medications.     Assessment/Plan   Assessment  Active Hospital Problems    Diagnosis     **Hypomagnesemia        Treatment Plan  The patient will be admitted to my service here at [facility].   In ER pan CT head C spine, TL spine, maxillofacial were -ve, UA +ve, BC obtained, UDS _ve THC, ETOH 475, MG 1, K 2.4 replaced, given thiamine, LFTs elevated, bilirubin 12 , PT was lethargic but reponsive, ER did not feel PT needed to go to ICU, will admit, CIWA, IVF thiamine MVI folic acid, abx, replace K, MG , consult GI, scd for DVT prophylaxis, identify reconciled restart home meds once reconciled   Medical Decision Making  Number and Complexity of problems: 3 acute  Differential Diagnosis: as above      Conditions and Status        Condition is unchanged.     [facility] Data  External documents reviewed: yes  Cardiac tracing (EKG, telemetry) interpretation: yes  Radiology interpretation: yes  Labs reviewed: yes  Any tests that were considered but not ordered: no     Decision rules/scores evaluated (example [id number], [place], etc): no     Discussed with: ED     Care Planning  Shared decision making: Patient apprised of current labs, vitals, imaging and treatment plan.  They are agreeable with proceeding with plans as discussed.   Code status and discussions: full      Disposition  Social Determinants of Health that impact treatment or disposition: no  Estimated length of stay is TBD.      I confirmed that the patient's advanced care plan is present, code status is documented, and a surrogate decision maker is listed in the patient's medical record        Electronically signed by Frankie Camacho MD, 04/02/25, 10:15 CDT.

## 2025-04-02 NOTE — PLAN OF CARE
Goal Outcome Evaluation:  Plan of Care Reviewed With: caregiver        Progress: no change       RN stated the patient does not require ST evaluation. RNBrenda will d/c the order. ST will not complete evaluation.    Halima Almendarez, SLP 4/2/2025 11:44 CDT

## 2025-04-02 NOTE — PAYOR COMM NOTE
"4/2/25 [facility] [phone]  [phone]    ER ADMIT TO OBSERVATION ON 4/1/25 OBSERVATION STATUS. NO PRE-CERT.    4/2/25 MD CHANGED STATUS TO INPATIENT.    4/2/25. INPATIENT ORDER.    FAXING FOR INPATIENT REVIEW.            Luciano Howell (40 y.o. Male)       Date of Birth   1985    Social Security Number       Address   [address] [address]    Home Phone   [phone]    MRN   [id number]       Taoism   Other    Marital Status   Single                            Admission Date   4/1/2025    Admission Type   Emergency    Admitting Provider   Frankie Camacho MD    Attending Provider   Frankie Camacho MD    Department, Room/Bed   [address], 453/1       Discharge Date       Discharge Disposition       Discharge Destination                                 Attending Provider: Frankie Camacho MD    Allergies: No Known Allergies    Isolation: None   Infection: None   Code Status: CPR    Ht: 190.5 cm (75\")   Wt: 127 kg (280 lb)    Admission Cmt: None   Principal Problem: Hypomagnesemia [E83.42]                   Active Insurance as of 4/1/2025       Primary Coverage       Payor Plan Insurance Group Employer/Plan Group    [employer] U[employer] [id number]       Payor Plan Address Payor Plan Phone Number Payor Plan Fax Number Effective Dates    [address] [address] [phone]  8/1/2020 - None Entered    [facility] [id number]         Subscriber Name Subscriber Birth Date Member ID       LUCIANO HOWELL 1985 [id number]                     Emergency Contacts        (Rel.) Home Phone Work Phone Mobile Phone    MAGO HOWELL (Mother) [phone] -- [phone]    Jossy Howell (Daughter) -- -- [phone]               [facility] Encounter Date/Time: 4/1/2025 [facility]   Hospital Account: [id number]    MRN: [id number]   Patient:  Luciano Howell   Contact Serial #: [id number]   SSN:          ENCOUNTER             Patient Class: Inpatient "   Unit: [address] Service: Medicine     Bed: 453/1   Admitting Provider: Frankie Camacho MD   Referring Physician:     Attending Provider: Frankie Camacho MD   Adm Diagnosis: Hypomagnesemia [E83.42]               PATIENT             Name: Luciano Howell : 1985 (40 yrs)   Address: [address] [place] [address] Sex: Male   City: [place] [address]   [facility]: [facility]   Marital Status: SINGLE Ethnicity: NOT        Race: WHITE   Primary Care Provider: Adrien Varghese MD Patients Phone: Home Phone: [phone]  Work Phone: [phone]  Mobile Phone: [phone]     EMERGENCY CONTACT   Contact Name Legal Guardian? Relationship to Patient Home Phone Work Phone Mobile Phone   1. ENGLISHMAGO  2. Jossy Howell      Mother  Daughter [phone]   GUARANTOR             Guarantor: Luciano Howell     : 1985   Address: [address] [place] [address] Sex: [place] [address]     Relation to Patient: Self       Home Phone: [phone]   Guarantor ID: [id number]       Work Phone: [phone]   GUARANTOR EMPLOYER   Employer: [employer]         Status: FULL TIME   COVERAGE          PRIMARY INSURANCE   Payor: UMR Plan: UMR   Group Number: [id number] Insurance Type: INDEMNITY   Subscriber Name: LUCIANO HOWELL Subscriber : 1985   Subscriber ID: [id number] Coverage Address: [place] [address]   Pat. Rel. to Subscriber: Self Coverage Phone: [phone]   SECONDARY INSURANCE   Payor: N/A Plan: N/A   Group Number:   Insurance Type:     Subscriber Name:   Subscriber :     Subscriber ID:   Coverage Address:     Pat. Rel. to Subscriber:   Coverage Phone:        Contact Serial # ([id number])         2025    Chart ID [id number])                          [address] [address]  Phone:  [phone]  Fax:  [phone]        Patient:     Luciano Howell MRN:  [id number]   [address] [place] [address]  "[facility] [place] [address] :  1985  SSN:    Phone: [phone] Sex:  M      INSURANCE PAYOR PLAN GROUP # SUBSCRIBER ID   Primary:    [facility] [id number]   Admitting Diagnosis: Hypomagnesemia [E83.42]  Order Date:  2025        Inpatient Admission       (Order ID: [id number])     Diagnosis:         Priority:  Routine Expected Date:   Expiration Date:        Interval:   Count:    Level of Care: Telemetry [5]  Diagnosis: Hypomagnesemia [976272]  Admitting Physician: JEM THOMAS [[id number]]  Attending Physician: JEM THOMAS [[id number]]  Certification: I Certify That Inpatient Hospital Services Are Medically Necessary For Greater Than 2 Midnights     Specimen Type:   Specimen Source:   Specimen Taken Date:   Specimen Taken Time:                  Verbal Order Mode: Telephone with readback   Authorizing Provider: Jem Thomas MD  Authorizing Provider's NPI: [id number]     Order Entered By: Marti Mayorga LPN 2025 12:52 PM     Electronically signed by:             Srinath Valle PA-C   Physician Assistant  Emergency Medicine     ED Provider Notes      Cosign Needed     Date of Service: 25  Creation Time: 25     Cosign Needed       Expand All Collapse All    Subjective  History of Present Illness     Patient is a 40-year-old male presenting to ED with alcohol use and fall.  PMH significant for history of alcoholism, non-insulin-dependent diabetes, hypertension, vasectomy.  Patient states 2 of his dogs got out today at which time he went running chasing after them outside describing that he had been running for an extended period of time in an alley way when due to being out of shape \"my legs just gave out and I fell down.\"  Patient states that he hit his face on the alleyway first but denies any loss of consciousness.  Patient states that he had no preceding symptoms including chest pain/pressure/heaviness/tightness or syncope but felt he had just run for too " "long for his conditioning.  Patient states that the fall was witnessed by a bystander who called EMS.  Upon arrival to ED patient states that he does not have pain anywhere.  Patient states that he had otherwise been at his baseline recently.  Patient did state that since his last ER visit he is decreasing his alcohol intake and is currently at two 12 ounce malt whiskey beers daily.  Patient describes \"I want to keep drinking and she is nothing ever works.\"  Patient perseverates on his 100 days of sobriety prior to starting a couple months ago.  Patient denies any medication use or interventions prior to arrival and presents at this time for further evaluation.     Record reviewed show patient was last seen in the ED on 3/18/2025 for hypomagnesemia, elevated LFTs, hypokalemia, alcoholism, thrombocytopenia, hepatic cirrhosis, portal hypertension, splenomegaly, gallstones, esophageal varices, GI bleed.     Review of Systems   Constitutional: Negative.    HENT: Negative.  Negative for facial swelling, nosebleeds and tinnitus.    Eyes: Negative.  Negative for visual disturbance.   Respiratory: Negative.  Negative for stridor.    Cardiovascular: Negative.  Negative for chest pain and palpitations.   Gastrointestinal:  Positive for diarrhea. Negative for abdominal pain, nausea and vomiting.   Genitourinary: Negative.    Musculoskeletal: Negative.  Negative for arthralgias, back pain, gait problem and neck pain.   Skin:  Positive for wound (abrasion, left face).   Neurological:  Negative for syncope and headaches.        Reports + head inury  Denies LOC   Psychiatric/Behavioral: Negative.  Negative for confusion.    All other systems reviewed and are negative.        Medical History        Past Medical History:   Diagnosis Date    Alcoholism      Diabetes mellitus      Gout      Hypertension      Jaundice              Allergies   No Known Allergies        Surgical History         Past Surgical History:   Procedure " Laterality Date    VASECTOMY                History reviewed. No pertinent family history.     Social History   Social History            Socioeconomic History    Marital status: Single   Tobacco Use    Smoking status: Some Days       Current packs/day: 0.25       Average packs/day: 0.5 packs/day for 15.2 years (7.1 ttl pk-yrs)       Types: Cigarettes       Start date: 2010    Smokeless tobacco: Never   Vaping Use    Vaping status: Never Used    Passive vaping exposure: Yes   Substance and Sexual Activity    Alcohol use: Yes       Alcohol/week: 12.0 standard drinks of alcohol       Types: 12 Cans of beer per week       Comment: 90 days sober    Drug use: Yes       Types: Marijuana    Sexual activity: Yes                  Objective[]Expand by Default  Physical Exam  Vitals and nursing note reviewed.   Constitutional:       General: He is not in acute distress.     Appearance: Normal appearance. He is obese. He is not ill-appearing, toxic-appearing or diaphoretic.   HENT:      Head: Normocephalic.      Comments: Superficial scratches noted on the left cheek with no deep lacerations.  Remainder of face and scalp are atraumatic with no further evidence of injury or acute abnormalities.     Ears:      Comments: Cerumen impaction noted on the left side.  No evidence of hemotympanum to the right side.  Eyes:      General: Scleral icterus present.      Extraocular Movements: Extraocular movements intact.      Pupils: Pupils are equal, round, and reactive to light.   Cardiovascular:      Rate and Rhythm: Normal rate.   Pulmonary:      Effort: Pulmonary effort is normal.   Abdominal:      General: Bowel sounds are normal. There is no distension.      Palpations: Abdomen is soft.      Tenderness: There is no abdominal tenderness. There is no right CVA tenderness, left CVA tenderness or guarding.   Musculoskeletal:         General: No tenderness or signs of injury.      Cervical back: Normal range of motion and neck supple.       Comments: No spinal midline or paraspinal muscular abnormalities.  All 4 extremities are unremarkable with no evidence of injury.  No bony tenderness, no joint swelling, all 4 EXTR neurovascular intact distally.     Skin:     General: Skin is warm.      Coloration: Skin is jaundiced.      Findings: Abrasion (as described in HEENT section) present. No bruising or laceration.   Neurological:      Mental Status: He is alert and oriented to person, place, and time.      Gait: Gait normal.   Psychiatric:         Mood and Affect: Mood normal.         Behavior: Behavior normal.            Procedures              ED Course     MELD SCORE: 25 (87.7% estimated 90-day survival)                                                          Medical Decision Making  Problems Addressed:  Abrasion of cheek, initial encounter: complicated acute illness or injury  Alcoholic intoxication with complication: complicated acute illness or injury  Elevated LFTs: complicated acute illness or injury  Fall injury while running: complicated acute illness or injury  Hypocalcemia: complicated acute illness or injury  Hypokalemia: complicated acute illness or injury  Impacted cerumen of left ear: complicated acute illness or injury  Marijuana use: complicated acute illness or injury  Urinary tract infection without hematuria, site unspecified: complicated acute illness or injury     Amount and/or Complexity of Data Reviewed  External Data Reviewed: labs, radiology and notes.  Labs: ordered. Decision-making details documented in ED Course.  Radiology: ordered. Decision-making details documented in ED Course.  ECG/medicine tests: ordered. Decision-making details documented in ED Course.  Discussion of management or test interpretation with external provider(s): LÁZARO Lopez (hospitalist)     Risk  OTC drugs.  Prescription drug management.  Decision regarding hospitalization.           Patient is a 40-year-old male presenting to ED with  "alcohol use and fall.  PMH significant for history of alcoholism, non-insulin-dependent diabetes, hypertension, vasectomy.  Upon initial evaluation patient resting in the bed in no acute distress.  Nontoxic-appearing, non-ill-appearing, nondiaphoretic.  Patient is tachycardic with otherwise unremarkable vital signs.  Examination finds cerumen impaction noted on the left side.  No evidence of hemotympanum to the right side.  No epistaxis or septal hematoma bilaterally.  No intraoral, dental, tongue injuries. Superficial scratches noted on the left cheek with no deep lacerations.  Remainder of face and scalp are atraumatic with no further evidence of injury or acute abnormalities.  Scleral icterus with no other acute periorbital or conjunctival abnormalities.  No spinal midline or paraspinal muscular abnormalities.  All 4 extremities are unremarkable with no evidence of injury.  No bony tenderness, no joint swelling, all 4 EXTR neurovascular intact distally.  No other acute examination findings.  Discussed with patient need for workup to include labs and imaging.  Discussed with patient need for thiamine replacement, Zofran.  Patient states at this time he is wanting help with drinking describing \"I have always wanted help but I just never seems to work.\"  Patient would like for his workup to decide on disposition planning but is otherwise amenable to treatment plan with no further questions, turns, needs at this time.     Differential diagnosis: Facial bone fracture, zygomatic arch fracture, orbital floor fracture, vertebral fracture, bulging/herniated disc, rib fracture, hepatic dysfunction, electrolyte disturbance, systemic infection, other     Lab work  revealed normal white blood cell count, H&H 12/33.9 slightly decreased from 12.4/35.6 performed 2 weeks ago.  Improvement in thrombocytopenia at 100 today compared to 35 2 weeks ago.  No further CBC abnormalities.  CMP with worsening LFTs.  Today ALT 54, , " alk phos 239, total bilirubin 12.8 compared to , ALT 40, alk phos 221 and total bilirubin 5 performed 2 weeks ago.  Anion gap elevated at 19.  Hyperglycemia 171, but kalemia 2.8, hypochloremia 92, hypocalcemia 7.3, hypomagnesemia of 1 for which patient was given IV replacement.  No renal dysfunction and no further electro disturbances.  Low concern for pancreatic abnormality such as pancreatitis with normal lipase.  PT/INR 25.4/2.16.  Evidence of urinary tract infection with moderate leukocytes, positive nitrites, 11-20 WBC and 4+ bacteria however culture will be sent as there are 21-30 squamous epithelium.  Patient was given initially Rocephin.  UDS positive for THC and otherwise unremarkable.  Alcohol elevated at 0.474.  Chest x-ray showed: No acute process in the chest.  Head CT showed: No hemorrhage, edema, mass effect or acute findings.  Maxillofacial CT showed: No facial fracture identified, orbital globes intact.  Cervical spine CT showed: Mild reversal normal cervical lordosis may relate to head position or musculoskeletal strain, no cervical vertebral fracture or traumatic subluxation, early degenerative changes.  Thoracic spine CT showed: No acute osseous injury or malalignment.  Lumbar spine CT showed: No acute fracture, lumbar spine osteoarthritis change with mild multilevel degenerative retrolisthesis.  Patient was given thiamine replacement in the setting of known alcoholism, Zofran which resolved his nausea, his tetanus status was updated.  Patient was given Rocephin for treatment of urinary tract infection as well as electrolyte replacement.  Discussed with patient need for admission for further evaluation and treatment as well as discussion of referral to a detox/treatment facility for which she states he is amenable today with no further questions, concerns, or needs.  Case was discussed with LÁZARO Lopez, for hospitalist group, who will come accept patient for admission under  "the services of Dr. Thomas.      Final diagnoses:   Abrasion of cheek, initial encounter   Fall injury while running   Hypocalcemia   Hypokalemia   Urinary tract infection without hematuria, site unspecified   Impacted cerumen of left ear   Marijuana use   Alcoholic intoxication with complication   Elevated LFTs   Need for Tdap vaccination         ED Disposition  ED Disposition         ED Disposition   Decision to Admit    Condition   --    Comment   Level of Care: Med/Surg [1]   Diagnosis: Hypomagnesemia [920023]   Admitting Physician: JEM THOMAS [[id number]]                      No follow-up provider specified.         Medication List       No changes were made to your prescriptions during this visit.               Srinath Valle PA-C  04/01/25 1655                 Jem Thomas MD   Physician  Hospitalist     H&P      Signed     Date of Service: 04/01/25 1708  Creation Time: 04/01/25 1708     Signed       Expand All [facility] Medicine Services  HISTORY AND PHYSICAL     Date of Admission: 4/1/2025  Primary Care Physician: Adrien Varghese MD     Subjective   Primary Historian: presented via EMS after fall      Chief Complaint: fall ETOH intoxication      Fall     Alcohol Intoxication     Patient is a 40-year-old male with PMH of alcoholism, non-insulin-dependent diabetes, hypertension, vasectomy. ETOH liver cirrhosis  presenting to ED with alcohol use and fall.  PMH significant for history Patient states 2 of his dogs got out today at which time he went running chasing after them outside describing that he had been running for an extended period of time in an alley way when due to being out of shape \"my legs just gave out and I fell down.\"  Patient states that he hit his face on the alleyway first but denies any loss of consciousness.  Patient states that he had no preceding symptoms including chest pain/pressure/heaviness/tightness or syncope but felt he had " "just run for too long for his conditioning.  Patient states that the fall was witnessed by a bystander who called EMS.  Upon arrival to ED patient states that he does not have pain anywhere.  Patient states that he had otherwise been at his baseline recently.  Patient did state that since his last ER visit he is decreasing his alcohol intake and is currently at two 12 ounce malt whiskey beers daily.  Patient describes \"I want to keep drinking and she is nothing ever works.\"  Patient perseverates on his 100 days of sobriety prior to starting a couple months ago.  Patient denies any medication use or interventions prior to arrival and presents at this time for further evaluation   In ER pan CT head C spine, TL spine, maxillofacial were -ve, UA +ve, BC obtained, UDS _ve THC, ETOH 475, MG 1, K 2.4 replaced, given thiamine, LFTs elevated, bilirubin 12 , PT was lethargic but reponsive, ER did not feel PT needed to go to ICU, will admit, CIWA, IVF thiamine MVI folic acid, abx, replace K, MG , consult GI, scd for DVT prophylaxis, identify reconciled restart home meds once reconciled         Review of Systems   Otherwise complete ROS reviewed and negative except as mentioned in the HPI.     Past Medical History:   Medical History[]Expand by Default        Past Medical History:   Diagnosis Date    Alcoholism      Diabetes mellitus      Gout      Hypertension      Jaundice           Past Surgical History:  Surgical History         Past Surgical History:   Procedure Laterality Date    VASECTOMY             Social History:  reports that he has been smoking cigarettes. He started smoking about 15 years ago. He has a 7.1 pack-year smoking history. He has never used smokeless tobacco. He reports current alcohol use of about 12.0 standard drinks of alcohol per week. He reports current drug use. Drug: Marijuana.     Family History: family history is not on file.        Allergies:  Allergies   No Known Allergies        Medications:    " "      Prior to Admission medications    Medication Sig Start Date End Date Taking? Authorizing Provider   calcium carbonate (Calcium 600) 600 MG tablet Take 1 tablet by mouth Daily. 8/6/24   Yes Jossy Christiansen APRN   cetirizine (zyrTEC) 10 MG tablet Take 1 tablet by mouth Daily. 8/6/24   Yes Jossy Christiansen APRN   folic acid (FOLVITE) 1 MG tablet Take 1 tablet by mouth Daily. 7/15/24   Yes Jossy Christiansen APRN   furosemide (Lasix) 40 MG tablet Take 1 tablet by mouth 2 (Two) Times a Day. 9/17/24   Yes Adrien Varghese MD   lactulose 20 GM/30ML solution solution Take 30 mL by mouth 3 (Three) Times a Day. 11/20/24   Yes Adrien Varghese MD   levothyroxine (Synthroid) 75 MCG tablet Take 1 tablet by mouth Daily. 10/9/24   Yes Jossy Christiansen APRN   pantoprazole (PROTONIX) 40 MG EC tablet Take 1 tablet by mouth Daily. 9/17/24   Yes Adrien Varghese MD   potassium chloride 10 MEQ CR tablet Take 1 tablet by mouth Every 12 (Twelve) Hours. 11/20/24   Yes Adrien Varghese MD   QUEtiapine (SEROquel) 100 MG tablet Take 1 tablet by mouth Every Night. 11/20/24   Yes Adrien Varghese MD   spironolactone (Aldactone) 100 MG tablet Take 1 tablet by mouth Daily. 11/20/24   Yes Adrien Varghese MD   thiamine (VITAMIN B1) 100 MG tablet Take 1 tablet by mouth Daily. 7/15/24   Yes Jossy Christiansen APRN      I have utilized all available immediate resources to obtain, update, or review the patient's current medications (including all prescriptions, over-the-counter products, herbals, cannabis/cannabidiol products, and vitamin/mineral/dietary (nutritional) supplements).     Objective      Vital Signs: /73   Pulse 97   Temp 98.6 °F (37 °C) (Oral)   Resp 20   Ht 190.5 cm (75\")   Wt 134 kg (296 lb)   SpO2 92%   BMI 37.00 kg/m²   Physical Exam  Constitutional:       Appearance: He is ill-appearing.   HENT:      Head: Normocephalic.      Nose: Nose normal.   Cardiovascular:      Rate and Rhythm: Normal rate.   Pulmonary:      Breath " sounds: Wheezing present.   Abdominal:      General: Abdomen is flat.   Musculoskeletal:      Cervical back: Normal range of motion.   Neurological:      General: No focal deficit present.      Mental Status: He is alert.                  Results Reviewed:  Lab Results (last 24 hours)         Procedure Component Value Units Date/Time     Blood Culture - Blood, Arm, Left [[id number]] Collected: 04/01/25 1559     Specimen: Blood from Arm, Left Updated: 04/01/25 1611     Blood Culture - Blood, Hand, Right [[id number]] Collected: 04/01/25 1556     Specimen: Blood from Hand, Right Updated: 04/01/25 1603     Magnesium [[id number]]  (Abnormal) Collected: 04/01/25 1436     Specimen: Blood from Arm, Right Updated: 04/01/25 1547       Magnesium 1.0 mg/dL       Urine Drug Screen - Urine, Clean Catch [[id number]]  (Abnormal) Collected: 04/01/25 1445     Specimen: Urine, Clean Catch Updated: 04/01/25 1531       THC, Screen, Urine Positive       Phencyclidine (PCP), Urine Negative       Cocaine Screen, Urine Negative       Methamphetamine, Ur Negative       Opiate Screen Negative       Amphetamine Screen, Urine Negative       Benzodiazepine Screen, Urine Negative       Tricyclic Antidepressants Screen Negative       Methadone Screen, Urine Negative       Barbiturates Screen, Urine Negative       Oxycodone Screen, Urine Negative       Buprenorphine, Screen, Urine Negative     Narrative:       Cutoff For Drugs Screened:     Amphetamines               500 ng/ml  Barbiturates               200 ng/ml  Benzodiazepines            150 ng/ml  Cocaine                    150 ng/ml  Methadone                  200 ng/ml  Opiates                    100 ng/ml  Phencyclidine               25 ng/ml  THC                         50 ng/ml  Methamphetamine            500 ng/ml  Tricyclic Antidepressants  300 ng/ml  Oxycodone                  100 ng/ml  Buprenorphine               10 ng/ml     The normal value for all drugs tested is negative. This report  includes unconfirmed screening results, with the cutoff values listed, to be used for medical treatment purposes only.  Unconfirmed results must not be used for non-medical purposes such as employment or legal testing.  Clinical consideration should be applied to any drug of abuse test, particularly when unconfirmed results are used.       Fentanyl, Urine - Urine, Clean Catch [[id number]]  (Normal) Collected: 04/01/25 1445     Specimen: Urine, Clean Catch Updated: 04/01/25 1515       Fentanyl, Urine Negative     Narrative:       Negative Threshold:       Fentanyl 5 ng/mL      The normal value for the drug tested is negative. This report includes final unconfirmed screening results to be used for medical treatment purposes only. Unconfirmed results must not be used for non-medical purposes such as employment or legal testing. Clinical consideration should be applied to any drug of abuse test, particularly when unconfirmed results are used.            Urinalysis With Culture If Indicated - Urine, Clean Catch [[id number]]  (Abnormal) Collected: 04/01/25 1445     Specimen: Urine, Clean Catch Updated: 04/01/25 1510       Color, UA Orange       Appearance, UA Cloudy       pH, UA 6.0       Specific Gravity, UA 1.017       Glucose, UA Negative       Ketones, UA Negative       Bilirubin, UA Large (3+)       Blood, UA Trace       Protein, UA 30 mg/dL (1+)       Leuk Esterase, UA Moderate (2+)       Nitrite, UA Positive       Urobilinogen, UA 1.0 E.U./dL     Narrative:       Dipstick results may be inaccurate due to color interference.     Protime-INR [[id number]]  (Abnormal) Collected: 04/01/25 1436     Specimen: Blood from Arm, Right Updated: 04/01/25 1507       Protime 25.4 Seconds         INR 2.16     aPTT [[id number]]  (Abnormal) Collected: 04/01/25 1436     Specimen: Blood from Arm, Right Updated: 04/01/25 1507       PTT 47.6 seconds       Narrative:       PTT = The equivalent PTT values for the therapeutic range of heparin  levels at 0.3 to 0.7 U/ml are 77 - 99 seconds.     Urinalysis, Microscopic Only - Urine, Clean Catch [[id number]]  (Abnormal) Collected: 04/01/25 1445     Specimen: Urine, Clean Catch Updated: 04/01/25 1503       RBC, UA 6-10 /HPF         WBC, UA 11-20 /HPF         Bacteria, UA 4+ /HPF         Squamous Epithelial Cells, UA 21-30 /HPF         Hyaline Casts, UA 7-12 /LPF         Methodology Automated Microscopy     Urine Culture - Urine, Urine, Clean Catch [[id number]] Collected: 04/01/25 1445     Specimen: Urine, Clean Catch Updated: 04/01/25 1503     Comprehensive Metabolic Panel [[id number]]  (Abnormal) Collected: 04/01/25 1436     Specimen: Blood from Arm, Right Updated: 04/01/25 1502       Glucose 171 mg/dL         BUN 10 mg/dL         Creatinine 0.70 mg/dL         Sodium 135 mmol/L         Potassium 2.8 mmol/L         Chloride 92 mmol/L         CO2 24.0 mmol/L         Calcium 7.3 mg/dL         Total Protein 6.4 g/dL         Albumin 3.3 g/dL         ALT (SGPT) 54 U/L         AST (SGOT) 270 U/L         Alkaline Phosphatase 239 U/L         Total Bilirubin 12.8 mg/dL         Globulin 3.1 gm/dL         A/G Ratio 1.1 g/dL         BUN/Creatinine Ratio 14.3       Anion Gap 19.0 mmol/L         eGFR 119.5 mL/min/1.73       Narrative:       GFR Categories in Chronic Kidney Disease (CKD)                         GFR Category          GFR (mL/min/1.73)    Interpretation  G1                       90 or greater              Normal or high (1)  G2                               60-89                Mild decrease (1)  G3a                   45-59                Mild to moderate decrease  G3b                   30-44                Moderate to severe decrease  G4                    15-29                Severe decrease  G5                    14 or less           Kidney failure                                                 (1)In the absence of evidence of kidney disease, neither GFR category G1 or G2 fulfill the criteria for CKD.      eGFR calculation 2021 CKD-EPI creatinine equation, which does not include race as a factor     Lipase [[id number]]  (Normal) Collected: 04/01/25 1436     Specimen: Blood from Arm, Right Updated: 04/01/25 1457       Lipase 16 U/L       Ethanol [[id number]] Collected: 04/01/25 1436     Specimen: Blood from Arm, Right Updated: 04/01/25 1457       Ethanol % 0.474 %       Narrative:       Not for legal purposes. Chain of Custody not followed.      CBC & Differential [[id number]]  (Abnormal) Collected: 04/01/25 1436     Specimen: Blood from Arm, Right Updated: 04/01/25 1451     Narrative:       The following orders were created for panel order CBC & Differential.  Procedure                               Abnormality         Status                     ---------                               -----------         ------                     CBC Auto Differential[[id number]]        Abnormal            Final result                  Please view results for these tests on the individual orders.     CBC Auto Differential [[id number]]  (Abnormal) Collected: 04/01/25 1436     Specimen: Blood from Arm, Right Updated: 04/01/25 1451       WBC 8.62 10*3/mm3         RBC 3.74 10*6/mm3         Hemoglobin 12.0 g/dL         Hematocrit 33.9 %         MCV 90.6 fL         MCH 32.1 pg         MCHC 35.4 g/dL         RDW 20.8 %         RDW-SD 68.3 fl         MPV 10.7 fL         Platelets 100 10*3/mm3         Neutrophil % 72.4 %         Lymphocyte % 14.3 %         Monocyte % 11.4 %         Eosinophil % 0.2 %         Basophil % 0.9 %         Immature Grans % 0.8 %         Neutrophils, Absolute 6.24 10*3/mm3         Lymphocytes, Absolute 1.23 10*3/mm3         Monocytes, Absolute 0.98 10*3/mm3         Eosinophils, Absolute 0.02 10*3/mm3         Basophils, Absolute 0.08 10*3/mm3         Immature Grans, Absolute 0.07 10*3/mm3               Imaging Results (Last 24 Hours)         Procedure Component Value Units Date/Time     CT Maxillofacial Without Contrast  [[id number]] Collected: 04/01/25 1516       Updated: 04/01/25 1533     Narrative:       CT MAXILLOFACIAL WO CONT- 4/1/2025 1:55 PM     HISTORY: fall onto face in alley while running; S00.81XA-Abrasion of  other part of head, initial encounter; W19.XXXA-Unspecified fall,  initial encounter; Y93.02-Activity, running; E83.51-Hypocalcemia;  E87.6-Hypokalemia; N39.0-Urinary tract infection, site not specified;  H61.22-Impacted cerumen, left ear      COMPARISON: None     TOTAL DOSE LENGTH PRODUCT: 448 mGycm. Automated exposure control was  also utilized to decrease patient radiation dose.     TECHNIQUE: Axial images of the face are obtained with sagittal coronal  reconstructions.     FINDINGS: There is no facial fracture identified. The orbital globes are  intact. There is no retrobulbar pathology. No air-fluid levels seen  within the paranasal sinuses. Mastoid air cells appear well-aerated.        Impression:       1. No facial fracture identified. Orbital globes intact.        This report was signed and finalized on 4/1/2025 3:30 PM by Dr. Kassy Blair MD.        CT Thoracic Spine Without Contrast [[id number]] Collected: 04/01/25 1519       Updated: 04/01/25 1524     Narrative:       CT THORACIC SPINE WO CONTRAST- 4/1/2025 1:55 PM     HISTORY: fall in alley while running; S00.81XA-Abrasion of other part of  head, initial encounter; W19.XXXA-Unspecified fall, initial encounter;  Y93.02-Activity, running; E83.51-Hypocalcemia; E87.6-Hypokalemia;  N39.0-Urinary tract infection, site not specified; H61.22-Impacted  cerumen, left ear     COMPARISON: CT scan dated 3/18/2025, CT scan dated 5/5/2024     DLP: 1386 mGy cm     TECHNIQUE: Serial helical tomographic images of the thoracic spine were  obtained without the use of intravenous contrast. Multiplanar  reformatted images were provided for review.     Automated exposure control was utilized to reduce patient radiation  dose.     FINDINGS:     There is no evidence of  fracture or subluxation. Vertebral body heights  and alignment are well maintained. The disc spaces are preserved. There  is no significant bony narrowing of the spinal canal or neural foramina.  There is no evidence of facet lock or perch. The posterior elements are  intact.     The paraspinal soft tissues are unremarkable.        Impression:       1. No acute osseous injury or malalignment in the thoracic spine.           This report was signed and finalized on 4/1/2025 3:21 PM by Dr Elton Aguirre.        CT Lumbar Spine Without Contrast [[id number]] Collected: 04/01/25 1516       Updated: 04/01/25 1522     Narrative:       CT LUMBAR SPINE WO CONTRAST- 4/1/2025 1:55 PM     HISTORY: fall in alley while running; S00.81XA-Abrasion of other part of  head, initial encounter; W19.XXXA-Unspecified fall, initial encounter;  Y93.02-Activity, running; E83.51-Hypocalcemia; E87.6-Hypokalemia;  N39.0-Urinary tract infection, site not specified; H61.22-Impacted  cerumen, left ear     COMPARISON: None      DOSE LENGTH PRODUCT: 3918.62 mGy.cm. Automated exposure control was also  utilized to decrease patient radiation dose.     TECHNIQUE: Serial helical tomographic images of the lumbar spine were  obtained without the use of intravenous contrast. Additionally, sagittal  and coronal reformatted images were provided for review. Automated  exposure control is utilized to reduce patient radiation dose.     FINDINGS:     Counting will assume 5 lumbar-type vertebrae. The spine is imaged from  T12 to S2.     There is no visualized acute fracture or traumatic subluxation. Lumbar  lordosis is maintained. Vertebral body heights are well maintained. Loss  of disc height with mild endplate spurring at several levels. Mild  L2-L3, L3-L4 and L4-5 level degenerative retrolisthesis. The posterior  elements are intact. Included portions of the osseous pelvis are intact.  Paraspinal soft tissues are unremarkable.        Impression:       1. No  acute fracture.  2. Lumbar spine osteoarthritis change with mild multilevel degenerative  retrolisthesis.           This report was signed and finalized on 4/1/2025 3:19 PM by Dr Elton Aguirre.        CT Cervical Spine Without Contrast [[id number]] Collected: 04/01/25 1511       Updated: 04/01/25 1519     Narrative:       CT CERVICAL SPINE WO CONTRAST- 4/1/2025 1:55 PM     HISTORY: fall in alley while running; S00.81XA-Abrasion of other part of  head, initial encounter; W19.XXXA-Unspecified fall, initial encounter;  Y93.02-Activity, running; E83.51-Hypocalcemia; E87.6-Hypokalemia;  N39.0-Urinary tract infection, site not specified; H61.22-Impacted  cerumen, left ear      COMPARISON: None     TOTAL DOSE LENGTH PRODUCT: 3918.62 mGy.cm. Automated exposure control  was also utilized to decrease patient radiation dose.     TECHNIQUE: Axial images of cervical spine obtained with sagittal coronal  reconstructions.      FINDINGS:  Mild reversal normal cervical lordosis with no abnormal  subluxation. Mild degenerative disc change at C5/6 and C6/7. Early  uncinate process hypertrophy at C5-C7 with mild facet osteoarthropathy.  No cervical vertebral fracture.     No prominent central cervical canal or foraminal stenosis identified.  Paravertebral soft tissues are unremarkable.           Impression:          1. Mild reversal normal cervical lordosis may relate to head positioning  or musculoskeletal strain. No cervical vertebral fracture or traumatic  subluxation. Early degenerative change.     This report was signed and finalized on 4/1/2025 3:15 PM by Dr. Kassy Blair MD.        CT Head Without Contrast [[id number]] Collected: 04/01/25 1510       Updated: 04/01/25 1515     Narrative:       EXAMINATION:  CT HEAD WO CONTRAST-  4/1/2025 1:55 PM     HISTORY: The patient fell while running. S00.81XA-Abrasion of other part  of head, initial encounter; W19.XXXA-Unspecified fall, initial  encounter; Y93.02-Activity, running;  E83.51-Hypocalcemia;  E87.6-Hypokalemia; N39.0-Urinary tract infection, site not specified;  H61.22-Impacted cerumen, left ear.     TECHNIQUE: Multiple axial images were obtained through the brain without  contrast infusion. Multiplanar images were reconstructed.     DLP: 3918.62 mGy.cm Automated dosage reduction technique was utilized to  reduce patient dosage.     COMPARISON: 8/27/2024.     FINDINGS: There are no hemorrhage, edema or mass effect. The ventricular  system is nondilated. The visualized paranasal sinuses are clear. The  mastoid air cells are clear. No calvarial fracture is seen.           Impression:       No hemorrhage, edema or mass effect. No acute findings.        The full report of this exam was immediately signed and available to the  emergency room. The patient is currently in the emergency room.        This report was signed and finalized on 4/1/2025 3:12 PM by Dr. Yash Prakash MD.        XR Chest 1 View [[id number]] Collected: 04/01/25 1507       Updated: 04/01/25 1511     Narrative:       XR CHEST 1 VW- 4/1/2025 1:54 PM     HISTORY: fall; S00.81XA-Abrasion of other part of head, initial  encounter; W19.XXXA-Unspecified fall, initial encounter;  Y93.02-Activity, running       COMPARISON: Chest x-ray dated 8/27/2024     FINDINGS:  Upright frontal radiograph of the chest was obtained     No lung consolidation. No pleural effusion or pneumothorax. The  cardiomediastinal silhouette and pulmonary vascularity are within normal  limits. The osseous structures and surrounding soft tissues demonstrate  no acute abnormality.        Impression:       1.  No acute process in the chest.     This report was signed and finalized on 4/1/2025 3:08 PM by Dr Elton Aguirre.                I have personally reviewed and interpreted the radiology studies and ECG obtained at time of admission.      Assessment / Plan   Assessment:        Active Hospital Problems     Diagnosis      **Hypomagnesemia     ETOH  hepatitis  ETOH intoxication  Metabolic encephalopathy      Treatment Plan  The patient will be admitted to my service here at [facility].   In ER pan CT head C spine, TL spine, maxillofacial were -ve, UA +ve, BC obtained, UDS _ve THC, ETOH 475, MG 1, K 2.4 replaced, given thiamine, LFTs elevated, bilirubin 12 , PT was lethargic but reponsive, ER did not feel PT needed to go to ICU, will admit, CIWA, IVF thiamine MVI folic acid, abx, replace K, MG , consult GI, scd for DVT prophylaxis, identify reconciled restart home meds once reconciled   Medical Decision Making  Number and Complexity of problems: 3 acute  Differential Diagnosis: as above      Conditions and Status        Condition is unchanged.     [facility] Data  External documents reviewed: yes  Cardiac tracing (EKG, telemetry) interpretation: yes  Radiology interpretation: yes  Labs reviewed: yes  Any tests that were considered but not ordered: no     Decision rules/scores evaluated (example [id number], Wells, etc): no     Discussed with: ED     Care Planning  Shared decision making: Patient apprised of current labs, vitals, imaging and treatment plan.  They are agreeable with proceeding with plans as discussed.   Code status and discussions: full      Disposition  Social Determinants of Health that impact treatment or disposition: no  Estimated length of stay is TBD.      I confirmed that the patient's advanced care plan is present, code status is documented, and a surrogate decision maker is listed in the patient's medical record.         The patient was seen and examined by me on 1720 at [facility] .     Electronically signed by Frankie Camacho MD, 04/01/25, 17:08 CDT.                  Beltran Matson MD   Physician  Gastroenterology     Consults      Signed     Date of Service: 04/02/25 0830  Creation Time: 04/02/25 0830     Signed       Expand All Collapse All               [facility]  Inpatient Consult Note  Today's date:   04/02/25     Luciano English  1985         Referring Provider: No ref. provider found  Primary Physician: Adrien Varghese MD      Date of Admission: 4/1/2025  Date of Service:  04/02/25     Reason for Consultation/Chief Complaint:   Alcoholic hepatitis     History of present illness:    Luciano is a 41 y/o male with history of cirrhosis, alcohol abuse, diabetes, and hypertension. He presented to the hospital yesterday after a fall while running after his dogs. GI has been consulted for alcoholic hepatitis. On admission, he was found to be jaundice with bilirubin 12.8. Transaminases were elevated with  and ALT 54. PT/INR were also elevated at 25.4 and 2.16. He did not have any abdominal imaging this admission. He had CT abdomen/pelvis last month on 3/18/25 which showed cirrthotic and steatotic liver with portal hypertension and mildly distended gallbladder with cholelithiasis. He had prior MRI abdomen on 9/18/24 which also showed cirrhotic liver with portal hypertension and cholelithiasis with thickened gallbladder     Medical History[]Expand by Default        Past Medical History:   Diagnosis Date    Alcoholism      Diabetes mellitus      Gout      Hypertension      Jaundice                 Surgical History         Past Surgical History:   Procedure Laterality Date    VASECTOMY                   Allergies   No Known Allergies           Social History            Tobacco Use    Smoking status: Some Days       Current packs/day: 0.25       Average packs/day: 0.5 packs/day for 15.2 years (7.1 ttl pk-yrs)       Types: Cigarettes       Start date: 2010    Smokeless tobacco: Never   Substance Use Topics    Alcohol use: Yes       Alcohol/week: 12.0 standard drinks of alcohol       Types: 12 Cans of beer per week       Comment: malt liquor (48 oz daily)            History reviewed. No pertinent family history.        Prescriptions Prior to Admission           Medications Prior to Admission   Medication Sig Dispense  Refill Last Dose/Taking    calcium carbonate (Calcium 600) 600 MG tablet Take 1 tablet by mouth Daily. 30 tablet 0 Taking    cetirizine (zyrTEC) 10 MG tablet Take 1 tablet by mouth Daily. 90 tablet 1 Taking    folic acid (FOLVITE) 1 MG tablet Take 1 tablet by mouth Daily. 90 tablet 1 Taking    furosemide (Lasix) 40 MG tablet Take 1 tablet by mouth 2 (Two) Times a Day. (Patient taking differently: Take 1 tablet by mouth Daily.) 90 tablet 2 Taking Differently    lactulose 20 GM/30ML solution solution Take 30 mL by mouth 3 (Three) Times a Day. (Patient taking differently: Take 30 mL by mouth 3 (Three) Times a Day. Havent been taking) 450 mL 8 Taking Differently    levothyroxine (Synthroid) 75 MCG tablet Take 1 tablet by mouth Daily. 30 tablet 2 Taking    pantoprazole (PROTONIX) 40 MG EC tablet Take 1 tablet by mouth Daily. 90 tablet 1 Taking    potassium chloride 10 MEQ CR tablet Take 1 tablet by mouth Every 12 (Twelve) Hours. 180 tablet 1 Taking    QUEtiapine (SEROquel) 100 MG tablet Take 1 tablet by mouth Every Night. 30 tablet 2 Taking    spironolactone (Aldactone) 100 MG tablet Take 1 tablet by mouth Daily. 90 tablet 2 Taking    thiamine (VITAMIN B1) 100 MG tablet Take 1 tablet by mouth Daily. 90 tablet 1 Taking                     Review of Systems:  Constitutional: No unexpected weight change, no fatigue, no unexplained fever, no sweats or chills.           HEENT: No icteric sclera.  No hearing or visual deficits.  No sore throat.  No chronic nasal discharge.  Pulmonary: No chronic cough.  No hemoptysis.  No shortness of breath.  Cardiovascular: No chest pain.  No palpitations.  No shortness of breath.  Gastrointestinal: As above.  Musculoskeletal/extremities: No peripheral edema.  No cyanosis.  No claudications.  No back pain.  Genitourinary: No dysuria.  No blood in stool.  No urethral discharges.  Neurologic: No seizures.  No headaches.  No dizziness.  No gait problems.  Skin: No rash.  No icterus.  Mental: No  psychosis.  No confusions.  No hallucinations.        Physical Exam:  Temp:  [98.3 °F (36.8 °C)-98.6 °F (37 °C)] 98.3 °F (36.8 °C)  Heart Rate:  [] 106  Resp:  [18-20] 18  BP: (109-153)/(57-76) 153/76     General:   HEENT: Nonicteric sclerae.  Moist oral mucosa.  PERRLA.  EOMI.  Clear pharynx.  Lungs: Clear to auscultation bilaterally.  No wheezing, rales or rhonchi.  Heart: Regular rate and rhythm.  Normal S1 and S2, no S3, S4 or murmur.  Abdomen: Soft, nondistended, nontender to palpation, with normoactive bowel sounds, no hepatosplenomegaly, no palpable masses.  Extremities: No cyanosis, edema or pulse deficits.  Skin: No rash or jaundice.        Results Review:  Lab Results (last 24 hours)         Procedure Component Value Units Date/Time     Basic Metabolic Panel [[id number]]  (Abnormal) Collected: 04/01/25 2018     Specimen: Blood Updated: 04/01/25 2053       Glucose 128 mg/dL         BUN 12 mg/dL         Creatinine 0.86 mg/dL         Sodium 138 mmol/L         Potassium 2.9 mmol/L         Chloride 95 mmol/L         CO2 23.0 mmol/L         Calcium 7.5 mg/dL         BUN/Creatinine Ratio 14.0       Anion Gap 20.0 mmol/L         eGFR 112.3 mL/min/1.73       Narrative:       GFR Categories in Chronic Kidney Disease (CKD)                         GFR Category          GFR (mL/min/1.73)    Interpretation  G1                       90 or greater              Normal or high (1)  G2                               60-89                Mild decrease (1)  G3a                   45-59                Mild to moderate decrease  G3b                   30-44                Moderate to severe decrease  G4                    15-29                Severe decrease  G5                    14 or less           Kidney failure                                                 (1)In the absence of evidence of kidney disease, neither GFR category G1 or G2 fulfill the criteria for CKD.     eGFR calculation 2021 CKD-EPI creatinine equation,  which does not include race as a factor     Ammonia [[id number]]  (Abnormal) Collected: 04/01/25 2018     Specimen: Blood Updated: 04/01/25 2052       Ammonia 85 umol/L       Magnesium [[id number]]  (Abnormal) Collected: 04/01/25 2018     Specimen: Blood Updated: 04/01/25 2050       Magnesium 1.3 mg/dL       CBC & Differential [[id number]]  (Abnormal) Collected: 04/01/25 2018     Specimen: Blood Updated: 04/01/25 2035     Narrative:       The following orders were created for panel order CBC & Differential.  Procedure                               Abnormality         Status                     ---------                               -----------         ------                     CBC Auto Differential[[id number]]        Abnormal            Final result                  Please view results for these tests on the individual orders.     CBC Auto Differential [[id number]]  (Abnormal) Collected: 04/01/25 2018     Specimen: Blood Updated: 04/01/25 2035       WBC 9.26 10*3/mm3         RBC 3.64 10*6/mm3         Hemoglobin 11.6 g/dL         Hematocrit 32.9 %         MCV 90.4 fL         MCH 31.9 pg         MCHC 35.3 g/dL         RDW 21.1 %         RDW-SD 68.8 fl         MPV 11.3 fL         Platelets 99 10*3/mm3         Neutrophil % 67.7 %         Lymphocyte % 20.4 %         Monocyte % 10.7 %         Eosinophil % 0.3 %         Basophil % 0.5 %         Immature Grans % 0.4 %         Neutrophils, Absolute 6.26 10*3/mm3         Lymphocytes, Absolute 1.89 10*3/mm3         Monocytes, Absolute 0.99 10*3/mm3         Eosinophils, Absolute 0.03 10*3/mm3         Basophils, Absolute 0.05 10*3/mm3         Immature Grans, Absolute 0.04 10*3/mm3       Vitamin B12 [[id number]] Collected: 04/01/25 2018     Specimen: Blood Updated: 04/01/25 2029     TSH [[id number]]  (Normal) Collected: 04/01/25 1436     Specimen: Blood from Arm, Right Updated: 04/01/25 1743       TSH 2.980 uIU/mL       Blood Culture - Blood, Arm, Left [[id number]] Collected: 04/01/25  1559     Specimen: Blood from Arm, Left Updated: 04/01/25 1611     Blood Culture - Blood, Hand, Right [[id number]] Collected: 04/01/25 1556     Specimen: Blood from Hand, Right Updated: 04/01/25 1603     Magnesium [[id number]]  (Abnormal) Collected: 04/01/25 1436     Specimen: Blood from Arm, Right Updated: 04/01/25 1547       Magnesium 1.0 mg/dL       Urine Drug Screen - Urine, Clean Catch [[id number]]  (Abnormal) Collected: 04/01/25 1445     Specimen: Urine, Clean Catch Updated: 04/01/25 1531       THC, Screen, Urine Positive       Phencyclidine (PCP), Urine Negative       Cocaine Screen, Urine Negative       Methamphetamine, Ur Negative       Opiate Screen Negative       Amphetamine Screen, Urine Negative       Benzodiazepine Screen, Urine Negative       Tricyclic Antidepressants Screen Negative       Methadone Screen, Urine Negative       Barbiturates Screen, Urine Negative       Oxycodone Screen, Urine Negative       Buprenorphine, Screen, Urine Negative     Narrative:       Cutoff For Drugs Screened:     Amphetamines               500 ng/ml  Barbiturates               200 ng/ml  Benzodiazepines            150 ng/ml  Cocaine                    150 ng/ml  Methadone                  200 ng/ml  Opiates                    100 ng/ml  Phencyclidine               25 ng/ml  THC                         50 ng/ml  Methamphetamine            500 ng/ml  Tricyclic Antidepressants  300 ng/ml  Oxycodone                  100 ng/ml  Buprenorphine               10 ng/ml     The normal value for all drugs tested is negative. This report includes unconfirmed screening results, with the cutoff values listed, to be used for medical treatment purposes only.  Unconfirmed results must not be used for non-medical purposes such as employment or legal testing.  Clinical consideration should be applied to any drug of abuse test, particularly when unconfirmed results are used.       Fentanyl, Urine - Urine, Clean Catch [[id number]]  (Normal)  Collected: 04/01/25 1445     Specimen: Urine, Clean Catch Updated: 04/01/25 1515       Fentanyl, Urine Negative     Narrative:       Negative Threshold:       Fentanyl 5 ng/mL      The normal value for the drug tested is negative. This report includes final unconfirmed screening results to be used for medical treatment purposes only. Unconfirmed results must not be used for non-medical purposes such as employment or legal testing. Clinical consideration should be applied to any drug of abuse test, particularly when unconfirmed results are used.            Urinalysis With Culture If Indicated - Urine, Clean Catch [[id number]]  (Abnormal) Collected: 04/01/25 1445     Specimen: Urine, Clean Catch Updated: 04/01/25 1510       Color, UA Orange       Appearance, UA Cloudy       pH, UA 6.0       Specific Gravity, UA 1.017       Glucose, UA Negative       Ketones, UA Negative       Bilirubin, UA Large (3+)       Blood, UA Trace       Protein, UA 30 mg/dL (1+)       Leuk Esterase, UA Moderate (2+)       Nitrite, UA Positive       Urobilinogen, UA 1.0 E.U./dL     Narrative:       Dipstick results may be inaccurate due to color interference.     Protime-INR [[id number]]  (Abnormal) Collected: 04/01/25 1436     Specimen: Blood from Arm, Right Updated: 04/01/25 1507       Protime 25.4 Seconds         INR 2.16     aPTT [[id number]]  (Abnormal) Collected: 04/01/25 1436     Specimen: Blood from Arm, Right Updated: 04/01/25 1507       PTT 47.6 seconds       Narrative:       PTT = The equivalent PTT values for the therapeutic range of heparin levels at 0.3 to 0.7 U/ml are 77 - 99 seconds.     Urinalysis, Microscopic Only - Urine, Clean Catch [[id number]]  (Abnormal) Collected: 04/01/25 1445     Specimen: Urine, Clean Catch Updated: 04/01/25 1503       RBC, UA 6-10 /HPF         WBC, UA 11-20 /HPF         Bacteria, UA 4+ /HPF         Squamous Epithelial Cells, UA 21-30 /HPF         Hyaline Casts, UA 7-12 /LPF         Methodology Automated  Microscopy     Urine Culture - Urine, Urine, Clean Catch [[id number]] Collected: 04/01/25 1445     Specimen: Urine, Clean Catch Updated: 04/01/25 1503     Comprehensive Metabolic Panel [[id number]]  (Abnormal) Collected: 04/01/25 1436     Specimen: Blood from Arm, Right Updated: 04/01/25 1502       Glucose 171 mg/dL         BUN 10 mg/dL         Creatinine 0.70 mg/dL         Sodium 135 mmol/L         Potassium 2.8 mmol/L         Chloride 92 mmol/L         CO2 24.0 mmol/L         Calcium 7.3 mg/dL         Total Protein 6.4 g/dL         Albumin 3.3 g/dL         ALT (SGPT) 54 U/L         AST (SGOT) 270 U/L         Alkaline Phosphatase 239 U/L         Total Bilirubin 12.8 mg/dL         Globulin 3.1 gm/dL         A/G Ratio 1.1 g/dL         BUN/Creatinine Ratio 14.3       Anion Gap 19.0 mmol/L         eGFR 119.5 mL/min/1.73       Narrative:       GFR Categories in Chronic Kidney Disease (CKD)                         GFR Category          GFR (mL/min/1.73)    Interpretation  G1                       90 or greater              Normal or high (1)  G2                               60-89                Mild decrease (1)  G3a                   45-59                Mild to moderate decrease  G3b                   30-44                Moderate to severe decrease  G4                    15-29                Severe decrease  G5                    14 or less           Kidney failure                                                 (1)In the absence of evidence of kidney disease, neither GFR category G1 or G2 fulfill the criteria for CKD.     eGFR calculation 2021 CKD-EPI creatinine equation, which does not include race as a factor     Lipase [[id number]]  (Normal) Collected: 04/01/25 1436     Specimen: Blood from Arm, Right Updated: 04/01/25 1457       Lipase 16 U/L       Ethanol [[id number]] Collected: 04/01/25 1436     Specimen: Blood from Arm, Right Updated: 04/01/25 1457       Ethanol % 0.474 %       Narrative:       Not for legal  purposes. Chain of Custody not followed.      CBC & Differential [[id number]]  (Abnormal) Collected: 04/01/25 1436     Specimen: Blood from Arm, Right Updated: 04/01/25 1451     Narrative:       The following orders were created for panel order CBC & Differential.  Procedure                               Abnormality         Status                     ---------                               -----------         ------                     CBC Auto Differential[[id number]]        Abnormal            Final result                  Please view results for these tests on the individual orders.     CBC Auto Differential [[id number]]  (Abnormal) Collected: 04/01/25 1436     Specimen: Blood from Arm, Right Updated: 04/01/25 1451       WBC 8.62 10*3/mm3         RBC 3.74 10*6/mm3         Hemoglobin 12.0 g/dL         Hematocrit 33.9 %         MCV 90.6 fL         MCH 32.1 pg         MCHC 35.4 g/dL         RDW 20.8 %         RDW-SD 68.3 fl         MPV 10.7 fL         Platelets 100 10*3/mm3         Neutrophil % 72.4 %         Lymphocyte % 14.3 %         Monocyte % 11.4 %         Eosinophil % 0.2 %         Basophil % 0.9 %         Immature Grans % 0.8 %         Neutrophils, Absolute 6.24 10*3/mm3         Lymphocytes, Absolute 1.23 10*3/mm3         Monocytes, Absolute 0.98 10*3/mm3         Eosinophils, Absolute 0.02 10*3/mm3         Basophils, Absolute 0.08 10*3/mm3         Immature Grans, Absolute 0.07 10*3/mm3                            Frankie Camacho MD   Physician  Hospitalist     Progress Notes      Signed     Date of Service: 04/02/25 1014  Creation Time: 04/02/25 1014     Signed              [facility] Medicine Services  INPATIENT PROGRESS NOTE     Patient Name: Luciano Christiansen  Date of Admission: 4/1/2025  Today's Date: 04/02/25  Length of Stay: 0  Primary Care Physician: Adrien Varghese MD     Subjective   Chief Complaint: fall ETOH intoxication      Fall     Alcohol Intoxication     Patient is a  "40-year-old male with PMH of alcoholism, non-insulin-dependent diabetes, hypertension, vasectomy. ETOH liver cirrhosis  presenting to ED with alcohol use and fall.  PMH significant for history Patient states 2 of his dogs got out today at which time he went running chasing after them outside describing that he had been running for an extended period of time in an alley way when due to being out of shape \"my legs just gave out and I fell down.\"  Patient states that he hit his face on the alleyway first but denies any loss of consciousness.  Patient states that he had no preceding symptoms including chest pain/pressure/heaviness/tightness or syncope but felt he had just run for too long for his conditioning.  Patient states that the fall was witnessed by a bystander who called EMS.  Upon arrival to ED patient states that he does not have pain anywhere.  Patient states that he had otherwise been at his baseline recently.  Patient did state that since his last ER visit he is decreasing his alcohol intake and is currently at two 12 ounce malt whiskey beers daily.  Patient describes \"I want to keep drinking and she is nothing ever works.\"  Patient perseverates on his 100 days of sobriety prior to starting a couple months ago.  Patient denies any medication use or interventions prior to arrival and presents at this time for further evaluation   In ER pan CT head C spine, TL spine, maxillofacial were -ve, UA +ve, BC obtained, UDS _ve THC, ETOH 475, MG 1, K 2.4 replaced, given thiamine, LFTs elevated, bilirubin 12 , PT was lethargic but reponsive, ER did not feel PT needed to go to ICU, will admit, CIWA, IVF thiamine MVI folic acid, abx, replace K, MG , consult GI, scd for DVT prophylaxis, identify reconciled restart home meds once reconciled   4/2  As before history of alcohol abuse presented with fall intoxication alcoholic hepatitis started on CIWA thiamine multivitamin folic acid GI has been consulted  Alcoholic " hepatitis. MDF 74 which correlates with poor prognosis was started on prednisone 40 p.o. daily continue to follow labs watch for DTs replace his potassium magnesium ammonia was 80 started on lactulose  Review of Systems   All other systems reviewed and are negative.     All pertinent negatives and positives are as above. All other systems have been reviewed and are negative unless otherwise stated.      Objective    Temp:  [98.3 °F (36.8 °C)-99.5 °F (37.5 °C)] 99.5 °F (37.5 °C)  Heart Rate:  [] 113  Resp:  [18-20] 18  BP: (109-153)/(57-86) 145/86  Physical Exam  Constitutional:       Appearance: He is ill-appearing.   HENT:      Head: Normocephalic.      Nose: Nose normal.   Eyes:      Pupils: Pupils are equal, round, and reactive to light.   Cardiovascular:      Rate and Rhythm: Normal rate.   Pulmonary:      Breath sounds: Wheezing present.   Abdominal:      General: Abdomen is flat.   Musculoskeletal:         General: Normal range of motion.      Cervical back: Normal range of motion.   Neurological:      General: No focal deficit present.      Mental Status: He is alert.                  Results Review:  I have reviewed the labs, radiology results, and diagnostic studies.     Laboratory Data:         Results from last 7 days   Lab Units 04/01/25  2018 04/01/25  1436   WBC 10*3/mm3 9.26 8.62   HEMOGLOBIN g/dL 11.6* 12.0*   HEMATOCRIT % 32.9* 33.9*   PLATELETS 10*3/mm3 99* 100*               Results from last 7 days   Lab Units 04/01/25  2018 04/01/25  1436   SODIUM mmol/L 138 135*   POTASSIUM mmol/L 2.9* 2.8*   CHLORIDE mmol/L 95* 92*   CO2 mmol/L 23.0 24.0   BUN mg/dL 12 10   CREATININE mg/dL 0.86 0.70*   CALCIUM mg/dL 7.5* 7.3*   BILIRUBIN mg/dL  --  12.8*   ALK PHOS U/L  --  239*   ALT (SGPT) U/L  --  54*   AST (SGOT) U/L  --  270*   GLUCOSE mg/dL 128* 171*           Culture Data:         Urine Culture   Date Value Ref Range Status   04/01/2025 >100,000 CFU/mL Gram Positive Cocci (A)   Preliminary                      esults from last 7 days   Lab Units 04/01/25 2018 04/01/25  1436   WBC 10*3/mm3 9.26 8.62   HEMOGLOBIN g/dL 11.6* 12.0*   HEMATOCRIT % 32.9* 33.9*   PLATELETS 10*3/mm3 99* 100*               Results from last 7 days   Lab Units 04/01/25 2018 04/01/25  1436   SODIUM mmol/L 138 135*   POTASSIUM mmol/L 2.9* 2.8*   CHLORIDE mmol/L 95* 92*   CO2 mmol/L 23.0 24.0   BUN mg/dL 12 10   CREATININE mg/dL 0.86 0.70*   CALCIUM mg/dL 7.5* 7.3*   BILIRUBIN mg/dL  --  12.8*   ALK PHOS U/L  --  239*   ALT (SGPT) U/L  --  54*   AST (SGOT) U/L  --  270*   GLUCOSE mg/dL 128* 171*         Culture Data:         Urine Culture   Date Value Ref Range Status   04/01/2025 >100,000 CFU/mL Gram Positive Cocci (A)               Disposition  Social Determinants of Health that impact treatment or disposition: no  Estimated length of stay is TBD.      I confirmed that the patient's advanced care plan is present, code status is documented, and a surrogate decision maker is listed in the patient's medical record           Electronically signed by Frankie Camacho MD, 04/02/25, 10:15 CDT.      04/02/25 0833 99.5 (37.5) 113 18 145/86 Lying -- 97   04/02/25 0452 98.3 (36.8) 106 18 153/76 Lying other (see comments) 92   04/02/25 0009 98.5 (36.9) 116 18 142/71 Sitting room air 94   04/01/25 2140 -- -- -- -- -- room air --   04/01/25 2043 98.4 (36.9) 114 18 109/70 Lying room air 95   04/01/25 1859 --             Comprehensive Metabolic Panel [LAB17] (Order [id number])  Order  Date: 4/1/2025 Department: [address] Released By/Authorizing: Frankie Camacho MD (auto-released)     Reprint Order Requisition    Comprehensive Metabolic Panel (Order #[id number]) on 4/1/25         Contains abnormal data Comprehensive Metabolic Panel  Order: [id number]   Status: Final result       Next appt: None    Test Result Released: No (scheduled for 4/2/2025 12:50 PM)    Specimen Information: Blood   0 Result Notes            Component  Ref Range &  Units (hover) 10:05  (4/2/25) 1 d ago  (4/1/25) 1 d ago  (4/1/25) 2 wk ago  (3/18/25) 2 wk ago  (3/18/25) 6 mo ago  (10/4/24) 6 mo ago  (9/18/24)   Glucose 149 High  128 High  171 High  171 High  192 High  R, CM 77 R    BUN 12 12 10 6  18 R    Creatinine 0.58 Low  0.86 0.70 Low  0.72 Low   1.04 R 1.10 R, CM   Sodium 139 138 135 Low  136  139 R    Potassium 3.6 2.9 Low  2.8 Low  2.7 Low   5.2 R    Chloride 101 95 Low  92 Low  89 Low   102 R    CO2 19.0 Low  23.0 24.0 28.0  23.0 Low  R    Calcium 7.1 Low  7.5 Low  7.3 Low  8.0 Low   9.2    Total Protein 6.1  6.4 7.0  8.2 R    Albumin 3.1 Low   3.3 Low  3.8  4.0 R    ALT (SGPT) 51 High   54 High  40  22 R    AST (SGOT) 295 High   270 High  190 High   52 High  R    Alkaline Phosphatase 210 High   239 High  221 High   120 R    Total Bilirubin 13.8 High   12.8 High  5.0 High   2.0 High  R    Globulin 3.0  3.1 3.2  4.2    A/G Ratio 1.0  1.1 1.2  1.0 Low  R    BUN/Creatinine Ratio 20.7 14.0 14.3 8.3  17.3 R    Anion Gap 19.0 High  20.0 High  19.0 High  19.0 High                Patient Communication    CBC & Differential     4/2/2025 12:21 PM  Not seen        CBC Auto Differential     4/2/2025 12:21 PM  Not seen     Results   CBC & Differential (Order [id number])  Linked Results    Procedure Abnormality Status   CBC Auto Differential Abnormal Abnormal  Final result      Contains abnormal data CBC & Differential  Order: [id number]   Status: Final result    Test Result Released: No (scheduled for 4/2/2025 12:21 PM)    Specimen Information: Blood   0 Result Notes       Specimen Collected: 04/02/25 10:05 CDT Last Resulted: 04/02/25 11:21 CDT       Order Details        View Encounter        Lab and Collection Details        Routing        Result History     View All Conversations on this Encounter     Result Care Coordination      Patient Communication     4/2/2025 12:21 PM  Not seen Back to Top      Contains abnormal data CBC Auto Differential  Order: [id number] - Part of Panel  Order [id number]   Status: Final result    Test Result Released: No (scheduled for 4/2/2025 12:21 PM)    Specimen Information: Blood   0 Result Notes            Component  Ref Range & Units (hover) 10:05  (4/2/25) 1 d ago  (4/1/25) 1 d ago  (4/1/25) 2 wk ago  (3/18/25) 2 wk ago  (3/18/25) 6 mo ago  (10/4/24) 7 mo ago  (9/3/24)   WBC 5.96 9.26 8.62  4.42 6.00 7.99   RBC 3.24 Low  3.64 Low  3.74 Low   3.90 Low  3.20 Low  2.73 Low    Hemoglobin 10.2 Low  11.6 Low  12.0 Low   12.4 Low  9.7 Low  8.3 Low    Hematocrit 29.9 Low  32.9 Low  33.9 Low   35.6 Low  30.2 Low  26.0 Low    MCV 92.3 90.4 90.6  91.3 94.4 95.2   MCH 31.5 31.9 32.1  31.8 30.3 30.4   MCHC 34.1 35.3 35.4  34.8 32.1 31.9   RDW 21.9 High  21.1 High  20.8 High   16.4 High  16.5 High  16.6 High    RDW-SD 72.9 High  68.8 High  68.3 High   55.4 High   56.8 High    MPV 11.5 11.3 10.7  R, CM 8.5 10.0   Platelets 70 Low  99 Low  100 Low   35 Low Critical  214 232   Neutrophil % 78.1 High  67.7 72.4 70.4  71.4    Lymphocyte % 9.7 Low  20.4 14.3 Low  18.4 Low   18.9 Low     Monocyte % 10.1 10.7 11.4 3.1 Low       Eosinophil % 0.3 0.3 0.2 Low  2.0      Basophil % 1.0 0.5 0.9 0.0      Immature Grans % 0.8 High  0.4 0.8 High        Neutrophils, Absolute 4.65 6.26 6.24 3.25  4.30    Lymphocytes, Absolute 0.58 Low  1.89 1.23 0.90  1.10    Monocytes, Absolute 0.60 0.99 High  0.98 High  0.14      Eosinophils, Absolute 0.02 0.03 0.02 0.09      Basophils, Absolute 0.06 0.05 0.08 0.00      Immature Grans, Absolute 0.05                Current Facility-Administered Medications   Medication Dose Route Frequency Provider Last Rate Last Admin    sennosides-docusate (PERICOLACE) 8.6-50 MG per tablet 2 tablet  2 tablet Oral BID Frankie Camacho MD        And    polyethylene glycol (MIRALAX) packet 17 g  17 g Oral Daily PRN Frankie Camacho MD        And    bisacodyl (DULCOLAX) EC tablet 5 mg  5 mg Oral Daily PRN Frankie Camacho MD        And    bisacodyl (DULCOLAX) suppository 10 mg   10 mg Rectal Daily PRN Frankie Camacho MD        Calcium Replacement - Follow Nurse / BPA Driven Protocol   Not Applicable PRN Frankie Camacho MD        carbamide peroxide (DEBROX) 6.5 % otic solution 5 drop  5 drop Left Ear Once Srinath Valle PA-C        cefTRIAXone (ROCEPHIN) 1,000 mg in sodium chloride 0.9 % 100 mL MBP  1,000 mg Intravenous Q24H Frankie Camacho MD        chlordiazePOXIDE (LIBRIUM) capsule 50 mg  50 mg Oral Q8H Frankie Camacho MD   50 mg at 04/02/25 1300    folic acid 1 mg in sodium chloride 0.9 % 50 mL IVPB  1 mg Intravenous Daily Frankie Camacho MD   Stopped at 04/01/25 2149    lactulose solution 20 g  20 g Oral TID Frankie Camacho MD   20 g at 04/02/25 1153    LORazepam (ATIVAN) tablet 1 mg  1 mg Oral Q1H PRN Frankie Camacho MD        Or    LORazepam (ATIVAN) injection 1 mg  1 mg Intravenous Q1H PRN Frankie Camacho MD   1 mg at 04/02/25 0913    Or    LORazepam (ATIVAN) tablet 2 mg  2 mg Oral Q1H PRN Frankie Camacho MD        Or    LORazepam (ATIVAN) injection 2 mg  2 mg Intravenous Q1H PRN Frankie Camacho MD   2 mg at 04/02/25 1300    Or    LORazepam (ATIVAN) injection 2 mg  2 mg Intravenous Q15 Min PRN Frankie Camacho MD        Or    LORazepam (ATIVAN) injection 2 mg  2 mg Intramuscular Q15 Min PRN Frankie Camacho MD        LORazepam (ATIVAN) tablet 1 mg  1 mg Oral Q6H Frankie Camacho MD        Followed by    [START ON 4/3/2025] LORazepam (ATIVAN) tablet 1 mg  1 mg Oral Q12H Frankie Camacho MD        Followed by    [START ON 4/5/2025] LORazepam (ATIVAN) tablet 1 mg  1 mg Oral Daily Frankie Camacho MD        Magnesium Standard Dose Replacement - Follow Nurse / BPA Driven Protocol   Not Applicable PRN Frankie Camacho MD        multivitamin with minerals 1 tablet  1 tablet Oral Daily Frankie Camacho MD   1 tablet at 04/02/25 0913    mupirocin (BACTROBAN) 2 % nasal ointment 1 Application  1 Application Each Nare BID Frankie Camacho MD   1 Application at 04/02/25 0913    nitroglycerin  (NITROSTAT) SL tablet 0.4 mg  0.4 mg Sublingual Q5 Min PRN Frankie Camacho MD        ondansetron (ZOFRAN) injection 4 mg  4 mg Intravenous Q6H PRN Frankie Camacho MD   4 mg at 04/02/25 1252    pantoprazole (PROTONIX) injection 40 mg  40 mg Intravenous Q AM Frankie Camacho MD   40 mg at 04/02/25 0519    Phosphorus Replacement - Follow Nurse / BPA Driven Protocol   Not Applicable PRN Frankie Camacho MD        phytonadione (AQUA-MEPHYTON, VITAMIN K) injection 10 mg  10 mg Subcutaneous Once Frankie Camacho MD        potassium chloride (KLOR-CON M20) CR tablet 40 mEq  40 mEq Oral Q4H Frankie Camacho MD   40 mEq at 04/02/25 1252    Potassium Replacement - Follow Nurse / BPA Driven Protocol   Not Applicable PRN Frankie Camacho MD        prednisoLONE sodium phosphate (ORAPRED) 15 MG/5ML oral solution 40 mg  40 mg Oral Daily Beltran Matson MD   40 mg at 04/02/25 1259    sodium chloride 0.9 % flush 10 mL  10 mL Intravenous PRN Srinath Valle PA-C        sodium chloride 0.9 % flush 10 mL  10 mL Intravenous Q12H Frankie Camacho MD   10 mL at 04/02/25 0913    sodium chloride 0.9 % flush 10 mL  10 mL Intravenous PRN Frankie Camacho MD        sodium chloride 0.9 % infusion 40 mL  40 mL Intravenous PRN Frankie Camacho MD        thiamine (B-1) 500 mg in sodium chloride 0.9 % 100 mL IVPB  500 mg Intravenous Q8H Frankie Camacho  mL/hr at 04/02/25 1300 500 mg at 04/02/25 1300    Followed by    [START ON 4/4/2025] thiamine (B-1) injection 200 mg  200 mg Intravenous Q8H Frankie Camacho MD        Followed by    [START ON 4/9/2025] thiamine (VITAMIN B-1) tablet 100 mg  100 mg Oral Daily Frankie Camacho MD

## 2025-04-02 NOTE — CONSULTS
[facility] Gastroenterology  Inpatient Consult Note  Today's date:  04/02/25    Luciano Christiansen  1985       Referring Provider: No ref. provider found  Primary Physician: Adrien Varghese MD     Date of Admission: 4/1/2025  Date of Service:  04/02/25    Reason for Consultation/Chief Complaint:   Alcoholic hepatitis    History of present illness:    Luciano is a 41 y/o male with history of cirrhosis, alcohol abuse, diabetes, and hypertension. He presented to the hospital yesterday after a fall while running after his dogs. GI has been consulted for alcoholic hepatitis. On admission, he was found to be jaundice with bilirubin 12.8. Transaminases were elevated with  and ALT 54. PT/INR were also elevated at 25.4 and 2.16. He did not have any abdominal imaging this admission. He had CT abdomen/pelvis last month on 3/18/25 which showed cirrthotic and steatotic liver with portal hypertension and mildly distended gallbladder with cholelithiasis. He had prior MRI abdomen on 9/18/24 which also showed cirrhotic liver with portal hypertension and cholelithiasis with thickened gallbladder    Past Medical History:   Diagnosis Date    Alcoholism     Diabetes mellitus     Gout     Hypertension     Jaundice          Past Surgical History:   Procedure Laterality Date    VASECTOMY            No Known Allergies      Social History     Tobacco Use    Smoking status: Some Days     Current packs/day: 0.25     Average packs/day: 0.5 packs/day for 15.2 years (7.1 ttl pk-yrs)     Types: Cigarettes     Start date: 2010    Smokeless tobacco: Never   Substance Use Topics    Alcohol use: Yes     Alcohol/week: 12.0 standard drinks of alcohol     Types: 12 Cans of beer per week     Comment: malt liquor (48 oz daily)          History reviewed. No pertinent family history.      Medications Prior to Admission   Medication Sig Dispense Refill Last Dose/Taking    calcium carbonate (Calcium 600) 600 MG tablet Take 1 tablet by mouth  Daily. 30 tablet 0 Taking    cetirizine (zyrTEC) 10 MG tablet Take 1 tablet by mouth Daily. 90 tablet 1 Taking    folic acid (FOLVITE) 1 MG tablet Take 1 tablet by mouth Daily. 90 tablet 1 Taking    furosemide (Lasix) 40 MG tablet Take 1 tablet by mouth 2 (Two) Times a Day. (Patient taking differently: Take 1 tablet by mouth Daily.) 90 tablet 2 Taking Differently    lactulose 20 GM/30ML solution solution Take 30 mL by mouth 3 (Three) Times a Day. (Patient taking differently: Take 30 mL by mouth 3 (Three) Times a Day. Havent been taking) 450 mL 8 Taking Differently    levothyroxine (Synthroid) 75 MCG tablet Take 1 tablet by mouth Daily. 30 tablet 2 Taking    pantoprazole (PROTONIX) 40 MG EC tablet Take 1 tablet by mouth Daily. 90 tablet 1 Taking    potassium chloride 10 MEQ CR tablet Take 1 tablet by mouth Every 12 (Twelve) Hours. 180 tablet 1 Taking    QUEtiapine (SEROquel) 100 MG tablet Take 1 tablet by mouth Every Night. 30 tablet 2 Taking    spironolactone (Aldactone) 100 MG tablet Take 1 tablet by mouth Daily. 90 tablet 2 Taking    thiamine (VITAMIN B1) 100 MG tablet Take 1 tablet by mouth Daily. 90 tablet 1 Taking             Review of Systems:  Constitutional: No unexpected weight change, no fatigue, no unexplained fever, no sweats or chills.   HEENT: No icteric sclera.  No hearing or visual deficits.  No sore throat.  No chronic nasal discharge.  Pulmonary: No chronic cough.  No hemoptysis.  No shortness of breath.  Cardiovascular: No chest pain.  No palpitations.  No shortness of breath.  Gastrointestinal: As above.  Musculoskeletal/extremities: No peripheral edema.  No cyanosis.  No claudications.  No back pain.  Genitourinary: No dysuria.  No blood in stool.  No urethral discharges.  Neurologic: No seizures.  No headaches.  No dizziness.  No gait problems.  Skin: No rash.  No icterus.  Mental: No psychosis.  No confusions.  No hallucinations.      Physical Exam:  Temp:  [98.3 °F (36.8 °C)-98.6 °F (37 °C)]  98.3 °F (36.8 °C)  Heart Rate:  [] 106  Resp:  [18-20] 18  BP: (109-153)/(57-76) 153/76    General:   HEENT: Nonicteric sclerae.  Moist oral mucosa.  PERRLA.  EOMI.  Clear pharynx.  Lungs: Clear to auscultation bilaterally.  No wheezing, rales or rhonchi.  Heart: Regular rate and rhythm.  Normal S1 and S2, no S3, S4 or murmur.  Abdomen: Soft, nondistended, nontender to palpation, with normoactive bowel sounds, no hepatosplenomegaly, no palpable masses.  Extremities: No cyanosis, edema or pulse deficits.  Skin: No rash or jaundice.      Results Review:  Lab Results (last 24 hours)       Procedure Component Value Units Date/Time    Basic Metabolic Panel [[id number]]  (Abnormal) Collected: 04/01/25 2018    Specimen: Blood Updated: 04/01/25 2053     Glucose 128 mg/dL      BUN 12 mg/dL      Creatinine 0.86 mg/dL      Sodium 138 mmol/L      Potassium 2.9 mmol/L      Chloride 95 mmol/L      CO2 23.0 mmol/L      Calcium 7.5 mg/dL      BUN/Creatinine Ratio 14.0     Anion Gap 20.0 mmol/L      eGFR 112.3 mL/min/1.73     Narrative:      GFR Categories in Chronic Kidney Disease (CKD)      GFR Category          GFR (mL/min/1.73)    Interpretation  G1                     90 or greater         Normal or high (1)  G2                      60-89                Mild decrease (1)  G3a                   45-59                Mild to moderate decrease  G3b                   30-44                Moderate to severe decrease  G4                    15-29                Severe decrease  G5                    14 or less           Kidney failure          (1)In the absence of evidence of kidney disease, neither GFR category G1 or G2 fulfill the criteria for CKD.    eGFR calculation 2021 CKD-EPI creatinine equation, which does not include race as a factor    Ammonia [[id number]]  (Abnormal) Collected: 04/01/25 2018    Specimen: Blood Updated: 04/01/25 2052     Ammonia 85 umol/L     Magnesium [[id number]]  (Abnormal) Collected: 04/01/25 2018     Specimen: Blood Updated: 04/01/25 2050     Magnesium 1.3 mg/dL     CBC & Differential [[id number]]  (Abnormal) Collected: 04/01/25 2018    Specimen: Blood Updated: 04/01/25 2035    Narrative:      The following orders were created for panel order CBC & Differential.  Procedure                               Abnormality         Status                     ---------                               -----------         ------                     CBC Auto Differential[[id number]]        Abnormal            Final result                 Please view results for these tests on the individual orders.    CBC Auto Differential [[id number]]  (Abnormal) Collected: 04/01/25 2018    Specimen: Blood Updated: 04/01/25 2035     WBC 9.26 10*3/mm3      RBC 3.64 10*6/mm3      Hemoglobin 11.6 g/dL      Hematocrit 32.9 %      MCV 90.4 fL      MCH 31.9 pg      MCHC 35.3 g/dL      RDW 21.1 %      RDW-SD 68.8 fl      MPV 11.3 fL      Platelets 99 10*3/mm3      Neutrophil % 67.7 %      Lymphocyte % 20.4 %      Monocyte % 10.7 %      Eosinophil % 0.3 %      Basophil % 0.5 %      Immature Grans % 0.4 %      Neutrophils, Absolute 6.26 10*3/mm3      Lymphocytes, Absolute 1.89 10*3/mm3      Monocytes, Absolute 0.99 10*3/mm3      Eosinophils, Absolute 0.03 10*3/mm3      Basophils, Absolute 0.05 10*3/mm3      Immature Grans, Absolute 0.04 10*3/mm3     Vitamin B12 [[id number]] Collected: 04/01/25 2018    Specimen: Blood Updated: 04/01/25 2029    TSH [[id number]]  (Normal) Collected: 04/01/25 1436    Specimen: Blood from Arm, Right Updated: 04/01/25 1743     TSH 2.980 uIU/mL     Blood Culture - Blood, Arm, Left [[id number]] Collected: 04/01/25 1559    Specimen: Blood from Arm, Left Updated: 04/01/25 1611    Blood Culture - Blood, Hand, Right [[id number]] Collected: 04/01/25 1556    Specimen: Blood from Hand, Right Updated: 04/01/25 1603    Magnesium [[id number]]  (Abnormal) Collected: 04/01/25 1436    Specimen: Blood from Arm, Right Updated: 04/01/25 4372      Magnesium 1.0 mg/dL     Urine Drug Screen - Urine, Clean Catch [[id number]]  (Abnormal) Collected: 04/01/25 1445    Specimen: Urine, Clean Catch Updated: 04/01/25 1531     THC, Screen, Urine Positive     Phencyclidine (PCP), Urine Negative     Cocaine Screen, Urine Negative     Methamphetamine, Ur Negative     Opiate Screen Negative     Amphetamine Screen, Urine Negative     Benzodiazepine Screen, Urine Negative     Tricyclic Antidepressants Screen Negative     Methadone Screen, Urine Negative     Barbiturates Screen, Urine Negative     Oxycodone Screen, Urine Negative     Buprenorphine, Screen, Urine Negative    Narrative:      Cutoff For Drugs Screened:    Amphetamines               500 ng/ml  Barbiturates               200 ng/ml  Benzodiazepines            150 ng/ml  Cocaine                    150 ng/ml  Methadone                  200 ng/ml  Opiates                    100 ng/ml  Phencyclidine               25 ng/ml  THC                         50 ng/ml  Methamphetamine            500 ng/ml  Tricyclic Antidepressants  300 ng/ml  Oxycodone                  100 ng/ml  Buprenorphine               10 ng/ml    The normal value for all drugs tested is negative. This report includes unconfirmed screening results, with the cutoff values listed, to be used for medical treatment purposes only.  Unconfirmed results must not be used for non-medical purposes such as employment or legal testing.  Clinical consideration should be applied to any drug of abuse test, particularly when unconfirmed results are used.      Fentanyl, Urine - Urine, Clean Catch [[id number]]  (Normal) Collected: 04/01/25 1445    Specimen: Urine, Clean Catch Updated: 04/01/25 1515     Fentanyl, Urine Negative    Narrative:      Negative Threshold:      Fentanyl 5 ng/mL     The normal value for the drug tested is negative. This report includes final unconfirmed screening results to be used for medical treatment purposes only. Unconfirmed results must not be  used for non-medical purposes such as employment or legal testing. Clinical consideration should be applied to any drug of abuse test, particularly when unconfirmed results are used.           Urinalysis With Culture If Indicated - Urine, Clean Catch [[id number]]  (Abnormal) Collected: 04/01/25 1445    Specimen: Urine, Clean Catch Updated: 04/01/25 1510     Color, UA [place]     Appearance, UA Cloudy     pH, UA 6.0     Specific Gravity, UA 1.017     Glucose, UA Negative     Ketones, UA Negative     Bilirubin, UA Large (3+)     Blood, UA Trace     Protein, UA 30 mg/dL (1+)     Leuk Esterase, UA Moderate (2+)     Nitrite, UA Positive     Urobilinogen, UA 1.0 E.U./dL    Narrative:      Dipstick results may be inaccurate due to color interference.    Protime-INR [[id number]]  (Abnormal) Collected: 04/01/25 1436    Specimen: Blood from Arm, Right Updated: 04/01/25 1507     Protime 25.4 Seconds      INR 2.16    aPTT [[id number]]  (Abnormal) Collected: 04/01/25 1436    Specimen: Blood from Arm, Right Updated: 04/01/25 1507     PTT 47.6 seconds     Narrative:      PTT = The equivalent PTT values for the therapeutic range of heparin levels at 0.3 to 0.7 U/ml are 77 - 99 seconds.    Urinalysis, Microscopic Only - Urine, Clean Catch [[id number]]  (Abnormal) Collected: 04/01/25 1445    Specimen: Urine, Clean Catch Updated: 04/01/25 1503     RBC, UA 6-10 /HPF      WBC, UA 11-20 /HPF      Bacteria, UA 4+ /HPF      Squamous Epithelial Cells, UA 21-30 /HPF      Hyaline Casts, UA 7-12 /LPF      Methodology Automated Microscopy    Urine Culture - Urine, Urine, Clean Catch [[id number]] Collected: 04/01/25 1445    Specimen: Urine, Clean Catch Updated: 04/01/25 1503    Comprehensive Metabolic Panel [[id number]]  (Abnormal) Collected: 04/01/25 1436    Specimen: Blood from Arm, Right Updated: 04/01/25 1502     Glucose 171 mg/dL      BUN 10 mg/dL      Creatinine 0.70 mg/dL      Sodium 135 mmol/L      Potassium 2.8 mmol/L      Chloride 92 mmol/L       CO2 24.0 mmol/L      Calcium 7.3 mg/dL      Total Protein 6.4 g/dL      Albumin 3.3 g/dL      ALT (SGPT) 54 U/L      AST (SGOT) 270 U/L      Alkaline Phosphatase 239 U/L      Total Bilirubin 12.8 mg/dL      Globulin 3.1 gm/dL      A/G Ratio 1.1 g/dL      BUN/Creatinine Ratio 14.3     Anion Gap 19.0 mmol/L      eGFR 119.5 mL/min/1.73     Narrative:      GFR Categories in Chronic Kidney Disease (CKD)      GFR Category          GFR (mL/min/1.73)    Interpretation  G1                     90 or greater         Normal or high (1)  G2                      60-89                Mild decrease (1)  G3a                   45-59                Mild to moderate decrease  G3b                   30-44                Moderate to severe decrease  G4                    15-29                Severe decrease  G5                    14 or less           Kidney failure          (1)In the absence of evidence of kidney disease, neither GFR category G1 or G2 fulfill the criteria for CKD.    eGFR calculation 2021 CKD-EPI creatinine equation, which does not include race as a factor    Lipase [[id number]]  (Normal) Collected: 04/01/25 1436    Specimen: Blood from Arm, Right Updated: 04/01/25 1457     Lipase 16 U/L     Ethanol [[id number]] Collected: 04/01/25 1436    Specimen: Blood from Arm, Right Updated: 04/01/25 1457     Ethanol % 0.474 %     Narrative:      Not for legal purposes. Chain of Custody not followed.     CBC & Differential [[id number]]  (Abnormal) Collected: 04/01/25 1436    Specimen: Blood from Arm, Right Updated: 04/01/25 1451    Narrative:      The following orders were created for panel order CBC & Differential.  Procedure                               Abnormality         Status                     ---------                               -----------         ------                     CBC Auto Differential[[id number]]        Abnormal            Final result                 Please view results for these tests on the individual  orders.    CBC Auto Differential [[id number]]  (Abnormal) Collected: 04/01/25 1436    Specimen: Blood from Arm, Right Updated: 04/01/25 1451     WBC 8.62 10*3/mm3      RBC 3.74 10*6/mm3      Hemoglobin 12.0 g/dL      Hematocrit 33.9 %      MCV 90.6 fL      MCH 32.1 pg      MCHC 35.4 g/dL      RDW 20.8 %      RDW-SD 68.3 fl      MPV 10.7 fL      Platelets 100 10*3/mm3      Neutrophil % 72.4 %      Lymphocyte % 14.3 %      Monocyte % 11.4 %      Eosinophil % 0.2 %      Basophil % 0.9 %      Immature Grans % 0.8 %      Neutrophils, Absolute 6.24 10*3/mm3      Lymphocytes, Absolute 1.23 10*3/mm3      Monocytes, Absolute 0.98 10*3/mm3      Eosinophils, Absolute 0.02 10*3/mm3      Basophils, Absolute 0.08 10*3/mm3      Immature Grans, Absolute 0.07 10*3/mm3               Radiology Review:  Imaging Results (Last 72 Hours)       Procedure Component Value Units Date/Time    CT Maxillofacial Without Contrast [[id number]] Collected: 04/01/25 1516     Updated: 04/01/25 1533    Narrative:      CT MAXILLOFACIAL WO CONT- 4/1/2025 1:55 PM     HISTORY: fall onto face in alley while running; S00.81XA-Abrasion of  other part of head, initial encounter; W19.XXXA-Unspecified fall,  initial encounter; Y93.02-Activity, running; E83.51-Hypocalcemia;  E87.6-Hypokalemia; N39.0-Urinary tract infection, site not specified;  H61.22-Impacted cerumen, left ear      COMPARISON: None     TOTAL DOSE LENGTH PRODUCT: 448 mGycm. Automated exposure control was  also utilized to decrease patient radiation dose.     TECHNIQUE: Axial images of the face are obtained with sagittal coronal  reconstructions.     FINDINGS: There is no facial fracture identified. The orbital globes are  intact. There is no retrobulbar pathology. No air-fluid levels seen  within the paranasal sinuses. Mastoid air cells appear well-aerated.       Impression:      1. No facial fracture identified. Orbital globes intact.        This report was signed and finalized on 4/1/2025 3:30 PM  by Dr. Kassy Blair MD.       CT Thoracic Spine Without Contrast [[id number]] Collected: 04/01/25 1519     Updated: 04/01/25 1524    Narrative:      CT THORACIC SPINE WO CONTRAST- 4/1/2025 1:55 PM     HISTORY: fall in alley while running; S00.81XA-Abrasion of other part of  head, initial encounter; W19.XXXA-Unspecified fall, initial encounter;  Y93.02-Activity, running; E83.51-Hypocalcemia; E87.6-Hypokalemia;  N39.0-Urinary tract infection, site not specified; H61.22-Impacted  cerumen, left ear     COMPARISON: CT scan dated 3/18/2025, CT scan dated 5/5/2024     DLP: 1386 mGy cm     TECHNIQUE: Serial helical tomographic images of the thoracic spine were  obtained without the use of intravenous contrast. Multiplanar  reformatted images were provided for review.     Automated exposure control was utilized to reduce patient radiation  dose.     FINDINGS:     There is no evidence of fracture or subluxation. Vertebral body heights  and alignment are well maintained. The disc spaces are preserved. There  is no significant bony narrowing of the spinal canal or neural foramina.  There is no evidence of facet lock or perch. The posterior elements are  intact.     The paraspinal soft tissues are unremarkable.       Impression:      1. No acute osseous injury or malalignment in the thoracic spine.           This report was signed and finalized on 4/1/2025 3:21 PM by Dr Elton Aguirre.       CT Lumbar Spine Without Contrast [[id number]] Collected: 04/01/25 1516     Updated: 04/01/25 1522    Narrative:      CT LUMBAR SPINE WO CONTRAST- 4/1/2025 1:55 PM     HISTORY: fall in alley while running; S00.81XA-Abrasion of other part of  head, initial encounter; W19.XXXA-Unspecified fall, initial encounter;  Y93.02-Activity, running; E83.51-Hypocalcemia; E87.6-Hypokalemia;  N39.0-Urinary tract infection, site not specified; H61.22-Impacted  cerumen, left ear     COMPARISON: None      DOSE LENGTH PRODUCT: 3918.62 mGy.cm. Automated  exposure control was also  utilized to decrease patient radiation dose.     TECHNIQUE: Serial helical tomographic images of the lumbar spine were  obtained without the use of intravenous contrast. Additionally, sagittal  and coronal reformatted images were provided for review. Automated  exposure control is utilized to reduce patient radiation dose.     FINDINGS:     Counting will assume 5 lumbar-type vertebrae. The spine is imaged from  T12 to S2.     There is no visualized acute fracture or traumatic subluxation. Lumbar  lordosis is maintained. Vertebral body heights are well maintained. Loss  of disc height with mild endplate spurring at several levels. Mild  L2-L3, L3-L4 and L4-5 level degenerative retrolisthesis. The posterior  elements are intact. Included portions of the osseous pelvis are intact.  Paraspinal soft tissues are unremarkable.       Impression:      1. No acute fracture.  2. Lumbar spine osteoarthritis change with mild multilevel degenerative  retrolisthesis.           This report was signed and finalized on 4/1/2025 3:19 PM by Dr Elton Aguirre.       CT Cervical Spine Without Contrast [[id number]] Collected: 04/01/25 1511     Updated: 04/01/25 1519    Narrative:      CT CERVICAL SPINE WO CONTRAST- 4/1/2025 1:55 PM     HISTORY: fall in alley while running; S00.81XA-Abrasion of other part of  head, initial encounter; W19.XXXA-Unspecified fall, initial encounter;  Y93.02-Activity, running; E83.51-Hypocalcemia; E87.6-Hypokalemia;  N39.0-Urinary tract infection, site not specified; H61.22-Impacted  cerumen, left ear      COMPARISON: None     TOTAL DOSE LENGTH PRODUCT: 3918.62 mGy.cm. Automated exposure control  was also utilized to decrease patient radiation dose.     TECHNIQUE: Axial images of cervical spine obtained with sagittal coronal  reconstructions.      FINDINGS:  Mild reversal normal cervical lordosis with no abnormal  subluxation. Mild degenerative disc change at C5/6 and C6/7.  Early  uncinate process hypertrophy at C5-C7 with mild facet osteoarthropathy.  No cervical vertebral fracture.     No prominent central cervical canal or foraminal stenosis identified.  Paravertebral soft tissues are unremarkable.          Impression:         1. Mild reversal normal cervical lordosis may relate to head positioning  or musculoskeletal strain. No cervical vertebral fracture or traumatic  subluxation. Early degenerative change.     This report was signed and finalized on 4/1/2025 3:15 PM by Dr. Kassy Blair MD.       CT Head Without Contrast [[id number]] Collected: 04/01/25 1510     Updated: 04/01/25 1515    Narrative:      EXAMINATION:  CT HEAD WO CONTRAST-  4/1/2025 1:55 PM     HISTORY: The patient fell while running. S00.81XA-Abrasion of other part  of head, initial encounter; W19.XXXA-Unspecified fall, initial  encounter; Y93.02-Activity, running; E83.51-Hypocalcemia;  E87.6-Hypokalemia; N39.0-Urinary tract infection, site not specified;  H61.22-Impacted cerumen, left ear.     TECHNIQUE: Multiple axial images were obtained through the brain without  contrast infusion. Multiplanar images were reconstructed.     DLP: 3918.62 mGy.cm Automated dosage reduction technique was utilized to  reduce patient dosage.     COMPARISON: 8/27/2024.     FINDINGS: There are no hemorrhage, edema or mass effect. The ventricular  system is nondilated. The visualized paranasal sinuses are clear. The  mastoid air cells are clear. No calvarial fracture is seen.          Impression:      No hemorrhage, edema or mass effect. No acute findings.        The full report of this exam was immediately signed and available to the  emergency room. The patient is currently in the emergency room.        This report was signed and finalized on 4/1/2025 3:12 PM by Dr. Yash Prakash MD.       XR Chest 1 View [[id number]] Collected: 04/01/25 1507     Updated: 04/01/25 1511    Narrative:      XR CHEST 1 VW- 4/1/2025 1:54 PM      HISTORY: fall; S00.81XA-Abrasion of other part of head, initial  encounter; W19.XXXA-Unspecified fall, initial encounter;  Y93.02-Activity, running       COMPARISON: Chest x-ray dated 8/27/2024     FINDINGS:  Upright frontal radiograph of the chest was obtained     No lung consolidation. No pleural effusion or pneumothorax. The  cardiomediastinal silhouette and pulmonary vascularity are within normal  limits. The osseous structures and surrounding soft tissues demonstrate  no acute abnormality.       Impression:      1.  No acute process in the chest.     This report was signed and finalized on 4/1/2025 3:08 PM by Dr Elton Aguirre.               Impression:  Alcoholic hepatitis. MDF 74 which correlates with poor prognosis.   Decompensated cirrhosis.  History of alcohol abuse with withdrawal and high risk for Dts.   Electrolyte imbalance.    Plan:  Continue supportive care. Will require aggressive electrolyte replacement. And treatment for alcohol withdrawal. Will defer this to medicine team. With his high discriminant function, will start on Prednisolone 40 mg daily. Continue to trend labs. Diet as tolerated.       Beltran Matson MD  04/02/25   08:30 CDT

## 2025-04-03 LAB
ALBUMIN SERPL-MCNC: 3 G/DL (ref 3.5–5.2)
ALBUMIN/GLOB SERPL: 1.2 G/DL
ALP SERPL-CCNC: 179 U/L (ref 39–117)
ALT SERPL W P-5'-P-CCNC: 48 U/L (ref 1–41)
ANION GAP SERPL CALCULATED.3IONS-SCNC: 9 MMOL/L (ref 5–15)
AST SERPL-CCNC: 251 U/L (ref 1–40)
BACTERIA SPEC AEROBE CULT: ABNORMAL
BASOPHILS # BLD AUTO: 0.02 10*3/MM3 (ref 0–0.2)
BASOPHILS NFR BLD AUTO: 0.4 % (ref 0–1.5)
BILIRUB SERPL-MCNC: 15.3 MG/DL (ref 0–1.2)
BUN SERPL-MCNC: 13 MG/DL (ref 6–20)
BUN/CREAT SERPL: 33.3 (ref 7–25)
CALCIUM SPEC-SCNC: 7 MG/DL (ref 8.6–10.5)
CHLORIDE SERPL-SCNC: 106 MMOL/L (ref 98–107)
CO2 SERPL-SCNC: 23 MMOL/L (ref 22–29)
CREAT SERPL-MCNC: 0.39 MG/DL (ref 0.76–1.27)
DEPRECATED RDW RBC AUTO: 75.6 FL (ref 37–54)
EGFRCR SERPLBLD CKD-EPI 2021: 142.5 ML/MIN/1.73
EOSINOPHIL # BLD AUTO: 0 10*3/MM3 (ref 0–0.4)
EOSINOPHIL NFR BLD AUTO: 0 % (ref 0.3–6.2)
ERYTHROCYTE [DISTWIDTH] IN BLOOD BY AUTOMATED COUNT: 22.4 % (ref 12.3–15.4)
GLOBULIN UR ELPH-MCNC: 2.5 GM/DL
GLUCOSE SERPL-MCNC: 95 MG/DL (ref 65–99)
HCT VFR BLD AUTO: 26.9 % (ref 37.5–51)
HGB BLD-MCNC: 9.2 G/DL (ref 13–17.7)
IMM GRANULOCYTES # BLD AUTO: 0.04 10*3/MM3 (ref 0–0.05)
IMM GRANULOCYTES NFR BLD AUTO: 0.9 % (ref 0–0.5)
INR PPP: 2.03 (ref 0.91–1.09)
LYMPHOCYTES # BLD AUTO: 0.49 10*3/MM3 (ref 0.7–3.1)
LYMPHOCYTES NFR BLD AUTO: 11 % (ref 19.6–45.3)
MAGNESIUM SERPL-MCNC: 1.5 MG/DL (ref 1.6–2.6)
MCH RBC QN AUTO: 31.9 PG (ref 26.6–33)
MCHC RBC AUTO-ENTMCNC: 34.2 G/DL (ref 31.5–35.7)
MCV RBC AUTO: 93.4 FL (ref 79–97)
MONOCYTES # BLD AUTO: 0.49 10*3/MM3 (ref 0.1–0.9)
MONOCYTES NFR BLD AUTO: 11 % (ref 5–12)
NEUTROPHILS NFR BLD AUTO: 3.43 10*3/MM3 (ref 1.7–7)
NEUTROPHILS NFR BLD AUTO: 76.7 % (ref 42.7–76)
PLATELET # BLD AUTO: 54 10*3/MM3 (ref 140–450)
PMV BLD AUTO: 11.9 FL (ref 6–12)
POTASSIUM SERPL-SCNC: 3.8 MMOL/L (ref 3.5–5.2)
PROT SERPL-MCNC: 5.5 G/DL (ref 6–8.5)
PROTHROMBIN TIME: 24.1 SECONDS (ref 11.8–14.8)
RBC # BLD AUTO: 2.88 10*6/MM3 (ref 4.14–5.8)
SODIUM SERPL-SCNC: 138 MMOL/L (ref 136–145)
WBC NRBC COR # BLD AUTO: 4.47 10*3/MM3 (ref 3.4–10.8)

## 2025-04-03 PROCEDURE — 25010000002 FOLIC ACID 5 MG/ML SOLUTION 10 ML VIAL: Performed by: HOSPITALIST

## 2025-04-03 PROCEDURE — 85025 COMPLETE CBC W/AUTO DIFF WBC: CPT | Performed by: HOSPITALIST

## 2025-04-03 PROCEDURE — 83735 ASSAY OF MAGNESIUM: CPT | Performed by: HOSPITALIST

## 2025-04-03 PROCEDURE — 25010000002 THIAMINE HCL 200 MG/2ML SOLUTION 2 ML VIAL: Performed by: HOSPITALIST

## 2025-04-03 PROCEDURE — 85610 PROTHROMBIN TIME: CPT | Performed by: INTERNAL MEDICINE

## 2025-04-03 PROCEDURE — 63710000001 PREDNISOLONE PER 5 MG: Performed by: INTERNAL MEDICINE

## 2025-04-03 PROCEDURE — 99232 SBSQ HOSP IP/OBS MODERATE 35: CPT | Performed by: INTERNAL MEDICINE

## 2025-04-03 PROCEDURE — 80053 COMPREHEN METABOLIC PANEL: CPT | Performed by: HOSPITALIST

## 2025-04-03 PROCEDURE — 36415 COLL VENOUS BLD VENIPUNCTURE: CPT | Performed by: HOSPITALIST

## 2025-04-03 PROCEDURE — 25010000002 AMPICILLIN PER 500 MG: Performed by: HOSPITALIST

## 2025-04-03 PROCEDURE — 25010000002 LORAZEPAM PER 2 MG: Performed by: HOSPITALIST

## 2025-04-03 PROCEDURE — 25010000002 MAGNESIUM SULFATE 2 GM/50ML SOLUTION: Performed by: HOSPITALIST

## 2025-04-03 RX ORDER — MAGNESIUM SULFATE HEPTAHYDRATE 40 MG/ML
2 INJECTION, SOLUTION INTRAVENOUS
Status: CANCELLED | OUTPATIENT
Start: 2025-04-03 | End: 2025-04-03

## 2025-04-03 RX ORDER — MAGNESIUM SULFATE HEPTAHYDRATE 40 MG/ML
2 INJECTION, SOLUTION INTRAVENOUS
Status: COMPLETED | OUTPATIENT
Start: 2025-04-03 | End: 2025-04-03

## 2025-04-03 RX ADMIN — Medication 1 APPLICATION: at 08:48

## 2025-04-03 RX ADMIN — LORAZEPAM 1 MG: 2 INJECTION INTRAMUSCULAR; INTRAVENOUS at 23:33

## 2025-04-03 RX ADMIN — THIAMINE HYDROCHLORIDE 500 MG: 100 INJECTION, SOLUTION INTRAMUSCULAR; INTRAVENOUS at 05:20

## 2025-04-03 RX ADMIN — CHLORDIAZEPOXIDE HYDROCHLORIDE 50 MG: 25 CAPSULE ORAL at 13:23

## 2025-04-03 RX ADMIN — AMPICILLIN 1 G: 1 INJECTION, POWDER, FOR SOLUTION INTRAMUSCULAR; INTRAVENOUS at 17:28

## 2025-04-03 RX ADMIN — CHLORDIAZEPOXIDE HYDROCHLORIDE 50 MG: 25 CAPSULE ORAL at 05:20

## 2025-04-03 RX ADMIN — LACTULOSE 20 G: 20 SOLUTION ORAL at 17:31

## 2025-04-03 RX ADMIN — Medication 1 APPLICATION: at 21:23

## 2025-04-03 RX ADMIN — LORAZEPAM 2 MG: 2 INJECTION INTRAMUSCULAR; INTRAVENOUS at 00:30

## 2025-04-03 RX ADMIN — LORAZEPAM 1 MG: 1 TABLET ORAL at 21:23

## 2025-04-03 RX ADMIN — LORAZEPAM 1 MG: 1 TABLET ORAL at 11:59

## 2025-04-03 RX ADMIN — THIAMINE HYDROCHLORIDE 500 MG: 100 INJECTION, SOLUTION INTRAMUSCULAR; INTRAVENOUS at 21:22

## 2025-04-03 RX ADMIN — FOLIC ACID 1 MG: 5 INJECTION, SOLUTION INTRAMUSCULAR; INTRAVENOUS; SUBCUTANEOUS at 18:29

## 2025-04-03 RX ADMIN — Medication 10 ML: at 08:48

## 2025-04-03 RX ADMIN — PANTOPRAZOLE SODIUM 40 MG: 40 INJECTION, POWDER, FOR SOLUTION INTRAVENOUS at 05:20

## 2025-04-03 RX ADMIN — LORAZEPAM 1 MG: 1 TABLET ORAL at 05:20

## 2025-04-03 RX ADMIN — Medication 1 TABLET: at 08:48

## 2025-04-03 RX ADMIN — LORAZEPAM 2 MG: 2 INJECTION INTRAMUSCULAR; INTRAVENOUS at 02:55

## 2025-04-03 RX ADMIN — CHLORDIAZEPOXIDE HYDROCHLORIDE 50 MG: 25 CAPSULE ORAL at 21:23

## 2025-04-03 RX ADMIN — Medication 10 ML: at 21:24

## 2025-04-03 RX ADMIN — THIAMINE HYDROCHLORIDE 500 MG: 100 INJECTION, SOLUTION INTRAMUSCULAR; INTRAVENOUS at 14:01

## 2025-04-03 RX ADMIN — AMPICILLIN 1 G: 1 INJECTION, POWDER, FOR SOLUTION INTRAMUSCULAR; INTRAVENOUS at 23:33

## 2025-04-03 RX ADMIN — MAGNESIUM SULFATE HEPTAHYDRATE 2 G: 2 INJECTION, SOLUTION INTRAVENOUS at 08:47

## 2025-04-03 RX ADMIN — LORAZEPAM 2 MG: 2 INJECTION INTRAMUSCULAR; INTRAVENOUS at 09:18

## 2025-04-03 RX ADMIN — MAGNESIUM SULFATE HEPTAHYDRATE 2 G: 2 INJECTION, SOLUTION INTRAVENOUS at 11:14

## 2025-04-03 RX ADMIN — MAGNESIUM SULFATE HEPTAHYDRATE 2 G: 2 INJECTION, SOLUTION INTRAVENOUS at 06:40

## 2025-04-03 RX ADMIN — LORAZEPAM 2 MG: 2 INJECTION INTRAMUSCULAR; INTRAVENOUS at 01:50

## 2025-04-03 RX ADMIN — AMPICILLIN 1 G: 1 INJECTION, POWDER, FOR SOLUTION INTRAMUSCULAR; INTRAVENOUS at 11:59

## 2025-04-03 RX ADMIN — PREDNISOLONE SODIUM PHOSPHATE 40 MG: 15 SOLUTION ORAL at 10:36

## 2025-04-03 RX ADMIN — LACTULOSE 20 G: 20 SOLUTION ORAL at 08:47

## 2025-04-03 RX ADMIN — LORAZEPAM 1 MG: 1 TABLET ORAL at 00:12

## 2025-04-03 RX ADMIN — LACTULOSE 20 G: 20 SOLUTION ORAL at 21:22

## 2025-04-03 RX ADMIN — LORAZEPAM 2 MG: 2 INJECTION INTRAMUSCULAR; INTRAVENOUS at 06:40

## 2025-04-03 NOTE — PROGRESS NOTES
[facility] Gastroenterology  Inpatient Progress Note  Today's date:  04/03/25    Luciano Christiansen  1985       Reason for Follow Up:   Alcoholic hepatitis    Subjective:   Patient with shakes and tremors. Denies abdominal pain. No fever.       Current Facility-Administered Medications:     ampicillin 1000 mg IVPB in 100 mL NS (MBP), 1 g, Intravenous, Q6H, Frankie Camacho MD    sennosides-docusate (PERICOLACE) 8.6-50 MG per tablet 2 tablet, 2 tablet, Oral, BID **AND** polyethylene glycol (MIRALAX) packet 17 g, 17 g, Oral, Daily PRN **AND** bisacodyl (DULCOLAX) EC tablet 5 mg, 5 mg, Oral, Daily PRN **AND** bisacodyl (DULCOLAX) suppository 10 mg, 10 mg, Rectal, Daily PRN, Frankie Camacho MD    Calcium Replacement - Follow Nurse / BPA Driven Protocol, , Not Applicable, PRN, Frankie Camacho MD    carbamide peroxide (DEBROX) 6.5 % otic solution 5 drop, 5 drop, Left Ear, Once, Srinath Valle PA-C    cefTRIAXone (ROCEPHIN) 1,000 mg in sodium chloride 0.9 % 100 mL MBP, 1,000 mg, Intravenous, Q24H, Frankie Camacho MD, Last Rate: 200 mL/hr at 04/02/25 1721, 1,000 mg at 04/02/25 1721    chlordiazePOXIDE (LIBRIUM) capsule 50 mg, 50 mg, Oral, Q8H, Frankie Camacho MD, 50 mg at 04/03/25 0520    folic acid 1 mg in sodium chloride 0.9 % 50 mL IVPB, 1 mg, Intravenous, Daily, Frankie Camacho MD, Last Rate: 100 mL/hr at 04/02/25 1720, 1 mg at 04/02/25 1720    lactulose solution 20 g, 20 g, Oral, TID, Frankie Camacho MD, 20 g at 04/03/25 0847    LORazepam (ATIVAN) tablet 1 mg, 1 mg, Oral, Q1H PRN **OR** LORazepam (ATIVAN) injection 1 mg, 1 mg, Intravenous, Q1H PRN, 1 mg at 04/02/25 0913 **OR** LORazepam (ATIVAN) tablet 2 mg, 2 mg, Oral, Q1H PRN **OR** LORazepam (ATIVAN) injection 2 mg, 2 mg, Intravenous, Q1H PRN, 2 mg at 04/03/25 0918 **OR** LORazepam (ATIVAN) injection 2 mg, 2 mg, Intravenous, Q15 Min PRN, 2 mg at 04/03/25 0030 **OR** LORazepam (ATIVAN) injection 2 mg, 2 mg, Intramuscular, Q15 Min PRN, Frankie Camacho MD     [COMPLETED] LORazepam (ATIVAN) tablet 2 mg, 2 mg, Oral, Q6H, 2 mg at 04/02/25 1153 **FOLLOWED BY** LORazepam (ATIVAN) tablet 1 mg, 1 mg, Oral, Q6H, 1 mg at 04/03/25 0520 **FOLLOWED BY** LORazepam (ATIVAN) tablet 1 mg, 1 mg, Oral, Q12H **FOLLOWED BY** [START ON 4/5/2025] LORazepam (ATIVAN) tablet 1 mg, 1 mg, Oral, Daily, Frankie Camacho MD    Magnesium Standard Dose Replacement - Follow Nurse / BPA Driven Protocol, , Not Applicable, PRN, Frankie Camacho MD    magnesium sulfate 2g/50 mL (PREMIX) infusion, 2 g, Intravenous, Q2H, Frankie Camacho MD, 2 g at 04/03/25 0847    multivitamin with minerals 1 tablet, 1 tablet, Oral, Daily, Frankie Camacho MD, 1 tablet at 04/03/25 0848    mupirocin (BACTROBAN) 2 % nasal ointment 1 Application, 1 Application, Each Nare, BID, Frankie Camacho MD, 1 Application at 04/03/25 0848    nitroglycerin (NITROSTAT) SL tablet 0.4 mg, 0.4 mg, Sublingual, Q5 Min PRN, Frankie Camacho MD    ondansetron (ZOFRAN) injection 4 mg, 4 mg, Intravenous, Q6H PRN, Frankie Camacho MD, 4 mg at 04/02/25 1252    pantoprazole (PROTONIX) injection 40 mg, 40 mg, Intravenous, Q AM, Frankie Camacho MD, 40 mg at 04/03/25 0520    Phosphorus Replacement - Follow Nurse / BPA Driven Protocol, , Not Applicable, PRN, Frankie Camacho MD    phytonadione (AQUA-MEPHYTON, VITAMIN K) injection 10 mg, 10 mg, Subcutaneous, Once, Frankie Camacho MD    Potassium Replacement - Follow Nurse / BPA Driven Protocol, , Not Applicable, PRN, Frankie Camacho MD    prednisoLONE sodium phosphate (ORAPRED) 15 MG/5ML oral solution 40 mg, 40 mg, Oral, Daily, Beltran Matson MD, 40 mg at 04/03/25 1036    [COMPLETED] Insert Peripheral IV, , , Once **AND** sodium chloride 0.9 % flush 10 mL, 10 mL, Intravenous, PRN, Srinath Valle PA-C    sodium chloride 0.9 % flush 10 mL, 10 mL, Intravenous, Q12H, Frankie Camacho MD, 10 mL at 04/03/25 0848    sodium chloride 0.9 % flush 10 mL, 10 mL, Intravenous, PRN, Frankie Camacho MD    sodium chloride 0.9 %  infusion 40 mL, 40 mL, Intravenous, PRN, Frankie Camacho MD    thiamine (B-1) 500 mg in sodium chloride 0.9 % 100 mL IVPB, 500 mg, Intravenous, Q8H, Last Rate: 200 mL/hr at 04/03/25 0520, 500 mg at 04/03/25 0520 **FOLLOWED BY** [START ON 4/4/2025] thiamine (B-1) injection 200 mg, 200 mg, Intravenous, Q8H **FOLLOWED BY** [START ON 4/9/2025] thiamine (VITAMIN B-1) tablet 100 mg, 100 mg, Oral, Daily, Frankie Camacho MD      Vital Signs:  Temp:  [98.1 °F (36.7 °C)-99.6 °F (37.6 °C)] 98.7 °F (37.1 °C)  Heart Rate:  [] 99  Resp:  [18] 18  BP: (120-157)/(50-89) 134/82    Physical Exam:  General: no acute distress, alert and oriented  HEENT: perrl, no sclera icterus  CV: rrr, no murmur  Resp: lungs clear to auscultation, no wheeze or rhonchi  Abd: soft, nontender, nondistended, no rebound our guarding  Skin: no rash, no jaundice     Results Review:   I have reviewed all of the patient's current test results    Results from last 7 days   Lab Units 04/03/25 0338 04/02/25 1005 04/01/25 2018   WBC 10*3/mm3 4.47 5.96 9.26   HEMOGLOBIN g/dL 9.2* 10.2* 11.6*   HEMATOCRIT % 26.9* 29.9* 32.9*   PLATELETS 10*3/mm3 54* 70* 99*       Results from last 7 days   Lab Units 04/03/25 0338 04/02/25 2141 04/02/25 1005 04/01/25 2018 04/01/25  1436   SODIUM mmol/L 138  --  139 138 135*   POTASSIUM mmol/L 3.8 4.4 3.6 2.9* 2.8*   CHLORIDE mmol/L 106  --  101 95* 92*   CO2 mmol/L 23.0  --  19.0* 23.0 24.0   BUN mg/dL 13  --  12 12 10   CREATININE mg/dL 0.39*  --  0.58* 0.86 0.70*   CALCIUM mg/dL 7.0*  --  7.1* 7.5* 7.3*   BILIRUBIN mg/dL 15.3*  --  13.8*  --  12.8*   ALK PHOS U/L 179*  --  210*  --  239*   ALT (SGPT) U/L 48*  --  51*  --  54*   AST (SGOT) U/L 251*  --  295*  --  270*   GLUCOSE mg/dL 95  --  149* 128* 171*       Results from last 7 days   Lab Units 04/01/25  1436   INR  2.16*         Impression:  Alcoholic hepatitis with MDF 74 on admission which correlates with high mortality. Bilirubin trending up.  Decompensated  cirrhosis.   History of alcohol abuse, now with withdrawal and high risk for Dts.     Plan:  Continue supportive care including treatment for withdrawal/Dts and electrolyte replacement. Continue Prednisolone 40 mg daily for treatment of alcoholic hepatitis.     Beltran Matson MD  04/03/25  11:12 CDT

## 2025-04-03 NOTE — PROGRESS NOTES
"    [facility] Services  INPATIENT PROGRESS NOTE    Patient Name: Luciano Christiansen  Date of Admission: 4/1/2025  Today's Date: 04/03/25  Length of Stay: 1  Primary Care Physician: Adrien Varghese MD    Subjective   Chief Complaint: fall ETOH intoxication      Fall     Alcohol Intoxication     Patient is a 40-year-old male with PMH of alcoholism, non-insulin-dependent diabetes, hypertension, vasectomy. ETOH liver cirrhosis  presenting to ED with alcohol use and fall.  PMH significant for history Patient states 2 of his dogs got out today at which time he went running chasing after them outside describing that he had been running for an extended period of time in an alley way when due to being out of shape \"my legs just gave out and I fell down.\"  Patient states that he hit his face on the alleyway first but denies any loss of consciousness.  Patient states that he had no preceding symptoms including chest pain/pressure/heaviness/tightness or syncope but felt he had just run for too long for his conditioning.  Patient states that the fall was witnessed by a bystander who called EMS.  Upon arrival to ED patient states that he does not have pain anywhere.  Patient states that he had otherwise been at his baseline recently.  Patient did state that since his last ER visit he is decreasing his alcohol intake and is currently at two 12 ounce malt whiskey beers daily.  Patient describes \"I want to keep drinking and she is nothing ever works.\"  Patient perseverates on his 100 days of sobriety prior to starting a couple months ago.  Patient denies any medication use or interventions prior to arrival and presents at this time for further evaluation   In ER pan CT head C spine, TL spine, maxillofacial were -ve, UA +ve, BC obtained, UDS _ve THC, ETOH 475, MG 1, K 2.4 replaced, given thiamine, LFTs elevated, bilirubin 12 , PT was lethargic but reponsive, ER did not feel PT needed to go to ICU, " will admit, CIWA, IVF thiamine MVI folic acid, abx, replace K, MG , consult GI, scd for DVT prophylaxis, identify reconciled restart home meds once reconciled   4/2  As before history of alcohol abuse presented with fall intoxication alcoholic hepatitis started on CIWA thiamine multivitamin folic acid GI has been consulted  Alcoholic hepatitis. MDF 74 which correlates with poor prognosis was started on prednisone 40 p.o. daily continue to follow labs watch for DTs replace his potassium magnesium ammonia was 80 started on lactulose  4/3  Before history of alcohol abuse liver cirrhosis presented with alcoholic hepatitis and pending DTs electrolyte abnormalities placed and DT precautions and CIWA replacing magnesium prognosis is poor started on prednisone per GI, UA is grossly positive urine culture is showing Enterococcus sensitive to ampicillin and Levaquin blood culture unremarkable white count unremarkable will DC Rocephin and switch to ampicillin chest x-ray was unremarkable  Review of Systems   All other systems reviewed and are negative.     All pertinent negatives and positives are as above. All other systems have been reviewed and are negative unless otherwise stated.     Objective    Temp:  [98.1 °F (36.7 °C)-99.6 °F (37.6 °C)] 98.7 °F (37.1 °C)  Heart Rate:  [] 99  Resp:  [18] 18  BP: (120-157)/(50-89) 134/82  Physical Exam  Constitutional:       Appearance: He is ill-appearing.   HENT:      Head: Normocephalic.      Nose: Nose normal.   Eyes:      Pupils: Pupils are equal, round, and reactive to light.   Cardiovascular:      Rate and Rhythm: Normal rate.   Pulmonary:      Breath sounds: Wheezing present.   Abdominal:      General: Abdomen is flat.   Musculoskeletal:         General: Normal range of motion.      Cervical back: Normal range of motion.   Neurological:      General: No focal deficit present.      Mental Status: He is alert.             Results Review:  I have reviewed the labs, radiology  results, and diagnostic studies.    Laboratory Data:   Results from last 7 days   Lab Units 04/03/25 0338 04/02/25 1005 04/01/25 2018   WBC 10*3/mm3 4.47 5.96 9.26   HEMOGLOBIN g/dL 9.2* 10.2* 11.6*   HEMATOCRIT % 26.9* 29.9* 32.9*   PLATELETS 10*3/mm3 54* 70* 99*        Results from last 7 days   Lab Units 04/03/25 0338 04/02/25 2141 04/02/25 1005 04/01/25 2018 04/01/25  1436   SODIUM mmol/L 138  --  139 138 135*   POTASSIUM mmol/L 3.8 4.4 3.6 2.9* 2.8*   CHLORIDE mmol/L 106  --  101 95* 92*   CO2 mmol/L 23.0  --  19.0* 23.0 24.0   BUN mg/dL 13  --  12 12 10   CREATININE mg/dL 0.39*  --  0.58* 0.86 0.70*   CALCIUM mg/dL 7.0*  --  7.1* 7.5* 7.3*   BILIRUBIN mg/dL 15.3*  --  13.8*  --  12.8*   ALK PHOS U/L 179*  --  210*  --  239*   ALT (SGPT) U/L 48*  --  51*  --  54*   AST (SGOT) U/L 251*  --  295*  --  270*   GLUCOSE mg/dL 95  --  149* 128* 171*       Culture Data:   Urine Culture   Date Value Ref Range Status   04/01/2025 >100,000 CFU/mL Gram Positive Cocci (A)  Preliminary       Radiology Data:   Imaging Results (Last 24 Hours)       ** No results found for the last 24 hours. **            I have reviewed the patient's current medications.     Assessment/Plan   Assessment  Active Hospital Problems    Diagnosis     **Hypomagnesemia        Treatment Plan  The patient will be admitted to my service here at [facility].   In ER pan CT head C spine, TL spine, maxillofacial were -ve, UA +ve, BC obtained, UDS _ve THC, ETOH 475, MG 1, K 2.4 replaced, given thiamine, LFTs elevated, bilirubin 12 , PT was lethargic but reponsive, ER did not feel PT needed to go to ICU, will admit, CIWA, IVF thiamine MVI folic acid, abx, replace K, MG , consult GI, scd for DVT prophylaxis, identify reconciled restart home meds once reconciled   Medical Decision Making  Number and Complexity of problems: 3 acute  Differential Diagnosis: as above      Conditions and Status        Condition is unchanged.     MDM  Data  External documents reviewed: yes  Cardiac tracing (EKG, telemetry) interpretation: yes  Radiology interpretation: yes  Labs reviewed: yes  Any tests that were considered but not ordered: no     Decision rules/scores evaluated (example [id number], [place], etc): no     Discussed with: ED     Care Planning  Shared decision making: Patient apprised of current labs, vitals, imaging and treatment plan.  They are agreeable with proceeding with plans as discussed.   Code status and discussions: full      Disposition  Social Determinants of Health that impact treatment or disposition: no  Estimated length of stay is TBD.      I confirmed that the patient's advanced care plan is present, code status is documented, and a surrogate decision maker is listed in the patient's medical record        Electronically signed by Frankie Camacho MD, 04/03/25, 10:06 CDT.

## 2025-04-04 LAB
ALBUMIN SERPL-MCNC: 2.9 G/DL (ref 3.5–5.2)
ALBUMIN/GLOB SERPL: 1.1 G/DL
ALP SERPL-CCNC: 169 U/L (ref 39–117)
ALT SERPL W P-5'-P-CCNC: 52 U/L (ref 1–41)
ANION GAP SERPL CALCULATED.3IONS-SCNC: 9 MMOL/L (ref 5–15)
AST SERPL-CCNC: 203 U/L (ref 1–40)
BILIRUB SERPL-MCNC: 17.3 MG/DL (ref 0–1.2)
BUN SERPL-MCNC: 14 MG/DL (ref 6–20)
BUN/CREAT SERPL: 56 (ref 7–25)
CALCIUM SPEC-SCNC: 7.4 MG/DL (ref 8.6–10.5)
CHLORIDE SERPL-SCNC: 106 MMOL/L (ref 98–107)
CO2 SERPL-SCNC: 24 MMOL/L (ref 22–29)
CREAT SERPL-MCNC: 0.25 MG/DL (ref 0.76–1.27)
EGFRCR SERPLBLD CKD-EPI 2021: 163 ML/MIN/1.73
GLOBULIN UR ELPH-MCNC: 2.6 GM/DL
GLUCOSE SERPL-MCNC: 81 MG/DL (ref 65–99)
POTASSIUM SERPL-SCNC: 3.6 MMOL/L (ref 3.5–5.2)
POTASSIUM SERPL-SCNC: 4.2 MMOL/L (ref 3.5–5.2)
PROT SERPL-MCNC: 5.5 G/DL (ref 6–8.5)
SODIUM SERPL-SCNC: 139 MMOL/L (ref 136–145)

## 2025-04-04 PROCEDURE — 25010000002 LORAZEPAM PER 2 MG: Performed by: HOSPITALIST

## 2025-04-04 PROCEDURE — 36415 COLL VENOUS BLD VENIPUNCTURE: CPT | Performed by: HOSPITALIST

## 2025-04-04 PROCEDURE — 25010000002 THIAMINE PER 100 MG: Performed by: HOSPITALIST

## 2025-04-04 PROCEDURE — 84132 ASSAY OF SERUM POTASSIUM: CPT | Performed by: HOSPITALIST

## 2025-04-04 PROCEDURE — 25010000002 THIAMINE HCL 200 MG/2ML SOLUTION: Performed by: HOSPITALIST

## 2025-04-04 PROCEDURE — 25010000002 AMPICILLIN PER 500 MG: Performed by: HOSPITALIST

## 2025-04-04 PROCEDURE — 63710000001 PREDNISOLONE PER 5 MG: Performed by: INTERNAL MEDICINE

## 2025-04-04 PROCEDURE — 99232 SBSQ HOSP IP/OBS MODERATE 35: CPT | Performed by: INTERNAL MEDICINE

## 2025-04-04 PROCEDURE — 25010000002 FOLIC ACID 5 MG/ML SOLUTION 10 ML VIAL: Performed by: HOSPITALIST

## 2025-04-04 PROCEDURE — 80053 COMPREHEN METABOLIC PANEL: CPT | Performed by: HOSPITALIST

## 2025-04-04 RX ORDER — POTASSIUM CHLORIDE 1500 MG/1
40 TABLET, EXTENDED RELEASE ORAL EVERY 4 HOURS
Status: COMPLETED | OUTPATIENT
Start: 2025-04-04 | End: 2025-04-04

## 2025-04-04 RX ADMIN — LORAZEPAM 1 MG: 2 INJECTION INTRAMUSCULAR; INTRAVENOUS at 13:40

## 2025-04-04 RX ADMIN — LORAZEPAM 1 MG: 1 TABLET ORAL at 09:08

## 2025-04-04 RX ADMIN — Medication 1 APPLICATION: at 20:35

## 2025-04-04 RX ADMIN — AMPICILLIN 1 G: 1 INJECTION, POWDER, FOR SOLUTION INTRAMUSCULAR; INTRAVENOUS at 11:12

## 2025-04-04 RX ADMIN — LACTULOSE 20 G: 20 SOLUTION ORAL at 08:22

## 2025-04-04 RX ADMIN — Medication 10 ML: at 08:27

## 2025-04-04 RX ADMIN — Medication 5 MG: at 22:13

## 2025-04-04 RX ADMIN — Medication 1 APPLICATION: at 08:25

## 2025-04-04 RX ADMIN — LACTULOSE 20 G: 20 SOLUTION ORAL at 20:35

## 2025-04-04 RX ADMIN — FOLIC ACID 1 MG: 5 INJECTION, SOLUTION INTRAMUSCULAR; INTRAVENOUS; SUBCUTANEOUS at 17:46

## 2025-04-04 RX ADMIN — LORAZEPAM 1 MG: 2 INJECTION INTRAMUSCULAR; INTRAVENOUS at 06:46

## 2025-04-04 RX ADMIN — AMPICILLIN 1 G: 1 INJECTION, POWDER, FOR SOLUTION INTRAMUSCULAR; INTRAVENOUS at 07:51

## 2025-04-04 RX ADMIN — POTASSIUM CHLORIDE 40 MEQ: 1500 TABLET, EXTENDED RELEASE ORAL at 09:09

## 2025-04-04 RX ADMIN — SENNOSIDES, DOCUSATE SODIUM 2 TABLET: 50; 8.6 TABLET, FILM COATED ORAL at 08:24

## 2025-04-04 RX ADMIN — CHLORDIAZEPOXIDE HYDROCHLORIDE 50 MG: 25 CAPSULE ORAL at 13:36

## 2025-04-04 RX ADMIN — LORAZEPAM 1 MG: 2 INJECTION INTRAMUSCULAR; INTRAVENOUS at 02:59

## 2025-04-04 RX ADMIN — LORAZEPAM 2 MG: 2 INJECTION INTRAMUSCULAR; INTRAVENOUS at 20:35

## 2025-04-04 RX ADMIN — PREDNISOLONE SODIUM PHOSPHATE 40 MG: 15 SOLUTION ORAL at 08:23

## 2025-04-04 RX ADMIN — Medication 10 ML: at 20:36

## 2025-04-04 RX ADMIN — LACTULOSE 20 G: 20 SOLUTION ORAL at 17:46

## 2025-04-04 RX ADMIN — AMPICILLIN 1 G: 1 INJECTION, POWDER, FOR SOLUTION INTRAMUSCULAR; INTRAVENOUS at 23:38

## 2025-04-04 RX ADMIN — CHLORDIAZEPOXIDE HYDROCHLORIDE 50 MG: 25 CAPSULE ORAL at 21:07

## 2025-04-04 RX ADMIN — LORAZEPAM 1 MG: 2 INJECTION INTRAMUSCULAR; INTRAVENOUS at 04:21

## 2025-04-04 RX ADMIN — CHLORDIAZEPOXIDE HYDROCHLORIDE 50 MG: 25 CAPSULE ORAL at 06:41

## 2025-04-04 RX ADMIN — AMPICILLIN 1 G: 1 INJECTION, POWDER, FOR SOLUTION INTRAMUSCULAR; INTRAVENOUS at 16:23

## 2025-04-04 RX ADMIN — SENNOSIDES, DOCUSATE SODIUM 2 TABLET: 50; 8.6 TABLET, FILM COATED ORAL at 20:35

## 2025-04-04 RX ADMIN — PANTOPRAZOLE SODIUM 40 MG: 40 INJECTION, POWDER, FOR SOLUTION INTRAVENOUS at 06:41

## 2025-04-04 RX ADMIN — POTASSIUM CHLORIDE 40 MEQ: 1500 TABLET, EXTENDED RELEASE ORAL at 06:46

## 2025-04-04 RX ADMIN — THIAMINE HYDROCHLORIDE 500 MG: 100 INJECTION, SOLUTION INTRAMUSCULAR; INTRAVENOUS at 06:41

## 2025-04-04 RX ADMIN — THIAMINE HYDROCHLORIDE 200 MG: 100 INJECTION, SOLUTION INTRAMUSCULAR; INTRAVENOUS at 21:07

## 2025-04-04 RX ADMIN — Medication 1 TABLET: at 08:26

## 2025-04-04 RX ADMIN — THIAMINE HYDROCHLORIDE 500 MG: 100 INJECTION, SOLUTION INTRAMUSCULAR; INTRAVENOUS at 13:37

## 2025-04-04 NOTE — PROGRESS NOTES
Nutrition Services    Patient Name:  Luciano Christiansen  YOB: 1985  MRN: [id number]  Admit Date:  4/1/2025    CLD x 3 days. Pt may benefit from early [enteral/parenteral] feeding if appropriate with POC goals. If desired, RD available for recommendations. Nutrition following per protocol.     Electronically signed by:  Virginie Cuellar RDN, LD  04/04/25 08:50 CDT

## 2025-04-04 NOTE — NURSING NOTE
Pt A/O x4. Pts vs stable. Pt tele nsr. PT CIWA Score 9 x2. Pt given x1 Ativan prn.  Pt continues to need assistance with linen change and room clean after multiple incontinent bowel movements.  Pt denies pain. Call bell within reach, non skid footwear in place.  Pt informed to notify staff if needs arise.

## 2025-04-04 NOTE — PROGRESS NOTES
[facility] Gastroenterology  Inpatient Progress Note  Today's date:  25    Luciano Christiansen  1985       Reason for Follow Up:   Alcoholic hepatitis    Subjective:   No new issues overnight. Bilirubin trending up.       Current Facility-Administered Medications:     ampicillin 1000 mg IVPB in 100 mL NS (MBP), 1 g, Intravenous, Q6H, Frankie Camacho MD, Last Rate: 200 mL/hr at 25 0751, 1 g at 25 0751    sennosides-docusate (PERICOLACE) 8.6-50 MG per tablet 2 tablet, 2 tablet, Oral, BID, 2 tablet at 25 0824 **AND** polyethylene glycol (MIRALAX) packet 17 g, 17 g, Oral, Daily PRN **AND** bisacodyl (DULCOLAX) EC tablet 5 mg, 5 mg, Oral, Daily PRN **AND** bisacodyl (DULCOLAX) suppository 10 mg, 10 mg, Rectal, Daily PRN, Frankie Camacho MD    Calcium Replacement - Follow Nurse / BPA Driven Protocol, , Not Applicable, PRN, Frankie Camacho MD    carbamide peroxide (DEBROX) 6.5 % otic solution 5 drop, 5 drop, Left Ear, Once, Srinath Valle PA-C    chlordiazePOXIDE (LIBRIUM) capsule 50 mg, 50 mg, Oral, Q8H, Frankie Camacho MD, 50 mg at 25 0641    folic acid 1 mg in sodium chloride 0.9 % 50 mL IVPB, 1 mg, Intravenous, Daily, Frankie Camacho MD, Last Rate: 100 mL/hr at 25 1829, 1 mg at 25 1829    lactulose solution 20 g, 20 g, Oral, TID, Frankie Camacho MD, 20 g at 25 0822    LORazepam (ATIVAN) tablet 1 mg, 1 mg, Oral, Q1H PRN **OR** LORazepam (ATIVAN) injection 1 mg, 1 mg, Intravenous, Q1H PRN, 1 mg at 25 0646 **OR** LORazepam (ATIVAN) tablet 2 mg, 2 mg, Oral, Q1H PRN **OR** LORazepam (ATIVAN) injection 2 mg, 2 mg, Intravenous, Q1H PRN, 2 mg at 25 0918 **OR** LORazepam (ATIVAN) injection 2 mg, 2 mg, Intravenous, Q15 Min PRN, 2 mg at 25 0030 **OR** LORazepam (ATIVAN) injection 2 mg, 2 mg, Intramuscular, Q15 Min PRN, Frankie Camacho MD    [COMPLETED] LORazepam (ATIVAN) tablet 2 mg, 2 mg, Oral, Q6H, 2 mg at 25 1153 **FOLLOWED BY** []  LORazepam (ATIVAN) tablet 1 mg, 1 mg, Oral, Q6H, 1 mg at 04/03/25 1159 **FOLLOWED BY** [COMPLETED] LORazepam (ATIVAN) tablet 1 mg, 1 mg, Oral, Q12H, 1 mg at 04/04/25 0908 **FOLLOWED BY** [START ON 4/5/2025] LORazepam (ATIVAN) tablet 1 mg, 1 mg, Oral, Daily, Frankie Camacho MD    Magnesium Standard Dose Replacement - Follow Nurse / BPA Driven Protocol, , Not Applicable, PRN, Frankie Camacho MD    multivitamin with minerals 1 tablet, 1 tablet, Oral, Daily, Frankie Camacho MD, 1 tablet at 04/04/25 0826    mupirocin (BACTROBAN) 2 % nasal ointment 1 Application, 1 Application, Each Nare, BID, Frankie Camacho MD, 1 Application at 04/04/25 0825    nitroglycerin (NITROSTAT) SL tablet 0.4 mg, 0.4 mg, Sublingual, Q5 Min PRN, Frankie Camacho MD    ondansetron (ZOFRAN) injection 4 mg, 4 mg, Intravenous, Q6H PRN, Frankie Camacho MD, 4 mg at 04/02/25 1252    pantoprazole (PROTONIX) injection 40 mg, 40 mg, Intravenous, Q AM, Frankie Camacho MD, 40 mg at 04/04/25 0641    Phosphorus Replacement - Follow Nurse / BPA Driven Protocol, , Not Applicable, PRN, Frankie Camacho MD    phytonadione (AQUA-MEPHYTON, VITAMIN K) injection 10 mg, 10 mg, Subcutaneous, Once, Frankie Camacho MD    Potassium Replacement - Follow Nurse / BPA Driven Protocol, , Not Applicable, PRN, Frankie Camacho MD    prednisoLONE sodium phosphate (ORAPRED) 15 MG/5ML oral solution 40 mg, 40 mg, Oral, Daily, Beltran Matson MD, 40 mg at 04/04/25 0823    [COMPLETED] Insert Peripheral IV, , , Once **AND** sodium chloride 0.9 % flush 10 mL, 10 mL, Intravenous, PRN, [place], Srinath, PA-C    sodium chloride 0.9 % flush 10 mL, 10 mL, Intravenous, Q12H, Frankie Camacho MD, 10 mL at 04/04/25 0827    sodium chloride 0.9 % flush 10 mL, 10 mL, Intravenous, PRN, Frankie Camacho MD    sodium chloride 0.9 % infusion 40 mL, 40 mL, Intravenous, PRN, Frankie Camacho MD    thiamine (B-1) 500 mg in sodium chloride 0.9 % 100 mL IVPB, 500 mg, Intravenous, Q8H, Last Rate: 200 mL/hr at  04/04/25 0641, 500 mg at 04/04/25 0641 **FOLLOWED BY** thiamine (B-1) injection 200 mg, 200 mg, Intravenous, Q8H **FOLLOWED BY** [START ON 4/9/2025] thiamine (VITAMIN B-1) tablet 100 mg, 100 mg, Oral, Daily, Frankie Camacho MD      Vital Signs:  Temp:  [98 °F (36.7 °C)-98.6 °F (37 °C)] 98 °F (36.7 °C)  Heart Rate:  [76-90] 79  Resp:  [16-18] 16  BP: ()/(49-76) 119/64    Physical Exam:  General: no acute distress, alert and oriented  HEENT: perrl, sclera icterus  CV: rrr, no murmur  Resp: lungs clear to auscultation, no wheeze or rhonchi  Abd: soft, nontender, nondistended, no rebound our guarding  Skin: no rash. Jaundice     Results Review:   I have reviewed all of the patient's current test results    Results from last 7 days   Lab Units 04/03/25  0338 04/02/25  1005 04/01/25 2018   WBC 10*3/mm3 4.47 5.96 9.26   HEMOGLOBIN g/dL 9.2* 10.2* 11.6*   HEMATOCRIT % 26.9* 29.9* 32.9*   PLATELETS 10*3/mm3 54* 70* 99*       Results from last 7 days   Lab Units 04/04/25  0326 04/03/25  0338 04/02/25  2141 04/02/25  1005   SODIUM mmol/L 139 138  --  139   POTASSIUM mmol/L 3.6 3.8 4.4 3.6   CHLORIDE mmol/L 106 106  --  101   CO2 mmol/L 24.0 23.0  --  19.0*   BUN mg/dL 14 13  --  12   CREATININE mg/dL 0.25* 0.39*  --  0.58*   CALCIUM mg/dL 7.4* 7.0*  --  7.1*   BILIRUBIN mg/dL 17.3* 15.3*  --  13.8*   ALK PHOS U/L 169* 179*  --  210*   ALT (SGPT) U/L 52* 48*  --  51*   AST (SGOT) U/L 203* 251*  --  295*   GLUCOSE mg/dL 81 95  --  149*       Impression:  Alcoholic hepatitis with MDF 74 on admission which correlates with high mortality. Bilirubin trending up.  Decompensated cirrhosis.     Plan:  Continue supportive care. On Prednisolone 40 mg daily for treatment of severe alcoholic hepatitis. Trend labs, will repeat CMP and PT/INR in the morning.     Beltran Matson MD  04/04/25  10:43 CDT

## 2025-04-04 NOTE — PROGRESS NOTES
"    [facility] Medicine Services  INPATIENT PROGRESS NOTE    Patient Name: Luciano Christiansen  Date of Admission: 4/1/2025  Today's Date: 04/04/25  Length of Stay: 2  Primary Care Physician: Adrien Varghese MD    Subjective   Chief Complaint: fall ETOH intoxication      Fall     Alcohol Intoxication     Patient is a 40-year-old male with PMH of alcoholism, non-insulin-dependent diabetes, hypertension, vasectomy. ETOH liver cirrhosis  presenting to ED with alcohol use and fall.  PMH significant for history Patient states 2 of his dogs got out today at which time he went running chasing after them outside describing that he had been running for an extended period of time in an alley way when due to being out of shape \"my legs just gave out and I fell down.\"  Patient states that he hit his face on the alleyway first but denies any loss of consciousness.  Patient states that he had no preceding symptoms including chest pain/pressure/heaviness/tightness or syncope but felt he had just run for too long for his conditioning.  Patient states that the fall was witnessed by a bystander who called EMS.  Upon arrival to ED patient states that he does not have pain anywhere.  Patient states that he had otherwise been at his baseline recently.  Patient did state that since his last ER visit he is decreasing his alcohol intake and is currently at two 12 ounce malt whiskey beers daily.  Patient describes \"I want to keep drinking and she is nothing ever works.\"  Patient perseverates on his 100 days of sobriety prior to starting a couple months ago.  Patient denies any medication use or interventions prior to arrival and presents at this time for further evaluation   In ER pan CT head C spine, TL spine, maxillofacial were -ve, UA +ve, BC obtained, UDS _ve THC, ETOH 475, MG 1, K 2.4 replaced, given thiamine, LFTs elevated, bilirubin 12 , PT was lethargic but reponsive, ER did not feel PT needed to go to ICU, " will admit, CIWA, IVF thiamine MVI folic acid, abx, replace K, MG , consult GI, scd for DVT prophylaxis, identify reconciled restart home meds once reconciled   4/2  As before history of alcohol abuse presented with fall intoxication alcoholic hepatitis started on CIWA thiamine multivitamin folic acid GI has been consulted  Alcoholic hepatitis. MDF 74 which correlates with poor prognosis was started on prednisone 40 p.o. daily continue to follow labs watch for DTs replace his potassium magnesium ammonia was 80 started on lactulose  4/3  Before history of alcohol abuse liver cirrhosis presented with alcoholic hepatitis and pending DTs electrolyte abnormalities placed and DT precautions and CIWA replacing magnesium prognosis is poor started on prednisone per GI, UA is grossly positive urine culture is showing Enterococcus sensitive to ampicillin and Levaquin blood culture unremarkable white count unremarkable will DC Rocephin and switch to ampicillin chest x-ray was unremarkable  4/4  Before appreciate GI alcoholic hepatitis and steroid high mortality rate bilirubin going up continue to trend labs overall prognosis is guarded  Review of Systems   All other systems reviewed and are negative.     All pertinent negatives and positives are as above. All other systems have been reviewed and are negative unless otherwise stated.     Objective    Temp:  [97.7 °F (36.5 °C)-98.6 °F (37 °C)] 97.7 °F (36.5 °C)  Heart Rate:  [76-90] 84  Resp:  [16-18] 18  BP: ()/(49-76) 135/76  Physical Exam  Constitutional:       Appearance: He is ill-appearing.   HENT:      Head: Normocephalic.      Nose: Nose normal.   Eyes:      Pupils: Pupils are equal, round, and reactive to light.   Cardiovascular:      Rate and Rhythm: Normal rate.   Pulmonary:      Breath sounds: Wheezing present.   Abdominal:      General: Abdomen is flat.   Musculoskeletal:         General: Normal range of motion.      Cervical back: Normal range of motion.    Neurological:      General: No focal deficit present.      Mental Status: He is alert.             Results Review:  I have reviewed the labs, radiology results, and diagnostic studies.    Laboratory Data:   Results from last 7 days   Lab Units 04/03/25  0338 04/02/25  1005 04/01/25 2018   WBC 10*3/mm3 4.47 5.96 9.26   HEMOGLOBIN g/dL 9.2* 10.2* 11.6*   HEMATOCRIT % 26.9* 29.9* 32.9*   PLATELETS 10*3/mm3 54* 70* 99*        Results from last 7 days   Lab Units 04/04/25  0326 04/03/25  0338 04/02/25 2141 04/02/25  1005   SODIUM mmol/L 139 138  --  139   POTASSIUM mmol/L 3.6 3.8 4.4 3.6   CHLORIDE mmol/L 106 106  --  101   CO2 mmol/L 24.0 23.0  --  19.0*   BUN mg/dL 14 13  --  12   CREATININE mg/dL 0.25* 0.39*  --  0.58*   CALCIUM mg/dL 7.4* 7.0*  --  7.1*   BILIRUBIN mg/dL 17.3* 15.3*  --  13.8*   ALK PHOS U/L 169* 179*  --  210*   ALT (SGPT) U/L 52* 48*  --  51*   AST (SGOT) U/L 203* 251*  --  295*   GLUCOSE mg/dL 81 95  --  149*       Culture Data:   Urine Culture   Date Value Ref Range Status   04/01/2025 >100,000 CFU/mL Gram Positive Cocci (A)  Preliminary       Radiology Data:   Imaging Results (Last 24 Hours)       ** No results found for the last 24 hours. **            I have reviewed the patient's current medications.     Assessment/Plan   Assessment  Active Hospital Problems    Diagnosis     **Hypomagnesemia        Treatment Plan  The patient will be admitted to my service here at [facility].   In ER pan CT head C spine, TL spine, maxillofacial were -ve, UA +ve, BC obtained, UDS _ve THC, ETOH 475, MG 1, K 2.4 replaced, given thiamine, LFTs elevated, bilirubin 12 , PT was lethargic but reponsive, ER did not feel PT needed to go to ICU, will admit, CIWA, IVF thiamine MVI folic acid, abx, replace K, MG , consult GI, scd for DVT prophylaxis, identify reconciled restart home meds once reconciled   Medical Decision Making  Number and Complexity of problems: 3 acute  Differential Diagnosis: as above       Conditions and Status        Condition is unchanged.     [facility] Data  External documents reviewed: yes  Cardiac tracing (EKG, telemetry) interpretation: yes  Radiology interpretation: yes  Labs reviewed: yes  Any tests that were considered but not ordered: no     Decision rules/scores evaluated (example [id number], [place], etc): no     Discussed with: ED     Care Planning  Shared decision making: Patient apprised of current labs, vitals, imaging and treatment plan.  They are agreeable with proceeding with plans as discussed.   Code status and discussions: full      Disposition  Social Determinants of Health that impact treatment or disposition: no  Estimated length of stay is TBD.      I confirmed that the patient's advanced care plan is present, code status is documented, and a surrogate decision maker is listed in the patient's medical record        Electronically signed by Frankie Camacho MD, 04/04/25, 10:52 CDT.

## 2025-04-04 NOTE — PLAN OF CARE
Problem: Adult Inpatient Plan of Care  Goal: Plan of Care Review  Outcome: Progressing  Flowsheets (Taken 4/4/2025 2185)  Progress: no change  Outcome Evaluation: VSS. pt A&Ox4. pt on room air. 1mg PRN IV Ativan given x4 during this shift. Latest CIWA 12 at 0640. no c/o pain during this shift. Sinus  on tele. safety maintained.  Plan of Care Reviewed With: patient

## 2025-04-05 VITALS
BODY MASS INDEX: 36.18 KG/M2 | WEIGHT: 291 LBS | DIASTOLIC BLOOD PRESSURE: 82 MMHG | RESPIRATION RATE: 18 BRPM | SYSTOLIC BLOOD PRESSURE: 134 MMHG | HEIGHT: 75 IN | HEART RATE: 88 BPM | OXYGEN SATURATION: 95 % | TEMPERATURE: 98.2 F

## 2025-04-05 LAB
ALBUMIN SERPL-MCNC: 2.9 G/DL (ref 3.5–5.2)
ALBUMIN/GLOB SERPL: 1.1 G/DL
ALP SERPL-CCNC: 163 U/L (ref 39–117)
ALT SERPL W P-5'-P-CCNC: 63 U/L (ref 1–41)
ANION GAP SERPL CALCULATED.3IONS-SCNC: 11 MMOL/L (ref 5–15)
AST SERPL-CCNC: 186 U/L (ref 1–40)
BILIRUB SERPL-MCNC: 18.3 MG/DL (ref 0–1.2)
BUN SERPL-MCNC: 15 MG/DL (ref 6–20)
BUN/CREAT SERPL: 44.1 (ref 7–25)
CALCIUM SPEC-SCNC: 7.8 MG/DL (ref 8.6–10.5)
CHLORIDE SERPL-SCNC: 104 MMOL/L (ref 98–107)
CO2 SERPL-SCNC: 21 MMOL/L (ref 22–29)
CREAT SERPL-MCNC: 0.34 MG/DL (ref 0.76–1.27)
EGFRCR SERPLBLD CKD-EPI 2021: 148.6 ML/MIN/1.73
GLOBULIN UR ELPH-MCNC: 2.6 GM/DL
GLUCOSE SERPL-MCNC: 79 MG/DL (ref 65–99)
POTASSIUM SERPL-SCNC: 3.7 MMOL/L (ref 3.5–5.2)
PROT SERPL-MCNC: 5.5 G/DL (ref 6–8.5)
SODIUM SERPL-SCNC: 136 MMOL/L (ref 136–145)

## 2025-04-05 PROCEDURE — 36415 COLL VENOUS BLD VENIPUNCTURE: CPT | Performed by: HOSPITALIST

## 2025-04-05 PROCEDURE — 63710000001 PREDNISOLONE PER 5 MG: Performed by: INTERNAL MEDICINE

## 2025-04-05 PROCEDURE — 25010000002 THIAMINE HCL 200 MG/2ML SOLUTION: Performed by: HOSPITALIST

## 2025-04-05 PROCEDURE — 80053 COMPREHEN METABOLIC PANEL: CPT | Performed by: HOSPITALIST

## 2025-04-05 PROCEDURE — 25010000002 AMPICILLIN PER 500 MG: Performed by: HOSPITALIST

## 2025-04-05 RX ADMIN — AMPICILLIN 1 G: 1 INJECTION, POWDER, FOR SOLUTION INTRAMUSCULAR; INTRAVENOUS at 06:44

## 2025-04-05 RX ADMIN — LORAZEPAM 1 MG: 1 TABLET ORAL at 08:31

## 2025-04-05 RX ADMIN — PREDNISOLONE SODIUM PHOSPHATE 40 MG: 15 SOLUTION ORAL at 08:31

## 2025-04-05 RX ADMIN — CHLORDIAZEPOXIDE HYDROCHLORIDE 50 MG: 25 CAPSULE ORAL at 06:44

## 2025-04-05 RX ADMIN — Medication 1 TABLET: at 08:31

## 2025-04-05 RX ADMIN — LACTULOSE 20 G: 20 SOLUTION ORAL at 08:31

## 2025-04-05 RX ADMIN — THIAMINE HYDROCHLORIDE 200 MG: 100 INJECTION, SOLUTION INTRAMUSCULAR; INTRAVENOUS at 06:45

## 2025-04-05 RX ADMIN — Medication 10 ML: at 08:32

## 2025-04-05 RX ADMIN — PANTOPRAZOLE SODIUM 40 MG: 40 INJECTION, POWDER, FOR SOLUTION INTRAVENOUS at 06:44

## 2025-04-05 NOTE — NURSING NOTE
Patient wanting to leave AMA. Dr. Camacho made aware. Charge ESTELA Martin made aware.  IV removed.  Tele monitor removed and returned to monitor room.  Patient signed AMA paperwork.

## 2025-04-05 NOTE — NURSING NOTE
Received report from night shift nurse.  Patient awake and alert in room, breathing even and regular on RA.  NSR on tele.  Safety precautions in place.  No acute distress or discomfort at this time.

## 2025-04-05 NOTE — NURSING NOTE
"Patient removed tele monitor. When this RN asked if it could be put back on, patient stated \"No I am hoping to be discharged today.\" Tele room made aware. Dr. Camacho made aware. Orders to discharge tele.  "

## 2025-04-06 LAB
BACTERIA SPEC AEROBE CULT: NORMAL
BACTERIA SPEC AEROBE CULT: NORMAL

## 2025-04-07 NOTE — DISCHARGE SUMMARY
"      [facility] Medicine Services  DISCHARGE SUMMARY       Date of Admission: 4/1/2025  Date of Discharge:  4/7/2025  Primary Care Physician: Adrien Varghese MD    Presenting Problem/History of Present Illness:  Patient is a 40-year-old male with PMH of alcoholism, non-insulin-dependent diabetes, hypertension, vasectomy. ETOH liver cirrhosis  presenting to ED with alcohol use and fall.  PMH significant for history Patient states 2 of his dogs got out today at which time he went running chasing after them outside describing that he had been running for an extended period of time in an alley way when due to being out of shape \"my legs just gave out and I fell down.\"  Patient states that he hit his face on the alleyway first but denies any loss of consciousness.  Patient states that he had no preceding symptoms including chest pain/pressure/heaviness/tightness or syncope but felt he had just run for too long for his conditioning.  Patient states that the fall was witnessed by a bystander who called EMS.  Upon arrival to ED patient states that he does not have pain anywhere.  Patient states that he had otherwise been at his baseline recently.  Patient did state that since his last ER visit he is decreasing his alcohol intake and is currently at two 12 ounce malt whiskey beers daily.  Patient describes \"I want to keep drinking and she is nothing ever works.\"  Patient perseverates on his 100 days of sobriety prior to starting a couple months ago.  Patient denies any medication use or interventions prior to arrival and presents at this time for further evaluation   In ER pan CT head C spine, TL spine, maxillofacial were -ve, UA +ve, BC obtained, UDS _ve THC, ETOH 475, MG 1, K 2.4 replaced, given thiamine, LFTs elevated, bilirubin 12 , PT was lethargic but reponsive, ER did not feel PT needed to go to ICU, will admit, CIWA, IVF thiamine MVI folic acid, abx, replace K, MG , consult GI, scd for " DVT prophylaxis, identify reconciled restart home meds once reconciled   4/2  As before history of alcohol abuse presented with fall intoxication alcoholic hepatitis started on CIWA thiamine multivitamin folic acid GI has been consulted  Alcoholic hepatitis. MDF 74 which correlates with poor prognosis was started on prednisone 40 p.o. daily continue to follow labs watch for DTs replace his potassium magnesium ammonia was 80 started on lactulose  4/3  Before history of alcohol abuse liver cirrhosis presented with alcoholic hepatitis and pending DTs electrolyte abnormalities placed and DT precautions and CIWA replacing magnesium prognosis is poor started on prednisone per GI, UA is grossly positive urine culture is showing Enterococcus sensitive to ampicillin and Levaquin blood culture unremarkable white count unremarkable will DC Rocephin and switch to ampicillin chest x-ray was unremarkable  4/4  Before appreciate GI alcoholic hepatitis and steroid high mortality rate bilirubin going up continue to trend labs overall prognosis is guarded, left AMA    Final Discharge Diagnoses:  Active Hospital Problems    Diagnosis     **Hypomagnesemia        Consults: GI    Procedures Performed: none    Pertinent Test Results:   Results for orders placed during the hospital encounter of 07/04/24    Adult Transthoracic Echo Complete W/ Cont if Necessary Per Protocol    Interpretation Summary    Left ventricular ejection fraction appears to be 61 - 65%.    Left ventricular diastolic function was normal.    Estimated right ventricular systolic pressure from tricuspid regurgitation is normal (<35 mmHg).      Imaging Results (All)       Procedure Component Value Units Date/Time    CT Maxillofacial Without Contrast [[id number]] Collected: 04/01/25 1516     Updated: 04/01/25 1533    Narrative:      CT MAXILLOFACIAL WO CONT- 4/1/2025 1:55 PM     HISTORY: fall onto face in alley while running; S00.81XA-Abrasion of  other part of head,  initial encounter; W19.XXXA-Unspecified fall,  initial encounter; Y93.02-Activity, running; E83.51-Hypocalcemia;  E87.6-Hypokalemia; N39.0-Urinary tract infection, site not specified;  H61.22-Impacted cerumen, left ear      COMPARISON: None     TOTAL DOSE LENGTH PRODUCT: 448 mGycm. Automated exposure control was  also utilized to decrease patient radiation dose.     TECHNIQUE: Axial images of the face are obtained with sagittal coronal  reconstructions.     FINDINGS: There is no facial fracture identified. The orbital globes are  intact. There is no retrobulbar pathology. No air-fluid levels seen  within the paranasal sinuses. Mastoid air cells appear well-aerated.       Impression:      1. No facial fracture identified. Orbital globes intact.        This report was signed and finalized on 4/1/2025 3:30 PM by Dr. Kassy Blair MD.       CT Thoracic Spine Without Contrast [[id number]] Collected: 04/01/25 1519     Updated: 04/01/25 1524    Narrative:      CT THORACIC SPINE WO CONTRAST- 4/1/2025 1:55 PM     HISTORY: fall in alley while running; S00.81XA-Abrasion of other part of  head, initial encounter; W19.XXXA-Unspecified fall, initial encounter;  Y93.02-Activity, running; E83.51-Hypocalcemia; E87.6-Hypokalemia;  N39.0-Urinary tract infection, site not specified; H61.22-Impacted  cerumen, left ear     COMPARISON: CT scan dated 3/18/2025, CT scan dated 5/5/2024     DLP: 1386 mGy cm     TECHNIQUE: Serial helical tomographic images of the thoracic spine were  obtained without the use of intravenous contrast. Multiplanar  reformatted images were provided for review.     Automated exposure control was utilized to reduce patient radiation  dose.     FINDINGS:     There is no evidence of fracture or subluxation. Vertebral body heights  and alignment are well maintained. The disc spaces are preserved. There  is no significant bony narrowing of the spinal canal or neural foramina.  There is no evidence of facet lock or  perch. The posterior elements are  intact.     The paraspinal soft tissues are unremarkable.       Impression:      1. No acute osseous injury or malalignment in the thoracic spine.           This report was signed and finalized on 4/1/2025 3:21 PM by Dr Elton Aguirre.       CT Lumbar Spine Without Contrast [[id number]] Collected: 04/01/25 1516     Updated: 04/01/25 1522    Narrative:      CT LUMBAR SPINE WO CONTRAST- 4/1/2025 1:55 PM     HISTORY: fall in alley while running; S00.81XA-Abrasion of other part of  head, initial encounter; W19.XXXA-Unspecified fall, initial encounter;  Y93.02-Activity, running; E83.51-Hypocalcemia; E87.6-Hypokalemia;  N39.0-Urinary tract infection, site not specified; H61.22-Impacted  cerumen, left ear     COMPARISON: None      DOSE LENGTH PRODUCT: 3918.62 mGy.cm. Automated exposure control was also  utilized to decrease patient radiation dose.     TECHNIQUE: Serial helical tomographic images of the lumbar spine were  obtained without the use of intravenous contrast. Additionally, sagittal  and coronal reformatted images were provided for review. Automated  exposure control is utilized to reduce patient radiation dose.     FINDINGS:     Counting will assume 5 lumbar-type vertebrae. The spine is imaged from  T12 to S2.     There is no visualized acute fracture or traumatic subluxation. Lumbar  lordosis is maintained. Vertebral body heights are well maintained. Loss  of disc height with mild endplate spurring at several levels. Mild  L2-L3, L3-L4 and L4-5 level degenerative retrolisthesis. The posterior  elements are intact. Included portions of the osseous pelvis are intact.  Paraspinal soft tissues are unremarkable.       Impression:      1. No acute fracture.  2. Lumbar spine osteoarthritis change with mild multilevel degenerative  retrolisthesis.           This report was signed and finalized on 4/1/2025 3:19 PM by Dr Elton Aguirre.       CT Cervical Spine Without Contrast  [[id number]] Collected: 04/01/25 1511     Updated: 04/01/25 1519    Narrative:      CT CERVICAL SPINE WO CONTRAST- 4/1/2025 1:55 PM     HISTORY: fall in alley while running; S00.81XA-Abrasion of other part of  head, initial encounter; W19.XXXA-Unspecified fall, initial encounter;  Y93.02-Activity, running; E83.51-Hypocalcemia; E87.6-Hypokalemia;  N39.0-Urinary tract infection, site not specified; H61.22-Impacted  cerumen, left ear      COMPARISON: None     TOTAL DOSE LENGTH PRODUCT: 3918.62 mGy.cm. Automated exposure control  was also utilized to decrease patient radiation dose.     TECHNIQUE: Axial images of cervical spine obtained with sagittal coronal  reconstructions.      FINDINGS:  Mild reversal normal cervical lordosis with no abnormal  subluxation. Mild degenerative disc change at C5/6 and C6/7. Early  uncinate process hypertrophy at C5-C7 with mild facet osteoarthropathy.  No cervical vertebral fracture.     No prominent central cervical canal or foraminal stenosis identified.  Paravertebral soft tissues are unremarkable.          Impression:         1. Mild reversal normal cervical lordosis may relate to head positioning  or musculoskeletal strain. No cervical vertebral fracture or traumatic  subluxation. Early degenerative change.     This report was signed and finalized on 4/1/2025 3:15 PM by Dr. Kassy Blair MD.       CT Head Without Contrast [[id number]] Collected: 04/01/25 1510     Updated: 04/01/25 1515    Narrative:      EXAMINATION:  CT HEAD WO CONTRAST-  4/1/2025 1:55 PM     HISTORY: The patient fell while running. S00.81XA-Abrasion of other part  of head, initial encounter; W19.XXXA-Unspecified fall, initial  encounter; Y93.02-Activity, running; E83.51-Hypocalcemia;  E87.6-Hypokalemia; N39.0-Urinary tract infection, site not specified;  H61.22-Impacted cerumen, left ear.     TECHNIQUE: Multiple axial images were obtained through the brain without  contrast infusion. Multiplanar images were  reconstructed.     DLP: 3918.62 mGy.cm Automated dosage reduction technique was utilized to  reduce patient dosage.     COMPARISON: 8/27/2024.     FINDINGS: There are no hemorrhage, edema or mass effect. The ventricular  system is nondilated. The visualized paranasal sinuses are clear. The  mastoid air cells are clear. No calvarial fracture is seen.          Impression:      No hemorrhage, edema or mass effect. No acute findings.        The full report of this exam was immediately signed and available to the  emergency room. The patient is currently in the emergency room.        This report was signed and finalized on 4/1/2025 3:12 PM by Dr. Yash Prakash MD.       XR Chest 1 View [[id number]] Collected: 04/01/25 1507     Updated: 04/01/25 1511    Narrative:      XR CHEST 1 VW- 4/1/2025 1:54 PM     HISTORY: fall; S00.81XA-Abrasion of other part of head, initial  encounter; W19.XXXA-Unspecified fall, initial encounter;  Y93.02-Activity, running       COMPARISON: Chest x-ray dated 8/27/2024     FINDINGS:  Upright frontal radiograph of the chest was obtained     No lung consolidation. No pleural effusion or pneumothorax. The  cardiomediastinal silhouette and pulmonary vascularity are within normal  limits. The osseous structures and surrounding soft tissues demonstrate  no acute abnormality.       Impression:      1.  No acute process in the chest.     This report was signed and finalized on 4/1/2025 3:08 PM by Dr Elton Aguirre.             LAB RESULTS:      Lab 04/03/25  1043 04/03/25  0338 04/02/25  1005 04/01/25 2018 04/01/25  1436   WBC  --  4.47 5.96 9.26 8.62   HEMOGLOBIN  --  9.2* 10.2* 11.6* 12.0*   HEMATOCRIT  --  26.9* 29.9* 32.9* 33.9*   PLATELETS  --  54* 70* 99* 100*   NEUTROS ABS  --  3.43 4.65 6.26 6.24   IMMATURE GRANS (ABS)  --  0.04 0.05 0.04 0.07*   LYMPHS ABS  --  0.49* 0.58* 1.89 1.23   MONOS ABS  --  0.49 0.60 0.99* 0.98*   EOS ABS  --  0.00 0.02 0.03 0.02   MCV  --  93.4 92.3 90.4 90.6    PROTIME 24.1*  --   --   --  25.4*   APTT  --   --   --   --  47.6*         Lab 04/05/25  0350 04/04/25  1301 04/04/25 0326 04/03/25 0338 04/02/25  2141 04/02/25  1005 04/01/25 2018 04/01/25  1436   SODIUM 136  --  139 138  --  139 138 135*   POTASSIUM 3.7 4.2 3.6 3.8 4.4 3.6 2.9* 2.8*   CHLORIDE 104  --  106 106  --  101 95* 92*   CO2 21.0*  --  24.0 23.0  --  19.0* 23.0 24.0   ANION GAP 11.0  --  9.0 9.0  --  19.0* 20.0* 19.0*   BUN 15  --  14 13  --  12 12 10   CREATININE 0.34*  --  0.25* 0.39*  --  0.58* 0.86 0.70*   EGFR 148.6  --  163.0 142.5  --  126.4 112.3 119.5   GLUCOSE 79  --  81 95  --  149* 128* 171*   CALCIUM 7.8*  --  7.4* 7.0*  --  7.1* 7.5* 7.3*   MAGNESIUM  --   --   --  1.5*  --  1.9 1.3* 1.0*   TSH  --   --   --   --   --   --   --  2.980         [facility] 04/05/25  0350 04/04/25 0326 04/03/25 0338 04/02/25 1005 04/01/25  1436   TOTAL PROTEIN 5.5* 5.5* 5.5* 6.1 6.4   ALBUMIN 2.9* 2.9* 3.0* 3.1* 3.3*   GLOBULIN 2.6 2.6 2.5 3.0 3.1   ALT (SGPT) 63* 52* 48* 51* 54*   AST (SGOT) 186* 203* 251* 295* 270*   BILIRUBIN 18.3* 17.3* 15.3* 13.8* 12.8*   ALK PHOS 163* 169* 179* 210* 239*   LIPASE  --   --   --   --  16         Lab 04/03/25  1043 04/01/25  1436   PROTIME 24.1* 25.4*   INR 2.03* 2.16*             Lab 04/01/25  2018   VITAMIN B 12 >2,000*         Brief Urine Lab Results  (Last result in the past 365 days)        Color   Clarity   Blood   Leuk Est   Nitrite   Protein   CREAT   Urine HCG        04/01/25 1445 Starr   Cloudy   Trace   Moderate (2+)   Positive   30 mg/dL (1+)                 Microbiology Results (last 10 days)       Procedure Component Value - Date/Time    Blood Culture - Blood, Arm, Left [[id number]]  (Normal) Collected: 04/01/25 1559    Lab Status: Final result Specimen: Blood from Arm, Left Updated: 04/06/25 1615     Blood Culture No growth at 5 days    Blood Culture - Blood, Hand, Right [[id number]]  (Normal) Collected: 04/01/25 1556    Lab Status: Final result Specimen: Blood  "from Hand, Right Updated: 04/06/25 1615     Blood Culture No growth at 5 days    Urine Culture - Urine, Urine, Clean Catch [[id number]]  (Abnormal)  (Susceptibility) Collected: 04/01/25 1445    Lab Status: Final result Specimen: Urine, Clean Catch Updated: 04/03/25 0946     Urine Culture >100,000 CFU/mL Enterococcus faecalis    Narrative:      Colonization of the urinary tract without infection is common. Treatment is discouraged unless the patient is symptomatic, pregnant, or undergoing an invasive urologic procedure.    Susceptibility        Enterococcus faecalis      NEVA      Ampicillin Susceptible      Levofloxacin Susceptible      Nitrofurantoin Susceptible      Vancomycin Susceptible                                   Hospital Course:   Patient is a 40-year-old male with PMH of alcoholism, non-insulin-dependent diabetes, hypertension, vasectomy. ETOH liver cirrhosis  presenting to ED with alcohol use and fall.  PMH significant for history Patient states 2 of his dogs got out today at which time he went running chasing after them outside describing that he had been running for an extended period of time in an alley way when due to being out of shape \"my legs just gave out and I fell down.\"  Patient states that he hit his face on the alleyway first but denies any loss of consciousness.  Patient states that he had no preceding symptoms including chest pain/pressure/heaviness/tightness or syncope but felt he had just run for too long for his conditioning.  Patient states that the fall was witnessed by a bystander who called EMS.  Upon arrival to ED patient states that he does not have pain anywhere.  Patient states that he had otherwise been at his baseline recently.  Patient did state that since his last ER visit he is decreasing his alcohol intake and is currently at two 12 ounce malt whiskey beers daily.  Patient describes \"I want to keep drinking and she is nothing ever works.\"  Patient perseverates on his 100 " "days of sobriety prior to starting a couple months ago.  Patient denies any medication use or interventions prior to arrival and presents at this time for further evaluation   In ER pan CT head C spine, TL spine, maxillofacial were -ve, UA +ve, BC obtained, UDS _ve THC, ETOH 475, MG 1, K 2.4 replaced, given thiamine, LFTs elevated, bilirubin 12 , PT was lethargic but reponsive, ER did not feel PT needed to go to ICU, will admit, CIWA, IVF thiamine MVI folic acid, abx, replace K, MG , consult GI, scd for DVT prophylaxis, identify reconciled restart home meds once reconciled   4/2  As before history of alcohol abuse presented with fall intoxication alcoholic hepatitis started on CIWA thiamine multivitamin folic acid GI has been consulted  Alcoholic hepatitis. MDF 74 which correlates with poor prognosis was started on prednisone 40 p.o. daily continue to follow labs watch for DTs replace his potassium magnesium ammonia was 80 started on lactulose  4/3  Before history of alcohol abuse liver cirrhosis presented with alcoholic hepatitis and pending DTs electrolyte abnormalities placed and DT precautions and CIWA replacing magnesium prognosis is poor started on prednisone per GI, UA is grossly positive urine culture is showing Enterococcus sensitive to ampicillin and Levaquin blood culture unremarkable white count unremarkable will DC Rocephin and switch to ampicillin chest x-ray was unremarkable  4/4  Before appreciate GI alcoholic hepatitis and steroid high mortality rate bilirubin going up continue to trend labs overall prognosis is guarded    Physical Exam on Discharge:  /82 (BP Location: Right arm, Patient Position: Sitting)   Pulse 88   Temp 98.2 °F (36.8 °C) (Oral)   Resp 18   Ht 190.5 cm (75\")   Wt 132 kg (291 lb)   SpO2 95%   BMI 36.37 kg/m²   Physical Exam      Condition on Discharge: stable    Discharge Disposition:  Left Without Being Seen    Discharge Medications:     Discharge Medications    "     ASK your doctor about these medications        Instructions Start Date   calcium carbonate 600 MG tablet  Commonly known as: Calcium 600   600 mg, Oral, Daily      cetirizine 10 MG tablet  Commonly known as: zyrTEC   10 mg, Oral, Daily      folic acid 1 MG tablet  Commonly known as: FOLVITE   1 mg, Oral, Daily      furosemide 40 MG tablet  Commonly known as: Lasix   40 mg, Oral, 2 Times Daily      lactulose 20 GM/30ML solution solution   20 g, Oral, 3 Times Daily      levothyroxine 75 MCG tablet  Commonly known as: Synthroid   75 mcg, Oral, Daily      pantoprazole 40 MG EC tablet  Commonly known as: PROTONIX   40 mg, Oral, Daily      potassium chloride 10 MEQ CR tablet   10 mEq, Oral, Every 12 Hours Scheduled      QUEtiapine 100 MG tablet  Commonly known as: SEROquel   100 mg, Oral, Nightly      spironolactone 100 MG tablet  Commonly known as: Aldactone   100 mg, Oral, Daily      thiamine 100 MG tablet  Commonly known as: VITAMIN B1   100 mg, Oral, Daily                   Discharge Diet:     Activity at Discharge:     Follow-up Appointments:   No future appointments.    Test Results Pending at Discharge: no    Electronically signed by Frankie Camacho MD, 04/07/25, 17:45 CDT.    Time: 45 minutes.

## 2025-04-18 ENCOUNTER — HOSPITAL ENCOUNTER (EMERGENCY)
Facility: HOSPITAL | Age: 40
Discharge: OTHER INSTITUTION | End: 2025-04-19
Attending: EMERGENCY MEDICINE
Payer: COMMERCIAL

## 2025-04-18 DIAGNOSIS — K81.0 ACUTE CHOLECYSTITIS: Primary | ICD-10-CM

## 2025-04-18 LAB
BASOPHILS # BLD AUTO: 0.09 10*3/MM3 (ref 0–0.2)
BASOPHILS NFR BLD AUTO: 0.6 % (ref 0–1.5)
DEPRECATED RDW RBC AUTO: 83.1 FL (ref 37–54)
EOSINOPHIL # BLD AUTO: 0.1 10*3/MM3 (ref 0–0.4)
EOSINOPHIL NFR BLD AUTO: 0.7 % (ref 0.3–6.2)
ERYTHROCYTE [DISTWIDTH] IN BLOOD BY AUTOMATED COUNT: 23.4 % (ref 12.3–15.4)
HCT VFR BLD AUTO: 31.1 % (ref 37.5–51)
HGB BLD-MCNC: 10.4 G/DL (ref 13–17.7)
IMM GRANULOCYTES # BLD AUTO: 0.25 10*3/MM3 (ref 0–0.05)
IMM GRANULOCYTES NFR BLD AUTO: 1.7 % (ref 0–0.5)
LYMPHOCYTES # BLD AUTO: 1.31 10*3/MM3 (ref 0.7–3.1)
LYMPHOCYTES NFR BLD AUTO: 8.9 % (ref 19.6–45.3)
MCH RBC QN AUTO: 32.2 PG (ref 26.6–33)
MCHC RBC AUTO-ENTMCNC: 33.4 G/DL (ref 31.5–35.7)
MCV RBC AUTO: 96.3 FL (ref 79–97)
MONOCYTES # BLD AUTO: 1.08 10*3/MM3 (ref 0.1–0.9)
MONOCYTES NFR BLD AUTO: 7.3 % (ref 5–12)
NEUTROPHILS NFR BLD AUTO: 11.92 10*3/MM3 (ref 1.7–7)
NEUTROPHILS NFR BLD AUTO: 80.8 % (ref 42.7–76)
NRBC BLD AUTO-RTO: 0 /100 WBC (ref 0–0.2)
PLATELET # BLD AUTO: 174 10*3/MM3 (ref 140–450)
PMV BLD AUTO: 10.2 FL (ref 6–12)
RBC # BLD AUTO: 3.23 10*6/MM3 (ref 4.14–5.8)
WBC NRBC COR # BLD AUTO: 14.75 10*3/MM3 (ref 3.4–10.8)

## 2025-04-18 PROCEDURE — 99285 EMERGENCY DEPT VISIT HI MDM: CPT

## 2025-04-18 PROCEDURE — 82140 ASSAY OF AMMONIA: CPT

## 2025-04-18 PROCEDURE — 83605 ASSAY OF LACTIC ACID: CPT

## 2025-04-18 PROCEDURE — 83735 ASSAY OF MAGNESIUM: CPT

## 2025-04-18 PROCEDURE — 80074 ACUTE HEPATITIS PANEL: CPT

## 2025-04-18 PROCEDURE — 85610 PROTHROMBIN TIME: CPT

## 2025-04-18 PROCEDURE — 85730 THROMBOPLASTIN TIME PARTIAL: CPT

## 2025-04-18 PROCEDURE — 83690 ASSAY OF LIPASE: CPT

## 2025-04-18 PROCEDURE — 80143 DRUG ASSAY ACETAMINOPHEN: CPT

## 2025-04-18 PROCEDURE — 80053 COMPREHEN METABOLIC PANEL: CPT

## 2025-04-18 PROCEDURE — 82077 ASSAY SPEC XCP UR&BREATH IA: CPT

## 2025-04-18 PROCEDURE — 85025 COMPLETE CBC W/AUTO DIFF WBC: CPT

## 2025-04-18 PROCEDURE — 80179 DRUG ASSAY SALICYLATE: CPT

## 2025-04-18 PROCEDURE — 84145 PROCALCITONIN (PCT): CPT

## 2025-04-18 RX ORDER — SODIUM CHLORIDE 0.9 % (FLUSH) 0.9 %
10 SYRINGE (ML) INJECTION AS NEEDED
Status: DISCONTINUED | OUTPATIENT
Start: 2025-04-18 | End: 2025-04-19 | Stop reason: HOSPADM

## 2025-04-19 ENCOUNTER — APPOINTMENT (OUTPATIENT)
Dept: CT IMAGING | Facility: HOSPITAL | Age: 40
End: 2025-04-19
Payer: COMMERCIAL

## 2025-04-19 VITALS
BODY MASS INDEX: 36.18 KG/M2 | RESPIRATION RATE: 20 BRPM | TEMPERATURE: 97.9 F | WEIGHT: 291.01 LBS | HEART RATE: 80 BPM | HEIGHT: 75 IN | OXYGEN SATURATION: 98 % | SYSTOLIC BLOOD PRESSURE: 122 MMHG | DIASTOLIC BLOOD PRESSURE: 78 MMHG

## 2025-04-19 LAB
ALBUMIN SERPL-MCNC: 2.7 G/DL (ref 3.5–5.2)
ALBUMIN/GLOB SERPL: 0.8 G/DL
ALP SERPL-CCNC: 325 U/L (ref 39–117)
ALT SERPL W P-5'-P-CCNC: 67 U/L (ref 1–41)
AMMONIA BLD-SCNC: 131 UMOL/L (ref 16–60)
AMPHET+METHAMPHET UR QL: NEGATIVE
AMPHETAMINES UR QL: NEGATIVE
ANION GAP SERPL CALCULATED.3IONS-SCNC: 13 MMOL/L (ref 5–15)
APAP SERPL-MCNC: <5 MCG/ML (ref 0–30)
APTT PPP: 58.4 SECONDS (ref 24.5–36)
AST SERPL-CCNC: 275 U/L (ref 1–40)
BACTERIA UR QL AUTO: ABNORMAL /HPF
BARBITURATES UR QL SCN: NEGATIVE
BENZODIAZ UR QL SCN: POSITIVE
BILIRUB SERPL-MCNC: 30.2 MG/DL (ref 0–1.2)
BILIRUB UR QL STRIP: ABNORMAL
BUN SERPL-MCNC: 4 MG/DL (ref 6–20)
BUN/CREAT SERPL: 22.2 (ref 7–25)
BUPRENORPHINE SERPL-MCNC: NEGATIVE NG/ML
CALCIUM SPEC-SCNC: 7.8 MG/DL (ref 8.6–10.5)
CANNABINOIDS SERPL QL: POSITIVE
CHLORIDE SERPL-SCNC: 105 MMOL/L (ref 98–107)
CLARITY UR: ABNORMAL
CO2 SERPL-SCNC: 21 MMOL/L (ref 22–29)
COCAINE UR QL: NEGATIVE
COLOR UR: ABNORMAL
CREAT SERPL-MCNC: 0.18 MG/DL (ref 0.76–1.27)
D-LACTATE SERPL-SCNC: 2.9 MMOL/L (ref 0.5–2)
D-LACTATE SERPL-SCNC: 3.3 MMOL/L (ref 0.5–2)
EGFRCR SERPLBLD CKD-EPI 2021: 180 ML/MIN/1.73
ETHANOL UR QL: 0.22 %
GLOBULIN UR ELPH-MCNC: 3.3 GM/DL
GLUCOSE SERPL-MCNC: 148 MG/DL (ref 65–99)
GLUCOSE UR STRIP-MCNC: NEGATIVE MG/DL
HAV IGM SERPL QL IA: NORMAL
HBV CORE IGM SERPL QL IA: NORMAL
HBV SURFACE AG SERPL QL IA: NORMAL
HCV AB SER QL: NORMAL
HGB UR QL STRIP.AUTO: NEGATIVE
HOLD SPECIMEN: NORMAL
HYALINE CASTS UR QL AUTO: ABNORMAL /LPF
INR PPP: 2.4 (ref 0.91–1.09)
KETONES UR QL STRIP: NEGATIVE
LEUKOCYTE ESTERASE UR QL STRIP.AUTO: ABNORMAL
LIPASE SERPL-CCNC: 42 U/L (ref 13–60)
MAGNESIUM SERPL-MCNC: 1.5 MG/DL (ref 1.6–2.6)
METHADONE UR QL SCN: NEGATIVE
NITRITE UR QL STRIP: POSITIVE
OPIATES UR QL: NEGATIVE
OXYCODONE UR QL SCN: NEGATIVE
PCP UR QL SCN: NEGATIVE
PH UR STRIP.AUTO: 6.5 [PH] (ref 5–8)
POTASSIUM SERPL-SCNC: 3.1 MMOL/L (ref 3.5–5.2)
PROCALCITONIN SERPL-MCNC: 0.25 NG/ML (ref 0–0.25)
PROT SERPL-MCNC: 6 G/DL (ref 6–8.5)
PROT UR QL STRIP: ABNORMAL
PROTHROMBIN TIME: 27.6 SECONDS (ref 11.8–14.8)
RBC # UR STRIP: ABNORMAL /HPF
REF LAB TEST METHOD: ABNORMAL
SALICYLATES SERPL-MCNC: <0.3 MG/DL
SODIUM SERPL-SCNC: 139 MMOL/L (ref 136–145)
SP GR UR STRIP: 1.02 (ref 1–1.03)
SQUAMOUS #/AREA URNS HPF: ABNORMAL /HPF
TRICYCLICS UR QL SCN: NEGATIVE
UROBILINOGEN UR QL STRIP: ABNORMAL
WBC # UR STRIP: ABNORMAL /HPF
WHOLE BLOOD HOLD COAG: NORMAL
WHOLE BLOOD HOLD SPECIMEN: NORMAL

## 2025-04-19 PROCEDURE — 25810000003 LACTATED RINGERS SOLUTION

## 2025-04-19 PROCEDURE — 25510000001 IOPAMIDOL 61 % SOLUTION

## 2025-04-19 PROCEDURE — 25010000002 PIPERACILLIN SOD-TAZOBACTAM PER 1 G: Performed by: EMERGENCY MEDICINE

## 2025-04-19 PROCEDURE — 25010000002 LORAZEPAM PER 2 MG: Performed by: EMERGENCY MEDICINE

## 2025-04-19 PROCEDURE — 36415 COLL VENOUS BLD VENIPUNCTURE: CPT

## 2025-04-19 PROCEDURE — 96365 THER/PROPH/DIAG IV INF INIT: CPT

## 2025-04-19 PROCEDURE — 96367 TX/PROPH/DG ADDL SEQ IV INF: CPT

## 2025-04-19 PROCEDURE — 83605 ASSAY OF LACTIC ACID: CPT

## 2025-04-19 PROCEDURE — 96375 TX/PRO/DX INJ NEW DRUG ADDON: CPT

## 2025-04-19 PROCEDURE — 74177 CT ABD & PELVIS W/CONTRAST: CPT

## 2025-04-19 PROCEDURE — 80306 DRUG TEST PRSMV INSTRMNT: CPT

## 2025-04-19 PROCEDURE — 25010000002 MAGNESIUM SULFATE 2 GM/50ML SOLUTION

## 2025-04-19 PROCEDURE — 87040 BLOOD CULTURE FOR BACTERIA: CPT

## 2025-04-19 PROCEDURE — 81001 URINALYSIS AUTO W/SCOPE: CPT

## 2025-04-19 RX ORDER — IOPAMIDOL 612 MG/ML
100 INJECTION, SOLUTION INTRAVASCULAR
Status: COMPLETED | OUTPATIENT
Start: 2025-04-19 | End: 2025-04-19

## 2025-04-19 RX ORDER — MAGNESIUM SULFATE HEPTAHYDRATE 40 MG/ML
2 INJECTION, SOLUTION INTRAVENOUS ONCE
Status: COMPLETED | OUTPATIENT
Start: 2025-04-19 | End: 2025-04-19

## 2025-04-19 RX ORDER — LORAZEPAM 2 MG/ML
1 INJECTION INTRAMUSCULAR ONCE
Status: COMPLETED | OUTPATIENT
Start: 2025-04-19 | End: 2025-04-19

## 2025-04-19 RX ORDER — LACTULOSE 10 G/15ML
20 SOLUTION ORAL ONCE
Status: COMPLETED | OUTPATIENT
Start: 2025-04-19 | End: 2025-04-19

## 2025-04-19 RX ADMIN — Medication 10 ML: at 04:36

## 2025-04-19 RX ADMIN — SODIUM CHLORIDE, SODIUM LACTATE, POTASSIUM CHLORIDE, CALCIUM CHLORIDE 1000 ML: 20; 30; 600; 310 INJECTION, SOLUTION INTRAVENOUS at 00:47

## 2025-04-19 RX ADMIN — LACTULOSE 20 G: 20 SOLUTION ORAL at 00:47

## 2025-04-19 RX ADMIN — IOPAMIDOL 100 ML: 612 INJECTION, SOLUTION INTRAVENOUS at 01:27

## 2025-04-19 RX ADMIN — PIPERACILLIN AND TAZOBACTAM 3.38 G: 3; .375 INJECTION, POWDER, FOR SOLUTION INTRAVENOUS; PARENTERAL at 03:32

## 2025-04-19 RX ADMIN — LORAZEPAM 1 MG: 2 INJECTION INTRAMUSCULAR; INTRAVENOUS at 04:35

## 2025-04-19 RX ADMIN — MAGNESIUM SULFATE HEPTAHYDRATE 2 G: 2 INJECTION, SOLUTION INTRAVENOUS at 00:47

## 2025-04-19 NOTE — ED PROVIDER NOTES
Subjective   History of Present Illness  Patient is a 40-year-old male who presents to the ER with complaints of abdominal pain and jaundice.  Patient reports for the past week, he has had generalized abdominal pain and worsening jaundice.  Past medical history of alcoholism, diabetes mellitus, hypertension, liver cirrhosis.  Patient was actually admitted to the hospital recently due to electrolyte imbalances and alcoholic hepatitis, but ultimately left AGAINST MEDICAL ADVICE.  Patient presents to the ER tonight due to worsening symptoms.  He reports that he had a drink tonight.  States that he drank a 6 ounce can of liquor tonight.  Reports that he typically has 3-4 6 ounce cans per day.  Patient reports that he feels uncomfortable, but is not really describing any pain.  He is complaining of diarrhea.  Denies nausea or vomiting.  No fevers.  Patient reports that he has not followed with GI since last summer.          Review of Systems   Gastrointestinal:  Positive for abdominal pain and diarrhea.   Skin:  Positive for color change.   All other systems reviewed and are negative.      Past Medical History:   Diagnosis Date    Alcoholism     currently drinking    Diabetes mellitus     Gout     Hypertension     Jaundice        No Known Allergies    Past Surgical History:   Procedure Laterality Date    VASECTOMY         History reviewed. No pertinent family history.    Social History     Socioeconomic History    Marital status: Single   Tobacco Use    Smoking status: Some Days     Current packs/day: 0.25     Average packs/day: 0.5 packs/day for 15.3 years (7.1 ttl pk-yrs)     Types: Cigarettes     Start date: 2010    Smokeless tobacco: Never   Vaping Use    Vaping status: Never Used    Passive vaping exposure: Yes   Substance and Sexual Activity    Alcohol use: Yes     Alcohol/week: 12.0 standard drinks of alcohol     Types: 12 Cans of beer per week     Comment: malt liquor (48 oz daily)    Drug use: Yes     Types:  Marijuana    Sexual activity: Yes           Objective   Physical Exam  Vitals and nursing note reviewed.   Constitutional:       General: He is not in acute distress.     Appearance: He is well-developed and normal weight. He is ill-appearing. He is not toxic-appearing.   HENT:      Head: Normocephalic.   Cardiovascular:      Rate and Rhythm: Normal rate and regular rhythm.      Pulses: Normal pulses.      Heart sounds: Normal heart sounds.   Pulmonary:      Effort: Pulmonary effort is normal.      Breath sounds: Normal breath sounds.   Abdominal:      General: Abdomen is flat. Bowel sounds are normal. There is distension.      Palpations: Abdomen is soft.      Tenderness: There is no abdominal tenderness. There is no guarding.   Musculoskeletal:         General: Normal range of motion.      Cervical back: Normal range of motion and neck supple.   Skin:     General: Skin is warm and dry.      Coloration: Skin is jaundiced.      Comments: Severe jaundice    Neurological:      General: No focal deficit present.      Mental Status: He is alert and oriented to person, place, and time. Mental status is at baseline.   Psychiatric:         Mood and Affect: Mood normal.         Behavior: Behavior normal.         Procedures           ED Course  ED Course as of 04/19/25 0506   Sat Apr 19, 2025   0145 Turnover from [facility] Jessica Richie for jaundice with hepatic failure and a UTI patient clinically intoxicated history of alcohol withdrawal.  Left AMA previously for the exact same symptoms. [JJ]   0149 Case signed out to Dr. Dewitt pending CT imaging and final disposition. [KR]   6484 40-year-old male with history of alcoholism, non-insulin-dependent diabetes, hypertension, vasectomy, ETOH liver cirrhosis recently admitted for alcohol use and falls found to have a UTI and acute cholecystitis with a lactate of 3.3 white count of 14.  Patient did come in for abdominal pain and jaundice.  Patient was no fever, hypotension or altered  mental status. [JJ]   0330 Discussed with Dr. Grant and patient will likely need IR so he will need to be transferred.  Do not have that ability over the weekend. [JJ]   0411 [facility] with [facility] [place] and they are at capacity they will talk with the  and see what the decision is if the patient can be transferred to their facility. [JJ]   0418 [place] [facility] currently at capacity and declines transfer.  They will contact [facility] to see if they have availability. [JJ]   0429 Discussed with [facility] and they will try to get in place at [facility] where they have GI and surgery that could care for the patient. [JJ]   0440 Discussed with Dr. Cervantes at [facility] and accepted for transfer. [JJ]      ED Course User Index  [JJ] Benjamin Dewitt MD  [KR] Jessica Quiroz APRN                                                       Medical Decision Making  Problems Addressed:  Acute cholecystitis: complicated acute illness or injury    Amount and/or Complexity of Data Reviewed  Labs: ordered.  Radiology: ordered.    Risk  Prescription drug management.        Final diagnoses:   Acute cholecystitis       ED Disposition  ED Disposition       ED Disposition   Transfer to Another Facility     Condition   --    Comment   --               No follow-up provider specified.       Medication List      No changes were made to your prescriptions during this visit.            Benjamin Dewitt MD  04/19/25 8339

## 2025-04-24 LAB
BACTERIA SPEC AEROBE CULT: NORMAL
BACTERIA SPEC AEROBE CULT: NORMAL

## 2025-05-01 DIAGNOSIS — F41.9 ANXIETY: ICD-10-CM

## 2025-05-02 ENCOUNTER — LAB (OUTPATIENT)
Dept: LAB | Facility: HOSPITAL | Age: 40
End: 2025-05-02
Payer: COMMERCIAL

## 2025-05-02 ENCOUNTER — OFFICE VISIT (OUTPATIENT)
Dept: FAMILY MEDICINE CLINIC | Facility: CLINIC | Age: 40
End: 2025-05-02
Payer: COMMERCIAL

## 2025-05-02 VITALS
BODY MASS INDEX: 37.3 KG/M2 | HEIGHT: 75 IN | SYSTOLIC BLOOD PRESSURE: 124 MMHG | TEMPERATURE: 97.8 F | OXYGEN SATURATION: 96 % | DIASTOLIC BLOOD PRESSURE: 70 MMHG | RESPIRATION RATE: 18 BRPM | WEIGHT: 300 LBS | HEART RATE: 81 BPM

## 2025-05-02 DIAGNOSIS — F41.9 ANXIETY: ICD-10-CM

## 2025-05-02 DIAGNOSIS — R60.0 BILATERAL LOWER EXTREMITY EDEMA: ICD-10-CM

## 2025-05-02 DIAGNOSIS — R60.1 ANASARCA: ICD-10-CM

## 2025-05-02 DIAGNOSIS — F10.20 ALCOHOLISM: ICD-10-CM

## 2025-05-02 DIAGNOSIS — K70.31 ALCOHOLIC CIRRHOSIS OF LIVER WITH ASCITES: ICD-10-CM

## 2025-05-02 DIAGNOSIS — F10.220 ALCOHOL INTOXICATION IN ACTIVE ALCOHOLIC WITHOUT COMPLICATION: ICD-10-CM

## 2025-05-02 DIAGNOSIS — K70.31 ALCOHOLIC CIRRHOSIS OF LIVER WITH ASCITES: Primary | ICD-10-CM

## 2025-05-02 DIAGNOSIS — E03.9 HYPOTHYROIDISM, UNSPECIFIED TYPE: ICD-10-CM

## 2025-05-02 LAB
ALBUMIN SERPL-MCNC: 3.1 G/DL (ref 3.5–5)
ALBUMIN/GLOB SERPL: 0.8 G/DL (ref 1.1–2.5)
ALP SERPL-CCNC: 212 U/L (ref 24–120)
ALT SERPL W P-5'-P-CCNC: 120 U/L (ref 0–50)
ANION GAP SERPL CALCULATED.3IONS-SCNC: 11 MMOL/L (ref 4–13)
AST SERPL-CCNC: 152 U/L (ref 7–45)
AUTO MIXED CELLS #: 0.7 10*3/MM3 (ref 0.1–2.6)
AUTO MIXED CELLS %: 7.4 % (ref 0.1–24)
BILIRUB SERPL-MCNC: 22 MG/DL (ref 0.1–1)
BUN SERPL-MCNC: 18 MG/DL (ref 5–21)
BUN/CREAT SERPL: 15
CALCIUM SPEC-SCNC: 8.3 MG/DL (ref 8.6–10.5)
CHLORIDE SERPL-SCNC: 112 MMOL/L (ref 98–110)
CO2 SERPL-SCNC: 12 MMOL/L (ref 24–31)
CREAT SERPL-MCNC: 1.2 MG/DL (ref 0.5–1.4)
EGFRCR SERPLBLD CKD-EPI 2021: 78.4 ML/MIN/1.73
ERYTHROCYTE [DISTWIDTH] IN BLOOD BY AUTOMATED COUNT: 20.7 % (ref 12.3–15.4)
GLOBULIN UR ELPH-MCNC: 4 GM/DL
GLUCOSE SERPL-MCNC: 176 MG/DL (ref 65–99)
HCT VFR BLD AUTO: 31.5 % (ref 37.5–51)
HGB BLD-MCNC: 10.2 G/DL (ref 13–17.7)
INR PPP: 1.71 (ref 0.91–1.09)
LYMPHOCYTES # BLD AUTO: 0.4 10*3/MM3 (ref 0.7–3.1)
LYMPHOCYTES NFR BLD AUTO: 3.6 % (ref 19.6–45.3)
MAGNESIUM SERPL-MCNC: 1.8 MG/DL (ref 1.6–2.6)
MCH RBC QN AUTO: 34.5 PG (ref 26.6–33)
MCHC RBC AUTO-ENTMCNC: 32.4 G/DL (ref 31.5–35.7)
MCV RBC AUTO: 106.4 FL (ref 79–97)
NEUTROPHILS NFR BLD AUTO: 8.9 10*3/MM3 (ref 1.7–7)
NEUTROPHILS NFR BLD AUTO: 89 % (ref 42.7–76)
PLATELET # BLD AUTO: 169 10*3/MM3 (ref 140–450)
PMV BLD AUTO: 11 FL (ref 6–12)
POTASSIUM SERPL-SCNC: 3.9 MMOL/L (ref 3.5–5.3)
PROT SERPL-MCNC: 7.1 G/DL (ref 6.3–8.7)
PROTHROMBIN TIME: 21 SECONDS (ref 11.8–14.8)
RBC # BLD AUTO: 2.96 10*6/MM3 (ref 4.14–5.8)
SODIUM SERPL-SCNC: 135 MMOL/L (ref 135–145)
WBC NRBC COR # BLD AUTO: 10 10*3/MM3 (ref 3.4–10.8)

## 2025-05-02 PROCEDURE — 80053 COMPREHEN METABOLIC PANEL: CPT

## 2025-05-02 PROCEDURE — 83735 ASSAY OF MAGNESIUM: CPT

## 2025-05-02 PROCEDURE — 85025 COMPLETE CBC W/AUTO DIFF WBC: CPT

## 2025-05-02 PROCEDURE — 85610 PROTHROMBIN TIME: CPT

## 2025-05-02 PROCEDURE — 36415 COLL VENOUS BLD VENIPUNCTURE: CPT

## 2025-05-02 RX ORDER — FUROSEMIDE 40 MG/1
40 TABLET ORAL 2 TIMES DAILY
Qty: 90 TABLET | Refills: 2 | Status: SHIPPED | OUTPATIENT
Start: 2025-05-02

## 2025-05-02 RX ORDER — SPIRONOLACTONE 100 MG/1
100 TABLET, FILM COATED ORAL DAILY
Qty: 90 TABLET | Refills: 2 | Status: SHIPPED | OUTPATIENT
Start: 2025-05-02

## 2025-05-02 RX ORDER — CHLORDIAZEPOXIDE HYDROCHLORIDE 25 MG/1
CAPSULE, GELATIN COATED ORAL
Qty: 15 CAPSULE | Refills: 0 | Status: SHIPPED | OUTPATIENT
Start: 2025-05-02 | End: 2025-05-06

## 2025-05-02 RX ORDER — LACTULOSE 10 G/15ML
20 SOLUTION ORAL 3 TIMES DAILY
Qty: 450 ML | Refills: 8 | Status: SHIPPED | OUTPATIENT
Start: 2025-05-02

## 2025-05-02 RX ORDER — LANOLIN ALCOHOL/MO/W.PET/CERES
100 CREAM (GRAM) TOPICAL DAILY
Qty: 90 TABLET | Refills: 1 | Status: SHIPPED | OUTPATIENT
Start: 2025-05-02

## 2025-05-02 RX ORDER — LEVOTHYROXINE SODIUM 75 UG/1
75 TABLET ORAL DAILY
Qty: 30 TABLET | Refills: 2 | Status: SHIPPED | OUTPATIENT
Start: 2025-05-02

## 2025-05-02 NOTE — PROGRESS NOTES
Chief Complaint  Hospital Follow Up Visit    Mary Christiansen presents to [facility]    HPI    History of Present Illness  The patient presents for evaluation of skin discoloration, fatigue, and medication management.    A significant improvement in her condition is reported following a recent hospitalization at [facility].  He was admitted 4/19 and discharged 4/26/2025.  He was treated for acute alcoholic hepatitis and discharged on total of 28 days course of prednisolone which he reports he is taking.      Persistent skin discoloration is noted, which is gradually resolving, and increased fatigue is experienced, attributed to prolonged bed rest during the hospital stay. An increase in appetite is reported, perceived as beneficial given previous eating difficulties. Alcohol consumption has been abstained from, with the exception of three beers consumed over the past 14 days, the most recent of which was 2 to 3 days ago. No current withdrawal symptoms are reported, but a history of such symptoms is acknowledged. Commitment to maintaining abstinence from alcohol is expressed.    Lactulose treatment has not been adhered to due to a period of diarrhea, but plans to resume now that bowel movements have normalized are indicated. Lasix has not been taken recently, but a supply is available at home, and a refill is requested. Potassium supplements are currently being taken, and resuming spironolactone therapy is agreed upon. Vitamin B1 supplements are being taken, with a sufficient supply at home.    Refills for fluoxetine and Synthroid are being sought. The supply of fluoxetine was exhausted as of yesterday, and sporadic anxiety attacks are experienced.    Financial difficulties and a recent mental breakdown are reported, believed to have triggered current health issues. Interest in exploring counseling options is expressed, but insurance does not cover psychiatric  "services. A letter to facilitate return to work on a part-time basis is requested.    A gastroenterology appointment was missed due to forgetfulness and distraction. A previous visit to [place] is recalled, where surgery was considered but ultimately deemed unnecessary due to the absence of gallbladder abnormalities. Antibiotics were prescribed at that time. No fevers or severe abdominal pain are reported, but occasional discomfort and increased gas production are experienced.    Kidney complications were informed, leading to renal issues, and fluid therapy was subsequently administered to aid kidney function. No back pain is reported, and regular urination is maintained. Water intake has been increased.    SOCIAL HISTORY  The patient reports no current alcohol use but admits to consuming three beers in the last 14 days, with the last one being two or three days ago.    Past Medical History:   Diagnosis Date    Alcoholism     currently drinking    Diabetes mellitus     Gout     Hypertension     Jaundice      Past Surgical History:   Procedure Laterality Date    VASECTOMY       Social History     Socioeconomic History    Marital status: Single   Tobacco Use    Smoking status: Some Days     Current packs/day: 0.25     Average packs/day: 0.5 packs/day for 15.3 years (7.1 ttl pk-yrs)     Types: Cigarettes     Start date: 2010    Smokeless tobacco: Never   Vaping Use    Vaping status: Never Used    Passive vaping exposure: Yes   Substance and Sexual Activity    Alcohol use: Yes     Alcohol/week: 12.0 standard drinks of alcohol     Types: 12 Cans of beer per week     Comment: malt liquor (48 oz daily)    Drug use: Yes     Types: Marijuana    Sexual activity: Yes       Objective   Vital Signs:  /70   Pulse 81   Temp 97.8 °F (36.6 °C) (Temporal)   Resp 18   Ht 190.5 cm (75\")   Wt 136 kg (300 lb)   SpO2 96%   BMI 37.50 kg/m²   Estimated body mass index is 37.5 kg/m² as calculated from the following:    Height " "as of this encounter: 190.5 cm (75\").    Weight as of this encounter: 136 kg (300 lb).              Physical Exam  Vitals reviewed.   Constitutional:       Appearance: Normal appearance.   HENT:      Head: Normocephalic.      Nose: Nose normal.      Mouth/Throat:      Mouth: Mucous membranes are moist.   Eyes:      Extraocular Movements: Extraocular movements intact.      Comments: Scleral icterus present   Cardiovascular:      Rate and Rhythm: Normal rate and regular rhythm.      Heart sounds: Normal heart sounds.   Pulmonary:      Effort: Pulmonary effort is normal.      Breath sounds: Normal breath sounds.   Musculoskeletal:         General: Normal range of motion.      Cervical back: Normal range of motion.   Skin:     General: Skin is warm and dry.      Coloration: Skin is jaundiced.   Neurological:      General: No focal deficit present.      Mental Status: He is alert and oriented to person, place, and time.   Psychiatric:         Mood and Affect: Mood normal.         Behavior: Behavior normal.        Physical Exam  Skin: Skin discoloration noted    Result Review :        Results               Assessment and Plan   Diagnoses and all orders for this visit:    1. Alcoholic cirrhosis of liver with ascites (Primary)  -Advised him to continue the remainder of course of prednisone, complete cessation of EtOH recommended.  Provided local resources for detox.  Recommended patient call GI to reestablish.  Recommend he restart Lasix and Aldactone regimen.  Will provide Librium course along with EtOH cessation.  Address underlying anxiety/mental health below.  -     Comprehensive metabolic panel; Future  -     CBC w AUTO Differential; Future  -     Magnesium; Future  -     lactulose 20 GM/30ML solution solution; Take 30 mL by mouth 3 (Three) Times a Day.  Dispense: 450 mL; Refill: 8  -     spironolactone (Aldactone) 100 MG tablet; Take 1 tablet by mouth Daily.  Dispense: 90 tablet; Refill: 2  -     furosemide (Lasix) 40 " MG tablet; Take 1 tablet by mouth 2 (Two) Times a Day.  Dispense: 90 tablet; Refill: 2  -     Ambulatory Referral to Behavioral Health  -     chlordiazePOXIDE (LIBRIUM) 25 MG capsule; Take 2 capsules by mouth Every 6 (Six) Hours for 1 day, THEN 2 capsules Every 12 (Twelve) Hours for 1 day, THEN 1 capsule Every 12 (Twelve) Hours for 1 day, THEN 1 capsule Daily for 1 day.  Dispense: 15 capsule; Refill: 0  -     Protime-INR; Future    2. Anxiety  -     FLUoxetine (PROzac) 20 MG capsule; Take 1 capsule by mouth Daily.  Dispense: 90 capsule; Refill: 1  -     Ambulatory Referral to Behavioral Health    3. Hypothyroidism, unspecified type  -     levothyroxine (Synthroid) 75 MCG tablet; Take 1 tablet by mouth Daily.  Dispense: 30 tablet; Refill: 2    4. Alcohol intoxication in active alcoholic without complication  -     thiamine (VITAMIN B1) 100 MG tablet; Take 1 tablet by mouth Daily.  Dispense: 90 tablet; Refill: 1    5. Anasarca  -     spironolactone (Aldactone) 100 MG tablet; Take 1 tablet by mouth Daily.  Dispense: 90 tablet; Refill: 2    6. Bilateral lower extremity edema  -     spironolactone (Aldactone) 100 MG tablet; Take 1 tablet by mouth Daily.  Dispense: 90 tablet; Refill: 2    7. Alcoholism           I spent 42 minutes caring for Luciano on this date of service. This time includes time spent by me in the following activities:preparing for the visit, reviewing tests, obtaining and/or reviewing a separately obtained history, performing a medically appropriate examination and/or evaluation , counseling and educating the patient/family/caregiver, ordering medications, tests, or procedures, referring and communicating with other health care professionals , documenting information in the medical record, independently interpreting results and communicating that information with the patient/family/caregiver, and care coordination  EMR Dragon/Transcription disclaimer:   Part of this note may be an electronic  transcription/translation of spoken language to printed text using the Dragon Dictation System     Follow Up   No follow-ups on file.    Patient or patient representative verbalized consent for the use of Ambient Listening during the visit with  Adrien Varghese MD for chart documentation. 5/2/2025  14:54 CDT    Patient was given instructions and counseling regarding his condition or for health maintenance advice. Please see specific information pulled into the AVS if appropriate.

## 2025-05-02 NOTE — LETTER
May 2, 2025     Patient: Luciano Christiansen   YOB: 1985   Date of Visit: 5/2/2025       To Whom It May Concern:    It is my medical opinion that Luciano Christiansen may return to work in part-time capacity.           Sincerely,        Adrien Varghese MD    CC: No Recipients

## 2025-05-28 ENCOUNTER — APPOINTMENT (OUTPATIENT)
Dept: ULTRASOUND IMAGING | Facility: HOSPITAL | Age: 40
DRG: 871 | End: 2025-05-28
Payer: COMMERCIAL

## 2025-05-28 ENCOUNTER — ANESTHESIA (OUTPATIENT)
Dept: PERIOP | Facility: HOSPITAL | Age: 40
End: 2025-05-28
Payer: COMMERCIAL

## 2025-05-28 ENCOUNTER — APPOINTMENT (OUTPATIENT)
Dept: CT IMAGING | Facility: HOSPITAL | Age: 40
DRG: 871 | End: 2025-05-28
Payer: COMMERCIAL

## 2025-05-28 ENCOUNTER — HOSPITAL ENCOUNTER (INPATIENT)
Facility: HOSPITAL | Age: 40
LOS: 7 days | Discharge: HOME | DRG: 871 | End: 2025-06-04
Attending: STUDENT IN AN ORGANIZED HEALTH CARE EDUCATION/TRAINING PROGRAM | Admitting: INTERNAL MEDICINE
Payer: COMMERCIAL

## 2025-05-28 ENCOUNTER — ANESTHESIA EVENT (OUTPATIENT)
Dept: PERIOP | Facility: HOSPITAL | Age: 40
End: 2025-05-28
Payer: COMMERCIAL

## 2025-05-28 ENCOUNTER — APPOINTMENT (OUTPATIENT)
Dept: GENERAL RADIOLOGY | Facility: HOSPITAL | Age: 40
DRG: 871 | End: 2025-05-28
Payer: COMMERCIAL

## 2025-05-28 DIAGNOSIS — F10.220 ALCOHOL INTOXICATION IN ACTIVE ALCOHOLIC WITHOUT COMPLICATION: ICD-10-CM

## 2025-05-28 DIAGNOSIS — K92.2 UPPER GI BLEED: ICD-10-CM

## 2025-05-28 DIAGNOSIS — K72.90 LIVER FAILURE WITHOUT HEPATIC COMA, UNSPECIFIED CHRONICITY: Primary | ICD-10-CM

## 2025-05-28 DIAGNOSIS — K70.31 ALCOHOLIC CIRRHOSIS OF LIVER WITH ASCITES: ICD-10-CM

## 2025-05-28 DIAGNOSIS — Z74.09 IMPAIRED FUNCTIONAL MOBILITY AND ACTIVITY TOLERANCE: ICD-10-CM

## 2025-05-28 LAB
ABO GROUP BLD: NORMAL
ALBUMIN SERPL-MCNC: 2.6 G/DL (ref 3.5–5.2)
ALBUMIN/GLOB SERPL: 1.1 G/DL
ALP SERPL-CCNC: 315 U/L (ref 39–117)
ALT SERPL W P-5'-P-CCNC: 130 U/L (ref 1–41)
AMMONIA BLD-SCNC: 92 UMOL/L (ref 16–60)
ANION GAP SERPL CALCULATED.3IONS-SCNC: 22 MMOL/L (ref 5–15)
APTT PPP: 57.5 SECONDS (ref 24.5–36)
AST SERPL-CCNC: 329 U/L (ref 1–40)
BACTERIA UR QL AUTO: ABNORMAL /HPF
BASOPHILS # BLD AUTO: 0.07 10*3/MM3 (ref 0–0.2)
BASOPHILS NFR BLD AUTO: 0.5 % (ref 0–1.5)
BILIRUB SERPL-MCNC: 26.2 MG/DL (ref 0–1.2)
BILIRUB UR QL STRIP: ABNORMAL
BLD GP AB SCN SERPL QL: NEGATIVE
BUN SERPL-MCNC: 21 MG/DL (ref 6–20)
BUN/CREAT SERPL: 43.8 (ref 7–25)
CALCIUM SPEC-SCNC: 7.8 MG/DL (ref 8.6–10.5)
CHLORIDE SERPL-SCNC: 89 MMOL/L (ref 98–107)
CLARITY UR: ABNORMAL
CO2 SERPL-SCNC: 21 MMOL/L (ref 22–29)
COLOR UR: ABNORMAL
CREAT SERPL-MCNC: 0.48 MG/DL (ref 0.76–1.27)
D-LACTATE SERPL-SCNC: 10.8 MMOL/L (ref 0.5–2)
D-LACTATE SERPL-SCNC: 12.1 MMOL/L (ref 0.5–2)
D-LACTATE SERPL-SCNC: 9.6 MMOL/L (ref 0.5–2)
DEPRECATED RDW RBC AUTO: 67.7 FL (ref 37–54)
EGFRCR SERPLBLD CKD-EPI 2021: 133.9 ML/MIN/1.73
EOSINOPHIL # BLD AUTO: 0.06 10*3/MM3 (ref 0–0.4)
EOSINOPHIL NFR BLD AUTO: 0.4 % (ref 0.3–6.2)
ERYTHROCYTE [DISTWIDTH] IN BLOOD BY AUTOMATED COUNT: 18.6 % (ref 12.3–15.4)
ETHANOL UR QL: 0.13 %
GLOBULIN UR ELPH-MCNC: 2.4 GM/DL
GLUCOSE BLDC GLUCOMTR-MCNC: 133 MG/DL (ref 70–130)
GLUCOSE SERPL-MCNC: 173 MG/DL (ref 65–99)
GLUCOSE UR STRIP-MCNC: NEGATIVE MG/DL
HCT VFR BLD AUTO: 24.8 % (ref 37.5–51)
HCT VFR BLD AUTO: 25 % (ref 37.5–51)
HCT VFR BLD AUTO: 28 % (ref 37.5–51)
HGB BLD-MCNC: 8.1 G/DL (ref 13–17.7)
HGB BLD-MCNC: 8.1 G/DL (ref 13–17.7)
HGB BLD-MCNC: 9.5 G/DL (ref 13–17.7)
HGB UR QL STRIP.AUTO: NEGATIVE
HOLD SPECIMEN: NORMAL
HOLD SPECIMEN: NORMAL
HYALINE CASTS UR QL AUTO: ABNORMAL /LPF
IMM GRANULOCYTES # BLD AUTO: 0.3 10*3/MM3 (ref 0–0.05)
IMM GRANULOCYTES NFR BLD AUTO: 2 % (ref 0–0.5)
INR PPP: 2.31 (ref 0.91–1.09)
KETONES UR QL STRIP: NEGATIVE
LDH SERPL-CCNC: 537 U/L (ref 135–225)
LEUKOCYTE ESTERASE UR QL STRIP.AUTO: ABNORMAL
LIPASE SERPL-CCNC: 19 U/L (ref 13–60)
LYMPHOCYTES # BLD AUTO: 1.65 10*3/MM3 (ref 0.7–3.1)
LYMPHOCYTES NFR BLD AUTO: 11.2 % (ref 19.6–45.3)
MCH RBC QN AUTO: 34.5 PG (ref 26.6–33)
MCHC RBC AUTO-ENTMCNC: 33.9 G/DL (ref 31.5–35.7)
MCV RBC AUTO: 101.8 FL (ref 79–97)
MONOCYTES # BLD AUTO: 1.07 10*3/MM3 (ref 0.1–0.9)
MONOCYTES NFR BLD AUTO: 7.3 % (ref 5–12)
NEUTROPHILS NFR BLD AUTO: 11.56 10*3/MM3 (ref 1.7–7)
NEUTROPHILS NFR BLD AUTO: 78.6 % (ref 42.7–76)
NITRITE UR QL STRIP: POSITIVE
NRBC BLD AUTO-RTO: 0 /100 WBC (ref 0–0.2)
PH UR STRIP.AUTO: 5.5 [PH] (ref 5–8)
PLATELET # BLD AUTO: 101 10*3/MM3 (ref 140–450)
PMV BLD AUTO: 12.4 FL (ref 6–12)
POTASSIUM SERPL-SCNC: 3.2 MMOL/L (ref 3.5–5.2)
PROCALCITONIN SERPL-MCNC: 1.35 NG/ML (ref 0–0.25)
PROT SERPL-MCNC: 5 G/DL (ref 6–8.5)
PROT UR QL STRIP: ABNORMAL
PROTHROMBIN TIME: 26.8 SECONDS (ref 11.8–14.8)
RBC # BLD AUTO: 2.75 10*6/MM3 (ref 4.14–5.8)
RBC # UR STRIP: ABNORMAL /HPF
REF LAB TEST METHOD: ABNORMAL
RH BLD: NEGATIVE
SODIUM SERPL-SCNC: 132 MMOL/L (ref 136–145)
SP GR UR STRIP: 1.02 (ref 1–1.03)
SQUAMOUS #/AREA URNS HPF: ABNORMAL /HPF
T&S EXPIRATION DATE: NORMAL
UROBILINOGEN UR QL STRIP: ABNORMAL
WBC # UR STRIP: ABNORMAL /HPF
WBC NRBC COR # BLD AUTO: 14.71 10*3/MM3 (ref 3.4–10.8)
WHOLE BLOOD HOLD COAG: NORMAL
WHOLE BLOOD HOLD SPECIMEN: NORMAL

## 2025-05-28 PROCEDURE — 25010000002 ALBUMIN HUMAN 25% PER 50 ML: Performed by: PHYSICIAN ASSISTANT

## 2025-05-28 PROCEDURE — 25010000002 DIAZEPAM PER 5 MG: Performed by: STUDENT IN AN ORGANIZED HEALTH CARE EDUCATION/TRAINING PROGRAM

## 2025-05-28 PROCEDURE — 85025 COMPLETE CBC W/AUTO DIFF WBC: CPT | Performed by: STUDENT IN AN ORGANIZED HEALTH CARE EDUCATION/TRAINING PROGRAM

## 2025-05-28 PROCEDURE — 25010000002 PIPERACILLIN SOD-TAZOBACTAM PER 1 G: Performed by: INTERNAL MEDICINE

## 2025-05-28 PROCEDURE — 25510000001 IOPAMIDOL 61 % SOLUTION: Performed by: STUDENT IN AN ORGANIZED HEALTH CARE EDUCATION/TRAINING PROGRAM

## 2025-05-28 PROCEDURE — 86901 BLOOD TYPING SEROLOGIC RH(D): CPT | Performed by: STUDENT IN AN ORGANIZED HEALTH CARE EDUCATION/TRAINING PROGRAM

## 2025-05-28 PROCEDURE — 81001 URINALYSIS AUTO W/SCOPE: CPT | Performed by: STUDENT IN AN ORGANIZED HEALTH CARE EDUCATION/TRAINING PROGRAM

## 2025-05-28 PROCEDURE — 86920 COMPATIBILITY TEST SPIN: CPT

## 2025-05-28 PROCEDURE — 85014 HEMATOCRIT: CPT | Performed by: PHYSICIAN ASSISTANT

## 2025-05-28 PROCEDURE — 25010000002 LABETALOL 5 MG/ML SOLUTION

## 2025-05-28 PROCEDURE — 86901 BLOOD TYPING SEROLOGIC RH(D): CPT

## 2025-05-28 PROCEDURE — 85730 THROMBOPLASTIN TIME PARTIAL: CPT | Performed by: STUDENT IN AN ORGANIZED HEALTH CARE EDUCATION/TRAINING PROGRAM

## 2025-05-28 PROCEDURE — 74177 CT ABD & PELVIS W/CONTRAST: CPT

## 2025-05-28 PROCEDURE — 83690 ASSAY OF LIPASE: CPT | Performed by: STUDENT IN AN ORGANIZED HEALTH CARE EDUCATION/TRAINING PROGRAM

## 2025-05-28 PROCEDURE — 25010000002 FOLIC ACID 5 MG/ML SOLUTION 10 ML VIAL: Performed by: STUDENT IN AN ORGANIZED HEALTH CARE EDUCATION/TRAINING PROGRAM

## 2025-05-28 PROCEDURE — P9612 CATHETERIZE FOR URINE SPEC: HCPCS

## 2025-05-28 PROCEDURE — 84145 PROCALCITONIN (PCT): CPT | Performed by: STUDENT IN AN ORGANIZED HEALTH CARE EDUCATION/TRAINING PROGRAM

## 2025-05-28 PROCEDURE — P9016 RBC LEUKOCYTES REDUCED: HCPCS

## 2025-05-28 PROCEDURE — 71045 X-RAY EXAM CHEST 1 VIEW: CPT

## 2025-05-28 PROCEDURE — 80053 COMPREHEN METABOLIC PANEL: CPT | Performed by: STUDENT IN AN ORGANIZED HEALTH CARE EDUCATION/TRAINING PROGRAM

## 2025-05-28 PROCEDURE — 25810000003 LACTATED RINGERS SOLUTION: Performed by: PHYSICIAN ASSISTANT

## 2025-05-28 PROCEDURE — 43235 EGD DIAGNOSTIC BRUSH WASH: CPT | Performed by: INTERNAL MEDICINE

## 2025-05-28 PROCEDURE — 25010000002 FENTANYL CITRATE (PF) 100 MCG/2ML SOLUTION

## 2025-05-28 PROCEDURE — 25010000002 PROPOFOL 10 MG/ML EMULSION

## 2025-05-28 PROCEDURE — 25010000002 THIAMINE HCL 200 MG/2ML SOLUTION: Performed by: STUDENT IN AN ORGANIZED HEALTH CARE EDUCATION/TRAINING PROGRAM

## 2025-05-28 PROCEDURE — 25010000002 MORPHINE PER 10 MG: Performed by: INTERNAL MEDICINE

## 2025-05-28 PROCEDURE — 25010000002 OCTREOTIDE PER 25 MCG: Performed by: STUDENT IN AN ORGANIZED HEALTH CARE EDUCATION/TRAINING PROGRAM

## 2025-05-28 PROCEDURE — 86850 RBC ANTIBODY SCREEN: CPT | Performed by: STUDENT IN AN ORGANIZED HEALTH CARE EDUCATION/TRAINING PROGRAM

## 2025-05-28 PROCEDURE — 82077 ASSAY SPEC XCP UR&BREATH IA: CPT | Performed by: STUDENT IN AN ORGANIZED HEALTH CARE EDUCATION/TRAINING PROGRAM

## 2025-05-28 PROCEDURE — 25010000002 CEFTRIAXONE PER 250 MG: Performed by: STUDENT IN AN ORGANIZED HEALTH CARE EDUCATION/TRAINING PROGRAM

## 2025-05-28 PROCEDURE — 85018 HEMOGLOBIN: CPT | Performed by: PHYSICIAN ASSISTANT

## 2025-05-28 PROCEDURE — 25810000003 SODIUM CHLORIDE 0.9 % SOLUTION: Performed by: STUDENT IN AN ORGANIZED HEALTH CARE EDUCATION/TRAINING PROGRAM

## 2025-05-28 PROCEDURE — 83605 ASSAY OF LACTIC ACID: CPT | Performed by: STUDENT IN AN ORGANIZED HEALTH CARE EDUCATION/TRAINING PROGRAM

## 2025-05-28 PROCEDURE — 36415 COLL VENOUS BLD VENIPUNCTURE: CPT | Performed by: STUDENT IN AN ORGANIZED HEALTH CARE EDUCATION/TRAINING PROGRAM

## 2025-05-28 PROCEDURE — 82948 REAGENT STRIP/BLOOD GLUCOSE: CPT

## 2025-05-28 PROCEDURE — 86900 BLOOD TYPING SEROLOGIC ABO: CPT

## 2025-05-28 PROCEDURE — 0DJ08ZZ INSPECTION OF UPPER INTESTINAL TRACT, VIA NATURAL OR ARTIFICIAL OPENING ENDOSCOPIC: ICD-10-PCS | Performed by: INTERNAL MEDICINE

## 2025-05-28 PROCEDURE — P9047 ALBUMIN (HUMAN), 25%, 50ML: HCPCS | Performed by: PHYSICIAN ASSISTANT

## 2025-05-28 PROCEDURE — 99285 EMERGENCY DEPT VISIT HI MDM: CPT | Performed by: STUDENT IN AN ORGANIZED HEALTH CARE EDUCATION/TRAINING PROGRAM

## 2025-05-28 PROCEDURE — 83615 LACTATE (LD) (LDH) ENZYME: CPT | Performed by: STUDENT IN AN ORGANIZED HEALTH CARE EDUCATION/TRAINING PROGRAM

## 2025-05-28 PROCEDURE — 83605 ASSAY OF LACTIC ACID: CPT | Performed by: PHYSICIAN ASSISTANT

## 2025-05-28 PROCEDURE — 82140 ASSAY OF AMMONIA: CPT | Performed by: STUDENT IN AN ORGANIZED HEALTH CARE EDUCATION/TRAINING PROGRAM

## 2025-05-28 PROCEDURE — 86900 BLOOD TYPING SEROLOGIC ABO: CPT | Performed by: STUDENT IN AN ORGANIZED HEALTH CARE EDUCATION/TRAINING PROGRAM

## 2025-05-28 PROCEDURE — 87040 BLOOD CULTURE FOR BACTERIA: CPT | Performed by: STUDENT IN AN ORGANIZED HEALTH CARE EDUCATION/TRAINING PROGRAM

## 2025-05-28 PROCEDURE — 70450 CT HEAD/BRAIN W/O DYE: CPT

## 2025-05-28 PROCEDURE — 76705 ECHO EXAM OF ABDOMEN: CPT

## 2025-05-28 PROCEDURE — 25010000002 ONDANSETRON PER 1 MG: Performed by: INTERNAL MEDICINE

## 2025-05-28 PROCEDURE — 25010000002 LIDOCAINE PF 2% 2 % SOLUTION

## 2025-05-28 PROCEDURE — 36430 TRANSFUSION BLD/BLD COMPNT: CPT

## 2025-05-28 PROCEDURE — 25010000002 PHENOBARBITAL PER 120 MG: Performed by: INTERNAL MEDICINE

## 2025-05-28 PROCEDURE — 85610 PROTHROMBIN TIME: CPT | Performed by: STUDENT IN AN ORGANIZED HEALTH CARE EDUCATION/TRAINING PROGRAM

## 2025-05-28 PROCEDURE — 25010000002 METOCLOPRAMIDE PER 10 MG: Performed by: STUDENT IN AN ORGANIZED HEALTH CARE EDUCATION/TRAINING PROGRAM

## 2025-05-28 RX ORDER — LABETALOL HYDROCHLORIDE 5 MG/ML
INJECTION, SOLUTION INTRAVENOUS AS NEEDED
Status: DISCONTINUED | OUTPATIENT
Start: 2025-05-28 | End: 2025-05-28 | Stop reason: SURG

## 2025-05-28 RX ORDER — MORPHINE SULFATE 2 MG/ML
0.5 INJECTION, SOLUTION INTRAMUSCULAR; INTRAVENOUS ONCE
Status: COMPLETED | OUTPATIENT
Start: 2025-05-28 | End: 2025-05-28

## 2025-05-28 RX ORDER — SODIUM CHLORIDE 0.9 % (FLUSH) 0.9 %
10 SYRINGE (ML) INJECTION EVERY 12 HOURS SCHEDULED
Status: DISCONTINUED | OUTPATIENT
Start: 2025-05-28 | End: 2025-05-28 | Stop reason: HOSPADM

## 2025-05-28 RX ORDER — MORPHINE SULFATE 2 MG/ML
2 INJECTION, SOLUTION INTRAMUSCULAR; INTRAVENOUS EVERY 4 HOURS PRN
Status: DISCONTINUED | OUTPATIENT
Start: 2025-05-28 | End: 2025-05-29

## 2025-05-28 RX ORDER — PANTOPRAZOLE SODIUM 40 MG/10ML
40 INJECTION, POWDER, LYOPHILIZED, FOR SOLUTION INTRAVENOUS ONCE
Status: COMPLETED | OUTPATIENT
Start: 2025-05-28 | End: 2025-05-28

## 2025-05-28 RX ORDER — LABETALOL HYDROCHLORIDE 5 MG/ML
5 INJECTION, SOLUTION INTRAVENOUS
Status: DISCONTINUED | OUTPATIENT
Start: 2025-05-28 | End: 2025-05-28 | Stop reason: HOSPADM

## 2025-05-28 RX ORDER — IOPAMIDOL 612 MG/ML
100 INJECTION, SOLUTION INTRAVASCULAR
Status: COMPLETED | OUTPATIENT
Start: 2025-05-28 | End: 2025-05-28

## 2025-05-28 RX ORDER — PHENOBARBITAL SODIUM 65 MG/ML
32.5 INJECTION, SOLUTION INTRAMUSCULAR; INTRAVENOUS 2 TIMES DAILY
Status: DISCONTINUED | OUTPATIENT
Start: 2025-06-02 | End: 2025-05-28

## 2025-05-28 RX ORDER — NITROGLYCERIN 0.4 MG/1
0.4 TABLET SUBLINGUAL
Status: DISCONTINUED | OUTPATIENT
Start: 2025-05-28 | End: 2025-06-04 | Stop reason: HOSPADM

## 2025-05-28 RX ORDER — FLUMAZENIL 0.1 MG/ML
0.2 INJECTION INTRAVENOUS AS NEEDED
Status: DISCONTINUED | OUTPATIENT
Start: 2025-05-28 | End: 2025-05-28 | Stop reason: HOSPADM

## 2025-05-28 RX ORDER — PHENOBARBITAL SODIUM 65 MG/ML
130 INJECTION, SOLUTION INTRAMUSCULAR; INTRAVENOUS 3 TIMES DAILY
Status: DISCONTINUED | OUTPATIENT
Start: 2025-05-28 | End: 2025-05-28

## 2025-05-28 RX ORDER — DEXTROSE MONOHYDRATE 25 G/50ML
12.5 INJECTION, SOLUTION INTRAVENOUS AS NEEDED
Status: DISCONTINUED | OUTPATIENT
Start: 2025-05-28 | End: 2025-05-28 | Stop reason: HOSPADM

## 2025-05-28 RX ORDER — NALOXONE HCL 0.4 MG/ML
0.04 VIAL (ML) INJECTION AS NEEDED
Status: DISCONTINUED | OUTPATIENT
Start: 2025-05-28 | End: 2025-05-28 | Stop reason: HOSPADM

## 2025-05-28 RX ORDER — DEXMEDETOMIDINE HYDROCHLORIDE 4 UG/ML
.2-1.5 INJECTION, SOLUTION INTRAVENOUS
Status: DISCONTINUED | OUTPATIENT
Start: 2025-05-28 | End: 2025-05-28

## 2025-05-28 RX ORDER — OCTREOTIDE ACETATE 100 UG/ML
50 INJECTION, SOLUTION INTRAVENOUS; SUBCUTANEOUS ONCE
Status: COMPLETED | OUTPATIENT
Start: 2025-05-28 | End: 2025-05-28

## 2025-05-28 RX ORDER — METOCLOPRAMIDE HYDROCHLORIDE 5 MG/ML
10 INJECTION INTRAMUSCULAR; INTRAVENOUS ONCE
Status: COMPLETED | OUTPATIENT
Start: 2025-05-28 | End: 2025-05-28

## 2025-05-28 RX ORDER — PHENOBARBITAL SODIUM 65 MG/ML
260 INJECTION, SOLUTION INTRAMUSCULAR; INTRAVENOUS ONCE
Status: DISCONTINUED | OUTPATIENT
Start: 2025-05-28 | End: 2025-05-28

## 2025-05-28 RX ORDER — HYDROMORPHONE HYDROCHLORIDE 1 MG/ML
0.5 INJECTION, SOLUTION INTRAMUSCULAR; INTRAVENOUS; SUBCUTANEOUS
Status: DISCONTINUED | OUTPATIENT
Start: 2025-05-28 | End: 2025-05-28 | Stop reason: HOSPADM

## 2025-05-28 RX ORDER — POLYETHYLENE GLYCOL 3350 17 G/17G
17 POWDER, FOR SOLUTION ORAL DAILY PRN
Status: DISCONTINUED | OUTPATIENT
Start: 2025-05-28 | End: 2025-06-04 | Stop reason: HOSPADM

## 2025-05-28 RX ORDER — FOLIC ACID 1 MG/1
1 TABLET ORAL DAILY
Status: DISCONTINUED | OUTPATIENT
Start: 2025-05-28 | End: 2025-06-04 | Stop reason: HOSPADM

## 2025-05-28 RX ORDER — BISACODYL 5 MG/1
5 TABLET, DELAYED RELEASE ORAL DAILY PRN
Status: DISCONTINUED | OUTPATIENT
Start: 2025-05-28 | End: 2025-06-04 | Stop reason: HOSPADM

## 2025-05-28 RX ORDER — LACTULOSE 10 G/15ML
20 SOLUTION ORAL 3 TIMES DAILY
Status: DISCONTINUED | OUTPATIENT
Start: 2025-05-28 | End: 2025-06-04 | Stop reason: HOSPADM

## 2025-05-28 RX ORDER — SODIUM CHLORIDE 0.9 % (FLUSH) 0.9 %
10 SYRINGE (ML) INJECTION EVERY 12 HOURS SCHEDULED
Status: DISCONTINUED | OUTPATIENT
Start: 2025-05-28 | End: 2025-06-04 | Stop reason: HOSPADM

## 2025-05-28 RX ORDER — FENTANYL CITRATE 50 UG/ML
INJECTION, SOLUTION INTRAMUSCULAR; INTRAVENOUS AS NEEDED
Status: DISCONTINUED | OUTPATIENT
Start: 2025-05-28 | End: 2025-05-28 | Stop reason: SURG

## 2025-05-28 RX ORDER — DIAZEPAM 10 MG/2ML
5 INJECTION, SOLUTION INTRAMUSCULAR; INTRAVENOUS ONCE
Status: COMPLETED | OUTPATIENT
Start: 2025-05-28 | End: 2025-05-28

## 2025-05-28 RX ORDER — QUETIAPINE FUMARATE 25 MG/1
50 TABLET, FILM COATED ORAL NIGHTLY
Status: DISCONTINUED | OUTPATIENT
Start: 2025-05-28 | End: 2025-05-29

## 2025-05-28 RX ORDER — PANTOPRAZOLE SODIUM 40 MG/10ML
40 INJECTION, POWDER, LYOPHILIZED, FOR SOLUTION INTRAVENOUS
Status: DISCONTINUED | OUTPATIENT
Start: 2025-05-28 | End: 2025-06-03

## 2025-05-28 RX ORDER — SODIUM CHLORIDE 9 MG/ML
1000 INJECTION, SOLUTION INTRAVENOUS ONCE
Status: COMPLETED | OUTPATIENT
Start: 2025-05-28 | End: 2025-05-28

## 2025-05-28 RX ORDER — LEVOTHYROXINE SODIUM 75 UG/1
75 TABLET ORAL
Status: DISCONTINUED | OUTPATIENT
Start: 2025-05-28 | End: 2025-06-04 | Stop reason: HOSPADM

## 2025-05-28 RX ORDER — BISACODYL 10 MG
10 SUPPOSITORY, RECTAL RECTAL DAILY PRN
Status: DISCONTINUED | OUTPATIENT
Start: 2025-05-28 | End: 2025-06-04 | Stop reason: HOSPADM

## 2025-05-28 RX ORDER — FENTANYL CITRATE 50 UG/ML
50 INJECTION, SOLUTION INTRAMUSCULAR; INTRAVENOUS
Status: DISCONTINUED | OUTPATIENT
Start: 2025-05-28 | End: 2025-05-28 | Stop reason: HOSPADM

## 2025-05-28 RX ORDER — THIAMINE HYDROCHLORIDE 100 MG/ML
200 INJECTION, SOLUTION INTRAMUSCULAR; INTRAVENOUS ONCE
Status: COMPLETED | OUTPATIENT
Start: 2025-05-28 | End: 2025-05-28

## 2025-05-28 RX ORDER — ONDANSETRON 2 MG/ML
4 INJECTION INTRAMUSCULAR; INTRAVENOUS EVERY 6 HOURS PRN
Status: DISCONTINUED | OUTPATIENT
Start: 2025-05-28 | End: 2025-06-04 | Stop reason: HOSPADM

## 2025-05-28 RX ORDER — FENTANYL CITRATE 50 UG/ML
25 INJECTION, SOLUTION INTRAMUSCULAR; INTRAVENOUS
Status: DISCONTINUED | OUTPATIENT
Start: 2025-05-28 | End: 2025-05-28 | Stop reason: HOSPADM

## 2025-05-28 RX ORDER — SODIUM CHLORIDE 0.9 % (FLUSH) 0.9 %
10 SYRINGE (ML) INJECTION AS NEEDED
Status: DISCONTINUED | OUTPATIENT
Start: 2025-05-28 | End: 2025-06-04 | Stop reason: HOSPADM

## 2025-05-28 RX ORDER — LIDOCAINE HYDROCHLORIDE 20 MG/ML
INJECTION, SOLUTION EPIDURAL; INFILTRATION; INTRACAUDAL; PERINEURAL AS NEEDED
Status: DISCONTINUED | OUTPATIENT
Start: 2025-05-28 | End: 2025-05-28 | Stop reason: SURG

## 2025-05-28 RX ORDER — CHLORHEXIDINE GLUCONATE 500 MG/1
1 CLOTH TOPICAL ONCE
Status: DISCONTINUED | OUTPATIENT
Start: 2025-05-28 | End: 2025-05-31

## 2025-05-28 RX ORDER — IBUPROFEN 600 MG/1
600 TABLET, FILM COATED ORAL EVERY 6 HOURS PRN
Status: DISCONTINUED | OUTPATIENT
Start: 2025-05-28 | End: 2025-05-28 | Stop reason: HOSPADM

## 2025-05-28 RX ORDER — PHENOBARBITAL SODIUM 65 MG/ML
65 INJECTION, SOLUTION INTRAMUSCULAR; INTRAVENOUS EVERY 8 HOURS SCHEDULED
Status: COMPLETED | OUTPATIENT
Start: 2025-05-30 | End: 2025-06-01

## 2025-05-28 RX ORDER — OXYCODONE AND ACETAMINOPHEN 10; 325 MG/1; MG/1
1 TABLET ORAL EVERY 4 HOURS PRN
Status: DISCONTINUED | OUTPATIENT
Start: 2025-05-28 | End: 2025-05-28 | Stop reason: HOSPADM

## 2025-05-28 RX ORDER — PROPOFOL 10 MG/ML
VIAL (ML) INTRAVENOUS AS NEEDED
Status: DISCONTINUED | OUTPATIENT
Start: 2025-05-28 | End: 2025-05-28 | Stop reason: SURG

## 2025-05-28 RX ORDER — ONDANSETRON 2 MG/ML
4 INJECTION INTRAMUSCULAR; INTRAVENOUS
Status: DISCONTINUED | OUTPATIENT
Start: 2025-05-28 | End: 2025-05-28 | Stop reason: HOSPADM

## 2025-05-28 RX ORDER — ALBUMIN (HUMAN) 12.5 G/50ML
50 SOLUTION INTRAVENOUS ONCE
Status: COMPLETED | OUTPATIENT
Start: 2025-05-28 | End: 2025-05-29

## 2025-05-28 RX ORDER — AMOXICILLIN 250 MG
2 CAPSULE ORAL 2 TIMES DAILY
Status: DISCONTINUED | OUTPATIENT
Start: 2025-05-28 | End: 2025-06-04 | Stop reason: HOSPADM

## 2025-05-28 RX ORDER — PHENOBARBITAL SODIUM 65 MG/ML
65 INJECTION, SOLUTION INTRAMUSCULAR; INTRAVENOUS 3 TIMES DAILY
Status: DISCONTINUED | OUTPATIENT
Start: 2025-05-30 | End: 2025-05-28

## 2025-05-28 RX ORDER — CHLORHEXIDINE GLUCONATE 500 MG/1
1 CLOTH TOPICAL EVERY 24 HOURS
Status: DISCONTINUED | OUTPATIENT
Start: 2025-05-29 | End: 2025-06-02

## 2025-05-28 RX ORDER — PHENOBARBITAL SODIUM 65 MG/ML
32.5 INJECTION, SOLUTION INTRAMUSCULAR; INTRAVENOUS EVERY 12 HOURS
Status: DISCONTINUED | OUTPATIENT
Start: 2025-06-01 | End: 2025-06-04 | Stop reason: HOSPADM

## 2025-05-28 RX ORDER — DEXMEDETOMIDINE HYDROCHLORIDE 4 UG/ML
.2-1.5 INJECTION, SOLUTION INTRAVENOUS
Status: DISCONTINUED | OUTPATIENT
Start: 2025-05-28 | End: 2025-05-29

## 2025-05-28 RX ORDER — SODIUM CHLORIDE 9 MG/ML
40 INJECTION, SOLUTION INTRAVENOUS AS NEEDED
Status: DISCONTINUED | OUTPATIENT
Start: 2025-05-28 | End: 2025-06-04 | Stop reason: HOSPADM

## 2025-05-28 RX ORDER — SUCCINYLCHOLINE/SOD CL,ISO/PF 200MG/10ML
SYRINGE (ML) INTRAVENOUS AS NEEDED
Status: DISCONTINUED | OUTPATIENT
Start: 2025-05-28 | End: 2025-05-28 | Stop reason: SURG

## 2025-05-28 RX ORDER — SODIUM CHLORIDE 0.9 % (FLUSH) 0.9 %
10 SYRINGE (ML) INJECTION AS NEEDED
Status: DISCONTINUED | OUTPATIENT
Start: 2025-05-28 | End: 2025-05-28 | Stop reason: HOSPADM

## 2025-05-28 RX ADMIN — FLUOXETINE HYDROCHLORIDE 20 MG: 20 CAPSULE ORAL at 17:10

## 2025-05-28 RX ADMIN — Medication 10 ML: at 20:47

## 2025-05-28 RX ADMIN — CEFTRIAXONE SODIUM 1000 MG: 1 INJECTION, POWDER, FOR SOLUTION INTRAMUSCULAR; INTRAVENOUS at 12:19

## 2025-05-28 RX ADMIN — PHENOBARBITAL SODIUM 260 MG: 130 INJECTION INTRAMUSCULAR at 16:41

## 2025-05-28 RX ADMIN — DIAZEPAM 5 MG: 5 INJECTION, SOLUTION INTRAMUSCULAR; INTRAVENOUS at 11:44

## 2025-05-28 RX ADMIN — SODIUM CHLORIDE, POTASSIUM CHLORIDE, SODIUM LACTATE AND CALCIUM CHLORIDE 500 ML: 600; 310; 30; 20 INJECTION, SOLUTION INTRAVENOUS at 23:31

## 2025-05-28 RX ADMIN — FOLIC ACID 1 MG: 1 TABLET ORAL at 16:37

## 2025-05-28 RX ADMIN — SODIUM CHLORIDE 1000 ML: 9 INJECTION, SOLUTION INTRAVENOUS at 11:30

## 2025-05-28 RX ADMIN — SODIUM CHLORIDE 1000 ML: 9 INJECTION, SOLUTION INTRAVENOUS at 12:12

## 2025-05-28 RX ADMIN — PHENOBARBITAL SODIUM 130 MG: 130 INJECTION INTRAMUSCULAR at 21:44

## 2025-05-28 RX ADMIN — IOPAMIDOL 100 ML: 612 INJECTION, SOLUTION INTRAVENOUS at 11:59

## 2025-05-28 RX ADMIN — ALBUMIN (HUMAN) 50 G: 0.25 INJECTION, SOLUTION INTRAVENOUS at 23:30

## 2025-05-28 RX ADMIN — LEVOTHYROXINE SODIUM 75 MCG: 0.07 TABLET ORAL at 16:40

## 2025-05-28 RX ADMIN — PIPERACILLIN AND TAZOBACTAM 3.38 G: 3; .375 INJECTION, POWDER, FOR SOLUTION INTRAVENOUS at 16:41

## 2025-05-28 RX ADMIN — MORPHINE SULFATE 0.5 MG: 2 INJECTION, SOLUTION INTRAMUSCULAR; INTRAVENOUS at 16:38

## 2025-05-28 RX ADMIN — LIDOCAINE HYDROCHLORIDE 100 MG: 20 INJECTION, SOLUTION EPIDURAL; INFILTRATION; INTRACAUDAL; PERINEURAL at 14:08

## 2025-05-28 RX ADMIN — PANTOPRAZOLE SODIUM 40 MG: 40 INJECTION, POWDER, FOR SOLUTION INTRAVENOUS at 13:39

## 2025-05-28 RX ADMIN — LACTULOSE 20 G: 20 SOLUTION ORAL at 20:46

## 2025-05-28 RX ADMIN — OCTREOTIDE ACETATE 25 MCG/HR: 500 INJECTION, SOLUTION INTRAVENOUS; SUBCUTANEOUS at 14:29

## 2025-05-28 RX ADMIN — QUETIAPINE FUMARATE 50 MG: 25 TABLET ORAL at 22:20

## 2025-05-28 RX ADMIN — PANTOPRAZOLE SODIUM 40 MG: 40 INJECTION, POWDER, FOR SOLUTION INTRAVENOUS at 11:26

## 2025-05-28 RX ADMIN — OCTREOTIDE ACETATE 50 MCG: 100 INJECTION, SOLUTION INTRAVENOUS; SUBCUTANEOUS at 12:20

## 2025-05-28 RX ADMIN — FENTANYL CITRATE 25 MCG: 50 INJECTION, SOLUTION INTRAMUSCULAR; INTRAVENOUS at 14:14

## 2025-05-28 RX ADMIN — PIPERACILLIN AND TAZOBACTAM 3.38 G: 3; .375 INJECTION, POWDER, FOR SOLUTION INTRAVENOUS at 23:31

## 2025-05-28 RX ADMIN — Medication 200 MG: at 14:08

## 2025-05-28 RX ADMIN — DEXMEDETOMIDINE HYDROCHLORIDE 0.2 MCG/KG/HR: 400 INJECTION INTRAVENOUS at 20:46

## 2025-05-28 RX ADMIN — PROPOFOL 170 MG: 10 INJECTION, EMULSION INTRAVENOUS at 14:08

## 2025-05-28 RX ADMIN — METOCLOPRAMIDE 10 MG: 5 INJECTION, SOLUTION INTRAMUSCULAR; INTRAVENOUS at 11:44

## 2025-05-28 RX ADMIN — PANTOPRAZOLE SODIUM 40 MG: 40 INJECTION, POWDER, FOR SOLUTION INTRAVENOUS at 17:10

## 2025-05-28 RX ADMIN — MORPHINE SULFATE 2 MG: 2 INJECTION, SOLUTION INTRAMUSCULAR; INTRAVENOUS at 22:20

## 2025-05-28 RX ADMIN — OCTREOTIDE ACETATE 25 MCG/HR: 500 INJECTION, SOLUTION INTRAVENOUS; SUBCUTANEOUS at 13:21

## 2025-05-28 RX ADMIN — FOLIC ACID 1 MG: 5 INJECTION, SOLUTION INTRAMUSCULAR; INTRAVENOUS; SUBCUTANEOUS at 11:40

## 2025-05-28 RX ADMIN — Medication 1 APPLICATION: at 21:44

## 2025-05-28 RX ADMIN — LACTULOSE 20 G: 20 SOLUTION ORAL at 16:40

## 2025-05-28 RX ADMIN — LABETALOL HYDROCHLORIDE 10 MG: 5 INJECTION, SOLUTION INTRAVENOUS at 14:28

## 2025-05-28 RX ADMIN — THIAMINE HYDROCHLORIDE 200 MG: 100 INJECTION, SOLUTION INTRAMUSCULAR; INTRAVENOUS at 11:26

## 2025-05-28 RX ADMIN — ONDANSETRON 4 MG: 2 INJECTION INTRAMUSCULAR; INTRAVENOUS at 20:46

## 2025-05-28 NOTE — H&P
"    [facility] Intensivist Services  HISTORY AND PHYSICAL    Date of Admission: 5/28/2025  Primary Care Physician: Adrien Varghese MD    Subjective   Primary Historian: patient    Chief Complaint: Generalized weakness, difficulty walking, dark stools, hematemesis    Abdominal Pain  Dizziness  Symptoms include abdominal pain.      40-year-old male with a past medical history of alcoholism, diabetes mellitus, gout, hepatitis C, hypertension, and jaundice admitted after presenting to ED with complaints of generalized weakness, difficulty walking, hematemesis, and melena.  He states he has noticed dark-colored stools lately.  He has also had 2 episodes of bloody emesis.  Patient states he is \"a big drinker\" and has been drinking since he was a teenager.  He has tried to quit multiple times, but unfortunately he has always picked up alcohol again.  Currently, he tells me he is drinking 3 to 4 12 ounce bottles of malt liquor each day.  He thinks his last drink was around 10 PM last night.  He was recently in the emergency department (4/18/25) and noted to have cholecystitis with stones for which he was transferred to [facility]; however, he was deemed unsafe for surgery at that time.  Prior to this, he was admitted to our hospital on 4/1/2025 with alcohol use and fall.  However, he left AMA on 4/4/25.    Significant workup from the ED includes the following: Sodium 132, potassium 3.2, chloride 89, CO2 21, anion gap 22, BUN 21, creatinine 0.48, glucose 173, calcium 7.8, phosphorus 2.4, alkaline phosphatase 315, , , total bilirubin 26.2, ammonia level 92, lactate 10.8, lipase 19, procalcitonin 1.35, PT 26.8, INR 2.31, PTT 57.5, WBC 14.71, hemoglobin 9.5, hematocrit 28.0, platelets 101.  MELD score was noted to be elevated at 31.  CT head showed no acute intracranial abnormality.  CT abdomen/pelvis with contrast showed no definite acute findings in the abdomen/pelvis and " "cholelithiasis with persistent gallbladder wall thickening, however decreased gallbladder distention as compared to prior study.  Chest x-ray showed borderline prominence of central pulmonary vascular structures for which mild venous congestion considered.    Patient was admitted with decompensated liver disease and upper GI bleed.  Intensivist team was called to admit this patient.  He will be monitored closely in the unit.  He will also go for upper endoscopy later today, and for this reason he will be NPO.    I saw the patient while he was still in the emergency department.  He appears older than his stated age.  He is quite jaundice with icteric sclera.  He admits to \"drinking a lot\" which started when he was a teenager.  He has tried to quit multiple times, but continues to drink alcohol.  His last drink was around 10 PM last night.  As stated earlier, he drinks 3-4 twelve ounce cans of malt liquor daily.    Review of Systems   Gastrointestinal:  Positive for abdominal pain.   Neurological:  Positive for dizziness.      Otherwise complete ROS reviewed and negative except as mentioned in the HPI.    Past Medical History:   Past Medical History:   Diagnosis Date    Alcoholism     currently drinking    Diabetes mellitus     Gout     Hepatitis C     Hypertension     Jaundice      Past Surgical History:  Past Surgical History:   Procedure Laterality Date    VASECTOMY       Social History:  reports that he has been smoking cigarettes. He started smoking about 15 years ago. He has a 7.1 pack-year smoking history. He has never used smokeless tobacco. He reports current alcohol use of about 12.0 standard drinks of alcohol per week. He reports current drug use. Drug: Marijuana.    Family History: family history is not on file.       Allergies:  No Known Allergies    Medications:  Prior to Admission medications    Medication Sig Start Date End Date Taking? Authorizing Provider   calcium carbonate (Calcium 600) 600 MG " "tablet Take 1 tablet by mouth Daily. 8/6/24   Jossy Christiansen APRN   cetirizine (zyrTEC) 10 MG tablet Take 1 tablet by mouth Daily. 8/6/24   Jossy Christiansen APRN   FLUoxetine (PROzac) 20 MG capsule Take 1 capsule by mouth Daily. 5/2/25   Adrien Varghese MD   folic acid (FOLVITE) 1 MG tablet Take 1 tablet by mouth Daily. 7/15/24   Jossy Christiansen APRN   furosemide (Lasix) 40 MG tablet Take 1 tablet by mouth 2 (Two) Times a Day. 5/2/25   Adrien Varghese MD   lactulose 20 GM/30ML solution solution Take 30 mL by mouth 3 (Three) Times a Day. 5/2/25   Adrien Varghese MD   levothyroxine (Synthroid) 75 MCG tablet Take 1 tablet by mouth Daily. 5/2/25   Adrien Varghese MD   pantoprazole (PROTONIX) 40 MG EC tablet Take 1 tablet by mouth Daily. 9/17/24   Adrien Varghese MD   potassium chloride 10 MEQ CR tablet Take 1 tablet by mouth Every 12 (Twelve) Hours. 11/20/24   Adrien Varghese MD   QUEtiapine (SEROquel) 100 MG tablet Take 1 tablet by mouth Every Night. 11/20/24   Adrien Varghese MD   spironolactone (Aldactone) 100 MG tablet Take 1 tablet by mouth Daily. 5/2/25   Adrien Varghese MD   thiamine (VITAMIN B1) 100 MG tablet Take 1 tablet by mouth Daily. 5/2/25   Adrien Varghese MD     I have utilized all available immediate resources to obtain, update, or review the patient's current medications (including all prescriptions, over-the-counter products, herbals, cannabis/cannabidiol products, and vitamin/mineral/dietary (nutritional) supplements).    Objective     Vital Signs: /70 (BP Location: Right arm, Patient Position: Sitting)   Pulse 109   Temp 98.2 °F (36.8 °C) (Oral)   Resp 18   Ht 190.5 cm (75\")   Wt (!) 137 kg (301 lb 4.8 oz)   SpO2 97%   BMI 37.66 kg/m²   Physical Exam  Constitutional:       General: He is awake.      Appearance: He is ill-appearing (chronically ill-appearing).      Comments: Appears older than stated age.  Jaundice.    HENT:      Head: Normocephalic.      Nose: Nose normal.      Mouth/Throat:     " " Mouth: Mucous membranes are moist.   Eyes:      General: Scleral icterus present.      Extraocular Movements: Extraocular movements intact.      Pupils: Pupils are equal, round, and reactive to light.   Cardiovascular:      Rate and Rhythm: Regular rhythm. Tachycardia present.      Pulses: Normal pulses.   Pulmonary:      Effort: Pulmonary effort is normal. No respiratory distress.      Breath sounds: Normal breath sounds. No stridor. No wheezing or rhonchi.   Abdominal:      General: Bowel sounds are normal. There is distension.      Tenderness: There is abdominal tenderness (generalized tenderness described as \"fullness\" or \"tightness\").   Musculoskeletal:         General: Normal range of motion.      Cervical back: Neck supple.   Skin:     Coloration: Skin is jaundiced.   Neurological:      General: No focal deficit present.      Mental Status: He is alert. Mental status is at baseline.   Psychiatric:         Mood and Affect: Mood normal.         Behavior: Behavior is cooperative.        Results Reviewed:  Lab Results (last 24 hours)       Procedure Component Value Units Date/Time    Urinalysis, Microscopic Only - Straight Cath [[id number]]  (Abnormal) Collected: 05/28/25 1254    Specimen: Urine from Straight Cath Updated: 05/28/25 1334     RBC, UA 0-2 /HPF      WBC, UA 3-5 /HPF      Comment: Urine culture not indicated.        Bacteria, UA 3+ /HPF      Squamous Epithelial Cells, UA 3-6 /HPF      Hyaline Casts, UA None Seen /LPF      Methodology Manual Light Microscopy    Urinalysis With Culture If Indicated - Straight Cath [[id number]]  (Abnormal) Collected: 05/28/25 1254    Specimen: Urine from Straight Cath Updated: 05/28/25 1319     Color, UA Manning     Appearance, UA Cloudy     pH, UA 5.5     Specific Gravity, UA 1.018     Glucose, UA Negative     Ketones, UA Negative     Bilirubin, UA Large (3+)     Blood, UA Negative     Protein, UA 30 mg/dL (1+)     Leuk Esterase, UA Small (1+)     Nitrite, UA Positive     " "Urobilinogen, UA 1.0 E.U./dL    Narrative:      Dipstick results may be inaccurate due to color interference.    Procalcitonin [[id number]]  (Abnormal) Collected: 05/28/25 1100    Specimen: Blood from Arm, Right Updated: 05/28/25 1145     Procalcitonin 1.35 ng/mL     Narrative:      As a Marker for Sepsis (Non-Neonates):    1. <0.5 ng/mL represents a low risk of severe sepsis and/or septic shock.  2. >2 ng/mL represents a high risk of severe sepsis and/or septic shock.    As a Marker for Lower Respiratory Tract Infections that require antibiotic therapy:    PCT on Admission    Antibiotic Therapy       6-12 Hrs later    >0.5                Strongly Recommended  >0.25 - <0.5        Recommended   0.1 - 0.25          Discouraged              Remeasure/reassess PCT  <0.1                Strongly Discouraged     Remeasure/reassess PCT    As 28 day mortality risk marker: \"Change in Procalcitonin Result\" (>80% or <=80%) if Day 0 (or Day 1) and Day 4 values are available. Refer to http://www.[facility]-pct-calculator.com    Change in PCT <=80%  A decrease of PCT levels below or equal to 80% defines a positive change in PCT test result representing a higher risk for 28-day all-cause mortality of patients diagnosed with severe sepsis for septic shock.    Change in PCT >80%  A decrease of PCT levels of more than 80% defines a negative change in PCT result representing a lower risk for 28-day all-cause mortality of patients diagnosed with severe sepsis or septic shock.       Ethanol [[id number]] Collected: 05/28/25 1100    Specimen: Blood from Arm, Right Updated: 05/28/25 1145     Ethanol % 0.126 %     Narrative:      Not for legal purposes. Chain of Custody not followed.     Comprehensive Metabolic Panel [[id number]]  (Abnormal) Collected: 05/28/25 1100    Specimen: Blood from Arm, Right Updated: 05/28/25 1144     Glucose 173 mg/dL      BUN 21.0 mg/dL      Creatinine 0.48 mg/dL      Sodium 132 mmol/L      Potassium 3.2 mmol/L      " Chloride 89 mmol/L      CO2 21.0 mmol/L      Calcium 7.8 mg/dL      Total Protein 5.0 g/dL      Albumin 2.6 g/dL      ALT (SGPT) 130 U/L      AST (SGOT) 329 U/L      Alkaline Phosphatase 315 U/L      Total Bilirubin 26.2 mg/dL      Globulin 2.4 gm/dL      A/G Ratio 1.1 g/dL      BUN/Creatinine Ratio 43.8     Anion Gap 22.0 mmol/L      eGFR 133.9 mL/min/1.73     Narrative:      GFR Categories in Chronic Kidney Disease (CKD)              GFR Category          GFR (mL/min/1.73)    Interpretation  G1                    90 or greater        Normal or high (1)  G2                    60-89                Mild decrease (1)  G3a                   45-59                Mild to moderate decrease  G3b                   30-44                Moderate to severe decrease  G4                    15-29                Severe decrease  G5                    14 or less           Kidney failure    (1)In the absence of evidence of kidney disease, neither GFR category G1 or G2 fulfill the criteria for CKD.    eGFR calculation 2021 CKD-EPI creatinine equation, which does not include race as a factor    Lactate Dehydrogenase [[id number]]  (Abnormal) Collected: 05/28/25 1100    Specimen: Blood from Arm, Right Updated: 05/28/25 1141      U/L     Ammonia [[id number]]  (Abnormal) Collected: 05/28/25 1100    Specimen: Blood from Arm, Right Updated: 05/28/25 1140     Ammonia 92 umol/L     Lipase [[id number]]  (Normal) Collected: 05/28/25 1100    Specimen: Blood from Arm, Right Updated: 05/28/25 1137     Lipase 19 U/L     Lactic Acid, Plasma [[id number]]  (Abnormal) Collected: 05/28/25 1100    Specimen: Blood from Arm, Right Updated: 05/28/25 1132     Lactate 10.8 mmol/L     Blood Culture - Blood, Hand, Left [[id number]] Collected: 05/28/25 1118    Specimen: Blood from Hand, Left Updated: 05/28/25 1127    Protime-INR [[id number]]  (Abnormal) Collected: 05/28/25 1100    Specimen: Blood from Arm, Right Updated: 05/28/25 1126     Protime 26.8 Seconds       INR 2.31    aPTT [[id number]]  (Abnormal) Collected: 05/28/25 1100    Specimen: Blood from Arm, Right Updated: 05/28/25 1126     PTT 57.5 seconds     Narrative:      PTT = The equivalent PTT values for the therapeutic range of heparin levels at 0.3 to 0.7 U/ml are 77 - 99 seconds.    [place] Draw [[id number]] Collected: 05/28/25 1100    Specimen: Blood from Arm, Right Updated: 05/28/25 1115    Narrative:      The following orders were created for panel order [place] Draw.  Procedure                               Abnormality         Status                     ---------                               -----------         ------                     Green Top (Gel)[[id number]]                                  Final result               Lavender Top[[id number]]                                     Final result               Red Top[[id number]]                                          Final result               Light Blue Top[[id number]]                                   Final result                 Please view results for these tests on the individual orders.    Green Top (Gel) [[id number]] Collected: 05/28/25 1100    Specimen: Blood from Arm, Right Updated: 05/28/25 1115     Extra Tube Hold for add-ons.     Comment: Auto resulted.       Lavender Top [[id number]] Collected: 05/28/25 1100    Specimen: Blood from Arm, Right Updated: 05/28/25 1115     Extra Tube hold for add-on     Comment: Auto resulted       Light Blue Top [[id number]] Collected: 05/28/25 1100    Specimen: Blood from Arm, Right Updated: 05/28/25 1115     Extra Tube Hold for add-ons.     Comment: Auto resulted       Red Top [[id number]] Collected: 05/28/25 1100    Specimen: Blood from Arm, Right Updated: 05/28/25 1115     Extra Tube Hold for add-ons.     Comment: Auto resulted.       CBC & Differential [[id number]]  (Abnormal) Collected: 05/28/25 1100    Specimen: Blood from Arm, Right Updated: 05/28/25 1113    Narrative:      The following orders were created for  panel order CBC & Differential.  Procedure                               Abnormality         Status                     ---------                               -----------         ------                     CBC Auto Differential[[id number]]        Abnormal            Final result                 Please view results for these tests on the individual orders.    CBC Auto Differential [[id number]]  (Abnormal) Collected: 05/28/25 1100    Specimen: Blood from Arm, Right Updated: 05/28/25 1113     WBC 14.71 10*3/mm3      RBC 2.75 10*6/mm3      Hemoglobin 9.5 g/dL      Hematocrit 28.0 %      .8 fL      MCH 34.5 pg      MCHC 33.9 g/dL      RDW 18.6 %      RDW-SD 67.7 fl      MPV 12.4 fL      Platelets 101 10*3/mm3      Neutrophil % 78.6 %      Lymphocyte % 11.2 %      Monocyte % 7.3 %      Eosinophil % 0.4 %      Basophil % 0.5 %      Immature Grans % 2.0 %      Neutrophils, Absolute 11.56 10*3/mm3      Lymphocytes, Absolute 1.65 10*3/mm3      Monocytes, Absolute 1.07 10*3/mm3      Eosinophils, Absolute 0.06 10*3/mm3      Basophils, Absolute 0.07 10*3/mm3      Immature Grans, Absolute 0.30 10*3/mm3      nRBC 0.0 /100 WBC     Blood Culture - Blood, Arm, Right [[id number]] Collected: 05/28/25 1100    Specimen: Blood from Arm, Right Updated: 05/28/25 1110             CT Abdomen Pelvis With Contrast  Result Date: 5/28/2025   1. No definite acute findings in the abdomen/pelvis. 2. Cholelithiasis with persistent gallbladder wall thickening, however decreased gallbladder distention as compared to the prior study. The gallbladder wall thickening could be explained by diffuse hepatocellular disease in the absence of clinical signs of acute cholecystitis. 3. Cirrhosis with sequela portal hypertension, including small volume ascites.   This report was signed and finalized on 5/28/2025 12:17 PM by Darwin Goldman.      CT Head Without Contrast  Result Date: 5/28/2025  Impression:   No acute intracranial abnormality.  This report was  signed and finalized on 5/28/2025 12:11 PM by Darwin Goldman.      XR Chest 1 View  Result Date: 5/28/2025  1. Borderline prominence of central pulmonary vascular structures for which mild venous congestion considered. 2. Primarily linear left basilar densities favoring atelectasis over pneumonia.   This report was signed and finalized on 5/28/2025 12:05 PM by Dr. Kassy Blair MD.        Result Review:  I have personally reviewed the results from the time of this admission to 5/28/2025 13:52 CDT and agree with these findings:  [x]  Laboratory list / accordion  [x]  Microbiology  [x]  Radiology  []  EKG/Telemetry   []  Cardiology/Vascular   []  Pathology  []  Old records  []  Other:  Most notable findings include:   Sodium 132, potassium 3.2, chloride 89, CO2 21, anion gap 22, BUN 21, creatinine 0.48, glucose 173, calcium 7.8, phosphorus 2.4, alkaline phosphatase 315, , , total bilirubin 26.2, ammonia level 92, lactate 10.8, lipase 19, procalcitonin 1.35, PT 26.8, INR 2.31, PTT 57.5, WBC 14.71, hemoglobin 9.5, hematocrit 28.0, platelets 101.  MELD score was noted to be elevated at 31. CT head showed no acute intracranial abnormality.  CT abdomen/pelvis with contrast showed no definite acute findings in the abdomen/pelvis and cholelithiasis with persistent gallbladder wall thickening, however decreased gallbladder distention as compared to prior study.  Chest x-ray showed borderline prominence of central pulmonary vascular structures for which mild venous congestion considered.    I have personally reviewed and interpreted the radiology studies and ECG obtained at time of admission.     Assessment / Plan   Assessment:   Active Hospital Problems    Diagnosis     **Upper GI bleed    40-year-old male with a past medical history of alcoholism admitted on 5/28/2025 with decompensated liver failure and upper GI bleed.  Patient being admitted to the unit for closer observation.  He will undergo upper GI  scope with GI to evaluate possible source of bleeding.    Treatment Plan  The patient will be admitted to Intensivist service here at [facility].     Decompensated liver failure in setting of cirrhosis  -MELD score of 31  -Ammonia level 92  -Patient severely jaundice with scleral icterus  -Resume home dose of lactulose  -I warned the patient that he is at high risk for mortality within the next 90 days if he does not quit drinking as his liver damage is quite severe given his MELD score.  Patient states he does want to quit, but he feels helpless as he has tried multiple times in the past to quit but been unsuccessful.  We will consult case management to help provide resources for this patient.    Alcoholism with continued alcohol ingestion  -I warned the patient that he is at high risk for mortality within the next 90 days if he does not quit drinking as his liver damage is quite severe given his MELD score.  Patient states he does want to quit, but he feels helpless as he has tried multiple times in the past to quit but been unsuccessful.  We will consult case management to help provide resources for this patient.  -Patient has been a heavy drinker for 20+ years. He states he currently drinks 3-4 twelve ounce malt liquor drinks/day. Last drink was around 10 pm on 5/27  -Start phenobarb taper to help prevent/minimize DTs. Seizure precautions.   -I strongly encouraged him to quit drinking. We will reach out to case management to try to provide resources to help this patient    GI bleed  -Patient reports dark stool as well as dark emesis recently  -Zofran prn  -Continue octreotide gtt started in ED  -In setting of #1 and #2 above  -Patient NPO for upper endoscopy later today with GI  -BID PPI  -GI following, we appreciate their recommendations    Coagulopathy  -Secondary to #1 and #2 above  -INR 2.32, Pt 26.8, PTT 57.5  -Monitor with daily labs    Anemia  -In setting of GIB  -Hgb 9.5 Hct 28.0  -Recheck  H&H tonight  -Daily CBC  -Transfuse if needed for Hgb <7    Lactic acidosis and sepsis  -Blood cultures pending  -Initial lactate 10.8, WBC 14.7, and mildly tachycardic  -Starting empiric treatment with Zosyn.   -Monitor cultures, lactate level, and daily CBC    Disposition: Critical care management    Lines/Tubes/Drains: PIV  Antibiotics: Zosyn  GI prophylaxis: PPI BID  VTE prophylaxis: SCDs  Nutrition: NPO for upper GI scope  Bowel: bowel regimen prn      Total critical care time: 40 minutes    Due to a high probability of clinically significant, life threatening deterioration, the patient required my highest level of preparedness to intervene emergently and I personally spent this critical care time directly and personally managing the patient.     This critical care time included obtaining a history; examining the patient; pulse oximetry; ordering and review of studies; arranging urgent treatment with development of a management plan; evaluation of patient's response to treatment; frequent reassessment; and, discussions with other providers.    This critical care time was performed to assess and manage the high probability of imminent, life-threatening deterioration that could result in multi-organ failure. It was exclusive of separately billable procedures and treating other patients and teaching time.    Please see MDM section and the rest of the note for further information on patient assessment and treatment.    Part of this note may be an electronic transcription/translation of spoken language to printed text using the Dragon Dictation System    Electronically signed by Aryan Erwin PA-C on 5/28/2025 at 14:32 CDT

## 2025-05-28 NOTE — ANESTHESIA PROCEDURE NOTES
Airway  Date/Time: 5/28/2025 2:09 PM  Airway not difficult    General Information and Staff    Patient location during procedure: OR  CRNA/CAA: Carla Pulido CRNA    Indications and Patient Condition  Indications for airway management: airway protection    Preoxygenated: yes    Mask difficulty assessment: 0 - not attempted    Final Airway Details    Final airway type: endotracheal airway      Successful airway: ETT  Cuffed: yes   Successful intubation technique: RSI and video laryngoscopy  Adjuncts used in placement: intubating stylet and cricoid pressure  Endotracheal tube insertion site: oral  Blade: Juli  Blade size: 3.5  ETT size (mm): 7.5  Cormack-Lehane Classification: grade IIa - partial view of glottis  Placement verified by: chest auscultation   Cuff volume (mL): 7  Measured from: teeth  ETT/EBT  to teeth (cm): 21  Number of attempts at approach: 1  Assessment: lips, teeth, and gum same as pre-op and atraumatic intubation

## 2025-05-28 NOTE — ED PROVIDER NOTES
Subjective   History of Present Illness  The patient is a 40-year-old male with history of liver failure who presents with generalized weakness, jaundice, and difficulty walking. He reports falling off the wagon in the last couple weeks with alcohol use, with last drink reportedly at 10 PM last night. He endorses fatigue, diarrhea, and nausea. Patient has had dark-colored vomit but denies hematemesis. He reports melena. Of note, patient has recent history of cholecystitis with stones, was transferred to [facility] but was deemed unsafe for surgery at that time. Patient also has history of ascites and bilateral lower extremity edema.        Review of Systems    Past Medical History:   Diagnosis Date    Alcoholism     currently drinking    Diabetes mellitus     Gout     Hepatitis C     Hypertension     Jaundice        No Known Allergies    Past Surgical History:   Procedure Laterality Date    ENDOSCOPY N/A 5/28/2025    Procedure: ESOPHAGOGASTRODUODENOSCOPY WITH ANESTHESIA;  Surgeon: Estela Ramos MD;  Location: [facility];  Service: Gastroenterology;  Laterality: N/A;  pre op: GI bleed  post op: esophageal ulcer, esophagitis  pcp: Adrien Varghese MD    VASECTOMY         History reviewed. No pertinent family history.    Social History     Socioeconomic History    Marital status: Single   Tobacco Use    Smoking status: Some Days     Current packs/day: 0.25     Average packs/day: 0.5 packs/day for 15.4 years (7.1 ttl pk-yrs)     Types: Cigarettes     Start date: 2010    Smokeless tobacco: Never   Vaping Use    Vaping status: Never Used    Passive vaping exposure: Yes   Substance and Sexual Activity    Alcohol use: Yes     Alcohol/week: 12.0 standard drinks of alcohol     Types: 12 Cans of beer per week     Comment: malt liquor (48 oz daily)    Drug use: Yes     Types: Marijuana    Sexual activity: Yes           Objective   Physical Exam    General: Patient appears ill. **Unable to walk**.  Appearance: Patient is not  diaphoretic.    HENT:  Head: Normocephalic and atraumatic  Nose: **Dry blood noted in nostrils, evidence of epistaxis**    Eyes:  General: **Scleral icterus present**    Neck:  **Negative for C-spine tenderness, trauma, swelling. Range of motion intact**    Cardiovascular:  Rate and Rhythm: Normal rate and regular rhythm  Heart sounds: No friction rub    Pulmonary:  Effort: Pulmonary effort is normal. No respiratory distress  Breath sounds: No stridor. No wheezing    Abd: soft and non tender.     Skin:  General: **Generalized jaundice present. Petechiae noted on body**    Extremities:  **Bilateral lower extremity edema present**    Neurological:  General: no focal weakness. No stroke deficits.   Mental Status: Alert and oriented    Procedures           ED Course  ED Course as of 05/29/25 1241   Wed May 28, 2025   1148 Meldscore of 31 points mortality in 90 calculated at 27-32 percent. In addition lactic acid is severely elevated at 10.8, giving additional fluids.  [SG]   1152 High mortality discussed with the PT.  [SG]   1229 CT abdomen and pelvis negative for concerns of worsening changes from the last time he was seen.  Will call ICU for potential admission [SG]   1249 Spoke to Dr. Ramos who states patient can be kept inhouse, keep NPO, protonix, he will attempt to scope PT today. PT is clinically stable at this time. No indication for intubation. Will call Dr. Preston for admit.  [SG]   1324 Spoke to ICU who accepted admit   [SG]      ED Course User Index  [SG] Deniz Wolf MD                                                       Medical Decision Making  Patient presents with decompensated liver disease with concerning features including potential coagulopathy, upper GI bleeding, no concerns at this time for hematemesis. He does not have abd pain, however PT was seen for cholecystitis not too long ago with no surgical management. Will obtain CT as well as US of abd pelvis.     Diagnostic workup ordered  includes:  - CT brain without contrast for evaluation of altered mental status and epistaxis  - CT abdomen/pelvis with contrast and ultrasound to evaluate for acute cholecystitis  - Laboratory studies including CBC, CMP, INR/PTT, type and screen, ammonia level, alcohol level  - Urinalysis due to concern for UTI    Treatment initiated:  - IV fluid resuscitation  - Thiamine and folic acid supplementation  - Banana bag initiated  - Protonix 40mg IV push given for GI prophylaxis  - Sepsis protocol initiated based on SIRS criteria  -ceftriaxone 1G    Patient meets criteria for admission due to decompensated liver disease and multiple organ system involvement.    PT had 2L of fluids, because of anasarca further IV fluids not recommended, base on exclusion    Problems Addressed:  Liver failure without hepatic coma, unspecified chronicity: complicated acute illness or injury  Upper GI bleed: complicated acute illness or injury    Amount and/or Complexity of Data Reviewed  Labs: ordered.  Radiology: ordered.    Risk  Prescription drug management.  Decision regarding hospitalization.        Final diagnoses:   Liver failure without hepatic coma, unspecified chronicity   Upper GI bleed       ED Disposition  ED Disposition       ED Disposition   Decision to Admit    Condition   --    Comment   Level of Care: Critical Care [6]   Diagnosis: Upper GI bleed [254595]   Admitting Physician: WILLIAM HINES [[id number]]   Attending Physician: WILLIAM HINES [[id number]]   Certification: I Certify That Inpatient Hospital Services Are Medically Necessary For Greater Than 2 Midnights                 No follow-up provider specified.       Medication List      No changes were made to your prescriptions during this visit.            Deniz Wolf MD  05/29/25 [id number]

## 2025-05-28 NOTE — CONSULTS
Patient was seen in the emergency room brief history taken complete consult note to follow  History of alcohol abuse.  Last alcohol was this morning  Patient is confused and has ascites deep jaundice and had 2 episodes of hematemesis with coffee-ground blood  He is unable to give any history of endoscopies in the past or history of varices however his MELD score is over 30 bilirubin is 26 INR 2.3 he is in acute alcoholic hepatitis and liver failure on top of his underlying alcohol induced cirrhosis  I discussed the case with the ER physician Dr. Wolf  Following recommendations  ICU admission and close monitoring  Treatment of acute alcoholic withdrawal/delirium tremens per hospitalist and intensivist team  Prepare for EGD for possible variceal bleeding/portal hypertensive gastropathy bleed depending upon the endoscopic findings  Give bolus of Sandostatin 50 mics and then 25 mics per hour  Pantoprazole 40 mg IV twice daily  Continue his outpatient medication including spironolactone and Lasix for ascites and anasarca  Abstinence from alcohol and alcohol rehab emphasized  Poor prognosis reiterated.  Given the acute GI bleed situation and pending endoscopy if no ulcer disease was found may consider pentoxifylline or prednisolone therapy due to very high Madrey's discriminant factor over 33 and poor prognosis from acute alcoholic hepatitis at current levels of his labs and clinical presentation  If patient's clinical situation deteriorates any further in ICU he will be a candidate to transfer to a tertiary center/hepatology team at a tertiary center  Further recommendations and plans to follow

## 2025-05-28 NOTE — ANESTHESIA PREPROCEDURE EVALUATION
Anesthesia Evaluation     Patient summary reviewed   NPO Solid Status: > 8 hours             Airway   Mallampati: II  Dental      Pulmonary    (+) a smoker,  (-) COPD, asthma, sleep apnea  Cardiovascular   Exercise tolerance: excellent (>7 METS)    (+) hypertension  (-) pacemaker, past MI, angina, cardiac stents      Neuro/Psych  (-) seizures, TIA, CVA  GI/Hepatic/Renal/Endo    (+) obesity, GERD, GI bleeding , liver disease cirrhosis, diabetes mellitus, thyroid problem hypothyroidism  (-) no renal disease    Musculoskeletal     Abdominal    Substance History   (+) alcohol use     OB/GYN          Other                      Anesthesia Plan    ASA 3 - emergent     general   Rapid sequence  intravenous induction     Anesthetic plan, risks, benefits, and alternatives have been provided, discussed and informed consent has been obtained with: patient.    Use of blood products discussed with patient  Consented to blood products.      CODE STATUS:

## 2025-05-29 ENCOUNTER — APPOINTMENT (OUTPATIENT)
Dept: GENERAL RADIOLOGY | Facility: HOSPITAL | Age: 40
DRG: 871 | End: 2025-05-29
Payer: COMMERCIAL

## 2025-05-29 LAB
ALBUMIN SERPL-MCNC: 2.6 G/DL (ref 3.5–5.2)
ALBUMIN/GLOB SERPL: 1.7 G/DL
ALP SERPL-CCNC: 206 U/L (ref 39–117)
ALT SERPL W P-5'-P-CCNC: 90 U/L (ref 1–41)
ANION GAP SERPL CALCULATED.3IONS-SCNC: 16 MMOL/L (ref 5–15)
ARTERIAL PATENCY WRIST A: ABNORMAL
AST SERPL-CCNC: 226 U/L (ref 1–40)
ATMOSPHERIC PRESS: 754 MMHG
BASE EXCESS BLDA CALC-SCNC: -0.3 MMOL/L (ref 0–2)
BASOPHILS # BLD AUTO: 0.04 10*3/MM3 (ref 0–0.2)
BASOPHILS NFR BLD AUTO: 0.5 % (ref 0–1.5)
BDY SITE: ABNORMAL
BILIRUB SERPL-MCNC: 22.8 MG/DL (ref 0–1.2)
BODY TEMPERATURE: 37
BUN SERPL-MCNC: 28 MG/DL (ref 6–20)
BUN/CREAT SERPL: 35 (ref 7–25)
CALCIUM SPEC-SCNC: 7.1 MG/DL (ref 8.6–10.5)
CHLORIDE SERPL-SCNC: 94 MMOL/L (ref 98–107)
CO2 SERPL-SCNC: 23 MMOL/L (ref 22–29)
CREAT SERPL-MCNC: 0.8 MG/DL (ref 0.76–1.27)
D-LACTATE SERPL-SCNC: 4.6 MMOL/L (ref 0.5–2)
DEPRECATED RDW RBC AUTO: 70.5 FL (ref 37–54)
EGFRCR SERPLBLD CKD-EPI 2021: 114.7 ML/MIN/1.73
EOSINOPHIL # BLD AUTO: 0.08 10*3/MM3 (ref 0–0.4)
EOSINOPHIL NFR BLD AUTO: 1 % (ref 0.3–6.2)
ERYTHROCYTE [DISTWIDTH] IN BLOOD BY AUTOMATED COUNT: 19.9 % (ref 12.3–15.4)
FIBRINOGEN PPP-MCNC: 170 MG/DL (ref 240–460)
GAS FLOW AIRWAY: 4 LPM
GLOBULIN UR ELPH-MCNC: 1.5 GM/DL
GLUCOSE SERPL-MCNC: 237 MG/DL (ref 65–99)
HCO3 BLDA-SCNC: 24.1 MMOL/L (ref 20–26)
HCT VFR BLD AUTO: 18.4 % (ref 37.5–51)
HCT VFR BLD AUTO: 20.1 % (ref 37.5–51)
HCT VFR BLD AUTO: 21.5 % (ref 37.5–51)
HCT VFR BLD AUTO: 21.7 % (ref 37.5–51)
HGB BLD-MCNC: 6.1 G/DL (ref 13–17.7)
HGB BLD-MCNC: 6.8 G/DL (ref 13–17.7)
HGB BLD-MCNC: 7.3 G/DL (ref 13–17.7)
HGB BLD-MCNC: 7.3 G/DL (ref 13–17.7)
IMM GRANULOCYTES # BLD AUTO: 0.08 10*3/MM3 (ref 0–0.05)
IMM GRANULOCYTES NFR BLD AUTO: 1 % (ref 0–0.5)
LYMPHOCYTES # BLD AUTO: 0.98 10*3/MM3 (ref 0.7–3.1)
LYMPHOCYTES NFR BLD AUTO: 12.4 % (ref 19.6–45.3)
Lab: ABNORMAL
MAGNESIUM SERPL-MCNC: 1.1 MG/DL (ref 1.6–2.6)
MCH RBC QN AUTO: 34.3 PG (ref 26.6–33)
MCHC RBC AUTO-ENTMCNC: 33.8 G/DL (ref 31.5–35.7)
MCV RBC AUTO: 101.5 FL (ref 79–97)
MODALITY: ABNORMAL
MONOCYTES # BLD AUTO: 0.58 10*3/MM3 (ref 0.1–0.9)
MONOCYTES NFR BLD AUTO: 7.3 % (ref 5–12)
NEUTROPHILS NFR BLD AUTO: 6.17 10*3/MM3 (ref 1.7–7)
NEUTROPHILS NFR BLD AUTO: 77.8 % (ref 42.7–76)
NRBC BLD AUTO-RTO: 0 /100 WBC (ref 0–0.2)
PCO2 BLDA: 37.1 MM HG (ref 35–45)
PCO2 TEMP ADJ BLD: 37.1 MM HG (ref 35–45)
PH BLDA: 7.42 PH UNITS (ref 7.35–7.45)
PH, TEMP CORRECTED: 7.42 PH UNITS (ref 7.35–7.45)
PHOSPHATE SERPL-MCNC: 3.9 MG/DL (ref 2.5–4.5)
PLATELET # BLD AUTO: 57 10*3/MM3 (ref 140–450)
PMV BLD AUTO: 12.3 FL (ref 6–12)
PO2 BLDA: 83.7 MM HG (ref 83–108)
PO2 TEMP ADJ BLD: 83.7 MM HG (ref 83–108)
POTASSIUM SERPL-SCNC: 3.1 MMOL/L (ref 3.5–5.2)
POTASSIUM SERPL-SCNC: 3.3 MMOL/L (ref 3.5–5.2)
PROT SERPL-MCNC: 4.1 G/DL (ref 6–8.5)
RBC # BLD AUTO: 1.98 10*6/MM3 (ref 4.14–5.8)
SAO2 % BLDCOA: 97.2 % (ref 94–99)
SODIUM SERPL-SCNC: 133 MMOL/L (ref 136–145)
VENTILATOR MODE: ABNORMAL
WBC NRBC COR # BLD AUTO: 7.93 10*3/MM3 (ref 3.4–10.8)

## 2025-05-29 PROCEDURE — 25010000002 POTASSIUM CHLORIDE PER 2 MEQ: Performed by: INTERNAL MEDICINE

## 2025-05-29 PROCEDURE — 83605 ASSAY OF LACTIC ACID: CPT | Performed by: PHYSICIAN ASSISTANT

## 2025-05-29 PROCEDURE — 85384 FIBRINOGEN ACTIVITY: CPT | Performed by: PHYSICIAN ASSISTANT

## 2025-05-29 PROCEDURE — 71045 X-RAY EXAM CHEST 1 VIEW: CPT

## 2025-05-29 PROCEDURE — 99497 ADVNCD CARE PLAN 30 MIN: CPT

## 2025-05-29 PROCEDURE — 83735 ASSAY OF MAGNESIUM: CPT | Performed by: PHYSICIAN ASSISTANT

## 2025-05-29 PROCEDURE — 4A133J1 MONITORING OF ARTERIAL PULSE, PERIPHERAL, PERCUTANEOUS APPROACH: ICD-10-PCS | Performed by: INTERNAL MEDICINE

## 2025-05-29 PROCEDURE — 25010000002 MAGNESIUM SULFATE 2 GM/50ML SOLUTION: Performed by: PHYSICIAN ASSISTANT

## 2025-05-29 PROCEDURE — 84100 ASSAY OF PHOSPHORUS: CPT | Performed by: PHYSICIAN ASSISTANT

## 2025-05-29 PROCEDURE — 36430 TRANSFUSION BLD/BLD COMPNT: CPT

## 2025-05-29 PROCEDURE — 85014 HEMATOCRIT: CPT | Performed by: PHYSICIAN ASSISTANT

## 2025-05-29 PROCEDURE — C1751 CATH, INF, PER/CENT/MIDLINE: HCPCS

## 2025-05-29 PROCEDURE — P9016 RBC LEUKOCYTES REDUCED: HCPCS

## 2025-05-29 PROCEDURE — 82803 BLOOD GASES ANY COMBINATION: CPT

## 2025-05-29 PROCEDURE — 85025 COMPLETE CBC W/AUTO DIFF WBC: CPT | Performed by: PHYSICIAN ASSISTANT

## 2025-05-29 PROCEDURE — 85018 HEMOGLOBIN: CPT | Performed by: PHYSICIAN ASSISTANT

## 2025-05-29 PROCEDURE — 02HV33Z INSERTION OF INFUSION DEVICE INTO SUPERIOR VENA CAVA, PERCUTANEOUS APPROACH: ICD-10-PCS | Performed by: INTERNAL MEDICINE

## 2025-05-29 PROCEDURE — 25010000002 OCTREOTIDE PER 25 MCG: Performed by: STUDENT IN AN ORGANIZED HEALTH CARE EDUCATION/TRAINING PROGRAM

## 2025-05-29 PROCEDURE — 99223 1ST HOSP IP/OBS HIGH 75: CPT

## 2025-05-29 PROCEDURE — P9040 RBC LEUKOREDUCED IRRADIATED: HCPCS

## 2025-05-29 PROCEDURE — 25010000002 PHENOBARBITAL PER 120 MG: Performed by: INTERNAL MEDICINE

## 2025-05-29 PROCEDURE — 25010000002 PIPERACILLIN SOD-TAZOBACTAM PER 1 G: Performed by: INTERNAL MEDICINE

## 2025-05-29 PROCEDURE — 25010000002 MORPHINE PER 10 MG: Performed by: INTERNAL MEDICINE

## 2025-05-29 PROCEDURE — 84132 ASSAY OF SERUM POTASSIUM: CPT | Performed by: PHYSICIAN ASSISTANT

## 2025-05-29 PROCEDURE — 80053 COMPREHEN METABOLIC PANEL: CPT | Performed by: PHYSICIAN ASSISTANT

## 2025-05-29 PROCEDURE — 86900 BLOOD TYPING SEROLOGIC ABO: CPT

## 2025-05-29 PROCEDURE — 4A133B1 MONITORING OF ARTERIAL PRESSURE, PERIPHERAL, PERCUTANEOUS APPROACH: ICD-10-PCS | Performed by: INTERNAL MEDICINE

## 2025-05-29 RX ORDER — POTASSIUM CHLORIDE 1.5 G/1.58G
40 POWDER, FOR SOLUTION ORAL EVERY 4 HOURS
Status: COMPLETED | OUTPATIENT
Start: 2025-05-29 | End: 2025-05-29

## 2025-05-29 RX ORDER — NOREPINEPHRINE BITARTRATE 0.03 MG/ML
.02-.3 INJECTION, SOLUTION INTRAVENOUS
Status: DISCONTINUED | OUTPATIENT
Start: 2025-05-29 | End: 2025-06-01

## 2025-05-29 RX ORDER — MAGNESIUM SULFATE HEPTAHYDRATE 40 MG/ML
2 INJECTION, SOLUTION INTRAVENOUS
Status: DISPENSED | OUTPATIENT
Start: 2025-05-29 | End: 2025-05-29

## 2025-05-29 RX ORDER — POTASSIUM CHLORIDE 29.8 MG/ML
20 INJECTION INTRAVENOUS
Status: COMPLETED | OUTPATIENT
Start: 2025-05-29 | End: 2025-05-29

## 2025-05-29 RX ORDER — MORPHINE SULFATE 2 MG/ML
2 INJECTION, SOLUTION INTRAMUSCULAR; INTRAVENOUS EVERY 6 HOURS PRN
Status: DISCONTINUED | OUTPATIENT
Start: 2025-05-29 | End: 2025-05-30

## 2025-05-29 RX ADMIN — LACTULOSE 20 G: 20 SOLUTION ORAL at 16:48

## 2025-05-29 RX ADMIN — PHENOBARBITAL SODIUM 130 MG: 130 INJECTION INTRAMUSCULAR at 14:51

## 2025-05-29 RX ADMIN — PANTOPRAZOLE SODIUM 40 MG: 40 INJECTION, POWDER, FOR SOLUTION INTRAVENOUS at 06:42

## 2025-05-29 RX ADMIN — Medication 1 APPLICATION: at 08:27

## 2025-05-29 RX ADMIN — PIPERACILLIN AND TAZOBACTAM 3.38 G: 3; .375 INJECTION, POWDER, FOR SOLUTION INTRAVENOUS at 14:51

## 2025-05-29 RX ADMIN — LACTULOSE 20 G: 20 SOLUTION ORAL at 20:23

## 2025-05-29 RX ADMIN — LEVOTHYROXINE SODIUM 75 MCG: 0.07 TABLET ORAL at 05:12

## 2025-05-29 RX ADMIN — MAGNESIUM SULFATE HEPTAHYDRATE 2 G: 40 INJECTION, SOLUTION INTRAVENOUS at 10:41

## 2025-05-29 RX ADMIN — PHENOBARBITAL SODIUM 130 MG: 130 INJECTION INTRAMUSCULAR at 22:06

## 2025-05-29 RX ADMIN — PIPERACILLIN AND TAZOBACTAM 3.38 G: 3; .375 INJECTION, POWDER, FOR SOLUTION INTRAVENOUS at 23:46

## 2025-05-29 RX ADMIN — MORPHINE SULFATE 2 MG: 2 INJECTION, SOLUTION INTRAMUSCULAR; INTRAVENOUS at 19:45

## 2025-05-29 RX ADMIN — CHLORHEXIDINE GLUCONATE 1 APPLICATION: 500 CLOTH TOPICAL at 05:12

## 2025-05-29 RX ADMIN — POTASSIUM CHLORIDE 20 MEQ: 29.8 INJECTION, SOLUTION INTRAVENOUS at 16:48

## 2025-05-29 RX ADMIN — FLUOXETINE HYDROCHLORIDE 20 MG: 20 CAPSULE ORAL at 08:27

## 2025-05-29 RX ADMIN — Medication 10 ML: at 20:23

## 2025-05-29 RX ADMIN — POTASSIUM CHLORIDE 40 MEQ: 1.5 POWDER, FOR SOLUTION ORAL at 06:42

## 2025-05-29 RX ADMIN — POTASSIUM CHLORIDE 20 MEQ: 29.8 INJECTION, SOLUTION INTRAVENOUS at 18:20

## 2025-05-29 RX ADMIN — MAGNESIUM SULFATE HEPTAHYDRATE 2 G: 40 INJECTION, SOLUTION INTRAVENOUS at 07:43

## 2025-05-29 RX ADMIN — FOLIC ACID 1 MG: 1 TABLET ORAL at 08:26

## 2025-05-29 RX ADMIN — MORPHINE SULFATE 2 MG: 2 INJECTION, SOLUTION INTRAMUSCULAR; INTRAVENOUS at 08:28

## 2025-05-29 RX ADMIN — PIPERACILLIN AND TAZOBACTAM 3.38 G: 3; .375 INJECTION, POWDER, FOR SOLUTION INTRAVENOUS at 06:46

## 2025-05-29 RX ADMIN — THIAMINE HCL TAB 100 MG 250 MG: 100 TAB at 08:26

## 2025-05-29 RX ADMIN — OCTREOTIDE ACETATE 25 MCG/HR: 500 INJECTION, SOLUTION INTRAVENOUS; SUBCUTANEOUS at 00:00

## 2025-05-29 RX ADMIN — POTASSIUM CHLORIDE 40 MEQ: 1.5 POWDER, FOR SOLUTION ORAL at 10:42

## 2025-05-29 RX ADMIN — Medication 10 ML: at 08:27

## 2025-05-29 RX ADMIN — Medication 1 APPLICATION: at 20:22

## 2025-05-29 RX ADMIN — NOREPINEPHRINE BITARTRATE 0.02 MCG/KG/MIN: 0.03 INJECTION, SOLUTION INTRAVENOUS at 01:29

## 2025-05-29 RX ADMIN — PANTOPRAZOLE SODIUM 40 MG: 40 INJECTION, POWDER, FOR SOLUTION INTRAVENOUS at 16:48

## 2025-05-29 RX ADMIN — LACTULOSE 20 G: 20 SOLUTION ORAL at 08:26

## 2025-05-29 RX ADMIN — POTASSIUM CHLORIDE 20 MEQ: 29.8 INJECTION, SOLUTION INTRAVENOUS at 19:33

## 2025-05-29 RX ADMIN — OCTREOTIDE ACETATE 25 MCG/HR: 500 INJECTION, SOLUTION INTRAVENOUS; SUBCUTANEOUS at 17:04

## 2025-05-29 NOTE — PROCEDURES
Insert Arterial Line    Date/Time: 5/29/2025 1:35 AM    Performed by: Nitish Grimes PA-C  Authorized by: Nitish Grimes PA-C    Universal Protocol:     Written consent obtained?: Yes      Risks and benefits: Risks, benefits and alternatives were discussed      Consent given by:  Patient    Patient states understanding of procedure being performed: Yes      Patient's understanding of procedure matches consent: Yes      Required items: Required blood products, implants, devices and special equipment available      Patient identity confirmed:  Arm band and provided demographic data    Time out: Immediately prior to the procedure a time out was called    A time out verifies correct patient, procedure, equipment, support staff and site/side marked as required:   Preparation:     Preparation: Patient was prepped and draped in usual sterile fashion    Indications:     Indications: hemodynamic monitoring    Location:     Location:  Right radial  Anesthesia:     Local anesthetic:  Lidocaine 1% without epinephrine    Anesthetic total (ml):  1    Patient sedated: No    Procedure Details:     Ultrasound Guidance: yes  Vessel patency was confirmed with the ultrasound.  Needle entry into vessel was visualized in realtime with the ultrasound.       Siva's test normal?: Yes      Needle gauge:  20    Seldinger technique: Seldinger technique used      Number of attempts:  1  Post-procedure:     Post-procedure:  Line sutured and dressing applied    Post-procedure CMS:  Normal     patient tolerated the procedure well with no immediate complications

## 2025-05-29 NOTE — PROGRESS NOTES
[facility] Intensivist Services  INPATIENT PROGRESS NOTE    Patient Name: Luciano Howell  Date of Admission: 5/28/2025  Today's Date: 05/29/25  Length of Stay:  LOS: 1 day   Primary Care Physician: Adrien Varghese MD  Next of Kin: Primary Emergency Contact: MAGO HOWELL Home Phone: [phone]      Subjective   Chief Complaint: Generalized weakness, difficulty walking, dark stools, hematemesis     Abdominal Pain  Dizziness  Symptoms include abdominal pain.       40 year old male with a PMH of alcoholism, diabetes mellitus, gout, hepatitis C, hypertension, and jaundice admitted after presenting to ED with complaints of generalized weakness, difficulty walking, hematemesis, and melena. Patient continues to drink and states he drinks about 4-5 twelve ounce malt liquor drinks daily.  He was recently in the emergency department (4/18/25) and noted to have cholecystitis with stones for which he was transferred to [facility]; however, he was deemed unsafe for surgery at that time.  Prior to this, he was admitted to our hospital on 4/1/2025 with alcohol use and fall.  However, he left AMA on 4/4/25. He was admitted to CCU for close monitoring with his GIB and underwent EGD to further evaluate this.    Interval history:  5/29: EGD from 5/28 showed severe esophagitis all the way up to the mid to upper esophagus with a small ulcer due to reflux. Per GI, the esophageal mucosa was swollen due to portal hypertension, ulcerations, and esophagitis, and therefore, he could not see any varices to band. Patient's Hgb was quite low this morning. He received 2 u PRBC with improvement of Hgb to 7.3 and Hct 21.5. Dr. Preston and I spoke with the patient again about the severity of his liver failure and the need for complete cessation from alcohol in order to have a chance of long term survival. Patient voiced his understanding and would like to seek help. Case management has been consulted.  Palliative team also on board. Patient had generalized malaise, but no other complaints for me at time of my exam. Blood pressure did drop overnight, requiring low dose levophed infusion. However, blood pressure has significantly improved after 2 PRBC, and patient is now off vasopressors.       Review of Systems   Gastrointestinal:  Positive for abdominal pain.   Neurological:  Positive for dizziness.      All pertinent negatives and positives are as above. All other systems have been reviewed and are negative unless otherwise stated.     Past Medical and Past Surgical History   Active and Resolved Problems  Active Hospital Problems    Diagnosis  POA    **Upper GI bleed [K92.2]  Yes      Resolved Hospital Problems   No resolved problems to display.       Past Medical History:   Past Medical History:   Diagnosis Date    Alcoholism     currently drinking    Diabetes mellitus     Gout     Hepatitis C     Hypertension     Jaundice      Past Surgical History:   Past Surgical History:   Procedure Laterality Date    ENDOSCOPY N/A 5/28/2025    Procedure: ESOPHAGOGASTRODUODENOSCOPY WITH ANESTHESIA;  Surgeon: Estela Ramos MD;  Location: [facility];  Service: Gastroenterology;  Laterality: N/A;  pre op: GI bleed  post op: esophageal ulcer, esophagitis  pcp: Adrien Varghese MD    VASECTOMY       Social and Family History   Family History:  family history is not on file.    Tobacco/Social History:  reports that he has been smoking cigarettes. He started smoking about 15 years ago. He has a 7.1 pack-year smoking history. He has never used smokeless tobacco. He reports current alcohol use of about 12.0 standard drinks of alcohol per week. He reports current drug use. Drug: Marijuana.    Allergies   Allergies:   He has no known allergies.    Objective    Temp:  [97.8 °F (36.6 °C)-99.1 °F (37.3 °C)] 98.4 °F (36.9 °C)  Heart Rate:  [] 82  Resp:  [12-27] 15  BP: ()/(38-80) 117/58  Physical Exam  Vitals reviewed. Exam  "conducted with a chaperone present.   Constitutional:       General: He is awake.      Appearance: He is ill-appearing (chronically ill-appearing). He is not diaphoretic.   HENT:      Head: Normocephalic.      Nose: Nose normal.      Mouth/Throat:      Mouth: Mucous membranes are moist.   Eyes:      General: Scleral icterus present.      Extraocular Movements: Extraocular movements intact.      Pupils: Pupils are equal, round, and reactive to light.   Cardiovascular:      Rate and Rhythm: Normal rate and regular rhythm.      Pulses: Normal pulses.   Pulmonary:      Effort: Pulmonary effort is normal. No respiratory distress.      Breath sounds: Normal breath sounds. No stridor. No wheezing or rhonchi.   Abdominal:      General: Bowel sounds are normal. There is distension.      Palpations: Abdomen is soft.      Tenderness: There is abdominal tenderness (\"generalized discomfort\").   Musculoskeletal:      Cervical back: Normal range of motion and neck supple.   Skin:     General: Skin is warm.      Capillary Refill: Capillary refill takes less than 2 seconds.      Coloration: Skin is jaundiced.   Neurological:      General: No focal deficit present.      Mental Status: He is alert. Mental status is at baseline.   Psychiatric:         Behavior: Behavior is cooperative.         Inpatient Medications   Medications: Scheduled Meds:[Transfer Hold] Chlorhexidine Gluconate Cloth, 1 Application, Topical, Once  Chlorhexidine Gluconate Cloth, 1 Application, Topical, Q24H  FLUoxetine, 20 mg, Oral, Daily  folic acid, 1 mg, Oral, Daily  lactulose, 20 g, Oral, TID  levothyroxine, 75 mcg, Oral, Q AM  magnesium sulfate, 2 g, Intravenous, Q2H  mupirocin, 1 Application, Each Nare, BID  pantoprazole, 40 mg, Intravenous, BID AC  PHENobarbital 130 mg in sodium chloride 0.9 % 100 mL IVPB, 130 mg, Intravenous, Q8H   Followed by  [START ON 5/30/2025] PHENobarbital, 65 mg, Intravenous, Q8H   Followed by  [START ON 6/1/2025] PHENobarbital, " 32.5 mg, Intravenous, Q12H  piperacillin-tazobactam, 3.375 g, Intravenous, Q8H  senna-docusate sodium, 2 tablet, Oral, BID  sodium chloride, 10 mL, Intravenous, Q12H  thiamine, 250 mg, Oral, Daily      Continuous Infusions:norepinephrine, 0.02-0.3 mcg/kg/min, Last Rate: Stopped (05/29/25 0719)  octreotide (SandoSTATIN) infusion, 25 mcg/hr, Last Rate: 25 mcg/hr (05/29/25 0000)      PRN Meds:.  senna-docusate sodium **AND** polyethylene glycol **AND** bisacodyl **AND** bisacodyl    Calcium Replacement - Follow Nurse / BPA Driven Protocol    Magnesium Standard Dose Replacement - Follow Nurse / BPA Driven Protocol    Morphine    nitroglycerin    ondansetron    Phosphorus Replacement - Follow Nurse / BPA Driven Protocol    Potassium Replacement - Follow Nurse / BPA Driven Protocol    sodium chloride    sodium chloride    sodium chloride    I have reviewed the patient's current medications.   Outpatient Medications     Current Outpatient Medications   Medication Instructions    FLUoxetine (PROZAC) 20 mg, Oral, Daily    levothyroxine (SYNTHROID) 75 mcg, Oral, Daily    thiamine (VITAMIN B1) 100 mg, Oral, Daily       Current Antibiotics   piperacillin-tazobactam (ZOSYN) 3.375 g IVPB in 100 mL NS MBP (CD)    Results:   CBC:      Lab 05/29/25  0910 05/29/25  0513 05/28/25  1732 05/28/25  1100   WBC  --  7.93  --  14.71*   HEMOGLOBIN 7.3* 6.8*   < > 9.5*   HEMATOCRIT 21.5* 20.1*   < > 28.0*   PLATELETS  --  57*  --  101*   NEUTROS ABS  --  6.17  --  11.56*   IMMATURE GRANS (ABS)  --  0.08*  --  0.30*   LYMPHS ABS  --  0.98  --  1.65   MONOS ABS  --  0.58  --  1.07*   EOS ABS  --  0.08  --  0.06   MCV  --  101.5*  --  101.8*    < > = values in this interval not displayed.     LIVER FUNCTION TESTS:      Lab 05/29/25  0513 05/28/25  1100   TOTAL PROTEIN 4.1* 5.0*   ALBUMIN 2.6* 2.6*   GLOBULIN 1.5 2.4   ALT (SGPT) 90* 130*   AST (SGOT) 226* 329*   BILIRUBIN 22.8* 26.2*   ALK PHOS 206* 315*   LIPASE  --  19     ABG:      Lab  05/29/25  0513 05/29/25  0246 05/28/25  1100   PH, ARTERIAL  --  7.422  --    PO2 ART  --  83.7  --    PCO2, ARTERIAL  --  37.1  --    HCO3 ART  --  24.1  --    ANION GAP 16.0*  --  22.0*     BMP/MG/PHOS:      Lab 05/29/25  0513 05/28/25  1100   SODIUM 133* 132*   POTASSIUM 3.3* 3.2*   CHLORIDE 94* 89*   BUN 28.0* 21.0*   CREATININE 0.80 0.48*   CALCIUM 7.1* 7.8*   MAGNESIUM 1.1*  --    PHOSPHORUS 3.9  --      DIABETIC:  A1C Last 3 Results          7/7/2024    05:35 8/6/2024    10:11   HGBA1C Last 3 Results   Hemoglobin A1C 4.60  4.3      IMAGING STUDIES:  XR Chest 1 View  Result Date: 5/29/2025  1. A mild pulmonary venous congestion and cardiomegaly. 2. Central venous line in place.   This report was signed and finalized on 5/29/2025 6:14 AM by Dr. Vj Hsu MD.      US Abdomen Limited  Result Date: 5/28/2025  1. Cholelithiasis with biliary sludge. No prominent gallbladder wall thickening. Common bile duct upper normal at 5 to 6 mm. No discrete common bile duct stones localized. Suboptimal visualization of the distal common bile duct and pancreas due to shadowing artifact. 2. Cirrhotic morphology of the liver. Small volume perihepatic ascites.   This report was signed and finalized on 5/28/2025 2:12 PM by Dr. Kassy Blair MD.      CT Abdomen Pelvis With Contrast  Result Date: 5/28/2025   1. No definite acute findings in the abdomen/pelvis. 2. Cholelithiasis with persistent gallbladder wall thickening, however decreased gallbladder distention as compared to the prior study. The gallbladder wall thickening could be explained by diffuse hepatocellular disease in the absence of clinical signs of acute cholecystitis. 3. Cirrhosis with sequela portal hypertension, including small volume ascites.   This report was signed and finalized on 5/28/2025 12:17 PM by Darwin Goldman.      CT Head Without Contrast  Result Date: 5/28/2025  Impression:   No acute intracranial abnormality.  This report was signed and  finalized on 5/28/2025 12:11 PM by Darwin Goldman.      XR Chest 1 View  Result Date: 5/28/2025  1. Borderline prominence of central pulmonary vascular structures for which mild venous congestion considered. 2. Primarily linear left basilar densities favoring atelectasis over pneumonia.   This report was signed and finalized on 5/28/2025 12:05 PM by Dr. Kassy Blair MD.        CULTURE DATA:   Blood Culture   Date Value Ref Range Status   05/28/2025 No growth at 24 hours  Preliminary   05/28/2025 No growth at 24 hours  Preliminary        Assessment/Plan   40-year-old male with a past medical history of alcoholism admitted on 5/28/2025 with decompensated liver failure and upper GI bleed. Patient being admitted to the unit for closer observation. He will undergo upper GI scope with GI to evaluate possible source of bleeding. He did require pressors for low blood pressure while he was receiving blood transfusion. However, blood pressure has improved and patient is no longer requiring pressors.     Decompensated liver failure in setting of cirrhosis  -MELD score of 31  -Ammonia level was 92  -Patient severely jaundice with scleral icterus  -Resume home dose of lactulose  -I warned the patient that he is at high risk for mortality within the next 90 days if he does not quit drinking as his liver damage is quite severe given his MELD score.  Patient states he does want to quit, but he feels helpless as he has tried multiple times in the past to quit but been unsuccessful.  Case management consulted for resources      Alcoholism with continued alcohol ingestion  -I warned the patient that he is at high risk for mortality within the next 90 days if he does not quit drinking as his liver damage is quite severe given his MELD score.  Patient states he does want to quit, but he feels helpless as he has tried multiple times in the past to quit but been unsuccessful.  Case management consulted to help provide resources for  this patient.  -Patient has been a heavy drinker for 20+ years. He states he currently drinks 3-4 twelve ounce malt liquor drinks/day. Last drink was around 10 pm on 5/27  -Continue  phenobarb taper to help prevent/minimize DTs. Seizure precautions.   -Total alcohol cessation again emphasized for this patient in order to give him his best chance of long term survival and possibly make him a candidate for liver transplant in future if he is able to have complete alcohol cessation    GI bleed  -Patient reports dark stool as well as dark emesis recently  -EGD showed severe esophagitis, ulceration, and  swollen esophageal mucosa. Due to the degree of swelling, GI was unable to see any varices to band  -Zofran prn for nausea  -Continue octreotide gtt per GI recommendations  -In setting of #1 and #2 above  -Start clear liquid diet and advance as tolerated  -BID PPI  -GI following, we appreciate their recommendations    Coagulopathy  -Secondary to #1 and #2 above  -INR 2.32, Pt 26.8, PTT 57.5  -Monitor with daily labs     Anemia  -In setting of GIB  -Hgb 7.3  Hct 21.5 s/p 2 PRBC.   -Recheck H&H again later today  -Daily CBC  -Transfuse if needed for Hgb <7     Lactic acidosis and sepsis  -Blood cultures  with no growth at 24 hours  -Initial lactate 10.8, latest lactate 4.6  -Leukocytosis improved   -Continue empiric treatment with Zosyn.   -Monitor cultures, lactate level, and daily CBC    CODE STATUS: Full  VTE prophylaxis: SCDs  Nutrition: clear liquid diet, advance as tolearted  Bowel: prn bowel regimen  GI prophylaxis: BID PPI  Antibiotics: Zosyn     Disposition: Critical care management     Total critical care time: 35 minutes    Due to a high probability of clinically significant, life threatening deterioration, the patient required my highest level of preparedness to intervene emergently and I personally spent this critical care time directly and personally managing the patient.     This critical care time included  obtaining a history; examining the patient; pulse oximetry; ordering and review of studies; arranging urgent treatment with development of a management plan; evaluation of patient's response to treatment; frequent reassessment; and, discussions with other providers.    This critical care time was performed to assess and manage the high probability of imminent, life-threatening deterioration that could result in multi-organ failure. It was exclusive of separately billable procedures and treating other patients and teaching time.    Please see MDM section and the rest of the note for further information on patient assessment and treatment.    Part of this note may be an electronic transcription/translation of spoken language to printed text using the Dragon Dictation System    Electronically signed by Aryan Erwin PA-C on 5/29/2025 at 12:44 CDT

## 2025-05-29 NOTE — CASE MANAGEMENT/SOCIAL WORK
Received orders to provide pt with chemical dependency packet. Physician currently in room. SW will try again later today or tomorrow and provide pt with chemical dependency packet. Noted Palliative Care consulted.

## 2025-05-29 NOTE — PLAN OF CARE
Problem: Adult Inpatient Plan of Care  Goal: Plan of Care Review  Outcome: Progressing  Flowsheets (Taken 5/29/2025 1844)  Progress: no change  Outcome Evaluation: VSS today. Levo gtt off since this a.m. Octreotide gtt ongoing. Dark stool x1 today. No c/o n/v. Is tolerating clear liquids.  Adequate UOP. Urine noted to be dark in color (brown). K+ replaced today. Magnesium also replaced today. Lactic improved. GI here to see pt. this shift and states will be d/c'ing Octreotide tomorrow and continue IV protonix bid. Also wants intensivist to monitor for possible need for paracentesis in the future. PA with intensivist notified. Has been awake and A&O most of this shift but increased drowsiness noted post phenobarbital dose this afternoon. Resting quietly @ present.  Plan of Care Reviewed With:   patient   parent  Goal: Patient-Specific Goal (Individualized)  Outcome: Progressing  Goal: Absence of Hospital-Acquired Illness or Injury  Outcome: Progressing  Intervention: Identify and Manage Fall Risk  Description: Perform standard risk assessment on admission using a validated tool or comprehensive approach appropriate to the patient; reassess fall risk frequently, with change in status or transfer to another level of care.Communicate risk to interprofessional healthcare team; ensure fall risk visible cue.Determine need for increased observation, equipment and environmental modification, as well as use of supportive, nonskid footwear.Adjust safety measures to individual needs and identified risk factors.Reinforce the importance of active participation with fall risk prevention, safety, and physical activity with the patient and family.Perform regular intentional rounding to assess need for position change, pain assessment and personal needs, including assistance with toileting.  Recent Flowsheet Documentation  Taken 5/29/2025 1800 by Yvette Badillo, RN  Safety Promotion/Fall Prevention: safety round/check  completed  Taken 5/29/2025 1700 by Yvette Badillo RN  Safety Promotion/Fall Prevention: safety round/check completed  Taken 5/29/2025 1600 by Yvette Badillo RN  Safety Promotion/Fall Prevention: safety round/check completed  Taken 5/29/2025 1500 by Yvette aBdillo RN  Safety Promotion/Fall Prevention: safety round/check completed  Taken 5/29/2025 1400 by Yvette Badillo RN  Safety Promotion/Fall Prevention: safety round/check completed  Taken 5/29/2025 1300 by Yvette Badillo RN  Safety Promotion/Fall Prevention: safety round/check completed  Taken 5/29/2025 1200 by Yvette Badillo RN  Safety Promotion/Fall Prevention: safety round/check completed  Taken 5/29/2025 1100 by Yvette Badillo RN  Safety Promotion/Fall Prevention: safety round/check completed  Taken 5/29/2025 1000 by Yvette Badillo RN  Safety Promotion/Fall Prevention: safety round/check completed  Taken 5/29/2025 0900 by Yvette Badillo RN  Safety Promotion/Fall Prevention: safety round/check completed  Taken 5/29/2025 0800 by Yvette Badillo RN  Safety Promotion/Fall Prevention: safety round/check completed  Taken 5/29/2025 0700 by Yvette Badillo RN  Safety Promotion/Fall Prevention: safety round/check completed  Intervention: Prevent Skin Injury  Description: Perform a screening for skin injury risk, such as pressure or moisture-associated skin damage on admission and at regular intervals throughout hospital stay.Keep all areas of skin (especially folds) clean and dry.Maintain adequate skin hydration.Relieve and redistribute pressure and protect bony prominences and skin at risk for injury; implement measures based on patient-specific risk factors.Match turning and repositioning schedule to clinical condition.Encourage weight shift frequently; assist with reposition if unable to complete independently.Float heels off bed; avoid pressure on the Achilles tendon.Keep skin free from extended contact with medical  devices.Optimize nutrition and hydration.Encourage functional activity and mobility, as early as tolerated.Use aids (e.g., slide boards, mechanical lift) during transfer.  Recent Flowsheet Documentation  Taken 5/29/2025 1800 by Yvette Badillo RN  Body Position:   position maintained   supine  Taken 5/29/2025 1700 by Yvette Badillo RN  Body Position:   turned   supine  Taken 5/29/2025 1600 by Yvette Badillo RN  Body Position:   position maintained   tilted   left  Skin Protection: incontinence pads utilized  Taken 5/29/2025 1500 by Yvette Badillo RN  Body Position:   turned   tilted   left  Taken 5/29/2025 1400 by Yvette Badillo RN  Body Position:   position maintained   supine  Taken 5/29/2025 1300 by Yvette Badillo RN  Body Position:   turned   sitting up in bed   supine  Taken 5/29/2025 1200 by Yvette Badillo RN  Body Position:   position maintained   tilted   right  Skin Protection: silicone foam dressing in place  Taken 5/29/2025 1100 by Yvette Badillo RN  Body Position:   turned   tilted   right  Taken 5/29/2025 1000 by Yvette Badillo RN  Body Position:   position maintained   tilted   left  Taken 5/29/2025 0900 by Yvette Badillo RN  Body Position:   turned   tilted   left  Taken 5/29/2025 0800 by Yvette Badillo RN  Body Position:   position maintained   tilted   right  Skin Protection: silicone foam dressing in place  Taken 5/29/2025 0700 by Yvette Badillo RN  Body Position:   turned   tilted   right  Intervention: Prevent and Manage VTE (Venous Thromboembolism) Risk  Description: Assess for VTE (venous thromboembolism) risk.Promote early mobilization; encourage both active and passive leg exercises, if unable to ambulate.Initiate and maintain compression or other therapy, as indicated, based on identified risk in accordance with organizational protocol and provider order.Recognize the patient's individual risk for bleeding before initiating pharmacologic  thromboprophylaxis.  Recent Flowsheet Documentation  Taken 5/29/2025 1600 by Yvette Badillo RN  VTE Prevention/Management:   bilateral   SCDs (sequential compression devices) on  Taken 5/29/2025 1200 by Yvette Badillo RN  VTE Prevention/Management:   bilateral   SCDs (sequential compression devices) on  Taken 5/29/2025 0800 by Yvette Badillo RN  VTE Prevention/Management:   bilateral   SCDs (sequential compression devices) on  Goal: Optimal Comfort and Wellbeing  Outcome: Progressing  Intervention: Monitor Pain and Promote Comfort  Description: Assess pain level, treatment efficacy and patient response at regular intervals using a consistent pain scale.Consider the presence and impact of preexisting chronic pain.Encourage patient and caregiver involvement in pain assessment, interventions and safety measures.Promote activity; balance with sleep and rest to enhance healing.  Recent Flowsheet Documentation  Taken 5/29/2025 1300 by Yvette Badillo RN  Pain Management Interventions: quiet environment facilitated  Taken 5/29/2025 0828 by Yvette Badillo RN  Pain Management Interventions: pain medication given  Intervention: Provide Person-Centered Care  Description: Use a family-focused approach to care; encourage support system presence and participation.Develop trust and rapport by proactively providing information, encouraging questions, addressing concerns and offering reassurance.Acknowledge emotional response to hospitalization.Recognize and utilize personal coping strategies and strengths; develop goals via shared decision-making.Honor spiritual and cultural preferences.  Recent Flowsheet Documentation  Taken 5/29/2025 1200 by Yvette Badillo RN  Trust Relationship/Rapport:   care explained   emotional support provided  Goal: Readiness for Transition of Care  Outcome: Progressing     Problem: Fall Injury Risk  Goal: Absence of Fall and Fall-Related Injury  Outcome: Progressing  Intervention:  Promote Injury-Free Environment  Description: Provide a safe, barrier-free environment that encourages independent activity.Keep care area uncluttered and well-lighted.Determine need for increased observation or monitoring.Avoid use of devices that minimize mobility, such as restraints or indwelling urinary catheter.  Recent Flowsheet Documentation  Taken 5/29/2025 1800 by Yvette Badillo RN  Safety Promotion/Fall Prevention: safety round/check completed  Taken 5/29/2025 1700 by Yvette Badillo RN  Safety Promotion/Fall Prevention: safety round/check completed  Taken 5/29/2025 1600 by Yvette Badillo RN  Safety Promotion/Fall Prevention: safety round/check completed  Taken 5/29/2025 1500 by Yvette Badillo RN  Safety Promotion/Fall Prevention: safety round/check completed  Taken 5/29/2025 1400 by Yevtte Badillo RN  Safety Promotion/Fall Prevention: safety round/check completed  Taken 5/29/2025 1300 by Yvette Badillo RN  Safety Promotion/Fall Prevention: safety round/check completed  Taken 5/29/2025 1200 by Yvette Badillo RN  Safety Promotion/Fall Prevention: safety round/check completed  Taken 5/29/2025 1100 by Yvette Badillo RN  Safety Promotion/Fall Prevention: safety round/check completed  Taken 5/29/2025 1000 by Yvette Badillo RN  Safety Promotion/Fall Prevention: safety round/check completed  Taken 5/29/2025 0900 by Yvette Badillo RN  Safety Promotion/Fall Prevention: safety round/check completed  Taken 5/29/2025 0800 by Yvette Badillo RN  Safety Promotion/Fall Prevention: safety round/check completed  Taken 5/29/2025 0700 by Yvette Badillo RN  Safety Promotion/Fall Prevention: safety round/check completed     Problem: Skin Injury Risk Increased  Goal: Skin Health and Integrity  Outcome: Progressing  Intervention: Optimize Skin Protection  Description: Perform a full pressure injury risk assessment, as indicated by screening, upon admission to care unit.Reassess skin  (full inspection and injury risk, including skin temperature, consistency and color) frequently (e.g., scheduled interval, with change in condition) to provide optimal early detection and prevention.Maintain adequate tissue perfusion (e.g., encourage fluid balance; avoid crossing legs, constrictive clothing or devices) to promote tissue oxygenation.Maintain head of bed at lowest degree of elevation tolerated, considering medical condition and other restrictions. Use positioning supports to prevent sliding and friction. Consider low friction textiles.Avoid positioning onto an area that remains reddened or on bony prominences.Minimize incontinence and moisture (e.g., toileting schedule; moisture-wicking pad, diaper or incontinence collection device; skin moisture barrier).Cleanse skin promptly and gently, when soiled, utilizing a pH-balanced cleanser.Relieve and redistribute pressure (e.g., scheduled position changes, weight shifts, use of support surface, medical device repositioning, protective dressing application, use of positioning device, microclimate control, use of pressure-injury-monitorEncourage increased activity, such as sitting in a chair at the bedside or early mobilization, when able to tolerate. Avoid prolonged sitting.  Recent Flowsheet Documentation  Taken 5/29/2025 1800 by Yvette Badillo, RN  Activity Management: bedrest  Head of Bed (HOB) Positioning: HOB elevated  Taken 5/29/2025 1700 by Yvette Badillo, RN  Activity Management: bedrest  Head of Bed (HOB) Positioning: HOB elevated  Taken 5/29/2025 1600 by Yvette Badillo, RN  Activity Management: bedrest  Pressure Reduction Techniques: weight shift assistance provided  Head of Bed (HOB) Positioning: HOB elevated  Pressure Reduction Devices: pressure-redistributing mattress utilized  Skin Protection: incontinence pads utilized  Taken 5/29/2025 1500 by Yvette Badillo, RN  Activity Management: bedrest  Head of Bed (HOB) Positioning: HOB  elevated  Taken 5/29/2025 1400 by Yvette Badillo RN  Activity Management: bedrest  Head of Bed (HOB) Positioning: HOB elevated  Taken 5/29/2025 1300 by Yvette Badillo RN  Activity Management: bedrest  Head of Bed (HOB) Positioning: HOB elevated  Taken 5/29/2025 1200 by Yvette Badillo RN  Activity Management: bedrest  Pressure Reduction Techniques: weight shift assistance provided  Head of Bed (HOB) Positioning: HOB elevated  Pressure Reduction Devices: pressure-redistributing mattress utilized  Skin Protection: silicone foam dressing in place  Taken 5/29/2025 1100 by Yvette Badillo RN  Activity Management: bedrest  Head of Bed (HOB) Positioning: HOB elevated  Taken 5/29/2025 1000 by Yvette Badillo RN  Activity Management: bedrest  Head of Bed (HOB) Positioning: HOB elevated  Taken 5/29/2025 0900 by Yvette Badillo RN  Activity Management: bedrest  Head of Bed (HOB) Positioning: HOB elevated  Taken 5/29/2025 0800 by Yvette Badillo RN  Activity Management: bedrest  Pressure Reduction Techniques: weight shift assistance provided  Head of Bed (HOB) Positioning: HOB elevated  Pressure Reduction Devices: pressure-redistributing mattress utilized  Skin Protection: silicone foam dressing in place  Taken 5/29/2025 0700 by Yvette Badillo RN  Activity Management: bedrest  Head of Bed (HOB) Positioning: HOB at 30 degrees     Problem: Sepsis/Septic Shock  Goal: Optimal Coping  Outcome: Progressing  Intervention: Support Patient and Family Response  Description: Acknowledge, normalize, validate intensity and complexity of patient and support system response to situation.Provide opportunity for expression of thoughts, feelings and concerns; respond with compassion and reassurance.Decrease stress and anxiety by providing information about patient's status and treatment.Facilitate support system presence and participation in care; consider providing a diary in intensive care situation.Support coping by  recognizing current coping strategies; provide aid in developing new strategies.Assess and monitor for signs and symptoms of psychologic distress, anxiety and depression.Utilize complementary therapy, such as music, massage or guided imagery.Engage in proactive and ongoing discussion about goals of care and facilitate shared decision-making.Connect with community resources for ongoing support, such as counseling and group support.  Recent Flowsheet Documentation  Taken 5/29/2025 1600 by Yvette Badillo RN  Supportive Measures: active listening utilized  Goal: Absence of Bleeding  Outcome: Progressing  Intervention: Monitor and Manage Bleeding  Description: Maintain bleeding precautions; provide safe environment and gentle care activities, such as positioning, oral and skin care.Avoid invasive procedures and medication that increase the risk of bleeding; monitor for signs of bleeding frequently.Anticipate need for fluid volume replacement (e.g., intravenous fluid, blood products) to maintain perfusion.Monitor for coagulopathy; anticipate the need for platelet infusion or heparin.Provide protective hemostasis by applying direct pressure to a visible bleeding site.  Recent Flowsheet Documentation  Taken 5/29/2025 0800 by Yvette Badillo RN  Bleeding Precautions:   blood pressure closely monitored   monitored for signs of bleeding  Bleeding Management: blood products administered  Goal: Blood Glucose Level Within Target Range  Outcome: Progressing  Goal: Absence of Infection Signs and Symptoms  Outcome: Progressing  Intervention: Initiate Sepsis Management  Description: Provide fluid therapy, such as crystalloid or albumin, to increase intravascular volume, organ perfusion and oxygen delivery.Provide respiratory support, such as oxygen therapy, noninvasive or invasive positive pressure ventilation, to achieve oxygenation and ventilation goal; avoid hyperoxemia.Obtain cultures prior to initiating antimicrobial  therapy when possible. Do not delay treatment for laboratory results in the presence of high suspicion or clinical indicators.Administer intravenous broad-spectrum antimicrobial therapy promptly.Implement hemodynamic monitoring to guide intravascular support based on individual targeted parameters.Determine and address underlying source of infection aggressively; implement transmission-based precautions and isolation, as indicated.  Recent Flowsheet Documentation  Taken 5/29/2025 0800 by Yvette Badillo RN  Stabilization Measures: blood products administered  Infection Management: aseptic technique maintained  Intervention: Promote Stabilization  Description: Monitor for signs of fluid responsiveness and overload; consider fluid adjustment and diuretic therapy.Anticipate use of vasoactive agent to support microperfusion and oxygen delivery; titrate to response.Monitor laboratory value, diagnostic test and clinical status trends for signs of infection progression and multiple organ failure.Assess effectiveness of, and potential for, de-escalation of the antimicrobial regimen daily; promote antimicrobial stewardship.Provide fever-reduction and comfort measures.Monitor and manage electrolyte imbalance, such as hypocalcemia.Use lung protective ventilation measures, such as low volume, inspiratory pressure, optimal positive end-expiratory pressure, to minimize the risk of ventilator-induced lung injury; ensure minute volume demands.Prepare for supportive therapy, such as prone positioning, corticosteroid therapy, coagulopathy management, CRRT (continuous renal replacement therapy), hemofiltration and cardiac-assist device.  Recent Flowsheet Documentation  Taken 5/29/2025 1600 by Yvette Badillo RN  Fluid/Electrolyte Management: fluids provided  Taken 5/29/2025 1200 by Yvette Badillo, RN  Fluid/Electrolyte Management: fluids provided  Taken 5/29/2025 0800 by Yvette Badillo, RN  Fluid/Electrolyte Management:  fluids provided  Intervention: Promote Recovery  Description: Encourage pulmonary hygiene, such as cough-enhancement and airway-clearance techniques, that may include use of incentive spirometry, deep breathing and cough.Encourage early rehabilitation and physical activity to optimize functional ability and activity tolerance, as well as minimize delirium.Promote energy conservation; minimize oxygen demand and consumption by adjusting environment, decreasing stimulation, maintaining normothermia and treating pain.Optimize fluid balance, nutrition intake, sleep and glycemic control to maintain tissue perfusion and enhance immune response.  Recent Flowsheet Documentation  Taken 5/29/2025 1800 by Yvette Badillo, RN  Activity Management: bedrest  Taken 5/29/2025 1700 by Yvette Badillo RN  Activity Management: bedrest  Taken 5/29/2025 1600 by Yvette Badillo RN  Activity Management: bedrest  Taken 5/29/2025 1500 by Yvette Badillo RN  Activity Management: bedrest  Taken 5/29/2025 1400 by Yvette Badillo RN  Activity Management: bedrest  Taken 5/29/2025 1300 by Yvette Badillo RN  Activity Management: bedrest  Taken 5/29/2025 1200 by Yvette Badillo RN  Activity Management: bedrest  Taken 5/29/2025 1100 by Yvette Badillo, RN  Activity Management: bedrest  Taken 5/29/2025 1000 by Yvette Badillo RN  Activity Management: bedrest  Taken 5/29/2025 0900 by Yvette Badillo, RN  Activity Management: bedrest  Taken 5/29/2025 0800 by Yvette Badillo, RN  Activity Management: bedrest  Taken 5/29/2025 0700 by Yvette Badillo RN  Activity Management: bedrest  Goal: Optimal Nutrition Delivery  Outcome: Progressing   Goal Outcome Evaluation:  Plan of Care Reviewed With: patient, parent        Progress: no change  Outcome Evaluation: VSS today. Levo gtt off since this a.m. Octreotide gtt ongoing. Dark stool x1 today. No c/o n/v. Is tolerating clear liquids.  Adequate UOP. Urine noted to be dark in  color (brown). K+ replaced today. Magnesium also replaced today. Lactic improved. GI here to see pt. this shift and states will be d/c'ing Octreotide tomorrow and continue IV protonix bid. Also wants intensivist to monitor for possible need for paracentesis in the future. PA with intensivist notified. Has been awake and A&O most of this shift but increased drowsiness noted post phenobarbital dose this afternoon. Resting quietly @ present.

## 2025-05-29 NOTE — PROGRESS NOTES
Patient is awake and alert.  Deeply jaundiced  Not complaining of any pain or shortness of breath  Abdomen distended from ascites per ultrasound there is a moderate amount of ascites but patient is not short of breath and hence the intensivist deferred  paracentesis  Assessment plan  Post EGD with severe esophagitis all the way up to the mid to upper esophagus with a small ulcer due to reflux  The esophageal mucosa was swollen due to portal hypertension and the ulcerations and esophagitis and hence could not see any varices to band  Portal hypertensive gastropathy was noted in the stomach with some old blood in the stomach  Duodenum without any ulcer  I recommend Carafate 1 g suspension 4 times daily  Pantoprazole 40 mg IV twice daily  Paracentesis as needed  Due to presence of esophageal ulcer and severe esophagitis steroid use is deferred for acute alcoholic hepatitis  Patient will benefit from referral and follow-up with hepatologist at a tertiary center  Due to ongoing drinking and liver failure patient has poor prognosis

## 2025-05-29 NOTE — PROCEDURES
"Insert Central Line At Bedside    Date/Time: 5/29/2025 2:35 AM    Performed by: Nitish Grimes PA-C  Authorized by: Nitish Grimes PA-C  Consent: The procedure was performed in an emergent situation  Patient identity confirmed: arm band and provided demographic data  Time out: Immediately prior to procedure a \"time out\" was called to verify the correct patient, procedure, equipment, support staff and site/side marked as required.  Indications: vascular access    Anesthesia:  Local Anesthetic: lidocaine 1% without epinephrine  Anesthetic total: 2 mL    Sedation:  Patient sedated: no    Preparation: skin prepped with 2% chlorhexidine  Skin prep agent dried: skin prep agent completely dried prior to procedure  Sterile barriers: all five maximum sterile barriers used - cap, mask, sterile gown, sterile gloves, and large sterile sheet  Hand hygiene: hand hygiene performed prior to central venous catheter insertion  Location details: right internal jugular  Patient position: flat  Catheter type: triple lumen  Catheter size: 7 Fr  Pre-procedure: landmarks identified  Ultrasound guidance: yes  Sterile ultrasound techniques: sterile gel and sterile probe covers were used  Number of attempts: 2  Successful placement: yes  Post-procedure: line sutured and dressing applied  Assessment: blood return through all ports, free fluid flow, placement verified by x-ray and no pneumothorax on x-ray  Patient tolerance: patient tolerated the procedure well with no immediate complications        "

## 2025-05-29 NOTE — ANESTHESIA POSTPROCEDURE EVALUATION
"Patient: Luciano Christiansen    Procedure Summary       Date: 05/28/25 Room / Location:  [facility]  /  [facility]    Anesthesia Start: 1400 Anesthesia Stop: 1449    Procedure: ESOPHAGOGASTRODUODENOSCOPY WITH ANESTHESIA Diagnosis:     Surgeons: Estela Ramos MD Provider: Carla Pulido CRNA    Anesthesia Type: general ASA Status: 3 - Emergent            Anesthesia Type: general    Vitals  Vitals Value Taken Time   /58 05/28/25 14:52   Temp 99.1 °F (37.3 °C) 05/28/25 14:41   Pulse 98 05/28/25 14:58   Resp 22 05/28/25 14:52   SpO2 91 % 05/28/25 14:58   Vitals shown include unfiled device data.        Post Anesthesia Care and Evaluation    Patient location during evaluation: PACU  Patient participation: complete - patient participated  Level of consciousness: awake and alert  Pain management: adequate    Airway patency: patent  Anesthetic complications: No anesthetic complications    Cardiovascular status: acceptable  Respiratory status: acceptable  Hydration status: acceptable    Comments: Blood pressure 108/58, pulse 102, temperature 98.5 °F (36.9 °C), temperature source Oral, resp. rate 16, height 188 cm (74\"), weight (!) 150 kg (329 lb 12.9 oz), SpO2 99%.    Pt discharged from PACU based on aline score >8    "

## 2025-05-29 NOTE — PLAN OF CARE
Goal Outcome Evaluation:   Pt initially A&Ox4, opened eyes spontaneously. He began c/o tremors, started Precedex. Pt had large, dark stool. Highest dose of Precedex was 0.2. Pt became more drowsy, BP soft, Precedex stopped. He was still A&Ox4 at this time, Arterial line started by JAYME Jameson to monitor BP more closely. Levophed started. At 2200 HGB was 8.1, recheck around 0000 was 6.1. At this time the pt was hard to keep awake. 2 units PRBCs ordered emergently, Right IJ central line placed by JAYME Jameson emergently. Attempted phone consent x2 with both mother and daughter listed in the chart, no answer. Pt is more awake this morning. A&Ox4, opens eyes spontaneously. NSR, BP stable on 0.02 Levo. Tolerating 4L n/c. Voids with external catheter. Remains on the Octreotide. Second unit of PRBCs infusing at this time, HBG on 0600 CBC was 6.8. No c/o pain at this time. Afebrile. Safety maintained.

## 2025-05-29 NOTE — CONSULTS
[facility] Palliative Care Services  Initial Consult    Attending Physician: Neo Preston MD  Referring Provider: Neo Preston MD    Patient Name: Luciano Christiansen  Date of Admission: 5/28/2025  Today's Date: 05/29/25     Reason for Referral: Goals of Care/Advance Care Planning    Code Status and Medical Interventions: CPR (Attempt to Resuscitate); Full Support   Ordered at: 05/28/25 1534     Code Status (Patient has no pulse and is not breathing):    CPR (Attempt to Resuscitate)     Medical Interventions (Patient has pulse or is breathing):    Full Support     Level Of Support Discussed With:    Patient      Subjective     HPI: 40 y.o. male with past medical history including alcoholism, diabetes mellitus, gout, hepatitis C, and hypertension.  Additional past medical history listed below.  According to chart review he has been hospitalized numerous times over the last year.  Hospitalized 7/4/2024-7/9/2024 due to concerns for alcohol withdrawal.  He was admitted to the critical care unit and placed on CIWA protocol.  During hospitalization found to be anemic and stool for occult blood was positive.  Noted plans to follow-up outpatient with GI for further workup.  He was discharged home.  Hospitalized 8/27/2024-9/3/2024 due to fatigue, generalized weakness, and confusion.  He was treated for hepatic encephalopathy and discharged home.  Evaluated in ED on 3/18/2025 due to dark stools.  Stool for occult blood positive.  CT of abdomen and pelvis revealed cirrhotic and steatotic liver with sequela of portal hypertension including splenomegaly and small esophageal varices.  Admission to hospital was recommended for further workup and treatment however he was apparently not agreeable and left AGAINST MEDICAL ADVICE.  Admitted 4/1/2025-4/5/2025 due to fall and electrolyte imbalance.  Noted he left AGAINST MEDICAL ADVICE.  Evaluated in ED on 4/18/2025 due to generalized abdominal pain and worsening  jaundice.  CT scan abdomen pelvis revealed findings concerning for acute calculus cholecystitis with multiple stones, gallbladder distention as well as wall thickening and mild pericholecystic fluid, liver cirrhosis with hepatosplenomegaly and recanalized periumbilical vein.  He was transferred to [facility] in [place], [place] for interventional radiology.  Discharge summary reviewed and noted he was given supportive care initially however liver enzymes failed to improve and he was started on prednisolone for acute alcoholic hepatitis.  Noted he did not require intervention for possible cholecystitis.  He was ultimately discharged home on 4/26/2025.  Patient presented to [facility] on 5/28/2025 related to generalized weakness and jaundice. Workup in ED revealed multiple abnormalities in labs including , alkaline phosphatase 315, albumin 2.6, , , total bilirubin 26.2, ammonia 92, lactate 10.8, procalcitonin 1.35, WBC 14.71, hemoglobin 9.5, hematocrit 28.0 and platelets 101.  Ethanol 0.126.  Chest x-ray revealed borderline prominence of central pulmonary vascular structures and linear left basilar densities favoring atelectasis.  UA revealed 3+ bacteria, 3+ bilirubin and positive for nitrite.  Does not appear urine culture obtained.  CT of head negative for acute intracranial abnormality.  CT abdomen pelvis visualized cirrhosis with sequela of portal hypertension including small volume ascites however negative for definite acute findings in abdomen/pelvis.  Ultrasound of abdomen completed revealing cholelithiasis with biliary sludge, common bile duct upper normal at 5 to 6 mm, cirrhotic morphology of the liver, and small volume perihepatic ascites.  He was admitted to the critical care unit for further workup and treatment.  GI consulted and recommended treatment of acute alcoholic withdrawal/DT and plans for EGD.  Noted overall poor prognosis.  Recommended transfer to  tertiary care center if he deteriorates.  According to chart review had EGD yesterday however unable to review operative note.  Began to have tremors, started on Precedex however was hypotensive and this was stopped.  Experienced hypotension and required Levophed earlier this morning.  Hemoglobin and hematocrit dropped to 6.1 and 18.4 around 1:30 AM and received 2 units PRBCs.  Labs collected this morning reviewed.  LFTs remain elevated however trending downward.  Hemoglobin and hematocrit 6.8 and 20.1, platelets low at 57,000.  Most recent hemoglobin and hematocrit checked at 9:10 AM shows some improvement to 7.3 and 21.5.  Palliative care has been consulted to discuss goals of care/advance care planning.  He is lying in bed, alert and in no apparent distress.  Denies any pain.  Reports he is fatigued.  No visitors at bedside.  Discussed with nursing and intensivist PA.     Advance Care Planning   Advanced Directives: Patient reports not having an advance directive. Declines further assistance.    Advance Care Planning Discussion: Mr. Christiansen is agreeable to goals of care discussion.  Explored previous discussions with providers regarding prognosis.  He reflected on recent hospitalizations.  Shared he unfortunately will seem to get little better and then essentially relapse and start using alcohol again and experience decline in end up being hospitalized.  Shared it has been a vicious cycle.  Support provided.  He reflected on several months when he was able to get sober.  Shared unfortunately he had decline and then when he tries to back off alcohol intake will experience symptoms of withdrawal and then increase his alcohol intake or ultimately require hospitalization.  Reports he has not been able to to go to inpatient rehab in the past due to financial reasons as his insurance apparently does not cover this.  Shared he has been having difficulty working now due to his health and inability to stand and has looked  into applying for disability.  We discussed concerns with overall health and poor prognosis.  He reflected on past discussions and shared he is aware.  He expressed he is aware that if he does not change his current pattern it will ultimately lead to death.  Reports most of the time increased stress and anxiety lead him to drinking and sometimes he becomes more depressed and this also contributes to increased alcohol intake.  Explored whether he had ever discussed managing anxiety and depression in the past with medication and/or counseling and he denied.  He appears to be open to this.  He also expressed interest in treatment for detoxing.  He shared he realizes importance of becoming sober and attempts to slow some of the damage that has already been done.  Shared he has discussed transplant in the past as well.  Explored discussions regarding medical priorities/goals of care.  Shared he has thought about this time however needs to speak with his mother and children further.  We discussed potential scenarios and interventions as well as risk and benefits.  Expressed wishes to continue with CPR and full support interventions at this time.  Shared he would not wish to be on ventilator support permanently or in a vegetative state where he would be dependent on others.  Reports he will discuss with his family further as well.  At this time appears to be hopeful for improvement.  He seems to be interested in changing lifestyle and getting help outpatient.  He was appreciative of discussion however had to use restroom therefore discussion ended.  Encouraged questions/concerns of arise.  Support provided.    The patient receives support from his children and parent. Patient's children are his next of kin.    Due to the palliative care topics discussed including goals of care, treatment options, and medical priorities we will establish an advance care plan.      Review of Systems   Constitutional: Positive for  malaise/fatigue.   Cardiovascular:  Negative for chest pain.   Respiratory:  Negative for shortness of breath.    Neurological:  Positive for weakness.   Psychiatric/Behavioral:  The patient is nervous/anxious (reports anxiety intermittently).      Pain Assessment  CPOT Facial Expression: 0-->relaxed, neutral  CPOT Body Movements: 0-->absence of movements  CPOT Muscle Tension: 0-->relaxed  Ventilator Compliance/Vocalization: 0-->talking in normal tone or no sound  CPOT Score: 0  Pain Location: abdomen  Past Medical History:   Diagnosis Date    Alcoholism     currently drinking    Diabetes mellitus     Gout     Hepatitis C     Hypertension     Jaundice       Past Surgical History:   Procedure Laterality Date    ENDOSCOPY N/A 5/28/2025    Procedure: ESOPHAGOGASTRODUODENOSCOPY WITH ANESTHESIA;  Surgeon: Estela Ramos MD;  Location: [facility] OR;  Service: Gastroenterology;  Laterality: N/A;  pre op: GI bleed  post op: esophageal ulcer, esophagitis  pcp: Adrien Varghese MD    VASECTOMY        Social History     Socioeconomic History    Marital status: Single   Tobacco Use    Smoking status: Some Days     Current packs/day: 0.25     Average packs/day: 0.5 packs/day for 15.4 years (7.1 ttl pk-yrs)     Types: Cigarettes     Start date: 2010    Smokeless tobacco: Never   Vaping Use    Vaping status: Never Used    Passive vaping exposure: Yes   Substance and Sexual Activity    Alcohol use: Yes     Alcohol/week: 12.0 standard drinks of alcohol     Types: 12 Cans of beer per week     Comment: malt liquor (48 oz daily)    Drug use: Yes     Types: Marijuana    Sexual activity: Yes     History reviewed. No pertinent family history.   No Known Allergies    Objective   Diagnostics: Reviewed    Intake/Output Summary (Last 24 hours) at 5/29/2025 0809  Last data filed at 5/29/2025 0755  Gross per 24 hour   Intake 2177.91 ml   Output --   Net 2177.91 ml       Current medications patient is presently taking including all prescriptions,  over-the-counter, herbals and vitamin/mineral/dietary (nutritional) supplements with reviewed including route, type, dose and frequency and are current per MAR at time of dictation.  Current Facility-Administered Medications   Medication Dose Route Frequency Provider Last Rate Last Admin    sennosides-docusate (PERICOLACE) 8.6-50 MG per tablet 2 tablet  2 tablet Oral BID Neo Preston MD        And    polyethylene glycol (MIRALAX) packet 17 g  17 g Oral Daily PRN Neo Preston MD        And    bisacodyl (DULCOLAX) EC tablet 5 mg  5 mg Oral Daily PRN Neo Preston MD        And    bisacodyl (DULCOLAX) suppository 10 mg  10 mg Rectal Daily PRN Neo Preston MD        Calcium Replacement - Follow Nurse / BPA Driven Protocol   Not Applicable PRN Neo Preston MD        [Transfer Hold] Chlorhexidine Gluconate Cloth 2 % pads 1 Application  1 Application Topical Once Aryan Erwin PA-C        Chlorhexidine Gluconate Cloth 2 % pads 1 Application  1 Application Topical Q24H Neo Preston MD   1 Application at 05/29/25 0512    dexmedetomidine (PRECEDEX) 400 mcg in 100 mL NS infusion  0.2-1.5 mcg/kg/hr Intravenous Titrated Nitish Grimes PA-C   Stopped at 05/29/25 0115    FLUoxetine (PROzac) capsule 20 mg  20 mg Oral Daily Aryan Erwin PA-C   20 mg at 05/28/25 1710    folic acid (FOLVITE) tablet 1 mg  1 mg Oral Daily Aryan Erwin PA-C   1 mg at 05/28/25 1637    lactulose solution 20 g  20 g Oral TID Aryan Erwin PA-C   20 g at 05/28/25 2046    levothyroxine (SYNTHROID, LEVOTHROID) tablet 75 mcg  75 mcg Oral Q AM Aryan Erwin PA-C   75 mcg at 05/29/25 0512    Magnesium Standard Dose Replacement - Follow Nurse / BPA Driven Protocol   Not Applicable PRN Neo Preston MD        magnesium sulfate 2g/50 mL (PREMIX) infusion  2 g Intravenous Q2H Nitish Grimes PA-C   2 g at 05/29/25 0743    Morphine sulfate (PF) injection 2 mg  2 mg Intravenous Q6H PRN Neo Preston MD         mupirocin (BACTROBAN) 2 % nasal ointment 1 Application  1 Application Each Nare BID Neo Preston MD   1 Application at 05/28/25 2144    nitroglycerin (NITROSTAT) SL tablet 0.4 mg  0.4 mg Sublingual Q5 Min PRN Neo Preston MD        norepinephrine (LEVOPHED) 8 mg in 250 mL NS infusion (premix)  0.02-0.3 mcg/kg/min Intravenous Titrated Nitish Grimes PA-C   Stopped at 05/29/25 0719    octreotide (sandoSTATIN) 500 mcg in sodium chloride 0.9 % 100 mL (5 mcg/mL) infusion  25 mcg/hr Intravenous Continuous Deniz Wolf MD 5 mL/hr at 05/29/25 0000 25 mcg/hr at 05/29/25 0000    ondansetron (ZOFRAN) injection 4 mg  4 mg Intravenous Q6H PRN Neo Preston MD   4 mg at 05/28/25 2046    pantoprazole (PROTONIX) injection 40 mg  40 mg Intravenous BID AC Aryan Erwin PA-C   40 mg at 05/29/25 0642    PHENobarbital 130 mg in sodium chloride 0.9 % 100 mL IVPB  130 mg Intravenous Q8H Neo Preston  mL/hr at 05/28/25 2144 130 mg at 05/28/25 2144    Followed by    [START ON 5/30/2025] PHENobarbital injection 65 mg  65 mg Intravenous Q8H Neo Preston MD        Followed by    [START ON 6/1/2025] PHENobarbital injection 32.5 mg  32.5 mg Intravenous Q12H Neo Preston MD        Phosphorus Replacement - Follow Nurse / BPA Driven Protocol   Not Applicable PRN Neo Preston MD        piperacillin-tazobactam (ZOSYN) 3.375 g IVPB in 100 mL NS MBP (CD)  3.375 g Intravenous Q8H Neo Preston MD   3.375 g at 05/29/25 0646    potassium chloride (KLOR-CON) packet 40 mEq  40 mEq Oral Q4H Nitish Grimes PA-C   40 mEq at 05/29/25 0642    Potassium Replacement - Follow Nurse / BPA Driven Protocol   Not Applicable PRN Aryan Erwin PA-C        sodium chloride 0.9 % flush 10 mL  10 mL Intravenous PRN Neo Preston MD        sodium chloride 0.9 % flush 10 mL  10 mL Intravenous Q12H Neo Preston MD   10 mL at 05/28/25 2047    sodium chloride 0.9 % flush 10 mL  10 mL Intravenous PRN Bird, Neo R,  "MD        sodium chloride 0.9 % infusion 40 mL  40 mL Intravenous PRN Neo Preston MD        thiamine (VITAMIN B-1) tablet 250 mg  250 mg Oral Daily Aryan Erwin PA-C        dexmedetomidine, 0.2-1.5 mcg/kg/hr, Last Rate: Stopped (05/29/25 0115)  norepinephrine, 0.02-0.3 mcg/kg/min, Last Rate: Stopped (05/29/25 0719)  octreotide (SandoSTATIN) infusion, 25 mcg/hr, Last Rate: 25 mcg/hr (05/29/25 0000)       senna-docusate sodium **AND** polyethylene glycol **AND** bisacodyl **AND** bisacodyl    Calcium Replacement - Follow Nurse / BPA Driven Protocol    Magnesium Standard Dose Replacement - Follow Nurse / BPA Driven Protocol    Morphine    nitroglycerin    ondansetron    Phosphorus Replacement - Follow Nurse / BPA Driven Protocol    Potassium Replacement - Follow Nurse / BPA Driven Protocol    sodium chloride    sodium chloride    sodium chloride  Assessment   /72   Pulse 94   Temp 98.3 °F (36.8 °C) (Axillary)   Resp 16   Ht 188 cm (74\")   Wt (!) 150 kg (329 lb 12.9 oz)   SpO2 96%   BMI 42.34 kg/m²     Physical Exam  Vitals and nursing note reviewed.   Constitutional:       General: He is not in acute distress.     Appearance: He is morbidly obese. He is ill-appearing.   HENT:      Head: Normocephalic and atraumatic.   Eyes:      General: Lids are normal. Scleral icterus present.      Extraocular Movements: Extraocular movements intact.   Neck:      Vascular: No JVD.      Trachea: Trachea normal.   Cardiovascular:      Rate and Rhythm: Tachycardia present.   Pulmonary:      Effort: Pulmonary effort is normal.   Musculoskeletal:      Cervical back: Neck supple.   Skin:     General: Skin is warm and dry.      Coloration: Skin is jaundiced.   Neurological:      Mental Status: He is alert and oriented to person, place, and time.   Psychiatric:         Mood and Affect: Affect is flat.         Behavior: Behavior is cooperative.     Functional status: Palliative Performance Scale Score: Performance 50% " based on the following measures: Ambulation: Mainly sit or lie down, Activity and Evidence of Disease: Unable to do any work, extensive evidence of disease, Self-Care: Considerable assistance required,  Intake: Normal or reduced, LOC: Full or confusion.  Nutritional status: Albumin 2.6. Body mass index is 42.34 kg/m².  Patient status: Disease state: Controlled with current treatments.    Active Hospital Problems    Diagnosis     **Upper GI bleed      Impression/Problem List:  Cirrhosis/Decompensated liver failure     - MELD 30 points/52.6% Estimated 3-Month Mortality  GI bleed  Anemia  Thrombocytopenia  Alcoholism  Hypoalbuminemia  Morbid obesity (BMI 42.34)    Plan / Recommendations     Palliative Care Encounter   Mr. Christiansen is agreeable to goals of care discussion.  Details of discussion above.   Shared it has been a vicious cycle with his alcoholism.  He shared difficulty with symptoms of withdrawal when he tries to cut back on his alcohol intake and attempts to be sober and he ultimately requires hospitalization for symptoms or will start drinking again.  Support provided.    Reports he has not been able to to go to inpatient rehab in the past due to financial reasons however expressed interest in assistance for alcoholism.  Discussed with PA.  Case management has been consulted.   Discussed concerns with overall health and poor prognosis.  Mr. Christiansen expressed he is aware that if he does not change his current pattern it will lead to death.    Medical priorities discussed. Expressed wishes to continue with CPR and full support interventions at this time.    Shared he would not wish to be on ventilator support permanently or in a vegetative state where he would be dependent on others.    Expressed plans to discuss with his mother and children more.  Encouraged ongoing discussions.   At this time appears to be hopeful for improvement.  He seems to be interested in changing lifestyle and getting help outpatient.     He was appreciative of discussion however had to use restroom therefore discussion ended.  Encouraged questions/concerns of arise.  Support provided.     Thank you for allowing us to participate in patient's plan of care. Palliative Care Team will continue to follow patient.     I spent an additional 25 minutes on advance care planning, goals of care, and code status discussion.  Consent was obtained for ACP discussion.     Electronically signed by, LÁZARO Stokes, 05/29/25.

## 2025-05-30 LAB
ALBUMIN SERPL-MCNC: 2.6 G/DL (ref 3.5–5.2)
ALBUMIN/GLOB SERPL: 1.7 G/DL
ALP SERPL-CCNC: 196 U/L (ref 39–117)
ALT SERPL W P-5'-P-CCNC: 90 U/L (ref 1–41)
ANION GAP SERPL CALCULATED.3IONS-SCNC: 10 MMOL/L (ref 5–15)
APTT PPP: 57.4 SECONDS (ref 24.5–36)
AST SERPL-CCNC: 225 U/L (ref 1–40)
BASOPHILS # BLD AUTO: 0.05 10*3/MM3 (ref 0–0.2)
BASOPHILS NFR BLD AUTO: 0.9 % (ref 0–1.5)
BH BB BLOOD EXPIRATION DATE: NORMAL
BH BB BLOOD EXPIRATION DATE: NORMAL
BH BB BLOOD TYPE BARCODE: 9500
BH BB BLOOD TYPE BARCODE: 9500
BH BB DISPENSE STATUS: NORMAL
BH BB DISPENSE STATUS: NORMAL
BH BB PRODUCT CODE: NORMAL
BH BB PRODUCT CODE: NORMAL
BH BB UNIT NUMBER: NORMAL
BH BB UNIT NUMBER: NORMAL
BILIRUB SERPL-MCNC: 24.2 MG/DL (ref 0–1.2)
BUN SERPL-MCNC: 26.7 MG/DL (ref 6–20)
BUN/CREAT SERPL: 46.8 (ref 7–25)
CALCIUM SPEC-SCNC: 7.4 MG/DL (ref 8.6–10.5)
CHLORIDE SERPL-SCNC: 98 MMOL/L (ref 98–107)
CO2 SERPL-SCNC: 26 MMOL/L (ref 22–29)
CREAT SERPL-MCNC: 0.57 MG/DL (ref 0.76–1.27)
CROSSMATCH INTERPRETATION: NORMAL
CROSSMATCH INTERPRETATION: NORMAL
DEPRECATED RDW RBC AUTO: 77 FL (ref 37–54)
EGFRCR SERPLBLD CKD-EPI 2021: 127.1 ML/MIN/1.73
EOSINOPHIL # BLD AUTO: 0.13 10*3/MM3 (ref 0–0.4)
EOSINOPHIL NFR BLD AUTO: 2.3 % (ref 0.3–6.2)
ERYTHROCYTE [DISTWIDTH] IN BLOOD BY AUTOMATED COUNT: 23.3 % (ref 12.3–15.4)
GLOBULIN UR ELPH-MCNC: 1.5 GM/DL
GLUCOSE SERPL-MCNC: 170 MG/DL (ref 65–99)
HCT VFR BLD AUTO: 21.1 % (ref 37.5–51)
HGB BLD-MCNC: 7.1 G/DL (ref 13–17.7)
IMM GRANULOCYTES # BLD AUTO: 0.09 10*3/MM3 (ref 0–0.05)
IMM GRANULOCYTES NFR BLD AUTO: 1.6 % (ref 0–0.5)
INR PPP: 2.36 (ref 0.91–1.09)
LYMPHOCYTES # BLD AUTO: 0.87 10*3/MM3 (ref 0.7–3.1)
LYMPHOCYTES NFR BLD AUTO: 15.6 % (ref 19.6–45.3)
MAGNESIUM SERPL-MCNC: 1.6 MG/DL (ref 1.6–2.6)
MCH RBC QN AUTO: 32.6 PG (ref 26.6–33)
MCHC RBC AUTO-ENTMCNC: 33.6 G/DL (ref 31.5–35.7)
MCV RBC AUTO: 96.8 FL (ref 79–97)
MONOCYTES # BLD AUTO: 0.51 10*3/MM3 (ref 0.1–0.9)
MONOCYTES NFR BLD AUTO: 9.2 % (ref 5–12)
NEUTROPHILS NFR BLD AUTO: 3.91 10*3/MM3 (ref 1.7–7)
NEUTROPHILS NFR BLD AUTO: 70.4 % (ref 42.7–76)
NRBC BLD AUTO-RTO: 0 /100 WBC (ref 0–0.2)
PHOSPHATE SERPL-MCNC: 1.7 MG/DL (ref 2.5–4.5)
PHOSPHATE SERPL-MCNC: 2 MG/DL (ref 2.5–4.5)
PHOSPHATE SERPL-MCNC: 2.1 MG/DL (ref 2.5–4.5)
PLATELET # BLD AUTO: 61 10*3/MM3 (ref 140–450)
PMV BLD AUTO: 10.8 FL (ref 6–12)
POTASSIUM SERPL-SCNC: 3.2 MMOL/L (ref 3.5–5.2)
POTASSIUM SERPL-SCNC: 3.3 MMOL/L (ref 3.5–5.2)
POTASSIUM SERPL-SCNC: 3.3 MMOL/L (ref 3.5–5.2)
POTASSIUM SERPL-SCNC: 3.6 MMOL/L (ref 3.5–5.2)
PROT SERPL-MCNC: 4.1 G/DL (ref 6–8.5)
PROTHROMBIN TIME: 27.2 SECONDS (ref 11.8–14.8)
RBC # BLD AUTO: 2.18 10*6/MM3 (ref 4.14–5.8)
SODIUM SERPL-SCNC: 134 MMOL/L (ref 136–145)
UNIT  ABO: NORMAL
UNIT  ABO: NORMAL
UNIT  RH: NORMAL
UNIT  RH: NORMAL
WBC NRBC COR # BLD AUTO: 5.56 10*3/MM3 (ref 3.4–10.8)

## 2025-05-30 PROCEDURE — 25010000002 PHYTONADIONE 10 MG/ML SOLUTION: Performed by: PHYSICIAN ASSISTANT

## 2025-05-30 PROCEDURE — 25010000002 MAGNESIUM SULFATE 2 GM/50ML SOLUTION: Performed by: PHYSICIAN ASSISTANT

## 2025-05-30 PROCEDURE — 84132 ASSAY OF SERUM POTASSIUM: CPT | Performed by: INTERNAL MEDICINE

## 2025-05-30 PROCEDURE — 25010000002 PHENOBARBITAL PER 120 MG: Performed by: INTERNAL MEDICINE

## 2025-05-30 PROCEDURE — 83735 ASSAY OF MAGNESIUM: CPT | Performed by: PHYSICIAN ASSISTANT

## 2025-05-30 PROCEDURE — 97162 PT EVAL MOD COMPLEX 30 MIN: CPT

## 2025-05-30 PROCEDURE — 85610 PROTHROMBIN TIME: CPT | Performed by: PHYSICIAN ASSISTANT

## 2025-05-30 PROCEDURE — 99232 SBSQ HOSP IP/OBS MODERATE 35: CPT

## 2025-05-30 PROCEDURE — 84100 ASSAY OF PHOSPHORUS: CPT | Performed by: PHYSICIAN ASSISTANT

## 2025-05-30 PROCEDURE — 25010000002 PIPERACILLIN SOD-TAZOBACTAM PER 1 G: Performed by: INTERNAL MEDICINE

## 2025-05-30 PROCEDURE — 80053 COMPREHEN METABOLIC PANEL: CPT | Performed by: PHYSICIAN ASSISTANT

## 2025-05-30 PROCEDURE — 85730 THROMBOPLASTIN TIME PARTIAL: CPT | Performed by: PHYSICIAN ASSISTANT

## 2025-05-30 PROCEDURE — 25010000002 OCTREOTIDE PER 25 MCG: Performed by: STUDENT IN AN ORGANIZED HEALTH CARE EDUCATION/TRAINING PROGRAM

## 2025-05-30 PROCEDURE — 25010000002 CALCIUM GLUCONATE 2-0.675 GM/100ML-% SOLUTION: Performed by: PHYSICIAN ASSISTANT

## 2025-05-30 PROCEDURE — 94799 UNLISTED PULMONARY SVC/PX: CPT

## 2025-05-30 PROCEDURE — 25010000002 VITAMIN K1 PER 1 MG: Performed by: INTERNAL MEDICINE

## 2025-05-30 PROCEDURE — 94761 N-INVAS EAR/PLS OXIMETRY MLT: CPT

## 2025-05-30 PROCEDURE — 84132 ASSAY OF SERUM POTASSIUM: CPT

## 2025-05-30 PROCEDURE — 97166 OT EVAL MOD COMPLEX 45 MIN: CPT | Performed by: OCCUPATIONAL THERAPIST

## 2025-05-30 PROCEDURE — 84100 ASSAY OF PHOSPHORUS: CPT | Performed by: INTERNAL MEDICINE

## 2025-05-30 PROCEDURE — 85025 COMPLETE CBC W/AUTO DIFF WBC: CPT | Performed by: PHYSICIAN ASSISTANT

## 2025-05-30 PROCEDURE — 25010000002 POTASSIUM CHLORIDE PER 2 MEQ

## 2025-05-30 RX ORDER — POTASSIUM CHLORIDE 29.8 MG/ML
INJECTION INTRAVENOUS
Status: COMPLETED
Start: 2025-05-30 | End: 2025-05-30

## 2025-05-30 RX ORDER — CALCIUM GLUCONATE 20 MG/ML
2000 INJECTION, SOLUTION INTRAVENOUS ONCE
Status: COMPLETED | OUTPATIENT
Start: 2025-05-30 | End: 2025-05-30

## 2025-05-30 RX ORDER — PHYTONADIONE 10 MG/ML
10 INJECTION, EMULSION INTRAMUSCULAR; INTRAVENOUS; SUBCUTANEOUS ONCE
Status: COMPLETED | OUTPATIENT
Start: 2025-05-30 | End: 2025-05-30

## 2025-05-30 RX ORDER — MAGNESIUM SULFATE HEPTAHYDRATE 40 MG/ML
2 INJECTION, SOLUTION INTRAVENOUS ONCE
Status: COMPLETED | OUTPATIENT
Start: 2025-05-30 | End: 2025-05-30

## 2025-05-30 RX ORDER — PENTOXIFYLLINE 400 MG/1
400 TABLET, EXTENDED RELEASE ORAL
Status: DISCONTINUED | OUTPATIENT
Start: 2025-05-30 | End: 2025-06-04 | Stop reason: HOSPADM

## 2025-05-30 RX ORDER — POTASSIUM CHLORIDE 1500 MG/1
40 TABLET, EXTENDED RELEASE ORAL EVERY 4 HOURS
Status: COMPLETED | OUTPATIENT
Start: 2025-05-30 | End: 2025-05-30

## 2025-05-30 RX ORDER — POTASSIUM CHLORIDE 29.8 MG/ML
20 INJECTION INTRAVENOUS
Status: COMPLETED | OUTPATIENT
Start: 2025-05-31 | End: 2025-05-31

## 2025-05-30 RX ORDER — TRAMADOL HYDROCHLORIDE 50 MG/1
50 TABLET ORAL EVERY 6 HOURS PRN
Status: DISCONTINUED | OUTPATIENT
Start: 2025-05-30 | End: 2025-06-04 | Stop reason: HOSPADM

## 2025-05-30 RX ORDER — PHYTONADIONE 2 MG/ML
10 INJECTION, EMULSION INTRAMUSCULAR; INTRAVENOUS; SUBCUTANEOUS ONCE
Status: COMPLETED | OUTPATIENT
Start: 2025-05-30 | End: 2025-05-30

## 2025-05-30 RX ORDER — POTASSIUM CHLORIDE 29.8 MG/ML
20 INJECTION INTRAVENOUS
Status: COMPLETED | OUTPATIENT
Start: 2025-05-30 | End: 2025-05-30

## 2025-05-30 RX ADMIN — TRAMADOL HYDROCHLORIDE 50 MG: 50 TABLET, COATED ORAL at 12:43

## 2025-05-30 RX ADMIN — POTASSIUM CHLORIDE 20 MEQ: 29.8 INJECTION, SOLUTION INTRAVENOUS at 02:05

## 2025-05-30 RX ADMIN — Medication 10 ML: at 09:30

## 2025-05-30 RX ADMIN — FOLIC ACID 1 MG: 1 TABLET ORAL at 09:30

## 2025-05-30 RX ADMIN — MAGNESIUM SULFATE HEPTAHYDRATE 2 G: 40 INJECTION, SOLUTION INTRAVENOUS at 07:56

## 2025-05-30 RX ADMIN — POTASSIUM & SODIUM PHOSPHATES POWDER PACK 280-160-250 MG 2 PACKET: 280-160-250 PACK at 15:44

## 2025-05-30 RX ADMIN — PHENOBARBITAL SODIUM 130 MG: 130 INJECTION INTRAMUSCULAR at 14:01

## 2025-05-30 RX ADMIN — Medication 1 APPLICATION: at 21:11

## 2025-05-30 RX ADMIN — Medication 1 APPLICATION: at 09:30

## 2025-05-30 RX ADMIN — NOREPINEPHRINE BITARTRATE 0.02 MCG/KG/MIN: 0.03 INJECTION, SOLUTION INTRAVENOUS at 02:05

## 2025-05-30 RX ADMIN — PHYTONADIONE 10 MG: 2 INJECTION, EMULSION INTRAMUSCULAR; INTRAVENOUS; SUBCUTANEOUS at 18:45

## 2025-05-30 RX ADMIN — PANTOPRAZOLE SODIUM 40 MG: 40 INJECTION, POWDER, FOR SOLUTION INTRAVENOUS at 18:46

## 2025-05-30 RX ADMIN — PHENOBARBITAL SODIUM 65 MG: 65 INJECTION INTRAMUSCULAR; INTRAVENOUS at 21:11

## 2025-05-30 RX ADMIN — CHLORHEXIDINE GLUCONATE 1 APPLICATION: 500 CLOTH TOPICAL at 03:18

## 2025-05-30 RX ADMIN — CALCIUM GLUCONATE 2000 MG: 20 INJECTION, SOLUTION INTRAVENOUS at 07:56

## 2025-05-30 RX ADMIN — LACTULOSE 20 G: 20 SOLUTION ORAL at 21:11

## 2025-05-30 RX ADMIN — Medication 10 ML: at 21:11

## 2025-05-30 RX ADMIN — PHYTONADIONE 10 MG: 10 INJECTION, EMULSION INTRAMUSCULAR; INTRAVENOUS; SUBCUTANEOUS at 12:38

## 2025-05-30 RX ADMIN — LACTULOSE 20 G: 20 SOLUTION ORAL at 15:44

## 2025-05-30 RX ADMIN — POTASSIUM CHLORIDE 40 MEQ: 1500 TABLET, EXTENDED RELEASE ORAL at 14:03

## 2025-05-30 RX ADMIN — LEVOTHYROXINE SODIUM 75 MCG: 0.07 TABLET ORAL at 05:53

## 2025-05-30 RX ADMIN — PENTOXIFYLLINE 400 MG: 400 TABLET, EXTENDED RELEASE ORAL at 12:38

## 2025-05-30 RX ADMIN — FLUOXETINE HYDROCHLORIDE 20 MG: 20 CAPSULE ORAL at 09:30

## 2025-05-30 RX ADMIN — TRAMADOL HYDROCHLORIDE 50 MG: 50 TABLET, COATED ORAL at 18:51

## 2025-05-30 RX ADMIN — PHENOBARBITAL SODIUM 130 MG: 130 INJECTION INTRAMUSCULAR at 05:48

## 2025-05-30 RX ADMIN — OCTREOTIDE ACETATE 25 MCG/HR: 500 INJECTION, SOLUTION INTRAVENOUS; SUBCUTANEOUS at 17:22

## 2025-05-30 RX ADMIN — POTASSIUM PHOSPHATE, MONOBASIC AND POTASSIUM PHOSPHATE, DIBASIC 15 MMOL: 224; 236 INJECTION, SOLUTION, CONCENTRATE INTRAVENOUS at 06:07

## 2025-05-30 RX ADMIN — LACTULOSE 20 G: 20 SOLUTION ORAL at 09:30

## 2025-05-30 RX ADMIN — PANTOPRAZOLE SODIUM 40 MG: 40 INJECTION, POWDER, FOR SOLUTION INTRAVENOUS at 07:55

## 2025-05-30 RX ADMIN — POTASSIUM CHLORIDE 20 MEQ: 29.8 INJECTION, SOLUTION INTRAVENOUS at 03:18

## 2025-05-30 RX ADMIN — PENTOXIFYLLINE 400 MG: 400 TABLET, EXTENDED RELEASE ORAL at 18:45

## 2025-05-30 RX ADMIN — PIPERACILLIN AND TAZOBACTAM 3.38 G: 3; .375 INJECTION, POWDER, FOR SOLUTION INTRAVENOUS at 07:55

## 2025-05-30 RX ADMIN — PIPERACILLIN AND TAZOBACTAM 3.38 G: 3; .375 INJECTION, POWDER, FOR SOLUTION INTRAVENOUS at 15:44

## 2025-05-30 RX ADMIN — POTASSIUM CHLORIDE 40 MEQ: 1500 TABLET, EXTENDED RELEASE ORAL at 18:45

## 2025-05-30 RX ADMIN — THIAMINE HCL TAB 100 MG 250 MG: 100 TAB at 09:30

## 2025-05-30 RX ADMIN — PIPERACILLIN AND TAZOBACTAM 3.38 G: 3; .375 INJECTION, POWDER, FOR SOLUTION INTRAVENOUS at 23:32

## 2025-05-30 NOTE — PROGRESS NOTES
[facility] Intensivist Services  INPATIENT PROGRESS NOTE    Patient Name: Luciano Howell  Date of Admission: 5/28/2025  Today's Date: 05/30/25  Length of Stay:  LOS: 2 days   Primary Care Physician: Adrien Varghese MD  Next of Kin: Primary Emergency Contact: MAGO HOWELL Home Phone: [phone]      Subjective   Chief Complaint: Generalized weakness, difficulty walking, dark stools, hematemesis     Abdominal Pain  Dizziness  Symptoms include abdominal pain.       40 year old male with a PMH of alcoholism, diabetes mellitus, gout, hepatitis C, hypertension, and jaundice admitted after presenting to ED with complaints of generalized weakness, difficulty walking, hematemesis, and melena. Patient continues to drink and states he drinks about 4-5 twelve ounce malt liquor drinks daily.  He was recently in the emergency department (4/18/25) and noted to have cholecystitis with stones for which he was transferred to [facility]; however, he was deemed unsafe for surgery at that time.  Prior to this, he was admitted to our hospital on 4/1/2025 with alcohol use and fall.  However, he left AMA on 4/4/25. He was admitted to CCU for close monitoring with his GIB and underwent EGD to further evaluate this.    Interval history:  5/29: EGD from 5/28 showed severe esophagitis all the way up to the mid to upper esophagus with a small ulcer due to reflux. Per GI, the esophageal mucosa was swollen due to portal hypertension, ulcerations, and esophagitis, and therefore, he could not see any varices to band. Patient's Hgb was quite low this morning. He received 2 u PRBC with improvement of Hgb to 7.3 and Hct 21.5. Dr. Preston and I spoke with the patient again about the severity of his liver failure and the need for complete cessation from alcohol in order to have a chance of long term survival. Patient voiced his understanding and would like to seek help. Case management has been consulted.  Palliative team also on board. Patient had generalized malaise, but no other complaints for me at time of my exam. Blood pressure did drop overnight, requiring low dose levophed infusion. However, blood pressure has significantly improved after 2 PRBC, and patient is now off vasopressors.     5/30: Patient intermittently requiring levophed infusion. Currently off levophed. H/H stable this morning. Patient has no complaints for me this morning.     Review of Systems   Gastrointestinal:  Positive for abdominal pain.   Neurological:  Positive for dizziness.      All pertinent negatives and positives are as above. All other systems have been reviewed and are negative unless otherwise stated.     Past Medical and Past Surgical History   Active and Resolved Problems  Active Hospital Problems    Diagnosis  POA    **Upper GI bleed [K92.2]  Yes      Resolved Hospital Problems   No resolved problems to display.       Past Medical History:   Past Medical History:   Diagnosis Date    Alcoholism     currently drinking    Diabetes mellitus     Gout     Hepatitis C     Hypertension     Jaundice      Past Surgical History:   Past Surgical History:   Procedure Laterality Date    ENDOSCOPY N/A 5/28/2025    Procedure: ESOPHAGOGASTRODUODENOSCOPY WITH ANESTHESIA;  Surgeon: Estela Ramos MD;  Location: [facility];  Service: Gastroenterology;  Laterality: N/A;  pre op: GI bleed  post op: esophageal ulcer, esophagitis  pcp: Adrien Varghese MD    VASECTOMY       Social and Family History   Family History:  family history is not on file.    Tobacco/Social History:  reports that he has been smoking cigarettes. He started smoking about 15 years ago. He has a 7.1 pack-year smoking history. He has never used smokeless tobacco. He reports current alcohol use of about 12.0 standard drinks of alcohol per week. He reports current drug use. Drug: Marijuana.    Allergies   Allergies:   He has no known allergies.    Objective    Temp:  [97.9 °F (36.6  "°C)-99 °F (37.2 °C)] 98.4 °F (36.9 °C)  Heart Rate:  [79-95] 86  Resp:  [12-16] 12  BP: ()/(42-86) 91/77  Physical Exam  Vitals and nursing note reviewed.   Constitutional:       General: He is awake. He is not in acute distress.     Appearance: He is ill-appearing (chronically ill-appearing). He is not diaphoretic.   HENT:      Head: Normocephalic.      Nose: Nose normal.      Mouth/Throat:      Mouth: Mucous membranes are moist.   Eyes:      General: Scleral icterus present.      Extraocular Movements: Extraocular movements intact.      Pupils: Pupils are equal, round, and reactive to light.   Cardiovascular:      Rate and Rhythm: Normal rate and regular rhythm.      Pulses: Normal pulses.   Pulmonary:      Effort: Pulmonary effort is normal. No respiratory distress.      Breath sounds: Normal breath sounds. No stridor. No wheezing or rhonchi.   Abdominal:      General: Bowel sounds are normal. There is distension.      Palpations: Abdomen is soft.      Tenderness: There is abdominal tenderness (\"generalized discomfort\").   Musculoskeletal:      Cervical back: Normal range of motion and neck supple.   Skin:     General: Skin is warm.      Capillary Refill: Capillary refill takes less than 2 seconds.      Coloration: Skin is jaundiced.   Neurological:      General: No focal deficit present.      Mental Status: He is alert. Mental status is at baseline.   Psychiatric:         Behavior: Behavior is cooperative.         Inpatient Medications   Medications: Scheduled Meds:[Transfer Hold] Chlorhexidine Gluconate Cloth, 1 Application, Topical, Once  Chlorhexidine Gluconate Cloth, 1 Application, Topical, Q24H  FLUoxetine, 20 mg, Oral, Daily  folic acid, 1 mg, Oral, Daily  lactulose, 20 g, Oral, TID  levothyroxine, 75 mcg, Oral, Q AM  mupirocin, 1 Application, Each Nare, BID  pantoprazole, 40 mg, Intravenous, BID AC  pentoxifylline, 400 mg, Oral, TID With Meals  PHENobarbital 130 mg in sodium chloride 0.9 % 100 mL " IVPB, 130 mg, Intravenous, Q8H   Followed by  PHENobarbital, 65 mg, Intravenous, Q8H   Followed by  [START ON 6/1/2025] PHENobarbital, 32.5 mg, Intravenous, Q12H  piperacillin-tazobactam, 3.375 g, Intravenous, Q8H  senna-docusate sodium, 2 tablet, Oral, BID  sodium chloride, 10 mL, Intravenous, Q12H  thiamine, 250 mg, Oral, Daily      Continuous Infusions:norepinephrine, 0.02-0.3 mcg/kg/min, Last Rate: Stopped (05/30/25 1247)  octreotide (SandoSTATIN) infusion, 25 mcg/hr, Last Rate: 25 mcg/hr (05/29/25 1704)      PRN Meds:.  senna-docusate sodium **AND** polyethylene glycol **AND** bisacodyl **AND** bisacodyl    Calcium Replacement - Follow Nurse / BPA Driven Protocol    Magnesium Standard Dose Replacement - Follow Nurse / BPA Driven Protocol    nitroglycerin    ondansetron    Phosphorus Replacement - Follow Nurse / BPA Driven Protocol    Potassium Replacement - Follow Nurse / BPA Driven Protocol    sodium chloride    sodium chloride    sodium chloride    traMADol    I have reviewed the patient's current medications.   Outpatient Medications     Current Outpatient Medications   Medication Instructions    FLUoxetine (PROZAC) 20 mg, Oral, Daily    levothyroxine (SYNTHROID) 75 mcg, Oral, Daily    thiamine (VITAMIN B1) 100 mg, Oral, Daily       Current Antibiotics   piperacillin-tazobactam (ZOSYN) 3.375 g IVPB in 100 mL NS MBP (CD)    Results:   CBC:      Lab 05/30/25  0358 05/29/25  0910 05/29/25  0513 05/28/25  1732 05/28/25  1100   WBC 5.56  --  7.93  --  14.71*   HEMOGLOBIN 7.1*   < > 6.8*   < > 9.5*   HEMATOCRIT 21.1*   < > 20.1*   < > 28.0*   PLATELETS 61*  --  57*  --  101*   NEUTROS ABS 3.91  --  6.17  --  11.56*   IMMATURE GRANS (ABS) 0.09*  --  0.08*  --  0.30*   LYMPHS ABS 0.87  --  0.98  --  1.65   MONOS ABS 0.51  --  0.58  --  1.07*   EOS ABS 0.13  --  0.08  --  0.06   MCV 96.8  --  101.5*  --  101.8*    < > = values in this interval not displayed.     LIVER FUNCTION TESTS:      Lab 05/30/25  0354  05/29/25  0513 05/28/25  1100   TOTAL PROTEIN 4.1* 4.1* 5.0*   ALBUMIN 2.6* 2.6* 2.6*   GLOBULIN 1.5 1.5 2.4   ALT (SGPT) 90* 90* 130*   AST (SGOT) 225* 226* 329*   BILIRUBIN 24.2* 22.8* 26.2*   ALK PHOS 196* 206* 315*   LIPASE  --   --  19     ABG:      Lab 05/30/25  0358 05/29/25  0513 05/29/25  0246 05/28/25  1100   PH, ARTERIAL  --   --  7.422  --    PO2 ART  --   --  83.7  --    PCO2, ARTERIAL  --   --  37.1  --    HCO3 ART  --   --  24.1  --    ANION GAP 10.0 16.0*  --  22.0*     BMP/MG/PHOS:      Lab 05/30/25  1120 05/30/25  0358 05/30/25  0057 05/29/25  1551 05/29/25  0513 05/28/25  1100   SODIUM  --  134*  --   --  133* 132*   POTASSIUM 3.3* 3.3* 3.2* 3.1* 3.3* 3.2*   CHLORIDE  --  98  --   --  94* 89*   BUN  --  26.7*  --   --  28.0* 21.0*   CREATININE  --  0.57*  --   --  0.80 0.48*   CALCIUM  --  7.4*  --   --  7.1* 7.8*   MAGNESIUM  --  1.6  --   --  1.1*  --    PHOSPHORUS  --  2.0*  --   --  3.9  --      DIABETIC:  A1C Last 3 Results          7/7/2024    05:35 8/6/2024    10:11   HGBA1C Last 3 Results   Hemoglobin A1C 4.60  4.3      IMAGING STUDIES:  XR Chest 1 View  Result Date: 5/29/2025  1. A mild pulmonary venous congestion and cardiomegaly. 2. Central venous line in place.   This report was signed and finalized on 5/29/2025 6:14 AM by Dr. Vj Hsu MD.      US Abdomen Limited  Result Date: 5/28/2025  1. Cholelithiasis with biliary sludge. No prominent gallbladder wall thickening. Common bile duct upper normal at 5 to 6 mm. No discrete common bile duct stones localized. Suboptimal visualization of the distal common bile duct and pancreas due to shadowing artifact. 2. Cirrhotic morphology of the liver. Small volume perihepatic ascites.   This report was signed and finalized on 5/28/2025 2:12 PM by Dr. Kassy Blair MD.        CULTURE DATA:   Blood Culture   Date Value Ref Range Status   05/28/2025 No growth at 24 hours  Preliminary   05/28/2025 No growth at 24 hours  Preliminary         Assessment/Plan   40-year-old male with a past medical history of alcoholism admitted on 5/28/2025 with decompensated liver failure and upper GI bleed. Patient being admitted to the unit for closer observation. He will undergo upper GI scope with GI to evaluate possible source of bleeding. He has intermittently required levophed infusion for BP support. Levophed currently on pause.     Decompensated liver failure in setting of cirrhosis  -MELD score of 31  -Ammonia level was 92  -Patient severely jaundice with scleral icterus  -Continue  home dose of lactulose  -I warned the patient that he is at high risk for mortality within the next 90 days if he does not quit drinking as his liver damage is quite severe given his MELD score.  Patient states he does want to quit, but he feels helpless as he has tried multiple times in the past to quit but been unsuccessful.  Case management consulted for resources      Alcoholism with continued alcohol ingestion  -I warned the patient that he is at high risk for mortality within the next 90 days if he does not quit drinking as his liver damage is quite severe given his MELD score.  Patient states he does want to quit, but he feels helpless as he has tried multiple times in the past to quit but been unsuccessful.  Case management consulted to help provide resources for this patient.  -Patient has been a heavy drinker for 20+ years. He states he currently drinks 3-4 twelve ounce malt liquor drinks/day. Last drink was around 10 pm on 5/27  -Continue  phenobarb taper to help prevent/minimize DTs. Seizure precautions.   -Total alcohol cessation again emphasized for this patient in order to give him his best chance of long term survival and possibly make him a candidate for liver transplant in future if he is able to have complete alcohol cessation    GI bleed  -Patient reports dark stool as well as dark emesis recently  -EGD showed severe esophagitis, ulceration, and  swollen  esophageal mucosa. Due to the degree of swelling, GI was unable to see any varices to band  -Zofran prn for nausea  -Continue octreotide gtt per GI recommendations  -In setting of #1 and #2 above  -Start clear liquid diet and advance as tolerated  -BID PPI  -GI following, we appreciate their recommendations    Coagulopathy  -Secondary to #1 and #2 above  -INR 2.32, Pt 26.8, PTT 57.5  -Monitor with daily labs     Anemia  -In setting of GIB  -Hgb 7.3  Hct 21.5 s/p 2 PRBC.   -Recheck H&H again later today  -Daily CBC  -Transfuse if needed for Hgb <7     Lactic acidosis and sepsis  -Blood cultures  with no growth at 24 hours  -Initial lactate 10.8, latest lactate 4.6  -Leukocytosis improved   -Continue empiric treatment with Zosyn.   -Monitor cultures, lactate level, and daily CBC  -Patient has had intermittent hypotension requiring low dose levophed. Currently levophed is being paused.     CODE STATUS: Full  VTE prophylaxis: SCDs  Nutrition: clear liquid diet, advance as tolearted  Bowel: prn bowel regimen  GI prophylaxis: BID PPI  Antibiotics: Zosyn     Disposition: Critical care management     Total critical care time: 34 minutes    Due to a high probability of clinically significant, life threatening deterioration, the patient required my highest level of preparedness to intervene emergently and I personally spent this critical care time directly and personally managing the patient.     This critical care time included obtaining a history; examining the patient; pulse oximetry; ordering and review of studies; arranging urgent treatment with development of a management plan; evaluation of patient's response to treatment; frequent reassessment; and, discussions with other providers.    This critical care time was performed to assess and manage the high probability of imminent, life-threatening deterioration that could result in multi-organ failure. It was exclusive of separately billable procedures and treating other  patients and teaching time.    Please see MDM section and the rest of the note for further information on patient assessment and treatment.    Part of this note may be an electronic transcription/translation of spoken language to printed text using the Dragon Dictation System    Electronically signed by Aryan Eriwn PA-C on 5/30/2025 at 13:11 CDT

## 2025-05-30 NOTE — PROGRESS NOTES
Nutrition Services    Patient Name:  Luciano Christiansen  YOB: 1985  MRN: [id number]  Admit Date:  5/28/2025    CLD x 3 days. Pt may benefit from early [enteral/parenteral] feeding if unable to advance to at least a full liquid diet. Per GI notes, he recommends a low Na+ (<2g salt diet) when appropriate with POC goals. If desired, RD available for recommendations. Nutrition following per protocol.     Electronically signed by:  Quyen Jon RDN, TOD  05/30/25 13:05 CDT

## 2025-05-30 NOTE — PLAN OF CARE
Goal Outcome Evaluation:  Plan of Care Reviewed With: patient        Progress: no change  Outcome Evaluation: PT eval completed.  Pt pleasant and agreeable to therapy.  Oriented X 4.  Reports pain as a soreness from being in the floor for a period of time and then in the bed for 2 days rated as 6/10.  Pt performs sit to/from standing w/CGA.  Worked on lateral wt shifting and then taking steps forward/backward 2-3 ft 3 x's w/ CGA.  Pt w/ fatigue, however no LOB.  Pt will benefit from continued PT services to improve activity tolerance, overall strength, balance, safety awareness and I w/ functional mobility.  Will follow for progress and needs.  Discharge plans unclear at this time.    Anticipated Discharge Disposition (PT): home with assist, sub acute care setting

## 2025-05-30 NOTE — THERAPY EVALUATION
Patient Name: Luciano Christiansen  : 1985    MRN: [id number]                              Today's Date: 2025       Admit Date: 2025    Visit Dx:     ICD-10-CM ICD-9-CM   1. Liver failure without hepatic coma, unspecified chronicity  K72.90 572.8   2. Upper GI bleed  K92.2 578.9   3. Impaired functional mobility and activity tolerance [Z74.09]  Z74.09 V49.89     Patient Active Problem List   Diagnosis    Wellness examination    Encounter for hepatitis C screening test for low risk patient    Primary hypertension    Class 1 obesity due to excess calories without serious comorbidity with body mass index (BMI) of 34.0 to 34.9 in adult    Fatty liver    Hyponatremia    Hypokalemia    Hypomagnesemia    Alcoholism    Transaminitis    Hypophosphatemia    Insomnia disorder related to known organic factor    Community acquired bilateral lower lobe pneumonia    Pneumonia due to Streptococcus    GI bleed    Calculus of gallbladder without cholecystitis without obstruction    Alcoholic encephalopathy    Alcohol withdrawal    Alcoholic steatohepatitis    BMI 40.0-44.9, adult    Hepatic encephalopathy    Gastroesophageal reflux disease without esophagitis    Hypothyroidism    Esophageal varices in cirrhosis    Upper GI bleed     Past Medical History:   Diagnosis Date    Alcoholism     currently drinking    Diabetes mellitus     Gout     Hepatitis C     Hypertension     Jaundice      Past Surgical History:   Procedure Laterality Date    ENDOSCOPY N/A 2025    Procedure: ESOPHAGOGASTRODUODENOSCOPY WITH ANESTHESIA;  Surgeon: Estela Ramos MD;  Location: [facility];  Service: Gastroenterology;  Laterality: N/A;  pre op: GI bleed  post op: esophageal ulcer, esophagitis  pcp: Adrien Varghese MD    VASECTOMY        General Information       Row Name 25 1528          Physical Therapy Time and Intention    Document Type evaluation;other (see comments)  see MAR  -JE     Mode of Treatment physical therapy  -ZAC       Row Name  05/30/25 1528          General Information    Patient Profile Reviewed yes  -     Prior Level of Function independent:;all household mobility;community mobility;ADL's;home management;driving;shopping;using stairs;work  -     Existing Precautions/Restrictions fall  -     Barriers to Rehab medically complex  -       Row Name 05/30/25 1528          Living Environment    Current Living Arrangements home  -     People in Home alone  -       Row Name 05/30/25 1528          Home Main Entrance    Number of Stairs, Main Entrance four  -JE     Stair Railings, Main Entrance railing on right side (ascending)  -       Row Name 05/30/25 1528          Stairs Within Home, Primary    Stairs, Within Home, Primary goes down to a door  -     Number of Stairs, Within Home, Primary two  -JE     Stair Railings, Within Home, Primary railings on both sides of stairs  -       Row Name 05/30/25 1528          Cognition    Orientation Status (Cognition) oriented x 4  -JE       Row Name 05/30/25 1528          Safety Issues/Impairments Affecting Functional Mobility    Safety Issues Affecting Function (Mobility) friction/shear risk;safety precaution awareness  -     Impairments Affecting Function (Mobility) balance;endurance/activity tolerance;pain;strength;sensation/sensory awareness  -               User Key  (r) = Recorded By, (t) = Taken By, (c) = Cosigned By      Initials Name Provider Type    Ruthy Payne, PT Physical Therapist                   Mobility    No documentation.                  Obj/Interventions       Row Name 05/30/25 1528          Range of Motion Comprehensive    Comment, General Range of Motion AROM all 4 extremities WFLs  -       Row Name 05/30/25 1528          Strength Comprehensive (MMT)    Comment, General Manual Muscle Testing (MMT) Assessment grossly 4+/5 throughout U/LEs  -       Row Name 05/30/25 1528          Balance    Balance Assessment sitting static balance;sitting dynamic  balance;standing static balance;standing dynamic balance  -     Static Sitting Balance independent  -     Dynamic Sitting Balance independent  -     Position, Sitting Balance supported;sitting in chair  -     Static Standing Balance contact guard  -     Dynamic Standing Balance contact guard;verbal cues  -     Position/Device Used, Standing Balance unsupported  -       Row Name 05/30/25 1528          Sensory Assessment (Somatosensory)    Sensory Assessment (Somatosensory) other (see comments)  reports chronic numbness in B feet  -               User Key  (r) = Recorded By, (t) = Taken By, (c) = Cosigned By      Initials Name Provider Type    Ruthy Payne, PT Physical Therapist                   Goals/Plan       Row Name 05/30/25 1528          Bed Mobility Goal 1 (PT)    Activity/Assistive Device (Bed Mobility Goal 1, PT) rolling to left;rolling to right;bridging;scooting;sit to supine/supine to sit;sidelying to sit/sit to sidelying  -     [place] Level/Cues Needed (Bed Mobility Goal 1, PT) standby assist;independent  -     Time Frame (Bed Mobility Goal 1, PT) long term goal (LTG);10 days  -     Progress/Outcomes (Bed Mobility Goal 1, PT) goal ongoing  -       Row Name 05/30/25 1528          Transfer Goal 1 (PT)    Activity/Assistive Device (Transfer Goal 1, PT) sit-to-stand/stand-to-sit;bed-to-chair/chair-to-bed  -     [place] Level/Cues Needed (Transfer Goal 1, PT) standby assist;independent  -     Time Frame (Transfer Goal 1, PT) long term goal (LTG);10 days  -     Progress/Outcome (Transfer Goal 1, PT) goal ongoing  -       Row Name 05/30/25 1528          Gait Training Goal 1 (PT)    Activity/Assistive Device (Gait Training Goal 1, PT) gait (walking locomotion);decrease fall risk;diminish gait deviation;improve balance and speed;increase endurance/gait distance;increase energy conservation  -     [place] Level (Gait Training Goal 1, PT) standby  assist;independent  -JE     Distance (Gait Training Goal 1, PT) 100 ft  -     Time Frame (Gait Training Goal 1, PT) long term goal (LTG);10 days  -JE     Progress/Outcome (Gait Training Goal 1, PT) goal ongoing  -       Row Name 05/30/25 1528          Stairs Goal 1 (PT)    Activity/Assistive Device (Stairs Goal 1, PT) ascending stairs;descending stairs;step-to-step;decrease fall risk;improve balance and speed;using handrail, right  -     [place] Level/Cues Needed (Stairs Goal 1, PT) contact guard required;standby assist  -     Number of Stairs (Stairs Goal 1, PT) 4  -JE     Time Frame (Stairs Goal 1, PT) long term goal (LTG);10 days  -JE     Progress/Outcome (Stairs Goal 1, PT) goal ongoing  -       Row Name 05/30/25 1528          Patient Education Goal (PT)    Activity (Patient Education Goal, PT) HEP for strengthening, breathing ex, energy conservation tech  -     [place]/Cues/Accuracy (Memory Goal 2, PT) demonstrates adequately;independent;verbalizes understanding  -JE     Time Frame (Patient Education Goal, PT) long term goal (LTG);10 days  -JE     Progress/Outcome (Patient Education Goal, PT) goal ongoing  -       Row Name 05/30/25 1528          Therapy Assessment/Plan (PT)    Planned Therapy Interventions (PT) balance training;bed mobility training;gait training;home exercise program;patient/family education;ROM (range of motion);strengthening;stair training;transfer training;other (see comments)  safety/falls prevention, breathing ex, energy conservation tech  -               User Key  (r) = Recorded By, (t) = Taken By, (c) = Cosigned By      Initials Name Provider Type    Ruthy Payne, PT Physical Therapist                   Clinical Impression       Row Name 05/30/25 1538          Pain    Pretreatment Pain Rating 6/10  -JE     Posttreatment Pain Rating 6/10  -JE     Pain Location other (see comments)  soreness all over from being the floor and in the bed for 2 days  -      Pain Side/Orientation generalized  -     Pain Management Interventions exercise or physical activity utilized  -     Response to Pain Interventions no change per patient report  -       Row Name 05/30/25 1538          Plan of Care Review    Plan of Care Reviewed With patient  -     Progress no change  -     Outcome Evaluation PT eval completed.  Pt pleasant and agreeable to therapy.  Oriented X 4.  Reports pain as a soreness from being in the floor for a period of time and then in the bed for 2 days rated as 6/10.  Pt performs sit to/from standing w/CGA.  Worked on lateral wt shifting and then taking steps forward/backward 2-3 ft 3 x's w/ CGA.  Pt w/ fatigue, however no LOB.  Pt will benefit from continued PT services to improve activity tolerance, overall strength, balance, safety awareness and I w/ functional mobility.  Will follow for progress and needs.  Discharge plans unclear at this time.  -       Row Name 05/30/25 1538          Therapy Assessment/Plan (PT)    Patient/Family Therapy Goals Statement (PT) get back to walking good, and improve my breathing so it is coordinated w/ my walking  -     Rehab Potential (PT) good  -JE     Criteria for Skilled Interventions Met (PT) yes;meets criteria;skilled treatment is necessary  -     Therapy Frequency (PT) 2 times/day  -     Predicted Duration of Therapy Intervention (PT) until discharge or goals achieved  -       Row Name 05/30/25 153          Vital Signs    Posttreatment Heart Rate (beats/min) 93  -JE     O2 Delivery Pre Treatment room air  -JE     O2 Delivery Intra Treatment room air  -JE     Post SpO2 (%) 97  -JE     O2 Delivery Post Treatment room air  -JE     Pre Patient Position Sitting  -JE     Intra Patient Position Standing  -JE     Post Patient Position Sitting  -JE       Row Name 05/30/25 1409          Positioning and Restraints    Pre-Treatment Position sitting in chair/recliner  -JE     Post Treatment Position chair  -JE     In  Chair reclined;call light within reach;encouraged to call for assist;legs elevated;patient within staff view  -               User Key  (r) = Recorded By, (t) = Taken By, (c) = Cosigned By      Initials Name Provider Type    Ruthy Payne, PT Physical Therapist                   Outcome Measures       Row Name 05/30/25 1528 05/30/25 0752       How much help from another person do you currently need...    Turning from your back to your side while in flat bed without using bedrails? 3  - 3  -BC    Moving from lying on back to sitting on the side of a flat bed without bedrails? 3  - 2  -BC    Moving to and from a bed to a chair (including a wheelchair)? 3  - 2  -BC    Standing up from a chair using your arms (e.g., wheelchair, bedside chair)? 3  - 2  -BC    Climbing 3-5 steps with a railing? 3  - 2  -BC    To walk in hospital room? 3  - 2  -BC    AM-PAC 6 Clicks Score (PT) 18  - 13  -BC    Highest Level of Mobility Goal Walk 10 Steps or More-6  - Move to Chair/Commode-4  -BC      Row Name 05/30/25 1528 05/30/25 1440       Functional Assessment    Outcome Measure Options AM-PAC 6 Clicks Basic Mobility (PT)  - AM-PAC 6 Clicks Daily Activity (OT)  -              User Key  (r) = Recorded By, (t) = Taken By, (c) = Cosigned By      Initials Name Provider Type     Maribel Vigil, OTR/L Occupational Therapist    BC Lyudmila Michaels, RN Registered Nurse    Ruthy Payne, PT Physical Therapist                                 Physical Therapy Education       Title: PT OT SLP Therapies (In Progress)       Topic: Physical Therapy (In Progress)       Point: Mobility training (Done)       Learning Progress Summary            Patient Acceptance, E,TB,D, VU,NR by ZAC at 5/30/2025 1624    Comment: Education re: purpose of PT/importance of activity, safety/falls prevention, LE exercise, deep breathing w/ education for improved tech w/ deep breathing                      Point: Home exercise program  (Done)       Learning Progress Summary            Patient Acceptance, E,TB,D, VU,NR by ZAC at 5/30/2025 1624    Comment: Education re: purpose of PT/importance of activity, safety/falls prevention, LE exercise, deep breathing w/ education for improved tech w/ deep breathing                      Point: Body mechanics (Not Started)       Learner Progress:  Not documented in this visit.              Point: Precautions (Done)       Learning Progress Summary            Patient Acceptance, E,TB,D, VU,NR by ZAC at 5/30/2025 1624    Comment: Education re: purpose of PT/importance of activity, safety/falls prevention, LE exercise, deep breathing w/ education for improved tech w/ deep breathing                                      User Key       Initials Effective Dates Name Provider Type Discipline     08/02/18 -  Ruthy Zaragoza, PT Physical Therapist PT                  PT Recommendation and Plan  Planned Therapy Interventions (PT): balance training, bed mobility training, gait training, home exercise program, patient/family education, ROM (range of motion), strengthening, stair training, transfer training, other (see comments) (safety/falls prevention, breathing ex, energy conservation tech)  Progress: no change  Outcome Evaluation: PT eval completed.  Pt pleasant and agreeable to therapy.  Oriented X 4.  Reports pain as a soreness from being in the floor for a period of time and then in the bed for 2 days rated as 6/10.  Pt performs sit to/from standing w/CGA.  Worked on lateral wt shifting and then taking steps forward/backward 2-3 ft 3 x's w/ CGA.  Pt w/ fatigue, however no LOB.  Pt will benefit from continued PT services to improve activity tolerance, overall strength, balance, safety awareness and I w/ functional mobility.  Will follow for progress and needs.  Discharge plans unclear at this time.     Time Calculation:         PT Charges       Row Name 05/30/25 9738             Time Calculation    Start Time 0221   -      Stop Time 1622  -      Time Calculation (min) 54 min  -ZAC      PT Received On 05/30/25  -ZAC      PT Goal Re-Cert Due Date 06/09/25  -                User Key  (r) = Recorded By, (t) = Taken By, (c) = Cosigned By      Initials Name Provider Type    Ruthy Payne, PT Physical Therapist                  Therapy Charges for Today       Code Description Service Date Service Provider Modifiers Qty    [id number] HC PT EVAL MOD COMPLEXITY 4 5/30/2025 Ruthy Zaragoza, PT GP 1            PT G-Codes  Outcome Measure Options: AM-PAC 6 Clicks Basic Mobility (PT)  AM-PAC 6 Clicks Score (PT): 18  AM-PAC 6 Clicks Score (OT): 18  PT Discharge Summary  Anticipated Discharge Disposition (PT): home with assist, sub acute care setting    Ruthy Zaragoza, PT  5/30/2025

## 2025-05-30 NOTE — PROGRESS NOTES
Patient is awake lethargic and deeply jaundiced  Abdomen distended  Bilirubin 24  Acute alcoholic hepatitis with cirrhosis  Recommendation  Less than 2 g salt diet  Continue spironolactone and Lasix based on recommendations from nephrology  Recommend paracentesis by IR and send the fluid for cell count, culture, albumin, protein, LDH  Given the presence of esophageal ulcer and severe esophagitis steroids cannot be given at this point.  May consider pentoxifylline recommended dose for acute alcoholic hepatitis since patient's Madrey's discriminant factor is more than 33 and has poor prognosis

## 2025-05-30 NOTE — PLAN OF CARE
Goal Outcome Evaluation:  Plan of Care Reviewed With: patient        Progress: no change  Outcome Evaluation: OT eval complete.  Pt. is AxOx 3 & participatory.  Mr. Christiansen is known to me from a previous admit.  The pt previously was living alone and working.  Per the chart he has significant EOTH abuse and now presents with a GI bleed.  The pt performed bed mob at [facility], sat EOB at S, required Max A for LBD & CGA for fxl mob.  He would benefit from cont'd OT tx to improve his fxl performance. He is a fall risk but demo's good rehab potential.  Rec alcohol tx center    Anticipated Discharge Disposition (OT): other (see comments)

## 2025-05-30 NOTE — PROGRESS NOTES
[facility] Palliative Care Services  Progress Note  Patient Name: Luciano Christiansen  Date of Admission: 5/28/2025  Today's Date: 05/30/25     Code Status and Medical Interventions: CPR (Attempt to Resuscitate); Full Support   Ordered at: 05/28/25 1534     Code Status (Patient has no pulse and is not breathing):    CPR (Attempt to Resuscitate)     Medical Interventions (Patient has pulse or is breathing):    Full Support     Level Of Support Discussed With:    Patient     Subjective   Chief complaint/Reason for Referral/Visit: Follow up on Goals of Care/Advance Care Planning.    Medical record reviewed.  Events noted.  According to chart review he did undergo EGD on 5/28/2025.  Operative note reviewed which reveals LA grade D reflux esophagitis with no bleeding, esophageal ulcer with stigmata of recent bleeding, and portal hypertensive gastropathy.  GI recommended Protonix 80 mg IV daily and sucralfate suspension 1 g QID and repeat endoscopy in 4 weeks.  Labs collected this morning reviewed.  LFTs remain elevated.  Total bilirubin slightly higher today at 24.2.  Hemoglobin and hematocrit remain low at 7.1 and 21.1.  Platelets 61,000.  Started back on Levophed around 2 AM per MAR.  Remains on octreotide infusion.  He is lying in bed, awake and in no apparent distress.  Denies any pain.  Reports he is feeling tired.  No visitors present.     Advance Care Planning   Advanced Directives: Patient reports not having an advance directive. Information provided.  Advance Care Planning Discussion: Mr. Christiansen is agreeable to goals of care discussion.  Recalled discussion yesterday however shared he has not been able to discuss with his family yet.  We again discussed importance of discussing wishes so his family is aware.  Reports wishes to continue CPR and full support interventions at this time.  He remains hopeful for improvement and reports plans to make lifestyle changes.  He is more drowsy today so difficult  to have extensive discussion.  Encouraged questions if arise.  Support provided.     The patient receives support from his children and parent. Patient's children are his next of kin.    Due to the palliative care topics discussed including goals of care and medical priorities we will establish an advance care plan.    Goals of care: Ongoing.    Review of Systems   Constitutional: Positive for malaise/fatigue.   Cardiovascular:  Negative for chest pain.   Respiratory:  Negative for shortness of breath.    Neurological:  Positive for weakness.       Pain Assessment  Preferred Pain Scale: number (Numeric Rating Pain Scale)  CPOT Facial Expression: 0-->relaxed, neutral  CPOT Body Movements: 0-->absence of movements  CPOT Muscle Tension: 0-->relaxed  Ventilator Compliance/Vocalization: 0-->talking in normal tone or no sound  CPOT Score: 0  Pain Location: head  Objective   Diagnostics: Reviewed      Intake/Output Summary (Last 24 hours) at 5/30/2025 0926  Last data filed at 5/30/2025 0752  Gross per 24 hour   Intake 1534.7 ml   Output 1900 ml   Net -365.3 ml     Current Facility-Administered Medications   Medication Dose Route Frequency Provider Last Rate Last Admin    sennosides-docusate (PERICOLACE) 8.6-50 MG per tablet 2 tablet  2 tablet Oral BID Neo Preston MD        And    polyethylene glycol (MIRALAX) packet 17 g  17 g Oral Daily PRN Neo Preston MD        And    bisacodyl (DULCOLAX) EC tablet 5 mg  5 mg Oral Daily PRN Neo Preston MD        And    bisacodyl (DULCOLAX) suppository 10 mg  10 mg Rectal Daily PRN Neo Preston MD        Calcium Replacement - Follow Nurse / BPA Driven Protocol   Not Applicable PRN Neo Preston MD        [Transfer Hold] Chlorhexidine Gluconate Cloth 2 % pads 1 Application  1 Application Topical Once Aryan Erwin PA-C        Chlorhexidine Gluconate Cloth 2 % pads 1 Application  1 Application Topical Q24H Neo Preston MD   1 Application at 05/30/25 4937     FLUoxetine (PROzac) capsule 20 mg  20 mg Oral Daily Aryan Erwin PA-C   20 mg at 05/29/25 0827    folic acid (FOLVITE) tablet 1 mg  1 mg Oral Daily Aryan Erwin PA-C   1 mg at 05/29/25 0826    lactulose solution 20 g  20 g Oral TID Aryan Erwin PA-C   20 g at 05/29/25 2023    levothyroxine (SYNTHROID, LEVOTHROID) tablet 75 mcg  75 mcg Oral Q AM Aryan Erwin PA-C   75 mcg at 05/30/25 0553    Magnesium Standard Dose Replacement - Follow Nurse / BPA Driven Protocol   Not Applicable PRN Neo Preston MD        mupirocin (BACTROBAN) 2 % nasal ointment 1 Application  1 Application Each Nare BID Neo Preston MD   1 Application at 05/29/25 2022    nitroglycerin (NITROSTAT) SL tablet 0.4 mg  0.4 mg Sublingual Q5 Min PRN Neo Preston MD        norepinephrine (LEVOPHED) 8 mg in 250 mL NS infusion (premix)  0.02-0.3 mcg/kg/min Intravenous Titrated Nitish Grimes PA-C   Held at 05/30/25 0725    octreotide (sandoSTATIN) 500 mcg in sodium chloride 0.9 % 100 mL (5 mcg/mL) infusion  25 mcg/hr Intravenous Continuous Deniz Wolf MD 5 mL/hr at 05/29/25 1704 25 mcg/hr at 05/29/25 1704    ondansetron (ZOFRAN) injection 4 mg  4 mg Intravenous Q6H PRN Neo Preston MD   4 mg at 05/28/25 2046    pantoprazole (PROTONIX) injection 40 mg  40 mg Intravenous BID AC Aryan Erwin PA-C   40 mg at 05/30/25 0755    PHENobarbital 130 mg in sodium chloride 0.9 % 100 mL IVPB  130 mg Intravenous Q8H Neo Preston  mL/hr at 05/30/25 0548 130 mg at 05/30/25 0548    Followed by    PHENobarbital injection 65 mg  65 mg Intravenous Q8H Neo Preston MD        Followed by    [START ON 6/1/2025] PHENobarbital injection 32.5 mg  32.5 mg Intravenous Q12H Neo Preston MD        Phosphorus Replacement - Follow Nurse / BPA Driven Protocol   Not Applicable PRN Neo Preston MD        piperacillin-tazobactam (ZOSYN) 3.375 g IVPB in 100 mL NS MBP (CD)  3.375 g Intravenous Q8H Neo Preston MD   3.375 g  "at 05/30/25 0755    Potassium Replacement - Follow Nurse / BPA Driven Protocol   Not Applicable PRN Aryan Erwin PA-C        sodium chloride 0.9 % flush 10 mL  10 mL Intravenous PRN Neo Preston MD        sodium chloride 0.9 % flush 10 mL  10 mL Intravenous Q12H Neo Preston MD   10 mL at 05/29/25 2023    sodium chloride 0.9 % flush 10 mL  10 mL Intravenous PRN Neo Preston MD        sodium chloride 0.9 % infusion 40 mL  40 mL Intravenous PRN Neo Preston MD        thiamine (VITAMIN B-1) tablet 250 mg  250 mg Oral Daily Aryan Erwin PA-C   250 mg at 05/29/25 0826    traMADol (ULTRAM) tablet 50 mg  50 mg Oral Q6H PRN Neo Preston MD         norepinephrine, 0.02-0.3 mcg/kg/min, Last Rate: Stopped (05/30/25 0725)  octreotide (SandoSTATIN) infusion, 25 mcg/hr, Last Rate: 25 mcg/hr (05/29/25 1704)        senna-docusate sodium **AND** polyethylene glycol **AND** bisacodyl **AND** bisacodyl    Calcium Replacement - Follow Nurse / BPA Driven Protocol    Magnesium Standard Dose Replacement - Follow Nurse / BPA Driven Protocol    nitroglycerin    ondansetron    Phosphorus Replacement - Follow Nurse / BPA Driven Protocol    Potassium Replacement - Follow Nurse / BPA Driven Protocol    sodium chloride    sodium chloride    sodium chloride    traMADol  Current medications patient is presently taking including all prescriptions, over-the-counter, herbals and vitamin/mineral/dietary (nutritional) supplements with reviewed including route, type, dose and frequency and are current per MAR at time of dictation.    Assessment:  Vital Signs: /54 (BP Location: Right arm, Patient Position: Lying)   Pulse 88   Temp 97.9 °F (36.6 °C) (Oral)   Resp 14   Ht 188 cm (74\")   Wt (!) 150 kg (330 lb 0.5 oz)   SpO2 95%   BMI 42.37 kg/m²     Physical Exam  Vitals and nursing note reviewed.   Constitutional:       General: He is sleeping. He is not in acute distress.     Appearance: He is ill-appearing. "   HENT:      Head: Normocephalic and atraumatic.   Eyes:      General: Lids are normal. Scleral icterus present.      Extraocular Movements: Extraocular movements intact.   Neck:      Vascular: No JVD.      Trachea: Trachea normal.   Cardiovascular:      Rate and Rhythm: Normal rate.   Musculoskeletal:      Cervical back: Neck supple.   Skin:     General: Skin is warm and dry.      Coloration: Skin is jaundiced.   Neurological:      Mental Status: He is oriented to person, place, and time and easily aroused.   Psychiatric:         Mood and Affect: Affect is flat.     Functional status: Palliative Performance Scale Score: Performance 50% based on the following measures: Ambulation: Mainly sit or lie down, Activity and Evidence of Disease: Unable to do any work, extensive evidence of disease, Self-Care: Considerable assistance required,  Intake: Normal or reduced, LOC: Full or confusion.  Nutritional status: Albumin 2.6. Body mass index is 42.37 kg/m².  Patient status: Disease state: Controlled with current treatments.    Active Hospital Problems    Diagnosis     **Upper GI bleed        Impression/Problem List:  Cirrhosis/Decompensated liver failure     - MELD 29 points  - Ascites and portal hypertensive gastropathy  GI bleed  - Esophageal ulcer with recent bleeding per EGD  Esophagitis LA grade D per EGD   Anemia  Thrombocytopenia  Alcoholism  Hypoalbuminemia  Morbid obesity (BMI 42.34)    Plan / Recommendations   Palliative Care Encounter   Mr. Christiansen is agreeable to goals of care discussion.  Details of discussion above.   Reports he has been unable to speak with his family yet.  Discussed importance of discussing wishes.    Wishes to continue CPR and full support interventions at this time.    Overall long-term prognosis appears poor secondary to cirrhosis of liver, alcoholism, hypoalbuminemia, GI bleed and other comorbidities listed above.  Mr. Christiansen remains hopeful for improvement and reports plans to make  lifestyle changes.  Difficult to determine prognostic awareness as he is drowsy today making it difficult to have extensive discussion.   Encouraged questions if arise.  Support provided.     Thank you for allowing us to participate in patient's plan of care. Palliative Care Team will continue to follow patient.   Electronically signed by, LÁZARO Stokes, 05/30/25.

## 2025-05-30 NOTE — CASE MANAGEMENT/SOCIAL WORK
Continued Stay Note  TOVA Munoz     Patient Name: Luciano Christiansen  MRN: [id number]  Today's Date: 5/30/2025    Admit Date: 5/28/2025        Discharge Plan       Row Name 05/30/25 6487       Plan    Plan Comments Therapy currently in room working with pt. SW will try again later to discuss Alcohol rehab with pt.                   Discharge Codes    No documentation.                       ROYCE Holman

## 2025-05-30 NOTE — THERAPY EVALUATION
Patient Name: Luciano Christiansen  : 1985    MRN: [id number]                              Today's Date: 2025       Admit Date: 2025    Visit Dx:     ICD-10-CM ICD-9-CM   1. Liver failure without hepatic coma, unspecified chronicity  K72.90 572.8   2. Upper GI bleed  K92.2 578.9     Patient Active Problem List   Diagnosis    Wellness examination    Encounter for hepatitis C screening test for low risk patient    Primary hypertension    Class 1 obesity due to excess calories without serious comorbidity with body mass index (BMI) of 34.0 to 34.9 in adult    Fatty liver    Hyponatremia    Hypokalemia    Hypomagnesemia    Alcoholism    Transaminitis    Hypophosphatemia    Insomnia disorder related to known organic factor    Community acquired bilateral lower lobe pneumonia    Pneumonia due to Streptococcus    GI bleed    Calculus of gallbladder without cholecystitis without obstruction    Alcoholic encephalopathy    Alcohol withdrawal    Alcoholic steatohepatitis    BMI 40.0-44.9, adult    Hepatic encephalopathy    Gastroesophageal reflux disease without esophagitis    Hypothyroidism    Esophageal varices in cirrhosis    Upper GI bleed     Past Medical History:   Diagnosis Date    Alcoholism     currently drinking    Diabetes mellitus     Gout     Hepatitis C     Hypertension     Jaundice      Past Surgical History:   Procedure Laterality Date    ENDOSCOPY N/A 2025    Procedure: ESOPHAGOGASTRODUODENOSCOPY WITH ANESTHESIA;  Surgeon: Estela Ramos MD;  Location: Health system;  Service: Gastroenterology;  Laterality: N/A;  pre op: GI bleed  post op: esophageal ulcer, esophagitis  pcp: Adrien Varghese MD    VASECTOMY        General Information       Row Name 25 1440          OT Time and Intention    Document Type evaluation  -CH     Mode of Treatment occupational therapy  -CH     Patient Effort good  -CH       Row Name 25 1442          General Information    Patient Profile Reviewed yes  -CH     Prior  Level of Function independent:;all household mobility;community mobility;ADL's;driving;work  -     Existing Precautions/Restrictions fall  -     Barriers to Rehab medically complex;physical barrier  -       Row Name 05/30/25 1440          Living Environment    Current Living Arrangements home  -     People in Home alone  -       Row Name 05/30/25 1440          Cognition    Orientation Status (Cognition) oriented to;person;place;situation  -       Row Name 05/30/25 1440          Safety Issues/Impairments Affecting Functional Mobility    Safety Issues Affecting Function (Mobility) at risk behavior observed;friction/shear risk;insight into deficits/self-awareness;judgment  -     Impairments Affecting Function (Mobility) endurance/activity tolerance;strength;balance  -               User Key  (r) = Recorded By, (t) = Taken By, (c) = Cosigned By      Initials Name Provider Type     Maribel Vigil, OTR/L Occupational Therapist                     Mobility/ADL's       Row Name 05/30/25 1440          Bed Mobility    Bed Mobility supine-sit  -     Supine-Sit [place] (Bed Mobility) minimum assist (75% patient effort);verbal cues  -[facility] Name 05/30/25 1440          Transfers    Transfers sit-stand transfer;stand-sit transfer;bed-chair transfer  -[facility] Name 05/30/25 1440          Bed-Chair Transfer    Bed-Chair [place] (Transfers) contact guard;verbal cues;nonverbal cues (demo/gesture)  -[facility] Name 05/30/25 1440          Sit-Stand Transfer    Sit-Stand [place] (Transfers) contact guard;verbal cues;nonverbal cues (demo/gesture)  -[facility] Name 05/30/25 1440          Stand-Sit Transfer    Stand-Sit [place] (Transfers) contact guard;verbal cues;nonverbal cues (demo/gesture)  -[facility] Name 05/30/25 1440          Activities of Daily Living    BADL Assessment/Intervention lower body dressing  -[facility] Name 05/30/25 1440          Lower Body Dressing  Assessment/Training    [place] Level (Lower Body Dressing) maximum assist (25% patient effort);don;socks  -     Position (Lower Body Dressing) edge of bed sitting  -               User Key  (r) = Recorded By, (t) = Taken By, (c) = Cosigned By      Initials Name Provider Type     Maribel Vigil, OTR/L Occupational Therapist                   Obj/Interventions       Row Name 05/30/25 [facility]          Vision Assessment/Intervention    Visual Impairment/Limitations WFL  -[facility] Name 05/30/25 [facility]          Range of Motion Comprehensive    General Range of Motion no range of motion deficits identified  -CH       Row Name 05/30/25 [facility]          Strength Comprehensive (MMT)    General Manual Muscle Testing (MMT) Assessment no strength deficits identified  -[facility] Name 05/30/25 [facility]          Motor Skills    Motor Skills functional endurance  -     Functional Endurance fair -  -[facility] Name 05/30/25 [facility]          Balance    Balance Assessment sitting static balance;sitting dynamic balance;sit to stand dynamic balance;standing static balance;standing dynamic balance  -     Static Sitting Balance supervision  -     Dynamic Sitting Balance supervision  -     Position, Sitting Balance sitting edge of bed  -     Sit to Stand Dynamic Balance contact guard;verbal cues  -     Static Standing Balance contact guard;verbal cues  -     Dynamic Standing Balance contact guard;verbal cues  -     Position/Device Used, Standing Balance supported  -               User Key  (r) = Recorded By, (t) = Taken By, (c) = Cosigned By      Initials Name Provider Type     Maribel Vigil, OTR/L Occupational Therapist                   Goals/Plan       [facility] Name 05/30/25 [facility]          Transfer Goal 1 (OT)    Activity/Assistive Device (Transfer Goal 1, OT) sit-to-stand/stand-to-sit;bed-to-chair/chair-to-bed;toilet;shower chair  -     [place] Level/Cues Needed (Transfer Goal 1, OT) independent  -     Time  Frame (Transfer Goal 1, OT) long term goal (LTG);by discharge  -CH     Progress/Outcome (Transfer Goal 1, OT) new goal  -CH       Row Name 05/30/25 1440          Dressing Goal 1 (OT)    Activity/Device (Dressing Goal 1, OT) lower body dressing  -CH     [place]/Cues Needed (Dressing Goal 1, OT) supervision required  -CH     Time Frame (Dressing Goal 1, OT) long term goal (LTG);10 days;by discharge  -CH     Progress/Outcome (Dressing Goal 1, OT) new goal  -       Row Name 05/30/25 1440          Toileting Goal 1 (OT)    Activity/Device (Toileting Goal 1, OT) toileting skills, all;adjust/manage clothing;perform perineal hygiene;commode  -CH     [place] Level/Cues Needed (Toileting Goal 1, OT) independent  -CH     Time Frame (Toileting Goal 1, OT) long term goal (LTG);by discharge  -     Progress/Outcome (Toileting Goal 1, OT) new goal  -       Row Name 05/30/25 1440          Therapy Assessment/Plan (OT)    Planned Therapy Interventions (OT) activity tolerance training;adaptive equipment training;BADL retraining;edema control/reduction;functional balance retraining;IADL retraining;occupation/activity based interventions;patient/caregiver education/training;ROM/therapeutic exercise;transfer/mobility retraining  -               User Key  (r) = Recorded By, (t) = Taken By, (c) = Cosigned By      Initials Name Provider Type    Maribel Edwards, OTR/L Occupational Therapist                   Clinical Impression       Row Name 05/30/25 1440          Plan of Care Review    Plan of Care Reviewed With patient  -CH     Progress no change  -     Outcome Evaluation OT eval complete.  Pt. is AxOx 3 & participatory.  Mr. Christiansen is known to me from a previous admit.  The pt previously was living alone and working.  Per the chart he has significant EOTH abuse and now presents with a GI bleed.  The pt performed bed mob at Min A, sat EOB at S, required Max A for LBD & CGA for fxl mob.  He would benefit from cont'd OT  tx to improve his fxl performance. He is a fall risk but demo's good rehab potential.  Rec alcohol tx center  -       Row Name 05/30/25 1440          Therapy Assessment/Plan (OT)    Patient/Family Therapy Goal Statement (OT) get out of bed  -     Rehab Potential (OT) good  -     Criteria for Skilled Therapeutic Interventions Met (OT) yes;skilled treatment is necessary  -     Therapy Frequency (OT) 3 times/wk  -     Predicted Duration of Therapy Intervention (OT) 10 days  -       Row Name 05/30/25 1440          Therapy Plan Review/Discharge Plan (OT)    Anticipated Discharge Disposition (OT) other (see comments)  -       Row Name 05/30/25 1440          Positioning and Restraints    Pre-Treatment Position in bed  -     Post Treatment Position chair  -     In Chair sitting;reclined;call light within reach;encouraged to call for assist;legs elevated;with nsg  -               User Key  (r) = Recorded By, (t) = Taken By, (c) = Cosigned By      Initials Name Provider Type     Maribel Vigil, OTR/L Occupational Therapist                   Outcome Measures       Row Name 05/30/25 1440          How much help from another is currently needed...    Putting on and taking off regular lower body clothing? 2  -CH     Bathing (including washing, rinsing, and drying) 2  -CH     Toileting (which includes using toilet bed pan or urinal) 3  -CH     Putting on and taking off regular upper body clothing 3  -CH     Taking care of personal grooming (such as brushing teeth) 4  -CH     Eating meals 4  -CH     AM-PAC 6 Clicks Score (OT) 18  -       Row Name 05/30/25 3652          How much help from another person do you currently need...    Turning from your back to your side while in flat bed without using bedrails? 3  -BC     Moving from lying on back to sitting on the side of a flat bed without bedrails? 2  -BC     Moving to and from a bed to a chair (including a wheelchair)? 2  -BC     Standing up from a chair using  your arms (e.g., wheelchair, bedside chair)? 2  -BC     Climbing 3-5 steps with a railing? 2  -BC     To walk in hospital room? 2  -BC     AM-PAC 6 Clicks Score (PT) 13  -BC       Row Name 05/30/25 1440          Functional Assessment    Outcome Measure Options AM-PAC 6 Clicks Daily Activity (OT)  -               User Key  (r) = Recorded By, (t) = Taken By, (c) = Cosigned By      Initials Name Provider Type     Maribel Vigil, OTR/L Occupational Therapist    BC Lyudmila Michaels, RN Registered Nurse                    Occupational Therapy Education       Title: PT OT SLP Therapies (In Progress)       Topic: Occupational Therapy (In Progress)       Point: ADL training (Done)       Learning Progress Summary            Patient Acceptance, E,D, VU by  at 5/30/2025 1517                      Point: Precautions (Done)       Learning Progress Summary            Patient Acceptance, E,D, VU by  at 5/30/2025 1517                      Point: Body mechanics (Done)       Learning Progress Summary            Patient Acceptance, E,D, VU by  at 5/30/2025 1517                                      User Key       Initials Effective Dates Name Provider Type [facility] 07/11/23 -  Maribel Vigil, OTR/L Occupational Therapist OT                  OT Recommendation and Plan  Planned Therapy Interventions (OT): activity tolerance training, adaptive equipment training, BADL retraining, edema control/reduction, functional balance retraining, IADL retraining, occupation/activity based interventions, patient/caregiver education/training, ROM/therapeutic exercise, transfer/mobility retraining  Therapy Frequency (OT): 3 times/wk  Plan of Care Review  Plan of Care Reviewed With: patient  Progress: no change  Outcome Evaluation: OT eval complete.  Pt. is AxOx 3 & participatory.  Mr. Christiansen is known to me from a previous admit.  The pt previously was living alone and working.  Per the chart he has significant EOTH abuse and now  presents with a GI bleed.  The pt performed bed mob at Min A, sat EOB at S, required Max A for LBD & CGA for fxl mob.  He would benefit from cont'd OT tx to improve his fxl performance. He is a fall risk but demo's good rehab potential.  Rec alcohol tx center     Time Calculation:         Time Calculation- OT       Row Name 05/30/25 1440             Time Calculation- OT    OT Start Time 1440  add 10 for CR  -CH      OT Stop Time 1508  -CH      OT Time Calculation (min) 28 min  -CH      OT Received On 05/30/25  -CH      OT Goal Re-Cert Due Date 06/09/25  -CH         Untimed Charges    OT Eval/Re-eval Minutes 38  -CH         Total Minutes    Untimed Charges Total Minutes 38  -CH       Total Minutes 38  -CH                User Key  (r) = Recorded By, (t) = Taken By, (c) = Cosigned By      Initials Name Provider Type     Maribel Vigil, OTR/L Occupational Therapist                  Therapy Charges for Today       Code Description Service Date Service Provider Modifiers Qty    [id number] HC OT EVAL MOD COMPLEXITY 3 5/30/2025 Maribel Vigil OTR/L GO 1                 Maribel Vigil OTR/L  5/30/2025

## 2025-05-30 NOTE — PLAN OF CARE
Goal Outcome Evaluation:         Three loose dark stools overnight. Potassium 3.3 after being replaced. Replacing phos. Mag 1.6. Urine output 1.5L. Alert and oriented. CIWA 4.

## 2025-05-31 LAB
ALBUMIN SERPL-MCNC: 2.6 G/DL (ref 3.5–5.2)
ALBUMIN/GLOB SERPL: 1.5 G/DL
ALP SERPL-CCNC: 199 U/L (ref 39–117)
ALT SERPL W P-5'-P-CCNC: 96 U/L (ref 1–41)
ANION GAP SERPL CALCULATED.3IONS-SCNC: 10 MMOL/L (ref 5–15)
APTT PPP: 55.2 SECONDS (ref 24.5–36)
AST SERPL-CCNC: 255 U/L (ref 1–40)
BASOPHILS # BLD AUTO: 0.04 10*3/MM3 (ref 0–0.2)
BASOPHILS NFR BLD AUTO: 0.5 % (ref 0–1.5)
BILIRUB SERPL-MCNC: 27 MG/DL (ref 0–1.2)
BUN SERPL-MCNC: 16.6 MG/DL (ref 6–20)
BUN/CREAT SERPL: 72.2 (ref 7–25)
CALCIUM SPEC-SCNC: 8 MG/DL (ref 8.6–10.5)
CHLORIDE SERPL-SCNC: 99 MMOL/L (ref 98–107)
CO2 SERPL-SCNC: 25 MMOL/L (ref 22–29)
CREAT SERPL-MCNC: 0.23 MG/DL (ref 0.76–1.27)
DEPRECATED RDW RBC AUTO: 79.1 FL (ref 37–54)
EGFRCR SERPLBLD CKD-EPI 2021: 167.2 ML/MIN/1.73
EOSINOPHIL # BLD AUTO: 0.1 10*3/MM3 (ref 0–0.4)
EOSINOPHIL NFR BLD AUTO: 1.4 % (ref 0.3–6.2)
ERYTHROCYTE [DISTWIDTH] IN BLOOD BY AUTOMATED COUNT: 23.3 % (ref 12.3–15.4)
GLOBULIN UR ELPH-MCNC: 1.7 GM/DL
GLUCOSE SERPL-MCNC: 161 MG/DL (ref 65–99)
HCT VFR BLD AUTO: 22.3 % (ref 37.5–51)
HGB BLD-MCNC: 7.5 G/DL (ref 13–17.7)
IMM GRANULOCYTES # BLD AUTO: 0.15 10*3/MM3 (ref 0–0.05)
IMM GRANULOCYTES NFR BLD AUTO: 2.1 % (ref 0–0.5)
INR PPP: 2.38 (ref 0.91–1.09)
LYMPHOCYTES # BLD AUTO: 1.05 10*3/MM3 (ref 0.7–3.1)
LYMPHOCYTES NFR BLD AUTO: 14.4 % (ref 19.6–45.3)
MAGNESIUM SERPL-MCNC: 1.7 MG/DL (ref 1.6–2.6)
MCH RBC QN AUTO: 33 PG (ref 26.6–33)
MCHC RBC AUTO-ENTMCNC: 33.6 G/DL (ref 31.5–35.7)
MCV RBC AUTO: 98.2 FL (ref 79–97)
MONOCYTES # BLD AUTO: 0.75 10*3/MM3 (ref 0.1–0.9)
MONOCYTES NFR BLD AUTO: 10.3 % (ref 5–12)
NEUTROPHILS NFR BLD AUTO: 5.21 10*3/MM3 (ref 1.7–7)
NEUTROPHILS NFR BLD AUTO: 71.3 % (ref 42.7–76)
NRBC BLD AUTO-RTO: 0 /100 WBC (ref 0–0.2)
PHOSPHATE SERPL-MCNC: 1.7 MG/DL (ref 2.5–4.5)
PHOSPHATE SERPL-MCNC: 1.9 MG/DL (ref 2.5–4.5)
PLATELET # BLD AUTO: 71 10*3/MM3 (ref 140–450)
PMV BLD AUTO: 11.9 FL (ref 6–12)
POTASSIUM SERPL-SCNC: 3.8 MMOL/L (ref 3.5–5.2)
POTASSIUM SERPL-SCNC: 3.9 MMOL/L (ref 3.5–5.2)
PROT SERPL-MCNC: 4.3 G/DL (ref 6–8.5)
PROTHROMBIN TIME: 27.5 SECONDS (ref 11.8–14.8)
RBC # BLD AUTO: 2.27 10*6/MM3 (ref 4.14–5.8)
SODIUM SERPL-SCNC: 134 MMOL/L (ref 136–145)
WBC NRBC COR # BLD AUTO: 7.3 10*3/MM3 (ref 3.4–10.8)

## 2025-05-31 PROCEDURE — 97530 THERAPEUTIC ACTIVITIES: CPT

## 2025-05-31 PROCEDURE — 85025 COMPLETE CBC W/AUTO DIFF WBC: CPT | Performed by: PHYSICIAN ASSISTANT

## 2025-05-31 PROCEDURE — 85730 THROMBOPLASTIN TIME PARTIAL: CPT | Performed by: PHYSICIAN ASSISTANT

## 2025-05-31 PROCEDURE — 25810000003 SODIUM CHLORIDE 0.9 % SOLUTION: Performed by: PHYSICIAN ASSISTANT

## 2025-05-31 PROCEDURE — 25010000002 ONDANSETRON PER 1 MG: Performed by: INTERNAL MEDICINE

## 2025-05-31 PROCEDURE — 84100 ASSAY OF PHOSPHORUS: CPT | Performed by: INTERNAL MEDICINE

## 2025-05-31 PROCEDURE — 25010000002 PIPERACILLIN SOD-TAZOBACTAM PER 1 G: Performed by: INTERNAL MEDICINE

## 2025-05-31 PROCEDURE — 84100 ASSAY OF PHOSPHORUS: CPT | Performed by: PHYSICIAN ASSISTANT

## 2025-05-31 PROCEDURE — 25010000002 POTASSIUM CHLORIDE PER 2 MEQ: Performed by: INTERNAL MEDICINE

## 2025-05-31 PROCEDURE — 85610 PROTHROMBIN TIME: CPT | Performed by: PHYSICIAN ASSISTANT

## 2025-05-31 PROCEDURE — 80053 COMPREHEN METABOLIC PANEL: CPT | Performed by: PHYSICIAN ASSISTANT

## 2025-05-31 PROCEDURE — 99233 SBSQ HOSP IP/OBS HIGH 50: CPT | Performed by: NURSE PRACTITIONER

## 2025-05-31 PROCEDURE — 83735 ASSAY OF MAGNESIUM: CPT | Performed by: PHYSICIAN ASSISTANT

## 2025-05-31 PROCEDURE — 25010000002 PHENOBARBITAL PER 120 MG: Performed by: INTERNAL MEDICINE

## 2025-05-31 PROCEDURE — 84132 ASSAY OF SERUM POTASSIUM: CPT | Performed by: INTERNAL MEDICINE

## 2025-05-31 RX ORDER — FENTANYL/ROPIVACAINE/NS/PF 2-625MCG/1
15 PLASTIC BAG, INJECTION (ML) EPIDURAL
Status: COMPLETED | OUTPATIENT
Start: 2025-05-31 | End: 2025-05-31

## 2025-05-31 RX ORDER — MIDODRINE HYDROCHLORIDE 2.5 MG/1
5 TABLET ORAL
Status: DISCONTINUED | OUTPATIENT
Start: 2025-05-31 | End: 2025-06-04 | Stop reason: HOSPADM

## 2025-05-31 RX ADMIN — LACTULOSE 20 G: 20 SOLUTION ORAL at 20:22

## 2025-05-31 RX ADMIN — POLYVINYL ALCOHOL, POVIDONE 2 DROP: 14; 6 SOLUTION/ DROPS OPHTHALMIC at 10:06

## 2025-05-31 RX ADMIN — POTASSIUM CHLORIDE 20 MEQ: 29.8 INJECTION, SOLUTION INTRAVENOUS at 02:01

## 2025-05-31 RX ADMIN — Medication 10 ML: at 08:33

## 2025-05-31 RX ADMIN — PIPERACILLIN AND TAZOBACTAM 3.38 G: 3; .375 INJECTION, POWDER, FOR SOLUTION INTRAVENOUS at 07:47

## 2025-05-31 RX ADMIN — PHENOBARBITAL SODIUM 65 MG: 65 INJECTION INTRAMUSCULAR; INTRAVENOUS at 21:05

## 2025-05-31 RX ADMIN — THIAMINE HCL TAB 100 MG 250 MG: 100 TAB at 08:31

## 2025-05-31 RX ADMIN — PANTOPRAZOLE SODIUM 40 MG: 40 INJECTION, POWDER, FOR SOLUTION INTRAVENOUS at 07:47

## 2025-05-31 RX ADMIN — Medication 10 ML: at 20:22

## 2025-05-31 RX ADMIN — ONDANSETRON 4 MG: 2 INJECTION INTRAMUSCULAR; INTRAVENOUS at 16:21

## 2025-05-31 RX ADMIN — POTASSIUM PHOSPHATE, MONOBASIC AND POTASSIUM PHOSPHATE, DIBASIC 15 MMOL: 224; 236 INJECTION, SOLUTION, CONCENTRATE INTRAVENOUS at 00:53

## 2025-05-31 RX ADMIN — Medication 1 APPLICATION: at 20:22

## 2025-05-31 RX ADMIN — POTASSIUM CHLORIDE 20 MEQ: 29.8 INJECTION, SOLUTION INTRAVENOUS at 00:53

## 2025-05-31 RX ADMIN — TRAMADOL HYDROCHLORIDE 50 MG: 50 TABLET, COATED ORAL at 07:15

## 2025-05-31 RX ADMIN — PENTOXIFYLLINE 400 MG: 400 TABLET, EXTENDED RELEASE ORAL at 12:10

## 2025-05-31 RX ADMIN — PHENOBARBITAL SODIUM 65 MG: 65 INJECTION INTRAMUSCULAR; INTRAVENOUS at 14:30

## 2025-05-31 RX ADMIN — Medication 1 APPLICATION: at 08:35

## 2025-05-31 RX ADMIN — PENTOXIFYLLINE 400 MG: 400 TABLET, EXTENDED RELEASE ORAL at 08:32

## 2025-05-31 RX ADMIN — FOLIC ACID 1 MG: 1 TABLET ORAL at 08:32

## 2025-05-31 RX ADMIN — LEVOTHYROXINE SODIUM 75 MCG: 0.07 TABLET ORAL at 06:21

## 2025-05-31 RX ADMIN — LACTULOSE 20 G: 20 SOLUTION ORAL at 17:41

## 2025-05-31 RX ADMIN — TRAMADOL HYDROCHLORIDE 50 MG: 50 TABLET, COATED ORAL at 14:29

## 2025-05-31 RX ADMIN — PHENOBARBITAL SODIUM 65 MG: 65 INJECTION INTRAMUSCULAR; INTRAVENOUS at 06:21

## 2025-05-31 RX ADMIN — POTASSIUM PHOSPHATE, MONOBASIC AND POTASSIUM PHOSPHATE, DIBASIC 15 MMOL: 224; 236 INJECTION, SOLUTION, CONCENTRATE INTRAVENOUS at 12:10

## 2025-05-31 RX ADMIN — POTASSIUM PHOSPHATE, MONOBASIC AND POTASSIUM PHOSPHATE, DIBASIC 15 MMOL: 224; 236 INJECTION, SOLUTION, CONCENTRATE INTRAVENOUS at 08:33

## 2025-05-31 RX ADMIN — FLUOXETINE HYDROCHLORIDE 20 MG: 20 CAPSULE ORAL at 08:32

## 2025-05-31 RX ADMIN — CHLORHEXIDINE GLUCONATE 1 APPLICATION: 500 CLOTH TOPICAL at 03:47

## 2025-05-31 RX ADMIN — PIPERACILLIN AND TAZOBACTAM 3.38 G: 3; .375 INJECTION, POWDER, FOR SOLUTION INTRAVENOUS at 14:34

## 2025-05-31 RX ADMIN — TRAMADOL HYDROCHLORIDE 50 MG: 50 TABLET, COATED ORAL at 21:03

## 2025-05-31 RX ADMIN — PIPERACILLIN AND TAZOBACTAM 3.38 G: 3; .375 INJECTION, POWDER, FOR SOLUTION INTRAVENOUS at 23:17

## 2025-05-31 RX ADMIN — MIDODRINE HYDROCHLORIDE 5 MG: 2.5 TABLET ORAL at 18:52

## 2025-05-31 RX ADMIN — PENTOXIFYLLINE 400 MG: 400 TABLET, EXTENDED RELEASE ORAL at 17:42

## 2025-05-31 RX ADMIN — POTASSIUM PHOSPHATE, MONOBASIC AND POTASSIUM PHOSPHATE, DIBASIC 15 MMOL: 224; 236 INJECTION, SOLUTION, CONCENTRATE INTRAVENOUS at 22:20

## 2025-05-31 RX ADMIN — PANTOPRAZOLE SODIUM 40 MG: 40 INJECTION, POWDER, FOR SOLUTION INTRAVENOUS at 18:08

## 2025-05-31 RX ADMIN — TRAMADOL HYDROCHLORIDE 50 MG: 50 TABLET, COATED ORAL at 01:01

## 2025-05-31 RX ADMIN — LACTULOSE 20 G: 20 SOLUTION ORAL at 08:33

## 2025-05-31 NOTE — THERAPY TREATMENT NOTE
Acute Care - Physical Therapy Treatment Note  [facility]     Patient Name: Luciano Christiansen  : 1985  MRN: [id number]  Today's Date: 2025      Visit Dx:     ICD-10-CM ICD-9-CM   1. Liver failure without hepatic coma, unspecified chronicity  K72.90 572.8   2. Upper GI bleed  K92.2 578.9   3. Impaired functional mobility and activity tolerance [Z74.09]  Z74.09 V49.89     Patient Active Problem List   Diagnosis    Wellness examination    Encounter for hepatitis C screening test for low risk patient    Primary hypertension    Class 1 obesity due to excess calories without serious comorbidity with body mass index (BMI) of 34.0 to 34.9 in adult    Fatty liver    Hyponatremia    Hypokalemia    Hypomagnesemia    Alcoholism    Transaminitis    Hypophosphatemia    Insomnia disorder related to known organic factor    Community acquired bilateral lower lobe pneumonia    Pneumonia due to Streptococcus    GI bleed    Calculus of gallbladder without cholecystitis without obstruction    Alcoholic encephalopathy    Alcohol withdrawal    Alcoholic steatohepatitis    BMI 40.0-44.9, adult    Hepatic encephalopathy    Gastroesophageal reflux disease without esophagitis    Hypothyroidism    Esophageal varices in cirrhosis    Upper GI bleed     Past Medical History:   Diagnosis Date    Alcoholism     currently drinking    Diabetes mellitus     Gout     Hepatitis C     Hypertension     Jaundice      Past Surgical History:   Procedure Laterality Date    ENDOSCOPY N/A 2025    Procedure: ESOPHAGOGASTRODUODENOSCOPY WITH ANESTHESIA;  Surgeon: Estela Ramos MD;  Location: [facility];  Service: Gastroenterology;  Laterality: N/A;  pre op: GI bleed  post op: esophageal ulcer, esophagitis  pcp: Adrien Varghese MD    VASECTOMY       PT Assessment (Last 12 Hours)       PT Evaluation and Treatment       Row Name 25 0928          Physical Therapy Time and Intention    Subjective Information complains of;fatigue  -MF     Document Type  "therapy note (daily note)  -     Mode of Treatment physical therapy  -       Row Name 05/31/25 0928          General Information    Existing Precautions/Restrictions fall;oxygen therapy device and L/min  -[facility] Name 05/31/25 0928          Pain    Pretreatment Pain Rating 0/10 - no pain  -     Posttreatment Pain Rating 0/10 - no pain  -     Pre/Posttreatment Pain Comment generalized \"soreness\" all over.  -       Row Name 05/31/25 0928          Bed Mobility    Supine-Sit Barnum (Bed Mobility) verbal cues;contact guard  -     Assistive Device (Bed Mobility) head of bed elevated  -[facility] Name 05/31/25 0928          Sit-Stand Transfer    Sit-Stand Barnum (Transfers) verbal cues;contact guard;minimum assist (75% patient effort)  -[facility] Name 05/31/25 0928          Stand-Sit Transfer    Stand-Sit Barnum (Transfers) verbal cues;contact guard  -[facility] Name 05/31/25 0928          Gait/Stairs (Locomotion)    Barnum Level (Gait) verbal cues;minimum assist (75% patient effort)  -     Distance in Feet (Gait) 6  -     Deviations/Abnormal Patterns (Gait) pauly decreased;stride length decreased  -     Comment, (Gait/Stairs) legs unsteady  -[facility] Name 05/31/25 0928          Hip (Therapeutic Exercise)    Hip (Therapeutic Exercise) AROM (active range of motion)  -     Hip AROM (Therapeutic Exercise) bilateral;flexion;sitting;10 repetitions  -[facility] Name 05/31/25 0928          Knee (Therapeutic Exercise)    Knee (Therapeutic Exercise) AROM (active range of motion)  -     Knee AROM (Therapeutic Exercise) bilateral;LAQ (long arc quad);sitting;15 repititions  -[facility] Name 05/31/25 0928          Ankle (Therapeutic Exercise)    Ankle (Therapeutic Exercise) AROM (active range of motion)  -     Ankle AROM (Therapeutic Exercise) bilateral;dorsiflexion;sitting;20 repititions  -[facility] Name 05/31/25 0928          Positioning and Restraints    " Pre-Treatment Position in bed  -     Post Treatment Position chair  -MF     In Chair reclined;call light within reach;encouraged to call for assist;with nsg;RUE elevated;LUE elevated  -               User Key  (r) = Recorded By, (t) = Taken By, (c) = Cosigned By      Initials Name Provider Type    Court Healy PTA Physical Therapist Assistant                    Physical Therapy Education       Title: PT OT SLP Therapies (In Progress)       Topic: Physical Therapy (In Progress)       Point: Mobility training (Done)       Learning Progress Summary            Patient Acceptance, E,TB,D, VU,NR by  at 5/30/2025 1624    Comment: Education re: purpose of PT/importance of activity, safety/falls prevention, LE exercise, deep breathing w/ education for improved tech w/ deep breathing                      Point: Home exercise program (Done)       Learning Progress Summary            Patient Acceptance, E,TB,D, VU,NR by  at 5/30/2025 1624    Comment: Education re: purpose of PT/importance of activity, safety/falls prevention, LE exercise, deep breathing w/ education for improved tech w/ deep breathing                      Point: Body mechanics (Not Started)       Learner Progress:  Not documented in this visit.              Point: Precautions (Done)       Learning Progress Summary            Patient Acceptance, E,TB,D, VU,NR by  at 5/30/2025 1624    Comment: Education re: purpose of PT/importance of activity, safety/falls prevention, LE exercise, deep breathing w/ education for improved tech w/ deep breathing                                      User Key       Initials Effective Dates Name Provider Type Discipline     08/02/18 -  Ruthy Zaragoza, PT Physical Therapist PT                  PT Recommendation and Plan         Outcome Measures       Row Name 05/31/25 0928             How much help from another person do you currently need...    Turning from your back to your side while in flat bed without  using bedrails? 3  -MF      Moving from lying on back to sitting on the side of a flat bed without bedrails? 3  -MF      Moving to and from a bed to a chair (including a wheelchair)? 3  -MF      Standing up from a chair using your arms (e.g., wheelchair, bedside chair)? 3  -MF      Climbing 3-5 steps with a railing? 2  -MF      To walk in hospital room? 3  -MF      AM-PAC 6 Clicks Score (PT) 17  -MF         Functional Assessment    Outcome Measure Options AM-PAC 6 Clicks Basic Mobility (PT)  -MF                User Key  (r) = Recorded By, (t) = Taken By, (c) = Cosigned By      Initials Name Provider Type    Court Healy PTA Physical Therapist Assistant                     Time Calculation:    PT Charges       Row Name 05/31/25 0954             Time Calculation    Start Time 0928  -MF      Stop Time 0954  -MF      Time Calculation (min) 26 min  -MF      PT Received On 05/31/25  -         Time Calculation- PT    Total Timed Code Minutes- PT 26 minute(s)  -MF         Timed Charges    12116 - PT Therapeutic Activity Minutes 26  -MF         Total Minutes    Timed Charges Total Minutes 26  -MF       Total Minutes 26  -MF                User Key  (r) = Recorded By, (t) = Taken By, (c) = Cosigned By      Initials Name Provider Type    Court Healy PTA Physical Therapist Assistant                  Therapy Charges for Today       Code Description Service Date Service Provider Modifiers Qty    [id number]  PT THERAPEUTIC ACT EA 15 MIN 5/31/2025 Court Bocanegra PTA GP 2            PT G-Codes  Outcome Measure Options: AM-PAC 6 Clicks Basic Mobility (PT)  AM-PAC 6 Clicks Score (PT): 17  AM-PAC 6 Clicks Score (OT): 18    Court Bocanegra PTA  5/31/2025

## 2025-05-31 NOTE — PROGRESS NOTES
[facility] Intensivist Services  INPATIENT PROGRESS NOTE    Patient Name: Luciano Howell  Date of Admission: 5/28/2025  Today's Date: 05/31/25  Length of Stay:  LOS: 3 days   Primary Care Physician: Adrien Varghese MD  Next of Kin: Primary Emergency Contact: MAGO HOWELL Home Phone: [phone]      Subjective   Chief Complaint: Generalized weakness, difficulty walking, dark stools, hematemesis     Abdominal Pain  Dizziness  Symptoms include abdominal pain.       40 year old male with a PMH of alcoholism, diabetes mellitus, gout, hepatitis C, hypertension, and jaundice admitted after presenting to ED with complaints of generalized weakness, difficulty walking, hematemesis, and melena. Patient continues to drink and states he drinks about 4-5 twelve ounce malt liquor drinks daily.  He was recently in the emergency department (4/18/25) and noted to have cholecystitis with stones for which he was [facility]; however, he was deemed unsafe for surgery at that time.  Prior to this, he was admitted to our hospital on 4/1/2025 with alcohol use and fall.  However, he left AMA on 4/4/25. He was admitted to CCU for close monitoring with his GIB and underwent EGD to further evaluate this.    Interval history:  5/29: EGD from 5/28 showed severe esophagitis all the way up to the mid to upper esophagus with a small ulcer due to reflux. Per GI, the esophageal mucosa was swollen due to portal hypertension, ulcerations, and esophagitis, and therefore, he could not see any varices to band. Patient's Hgb was quite low this morning. He received 2 u PRBC with improvement of Hgb to 7.3 and Hct 21.5. Dr. Preston and I spoke with the patient again about the severity of his liver failure and the need for complete cessation from alcohol in order to have a chance of long term survival. Patient voiced his understanding and would like to seek help. Case management has been consulted.  Palliative team also on board. Patient had generalized malaise, but no other complaints for me at time of my exam. Blood pressure did drop overnight, requiring low dose levophed infusion. However, blood pressure has significantly improved after 2 PRBC, and patient is now off vasopressors.     5/30: Patient intermittently requiring levophed infusion. Currently off levophed. H/H stable this morning. Patient has no complaints for me this morning.     5/31: Patient is back on low dose levophed infusion. No acute events overnight. H/H remain stable. His only complaint this morning was dry eyes. We have added artificial tears.     Review of Systems   Gastrointestinal:  Positive for abdominal pain.   Neurological:  Positive for dizziness.      All pertinent negatives and positives are as above. All other systems have been reviewed and are negative unless otherwise stated.     Past Medical and Past Surgical History   Active and Resolved Problems  Active Hospital Problems    Diagnosis  POA    **Upper GI bleed [K92.2]  Yes      Resolved Hospital Problems   No resolved problems to display.       Past Medical History:   Past Medical History:   Diagnosis Date    Alcoholism     currently drinking    Diabetes mellitus     Gout     Hepatitis C     Hypertension     Jaundice      Past Surgical History:   Past Surgical History:   Procedure Laterality Date    ENDOSCOPY N/A 5/28/2025    Procedure: ESOPHAGOGASTRODUODENOSCOPY WITH ANESTHESIA;  Surgeon: Estela Ramos MD;  Location: [facility] OR;  Service: Gastroenterology;  Laterality: N/A;  pre op: GI bleed  post op: esophageal ulcer, esophagitis  pcp: Adrien Varghese MD    VASECTOMY       Social and Family History   Family History:  family history is not on file.    Tobacco/Social History:  reports that he has been smoking cigarettes. He started smoking about 15 years ago. He has a 7.1 pack-year smoking history. He has never used smokeless tobacco. He reports current alcohol use of about 12.0  "standard drinks of alcohol per week. He reports current drug use. Drug: Marijuana.    Allergies   Allergies:   He has no known allergies.    Objective    Temp:  [97.7 °F (36.5 °C)-98.4 °F (36.9 °C)] 98.2 °F (36.8 °C)  Heart Rate:  [] 81  Resp:  [12-14] 14  BP: (106-144)/(47-75) 135/69  Physical Exam  Vitals and nursing note reviewed.   Constitutional:       General: He is awake. He is not in acute distress.     Appearance: He is ill-appearing (chronically ill-appearing). He is not diaphoretic.   HENT:      Head: Normocephalic.      Nose: Nose normal.      Mouth/Throat:      Mouth: Mucous membranes are moist.   Eyes:      General: Scleral icterus present.      Extraocular Movements: Extraocular movements intact.      Pupils: Pupils are equal, round, and reactive to light.   Cardiovascular:      Rate and Rhythm: Normal rate and regular rhythm.      Pulses: Normal pulses.   Pulmonary:      Effort: Pulmonary effort is normal. No respiratory distress.      Breath sounds: Normal breath sounds. No stridor. No wheezing or rhonchi.   Abdominal:      General: Bowel sounds are normal. There is distension.      Palpations: Abdomen is soft.      Tenderness: There is abdominal tenderness (\"generalized discomfort\"-improving).   Musculoskeletal:      Cervical back: Normal range of motion and neck supple.   Skin:     General: Skin is warm.      Capillary Refill: Capillary refill takes less than 2 seconds.      Coloration: Skin is jaundiced.   Neurological:      General: No focal deficit present.      Mental Status: He is alert. Mental status is at baseline.   Psychiatric:         Behavior: Behavior is cooperative.         Inpatient Medications   Medications: Scheduled Meds:[Transfer Hold] Chlorhexidine Gluconate Cloth, 1 Application, Topical, Once  Chlorhexidine Gluconate Cloth, 1 Application, Topical, Q24H  FLUoxetine, 20 mg, Oral, Daily  folic acid, 1 mg, Oral, Daily  lactulose, 20 g, Oral, TID  levothyroxine, 75 mcg, Oral, " Q AM  mupirocin, 1 Application, Each Nare, BID  pantoprazole, 40 mg, Intravenous, BID AC  pentoxifylline, 400 mg, Oral, TID With Meals  PHENobarbital, 65 mg, Intravenous, Q8H   Followed by  [START ON 6/1/2025] PHENobarbital, 32.5 mg, Intravenous, Q12H  piperacillin-tazobactam, 3.375 g, Intravenous, Q8H  potassium phosphate, 15 mmol, Intravenous, Q3H  senna-docusate sodium, 2 tablet, Oral, BID  sodium chloride, 10 mL, Intravenous, Q12H  thiamine, 250 mg, Oral, Daily      Continuous Infusions:norepinephrine, 0.02-0.3 mcg/kg/min, Last Rate: Stopped (05/31/25 0830)  octreotide (SandoSTATIN) infusion, 25 mcg/hr, Last Rate: 25 mcg/hr (05/30/25 1722)      PRN Meds:.  senna-docusate sodium **AND** polyethylene glycol **AND** bisacodyl **AND** bisacodyl    Calcium Replacement - Follow Nurse / BPA Driven Protocol    Magnesium Standard Dose Replacement - Follow Nurse / BPA Driven Protocol    nitroglycerin    ondansetron    Phosphorus Replacement - Follow Nurse / BPA Driven Protocol    Polyvinyl Alcohol-Povidone PF    Potassium Replacement - Follow Nurse / BPA Driven Protocol    sodium chloride    sodium chloride    sodium chloride    traMADol    I have reviewed the patient's current medications.   Outpatient Medications     Current Outpatient Medications   Medication Instructions    FLUoxetine (PROZAC) 20 mg, Oral, Daily    levothyroxine (SYNTHROID) 75 mcg, Oral, Daily    thiamine (VITAMIN B1) 100 mg, Oral, Daily       Current Antibiotics   piperacillin-tazobactam (ZOSYN) 3.375 g IVPB in 100 mL NS MBP (CD)    Results:   CBC:      Lab 05/31/25  0415 05/30/25  0358 05/29/25  0910 05/29/25  0513 05/28/25  1732 05/28/25  1100   WBC 7.30 5.56  --  7.93  --  14.71*   HEMOGLOBIN 7.5* 7.1*   < > 6.8*   < > 9.5*   HEMATOCRIT 22.3* 21.1*   < > 20.1*   < > 28.0*   PLATELETS 71* 61*  --  57*  --  101*   NEUTROS ABS 5.21 3.91  --  6.17  --  11.56*   IMMATURE GRANS (ABS) 0.15* 0.09*  --  0.08*  --  0.30*   LYMPHS ABS 1.05 0.87  --  0.98  --   1.65   MONOS ABS 0.75 0.51  --  0.58  --  1.07*   EOS ABS 0.10 0.13  --  0.08  --  0.06   MCV 98.2* 96.8  --  101.5*  --  101.8*    < > = values in this interval not displayed.     LIVER FUNCTION TESTS:      Lab 05/31/25  0415 05/30/25 0358 05/29/25  0513 05/28/25  1100   TOTAL PROTEIN 4.3* 4.1* 4.1* 5.0*   ALBUMIN 2.6* 2.6* 2.6* 2.6*   GLOBULIN 1.7 1.5 1.5 2.4   ALT (SGPT) 96* 90* 90* 130*   AST (SGOT) 255* 225* 226* 329*   BILIRUBIN 27.0* 24.2* 22.8* 26.2*   ALK PHOS 199* 196* 206* 315*   LIPASE  --   --   --  19     ABG:      Lab 05/31/25 0415 05/30/25 0358 05/29/25  0513 05/29/25  0246 05/28/25  1100   PH, ARTERIAL  --   --   --  7.422  --    PO2 ART  --   --   --  83.7  --    PCO2, ARTERIAL  --   --   --  37.1  --    HCO3 ART  --   --   --  24.1  --    ANION GAP 10.0 10.0 16.0*  --  22.0*     BMP/MG/PHOS:      Lab 05/31/25  0737 05/31/25 0415 05/30/25  2310 05/30/25  1409 05/30/25  1120 05/30/25  0358 05/29/25  1551 05/29/25  0513 05/28/25  1100   SODIUM  --  134*  --   --   --  134*  --  133* 132*   POTASSIUM 3.9 3.8 3.6  --  3.3* 3.3*   < > 3.3* 3.2*   CHLORIDE  --  99  --   --   --  98  --  94* 89*   BUN  --  16.6  --   --   --  26.7*  --  28.0* 21.0*   CREATININE  --  0.23*  --   --   --  0.57*  --  0.80 0.48*   CALCIUM  --  8.0*  --   --   --  7.4*  --  7.1* 7.8*   MAGNESIUM  --  1.7  --   --   --  1.6  --  1.1*  --    PHOSPHORUS 1.7*  --  1.7* 2.1*  --  2.0*  --  3.9  --     < > = values in this interval not displayed.     DIABETIC:  A1C Last 3 Results          7/7/2024    05:35 8/6/2024    10:11   HGBA1C Last 3 Results   Hemoglobin A1C 4.60  4.3      IMAGING STUDIES:  No radiology results for the last day      CULTURE DATA:   Blood Culture   Date Value Ref Range Status   05/28/2025 No growth at 24 hours  Preliminary   05/28/2025 No growth at 24 hours  Preliminary        Assessment/Plan   40-year-old male with a past medical history of alcoholism admitted on 5/28/2025 with decompensated liver failure  and upper GI bleed. Patient being admitted to the unit for closer observation. He will undergo upper GI scope with GI to evaluate possible source of bleeding. He has intermittently required levophed infusion for BP support.    Decompensated liver failure in setting of cirrhosis  -MELD score of 31  -Ammonia level was 92  -Patient severely jaundice with scleral icterus  -Continue  home dose of lactulose  -I warned the patient that he is at high risk for mortality within the next 90 days if he does not quit drinking as his liver damage is quite severe given his MELD score.  Patient states he does want to quit, but he feels helpless as he has tried multiple times in the past to quit but been unsuccessful.  Case management consulted for resources      Alcoholism with continued alcohol ingestion  -I warned the patient that he is at high risk for mortality within the next 90 days if he does not quit drinking as his liver damage is quite severe given his MELD score.  Patient states he does want to quit, but he feels helpless as he has tried multiple times in the past to quit but been unsuccessful.  Case management consulted to help provide resources for this patient.  -Patient has been a heavy drinker for 20+ years. He states he currently drinks 3-4 twelve ounce malt liquor drinks/day. Last drink was around 10 pm on 5/27  -Continue  phenobarb taper to help prevent/minimize DTs. Seizure precautions.   -Total alcohol cessation again emphasized for this patient in order to give him his best chance of long term survival and possibly make him a candidate for liver transplant in future if he is able to have complete alcohol cessation    GI bleed  -Patient reports dark stool as well as dark emesis recently  -EGD showed severe esophagitis, ulceration, and  swollen esophageal mucosa. Due to the degree of swelling, GI was unable to see any varices to band  -Zofran prn for nausea  -Continue octreotide gtt per GI recommendations  -In  setting of #1 and #2 above  -Start clear liquid diet and advance as tolerated  -BID PPI  -GI following, we appreciate their recommendations    Coagulopathy  -Secondary to #1 and #2 above  -INR 2.32, Pt 26.8, PTT 57.5  -Monitor with daily labs     Anemia  -In setting of GIB  -Latest Hgb 7.5  Hct 22.3  -Patient is s/p 2 PRBC.   -Recheck H&H again later today  -Daily CBC  -Transfuse if needed for Hgb <7     Lactic acidosis and sepsis  -Blood cultures  with no growth at 24 hours  -Initial lactate 10.8, latest lactate 4.6  -Leukocytosis improved   -Continue empiric treatment with Zosyn.   -Monitor cultures, lactate level, and daily CBC. Blood cultures showing no growth at 3 days.   -Patient has had intermittent hypotension requiring low dose levophed. Currently he is back on levophed.      CODE STATUS: Full  VTE prophylaxis: SCDs  Nutrition: clear liquid diet, advance as tolearted  Bowel: prn bowel regimen  GI prophylaxis: BID PPI  Antibiotics: Zosyn     Disposition: Critical care management     Total critical care time: 33 minutes    Due to a high probability of clinically significant, life threatening deterioration, the patient required my highest level of preparedness to intervene emergently and I personally spent this critical care time directly and personally managing the patient.     This critical care time included obtaining a history; examining the patient; pulse oximetry; ordering and review of studies; arranging urgent treatment with development of a management plan; evaluation of patient's response to treatment; frequent reassessment; and, discussions with other providers.    This critical care time was performed to assess and manage the high probability of imminent, life-threatening deterioration that could result in multi-organ failure. It was exclusive of separately billable procedures and treating other patients and teaching time.    Please see MDM section and the rest of the note for further information  on patient assessment and treatment.    Part of this note may be an electronic transcription/translation of spoken language to printed text using the Dragon Dictation System    Electronically signed by Aryan Erwin PA-C on 5/31/2025 at 09:18 CDT

## 2025-05-31 NOTE — PLAN OF CARE
Goal Outcome Evaluation:           Progress: improving  Outcome Evaluation: Has been on/off Levophed, currently on hold. PRN pain meds x 2. O2 weaned off. Up in chair with PT/OT. Vit K started. Octreotide gtt continues

## 2025-05-31 NOTE — PLAN OF CARE
Problem: Adult Inpatient Plan of Care  Goal: Plan of Care Review  Outcome: Not Progressing  Flowsheets  Taken 5/31/2025 1827 by Hannah Reaves RN  Outcome Evaluation: pt has been on levophed intermittently this shift. adding midodrine this pm. off octreotide gtt. several loose stools this shift. still recieving lactulose. nausea and emesis x1 with zofran given. up in chair today.  Taken 5/30/2025 1538 by Ruthy Zaragoza PT  Plan of Care Reviewed With: patient  Goal: Patient-Specific Goal (Individualized)  Outcome: Not Progressing  Goal: Absence of Hospital-Acquired Illness or Injury  Outcome: Not Progressing  Intervention: Identify and Manage Fall Risk  Recent Flowsheet Documentation  Taken 5/31/2025 1700 by Hannah Reaves RN  Safety Promotion/Fall Prevention:   room organization consistent   safety round/check completed  Taken 5/31/2025 1600 by Hannah Reaves RN  Safety Promotion/Fall Prevention:   room organization consistent   safety round/check completed  Taken 5/31/2025 1500 by Hannah Reaves RN  Safety Promotion/Fall Prevention:   room organization consistent   safety round/check completed  Taken 5/31/2025 1400 by Hannah Reaves RN  Safety Promotion/Fall Prevention:   room organization consistent   safety round/check completed  Taken 5/31/2025 1300 by Hannah Reaves RN  Safety Promotion/Fall Prevention:   room organization consistent   safety round/check completed  Taken 5/31/2025 1200 by Hannah Reaves RN  Safety Promotion/Fall Prevention:   room organization consistent   safety round/check completed  Taken 5/31/2025 1100 by Hannah Reaves RN  Safety Promotion/Fall Prevention:   room organization consistent   safety round/check completed  Taken 5/31/2025 1000 by Hannah Reaves RN  Safety Promotion/Fall Prevention:   room organization consistent   safety round/check completed  Taken 5/31/2025 0900 by Hannah Reaves RN  Safety Promotion/Fall Prevention:   room  organization consistent   safety round/check completed  Taken 5/31/2025 0800 by Hannah Reaves RN  Safety Promotion/Fall Prevention:   room organization consistent   safety round/check completed  Taken 5/31/2025 0700 by Hannah Reaves RN  Safety Promotion/Fall Prevention:   room organization consistent   safety round/check completed  Intervention: Prevent Skin Injury  Recent Flowsheet Documentation  Taken 5/31/2025 1700 by Hannah Reaves RN  Body Position:   turned   left  Taken 5/31/2025 1600 by Hannah Reaves RN  Skin Protection:   incontinence pads utilized   silicone foam dressing in place   transparent dressing maintained  Taken 5/31/2025 1500 by Hannah Reaves RN  Body Position:   turned   right  Taken 5/31/2025 1300 by Hannah Reaves RN  Body Position:   turned   left  Taken 5/31/2025 1100 by Hannah Reaves RN  Body Position:   turned   sitting up in bed  Taken 5/31/2025 0900 by Hannah Reaves RN  Body Position:   turned   right  Taken 5/31/2025 0800 by Hannah Reaves RN  Skin Protection:   incontinence pads utilized   skin sealant/moisture barrier applied   transparent dressing maintained  Taken 5/31/2025 0700 by Hannah Reaves RN  Body Position:   turned   left  Intervention: Prevent and Manage VTE (Venous Thromboembolism) Risk  Recent Flowsheet Documentation  Taken 5/31/2025 0800 by Hannah Reaves RN  VTE Prevention/Management:   bilateral   SCDs (sequential compression devices) on  Intervention: Prevent Infection  Recent Flowsheet Documentation  Taken 5/31/2025 1600 by Hannah Reaves RN  Infection Prevention: cohorting utilized  Taken 5/31/2025 1200 by Hannah Reaves RN  Infection Prevention:   cohorting utilized   single patient room provided  Taken 5/31/2025 0800 by Hannah Reaves RN  Infection Prevention: cohorting utilized  Goal: Optimal Comfort and Wellbeing  Outcome: Not Progressing  Intervention: Provide Person-Centered Care  Recent Flowsheet  Documentation  Taken 5/31/2025 0800 by Hannah Reaves, RN  Trust Relationship/Rapport: care explained  Goal: Readiness for Transition of Care  Outcome: Not Progressing   Goal Outcome Evaluation:              Outcome Evaluation: pt has been on levophed intermittently this shift. adding midodrine this pm. off octreotide gtt. several loose stools this shift. still recieving lactulose. nausea and emesis x1 with zofran given. up in chair today.

## 2025-05-31 NOTE — PLAN OF CARE
Goal Outcome Evaluation:      Hemoglobin 7.5, trending up. Numerous bowel movements this shift, no black or dark stools. Placed back on 2L after O2 sat dropped to 84%. Potassium 3.8 after replacement. Phosphorus 1.7, replaced, redraw pending. CIWA 0.

## 2025-05-31 NOTE — PROGRESS NOTES
[facility]  Inpatient Progress Note      Date of Admission: 5/28/2025  Date of Service:  05/31/25    Reason for Follow Up: Cirrhosis    Subjective     Subjective:   Patient lying in bed no complaints.  No signs of GI blood loss.  No nausea vomiting.    Current Facility-Administered Medications:     sennosides-docusate (PERICOLACE) 8.6-50 MG per tablet 2 tablet, 2 tablet, Oral, BID **AND** polyethylene glycol (MIRALAX) packet 17 g, 17 g, Oral, Daily PRN **AND** bisacodyl (DULCOLAX) EC tablet 5 mg, 5 mg, Oral, Daily PRN **AND** bisacodyl (DULCOLAX) suppository 10 mg, 10 mg, Rectal, Daily PRN, Neo Preston MD    Calcium Replacement - Follow Nurse / BPA Driven Protocol, , Not Applicable, PRN, Neo Preston MD    [Transfer Hold] Chlorhexidine Gluconate Cloth 2 % pads 1 Application, 1 Application, Topical, Once, Aryan Erwin PA-C    Chlorhexidine Gluconate Cloth 2 % pads 1 Application, 1 Application, Topical, Q24H, Neo Preston MD, 1 Application at 05/31/25 0347    FLUoxetine (PROzac) capsule 20 mg, 20 mg, Oral, Daily, Aryan Erwin PA-C, 20 mg at 05/31/25 0832    folic acid (FOLVITE) tablet 1 mg, 1 mg, Oral, Daily, Aryan Erwin PA-C, 1 mg at 05/31/25 0832    lactulose solution 20 g, 20 g, Oral, TID, Aryan Erwin PA-C, 20 g at 05/31/25 0833    levothyroxine (SYNTHROID, LEVOTHROID) tablet 75 mcg, 75 mcg, Oral, Q AM, Aryan Erwin PA-C, 75 mcg at 05/31/25 0621    Magnesium Standard Dose Replacement - Follow Nurse / BPA Driven Protocol, , Not Applicable, PRN, Neo Preston MD    mupirocin (BACTROBAN) 2 % nasal ointment 1 Application, 1 Application, Each Nare, BID, Neo Preston MD, 1 Application at 05/31/25 0835    nitroglycerin (NITROSTAT) SL tablet 0.4 mg, 0.4 mg, Sublingual, Q5 Min PRN, Neo Preston MD    norepinephrine (LEVOPHED) 8 mg in 250 mL NS infusion (premix), 0.02-0.3 mcg/kg/min, Intravenous, Titrated, Nitish Grimes PA-C, Stopped at 05/31/25  0830    octreotide (sandoSTATIN) 500 mcg in sodium chloride 0.9 % 100 mL (5 mcg/mL) infusion, 25 mcg/hr, Intravenous, Continuous, Deniz Wolf MD, Last Rate: 5 mL/hr at 25 172, 25 mcg/hr at 25 172    ondansetron (ZOFRAN) injection 4 mg, 4 mg, Intravenous, Q6H PRN, Neo Preston MD, 4 mg at 25    pantoprazole (PROTONIX) injection 40 mg, 40 mg, Intravenous, BID AC, Aryan Erwin PA-C, 40 mg at 25 0747    pentoxifylline (TRENtal) CR tablet 400 mg, 400 mg, Oral, TID With Meals, Estela Ramos MD, 400 mg at 25 0832    [COMPLETED] PHENobarbital 260 mg in sodium chloride 0.9 % 100 mL IVPB, 260 mg, Intravenous, Once, Last Rate: 600 mL/hr at 25 1641, 260 mg at 25 1641 **FOLLOWED BY** [] PHENobarbital 130 mg in sodium chloride 0.9 % 100 mL IVPB, 130 mg, Intravenous, Q8H, Last Rate: 600 mL/hr at 25 1401, 130 mg at 25 1401 **FOLLOWED BY** PHENobarbital injection 65 mg, 65 mg, Intravenous, Q8H, 65 mg at 25 0621 **FOLLOWED BY** [START ON 2025] PHENobarbital injection 32.5 mg, 32.5 mg, Intravenous, Q12H, Neo Preston MD    Phosphorus Replacement - Follow Nurse / BPA Driven Protocol, , Not Applicable, PRN, Neo Preston MD    piperacillin-tazobactam (ZOSYN) 3.375 g IVPB in 100 mL NS MBP (CD), 3.375 g, Intravenous, Q8H, Neo Preston MD, 3.375 g at 25 0747    Polyvinyl Alcohol-Povidone PF (ARTIFICIAL TEARS) 1.4-0.6 % ophthalmic solution 2 drop, 2 drop, Both Eyes, Q1H PRN, Aryan Erwin PA-C, 2 drop at 25 1006    potassium phosphate 15 mmol in 0.9% normal saline 250 mL IVPB, 15 mmol, Intravenous, Q3H, Aryan Erwin PA-C, 15 mmol at 25 0833    Potassium Replacement - Follow Nurse / BPA Driven Protocol, , Not Applicable, PRN, Aryan Erwin PA-C    sodium chloride 0.9 % flush 10 mL, 10 mL, Intravenous, PRN, Neo Preston MD    sodium chloride 0.9 % flush 10 mL, 10 mL, Intravenous, Q12H, Neo Preston MD, 10 mL  at 05/31/25 0833    sodium chloride 0.9 % flush 10 mL, 10 mL, Intravenous, PRN, Neo Preston MD    sodium chloride 0.9 % infusion 40 mL, 40 mL, Intravenous, PRN, Neo Preston MD    thiamine (VITAMIN B-1) tablet 250 mg, 250 mg, Oral, Daily, Aryan Erwin PA-C, 250 mg at 05/31/25 0831    traMADol (ULTRAM) tablet 50 mg, 50 mg, Oral, Q6H PRN, Neo Preston MD, 50 mg at 05/31/25 0715    Review of Systems     Constitution:  negative for chills, fatigue and fevers  ENT:   negative for sore throat and voice change  Respiratory: negative for  cough and shortness of air  Cardiovascular:  Negative for chest pain or palpitations  Gastrointestinal:  negative for  See HPI  Endocrine: negative for   weight loss, unintended      Objective     Vital Signs  Temp:  [97.7 °F (36.5 °C)-98.4 °F (36.9 °C)] 98.2 °F (36.8 °C)  Heart Rate:  [] 79  Resp:  [12-14] 14  BP: ()/(47-75) 110/55  Body mass index is 43.34 kg/m².    Intake/Output Summary (Last 24 hours) at 5/31/2025 1104  Last data filed at 5/31/2025 0400  Gross per 24 hour   Intake 535.14 ml   Output 1725 ml   Net -1189.86 ml     No intake/output data recorded.       Physical Exam:   General: patient awake, alert and cooperative   Eyes: Normal lids and lashes, positive scleral icterus   Neck: supple, normal ROM   Skin: warm and dry, positive jaundiced   Cardiovascular:  no murmurs auscultated   Pulm: clear to auscultation bilaterally, regular and unlabored   Abdomen: soft, , distended; normal bowel sounds   Rectal: deferred   Extremities: no  edema   Psychiatric: Normal mood and behavior; memory intact         Results Review:    I have reviewed all of the patients current test results  Results from last 7 days   Lab Units 05/31/25  0415 05/30/25  0358 05/29/25  1551 05/29/25  0910 05/29/25  0513   WBC 10*3/mm3 7.30 5.56  --   --  7.93   HEMOGLOBIN g/dL 7.5* 7.1* 7.3*   < > 6.8*   HEMATOCRIT % 22.3* 21.1* 21.7*   < > 20.1*   PLATELETS 10*3/mm3 71* 61*  --    --  57*    < > = values in this interval not displayed.       Results from last 7 days   Lab Units 05/31/25  0737 05/31/25  0415 05/30/25  2310 05/30/25  1120 05/30/25  0358 05/29/25  1551 05/29/25  0513   SODIUM mmol/L  --  134*  --   --  134*  --  133*   POTASSIUM mmol/L 3.9 3.8 3.6   < > 3.3*   < > 3.3*   CHLORIDE mmol/L  --  99  --   --  98  --  94*   CO2 mmol/L  --  25.0  --   --  26.0  --  23.0   BUN mg/dL  --  16.6  --   --  26.7*  --  28.0*   CREATININE mg/dL  --  0.23*  --   --  0.57*  --  0.80   CALCIUM mg/dL  --  8.0*  --   --  7.4*  --  7.1*   BILIRUBIN mg/dL  --  27.0*  --   --  24.2*  --  22.8*   ALK PHOS U/L  --  199*  --   --  196*  --  206*   ALT (SGPT) U/L  --  96*  --   --  90*  --  90*   AST (SGOT) U/L  --  255*  --   --  225*  --  226*   GLUCOSE mg/dL  --  161*  --   --  170*  --  237*    < > = values in this interval not displayed.       Results from last 7 days   Lab Units 05/31/25 0415 05/30/25 0358 05/28/25  1100   INR  2.38* 2.36* 2.31*       Lab Results   Lab Value Date/Time    LIPASE 19 05/28/2025 1100    LIPASE 42 04/18/2025 2347    LIPASE 16 04/01/2025 1436    LIPASE 27 03/18/2025 1657    LIPASE 17 08/27/2024 1141    LIPASE 20 05/05/2024 1852    LIPASE 26 04/22/2024 0020       Radiology:    Imaging Results (Last 24 Hours)       ** No results found for the last 24 hours. **              Assessment & Plan       Upper GI bleed      Impression/Plan    Cirrhosis secondary to alcohol abuse  Alcoholic hepatitis  GI bleed  Anemia, GI bleed    EGD showed severe esophagitis with ulceration and swollen esophageal mucosa.  Continue Carafate as well as  bid pantoprazole as previously prescribed.  Okay to DC octreotide.  Continue to monitor for signs of active GI blood loss, continue to trend H&H.  Patient is at very high risk of mortality due to his continued drinking in setting of liver disease.  Steroid use not recommended in light of severe esophagitis, esophageal ulceration.    Further  recommendations will be made pending the results of the ordered work up and Luciano Christiansen clinical course.      Electronically signed by LÁZARO Abreu, 05/31/25, 11:04 AM CDT.       LÁZARO Abreu  05/31/25  11:04 CDT

## 2025-06-01 LAB
ALBUMIN SERPL-MCNC: 2.3 G/DL (ref 3.5–5.2)
ALBUMIN/GLOB SERPL: 1.4 G/DL
ALP SERPL-CCNC: 184 U/L (ref 39–117)
ALT SERPL W P-5'-P-CCNC: 86 U/L (ref 1–41)
ANION GAP SERPL CALCULATED.3IONS-SCNC: 10 MMOL/L (ref 5–15)
APTT PPP: 56.9 SECONDS (ref 24.5–36)
AST SERPL-CCNC: 195 U/L (ref 1–40)
BILIRUB SERPL-MCNC: 25.4 MG/DL (ref 0–1.2)
BUN SERPL-MCNC: 11.9 MG/DL (ref 6–20)
BUN/CREAT SERPL: 47.6 (ref 7–25)
BURR CELLS BLD QL SMEAR: ABNORMAL
CALCIUM SPEC-SCNC: 7.3 MG/DL (ref 8.6–10.5)
CHLORIDE SERPL-SCNC: 102 MMOL/L (ref 98–107)
CO2 SERPL-SCNC: 25 MMOL/L (ref 22–29)
CREAT SERPL-MCNC: 0.25 MG/DL (ref 0.76–1.27)
DEPRECATED RDW RBC AUTO: 82.2 FL (ref 37–54)
EGFRCR SERPLBLD CKD-EPI 2021: 163 ML/MIN/1.73
EOSINOPHIL # BLD MANUAL: 0.04 10*3/MM3 (ref 0–0.4)
EOSINOPHIL NFR BLD MANUAL: 1 % (ref 0.3–6.2)
ERYTHROCYTE [DISTWIDTH] IN BLOOD BY AUTOMATED COUNT: 23.2 % (ref 12.3–15.4)
GLOBULIN UR ELPH-MCNC: 1.7 GM/DL
GLUCOSE SERPL-MCNC: 104 MG/DL (ref 65–99)
HCT VFR BLD AUTO: 21.3 % (ref 37.5–51)
HGB BLD-MCNC: 7.1 G/DL (ref 13–17.7)
INR PPP: 2.48 (ref 0.91–1.09)
LYMPHOCYTES # BLD MANUAL: 0.74 10*3/MM3 (ref 0.7–3.1)
LYMPHOCYTES NFR BLD MANUAL: 5 % (ref 5–12)
MAGNESIUM SERPL-MCNC: 1.5 MG/DL (ref 1.6–2.6)
MCH RBC QN AUTO: 33.3 PG (ref 26.6–33)
MCHC RBC AUTO-ENTMCNC: 33.3 G/DL (ref 31.5–35.7)
MCV RBC AUTO: 100 FL (ref 79–97)
MONOCYTES # BLD: 0.22 10*3/MM3 (ref 0.1–0.9)
NEUTROPHILS # BLD AUTO: 3.33 10*3/MM3 (ref 1.7–7)
NEUTROPHILS NFR BLD MANUAL: 77 % (ref 42.7–76)
NRBC BLD AUTO-RTO: 0 /100 WBC (ref 0–0.2)
PHOSPHATE SERPL-MCNC: 2 MG/DL (ref 2.5–4.5)
PHOSPHATE SERPL-MCNC: 2.1 MG/DL (ref 2.5–4.5)
PLATELET # BLD AUTO: 67 10*3/MM3 (ref 140–450)
PMV BLD AUTO: 11.2 FL (ref 6–12)
POIKILOCYTOSIS BLD QL SMEAR: ABNORMAL
POLYCHROMASIA BLD QL SMEAR: ABNORMAL
POTASSIUM SERPL-SCNC: 3.3 MMOL/L (ref 3.5–5.2)
POTASSIUM SERPL-SCNC: 3.7 MMOL/L (ref 3.5–5.2)
PROT SERPL-MCNC: 4 G/DL (ref 6–8.5)
PROTHROMBIN TIME: 28.3 SECONDS (ref 11.8–14.8)
RBC # BLD AUTO: 2.13 10*6/MM3 (ref 4.14–5.8)
SMALL PLATELETS BLD QL SMEAR: ABNORMAL
SODIUM SERPL-SCNC: 137 MMOL/L (ref 136–145)
VARIANT LYMPHS NFR BLD MANUAL: 17 % (ref 19.6–45.3)
WBC MORPH BLD: NORMAL
WBC NRBC COR # BLD AUTO: 4.33 10*3/MM3 (ref 3.4–10.8)

## 2025-06-01 PROCEDURE — 80053 COMPREHEN METABOLIC PANEL: CPT | Performed by: PHYSICIAN ASSISTANT

## 2025-06-01 PROCEDURE — 94761 N-INVAS EAR/PLS OXIMETRY MLT: CPT

## 2025-06-01 PROCEDURE — 85730 THROMBOPLASTIN TIME PARTIAL: CPT | Performed by: PHYSICIAN ASSISTANT

## 2025-06-01 PROCEDURE — 99232 SBSQ HOSP IP/OBS MODERATE 35: CPT | Performed by: NURSE PRACTITIONER

## 2025-06-01 PROCEDURE — 84100 ASSAY OF PHOSPHORUS: CPT | Performed by: PHYSICIAN ASSISTANT

## 2025-06-01 PROCEDURE — 84132 ASSAY OF SERUM POTASSIUM: CPT | Performed by: NURSE PRACTITIONER

## 2025-06-01 PROCEDURE — 84100 ASSAY OF PHOSPHORUS: CPT | Performed by: NURSE PRACTITIONER

## 2025-06-01 PROCEDURE — 85007 BL SMEAR W/DIFF WBC COUNT: CPT | Performed by: PHYSICIAN ASSISTANT

## 2025-06-01 PROCEDURE — 25010000002 PHENOBARBITAL PER 120 MG: Performed by: INTERNAL MEDICINE

## 2025-06-01 PROCEDURE — 25010000002 PIPERACILLIN SOD-TAZOBACTAM PER 1 G: Performed by: INTERNAL MEDICINE

## 2025-06-01 PROCEDURE — 25010000002 MAGNESIUM SULFATE 2 GM/50ML SOLUTION: Performed by: NURSE PRACTITIONER

## 2025-06-01 PROCEDURE — 83735 ASSAY OF MAGNESIUM: CPT | Performed by: PHYSICIAN ASSISTANT

## 2025-06-01 PROCEDURE — 85610 PROTHROMBIN TIME: CPT | Performed by: PHYSICIAN ASSISTANT

## 2025-06-01 PROCEDURE — 94799 UNLISTED PULMONARY SVC/PX: CPT

## 2025-06-01 PROCEDURE — 85025 COMPLETE CBC W/AUTO DIFF WBC: CPT | Performed by: PHYSICIAN ASSISTANT

## 2025-06-01 RX ORDER — POTASSIUM CHLORIDE 1500 MG/1
40 TABLET, EXTENDED RELEASE ORAL EVERY 4 HOURS
Status: COMPLETED | OUTPATIENT
Start: 2025-06-01 | End: 2025-06-01

## 2025-06-01 RX ORDER — GUAIFENESIN/DEXTROMETHORPHAN 100-10MG/5
10 SYRUP ORAL EVERY 4 HOURS PRN
Status: DISCONTINUED | OUTPATIENT
Start: 2025-06-01 | End: 2025-06-04 | Stop reason: HOSPADM

## 2025-06-01 RX ORDER — MAGNESIUM SULFATE HEPTAHYDRATE 40 MG/ML
2 INJECTION, SOLUTION INTRAVENOUS
Status: COMPLETED | OUTPATIENT
Start: 2025-06-01 | End: 2025-06-01

## 2025-06-01 RX ADMIN — PENTOXIFYLLINE 400 MG: 400 TABLET, EXTENDED RELEASE ORAL at 07:32

## 2025-06-01 RX ADMIN — Medication 1 APPLICATION: at 08:05

## 2025-06-01 RX ADMIN — LACTULOSE 20 G: 20 SOLUTION ORAL at 17:01

## 2025-06-01 RX ADMIN — POTASSIUM CHLORIDE 40 MEQ: 1500 TABLET, EXTENDED RELEASE ORAL at 12:00

## 2025-06-01 RX ADMIN — POTASSIUM & SODIUM PHOSPHATES POWDER PACK 280-160-250 MG 2 PACKET: 280-160-250 PACK at 08:05

## 2025-06-01 RX ADMIN — FOLIC ACID 1 MG: 1 TABLET ORAL at 08:05

## 2025-06-01 RX ADMIN — MIDODRINE HYDROCHLORIDE 5 MG: 2.5 TABLET ORAL at 12:00

## 2025-06-01 RX ADMIN — PENTOXIFYLLINE 400 MG: 400 TABLET, EXTENDED RELEASE ORAL at 17:01

## 2025-06-01 RX ADMIN — LEVOTHYROXINE SODIUM 75 MCG: 0.07 TABLET ORAL at 05:09

## 2025-06-01 RX ADMIN — PIPERACILLIN AND TAZOBACTAM 3.38 G: 3; .375 INJECTION, POWDER, FOR SOLUTION INTRAVENOUS at 14:21

## 2025-06-01 RX ADMIN — LACTULOSE 20 G: 20 SOLUTION ORAL at 08:05

## 2025-06-01 RX ADMIN — PHENOBARBITAL SODIUM 65 MG: 65 INJECTION INTRAMUSCULAR; INTRAVENOUS at 05:02

## 2025-06-01 RX ADMIN — CHLORHEXIDINE GLUCONATE 1 APPLICATION: 500 CLOTH TOPICAL at 04:13

## 2025-06-01 RX ADMIN — Medication 1 APPLICATION: at 21:07

## 2025-06-01 RX ADMIN — MIDODRINE HYDROCHLORIDE 5 MG: 2.5 TABLET ORAL at 17:01

## 2025-06-01 RX ADMIN — PANTOPRAZOLE SODIUM 40 MG: 40 INJECTION, POWDER, FOR SOLUTION INTRAVENOUS at 06:42

## 2025-06-01 RX ADMIN — PIPERACILLIN AND TAZOBACTAM 3.38 G: 3; .375 INJECTION, POWDER, FOR SOLUTION INTRAVENOUS at 06:45

## 2025-06-01 RX ADMIN — MAGNESIUM SULFATE HEPTAHYDRATE 2 G: 40 INJECTION, SOLUTION INTRAVENOUS at 11:57

## 2025-06-01 RX ADMIN — PENTOXIFYLLINE 400 MG: 400 TABLET, EXTENDED RELEASE ORAL at 12:00

## 2025-06-01 RX ADMIN — Medication 10 ML: at 21:08

## 2025-06-01 RX ADMIN — LACTULOSE 20 G: 20 SOLUTION ORAL at 21:08

## 2025-06-01 RX ADMIN — PHENOBARBITAL SODIUM 65 MG: 65 INJECTION INTRAMUSCULAR; INTRAVENOUS at 14:02

## 2025-06-01 RX ADMIN — MAGNESIUM SULFATE HEPTAHYDRATE 2 G: 40 INJECTION, SOLUTION INTRAVENOUS at 08:05

## 2025-06-01 RX ADMIN — TRAMADOL HYDROCHLORIDE 50 MG: 50 TABLET, COATED ORAL at 22:49

## 2025-06-01 RX ADMIN — PIPERACILLIN AND TAZOBACTAM 3.38 G: 3; .375 INJECTION, POWDER, FOR SOLUTION INTRAVENOUS at 22:49

## 2025-06-01 RX ADMIN — THIAMINE HCL TAB 100 MG 250 MG: 100 TAB at 08:05

## 2025-06-01 RX ADMIN — Medication 10 ML: at 08:06

## 2025-06-01 RX ADMIN — POTASSIUM CHLORIDE 40 MEQ: 1500 TABLET, EXTENDED RELEASE ORAL at 07:32

## 2025-06-01 RX ADMIN — GUAIFENESIN AND DEXTROMETHORPHAN 10 ML: 100; 10 SYRUP ORAL at 17:01

## 2025-06-01 RX ADMIN — MIDODRINE HYDROCHLORIDE 5 MG: 2.5 TABLET ORAL at 06:42

## 2025-06-01 RX ADMIN — PANTOPRAZOLE SODIUM 40 MG: 40 INJECTION, POWDER, FOR SOLUTION INTRAVENOUS at 17:01

## 2025-06-01 RX ADMIN — FLUOXETINE HYDROCHLORIDE 20 MG: 20 CAPSULE ORAL at 08:05

## 2025-06-01 RX ADMIN — TRAMADOL HYDROCHLORIDE 50 MG: 50 TABLET, COATED ORAL at 17:08

## 2025-06-01 RX ADMIN — GUAIFENESIN AND DEXTROMETHORPHAN 10 ML: 100; 10 SYRUP ORAL at 21:19

## 2025-06-01 RX ADMIN — PHENOBARBITAL SODIUM 32.5 MG: 65 INJECTION INTRAMUSCULAR; INTRAVENOUS at 21:08

## 2025-06-01 RX ADMIN — MAGNESIUM SULFATE HEPTAHYDRATE 2 G: 40 INJECTION, SOLUTION INTRAVENOUS at 10:27

## 2025-06-01 RX ADMIN — POTASSIUM & SODIUM PHOSPHATES POWDER PACK 280-160-250 MG 2 PACKET: 280-160-250 PACK at 18:17

## 2025-06-01 RX ADMIN — TRAMADOL HYDROCHLORIDE 50 MG: 50 TABLET, COATED ORAL at 07:25

## 2025-06-01 NOTE — PLAN OF CARE
Goal Outcome Evaluation:           Progress: improving  Outcome Evaluation: bp improved on po midodrine. ultram prn pain. up in chair today and up to bedside commode. multiple loose stools today still rec po lactulose. voiding in urinal. removed central line. peripheral iv x2. tolerating diet. transfer floor orders in awaiting bed.

## 2025-06-01 NOTE — PROGRESS NOTES
[facility]  Inpatient Progress Note      Date of Admission: 5/28/2025  Date of Service:  06/01/25    Reason for Follow Up:  cirrhosis    Subjective     Subjective:     Patient lying in bed, no complaints.  No signs GI blood loss.  No nausea/vomitting.         Current Facility-Administered Medications:     sennosides-docusate (PERICOLACE) 8.6-50 MG per tablet 2 tablet, 2 tablet, Oral, BID **AND** polyethylene glycol (MIRALAX) packet 17 g, 17 g, Oral, Daily PRN **AND** bisacodyl (DULCOLAX) EC tablet 5 mg, 5 mg, Oral, Daily PRN **AND** bisacodyl (DULCOLAX) suppository 10 mg, 10 mg, Rectal, Daily PRN, Neo Preston MD    Calcium Replacement - Follow Nurse / BPA Driven Protocol, , Not Applicable, PRN, Neo Preston MD    Chlorhexidine Gluconate Cloth 2 % pads 1 Application, 1 Application, Topical, Q24H, Neo Preston MD, 1 Application at 06/01/25 0413    FLUoxetine (PROzac) capsule 20 mg, 20 mg, Oral, Daily, Aryan Erwin PA-C, 20 mg at 06/01/25 0805    folic acid (FOLVITE) tablet 1 mg, 1 mg, Oral, Daily, Aryan Erwin PA-C, 1 mg at 06/01/25 0805    lactulose solution 20 g, 20 g, Oral, TID, Aryan Erwin PA-C, 20 g at 06/01/25 0805    levothyroxine (SYNTHROID, LEVOTHROID) tablet 75 mcg, 75 mcg, Oral, Q AM, Aryan Erwin PA-C, 75 mcg at 06/01/25 0509    Magnesium Standard Dose Replacement - Follow Nurse / BPA Driven Protocol, , Not Applicable, PRMohan SHEIKH Garrett R, MD    magnesium sulfate 2g/50 mL (PREMIX) infusion, 2 g, Intravenous, Q2H, Wes Hilton APRN, 2 g at 06/01/25 1027    midodrine (PROAMATINE) tablet 5 mg, 5 mg, Oral, TID AC, Aryan Erwin PA-C, 5 mg at 06/01/25 0642    mupirocin (BACTROBAN) 2 % nasal ointment 1 Application, 1 Application, Each Nare, BID, Neo Preston MD, 1 Application at 06/01/25 0805    nitroglycerin (NITROSTAT) SL tablet 0.4 mg, 0.4 mg, Sublingual, Q5 Min PRN, Neo Preston MD    ondansetron (ZOFRAN) injection 4 mg, 4 mg,  Intravenous, Q6H PRN, Neo Preston MD, 4 mg at 25 1621    pantoprazole (PROTONIX) injection 40 mg, 40 mg, Intravenous, BID AC, Aryan Erwin PA-C, 40 mg at 25 0642    pentoxifylline (TRENtal) CR tablet 400 mg, 400 mg, Oral, TID With Meals, Estela Ramos MD, 400 mg at 25 0732    [COMPLETED] PHENobarbital 260 mg in sodium chloride 0.9 % 100 mL IVPB, 260 mg, Intravenous, Once, Last Rate: 600 mL/hr at 25 1641, 260 mg at 25 1641 **FOLLOWED BY** [] PHENobarbital 130 mg in sodium chloride 0.9 % 100 mL IVPB, 130 mg, Intravenous, Q8H, Last Rate: 600 mL/hr at 25 1401, 130 mg at 25 1401 **FOLLOWED BY** PHENobarbital injection 65 mg, 65 mg, Intravenous, Q8H, 65 mg at 25 0502 **FOLLOWED BY** PHENobarbital injection 32.5 mg, 32.5 mg, Intravenous, Q12H, Neo Preston MD    Phosphorus Replacement - Follow Nurse / BPA Driven Protocol, , Not Applicable, PRNMohan Garrett R, MD    piperacillin-tazobactam (ZOSYN) 3.375 g IVPB in 100 mL NS MBP (CD), 3.375 g, Intravenous, Q8H, Neo Preston MD, 3.375 g at 25 0645    Polyvinyl Alcohol-Povidone PF (ARTIFICIAL TEARS) 1.4-0.6 % ophthalmic solution 2 drop, 2 drop, Both Eyes, Q1H PRN, Aryan Erwin PA-C, 2 drop at 25 1006    potassium chloride (KLOR-CON M20) CR tablet 40 mEq, 40 mEq, Oral, Q4H, Wes Hilton APRN, 40 mEq at 25 0732    Potassium Replacement - Follow Nurse / BPA Driven Protocol, , Not Applicable, PRN, Aryan Erwin PA-C    sodium chloride 0.9 % flush 10 mL, 10 mL, Intravenous, PRN, Neo Preston MD    sodium chloride 0.9 % flush 10 mL, 10 mL, Intravenous, Q12H, Neo Preston MD, 10 mL at 25 0806    sodium chloride 0.9 % flush 10 mL, 10 mL, Intravenous, PRN, Neo Preston MD    sodium chloride 0.9 % infusion 40 mL, 40 mL, Intravenous, PRN, Neo Preston MD    thiamine (VITAMIN B-1) tablet 250 mg, 250 mg, Oral, Daily, Aryan Erwin PA-C, 250 mg at 25 0805     traMADol (ULTRAM) tablet 50 mg, 50 mg, Oral, Q6H PRN, Neo Preston MD, 50 mg at 06/01/25 0725    Review of Systems     Constitution:  negative for chills, positive fatigue and negative fevers  ENT:   negative for sore throat and voice change  Respiratory: negative for  cough and shortness of air  Cardiovascular:  Negative for chest pain or palpitations  Gastrointestinal:  negative for  See HPI  Endocrine: negative for   weight loss, unintended      Objective     Vital Signs  Temp:  [97.4 °F (36.3 °C)-98.4 °F (36.9 °C)] 97.7 °F (36.5 °C)  Heart Rate:  [77-92] 88  Resp:  [17-23] 17  BP: ()/(45-80) 106/48  Body mass index is 42.91 kg/m².    Intake/Output Summary (Last 24 hours) at 6/1/2025 1046  Last data filed at 6/1/2025 0800  Gross per 24 hour   Intake 2383.41 ml   Output 1200 ml   Net 1183.41 ml     I/O this shift:  In: -   Out: 400 [Urine:400]       Physical Exam:   General: patient awake, alert and cooperative   Eyes: Normal lids and lashes, bilateral  scleral icterus   Neck: supple, normal ROM   Skin: warm and dry, positive jaundiced   Cardiovascular:  no murmurs auscultated   Pulm: clear to auscultation bilaterally, regular and unlabored   Abdomen: soft, nontender, nondistended; normal bowel sounds   Rectal: deferred   Extremities: no  edema   Psychiatric: Normal mood and behavior; memory intact         Results Review:    I have reviewed all of the patients current test results  Results from last 7 days   Lab Units 06/01/25  0607 05/31/25  0415 05/30/25  0358   WBC 10*3/mm3 4.33 7.30 5.56   HEMOGLOBIN g/dL 7.1* 7.5* 7.1*   HEMATOCRIT % 21.3* 22.3* 21.1*   PLATELETS 10*3/mm3 67* 71* 61*       Results from last 7 days   Lab Units 06/01/25  0607 05/31/25  0737 05/31/25  0415 05/30/25  1120 05/30/25  0358   SODIUM mmol/L 137  --  134*  --  134*   POTASSIUM mmol/L 3.3* 3.9 3.8   < > 3.3*   CHLORIDE mmol/L 102  --  99  --  98   CO2 mmol/L 25.0  --  25.0  --  26.0   BUN mg/dL 11.9  --  16.6  --  26.7*    CREATININE mg/dL 0.25*  --  0.23*  --  0.57*   CALCIUM mg/dL 7.3*  --  8.0*  --  7.4*   BILIRUBIN mg/dL 25.4*  --  27.0*  --  24.2*   ALK PHOS U/L 184*  --  199*  --  196*   ALT (SGPT) U/L 86*  --  96*  --  90*   AST (SGOT) U/L 195*  --  255*  --  225*   GLUCOSE mg/dL 104*  --  161*  --  170*    < > = values in this interval not displayed.       Results from last 7 days   Lab Units 06/01/25  0607 05/31/25  0415 05/30/25  0358   INR  2.48* 2.38* 2.36*       Lab Results   Lab Value Date/Time    LIPASE 19 05/28/2025 1100    LIPASE 42 04/18/2025 2347    LIPASE 16 04/01/2025 1436    LIPASE 27 03/18/2025 1657    LIPASE 17 08/27/2024 1141    LIPASE 20 05/05/2024 1852    LIPASE 26 04/22/2024 0020       Radiology:    Imaging Results (Last 24 Hours)       ** No results found for the last 24 hours. **              Assessment & Plan       Upper GI bleed      Impression/Plan    Cirrhosis  Alcohol hepatitis  GI bleed  Anemia     EGD showed severe esophagitis with ulceration and swollen esophageal mucosa.  Continue Carafate as well as  bid pantoprazole as previously prescribed.  Continue to monitor for signs of active GI blood loss, continue to trend H&H. Patient is at very high risk of mortality due to his continued drinking in setting of liver disease. Bili trending down.  Steroid use not recommended in light of severe esophagitis, esophageal ulceration.     Further recommendations will be made pending the results of the ordered work up and Luciano Christiansen clinical course.         Electronically signed by LÁZARO Abreu, 06/01/25, 10:46 AM CDT.       LÁZARO Abreu  06/01/25  10:46 CDT  .

## 2025-06-01 NOTE — CASE MANAGEMENT/SOCIAL WORK
Discharge Planning Assessment  [facility]     Patient Name: Luciano Christiansen  MRN: [id number]  Today's Date: 6/1/2025    Admit Date: 5/28/2025        Discharge Needs Assessment       Row Name 06/01/25 1427       Living Environment    People in Home alone    Current Living Arrangements home    Potentially Unsafe Housing Conditions none    Primary Care Provided by self    Provides Primary Care For no one    Family Caregiver if Needed child(kathryn), adult;parent(s)    Quality of Family Relationships helpful;involved;supportive    Able to Return to Prior Arrangements yes       Resource/Environmental Concerns    Resource/Environmental Concerns none       Transition Planning    Patient/Family Anticipates Transition to home    Transportation Anticipated family or friend will provide       Discharge Needs Assessment    Readmission Within the Last 30 Days no previous admission in last 30 days    Equipment Currently Used at Home none    Concerns to be Addressed adjustment to diagnosis/illness;substance/tobacco abuse/use    Outpatient/Agency/Support Group Needs outpatient substance abuse treatment    Discharge Facility/Level of Care Needs substance abuse facility    Current Discharge Risk substance use/abuse    Discharge Coordination/Progress Spoke with pt about possible d/c needs. Pt lives at home alone and states he is normally independent. He does have a hx of substance abuse issues. Gave pt a chemical dependency packet for possible assistance. Pt still in CCU. At this time, plan is for pt to go home at d/c.                   Discharge Plan    No documentation.                      Demographic Summary    No documentation.                  Functional Status    No documentation.                  Psychosocial    No documentation.                  Abuse/Neglect    No documentation.                  Legal    No documentation.                  Substance Abuse    No documentation.                  Patient Forms    No documentation.                      RAMO Clement

## 2025-06-01 NOTE — PLAN OF CARE
Goal Outcome Evaluation:  Plan of Care Reviewed With: patient        Progress: improving     Phosphorus replaced. No levo required this shift. No n/v this shift. Multiple loose stools. PRN Tramadol administered x1. CIWA score 0.

## 2025-06-02 LAB
ALBUMIN SERPL-MCNC: 2.6 G/DL (ref 3.5–5.2)
ALBUMIN/GLOB SERPL: 1.2 G/DL
ALP SERPL-CCNC: 217 U/L (ref 39–117)
ALT SERPL W P-5'-P-CCNC: 94 U/L (ref 1–41)
ANION GAP SERPL CALCULATED.3IONS-SCNC: 9 MMOL/L (ref 5–15)
APTT PPP: 54.8 SECONDS (ref 24.5–36)
AST SERPL-CCNC: 198 U/L (ref 1–40)
BACTERIA SPEC AEROBE CULT: NORMAL
BACTERIA SPEC AEROBE CULT: NORMAL
BASOPHILS # BLD AUTO: 0.05 10*3/MM3 (ref 0–0.2)
BASOPHILS NFR BLD AUTO: 0.7 % (ref 0–1.5)
BILIRUB SERPL-MCNC: 31.5 MG/DL (ref 0–1.2)
BUN SERPL-MCNC: 9.7 MG/DL (ref 6–20)
BUN/CREAT SERPL: ABNORMAL
CALCIUM SPEC-SCNC: 8 MG/DL (ref 8.6–10.5)
CHLORIDE SERPL-SCNC: 102 MMOL/L (ref 98–107)
CO2 SERPL-SCNC: 25 MMOL/L (ref 22–29)
CREAT SERPL-MCNC: <0.17 MG/DL (ref 0.76–1.27)
DEPRECATED RDW RBC AUTO: 86.1 FL (ref 37–54)
EOSINOPHIL # BLD AUTO: 0.08 10*3/MM3 (ref 0–0.4)
EOSINOPHIL NFR BLD AUTO: 1.1 % (ref 0.3–6.2)
ERYTHROCYTE [DISTWIDTH] IN BLOOD BY AUTOMATED COUNT: 23.5 % (ref 12.3–15.4)
GLOBULIN UR ELPH-MCNC: 2.2 GM/DL
GLUCOSE SERPL-MCNC: 91 MG/DL (ref 65–99)
HCT VFR BLD AUTO: 25.3 % (ref 37.5–51)
HGB BLD-MCNC: 8.3 G/DL (ref 13–17.7)
IMM GRANULOCYTES # BLD AUTO: 0.32 10*3/MM3 (ref 0–0.05)
IMM GRANULOCYTES NFR BLD AUTO: 4.5 % (ref 0–0.5)
INR PPP: 2.14 (ref 0.91–1.09)
LYMPHOCYTES # BLD AUTO: 1.14 10*3/MM3 (ref 0.7–3.1)
LYMPHOCYTES NFR BLD AUTO: 16.2 % (ref 19.6–45.3)
MAGNESIUM SERPL-MCNC: 2 MG/DL (ref 1.6–2.6)
MCH RBC QN AUTO: 33.6 PG (ref 26.6–33)
MCHC RBC AUTO-ENTMCNC: 32.8 G/DL (ref 31.5–35.7)
MCV RBC AUTO: 102.4 FL (ref 79–97)
MONOCYTES # BLD AUTO: 1 10*3/MM3 (ref 0.1–0.9)
MONOCYTES NFR BLD AUTO: 14.2 % (ref 5–12)
NEUTROPHILS NFR BLD AUTO: 4.45 10*3/MM3 (ref 1.7–7)
NEUTROPHILS NFR BLD AUTO: 63.3 % (ref 42.7–76)
NRBC BLD AUTO-RTO: 0 /100 WBC (ref 0–0.2)
PHOSPHATE SERPL-MCNC: 2.1 MG/DL (ref 2.5–4.5)
PHOSPHATE SERPL-MCNC: 2.2 MG/DL (ref 2.5–4.5)
PLATELET # BLD AUTO: 103 10*3/MM3 (ref 140–450)
PMV BLD AUTO: 11.5 FL (ref 6–12)
POTASSIUM SERPL-SCNC: 3.7 MMOL/L (ref 3.5–5.2)
PROT SERPL-MCNC: 4.8 G/DL (ref 6–8.5)
PROTHROMBIN TIME: 25.2 SECONDS (ref 11.8–14.8)
RBC # BLD AUTO: 2.47 10*6/MM3 (ref 4.14–5.8)
SODIUM SERPL-SCNC: 136 MMOL/L (ref 136–145)
WBC NRBC COR # BLD AUTO: 7.04 10*3/MM3 (ref 3.4–10.8)

## 2025-06-02 PROCEDURE — 80053 COMPREHEN METABOLIC PANEL: CPT | Performed by: PHYSICIAN ASSISTANT

## 2025-06-02 PROCEDURE — 25010000002 ONDANSETRON PER 1 MG: Performed by: INTERNAL MEDICINE

## 2025-06-02 PROCEDURE — 84100 ASSAY OF PHOSPHORUS: CPT

## 2025-06-02 PROCEDURE — 97535 SELF CARE MNGMENT TRAINING: CPT

## 2025-06-02 PROCEDURE — 85730 THROMBOPLASTIN TIME PARTIAL: CPT | Performed by: PHYSICIAN ASSISTANT

## 2025-06-02 PROCEDURE — 85025 COMPLETE CBC W/AUTO DIFF WBC: CPT | Performed by: PHYSICIAN ASSISTANT

## 2025-06-02 PROCEDURE — 83735 ASSAY OF MAGNESIUM: CPT | Performed by: PHYSICIAN ASSISTANT

## 2025-06-02 PROCEDURE — 85610 PROTHROMBIN TIME: CPT | Performed by: PHYSICIAN ASSISTANT

## 2025-06-02 PROCEDURE — 97116 GAIT TRAINING THERAPY: CPT

## 2025-06-02 PROCEDURE — 25010000002 PHENOBARBITAL PER 120 MG: Performed by: INTERNAL MEDICINE

## 2025-06-02 RX ORDER — BENZONATATE 100 MG/1
200 CAPSULE ORAL 3 TIMES DAILY PRN
Status: DISCONTINUED | OUTPATIENT
Start: 2025-06-02 | End: 2025-06-04 | Stop reason: HOSPADM

## 2025-06-02 RX ADMIN — MIDODRINE HYDROCHLORIDE 5 MG: 2.5 TABLET ORAL at 10:35

## 2025-06-02 RX ADMIN — LACTULOSE 20 G: 20 SOLUTION ORAL at 08:23

## 2025-06-02 RX ADMIN — MIDODRINE HYDROCHLORIDE 5 MG: 2.5 TABLET ORAL at 08:23

## 2025-06-02 RX ADMIN — PHENOBARBITAL SODIUM 32.5 MG: 65 INJECTION INTRAMUSCULAR; INTRAVENOUS at 10:35

## 2025-06-02 RX ADMIN — Medication 10 ML: at 20:37

## 2025-06-02 RX ADMIN — BENZONATATE 200 MG: 100 CAPSULE ORAL at 20:53

## 2025-06-02 RX ADMIN — THIAMINE HCL TAB 100 MG 250 MG: 100 TAB at 08:23

## 2025-06-02 RX ADMIN — PENTOXIFYLLINE 400 MG: 400 TABLET, EXTENDED RELEASE ORAL at 12:22

## 2025-06-02 RX ADMIN — PHENOBARBITAL SODIUM 32.5 MG: 65 INJECTION INTRAMUSCULAR; INTRAVENOUS at 20:37

## 2025-06-02 RX ADMIN — TRAMADOL HYDROCHLORIDE 50 MG: 50 TABLET, COATED ORAL at 20:57

## 2025-06-02 RX ADMIN — PANTOPRAZOLE SODIUM 40 MG: 40 INJECTION, POWDER, FOR SOLUTION INTRAVENOUS at 08:23

## 2025-06-02 RX ADMIN — BENZONATATE 200 MG: 100 CAPSULE ORAL at 12:22

## 2025-06-02 RX ADMIN — Medication 10 ML: at 08:24

## 2025-06-02 RX ADMIN — FLUOXETINE HYDROCHLORIDE 20 MG: 20 CAPSULE ORAL at 08:23

## 2025-06-02 RX ADMIN — LACTULOSE 20 G: 20 SOLUTION ORAL at 20:57

## 2025-06-02 RX ADMIN — MIDODRINE HYDROCHLORIDE 5 MG: 2.5 TABLET ORAL at 17:57

## 2025-06-02 RX ADMIN — GUAIFENESIN AND DEXTROMETHORPHAN 10 ML: 100; 10 SYRUP ORAL at 17:57

## 2025-06-02 RX ADMIN — LEVOTHYROXINE SODIUM 75 MCG: 0.07 TABLET ORAL at 04:26

## 2025-06-02 RX ADMIN — POTASSIUM & SODIUM PHOSPHATES POWDER PACK 280-160-250 MG 2 PACKET: 280-160-250 PACK at 02:51

## 2025-06-02 RX ADMIN — GUAIFENESIN AND DEXTROMETHORPHAN 10 ML: 100; 10 SYRUP ORAL at 08:23

## 2025-06-02 RX ADMIN — ONDANSETRON 4 MG: 2 INJECTION INTRAMUSCULAR; INTRAVENOUS at 01:32

## 2025-06-02 RX ADMIN — PANTOPRAZOLE SODIUM 40 MG: 40 INJECTION, POWDER, FOR SOLUTION INTRAVENOUS at 17:57

## 2025-06-02 RX ADMIN — PENTOXIFYLLINE 400 MG: 400 TABLET, EXTENDED RELEASE ORAL at 17:57

## 2025-06-02 RX ADMIN — TRAMADOL HYDROCHLORIDE 50 MG: 50 TABLET, COATED ORAL at 08:33

## 2025-06-02 RX ADMIN — GUAIFENESIN AND DEXTROMETHORPHAN 10 ML: 100; 10 SYRUP ORAL at 04:26

## 2025-06-02 RX ADMIN — LACTULOSE 20 G: 20 SOLUTION ORAL at 17:57

## 2025-06-02 RX ADMIN — ONDANSETRON 4 MG: 2 INJECTION INTRAMUSCULAR; INTRAVENOUS at 20:37

## 2025-06-02 RX ADMIN — PENTOXIFYLLINE 400 MG: 400 TABLET, EXTENDED RELEASE ORAL at 08:23

## 2025-06-02 RX ADMIN — FOLIC ACID 1 MG: 1 TABLET ORAL at 08:23

## 2025-06-02 NOTE — PLAN OF CARE
Goal Outcome Evaluation:  Plan of Care Reviewed With: patient        Progress: improving  Outcome Evaluation: OT treatment complete. Patient has flat affect but is oriented x 4. Patient laying on bed with lower leg on the end of the bed railing. Patient educated on the risks of pressure areas on the back of his legs and encouraged to avoid. Patient able to move supine to sit with SBA. Patient transferred to [facility] with CGA. Able to complete perianal hygiene with set up. Patient returned to EOB and was given encouragement to bath. Patient washed face and hands and declined washing remainder of body. Patient brushed teeth with set up. Patietn returned to supine position with VC. Recommend continued OT while patient is hospitalized to improve independence with self care. Recommend home with assist or substance abuse rehab upon hospital discharge.    Anticipated Discharge Disposition (OT): home with assist, other (see comments) (substance abuse rehab)

## 2025-06-02 NOTE — PROGRESS NOTES
[facility] Medicine Services  INPATIENT PROGRESS NOTE    Patient Name: Luciano Christiansen  Date of Admission: 5/28/2025  Today's Date: 06/02/25  Length of Stay: 5  Primary Care Physician: Adrien Varghese MD    Subjective   Chief Complaint: Weakness, dark stools    Abdominal Pain  Dizziness  Symptoms include abdominal pain.       Patient complains of fatigue today.  He also has a cough which is nonproductive.    Review of Systems   Gastrointestinal:  Positive for abdominal pain.   Neurological:  Positive for dizziness.      All pertinent negatives and positives are as above. All other systems have been reviewed and are negative unless otherwise stated.     Objective    Temp:  [97.3 °F (36.3 °C)-97.8 °F (36.6 °C)] 97.3 °F (36.3 °C)  Heart Rate:  [75-84] 78  Resp:  [16-19] 18  BP: ()/(47-79) 127/56  Physical Exam  Constitutional:       General: He is not in acute distress.     Appearance: He is well-developed. He is ill-appearing. He is not diaphoretic.   HENT:      Head: Normocephalic.   Eyes:      General: Scleral icterus present.      Pupils: Pupils are equal, round, and reactive to light.   Neck:      Thyroid: No thyromegaly.      Vascular: No JVD.      Trachea: No tracheal deviation.   Cardiovascular:      Rate and Rhythm: Normal rate and regular rhythm.      Heart sounds: Normal heart sounds. No murmur heard.     No friction rub. No gallop.   Pulmonary:      Effort: Pulmonary effort is normal. No respiratory distress.      Breath sounds: No stridor. No wheezing.   Chest:      Chest wall: No tenderness.   Abdominal:      General: Bowel sounds are normal. There is distension.      Palpations: Abdomen is soft. There is no mass.      Tenderness: There is no abdominal tenderness. There is no guarding or rebound.      Hernia: No hernia is present.   Musculoskeletal:         General: No tenderness or deformity. Normal range of motion.      Right lower leg: Edema present.      Left  lower leg: Edema present.   Lymphadenopathy:      Cervical: No cervical adenopathy.   Skin:     General: Skin is warm and dry.      Coloration: Skin is not pale.      Findings: No erythema or rash.   Neurological:      Mental Status: He is alert.      Cranial Nerves: No cranial nerve deficit.      Sensory: No sensory deficit.      Motor: No abnormal muscle tone.   Psychiatric:         Mood and Affect: Mood normal.         Behavior: Behavior normal.       Results Review:  I have reviewed the labs, radiology results, and diagnostic studies.    Laboratory Data:   Results from last 7 days   Lab Units 06/02/25  0821 06/01/25  0607 05/31/25 0415   WBC 10*3/mm3 7.04 4.33 7.30   HEMOGLOBIN g/dL 8.3* 7.1* 7.5*   HEMATOCRIT % 25.3* 21.3* 22.3*   PLATELETS 10*3/mm3 103* 67* 71*        Results from last 7 days   Lab Units 06/02/25  0821 06/01/25  1557 06/01/25  0607 05/31/25  0737 05/31/25  0415   SODIUM mmol/L 136  --  137  --  134*   POTASSIUM mmol/L 3.7 3.7 3.3*   < > 3.8   CHLORIDE mmol/L 102  --  102  --  99   CO2 mmol/L 25.0  --  25.0  --  25.0   BUN mg/dL 9.7  --  11.9  --  16.6   CREATININE mg/dL <0.17*  --  0.25*  --  0.23*   CALCIUM mg/dL 8.0*  --  7.3*  --  8.0*   BILIRUBIN mg/dL 31.5*  --  25.4*  --  27.0*   ALK PHOS U/L 217*  --  184*  --  199*   ALT (SGPT) U/L 94*  --  86*  --  96*   AST (SGOT) U/L 198*  --  195*  --  255*   GLUCOSE mg/dL 91  --  104*  --  161*    < > = values in this interval not displayed.       Culture Data:   Blood Culture   Date Value Ref Range Status   05/28/2025 No growth at 5 days  Final   05/28/2025 No growth at 5 days  Final       Radiology Data:   Imaging Results (Last 24 Hours)       ** No results found for the last 24 hours. **            I have reviewed the patient's current medications.     Assessment/Plan   Assessment  Active Hospital Problems    Diagnosis     **Upper GI bleed    Cirrhosis  Alcoholic hepatitis  Anemia  Severe esophagitis  Esophageal  ulceration  Sepsis  Coagulopathy secondary to liver failure  Alcohol abuse and impending delirium tremens  Thiamine therapy for prophylactic purposes    Treatment Plan  Monitor hemoglobin.  Blood pressure and hemoglobin appears stable at this time.  Gastroenterology input appreciated.  Continue IV Protonix for GI bleed.     Continue pentoxifylline for alcoholic hepatitis.  No steroids due to recent GI bleed, esophageal ulceration, and infection.  Continue lactulose    Patient completed a course of Zosyn for sepsis and intra-abdominal infection.  Will monitor off antibiotics for now.    Taper phenobarbital for alcohol withdrawal syndrome.  Continue thiamine for prophylactic use.    DVT prophylaxis with SCDs    CODE STATUS is full code    Medical Decision Making  Number and Complexity of problems: 8  Differential Diagnosis: None    Conditions and Status        Condition is unchanged.     [facility] Data  External documents reviewed: None  Cardiac tracing (EKG, telemetry) interpretation: None  Radiology interpretation: Chest x ray reviewed  Labs reviewed: CBC, CMP reviewed  Any tests that were considered but not ordered: None     Decision rules/scores evaluated (example [id number], [place], etc): None     Discussed with: Patient     Care Planning  Shared decision making: Patient and his mother  Code status and discussions: CODE STATUS is full code    Disposition  Social Determinants of Health that impact treatment or disposition: None  I expect the patient to be discharged to skilled nursing facility in 5 to 7 days        Electronically signed by Rick Paez MD, 06/02/25, 14:00 CDT.

## 2025-06-02 NOTE — PLAN OF CARE
Goal Outcome Evaluation:  Plan of Care Reviewed With: patient        Progress: improving  Outcome Evaluation: Pt trans to EOB sba, sat EOB few minutes, sit-stand sba, pt amb 200 feet cga-sba x 2 reps, with a sitting rest,pt states he plans to go to his mothers house at d/c

## 2025-06-02 NOTE — THERAPY TREATMENT NOTE
Patient Name: Luciano Christiansen  : 1985    MRN: [id number]                              Today's Date: 2025       Admit Date: 2025    Visit Dx:     ICD-10-CM ICD-9-CM   1. Liver failure without hepatic coma, unspecified chronicity  K72.90 572.8   2. Upper GI bleed  K92.2 578.9   3. Impaired functional mobility and activity tolerance [Z74.09]  Z74.09 V49.89     Patient Active Problem List   Diagnosis    Wellness examination    Encounter for hepatitis C screening test for low risk patient    Primary hypertension    Class 1 obesity due to excess calories without serious comorbidity with body mass index (BMI) of 34.0 to 34.9 in adult    Fatty liver    Hyponatremia    Hypokalemia    Hypomagnesemia    Alcoholism    Transaminitis    Hypophosphatemia    Insomnia disorder related to known organic factor    Community acquired bilateral lower lobe pneumonia    Pneumonia due to Streptococcus    GI bleed    Calculus of gallbladder without cholecystitis without obstruction    Alcoholic encephalopathy    Alcohol withdrawal    Alcoholic steatohepatitis    BMI 40.0-44.9, adult    Hepatic encephalopathy    Gastroesophageal reflux disease without esophagitis    Hypothyroidism    Esophageal varices in cirrhosis    Upper GI bleed     Past Medical History:   Diagnosis Date    Alcoholism     currently drinking    Diabetes mellitus     Gout     Hepatitis C     Hypertension     Jaundice      Past Surgical History:   Procedure Laterality Date    ENDOSCOPY N/A 2025    Procedure: ESOPHAGOGASTRODUODENOSCOPY WITH ANESTHESIA;  Surgeon: Estela Ramos MD;  Location: [facility];  Service: Gastroenterology;  Laterality: N/A;  pre op: GI bleed  post op: esophageal ulcer, esophagitis  pcp: Adrien Varghese MD    VASECTOMY        General Information       Row Name 25 0830          OT Time and Intention    Subjective Information complains of;fatigue  -MESSI     Document Type therapy note (daily note)  -MESSI     Mode of Treatment occupational  therapy  -     Patient Effort good  -       Row Name 06/02/25 0830          General Information    Patient Profile Reviewed yes  -     Existing Precautions/Restrictions fall  -     Barriers to Rehab medically complex  -       Row Name 06/02/25 0830          Cognition    Orientation Status (Cognition) oriented x 4  -[facility] Name 06/02/25 0830          Safety Issues/Impairments Affecting Functional Mobility    Safety Issues Affecting Function (Mobility) friction/shear risk;safety precaution awareness  -     Impairments Affecting Function (Mobility) balance;endurance/activity tolerance;pain;strength;sensation/sensory awareness  -               User Key  (r) = Recorded By, (t) = Taken By, (c) = Cosigned By      Initials Name Provider Type     Izabel Mcwilliams, OTR/L Occupational Therapist                     Mobility/ADL's       Row Name 06/02/25 0830          Bed Mobility    Bed Mobility supine-sit;sit-supine  -     Supine-Sit [place] (Bed Mobility) standby assist  -     Sit-Supine [place] (Bed Mobility) modified independence  -     Assistive Device (Bed Mobility) head of bed elevated  -[facility] Name 06/02/25 0830          Transfers    Transfers sit-stand transfer;stand-sit transfer;toilet transfer  -[facility] Name 06/02/25 0830          Sit-Stand Transfer    Sit-Stand [place] (Transfers) standby assist;contact guard  -[facility] Name 06/02/25 0830          Stand-Sit Transfer    Stand-Sit [place] (Transfers) standby assist;contact guard  -[facility] Name 06/02/25 0830          Toilet Transfer    Type (Toilet Transfer) sit-stand;stand-sit  -     [place] Level (Toilet Transfer) contact guard  -     Assistive Device (Toilet Transfer) commode, bedside without drop arms  -       Row Name 06/02/25 0830          Functional Mobility    Functional Mobility- Ind. Level contact guard assist;minimum assist (75% patient effort)  -     Functional Mobility-Distance  (Feet) 5  -     Functional Mobility- Safety Issues step length decreased;weight-shifting ability decreased  -     Patient was able to Ambulate yes  -       Row Name 06/02/25 0830          Activities of Daily Living    BADL Assessment/Intervention toileting;upper body dressing;grooming  -       Row Name 06/02/25 0830          Hygiene Care    Oral Care teeth brushed - regular toothbrush  -       Row Name 06/02/25 0830          Toileting Assessment/Training    [place] Level (Toileting) perform perineal hygiene;set up  -     Assistive Devices (Toileting) commode, bedside without drop arms  -     Position (Toileting) supported sitting  -       Row Name 06/02/25 0830          Grooming Assessment/Training    [place] Level (Grooming) wash face, hands;set up  -     Position (Grooming) edge of bed sitting  -               User Key  (r) = Recorded By, (t) = Taken By, (c) = Cosigned By      Initials Name Provider Type     Izabel Mcwilliams, OTR/L Occupational Therapist                   Obj/Interventions    No documentation.                  Goals/Plan    No documentation.                  Clinical Impression       [facility] Name 06/02/25 0830          Pain Assessment    Pretreatment Pain Rating 0/10 - no pain  -     Posttreatment Pain Rating 0/10 - no pain  -[facility] Name 06/02/25 0830          Plan of Care Review    Plan of Care Reviewed With patient  -     Progress improving  -     Outcome Evaluation OT treatment complete. Patient has flat affect but is oriented x 4. Patient laying on bed with lower leg on the end of the bed railing. Patient educated on the risks of pressure areas on the back of his legs and encouraged to avoid. Patient able to move supine to sit with SBA. Patient transferred to [facility] with CGA. Able to complete perianal hygiene with set up. Patient returned to EOB and was given encouragement to bath. Patient washed face and hands and declined washing remainder of body. Patient  brushed teeth with set up. Patietn returned to supine position with VC. Recommend continued OT while patient is hospitalized to improve independence with self care. Recommend home with assist or substance abuse rehab upon hospital discharge.  -       Row Name 06/02/25 0830          Therapy Assessment/Plan (OT)    Rehab Potential (OT) good  -     Criteria for Skilled Therapeutic Interventions Met (OT) yes;skilled treatment is necessary  -     Therapy Frequency (OT) 3 times/wk  -       Row Name 06/02/25 0830          Therapy Plan Review/Discharge Plan (OT)    Anticipated Discharge Disposition (OT) home with assist;other (see comments)  substance abuse rehab  -       Row Name 06/02/25 0830          Vital Signs    Post SpO2 (%) 97  -     O2 Delivery Post Treatment room air  -       Row Name 06/02/25 0830          Positioning and Restraints    Pre-Treatment Position in bed  -MESSI     Post Treatment Position bed  -MESSI     In Bed fowlers;with nsg;exit alarm on;call light within reach;encouraged to call for assist;side rails up x2  -               User Key  (r) = Recorded By, (t) = Taken By, (c) = Cosigned By      Initials Name Provider Type     Izabel Mcwilliams, OTR/L Occupational Therapist                   Outcome Measures       Row Name 06/02/25 0830          How much help from another is currently needed...    Putting on and taking off regular lower body clothing? 2  -MESSI     Bathing (including washing, rinsing, and drying) 3  -MESSI     Toileting (which includes using toilet bed pan or urinal) 3  -MESSI     Putting on and taking off regular upper body clothing 3  -MESSI     Taking care of personal grooming (such as brushing teeth) 3  -MESSI     Eating meals 4  -MESSI     AM-PAC 6 Clicks Score (OT) 18  -       Row Name 06/02/25 0833          How much help from another person do you currently need...    Turning from your back to your side while in flat bed without using bedrails? 3  -EJ     Moving from lying on back to  sitting on the side of a flat bed without bedrails? 3  -EJ     Moving to and from a bed to a chair (including a wheelchair)? 3  -EJ     Standing up from a chair using your arms (e.g., wheelchair, bedside chair)? 3  -EJ     Climbing 3-5 steps with a railing? 2  -EJ     To walk in hospital room? 3  -EJ     AM-PAC 6 Clicks Score (PT) 17  -EJ     Highest Level of Mobility Goal Stand (1 or More Minutes)-5  -EJ       Row Name 06/02/25 0830          Functional Assessment    Outcome Measure Options AM-PAC 6 Clicks Daily Activity (OT)  -               User Key  (r) = Recorded By, (t) = Taken By, (c) = Cosigned By      Initials Name Provider Type    Izabel Candelaria, OTR/L Occupational Therapist    Arabella Galvan, RN Registered Nurse                    Occupational Therapy Education       Title: PT OT SLP Therapies (In Progress)       Topic: Occupational Therapy (In Progress)       Point: ADL training (Done)       Learning Progress Summary            Patient Acceptance, E,D, VU,NR by  at 6/2/2025 0952    Comment: Patient educated on safety with self care, pause when standing to gain balance and need to not place legs on hand end of foot board.    Acceptance, E,D, VU by  at 5/30/2025 1517                      Point: Precautions (Done)       Learning Progress Summary            Patient Acceptance, E,D, VU,NR by  at 6/2/2025 0952    Comment: Patient educated on safety with self care, pause when standing to gain balance and need to not place legs on hand end of foot board.    Acceptance, E,D, VU by  at 5/30/2025 1517                      Point: Body mechanics (Done)       Learning Progress Summary            Patient Acceptance, E,D, VU,NR by  at 6/2/2025 0952    Comment: Patient educated on safety with self care, pause when standing to gain balance and need to not place legs on hand end of foot board.    Acceptance, E,D, VU by  at 5/30/2025 1517                                      User Key       Initials  Effective Dates Name Provider Type Discipline     02/28/25 -  Izabel Mcwilliams, OTR/L Occupational Therapist OT     07/11/23 -  Maribel Vigil, OTR/L Occupational Therapist OT                  OT Recommendation and Plan  Therapy Frequency (OT): 3 times/wk  Plan of Care Review  Plan of Care Reviewed With: patient  Progress: improving  Outcome Evaluation: OT treatment complete. Patient has flat affect but is oriented x 4. Patient laying on bed with lower leg on the end of the bed railing. Patient educated on the risks of pressure areas on the back of his legs and encouraged to avoid. Patient able to move supine to sit with SBA. Patient transferred to [facility] [place] [facility]. Able to complete perianal hygiene with set up. Patient returned to EOB and was given encouragement to bath. Patient washed face and hands and declined washing remainder of body. Patient brushed teeth with set up. Patietn returned to supine position with VC. Recommend continued OT while patient is hospitalized to improve independence with self care. Recommend home with assist or substance abuse rehab upon hospital discharge.     Time Calculation:         Time Calculation- OT       Row Name 06/02/25 0830             Time Calculation- OT    OT Start Time 0830  -MESSI      OT Stop Time 0928  -MESSI      OT Time Calculation (min) 58 min  -MESSI      OT Received On 06/02/25  -         Timed Charges    79845 - OT Self Care/Mgmt Minutes 58  -MESSI         Total Minutes    Timed Charges Total Minutes 58  -MESSI       Total Minutes 58  -MESSI                User Key  (r) = Recorded By, (t) = Taken By, (c) = Cosigned By      Initials Name Provider Type     Izabel Mcwilliams OTR/L Occupational Therapist                  Therapy Charges for Today       Code Description Service Date Service Provider Modifiers Qty    [id number]  OT SELF CARE/MGMT/TRAIN EA 15 MIN 6/2/2025 Izabel Mcwilliams OTR/L GO 4                 KEYSHA Allison/INGRIS  6/2/2025

## 2025-06-02 NOTE — PROGRESS NOTES
Chart follow-up.    Recent progress notes and labs noted.     Please let us know if further GI input is desired.     Thee White MD  6/2/2025  15:35 CDT

## 2025-06-02 NOTE — THERAPY TREATMENT NOTE
Acute Care - Physical Therapy Treatment Note  [facility]     Patient Name: Luciano Christiansen  : 1985  MRN: [id number]  Today's Date: 2025      Visit Dx:     ICD-10-CM ICD-9-CM   1. Liver failure without hepatic coma, unspecified chronicity  K72.90 572.8   2. Upper GI bleed  K92.2 578.9   3. Impaired functional mobility and activity tolerance [Z74.09]  Z74.09 V49.89     Patient Active Problem List   Diagnosis    Wellness examination    Encounter for hepatitis C screening test for low risk patient    Primary hypertension    Class 1 obesity due to excess calories without serious comorbidity with body mass index (BMI) of 34.0 to 34.9 in adult    Fatty liver    Hyponatremia    Hypokalemia    Hypomagnesemia    Alcoholism    Transaminitis    Hypophosphatemia    Insomnia disorder related to known organic factor    Community acquired bilateral lower lobe pneumonia    Pneumonia due to Streptococcus    GI bleed    Calculus of gallbladder without cholecystitis without obstruction    Alcoholic encephalopathy    Alcohol withdrawal    Alcoholic steatohepatitis    BMI 40.0-44.9, adult    Hepatic encephalopathy    Gastroesophageal reflux disease without esophagitis    Hypothyroidism    Esophageal varices in cirrhosis    Upper GI bleed     Past Medical History:   Diagnosis Date    Alcoholism     currently drinking    Diabetes mellitus     Gout     Hepatitis C     Hypertension     Jaundice      Past Surgical History:   Procedure Laterality Date    ENDOSCOPY N/A 2025    Procedure: ESOPHAGOGASTRODUODENOSCOPY WITH ANESTHESIA;  Surgeon: Estela Ramos MD;  Location: [facility];  Service: Gastroenterology;  Laterality: N/A;  pre op: GI bleed  post op: esophageal ulcer, esophagitis  pcp: Adrien Varghese MD    VASECTOMY       PT Assessment (Last 12 Hours)       PT Evaluation and Treatment       Row Name 25 1444 25 1128       Physical Therapy Time and Intention    Subjective Information complains of;weakness;swelling  -AH  --  -    Document Type therapy note (daily note)  - --    Mode of Treatment physical therapy  - --    Session Not Performed -- patient/family declined treatment  -    Comment, Session Not Performed -- pt declined therapy, requested to check back later  -[facility] Name 06/02/25 1444          General Information    Existing Precautions/Restrictions fall  -AH       Row Name 06/02/25 1444          Pain    Pretreatment Pain Rating 0/10 - no pain  -     Posttreatment Pain Rating 0/10 - no pain  -AH       Row Name 06/02/25 1444          Bed Mobility    Supine-Sit Taiban (Bed Mobility) standby assist  -     Sit-Supine [place]iban (Bed Mobility) standby assist  -AH       Row Name 06/02/25 1444          Sit-Stand Transfer    Sit-Stand Taiban (Transfers) standby assist;contact guard  -[facility] Name 06/02/25 1444          Stand-Sit Transfer    Stand-Sit Taiban (Transfers) standby assist;contact guard  -AH       Row Name 06/02/25 1444          Gait/Stairs (Locomotion)    Taiban Level (Gait) contact guard;standby assist  -AH     Distance in Feet (Gait) 200  200 X 2  -[facility] Name 06/02/25 1444          Plan of Care Review    Plan of Care Reviewed With patient  -     Progress improving  -     Outcome Evaluation Pt trans to EOB sba, sat EOB few minutes, sit-stand sba, pt amb 200 feet cga-sba x 2 reps, with a sitting rest,pt states he plans to go to his mothers house at d/c  -[facility] Name 06/02/25 1444          Positioning and Restraints    Pre-Treatment Position in bed  [facility]     Post Treatment Position bed  -AH     In Bed fowlers;call light within reach;encouraged to call for assist  -               User Key  (r) = Recorded By, (t) = Taken By, (c) = Cosigned By      Initials Name Provider Type     Vero Rose, PTA Physical Therapist Assistant                    Physical Therapy Education       Title: PT OT SLP Therapies (In Progress)       Topic: Physical Therapy (In  Progress)       Point: Mobility training (Done)       Learning Progress Summary            Patient Acceptance, E,TB,D, VU,NR by  at 5/30/2025 1624    Comment: Education re: purpose of PT/importance of activity, safety/falls prevention, LE exercise, deep breathing w/ education for improved tech w/ deep breathing                      Point: Home exercise program (Done)       Learning Progress Summary            Patient Acceptance, E,TB,D, VU,NR by  at 5/30/2025 1624    Comment: Education re: purpose of PT/importance of activity, safety/falls prevention, LE exercise, deep breathing w/ education for improved tech w/ deep breathing                      Point: Body mechanics (Not Started)       Learner Progress:  Not documented in this visit.              Point: Precautions (Done)       Learning Progress Summary            Patient Acceptance, E,TB,D, VU,NR by  at 5/30/2025 1624    Comment: Education re: purpose of PT/importance of activity, safety/falls prevention, LE exercise, deep breathing w/ education for improved tech w/ deep breathing                                      User Key       Initials Effective Dates Name Provider Type Discipline     08/02/18 -  Ruthy Zaragoza, PT Physical Therapist PT                  PT Recommendation and Plan     Plan of Care Reviewed With: patient  Progress: improving  Outcome Evaluation: Pt trans to EOB sba, sat EOB few minutes, sit-stand sba, pt amb 200 feet cga-sba x 2 reps, with a sitting rest,pt states he plans to go to his mothers house at d/c   Outcome Measures       Row Name 06/02/25 1500 05/31/25 0928          How much help from another person do you currently need...    Turning from your back to your side while in flat bed without using bedrails? 4  -AH 3  -MF     Moving from lying on back to sitting on the side of a flat bed without bedrails? 4  -AH 3  -MF     Moving to and from a bed to a chair (including a wheelchair)? 4  -AH 3  -MF     Standing up from a chair  using your arms (e.g., wheelchair, bedside chair)? 3  - 3  -MF     Climbing 3-5 steps with a railing? 3  -AH 2  -MF     To walk in hospital room? 3  -AH 3  -MF     AM-PAC 6 Clicks Score (PT) 21  -AH 17  -MF        Functional Assessment    Outcome Measure Options AM-PAC 6 Clicks Basic Mobility (PT)  - AM-PAC 6 Clicks Basic Mobility (PT)  -               User Key  (r) = Recorded By, (t) = Taken By, (c) = Cosigned By      Initials Name Provider Type    Vero Porter PTA Physical Therapist Assistant     Court Bocanegra PTA Physical Therapist Assistant                     Time Calculation:    PT Charges       Row Name 06/02/25 1518             Time Calculation    Start Time 1444  -      Stop Time 1515  -      Time Calculation (min) 31 min  -      PT Received On 06/02/25  -         Time Calculation- PT    Total Timed Code Minutes- PT 31 minute(s)  -         Timed Charges    31000 - Gait Training Minutes  31  -AH         Total Minutes    Timed Charges Total Minutes 31  -       Total Minutes 31  -AH                User Key  (r) = Recorded By, (t) = Taken By, (c) = Cosigned By      Initials Name Provider Type     Vero Rose PTA Physical Therapist Assistant                  Therapy Charges for Today       Code Description Service Date Service Provider Modifiers Qty    [id number] HC GAIT TRAINING EA 15 MIN 6/2/2025 Vero Rose PTA GP 2            PT G-Codes  Outcome Measure Options: AM-PAC 6 Clicks Basic Mobility (PT)  AM-PAC 6 Clicks Score (PT): 21  AM-PAC 6 Clicks Score (OT): 18    Vero Rose PTA  6/2/2025

## 2025-06-02 NOTE — PLAN OF CARE
Problem: Adult Inpatient Plan of Care  Goal: Plan of Care Review  Outcome: Progressing  Flowsheets (Taken 6/2/2025 4597)  Progress: no change  Outcome Evaluation: Pt complains of pain, see MAR. A&Ox4. Up x1 to BSC. Voiding and having multiple BMs. IV abx. PRN given for cough and nausea. SCDs. Safety maintained.  Plan of Care Reviewed With: patient

## 2025-06-03 LAB
ALBUMIN SERPL-MCNC: 2.8 G/DL (ref 3.5–5.2)
ALBUMIN/GLOB SERPL: 1.4 G/DL
ALP SERPL-CCNC: 215 U/L (ref 39–117)
ALT SERPL W P-5'-P-CCNC: 86 U/L (ref 1–41)
ANION GAP SERPL CALCULATED.3IONS-SCNC: 12 MMOL/L (ref 5–15)
APTT PPP: 57.7 SECONDS (ref 24.5–36)
AST SERPL-CCNC: 156 U/L (ref 1–40)
BASOPHILS # BLD AUTO: 0.07 10*3/MM3 (ref 0–0.2)
BASOPHILS NFR BLD AUTO: 0.9 % (ref 0–1.5)
BILIRUB SERPL-MCNC: 30.3 MG/DL (ref 0–1.2)
BUN SERPL-MCNC: 9.7 MG/DL (ref 6–20)
BUN/CREAT SERPL: 44.1 (ref 7–25)
CALCIUM SPEC-SCNC: 7.9 MG/DL (ref 8.6–10.5)
CHLORIDE SERPL-SCNC: 104 MMOL/L (ref 98–107)
CO2 SERPL-SCNC: 23 MMOL/L (ref 22–29)
CREAT SERPL-MCNC: 0.22 MG/DL (ref 0.76–1.27)
DEPRECATED RDW RBC AUTO: 86.6 FL (ref 37–54)
EGFRCR SERPLBLD CKD-EPI 2021: 169.4 ML/MIN/1.73
EOSINOPHIL # BLD AUTO: 0.09 10*3/MM3 (ref 0–0.4)
EOSINOPHIL NFR BLD AUTO: 1.2 % (ref 0.3–6.2)
ERYTHROCYTE [DISTWIDTH] IN BLOOD BY AUTOMATED COUNT: 23.4 % (ref 12.3–15.4)
GLOBULIN UR ELPH-MCNC: 2 GM/DL
GLUCOSE SERPL-MCNC: 81 MG/DL (ref 65–99)
HCT VFR BLD AUTO: 25.2 % (ref 37.5–51)
HGB BLD-MCNC: 8.3 G/DL (ref 13–17.7)
IMM GRANULOCYTES # BLD AUTO: 0.33 10*3/MM3 (ref 0–0.05)
IMM GRANULOCYTES NFR BLD AUTO: 4.3 % (ref 0–0.5)
INR PPP: 2.18 (ref 0.91–1.09)
LYMPHOCYTES # BLD AUTO: 1.72 10*3/MM3 (ref 0.7–3.1)
LYMPHOCYTES NFR BLD AUTO: 22.2 % (ref 19.6–45.3)
MAGNESIUM SERPL-MCNC: 1.8 MG/DL (ref 1.6–2.6)
MCH RBC QN AUTO: 33.5 PG (ref 26.6–33)
MCHC RBC AUTO-ENTMCNC: 32.9 G/DL (ref 31.5–35.7)
MCV RBC AUTO: 101.6 FL (ref 79–97)
MONOCYTES # BLD AUTO: 1.05 10*3/MM3 (ref 0.1–0.9)
MONOCYTES NFR BLD AUTO: 13.6 % (ref 5–12)
NEUTROPHILS NFR BLD AUTO: 4.48 10*3/MM3 (ref 1.7–7)
NEUTROPHILS NFR BLD AUTO: 57.8 % (ref 42.7–76)
PHOSPHATE SERPL-MCNC: 2.2 MG/DL (ref 2.5–4.5)
PHOSPHATE SERPL-MCNC: 2.3 MG/DL (ref 2.5–4.5)
PLATELET # BLD AUTO: 123 10*3/MM3 (ref 140–450)
PMV BLD AUTO: 11.5 FL (ref 6–12)
POTASSIUM SERPL-SCNC: 3.3 MMOL/L (ref 3.5–5.2)
POTASSIUM SERPL-SCNC: 3.6 MMOL/L (ref 3.5–5.2)
PROT SERPL-MCNC: 4.8 G/DL (ref 6–8.5)
PROTHROMBIN TIME: 25.5 SECONDS (ref 11.8–14.8)
RBC # BLD AUTO: 2.48 10*6/MM3 (ref 4.14–5.8)
SODIUM SERPL-SCNC: 139 MMOL/L (ref 136–145)
WBC NRBC COR # BLD AUTO: 7.74 10*3/MM3 (ref 3.4–10.8)

## 2025-06-03 PROCEDURE — 83735 ASSAY OF MAGNESIUM: CPT | Performed by: PHYSICIAN ASSISTANT

## 2025-06-03 PROCEDURE — 80053 COMPREHEN METABOLIC PANEL: CPT | Performed by: PHYSICIAN ASSISTANT

## 2025-06-03 PROCEDURE — 84100 ASSAY OF PHOSPHORUS: CPT | Performed by: PHYSICIAN ASSISTANT

## 2025-06-03 PROCEDURE — 84100 ASSAY OF PHOSPHORUS: CPT | Performed by: INTERNAL MEDICINE

## 2025-06-03 PROCEDURE — 85025 COMPLETE CBC W/AUTO DIFF WBC: CPT | Performed by: PHYSICIAN ASSISTANT

## 2025-06-03 PROCEDURE — 25010000002 PHENOBARBITAL PER 120 MG: Performed by: INTERNAL MEDICINE

## 2025-06-03 PROCEDURE — 25010000002 ONDANSETRON PER 1 MG: Performed by: INTERNAL MEDICINE

## 2025-06-03 PROCEDURE — 85730 THROMBOPLASTIN TIME PARTIAL: CPT | Performed by: PHYSICIAN ASSISTANT

## 2025-06-03 PROCEDURE — 85610 PROTHROMBIN TIME: CPT | Performed by: PHYSICIAN ASSISTANT

## 2025-06-03 PROCEDURE — 84132 ASSAY OF SERUM POTASSIUM: CPT | Performed by: INTERNAL MEDICINE

## 2025-06-03 RX ORDER — POTASSIUM CHLORIDE 1500 MG/1
40 TABLET, EXTENDED RELEASE ORAL EVERY 4 HOURS
Status: COMPLETED | OUTPATIENT
Start: 2025-06-03 | End: 2025-06-03

## 2025-06-03 RX ORDER — POTASSIUM CHLORIDE 1500 MG/1
40 TABLET, EXTENDED RELEASE ORAL EVERY 4 HOURS
Status: DISCONTINUED | OUTPATIENT
Start: 2025-06-03 | End: 2025-06-03

## 2025-06-03 RX ORDER — PANTOPRAZOLE SODIUM 40 MG/1
40 TABLET, DELAYED RELEASE ORAL
Status: DISCONTINUED | OUTPATIENT
Start: 2025-06-03 | End: 2025-06-04 | Stop reason: HOSPADM

## 2025-06-03 RX ADMIN — MIDODRINE HYDROCHLORIDE 5 MG: 2.5 TABLET ORAL at 08:48

## 2025-06-03 RX ADMIN — BENZONATATE 200 MG: 100 CAPSULE ORAL at 05:05

## 2025-06-03 RX ADMIN — TRAMADOL HYDROCHLORIDE 50 MG: 50 TABLET, COATED ORAL at 21:47

## 2025-06-03 RX ADMIN — Medication 10 ML: at 08:48

## 2025-06-03 RX ADMIN — BENZONATATE 200 MG: 100 CAPSULE ORAL at 15:28

## 2025-06-03 RX ADMIN — GUAIFENESIN AND DEXTROMETHORPHAN 10 ML: 100; 10 SYRUP ORAL at 18:20

## 2025-06-03 RX ADMIN — POTASSIUM CHLORIDE 40 MEQ: 1500 TABLET, EXTENDED RELEASE ORAL at 18:20

## 2025-06-03 RX ADMIN — FLUOXETINE HYDROCHLORIDE 20 MG: 20 CAPSULE ORAL at 08:48

## 2025-06-03 RX ADMIN — LACTULOSE 20 G: 20 SOLUTION ORAL at 08:48

## 2025-06-03 RX ADMIN — PENTOXIFYLLINE 400 MG: 400 TABLET, EXTENDED RELEASE ORAL at 11:24

## 2025-06-03 RX ADMIN — ONDANSETRON 4 MG: 2 INJECTION INTRAMUSCULAR; INTRAVENOUS at 16:35

## 2025-06-03 RX ADMIN — LACTULOSE 20 G: 20 SOLUTION ORAL at 15:28

## 2025-06-03 RX ADMIN — LEVOTHYROXINE SODIUM 75 MCG: 0.07 TABLET ORAL at 05:05

## 2025-06-03 RX ADMIN — PENTOXIFYLLINE 400 MG: 400 TABLET, EXTENDED RELEASE ORAL at 17:37

## 2025-06-03 RX ADMIN — MIDODRINE HYDROCHLORIDE 5 MG: 2.5 TABLET ORAL at 11:23

## 2025-06-03 RX ADMIN — PENTOXIFYLLINE 400 MG: 400 TABLET, EXTENDED RELEASE ORAL at 08:48

## 2025-06-03 RX ADMIN — PHENOBARBITAL SODIUM 32.5 MG: 65 INJECTION INTRAMUSCULAR; INTRAVENOUS at 21:48

## 2025-06-03 RX ADMIN — PANTOPRAZOLE SODIUM 40 MG: 40 TABLET, DELAYED RELEASE ORAL at 17:37

## 2025-06-03 RX ADMIN — THIAMINE HCL TAB 100 MG 250 MG: 100 TAB at 08:48

## 2025-06-03 RX ADMIN — POTASSIUM & SODIUM PHOSPHATES POWDER PACK 280-160-250 MG 2 PACKET: 280-160-250 PACK at 08:48

## 2025-06-03 RX ADMIN — PHENOBARBITAL SODIUM 32.5 MG: 65 INJECTION INTRAMUSCULAR; INTRAVENOUS at 11:23

## 2025-06-03 RX ADMIN — PANTOPRAZOLE SODIUM 40 MG: 40 INJECTION, POWDER, FOR SOLUTION INTRAVENOUS at 08:48

## 2025-06-03 RX ADMIN — POTASSIUM CHLORIDE 40 MEQ: 1500 TABLET, EXTENDED RELEASE ORAL at 08:48

## 2025-06-03 RX ADMIN — POTASSIUM CHLORIDE 40 MEQ: 1500 TABLET, EXTENDED RELEASE ORAL at 21:47

## 2025-06-03 RX ADMIN — FOLIC ACID 1 MG: 1 TABLET ORAL at 08:48

## 2025-06-03 RX ADMIN — TRAMADOL HYDROCHLORIDE 50 MG: 50 TABLET, COATED ORAL at 15:28

## 2025-06-03 RX ADMIN — GUAIFENESIN AND DEXTROMETHORPHAN 10 ML: 100; 10 SYRUP ORAL at 21:50

## 2025-06-03 RX ADMIN — MIDODRINE HYDROCHLORIDE 5 MG: 2.5 TABLET ORAL at 17:37

## 2025-06-03 RX ADMIN — ONDANSETRON 4 MG: 2 INJECTION INTRAMUSCULAR; INTRAVENOUS at 21:47

## 2025-06-03 RX ADMIN — Medication 10 ML: at 21:47

## 2025-06-03 RX ADMIN — POTASSIUM CHLORIDE 40 MEQ: 1500 TABLET, EXTENDED RELEASE ORAL at 11:23

## 2025-06-03 RX ADMIN — ONDANSETRON 4 MG: 2 INJECTION INTRAMUSCULAR; INTRAVENOUS at 05:05

## 2025-06-03 RX ADMIN — TRAMADOL HYDROCHLORIDE 50 MG: 50 TABLET, COATED ORAL at 05:06

## 2025-06-03 RX ADMIN — LACTULOSE 20 G: 20 SOLUTION ORAL at 21:46

## 2025-06-03 NOTE — PLAN OF CARE
Goal Outcome Evaluation:  Plan of Care Reviewed With: patient        Progress: no change  Outcome Evaluation: Patient complains of nausea and pain x1, see MAR. IV IID. Voiding. CIWA protocol in place. VSS. Safety maintained.

## 2025-06-03 NOTE — PLAN OF CARE
Goal Outcome Evaluation:  Plan of Care Reviewed With: patient        Progress: no change  Outcome Evaluation: No complaints of pain. Complains of cough, see MAR. IV IID. Voiding. Up to standby to BSC. CIWA protocol in place. VSS. Safety maintained.

## 2025-06-03 NOTE — PROGRESS NOTES
[facility] Medicine Services  INPATIENT PROGRESS NOTE    Patient Name: Luciano Christiansen  Date of Admission: 5/28/2025  Today's Date: 06/03/25  Length of Stay: 6  Primary Care Physician: Adrien Varghese MD    Subjective   Chief Complaint: Weakness, dark stools    HPI   Patient feels improved. Cough is better.    Review of Systems   All pertinent negatives and positives are as above. All other systems have been reviewed and are negative unless otherwise stated.     Objective    Temp:  [96.6 °F (35.9 °C)-97.7 °F (36.5 °C)] 97.4 °F (36.3 °C)  Heart Rate:  [81-91] 88  Resp:  [16-18] 16  BP: (114-132)/(48-66) 117/59  Physical Exam  Constitutional:       General: He is not in acute distress.     Appearance: He is well-developed. He is ill-appearing. He is not diaphoretic.   HENT:      Head: Normocephalic.   Eyes:      General: Scleral icterus present.      Pupils: Pupils are equal, round, and reactive to light.   Neck:      Thyroid: No thyromegaly.      Vascular: No JVD.      Trachea: No tracheal deviation.   Cardiovascular:      Rate and Rhythm: Regular rhythm.      Heart sounds: Normal heart sounds. No murmur heard.     No friction rub. No gallop.   Pulmonary:      Effort: Pulmonary effort is normal. No respiratory distress.      Breath sounds: No stridor.   Chest:      Chest wall: No tenderness.   Abdominal:      General: Bowel sounds are normal. There is distension.      Palpations: Abdomen is soft. There is no mass.      Tenderness: There is no abdominal tenderness. There is no guarding or rebound.      Hernia: No hernia is present.   Musculoskeletal:         General: No tenderness or deformity. Normal range of motion.      Right lower leg: Edema present.      Left lower leg: Edema present.   Lymphadenopathy:      Cervical: No cervical adenopathy.   Skin:     General: Skin is warm and dry.      Coloration: Skin is not pale.      Findings: No erythema or rash.   Neurological:      Mental  Status: He is alert.      Cranial Nerves: No cranial nerve deficit.      Sensory: No sensory deficit.      Motor: No abnormal muscle tone.   Psychiatric:         Mood and Affect: Mood normal.         Behavior: Behavior normal.       Results Review:  I have reviewed the labs, radiology results, and diagnostic studies.    Laboratory Data:   Results from last 7 days   Lab Units 06/03/25  0459 06/02/25  0821 06/01/25  0607   WBC 10*3/mm3 7.74 7.04 4.33   HEMOGLOBIN g/dL 8.3* 8.3* 7.1*   HEMATOCRIT % 25.2* 25.3* 21.3*   PLATELETS 10*3/mm3 123* 103* 67*        Results from last 7 days   Lab Units 06/03/25  0458 06/02/25  0821 06/01/25  1557 06/01/25  0607   SODIUM mmol/L 139 136  --  137   POTASSIUM mmol/L 3.3* 3.7 3.7 3.3*   CHLORIDE mmol/L 104 102  --  102   CO2 mmol/L 23.0 25.0  --  25.0   BUN mg/dL 9.7 9.7  --  11.9   CREATININE mg/dL 0.22* <0.17*  --  0.25*   CALCIUM mg/dL 7.9* 8.0*  --  7.3*   BILIRUBIN mg/dL 30.3* 31.5*  --  25.4*   ALK PHOS U/L 215* 217*  --  184*   ALT (SGPT) U/L 86* 94*  --  86*   AST (SGOT) U/L 156* 198*  --  195*   GLUCOSE mg/dL 81 91  --  104*       Culture Data:   Blood Culture   Date Value Ref Range Status   05/28/2025 No growth at 5 days  Final   05/28/2025 No growth at 5 days  Final       Radiology Data:   Imaging Results (Last 24 Hours)       ** No results found for the last 24 hours. **            I have reviewed the patient's current medications.     Assessment/Plan   Assessment  Active Hospital Problems    Diagnosis     **Upper GI bleed    Cirrhosis  Alcoholic hepatitis  Anemia  Severe esophagitis  Esophageal ulceration  Sepsis  Coagulopathy secondary to liver failure  Alcohol abuse and impending delirium tremens  Thiamine therapy for prophylactic purposes    Treatment Plan  Hemoglobin appears stable. Blood pressure is in acceptable range.  Gastroenterology input appreciated.  Switch IV Protonix to PO protonix for GI bleed.  Plan to discharge in the next 24 hours if hemoglobin and  vital signs remained stable.    Continue pentoxifylline for alcoholic hepatitis.  Plan to discharge on a 24-day course of pentoxifylline.  No steroids due to recent GI bleed, esophageal ulceration, and infection.  Continue lactulose.  Patient to establish care with gastroenterology on discharge and follow-up with transplant hepatology in a tertiary center.  He is at high risk for morbidity and mortality in 90 days given his advanced MELD score.    Patient completed a course of Zosyn for sepsis and intra-abdominal infection.  Will monitor off antibiotics for now.    Taper phenobarbital for alcohol withdrawal syndrome and can discontinue in the next 24 hours. Continue thiamine for prophylactic use.    DVT prophylaxis with SCDs    CODE STATUS is full code    Medical Decision Making  Number and Complexity of problems: 8  Differential Diagnosis: None    Conditions and Status        Condition is unchanged.     MDM Data  External documents reviewed: None  Cardiac tracing (EKG, telemetry) interpretation: None  Radiology interpretation: Chest x ray reviewed  Labs reviewed: CBC, CMP reviewed  Any tests that were considered but not ordered: None     Decision rules/scores evaluated (example [id number], Wells, etc): None     Discussed with: Patient     Care Planning  Shared decision making: Patient and his mother  Code status and discussions: CODE STATUS is full code    Disposition  Social Determinants of Health that impact treatment or disposition: None  I expect the patient to be discharged to home in the next 24 hours        Electronically signed by Rick Paez MD, 06/03/25, 14:11 CDT.

## 2025-06-03 NOTE — PLAN OF CARE
Goal Outcome Evaluation:  Plan of Care Reviewed With: patient  Progress: no change       Pt medicated with prn pain and nausea meds x1 thus far this shift. IV in place saline locked. Up with asst. Voiding. Mult loose stools throughout shift. Prn Tessalon pearls given x1. VSS. Safety maintained.

## 2025-06-03 NOTE — CASE MANAGEMENT/SOCIAL WORK
Continued Stay Note  [facility]     Patient Name: Luciano Christiansen  MRN: [id number]  Today's Date: 6/3/2025    Admit Date: 5/28/2025    Plan: Home   Discharge Plan       Row Name 06/03/25 1243       Plan    Plan Home    Patient/Family in Agreement with Plan yes    Plan Comments Pt plans to return to his mother's home at d/c. He has been given a chemical dependency packet.    Final Discharge Disposition Code 01 - home or self-care                   Discharge Codes    No documentation.                 Expected Discharge Date and Time       Expected Discharge Date Expected Discharge Time    Steven 3, 2025               RAMO Clement

## 2025-06-04 ENCOUNTER — READMISSION MANAGEMENT (OUTPATIENT)
Dept: CALL CENTER | Facility: HOSPITAL | Age: 40
End: 2025-06-04
Payer: COMMERCIAL

## 2025-06-04 VITALS
BODY MASS INDEX: 39.94 KG/M2 | HEART RATE: 79 BPM | OXYGEN SATURATION: 97 % | HEIGHT: 74 IN | TEMPERATURE: 97.6 F | WEIGHT: 311.2 LBS | DIASTOLIC BLOOD PRESSURE: 57 MMHG | RESPIRATION RATE: 16 BRPM | SYSTOLIC BLOOD PRESSURE: 132 MMHG

## 2025-06-04 PROBLEM — R17 JAUNDICE: Status: ACTIVE | Noted: 2025-06-04

## 2025-06-04 LAB
ALBUMIN SERPL-MCNC: 2.5 G/DL (ref 3.5–5.2)
ALBUMIN/GLOB SERPL: 1.3 G/DL
ALP SERPL-CCNC: 185 U/L (ref 39–117)
ALT SERPL W P-5'-P-CCNC: 75 U/L (ref 1–41)
ANION GAP SERPL CALCULATED.3IONS-SCNC: 9 MMOL/L (ref 5–15)
ANISOCYTOSIS BLD QL: ABNORMAL
AST SERPL-CCNC: 125 U/L (ref 1–40)
BASOPHILS # BLD MANUAL: 0 10*3/MM3 (ref 0–0.2)
BASOPHILS NFR BLD MANUAL: 0 % (ref 0–1.5)
BILIRUB SERPL-MCNC: 26.8 MG/DL (ref 0–1.2)
BUN SERPL-MCNC: 8.5 MG/DL (ref 6–20)
BUN/CREAT SERPL: 37 (ref 7–25)
CALCIUM SPEC-SCNC: 7.8 MG/DL (ref 8.6–10.5)
CHLORIDE SERPL-SCNC: 109 MMOL/L (ref 98–107)
CLUMPED PLATELETS: PRESENT
CO2 SERPL-SCNC: 21 MMOL/L (ref 22–29)
CREAT SERPL-MCNC: 0.23 MG/DL (ref 0.76–1.27)
DEPRECATED RDW RBC AUTO: 86.3 FL (ref 37–54)
EGFRCR SERPLBLD CKD-EPI 2021: 167.2 ML/MIN/1.73
EOSINOPHIL # BLD MANUAL: 0.06 10*3/MM3 (ref 0–0.4)
EOSINOPHIL NFR BLD MANUAL: 1 % (ref 0.3–6.2)
ERYTHROCYTE [DISTWIDTH] IN BLOOD BY AUTOMATED COUNT: 23.4 % (ref 12.3–15.4)
GIANT PLATELETS: ABNORMAL
GLOBULIN UR ELPH-MCNC: 2 GM/DL
GLUCOSE SERPL-MCNC: 95 MG/DL (ref 65–99)
HCT VFR BLD AUTO: 23.4 % (ref 37.5–51)
HGB BLD-MCNC: 7.7 G/DL (ref 13–17.7)
HYPOCHROMIA BLD QL: ABNORMAL
LYMPHOCYTES # BLD MANUAL: 0.8 10*3/MM3 (ref 0.7–3.1)
LYMPHOCYTES NFR BLD MANUAL: 7 % (ref 5–12)
MACROCYTES BLD QL SMEAR: ABNORMAL
MAGNESIUM SERPL-MCNC: 1.7 MG/DL (ref 1.6–2.6)
MCH RBC QN AUTO: 33.5 PG (ref 26.6–33)
MCHC RBC AUTO-ENTMCNC: 32.9 G/DL (ref 31.5–35.7)
MCV RBC AUTO: 101.7 FL (ref 79–97)
MICROCYTES BLD QL: ABNORMAL
MONOCYTES # BLD: 0.4 10*3/MM3 (ref 0.1–0.9)
NEUTROPHILS # BLD AUTO: 4.4 10*3/MM3 (ref 1.7–7)
NEUTROPHILS NFR BLD MANUAL: 74 % (ref 42.7–76)
NEUTS BAND NFR BLD MANUAL: 3 % (ref 0–5)
PHOSPHATE SERPL-MCNC: 2.2 MG/DL (ref 2.5–4.5)
PHOSPHATE SERPL-MCNC: 2.2 MG/DL (ref 2.5–4.5)
PLATELET # BLD AUTO: 109 10*3/MM3 (ref 140–450)
PMV BLD AUTO: 11.3 FL (ref 6–12)
POIKILOCYTOSIS BLD QL SMEAR: ABNORMAL
POLYCHROMASIA BLD QL SMEAR: ABNORMAL
POTASSIUM SERPL-SCNC: 3.9 MMOL/L (ref 3.5–5.2)
POTASSIUM SERPL-SCNC: 3.9 MMOL/L (ref 3.5–5.2)
PROMYELOCYTES NFR BLD MANUAL: 1 % (ref 0–0)
PROT SERPL-MCNC: 4.5 G/DL (ref 6–8.5)
RBC # BLD AUTO: 2.3 10*6/MM3 (ref 4.14–5.8)
SMALL PLATELETS BLD QL SMEAR: ABNORMAL
SODIUM SERPL-SCNC: 139 MMOL/L (ref 136–145)
VARIANT LYMPHS NFR BLD MANUAL: 12 % (ref 19.6–45.3)
VARIANT LYMPHS NFR BLD MANUAL: 2 % (ref 0–5)
WBC NRBC COR # BLD AUTO: 5.72 10*3/MM3 (ref 3.4–10.8)

## 2025-06-04 PROCEDURE — 85007 BL SMEAR W/DIFF WBC COUNT: CPT | Performed by: PHYSICIAN ASSISTANT

## 2025-06-04 PROCEDURE — 85025 COMPLETE CBC W/AUTO DIFF WBC: CPT | Performed by: PHYSICIAN ASSISTANT

## 2025-06-04 PROCEDURE — 97535 SELF CARE MNGMENT TRAINING: CPT | Performed by: OCCUPATIONAL THERAPIST

## 2025-06-04 PROCEDURE — 80053 COMPREHEN METABOLIC PANEL: CPT | Performed by: PHYSICIAN ASSISTANT

## 2025-06-04 PROCEDURE — 83735 ASSAY OF MAGNESIUM: CPT | Performed by: PHYSICIAN ASSISTANT

## 2025-06-04 PROCEDURE — 84132 ASSAY OF SERUM POTASSIUM: CPT | Performed by: INTERNAL MEDICINE

## 2025-06-04 PROCEDURE — 84100 ASSAY OF PHOSPHORUS: CPT | Performed by: INTERNAL MEDICINE

## 2025-06-04 PROCEDURE — 25010000002 PHENOBARBITAL PER 120 MG: Performed by: INTERNAL MEDICINE

## 2025-06-04 PROCEDURE — 84100 ASSAY OF PHOSPHORUS: CPT | Performed by: PHYSICIAN ASSISTANT

## 2025-06-04 PROCEDURE — 97530 THERAPEUTIC ACTIVITIES: CPT | Performed by: OCCUPATIONAL THERAPIST

## 2025-06-04 RX ORDER — PANTOPRAZOLE SODIUM 40 MG/1
40 TABLET, DELAYED RELEASE ORAL
Qty: 60 TABLET | Refills: 0 | Status: SHIPPED | OUTPATIENT
Start: 2025-06-04

## 2025-06-04 RX ORDER — FOLIC ACID 1 MG/1
1 TABLET ORAL DAILY
Qty: 90 TABLET | Refills: 1 | Status: SHIPPED | OUTPATIENT
Start: 2025-06-04

## 2025-06-04 RX ORDER — PENTOXIFYLLINE 400 MG/1
400 TABLET, EXTENDED RELEASE ORAL
Qty: 74 TABLET | Refills: 0 | Status: SHIPPED | OUTPATIENT
Start: 2025-06-04 | End: 2025-06-29

## 2025-06-04 RX ORDER — MIDODRINE HYDROCHLORIDE 5 MG/1
5 TABLET ORAL
Qty: 90 TABLET | Refills: 0 | Status: SHIPPED | OUTPATIENT
Start: 2025-06-04

## 2025-06-04 RX ORDER — LACTULOSE 10 G/15ML
20 SOLUTION ORAL 3 TIMES DAILY
Qty: 450 ML | Refills: 8 | Status: SHIPPED | OUTPATIENT
Start: 2025-06-04

## 2025-06-04 RX ADMIN — PANTOPRAZOLE SODIUM 40 MG: 40 TABLET, DELAYED RELEASE ORAL at 08:46

## 2025-06-04 RX ADMIN — MIDODRINE HYDROCHLORIDE 5 MG: 2.5 TABLET ORAL at 11:12

## 2025-06-04 RX ADMIN — TRAMADOL HYDROCHLORIDE 50 MG: 50 TABLET, COATED ORAL at 06:55

## 2025-06-04 RX ADMIN — POTASSIUM & SODIUM PHOSPHATES POWDER PACK 280-160-250 MG 2 PACKET: 280-160-250 PACK at 08:45

## 2025-06-04 RX ADMIN — PENTOXIFYLLINE 400 MG: 400 TABLET, EXTENDED RELEASE ORAL at 11:12

## 2025-06-04 RX ADMIN — THIAMINE HCL TAB 100 MG 250 MG: 100 TAB at 08:45

## 2025-06-04 RX ADMIN — Medication 10 ML: at 08:46

## 2025-06-04 RX ADMIN — FLUOXETINE HYDROCHLORIDE 20 MG: 20 CAPSULE ORAL at 08:45

## 2025-06-04 RX ADMIN — FOLIC ACID 1 MG: 1 TABLET ORAL at 08:45

## 2025-06-04 RX ADMIN — PHENOBARBITAL SODIUM 32.5 MG: 65 INJECTION INTRAMUSCULAR; INTRAVENOUS at 11:12

## 2025-06-04 RX ADMIN — BENZONATATE 200 MG: 100 CAPSULE ORAL at 06:55

## 2025-06-04 RX ADMIN — MIDODRINE HYDROCHLORIDE 5 MG: 2.5 TABLET ORAL at 08:45

## 2025-06-04 RX ADMIN — PENTOXIFYLLINE 400 MG: 400 TABLET, EXTENDED RELEASE ORAL at 08:45

## 2025-06-04 RX ADMIN — LACTULOSE 20 G: 20 SOLUTION ORAL at 08:45

## 2025-06-04 RX ADMIN — LEVOTHYROXINE SODIUM 75 MCG: 0.07 TABLET ORAL at 06:55

## 2025-06-04 NOTE — THERAPY TREATMENT NOTE
Patient Name: Luciano Christiansen  : 1985    MRN: [id number]                              Today's Date: 2025       Admit Date: 2025    Visit Dx:     ICD-10-CM ICD-9-CM   1. Liver failure without hepatic coma, unspecified chronicity  K72.90 572.8   2. Upper GI bleed  K92.2 578.9   3. Impaired functional mobility and activity tolerance [Z74.09]  Z74.09 V49.89     Patient Active Problem List   Diagnosis    Wellness examination    Encounter for hepatitis C screening test for low risk patient    Primary hypertension    Class 1 obesity due to excess calories without serious comorbidity with body mass index (BMI) of 34.0 to 34.9 in adult    Fatty liver    Hyponatremia    Hypokalemia    Hypomagnesemia    Alcoholism    Transaminitis    Hypophosphatemia    Insomnia disorder related to known organic factor    Community acquired bilateral lower lobe pneumonia    Pneumonia due to Streptococcus    GI bleed    Calculus of gallbladder without cholecystitis without obstruction    Alcoholic encephalopathy    Alcohol withdrawal    Alcoholic steatohepatitis    BMI 40.0-44.9, adult    Hepatic encephalopathy    Gastroesophageal reflux disease without esophagitis    Hypothyroidism    Esophageal varices in cirrhosis    Upper GI bleed     Past Medical History:   Diagnosis Date    Alcoholism     currently drinking    Diabetes mellitus     Gout     Hepatitis C     Hypertension     Jaundice      Past Surgical History:   Procedure Laterality Date    ENDOSCOPY N/A 2025    Procedure: ESOPHAGOGASTRODUODENOSCOPY WITH ANESTHESIA;  Surgeon: Estela Ramos MD;  Location: [facility];  Service: Gastroenterology;  Laterality: N/A;  pre op: GI bleed  post op: esophageal ulcer, esophagitis  pcp: Adrien Varghese MD    VASECTOMY        General Information       Row Name 25 0833          OT Time and Intention    Subjective Information complains of;weakness  -MM     Document Type therapy note (daily note)  -MM     Mode of Treatment occupational  therapy  -MM       Row Name 06/04/25 [facility]          General Information    Patient Profile Reviewed yes  -MM     Existing Precautions/Restrictions fall  -MM     Barriers to Rehab medically complex  -MM       Row Name 06/04/25 08          Cognition    Orientation Status (Cognition) oriented x 4  -MM       Row Name 06/04/25 08          Safety Issues/Impairments Affecting Functional Mobility    Safety Issues Affecting Function (Mobility) insight into deficits/self-awareness  -MM     Impairments Affecting Function (Mobility) balance;endurance/activity tolerance;pain;strength;sensation/sensory awareness  -MM               User Key  (r) = Recorded By, (t) = Taken By, (c) = Cosigned By      Initials Name Provider Type    MM Cipriano Patrick, OTR/L Occupational Therapist                     Mobility/ADL's       Row Name 06/04/25 [facility]          Bed Mobility    Comment, (Bed Mobility) sitting EOB pre and post tx.  -MM       [facility] Name 06/04/25 [facility]          Transfers    Transfers sit-stand transfer;stand-sit transfer;toilet transfer  -MM       [facility] Name 06/04/25 [facility]          Sit-Stand Transfer    Sit-Stand [place] (Transfers) standby assist  -MM       [facility] Name 06/04/25 [facility]          Stand-Sit Transfer    Stand-Sit [place] (Transfers) standby assist  -MM       [facility] Name 06/04/25 [facility]          Toilet Transfer    Type (Toilet Transfer) sit-stand;stand-sit  -MM     [place] Level (Toilet Transfer) independent  -[facility] Name 06/04/25 [facility]          Functional Mobility    Functional Mobility- Ind. Level contact guard assist;supervision required;verbal cues required  -MM     Functional Mobility- Comment Pt walked bed to BR to romero to bed.  -MM       Row Name 06/04/25 [facility]          Activities of Daily Living    BADL Assessment/Intervention feeding;lower body dressing;toileting  -MM       [facility] Name 06/04/25 [facility]          Lower Body Dressing Assessment/Training    [place] Level (Lower Body Dressing) standby assist   adjust sock.  -MM     Position (Lower Body Dressing) edge of bed sitting  -MM       Row Name 06/04/25 33          Toileting Assessment/Training    [place] Level (Toileting) toileting skills;independent  -MM     Position (Toileting) unsupported sitting  -MM       Row Name 06/04/25 [facility]          Self-Feeding Assessment/Training    [place] Level (Feeding) liquids to mouth;independent  -MM     Position (Feeding) edge of bed sitting  -MM               User Key  (r) = Recorded By, (t) = Taken By, (c) = Cosigned By      Initials Name Provider Type    Cipriano Corea, OTR/L Occupational Therapist                   Obj/Interventions       Row Name 06/04/25 [facility]          Balance    Balance Assessment sitting static balance;sitting dynamic balance;standing static balance;standing dynamic balance  -MM     Static Sitting Balance independent  -MM     Dynamic Sitting Balance independent  -MM     Position, Sitting Balance unsupported;sitting edge of bed  -MM     Static Standing Balance supervision;contact guard;verbal cues  -MM     Dynamic Standing Balance supervision;contact guard;verbal cues  -MM     Position/Device Used, Standing Balance unsupported  -MM               User Key  (r) = Recorded By, (t) = Taken By, (c) = Cosigned By      Initials Name Provider Type    Cipriano Corea, OTR/L Occupational Therapist                   Goals/Plan    No documentation.                  Clinical Impression       Row Name 06/04/25 [facility]          Pain Assessment    Pretreatment Pain Rating 0/10 - no pain  -MM     Posttreatment Pain Rating 0/10 - no pain  -MM       Row Name 06/04/25 [facility]          Plan of Care Review    Plan of Care Reviewed With patient  -MM     Progress no change  -MM     Outcome Evaluation OT tx completed. Pt is alert and sitting EOB. Pt is agreeable to therapy with increased time. Pt was sitting EOB pre and post tx. Pt was SBA to independent for sit to stand t/f. Pt was supervision to CGA for  functional mobility bed to BR to romero to bed. Pt was SBA to adjust sock. Pt was independent for toileting task. Pt was independent for liquids to mouth. Continue OT POC.  -MM       Row Name 06/04/25 0833          Therapy Plan Review/Discharge Plan (OT)    Anticipated Discharge Disposition (OT) home with assist  -MM       Row Name 06/04/25 0833          Positioning and Restraints    Pre-Treatment Position in bed  -MM     Post Treatment Position bed  -MM     In Bed notified nsg;sitting EOB;call light within reach;encouraged to call for assist;side rails up x2  -MM               User Key  (r) = Recorded By, (t) = Taken By, (c) = Cosigned By      Initials Name Provider Type    MM Cipriano Patrick, OTR/L Occupational Therapist                   Outcome Measures       Row Name 06/04/25 0833          How much help from another is currently needed...    Putting on and taking off regular lower body clothing? 3  -MM     Bathing (including washing, rinsing, and drying) 3  -MM     Toileting (which includes using toilet bed pan or urinal) 4  -MM     Putting on and taking off regular upper body clothing 4  -MM     Taking care of personal grooming (such as brushing teeth) 4  -MM     Eating meals 4  -MM     AM-PAC 6 Clicks Score (OT) 22  -MM       Row Name 06/04/25 0845          How much help from another person do you currently need...    Turning from your back to your side while in flat bed without using bedrails? 4  -EJ     Moving from lying on back to sitting on the side of a flat bed without bedrails? 4  -EJ     Moving to and from a bed to a chair (including a wheelchair)? 4  -EJ     Standing up from a chair using your arms (e.g., wheelchair, bedside chair)? 4  -EJ     Climbing 3-5 steps with a railing? 3  -EJ     To walk in hospital room? 3  -EJ     AM-PAC 6 Clicks Score (PT) 22  -EJ     Highest Level of Mobility Goal Walk 25 Feet or More-7  -EJ       Row Name 06/04/25 0833          Functional Assessment    Outcome Measure  Options AM-PAC 6 Clicks Daily Activity (OT)  -               User Key  (r) = Recorded By, (t) = Taken By, (c) = Cosigned By      Initials Name Provider Type    MM Cipriano Patrick OTR/L Occupational Therapist    Arabella Galvan, RN Registered Nurse                    Occupational Therapy Education       Title: PT OT SLP Therapies (In Progress)       Topic: Occupational Therapy (In Progress)       Point: ADL training (Done)       Learning Progress Summary            Patient Acceptance, E, VU by MM at 6/4/2025 1151    Acceptance, E, VU by MM at 6/4/2025 0858    Acceptance, E,D, VU,NR by MESSI at 6/2/2025 0952    Comment: Patient educated on safety with self care, pause when standing to gain balance and need to not place legs on hand end of foot board.                      Point: Precautions (Done)       Learning Progress Summary            Patient Acceptance, E, VU by MM at 6/4/2025 1151    Acceptance, E, VU by MM at 6/4/2025 0858    Acceptance, E,D, VU,NR by MESSI at 6/2/2025 0952    Comment: Patient educated on safety with self care, pause when standing to gain balance and need to not place legs on hand end of foot board.                      Point: Body mechanics (Done)       Learning Progress Summary            Patient Acceptance, E, VU by MM at 6/4/2025 1151    Acceptance, E, VU by MM at 6/4/2025 0858    Acceptance, E,D, VU,NR by MESSI at 6/2/2025 0952    Comment: Patient educated on safety with self care, pause when standing to gain balance and need to not place legs on hand end of foot board.                                      User Key       Initials Effective Dates Name Provider Type [facility] 02/28/25 -  Izabel Mcwilliams OTR/L Occupational Therapist OT     07/11/23 -  Cipriano Patrick OTR/L Occupational Therapist OT                  OT Recommendation and Plan     Plan of Care Review  Plan of Care Reviewed With: patient  Progress: no change  Outcome Evaluation: OT tx completed. Pt is alert and  sitting EOB. Pt is agreeable to therapy with increased time. Pt was sitting EOB pre and post tx. Pt was SBA to independent for sit to stand t/f. Pt was supervision to CGA for functional mobility bed to BR to romero to bed. Pt was SBA to adjust sock. Pt was independent for toileting task. Pt was independent for liquids to mouth. Continue OT POC.     Time Calculation:         Time Calculation- OT       Row Name 06/04/25 0833             Time Calculation- OT    OT Start Time 0833  -MM      OT Stop Time 0918  -MM      OT Time Calculation (min) 45 min  -MM      Total Timed Code Minutes- OT 45 minute(s)  -MM         Timed Charges    33899 - OT Therapeutic Activity Minutes 30  -MM      60883 - OT Self Care/Mgmt Minutes 15  -MM         Total Minutes    Timed Charges Total Minutes 45  -MM       Total Minutes 45  -MM                User Key  (r) = Recorded By, (t) = Taken By, (c) = Cosigned By      Initials Name Provider Type    MM Cipriano Patrick, OTR/L Occupational Therapist                  Therapy Charges for Today       Code Description Service Date Service Provider Modifiers Qty    95954844681 HC OT THERAPEUTIC ACT EA 15 MIN 6/4/2025 Cipriano Patrick OTR/L GO 2    54115052557 HC OT SELF CARE/MGMT/TRAIN EA 15 MIN 6/4/2025 Cipriano Patrick OTR/L GO 1                 KEYSHA Rich/INGRIS  6/4/2025

## 2025-06-04 NOTE — PLAN OF CARE
Goal Outcome Evaluation:  Plan of Care Reviewed With: patient  Progress: no change       Pt medicated with prn pain med x1 thus far this. IV's cont in place saline locked. Up with SBA. Voiding. Plan to d/c later today. VSS. Safety maintained.

## 2025-06-04 NOTE — THERAPY DISCHARGE NOTE
Acute Care - Occupational Therapy Discharge Summary  [facility]     Patient Name: Luciano Christiansen  : 1985  MRN: [id number]    Today's Date: 2025                 Admit Date: 2025        OT Recommendation and Plan    Visit Dx:    ICD-10-CM ICD-9-CM   1. Liver failure without hepatic coma, unspecified chronicity  K72.90 572.8   2. Upper GI bleed  K92.2 578.9   3. Impaired functional mobility and activity tolerance [Z74.09]  Z74.09 V49.89   4. Alcoholic cirrhosis of liver with ascites  K70.31 571.2   5. Alcohol intoxication in active alcoholic without complication  F10.220 303.00          Time Calculation- OT       Row Name 25 0833             Time Calculation- OT    OT Start Time 0833  -MM      OT Stop Time 0918  -MM      OT Time Calculation (min) 45 min  -MM      Total Timed Code Minutes- OT 45 minute(s)  -MM         Timed Charges    32682 - OT Therapeutic Activity Minutes 30  -MM      98844 - OT Self Care/Mgmt Minutes 15  -MM         Total Minutes    Timed Charges Total Minutes 45  -MM       Total Minutes 45  -MM                User Key  (r) = Recorded By, (t) = Taken By, (c) = Cosigned By      Initials Name Provider Type    MM Cipriano Patrick, OTR/L Occupational Therapist                       OT Rehab Goals       Row Name 25 1400             Transfer Goal 1 (OT)    Activity/Assistive Device (Transfer Goal 1, OT) sit-to-stand/stand-to-sit;bed-to-chair/chair-to-bed;toilet;shower chair  -LS      [place] Level/Cues Needed (Transfer Goal 1, OT) independent  -LS      Time Frame (Transfer Goal 1, OT) long term goal (LTG);by discharge  -LS      Progress/Outcome (Transfer Goal 1, OT) goal not met  -LS         Dressing Goal 1 (OT)    Activity/Device (Dressing Goal 1, OT) lower body dressing  -LS      [place]/Cues Needed (Dressing Goal 1, OT) supervision required  -LS      Time Frame (Dressing Goal 1, OT) long term goal (LTG);10 days;by discharge  -LS      Progress/Outcome (Dressing Goal  1, OT) goal not met  -LS         Toileting Goal 1 (OT)    Activity/Device (Toileting Goal 1, OT) toileting skills, all;adjust/manage clothing;perform perineal hygiene;commode  -LS      [place] Level/Cues Needed (Toileting Goal 1, OT) independent  -LS      Time Frame (Toileting Goal 1, OT) long term goal (LTG);by discharge  -LS      Progress/Outcome (Toileting Goal 1, OT) goal not met  -LS                User Key  (r) = Recorded By, (t) = Taken By, (c) = Cosigned By      Initials Name Provider Type Discipline    Radha Malave, OTR/L Occupational Therapist OT                     Outcome Measures       Row Name 06/02/25 1500             How much help from another person do you currently need...    Turning from your back to your side while in flat bed without using bedrails? 4  -AH      Moving from lying on back to sitting on the side of a flat bed without bedrails? 4  -AH      Moving to and from a bed to a chair (including a wheelchair)? 4  -AH      Standing up from a chair using your arms (e.g., wheelchair, bedside chair)? 3  -AH      Climbing 3-5 steps with a railing? 3  -AH      To walk in hospital room? 3  -AH      AM-PAC 6 Clicks Score (PT) 21  -         Functional Assessment    Outcome Measure Options AM-PAC 6 Clicks Basic Mobility (PT)  -                User Key  (r) = Recorded By, (t) = Taken By, (c) = Cosigned By      Initials Name Provider Type     Vero Rose, PTA Physical Therapist Assistant                    Timed Therapy Charges  Total Units: 3      Suggested Charges  Total Units: 3      Procedure Name Documented Minutes Units Code    HC OT THERAPEUTIC ACT EA 15 MIN 30 2   13247 (CPT®)      HC OT SELF CARE/MGMT/TRAIN EA 15 MIN 15 1   02536 (CPT®)                 Documented Minutes  Total Minutes: 45      Therapy Provided Minutes    52939 - OT Therapeutic Activity Minutes 30    58517 - OT Self Care/Mgmt Minutes 15                        OT Discharge Summary  Anticipated Discharge  Disposition (OT): home with assist  Reason for Discharge: Discharge from facility  Outcomes Achieved: Refer to plan of care for updates on goals achieved  Discharge Destination: Home with assist      Radha Maier OTR/L  6/4/2025

## 2025-06-04 NOTE — PLAN OF CARE
Problem: Adult Inpatient Plan of Care  Goal: Plan of Care Review  Recent Flowsheet Documentation  Taken 6/4/2025 0833 by Cipriano Patrick, OTR/L  Progress: no change  Outcome Evaluation: OT tx completed. Pt is alert and sitting EOB. Pt is agreeable to therapy with increased time. Pt was sitting EOB pre and post tx. Pt was SBA to independent for sit to stand t/f. Pt was supervision to CGA for functional mobility bed to BR to romero to bed. Pt was SBA to adjust sock. Pt was independent for toileting task. Pt was independent for liquids to mouth. Continue OT POC.

## 2025-06-04 NOTE — THERAPY DISCHARGE NOTE
Acute Care - Physical Therapy Discharge Summary  [facility]       Patient Name: Luciano Christiansen  : 1985  MRN: [id number]    Today's Date: 2025                 Admit Date: 2025      PT Recommendation and Plan    Visit Dx:    ICD-10-CM ICD-9-CM   1. Liver failure without hepatic coma, unspecified chronicity  K72.90 572.8   2. Upper GI bleed  K92.2 578.9   3. Impaired functional mobility and activity tolerance [Z74.09]  Z74.09 V49.89   4. Alcoholic cirrhosis of liver with ascites  K70.31 571.2   5. Alcohol intoxication in active alcoholic without complication  F10.220 303.00        Outcome Measures       Row Name 25 1500             How much help from another person do you currently need...    Turning from your back to your side while in flat bed without using bedrails? 4  -AH      Moving from lying on back to sitting on the side of a flat bed without bedrails? 4  -AH      Moving to and from a bed to a chair (including a wheelchair)? 4  -AH      Standing up from a chair using your arms (e.g., wheelchair, bedside chair)? 3  -AH      Climbing 3-5 steps with a railing? 3  -AH      To walk in hospital room? 3  -AH      AM-PAC 6 Clicks Score (PT) 21  -         Functional Assessment    Outcome Measure Options AM-PAC 6 Clicks Basic Mobility (PT)  -                User Key  (r) = Recorded By, (t) = Taken By, (c) = Cosigned By      Initials Name Provider Type     Vero Rose, PTA Physical Therapist Assistant                         PT Rehab Goals       Row Name 25 1400             Bed Mobility Goal 1 (PT)    Activity/Assistive Device (Bed Mobility Goal 1, PT) rolling to left;rolling to right;bridging;scooting;sit to supine/supine to sit;sidelying to sit/sit to sidelying  -AB      [place] Level/Cues Needed (Bed Mobility Goal 1, PT) standby assist;independent  -AB      Time Frame (Bed Mobility Goal 1, PT) long term goal (LTG);10 days  -AB      Progress/Outcomes (Bed Mobility Goal 1, PT)  goal partially met  -AB         Transfer Goal 1 (PT)    Activity/Assistive Device (Transfer Goal 1, PT) sit-to-stand/stand-to-sit;bed-to-chair/chair-to-bed  -AB      [place] Level/Cues Needed (Transfer Goal 1, PT) standby assist;independent  -AB      Time Frame (Transfer Goal 1, PT) long term goal (LTG);10 days  -AB      Progress/Outcome (Transfer Goal 1, PT) goal partially met  -AB         Gait Training Goal 1 (PT)    Activity/Assistive Device (Gait Training Goal 1, PT) gait (walking locomotion);decrease fall risk;diminish gait deviation;improve balance and speed;increase endurance/gait distance;increase energy conservation  -AB      [place] Level (Gait Training Goal 1, PT) standby assist;independent  -AB      Distance (Gait Training Goal 1, PT) 100 ft  -AB      Time Frame (Gait Training Goal 1, PT) long term goal (LTG);10 days  -AB      Progress/Outcome (Gait Training Goal 1, PT) goal not met  -AB         Stairs Goal 1 (PT)    Activity/Assistive Device (Stairs Goal 1, PT) ascending stairs;descending stairs;step-to-step;decrease fall risk;improve balance and speed;using handrail, right  -AB      [place] Level/Cues Needed (Stairs Goal 1, PT) contact guard required;standby assist  -AB      Number of Stairs (Stairs Goal 1, PT) 4  -AB      Time Frame (Stairs Goal 1, PT) long term goal (LTG);10 days  -AB      Progress/Outcome (Stairs Goal 1, PT) goal not met  -AB         Patient Education Goal (PT)    Activity (Patient Education Goal, PT) HEP for strengthening, breathing ex, energy conservation tech  -AB      [place]/Cues/Accuracy (Memory Goal 2, PT) demonstrates adequately;independent;verbalizes understanding  -AB      Time Frame (Patient Education Goal, PT) long term goal (LTG);10 days  -AB      Progress/Outcome (Patient Education Goal, PT) goal partially met  -AB                User Key  (r) = Recorded By, (t) = Taken By, (c) = Cosigned By      Initials Name Provider Type Discipline    AB Simin,  Carla WHITAKER, PTA Physical Therapist Assistant PT                        PT Discharge Summary  Anticipated Discharge Disposition (PT): home with assist, sub acute care setting  Reason for Discharge: Discharge from facility  Outcomes Achieved: Refer to plan of care for updates on goals achieved  Discharge Destination: Home with assist      Carla Duval, PTA   6/4/2025

## 2025-06-04 NOTE — DISCHARGE SUMMARY
[facility] Medicine Services  DISCHARGE SUMMARY       Date of Admission: 5/28/2025  Date of Discharge:  6/4/2025  Primary Care Physician: Adrien Varghese MD    Presenting Problem/History of Present Illness:  Generalized weakness, difficulty walking, dark stools, hematemesis       Final Discharge Diagnoses:  Cirrhosis  Alcoholic hepatitis  Jaundice  Anemia  Severe esophagitis  Esophageal ulceration  Sepsis  Coagulopathy secondary to liver failure  Alcohol abuse and impending delirium tremens  Thiamine therapy for prophylactic purposes  Questionable medical compliance    Consults:   Intensivist  GI    Procedures Performed:    Upper GI endoscopy    Pertinent Test Results:   Results for orders placed during the hospital encounter of 07/04/24    Adult Transthoracic Echo Complete W/ Cont if Necessary Per Protocol    Interpretation Summary    Left ventricular ejection fraction appears to be 61 - 65%.    Left ventricular diastolic function was normal.    Estimated right ventricular systolic pressure from tricuspid regurgitation is normal (<35 mmHg).      Imaging Results (All)       Procedure Component Value Units Date/Time    XR Chest 1 View [[id number]] Collected: 05/29/25 0612     Updated: 05/29/25 0617    Narrative:      EXAMINATION: XR CHEST 1 VW-        HISTORY: Central line placement; K72.90-Hepatic failure, unspecified  without coma; K92.2-Gastrointestinal hemorrhage, unspecified     A frontal projection of the chest is compared with the previous study  dated 5/28/2025.     The lungs are poorly expanded.     There is mild pulmonary venous congestion.     No pleural effusion. No pneumothorax.     There is moderate cardiomegaly.     A venous access line is seen through the right internal jugular vein.  Distal end is in the mid SVC.     No acute bony abnormality.       Impression:      1. A mild pulmonary venous congestion and cardiomegaly.  2. Central venous line in place.         This report was signed and finalized on 5/29/2025 6:14 AM by Dr. Vj Hsu MD.       US Abdomen Limited [[id number]] Collected: 05/28/25 1408     Updated: 05/28/25 1415    Narrative:      US ABDOMEN LIMITED-     HISTORY: GB, hx of calculus cholecystitis.     COMPARISON: 8/27/2024     FINDINGS: Sonographic imaging of the right upper quadrant is performed.     Proximal IVC appears patent. Limited visualization of the abdominal  aorta. Suboptimal visualization of the pancreas.     Cirrhotic morphology of the liver with scalloped margins. Small volume  perihepatic ascites. No focal liver mass identified. Suboptimal  assessment of the portal venous flow on the study. Multiple shadowing  stones within the lumen of the gallbladder measuring as great as 3.3 cm  with biliary sludge. No significant gallbladder wall thickening,  gallbladder wall measurement 2 mm. Common bile duct measuring 5 to 6 mm,  upper limits of normal.     Right kidney is normal measuring 13.9 cm in length. No right-sided  hydronephrosis.       Impression:      1. Cholelithiasis with biliary sludge. No prominent gallbladder wall  thickening. Common bile duct upper normal at 5 to 6 mm. No discrete  common bile duct stones localized. Suboptimal visualization of the  distal common bile duct and pancreas due to shadowing artifact.  2. Cirrhotic morphology of the liver. Small volume perihepatic ascites.        This report was signed and finalized on 5/28/2025 2:12 PM by Dr. Kassy Blair MD.       CT Abdomen Pelvis With Contrast [[id number]] Collected: 05/28/25 1211     Updated: 05/28/25 1220    Narrative:      EXAM: CT ABDOMEN PELVIS W CONTRAST-     INDICATION: Hx of acute cholecystitis       TECHNIQUE: Helically acquired CT images were obtained of the abdomen and  pelvis after the administration of intravenous contrast. Coronal and  sagittal reformations were performed.         DOSE LENGTH PRODUCT: 2313.54 mGy.cm mGy cm. Automated exposure  control  was also utilized to decrease patient radiation dose.     COMPARISON: 4/19/2025     FINDINGS:     Lower Chest: Mild atelectasis.     Liver: Mildly nodular liver contour with recanalized umbilical vein.     Biliary Tree: Cholelithiasis. As compared to the prior CT there is  decreased gallbladder distention, mild persistent wall thickening.     Spleen: Splenomegaly is redemonstrated.     Pancreas: Unremarkable.     Adrenal Glands: Unremarkable.     Kidneys/Ureters/Bladder: Unremarkable.     Reproductive Organs: Unremarkable.     Gastrointestinal Tract: Unremarkable.      Lymphatics: Unremarkable.     Vasculature: Mild atherosclerosis.     Peritoneum/Retroperitoneum: Small volume ascites and mild mesenteric  edema.     Abdominal Wall/Soft Tissues: Symmetric bilateral gynecomastia. Mild body  wall anasarca.     Osseous Structures: No acute or suspisious findings.        Impression:         1. No definite acute findings in the abdomen/pelvis.  2. Cholelithiasis with persistent gallbladder wall thickening, however  decreased gallbladder distention as compared to the prior study. The  gallbladder wall thickening could be explained by diffuse hepatocellular  disease in the absence of clinical signs of acute cholecystitis.  3. Cirrhosis with sequela portal hypertension, including small volume  ascites.        This report was signed and finalized on 5/28/2025 12:17 PM by Darwin Goldman.       CT Head Without Contrast [[id number]] Collected: 05/28/25 1210     Updated: 05/28/25 1214    Narrative:      Exam: CT HEAD WO CONTRAST-      HISTORY: possible falls.       DOSE LENGTH PRODUCT: 2313.54 mGy.cm mGy cm. Automated exposure control  was also utilized to decrease patient radiation dose.     Technique:  Helically acquired CT of the brain without IV contrast was performed.  Sagittal and coronal reformations are also provided for review. Soft  tissue and bone kernels are available for interpretation.     Comparison:  4/1/2025.     Findings:     Ventricles and extra-axial CSF spaces are normal in size.     No intraparenchymal or extra-axial hemorrhage.     Gray-white matter differentiation is preserved.     Orbits are grossly unremarkable. Paranasal sinuses are grossly clear.  Mastoid air cells are grossly clear.     No suspicious calvarial or extracranial soft tissue abnormality.       Impression:      Impression:       No acute intracranial abnormality.     This report was signed and finalized on 5/28/2025 12:11 PM by Darwin Goldman.       XR Chest 1 View [[id number]] Collected: 05/28/25 1149     Updated: 05/28/25 1208    Narrative:      XR CHEST 1 VW-     HISTORY: Simple Sepsis Protocol     COMPARISON: 4/1/2025     FINDINGS: Frontal view the chest obtained.     Mild prominence of central pulmonary vascular structures with heart  upper limits of normal in size. Linear left basilar densities favoring  atelectasis. No pleural effusion or pneumothorax. No acute regional bony  pathology.       Impression:      1. Borderline prominence of central pulmonary vascular structures for  which mild venous congestion considered.  2. Primarily linear left basilar densities favoring atelectasis over  pneumonia.        This report was signed and finalized on 5/28/2025 12:05 PM by Dr. Kassy Blair MD.             LAB RESULTS:      Lab 06/04/25  0312 06/03/25  0459 06/03/25  0458 06/02/25  0821 06/01/25  0607 05/31/25  0415 05/30/25  0358 05/29/25  1551 05/29/25  1015 05/29/25  0910 05/29/25  0513 05/29/25  0136 05/28/25  2147 05/28/25  1732   WBC 5.72 7.74  --  7.04 4.33 7.30 5.56  --   --   --  7.93   < >  --   --    HEMOGLOBIN 7.7* 8.3*  --  8.3* 7.1* 7.5* 7.1*   < >  --    < > 6.8*   < > 8.1* 8.1*   HEMATOCRIT 23.4* 25.2*  --  25.3* 21.3* 22.3* 21.1*   < >  --    < > 20.1*   < > 25.0* 24.8*   PLATELETS 109* 123*  --  103* 67* 71* 61*  --   --   --  57*   < >  --   --    NEUTROS ABS 4.40 4.48  --  4.45 3.33 5.21 3.91  --   --   --  6.17    < >  --   --    IMMATURE GRANS (ABS)  --  0.33*  --  0.32*  --  0.15* 0.09*  --   --   --  0.08*  --   --   --    LYMPHS ABS  --  1.72  --  1.14  --  1.05 0.87  --   --   --  0.98  --   --   --    MONOS ABS  --  1.05*  --  1.00*  --  0.75 0.51  --   --   --  0.58  --   --   --    EOS ABS 0.06 0.09  --  0.08 0.04 0.10 0.13  --   --   --  0.08   < >  --   --    .7* 101.6*  --  102.4* 100.0* 98.2* 96.8  --   --   --  101.5*   < >  --   --    LACTATE  --   --   --   --   --   --   --   --  4.6*  --   --   --  12.1* 9.6*   PROTIME  --   --  25.5* 25.2* 28.3* 27.5* 27.2*  --   --   --   --   --   --   --    APTT  --   --  57.7* 54.8* 56.9* 55.2* 57.4*  --   --   --   --   --   --   --     < > = values in this interval not displayed.         Lab 06/04/25  0312 06/03/25  1701 06/03/25  0458 06/02/25  0821 06/01/25  2345 06/01/25  1557 06/01/25  0607 05/31/25  0737 05/31/25  0415 05/30/25  1120 05/30/25  0358   SODIUM 139  --  139 136  --   --  137  --  134*  --  134*   POTASSIUM 3.9  3.9 3.6 3.3* 3.7  --  3.7 3.3*   < > 3.8   < > 3.3*   CHLORIDE 109*  --  104 102  --   --  102  --  99  --  98   CO2 21.0*  --  23.0 25.0  --   --  25.0  --  25.0  --  26.0   ANION GAP 9.0  --  12.0 9.0  --   --  10.0  --  10.0  --  10.0   BUN 8.5  --  9.7 9.7  --   --  11.9  --  16.6  --  26.7*   CREATININE 0.23*  --  0.22* <0.17*  --   --  0.25*  --  0.23*  --  0.57*   EGFR 167.2  --  169.4  --   --   --  163.0  --  167.2  --  127.1   GLUCOSE 95  --  81 91  --   --  104*  --  161*  --  170*   CALCIUM 7.8*  --  7.9* 8.0*  --   --  7.3*  --  8.0*  --  7.4*   MAGNESIUM 1.7  --  1.8 2.0  --   --  1.5*  --  1.7  --  1.6   PHOSPHORUS 2.2* 2.3* 2.2* 2.2* 2.1* 2.1* 2.0*   < >  --    < > 2.0*    < > = values in this interval not displayed.         Lab 06/04/25  0312 06/03/25  0458 06/02/25  0821 06/01/25  0607 05/31/25  0415   TOTAL PROTEIN 4.5* 4.8* 4.8* 4.0* 4.3*   ALBUMIN 2.5* 2.8* 2.6* 2.3* 2.6*   GLOBULIN 2.0 2.0 2.2 1.7 1.7   ALT (SGPT)  75* 86* 94* 86* 96*   AST (SGOT) 125* 156* 198* 195* 255*   BILIRUBIN 26.8* 30.3* 31.5* 25.4* 27.0*   ALK PHOS 185* 215* 217* 184* 199*         Lab 06/03/25  0458 06/02/25  0821 06/01/25  0607 05/31/25  0415 05/30/25  0358   PROTIME 25.5* 25.2* 28.3* 27.5* 27.2*   INR 2.18* 2.14* 2.48* 2.38* 2.36*                 Lab 05/29/25  0246   PH, ARTERIAL 7.422   PCO2, ARTERIAL 37.1   PO2 ART 83.7   O2 SATURATION ART 97.2   HCO3 ART 24.1   BASE EXCESS ART -0.3*     Brief Urine Lab Results  (Last result in the past 365 days)        Color   Clarity   Blood   Leuk Est   Nitrite   Protein   CREAT   Urine HCG        05/28/25 1254 Orange   Cloudy   Negative   Small (1+)   Positive   30 mg/dL (1+)                 Microbiology Results (last 10 days)       Procedure Component Value - Date/Time    Blood Culture - Blood, Hand, Left [[id number]]  (Normal) Collected: 05/28/25 1118    Lab Status: Final result Specimen: Blood from Hand, Left Updated: 06/02/25 1130     Blood Culture No growth at 5 days    Blood Culture - Blood, Arm, Right [[id number]]  (Normal) Collected: 05/28/25 1100    Lab Status: Final result Specimen: Blood from Arm, Right Updated: 06/02/25 1116     Blood Culture No growth at 5 days            Hospital Course:   Patient is wanting to go home.  This seems to be reasonable.  I discussed the case with Dr. White.  He likewise feels this is reasonable.  Patient is deeply jaundiced and likely been going on for quite some time.  Dr. White reminded me that we had this patient in the past.  He had mentioned to me that we talked about discriminant factor and use of pentoxifylline versus steroid for alcoholic hepatitis.  As per progress note yesterday by Dr. Sanders:   No steroids due to recent GI bleed, esophageal ulceration, and infection.  Continue lactulose.  Patient to establish care with gastroenterology on discharge and follow-up with transplant hepatology in a tertiary center.  He is at high risk for morbidity and  "mortality in 90 days given his advanced MELD score.     Furthermore patient received course of Zosyn for sepsis and intra-abdominal infection.  He looks clinically stable overall.  Patient expressed that he is at his baseline.  He had no growth on his blood culture.    Summary  Patient is a 40-year-old man who has history of alcoholism, diabetes, gout, hepatitis C, hypertension and jaundice.  He was admitted to intensive care service with complaints of generalized weakness, difficulty walking, hematemesis and melena.  The interested reader is referred to admitting H&P for details surrounding hospitalization    He was admitted with the following provisional diagnoses:   Decompensated liver failure in setting of cirrhosis  Alcoholism with continued alcohol ingestion  GI bleeding  Coagulopathy  Anemia  Lactic acidosis and sepsis.    Seen in consult by:  GI service:    Palliative care: I confirmed that the patient's advanced care plan is present, code status is documented, and a surrogate decision maker is listed in the patient's medical record.    That I am not sure why on current medication patient is not on sucralfate.  Otherwise patient been on twice daily Protonix.  He had been followed by GI service.  Patient should follow-up with GI service with upper GI endoscopy in 4 weeks to check for healing.      Patient is believed to be medically stable for discharge.    Physical Exam on Discharge:  /57 (BP Location: Left arm, Patient Position: Lying)   Pulse 79   Temp 97.6 °F (36.4 °C) (Oral)   Resp 16   Ht 188 cm (74\")   Wt (!) 141 kg (311 lb 3.2 oz)   SpO2 97%   BMI 39.96 kg/m²   Physical Exam  Deeply jaundiced  Obese with BMI of 39.9  No distress  Nonpitting edema present  Truncal obesity makes it difficult to examine for any organomegaly  No cyanosis  Asleep but readily arousable  Conversant and able to interact  Short neck  Lungs are clear to auscultation  S1-S2 regular rate and rhythm  Appropriate " affect  Grossly nonfocal exam.    Condition on Discharge: Stable    Discharge Disposition:  Home or Self Care    Discharge Medications:     Discharge Medications        New Medications        Instructions Start Date   midodrine 5 MG tablet  Commonly known as: PROAMATINE   5 mg, Oral, 3 Times Daily Before Meals      pantoprazole 40 MG EC tablet  Commonly known as: PROTONIX   40 mg, Oral, 2 Times Daily Before Meals      pentoxifylline 400 MG CR tablet  Commonly known as: TRENtal   400 mg, Oral, 3 Times Daily With Meals             Continue These Medications        Instructions Start Date   FLUoxetine 20 MG capsule  Commonly known as: PROzac   20 mg, Oral, Daily      folic acid 1 MG tablet  Commonly known as: FOLVITE   1 mg, Oral, Daily      lactulose 20 GM/30ML solution solution   20 g, Oral, 3 Times Daily      levothyroxine 75 MCG tablet  Commonly known as: Synthroid   75 mcg, Oral, Daily      thiamine 100 MG tablet  Commonly known as: VITAMIN B1   100 mg, Oral, Daily               This patient has current or prior documentation of an left ventricular ejection fraction (LVEF) of less than or equal to 40%.  Not applicable  Results for orders placed during the hospital encounter of 07/04/24    Adult Transthoracic Echo Complete W/ Cont if Necessary Per Protocol    Interpretation Summary    Left ventricular ejection fraction appears to be 61 - 65%.    Left ventricular diastolic function was normal.    Estimated right ventricular systolic pressure from tricuspid regurgitation is normal (<35 mmHg).        Discharge Diet:   Diet Instructions       Diet: Gastrointestinal Diets; Fiber-Restricted; Thin (IDDSI 0)      Discharge Diet: Gastrointestinal Diets    Gastrointestinal Diet: Fiber-Restricted    Fluid Consistency: Thin (IDDSI 0)            Activity at Discharge:   Activity Instructions       Gradually Increase Activity Until at Pre-Hospitalization Level              Follow-up Appointments:   PCP within 1 week  GI per  their recommendation    Future Appointments   Date Time Provider Department Center   8/12/2025  3:45 PM Adrien Varghese MD [facility]       Test Results Pending at Discharge: None    Electronically signed by Roderick Field MD, 06/04/25, 12:20 CDT.    Time: Greater than 30 minutes.

## 2025-06-05 NOTE — OUTREACH NOTE
Prep Survey      Flowsheet Row Responses   [facility] facility patient discharged from? [place]   Is LACE score < 7 ? No   Eligibility Not Eligible   What are the reasons patient is not eligible? Other  [etoh]   Does the patient have one of the following disease processes/diagnoses(primary or secondary)? Other   Prep survey completed? Yes            NOELLE LARRY - Registered Nurse

## 2025-06-10 ENCOUNTER — OFFICE VISIT (OUTPATIENT)
Dept: FAMILY MEDICINE CLINIC | Facility: CLINIC | Age: 40
End: 2025-06-10
Payer: COMMERCIAL

## 2025-06-10 ENCOUNTER — LAB (OUTPATIENT)
Dept: LAB | Facility: HOSPITAL | Age: 40
End: 2025-06-10
Payer: COMMERCIAL

## 2025-06-10 VITALS
BODY MASS INDEX: 40.43 KG/M2 | HEIGHT: 74 IN | RESPIRATION RATE: 16 BRPM | DIASTOLIC BLOOD PRESSURE: 52 MMHG | TEMPERATURE: 98 F | OXYGEN SATURATION: 100 % | HEART RATE: 91 BPM | SYSTOLIC BLOOD PRESSURE: 116 MMHG | WEIGHT: 315 LBS

## 2025-06-10 DIAGNOSIS — Z09 HOSPITAL DISCHARGE FOLLOW-UP: Primary | ICD-10-CM

## 2025-06-10 DIAGNOSIS — K70.31 ALCOHOLIC CIRRHOSIS OF LIVER WITH ASCITES: ICD-10-CM

## 2025-06-10 DIAGNOSIS — K70.10 ALCOHOLIC HEPATITIS WITHOUT ASCITES: ICD-10-CM

## 2025-06-10 LAB
ALBUMIN SERPL-MCNC: 2.7 G/DL (ref 3.5–5)
ALBUMIN/GLOB SERPL: 0.7 G/DL (ref 1.1–2.5)
ALP SERPL-CCNC: 211 U/L (ref 24–120)
ALT SERPL W P-5'-P-CCNC: 61 U/L (ref 0–50)
ANION GAP SERPL CALCULATED.3IONS-SCNC: 10 MMOL/L (ref 4–13)
AST SERPL-CCNC: 77 U/L (ref 7–45)
AUTO MIXED CELLS #: 1.1 10*3/MM3 (ref 0.1–2.6)
AUTO MIXED CELLS %: 10.8 % (ref 0.1–24)
BILIRUB SERPL-MCNC: 24.6 MG/DL (ref 0.1–1)
BUN SERPL-MCNC: 8 MG/DL (ref 5–21)
BUN/CREAT SERPL: 5.3
CALCIUM SPEC-SCNC: 8 MG/DL (ref 8.6–10.5)
CHLORIDE SERPL-SCNC: 105 MMOL/L (ref 98–110)
CO2 SERPL-SCNC: 21 MMOL/L (ref 24–31)
CREAT SERPL-MCNC: 1.5 MG/DL (ref 0.5–1.4)
EGFRCR SERPLBLD CKD-EPI 2021: 60 ML/MIN/1.73
ERYTHROCYTE [DISTWIDTH] IN BLOOD BY AUTOMATED COUNT: 22.9 % (ref 12.3–15.4)
GLOBULIN UR ELPH-MCNC: 3.9 GM/DL
GLUCOSE SERPL-MCNC: 136 MG/DL (ref 65–99)
HCT VFR BLD AUTO: 27.7 % (ref 37.5–51)
HGB BLD-MCNC: 9.1 G/DL (ref 13–17.7)
INR PPP: 2.39 (ref 0.91–1.09)
LYMPHOCYTES # BLD AUTO: 1.2 10*3/MM3 (ref 0.7–3.1)
LYMPHOCYTES NFR BLD AUTO: 11.1 % (ref 19.6–45.3)
MCH RBC QN AUTO: 33.7 PG (ref 26.6–33)
MCHC RBC AUTO-ENTMCNC: 32.9 G/DL (ref 31.5–35.7)
MCV RBC AUTO: 102.6 FL (ref 79–97)
NEUTROPHILS NFR BLD AUTO: 78.1 % (ref 42.7–76)
NEUTROPHILS NFR BLD AUTO: 8.3 10*3/MM3 (ref 1.7–7)
PLATELET # BLD AUTO: 190 10*3/MM3 (ref 140–450)
PMV BLD AUTO: 10.1 FL (ref 6–12)
POTASSIUM SERPL-SCNC: 3.1 MMOL/L (ref 3.5–5.3)
PROT SERPL-MCNC: 6.6 G/DL (ref 6.3–8.7)
PROTHROMBIN TIME: 27.5 SECONDS (ref 11.8–14.8)
RBC # BLD AUTO: 2.7 10*6/MM3 (ref 4.14–5.8)
SODIUM SERPL-SCNC: 136 MMOL/L (ref 135–145)
WBC NRBC COR # BLD AUTO: 10.6 10*3/MM3 (ref 3.4–10.8)

## 2025-06-10 PROCEDURE — 85610 PROTHROMBIN TIME: CPT

## 2025-06-10 PROCEDURE — 36415 COLL VENOUS BLD VENIPUNCTURE: CPT

## 2025-06-10 PROCEDURE — 85025 COMPLETE CBC W/AUTO DIFF WBC: CPT

## 2025-06-10 PROCEDURE — 80053 COMPREHEN METABOLIC PANEL: CPT

## 2025-06-10 RX ORDER — CHLORDIAZEPOXIDE HYDROCHLORIDE 5 MG/1
CAPSULE, GELATIN COATED ORAL
Qty: 42 CAPSULE | Refills: 0 | Status: SHIPPED | OUTPATIENT
Start: 2025-06-10 | End: 2025-07-01

## 2025-06-10 RX ORDER — SUCRALFATE 1 G/1
1 TABLET ORAL 4 TIMES DAILY
Qty: 120 TABLET | Refills: 2 | Status: SHIPPED | OUTPATIENT
Start: 2025-06-10

## 2025-06-10 NOTE — PROGRESS NOTES
Chief Complaint  Transitional Care Management    Subjective    {Problem List  Visit Diagnosis   Encounters  Notes  Medications  Labs  Result Review Imaging  Media :23}    Luciano Christiansen presents to [facility]    HPI    History of Present Illness  The patient presents for evaluation of internal bleeding, anxiety, and blood pressure management.    He reports a significant improvement in his condition since his hospital discharge. He experiences a cough at night but has not noticed any blood in his sputum or stools, which are described as yellowish-brown. He is currently on pentoxifylline, Protonix, and midodrine. He is also taking lactulose and thyroid medication. He is unsure if he is still taking Carafate. He has a scheduled appointment with gastroenterology on 07/28/2025. He received intravenous fluids during his hospital stay and continues to take over-the-counter vitamins, including a multivitamin, vitamin C, calcium, B complex, folic acid, and zinc. He has abstained from alcohol for the past 2 weeks and maintains hydration with Gatorade and water.    He expresses interest in resuming chlordiazepoxide treatment for withdrawal symptoms, as it was beneficial during his last hospital stay. He reports feeling overwhelmed and anxious, with occasional tremors. He is considering seeking external resources for cessation support.    He does not monitor his blood pressure at home. His midodrine dosage has been reduced to half a tablet three times daily due to nausea, which has improved his appetite.    He reports feeling fatigued and weak, with occasional lightheadedness upon standing. He also experiences shortness of breath.    SOCIAL HISTORY  The patient has not consumed alcohol for 2 weeks.    Past Medical History:   Diagnosis Date    Alcoholism     currently drinking    Diabetes mellitus     Gout     Hepatitis C     Hypertension     Jaundice      Past Surgical History:  "  Procedure Laterality Date    ENDOSCOPY N/A 5/28/2025    Procedure: ESOPHAGOGASTRODUODENOSCOPY WITH ANESTHESIA;  Surgeon: Estela Ramos MD;  [facility]: [facility];  Service: Gastroenterology;  Laterality: N/A;  pre op: GI bleed  post op: esophageal ulcer, esophagitis  pcp: Adrien Varghese MD    VASECTOMY       Social History     Socioeconomic History    Marital status: Single   Tobacco Use    Smoking status: Some Days     Current packs/day: 0.25     Average packs/day: 0.5 packs/day for 15.4 years (7.1 ttl pk-yrs)     Types: Cigarettes     Start date: 2010    Smokeless tobacco: Never   Vaping Use    Vaping status: Never Used    Passive vaping exposure: Yes   Substance and Sexual Activity    Alcohol use: Yes     Alcohol/week: 12.0 standard drinks of alcohol     Types: 12 Cans of beer per week     Comment: malt liquor (48 oz daily)    Drug use: Yes     Types: Marijuana    Sexual activity: Yes       Objective   Vital Signs:  /52   Pulse 91   Temp 98 °F (36.7 °C) (Temporal)   Resp 16   Ht 188 cm (74.02\")   Wt (!) 143 kg (315 lb)   SpO2 100%   BMI 40.43 kg/m²   Estimated body mass index is 40.43 kg/m² as calculated from the following:    Height as of this encounter: 188 cm (74.02\").    Weight as of this encounter: 143 kg (315 lb).              Physical Exam   Physical Exam  General: No signs of distress, appears well-hydrated  Neurological: Awake, alert, oriented x4, no focal deficit  Head: Normocephalic, atraumatic  Ears: External ear canals and tympanic membranes intact  Eyes: Pupils equal and round, conjunctivae clear  Nose: Septum midline, nares patent, mucosa normal  Mouth/Throat: Mucous membranes moist, no erythema, no exudate  Neck: Supple, no abnormalities  Respiratory: Clear to auscultation, no wheezing, rales or rhonchi  Cardiovascular: Regular rate and rhythm, no murmurs, rubs, or gallops  Gastrointestinal: Soft, no tenderness, no distention, no masses  Extremities: No edema, no " cyanosis  Musculoskeletal: No joint or muscular abnormalities noted  Skin: No abnormalities, no rashes or lesions    Result Review :{Labs  Result Review  Imaging  Med Tab  Media  Procedures :23}  {The following data was reviewed by (Optional):45841}  {Lab Choices (Optional):59567}  {Data reviewed (Optional):66074:::1}  Results               Assessment and Plan {CC Problem List  Visit Diagnosis   ROS  Review (Popup)  Health Maintenance  Quality  BestPractice  Medications  SmartSets  SnapShot Encounters  Media :23}  Diagnoses and all orders for this visit:    1. Hospital discharge follow-up (Primary)    2. Alcoholic cirrhosis of liver with ascites  -     CBC w AUTO Differential; Future  -     Comprehensive metabolic panel; Future  -     sucralfate (Carafate) 1 g tablet; Take 1 tablet by mouth 4 (Four) Times a Day.  Dispense: 120 tablet; Refill: 2  -     chlordiazePOXIDE (LIBRIUM) 5 MG capsule; Take 3 capsules by mouth Daily As Needed for Anxiety for 7 days, THEN 2 capsules Daily As Needed for 7 days, THEN 1 capsule Daily for 7 days.  Dispense: 42 capsule; Refill: 0  -     Protime-INR; Future    3. Alcoholic hepatitis without ascites  -     CBC w AUTO Differential; Future  -     Comprehensive metabolic panel; Future  -     sucralfate (Carafate) 1 g tablet; Take 1 tablet by mouth 4 (Four) Times a Day.  Dispense: 120 tablet; Refill: 2      Assessment & Plan  1. Internal bleeding.  His blood pressure readings are within normal range today, albeit lower than previous records. He reports no signs of major bleeding such as hematemesis or melena.  Currently on pentoxifylline, Protonix, midodrine, lactulose, and thyroid medication. A prescription for Carafate will be provided to be taken in conjunction with Protonix.  A complete blood count will be ordered to monitor for any potential downward trend.  Advised to maintain adequate hydration without overconsumption of fluids. An earlier appointment with  gastroenterology is recommended.    2. Anxiety.  Reports experiencing anxiety and shakes, particularly in relation to his recent withdrawal from alcohol.  A prescription for chlordiazepoxide will be provided, starting at 15 mg for the first week, then tapering to 10 mg and 5 mg over the subsequent weeks.  Advised to use it as needed during this time. Potential side effects, including sedation and liver impact, were discussed.    3. Blood pressure management.  Currently on midodrine for blood pressure support. Blood pressure readings are stable but lower than previous records.  Advised to continue current regimen without adding diuretics at this time to avoid potential hypotension.  The need for midodrine will be reassessed in the future based on blood pressure stability.    Follow-up  The patient will follow up in August 2025.       {Time Spent (Optional):34386}  EMR Dragon/Transcription disclaimer:   Part of this note may be an electronic transcription/translation of spoken language to printed text using the Dragon Dictation System     Follow Up {Instructions Charge Capture  Follow-up Communications :23}  No follow-ups on file.    {STANLEY [employer] Provider Statement:95351}    Patient was given instructions and counseling regarding his condition or for health maintenance advice. Please see specific information pulled into the AVS if appropriate.

## 2025-06-10 NOTE — PROGRESS NOTES
Transitional Care Follow Up Visit  Subjective     Luciano Christiansen is a 40 y.o. male who presents for a transitional care management visit.    Within 48 business hours after discharge our office contacted him via telephone to coordinate his care and needs.      I reviewed and discussed the details of that call along with the discharge summary, hospital problems, inpatient lab results, inpatient diagnostic studies, and consultation reports with Luciano.     Current outpatient and discharge medications have been reconciled for the patient.  Reviewed by: Adrien Varghese MD          9/4/2024     4:20 AM   Date of TCM Phone Call   [facility]   Date of Admission 8/27/2024   Date of Discharge 9/3/2024   Discharge Disposition Home or Self Care     Risk for Readmission (LACE) Score: 17 (6/4/2025  5:00 AM)      History of Present Illness   Course During Hospital Stay:    Hospital Course:   Patient is wanting to go home.  This seems to be reasonable.  I discussed the case with Dr. White.  He likewise feels this is reasonable.  Patient is deeply jaundiced and likely been going on for quite some time.  Dr. White reminded me that we had this patient in the past.  He had mentioned to me that we talked about discriminant factor and use of pentoxifylline versus steroid for alcoholic hepatitis.  As per progress note yesterday by Dr. Sanders:   No steroids due to recent GI bleed, esophageal ulceration, and infection.  Continue lactulose.  Patient to establish care with gastroenterology on discharge and follow-up with transplant hepatology in a tertiary center.  He is at high risk for morbidity and mortality in 90 days given his advanced MELD score.      Furthermore patient received course of Zosyn for sepsis and intra-abdominal infection.  He looks clinically stable overall.  Patient expressed that he is at his baseline.  He had no growth on his blood culture.     Summary  Patient is a 40-year-old man who has history of  alcoholism, diabetes, gout, hepatitis C, hypertension and jaundice.  He was admitted to intensive care service with complaints of generalized weakness, difficulty walking, hematemesis and melena.  The interested reader is referred to admitting H&P for details surrounding hospitalization     He was admitted with the following provisional diagnoses:   Decompensated liver failure in setting of cirrhosis  Alcoholism with continued alcohol ingestion  GI bleeding  Coagulopathy  Anemia  Lactic acidosis and sepsis.     Seen in consult by:  GI service:    Palliative care: I confirmed that the patient's advanced care plan is present, code status is documented, and a surrogate decision maker is listed in the patient's medical record.     That I am not sure why on current medication patient is not on sucralfate.  Otherwise patient been on twice daily Protonix.  He had been followed by GI service.  Patient should follow-up with GI service with upper GI endoscopy in 4 weeks to check for healing.        Patient is believed to be medically stable for discharge.     The following portions of the patient's history were reviewed and updated as appropriate: allergies, current medications, past family history, past medical history, past social history, past surgical history, and problem list.      INTERVAL:  He reports a significant improvement in his condition since his hospital discharge. He experiences a cough at night but has not noticed any blood in his sputum or stools, which are described as yellowish-brown. He is currently on pentoxifylline, Protonix, and midodrine. He is also taking lactulose and thyroid medication. He is unsure if he is still taking Carafate. He has a scheduled appointment with gastroenterology on 07/28/2025. He received intravenous fluids during his hospital stay and continues to take over-the-counter vitamins, including a multivitamin, vitamin C, calcium, B complex, folic acid, and zinc. He has abstained  "from alcohol for the past 2 weeks and maintains hydration with Gatorade and water.    He expresses interest in resuming chlordiazepoxide treatment for withdrawal symptoms, as it was beneficial in etoh abstinence/anxiety after his last hospital stay. He reports feeling overwhelmed and anxious, with occasional tremors. He is considering seeking external resources for cessation support.    He does not monitor his blood pressure at home. His midodrine dosage has been reduced to half a tablet three times daily due to nausea, which has improved his appetite.    He reports feeling fatigued and weak, with occasional lightheadedness upon standing. He also experiences shortness of breath.        Review of Systems  ROS negative except that which is listed in HPI    Objective   /52   Pulse 91   Temp 98 °F (36.7 °C) (Temporal)   Resp 16   Ht 188 cm (74.02\")   Wt (!) 143 kg (315 lb)   SpO2 100%   BMI 40.43 kg/m²   Physical Exam  Vitals reviewed.   Constitutional:       Appearance: Normal appearance.   HENT:      Head: Normocephalic.      Nose: Nose normal.      Mouth/Throat:      Mouth: Mucous membranes are moist.   Eyes:      General: Scleral icterus present.      Extraocular Movements: Extraocular movements intact.   Cardiovascular:      Rate and Rhythm: Normal rate and regular rhythm.      Heart sounds: Normal heart sounds.   Pulmonary:      Effort: Pulmonary effort is normal.      Breath sounds: Normal breath sounds.   Musculoskeletal:         General: Normal range of motion.      Cervical back: Normal range of motion.   Skin:     General: Skin is warm and dry.      Coloration: Skin is jaundiced.      Comments: Generalized anasarca. No significant change in abdominal circumference noted. No abdominal pain.   Neurological:      General: No focal deficit present.      Mental Status: He is alert and oriented to person, place, and time.   Psychiatric:         Mood and Affect: Mood normal.         Behavior: Behavior " normal.         Assessment & Plan   Diagnoses and all orders for this visit:    1. Hospital discharge follow-up (Primary)  - Hemodynamically stable, no signs of infection or continued bleeding clinically.  Will check blood counts and metabolic panel.  Patient has GI appointment, offered patient to call them to see if he could get in sooner.  Patient currently abstinent from alcohol for 2 weeks, I encouraged him on his efforts.  I discussed this in detail with patient and his mother importance of etoh abstinence going forward. Stay on midodrine for now. Discussed adequate fluid intake. Will hold off on resuming diuretics for now. Continue ulcer prophylaxis with PPI and carafate. Will send tapered librium course to help w/ anxiety/tremors related to previous etoh use. He states will not take with etoh concurrently.    2. Alcoholic cirrhosis of liver with ascites  -     CBC w AUTO Differential; Future  -     Comprehensive metabolic panel; Future  -     sucralfate (Carafate) 1 g tablet; Take 1 tablet by mouth 4 (Four) Times a Day.  Dispense: 120 tablet; Refill: 2  -     chlordiazePOXIDE (LIBRIUM) 5 MG capsule; Take 3 capsules by mouth Daily As Needed for Anxiety for 7 days, THEN 2 capsules Daily As Needed for 7 days, THEN 1 capsule Daily for 7 days.  Dispense: 42 capsule; Refill: 0  -     Protime-INR; Future    3. Alcoholic hepatitis without ascites  -     CBC w AUTO Differential; Future  -     Comprehensive metabolic panel; Future  -     sucralfate (Carafate) 1 g tablet; Take 1 tablet by mouth 4 (Four) Times a Day.  Dispense: 120 tablet; Refill: 2

## 2025-06-23 PROBLEM — N39.0 ACUTE UTI (URINARY TRACT INFECTION): Status: ACTIVE | Noted: 2025-06-23

## 2025-06-23 PROBLEM — K70.30 ALCOHOLIC CIRRHOSIS: Status: ACTIVE | Noted: 2025-01-01

## 2025-06-23 PROBLEM — I10 PRIMARY HYPERTENSION: Status: RESOLVED | Noted: 2022-08-17 | Resolved: 2025-06-23

## 2025-06-23 PROBLEM — Z86.19 HISTORY OF HEPATITIS C: Status: ACTIVE | Noted: 2025-01-01

## 2025-06-23 NOTE — PROGRESS NOTES
Psychiatric Clinical Pharmacy Services: Ampicillin Consult  Luciano Christiansen is a 40 y.o. male who has been consulted to dose Ampicillin for Urinary Tract Infection (pyelonephritis/complicated UTI).    Current Antimicrobial Therapy:  Pharmacy to Dose: Ampicillin    Relevant clinical data and objective history reviewed:  Wt: 136 kg (300 lb); BMI: Body mass index is 37.5 kg/m².    Temp Readings from Last 1 Encounters:   06/23/25 97.6 °F (36.4 °C) (Oral)        Estimated Creatinine Clearance: 205.4 mL/min (A) (by C-G formula based on SCr of 0.71 mg/dL (L)).    Results from last 7 days   Lab Units 06/23/25  1007   CREATININE mg/dL 0.71*   WBC 10*3/mm3 12.72*       Microbiology Culture Results:  Culture results from last 30 days   Lab 05/28/25  1118 05/28/25  1100   BLOODCX No growth at 5 days No growth at 5 days       Assessment/Plan:  Initiate Ampicillin 2000 mg IV every 6 hours for a duration of 5 days  Pharmacy will continue to monitor renal function, clinical status and culture results and adjust the dose and/or frequency as needed.    Danelle Parra, PharmD  6/23/2025  17:41 CDT

## 2025-06-23 NOTE — Clinical Note
Level of Care: Telemetry [5]  Diagnosis: Hepatic encephalopathy [572.2.ICD-9-CM]  Admitting Physician: LUIS CORTEZ [[id number]]  Attending Physician: LUIS CORTEZ [[id number]]  Certification: I Certify That Inpatient Hospital Services Are Medica lly Necessary For Greater Than 2 Midnights

## 2025-06-23 NOTE — ED PROVIDER NOTES
Subjective   History of Present Illness  Patient is a 40-year-old whose got history of alcoholic cirrhosis came to the ED for evaluation of confusion mother has been giving him lactulose the patient is awake and follows commands.  But appears confused and lethargic.  I have discussed with the mother regarding the patient extreme bad prognosis.  He just stopped drinking 3 to 4 weeks ago.  No melena no fever    History provided by:  Caregiver and parent  History limited by:  Mental status change  Altered Mental Status  Presenting symptoms: confusion and lethargy    Severity:  Moderate  Most recent episode:  2 days ago  Episode history:  Single  Timing:  Constant  Progression:  Worsening  Chronicity:  Recurrent  Context: alcohol use    Context: not head injury, not homeless, not nursing home resident, not recent change in medication, not recent illness and not recent infection    Associated symptoms: decreased appetite, nausea and weakness    Associated symptoms: no abdominal pain, normal movement, no fever, no hallucinations, no seizures, no slurred speech and no visual change        Review of Systems   Unable to perform ROS: Mental status change   Constitutional:  Positive for decreased appetite. Negative for fever.   Gastrointestinal:  Positive for nausea. Negative for abdominal pain.   Neurological:  Positive for weakness. Negative for seizures.   Psychiatric/Behavioral:  Positive for confusion. Negative for hallucinations.        Past Medical History:   Diagnosis Date    Alcoholism     currently drinking    Diabetes mellitus     Gout     Hepatitis C     Hypertension     Jaundice        No Known Allergies    Past Surgical History:   Procedure Laterality Date    ENDOSCOPY N/A 5/28/2025    Procedure: ESOPHAGOGASTRODUODENOSCOPY WITH ANESTHESIA;  Surgeon: Estela Ramos MD;  Location: [facility];  Service: Gastroenterology;  Laterality: N/A;  pre op: GI bleed  post op: esophageal ulcer, esophagitis  pcp: Adrien Varghese MD     VASECTOMY         History reviewed. No pertinent family history.    Social History     Socioeconomic History    Marital status: Single   Tobacco Use    Smoking status: Some Days     Current packs/day: 0.25     Average packs/day: 0.5 packs/day for 15.5 years (7.1 ttl pk-yrs)     Types: Cigarettes     Start date: 2010    Smokeless tobacco: Never   Vaping Use    Vaping status: Never Used    Passive vaping exposure: Yes   Substance and Sexual Activity    Alcohol use: Yes     Alcohol/week: 12.0 standard drinks of alcohol     Types: 12 Cans of beer per week     Comment: malt liquor (48 oz daily)    Drug use: Yes     Types: Marijuana    Sexual activity: Yes           Objective   Physical Exam  Vitals and nursing note reviewed. Exam conducted with a chaperone present.   Constitutional:       Appearance: He is well-developed. He is morbidly obese. He is ill-appearing.      Comments: Patient is jaundiced   HENT:      Head: Normocephalic and atraumatic.   Eyes:      General: Lids are normal. Scleral icterus present.      Conjunctiva/sclera: Conjunctivae normal.      Pupils: Pupils are equal, round, and reactive to light.   Cardiovascular:      Rate and Rhythm: Normal rate and regular rhythm.      Chest Wall: PMI is not displaced.      Pulses: Normal pulses.      Heart sounds: Normal heart sounds.      No systolic murmur is present.   Pulmonary:      Effort: Pulmonary effort is normal. Tachypnea present.      Breath sounds: Examination of the right-lower field reveals decreased breath sounds. Examination of the left-lower field reveals decreased breath sounds. Decreased breath sounds present.   Abdominal:      General: Abdomen is protuberant. Bowel sounds are decreased.      Palpations: Abdomen is soft. There is shifting dullness and fluid wave.      Tenderness: There is generalized abdominal tenderness. There is no guarding or rebound.   Musculoskeletal:         General: Normal range of motion.      Cervical back: Full  passive range of motion without pain, normal range of motion and neck supple.      Right lower leg: 3+ Edema present.      Left lower leg: 3+ Edema present.   Skin:     General: Skin is warm and dry.      Capillary Refill: Capillary refill takes more than 3 seconds.      Coloration: Skin is mottled and sallow.   Neurological:      General: No focal deficit present.      Mental Status: He is easily aroused. He is lethargic, disoriented and confused.      GCS: GCS eye subscore is 4. GCS verbal subscore is 4. GCS motor subscore is 6.      Cranial Nerves: Cranial nerves 2-12 are intact. No cranial nerve deficit, dysarthria or facial asymmetry.      Motor: Motor function is intact. No weakness or tremor.      Comments: Moving all 4 extremities asterixis   Psychiatric:         Behavior: Behavior is cooperative.         Paracentesis Without Radiology Bedside    Date/Time: 6/23/2025 2:09 PM    Performed by: Lorenzo Quiñonez MD  Authorized by: Lorenzo Quiñonez MD    Consent:     Consent obtained:  Written    Consent given by:  Patient    Risks, benefits, and alternatives were discussed: yes      Risks discussed:  Pain, infection, bowel perforation and bleeding    Alternatives discussed:  Delayed treatment  Universal protocol:     Procedure explained and questions answered to patient or proxy's satisfaction: yes      Immediately prior to procedure, a time out was called: yes      Patient identity confirmed:  Hospital-assigned identification number  Pre-procedure details:     Procedure purpose:  Diagnostic  Anesthesia:     Anesthesia method:  Local infiltration    Local anesthetic:  Lidocaine 1% w/o epi  Procedure details:     Needle gauge:  18    Ultrasound guidance: yes      Puncture site:  R lower quadrant    Fluid removed amount:  35 mL    Fluid appearance:  Yellow    Dressing:  Adhesive bandage  Post-procedure details:     Procedure completion:  Tolerated             ED Course  ED Course as of 06/23/25 1649   Mon Jun 23, 2025    1055 Normal sinus rhythm with right bundle branch block [TS]   1408 Discussed with Dr. Matson who came and saw the patient the patient will be admitted to the medicine service I did a paracentesis diagnostic on him. [TS]   1409 Patient with hepatic encephalopathy drinking alcohol also has benzodiazepine and barbiturates in the urine.  Mild UTI was given IV antibiotics paracentesis performed does not appear to be SBP pending lab reports.  Potassium was 2.9 which was replaced.  Has RTA with a bilirubin of 33.8 and elevated liver enzymes and INR 2.6. [TS]   1410 Guarded prognosis of the patient has been explained to the patient's mother.  Patient will be admitted to medicine service [TS]   1410 MELD score is 30   52.6% mortality [TS]   1413 Patient does not have any melena or hematemesis or hematochezia mom states that he just having yellow stool. [TS]   1416 Lactate of 4.7 is not secondary to infectious process is probably related to his underlying liver disease. [TS]   1418 Patient would not give any fluid bolus as he already has ascites and anasarca and fluid overloaded. [TS]   1418 Normally congestion seen on chest x-ray related to his underlying liver disease. [TS]   1418 The area of density in the zoltan but there is no clinical evidence of stroke he is speaking moving all 4 extremities.  If warranted further testing can be done later but I do not see any acute hemorrhage subdural hematomas etc. on this patient. [TS]   1441 GI had seen the patient and we have admitted the patient to the medicine service the medicine service at [facility] the patient in the ED and concerned about his condition with not having hepatologist available.  In the wanted the patient to be transferred to higher level of care.  They also discussed with gastroenterology again and after mutual discussion recommendation now is to transfer the patient the patient has been in the ED has not been admitted yet.  Will call tertiary care facility try  to get hold of his mother. [TS]   1445 Called the patient's mother and updated her on the patient's guarded and poor prognosis she wanted know what was wrong with the patient and I had already explained that when she was here with her son regarding the patient's prognosis and medical condition we went over that again.  Will start calling [facility] for transfer. [TS]   1508 As stated earlier GI had come and saw the patient in the ED and the initial plan was that maybe the patient can be admitted over here.  After discussed this with the hospitalist service and with another GI specialist in the hospital the final recommendation was that the patient is to be transferred to a higher level of care where he can be seen by hematology and other services which we will not be able to provide over here. [TS]   1509 Discussed with [facility] they are trying to see if they have a availability [TS]   1641 Patient does not have any significant leukocytes in his acetic fluid [TS]   1642 I had called [facility] because of lack of hematology service finally got a call back from them and Dr.Joseph Mabry with hepatology/transplant service in [facility] discussed this case and the patient as per his recommendations the patient has a history of alcohol use and had relapsed and had been abusing alcohol after the last transfer to a tertiary care facility therefore in order to be a candidate for transplant he will have to have a full alcohol rehabitation before he is even considered for transplantation having said that the recommendations from hematology are to monitor his fluid status improved with encephalopathy and then refer him for alcohol rehab and then maybe he could be a transplant candidate.  They did realize his mortality is very high.  But in their opinion as per their transplant criteria he would not meet the transplant criteria and therefore they will not be able to do anything else for  him that cannot be done in our hospital.  They also stated that they are at capacity and therefore transferring somebody who will not be a candidate for transplant will not be feasible.  I did discuss whether I should call other tertiary care facilities or will that be a futile effort since the patient is not a transplant candidate obviously DR Mabry is not aware of the exact criteria that other transplant facilities have but overall the likelihood of patient being a transplant candidate is very low  I have discussed this case with Dr. Nic Boateng.  I offered calling other transplant services but Dr. Boateng has graciously accepted the patient to his service. [TS]   1648 I have called the patient's mother on the phone and once again reiterated the very poor prognosis is a high mortality rate the patient has I have discussed my discussion with the [place] transplant service the patient will be admitted to our hospital. [TS]      ED Course User Index  [TS] Lorenzo Quiñonez MD                                Total (NIH Stroke Scale): 10                      Medical Decision Making  Differential Diagnosis:  I considered toxic-metabolic etiology, hypoglycemia, hyperglycemia, diabetic ketoacidosis, drug overdose, ethanol intoxication, thiamine deficiency, hypothermia, hyponatremia, hypernatremia, organ failure, liver failure, kidney failure, thyroid failure, adrenal failure, hypoxia, hypercarbia, ischemic stroke, intracranial bleed, subarachnoid hemorrhage, closed head injury, subdural hematoma, seizure activity, syncopal episode, infectious etiology, hypertensive encephalopathy, vasculitis, thrombotic thrombocytopenic purpura and disseminated intravascular coagulation as a possible cause of altered mental status in this patient. This is a partial list of diagnoses considered.             Problems Addressed:  Alcoholic cirrhosis of liver with ascites: chronic illness or injury  Anemia, unspecified type: chronic illness  or injury  Hepatic encephalopathy: chronic illness or injury with exacerbation, progression, or side effects of treatment  Hypokalemia: complicated acute illness or injury  Jaundice: chronic illness or injury    Amount and/or Complexity of Data Reviewed  Labs: ordered.     Details: Labs  Radiology: ordered.     Details: .  X-rays reviewed  ECG/medicine tests: ordered.  Discussion of management or test interpretation with external provider(s): Discussed with Dr. Matson who came and saw the patient in the ED.  And with the hospitalist SOFÍA.    Risk  Prescription drug management.  Decision regarding hospitalization.  Risk Details: Patient overall has a guarded prognosis he understands and follows commands and knows the hospital that he is and does not remember the date as well but remembers a month.  Is able to push himself up in the bed on my asking him.  Still moving all 4 extremities.  But is encephalopathic confused with possible polypharmacy.  Will be admitted with IV antibiotics.  Some lactulose and other interventions as recommended by GI.        Final diagnoses:   Hepatic encephalopathy   Alcoholic cirrhosis of liver with ascites   Hypokalemia   Anemia, unspecified type   Jaundice       ED Disposition  ED Disposition       ED Disposition   Decision to Admit    Condition   --    Comment   Level of Care: Telemetry [5]   Diagnosis: Hepatic encephalopathy [572.2.ICD-9-CM]   Admitting Physician: LUIS CORTEZ [[id number]]   Attending Physician: LUIS CORTEZ [[id number]]   Certification: I Certify That Inpatient Hospital Services Are Medically Necessary For Greater Than 2 Midnights                 No follow-up provider specified.       Medication List      No changes were made to your prescriptions during this visit.            Lorenzo Quiñonez MD  06/23/25 1037       Lorenzo Quiñonez MD  06/23/25 1424       Lorenzo Quiñonez MD  06/23/25 3620

## 2025-06-23 NOTE — H&P
[facility] Medicine Services  HISTORY AND PHYSICAL    Date of Admission: 6/23/2025  Primary Care Physician: Adrien Varghese MD    Subjective   Primary Historian: Patient and his mother    Chief Complaint: Significant confusion    Altered Mental Status  Symptoms include congestion, fatigue and weakness.      Luciano Christiansen is a 40-year-old male with past medical history significant for alcoholism, cirrhosis, diabetes mellitus, gout, hep C positive, significant jaundice and hypertension.  Presented to [facility] emergency department 6/23/2025 with his mother due to significant increase in confusion.  She states she has been giving him lactulose when he wakes up and follows commands however he is becoming more lethargic and more confused and she was unable to give him any today.  He did recently stop drinking approximately 3-4 weeks ago.  Patient does not have any melena or hematemesis or hematochezia, mother has stated that he has just become more jaundice and his stool has become yellow.  No fluid bolus was initiated due to extreme ascites and anasarca. Current MELD score is 30 with a 52.6% mortality.   Bedside paracentesis performed per ED.Dr. Matson, gastroenterology came and evaluated the patient and recommended either transfer to tertiary facility.  Case was discussed with Dr. Mabry with hepatology/transplant service at [facility] who stated that in order for patient to be transferred to tertiary care facility he would have to be in full alcohol rehabilitation before he is even considered for transplant having said that the recommendations from hepatology are to monitor his fluid status improved with encephalopathy and then refer him to alcohol rehab and then maybe he could be a transplant candidate in the future.  They did realize her and his mortality is very high but in their opinion as per their transport criteria he would not meet the transplant criteria and therefore  they will not be able to do anything else for him that cannot be done in our hospital.  Case was again discussed with Dr. Matson, gastroenterology who will consult and assist in supportive care and possible palliative measures with the hospitalist team.    Workup revealed K2.9, CO2 17.0, anion gap 16.0, BUN 14, CR 0.71, , Ca 8.5, alkaline phosphatase 221, albumin 2.7, , ALT 61, total bilirubin 33.6, ammonia 149, lactate 4.7 post paracentesis 2.6, INR 2.60, WBC 12.72 with a left shift and no bandemia, Hb 9.6, HCT 29.2, , urinalysis positive for nitrates, moderate leukocytes 4+ bacteria, BC and blood culture fluid pending.    Due to previous positive culture with Enterococcus faecalis in April of this year will initiate ampicillin per susceptibility and extensive discussion with pharmacist.    Review of Systems   Constitutional:  Positive for activity change, appetite change and fatigue.   HENT:  Positive for congestion.    Cardiovascular:  Positive for leg swelling.   Gastrointestinal:  Positive for abdominal distention.   Skin:  Positive for color change.   Neurological:  Positive for dizziness and weakness.   Psychiatric/Behavioral:  Positive for confusion.       Otherwise complete ROS reviewed and negative except as mentioned in the HPI.    Past Medical History:   Past Medical History:   Diagnosis Date    Alcoholism     currently drinking    Diabetes mellitus     Gout     Hepatitis C     Hypertension     Jaundice      Past Surgical History:  Past Surgical History:   Procedure Laterality Date    ENDOSCOPY N/A 5/28/2025    Procedure: ESOPHAGOGASTRODUODENOSCOPY WITH ANESTHESIA;  Surgeon: Estela Ramos MD;  Location: [facility];  Service: Gastroenterology;  Laterality: N/A;  pre op: GI bleed  post op: esophageal ulcer, esophagitis  pcp: Adrien Varghese MD    VASECTOMY       Social History:  reports that he has been smoking cigarettes. He started smoking about 15 years ago. He has a 7.1 pack-year  smoking history. He has never used smokeless tobacco. He reports current alcohol use of about 12.0 standard drinks of alcohol per week. He reports current drug use. Drug: Marijuana.    Family History: family history is not on file.       Allergies:  No Known Allergies    Medications:  Prior to Admission medications    Medication Sig Start Date End Date Taking? Authorizing Provider   calcium carbonate (OS-MAGGIE) 600 MG tablet Take 1 tablet by mouth Daily.   Yes Makenna Dukes MD   FLUoxetine (PROzac) 20 MG capsule Take 1 capsule by mouth Daily. 5/2/25  Yes Adrien Varghese MD   folic acid (FOLVITE) 1 MG tablet Take 1 tablet by mouth Daily. 6/4/25  Yes Roderick Field MD   lactulose 20 GM/30ML solution solution Take 30 mL by mouth 3 (Three) Times a Day. 6/4/25  Yes Roderick Field MD   levothyroxine (Synthroid) 75 MCG tablet Take 1 tablet by mouth Daily. 5/2/25  Yes Adrien Varghese MD   midodrine (PROAMATINE) 5 MG tablet Take 1 tablet by mouth 3 (Three) Times a Day Before Meals.  Patient taking differently: Take 0.5 tablets by mouth 3 (Three) Times a Day Before Meals. 6/4/25  Yes Roderick Field MD   multivitamin with minerals (MULTIVITAMIN ADULT PO) Take 1 tablet by mouth Daily.   Yes Makenna Dukes MD   pantoprazole (PROTONIX) 40 MG EC tablet Take 1 tablet by mouth 2 (Two) Times a Day Before Meals. 6/4/25  Yes Roderick Field MD   pentoxifylline (TRENtal) 400 MG CR tablet Take 1 tablet by mouth 3 (Three) Times a Day With Meals for 74 doses.  Patient taking differently: Take 0.5 tablets by mouth 3 (Three) Times a Day With Meals. 6/4/25 6/29/25 Yes Roderick Field MD   sucralfate (Carafate) 1 g tablet Take 1 tablet by mouth 4 (Four) Times a Day. 6/10/25  Yes Adrien Varghese MD   thiamine (VITAMIN B1) 100 MG tablet Take 1 tablet by mouth Daily. 5/2/25  Yes Adrien Varghese MD   vitamin C (ASCORBIC ACID) 250 MG tablet Take 1 tablet by mouth Daily.   Yes Provider,  "MD Makenna   chlordiazePOXIDE (LIBRIUM) 5 MG capsule Take 3 capsules by mouth Daily As Needed for Anxiety for 7 days, THEN 2 capsules Daily As Needed for 7 days, THEN 1 capsule Daily for 7 days. 6/10/25 7/1/25  Adrien Varghese MD     I have utilized all available immediate resources to obtain, update, or review the patient's current medications (including all prescriptions, over-the-counter products, herbals, cannabis/cannabidiol products, and vitamin/mineral/dietary (nutritional) supplements).    Results for orders placed during the hospital encounter of 07/04/24    Adult Transthoracic Echo Complete W/ Cont if Necessary Per Protocol    Interpretation Summary    Left ventricular ejection fraction appears to be 61 - 65%.    Left ventricular diastolic function was normal.    Estimated right ventricular systolic pressure from tricuspid regurgitation is normal (<35 mmHg).       Objective     Vital Signs: /62   Pulse 74   Temp 97.6 °F (36.4 °C) (Oral)   Resp 20   Ht 190.5 cm (75\")   Wt 136 kg (300 lb)   SpO2 99%   BMI 37.50 kg/m²   Physical Exam  Vitals and nursing note reviewed.   Constitutional:       General: He is in acute distress.      Appearance: He is obese. He is ill-appearing and toxic-appearing.      Comments: Room air   HENT:      Head: Normocephalic and atraumatic.      Right Ear: Tympanic membrane normal.      Left Ear: Tympanic membrane normal.      Nose: Congestion present.      Mouth/Throat:      Mouth: Mucous membranes are dry.   Eyes:      General: Scleral icterus present.      Conjunctiva/sclera:      Right eye: Right conjunctiva is injected.      Left eye: Left conjunctiva is injected.   Neck:      Vascular: JVD present.   Cardiovascular:      Rate and Rhythm: Normal rate. Rhythm irregular.   Pulmonary:      Effort: Accessory muscle usage present. No tachypnea or respiratory distress.      Breath sounds: Examination of the right-upper field reveals rales. Examination of the left-upper " field reveals rales. Examination of the right-middle field reveals decreased breath sounds. Examination of the left-middle field reveals decreased breath sounds. Examination of the right-lower field reveals decreased breath sounds. Examination of the left-lower field reveals decreased breath sounds. Decreased breath sounds and rales present.   Abdominal:      General: Bowel sounds are decreased. There is distension.      Palpations: There is fluid wave.      Tenderness: There is generalized abdominal tenderness.   Musculoskeletal:      Cervical back: Neck supple. No rigidity or tenderness.      Right lower le+ Edema present.      Left lower le+ Edema present.   Skin:     General: Skin is cool.      Coloration: Skin is sallow. Skin is not jaundiced.   Neurological:      Mental Status: He is lethargic and disoriented.      Cranial Nerves: Cranial nerves 2-12 are intact.      Motor: Weakness present.   Psychiatric:         Attention and Perception: He is inattentive.         Mood and Affect: Affect is flat.         Speech: Speech is slurred. Speech is not delayed.         Behavior: Behavior is withdrawn.         Cognition and Memory: Cognition is impaired. Memory is impaired.        Results Reviewed:  Lab Results (last 24 hours)       Procedure Component Value Units Date/Time    Body Fluid Cell Count With Differential - Body Fluid, Peritoneum [[id number]] Collected: 25 1403    Specimen: Body Fluid from Peritoneum Updated: 25 1524            Body fluid differential - Body Fluid, Peritoneum [[id number]] Collected: 25 1403    Specimen: Body Fluid from Peritoneum Updated: 25 1524     Neutrophils, Fluid % 11 %      Lymphocytes, Fluid % 11 %      Monocytes, Fluid % 73 %      Mesothelial Cells, Fluid % 1 %      Macrophage, Fluid % 4 %     STAT Lactic Acid, Reflex [[id number]]  (Abnormal) Collected: 25 1417    Specimen: Blood Updated: 25 1509     Lactate 2.6 mmol/L     Body fluid cell  count - Body Fluid, Peritoneum [[id number]] Collected: 06/23/25 1403    Specimen: Body Fluid from Peritoneum Updated: 06/23/25 1426     Color, Fluid Yellow     Appearance, Fluid Clear     RBC, Fluid 1,000 /mm3      Nucleated Cells, Fluid 57 /mm3      Method: Automated Sysmex XN Method    Albumin, Fluid - Body Fluid, Peritoneum [[id number]] Collected: 06/23/25 1403    Specimen: Body Fluid from Peritoneum Updated: 06/23/25 1410    Body Fluid Culture - Body Fluid, Peritoneum [[id number]] Collected: 06/23/25 1403    Specimen: Body Fluid from Peritoneum Updated: 06/23/25 1410    Urinalysis With Culture If Indicated - Urine, Clean Catch [[id number]]  (Abnormal) Collected: 06/23/25 1118    Specimen: Urine, Clean Catch Updated: 06/23/25 1155     Color, UA [place]     Appearance, UA Cloudy     pH, UA 5.5     Specific Gravity, UA 1.018     Glucose, UA Negative     Ketones, UA Negative     Bilirubin, UA Large (3+)     Blood, UA Negative     Protein, UA 30 mg/dL (1+)     Leuk Esterase, UA Moderate (2+)     Nitrite, UA Positive     Urobilinogen, UA 0.2 E.U./dL            Urinalysis, Microscopic Only - Urine, Clean Catch [[id number]]  (Abnormal) Collected: 06/23/25 1118    Specimen: Urine, Clean Catch Updated: 06/23/25 1154     RBC, UA 0-2 /HPF      WBC, UA 3-5 /HPF      Comment: Urine culture not indicated.        Bacteria, UA 3+ /HPF      Squamous Epithelial Cells, UA 0-2 /HPF      Hyaline Casts, UA 3-6 /LPF      Mucus, UA Trace /HPF      Methodology Manual Light Microscopy    Urine Drug Screen - Urine, Clean Catch [[id number]]  (Abnormal) Collected: 06/23/25 1118    Specimen: Urine, Clean Catch Updated: 06/23/25 1148     THC, Screen, Urine Negative     Phencyclidine (PCP), Urine Negative     Cocaine Screen, Urine Negative     Methamphetamine, Ur Negative     Opiate Screen Negative     Amphetamine Screen, Urine Negative     Benzodiazepine Screen, Urine Positive     Tricyclic Antidepressants Screen Negative     Methadone Screen, Urine  Negative     Barbiturates Screen, Urine Positive     Oxycodone Screen, Urine Negative     Buprenorphine, Screen, Urine Negative            Ammonia [[id number]]  (Abnormal) Collected: 06/23/25 1007    Specimen: Blood Updated: 06/23/25 1111     Ammonia 149 umol/L     Blood Culture - Blood, Arm, Right [[id number]] Collected: 06/23/25 1000    Specimen: Blood from Arm, Right Updated: 06/23/25 1100    Blood Culture - Blood, Arm, Left [[id number]] Collected: 06/23/25 1007    Specimen: Blood from Arm, Left Updated: 06/23/25 1100    Comprehensive Metabolic Panel [[id number]]  (Abnormal) Collected: 06/23/25 1007    Specimen: Blood from Arm, Right Updated: 06/23/25 1056     Glucose 151 mg/dL      BUN 14.1 mg/dL      Creatinine 0.71 mg/dL      Sodium 138 mmol/L      Potassium 2.9 mmol/L      Chloride 105 mmol/L      CO2 17.0 mmol/L      Calcium 8.5 mg/dL      Total Protein 5.2 g/dL      Albumin 2.7 g/dL      ALT (SGPT) 61 U/L      AST (SGOT) 133 U/L      Alkaline Phosphatase 221 U/L      Total Bilirubin 33.6 mg/dL      Globulin 2.5 gm/dL      A/G Ratio 1.1 g/dL      BUN/Creatinine Ratio 19.9     Anion Gap 16.0 mmol/L      eGFR 118.9 mL/min/1.73             Lactic Acid, Plasma [[id number]]  (Abnormal) Collected: 06/23/25 1007    Specimen: Blood from Arm, Right Updated: 06/23/25 1055     Lactate 4.7 mmol/L     Magnesium [[id number]]  (Normal) Collected: 06/23/25 1007    Specimen: Blood from Arm, Right Updated: 06/23/25 1052     Magnesium 1.9 mg/dL     Ethanol [[id number]] Collected: 06/23/25 1007    Specimen: Blood from Arm, Right Updated: 06/23/25 1049     Ethanol % <0.010 %     Narrative:      Not for legal purposes. Chain of Custody not followed.     Protime-INR [[id number]]  (Abnormal) Collected: 06/23/25 1007    Specimen: Blood from Arm, Right Updated: 06/23/25 1048     Protime 29.4 Seconds      INR 2.60    CBC & Differential [[id number]]  (Abnormal) Collected: 06/23/25 1007    Specimen: Blood from Arm, Right Updated: 06/23/25 1043             CBC Auto Differential [[id number]]  (Abnormal) Collected: 06/23/25 1007    Specimen: Blood from Arm, Right Updated: 06/23/25 1041     WBC 12.72 10*3/mm3      RBC 2.79 10*6/mm3      Hemoglobin 9.6 g/dL      Hematocrit 29.2 %      .7 fL      MCH 34.4 pg      MCHC 32.9 g/dL      RDW 21.6 %      RDW-SD 83.7 fl      MPV 11.2 fL      Platelets 128 10*3/mm3      Neutrophil % 79.4 %      Lymphocyte % 9.7 %      Monocyte % 8.1 %      Eosinophil % 1.1 %      Basophil % 0.6 %      Immature Grans % 1.1 %      Neutrophils, Absolute 10.10 10*3/mm3      Lymphocytes, Absolute 1.23 10*3/mm3      Monocytes, Absolute 1.03 10*3/mm3      Eosinophils, Absolute 0.14 10*3/mm3      Basophils, Absolute 0.08 10*3/mm3      Immature Grans, Absolute 0.14 10*3/mm3      nRBC 0.0 /100 WBC      Comment: : [id number] [place] ID: [id number]             Imaging Results (Last 24 Hours)       Procedure Component Value Units Date/Time    CT Head Without Contrast [[id number]] Collected: 06/23/25 1132     Updated: 06/23/25 1142    Narrative:      EXAMINATION: CT HEAD WO CONTRAST-        HISTORY:Altered mental status     In order to have a CT radiation dose as low as reasonably achievable  Automated Exposure Control was utilized for adjustment of the mA and/or  KV according to patient size.     Total DLP = 934.85 mGy.cm     The CT scan of the head is performed without intravenous contrast  enhancement.     The images are acquired in axial plane and subsequent reconstruction in  coronal and sagittal planes.     The comparison is made with the previous study dated 5/28/2025.     There is no evidence of a mass. There is no midline shift.     There is no evidence of intracranial hemorrhage or hematoma.     The ventricles, the basal cisterns and the cortical sulci are normal.     The gray-white matter differentiation is maintained.     The posterior fossa structures appear unremarkable as visualized.  Low-density area located  anteroinferiorly in the zoltan seen in the  sagittal plane images may be artifactual due to adjacent dense bone and  pulsation of the basilar artery?.     Images reviewed in bone windows show no acute skull fracture. Limited  visualized paranasal sinuses and mastoid air cells are clear.       Impression:      1. A small ill-defined low-density area located in the anterior inferior  zoltan which is only visualized in the sagittal plane images may be  artifactual. If clinically warranted, further evaluation with MR imaging  of the brain with particular attention to this area may be obtained.  2. The remaining brain is unremarkable as detailed above.                                         This report was signed and finalized on 6/23/2025 11:39 AM by Dr. Vj Hsu MD.       XR Chest 1 View [[id number]] Collected: 06/23/25 1132     Updated: 06/23/25 1135    Narrative:      EXAM: XR CHEST 1 VW-         HISTORY: Fever       COMPARISON: 5/29/2025.     TECHNIQUE:  Frontal view(s) of the chest submitted.     FINDINGS:    Right IJ central venous catheter is been removed. There are low lung  volumes with vascular crowding and probable volume overload/edema. No  effusion or pneumothorax is seen. Heart and mediastinum are  unremarkable.          Impression:         1. Low lung volumes with vascular crowding and probable volume  overload/edema.  2. Removal of the right IJ central venous catheter.     This report was signed and finalized on 6/23/2025 11:32 AM by Óscar Orozco.                  Assessment / Plan   Assessment:   Active Hospital Problems    Diagnosis     **Hepatic encephalopathy     Acute UTI (urinary tract infection)     Alcoholic cirrhosis     History of hepatitis C     Jaundice     Alcoholism     Hypokalemia     Class 1 obesity due to excess calories without serious comorbidity with body mass index (BMI) of 34.0 to 34.9 in adult        Treatment Plan  The patient will be admitted to Dr. Boateng's service  here at [facility].     Hepatic encephalopathy/alcoholic cirrhosis/hepatitis C/jaundice  - N.p.o. due to AMS  - Lactulose 300 mL enema twice daily.  - Gastroenterology consult for any additional guidance, greatly appreciated.  - End-stage disease, palliative care consult for the AM.  - Long discussion with patient's mother by phone patient has been made DNR/DNI.  - Limited abdominal ultrasound for complete evaluation and any stone involvement.    Hypokalemia  - Stat BMP to evaluate current status.  - Electrolyte protocol in place.    Alcoholism  - Currently patient has been sober for approximately 3-4 weeks with the help of Librium per PCP.  Patient has not been able to take any for the last 2 to 3 days due to altered mental status.  Will resume Librium p.o. if patient regains consciousness.    Acute UTI  - Urinalysis with moderate leukocytes, positive nitrates, 3-5 WBC and 3+ bacteria  - Recent positive urine culture for Enterococcus faecalis, reviewed susceptibilities with pharmacist to initiate antibiotics safest for patient's current state.  Will initiate ampicillin and continue to follow cultures.    Reviewed home medication list, will restart if patient is able to tolerate p.o. currently no alternatives needed other than lactulose.    Medical Decision Making  6 acute on chronic, high complexity, worsening  1 acute, high complexity, unchanged    Number and Complexity of problems: 8  Differential Diagnosis: None    Conditions and Status        Condition is worsening.     [facility] Data  External documents reviewed: [facility] EHR  Cardiac tracing (EKG, telemetry) interpretation: 6/23/2025 EKG per cardiology reviewed  Radiology interpretation: 6/23/2025 chest x-ray and ultrasound of the abdomen per radiology reviewed  Labs reviewed: 6/23/2025 reviewed   any tests that were considered but not ordered: None  Decision rules/scores evaluated (example [id number], Wells, etc): MELD Score (Original, Pre-2016, Model  for End-Stage Liver Disease) - MDCalc  Calculated on Jun 23 2025 7:03 PM  30 points -> Original MELD Score (Pre-2016)*  52.6% -> Estimated 3-Month Mortality     Discussed with: Dr. Boateng, patient and his mother Marisa     Care Planning  Shared decision making: Dr. Boateng, patient and his mother Marisa  Code status and discussions: DNR/DNI after extensive discussion with patient's mother    Disposition  Social Determinants of Health that impact treatment or disposition: Possible need for hospice versus palliative care  Estimated length of stay is undetermined at this time.     I confirmed that the patient's advanced care plan is present, code status is documented, and a surrogate decision maker is listed in the patient's medical record.     The patient's surrogate decision maker is his mother Marisa.     The patient was seen and examined by me on 6/23/2025 at 1648.    Electronically signed by Nydia Zurita, CANDIDO, 06/23/25, 18:06 CDT.

## 2025-06-23 NOTE — ED NOTES
Report given to floor. At this time verbal orders received per  Hospital supervisor, (Ernestina) , to keep patient on floor. Per admitting provider patient may need unit bed or possible transfer to other facility.

## 2025-06-23 NOTE — ED NOTES
Patient assisted up right in bed. Patient provided with lactulose. Patient took medication with out difficulty. Patient remained upright after administer medication.

## 2025-06-23 NOTE — ED NOTES
Patient bedside swallow screen failed due to slurring of speech. Patient able to tolerate PO medications and food at home prior to arrival. Patient drinking sprite upon arrival to Er room. Patient was able to drink 3oz of water without difficulty. Verbal orders per Dr. Quiñonez to sit patient up in bed and administer lactulose. Benefits out weight risk at this time.

## 2025-06-24 PROBLEM — K72.90 LIVER FAILURE: Status: ACTIVE | Noted: 2025-01-01

## 2025-06-24 NOTE — CONSULTS
[facility] Palliative Care Services    Palliative Care Initial Consult   Attending Physician: Zoltan Boateng MD  Referring Provider: Nydia APRN     Reason for Referral: assistance with clarification of goals of care  Family/Support: daughter and mother    Code Status and Medical Interventions: No CPR (Do Not Attempt to Resuscitate); Limited Support; No intubation (DNI), No cardioversion; Lengthy discussion with his mother   Ordered at: 06/23/25 5380     Code Status (Patient has no pulse and is not breathing):    No CPR (Do Not Attempt to Resuscitate)     Medical Interventions (Patient has pulse or is breathing):    Limited Support     Medical Intervention Limits:    No intubation (DNI)       No cardioversion     Comments:    Lengthy discussion with his mother     Level Of Support Discussed With:    Next of Kin (If No Surrogate)     Goals of Care: TBD.    HPI:   40 y.o. male has a past medical history of Alcoholism, Diabetes mellitus, Gout, Hepatitis C, Hypertension. Hospitalized 7/4/2024-7/9/2024 due to concerns for alcohol withdrawal.  He was admitted to the critical care unit and placed on CIWA protocol.  During hospitalization found to be anemic and stool for occult blood was positive.  Noted plans to follow-up outpatient with GI for further workup.  He was discharged home.  Hospitalized 8/27/2024-9/3/2024 due to fatigue, generalized weakness, and confusion.  He was treated for hepatic encephalopathy and discharged home.  Evaluated in ED on 3/18/2025 due to dark stools.  Stool for occult blood positive.  CT of abdomen and pelvis revealed cirrhotic and steatotic liver with sequela of portal hypertension including splenomegaly and small esophageal varices.  Admission to hospital was recommended for further workup and treatment however he was apparently not agreeable and left AGAINST MEDICAL ADVICE.  Admitted 4/1/2025-4/5/2025 due to fall and electrolyte imbalance.  Noted  he left AGAINST MEDICAL ADVICE.  Evaluated in ED on 4/18/2025 due to generalized abdominal pain and worsening jaundice.  CT scan abdomen pelvis revealed findings concerning for acute calculus cholecystitis with multiple stones, gallbladder distention as well as wall thickening and mild pericholecystic fluid, liver cirrhosis with hepatosplenomegaly and recanalized periumbilical vein.  He was transferred to [facility] in [place], [place] for interventional radiology.  Discharge summary reviewed and noted he was given supportive care initially however liver enzymes failed to improve and he was started on prednisolone for acute alcoholic hepatitis.  Noted he did not require intervention for possible cholecystitis.  He was ultimately discharged home on 4/26/2025.  Hospitalization 5/28-6/4 due to decompensated liver failure in the setting of cirrhosis, coagulopathy, anemia, GI bleed, underwent upper endoscopy with findings of grade D reflux esophagitis, esophageal ulcer, portal hypertension gastropathy.  Most recently seen by my colleague LÁZARO Chavira during May hospitalization and at that time expressed wishes to continue full aggressive care interventions, noted to express interest in changing lifestyle and getting help outpatient. Patient presented to [facility] on 6/23/2025 related to significant confusion.  Family report patient stopped drinking approximately 3 to 4 weeks ago with help from [employer].  Resides with parents and mother provides caregiver support and medication management who notes increased jaundice.  Bedside paracentesis performed in ED.  ED workup shows elevated lactate, ammonia 149, hypokalemia, hypoalbuminemia, transaminitis, elevated alk phos, leukocytosis, anemia.  Urinalysis 3+ bilirubin/1+ protein/2+ leuk esterase/positive nitrite/3+ bacteria.  Urine drug screen positive for benzodiazepines and barbiturates.  CT head shows small ill-defined low-density area  located in anterior inferior zoltan, may be artifactual, remaining brain unremarkable.  Chest imaging shows low lung volumes with vascular crowding and probable volume overload/edema.  Evaluated by GI and recommended transfer to tertiary facility.  Tertiary facility/hepatology/transplant service at [facility] contacted and according to chart review in order for patient to be transferred to tertiary facility would have to be in full alcohol rehabilitation before being considered for transplant.  Patient is treated with empiric antibiotics for UTI, lactulose enema given degree of ascites no fluid bolus provided.  CODE STATUS changes no CPR/limited support interventions, no intubation, no cardioversion per hospitalist team.  Laying in bed without apparent distress.  He is extremely somnolent.  His lunch tray is sitting untouched at bedside.  No family currently at bedside. Palliative Care Spoke With: family spoke with patient's mother, Marisa who reports patient has resided with her since most recent hospitalization and she has been successful in assisting him with avoiding alcohol.  He has 2 adult sons ages 21 and 22- Ki and Jg and a 17-year-old daughter, Jossy. Marisa reports given patient's long-history of alcoholism limited involvement with children.  Due to the Palliative Care Topics Discussed: palliative care, goals of care, care options, resuscitation status, and discharge options we will establish an advance care plan.   Advance Care Planning   Advance Care Planning Discussion: Spoke with patient's motherMarisa via telephone with regard to events leading to current hospitalization, plan of care, and overall poor long-term prognosis.  We explored conversations with providers.  We clarified CODE STATUS and initially Ghazala was considering reescalating medical priorities, but after further discussion with regard to associated risk of CPR will continue current CODE STATUS in place.  We discussed patient's  long history of heavy alcohol use since the age of 20 years old and how this has negatively impacted his health.  She understandably has difficulty with his life choices but supportive for his current efforts.  She continues to remain hopeful that patient may qualify for transplant but does admit the requirements of rehab have been difficult to complete as patient's insurance does not cover this resource.  She seems realistic and limited life expectancy if patient continues with alcohol use and determined not a liver transplant candidate. Open to receiving additional resources that could potentially assist with patient rehab efforts. We discussed current plan of care for treatment of hyperammonemia, UTI, and additional adjuvants.  Anticipating patient to discharge back home with patient's mother at time of discharge.     Review of Systems   Unable to perform ROS: Acuity of condition   Gastrointestinal:  Positive for jaundice.     1- Pain Assessment  CPOT and PAINAD Scales: PAINAD (Pain Assessment in Advance Dementia Scale)  PAINAD Breathin-->normal  PAINAD Negative Vocalization: 0-->none  PAINAD Facial Expression: 0-->smiling or inexpressive  PAINAD Body Language: 0-->relaxed  PAINAD Consolability: 0-->no need to console  PAINAD Score: 0    Past Medical History:   Diagnosis Date    Alcoholism     currently drinking    Diabetes mellitus     Gout     Hepatitis C     Hypertension     Jaundice     UTI (urinary tract infection)      Past Surgical History:   Procedure Laterality Date    ENDOSCOPY N/A 2025    Procedure: ESOPHAGOGASTRODUODENOSCOPY WITH ANESTHESIA;  Surgeon: Estela Ramos MD;  Location: [facility];  Service: Gastroenterology;  Laterality: N/A;  pre op: GI bleed  post op: esophageal ulcer, esophagitis  pcp: Adrien Varghese MD    VASECTOMY       Social History     Socioeconomic History    Marital status: Single   Tobacco Use    Smoking status: Some Days     Current packs/day: 0.25     Average packs/day:  0.5 packs/day for 15.5 years (7.1 ttl pk-yrs)     Types: Cigarettes     Start date: 2010    Smokeless tobacco: Never   Vaping Use    Vaping status: Never Used    Passive vaping exposure: Yes   Substance and Sexual Activity    Alcohol use: Not Currently     Alcohol/week: 12.0 standard drinks of alcohol     Types: 12 Cans of beer per week     Comment: malt liquor (48 oz daily)  nothing to drink in past 4 weeks    Drug use: Not Currently    Sexual activity: Yes         Current Facility-Administered Medications:     ampicillin 2000 mg IVPB in 100 mL NS (MBP), 2 g, Intravenous, Q6H, Nydia Zurita APRN, Last Rate: 200 mL/hr at 06/24/25 1108, 2 g at 06/24/25 1108    Calcium Replacement - Follow Nurse / BPA Driven Protocol, , Not Applicable, PRN, Nydia Zurita APRN    lactulose solution 20 g, 20 g, Oral, TID, Zoltan Boateng MD, 20 g at 06/24/25 1136    levothyroxine (SYNTHROID, LEVOTHROID) tablet 75 mcg, 75 mcg, Oral, Q AM, Zoltan Boateng MD, 75 mcg at 06/24/25 1135    Magnesium Cardiology Dose Replacement - Follow Nurse / BPA Driven Protocol, , Not Applicable, PRN, Nydia Zurita APRN    midodrine (PROAMATINE) tablet 5 mg, 5 mg, Oral, TID Kilo CUMMINS Benjamin H, MD, 5 mg at 06/24/25 1135    nitroglycerin (NITROSTAT) SL tablet 0.4 mg, 0.4 mg, Sublingual, Q5 Min PRN, Nydia Zurita APRN    ondansetron ODT (ZOFRAN-ODT) disintegrating tablet 4 mg, 4 mg, Oral, Q6H PRN **OR** ondansetron (ZOFRAN) injection 4 mg, 4 mg, Intravenous, Q6H PRN, Zoltan Boateng MD    pantoprazole (PROTONIX) injection 40 mg, 40 mg, Intravenous, BID Kilo CUMMINS Benjamin H, MD, 40 mg at 06/24/25 1134    Phosphorus Replacement - Follow Nurse / BPA Driven Protocol, , Not Applicable, PRN, , Nydia, APRN    Potassium Replacement - Follow Nurse / BPA Driven Protocol, , Not Applicable, PRN, Nydia Zurita, LÁZARO    sodium chloride 0.9 % flush 10 mL, 10 mL, Intravenous, Q12H, Nydia Zurita APRN, 10 mL at  "06/24/25 0800    sodium chloride 0.9 % flush 10 mL, 10 mL, Intravenous, PRN, Nydia APRN    sodium chloride 0.9 % infusion 40 mL, 40 mL, Intravenous, PRN, Nydia Zurita APRN, 40 mL at 06/23/25 2221    sucralfate (CARAFATE) tablet 1 g, 1 g, Oral, 4x Daily AC & at Bedtime, Zoltan Boateng MD, 1 g at 06/24/25 1135    thiamine (VITAMIN B-1) tablet 100 mg, 100 mg, Oral, Daily, Zoltan Boateng MD, 100 mg at 06/24/25 1136    No Known Allergies  I have utilized all available immediate resources to obtain, update, or review the patient's current medications (including all prescriptions, over-the-counter products, herbals, cannabis/cannabidiol products, and vitamin/mineral/dietary (nutritional) supplements) for name, route of administration, type, dose and frequency.      Intake/Output Summary (Last 24 hours) at 6/24/2025 1349  Last data filed at 6/24/2025 0504  Gross per 24 hour   Intake --   Output 350 ml   Net -350 ml       Physical Exam:    Diagnostics: Reviewed  /49 (BP Location: Left arm, Patient Position: Lying)   Pulse 63   Temp 95.1 °F (35.1 °C) (Axillary)   Resp 18   Ht 190.5 cm (75\")   Wt (!) 143 kg (316 lb 3.2 oz)   SpO2 100%   BMI 39.52 kg/m²     Vitals and nursing note reviewed.   Constitutional:       Appearance: Not in distress. Ill-appearing and chronically ill-appearing.      Comments: Extremely somnolent   Pulmonary:      Effort: Pulmonary effort is normal.   Cardiovascular:      Normal rate.   Skin:     Coloration: Skin is jaundiced.     Patient status: Disease state: Controlled with current treatments.  Current Functional status: Palliative Performance Scale Score: Performance 30% based on the following measures: Ambulation: Totally bed bound, Activity and Evidence of Disease: Unable to do any work, extensive evidence of disease, Self-Care: Total care required,  Intake: Reduced, LOC: Full, drowsy or confusion   Nutritional status: Albumin 2.4 Body mass index is " 39.52 kg/m².      Palliative Care Acuity Data  Psychosocial Acuity: normal complexity  Spiritual Acuity: normal complexity  Hospital Problem List      Hepatic encephalopathy    Class 1 obesity due to excess calories without serious comorbidity with body mass index (BMI) of 34.0 to 34.9 in adult    Hypokalemia    Alcoholism    Jaundice    Acute UTI (urinary tract infection)    Alcoholic cirrhosis    History of hepatitis C    Liver failure    Impression/Problem List:    Cirrhosis of the liver due to alcoholism, MELD-31, Child Class C  Hepatic encephalopathy  Alcoholism    Suspected UTI  Hypokalemia  Diabetes mellitus  Thrombocytopenia  Recent GI bleed  Gout  Hepatitis C  Hypertension   Hypoalbuminemia      Recommendations/Plan:  1. plan: Goals of care include status no CPR/limited support interventions.    Family support: The patient receives support from his mother..  Advance Directives: Advance Directive Status: Patient does not have advance directive   POA/Healthcare surrogate-children and mother-next of kin.    2.  Palliative care encounter  - Prognosis is poor long-term secondary to cirrhosis of the liver, alcoholism, and comorbidities.  -Family appears to have fair prognostic awareness.     -Evaluated in ED on 4/18/2025 due to generalized abdominal pain and worsening jaundice.  CT scan abdomen pelvis revealed findings concerning for acute calculus cholecystitis with multiple stones, gallbladder distention as well as wall thickening and mild pericholecystic fluid, liver cirrhosis with hepatosplenomegaly and recanalized periumbilical vein.  He was transferred to [facility] in [place], [place] for interventional radiology.  Discharge summary reviewed and noted he was given supportive care initially however liver enzymes failed to improve and he was started on prednisolone for acute alcoholic hepatitis.  Noted he did not require intervention for possible cholecystitis.  He was ultimately discharged home  on 4/26/2025.  -Hospitalization 5/28-6/4 due to decompensated liver failure in the setting of cirrhosis, coagulopathy, anemia, GI bleed, underwent upper endoscopy with findings of grade D reflux esophagitis, esophageal ulcer, portal hypertension gastropathy.      -Most recently seen by my colleague LÁZARO Chavira during May hospitalization and at that time expressed wishes to continue full aggressive care interventions, noted to express interest in changing lifestyle and getting help outpatient.    -Bedside paracentesis performed in ED.    -Evaluated by GI and recommended transfer to tertiary facility.  Tertiary facility/hepatology/transplant service at [facility] contacted and according to chart review in order for patient to be transferred to tertiary facility would have to be in full alcohol rehabilitation before being considered for transplant.    -Patient is treated with empiric antibiotics for UTI, lactulose enema given degree of ascites no fluid bolus provided.    -CODE STATUS changes no CPR/limited support interventions, no intubation, no cardioversion per hospitalist team.        6/24-provide CODE STATUS no CPR/limited support interventions  -long history of heavy alcohol use since the age of 20 years old   -continues to remain hopeful that patient may qualify for transplant but does admit the requirements of rehab have been difficult to complete as patient's insurance does not cover this resource.  She seems realistic in limited life expectancy if patient continues with alcohol use and determined not a liver transplant candidate.    -plan of care discussed  -will plan to provide additional resources for mother to potentially continue supportive efforts for patient given financial constraints.   -Anticipating patient to discharge back home with patient's mother at time of discharge.     Thank you for this consult and allowing us to participate in patient's plan of care. Palliative Care Team  will continue to follow patient.     Marge Green, APRN  6/24/2025  13:49 CDT

## 2025-06-24 NOTE — THERAPY EVALUATION
Acute Care - Speech Language Pathology   Swallow Initial Evaluation [facility]     Patient Name: Luciano Christiansen  : 1985  MRN: [id number]  Today's Date: 2025               Admit Date: 2025  SPEECH-LANGUAGE PATHOLOGY EVALUATION - SWALLOW  Subjective: The patient was seen on this date for a Clinical Swallow evaluation.  Patient was asleep upon entering but woke with name called. He remained drowsy throughout with difficulty following all commands due to fatigue and drowsiness.  Significant history: Presented to ER due to significant confusion. Diagnosed with hepatic encephalopathy. Medical history of alcoholic cirrhosis, DM, gout, Hep C, HTN, jaundice, and prior endos with esophagitis. SLP consulted due to failed RN dysphagia screening.   Objective: Oral motor examination results: generalized weakness.  Textures given during assessment of swallow function included thin liquid, puree consistency, and regular consistency.  Assessment: Difficulties were noted with none of the above consistencies.  Observations: No overt s/s of aspiration observed. Functional rotary chew with solids. Required 1:1 assist and cues to remain attentive to task.   SLP Findings:  Patient presents with functional swallow, without esophageal component.   Recommendations: Diet Textures: regular and thin liquids  Medications should be taken whole as tolerated.    Recommended Strategies: upright for PO, small bites and sips, and supervision with all PO. Assist with feeding as needed. I suspect with drowsiness patient will not initiate feeding, will require feeder and supervision due to confusion that continues. Oral care 2x a day.  Other Recommended Evaluations: none    Dysphagia therapy is recommended.      Анна Armando MS CCC-SLP 2025 08:36 CDT    Visit Dx:     ICD-10-CM ICD-9-CM   1. Hepatic encephalopathy  K76.82 572.2   2. Alcoholic cirrhosis of liver with ascites  K70.31 571.2   3. Hypokalemia  E87.6 276.8   4.  Anemia, unspecified type  D64.9 285.9   5. Jaundice  R17 782.4   6. Dysphagia, unspecified type  R13.10 787.20     Patient Active Problem List   Diagnosis    Wellness examination    Encounter for hepatitis C screening test for low risk patient    Class 1 obesity due to excess calories without serious comorbidity with body mass index (BMI) of 34.0 to 34.9 in adult    Fatty liver    Hyponatremia    Hypokalemia    Hypomagnesemia    Alcoholism    Transaminitis    Hypophosphatemia    Insomnia disorder related to known organic factor    Community acquired bilateral lower lobe pneumonia    Pneumonia due to Streptococcus    GI bleed    Calculus of gallbladder without cholecystitis without obstruction    Alcoholic encephalopathy    Alcohol withdrawal    Alcoholic steatohepatitis    BMI 40.0-44.9, adult    Hepatic encephalopathy    Gastroesophageal reflux disease without esophagitis    Hypothyroidism    Esophageal varices in cirrhosis    Upper GI bleed    Jaundice    Acute UTI (urinary tract infection)    Alcoholic cirrhosis    History of hepatitis C     Past Medical History:   Diagnosis Date    Alcoholism     currently drinking    Diabetes mellitus     Gout     Hepatitis C     Hypertension     Jaundice     UTI (urinary tract infection)      Past Surgical History:   Procedure Laterality Date    ENDOSCOPY N/A 5/28/2025    Procedure: ESOPHAGOGASTRODUODENOSCOPY WITH ANESTHESIA;  Surgeon: Estela Ramos MD;  Location: [facility];  Service: Gastroenterology;  Laterality: N/A;  pre op: GI bleed  post op: esophageal ulcer, esophagitis  pcp: Adrien Varghese MD    VASECTOMY         SLP Recommendation and Plan  SLP Swallowing Diagnosis: mild, oral dysphagia, R/O pharyngeal dysphagia (06/24/25 0750)  SLP Diet Recommendation: regular textures, thin liquids (06/24/25 0750)  Recommended Precautions and Strategies: upright posture during/after eating, small bites of food and sips of liquid, general aspiration precautions, fatigue precautions  (06/24/25 0750)  SLP Rec. for Method of Medication Administration: as tolerated (06/24/25 0750)     Monitor for Signs of Aspiration: yes, notify SLP if any concerns (06/24/25 0750)     Swallow Criteria for Skilled Therapeutic Interventions Met: demonstrates skilled criteria (06/24/25 0750)  Anticipated Discharge Disposition (SLP): unknown (06/24/25 0750)  Rehab Potential/Prognosis, Swallowing: adequate, monitor progress closely (06/24/25 0750)  Therapy Frequency (Swallow): PRN (06/24/25 0750)  Predicted Duration Therapy Intervention (Days): 2 days (06/24/25 0750)  Oral Care Recommendations: Oral Care BID/PRN, Toothbrush (06/24/25 0750)                                        Progress: no change (Initial Evaluation)      SWALLOW EVALUATION (Last 72 Hours)       SLP Adult Swallow Evaluation       Row Name 06/24/25 0750                   Rehab Evaluation    Document Type evaluation  -MG        Subjective Information no complaints  -MG        Patient Observations lethargic;cooperative;agree to therapy  -MG        Patient/Family/Caregiver Comments/Observations No family present  -MG        Patient Effort good  -MG        Symptoms Noted During/After Treatment fatigue  -MG           General Information    Patient Profile Reviewed yes  -MG        Pertinent History Of Current Problem Presented to ER due to significant confusion. Diagnosed with hepatic encephalopathy. Medical history of alcoholic cirrhosis, DM, gout, Hep C, HTN, jaundice, and prior endos with esophagitis. SLP consulted due to failed RN dysphagia screening.  -MG        Current Method of Nutrition NPO  -MG        Precautions/Limitations, Vision WFL;for purposes of eval  -MG        Precautions/Limitations, Hearing WFL;for purposes of eval  -MG        Prior Level of Function-Communication WFL  -MG        Prior Level of Function-Swallowing no diet consistency restrictions  -MG        Plans/Goals Discussed with patient;agreed upon  -MG        Barriers to Rehab none  identified  -MG        Patient's Goals for Discharge patient did not state  -MG           Pain    Additional Documentation Pain Scale: FACES Pre/Post-Treatment (Group)  -MG           Pain Scale: FACES Pre/Post-Treatment    Pain: FACES Scale, Pretreatment 0-->no hurt  -MG        Posttreatment Pain Rating 0-->no hurt  -MG           Oral Motor Structure and Function    Dentition Assessment natural, present and adequate  -MG        Secretion Management WNL/WFL  -MG        Mucosal Quality dry  -MG           Oral Musculature and Cranial Nerve Assessment    Oral Motor General Assessment generalized oral motor weakness  -MG           General Eating/Swallowing Observations    Respiratory Support Currently in Use room air  -MG        Eating/Swallowing Skills fed by SLP  -MG        Positioning During Eating upright in bed  -MG        Utensils Used spoon;straw  -MG        Consistencies Trialed pureed;thin liquids;regular textures  -MG           Clinical Swallow Eval    Oral Prep Phase WFL  -MG        Oral Transit WFL  -MG        Oral Residue WFL  -MG        Pharyngeal Phase no overt signs/symptoms of pharyngeal impairment  -MG        Esophageal Phase unremarkable  -MG        Clinical Swallow Evaluation Summary See note  -MG           SLP Evaluation Clinical Impression    SLP Swallowing Diagnosis mild;oral dysphagia;R/O pharyngeal dysphagia  -MG        Functional Impact risk of aspiration/pneumonia;risk of dehydration;risk of malnutrition  -MG        Rehab Potential/Prognosis, Swallowing adequate, monitor progress closely  -MG        Swallow Criteria for Skilled Therapeutic Interventions Met demonstrates skilled criteria  -MG           Recommendations    Therapy Frequency (Swallow) PRN  -MG        Predicted Duration Therapy Intervention (Days) 2 days  -MG        SLP Diet Recommendation regular textures;thin liquids  -MG        Recommended Precautions and Strategies upright posture during/after eating;small bites of food and sips  of liquid;general aspiration precautions;fatigue precautions  -MG        Oral Care Recommendations Oral Care BID/PRN;Toothbrush  -MG        SLP Rec. for Method of Medication Administration as tolerated  -MG        Monitor for Signs of Aspiration yes;notify SLP if any concerns  -MG        Anticipated Discharge Disposition (SLP) unknown  -MG           Swallow Goals (SLP)    Swallow LTGs Swallow Long Term Goal (free text)  -MG        Swallow STGs diet tolerance goal selection (SLP)  -MG        Diet Tolerance Goal Selection (SLP) Patient will tolerate trials of  -MG           (LTG) Swallow    (LTG) Swallow Patient will tolerate least restrictive diet without overt s/s of aspiration.  -MG        [place] (Swallow Long Term Goal) independently (over 90% accuracy)  -MG        Time Frame (Swallow Long Term Goal) by discharge  -MG        Barriers (Swallow Long Term Goal) none  -MG        Progress/Outcomes (Swallow Long Term Goal) new goal  -MG           (STG) Patient will tolerate trials of    Consistencies Trialed (Tolerate trials) regular textures;thin liquids  -MG        Desired Outcome (Tolerate trials) without signs/symptoms of aspiration;without signs of distress;with adequate oral prep/transit/clearance  -MG        [place] (Tolerate trials) independently (over 90% accuracy)  -MG        Time Frame (Tolerate trials) by discharge  -MG        Progress/Outcomes (Tolerate trials) new goal  -MG                  User Key  (r) = Recorded By, (t) = Taken By, (c) = Cosigned By      Initials Name Effective Dates    MG Анна Armando, MS [facility]-SLP 07/11/23 -                     EDUCATION  The patient has been educated in the following areas:   Dysphagia (Swallowing Impairment) Oral Care/Hydration.        SLP GOALS       Row Name 06/24/25 0750             (LTG) Swallow    (LTG) Swallow Patient will tolerate least restrictive diet without overt s/s of aspiration.  -MG      [place] (Swallow Long Term Goal)  independently (over 90% accuracy)  -MG      Time Frame (Swallow Long Term Goal) by discharge  -MG      Barriers (Swallow Long Term Goal) none  -MG      Progress/Outcomes (Swallow Long Term Goal) new goal  -MG         (STG) Patient will tolerate trials of    Consistencies Trialed (Tolerate trials) regular textures;thin liquids  -MG      Desired Outcome (Tolerate trials) without signs/symptoms of aspiration;without signs of distress;with adequate oral prep/transit/clearance  -MG      [place] (Tolerate trials) independently (over 90% accuracy)  -MG      Time Frame (Tolerate trials) by discharge  -MG      Progress/Outcomes (Tolerate trials) new goal  -MG                User Key  (r) = Recorded By, (t) = Taken By, (c) = Cosigned By      Initials Name Provider Type    Анна Rivera MS CCC-SLP Speech and Language Pathologist                         Time Calculation:    Time Calculation- SLP       Row Name 06/24/25 0834             Time Calculation- SLP    SLP Start Time 0750  -MG      SLP Stop Time 0844  10 min chart review  -MG      SLP Time Calculation (min) 54 min  -MG      SLP Received On 06/24/25  -MG      SLP Goal Re-Cert Due Date 07/04/25  -MG         Untimed Charges    SLP Eval/Re-eval  ST Eval Oral Pharyng Swallow - 24023  -MG      [id number]-TQ Eval Oral Pharyng Swallow Minutes 54  -MG         Total Minutes    Untimed Charges Total Minutes 54  -MG       Total Minutes 54  -MG                User Key  (r) = Recorded By, (t) = Taken By, (c) = Cosigned By      Initials Name Provider Type    Анна Rivera MS CCC-SLP Speech and Language Pathologist                    Therapy Charges for Today       Code Description Service Date Service Provider Modifiers Qty    [id number]  ST EVAL ORAL PHARYNG SWALLOW 4 6/24/2025 Анна Armando MS CCC-SLP GN 1                 Анна Armando MS CCC-NELI  6/24/2025

## 2025-06-24 NOTE — PLAN OF CARE
Goal Outcome Evaluation:  Plan of Care Reviewed With: patient, caregiver (RN April)        Progress: no change (Initial Evaluation)       Anticipated Discharge Disposition (SLP): unknown          SLP Swallowing Diagnosis: mild, oral dysphagia, R/O pharyngeal dysphagia (06/24/25 0750)             SPEECH-LANGUAGE PATHOLOGY EVALUATION - SWALLOW  Subjective: The patient was seen on this date for a Clinical Swallow evaluation.  Patient was asleep upon entering but woke with name called. He remained drowsy throughout with difficulty following all commands due to fatigue and drowsiness.  Significant history: Presented to ER due to significant confusion. Diagnosed with hepatic encephalopathy. Medical history of alcoholic cirrhosis, DM, gout, Hep C, HTN, jaundice, and prior endos with esophagitis. SLP consulted due to failed RN dysphagia screening.   Objective: Oral motor examination results: generalized weakness.  Textures given during assessment of swallow function included thin liquid, puree consistency, and regular consistency.  Assessment: Difficulties were noted with none of the above consistencies.  Observations: No overt s/s of aspiration observed. Functional rotary chew with solids. Required 1:1 assist and cues to remain attentive to task.   SLP Findings:  Patient presents with functional swallow, without esophageal component.   Recommendations: Diet Textures: regular and thin liquids  Medications should be taken whole as tolerated.    Recommended Strategies: upright for PO, small bites and sips, and supervision with all PO. Assist with feeding as needed. I suspect with drowsiness patient will not initiate feeding, will require feeder and supervision due to confusion that continues. Oral care 2x a day.  Other Recommended Evaluations: none    Dysphagia therapy is recommended.      Анна Armando MS CCC-SLP 6/24/2025 08:34 CDT

## 2025-06-24 NOTE — PLAN OF CARE
Problem: Adult Inpatient Plan of Care  Goal: Plan of Care Review  6/24/2025 0408 by Nusrat Nelson, RN  Outcome: Progressing  Flowsheets (Taken 6/24/2025 0406)  Progress: no change  Outcome Evaluation: S 72-75.  Lactulose enema given, but patient wasn't able to hold in very long.  Potassium IV replaced overnight.   Plan of Care Reviewed With: patient

## 2025-06-24 NOTE — PLAN OF CARE
Goal Outcome Evaluation:  Plan of Care Reviewed With: patient        Progress: no change  Outcome Evaluation: He has slept most of the day. He wake up to take some medication. In and out cath done with 500ml of output. He is very jaundice. Cont IV ABX. Palliative care consulted. Cont to monitor.

## 2025-06-24 NOTE — PROGRESS NOTES
Patient Name: Luciano Christiansen  Date of Admission: 6/23/2025  Today's Date: 06/24/25  Length of Stay: 1  Primary Care Physician: Adrien Varghese MD    Subjective   Chief Complaint: AMS  HPI   Today: Awakened from sleep for my exam.  Patient is still very lethargic.  He does not participate very much in exam.   No family present at bedside.  Discussed with bedside nurse and it sounds like earlier this morning was more awake and able to take some PO.    Documented weights    06/23/25 0940 06/23/25 2031   Weight: 136 kg (300 lb) (!) 143 kg (316 lb 3.2 oz)          Intake/Output Summary (Last 24 hours) at 6/24/2025 1100  Last data filed at 6/24/2025 0504  Gross per 24 hour   Intake --   Output 350 ml   Net -350 ml       Results for orders placed during the hospital encounter of 07/04/24    Adult Transthoracic Echo Complete W/ Cont if Necessary Per Protocol    Interpretation Summary    Left ventricular ejection fraction appears to be 61 - 65%.    Left ventricular diastolic function was normal.    Estimated right ventricular systolic pressure from tricuspid regurgitation is normal (<35 mmHg).       Review of Systems   All pertinent negatives and positives are as above. All other systems have been reviewed and are negative unless otherwise stated.     Objective    Temp:  [94.2 °F (34.6 °C)-97.3 °F (36.3 °C)] 94.5 °F (34.7 °C)  Heart Rate:  [69-81] 69  Resp:  [15-18] 18  BP: (100-125)/(56-77) 102/56  Physical Exam  Vitals reviewed.   Constitutional:       Appearance: He is obese. He is ill-appearing.   HENT:      Head: Normocephalic.      Mouth/Throat:      Mouth: Mucous membranes are dry.   Eyes:      General: Scleral icterus present.   Pulmonary:      Effort: Pulmonary effort is normal. No respiratory distress.      Comments: Diminished at bases  Abdominal:      General: There is distension.      Tenderness: There is no abdominal tenderness.   Skin:     Coloration: Skin is jaundiced.   Neurological:      Motor: Weakness  present.      Comments: Opens eyes to verbal and stimuli; still very lethargic and was very limited in following commands during my assessment         Results Review:  I have reviewed the labs, radiology results, and diagnostic studies.    Laboratory Data:   Results from last 7 days   Lab Units 06/23/25  1007   WBC 10*3/mm3 12.72*   HEMOGLOBIN g/dL 9.6*   HEMATOCRIT % 29.2*   PLATELETS 10*3/mm3 128*        Results from last 7 days   Lab Units 06/24/25  0601 06/23/25  1735 06/23/25  1007   SODIUM mmol/L 136 139 138   POTASSIUM mmol/L 3.5  3.5 2.9* 2.9*   CHLORIDE mmol/L 108* 108* 105   CO2 mmol/L 19.0* 18.0* 17.0*   BUN mg/dL 14.1 14.1 14.1   CREATININE mg/dL <0.17* 0.57* 0.71*   CALCIUM mg/dL 8.7 8.5* 8.5*   BILIRUBIN mg/dL 29.0*  --  33.6*   ALK PHOS U/L 179*  --  221*   ALT (SGPT) U/L 54*  --  61*   AST (SGOT) U/L 140*  --  133*   GLUCOSE mg/dL 93 113* 151*       Culture Data:     Microbiology Results (last 10 days)       Procedure Component Value - Date/Time    Body Fluid Culture - Body Fluid, Peritoneum [[id number]] Collected: 06/23/25 1403    Lab Status: Preliminary result Specimen: Body Fluid from Peritoneum Updated: 06/24/25 0800     Body Fluid Culture No growth     Gram Stain No organisms seen             Radiology Data:   Imaging Results (Last 7 Days)       Procedure Component Value Units Date/Time    US Abdomen Limited [[id number]] Collected: 06/23/25 1858     Updated: 06/23/25 1902    Narrative:      EXAM/TECHNIQUE: US ABDOMEN LIMITED-     INDICATION: Please evaluate any biliary duct obstruction; K76.82-Hepatic  encephalopathy; K70.31-Alcoholic cirrhosis of liver with ascites;  E87.6-Hypokalemia; D64.9-Anemia, unspecified; R17-Unspecified jaundice     COMPARISON: None available.     FINDINGS:     Visualized portion of the abdominal aorta and IVC are unremarkable.     Visualized portion of the pancreas appears normal.     Coarsened nodular liver, in keeping with cirrhosis. Liver echogenicity  is within  normal limits. No suspicious liver lesion identified.  Perihepatic ascites.     Multiple gallstones in the gallbladder lumen. No gallbladder wall  thickening. No biliary ductal dilatation. Common bile duct is 6 mm  diameter.     Right kidney is 13.7 cm in length and demonstrates normal cortical  thickness and echogenicity. No shadowing renal calculi or  hydronephrosis.       Impression:      1.  Cirrhotic liver. Perihepatic ascites.  2.  Gallstones are present but no evidence of acute cholecystitis. No  biliary ductal dilatation.     This report was signed and finalized on 6/23/2025 6:59 PM by Dr. Reinaldo Barry MD.       CT Head Without Contrast [[id number]] Collected: 06/23/25 1132     Updated: 06/23/25 1142    Narrative:      EXAMINATION: CT HEAD WO CONTRAST-        HISTORY:Altered mental status     In order to have a CT radiation dose as low as reasonably achievable  Automated Exposure Control was utilized for adjustment of the mA and/or  KV according to patient size.     Total DLP = 934.85 mGy.cm     The CT scan of the head is performed without intravenous contrast  enhancement.     The images are acquired in axial plane and subsequent reconstruction in  coronal and sagittal planes.     The comparison is made with the previous study dated 5/28/2025.     There is no evidence of a mass. There is no midline shift.     There is no evidence of intracranial hemorrhage or hematoma.     The ventricles, the basal cisterns and the cortical sulci are normal.     The gray-white matter differentiation is maintained.     The posterior fossa structures appear unremarkable as visualized.  Low-density area located anteroinferiorly in the zoltan seen in the  sagittal plane images may be artifactual due to adjacent dense bone and  pulsation of the basilar artery?.     Images reviewed in bone windows show no acute skull fracture. Limited  visualized paranasal sinuses and mastoid air cells are clear.       Impression:      1. A  small ill-defined low-density area located in the anterior inferior  zoltan which is only visualized in the sagittal plane images may be  artifactual. If clinically warranted, further evaluation with MR imaging  of the brain with particular attention to this area may be obtained.  2. The remaining brain is unremarkable as detailed above.                                         This report was signed and finalized on 6/23/2025 11:39 AM by Dr. Vj Hsu MD.       XR Chest 1 View [[id number]] Collected: 06/23/25 1132     Updated: 06/23/25 1135    Narrative:      EXAM: XR CHEST 1 VW-         HISTORY: Fever       COMPARISON: 5/29/2025.     TECHNIQUE:  Frontal view(s) of the chest submitted.     FINDINGS:    Right IJ central venous catheter is been removed. There are low lung  volumes with vascular crowding and probable volume overload/edema. No  effusion or pneumothorax is seen. Heart and mediastinum are  unremarkable.          Impression:         1. Low lung volumes with vascular crowding and probable volume  overload/edema.  2. Removal of the right IJ central venous catheter.     This report was signed and finalized on 6/23/2025 11:32 AM by Óscar Orozco.                I have reviewed the patient's current medications.     ampicillin, 2 g, Intravenous, Q6H  lactulose, 20 g, Oral, TID  sodium chloride, 10 mL, Intravenous, Q12H       Assessment/Plan   Assessment  Active Hospital Problems    Diagnosis     **Hepatic encephalopathy     Liver failure     Acute UTI (urinary tract infection)     Alcoholic cirrhosis     History of hepatitis C     Jaundice     Alcoholism     Hypokalemia     Class 1 obesity due to excess calories without serious comorbidity with body mass index (BMI) of 34.0 to 34.9 in adult        Treatment Plan  Hepatic encephalopathy/alcoholic decompensated cirrhosis/hepatitis C/jaundice/concern for liver failure  - Speech therapy recommendations reviewed  - Transition from Lactulose enemas to PO  Lactulose (will order TID for now) - Discussed with nursing staff to inform me if patient unable to safely take PO  - Gastroenterology consult for any additional guidance, greatly appreciated.  - End-stage disease, palliative care consult today   - abdominal ultrasound revealed cirrhotic liver, perihepatic ascites.  Gallstones are present but no evidence of acute cholecystitis. No biliary ductal dilatation.  -MELD score of 30  - Madrey's discriminant function score of 116.  Unfortunately, patient had also been treated with prednisone in the past during a hospitalization for acute alcoholic hepatitis and at that time his hospital course was complicated by gastrointestinal bleed thought to be related to esophageal ulcers.  - Paracentesis completed in the emergency department yesterday; ascites fluid studies do not appear consistent with SBP.  - Attempts were made to transfer patient to a tertiary [facility] yesterday from the emergency department to include evaluation by hepatology.  These efforts were unsuccessful.       Hypokalemia  - Improving now K 3.5  - Electrolyte protocol in place.     Alcoholism  - Per report patient's last EtOH intake occurred approximately 3-4 weeks.  Patient evidently had also been on Librium in the outpatient setting.  This medication will have to remain on hold given his somnolence and lethargy     Possible UTI  - Urinalysis with moderate leukocytes, positive nitrates, 3-5 WBC and 3+ bacteria  - Recent positive urine culture for Enterococcus faecalis, reviewed susceptibilities with pharmacist to initiate antibiotics safest for patient's current state.  Continue ampicillin and continue to follow cultures - will need to add urine culture as it appears no new culture is in progress       Medical Decision Making    5 acute on chronic, high complexity, worsening  2 acute, high complexity, unchanged      Number and Complexity of problems: 7  Differential Diagnosis: None    Conditions and  Status        Condition is unchanged.     [facility] Data  External documents reviewed: None  Cardiac tracing (EKG, telemetry) interpretation: Reviewed  Radiology interpretation: Reviewed  Labs reviewed: yes  Any tests that were considered but not ordered: None     Decision rules/scores evaluated (example [id number], Wells, etc): MELD Score (Original, Pre-2016, Model for End-Stage Liver Disease) - MDCalc  Calculated on Jun 23 2025 7:03 PM  30 points -> Original MELD Score (Pre-2016)*  52.6% -> Estimated 3-Month Mortality     Discussed with: patient (limited) and bedside nurse April     Care Planning  Shared decision making: mother  Code status and discussions: no cpr  Surrogate Decision Maker Mother Marisa    Disposition  Social Determinants of Health that impact treatment or disposition: Possible need for end of life care   I expect the patient to be discharged to TBD in TBD days. Very guarded prognosis.    Electronically signed by Zoltan Boateng MD-BC, 06/24/25, 11:00 CDT.

## 2025-06-24 NOTE — CONSULTS
[facility]  Inpatient Consult Note  Today's date:  06/24/25    Luciano Christiansen  1985       Referring Provider: No ref. provider found  Primary Physician: Adrien Varghese MD     Date of Admission: 6/23/2025  Date of Service:  06/24/25    Reason for Consultation/Chief Complaint:   Cirrhosis, Elevated liver enzymes, History of alcoholic hepatitis    History of present illness:    Luciano is a 39 y/o male admitted to the hospital yesterday. History of decompensated cirrhosis and alcoholic hepatitis. He has had several hospitalizations. He presented to the hospital yesterday with jaundice, generalized weakness, altered mental status. On admission he was found to have bilirubin 33.6, Cr, 0.71, sodium 138, and INR 2.60 giving him a MELD-Na of 30 and discriminant function 113. Ultrasound showed cirrhosis with perihepatic ascites. He underwent diagnostic paracentesis in the emergency room. Fluid was negative for SBP. A call was made from the ER yesterday to [place] regarding possible transfer to liver transplant facility. This was denied. Apparently there was reported alcohol abuse after previously being transferred to [facility] for liver transplant center. The patient was hospitalized here 04/2025 and was treated for decompensated cirrhosis and alcoholic hepatitis at that time. At that time, he was treated with Prednisone. He was hospitalized again 05/2025. At that time, he did have GI bleed, thought to be due to esophageal ulcers. Patient is awake and answers some questions but is slow to respond and still shows signs of confusion.     Past Medical History:   Diagnosis Date    Alcoholism     currently drinking    Diabetes mellitus     Gout     Hepatitis C     Hypertension     Jaundice     UTI (urinary tract infection)          Past Surgical History:   Procedure Laterality Date    ENDOSCOPY N/A 5/28/2025    Procedure: ESOPHAGOGASTRODUODENOSCOPY WITH ANESTHESIA;  Surgeon: Estela Ramos MD;   Location: [facility] OR;  Service: Gastroenterology;  Laterality: N/A;  pre op: GI bleed  post op: esophageal ulcer, esophagitis  pcp: Adrien Varghese MD    VASECTOMY            No Known Allergies      Social History     Tobacco Use    Smoking status: Some Days     Current packs/day: 0.25     Average packs/day: 0.5 packs/day for 15.5 years (7.1 ttl pk-yrs)     Types: Cigarettes     Start date: 2010    Smokeless tobacco: Never   Substance Use Topics    Alcohol use: Not Currently     Alcohol/week: 12.0 standard drinks of alcohol     Types: 12 Cans of beer per week     Comment: malt liquor (48 oz daily)  nothing to drink in past 4 weeks          History reviewed. No pertinent family history.      Medications Prior to Admission   Medication Sig Dispense Refill Last Dose/Taking    calcium carbonate (OS-MAGGIE) 600 MG tablet Take 1 tablet by mouth Daily.   Taking    chlordiazePOXIDE (LIBRIUM) 5 MG capsule Take 3 capsules by mouth Daily As Needed for Anxiety for 7 days, THEN 2 capsules Daily As Needed for 7 days, THEN 1 capsule Daily for 7 days. 42 capsule 0 Taking As Needed    FLUoxetine (PROzac) 20 MG capsule Take 1 capsule by mouth Daily. (Patient taking differently: Take 1 capsule by mouth Daily. prn) 90 capsule 1 Taking Differently    folic acid (FOLVITE) 1 MG tablet Take 1 tablet by mouth Daily. 90 tablet 1 Taking    lactulose 20 GM/30ML solution solution Take 30 mL by mouth 3 (Three) Times a Day. 450 mL 8 Taking    levothyroxine (Synthroid) 75 MCG tablet Take 1 tablet by mouth Daily. 30 tablet 2 Taking    midodrine (PROAMATINE) 5 MG tablet Take 1 tablet by mouth 3 (Three) Times a Day Before Meals. (Patient taking differently: Take 0.5 tablets by mouth 3 (Three) Times a Day Before Meals.) 90 tablet 0 Taking Differently    multivitamin with minerals (MULTIVITAMIN ADULT PO) Take 1 tablet by mouth Daily.   Taking    pantoprazole (PROTONIX) 40 MG EC tablet Take 1 tablet by mouth 2 (Two) Times a Day Before Meals.   Taking     pentoxifylline (TRENtal) 400 MG CR tablet Take 1 tablet by mouth 3 (Three) Times a Day With Meals for 74 doses. (Patient taking differently: Take 200 mg by mouth 3 (Three) Times a Day With Meals.) 74 tablet 0 Taking Differently    sucralfate (Carafate) 1 g tablet Take 1 tablet by mouth 4 (Four) Times a Day. 120 tablet 2 Taking    thiamine (VITAMIN B1) 100 MG tablet Take 1 tablet by mouth Daily. 90 tablet 1 Taking    vitamin C (ASCORBIC ACID) 250 MG tablet Take 1 tablet by mouth Daily.   Taking    ferrous sulfate 324 (65 Fe) MG tablet delayed-release EC tablet Take 1 tablet by mouth Daily. At noon       Vitamin D, Cholecalciferol, (CHOLECALCIFEROL) 10 MCG (400 UNIT) tablet Take 1 tablet by mouth Daily.       zinc sulfate (ZINCATE) 220 (50 Zn) MG capsule Take 1 capsule by mouth Daily.                Review of Systems:  Limited due to altered mental status      Physical Exam:  Temp:  [94.2 °F (34.6 °C)-97.6 °F (36.4 °C)] 94.5 °F (34.7 °C)  Heart Rate:  [69-86] 69  Resp:  [15-20] 18  BP: (100-125)/(56-77) 102/56    General: confused  HEENT: scleral icteurs.  Dry oral mucosa.  PERRLA.  EOMI.  Clear pharynx.  Lungs: Clear to auscultation bilaterally.  No wheezing, rales or rhonchi.  Heart: Regular rate and rhythm.  Normal S1 and S2, no S3, S4 or murmur.  Abdomen: didstended, nontender, no rebound or guarding  Extremities: No cyanosis, edema or pulse deficits.  Skin: Jaundice      Results Review:  Lab Results (last 24 hours)       Procedure Component Value Units Date/Time    Bilirubin, Direct [[id number]]  (Abnormal) Collected: 06/24/25 0601    Specimen: Blood Updated: 06/24/25 0807     Bilirubin, Direct 20.1 mg/dL     Body Fluid Culture - Body Fluid, Peritoneum [[id number]] Collected: 06/23/25 1403    Specimen: Body Fluid from Peritoneum Updated: 06/24/25 0800     Body Fluid Culture No growth     Gram Stain No organisms seen    Ferritin [[id number]]  (Normal) Collected: 06/24/25 0601    Specimen: Blood Updated: 06/24/25 0750      Ferritin 271.10 ng/mL     Narrative:      Results may be falsely decreased if patient taking Biotin.      Comprehensive Metabolic Panel [[id number]]  (Abnormal) Collected: 06/24/25 0601    Specimen: Blood Updated: 06/24/25 0748     Glucose 93 mg/dL      BUN 14.1 mg/dL      Creatinine <0.17 mg/dL      Sodium 136 mmol/L      Potassium 3.5 mmol/L      Comment: Specimen hemolyzed.  Result may be falsely elevated.        Chloride 108 mmol/L      CO2 19.0 mmol/L      Calcium 8.7 mg/dL      Total Protein 4.6 g/dL      Albumin 2.4 g/dL      ALT (SGPT) 54 U/L      Comment: Specimen hemolyzed.  Result may  be falsely elevated.        AST (SGOT) 140 U/L      Comment: Specimen hemolyzed.  Result may be falsely elevated.        Alkaline Phosphatase 179 U/L      Total Bilirubin 29.0 mg/dL      Globulin 2.2 gm/dL      A/G Ratio 1.1 g/dL      BUN/Creatinine Ratio --     Comment: Unable to calculate Bun/Crea Ratio.        Anion Gap 9.0 mmol/L     Narrative:      Unable to Calculate GFR result due to patient's height not being entered.    Lipase [[id number]]  (Abnormal) Collected: 06/24/25 0601    Specimen: Blood Updated: 06/24/25 0747     Lipase 82 U/L      Comment: Specimen hemolyzed.  Results may be falsely decreased.       Magnesium [[id number]]  (Normal) Collected: 06/24/25 0601    Specimen: Blood Updated: 06/24/25 0747     Magnesium 1.9 mg/dL     Potassium [[id number]]  (Normal) Collected: 06/24/25 0601    Specimen: Blood Updated: 06/24/25 0747     Potassium 3.5 mmol/L      Comment: Specimen hemolyzed.  Result may be falsely elevated.       Iron Profile w/o Ferritin [[id number]]  (Abnormal) Collected: 06/24/25 0601    Specimen: Blood Updated: 06/24/25 0745     Iron 130 mcg/dL      Iron Saturation (TSAT) 80 %      Transferrin 109 mg/dL      TIBC 162 mcg/dL     Phosphorus [[id number]]  (Normal) Collected: 06/24/25 0601    Specimen: Blood Updated: 06/24/25 0743     Phosphorus 3.6 mg/dL     Amylase [[id number]]  (Normal) Collected:  06/24/25 0601    Specimen: Blood Updated: 06/24/25 0742     Amylase 29 U/L     Albumin, Fluid - Body Fluid, Peritoneum [[id number]] Collected: 06/23/25 1403    Specimen: Body Fluid from Peritoneum Updated: 06/24/25 0037     Albumin, Fluid <0.20 g/dL     Narrative:      No Reference Ranges Established.    A Serous fluid albumin gradient (serum albumin-fluid) <1.1 g/dL suggests the fluid is an exudate.  Cirrhosis usually results in an ascites fluid albumin gradient >1.1 g/dL.    This test was developed, its performance characteristics determined and judged suitable for clinical purposes by [facility].  It has not been cleared or approved by the FDA.  The laboratory is regulated under CLIA as qualified to perfom high-complexity testing.      STAT Lactic Acid, Reflex [[id number]]  (Normal) Collected: 06/23/25 2040    Specimen: Blood Updated: 06/23/25 2113     Lactate 1.9 mmol/L     STAT Lactic Acid, Reflex [[id number]]  (Abnormal) Collected: 06/23/25 1735    Specimen: Blood Updated: 06/23/25 1811     Lactate 2.2 mmol/L     Basic Metabolic Panel [[id number]]  (Abnormal) Collected: 06/23/25 1735    Specimen: Blood Updated: 06/23/25 1810     Glucose 113 mg/dL      BUN 14.1 mg/dL      Creatinine 0.57 mg/dL      Sodium 139 mmol/L      Potassium 2.9 mmol/L      Chloride 108 mmol/L      CO2 18.0 mmol/L      Calcium 8.5 mg/dL      BUN/Creatinine Ratio 24.7     Anion Gap 13.0 mmol/L      eGFR 127.1 mL/min/1.73     Narrative:      GFR Categories in Chronic Kidney Disease (CKD)              GFR Category          GFR (mL/min/1.73)    Interpretation  G1                    90 or greater        Normal or high (1)  G2                    60-89                Mild decrease (1)  G3a                   45-59                Mild to moderate decrease  G3b                   30-44                Moderate to severe decrease  G4                    15-29                Severe decrease  G5                    14 or less            Kidney failure    (1)In the absence of evidence of kidney disease, neither GFR category G1 or G2 fulfill the criteria for CKD.    eGFR calculation 2021 CKD-EPI creatinine equation, which does not include race as a factor    Phosphorus [[id number]]  (Normal) Collected: 06/23/25 1007    Specimen: Blood from Arm, Right Updated: 06/23/25 1748     Phosphorus 3.1 mg/dL     Body Fluid Cell Count With Differential - Body Fluid, Peritoneum [[id number]] Collected: 06/23/25 1403    Specimen: Body Fluid from Peritoneum Updated: 06/23/25 1524    Narrative:      The following orders were created for panel order Body Fluid Cell Count With Differential - Body Fluid, Peritoneum.  Procedure                               Abnormality         Status                     ---------                               -----------         ------                     Body fluid cell count - ...[[id number]]                      Final result               Body fluid differential ...[[id number]]                      Final result                 Please view results for these tests on the individual orders.    Body fluid differential - Body Fluid, Peritoneum [[id number]] Collected: 06/23/25 1403    Specimen: Body Fluid from Peritoneum Updated: 06/23/25 1524     Neutrophils, Fluid % 11 %      Lymphocytes, Fluid % 11 %      Monocytes, Fluid % 73 %      Mesothelial Cells, Fluid % 1 %      Macrophage, Fluid % 4 %     STAT Lactic Acid, Reflex [[id number]]  (Abnormal) Collected: 06/23/25 1417    Specimen: Blood Updated: 06/23/25 1509     Lactate 2.6 mmol/L     Body fluid cell count - Body Fluid, Peritoneum [[id number]] Collected: 06/23/25 1403    Specimen: Body Fluid from Peritoneum Updated: 06/23/25 1426     Color, Fluid Yellow     Appearance, Fluid Clear     RBC, Fluid 1,000 /mm3      Nucleated Cells, Fluid 57 /mm3      Method: Automated Sysmex XN Method    Urinalysis With Culture If Indicated - Urine, Clean Catch [[id number]]  (Abnormal) Collected: 06/23/25 1118     Specimen: Urine, Clean Catch Updated: 06/23/25 1155     Color, UA [place]     Appearance, UA Cloudy     pH, UA 5.5     Specific Gravity, UA 1.018     Glucose, UA Negative     Ketones, UA Negative     Bilirubin, UA Large (3+)     Blood, UA Negative     Protein, UA 30 mg/dL (1+)     Leuk Esterase, UA Moderate (2+)     Nitrite, UA Positive     Urobilinogen, UA 0.2 E.U./dL    Narrative:      Dipstick results may be inaccurate due to color interference.    Urinalysis, Microscopic Only - Urine, Clean Catch [[id number]]  (Abnormal) Collected: 06/23/25 1118    Specimen: Urine, Clean Catch Updated: 06/23/25 1154     RBC, UA 0-2 /HPF      WBC, UA 3-5 /HPF      Comment: Urine culture not indicated.        Bacteria, UA 3+ /HPF      Squamous Epithelial Cells, UA 0-2 /HPF      Hyaline Casts, UA 3-6 /LPF      Mucus, UA Trace /HPF      Methodology Manual Light Microscopy    Urine Drug Screen - Urine, Clean Catch [[id number]]  (Abnormal) Collected: 06/23/25 1118    Specimen: Urine, Clean Catch Updated: 06/23/25 1148     THC, Screen, Urine Negative     Phencyclidine (PCP), Urine Negative     Cocaine Screen, Urine Negative     Methamphetamine, Ur Negative     Opiate Screen Negative     Amphetamine Screen, Urine Negative     Benzodiazepine Screen, Urine Positive     Tricyclic Antidepressants Screen Negative     Methadone Screen, Urine Negative     Barbiturates Screen, Urine Positive     Oxycodone Screen, Urine Negative     Buprenorphine, Screen, Urine Negative    Narrative:      Cutoff For Drugs Screened:    Amphetamines               500 ng/ml  Barbiturates               200 ng/ml  Benzodiazepines            150 ng/ml  Cocaine                    150 ng/ml  Methadone                  200 ng/ml  Opiates                    100 ng/ml  Phencyclidine               25 ng/ml  THC                         50 ng/ml  Methamphetamine            500 ng/ml  Tricyclic Antidepressants  300 ng/ml  Oxycodone                  100 ng/ml  Buprenorphine                10 ng/ml    The normal value for all drugs tested is negative. This report includes unconfirmed screening results, with the cutoff values listed, to be used for medical treatment purposes only.  Unconfirmed results must not be used for non-medical purposes such as employment or legal testing.  Clinical consideration should be applied to any drug of abuse test, particularly when unconfirmed results are used.      Ammonia [[id number]]  (Abnormal) Collected: 06/23/25 1007    Specimen: Blood Updated: 06/23/25 1111     Ammonia 149 umol/L     Blood Culture - Blood, Arm, Right [[id number]] Collected: 06/23/25 1000    Specimen: Blood from Arm, Right Updated: 06/23/25 1100    Blood Culture - Blood, Arm, Left [[id number]] Collected: 06/23/25 1007    Specimen: Blood from Arm, Left Updated: 06/23/25 1100    Comprehensive Metabolic Panel [[id number]]  (Abnormal) Collected: 06/23/25 1007    Specimen: Blood from Arm, Right Updated: 06/23/25 1056     Glucose 151 mg/dL      BUN 14.1 mg/dL      Creatinine 0.71 mg/dL      Sodium 138 mmol/L      Potassium 2.9 mmol/L      Chloride 105 mmol/L      CO2 17.0 mmol/L      Calcium 8.5 mg/dL      Total Protein 5.2 g/dL      Albumin 2.7 g/dL      ALT (SGPT) 61 U/L      AST (SGOT) 133 U/L      Alkaline Phosphatase 221 U/L      Total Bilirubin 33.6 mg/dL      Globulin 2.5 gm/dL      A/G Ratio 1.1 g/dL      BUN/Creatinine Ratio 19.9     Anion Gap 16.0 mmol/L      eGFR 118.9 mL/min/1.73     Narrative:      GFR Categories in Chronic Kidney Disease (CKD)              GFR Category          GFR (mL/min/1.73)    Interpretation  G1                    90 or greater        Normal or high (1)  G2                    60-89                Mild decrease (1)  G3a                   45-59                Mild to moderate decrease  G3b                   30-44                Moderate to severe decrease  G4                    15-29                Severe decrease  G5                    14 or less           Kidney  failure    (1)In the absence of evidence of kidney disease, neither GFR category G1 or G2 fulfill the criteria for CKD.    eGFR calculation 2021 CKD-EPI creatinine equation, which does not include race as a factor    Lactic Acid, Plasma [[id number]]  (Abnormal) Collected: 06/23/25 1007    Specimen: Blood from Arm, Right Updated: 06/23/25 1055     Lactate 4.7 mmol/L     Magnesium [[id number]]  (Normal) Collected: 06/23/25 1007    Specimen: Blood from Arm, Right Updated: 06/23/25 1052     Magnesium 1.9 mg/dL     Ethanol [[id number]] Collected: 06/23/25 1007    Specimen: Blood from Arm, Right Updated: 06/23/25 1049     Ethanol % <0.010 %     Narrative:      Not for legal purposes. Chain of Custody not followed.     Protime-INR [[id number]]  (Abnormal) Collected: 06/23/25 1007    Specimen: Blood from Arm, Right Updated: 06/23/25 1048     Protime 29.4 Seconds      INR 2.60    CBC & Differential [[id number]]  (Abnormal) Collected: 06/23/25 1007    Specimen: Blood from Arm, Right Updated: 06/23/25 1041    Narrative:      The following orders were created for panel order CBC & Differential.  Procedure                               Abnormality         Status                     ---------                               -----------         ------                     CBC Auto Differential[[id number]]        Abnormal            Final result                 Please view results for these tests on the individual orders.    CBC Auto Differential [[id number]]  (Abnormal) Collected: 06/23/25 1007    Specimen: Blood from Arm, Right Updated: 06/23/25 1041     WBC 12.72 10*3/mm3      RBC 2.79 10*6/mm3      Hemoglobin 9.6 g/dL      Hematocrit 29.2 %      .7 fL      MCH 34.4 pg      MCHC 32.9 g/dL      RDW 21.6 %      RDW-SD 83.7 fl      MPV 11.2 fL      Platelets 128 10*3/mm3      Neutrophil % 79.4 %      Lymphocyte % 9.7 %      Monocyte % 8.1 %      Eosinophil % 1.1 %      Basophil % 0.6 %      Immature Grans % 1.1 %      Neutrophils,  Absolute 10.10 10*3/mm3      Lymphocytes, Absolute 1.23 10*3/mm3      Monocytes, Absolute 1.03 10*3/mm3      Eosinophils, Absolute 0.14 10*3/mm3      Basophils, Absolute 0.08 10*3/mm3      Immature Grans, Absolute 0.14 10*3/mm3      nRBC 0.0 /100 WBC     [place] Draw [[id number]] Collected: 06/23/25 1007    Specimen: Blood from Arm, Right Updated: 06/23/25 1031    Narrative:      The following orders were created for panel order [place] Draw.  Procedure                               Abnormality         Status                     ---------                               -----------         ------                     Green Top (Gel)[[id number]]                                  Final result               Lavender Top[[id number]]                                     Final result               Red Top[[id number]]                                          Final result               Gaming Top[[id number]]                                         Final result               Light Blue Top[[id number]]                                   Final result                 Please view results for these tests on the individual orders.    Red Top [[id number]] Collected: 06/23/25 1007    Specimen: Blood from Arm, Right Updated: 06/23/25 1031     Extra Tube Hold for add-ons.     Comment: Auto resulted.       Light Blue Top [[id number]] Collected: 06/23/25 1007    Specimen: Blood from Arm, Right Updated: 06/23/25 1031     Extra Tube Hold for add-ons.     Comment: Auto resulted       Green Top (Gel) [[id number]] Collected: 06/23/25 1007    Specimen: Blood from Arm, Right Updated: 06/23/25 1031     Extra Tube Hold for add-ons.     Comment: Auto resulted.       Lavender Top [[id number]] Collected: 06/23/25 1007    Specimen: Blood from Arm, Right Updated: 06/23/25 1031     Extra Tube hold for add-on     Comment: Auto resulted       Gray Top [[id number]] Collected: 06/23/25 1007    Specimen: Blood from Arm, Right Updated: 06/23/25 1031     Extra Tube Hold for  add-ons.     Comment: Auto resulted.       POC Glucose Once [[id number]]  (Abnormal) Collected: 06/23/25 0957    Specimen: Blood Updated: 06/23/25 1010     Glucose 155 mg/dL      Comment: : Bridgett Malone ID: [id number]                 Radiology Review:  Imaging Results (Last 72 Hours)       Procedure Component Value Units Date/Time    US Abdomen Limited [[id number]] Collected: 06/23/25 1858     Updated: 06/23/25 1902    Narrative:      EXAM/TECHNIQUE: US ABDOMEN LIMITED-     INDICATION: Please evaluate any biliary duct obstruction; K76.82-Hepatic  encephalopathy; K70.31-Alcoholic cirrhosis of liver with ascites;  E87.6-Hypokalemia; D64.9-Anemia, unspecified; R17-Unspecified jaundice     COMPARISON: None available.     FINDINGS:     Visualized portion of the abdominal aorta and IVC are unremarkable.     Visualized portion of the pancreas appears normal.     Coarsened nodular liver, in keeping with cirrhosis. Liver echogenicity  is within normal limits. No suspicious liver lesion identified.  Perihepatic ascites.     Multiple gallstones in the gallbladder lumen. No gallbladder wall  thickening. No biliary ductal dilatation. Common bile duct is 6 mm  diameter.     Right kidney is 13.7 cm in length and demonstrates normal cortical  thickness and echogenicity. No shadowing renal calculi or  hydronephrosis.       Impression:      1.  Cirrhotic liver. Perihepatic ascites.  2.  Gallstones are present but no evidence of acute cholecystitis. No  biliary ductal dilatation.     This report was signed and finalized on 6/23/2025 6:59 PM by Dr. Reinaldo Barry MD.       CT Head Without Contrast [[id number]] Collected: 06/23/25 1132     Updated: 06/23/25 1142    Narrative:      EXAMINATION: CT HEAD WO CONTRAST-        HISTORY:Altered mental status     In order to have a CT radiation dose as low as reasonably achievable  Automated Exposure Control was utilized for adjustment of the mA and/or  KV according to  patient size.     Total DLP = 934.85 mGy.cm     The CT scan of the head is performed without intravenous contrast  enhancement.     The images are acquired in axial plane and subsequent reconstruction in  coronal and sagittal planes.     The comparison is made with the previous study dated 5/28/2025.     There is no evidence of a mass. There is no midline shift.     There is no evidence of intracranial hemorrhage or hematoma.     The ventricles, the basal cisterns and the cortical sulci are normal.     The gray-white matter differentiation is maintained.     The posterior fossa structures appear unremarkable as visualized.  Low-density area located anteroinferiorly in the zoltan seen in the  sagittal plane images may be artifactual due to adjacent dense bone and  pulsation of the basilar artery?.     Images reviewed in bone windows show no acute skull fracture. Limited  visualized paranasal sinuses and mastoid air cells are clear.       Impression:      1. A small ill-defined low-density area located in the anterior inferior  zoltan which is only visualized in the sagittal plane images may be  artifactual. If clinically warranted, further evaluation with MR imaging  of the brain with particular attention to this area may be obtained.  2. The remaining brain is unremarkable as detailed above.                                         This report was signed and finalized on 6/23/2025 11:39 AM by Dr. Vj Hsu MD.       XR Chest 1 View [[id number]] Collected: 06/23/25 1132     Updated: 06/23/25 1135    Narrative:      EXAM: XR CHEST 1 VW-         HISTORY: Fever       COMPARISON: 5/29/2025.     TECHNIQUE:  Frontal view(s) of the chest submitted.     FINDINGS:    Right IJ central venous catheter is been removed. There are low lung  volumes with vascular crowding and probable volume overload/edema. No  effusion or pneumothorax is seen. Heart and mediastinum are  unremarkable.          Impression:         1. Low lung  volumes with vascular crowding and probable volume  overload/edema.  2. Removal of the right IJ central venous catheter.     This report was signed and finalized on 6/23/2025 11:32 AM by Óscar Orozco.               Impression:  -Decompensated cirrhosis with history of alcoholic hepatitis. MELD 30 and discriminant function 113 on admission. Overall, extremely poor prognosis with high mortality rate.     -History of alcohol abuse with relapse. Last alcohol use 3-4 weeks ago per family.     -Ulcerative esophagitis. Recent upper GI bleed secondary to esophageal ulcer and severe esophagitis.     -Altered mental status with hepatic encephalopathy.       Plan:  -Continue supportive care. Replace electrolytes as needed. Will give dose of vitamin K with his elevated PT/INR, but this may not help much. Continue Pantoprazole 40 mg twice daily. Monitor for any overt GI bleeding. Will not start on steroids with history of recent upper GI bleed and esophageal ulcers. He has previously been treated with Prednisone for alcoholic hepatitis. Can try adding Pentoxifylline, although may not have much benefit with the severity of his liver disease. Unfortunately, not much more to add from GI standpoint outside of transfer to liver transplant center. ER discussed case with [facility] and transfer was denied to to relapse and ongoing alcohol use. Can try reaching out to [facility] .      Beltran Matson MD  06/24/25   09:37 CDT

## 2025-06-25 PROBLEM — K22.10 ULCERATIVE ESOPHAGITIS: Status: ACTIVE | Noted: 2025-01-01

## 2025-06-25 NOTE — PROGRESS NOTES
"Patient Name: Luciano Christiansen  Date of Admission: 6/23/2025  Today's Date: 06/25/25  Length of Stay: 2  Primary Care Physician: Adrien Varghese MD    Subjective   Chief Complaint: follow-up encephalopathy  HPI   Patient is much more alert this morning.  His dad is at bedside.  He is trying to eat some breakfast.  He does not recall seeing me over the course of the past couple of days.  He indicated to me that his last drink was about 3 weeks ago, \"if not longer.\"  He also indicated \"I thought things were getting better.\"    Review of Systems   All pertinent negatives and positives are as above. All other systems have been reviewed and are negative unless otherwise stated.     Objective    Temp:  [94.5 °F (34.7 °C)-97.7 °F (36.5 °C)] 94.5 °F (34.7 °C)  Heart Rate:  [63-75] 72  Resp:  [18] 18  BP: (109-133)/(49-70) 118/61  Physical Exam  Vitals reviewed.   Constitutional:       Appearance: He is obese. He is ill-appearing.   HENT:      Head: Normocephalic.   Eyes:      General: Scleral icterus present.   Pulmonary:      Effort: Pulmonary effort is normal. No respiratory distress.      Comments: Diminished at bases  Abdominal:      General: There is distension.      Tenderness: There is no abdominal tenderness.   Skin:     Coloration: Skin is jaundiced.   Neurological:      Motor: Weakness present.      Comments: Much more awake and alert today; still drowsy but able to answer questions and follow commands this morning.           Results Review:  I have reviewed the labs, radiology results, and diagnostic studies.    Laboratory Data:   Results from last 7 days   Lab Units 06/25/25  0602 06/23/25  1007   WBC 10*3/mm3 9.75 12.72*   HEMOGLOBIN g/dL 9.1* 9.6*   HEMATOCRIT % 27.2* 29.2*   PLATELETS 10*3/mm3 117* 128*        Results from last 7 days   Lab Units 06/25/25  0602 06/24/25  0601 06/23/25  1735 06/23/25  1007   SODIUM mmol/L 138 136 139 138   POTASSIUM mmol/L 3.0* 3.5  3.5 2.9* 2.9*   CHLORIDE mmol/L 109* 108* 108* " 105   CO2 mmol/L 19.0* 19.0* 18.0* 17.0*   BUN mg/dL 14.9 14.1 14.1 14.1   CREATININE mg/dL 0.55* <0.17* 0.57* 0.71*   CALCIUM mg/dL 8.9 8.7 8.5* 8.5*   BILIRUBIN mg/dL 32.0* 29.0*  --  33.6*   ALK PHOS U/L 199* 179*  --  221*   ALT (SGPT) U/L 58* 54*  --  61*   AST (SGOT) U/L 140* 140*  --  133*   GLUCOSE mg/dL 113* 93 113* 151*       Culture Data:     Microbiology Results (last 10 days)       Procedure Component Value - Date/Time    Body Fluid Culture - Body Fluid, Peritoneum [[id number]] Collected: 06/23/25 1403    Lab Status: Preliminary result Specimen: Body Fluid from Peritoneum Updated: 06/25/25 0614     Body Fluid Culture No growth at 2 days     Gram Stain No organisms seen    Blood Culture - Blood, Arm, Left [[id number]]  (Normal) Collected: 06/23/25 1007    Lab Status: Preliminary result Specimen: Blood from Arm, Left Updated: 06/24/25 1102     Blood Culture No growth at 24 hours    Blood Culture - Blood, Arm, Right [[id number]]  (Normal) Collected: 06/23/25 1000    Lab Status: Preliminary result Specimen: Blood from Arm, Right Updated: 06/24/25 1102     Blood Culture No growth at 24 hours             Radiology Data:   Imaging Results (Last 7 Days)       Procedure Component Value Units Date/Time    US Abdomen Limited [[id number]] Collected: 06/23/25 1858     Updated: 06/23/25 1902    Narrative:      EXAM/TECHNIQUE: US ABDOMEN LIMITED-     INDICATION: Please evaluate any biliary duct obstruction; K76.82-Hepatic  encephalopathy; K70.31-Alcoholic cirrhosis of liver with ascites;  E87.6-Hypokalemia; D64.9-Anemia, unspecified; R17-Unspecified jaundice     COMPARISON: None available.     FINDINGS:     Visualized portion of the abdominal aorta and IVC are unremarkable.     Visualized portion of the pancreas appears normal.     Coarsened nodular liver, in keeping with cirrhosis. Liver echogenicity  is within normal limits. No suspicious liver lesion identified.  Perihepatic ascites.     Multiple gallstones in the  gallbladder lumen. No gallbladder wall  thickening. No biliary ductal dilatation. Common bile duct is 6 mm  diameter.     Right kidney is 13.7 cm in length and demonstrates normal cortical  thickness and echogenicity. No shadowing renal calculi or  hydronephrosis.       Impression:      1.  Cirrhotic liver. Perihepatic ascites.  2.  Gallstones are present but no evidence of acute cholecystitis. No  biliary ductal dilatation.     This report was signed and finalized on 6/23/2025 6:59 PM by Dr. Reinaldo Barry MD.       CT Head Without Contrast [[id number]] Collected: 06/23/25 1132     Updated: 06/23/25 1142    Narrative:      EXAMINATION: CT HEAD WO CONTRAST-        HISTORY:Altered mental status     In order to have a CT radiation dose as low as reasonably achievable  Automated Exposure Control was utilized for adjustment of the mA and/or  KV according to patient size.     Total DLP = 934.85 mGy.cm     The CT scan of the head is performed without intravenous contrast  enhancement.     The images are acquired in axial plane and subsequent reconstruction in  coronal and sagittal planes.     The comparison is made with the previous study dated 5/28/2025.     There is no evidence of a mass. There is no midline shift.     There is no evidence of intracranial hemorrhage or hematoma.     The ventricles, the basal cisterns and the cortical sulci are normal.     The gray-white matter differentiation is maintained.     The posterior fossa structures appear unremarkable as visualized.  Low-density area located anteroinferiorly in the zoltan seen in the  sagittal plane images may be artifactual due to adjacent dense bone and  pulsation of the basilar artery?.     Images reviewed in bone windows show no acute skull fracture. Limited  visualized paranasal sinuses and mastoid air cells are clear.       Impression:      1. A small ill-defined low-density area located in the anterior inferior  zoltan which is only visualized in the  sagittal plane images may be  artifactual. If clinically warranted, further evaluation with MR imaging  of the brain with particular attention to this area may be obtained.  2. The remaining brain is unremarkable as detailed above.                                         This report was signed and finalized on 6/23/2025 11:39 AM by Dr. Vj Hsu MD.       XR Chest 1 View [[id number]] Collected: 06/23/25 1132     Updated: 06/23/25 1135    Narrative:      EXAM: XR CHEST 1 VW-         HISTORY: Fever       COMPARISON: 5/29/2025.     TECHNIQUE:  Frontal view(s) of the chest submitted.     FINDINGS:    Right IJ central venous catheter is been removed. There are low lung  volumes with vascular crowding and probable volume overload/edema. No  effusion or pneumothorax is seen. Heart and mediastinum are  unremarkable.          Impression:         1. Low lung volumes with vascular crowding and probable volume  overload/edema.  2. Removal of the right IJ central venous catheter.     This report was signed and finalized on 6/23/2025 11:32 AM by Óscar Orozco.                I have reviewed the patient's current medications.     ampicillin, 2 g, Intravenous, Q6H  lactulose, 20 g, Oral, TID  levothyroxine, 75 mcg, Oral, Q AM  midodrine, 5 mg, Oral, TID AC  pantoprazole, 40 mg, Intravenous, BID AC  pentoxifylline, 400 mg, Oral, TID With Meals  potassium chloride, 40 mEq, Oral, Q4H  sodium chloride, 10 mL, Intravenous, Q12H  sucralfate, 1 g, Oral, 4x Daily AC & at Bedtime  thiamine, 100 mg, Oral, Daily       Assessment/Plan   Assessment  Active Hospital Problems    Diagnosis     **Hepatic encephalopathy     Ulcerative esophagitis     Liver failure     Acute UTI (urinary tract infection)     Alcoholic cirrhosis     History of hepatitis C     Jaundice     Alcoholism     Hypokalemia     Class 1 obesity due to excess calories without serious comorbidity with body mass index (BMI) of 34.0 to 34.9 in adult        Treatment  Plan  Hepatic encephalopathy/alcoholic decompensated cirrhosis/hepatitis C/jaundice/concern for liver failure  - Continue PO Lactulose  - GI following and appreciate their assistance  - Continue pentoxifylline  - Recent MELD score of 30  - Madrey's discriminant function score of 116.  Unfortunately, patient had also been treated with prednisone in the past during a hospitalization for acute alcoholic hepatitis and at that time his hospital course was complicated by gastrointestinal bleed thought to be related to esophageal ulcers.  Currently not giving treatment with steroids given the circumstance.  - Paracentesis completed in the emergency department yesterday; ascites fluid studies do not appear consistent with SBP.  - Attempts were made to transfer patient to a tertiary [facility] from the emergency department to include evaluation by hepatology.  These efforts were unsuccessful.  - Patient's father was at bedside this morning and case also discussed with him.  Brant discussion with patient and father regarding prognosis.  - Palliative care following and appreciate their assistance.  - Plan to repeat labs in the morning tomorrow including a CMP, coags, ammonia level       Hypokalemia  - Continue K replacement  -repeat labs in AM including Mg     Alcoholism  - Per report patient's last EtOH intake occurred approximately 3 weeks ago.  Patient evidently had also been on Librium in the outpatient setting.  This medication will have to remain on hold given his somnolence and lethargy     Possible UTI  - Urinalysis with moderate leukocytes, positive nitrates, 3-5 WBC and 3+ bacteria  - Recent positive urine culture for Enterococcus faecalis, reviewed susceptibilities with pharmacist to initiate antibiotics safest for patient's current state.  Continue ampicillin.  Upon review again this morning he does not look like a urine culture is in process.  Plan for a short course of empiric antibiotics.       Medical  Decision Making    5 acute on chronic, high complexity, worsening  2 acute, high complexity, unchanged      Number and Complexity of problems: 7  Differential Diagnosis: None    Conditions and Status        Condition is unchanged.     [facility] Data  External documents reviewed: None  Cardiac tracing (EKG, telemetry) interpretation: no new EKGs  Radiology interpretation: no new radiology studies  Labs reviewed: as above  Any tests that were considered but not ordered: None     Decision rules/scores evaluated (example [id number], Wells, etc): MELD Score (Original, Pre-2016, Model for End-Stage Liver Disease) - MDCalc  Calculated on Jun 23 2025 7:03 PM  30 points -> Original MELD Score (Pre-2016)*  52.6% -> Estimated 3-Month Mortality     Discussed with: patient and father at bedside; bedside nurse Aleisha     Care Planning  Shared decision making: mother  Code status and discussions: no cpr  Surrogate Decision Maker Mother Mraisa    Disposition  Social Determinants of Health that impact treatment or disposition: Possible need for end of life care   I expect the patient to be discharged to TBD in TBD days. Very guarded prognosis.    Electronically signed by Zoltan Boateng MD-BC, 06/25/25, 09:41 CDT.

## 2025-06-25 NOTE — PLAN OF CARE
Problem: Adult Inpatient Plan of Care  Goal: Plan of Care Review  Outcome: Progressing  Flowsheets (Taken 6/25/2025 0348)  Progress: no change  Outcome Evaluation: Able to answer orientation questions appropriately. Some confusion still noted. INC of urine and stool. Drinking fluids with no issues. Rested well during the night.  Goal: Patient-Specific Goal (Individualized)  Outcome: Progressing  Goal: Absence of Hospital-Acquired Illness or Injury  Outcome: Progressing  Intervention: Identify and Manage Fall Risk  Recent Flowsheet Documentation  Taken 6/25/2025 0300 by Flor Marmolejo RN  Safety Promotion/Fall Prevention:   nonskid shoes/slippers when out of bed   safety round/check completed  Taken 6/25/2025 0200 by Flor Marmolejo RN  Safety Promotion/Fall Prevention:   nonskid shoes/slippers when out of bed   safety round/check completed  Taken 6/25/2025 0100 by Flor Marmolejo RN  Safety Promotion/Fall Prevention:   nonskid shoes/slippers when out of bed   safety round/check completed  Taken 6/24/2025 2300 by Flor Marmolejo RN  Safety Promotion/Fall Prevention:   nonskid shoes/slippers when out of bed   safety round/check completed  Taken 6/24/2025 2105 by Flor Marmolejo RN  Safety Promotion/Fall Prevention:   safety round/check completed   fall prevention program maintained   assistive device/personal items within reach   clutter free environment maintained   nonskid shoes/slippers when out of bed  Intervention: Prevent Skin Injury  Recent Flowsheet Documentation  Taken 6/25/2025 0300 by Flor Marmolejo RN  Body Position:   turned   left  Taken 6/25/2025 0100 by Flor Marmolejo RN  Body Position:   turned   supine  Taken 6/24/2025 2300 by Flor Marmolejo RN  Body Position:   turned   right  Taken 6/24/2025 2105 by Flor Marmolejo RN  Body Position: supine  Goal: Optimal Comfort and Wellbeing  Outcome: Progressing  Intervention: Provide Person-Centered Care  Recent Flowsheet  Documentation  Taken 6/24/2025 2105 by Flor Marmolejo, RN  Trust Relationship/Rapport: care explained  Goal: Readiness for Transition of Care  Outcome: Progressing   Goal Outcome Evaluation:           Progress: no change  Outcome Evaluation: Able to answer orientation questions appropriately. Some confusion still noted. INC of urine and stool. Drinking fluids with no issues. Rested well during the night.

## 2025-06-25 NOTE — PLAN OF CARE
Problem: Adult Inpatient Plan of Care  Goal: Absence of Hospital-Acquired Illness or Injury  Intervention: Identify and Manage Fall Risk  Recent Flowsheet Documentation  Taken 6/25/2025 1600 by Ashvin Jacobs RNA  Safety Promotion/Fall Prevention: safety round/check completed  Taken 6/25/2025 1500 by Ashvin Jacobs RNA  Safety Promotion/Fall Prevention: safety round/check completed  Taken 6/25/2025 1400 by Ashvin Jacobs RNA  Safety Promotion/Fall Prevention: safety round/check completed  Taken 6/25/2025 1300 by Ashvin Jacobs RNA  Safety Promotion/Fall Prevention: safety round/check completed  Taken 6/25/2025 1200 by Ashvin Jacobs RNA  Safety Promotion/Fall Prevention: safety round/check completed  Taken 6/25/2025 1100 by Ashvin Jacobs RNA  Safety Promotion/Fall Prevention: safety round/check completed  Taken 6/25/2025 1000 by Ashvin Jacobs RNA  Safety Promotion/Fall Prevention: safety round/check completed  Taken 6/25/2025 0900 by Ashvin Jacobs RNA  Safety Promotion/Fall Prevention: safety round/check completed  Taken 6/25/2025 0800 by Ashvin Jacobs RNA  Safety Promotion/Fall Prevention: safety round/check completed  Taken 6/25/2025 0730 by Ashvin Jacobs RNA  Safety Promotion/Fall Prevention: safety round/check completed  Taken 6/25/2025 0700 by Ashvin Jacobs RNA  Safety Promotion/Fall Prevention: safety round/check completed  Intervention: Prevent Skin Injury  Recent Flowsheet Documentation  Taken 6/25/2025 0900 by Ashvin Jacobs RNA  Body Position: supine  Intervention: Prevent Infection  Recent Flowsheet Documentation  Taken 6/25/2025 0900 by Ashvin Jacobs RNA  Infection Prevention: single patient room provided  Goal: Optimal Comfort and Wellbeing  Intervention: Provide Person-Centered Care  Recent Flowsheet Documentation  Taken 6/25/2025 0900 by Ashvin Jacobs RNA  Trust Relationship/Rapport:   care explained   choices provided   thoughts/feelings acknowledged     Problem: Fall Injury  Risk  Goal: Absence of Fall and Fall-Related Injury  Intervention: Identify and Manage Contributors  Recent Flowsheet Documentation  Taken 6/25/2025 0900 by Ashvin Jacobs RNA  Medication Review/Management: medications reviewed  Intervention: Promote Injury-Free Environment  Recent Flowsheet Documentation  Taken 6/25/2025 1600 by Ashvin Jacobs RNA  Safety Promotion/Fall Prevention: safety round/check completed  Taken 6/25/2025 1500 by Ashvin Jacobs RNA  Safety Promotion/Fall Prevention: safety round/check completed  Taken 6/25/2025 1400 by Ashvin Jacobs RNA  Safety Promotion/Fall Prevention: safety round/check completed  Taken 6/25/2025 1300 by Ashvin Jacobs RNA  Safety Promotion/Fall Prevention: safety round/check completed  Taken 6/25/2025 1200 by Ashvin Jacobs RNA  Safety Promotion/Fall Prevention: safety round/check completed  Taken 6/25/2025 1100 by Ashvin Jacobs RNA  Safety Promotion/Fall Prevention: safety round/check completed  Taken 6/25/2025 1000 by Ashvin Jacobs RNA  Safety Promotion/Fall Prevention: safety round/check completed  Taken 6/25/2025 0900 by Ashvin Jacobs RNA  Safety Promotion/Fall Prevention: safety round/check completed  Taken 6/25/2025 0800 by Ashvin Jacobs RNA  Safety Promotion/Fall Prevention: safety round/check completed  Taken 6/25/2025 0730 by Ashvin Jacobs RNA  Safety Promotion/Fall Prevention: safety round/check completed  Taken 6/25/2025 0700 by Ashvin Jacobs RNA  Safety Promotion/Fall Prevention: safety round/check completed     Problem: Comorbidity Management  Goal: Blood Glucose Level Within Target Range  Intervention: Monitor and Manage Glycemia  Recent Flowsheet Documentation  Taken 6/25/2025 0900 by Ashvin Jacobs RNA  Medication Review/Management: medications reviewed  Goal: Maintenance of Heart Failure Symptom Control  Intervention: Maintain Heart Failure Management  Recent Flowsheet Documentation  Taken 6/25/2025 0900 by Ashvin Jacobs  RNA  Medication Review/Management: medications reviewed  Goal: Blood Pressure in Desired Range  Intervention: Maintain Blood Pressure Management  Recent Flowsheet Documentation  Taken 6/25/2025 0900 by Ashvin Jacobs RNA  Medication Review/Management: medications reviewed     Problem: Violence Risk or Actual  Goal: Anger and Impulse Control  Intervention: Minimize Safety Risk  Recent Flowsheet Documentation  Taken 6/25/2025 0900 by Ashvin Jacobs RNA  Enhanced Safety Measures:  at bedside     Problem: Skin Injury Risk Increased  Goal: Skin Health and Integrity  Intervention: Optimize Skin Protection  Recent Flowsheet Documentation  Taken 6/25/2025 0900 by Ashvin Jacobs RNA  Pressure Reduction Techniques:   frequent weight shift encouraged   weight shift assistance provided  Head of Bed (HOB) Positioning: HOB at 20-30 degrees  Pressure Reduction Devices: pressure-redistributing mattress utilized   Goal Outcome Evaluation: Safety maintained, call light in reach. No complaints of pain.

## 2025-06-25 NOTE — PROGRESS NOTES
[facility]ology  Inpatient Progress Note  Today's date:  06/25/25    Luciano Christiansen  1985       Reason for Follow Up:   Cirrhosis    Subjective:   More awake today. No other new issues overnight.       Current Facility-Administered Medications:     ampicillin 2000 mg IVPB in 100 mL NS (MBP), 2 g, Intravenous, Q6H, Nydia Zurita APRN, Last Rate: 200 mL/hr at 06/25/25 0533, 2 g at 06/25/25 0533    Calcium Replacement - Follow Nurse / BPA Driven Protocol, , Not Applicable, PRN, Nydia Zurita APRN    lactulose solution 20 g, 20 g, Oral, TID, Zoltan Boateng MD, 20 g at 06/25/25 0945    levothyroxine (SYNTHROID, LEVOTHROID) tablet 75 mcg, 75 mcg, Oral, Q AM, Zoltan Boateng MD, 75 mcg at 06/25/25 0533    Magnesium Cardiology Dose Replacement - Follow Nurse / BPA Driven Protocol, , Not Applicable, PRN, Nydia Zurita APRN    midodrine (PROAMATINE) tablet 5 mg, 5 mg, Oral, TID Kilo CUMMINS Benjamin H, MD, 5 mg at 06/25/25 0945    nitroglycerin (NITROSTAT) SL tablet 0.4 mg, 0.4 mg, Sublingual, Q5 Min PRN, Nydia Zurita APRN    ondansetron ODT (ZOFRAN-ODT) disintegrating tablet 4 mg, 4 mg, Oral, Q6H PRN **OR** ondansetron (ZOFRAN) injection 4 mg, 4 mg, Intravenous, Q6H PRN, Zoltan Boateng MD    pantoprazole (PROTONIX) injection 40 mg, 40 mg, Intravenous, BID Kilo CUMMINS Benjamin H, MD, 40 mg at 06/25/25 0945    pentoxifylline (TRENtal) CR tablet 400 mg, 400 mg, Oral, TID With Meals, Beltran Matson MD, 400 mg at 06/25/25 0945    Phosphorus Replacement - Follow Nurse / BPA Driven Protocol, , Not Applicable, PRN, Nydia Zurita APRN    potassium chloride (KLOR-CON M20) CR tablet 40 mEq, 40 mEq, Oral, Q4H, Zoltan Boateng MD, 40 mEq at 06/25/25 0945    Potassium Replacement - Follow Nurse / BPA Driven Protocol, , Not Applicable, PRN, Nydia APRN    sodium chloride 0.9 % flush 10 mL, 10 mL, Intravenous, Q12H, Nydia Zurita APRN, 10 mL at  06/25/25 0945    sodium chloride 0.9 % flush 10 mL, 10 mL, Intravenous, PRN, Nydia APRN    sodium chloride 0.9 % infusion 40 mL, 40 mL, Intravenous, PRN, Nydia Zurita APRN, 40 mL at 06/23/25 2221    sucralfate (CARAFATE) tablet 1 g, 1 g, Oral, 4x Daily AC & at Bedtime, Zoltan Boateng MD, 1 g at 06/25/25 0945    thiamine (VITAMIN B-1) tablet 100 mg, 100 mg, Oral, Daily, Zoltan Boateng MD, 100 mg at 06/25/25 0945      Vital Signs:  Temp:  [94.5 °F (34.7 °C)-97.7 °F (36.5 °C)] 97.4 °F (36.3 °C)  Heart Rate:  [67-75] 75  Resp:  [18] 18  BP: (109-133)/(52-70) 122/52    Physical Exam:  General: no acute distress, awake but slowed responses  HEENT: perrl, sclera icterus  CV: rrr, no murmur  Resp: lungs clear to auscultation, no wheeze or rhonchi  Abd: mild distendtion, nontender, no rebound our guarding  Skin: no rash, jaundice     Results Review:   I have reviewed all of the patient's current test results    Results from last 7 days   Lab Units 06/25/25  0602 06/23/25  1007   WBC 10*3/mm3 9.75 12.72*   HEMOGLOBIN g/dL 9.1* 9.6*   HEMATOCRIT % 27.2* 29.2*   PLATELETS 10*3/mm3 117* 128*       Results from last 7 days   Lab Units 06/25/25  0602 06/24/25  0601 06/23/25  1735 06/23/25  1007   SODIUM mmol/L 138 136 139 138   POTASSIUM mmol/L 3.0* 3.5  3.5 2.9* 2.9*   CHLORIDE mmol/L 109* 108* 108* 105   CO2 mmol/L 19.0* 19.0* 18.0* 17.0*   BUN mg/dL 14.9 14.1 14.1 14.1   CREATININE mg/dL 0.55* <0.17* 0.57* 0.71*   CALCIUM mg/dL 8.9 8.7 8.5* 8.5*   BILIRUBIN mg/dL 32.0* 29.0*  --  33.6*   ALK PHOS U/L 199* 179*  --  221*   ALT (SGPT) U/L 58* 54*  --  61*   AST (SGOT) U/L 140* 140*  --  133*   GLUCOSE mg/dL 113* 93 113* 151*       Results from last 7 days   Lab Units 06/25/25  0602 06/24/25  0601 06/23/25  1007   INR  2.81* 2.79* 2.60*       Impression:  -Decompensated cirrhosis with history of alcoholic hepatitis. MELD 30 and discriminant function 113 on admission. Overall, extremely poor  prognosis with high mortality rate.      -History of alcohol abuse with relapse. Last alcohol use 3-4 weeks ago per family.      -Ulcerative esophagitis. Recent upper GI bleed secondary to esophageal ulcer and severe esophagitis.      -Altered mental status with hepatic encephalopathy.     Plan:  -Continue current treatment. I will place call to [facility] liver transplant team to see if they will accept him as a transfer. Extremely poor prognosis without liver transplant. Discussed this with patient and his father at the bedside.    Beltran Matson MD  06/25/25  12:19 CDT      Addendum:  I spoke with transplant hepatology and transplant surgery at [facility] center this afternoon. Not a candidate for liver transplant with history of ongoing alcohol abuse after multiple admissions with diagnosis of known cirrhosis and alcoholic hepatitis.    Unfortunately, not much more to add at this time. Can continue Pentoxifylline, although may not have much benefit at this point.. Steroids not started as he was previously treated with this and had recent upper GI bleed with esophageal ulcers. Will try giving another dose of Vitamin K today with increasing INR, but this may also not be of much benefit with advanced liver disease/failure.

## 2025-06-26 NOTE — CASE MANAGEMENT/SOCIAL WORK
Discharge Planning Assessment  [facility]     Patient Name: Luciano Christiansen  MRN: [id number]  Today's Date: 6/26/2025    Admit Date: 6/23/2025    Plan: Home   Discharge Needs Assessment       Row Name 06/26/25 1209       Living Environment    People in Home parent(s)    Name(s) of People in Home Mother Brooke Cunningham     Current Living Arrangements home    Primary Care Provided by self;parent(s)    Provides Primary Care For no one, unable/limited ability to care for self    Family Caregiver if Needed parent(s)    Family Caregiver Names Mother Brooke Cunningham     Quality of Family Relationships supportive    Able to Return to Prior Arrangements yes       Resource/Environmental Concerns    Transportation Concerns none       Food Insecurity    Within the past 12 months, you worried that your food would run out before you got the money to buy more. Never true    Within the past 12 months, the food you bought just didn't last and you didn't have money to get more. Never true       Transition Planning    Patient/Family Anticipates Transition to home with family    Transportation Anticipated family or friend will provide       Discharge Needs Assessment    Readmission Within the Last 30 Days no previous admission in last 30 days    Equipment Currently Used at Home cane, straight;commode;walker, standard    Concerns to be Addressed discharge planning    Current Discharge Risk chronically ill;substance use/abuse    Discharge Coordination/Progress SW spoke with patient regarding discharge plan.  Patient declines SNF placement at this time and states he is currently staying with his mother who assists in his care.  Patient's father was present at the hospital this am and he is also supportive.  SW following.                   Discharge Plan       Row Name 06/26/25 1209       Plan    Plan Home                       Demographic Summary    No documentation.                  Functional Status    No documentation.                   Psychosocial    No documentation.                  Abuse/Neglect    No documentation.                  Legal    No documentation.                  Substance Abuse    No documentation.                  Patient Forms    No documentation.                     CHAPITO BlairW

## 2025-06-26 NOTE — PLAN OF CARE
Goal Outcome Evaluation:  Plan of Care Reviewed With: patient        Progress: no change  Outcome Evaluation: see note                  Treatment Assessment (SLP): continued (06/26/25 0854)  Treatment Assessment Comments (SLP): see note (06/26/25 0854)  Plan for Continued Treatment (SLP): treatment no longer indicated as all goals met (06/26/25 0854)

## 2025-06-26 NOTE — THERAPY EVALUATION
Patient Name: Luciano Christiansen  : 1985    MRN: [id number]                              Today's Date: 2025       Admit Date: 2025    Visit Dx:     ICD-10-CM ICD-9-CM   1. Hepatic encephalopathy  K76.82 572.2   2. Alcoholic cirrhosis of liver with ascites  K70.31 571.2   3. Hypokalemia  E87.6 276.8   4. Anemia, unspecified type  D64.9 285.9   5. Jaundice  R17 782.4   6. Dysphagia, unspecified type  R13.10 787.20     Patient Active Problem List   Diagnosis    Wellness examination    Encounter for hepatitis C screening test for low risk patient    Class 1 obesity due to excess calories without serious comorbidity with body mass index (BMI) of 34.0 to 34.9 in adult    Fatty liver    Hyponatremia    Hypokalemia    Hypomagnesemia    Alcoholism    Transaminitis    Hypophosphatemia    Insomnia disorder related to known organic factor    Community acquired bilateral lower lobe pneumonia    Pneumonia due to Streptococcus    GI bleed    Calculus of gallbladder without cholecystitis without obstruction    Alcoholic encephalopathy    Alcohol withdrawal    Alcoholic steatohepatitis    BMI 40.0-44.9, adult    Hepatic encephalopathy    Gastroesophageal reflux disease without esophagitis    Hypothyroidism    Esophageal varices in cirrhosis    Upper GI bleed    Jaundice    Acute UTI (urinary tract infection)    Alcoholic cirrhosis    History of hepatitis C    Liver failure    Ulcerative esophagitis     Past Medical History:   Diagnosis Date    Alcoholism     currently drinking    Diabetes mellitus     Gout     Hepatitis C     Hypertension     Jaundice     UTI (urinary tract infection)      Past Surgical History:   Procedure Laterality Date    ENDOSCOPY N/A 2025    Procedure: ESOPHAGOGASTRODUODENOSCOPY WITH ANESTHESIA;  Surgeon: Estela Ramos MD;  Location: [facility];  Service: Gastroenterology;  Laterality: N/A;  pre op: GI bleed  post op: esophageal ulcer, esophagitis  pcp: Adrien Varghese MD    VASECTOMY         General Information       Row Name 06/26/25 0801          Physical Therapy Time and Intention    Document Type evaluation  -KR     Mode of Treatment physical therapy  -KR       Row Name 06/26/25 0801          General Information    Patient Profile Reviewed yes  -KR     Prior Level of Function independent:;all household mobility;community mobility  -KR     Existing Precautions/Restrictions fall  -KR     Barriers to Rehab medically complex;previous functional deficit;cognitive status  -KR       Row Name 06/26/25 0801          Living Environment    Current Living Arrangements home  -KR     People in Home alone  -KR       Row Name 06/26/25 0801          Home Main Entrance    Number of Stairs, Main Entrance three  -KR     Stair Railings, Main Entrance railings on both sides of stairs  -KR       Row Name 06/26/25 0801          Cognition    Orientation Status (Cognition) oriented x 3;oriented to;person;place;situation;disoriented to;time  -KR       Row Name 06/26/25 0801          Safety Issues/Impairments Affecting Functional Mobility    Safety Issues Affecting Function (Mobility) sequencing abilities  -KR     Impairments Affecting Function (Mobility) balance;cognition;endurance/activity tolerance;strength;pain  -KR     Cognitive Impairments, Mobility Safety/Performance attention;awareness, need for assistance;insight into deficits/self-awareness;judgment;problem-solving/reasoning;safety precaution awareness;safety precaution follow-through  -KR               User Key  (r) = Recorded By, (t) = Taken By, (c) = Cosigned By      Initials Name Provider Type    KR Paola Winchester, PT DPT Physical Therapist                   Mobility       Row Name 06/26/25 0801          Bed Mobility    Bed Mobility supine-sit  -KR     Supine-Sit [place] (Bed Mobility) minimum assist (75% patient effort);2 person assist  -KR     Assistive Device (Bed Mobility) bed rails;head of bed elevated  -KR       Row Name 06/26/25 0801           Bed-Chair Transfer    Bed-Chair [place] (Transfers) minimum assist (75% patient effort);2 person assist;verbal cues;nonverbal cues (demo/gesture)  -KR     Assistive Device (Bed-Chair Transfers) walker, front-wheeled  -KR       Row Name 06/26/25 0801          Sit-Stand Transfer    Sit-Stand [place] (Transfers) minimum assist (75% patient effort);2 person assist  -KR     Assistive Device (Sit-Stand Transfers) walker, front-wheeled  -KR       Row Name 06/26/25 0801          Gait/Stairs (Locomotion)    [place] Level (Gait) minimum assist (75% patient effort);2 person assist  -KR     Assistive Device (Gait) walker, front-wheeled  -KR     Patient was able to Ambulate yes  -KR     Distance in Feet (Gait) 2  from bed to chair  -KR     Deviations/Abnormal Patterns (Gait) gait speed decreased;base of support, wide;stride length decreased  -KR               User Key  (r) = Recorded By, (t) = Taken By, (c) = Cosigned By      Initials Name Provider Type    KR Paola Winchester, PT DPT Physical Therapist                   Obj/Interventions       Row Name 06/26/25 0801          Range of Motion Comprehensive    General Range of Motion bilateral lower extremity ROM WFL  -KR       Row Name 06/26/25 0801          Strength Comprehensive (MMT)    Comment, General Manual Muscle Testing (MMT) Assessment B LE functionally 4/5  -KR       Row Name 06/26/25 0801          Motor Skills    Motor Skills functional endurance  -KR     Functional Endurance decreased  -KR       Row Name 06/26/25 0801          Balance    Balance Assessment sitting static balance;sitting dynamic balance;standing static balance;standing dynamic balance  -KR     Static Sitting Balance standby assist;contact guard  -KR     Dynamic Sitting Balance standby assist;contact guard  -KR     Position, Sitting Balance unsupported;sitting edge of bed  -KR     Static Standing Balance minimal assist;2-person assist  -KR     Dynamic Standing Balance minimal  assist;2-person assist  -KR     Position/Device Used, Standing Balance supported;walker, front-wheeled  -KR       Row Name 06/26/25 0801          Sensory Assessment (Somatosensory)    Sensory Assessment (Somatosensory) sensation intact  -KR               User Key  (r) = Recorded By, (t) = Taken By, (c) = Cosigned By      Initials Name Provider Type    Paola Guerrero, PT DPT Physical Therapist                   Goals/Plan       Row Name 06/26/25 0801          Bed Mobility Goal 1 (PT)    Activity/Assistive Device (Bed Mobility Goal 1, PT) bed mobility activities, all  -KR     [place] Level/Cues Needed (Bed Mobility Goal 1, PT) modified independence  -KR     Time Frame (Bed Mobility Goal 1, PT) by discharge  -KR     Progress/Outcomes (Bed Mobility Goal 1, PT) new goal  -KR       Row Name 06/26/25 0801          Transfer Goal 1 (PT)    Activity/Assistive Device (Transfer Goal 1, PT) sit-to-stand/stand-to-sit;bed-to-chair/chair-to-bed  -KR     [place] Level/Cues Needed (Transfer Goal 1, PT) modified independence  -KR     Time Frame (Transfer Goal 1, PT) by discharge  -KR     Progress/Outcome (Transfer Goal 1, PT) new goal  -KR       Row Name 06/26/25 0801          Gait Training Goal 1 (PT)    Activity/Assistive Device (Gait Training Goal 1, PT) gait (walking locomotion);assistive device use  -KR     [place] Level (Gait Training Goal 1, PT) modified independence  -KR     Distance (Gait Training Goal 1, PT) 150'  -KR     Time Frame (Gait Training Goal 1, PT) by discharge  -KR       Row Name 06/26/25 0801          Stairs Goal 1 (PT)    Activity/Assistive Device (Stairs Goal 1, PT) stairs, all skills  -KR     [place] Level/Cues Needed (Stairs Goal 1, PT) standby assist  -KR     Number of Stairs (Stairs Goal 1, PT) 3 with 2 HRs  -KR     Time Frame (Stairs Goal 1, PT) by discharge  -KR     Progress/Outcome (Stairs Goal 1, PT) new goal  -KR       Row Name 06/26/25 0801          Therapy Assessment/Plan  (PT)    Planned Therapy Interventions (PT) balance training;bed mobility training;gait training;home exercise program;patient/family education;neuromuscular re-education;postural re-education;ROM (range of motion);stair training;strengthening;transfer training  -KR               User Key  (r) = Recorded By, (t) = Taken By, (c) = Cosigned By      Initials Name Provider Type    KR Paola Winchester, PT DPT Physical Therapist                   Clinical Impression       Row Name 06/26/25 0801          Pain    Pretreatment Pain Rating 0/10 - no pain  -KR     Posttreatment Pain Rating 4/10  -KR     Pain Side/Orientation generalized  -KR     Pain Management Interventions exercise or physical activity utilized  -KR     Response to Pain Interventions activity participation with tolerable pain;activity participation with increased pain  -KR       Row Name 06/26/25 0801          Plan of Care Review    Plan of Care Reviewed With patient  -KR     Outcome Evaluation Physical therapy evaluation completed. Pt needed Min A x2 for supine to sit, transfers and gait of 2' to chair with RW. He needed frequent cues for sequencing and positioning for mobility and has poor tolerance to activity. Skilled PT needed to address and improve independence with bed mobiltiy, transfers and gait. He will likely need SNF placement at d/c.  -KR       Row Name 06/26/25 0801          Therapy Assessment/Plan (PT)    Patient/Family Therapy Goals Statement (PT) did not state  -KR     Rehab Potential (PT) good  -KR     Criteria for Skilled Interventions Met (PT) yes  -KR     Therapy Frequency (PT) 2 times/day  -KR     Predicted Duration of Therapy Intervention (PT) until d/c  -KR       Row Name 06/26/25 0801          Vital Signs    Post SpO2 (%) 98  -KR     O2 Delivery Post Treatment room air  -KR     Pre Patient Position Supine  -KR     Intra Patient Position Sitting  -KR     Post Patient Position Supine  -KR       Row Name 06/26/25 0801           Positioning and Restraints    Pre-Treatment Position in bed  -KR     Post Treatment Position chair  -KR     In Chair reclined;call light within reach;encouraged to call for assist;exit alarm on;notified nsg  -KR               User Key  (r) = Recorded By, (t) = Taken By, (c) = Cosigned By      Initials Name Provider Type    Paola Guerrero, PT DPT Physical Therapist                   Outcome Measures       Row Name 06/26/25 0801 06/25/25 2050       How much help from another person do you currently need...    Turning from your back to your side while in flat bed without using bedrails? 2  -KR 3  -LH    Moving from lying on back to sitting on the side of a flat bed without bedrails? 2  -KR 2  -LH    Moving to and from a bed to a chair (including a wheelchair)? 2  -KR 1  -LH    Standing up from a chair using your arms (e.g., wheelchair, bedside chair)? 2  -KR 1  -LH    Climbing 3-5 steps with a railing? 2  -KR 1  -LH    To walk in hospital room? 2  -KR 1  -LH    AM-PAC 6 Clicks Score (PT) 12  -KR 9  -LH    Highest Level of Mobility Goal Move to Chair/Commode-4  -KR Sit at Edge of Bed-3  -LH      Row Name 06/26/25 0801 06/26/25 0729       Functional Assessment    Outcome Measure Options AM-PAC 6 Clicks Basic Mobility (PT)  -KR AM-PAC 6 Clicks Daily Activity (OT)  -LS              User Key  (r) = Recorded By, (t) = Taken By, (c) = Cosigned By      Initials Name Provider Type    Paola Guerrero, PT DPT Physical Therapist    Radha Malave OTR/L Occupational Therapist     Una Sheffield RN Registered Nurse                                 Physical Therapy Education       Title: PT OT SLP Therapies (In Progress)       Topic: Physical Therapy (In Progress)       Point: Mobility training (In Progress)       Learning Progress Summary            Patient Acceptance, E,D, NR,NL by CHAD at 6/26/2025 0837                      Point: Home exercise program (Not Started)       Learner Progress:  Not documented in  this visit.              Point: Body mechanics (In Progress)       Learning Progress Summary            Patient Acceptance, E,D, NR,NL by KR at 6/26/2025 0837                      Point: Precautions (In Progress)       Learning Progress Summary            Patient Acceptance, E,D, NR,NL by KR at 6/26/2025 0837                                      User Key       Initials Effective Dates Name Provider Type Discipline     10/22/24 -  Paola Winchester, PT DPT Physical Therapist PT                  PT Recommendation and Plan  Planned Therapy Interventions (PT): balance training, bed mobility training, gait training, home exercise program, patient/family education, neuromuscular re-education, postural re-education, ROM (range of motion), stair training, strengthening, transfer training  Outcome Evaluation: Physical therapy evaluation completed. Pt needed Min A x2 for supine to sit, transfers and gait of 2' to chair with RW. He needed frequent cues for sequencing and positioning for mobility and has poor tolerance to activity. Skilled PT needed to address and improve independence with bed mobiltiy, transfers and gait. He will likely need SNF placement at d/c.     Time Calculation:         PT Charges       Row Name 06/26/25 0801             Time Calculation    Start Time 0801  -KR      Stop Time 0825  + 10 min chart review  -KR      Time Calculation (min) 24 min  -KR      PT Received On 06/26/25  -KR      PT Goal Re-Cert Due Date 07/06/25  -KR                User Key  (r) = Recorded By, (t) = Taken By, (c) = Cosigned By      Initials Name Provider Type    KR Paola Winchester, PT DPT Physical Therapist                  Therapy Charges for Today       Code Description Service Date Service Provider Modifiers Qty    89181367819 HC PT EVAL HIGH COMPLEXITY 2 6/26/2025 Paola Winchester, PT DPT GP 1            PT G-Codes  Outcome Measure Options: AM-PAC 6 Clicks Basic Mobility (PT)  AM-PAC 6 Clicks Score (PT): 12  AM-PAC  6 Clicks Score (OT): 11  PT Discharge Summary  Anticipated Discharge Disposition (PT): skilled nursing facility    Paola Winchester, PT DPT  6/26/2025

## 2025-06-26 NOTE — PLAN OF CARE
Goal Outcome Evaluation:  Plan of Care Reviewed With: patient        Progress: no change  Outcome Evaluation: OT eval completed. Pt in fowlers upon therapist arrival; A&Ox3; Flat affect; No c/o pain at rest. Pt is a poor historian, but according to his report, he was performing all BADLs I at baseline. Today, Pt had incontinent episode of bowel. Pt dependent for daisy hygiene and changing of brief at bed level. During daisy hygiene and brief change, Pt required Min A and verbal/visual/tactile cues to roll to B sides utilizing bedrail. Pt performed supine>sit utilizing bedrail with HOB elevated requiring Min A and verbal/visual/tactile cues for sequencing and positioning. Pt dependent to caitlyn B socks. Pt performed sit>stand and took a few steps from bed>chair utilizing rwx with Min A x2 and verbal/visual/tactile cues for sequencing, positioning of AD, body mechanics, and safety awareness. Pt additionally demos BUE weakness during formal testing. Skilled OT intervention indicated in order to address deficits in fxl mobility, fxl activity tolerance, balance, strength, and use of adaptive techniques/equipment during performance of BADLs. Recommend SNF at discharge.    Anticipated Discharge Disposition (OT): skilled nursing facility

## 2025-06-26 NOTE — PLAN OF CARE
Problem: Adult Inpatient Plan of Care  Goal: Absence of Hospital-Acquired Illness or Injury  Intervention: Identify and Manage Fall Risk  Recent Flowsheet Documentation  Taken 6/26/2025 1700 by Ashvin Jacobs RN  Safety Promotion/Fall Prevention: safety round/check completed  Taken 6/26/2025 1600 by Ashvin Jacobs RN  Safety Promotion/Fall Prevention: safety round/check completed  Taken 6/26/2025 1500 by Ashvin Jacobs RN  Safety Promotion/Fall Prevention: safety round/check completed  Taken 6/26/2025 1400 by Ashvin Jacobs RN  Safety Promotion/Fall Prevention: safety round/check completed  Taken 6/26/2025 1300 by Ashvin Jacobs RN  Safety Promotion/Fall Prevention: safety round/check completed  Taken 6/26/2025 1200 by Ashvin Jacobs RN  Safety Promotion/Fall Prevention: safety round/check completed  Taken 6/26/2025 1100 by Ashvin Jacobs RN  Safety Promotion/Fall Prevention: safety round/check completed  Taken 6/26/2025 1000 by Ashvin Jacobs RN  Safety Promotion/Fall Prevention: safety round/check completed  Taken 6/26/2025 0900 by Ashvin Jacobs RN  Safety Promotion/Fall Prevention: safety round/check completed  Taken 6/26/2025 0801 by Ashvin Jacobs RN  Safety Promotion/Fall Prevention: safety round/check completed  Taken 6/26/2025 0700 by Ashvin Jacobs RN  Safety Promotion/Fall Prevention: safety round/check completed  Intervention: Prevent Infection  Recent Flowsheet Documentation  Taken 6/26/2025 0900 by Ashvin Jacobs RN  Infection Prevention: single patient room provided  Goal: Optimal Comfort and Wellbeing  Intervention: Monitor Pain and Promote Comfort  Recent Flowsheet Documentation  Taken 6/26/2025 0900 by Ashvin Jacobs RN  Pain Management Interventions:   pain management plan reviewed with patient/caregiver   relaxation techniques promoted  Taken 6/26/2025 0801 by Ashvin Jacobs RN  Pain Management Interventions: relaxation techniques promoted  Taken 6/26/2025 0700 by Ashvin Jacobs  RN  Pain Management Interventions:   quiet environment facilitated   relaxation techniques promoted   pain management plan reviewed with patient/caregiver  Intervention: Provide Person-Centered Care  Recent Flowsheet Documentation  Taken 6/26/2025 0900 by Ashvin Jacobs RN  Trust Relationship/Rapport:   questions encouraged   reassurance provided     Problem: Fall Injury Risk  Goal: Absence of Fall and Fall-Related Injury  Intervention: Identify and Manage Contributors  Recent Flowsheet Documentation  Taken 6/26/2025 0900 by Ashvin Jacobs RN  Medication Review/Management: medications reviewed  Intervention: Promote Injury-Free Environment  Recent Flowsheet Documentation  Taken 6/26/2025 1700 by Ashvin Jacobs RN  Safety Promotion/Fall Prevention: safety round/check completed  Taken 6/26/2025 1600 by Ashvin Jacobs RN  Safety Promotion/Fall Prevention: safety round/check completed  Taken 6/26/2025 1500 by Ashvin Jacobs RN  Safety Promotion/Fall Prevention: safety round/check completed  Taken 6/26/2025 1400 by Ashvin Jacobs RN  Safety Promotion/Fall Prevention: safety round/check completed  Taken 6/26/2025 1300 by Ashvin Jacobs RN  Safety Promotion/Fall Prevention: safety round/check completed  Taken 6/26/2025 1200 by Ashvin Jacobs RN  Safety Promotion/Fall Prevention: safety round/check completed  Taken 6/26/2025 1100 by Ashvin Jacobs RN  Safety Promotion/Fall Prevention: safety round/check completed  Taken 6/26/2025 1000 by Ashvin Jacobs RN  Safety Promotion/Fall Prevention: safety round/check completed  Taken 6/26/2025 0900 by Ashvin Jacobs RN  Safety Promotion/Fall Prevention: safety round/check completed  Taken 6/26/2025 0801 by Ashvin Jacobs RN  Safety Promotion/Fall Prevention: safety round/check completed  Taken 6/26/2025 0700 by Ashvin Jacobs RN  Safety Promotion/Fall Prevention: safety round/check completed     Problem: Comorbidity Management  Goal: Blood Glucose Level Within Target  Range  Intervention: Monitor and Manage Glycemia  Recent Flowsheet Documentation  Taken 6/26/2025 0900 by Ashvin Jacobs RN  Medication Review/Management: medications reviewed  Goal: Maintenance of Heart Failure Symptom Control  Intervention: Maintain Heart Failure Management  Recent Flowsheet Documentation  Taken 6/26/2025 0900 by Ashvin Jacobs RN  Medication Review/Management: medications reviewed  Goal: Blood Pressure in Desired Range  Intervention: Maintain Blood Pressure Management  Recent Flowsheet Documentation  Taken 6/26/2025 0900 by Ashvin Jacobs RN  Medication Review/Management: medications reviewed     Problem: Skin Injury Risk Increased  Goal: Skin Health and Integrity  Intervention: Optimize Skin Protection  Recent Flowsheet Documentation  Taken 6/26/2025 1600 by Ashvin Jacobs RN  Activity Management: up in chair  Taken 6/26/2025 0900 by Ashvin Jacobs RN  Activity Management: up in chair  Pressure Reduction Techniques:   frequent weight shift encouraged   weight shift assistance provided  Pressure Reduction Devices: pressure-redistributing mattress utilized   Goal Outcome Evaluation: Safety maintained, call light in reach. Worked with PT, up with x2 assist. S 76-86.

## 2025-06-26 NOTE — PLAN OF CARE
Goal Outcome Evaluation:  Plan of Care Reviewed With: patient           Outcome Evaluation: Physical therapy evaluation completed. Pt needed Min A x2 for supine to sit, transfers and gait of 2' to chair with RW. He needed frequent cues for sequencing and positioning for mobility and has poor tolerance to activity. Skilled PT needed to address and improve independence with bed mobiltiy, transfers and gait. He will likely need SNF placement at d/c.    Anticipated Discharge Disposition (PT): skilled nursing facility

## 2025-06-26 NOTE — THERAPY DISCHARGE NOTE
Acute Care - Speech Language Pathology   Swallow Treatment Note/Discharge  [place]     Patient Name: Luciano Christiansen  : 1985  MRN: [id number]  Today's Date: 2025               Admit Date: 2025  ST follow up completed. Upon arrival, pt upright in chair with father and RN Ashvin present. Pt benefiting from feeding assistance from nursing staff per RN. Pt has not exhibited any swallowing difficulty but does have difficult getting food from plate to his mouth. Pt observed consuming biscuits and gravy with sips of Sprite with no overt s/s of aspiration. Vocal quality remains clear. Pt continues to demonstrate confusion but expressed no further questions or concerns at this time. ST to sign off as skilled services are no longer warranted. Please reconsult as additional concerns arise. Thank you!    Torri Quiroz, MS CCC-SLP 2025 09:39 CDT      Visit Dx:    ICD-10-CM ICD-9-CM   1. Hepatic encephalopathy  K76.82 572.2   2. Alcoholic cirrhosis of liver with ascites  K70.31 571.2   3. Hypokalemia  E87.6 276.8   4. Anemia, unspecified type  D64.9 285.9   5. Jaundice  R17 782.4   6. Dysphagia, unspecified type  R13.10 787.20     Patient Active Problem List   Diagnosis    Wellness examination    Encounter for hepatitis C screening test for low risk patient    Class 1 obesity due to excess calories without serious comorbidity with body mass index (BMI) of 34.0 to 34.9 in adult    Fatty liver    Hyponatremia    Hypokalemia    Hypomagnesemia    Alcoholism    Transaminitis    Hypophosphatemia    Insomnia disorder related to known organic factor    Community acquired bilateral lower lobe pneumonia    Pneumonia due to Streptococcus    GI bleed    Calculus of gallbladder without cholecystitis without obstruction    Alcoholic encephalopathy    Alcohol withdrawal    Alcoholic steatohepatitis    BMI 40.0-44.9, adult    Hepatic encephalopathy    Gastroesophageal reflux disease without esophagitis    Hypothyroidism     Esophageal varices in cirrhosis    Upper GI bleed    Jaundice    Acute UTI (urinary tract infection)    Alcoholic cirrhosis    History of hepatitis C    Liver failure    Ulcerative esophagitis     Past Medical History:   Diagnosis Date    Alcoholism     currently drinking    Diabetes mellitus     Gout     Hepatitis C     Hypertension     Jaundice     UTI (urinary tract infection)      Past Surgical History:   Procedure Laterality Date    ENDOSCOPY N/A 5/28/2025    Procedure: ESOPHAGOGASTRODUODENOSCOPY WITH ANESTHESIA;  Surgeon: Estela Ramos MD;  Location: [facility];  Service: Gastroenterology;  Laterality: N/A;  pre op: GI bleed  post op: esophageal ulcer, esophagitis  pcp: Adrien Varghese MD    VASECTOMY         SLP Recommendation and Plan                                   Daily Summary of Progress (SLP): progress toward functional goals as expected (06/26/25 0854)                       Treatment Assessment (SLP): continued (06/26/25 0854)  Treatment Assessment Comments (SLP): see note (06/26/25 0854)  Plan for Continued Treatment (SLP): treatment no longer indicated as all goals met (06/26/25 0854)    Progress: no change (06/26/25 0932)  Outcome Evaluation: see note (06/26/25 0932)    SWALLOW EVALUATION (Last 72 Hours)       SLP Adult Swallow Evaluation       Row Name 06/26/25 0854 06/24/25 0750                Rehab Evaluation    Document Type discharge treatment  -JR evaluation  -MG       Subjective Information no complaints  -JR no complaints  -MG       Patient Observations lethargic;cooperative  -JR lethargic;cooperative;agree to therapy  -MG       Patient/Family/Caregiver Comments/Observations pt father and RN Ashvin present  -JR No family present  -MG       Patient Effort good  -JR good  -MG       Symptoms Noted During/After Treatment fatigue  -JR fatigue  -MG          General Information    Patient Profile Reviewed yes  -JR yes  -MG       Pertinent History Of Current Problem -- Presented to ER due to  significant confusion. Diagnosed with hepatic encephalopathy. Medical history of alcoholic cirrhosis, DM, gout, Hep C, HTN, jaundice, and prior endos with esophagitis. SLP consulted due to failed RN dysphagia screening.  -MG       Current Method of Nutrition -- NPO  -MG       Precautions/Limitations, Vision -- WFL;for purposes of eval  -MG       Precautions/Limitations, Hearing -- WFL;for purposes of eval  -MG       Prior Level of Function-Communication -- WFL  -MG       Prior Level of Function-Swallowing -- no diet consistency restrictions  -MG       Plans/Goals Discussed with -- patient;agreed upon  -MG       Barriers to Rehab -- none identified  -MG       Patient's Goals for Discharge -- patient did not state  -MG          Pain    Additional Documentation Pain Scale: FACES Pre/Post-Treatment (Group)  -JR Pain Scale: FACES Pre/Post-Treatment (Group)  -MG          Pain Scale: FACES Pre/Post-Treatment    Pain: FACES Scale, Pretreatment 0-->no hurt  -JR 0-->no hurt  -MG       Posttreatment Pain Rating 0-->no hurt  -JR 0-->no hurt  -MG          Oral Motor Structure and Function    Dentition Assessment -- natural, present and adequate  -MG       Secretion Management -- WNL/WFL  -MG       Mucosal Quality -- dry  -MG          Oral Musculature and Cranial Nerve Assessment    Oral Motor General Assessment -- generalized oral motor weakness  -MG          General Eating/Swallowing Observations    Respiratory Support Currently in Use -- room air  -MG       Eating/Swallowing Skills -- fed by SLP  -MG       Positioning During Eating -- upright in bed  -MG       Utensils Used -- spoon;straw  -MG       Consistencies Trialed -- pureed;thin liquids;regular textures  -MG          Clinical Swallow Eval    Oral Prep Phase -- WFL  -MG       Oral Transit -- WFL  -MG       Oral Residue -- WFL  -MG       Pharyngeal Phase -- no overt signs/symptoms of pharyngeal impairment  -MG       Esophageal Phase -- unremarkable  -MG       Clinical  Swallow Evaluation Summary -- See note  -MG          SLP Evaluation Clinical Impression    SLP Swallowing Diagnosis -- mild;oral dysphagia;R/O pharyngeal dysphagia  -MG       Functional Impact -- risk of aspiration/pneumonia;risk of dehydration;risk of malnutrition  -MG       Rehab Potential/Prognosis, Swallowing -- adequate, monitor progress closely  -MG       Swallow Criteria for Skilled Therapeutic Interventions Met -- demonstrates skilled criteria  -MG          SLP Treatment Clinical Impressions    Treatment Assessment (SLP) continued  -JR --       Treatment Assessment Comments (SLP) see note  -JR --       Daily Summary of Progress (SLP) progress toward functional goals as expected  -JR --       Barriers to Overall Progress (SLP) Medically complex  -JR --       Plan for Continued Treatment (SLP) treatment no longer indicated as all goals met  -JR --       Care Plan Review evaluation/treatment results reviewed;care plan/treatment goals reviewed;risks/benefits reviewed  -JR --       Care Plan Review, Other Participant(s) caregiver;family  -JR --          Recommendations    Therapy Frequency (Swallow) -- PRN  -MG       Predicted Duration Therapy Intervention (Days) -- 2 days  -MG       SLP Diet Recommendation -- regular textures;thin liquids  -MG       Recommended Precautions and Strategies -- upright posture during/after eating;small bites of food and sips of liquid;general aspiration precautions;fatigue precautions  -MG       Oral Care Recommendations -- Oral Care BID/PRN;Toothbrush  -MG       SLP Rec. for Method of Medication Administration -- as tolerated  -MG       Monitor for Signs of Aspiration -- yes;notify SLP if any concerns  -MG       Anticipated Discharge Disposition (SLP) -- unknown  -MG          Swallow Goals (SLP)    Swallow LTGs Swallow Long Term Goal (free text)  -JR Swallow Long Term Goal (free text)  -MG       Swallow STGs diet tolerance goal selection (SLP)  -JR diet tolerance goal selection  (SLP)  -MG       Diet Tolerance Goal Selection (SLP) Patient will tolerate trials of  -JR Patient will tolerate trials of  -MG          (LTG) Swallow    (LTG) Swallow Patient will tolerate least restrictive diet without overt s/s of aspiration.  -JR Patient will tolerate least restrictive diet without overt s/s of aspiration.  -MG       [place] (Swallow Long Term Goal) independently (over 90% accuracy)  -JR independently (over 90% accuracy)  -MG       Time Frame (Swallow Long Term Goal) by discharge  -JR by discharge  -MG       Barriers (Swallow Long Term Goal) none  -JR none  -MG       Progress/Outcomes (Swallow Long Term Goal) goal met  -JR new goal  -MG          (STG) Patient will tolerate trials of    Consistencies Trialed (Tolerate trials) regular textures;thin liquids  -JR regular textures;thin liquids  -MG       Desired Outcome (Tolerate trials) without signs/symptoms of aspiration;without signs of distress;with adequate oral prep/transit/clearance  -JR without signs/symptoms of aspiration;without signs of distress;with adequate oral prep/transit/clearance  -MG       [place] (Tolerate trials) independently (over 90% accuracy)  -JR independently (over 90% accuracy)  -MG       Time Frame (Tolerate trials) by discharge  -JR by discharge  -MG       Progress/Outcomes (Tolerate trials) goal met  -JR new goal  -MG                 User Key  (r) = Recorded By, (t) = Taken By, (c) = Cosigned By      Initials Name Effective Dates    MG Анна Armando, MS [facility]-SLP 07/11/23 -     Torri Child MS CCC-SLP 08/22/23 -                     EDUCATION  The patient has been educated in the following areas:   Dysphagia (Swallowing Impairment).         SLP GOALS       Row Name 06/26/25 0854 06/24/25 0750          (LTG) Swallow    (LTG) Swallow Patient will tolerate least restrictive diet without overt s/s of aspiration.  -JR Patient will tolerate least restrictive diet without overt s/s of aspiration.  -MG      [place] (Swallow Long Term Goal) independently (over 90% accuracy)  -JR independently (over 90% accuracy)  -MG     Time Frame (Swallow Long Term Goal) by discharge  -JR by discharge  -MG     Barriers (Swallow Long Term Goal) none  -JR none  -MG     Progress/Outcomes (Swallow Long Term Goal) goal met  -JR new goal  -MG        (STG) Patient will tolerate trials of    Consistencies Trialed (Tolerate trials) regular textures;thin liquids  -JR regular textures;thin liquids  -MG     Desired Outcome (Tolerate trials) without signs/symptoms of aspiration;without signs of distress;with adequate oral prep/transit/clearance  -JR without signs/symptoms of aspiration;without signs of distress;with adequate oral prep/transit/clearance  -MG     [place] (Tolerate trials) independently (over 90% accuracy)  -JR independently (over 90% accuracy)  -MG     Time Frame (Tolerate trials) by discharge  -JR by discharge  -MG     Progress/Outcomes (Tolerate trials) goal met  -JR new goal  -MG               User Key  (r) = Recorded By, (t) = Taken By, (c) = Cosigned By      Initials Name Provider Type    MG Анна Armando MS CCC-SLP Speech and Language Pathologist    Torri Child MS CCC-SLP Speech and Language Pathologist                           Time Calculation:    Time Calculation- SLP       Row Name 06/26/25 0938             Time Calculation- SLP    SLP Start Time 0854  -      SLP Stop Time 0919  -      SLP Time Calculation (min) 25 min  -      SLP Received On 06/26/25  -         Untimed Charges    25095-[id number] Treatment Swallow Minutes 25  -JR         Total Minutes    Untimed Charges Total Minutes 25  -JR       Total Minutes 25  -JR                User Key  (r) = Recorded By, (t) = Taken By, (c) = Cosigned By      Initials Name Provider Type    Torri Child, MS CCC-SLP Speech and Language Pathologist                    Therapy Charges for Today       Code Description Service Date Service Provider Modifiers Qty     [id number] [facility] TREATMENT SWALLOW 2 6/26/2025 Torri Quiroz, MS CCC-SLP GN 1                      Torri Quiroz MS CCC-SLP  6/26/2025

## 2025-06-26 NOTE — THERAPY EVALUATION
Patient Name: Luciano Christiansen  : 1985    MRN: [id number]                              Today's Date: 2025       Admit Date: 2025    Visit Dx:     ICD-10-CM ICD-9-CM   1. Hepatic encephalopathy  K76.82 572.2   2. Alcoholic cirrhosis of liver with ascites  K70.31 571.2   3. Hypokalemia  E87.6 276.8   4. Anemia, unspecified type  D64.9 285.9   5. Jaundice  R17 782.4   6. Dysphagia, unspecified type  R13.10 787.20     Patient Active Problem List   Diagnosis    Wellness examination    Encounter for hepatitis C screening test for low risk patient    Class 1 obesity due to excess calories without serious comorbidity with body mass index (BMI) of 34.0 to 34.9 in adult    Fatty liver    Hyponatremia    Hypokalemia    Hypomagnesemia    Alcoholism    Transaminitis    Hypophosphatemia    Insomnia disorder related to known organic factor    Community acquired bilateral lower lobe pneumonia    Pneumonia due to Streptococcus    GI bleed    Calculus of gallbladder without cholecystitis without obstruction    Alcoholic encephalopathy    Alcohol withdrawal    Alcoholic steatohepatitis    BMI 40.0-44.9, adult    Hepatic encephalopathy    Gastroesophageal reflux disease without esophagitis    Hypothyroidism    Esophageal varices in cirrhosis    Upper GI bleed    Jaundice    Acute UTI (urinary tract infection)    Alcoholic cirrhosis    History of hepatitis C    Liver failure    Ulcerative esophagitis     Past Medical History:   Diagnosis Date    Alcoholism     currently drinking    Diabetes mellitus     Gout     Hepatitis C     Hypertension     Jaundice     UTI (urinary tract infection)      Past Surgical History:   Procedure Laterality Date    ENDOSCOPY N/A 2025    Procedure: ESOPHAGOGASTRODUODENOSCOPY WITH ANESTHESIA;  Surgeon: Estela Ramos MD;  Location: [facility];  Service: Gastroenterology;  Laterality: N/A;  pre op: GI bleed  post op: esophageal ulcer, esophagitis  pcp: Adrien Varghese MD    VASECTOMY         General Information       Row Name 06/26/25 0729          OT Time and Intention    Subjective Information no complaints  -LS     Document Type evaluation  cc: significant confusion. Dx: Hepatic encephalopathy, Acute UTI, Alcoholic cirrhosis, History of hepatitis C, Jaundice, Alcoholism, Hypokalemia, Class 1 obesity  -LS     Mode of Treatment occupational therapy  -LS     Patient Effort good  -       Row Name 06/26/25 0729          General Information    Patient Profile Reviewed yes  -LS     Prior Level of Function independent:;ADL's;all household mobility;dependent:;home management;cooking;cleaning;driving;shopping  Per Pt report although Pt a poor historian  -     Existing Precautions/Restrictions fall  -     Barriers to Rehab medically complex;previous functional deficit;cognitive status  -       Row Name 06/26/25 0729          Occupational Profile    Environmental Supports and Barriers (Occupational Profile) Other AD/DME: rwx  -       Row Name 06/26/25 0729          Living Environment    Current Living Arrangements home  -     People in Home alone  -       Row Name 06/26/25 0729          Home Main Entrance    Number of Stairs, Main Entrance three  -LS     Stair Railings, Main Entrance railings on both sides of stairs  -       Row Name 06/26/25 0729          Stairs Within Home, Primary    Number of Stairs, Within Home, Primary none  -       Row Name 06/26/25 0729          Cognition    Orientation Status (Cognition) oriented x 3;oriented to;person;place;situation;disoriented to;time  -       Row Name 06/26/25 0729          Safety Issues/Impairments Affecting Functional Mobility    Safety Issues Affecting Function (Mobility) awareness of need for assistance;friction/shear risk;insight into deficits/self-awareness;judgment;positioning of assistive device;ability to follow commands;problem-solving;safety precaution awareness;safety precautions follow-through/compliance;sequencing abilities  -      Impairments Affecting Function (Mobility) balance;cognition;endurance/activity tolerance;strength;pain  -     Cognitive Impairments, Mobility Safety/Performance attention;awareness, need for assistance;insight into deficits/self-awareness;judgment;problem-solving/reasoning;safety precaution awareness;safety precaution follow-through;sequencing abilities  -               User Key  (r) = Recorded By, (t) = Taken By, (c) = Cosigned By      Initials Name Provider Type     Radha Maier OTR/L Occupational Therapist                     Mobility/ADL's       Row Name 06/26/25 0729          Bed Mobility    Bed Mobility supine-sit  -     Supine-Sit [place] (Bed Mobility) minimum assist (75% patient effort);verbal cues;nonverbal cues (demo/gesture)  -     Assistive Device (Bed Mobility) bed rails;head of bed elevated  -       Row Name 06/26/25 0729          Transfers    Transfers sit-stand transfer;bed-chair transfer  -       Row Name 06/26/25 0729          Bed-Chair Transfer    Bed-Chair [place] (Transfers) minimum assist (75% patient effort);2 person assist;verbal cues;nonverbal cues (demo/gesture)  -     Assistive Device (Bed-Chair Transfers) walker, front-wheeled  -       Row Name 06/26/25 0729          Sit-Stand Transfer    Sit-Stand [place] (Transfers) minimum assist (75% patient effort);2 person assist;verbal cues;nonverbal cues (demo/gesture)  -     Assistive Device (Sit-Stand Transfers) walker, front-wheeled  -       Row Name 06/26/25 0729          Activities of Daily Living    BADL Assessment/Intervention lower body dressing;toileting  -       Row Name 06/26/25 0729          Lower Body Dressing Assessment/Training    [place] Level (Lower Body Dressing) don;socks;dependent (less than 25% patient effort)  -       Row Name 06/26/25 0729          Toileting Assessment/Training    [place] Level (Toileting) toileting skills;perform perineal hygiene;change  pad/brief;dependent (less than 25% patient effort)  -LS     Position (Toileting) supine  -LS               User Key  (r) = Recorded By, (t) = Taken By, (c) = Cosigned By      Initials Name Provider Type    Radha Malave OTR/L Occupational Therapist                   Obj/Interventions       Row Name 06/26/25 0729          Sensory Assessment (Somatosensory)    Sensory Assessment (Somatosensory) UE sensation intact  -       Row Name 06/26/25 0729          Vision Assessment/Intervention    Visual Impairment/Limitations WFL  -LS       Row Name 06/26/25 0729          Range of Motion Comprehensive    General Range of Motion bilateral upper extremity ROM WFL  -       Row Name 06/26/25 0729          Strength Comprehensive (MMT)    General Manual Muscle Testing (MMT) Assessment upper extremity strength deficits identified  -LS     Comment, General Manual Muscle Testing (MMT) Assessment BUE strength grossly 4/5  -LS       Row Name 06/26/25 0729          Balance    Balance Assessment sitting static balance;sitting dynamic balance;standing static balance;standing dynamic balance  -LS     Static Sitting Balance contact guard  -LS     Dynamic Sitting Balance contact guard  -LS     Position, Sitting Balance unsupported;sitting edge of bed  -LS     Static Standing Balance minimal assist;2-person assist;verbal cues;non-verbal cues (demo/gesture)  -LS     Dynamic Standing Balance minimal assist;2-person assist;verbal cues;non-verbal cues (demo/gesture)  -LS     Position/Device Used, Standing Balance supported;walker, front-wheeled  -LS               User Key  (r) = Recorded By, (t) = Taken By, (c) = Cosigned By      Initials Name Provider Type    Radha Malave OTR/L Occupational Therapist                   Goals/Plan       Row Name 06/26/25 0729          Transfer Goal 1 (OT)    Activity/Assistive Device (Transfer Goal 1, OT) toilet;shower chair  -LS     [place] Level/Cues Needed (Transfer Goal 1, OT) contact guard  required  -LS     Time Frame (Transfer Goal 1, OT) long term goal (LTG);10 days  -LS     Progress/Outcome (Transfer Goal 1, OT) new goal  -LS       Row Name 06/26/25 0729          Dressing Goal 1 (OT)    Activity/Device (Dressing Goal 1, OT) dressing skills, all  -LS     [place]/Cues Needed (Dressing Goal 1, OT) minimum assist (75% or more patient effort)  -LS     Time Frame (Dressing Goal 1, OT) long term goal (LTG);10 days  -LS     Progress/Outcome (Dressing Goal 1, OT) new goal  -LS       Row Name 06/26/25 0729          Toileting Goal 1 (OT)    Activity/Device (Toileting Goal 1, OT) toileting skills, all  -LS     [place] Level/Cues Needed (Toileting Goal 1, OT) minimum assist (75% or more patient effort)  -LS     Time Frame (Toileting Goal 1, OT) long term goal (LTG);10 days  -LS     Progress/Outcome (Toileting Goal 1, OT) new goal  -LS       Row Name 06/26/25 0729          Therapy Assessment/Plan (OT)    Planned Therapy Interventions (OT) activity tolerance training;functional balance retraining;occupation/activity based interventions;ROM/therapeutic exercise;strengthening exercise;transfer/mobility retraining;patient/caregiver education/training;adaptive equipment training;BADL retraining;cognitive/visual perception retraining  -LS               User Key  (r) = Recorded By, (t) = Taken By, (c) = Cosigned By      Initials Name Provider Type    LS Radha Maier OTR/L Occupational Therapist                   Clinical Impression       Row Name 06/26/25 0729          Pain Assessment    Pretreatment Pain Rating 0/10 - no pain  -LS     Posttreatment Pain Rating 4/10  -LS     Pain Side/Orientation generalized  -LS     Pain Management Interventions exercise or physical activity utilized  -LS     Response to Pain Interventions activity participation with increased pain;activity participation with tolerable pain  -LS       Row Name 06/26/25 0729          Plan of Care Review    Plan of Care Reviewed With patient   -     Progress no change  -     Outcome Evaluation OT eval completed. Pt in fowlers upon therapist arrival; A&Ox3; Flat affect; No c/o pain at rest. Pt is a poor historian, but according to his report, he was performing all BADLs I at baseline. Today, Pt had incontinent episode of bowel. Pt dependent for daisy hygiene and changing of brief at bed level. During daisy hygiene and brief change, Pt required Min A and verbal/visual/tactile cues to roll to B sides utilizing bedrail. Pt performed supine>sit utilizing bedrail with HOB elevated requiring Min A and verbal/visual/tactile cues for sequencing and positioning. Pt dependent to caitlyn B socks. Pt performed sit>stand and took a few steps from bed>chair utilizing rwx with Min A x2 and verbal/visual/tactile cues for sequencing, positioning of AD, body mechanics, and safety awareness. Pt additionally demos BUE weakness during formal testing. Skilled OT intervention indicated in order to address deficits in fxl mobility, fxl activity tolerance, balance, strength, and use of adaptive techniques/equipment during performance of BADLs. Recommend SNF at discharge.  -       Row Name 06/26/25 0729          Therapy Assessment/Plan (OT)    Rehab Potential (OT) fair  -     Criteria for Skilled Therapeutic Interventions Met (OT) yes;skilled treatment is necessary  -     Therapy Frequency (OT) 5 times/wk  -       Row Name 06/26/25 0729          Therapy Plan Review/Discharge Plan (OT)    Anticipated Discharge Disposition (OT) skilled nursing facility  -       Row Name 06/26/25 0729          Positioning and Restraints    Pre-Treatment Position in bed  -LS     Post Treatment Position chair  -LS     In Chair reclined;call light within reach;encouraged to call for assist;legs elevated;exit alarm on;notified [facility]  -               User Key  (r) = Recorded By, (t) = Taken By, (c) = Cosigned By      Initials Name Provider Type    Radha Malave, SINGHR/L Occupational Therapist                    Outcome Measures       Row Name 06/26/25 0729          How much help from another is currently needed...    Putting on and taking off regular lower body clothing? 1  -LS     Bathing (including washing, rinsing, and drying) 2  -LS     Toileting (which includes using toilet bed pan or urinal) 1  -LS     Putting on and taking off regular upper body clothing 2  -LS     Taking care of personal grooming (such as brushing teeth) 2  -LS     Eating meals 3  -     AM-PAC 6 Clicks Score (OT) 11  -       Row Name 06/25/25 2050          How much help from another person do you currently need...    Turning from your back to your side while in flat bed without using bedrails? 3  -LH     Moving from lying on back to sitting on the side of a flat bed without bedrails? 2  -LH     Moving to and from a bed to a chair (including a wheelchair)? 1  -LH     Standing up from a chair using your arms (e.g., wheelchair, bedside chair)? 1  -LH     Climbing 3-5 steps with a railing? 1  -LH     To walk in hospital room? 1  -     AM-PAC 6 Clicks Score (PT) 9  -       Row Name 06/26/25 0729          Functional Assessment    Outcome Measure Options AM-PAC 6 Clicks Daily Activity (OT)  -               User Key  (r) = Recorded By, (t) = Taken By, (c) = Cosigned By      Initials Name Provider Type     Radha Maier OTR/L Occupational Therapist     Una Sheffield RN Registered Nurse                    Occupational Therapy Education       Title: PT OT SLP Therapies (In Progress)       Topic: Occupational Therapy (In Progress)       Point: ADL training (Done)       Learning Progress Summary            Patient Acceptance, E, VU,NR by  at 6/26/2025 0832                      Point: Precautions (Done)       Learning Progress Summary            Patient Acceptance, E, VU,NR by  at 6/26/2025 0832                      Point: Body mechanics (Done)       Learning Progress Summary            Patient Acceptance, E, VU,NR by   at 6/26/2025 0832                                      User Key       Initials Effective Dates Name Provider Type Discipline     06/20/22 -  Radha Maeir, OTR/L Occupational Therapist OT                  OT Recommendation and Plan  Planned Therapy Interventions (OT): activity tolerance training, functional balance retraining, occupation/activity based interventions, ROM/therapeutic exercise, strengthening exercise, transfer/mobility retraining, patient/caregiver education/training, adaptive equipment training, BADL retraining, cognitive/visual perception retraining  Therapy Frequency (OT): 5 times/wk  Plan of Care Review  Plan of Care Reviewed With: patient  Progress: no change  Outcome Evaluation: OT eval completed. Pt in fowlers upon therapist arrival; A&Ox3; Flat affect; No c/o pain at rest. Pt is a poor historian, but according to his report, he was performing all BADLs I at baseline. Today, Pt had incontinent episode of bowel. Pt dependent for daisy hygiene and changing of brief at bed level. During daisy hygiene and brief change, Pt required Min A and verbal/visual/tactile cues to roll to B sides utilizing bedrail. Pt performed supine>sit utilizing bedrail with HOB elevated requiring Min A and verbal/visual/tactile cues for sequencing and positioning. Pt dependent to caitlyn B socks. Pt performed sit>stand and took a few steps from bed>chair utilizing rwx with Min A x2 and verbal/visual/tactile cues for sequencing, positioning of AD, body mechanics, and safety awareness. Pt additionally demos BUE weakness during formal testing. Skilled OT intervention indicated in order to address deficits in fxl mobility, fxl activity tolerance, balance, strength, and use of adaptive techniques/equipment during performance of BADLs. Recommend SNF at discharge.     Time Calculation:         Time Calculation- OT       Row Name 06/26/25 0729             Time Calculation- OT    OT Start Time 0729  +10 minutes chart review  -       OT Stop Time 0830  -      OT Time Calculation (min) 61 min  -      Total Timed Code Minutes- OT 11 minute(s)  -      OT Non-Billable Time (min) 60 min  -      OT Received On 06/26/25  -      OT Goal Re-Cert Due Date 07/06/25  -                User Key  (r) = Recorded By, (t) = Taken By, (c) = Cosigned By      Initials Name Provider Type     Radha Maier, OTR/L Occupational Therapist                  Therapy Charges for Today       Code Description Service Date Service Provider Modifiers Qty    19120168237 HC OT EVAL MOD COMPLEXITY 4 6/26/2025 Radha Maier, OTR/L GO 1    12776710071 HC OT SELF CARE/MGMT/TRAIN EA 15 MIN 6/26/2025 Radha Maier OTR/L GO 1                 Radha Maier OTR/L  6/26/2025

## 2025-06-26 NOTE — PROGRESS NOTES
Patient Name: Luciano Christiansen  Date of Admission: 6/23/2025  Today's Date: 06/26/25  Length of Stay: 3  Primary Care Physician: Adrien Varghese MD    Subjective   Chief Complaint: follow-up encephalopathy  HPI   Patient sitting up in the bedside chair this morning.  His father was at bedside.  He was awake and more alert.  Slow to respond at times, but much more interactive as compared to previous exams.  He denies having any pain.  He was not aware of having any previous paracentesis performed prior to this hospitalization.  He does report that he has moved his bowels a couple of times in the last saw him yesterday.  Denies any new pain symptoms.      Review of Systems   All pertinent negatives and positives are as above. All other systems have been reviewed and are negative unless otherwise stated.     Objective    Temp:  [97.3 °F (36.3 °C)-98.5 °F (36.9 °C)] 97.7 °F (36.5 °C)  Heart Rate:  [71-94] 94  Resp:  [16-18] 16  BP: (108-122)/(52-76) 118/76  Physical Exam  Vitals reviewed.   Constitutional:       Appearance: He is obese. He is ill-appearing.      Comments: Sitting up in bedside chair this morning   HENT:      Head: Normocephalic.   Eyes:      General: Scleral icterus present.   Pulmonary:      Effort: Pulmonary effort is normal. No respiratory distress.      Comments: Diminished at bases  Abdominal:      General: There is distension.      Tenderness: There is no abdominal tenderness.   Musculoskeletal:      Right lower leg: Edema present.      Left lower leg: Edema present.   Skin:     Coloration: Skin is jaundiced.   Neurological:      Motor: Weakness present.      Comments: More awake and alert today; each day appears less lethargic and drowsy; still slow to respond at times but much more interactive overall as compared to previous exams.  Significant generalized weakness          Results Review:  I have reviewed the labs, radiology results, and diagnostic studies.    Laboratory Data:   Results from last 7  days   Lab Units 06/25/25  0602 06/23/25  1007   WBC 10*3/mm3 9.75 12.72*   HEMOGLOBIN g/dL 9.1* 9.6*   HEMATOCRIT % 27.2* 29.2*   PLATELETS 10*3/mm3 117* 128*        Results from last 7 days   Lab Units 06/25/25 2037 06/25/25  0602 06/24/25  0601 06/23/25  1735 06/23/25  1007   SODIUM mmol/L  --  138 136 139 138   POTASSIUM mmol/L 3.4* 3.0* 3.5  3.5 2.9* 2.9*   CHLORIDE mmol/L  --  109* 108* 108* 105   CO2 mmol/L  --  19.0* 19.0* 18.0* 17.0*   BUN mg/dL  --  14.9 14.1 14.1 14.1   CREATININE mg/dL  --  0.55* <0.17* 0.57* 0.71*   CALCIUM mg/dL  --  8.9 8.7 8.5* 8.5*   BILIRUBIN mg/dL  --  32.0* 29.0*  --  33.6*   ALK PHOS U/L  --  199* 179*  --  221*   ALT (SGPT) U/L  --  58* 54*  --  61*   AST (SGOT) U/L  --  140* 140*  --  133*   GLUCOSE mg/dL  --  113* 93 113* 151*       Culture Data:     Microbiology Results (last 10 days)       Procedure Component Value - Date/Time    Body Fluid Culture - Body Fluid, Peritoneum [[id number]] Collected: 06/23/25 1403    Lab Status: Preliminary result Specimen: Body Fluid from Peritoneum Updated: 06/26/25 0922     Body Fluid Culture No growth at 3 days     Gram Stain No organisms seen    Blood Culture - Blood, Arm, Left [[id number]]  (Normal) Collected: 06/23/25 1007    Lab Status: Preliminary result Specimen: Blood from Arm, Left Updated: 06/25/25 1100     Blood Culture No growth at 2 days    Blood Culture - Blood, Arm, Right [[id number]]  (Normal) Collected: 06/23/25 1000    Lab Status: Preliminary result Specimen: Blood from Arm, Right Updated: 06/25/25 1100     Blood Culture No growth at 2 days             Radiology Data:   Imaging Results (Last 7 Days)       Procedure Component Value Units Date/Time    US Abdomen Limited [[id number]] Collected: 06/23/25 1858     Updated: 06/23/25 1902    Narrative:      EXAM/TECHNIQUE: US ABDOMEN LIMITED-     INDICATION: Please evaluate any biliary duct obstruction; K76.82-Hepatic  encephalopathy; K70.31-Alcoholic cirrhosis of liver with  ascites;  E87.6-Hypokalemia; D64.9-Anemia, unspecified; R17-Unspecified jaundice     COMPARISON: None available.     FINDINGS:     Visualized portion of the abdominal aorta and IVC are unremarkable.     Visualized portion of the pancreas appears normal.     Coarsened nodular liver, in keeping with cirrhosis. Liver echogenicity  is within normal limits. No suspicious liver lesion identified.  Perihepatic ascites.     Multiple gallstones in the gallbladder lumen. No gallbladder wall  thickening. No biliary ductal dilatation. Common bile duct is 6 mm  diameter.     Right kidney is 13.7 cm in length and demonstrates normal cortical  thickness and echogenicity. No shadowing renal calculi or  hydronephrosis.       Impression:      1.  Cirrhotic liver. Perihepatic ascites.  2.  Gallstones are present but no evidence of acute cholecystitis. No  biliary ductal dilatation.     This report was signed and finalized on 6/23/2025 6:59 PM by Dr. Reinaldo Barry MD.       CT Head Without Contrast [[id number]] Collected: 06/23/25 1132     Updated: 06/23/25 1142    Narrative:      EXAMINATION: CT HEAD WO CONTRAST-        HISTORY:Altered mental status     In order to have a CT radiation dose as low as reasonably achievable  Automated Exposure Control was utilized for adjustment of the mA and/or  KV according to patient size.     Total DLP = 934.85 mGy.cm     The CT scan of the head is performed without intravenous contrast  enhancement.     The images are acquired in axial plane and subsequent reconstruction in  coronal and sagittal planes.     The comparison is made with the previous study dated 5/28/2025.     There is no evidence of a mass. There is no midline shift.     There is no evidence of intracranial hemorrhage or hematoma.     The ventricles, the basal cisterns and the cortical sulci are normal.     The gray-white matter differentiation is maintained.     The posterior fossa structures appear unremarkable as  visualized.  Low-density area located anteroinferiorly in the zoltan seen in the  sagittal plane images may be artifactual due to adjacent dense bone and  pulsation of the basilar artery?.     Images reviewed in bone windows show no acute skull fracture. Limited  visualized paranasal sinuses and mastoid air cells are clear.       Impression:      1. A small ill-defined low-density area located in the anterior inferior  zoltan which is only visualized in the sagittal plane images may be  artifactual. If clinically warranted, further evaluation with MR imaging  of the brain with particular attention to this area may be obtained.  2. The remaining brain is unremarkable as detailed above.                                         This report was signed and finalized on 6/23/2025 11:39 AM by Dr. Vj Hsu MD.       XR Chest 1 View [[id number]] Collected: 06/23/25 1132     Updated: 06/23/25 1135    Narrative:      EXAM: XR CHEST 1 VW-         HISTORY: Fever       COMPARISON: 5/29/2025.     TECHNIQUE:  Frontal view(s) of the chest submitted.     FINDINGS:    Right IJ central venous catheter is been removed. There are low lung  volumes with vascular crowding and probable volume overload/edema. No  effusion or pneumothorax is seen. Heart and mediastinum are  unremarkable.          Impression:         1. Low lung volumes with vascular crowding and probable volume  overload/edema.  2. Removal of the right IJ central venous catheter.     This report was signed and finalized on 6/23/2025 11:32 AM by Óscar Orozco.                I have reviewed the patient's current medications.     ampicillin, 2 g, Intravenous, Q6H  lactulose, 20 g, Oral, TID  levothyroxine, 75 mcg, Oral, Q AM  midodrine, 5 mg, Oral, TID AC  pantoprazole, 40 mg, Intravenous, BID AC  pentoxifylline, 400 mg, Oral, TID With Meals  sodium chloride, 10 mL, Intravenous, Q12H  sucralfate, 1 g, Oral, 4x Daily AC & at Bedtime  tamsulosin, 0.4 mg, Oral,  Daily  thiamine, 100 mg, Oral, Daily       Assessment/Plan   Assessment  Active Hospital Problems    Diagnosis     **Hepatic encephalopathy     Ulcerative esophagitis     Liver failure     Acute UTI (urinary tract infection)     Alcoholic cirrhosis     History of hepatitis C     Jaundice     Alcoholism     Hypokalemia     Class 1 obesity due to excess calories without serious comorbidity with body mass index (BMI) of 34.0 to 34.9 in adult        Treatment Plan:  Continue PO Lactulose and add trial of Rifaximin  2.   Discussed with Dr. Matson this morning.  Very much appreciate his assistance in also reaching out to [facility]  3.   Continue pentoxifylline  4.   Labs pending for this AM  5.   Recent MELD score of 30  6.   Madrey's discriminant function score of 116.  Unfortunately, patient recently treated with steroids during a hospitalization for acute alcoholic hepatitis and at that time his hospital course was complicated by gastrointestinal bleed related to esophageal ulcers.  Currently not giving treatment with steroids given the circumstance.  7.  Paracentesis completed in the emergency department; ascites fluid studies do not appear consistent with SBP.  8.  Consider adding Lasix/Aldactone regimen in near future  9.  Plan to d/c Abxs today and monitor off of antimicrobial therapy  10.  Palliative care following and appreciate their assistance.  11.   PT and OT  12.   K replacement  13.   Very guarded prognosis    Medical Decision Making    5 acute on chronic, high complexity, worsening  2 acute, high complexity, unchanged      Number and Complexity of problems: 7  Differential Diagnosis: None    Conditions and Status        Slight improvement but still very guarded overall prognosis     MDM Data  External documents reviewed: None  Cardiac tracing (EKG, telemetry) interpretation: no new EKGs  Radiology interpretation: no new radiology studies  Labs reviewed: as above  Any tests that were considered  but not ordered: None     Decision rules/scores evaluated (example [id number], Wells, etc): MELD Score (Original, Pre-2016, Model for End-Stage Liver Disease) - MDCalc  Calculated on Jun 23 2025 7:03 PM  30 points -> Original MELD Score (Pre-2016)*  52.6% -> Estimated 3-Month Mortality     Discussed with: patient and father at bedside; bedside nurse Aleisha; Dr. Matson with GI     Care Planning  Shared decision making: mother  Code status and discussions: no cpr      Disposition  Social Determinants of Health that impact treatment or disposition: Possible need for end of life care   I expect the patient to be discharged to TBD in TBD days. Very guarded prognosis.  Physical therapy notes reviewed indicating disposition of possible SNF.    Electronically signed by Zoltan Boateng MD-BC, 06/26/25, 09:29 CDT.

## 2025-06-26 NOTE — PROGRESS NOTES
[facility] Palliative Care Services    Palliative Care Daily Progress Note   Chief complaint-follow up support for patient/family, hospice discussion    Code Status:   Code Status and Medical Interventions: No CPR (Do Not Attempt to Resuscitate); Limited Support; No intubation (DNI), No cardioversion; Lengthy discussion with his mother   Ordered at: 06/23/25 1274     Code Status (Patient has no pulse and is not breathing):    No CPR (Do Not Attempt to Resuscitate)     Medical Interventions (Patient has pulse or is breathing):    Limited Support     Medical Intervention Limits:    No intubation (DNI)       No cardioversion     Comments:    Lengthy discussion with his mother     Level Of Support Discussed With:    Next of Kin (If No Surrogate)      Advanced Directives: Advance Directive Status: Patient does not have advance directive   Goals of Care: Ongoing.     S: Medical record reviewed. Events noted.  GI following, notes extremely poor prognosis without liver pretransplant, spoke with both [facility] and [facility] and not a liver transplant candidate due to ongoing alcohol abuse and relapse requiring multiple admissions.  Continue Pentoxifylline, although may not have much benefit at this point.  Steroids not started given recent upper GI bleed with esophageal ulcers.  Recommends paracentesis as needed.  Continue lactulose and rifaximin.  Diuretic trial.  Received dose of vitamin K 6/25.  Therapy following recommends SNF at discharge.  Hypokalemia resolved.  Transaminitis with slight trend upward.  Leukocytosis.  Anemia stable.  Ammonia trending downward.  Thrombocytopenia.  Sitting up in chair, staff at bedside checking vital signs.  Reports increased shortness of breath with transfer to chair, vital signs stable.  Abdominal distention.  Somnolent, slow to respond to questions.  Not oriented to situation when asked about conversation  "with providers this morning states \"pretty good news\".  He was not able to elaborate further.  Due to the Palliative Care Topics Discussed: care options and [facility] we will establish an advance care plan.   Advance Care Planning   Advance Care Planning Discussion: Spoke with patient's mother, Marisa via telephone reports she and Luciano's father have been  for multiple years and do not communicate.  Updated on current plan of care, overall extremely poor prognosis without liver transplant.  Efforts to transfer to tertiary facility for consideration and unfortunately not a candidate for liver transplant due to ongoing alcohol abuse and relapse requiring multiple hospitalizations.  She verbalizes \"he said he did not want a liver transplant anyway\".  We further explored best supportive care directed by hospice and expectations discussed including no further rehospitalization's and focus of quality of life.  She is receptive to receiving hospice information \"what ever is best for him\".  Questions encouraged and support given.     Review of Systems   Constitutional: Positive for malaise/fatigue.   Respiratory:  Positive for shortness of breath.    Musculoskeletal:  Positive for myalgias.   Gastrointestinal:  Positive for jaundice.        Abdominal distention   Neurological:  Positive for weakness.   Psychiatric/Behavioral:  Positive for memory loss.      Pain Assessment  Preferred Pain Scale: number (Numeric Rating Pain Scale)  CPOT and PAINAD Scales: PAINAD (Pain Assessment in Advance Dementia Scale)  PAINAD Breathin-->normal  PAINAD Negative Vocalization: 0-->none  PAINAD Facial Expression: 0-->smiling or inexpressive  PAINAD Body Language: 0-->relaxed  PAINAD Consolability: 0-->no need to console  PAINAD Score: 0  Pain Location: ankle, foot  Pain Description: aching    O:     Intake/Output Summary (Last 24 hours) at 2025 1532  Last data filed at 2025 1300  Gross per 24 hour   Intake 360 ml   Output " 475 ml   Net -115 ml       Diagnostics and current medications: Reviewed.      Current Facility-Administered Medications:     Calcium Replacement - Follow Nurse / BPA Driven Protocol, , Not Applicable, PRN, Nydia Zurita APRN    lactulose solution 20 g, 20 g, Oral, TID, Zoltan Boateng MD, 20 g at 06/26/25 0843    levothyroxine (SYNTHROID, LEVOTHROID) tablet 75 mcg, 75 mcg, Oral, Q AM, Zoltan Boateng MD, 75 mcg at 06/26/25 0524    Magnesium Cardiology Dose Replacement - Follow Nurse / BPA Driven Protocol, , Not Applicable, PRN, Nydia Zurita APRN    midodrine (PROAMATINE) tablet 5 mg, 5 mg, Oral, TID Kilo CUMMINS Benjamin H, MD, 5 mg at 06/26/25 1153    nitroglycerin (NITROSTAT) SL tablet 0.4 mg, 0.4 mg, Sublingual, Q5 Min PRN, Nydia Zurita APRN    ondansetron ODT (ZOFRAN-ODT) disintegrating tablet 4 mg, 4 mg, Oral, Q6H PRN **OR** ondansetron (ZOFRAN) injection 4 mg, 4 mg, Intravenous, Q6H PRN, Zoltan Boateng MD    pantoprazole (PROTONIX) injection 40 mg, 40 mg, Intravenous, BID Kilo CUMMINS Benjamin H, MD, 40 mg at 06/26/25 0844    pentoxifylline (TRENtal) CR tablet 400 mg, 400 mg, Oral, TID With Meals, Beltran Matson MD, 400 mg at 06/26/25 1143    Phosphorus Replacement - Follow Nurse / BPA Driven Protocol, , Not Applicable, PRN, Nydia Zurita APRN    Potassium Replacement - Follow Nurse / BPA Driven Protocol, , Not Applicable, PRAGUILAR, Nydia Zurita APRN    riFAXIMin (XIFAXAN) tablet 550 mg, 550 mg, Oral, Q12H, Zoltan Boateng MD, 550 mg at 06/26/25 1143    sodium chloride 0.9 % flush 10 mL, 10 mL, Intravenous, Q12H, Nydia Zurita APRN, 10 mL at 06/26/25 0844    sodium chloride 0.9 % flush 10 mL, 10 mL, Intravenous, PRN, , Nydia, APRN    sodium chloride 0.9 % infusion 40 mL, 40 mL, Intravenous, PRN, , LÁZARO Stafford, 40 mL at 06/23/25 2221    sucralfate (CARAFATE) tablet 1 g, 1 g, Oral, 4x Daily AC & at Bedtime, Zoltan Boateng MD, 1 g at  "06/26/25 1143    tamsulosin (FLOMAX) 24 hr capsule 0.4 mg, 0.4 mg, Oral, Daily, Zoltan Boateng MD, 0.4 mg at 06/26/25 0843    thiamine (VITAMIN B-1) tablet 100 mg, 100 mg, Oral, Daily, Zoltan Boateng MD, 100 mg at 06/26/25 0843    No Known Allergies  I have utilized all available immediate resources to obtain, update, or review the patient's current medications (including all prescriptions, over-the-counter products, herbals, cannabis/cannabidiol products, and vitamin/mineral/dietary (nutritional) supplements) for name, route of administration, type, dose and frequency.    A:    /53 (BP Location: Left arm, Patient Position: Lying)   Pulse 75   Temp 97.6 °F (36.4 °C) (Oral)   Resp 16   Ht 190.5 cm (75\")   Wt (!) 143 kg (316 lb 3.2 oz)   SpO2 97%   BMI 39.52 kg/m²     Vitals and nursing note reviewed.   Constitutional:       Appearance: Ill-appearing and chronically ill-appearing.      Comments: somnolent   Eyes:      General: Scleral icterus.   HENT:      Head: Normocephalic.   Pulmonary:      Effort: Increased respiratory effort.   Cardiovascular:      Normal rate.   Edema:     Peripheral edema present.  Abdominal:      General: There is distension.   Skin:     General: Skin is warm.      Coloration: Skin is jaundiced.     Patient status: Disease state: Controlled with current treatments.  Current Functional status: Palliative Performance Scale Score: Performance 30% based on the following measures: Ambulation: Totally bed bound, Activity and Evidence of Disease: Unable to do any work, extensive evidence of disease, Self-Care: Total care required,  Intake: Reduced, LOC: Full, drowsy or confusion   Nutritional status: Albumin 2.4 Body mass index is 39.52 kg/m².      Palliative Care Acuity Data  Psychosocial Acuity: normal complexity  Spiritual Acuity: normal complexity  Hospital Problem List      Hepatic encephalopathy    Class 1 obesity due to excess calories without serious comorbidity " with body mass index (BMI) of 34.0 to 34.9 in adult    Hypokalemia    Alcoholism    Jaundice    Acute UTI (urinary tract infection)    Alcoholic cirrhosis    History of hepatitis C    Liver failure     Impression/Problem List:     Cirrhosis of the liver due to alcoholism, MELD-31, Child Class C  Hepatic encephalopathy  Alcoholism    Suspected UTI  Hypokalemia  Diabetes mellitus  Thrombocytopenia  Recent GI bleed  Gout  Hepatitis C  Hypertension   Hypoalbuminemia       Recommendations/Plan:  1. plan: Goals of care include status no CPR/limited support interventions.     Family support: The patient receives support from his mother..  Advance Directives: Advance Directive Status: Patient does not have advance directive   POA/Healthcare surrogate-children and mother-next of kin.     2.  Palliative care encounter  - Prognosis is poor long-term secondary to cirrhosis of the liver, alcoholism, and comorbidities.  -Family appears to have fair prognostic awareness.      -Evaluated in ED on 4/18/2025 due to generalized abdominal pain and worsening jaundice.  CT scan abdomen pelvis revealed findings concerning for acute calculus cholecystitis with multiple stones, gallbladder distention as well as wall thickening and mild pericholecystic fluid, liver cirrhosis with hepatosplenomegaly and recanalized periumbilical vein.  He was transferred to [facility] in [place], [place] for interventional radiology.  Discharge summary reviewed and noted he was given supportive care initially however liver enzymes failed to improve and he was started on prednisolone for acute alcoholic hepatitis.  Noted he did not require intervention for possible cholecystitis.  He was ultimately discharged home on 4/26/2025.  -Hospitalization 5/28-6/4 due to decompensated liver failure in the setting of cirrhosis, coagulopathy, anemia, GI bleed, underwent upper endoscopy with findings of grade D reflux esophagitis, esophageal ulcer, portal  hypertension gastropathy.       -Most recently seen by my colleague LÁZARO Chavira during May hospitalization and at that time expressed wishes to continue full aggressive care interventions, noted to express interest in changing lifestyle and getting help outpatient.     -Bedside paracentesis performed in ED.    -Evaluated by GI and recommended transfer to tertiary facility.  Tertiary facility/hepatology/transplant service at [facility] contacted and according to chart review in order for patient to be transferred to tertiary facility would have to be in full alcohol rehabilitation before being considered for transplant.    -Patient is treated with empiric antibiotics for UTI, lactulose enema given degree of ascites no fluid bolus provided.    -CODE STATUS changes no CPR/limited support interventions, no intubation, no cardioversion per hospitalist team.      6/25-Vit K     6/24-provide CODE STATUS no CPR/limited support interventions    6/26-continue supportive measures.  -GI following, notes extremely poor prognosis without liver pretransplant, spoke with both [facility] and [facility] and not a liver transplant candidate due to ongoing alcohol abuse and relapse requiring multiple admissions.  Continue Pentoxifylline, although may not have much benefit at this point.  Steroids not started given recent upper GI bleed with esophageal ulcers.  Recommends paracentesis as needed.  Continue lactulose and rifaximin.  Diuretic trial.    -Therapy following recommends SNF at discharge.    -Anticipating patient to discharge back home with patient's mother at time of discharge, per SW note.  -mother agreeable to hospice consult for more information. A hospice consult placed.  Electronic communication to CM and care team      Thank you for allowing us to participate in patient's plan of care. Palliative Care Team will continue to follow patient.     Marge  LÁZARO Green  6/26/2025  15:32 CDT

## 2025-06-26 NOTE — PROGRESS NOTES
[facility] Gastroenterology  Inpatient Progress Note  Today's date:  06/26/25    Luciano   1985       Reason for Follow Up:   Cirrhosis, Alcoholic hepatitis    Subjective:   No new issues overnight. Seems to be more awake and alert today. Sitting up in chair. Slowed responses, but answering questions.       Current Facility-Administered Medications:     Calcium Replacement - Follow Nurse / BPA Driven Protocol, , Not Applicable, PRN, Nydia Zurita APRN    lactulose solution 20 g, 20 g, Oral, TID, Zoltan Boateng MD, 20 g at 06/26/25 0843    levothyroxine (SYNTHROID, LEVOTHROID) tablet 75 mcg, 75 mcg, Oral, Q AM, Zoltan Boateng MD, 75 mcg at 06/26/25 0524    Magnesium Cardiology Dose Replacement - Follow Nurse / BPA Driven Protocol, , Not Applicable, PRN, Nydia Zurita APRN    midodrine (PROAMATINE) tablet 5 mg, 5 mg, Oral, TID Kilo CUMMINS Benjamin H, MD, 5 mg at 06/26/25 0843    nitroglycerin (NITROSTAT) SL tablet 0.4 mg, 0.4 mg, Sublingual, Q5 Min PRN, Nydia Zurita APRN    ondansetron ODT (ZOFRAN-ODT) disintegrating tablet 4 mg, 4 mg, Oral, Q6H PRN **OR** ondansetron (ZOFRAN) injection 4 mg, 4 mg, Intravenous, Q6H PRN, Zoltan Boateng MD    pantoprazole (PROTONIX) injection 40 mg, 40 mg, Intravenous, BID Kilo CUMMINS Benjamin H, MD, 40 mg at 06/26/25 0844    pentoxifylline (TRENtal) CR tablet 400 mg, 400 mg, Oral, TID With Meals, Beltran Matson MD, 400 mg at 06/26/25 0843    Phosphorus Replacement - Follow Nurse / BPA Driven Protocol, , Not Applicable, PRN, Nydia Zurita APRN    Potassium Replacement - Follow Nurse / BPA Driven Protocol, , Not Applicable, PRN, Nydia Zurita APRN    riFAXIMin (XIFAXAN) tablet 550 mg, 550 mg, Oral, Q12H, Zoltan Boateng MD    sodium chloride 0.9 % flush 10 mL, 10 mL, Intravenous, Q12H, Nydia, APRN, 10 mL at 06/26/25 0844    sodium chloride 0.9 % flush 10 mL, 10 mL, Intravenous, PRN, Nydia Zurita,  APRN    sodium chloride 0.9 % infusion 40 mL, 40 mL, Intravenous, PRN, , Nydia, APRN, 40 mL at 06/23/25 2221    sucralfate (CARAFATE) tablet 1 g, 1 g, Oral, 4x Daily AC & at Bedtime, Zoltan Boateng MD, 1 g at 06/26/25 0843    tamsulosin (FLOMAX) 24 hr capsule 0.4 mg, 0.4 mg, Oral, Daily, Zoltan Boateng MD, 0.4 mg at 06/26/25 0843    thiamine (VITAMIN B-1) tablet 100 mg, 100 mg, Oral, Daily, Zoltan Boateng MD, 100 mg at 06/26/25 0843      Vital Signs:  Temp:  [97.3 °F (36.3 °C)-98.5 °F (36.9 °C)] 97.7 °F (36.5 °C)  Heart Rate:  [71-94] 94  Resp:  [16-18] 16  BP: (108-122)/(52-76) 118/76    Physical Exam:  General: no acute distress, more alert and oriented today, slow responses  HEENT: perrl, sclera icterus  CV: rrr, no murmur  Resp: lungs clear to auscultation, no wheeze or rhonchi  Abd: soft, mild distended, no rebound our guarding  Skin: no rash, jaundice     Results Review:   I have reviewed all of the patient's current test results    Results from last 7 days   Lab Units 06/25/25  0602 06/23/25  1007   WBC 10*3/mm3 9.75 12.72*   HEMOGLOBIN g/dL 9.1* 9.6*   HEMATOCRIT % 27.2* 29.2*   PLATELETS 10*3/mm3 117* 128*       Results from last 7 days   Lab Units 06/25/25 2037 06/25/25  0602 06/24/25  0601 06/23/25  1735 06/23/25  1007   SODIUM mmol/L  --  138 136 139 138   POTASSIUM mmol/L 3.4* 3.0* 3.5  3.5 2.9* 2.9*   CHLORIDE mmol/L  --  109* 108* 108* 105   CO2 mmol/L  --  19.0* 19.0* 18.0* 17.0*   BUN mg/dL  --  14.9 14.1 14.1 14.1   CREATININE mg/dL  --  0.55* <0.17* 0.57* 0.71*   CALCIUM mg/dL  --  8.9 8.7 8.5* 8.5*   BILIRUBIN mg/dL  --  32.0* 29.0*  --  33.6*   ALK PHOS U/L  --  199* 179*  --  221*   ALT (SGPT) U/L  --  58* 54*  --  61*   AST (SGOT) U/L  --  140* 140*  --  133*   GLUCOSE mg/dL  --  113* 93 113* 151*       Results from last 7 days   Lab Units 06/25/25  0602 06/24/25  0601 06/23/25  1007   INR  2.81* 2.79* 2.60*         Impression:  -Decompensated cirrhosis with  history of alcoholic hepatitis. MELD 30 and discriminant function 113 on admission. Overall, extremely poor prognosis with high mortality rate.      -History of alcohol abuse with relapse. Last alcohol use 4 weeks ago per family.      -Ulcerative esophagitis. Recent upper GI bleed secondary to esophageal ulcer and severe esophagitis.      -Altered mental status with hepatic encephalopathy.     Plan:  -Had long discussion with patient and family at bedside. Explained that his prognosis is poor and mortality rate is very jadon without liver transplant. I explained that we have reached out to [facility] and [facility], and he is not a liver transplant candidate at this time due to ongoing alcohol use and relapse history despite his knowledge of having cirrhosis and alcoholic hepatitis.     -Unfortunately, there is not much more to add at this time, and will just have to wait and see how his liver responds. He was not started on steroids due to history of recent GI bleed secondary to esophageal ulcers after being on steroids for alcoholic hepatitis. He has been started on Pentoxifylline, although not sure how much benefit there will from this at this point. Will follow up repeat labs from today. Can consider starting on low dose diuretics. Caution with Lasix as his potassium has been on the low side, but could start on low dose of Aldactone. Can get paracentesis as needed. Continue Lactulose and Rifaxamin.     Beltran Matson MD  06/26/25  10:07 CDT

## 2025-06-26 NOTE — PLAN OF CARE
Goal Outcome Evaluation:    Problem: Adult Inpatient Plan of Care  Goal: Plan of Care Review  Outcome: Not Progressing  Flowsheets (Taken 6/26/2025 8312)  Progress: no change  Outcome Evaluation: No c/o pain as of this time. Multiple BM this shift. Some confusion noted. Patient able to void. S 72-76 per tele. Plan of care ongoing.  Plan of Care Reviewed With: patient

## 2025-06-27 NOTE — PROGRESS NOTES
Patient Name: Luciano Christiansen  Date of Admission: 6/23/2025  Today's Date: 06/27/25  Length of Stay: 4  Primary Care Physician: Adrien Varghese MD    Subjective   Chief Complaint: follow-up encephalopathy  HPI   Patient was getting cleaned up by nursing staff after having a recent bowel movement.  He also has an external catheter in place to assist when he voids.  Patient reports that he is not feeling as well today.  He did report to me that he wanted to spend some time with his family.  We discussed his lab results.  We spent some time discussing his MELD score, and what that means.  We spent some time discussing consideration of getting information regarding hospice support, and he was agreeable.    Review of Systems   All pertinent negatives and positives are as above. All other systems have been reviewed and are negative unless otherwise stated.     Objective    Temp:  [97 °F (36.1 °C)-97.6 °F (36.4 °C)] 97.1 °F (36.2 °C)  Heart Rate:  [64-81] 77  Resp:  [16-22] 16  BP: ()/(34-59) 97/45  Physical Exam  Vitals reviewed.   Constitutional:       Appearance: He is obese. He is ill-appearing.      Comments: Laying in bed this morning   HENT:      Head: Normocephalic.   Eyes:      General: Scleral icterus present.   Pulmonary:      Effort: Pulmonary effort is normal. No respiratory distress.      Comments: Diminished at bases  Abdominal:      General: There is distension.      Tenderness: There is no abdominal tenderness.   Musculoskeletal:      Right lower leg: Edema present.      Left lower leg: Edema present.   Skin:     Coloration: Skin is jaundiced.   Neurological:      Motor: Weakness present.         Results Review:  I have reviewed the labs, radiology results, and diagnostic studies.    Laboratory Data:   Results from last 7 days   Lab Units 06/27/25  0415 06/26/25  1116 06/25/25  0602   WBC 10*3/mm3 10.92* 11.89* 9.75   HEMOGLOBIN g/dL 8.5* 9.0* 9.1*   HEMATOCRIT % 26.1* 26.9* 27.2*   PLATELETS 10*3/mm3  95* 111* 117*        Results from last 7 days   Lab Units 06/27/25  0415 06/26/25  1116 06/25/25 2037 06/25/25  0602   SODIUM mmol/L 137 138  --  138   POTASSIUM mmol/L 3.4* 3.9 3.4* 3.0*   CHLORIDE mmol/L 107 110*  --  109*   CO2 mmol/L 14.0* 15.0*  --  19.0*   BUN mg/dL 17.3 16.4  --  14.9   CREATININE mg/dL 0.84 0.76  --  0.55*   CALCIUM mg/dL 8.6 8.6  --  8.9   BILIRUBIN mg/dL 32.6* 31.7*  --  32.0*   ALK PHOS U/L 184* 195*  --  199*   ALT (SGPT) U/L 58* 59*  --  58*   AST (SGOT) U/L 117* 151*  --  140*   GLUCOSE mg/dL 119* 153*  --  113*       Culture Data:     Microbiology Results (last 10 days)       Procedure Component Value - Date/Time    Body Fluid Culture - Body Fluid, Peritoneum [[id number]] Collected: 06/23/25 1403    Lab Status: Preliminary result Specimen: Body Fluid from Peritoneum Updated: 06/27/25 0822     Body Fluid Culture No growth at 4 days     Gram Stain No organisms seen    Blood Culture - Blood, Arm, Left [[id number]]  (Normal) Collected: 06/23/25 1007    Lab Status: Preliminary result Specimen: Blood from Arm, Left Updated: 06/26/25 1100     Blood Culture No growth at 3 days    Blood Culture - Blood, Arm, Right [[id number]]  (Normal) Collected: 06/23/25 1000    Lab Status: Preliminary result Specimen: Blood from Arm, Right Updated: 06/26/25 1115     Blood Culture No growth at 3 days             Radiology Data:   Imaging Results (Last 7 Days)       Procedure Component Value Units Date/Time    US Abdomen Limited [[id number]] Collected: 06/23/25 1858     Updated: 06/23/25 1902    Narrative:      EXAM/TECHNIQUE: US ABDOMEN LIMITED-     INDICATION: Please evaluate any biliary duct obstruction; K76.82-Hepatic  encephalopathy; K70.31-Alcoholic cirrhosis of liver with ascites;  E87.6-Hypokalemia; D64.9-Anemia, unspecified; R17-Unspecified jaundice     COMPARISON: None available.     FINDINGS:     Visualized portion of the abdominal aorta and IVC are unremarkable.     Visualized portion of the pancreas  appears normal.     Coarsened nodular liver, in keeping with cirrhosis. Liver echogenicity  is within normal limits. No suspicious liver lesion identified.  Perihepatic ascites.     Multiple gallstones in the gallbladder lumen. No gallbladder wall  thickening. No biliary ductal dilatation. Common bile duct is 6 mm  diameter.     Right kidney is 13.7 cm in length and demonstrates normal cortical  thickness and echogenicity. No shadowing renal calculi or  hydronephrosis.       Impression:      1.  Cirrhotic liver. Perihepatic ascites.  2.  Gallstones are present but no evidence of acute cholecystitis. No  biliary ductal dilatation.     This report was signed and finalized on 6/23/2025 6:59 PM by Dr. Reinaldo Barry MD.       CT Head Without Contrast [[id number]] Collected: 06/23/25 1132     Updated: 06/23/25 1142    Narrative:      EXAMINATION: CT HEAD WO CONTRAST-        HISTORY:Altered mental status     In order to have a CT radiation dose as low as reasonably achievable  Automated Exposure Control was utilized for adjustment of the mA and/or  KV according to patient size.     Total DLP = 934.85 mGy.cm     The CT scan of the head is performed without intravenous contrast  enhancement.     The images are acquired in axial plane and subsequent reconstruction in  coronal and sagittal planes.     The comparison is made with the previous study dated 5/28/2025.     There is no evidence of a mass. There is no midline shift.     There is no evidence of intracranial hemorrhage or hematoma.     The ventricles, the basal cisterns and the cortical sulci are normal.     The gray-white matter differentiation is maintained.     The posterior fossa structures appear unremarkable as visualized.  Low-density area located anteroinferiorly in the zoltan seen in the  sagittal plane images may be artifactual due to adjacent dense bone and  pulsation of the basilar artery?.     Images reviewed in bone windows show no acute skull fracture.  Limited  visualized paranasal sinuses and mastoid air cells are clear.       Impression:      1. A small ill-defined low-density area located in the anterior inferior  zoltan which is only visualized in the sagittal plane images may be  artifactual. If clinically warranted, further evaluation with MR imaging  of the brain with particular attention to this area may be obtained.  2. The remaining brain is unremarkable as detailed above.                                         This report was signed and finalized on 6/23/2025 11:39 AM by Dr. Vj Hsu MD.       XR Chest 1 View [[id number]] Collected: 06/23/25 1132     Updated: 06/23/25 1135    Narrative:      EXAM: XR CHEST 1 VW-         HISTORY: Fever       COMPARISON: 5/29/2025.     TECHNIQUE:  Frontal view(s) of the chest submitted.     FINDINGS:    Right IJ central venous catheter is been removed. There are low lung  volumes with vascular crowding and probable volume overload/edema. No  effusion or pneumothorax is seen. Heart and mediastinum are  unremarkable.          Impression:         1. Low lung volumes with vascular crowding and probable volume  overload/edema.  2. Removal of the right IJ central venous catheter.     This report was signed and finalized on 6/23/2025 11:32 AM by Óscar Orozco.                I have reviewed the patient's current medications.     lactulose, 20 g, Oral, TID  levothyroxine, 75 mcg, Oral, Q AM  midodrine, 5 mg, Oral, TID AC  pantoprazole, 40 mg, Intravenous, BID AC  pentoxifylline, 400 mg, Oral, TID With Meals  potassium chloride ER, 40 mEq, Oral, Q4H  rifAXIMin, 550 mg, Oral, Q12H  sodium chloride, 10 mL, Intravenous, Q12H  sucralfate, 1 g, Oral, 4x Daily AC & at Bedtime  tamsulosin, 0.4 mg, Oral, Daily  thiamine, 100 mg, Oral, Daily       Assessment/Plan   Assessment  Active Hospital Problems    Diagnosis     **Hepatic encephalopathy     Ulcerative esophagitis     Liver failure     Acute UTI (urinary tract infection)      Alcoholic cirrhosis     History of hepatitis C     Jaundice     Alcoholism     Hypokalemia     Class 1 obesity due to excess calories without serious comorbidity with body mass index (BMI) of 34.0 to 34.9 in adult        Treatment Plan:  Continue PO Lactulose   Rifaximin added  Vitamin K PO X 1 again today  Trial of low dose Aldactone  Ongoing issues with hypokalemia and some soft BPs this morning; will hold starting furosemide for now  6.   Continue pentoxifylline  7.   Labs pending for this AM  8.   MELD score is 34 this morning  9.   [facility].  Unfortunately, patient recently treated with steroids during a hospitalization for acute alcoholic hepatitis and at that time his hospital course was complicated by gastrointestinal bleed related to esophageal ulcers.  Currently not giving treatment with steroids given the circumstance.  10.  Paracentesis completed in the emergency department; ascites fluid studies do not appear consistent with SBP.  11.  Palliative care following and appreciate their assistance.  12.  Hospice consultation  13.   PT and OT  14.   K replacement  15.   Very guarded prognosis.  A MELD score of 34 has an estimated 90-day mortality of 65-66%.      Medical Decision Making    5 acute on chronic, high complexity, worsening  2 acute, high complexity, unchanged    Conditions and Status        No significant improvement; very guarded overall prognosis     MDM Data  External documents reviewed: None  Cardiac tracing (EKG, telemetry) interpretation: no new EKGs  Radiology interpretation: no new radiology studies  Labs reviewed: as above  Any tests that were considered but not ordered: None        Discussed with: patient      Care Planning  Shared decision making: mother  Code status and discussions: no cpr      Disposition  Social Determinants of Health that impact treatment or disposition: Possible need for end of life care   I expect the patient to be  discharged to TBD in TBD days. Very guarded prognosis.  Will order hospice consultation today.    Electronically signed by Zoltan Boateng MD-BC, 06/27/25, 09:34 CDT.

## 2025-06-27 NOTE — PLAN OF CARE
Goal Outcome Evaluation:  Plan of Care Reviewed With: patient        Progress: no change  Outcome Evaluation: No co pain. He is drowsy. He had a BM today. He is alert to self and time. Cont to monitor.

## 2025-06-27 NOTE — CASE MANAGEMENT/SOCIAL WORK
Continued Stay Note  TOVA Munoz     Patient Name: Luciano Christiansen  MRN: [id number]  Today's Date: 6/27/2025    Admit Date: 6/23/2025    Plan: Possible home with hospice?   Discharge Plan       Row Name 06/27/25 0854       Plan    Plan Possible home with hospice?    Plan Comments Hospice consult received.  Information faxed to [facility].  Hospice liaison, Jess Reed, does not work on Fridays.  Awaiting response from hospice.                   Discharge Codes    No documentation.                       MICHELLE Blair

## 2025-06-27 NOTE — DISCHARGE PLACEMENT REQUEST
"To: [facility]    From: Jannie MCGEELisbeth [phone]          Luciano Christiansen (40 y.o. Male)       Date of Birth   1985    Social Security Number       Address   [address] [id number]    Home Phone   [phone]    MRN   [id number]       Yarsani   Other    Marital Status   Single                            Admission Date   6/23/2025    Admission Type   Emergency    Admitting Provider   Zoltan Boateng MD    Attending Provider   Zoltan Boateng MD    Department, Room/Bed   [facility] 4B, 443/1       Discharge Date       Discharge Disposition       Discharge Destination                                 Attending Provider: Zoltan Boateng MD    Allergies: No Known Allergies    Isolation: None   Infection: None   Code Status: No CPR    Ht: 190.5 cm (75\")   Wt: 143 kg (316 lb 3.2 oz)    Admission Cmt: None   Principal Problem: Hepatic encephalopathy [K76.82]                   Active Insurance as of 6/23/2025       Primary Coverage       Payor Plan Insurance Group Employer/Plan Group    [id number]       Payor Plan Address Payor Plan Phone Number Payor Plan Fax Number Effective Dates    [address] [address] [phone]  8/1/2020 - None Entered    [facility] [id number]         Subscriber Name Subscriber Birth Date Member ID       LUCIANO CHRISTIANSEN 1985 [id number]                     Emergency Contacts        (Rel.) Home Phone Work Phone Mobile Phone    MAGO CHRISTIANSEN (Mother) [phone] -- [phone]    Jossy Christiansen-17 years old (Daughter) -- -- [phone]                 History & Physical        , LÁZARO Stafford at 06/23/25 1648       Attestation signed by Zoltan Boateng MD at 06/24/25 1058      I have reviewed this documentation and agree.    I performed a substantive part of the MDM during the patient’s E/M visit. I personally evaluated and examined the patient. I personally made or approved the documented management plan " and acknowledge its risk of complications.     (Discussion) Management/test interpretation discussed with ED provider, Dr. Quiñonez in addition to gastroenterology.  Discussed with patient (limited) and bedside nurse while in room #40 of the ED.       Electronically signed by Zoltan Boateng MD, 6/24/2025, 10:55 CDT.                           [facility] Medicine Services  HISTORY AND PHYSICAL    Date of Admission: 6/23/2025  Primary Care Physician: Adrien Varghese MD    Subjective   Primary Historian: Patient and his mother    Chief Complaint: Significant confusion    Altered Mental Status  Symptoms include congestion, fatigue and weakness.      Luciano Christiansen is a 40-year-old male with past medical history significant for alcoholism, cirrhosis, diabetes mellitus, gout, hep C positive, significant jaundice and hypertension.  Presented to [facility] [place] operated by [facility] emergency department 6/23/2025 with his mother due to significant increase in confusion.  She states she has been giving him lactulose when he wakes up and follows commands however he is becoming more lethargic and more confused and she was unable to give him any today.  He did recently stop drinking approximately 3-4 weeks ago.  Patient does not have any melena or hematemesis or hematochezia, mother has stated that he has just become more jaundice and his stool has become yellow.  No fluid bolus was initiated due to extreme ascites and anasarca. Current MELD score is 30 with a 52.6% mortality.   Bedside paracentesis performed per ED.Dr. Matson, gastroenterology came and evaluated the patient and recommended either transfer to tertiary facility.  Case was discussed with Dr. Mabry with hepatology/transplant service at [facility] who stated that in order for patient to be transferred to tertiary care facility he would have to be in full alcohol rehabilitation before he is even considered for transplant having said that the  recommendations from hepatology are to monitor his fluid status improved with encephalopathy and then refer him to alcohol rehab and then maybe he could be a transplant candidate in the future.  They did realize her and his mortality is very high but in their opinion as per their transport criteria he would not meet the transplant criteria and therefore they will not be able to do anything else for him that cannot be done in our hospital.  Case was again discussed with Dr. Matson, gastroenterology who will consult and assist in supportive care and possible palliative measures with the hospitalist team.    Workup revealed K2.9, CO2 17.0, anion gap 16.0, BUN 14, CR 0.71, , Ca 8.5, alkaline phosphatase 221, albumin 2.7, , ALT 61, total bilirubin 33.6, ammonia 149, lactate 4.7 post paracentesis 2.6, INR 2.60, WBC 12.72 with a left shift and no bandemia, Hb 9.6, HCT 29.2, , urinalysis positive for nitrates, moderate leukocytes 4+ bacteria, BC and blood culture fluid pending.    Due to previous positive culture with Enterococcus faecalis in April of this year will initiate ampicillin per susceptibility and extensive discussion with pharmacist.    Review of Systems   Constitutional:  Positive for activity change, appetite change and fatigue.   HENT:  Positive for congestion.    Cardiovascular:  Positive for leg swelling.   Gastrointestinal:  Positive for abdominal distention.   Skin:  Positive for color change.   Neurological:  Positive for dizziness and weakness.   Psychiatric/Behavioral:  Positive for confusion.       Otherwise complete ROS reviewed and negative except as mentioned in the HPI.    Past Medical History:   Past Medical History:   Diagnosis Date    Alcoholism     currently drinking    Diabetes mellitus     Gout     Hepatitis C     Hypertension     Jaundice      Past Surgical History:  Past Surgical History:   Procedure Laterality Date    ENDOSCOPY N/A 5/28/2025    Procedure:  ESOPHAGOGASTRODUODENOSCOPY WITH ANESTHESIA;  Surgeon: Estela Ramos MD;  Location: [facility];  Service: Gastroenterology;  Laterality: N/A;  pre op: GI bleed  post op: esophageal ulcer, esophagitis  pcp: Adrien Varghese MD    VASECTOMY       Social History:  reports that he has been smoking cigarettes. He started smoking about 15 years ago. He has a 7.1 pack-year smoking history. He has never used smokeless tobacco. He reports current alcohol use of about 12.0 standard drinks of alcohol per week. He reports current drug use. Drug: Marijuana.    Family History: family history is not on file.       Allergies:  No Known Allergies    Medications:  Prior to Admission medications    Medication Sig Start Date End Date Taking? Authorizing Provider   calcium carbonate (OS-MAGGIE) 600 MG tablet Take 1 tablet by mouth Daily.   Yes Makenna Dukes MD   FLUoxetine (PROzac) 20 MG capsule Take 1 capsule by mouth Daily. 5/2/25  Yes Adrien Varghese MD   folic acid (FOLVITE) 1 MG tablet Take 1 tablet by mouth Daily. 6/4/25  Yes Roderick Field MD   lactulose 20 GM/30ML solution solution Take 30 mL by mouth 3 (Three) Times a Day. 6/4/25  Yes Roderick Field MD   levothyroxine (Synthroid) 75 MCG tablet Take 1 tablet by mouth Daily. 5/2/25  Yes Adrien Varghese MD   midodrine (PROAMATINE) 5 MG tablet Take 1 tablet by mouth 3 (Three) Times a Day Before Meals.  Patient taking differently: Take 0.5 tablets by mouth 3 (Three) Times a Day Before Meals. 6/4/25  Yes Roderick Field MD   multivitamin with minerals (MULTIVITAMIN ADULT PO) Take 1 tablet by mouth Daily.   Yes Makenna Dukes MD   pantoprazole (PROTONIX) 40 MG EC tablet Take 1 tablet by mouth 2 (Two) Times a Day Before Meals. 6/4/25  Yes Roderick Field MD   pentoxifylline (TRENtal) 400 MG CR tablet Take 1 tablet by mouth 3 (Three) Times a Day With Meals for 74 doses.  Patient taking differently: Take 0.5 tablets by mouth 3 (Three) Times a  "Day With Meals. 6/4/25 6/29/25 Yes Roderick Field MD   sucralfate (Carafate) 1 g tablet Take 1 tablet by mouth 4 (Four) Times a Day. 6/10/25  Yes Adrien Varghese MD   thiamine (VITAMIN B1) 100 MG tablet Take 1 tablet by mouth Daily. 5/2/25  Yes Adrien Varghese MD   vitamin C (ASCORBIC ACID) 250 MG tablet Take 1 tablet by mouth Daily.   Yes ProviderMakenna MD   chlordiazePOXIDE (LIBRIUM) 5 MG capsule Take 3 capsules by mouth Daily As Needed for Anxiety for 7 days, THEN 2 capsules Daily As Needed for 7 days, THEN 1 capsule Daily for 7 days. 6/10/25 7/1/25  Adrien Varghese MD     I have utilized all available immediate resources to obtain, update, or review the patient's current medications (including all prescriptions, over-the-counter products, herbals, cannabis/cannabidiol products, and vitamin/mineral/dietary (nutritional) supplements).    Results for orders placed during the hospital encounter of 07/04/24    Adult Transthoracic Echo Complete W/ Cont if Necessary Per Protocol    Interpretation Summary    Left ventricular ejection fraction appears to be 61 - 65%.    Left ventricular diastolic function was normal.    Estimated right ventricular systolic pressure from tricuspid regurgitation is normal (<35 mmHg).       Objective     Vital Signs: /62   Pulse 74   Temp 97.6 °F (36.4 °C) (Oral)   Resp 20   Ht 190.5 cm (75\")   Wt 136 kg (300 lb)   SpO2 99%   BMI 37.50 kg/m²   Physical Exam  Vitals and nursing note reviewed.   Constitutional:       General: He is in acute distress.      Appearance: He is obese. He is ill-appearing and toxic-appearing.      Comments: Room air   HENT:      Head: Normocephalic and atraumatic.      Right Ear: Tympanic membrane normal.      Left Ear: Tympanic membrane normal.      Nose: Congestion present.      Mouth/Throat:      Mouth: Mucous membranes are dry.   Eyes:      General: Scleral icterus present.      Conjunctiva/sclera:      Right eye: Right conjunctiva is " injected.      Left eye: Left conjunctiva is injected.   Neck:      Vascular: JVD present.   Cardiovascular:      Rate and Rhythm: Normal rate. Rhythm irregular.   Pulmonary:      Effort: Accessory muscle usage present. No tachypnea or respiratory distress.      Breath sounds: Examination of the right-upper field reveals rales. Examination of the left-upper field reveals rales. Examination of the right-middle field reveals decreased breath sounds. Examination of the left-middle field reveals decreased breath sounds. Examination of the right-lower field reveals decreased breath sounds. Examination of the left-lower field reveals decreased breath sounds. Decreased breath sounds and rales present.   Abdominal:      General: Bowel sounds are decreased. There is distension.      Palpations: There is fluid wave.      Tenderness: There is generalized abdominal tenderness.   Musculoskeletal:      Cervical back: Neck supple. No rigidity or tenderness.      Right lower le+ Edema present.      Left lower le+ Edema present.   Skin:     General: Skin is cool.      Coloration: Skin is sallow. Skin is not jaundiced.   Neurological:      Mental Status: He is lethargic and disoriented.      Cranial Nerves: Cranial nerves 2-12 are intact.      Motor: Weakness present.   Psychiatric:         Attention and Perception: He is inattentive.         Mood and Affect: Affect is flat.         Speech: Speech is slurred. Speech is not delayed.         Behavior: Behavior is withdrawn.         Cognition and Memory: Cognition is impaired. Memory is impaired.        Results Reviewed:  Lab Results (last 24 hours)       Procedure Component Value Units Date/Time    Body Fluid Cell Count With Differential - Body Fluid, Peritoneum [[id number]] Collected: 25 1403    Specimen: Body Fluid from Peritoneum Updated: 25 1524            Body fluid differential - Body Fluid, Peritoneum [[id number]] Collected: 25 1403    Specimen: Body  Fluid from Peritoneum Updated: 06/23/25 1524     Neutrophils, Fluid % 11 %      Lymphocytes, Fluid % 11 %      Monocytes, Fluid % 73 %      Mesothelial Cells, Fluid % 1 %      Macrophage, Fluid % 4 %     STAT Lactic Acid, Reflex [[id number]]  (Abnormal) Collected: 06/23/25 1417    Specimen: Blood Updated: 06/23/25 1509     Lactate 2.6 mmol/L     Body fluid cell count - Body Fluid, Peritoneum [[id number]] Collected: 06/23/25 1403    Specimen: Body Fluid from Peritoneum Updated: 06/23/25 1426     Color, Fluid Yellow     Appearance, Fluid Clear     RBC, Fluid 1,000 /mm3      Nucleated Cells, Fluid 57 /mm3      Method: Automated Sysmex XN Method    Albumin, Fluid - Body Fluid, Peritoneum [[id number]] Collected: 06/23/25 1403    Specimen: Body Fluid from Peritoneum Updated: 06/23/25 1410    Body Fluid Culture - Body Fluid, Peritoneum [[id number]] Collected: 06/23/25 1403    Specimen: Body Fluid from Peritoneum Updated: 06/23/25 1410    Urinalysis With Culture If Indicated - Urine, Clean Catch [[id number]]  (Abnormal) Collected: 06/23/25 1118    Specimen: Urine, Clean Catch Updated: 06/23/25 1155     Color, UA [place]     Appearance, UA Cloudy     pH, UA 5.5     Specific Gravity, UA 1.018     Glucose, UA Negative     Ketones, UA Negative     Bilirubin, UA Large (3+)     Blood, UA Negative     Protein, UA 30 mg/dL (1+)     Leuk Esterase, UA Moderate (2+)     Nitrite, UA Positive     Urobilinogen, UA 0.2 E.U./dL            Urinalysis, Microscopic Only - Urine, Clean Catch [[id number]]  (Abnormal) Collected: 06/23/25 1118    Specimen: Urine, Clean Catch Updated: 06/23/25 1154     RBC, UA 0-2 /HPF      WBC, UA 3-5 /HPF      Comment: Urine culture not indicated.        Bacteria, UA 3+ /HPF      Squamous Epithelial Cells, UA 0-2 /HPF      Hyaline Casts, UA 3-6 /LPF      Mucus, UA Trace /HPF      Methodology Manual Light Microscopy    Urine Drug Screen - Urine, Clean Catch [[id number]]  (Abnormal) Collected: 06/23/25 1118    Specimen:  Urine, Clean Catch Updated: 06/23/25 1148     THC, Screen, Urine Negative     Phencyclidine (PCP), Urine Negative     Cocaine Screen, Urine Negative     Methamphetamine, Ur Negative     Opiate Screen Negative     Amphetamine Screen, Urine Negative     Benzodiazepine Screen, Urine Positive     Tricyclic Antidepressants Screen Negative     Methadone Screen, Urine Negative     Barbiturates Screen, Urine Positive     Oxycodone Screen, Urine Negative     Buprenorphine, Screen, Urine Negative            Ammonia [[id number]]  (Abnormal) Collected: 06/23/25 1007    Specimen: Blood Updated: 06/23/25 1111     Ammonia 149 umol/L     Blood Culture - Blood, Arm, Right [[id number]] Collected: 06/23/25 1000    Specimen: Blood from Arm, Right Updated: 06/23/25 1100    Blood Culture - Blood, Arm, Left [[id number]] Collected: 06/23/25 1007    Specimen: Blood from Arm, Left Updated: 06/23/25 1100    Comprehensive Metabolic Panel [[id number]]  (Abnormal) Collected: 06/23/25 1007    Specimen: Blood from Arm, Right Updated: 06/23/25 1056     Glucose 151 mg/dL      BUN 14.1 mg/dL      Creatinine 0.71 mg/dL      Sodium 138 mmol/L      Potassium 2.9 mmol/L      Chloride 105 mmol/L      CO2 17.0 mmol/L      Calcium 8.5 mg/dL      Total Protein 5.2 g/dL      Albumin 2.7 g/dL      ALT (SGPT) 61 U/L      AST (SGOT) 133 U/L      Alkaline Phosphatase 221 U/L      Total Bilirubin 33.6 mg/dL      Globulin 2.5 gm/dL      A/G Ratio 1.1 g/dL      BUN/Creatinine Ratio 19.9     Anion Gap 16.0 mmol/L      eGFR 118.9 mL/min/1.73             Lactic Acid, Plasma [[id number]]  (Abnormal) Collected: 06/23/25 1007    Specimen: Blood from Arm, Right Updated: 06/23/25 1055     Lactate 4.7 mmol/L     Magnesium [[id number]]  (Normal) Collected: 06/23/25 1007    Specimen: Blood from Arm, Right Updated: 06/23/25 1052     Magnesium 1.9 mg/dL     Ethanol [[id number]] Collected: 06/23/25 1007    Specimen: Blood from Arm, Right Updated: 06/23/25 1049     Ethanol % <0.010 %      Narrative:      Not for legal purposes. Chain of Custody not followed.     Protime-INR [[id number]]  (Abnormal) Collected: 06/23/25 1007    Specimen: Blood from Arm, Right Updated: 06/23/25 1048     Protime 29.4 Seconds      INR 2.60    CBC & Differential [[id number]]  (Abnormal) Collected: 06/23/25 1007    Specimen: Blood from Arm, Right Updated: 06/23/25 1041            CBC Auto Differential [[id number]]  (Abnormal) Collected: 06/23/25 1007    Specimen: Blood from Arm, Right Updated: 06/23/25 1041     WBC 12.72 10*3/mm3      RBC 2.79 10*6/mm3      Hemoglobin 9.6 g/dL      Hematocrit 29.2 %      .7 fL      MCH 34.4 pg      MCHC 32.9 g/dL      RDW 21.6 %      RDW-SD 83.7 fl      MPV 11.2 fL      Platelets 128 10*3/mm3      Neutrophil % 79.4 %      Lymphocyte % 9.7 %      Monocyte % 8.1 %      Eosinophil % 1.1 %      Basophil % 0.6 %      Immature Grans % 1.1 %      Neutrophils, Absolute 10.10 10*3/mm3      Lymphocytes, Absolute 1.23 10*3/mm3      Monocytes, Absolute 1.03 10*3/mm3      Eosinophils, Absolute 0.14 10*3/mm3      Basophils, Absolute 0.08 10*3/mm3      Immature Grans, Absolute 0.14 10*3/mm3      nRBC 0.0 /100 WBC      Comment: : [id number] Malone ID: [id number]             Imaging Results (Last 24 Hours)       Procedure Component Value Units Date/Time    CT Head Without Contrast [[id number]] Collected: 06/23/25 1132     Updated: 06/23/25 1142    Narrative:      EXAMINATION: CT HEAD WO CONTRAST-        HISTORY:Altered mental status     In order to have a CT radiation dose as low as reasonably achievable  Automated Exposure Control was utilized for adjustment of the mA and/or  KV according to patient size.     Total DLP = 934.85 mGy.cm     The CT scan of the head is performed without intravenous contrast  enhancement.     The images are acquired in axial plane and subsequent reconstruction in  coronal and sagittal planes.     The comparison is made with the previous study dated  5/28/2025.     There is no evidence of a mass. There is no midline shift.     There is no evidence of intracranial hemorrhage or hematoma.     The ventricles, the basal cisterns and the cortical sulci are normal.     The gray-white matter differentiation is maintained.     The posterior fossa structures appear unremarkable as visualized.  Low-density area located anteroinferiorly in the zoltan seen in the  sagittal plane images may be artifactual due to adjacent dense bone and  pulsation of the basilar artery?.     Images reviewed in bone windows show no acute skull fracture. Limited  visualized paranasal sinuses and mastoid air cells are clear.       Impression:      1. A small ill-defined low-density area located in the anterior inferior  zoltan which is only visualized in the sagittal plane images may be  artifactual. If clinically warranted, further evaluation with MR imaging  of the brain with particular attention to this area may be obtained.  2. The remaining brain is unremarkable as detailed above.                                         This report was signed and finalized on 6/23/2025 11:39 AM by Dr. Vj Hsu MD.       XR Chest 1 View [[id number]] Collected: 06/23/25 1132     Updated: 06/23/25 1135    Narrative:      EXAM: XR CHEST 1 VW-         HISTORY: Fever       COMPARISON: 5/29/2025.     TECHNIQUE:  Frontal view(s) of the chest submitted.     FINDINGS:    Right IJ central venous catheter is been removed. There are low lung  volumes with vascular crowding and probable volume overload/edema. No  effusion or pneumothorax is seen. Heart and mediastinum are  unremarkable.          Impression:         1. Low lung volumes with vascular crowding and probable volume  overload/edema.  2. Removal of the right IJ central venous catheter.     This report was signed and finalized on 6/23/2025 11:32 AM by Óscar Orozco.                  Assessment / Plan   Assessment:   Active Hospital Problems    Diagnosis      **Hepatic encephalopathy     Acute UTI (urinary tract infection)     Alcoholic cirrhosis     History of hepatitis C     Jaundice     Alcoholism     Hypokalemia     Class 1 obesity due to excess calories without serious comorbidity with body mass index (BMI) of 34.0 to 34.9 in adult        Treatment Plan  The patient will be admitted to Dr. Boateng's service here at [facility].     Hepatic encephalopathy/alcoholic cirrhosis/hepatitis C/jaundice  - N.p.o. due to AMS  - Lactulose 300 mL enema twice daily.  - Gastroenterology consult for any additional guidance, greatly appreciated.  - End-stage disease, palliative care consult for the AM.  - Long discussion with patient's mother by phone patient has been made DNR/DNI.  - Limited abdominal ultrasound for complete evaluation and any stone involvement.    Hypokalemia  - Stat BMP to evaluate current status.  - Electrolyte protocol in place.    Alcoholism  - Currently patient has been sober for approximately 3-4 weeks with the help of Librium per PCP.  Patient has not been able to take any for the last 2 to 3 days due to altered mental status.  Will resume Librium p.o. if patient regains consciousness.    Acute UTI  - Urinalysis with moderate leukocytes, positive nitrates, 3-5 WBC and 3+ bacteria  - Recent positive urine culture for Enterococcus faecalis, reviewed susceptibilities with pharmacist to initiate antibiotics safest for patient's current state.  Will initiate ampicillin and continue to follow cultures.    Reviewed home medication list, will restart if patient is able to tolerate p.o. currently no alternatives needed other than lactulose.    Medical Decision Making  6 acute on chronic, high complexity, worsening  1 acute, high complexity, unchanged    Number and Complexity of problems: 8  Differential Diagnosis: None    Conditions and Status        Condition is worsening.     [facility] Data  External documents reviewed: [employer] EHR  Cardiac tracing (EKG,  telemetry) interpretation: 6/23/2025 EKG per cardiology reviewed  Radiology interpretation: 6/23/2025 chest x-ray and ultrasound of the abdomen per radiology reviewed  Labs reviewed: 6/23/2025 reviewed   any tests that were considered but not ordered: None  Decision rules/scores evaluated (example [id number], Wells, etc): MELD Score (Original, Pre-2016, Model for End-Stage Liver Disease) - MDCalc  Calculated on Jun 23 2025 7:03 PM  30 points -> Original MELD Score (Pre-2016)*  52.6% -> Estimated 3-Month Mortality     Discussed with: Dr. Boateng, patient and his mother Marisa     Care Planning  Shared decision making: Dr. Boateng, patient and his mother Marisa  Code status and discussions: DNR/DNI after extensive discussion with patient's mother    Disposition  Social Determinants of Health that impact treatment or disposition: Possible need for hospice versus palliative care  Estimated length of stay is undetermined at this time.     I confirmed that the patient's advanced care plan is present, code status is documented, and a surrogate decision maker is listed in the patient's medical record.     The patient's surrogate decision maker is his mother Marisa.     The patient was seen and examined by me on 6/23/2025 at 1648.    Electronically signed by CANDIDO Dubon, 06/23/25, 18:06 CDT.               Electronically signed by Zoltan Boateng MD at 06/24/25 1056          Physician Progress Notes (most recent note)        Marge Green APRN at 06/26/25 1532                      [facility] Palliative Care Services    Palliative Care Daily Progress Note   Chief complaint-follow up support for patient/family, hospice discussion    Code Status:   Code Status and Medical Interventions: No CPR (Do Not Attempt to Resuscitate); Limited Support; No intubation (DNI), No cardioversion; Lengthy discussion with his mother   Ordered at: 06/23/25 1714     Code Status (Patient has no pulse and is not  "breathing):    No CPR (Do Not Attempt to Resuscitate)     Medical Interventions (Patient has pulse or is breathing):    Limited Support     Medical Intervention Limits:    No intubation (DNI)       No cardioversion     Comments:    Lengthy discussion with his mother     Level Of Support Discussed With:    Next of Kin (If No Surrogate)      Advanced Directives: Advance Directive Status: Patient does not have advance directive   Goals of Care: Ongoing.     S: Medical record reviewed. Events noted.  GI following, notes extremely poor prognosis without liver pretransplant, spoke with both [facility] and [facility] and not a liver transplant candidate due to ongoing alcohol abuse and relapse requiring multiple admissions.  Continue Pentoxifylline, although may not have much benefit at this point.  Steroids not started given recent upper GI bleed with esophageal ulcers.  Recommends paracentesis as needed.  Continue lactulose and rifaximin.  Diuretic trial.  Received dose of vitamin K 6/25.  Therapy following recommends SNF at discharge.  Hypokalemia resolved.  Transaminitis with slight trend upward.  Leukocytosis.  Anemia stable.  Ammonia trending downward.  Thrombocytopenia.  Sitting up in chair, staff at bedside checking vital signs.  Reports increased shortness of breath with transfer to chair, vital signs stable.  Abdominal distention.  Somnolent, slow to respond to questions.  Not oriented to situation when asked about conversation with providers this morning states \"pretty good news\".  He was not able to elaborate further.  Due to the Palliative Care Topics Discussed: care options and [facility] we will establish an advance care plan.   Advance Care Planning   Advance Care Planning Discussion: Spoke with patient's motherMarisa via telephone reports she and Luciano's father have been  for multiple years and do not communicate.  Updated on current plan of care, " "overall extremely poor prognosis without liver transplant.  Efforts to transfer to tertiary facility for consideration and unfortunately not a candidate for liver transplant due to ongoing alcohol abuse and relapse requiring multiple hospitalizations.  She verbalizes \"he said he did not want a liver transplant anyway\".  We further explored best supportive care directed by hospice and expectations discussed including no further rehospitalization's and focus of quality of life.  She is receptive to receiving hospice information \"what ever is best for him\".  Questions encouraged and support given.    Review of Systems   Constitutional: Positive for malaise/fatigue.   Respiratory:  Positive for shortness of breath.    Musculoskeletal:  Positive for myalgias.   Gastrointestinal:  Positive for jaundice.        Abdominal distention   Neurological:  Positive for weakness.   Psychiatric/Behavioral:  Positive for memory loss.      Pain Assessment  Preferred Pain Scale: number (Numeric Rating Pain Scale)  CPOT and PAINAD Scales: PAINAD (Pain Assessment in Advance Dementia Scale)  PAINAD Breathin-->normal  PAINAD Negative Vocalization: 0-->none  PAINAD Facial Expression: 0-->smiling or inexpressive  PAINAD Body Language: 0-->relaxed  PAINAD Consolability: 0-->no need to console  PAINAD Score: 0  Pain Location: ankle, foot  Pain Description: aching    O:     Intake/Output Summary (Last 24 hours) at 2025 1532  Last data filed at 2025 1300  Gross per 24 hour   Intake 360 ml   Output 475 ml   Net -115 ml       Diagnostics and current medications: Reviewed.      Current Facility-Administered Medications:     Calcium Replacement - Follow Nurse / BPA Driven Protocol, , Not Applicable, PRN, , LÁZARO Stafford    lactulose solution 20 g, 20 g, Oral, TID, Zoltan Boateng MD, 20 g at 25 0843    levothyroxine (SYNTHROID, LEVOTHROID) tablet 75 mcg, 75 mcg, Oral, Q AM, Zoltan Boateng MD, 75 mcg at " 06/26/25 0524    Magnesium Cardiology Dose Replacement - Follow Nurse / BPA Driven Protocol, , Not Applicable, PRN, Nydia Zurita APRN    midodrine (PROAMATINE) tablet 5 mg, 5 mg, Oral, TID Kilo CUMMINS Benjamin H, MD, 5 mg at 06/26/25 1153    nitroglycerin (NITROSTAT) SL tablet 0.4 mg, 0.4 mg, Sublingual, Q5 Min PRN, Nydia Zurita APRN    ondansetron ODT (ZOFRAN-ODT) disintegrating tablet 4 mg, 4 mg, Oral, Q6H PRN **OR** ondansetron (ZOFRAN) injection 4 mg, 4 mg, Intravenous, Q6H PRN, Zoltan Boateng MD    pantoprazole (PROTONIX) injection 40 mg, 40 mg, Intravenous, BID AC, Zoltan Boateng MD, 40 mg at 06/26/25 0844    pentoxifylline (TRENtal) CR tablet 400 mg, 400 mg, Oral, TID With Meals, Beltran Matson MD, 400 mg at 06/26/25 1143    Phosphorus Replacement - Follow Nurse / BPA Driven Protocol, , Not Applicable, PRN, Nydia Zurita APRN    Potassium Replacement - Follow Nurse / BPA Driven Protocol, , Not Applicable, PRYudith SHEIKH Annemarie, APRN    riFAXIMin (XIFAXAN) tablet 550 mg, 550 mg, Oral, Q12H, Zoltan Boateng MD, 550 mg at 06/26/25 1143    sodium chloride 0.9 % flush 10 mL, 10 mL, Intravenous, Q12H, Nydia Zurita APRN, 10 mL at 06/26/25 0844    sodium chloride 0.9 % flush 10 mL, 10 mL, Intravenous, PRN, Nydia Zurita APRN    sodium chloride 0.9 % infusion 40 mL, 40 mL, Intravenous, PRN, Nydia Zurita APRN, 40 mL at 06/23/25 2221    sucralfate (CARAFATE) tablet 1 g, 1 g, Oral, 4x Daily AC & at Bedtime, Zoltan Boateng MD, 1 g at 06/26/25 1143    tamsulosin (FLOMAX) 24 hr capsule 0.4 mg, 0.4 mg, Oral, Daily, Zoltan Boateng MD, 0.4 mg at 06/26/25 0843    thiamine (VITAMIN B-1) tablet 100 mg, 100 mg, Oral, Daily, Zoltan Boateng MD, 100 mg at 06/26/25 0843    No Known Allergies  I have utilized all available immediate resources to obtain, update, or review the patient's current medications (including all prescriptions, over-the-counter products,  "herbals, cannabis/cannabidiol products, and vitamin/mineral/dietary (nutritional) supplements) for name, route of administration, type, dose and frequency.    A:    /53 (BP Location: Left arm, Patient Position: Lying)   Pulse 75   Temp 97.6 °F (36.4 °C) (Oral)   Resp 16   Ht 190.5 cm (75\")   Wt (!) 143 kg (316 lb 3.2 oz)   SpO2 97%   BMI 39.52 kg/m²     Vitals and nursing note reviewed.   Constitutional:       Appearance: Ill-appearing and chronically ill-appearing.      Comments: somnolent   Eyes:      General: Scleral icterus.   HENT:      Head: Normocephalic.   Pulmonary:      Effort: Increased respiratory effort.   Cardiovascular:      Normal rate.   Edema:     Peripheral edema present.  Abdominal:      General: There is distension.   Skin:     General: Skin is warm.      Coloration: Skin is jaundiced.     Patient status: Disease state: Controlled with current treatments.  Current Functional status: Palliative Performance Scale Score: Performance 30% based on the following measures: Ambulation: Totally bed bound, Activity and Evidence of Disease: Unable to do any work, extensive evidence of disease, Self-Care: Total care required,  Intake: Reduced, LOC: Full, drowsy or confusion   Nutritional status: Albumin 2.4 Body mass index is 39.52 kg/m².      Palliative Care Acuity Data  Psychosocial Acuity: normal complexity  Spiritual Acuity: normal complexity  Hospital Problem List      Hepatic encephalopathy    Class 1 obesity due to excess calories without serious comorbidity with body mass index (BMI) of 34.0 to 34.9 in adult    Hypokalemia    Alcoholism    Jaundice    Acute UTI (urinary tract infection)    Alcoholic cirrhosis    History of hepatitis C    Liver failure     Impression/Problem List:     Cirrhosis of the liver due to alcoholism, MELD-31, Child Class C  Hepatic encephalopathy  Alcoholism    Suspected UTI  Hypokalemia  Diabetes mellitus  Thrombocytopenia  Recent GI bleed  Gout  Hepatitis " C  Hypertension   Hypoalbuminemia       Recommendations/Plan:  1. plan: Goals of care include status no CPR/limited support interventions.     Family support: The patient receives support from his mother..  Advance Directives: Advance Directive Status: Patient does not have advance directive   POA/Healthcare surrogate-children and mother-next of kin.     2.  Palliative care encounter  - Prognosis is poor long-term secondary to cirrhosis of the liver, alcoholism, and comorbidities.  -Family appears to have fair prognostic awareness.      -Evaluated in ED on 4/18/2025 due to generalized abdominal pain and worsening jaundice.  CT scan abdomen pelvis revealed findings concerning for acute calculus cholecystitis with multiple stones, gallbladder distention as well as wall thickening and mild pericholecystic fluid, liver cirrhosis with hepatosplenomegaly and recanalized periumbilical vein.  He was transferred to [facility] in [place], [place] for interventional radiology.  Discharge summary reviewed and noted he was given supportive care initially however liver enzymes failed to improve and he was started on prednisolone for acute alcoholic hepatitis.  Noted he did not require intervention for possible cholecystitis.  He was ultimately discharged home on 4/26/2025.  -Hospitalization 5/28-6/4 due to decompensated liver failure in the setting of cirrhosis, coagulopathy, anemia, GI bleed, underwent upper endoscopy with findings of grade D reflux esophagitis, esophageal ulcer, portal hypertension gastropathy.       -Most recently seen by my colleague LÁZARO Chavira during May hospitalization and at that time expressed wishes to continue full aggressive care interventions, noted to express interest in changing lifestyle and getting help outpatient.     -Bedside paracentesis performed in ED.    -Evaluated by GI and recommended transfer to tertiary facility.  Tertiary facility/hepatology/transplant service at  [facility] contacted and according to chart review in order for patient to be transferred to tertiary facility would have to be in full alcohol rehabilitation before being considered for transplant.    -Patient is treated with empiric antibiotics for UTI, lactulose enema given degree of ascites no fluid bolus provided.    -CODE STATUS changes no CPR/limited support interventions, no intubation, no cardioversion per hospitalist team.      6/25-Vit K     6/24-provide CODE STATUS no CPR/limited support interventions    6/26-continue supportive measures.  -GI following, notes extremely poor prognosis without liver pretransplant, spoke with both [facility] and [facility] and not a liver transplant candidate due to ongoing alcohol abuse and relapse requiring multiple admissions.  Continue Pentoxifylline, although may not have much benefit at this point.  Steroids not started given recent upper GI bleed with esophageal ulcers.  Recommends paracentesis as needed.  Continue lactulose and rifaximin.  Diuretic trial.    -Therapy following recommends SNF at discharge.    -Anticipating patient to discharge back home with patient's mother at time of discharge, per SW note.  -mother agreeable to hospice consult for more information. A hospice consult placed.  Electronic communication to CM and care team      Thank you for allowing us to participate in patient's plan of care. Palliative Care Team will continue to follow patient.     LÁZARO Odom  6/26/2025  15:32 CDT     Electronically signed by Marge Green APRN at 06/26/25 1614          Consult Notes (most recent note)        aMrge Green APRN at 06/24/25 1348        Consult Orders    1. Inpatient Palliative Care Consult [[id number]] ordered by Nydia APRN at 06/23/25 1646                             [facility] Palliative Care Services    Palliative Care Initial Consult    Attending Physician: Zoltan Boateng MD  Referring Provider: Nydia APRN     Reason for Referral: assistance with clarification of goals of care  Family/Support: daughter and mother    Code Status and Medical Interventions: No CPR (Do Not Attempt to Resuscitate); Limited Support; No intubation (DNI), No cardioversion; Lengthy discussion with his mother   Ordered at: 06/23/25 1714     Code Status (Patient has no pulse and is not breathing):    No CPR (Do Not Attempt to Resuscitate)     Medical Interventions (Patient has pulse or is breathing):    Limited Support     Medical Intervention Limits:    No intubation (DNI)       No cardioversion     Comments:    Lengthy discussion with his mother     Level Of Support Discussed With:    Next of Kin (If No Surrogate)     Goals of Care: TBD.    HPI:   40 y.o. male has a past medical history of Alcoholism, Diabetes mellitus, Gout, Hepatitis C, Hypertension. Hospitalized 7/4/2024-7/9/2024 due to concerns for alcohol withdrawal.  He was admitted to the critical care unit and placed on CIWA protocol.  During hospitalization found to be anemic and stool for occult blood was positive.  Noted plans to follow-up outpatient with GI for further workup.  He was discharged home.  Hospitalized 8/27/2024-9/3/2024 due to fatigue, generalized weakness, and confusion.  He was treated for hepatic encephalopathy and discharged home.  Evaluated in ED on 3/18/2025 due to dark stools.  Stool for occult blood positive.  CT of abdomen and pelvis revealed cirrhotic and steatotic liver with sequela of portal hypertension including splenomegaly and small esophageal varices.  Admission to hospital was recommended for further workup and treatment however he was apparently not agreeable and left AGAINST MEDICAL ADVICE.  Admitted 4/1/2025-4/5/2025 due to fall and electrolyte imbalance.  Noted he left AGAINST MEDICAL ADVICE.  Evaluated in ED on 4/18/2025 due to generalized abdominal pain  and worsening jaundice.  CT scan abdomen pelvis revealed findings concerning for acute calculus cholecystitis with multiple stones, gallbladder distention as well as wall thickening and mild pericholecystic fluid, liver cirrhosis with hepatosplenomegaly and recanalized periumbilical vein.  He was transferred to [facility] in [place], [place] for interventional radiology.  Discharge summary reviewed and noted he was given supportive care initially however liver enzymes failed to improve and he was started on prednisolone for acute alcoholic hepatitis.  Noted he did not require intervention for possible cholecystitis.  He was ultimately discharged home on 4/26/2025.  Hospitalization 5/28-6/4 due to decompensated liver failure in the setting of cirrhosis, coagulopathy, anemia, GI bleed, underwent upper endoscopy with findings of grade D reflux esophagitis, esophageal ulcer, portal hypertension gastropathy.  Most recently seen by my colleague LÁZARO Chavira during May hospitalization and at that time expressed wishes to continue full aggressive care interventions, noted to express interest in changing lifestyle and getting help outpatient. Patient presented to [facility] on 6/23/2025 related to significant confusion.  Family report patient stopped drinking approximately 3 to 4 weeks ago with help from [employer].  Resides with parents and mother provides caregiver support and medication management who notes increased jaundice.  Bedside paracentesis performed in ED.  ED workup shows elevated lactate, ammonia 149, hypokalemia, hypoalbuminemia, transaminitis, elevated alk phos, leukocytosis, anemia.  Urinalysis 3+ bilirubin/1+ protein/2+ leuk esterase/positive nitrite/3+ bacteria.  Urine drug screen positive for benzodiazepines and barbiturates.  CT head shows small ill-defined low-density area located in anterior inferior zoltan, may be artifactual, remaining brain unremarkable.  Chest imaging  shows low lung volumes with vascular crowding and probable volume overload/edema.  Evaluated by GI and recommended transfer to tertiary facility.  Tertiary facility/hepatology/transplant service at [facility] contacted and according to chart review in order for patient to be transferred to tertiary facility would have to be in full alcohol rehabilitation before being considered for transplant.  Patient is treated with empiric antibiotics for UTI, lactulose enema given degree of ascites no fluid bolus provided.  CODE STATUS changes no CPR/limited support interventions, no intubation, no cardioversion per hospitalist team.  Laying in bed without apparent distress.  He is extremely somnolent.  His lunch tray is sitting untouched at bedside.  No family currently at bedside. Palliative Care Spoke With: family spoke with patient's mother, Marisa who reports patient has resided with her since most recent hospitalization and she has been successful in assisting him with avoiding alcohol.  He has 2 adult sons ages 21 and 22- Ki and Jg and a 17-year-old daughter, Jossy. Marisa reports given patient's long-history of alcoholism limited involvement with children.  Due to the Palliative Care Topics Discussed: palliative care, goals of care, care options, resuscitation status, and discharge options we will establish an advance care plan.   Advance Care Planning   Advance Care Planning Discussion: Spoke with patient's mother, Marisa via telephone with regard to events leading to current hospitalization, plan of care, and overall poor long-term prognosis.  We explored conversations with providers.  We clarified CODE STATUS and initially Ghazala was considering reescalating medical priorities, but after further discussion with regard to associated risk of CPR will continue current CODE STATUS in place.  We discussed patient's long history of heavy alcohol use since the age of 20 years old and how this has negatively impacted  his health.  She understandably has difficulty with his life choices but supportive for his current efforts.  She continues to remain hopeful that patient may qualify for transplant but does admit the requirements of rehab have been difficult to complete as patient's insurance does not cover this resource.  She seems realistic and limited life expectancy if patient continues with alcohol use and determined not a liver transplant candidate. Open to receiving additional resources that could potentially assist with patient rehab efforts. We discussed current plan of care for treatment of hyperammonemia, UTI, and additional adjuvants.  Anticipating patient to discharge back home with patient's mother at time of discharge.    Review of Systems   Unable to perform ROS: Acuity of condition   Gastrointestinal:  Positive for jaundice.     1- Pain Assessment  CPOT and PAINAD Scales: PAINAD (Pain Assessment in Advance Dementia Scale)  PAINAD Breathin-->normal  PAINAD Negative Vocalization: 0-->none  PAINAD Facial Expression: 0-->smiling or inexpressive  PAINAD Body Language: 0-->relaxed  PAINAD Consolability: 0-->no need to console  PAINAD Score: 0    Past Medical History:   Diagnosis Date    Alcoholism     currently drinking    Diabetes mellitus     Gout     Hepatitis C     Hypertension     Jaundice     UTI (urinary tract infection)      Past Surgical History:   Procedure Laterality Date    ENDOSCOPY N/A 2025    Procedure: ESOPHAGOGASTRODUODENOSCOPY WITH ANESTHESIA;  Surgeon: Estela Ramos MD;  Location: [facility];  Service: Gastroenterology;  Laterality: N/A;  pre op: GI bleed  post op: esophageal ulcer, esophagitis  pcp: Adrien Varghese MD    VASECTOMY       Social History     Socioeconomic History    Marital status: Single   Tobacco Use    Smoking status: Some Days     Current packs/day: 0.25     Average packs/day: 0.5 packs/day for 15.5 years (7.1 ttl pk-yrs)     Types: Cigarettes     Start date:     Smokeless  tobacco: Never   Vaping Use    Vaping status: Never Used    Passive vaping exposure: Yes   Substance and Sexual Activity    Alcohol use: Not Currently     Alcohol/week: 12.0 standard drinks of alcohol     Types: 12 Cans of beer per week     Comment: malt liquor (48 oz daily)  nothing to drink in past 4 weeks    Drug use: Not Currently    Sexual activity: Yes         Current Facility-Administered Medications:     ampicillin 2000 mg IVPB in 100 mL NS (MBP), 2 g, Intravenous, Q6H, Nydia Zurita APRN, Last Rate: 200 mL/hr at 06/24/25 1108, 2 g at 06/24/25 1108    Calcium Replacement - Follow Nurse / BPA Driven Protocol, , Not Applicable, PRN, Nydia Zurita APRN    lactulose solution 20 g, 20 g, Oral, TID, Zoltan Boateng MD, 20 g at 06/24/25 1136    levothyroxine (SYNTHROID, LEVOTHROID) tablet 75 mcg, 75 mcg, Oral, Q AM, Zoltan Boateng MD, 75 mcg at 06/24/25 1135    Magnesium Cardiology Dose Replacement - Follow Nurse / BPA Driven Protocol, , Not Applicable, PRN, Nydia Zurita APRN    midodrine (PROAMATINE) tablet 5 mg, 5 mg, Oral, TID Kilo CUMMINS Benjamin H, MD, 5 mg at 06/24/25 1135    nitroglycerin (NITROSTAT) SL tablet 0.4 mg, 0.4 mg, Sublingual, Q5 Min PRN, Nydia Zurita APRN    ondansetron ODT (ZOFRAN-ODT) disintegrating tablet 4 mg, 4 mg, Oral, Q6H PRN **OR** ondansetron (ZOFRAN) injection 4 mg, 4 mg, Intravenous, Q6H PRN, Zoltan Boateng MD    pantoprazole (PROTONIX) injection 40 mg, 40 mg, Intravenous, BID Kilo CUMMINS Benjamin H, MD, 40 mg at 06/24/25 1134    Phosphorus Replacement - Follow Nurse / BPA Driven Protocol, , Not Applicable, PRN, Nydia Zurita APRN    Potassium Replacement - Follow Nurse / BPA Driven Protocol, , Not Applicable, PRN, Nydia Zurita APRN    sodium chloride 0.9 % flush 10 mL, 10 mL, Intravenous, Q12H, , Nydia, LÁZARO, 10 mL at 06/24/25 0800    sodium chloride 0.9 % flush 10 mL, 10 mL, Intravenous, PRN, Yudith, Nydia, APRN     "sodium chloride 0.9 % infusion 40 mL, 40 mL, Intravenous, PRN, , LÁZARO Stafford, 40 mL at 06/23/25 2221    sucralfate (CARAFATE) tablet 1 g, 1 g, Oral, 4x Daily AC & at Bedtime, Zoltan Boateng MD, 1 g at 06/24/25 1135    thiamine (VITAMIN B-1) tablet 100 mg, 100 mg, Oral, Daily, Zoltan Boateng MD, 100 mg at 06/24/25 1136    No Known Allergies  I have utilized all available immediate resources to obtain, update, or review the patient's current medications (including all prescriptions, over-the-counter products, herbals, cannabis/cannabidiol products, and vitamin/mineral/dietary (nutritional) supplements) for name, route of administration, type, dose and frequency.      Intake/Output Summary (Last 24 hours) at 6/24/2025 1349  Last data filed at 6/24/2025 0504  Gross per 24 hour   Intake --   Output 350 ml   Net -350 ml       Physical Exam:    Diagnostics: Reviewed  /49 (BP Location: Left arm, Patient Position: Lying)   Pulse 63   Temp 95.1 °F (35.1 °C) (Axillary)   Resp 18   Ht 190.5 cm (75\")   Wt (!) 143 kg (316 lb 3.2 oz)   SpO2 100%   BMI 39.52 kg/m²     Vitals and nursing note reviewed.   Constitutional:       Appearance: Not in distress. Ill-appearing and chronically ill-appearing.      Comments: Extremely somnolent   Pulmonary:      Effort: Pulmonary effort is normal.   Cardiovascular:      Normal rate.   Skin:     Coloration: Skin is jaundiced.     Patient status: Disease state: Controlled with current treatments.  Current Functional status: Palliative Performance Scale Score: Performance 30% based on the following measures: Ambulation: Totally bed bound, Activity and Evidence of Disease: Unable to do any work, extensive evidence of disease, Self-Care: Total care required,  Intake: Reduced, LOC: Full, drowsy or confusion   Nutritional status: Albumin 2.4 Body mass index is 39.52 kg/m².      Palliative Care Acuity Data  Psychosocial Acuity: normal complexity  Spiritual Acuity: " normal complexity  Hospital Problem List      Hepatic encephalopathy    Class 1 obesity due to excess calories without serious comorbidity with body mass index (BMI) of 34.0 to 34.9 in adult    Hypokalemia    Alcoholism    Jaundice    Acute UTI (urinary tract infection)    Alcoholic cirrhosis    History of hepatitis C    Liver failure    Impression/Problem List:    Cirrhosis of the liver due to alcoholism, MELD-31, Child Class C  Hepatic encephalopathy  Alcoholism    Suspected UTI  Hypokalemia  Diabetes mellitus  Thrombocytopenia  Recent GI bleed  Gout  Hepatitis C  Hypertension   Hypoalbuminemia      Recommendations/Plan:  1. plan: Goals of care include status no CPR/limited support interventions.    Family support: The patient receives support from his mother..  Advance Directives: Advance Directive Status: Patient does not have advance directive   POA/Healthcare surrogate-children and mother-next of kin.    2.  Palliative care encounter  - Prognosis is poor long-term secondary to cirrhosis of the liver, alcoholism, and comorbidities.  -Family appears to have fair prognostic awareness.     -Evaluated in ED on 4/18/2025 due to generalized abdominal pain and worsening jaundice.  CT scan abdomen pelvis revealed findings concerning for acute calculus cholecystitis with multiple stones, gallbladder distention as well as wall thickening and mild pericholecystic fluid, liver cirrhosis with hepatosplenomegaly and recanalized periumbilical vein.  He was transferred to [facility] in [place], [place] for interventional radiology.  Discharge summary reviewed and noted he was given supportive care initially however liver enzymes failed to improve and he was started on prednisolone for acute alcoholic hepatitis.  Noted he did not require intervention for possible cholecystitis.  He was ultimately discharged home on 4/26/2025.  -Hospitalization 5/28-6/4 due to decompensated liver failure in the setting of cirrhosis,  coagulopathy, anemia, GI bleed, underwent upper endoscopy with findings of grade D reflux esophagitis, esophageal ulcer, portal hypertension gastropathy.      -Most recently seen by my colleague LÁZARO Chavira during May hospitalization and at that time expressed wishes to continue full aggressive care interventions, noted to express interest in changing lifestyle and getting help outpatient.    -Bedside paracentesis performed in ED.    -Evaluated by GI and recommended transfer to tertiary facility.  Tertiary facility/hepatology/transplant service at [facility] contacted and according to chart review in order for patient to be transferred to tertiary facility would have to be in full alcohol rehabilitation before being considered for transplant.    -Patient is treated with empiric antibiotics for UTI, lactulose enema given degree of ascites no fluid bolus provided.    -CODE STATUS changes no CPR/limited support interventions, no intubation, no cardioversion per hospitalist team.        6/24-provide CODE STATUS no CPR/limited support interventions  -long history of heavy alcohol use since the age of 20 years old   -continues to remain hopeful that patient may qualify for transplant but does admit the requirements of rehab have been difficult to complete as patient's insurance does not cover this resource.  She seems realistic in limited life expectancy if patient continues with alcohol use and determined not a liver transplant candidate.    -plan of care discussed  -will plan to provide additional resources for mother to potentially continue supportive efforts for patient given financial constraints.   -Anticipating patient to discharge back home with patient's mother at time of discharge.     Thank you for this consult and allowing us to participate in patient's plan of care. Palliative Care Team will continue to follow patient.     LÁZARO Odom  6/24/2025  13:49 CDT                Electronically signed by Marge Green APRN at 06/24/25 1519         Inpatient Hospice Consult [[id number]] (Order [id number])  Order  Date: 6/26/2025 Department: [address] Ordering/Authorizing: Marge Green APRN     Standing Order Information    Remaining Occurrences Interval Last Released     0/1 Once 6/26/2025              Order History  Inpatient  Date/Time Action Taken User Additional Information   06/26/25 1608 Sign Marge Green APRN    06/26/25 1608 Release Instance Marge Green APRN (auto-released) Released Order:[id number]   06/26/25 1624 Acknowledge Aleisha Moore RN New Order     Order Details    Frequency Duration Priority Order Class   Once 1  occurrence Routine Hospital Performed       Comments    Mother request information.              Order Questions    Question Answer   Reason for Consult? Information on Hospice Support              Source Order Set / Preference List    Preference List   [facility] FACILITY CONSULTS [[id number]]     Process Instructions    [place]: Please contact assigned  or  to your unit for further assistance.    [place]:  Please notify [facility] Palliative Care Department of the Hospice consult order at ext. [phone] and on Weekends and Holidays, call [facility] at [phone]    [place]:  Please call Hospice consults/concerns to hospice liaison, ext [phone].. On weekends/holidays please contact the inpatient hospice nurse, ext [phone].    [place]:  Please call [facility] at [phone]    [place]:  Please call the  for your unit. For after hours, contact the hospital .                 Consult Order Info    ID Description Priority Start Date Start Time   [id number] Inpatient Hospice Consult Routine 06/26/2025  4:09 PM   Provider Specialty Referred to   Hospice and Palliative Medicine _____________________________________                           Acknowledgement Info    For At Acknowledged By  Acknowledged On   Placing Order 06/26/25 1607 Aleisha Moore, RN 06/26/25 1624             Reprint Order Requisition    Inpatient Hospice Consult (Order #[id number]) on 6/26/25       Encounter    View Encounter                  Order Provider Info        Phone Pager E-mail   Ordering User  Marge Green APRN  [phone] (Office Phone) -- --   Authorizing Provider  Marge Green APRN  [phone] (Office Phone) -- --   Attending Provider  Zoltan Boateng MD  [phone] (Office Phone) -- --     Tracking Reports

## 2025-06-27 NOTE — PLAN OF CARE
Goal Outcome Evaluation:   Patient attempted B LE exercises in supine. Patient could move slightly with assist each new exercises for 2-3 reps each exercise. Most all activity was passive.

## 2025-06-27 NOTE — CASE MANAGEMENT/SOCIAL WORK
Continued Stay Note  TOVA Munoz     Patient Name: Luciano Christiansen  MRN: [id number]  Today's Date: 6/27/2025    Admit Date: 6/23/2025    Plan: Pending   Discharge Plan       Row Name 06/27/25 1538       Plan    Plan Pending    Plan Comments Received call from Darshana with [facility] advising patient's insurance does not provide hospice benefits.  If patient were to decide on hospice services he would have to pay out of pocket.                   Discharge Codes    No documentation.                       MICHELLE Blair

## 2025-06-27 NOTE — PLAN OF CARE
Goal Outcome Evaluation:              Outcome Evaluation: SR 73-83; VSS; no c/o pain; A&Ox1; multiple BM throughout shift

## 2025-06-27 NOTE — PROGRESS NOTES
[facility] Gastroenterology  Inpatient Progress Note  Today's date:  06/27/25    Luciano Christiansen  1985       Reason for Follow Up:   Cirrhosis    Subjective:   No new issues overnight. Denies abdominal pain. Abdomen does feel more distended today. No fever. No melena, hematemesis, or hematochezia.       Current Facility-Administered Medications:     Calcium Replacement - Follow Nurse / BPA Driven Protocol, , Not Applicable, PRN, Nydia Zurita APRN    lactulose solution 20 g, 20 g, Oral, TID, Zoltan Boateng MD, 20 g at 06/27/25 0844    levothyroxine (SYNTHROID, LEVOTHROID) tablet 75 mcg, 75 mcg, Oral, Q AM, Zoltan Boateng MD, 75 mcg at 06/27/25 0518    Magnesium Cardiology Dose Replacement - Follow Nurse / BPA Driven Protocol, , Not Applicable, PRN, Nydia Zurita APRN    midodrine (PROAMATINE) tablet 5 mg, 5 mg, Oral, TID Kilo CUMMINS Benjamin H, MD, 5 mg at 06/27/25 1205    nitroglycerin (NITROSTAT) SL tablet 0.4 mg, 0.4 mg, Sublingual, Q5 Min PRN, Nydia Zurita APRN    ondansetron ODT (ZOFRAN-ODT) disintegrating tablet 4 mg, 4 mg, Oral, Q6H PRN **OR** ondansetron (ZOFRAN) injection 4 mg, 4 mg, Intravenous, Q6H PRN, Zoltan Boateng MD    pantoprazole (PROTONIX) injection 40 mg, 40 mg, Intravenous, BID Kilo CUMMINS Benjamin H, MD, 40 mg at 06/27/25 0841    pentoxifylline (TRENtal) CR tablet 400 mg, 400 mg, Oral, TID With Meals, Beltran Matson MD, 400 mg at 06/27/25 1205    Phosphorus Replacement - Follow Nurse / BPA Driven Protocol, , Not Applicable, PRN, Nydia Zurita APRN    Potassium Replacement - Follow Nurse / BPA Driven Protocol, , Not Applicable, PRN, Nydia Zurita APRN    riFAXIMin (XIFAXAN) tablet 550 mg, 550 mg, Oral, Q12H, Zoltan Boateng MD, 550 mg at 06/27/25 0844    sodium chloride 0.9 % flush 10 mL, 10 mL, Intravenous, Q12H, , LÁZARO Stafford, 10 mL at 06/27/25 0848    sodium chloride 0.9 % flush 10 mL, 10 mL, Intravenous, PRN, ,  LÁZARO Stafford    sodium chloride 0.9 % infusion 40 mL, 40 mL, Intravenous, PRN, , LÁZARO Stafford, 40 mL at 06/23/25 2221    spironolactone (ALDACTONE) tablet 25 mg, 25 mg, Oral, Daily, Zoltan Boateng MD, 25 mg at 06/27/25 1028    sucralfate (CARAFATE) tablet 1 g, 1 g, Oral, 4x Daily AC & at Bedtime, Zoltan Boateng MD, 1 g at 06/27/25 1205    tamsulosin (FLOMAX) 24 hr capsule 0.4 mg, 0.4 mg, Oral, Daily, Zoltan Boateng MD, 0.4 mg at 06/27/25 0844    thiamine (VITAMIN B-1) tablet 100 mg, 100 mg, Oral, Daily, Zoltan Boateng MD, 100 mg at 06/27/25 0844      Vital Signs:  Temp:  [94.5 °F (34.7 °C)-97.6 °F (36.4 °C)] 94.5 °F (34.7 °C)  Heart Rate:  [64-81] 77  Resp:  [16-22] 20  BP: ()/(34-59) 112/59    Physical Exam:  General: no acute distress, awake and alert  HEENT: perrl, sclera icterus  CV: rrr, no murmur  Resp: lungs clear to auscultation, no wheeze or rhonchi  Abd: nontender, distended  Skin: no rash, severe jaundice     Results Review:   I have reviewed all of the patient's current test results    Results from last 7 days   Lab Units 06/27/25 0415 06/26/25 1116 06/25/25  0602   WBC 10*3/mm3 10.92* 11.89* 9.75   HEMOGLOBIN g/dL 8.5* 9.0* 9.1*   HEMATOCRIT % 26.1* 26.9* 27.2*   PLATELETS 10*3/mm3 95* 111* 117*       Results from last 7 days   Lab Units 06/27/25  0415 06/26/25  1116 06/25/25 2037 06/25/25  0602   SODIUM mmol/L 137 138  --  138   POTASSIUM mmol/L 3.4* 3.9 3.4* 3.0*   CHLORIDE mmol/L 107 110*  --  109*   CO2 mmol/L 14.0* 15.0*  --  19.0*   BUN mg/dL 17.3 16.4  --  14.9   CREATININE mg/dL 0.84 0.76  --  0.55*   CALCIUM mg/dL 8.6 8.6  --  8.9   BILIRUBIN mg/dL 32.6* 31.7*  --  32.0*   ALK PHOS U/L 184* 195*  --  199*   ALT (SGPT) U/L 58* 59*  --  58*   AST (SGOT) U/L 117* 151*  --  140*   GLUCOSE mg/dL 119* 153*  --  113*       Results from last 7 days   Lab Units 06/27/25  0415 06/26/25  1116 06/25/25  0602   INR  3.35* 2.97* 2.81*        Impression:  -Decompensated cirrhosis with history of alcoholic hepatitis. MELD 30 and discriminant function 113 on admission. Recalculation today with MELD 34 and discriminant function 144. Overall, extremely poor prognosis with high mortality rate.      -History of alcohol abuse with relapse. Last alcohol use 4 weeks ago per family.     -Anemia. No overt GI bleeding.    -Coagulopathy, in setting of severe liver disease.      -Ulcerative esophagitis. Recent upper GI bleed secondary to esophageal ulcer and severe esophagitis.      -Altered mental status with hepatic encephalopathy.     Plan:  -Had long discussion with patient and family at bedside. Explained that his prognosis is poor and mortality rate is very jadon without liver transplant. I explained that we have reached out to [facility] and [facility], and he is not a liver transplant candidate at this time due to ongoing alcohol use and relapse history despite his knowledge of having cirrhosis and alcoholic hepatitis.      -Unfortunately, there is not much more to add at this time, and will just have to wait and see how his liver responds. He was not started on steroids due to history of recent GI bleed secondary to esophageal ulcers after being on steroids for alcoholic hepatitis. He was started on Pentoxifylline on admission. However, given his increasing PT/INR, will discontinue this as it does have some antiplatelet and anticoagulant effects and likely is not providing much benefit at this point.     Beltran Matson MD  06/27/25  12:18 CDT

## 2025-06-28 NOTE — PROGRESS NOTES
"Patient Name: Luciano Christiansen  Date of Admission: 6/23/2025  Today's Date: 06/28/25  Length of Stay: 5  Primary Care Physician: Adrien Varghese MD    Subjective   Chief Complaint: follow-up encephalopathy  HPI   Patient was able to state this morning that his mind feels foggy.  He does report eating some breakfast earlier this morning.  His dad is at bedside.  He currently denies having any pain.  \"I was really wanting to take the day off today.\"    Review of Systems   All pertinent negatives and positives are as above. All other systems have been reviewed and are negative unless otherwise stated.     Objective    Temp:  [94.3 °F (34.6 °C)-95.1 °F (35.1 °C)] 94.3 °F (34.6 °C)  Heart Rate:  [74-79] 74  Resp:  [20-22] 20  BP: (109-125)/(57-64) 112/64  Physical Exam  Vitals reviewed.   Constitutional:       Appearance: He is obese. He is ill-appearing.      Comments: Patient father and I helped reposition him in bed this morning   HENT:      Head: Normocephalic.   Eyes:      General: Scleral icterus present.   Pulmonary:      Effort: Pulmonary effort is normal. No respiratory distress.      Comments: Diminished at bases  Abdominal:      General: There is distension (worsening).      Tenderness: There is no abdominal tenderness.   Musculoskeletal:      Right lower leg: Edema present.      Left lower leg: Edema present.   Skin:     Coloration: Skin is jaundiced.   Neurological:      Motor: Weakness present.         Results Review:  I have reviewed the labs, radiology results, and diagnostic studies.    Laboratory Data:   Results from last 7 days   Lab Units 06/27/25  0415 06/26/25  1116 06/25/25  0602   WBC 10*3/mm3 10.92* 11.89* 9.75   HEMOGLOBIN g/dL 8.5* 9.0* 9.1*   HEMATOCRIT % 26.1* 26.9* 27.2*   PLATELETS 10*3/mm3 95* 111* 117*        Results from last 7 days   Lab Units 06/27/25  1442 06/27/25  0415 06/26/25  1116 06/25/25 2037 06/25/25  0602   SODIUM mmol/L  --  137 138  --  138   POTASSIUM mmol/L 3.8 3.4* 3.9   < > " 3.0*   CHLORIDE mmol/L  --  107 110*  --  109*   CO2 mmol/L  --  14.0* 15.0*  --  19.0*   BUN mg/dL  --  17.3 16.4  --  14.9   CREATININE mg/dL  --  0.84 0.76  --  0.55*   CALCIUM mg/dL  --  8.6 8.6  --  8.9   BILIRUBIN mg/dL  --  32.6* 31.7*  --  32.0*   ALK PHOS U/L  --  184* 195*  --  199*   ALT (SGPT) U/L  --  58* 59*  --  58*   AST (SGOT) U/L  --  117* 151*  --  140*   GLUCOSE mg/dL  --  119* 153*  --  113*    < > = values in this interval not displayed.       Culture Data:     Microbiology Results (last 10 days)       Procedure Component Value - Date/Time    Body Fluid Culture - Body Fluid, Peritoneum [[id number]] Collected: 06/23/25 1403    Lab Status: Final result Specimen: Body Fluid from Peritoneum Updated: 06/28/25 0706     Body Fluid Culture No growth at 5 days     Gram Stain No organisms seen    Blood Culture - Blood, Arm, Left [[id number]]  (Normal) Collected: 06/23/25 1007    Lab Status: Preliminary result Specimen: Blood from Arm, Left Updated: 06/27/25 1101     Blood Culture No growth at 4 days    Blood Culture - Blood, Arm, Right [[id number]]  (Normal) Collected: 06/23/25 1000    Lab Status: Preliminary result Specimen: Blood from Arm, Right Updated: 06/27/25 1101     Blood Culture No growth at 4 days             Radiology Data:   Imaging Results (Last 7 Days)       Procedure Component Value Units Date/Time    US Abdomen Limited [[id number]] Collected: 06/23/25 1858     Updated: 06/23/25 1902    Narrative:      EXAM/TECHNIQUE: US ABDOMEN LIMITED-     INDICATION: Please evaluate any biliary duct obstruction; K76.82-Hepatic  encephalopathy; K70.31-Alcoholic cirrhosis of liver with ascites;  E87.6-Hypokalemia; D64.9-Anemia, unspecified; R17-Unspecified jaundice     COMPARISON: None available.     FINDINGS:     Visualized portion of the abdominal aorta and IVC are unremarkable.     Visualized portion of the pancreas appears normal.     Coarsened nodular liver, in keeping with cirrhosis. Liver  echogenicity  is within normal limits. No suspicious liver lesion identified.  Perihepatic ascites.     Multiple gallstones in the gallbladder lumen. No gallbladder wall  thickening. No biliary ductal dilatation. Common bile duct is 6 mm  diameter.     Right kidney is 13.7 cm in length and demonstrates normal cortical  thickness and echogenicity. No shadowing renal calculi or  hydronephrosis.       Impression:      1.  Cirrhotic liver. Perihepatic ascites.  2.  Gallstones are present but no evidence of acute cholecystitis. No  biliary ductal dilatation.     This report was signed and finalized on 6/23/2025 6:59 PM by Dr. Reinaldo Barry MD.       CT Head Without Contrast [[id number]] Collected: 06/23/25 1132     Updated: 06/23/25 1142    Narrative:      EXAMINATION: CT HEAD WO CONTRAST-        HISTORY:Altered mental status     In order to have a CT radiation dose as low as reasonably achievable  Automated Exposure Control was utilized for adjustment of the mA and/or  KV according to patient size.     Total DLP = 934.85 mGy.cm     The CT scan of the head is performed without intravenous contrast  enhancement.     The images are acquired in axial plane and subsequent reconstruction in  coronal and sagittal planes.     The comparison is made with the previous study dated 5/28/2025.     There is no evidence of a mass. There is no midline shift.     There is no evidence of intracranial hemorrhage or hematoma.     The ventricles, the basal cisterns and the cortical sulci are normal.     The gray-white matter differentiation is maintained.     The posterior fossa structures appear unremarkable as visualized.  Low-density area located anteroinferiorly in the zoltan seen in the  sagittal plane images may be artifactual due to adjacent dense bone and  pulsation of the basilar artery?.     Images reviewed in bone windows show no acute skull fracture. Limited  visualized paranasal sinuses and mastoid air cells are clear.        Impression:      1. A small ill-defined low-density area located in the anterior inferior  zoltan which is only visualized in the sagittal plane images may be  artifactual. If clinically warranted, further evaluation with MR imaging  of the brain with particular attention to this area may be obtained.  2. The remaining brain is unremarkable as detailed above.                                         This report was signed and finalized on 6/23/2025 11:39 AM by Dr. Vj Hsu MD.       XR Chest 1 View [[id number]] Collected: 06/23/25 1132     Updated: 06/23/25 1135    Narrative:      EXAM: XR CHEST 1 VW-         HISTORY: Fever       COMPARISON: 5/29/2025.     TECHNIQUE:  Frontal view(s) of the chest submitted.     FINDINGS:    Right IJ central venous catheter is been removed. There are low lung  volumes with vascular crowding and probable volume overload/edema. No  effusion or pneumothorax is seen. Heart and mediastinum are  unremarkable.          Impression:         1. Low lung volumes with vascular crowding and probable volume  overload/edema.  2. Removal of the right IJ central venous catheter.     This report was signed and finalized on 6/23/2025 11:32 AM by Óscar Orozco.                I have reviewed the patient's current medications.     lactulose, 20 g, Oral, TID  levothyroxine, 75 mcg, Oral, Q AM  midodrine, 5 mg, Oral, TID AC  pantoprazole, 40 mg, Intravenous, BID AC  rifAXIMin, 550 mg, Oral, Q12H  sodium chloride, 10 mL, Intravenous, Q12H  spironolactone, 25 mg, Oral, Daily  sucralfate, 1 g, Oral, 4x Daily AC & at Bedtime  tamsulosin, 0.4 mg, Oral, Daily  thiamine, 100 mg, Oral, Daily       Assessment/Plan   Assessment  Active Hospital Problems    Diagnosis     **Hepatic encephalopathy     Ulcerative esophagitis     Liver failure     Acute UTI (urinary tract infection)     Alcoholic cirrhosis     History of hepatitis C     Jaundice     Alcoholism     Hypokalemia     Class 1 obesity due to excess  calories without serious comorbidity with body mass index (BMI) of 34.0 to 34.9 in adult        Treatment Plan:  Discussed with GI this morning  Continue PO Lactulose   Continue Rifaximin   Repeat dose of Vitamin K PO again today  Trial of low dose Aldactone/Furosemide  Titrate PO Midodrine  7.   Will repeat labs in AM  8.   MELD score is 34 yesterday in AM  9.   Recent Paula's discriminant function score was 116.  Unfortunately, patient recently treated with steroids during a hospitalization for acute alcoholic hepatitis and at that time his hospital course was complicated by gastrointestinal bleed related to esophageal ulcers.  Currently not giving treatment with steroids given the circumstance.  10.  Paracentesis completed in the emergency department; ascites fluid studies do not appear consistent with SBP.  11.  Palliative care following and appreciate their assistance.  12.  Hospice consultation - notified by SW of the following:  I just heard back from hospice and they have advised that patient's insurance does not provide hospice benefits. So if he were to go that route they would have to agree to pay privately. Sad situation     13.   Very guarded and unfortunately grim prognosis.  A MELD score of 34 has an estimated 90-day mortality of 65-66%.      Medical Decision Making    5 acute on chronic, high complexity, worsening  2 acute, high complexity, unchanged    Conditions and Status        No significant improvement; very guarded overall prognosis     MDM Data  External documents reviewed: None  Cardiac tracing (EKG, telemetry) interpretation: no new EKGs  Radiology interpretation: no new radiology studies  Labs reviewed: none  Any tests that were considered but not ordered: None        Discussed with: patient and father     Care Planning  Shared decision making: Discussed with patient and father at bedside this morning  Code status and discussions: DO NOT RESUSCITATE      Disposition  Social Determinants  of Health that impact treatment or disposition: Possible need for end of life care   I expect the patient to be discharged to TBD in TBD days. Very guarded and unfortunately grim prognosis.      Electronically signed by Zoltan Boateng MD-BC, 06/28/25, 09:51 CDT.

## 2025-06-28 NOTE — PLAN OF CARE
Problem: Adult Inpatient Plan of Care  Goal: Plan of Care Review  Outcome: Progressing  Flowsheets (Taken 6/28/2025 0601)  Progress: no change  Outcome Evaluation: Pt slept through night with no c/o pain. VSS. S 71-75 per tele. Alert to self. Safety maintained and call light within reach.  Plan of Care Reviewed With: patient  Goal: Patient-Specific Goal (Individualized)  Outcome: Progressing  Goal: Absence of Hospital-Acquired Illness or Injury  Outcome: Progressing  Intervention: Prevent Skin Injury  Recent Flowsheet Documentation  Taken 6/28/2025 0400 by Aliya Gonzales RN  Body Position: position changed independently  Taken 6/28/2025 0200 by Aliya Gonzales RN  Body Position: position changed independently  Taken 6/28/2025 0000 by Aliya Gonzales RN  Body Position: position changed independently  Goal: Optimal Comfort and Wellbeing  Outcome: Progressing  Goal: Readiness for Transition of Care  Outcome: Progressing     Problem: Fall Injury Risk  Goal: Absence of Fall and Fall-Related Injury  Outcome: Progressing     Problem: Comorbidity Management  Goal: Blood Glucose Level Within Target Range  Outcome: Progressing  Goal: Maintenance of Heart Failure Symptom Control  Outcome: Progressing  Goal: Blood Pressure in Desired Range  Outcome: Progressing     Problem: Violence Risk or Actual  Goal: Anger and Impulse Control  Outcome: Progressing     Problem: Skin Injury Risk Increased  Goal: Skin Health and Integrity  Outcome: Progressing   Goal Outcome Evaluation:  Plan of Care Reviewed With: patient        Progress: no change  Outcome Evaluation: Pt slept through night with no c/o pain. VSS. S 71-75 per tele. Alert to self. Safety maintained and call light within reach.

## 2025-06-28 NOTE — NURSING NOTE
"Night time meds offered (lactulose, rifaximin, and sucralafate), pt stated \"skip those for now\" --marked pt refused-alerted primary nurse  "

## 2025-06-28 NOTE — PROGRESS NOTES
[facility]  Inpatient Progress Note      Date of Admission: 6/23/2025  Date of Service:  06/28/25    Reason for Follow Up: Cirrhosis    Subjective     Subjective:   Patient lying in bed, no family present at bedside.  He is denying abdominal pain.  No signs of GI blood loss.  No new complaints.    Current Facility-Administered Medications:     Calcium Replacement - Follow Nurse / BPA Driven Protocol, , Not Applicable, PRN, Nydia Zurita APRN    lactulose solution 20 g, 20 g, Oral, TID, Zoltan Boateng MD, 20 g at 06/28/25 0803    levothyroxine (SYNTHROID, LEVOTHROID) tablet 75 mcg, 75 mcg, Oral, Q AM, Zoltan Boateng MD, 75 mcg at 06/28/25 0542    Magnesium Cardiology Dose Replacement - Follow Nurse / BPA Driven Protocol, , Not Applicable, PRN, Nydia Zurita APRN    midodrine (PROAMATINE) tablet 5 mg, 5 mg, Oral, TID Kiol CUMMINS Benjamin H, MD, 5 mg at 06/28/25 0813    nitroglycerin (NITROSTAT) SL tablet 0.4 mg, 0.4 mg, Sublingual, Q5 Min PRN, Nydia Zurita APRN    ondansetron ODT (ZOFRAN-ODT) disintegrating tablet 4 mg, 4 mg, Oral, Q6H PRN **OR** ondansetron (ZOFRAN) injection 4 mg, 4 mg, Intravenous, Q6H PRN, Zoltan Boateng MD    pantoprazole (PROTONIX) injection 40 mg, 40 mg, Intravenous, BID Kilo CUMMINS Benjamin H, MD, 40 mg at 06/28/25 0803    Phosphorus Replacement - Follow Nurse / BPA Driven Protocol, , Not Applicable, PRN, Nydia Zurita APRN    Potassium Replacement - Follow Nurse / BPA Driven Protocol, , Not Applicable, PRN, Nydia Zurita APRN    riFAXIMin (XIFAXAN) tablet 550 mg, 550 mg, Oral, Q12H, Zoltan Boateng MD, 550 mg at 06/28/25 0805    sodium chloride 0.9 % flush 10 mL, 10 mL, Intravenous, Q12H, Nydia Zurita APRN, 10 mL at 06/28/25 0804    sodium chloride 0.9 % flush 10 mL, 10 mL, Intravenous, PRN, , Nydia, APRN    sodium chloride 0.9 % infusion 40 mL, 40 mL, Intravenous, PRN, , Nydia, APRN, 40 mL at  06/23/25 2221    spironolactone (ALDACTONE) tablet 25 mg, 25 mg, Oral, Daily, Zoltan Boateng MD, 25 mg at 06/28/25 0813    sucralfate (CARAFATE) tablet 1 g, 1 g, Oral, 4x Daily AC & at Bedtime, Zoltan Boateng MD, 1 g at 06/28/25 0804    tamsulosin (FLOMAX) 24 hr capsule 0.4 mg, 0.4 mg, Oral, Daily, Zoltan Boateng MD, 0.4 mg at 06/28/25 0804    thiamine (VITAMIN B-1) tablet 100 mg, 100 mg, Oral, Daily, Zoltan Boateng MD, 100 mg at 06/28/25 0804    Review of Systems     Constitution: Positive fatigue  ENT:   negative for sore throat and voice change  Respiratory: negative for  cough and shortness of air  Cardiovascular:  Negative for chest pain or palpitations  Gastrointestinal:  negative for  See HPI  Endocrine: negative for   weight loss, unintended      Objective     Vital Signs  Temp:  [94.3 °F (34.6 °C)-95.1 °F (35.1 °C)] 94.3 °F (34.6 °C)  Heart Rate:  [74-79] 74  Resp:  [20-22] 20  BP: (109-125)/(57-64) 112/64  Body mass index is 39.52 kg/m².    Intake/Output Summary (Last 24 hours) at 6/28/2025 0911  Last data filed at 6/28/2025 0453  Gross per 24 hour   Intake 160 ml   Output 500 ml   Net -340 ml     No intake/output data recorded.       Physical Exam:   General: patient awake, alert and cooperative   Eyes: Normal lids and lashes, positive scleral icterus   Neck: supple, normal ROM   Skin: warm and dry, jaundiced   Cardiovascular:  no murmurs auscultated   Pulm: clear to auscultation bilaterally, regular and unlabored   Abdomen: soft, nontender, nondistended; normal bowel sounds   Rectal: deferred   Extremities: no  edema   Psychiatric: Normal mood and behavior; memory intact         Results Review:    I have reviewed all of the patients current test results  Results from last 7 days   Lab Units 06/27/25  0415 06/26/25  1116 06/25/25  0602   WBC 10*3/mm3 10.92* 11.89* 9.75   HEMOGLOBIN g/dL 8.5* 9.0* 9.1*   HEMATOCRIT % 26.1* 26.9* 27.2*   PLATELETS 10*3/mm3 95* 111* 117*        Results from last 7 days   Lab Units 06/27/25  1442 06/27/25  0415 06/26/25  1116 06/25/25 2037 06/25/25  0602   SODIUM mmol/L  --  137 138  --  138   POTASSIUM mmol/L 3.8 3.4* 3.9   < > 3.0*   CHLORIDE mmol/L  --  107 110*  --  109*   CO2 mmol/L  --  14.0* 15.0*  --  19.0*   BUN mg/dL  --  17.3 16.4  --  14.9   CREATININE mg/dL  --  0.84 0.76  --  0.55*   CALCIUM mg/dL  --  8.6 8.6  --  8.9   BILIRUBIN mg/dL  --  32.6* 31.7*  --  32.0*   ALK PHOS U/L  --  184* 195*  --  199*   ALT (SGPT) U/L  --  58* 59*  --  58*   AST (SGOT) U/L  --  117* 151*  --  140*   GLUCOSE mg/dL  --  119* 153*  --  113*    < > = values in this interval not displayed.       Results from last 7 days   Lab Units 06/27/25  0415 06/26/25  1116 06/25/25  0602   INR  3.35* 2.97* 2.81*       Lab Results   Lab Value Date/Time    LIPASE 82 (H) 06/24/2025 0601    LIPASE 19 05/28/2025 1100    LIPASE 42 04/18/2025 2347    LIPASE 16 04/01/2025 1436    LIPASE 27 03/18/2025 1657    LIPASE 17 08/27/2024 1141    LIPASE 20 05/05/2024 1852    LIPASE 26 04/22/2024 0020       Radiology:    Imaging Results (Last 24 Hours)       ** No results found for the last 24 hours. **              Assessment & Plan       Hepatic encephalopathy    Class 1 obesity due to excess calories without serious comorbidity with body mass index (BMI) of 34.0 to 34.9 in adult    Hypokalemia    Alcoholism    Jaundice    Acute UTI (urinary tract infection)    Alcoholic cirrhosis    History of hepatitis C    Liver failure    Ulcerative esophagitis      Impression/Plan    Cirrhosis  Anemia  Altered mental status    Patient's prognosis is poor and mortality rate considered high without liver transplant.  Labs discontinued but bilirubin/INR are increasing.  Attempts made for possible transfer to tertiary center were not successful.  Hospice has been consulted.        Electronically signed by LÁZARO Abreu, 06/28/25, 9:11 AM CDT.       Vernon Morales,  LÁZARO  06/28/25  09:11 CDT

## 2025-06-29 NOTE — PLAN OF CARE
Goal Outcome Evaluation:  Plan of Care Reviewed With: family        Progress: no change  Outcome Evaluation: Alert to self. Able to make needs known. Has been incontinent of urine and stool several times this shift. VSS, HTN noted/treated. NSR 66-69 per telemetry.

## 2025-06-29 NOTE — THERAPY TREATMENT NOTE
Acute Care - Physical Therapy Treatment Note  [facility]     Patient Name: Luciano Christiansen  : 1985  MRN: [id number]  Today's Date: 2025      Visit Dx:     ICD-10-CM ICD-9-CM   1. Hepatic encephalopathy  K76.82 572.2   2. Alcoholic cirrhosis of liver with ascites  K70.31 571.2   3. Hypokalemia  E87.6 276.8   4. Anemia, unspecified type  D64.9 285.9   5. Jaundice  R17 782.4   6. Dysphagia, unspecified type  R13.10 787.20     Patient Active Problem List   Diagnosis    Wellness examination    Encounter for hepatitis C screening test for low risk patient    Class 1 obesity due to excess calories without serious comorbidity with body mass index (BMI) of 34.0 to 34.9 in adult    Fatty liver    Hyponatremia    Hypokalemia    Hypomagnesemia    Alcoholism    Transaminitis    Hypophosphatemia    Insomnia disorder related to known organic factor    Community acquired bilateral lower lobe pneumonia    Pneumonia due to Streptococcus    GI bleed    Calculus of gallbladder without cholecystitis without obstruction    Alcoholic encephalopathy    Alcohol withdrawal    Alcoholic steatohepatitis    BMI 40.0-44.9, adult    Hepatic encephalopathy    Gastroesophageal reflux disease without esophagitis    Hypothyroidism    Esophageal varices in cirrhosis    Upper GI bleed    Jaundice    Acute UTI (urinary tract infection)    Alcoholic cirrhosis    History of hepatitis C    Liver failure    Ulcerative esophagitis     Past Medical History:   Diagnosis Date    Alcoholism     currently drinking    Diabetes mellitus     Gout     Hepatitis C     Hypertension     Jaundice     UTI (urinary tract infection)      Past Surgical History:   Procedure Laterality Date    ENDOSCOPY N/A 2025    Procedure: ESOPHAGOGASTRODUODENOSCOPY WITH ANESTHESIA;  Surgeon: Estela Ramos MD;  Location: [facility];  Service: Gastroenterology;  Laterality: N/A;  pre op: GI bleed  post op: esophageal ulcer, esophagitis  pcp: Adrien Varghese MD    VASECTOMY        PT Assessment (Last 12 Hours)       PT Evaluation and Treatment       Row Name 06/29/25 1526 06/29/25 1104       Physical Therapy Time and Intention    Subjective Information no complaints  - --    Document Type therapy note (daily note)  - therapy note (daily note)  -    Mode of Treatment physical therapy  - --    Session Not Performed -- patient/family declined, not feeling well  -    Comment, Session Not Performed -- pt very lethargic. Declined to get up. Unable to encourage more due to pt falling back to sleep mid sentence. Will attempt to check back.  -    Comment Pt. lethargic, but would wake occasionally, but would fall back asleep.  - --      Row Name 06/29/25 1526          General Information    Existing Precautions/Restrictions fall  -       Row Name 06/29/25 1526          Pain Scale: FACES Pre/Post-Treatment    Pain: FACES Scale, Pretreatment 0-->no hurt  -     Posttreatment Pain Rating 0-->no hurt  -       Row Name 06/29/25 1526          Bed Mobility    Scooting/Bridging [place] (Bed Mobility) dependent (less than 25% patient effort)  -     Comment, (Bed Mobility) pt not wake up enought to attempt sitting up.  -       Row Name 06/29/25 1526          Motor Skills    Comments, Therapeutic Exercise Pt squeezed Left hand on command and started to assist with some ROM, but then fell back asleep and started snoring.  -       Row Name 06/29/25 1526          Positioning and Restraints    Pre-Treatment Position in bed  -     Post Treatment Position bed  -     In Bed fowlers;call light within reach;exit alarm on;side rails up x2  -               User Key  (r) = Recorded By, (t) = Taken By, (c) = Cosigned By      Initials Name Provider Type    Court Healy PTA Physical Therapist Assistant                    Physical Therapy Education       Title: PT OT SLP Therapies (In Progress)       Topic: Physical Therapy (In Progress)       Point: Mobility training (In  Progress)       Learning Progress Summary            Patient Acceptance, E,D, NR,NL by KR at 6/26/2025 0837                      Point: Home exercise program (Not Started)       Learner Progress:  Not documented in this visit.              Point: Body mechanics (In Progress)       Learning Progress Summary            Patient Acceptance, E,D, NR,NL by KR at 6/26/2025 0837                      Point: Precautions (In Progress)       Learning Progress Summary            Patient Acceptance, E,D, NR,NL by KR at 6/26/2025 0837                                      User Key       Initials Effective Dates Name Provider Type Discipline     10/22/24 -  Paola Winchester, PT DPT Physical Therapist PT                  PT Recommendation and Plan         Outcome Measures       Row Name 06/29/25 1526 06/27/25 1100          How much help from another person do you currently need...    Turning from your back to your side while in flat bed without using bedrails? 1  - 1  -ZANE     Moving from lying on back to sitting on the side of a flat bed without bedrails? 1  - 1  -ZANE     Moving to and from a bed to a chair (including a wheelchair)? 1  - 1  -ZANE     Standing up from a chair using your arms (e.g., wheelchair, bedside chair)? 1  - 1  -ZANE     Climbing 3-5 steps with a railing? 1  - 1  -ZNAE     To walk in hospital room? 1  - 1  -ZANE     AM-PAC 6 Clicks Score (PT) 6  - 6  -ZANE        Functional Assessment    Outcome Measure Options AM-PAC 6 Clicks Basic Mobility (PT)  - --               User Key  (r) = Recorded By, (t) = Taken By, (c) = Cosigned By      Initials Name Provider Type    ZANE Francesca Odonnell, PIYUSH Physical Therapist Assistant    Court Healy PTA Physical Therapist Assistant                     Time Calculation:    PT Charges       Row Name 06/29/25 1547             Time Calculation    Start Time 1526  -      Stop Time 1536  -      Time Calculation (min) 10 min  -      PT Received On 06/29/25   -MF         Time Calculation- PT    Total Timed Code Minutes- PT 10 minute(s)  -MF         Timed Charges    83615 - PT Therapeutic Exercise Minutes 10  -MF         Total Minutes    Timed Charges Total Minutes 10  -MF       Total Minutes 10  -MF                User Key  (r) = Recorded By, (t) = Taken By, (c) = Cosigned By      Initials Name Provider Type    Court Healy PTA Physical Therapist Assistant                  Therapy Charges for Today       Code Description Service Date Service Provider Modifiers Qty    80750916144 HC PT THER PROC EA 15 MIN 6/29/2025 Court Bocanegra PTA GP 1            PT G-Codes  Outcome Measure Options: AM-PAC 6 Clicks Basic Mobility (PT)  AM-PAC 6 Clicks Score (PT): 6  AM-PAC 6 Clicks Score (OT): 11    Court Bocanegra PTA  6/29/2025

## 2025-06-29 NOTE — CASE MANAGEMENT/SOCIAL WORK
Continued Stay Note   [facility]     Patient Name: Luciano Christiansen  MRN: [id number]  Today's Date: 6/29/2025    Admit Date: 6/23/2025    Plan: Hospice vs HH (see notes)   Discharge Plan       Row Name 06/29/25 1123       Plan    Plan Hospice vs HH (see notes)    Plan Comments Per Fabiola from [facility], pt does not have coverage with ins. for hospice but could get HH care.  Jess Reed from Hospice will provide pt/family self pay rates and provide financial assistance application in case they want to fill this out.  Will follow.                   Discharge Codes    No documentation.                       CHAIPTO JensenW

## 2025-06-29 NOTE — PLAN OF CARE
Goal Outcome Evaluation:     Pt slept between care through the shift with no c/o pain or discomfort.  VSS WNL S 62-70 per tele. Alert to self.  Taken all meds without difficultly. Care and Safety maintained.

## 2025-06-29 NOTE — PROGRESS NOTES
Patient Name: Luciano Christiansen  Date of Admission: 6/23/2025  Today's Date: 06/29/25  Length of Stay: 6  Primary Care Physician: Adrien Varghese MD    Subjective   Chief Complaint: follow-up encephalopathy  HPI   Patient was sleeping when I arrived.  He would wake up easily, inform me that he did not feel well, has been having some symptoms of nausea, and reports that he feels tired and lethargic.  No family at bedside this morning.    Review of Systems   All pertinent negatives and positives are as above. All other systems have been reviewed and are negative unless otherwise stated.     Objective    Temp:  [96.2 °F (35.7 °C)-96.4 °F (35.8 °C)] 96.2 °F (35.7 °C)  Heart Rate:  [67-71] 70  Resp:  [16-22] 16  BP: ()/(54-59) 121/59  Physical Exam  Vitals reviewed.   Constitutional:       Appearance: He is obese. He is ill-appearing.   HENT:      Head: Normocephalic.   Eyes:      General: Scleral icterus present.   Pulmonary:      Effort: Pulmonary effort is normal. No respiratory distress.      Comments: Diminished at bases  Abdominal:      General: There is distension.      Tenderness: There is no abdominal tenderness.   Musculoskeletal:      Right lower leg: Edema present.      Left lower leg: Edema present.   Skin:     Coloration: Skin is jaundiced.   Neurological:      Motor: Weakness present.         Results Review:  I have reviewed the labs, radiology results, and diagnostic studies.    Laboratory Data:   Results from last 7 days   Lab Units 06/29/25  0421 06/27/25  0415 06/26/25  1116   WBC 10*3/mm3 5.99 10.92* 11.89*   HEMOGLOBIN g/dL 7.9* 8.5* 9.0*   HEMATOCRIT % 24.4* 26.1* 26.9*   PLATELETS 10*3/mm3 78* 95* 111*        Results from last 7 days   Lab Units 06/29/25  0421 06/27/25  1442 06/27/25  0415 06/26/25  1116   SODIUM mmol/L 137  --  137 138   POTASSIUM mmol/L 3.8 3.8 3.4* 3.9   CHLORIDE mmol/L 109*  --  107 110*   CO2 mmol/L 16.0*  --  14.0* 15.0*   BUN mg/dL 20.6*  --  17.3 16.4   CREATININE mg/dL  0.77  --  0.84 0.76   CALCIUM mg/dL 8.7  --  8.6 8.6   BILIRUBIN mg/dL 33.3*  --  32.6* 31.7*   ALK PHOS U/L 181*  --  184* 195*   ALT (SGPT) U/L 58*  --  58* 59*   AST (SGOT) U/L 106*  --  117* 151*   GLUCOSE mg/dL 98  --  119* 153*       Culture Data:     Microbiology Results (last 10 days)       Procedure Component Value - Date/Time    Body Fluid Culture - Body Fluid, Peritoneum [[id number]] Collected: 06/23/25 1403    Lab Status: Final result Specimen: Body Fluid from Peritoneum Updated: 06/28/25 0706     Body Fluid Culture No growth at 5 days     Gram Stain No organisms seen    Blood Culture - Blood, Arm, Left [[id number]]  (Normal) Collected: 06/23/25 1007    Lab Status: Final result Specimen: Blood from Arm, Left Updated: 06/28/25 1100     Blood Culture No growth at 5 days    Blood Culture - Blood, Arm, Right [[id number]]  (Normal) Collected: 06/23/25 1000    Lab Status: Final result Specimen: Blood from Arm, Right Updated: 06/28/25 1116     Blood Culture No growth at 5 days             Radiology Data:   Imaging Results (Last 7 Days)       Procedure Component Value Units Date/Time    US Abdomen Limited [[id number]] Collected: 06/23/25 1858     Updated: 06/23/25 1902    Narrative:      EXAM/TECHNIQUE: US ABDOMEN LIMITED-     INDICATION: Please evaluate any biliary duct obstruction; K76.82-Hepatic  encephalopathy; K70.31-Alcoholic cirrhosis of liver with ascites;  E87.6-Hypokalemia; D64.9-Anemia, unspecified; R17-Unspecified jaundice     COMPARISON: None available.     FINDINGS:     Visualized portion of the abdominal aorta and IVC are unremarkable.     Visualized portion of the pancreas appears normal.     Coarsened nodular liver, in keeping with cirrhosis. Liver echogenicity  is within normal limits. No suspicious liver lesion identified.  Perihepatic ascites.     Multiple gallstones in the gallbladder lumen. No gallbladder wall  thickening. No biliary ductal dilatation. Common bile duct is 6 mm  diameter.      Right kidney is 13.7 cm in length and demonstrates normal cortical  thickness and echogenicity. No shadowing renal calculi or  hydronephrosis.       Impression:      1.  Cirrhotic liver. Perihepatic ascites.  2.  Gallstones are present but no evidence of acute cholecystitis. No  biliary ductal dilatation.     This report was signed and finalized on 6/23/2025 6:59 PM by Dr. Reinaldo Barry MD.       CT Head Without Contrast [[id number]] Collected: 06/23/25 1132     Updated: 06/23/25 1142    Narrative:      EXAMINATION: CT HEAD WO CONTRAST-        HISTORY:Altered mental status     In order to have a CT radiation dose as low as reasonably achievable  Automated Exposure Control was utilized for adjustment of the mA and/or  KV according to patient size.     Total DLP = 934.85 mGy.cm     The CT scan of the head is performed without intravenous contrast  enhancement.     The images are acquired in axial plane and subsequent reconstruction in  coronal and sagittal planes.     The comparison is made with the previous study dated 5/28/2025.     There is no evidence of a mass. There is no midline shift.     There is no evidence of intracranial hemorrhage or hematoma.     The ventricles, the basal cisterns and the cortical sulci are normal.     The gray-white matter differentiation is maintained.     The posterior fossa structures appear unremarkable as visualized.  Low-density area located anteroinferiorly in the zoltan seen in the  sagittal plane images may be artifactual due to adjacent dense bone and  pulsation of the basilar artery?.     Images reviewed in bone windows show no acute skull fracture. Limited  visualized paranasal sinuses and mastoid air cells are clear.       Impression:      1. A small ill-defined low-density area located in the anterior inferior  zoltan which is only visualized in the sagittal plane images may be  artifactual. If clinically warranted, further evaluation with MR imaging  of the brain with  particular attention to this area may be obtained.  2. The remaining brain is unremarkable as detailed above.                                         This report was signed and finalized on 6/23/2025 11:39 AM by Dr. Vj Hsu MD.       XR Chest 1 View [[id number]] Collected: 06/23/25 1132     Updated: 06/23/25 1135    Narrative:      EXAM: XR CHEST 1 VW-         HISTORY: Fever       COMPARISON: 5/29/2025.     TECHNIQUE:  Frontal view(s) of the chest submitted.     FINDINGS:    Right IJ central venous catheter is been removed. There are low lung  volumes with vascular crowding and probable volume overload/edema. No  effusion or pneumothorax is seen. Heart and mediastinum are  unremarkable.          Impression:         1. Low lung volumes with vascular crowding and probable volume  overload/edema.  2. Removal of the right IJ central venous catheter.     This report was signed and finalized on 6/23/2025 11:32 AM by Óscar Orozco.                I have reviewed the patient's current medications.     furosemide, 20 mg, Oral, Daily  lactulose, 20 g, Oral, TID  levothyroxine, 75 mcg, Oral, Q AM  midodrine, 10 mg, Oral, TID AC  pantoprazole, 40 mg, Intravenous, BID AC  rifAXIMin, 550 mg, Oral, Q12H  sodium chloride, 10 mL, Intravenous, Q12H  spironolactone, 25 mg, Oral, Daily  sucralfate, 1 g, Oral, 4x Daily AC & at Bedtime  tamsulosin, 0.4 mg, Oral, Daily  thiamine, 100 mg, Oral, Daily       Assessment/Plan   Assessment  Active Hospital Problems    Diagnosis     **Hepatic encephalopathy     Ulcerative esophagitis     Liver failure     Acute UTI (urinary tract infection)     Alcoholic cirrhosis     History of hepatitis C     Jaundice     Alcoholism     Hypokalemia     Class 1 obesity due to excess calories without serious comorbidity with body mass index (BMI) of 34.0 to 34.9 in adult        Treatment Plan:  Unfortunately, no meaningful improvement in clinical status  Continue PO Lactulose   Continue Rifaximin    Multiple doses of Vitamin K given; coagulopathy remains largely unchanged  Trial of low dose Aldactone/Furosemide  PO Midodrine  7.   Will repeat labs in AM  8.   MELD score is 34 - see below  9.   Recent Madlizbeth's discriminant function score was 116.  Unfortunately, patient recently treated with steroids during a hospitalization for acute alcoholic hepatitis and at that time his hospital course was complicated by gastrointestinal bleed related to esophageal ulcers.  Currently not giving treatment with steroids given the circumstance.  10.  Paracentesis completed in the emergency department; ascites fluid studies do not appear consistent with SBP.  11.  Palliative care following and appreciate their assistance.  12.  Hospice consultation - notified by SW of the following:  I just heard back from hospice and they have advised that patient's insurance does not provide hospice benefits. So if he were to go that route they would have to agree to pay privately. Sad situation     13.   Very guarded and unfortunately grim prognosis.  A MELD score of 34 has an estimated 90-day mortality of 65-66%.      Medical Decision Making - moderate complexity      Conditions and Status        No significant improvement; very guarded and concerning prognosis     MDM Data  External documents reviewed: None  Cardiac tracing (EKG, telemetry) interpretation: no new EKGs  Radiology interpretation: no new radiology studies  Labs reviewed: none  Any tests that were considered but not ordered: None        Discussed with: patient      Care Planning  Shared decision making: Discussed with patient this morning  Code status and discussions: DO NOT RESUSCITATE      Disposition  Social Determinants of Health that impact treatment or disposition: Possible need for end of life care   I expect the patient to be discharged to TBD in TBD days. Very guarded and unfortunately grim prognosis.      Electronically signed by Zoltan Boateng MD-BC,  06/29/25, 10:06 CDT.

## 2025-06-29 NOTE — PLAN OF CARE
Goal Outcome Evaluation:         Pt slept between care through the shift with no c/o pain or discomfort.  VSS WNL S 70-74 per tele. Alert to self. BM x 2 today. Taken all meds without difficultly. Care and Safety maintained.

## 2025-06-30 NOTE — PROGRESS NOTES
[facility]  Inpatient Progress Note      Date of Admission: 6/23/2025  Date of Service:  06/30/25    Reason for Follow Up:  encephalopathy    Subjective     Subjective:     Luciano Christiansen lying in bed, no family present at bedside.  Patient's midnight nurse was in the room and does not verbalize any new complaints.  Patient did refuse his nighttime lactulose medication.  Patient opens his eyes to verbal stimulation but quickly closes them back and does not say any meaningful words.        Current Facility-Administered Medications:     Calcium Replacement - Follow Nurse / BPA Driven Protocol, , Not Applicable, PRN, , LÁZARO Satfford    furosemide (LASIX) tablet 20 mg, 20 mg, Oral, Daily, Zoltan Boateng MD, 20 mg at 06/29/25 0911    lactulose solution 20 g, 20 g, Oral, TID, Zoltan Boateng MD, 20 g at 06/29/25 1823    levothyroxine (SYNTHROID, LEVOTHROID) tablet 75 mcg, 75 mcg, Oral, Q AM, Zoltan Boateng MD, 75 mcg at 06/30/25 0556    Magnesium Cardiology Dose Replacement - Follow Nurse / BPA Driven Protocol, , Not Applicable, PRN, Nydia walter APRN    midodrine (PROAMATINE) tablet 10 mg, 10 mg, Oral, TID Kilo CUMMINS Benjamin H, MD, 10 mg at 06/29/25 1823    nitroglycerin (NITROSTAT) SL tablet 0.4 mg, 0.4 mg, Sublingual, Q5 Min PRN, Nydia Zurita APRN    ondansetron ODT (ZOFRAN-ODT) disintegrating tablet 4 mg, 4 mg, Oral, Q6H PRN **OR** ondansetron (ZOFRAN) injection 4 mg, 4 mg, Intravenous, Q6H PRN, Zoltan Boateng MD    pantoprazole (PROTONIX) injection 40 mg, 40 mg, Intravenous, BID Kilo CUMMINS Benjamin H, MD, 40 mg at 06/29/25 1824    Phosphorus Replacement - Follow Nurse / BPA Driven Protocol, , Not Applicable, PRN, , Nydia APRN    Potassium Replacement - Follow Nurse / BPA Driven Protocol, , Not Applicable, PRN, Nydia Zurita APRN    riFAXIMin (XIFAXAN) tablet 550 mg, 550 mg, Oral, Q12H, Zoltan Boateng MD, 550 mg at 06/29/25  2045    sodium chloride 0.9 % flush 10 mL, 10 mL, Intravenous, Q12H, , Nydia, APRN, 10 mL at 06/29/25 2045    sodium chloride 0.9 % flush 10 mL, 10 mL, Intravenous, PRN, , Nydia, APRN    sodium chloride 0.9 % infusion 40 mL, 40 mL, Intravenous, PRN, , Nydia, APRN, 40 mL at 06/23/25 2221    spironolactone (ALDACTONE) tablet 25 mg, 25 mg, Oral, Daily, Zoltan Boateng MD, 25 mg at 06/29/25 0913    sucralfate (CARAFATE) tablet 1 g, 1 g, Oral, 4x Daily AC & at Bedtime, Zoltan Boateng MD, 1 g at 06/29/25 2045    tamsulosin (FLOMAX) 24 hr capsule 0.4 mg, 0.4 mg, Oral, Daily, Zoltan Boateng MD, 0.4 mg at 06/29/25 0911    thiamine (VITAMIN B-1) tablet 100 mg, 100 mg, Oral, Daily, Zoltan Boateng MD, 100 mg at 06/29/25 0911    Review of Systems     Unobtainable due to mental status    Objective     Vital Signs  Temp:  [93.8 °F (34.3 °C)] 93.8 °F (34.3 °C)  Heart Rate:  [60-70] 60  Resp:  [16-20] 20  BP: (105-121)/(46-59) 108/54  Body mass index is 39.52 kg/m².    Intake/Output Summary (Last 24 hours) at 6/30/2025 0805  Last data filed at 6/30/2025 0439  Gross per 24 hour   Intake 600 ml   Output 700 ml   Net -100 ml     No intake/output data recorded.       Physical Exam:   General: Ill-appearing   Eyes:  scleral icterus   Neck: supple, normal ROM   Skin:  jaundiced   Cardiovascular:  no murmurs auscultated   Pulm: clear to auscultation bilaterally, regular and unlabored   Abdomen: soft, nontender, distended; normal bowel sounds   Psychiatric: Confusion         Results Review:    I have reviewed all of the patients current test results  Results from last 7 days   Lab Units 06/30/25  0400 06/29/25  0421 06/27/25  0415   WBC 10*3/mm3 6.71 5.99 10.92*   HEMOGLOBIN g/dL 8.4* 7.9* 8.5*   HEMATOCRIT % 25.8* 24.4* 26.1*   PLATELETS 10*3/mm3 71* 78* 95*       Results from last 7 days   Lab Units 06/30/25  0401 06/29/25  0421 06/27/25  1442 06/27/25  0415   SODIUM mmol/L 137 137  --   137   POTASSIUM mmol/L 3.7 3.8 3.8 3.4*   CHLORIDE mmol/L 109* 109*  --  107   CO2 mmol/L 16.0* 16.0*  --  14.0*   BUN mg/dL 24.4* 20.6*  --  17.3   CREATININE mg/dL 1.06 0.77  --  0.84   CALCIUM mg/dL 8.9 8.7  --  8.6   BILIRUBIN mg/dL 33.4* 33.3*  --  32.6*   ALK PHOS U/L 184* 181*  --  184*   ALT (SGPT) U/L 58* 58*  --  58*   AST (SGOT) U/L 116* 106*  --  117*   GLUCOSE mg/dL 106* 98  --  119*       Results from last 7 days   Lab Units 06/30/25  0401 06/29/25  0421 06/27/25  0415   INR  3.50* 3.31* 3.35*       Lab Results   Lab Value Date/Time    LIPASE 82 (H) 06/24/2025 0601    LIPASE 19 05/28/2025 1100    LIPASE 42 04/18/2025 2347    LIPASE 16 04/01/2025 1436    LIPASE 27 03/18/2025 1657    LIPASE 17 08/27/2024 1141    LIPASE 20 05/05/2024 1852    LIPASE 26 04/22/2024 0020       Radiology:    Imaging Results (Last 24 Hours)       ** No results found for the last 24 hours. **              Assessment & Plan       Hepatic encephalopathy    Class 1 obesity due to excess calories without serious comorbidity with body mass index (BMI) of 34.0 to 34.9 in adult    Hypokalemia    Alcoholism    Jaundice    Acute UTI (urinary tract infection)    Alcoholic cirrhosis    History of hepatitis C    Liver failure    Ulcerative esophagitis      Impression/Plan    Hepatic encephalopathy  Liver failure  Ulcerative esophagitis  Alcoholism    Patient's nurse from last evening stated he took earlier doses of lactulose without difficulty it was only the last dose of the day, I will see if timing can be readjusted.  Continue p.o. lactulose, continue Rifaximin.   Unfortunately patient has a very poor prognosis.  INR has elevated today.   Not much else to offer from GI perspective.       Electronically signed by LÁZARO Abreu, 06/30/25, 8:05 AM CDT.       LÁZARO Abreu  06/30/25  08:05 CDT

## 2025-06-30 NOTE — PLAN OF CARE
Goal Outcome Evaluation:  Plan of Care Reviewed With: patient, father        Progress: no change  Outcome Evaluation: OT treatment ocmplete. Patient is lethargic and not able to anser orientation questions. Patient did follow some commands to reach and touch face and to pull on bedrail for rolling to side. Patient is not able to complete self care at this time due to low level of alertness. Fathe was instructed to have patient sit up higher with taking sips of drink and to encourage patient to complete simple self care such as washing face and to engage in basic ROM/reaching. Father verbalized understanding. Recommend continuation of OT POC and SNF placement upon hospital discharge.    Anticipated Discharge Disposition (OT): skilled nursing facility

## 2025-06-30 NOTE — PLAN OF CARE
Goal Outcome Evaluation:  Plan of Care Reviewed With: patient        Progress: declining  Outcome Evaluation: No co pain. He is more sleeply today. He is sinus/ kiara on tele. Need hospice care. Cont to monitor.

## 2025-06-30 NOTE — PROGRESS NOTES
[facility] Clinical Pharmacy Services: IV to PO Interchange    Relevant clinical data and objective history reviewed:  Diet Order   Procedures    Diet: Regular/House; Texture: Regular (IDDSI 7); Fluid Consistency: Thin (IDDSI 0)       I/O last 3 completed shifts:  In: 840 [P.O.:840]  Out: 700 [Urine:700]    The patient meets the following criteria to be switched from IV administration to PO including:  Functioning GI tract  Hemodynamically stable  Showing signs of clinical improvement  Tolerating other oral medications or enteral diet    Assessment/Plan:  Pantoprazole 40 mg has been changed from IV to PO formulation based on our [facility] P&T approved Adult IV to PO Switching policy    INGRIS Ortiz RPH  6/30/2025  11:54 CDT

## 2025-06-30 NOTE — THERAPY TREATMENT NOTE
Patient Name: Luciano Christiansen  : 1985    MRN: [id number]                              Today's Date: 2025       Admit Date: 2025    Visit Dx:     ICD-10-CM ICD-9-CM   1. Hepatic encephalopathy  K76.82 572.2   2. Alcoholic cirrhosis of liver with ascites  K70.31 571.2   3. Hypokalemia  E87.6 276.8   4. Anemia, unspecified type  D64.9 285.9   5. Jaundice  R17 782.4   6. Dysphagia, unspecified type  R13.10 787.20     Patient Active Problem List   Diagnosis    Wellness examination    Encounter for hepatitis C screening test for low risk patient    Class 1 obesity due to excess calories without serious comorbidity with body mass index (BMI) of 34.0 to 34.9 in adult    Fatty liver    Hyponatremia    Hypokalemia    Hypomagnesemia    Alcoholism    Transaminitis    Hypophosphatemia    Insomnia disorder related to known organic factor    Community acquired bilateral lower lobe pneumonia    Pneumonia due to Streptococcus    GI bleed    Calculus of gallbladder without cholecystitis without obstruction    Alcoholic encephalopathy    Alcohol withdrawal    Alcoholic steatohepatitis    BMI 40.0-44.9, adult    Hepatic encephalopathy    Gastroesophageal reflux disease without esophagitis    Hypothyroidism    Esophageal varices in cirrhosis    Upper GI bleed    Jaundice    Acute UTI (urinary tract infection)    Alcoholic cirrhosis    History of hepatitis C    Liver failure    Ulcerative esophagitis     Past Medical History:   Diagnosis Date    Alcoholism     currently drinking    Diabetes mellitus     Gout     Hepatitis C     Hypertension     Jaundice     UTI (urinary tract infection)      Past Surgical History:   Procedure Laterality Date    ENDOSCOPY N/A 2025    Procedure: ESOPHAGOGASTRODUODENOSCOPY WITH ANESTHESIA;  Surgeon: Estela Ramos MD;  Location: [facility];  Service: Gastroenterology;  Laterality: N/A;  pre op: GI bleed  post op: esophageal ulcer, esophagitis  pcp: Adrien Varghese MD    VASECTOMY         General Information       Row Name 06/30/25 1400          OT Time and Intention    Subjective Information no complaints  Patient made verbalizations throughout the session but most were not intelligible.  -MESSI     Document Type therapy note (daily note)  -MESSI     Patient Effort poor  -MESSI     Comment Patient would follow some simple commands but not all.  -MESSI       Row Name 06/30/25 1400          General Information    Patient Profile Reviewed yes  -MESSI     Existing Precautions/Restrictions fall  -MESSI     Barriers to Rehab medically complex;cognitive status  -       Row Name 06/30/25 1400          Cognition    Orientation Status (Cognition) --  Patient unable to answer any orientation questions.  -MESSI               User Key  (r) = Recorded By, (t) = Taken By, (c) = Cosigned By      Initials Name Provider Type    Izabel Candelaria, OTR/L Occupational Therapist                     Mobility/ADL's       Row Name 06/30/25 1428          Bed Mobility    Bed Mobility rolling left  -MESSI     Rolling Left [place] (Bed Mobility) moderate assist (50% patient effort)  used bedrail to pull self  -       Row Name 06/30/25 1428          Functional Mobility    Patient was able to Ambulate no, other medical factors prevent ambulation  -MESSI     Reason Patient was unable to Ambulate Excessive Sedation/Somnolence  -MESSI               User Key  (r) = Recorded By, (t) = Taken By, (c) = Cosigned By      Initials Name Provider Type    Izabel Candelaria OTR/L Occupational Therapist                   Obj/Interventions       Row Name 06/30/25 1428          Motor Skills    Motor Skills motor control/coordination interventions  -     Motor Control/Coordination Interventions therapeutic exercise/ROM  -MESSI     Gross Motor Skill, Impairments Detail BUE A/AA/PROM, folowing commands for reaching. Poor participation, Low level of alertness.  -MESSI     Therapeutic Exercise other (see comments)  BUE  -MESSI               User Key  (r) = Recorded By, (t) =  Taken By, (c) = Cosigned By      Initials Name Provider Type    MESSI Izabel Mcwilliams, OTR/L Occupational Therapist                   Goals/Plan    No documentation.                  Clinical Impression       Row Name 06/30/25 1428          Pain Scale: FACES Pre/Post-Treatment    Pain: FACES Scale, Pretreatment 0-->no hurt  -MESSI     Posttreatment Pain Rating 0-->no hurt  -MESSI       Row Name 06/30/25 1428          Plan of Care Review    Plan of Care Reviewed With patient;father  -     Progress no change  -     Outcome Evaluation OT treatment ocmplete. Patient is lethargic and not able to anser orientation questions. Patient did follow some commands to reach and touch face and to pull on bedrail for rolling to side. Patient is not able to complete self care at this time due to low level of alertness. Fathe was instructed to have patient sit up higher with taking sips of drink and to encourage patient to complete simple self care such as washing face and to engage in basic ROM/reaching. Father verbalized understanding. Recommend continuation of OT POC and SNF placement upon hospital discharge.  -       Row Name 06/30/25 1428          Therapy Assessment/Plan (OT)    Rehab Potential (OT) poor  -     Criteria for Skilled Therapeutic Interventions Met (OT) yes;skilled treatment is necessary  -     Therapy Frequency (OT) 5 times/wk  -     Predicted Duration of Therapy Intervention (OT) Until hospital discharge  -       Row Name 06/30/25 1428          Therapy Plan Review/Discharge Plan (OT)    Anticipated Discharge Disposition (OT) skilled nursing facility  -       Row Name 06/30/25 1428          Positioning and Restraints    Pre-Treatment Position in bed  -     Post Treatment Position bed  -     In Bed fowlers;call light within reach;encouraged to call for assist;with family/caregiver;side rails up x2  -               User Key  (r) = Recorded By, (t) = Taken By, (c) = Cosigned By      Initials Name Provider  Type    Izabel Candelaria, OTR/L Occupational Therapist                   Outcome Measures       Row Name 06/30/25 1428          How much help from another is currently needed...    Putting on and taking off regular lower body clothing? 1  -MESSI     Bathing (including washing, rinsing, and drying) 1  -MESSI     Toileting (which includes using toilet bed pan or urinal) 1  -MESSI     Putting on and taking off regular upper body clothing 1  -MESSI     Taking care of personal grooming (such as brushing teeth) 1  -MESSI     Eating meals 1  -MESSI     AM-PAC 6 Clicks Score (OT) 6  -MESSI       Row Name 06/30/25 0826          How much help from another person do you currently need...    Turning from your back to your side while in flat bed without using bedrails? 3  -AA     Moving from lying on back to sitting on the side of a flat bed without bedrails? 2  -AA     Moving to and from a bed to a chair (including a wheelchair)? 1  -AA     Standing up from a chair using your arms (e.g., wheelchair, bedside chair)? 1  -AA     Climbing 3-5 steps with a railing? 1  -AA     To walk in hospital room? 1  -AA     AM-PAC 6 Clicks Score (PT) 9  -AA     Highest Level of Mobility Goal Sit at Edge of Bed-3  -AA       Row Name 06/30/25 1428          Functional Assessment    Outcome Measure Options AM-PAC 6 Clicks Daily Activity (OT)  -               User Key  (r) = Recorded By, (t) = Taken By, (c) = Cosigned By      Initials Name Provider Type    Izabel Candelaria, OTR/L Occupational Therapist    Linda Acosta, RN Registered Nurse                    Occupational Therapy Education       Title: PT OT SLP Therapies (In Progress)       Topic: Occupational Therapy (In Progress)       Point: Home exercise program (In Progress)       Learning Progress Summary            Father Acceptance, E,D, NR by  at 6/30/2025 3030    Comment: ROM HEP instructed to father                                      User Key       Initials Effective Dates Name Provider Type  Discipline     02/28/25 -  Izabel Mcwilliams OTR/L Occupational Therapist OT                  OT Recommendation and Plan  Therapy Frequency (OT): 5 times/wk  Plan of Care Review  Plan of Care Reviewed With: patient, father  Progress: no change  Outcome Evaluation: OT treatment ocmplete. Patient is lethargic and not able to anser orientation questions. Patient did follow some commands to reach and touch face and to pull on bedrail for rolling to side. Patient is not able to complete self care at this time due to low level of alertness. Fathe was instructed to have patient sit up higher with taking sips of drink and to encourage patient to complete simple self care such as washing face and to engage in basic ROM/reaching. Father verbalized understanding. Recommend continuation of OT POC and SNF placement upon hospital discharge.     Time Calculation:         Time Calculation- OT       Row Name 06/30/25 1428             Time Calculation- OT    OT Start Time 1403  -MESSI      OT Stop Time 1444  -MESSI      OT Time Calculation (min) 41 min  -      OT Received On 06/30/25  -         Timed Charges    43693 - OT Therapeutic Exercise Minutes 41  -MESSI         Total Minutes    Timed Charges Total Minutes 41  -MESSI       Total Minutes 41  -MESSI                User Key  (r) = Recorded By, (t) = Taken By, (c) = Cosigned By      Initials Name Provider Type     Izabel Mcwilliams OTR/L Occupational Therapist                  Therapy Charges for Today       Code Description Service Date Service Provider Modifiers Qty    [id number]  OT THER PROC EA 15 MIN 6/30/2025 Izabel Mcwilliams OTR/L GO 3                 KEYSHA Allison/INGRIS  6/30/2025

## 2025-06-30 NOTE — PLAN OF CARE
Goal Outcome Evaluation:  Plan of Care Reviewed With: family        Progress: no change  Outcome Evaluation: Alert to self. Able to make his needs known. Has been sleeping with sonorous respirations. Sinus [place] to NSR 55-68 w/1rst degree block per telemetry,

## 2025-06-30 NOTE — PLAN OF CARE
Problem: Palliative Care  Goal: Enhanced Quality of Life  Intervention: Optimize Psychosocial Wellbeing  Flowsheets (Taken 6/30/2025 1342)  Supportive Measures: active listening utilized    Pt is awake. VSS. Room air. Not in any apparent pain or distress. Not very interactive, though attempts to answer questions.     Notes reviewed and events noted. Awaiting placement.       Danelle Garza LCSW  6/30/2025

## 2025-06-30 NOTE — CASE MANAGEMENT/SOCIAL WORK
Continued Stay Note  [facility]     Patient Name: Luciano Christiansen  MRN: [id number]  Today's Date: 6/30/2025    Admit Date: 6/23/2025    Plan: Listing for NHP   Discharge Plan       Row Name 06/30/25 1157       Plan    Plan Listing for NHP    Patient/Family in Agreement with Plan yes    Plan Comments Spoke with ENGLISH,MAGO Mother [phone] as she meet with hospice and they are unable to take pt home and take care of him so hospice not an option either financially and no caregiver can be there as needed.  She is wanting pt listed for NHP here in [place] and open to all four facilities.  Pt would be medicaid pending. Will follow.                   Discharge Codes    No documentation.                       MICHELLE Jensen

## 2025-06-30 NOTE — DISCHARGE PLACEMENT REQUEST
"Sandy  [phone] Pt has no hospice benefits- has ins. But with condition would probably need medicaid pending.   Luciano Christiansen (40 y.o. Male)       Date of Birth   1985    Social Security Number       Address   [address] [place], [place] [address]    Home Phone   [phone]    MRN   [id number]       Latter-day   Other    Marital Status   Single                            Admission Date   6/23/2025    Admission Type   Emergency    Admitting Provider   Roderick Field MD    Attending Provider   Roderick Field MD    Department, Room/Bed   [facility] 4B, 443/1       Discharge Date       Discharge Disposition       Discharge Destination                                 Attending Provider: Roderick Field MD    Allergies: No Known Allergies    Isolation: None   Infection: None   Code Status: No CPR    Ht: 190.5 cm (75\")   Wt: 143 kg (316 lb 3.2 oz)    Admission Cmt: None   Principal Problem: Hepatic encephalopathy [K76.82]                   Active Insurance as of 6/23/2025       Primary Coverage       Payor Plan Insurance Group Employer/Plan Group    [id number]       Payor Plan Address Payor Plan Phone Number Payor Plan Fax Number Effective Dates    [address] [address] [phone]  8/1/2020 - None Entered    [facility] [id number]         Subscriber Name Subscriber Birth Date Member ID       LUCIANO CHRISTIANSEN 1985 [id number]                     Emergency Contacts        (Rel.) Home Phone Work Phone Mobile Phone    MAGO CHRISTIANSEN (Mother) [phone] -- [phone]    Jossy Christiansen-17 years old (Daughter) -- -- [phone]                 History & Physical        , LÁZARO Stafford at 06/23/25 1648       Attestation signed by Zoltan Boateng MD at 06/24/25 1056      I have reviewed this documentation and agree.    I performed a substantive part of the MDM during the patient’s E/M visit. I personally evaluated and examined the patient. " I personally made or approved the documented management plan and acknowledge its risk of complications.     (Discussion) Management/test interpretation discussed with ED provider, Dr. Quiñonez in addition to gastroenterology.  Discussed with patient (limited) and bedside nurse while in room #40 of the ED.       Electronically signed by Zoltan Boateng MD, 6/24/2025, 10:55 CDT.                           [facility] Medicine Services  HISTORY AND PHYSICAL    Date of Admission: 6/23/2025  Primary Care Physician: Adrien Varghese MD    Subjective   Primary Historian: Patient and his mother    Chief Complaint: Significant confusion    Altered Mental Status  Symptoms include congestion, fatigue and weakness.      Luciano Christiansen is a 40-year-old male with past medical history significant for alcoholism, cirrhosis, diabetes mellitus, gout, hep C positive, significant jaundice and hypertension.  Presented to [facility] emergency department 6/23/2025 with his mother due to significant increase in confusion.  She states she has been giving him lactulose when he wakes up and follows commands however he is becoming more lethargic and more confused and she was unable to give him any today.  He did recently stop drinking approximately 3-4 weeks ago.  Patient does not have any melena or hematemesis or hematochezia, mother has stated that he has just become more jaundice and his stool has become yellow.  No fluid bolus was initiated due to extreme ascites and anasarca. Current MELD score is 30 with a 52.6% mortality.   Bedside paracentesis performed per ED.Dr. Matson, gastroenterology came and evaluated the patient and recommended either transfer to tertiary facility.  Case was discussed with Dr. Mabry with hepatology/transplant service at [facility] who stated that in order for patient to be transferred to tertiary care facility he would have to be in full alcohol rehabilitation before he is  even considered for transplant having said that the recommendations from hepatology are to monitor his fluid status improved with encephalopathy and then refer him to alcohol rehab and then maybe he could be a transplant candidate in the future.  They did realize her and his mortality is very high but in their opinion as per their transport criteria he would not meet the transplant criteria and therefore they will not be able to do anything else for him that cannot be done in our hospital.  Case was again discussed with Dr. Matson, gastroenterology who will consult and assist in supportive care and possible palliative measures with the hospitalist team.    Workup revealed K2.9, CO2 17.0, anion gap 16.0, BUN 14, CR 0.71, , Ca 8.5, alkaline phosphatase 221, albumin 2.7, , ALT 61, total bilirubin 33.6, ammonia 149, lactate 4.7 post paracentesis 2.6, INR 2.60, WBC 12.72 with a left shift and no bandemia, Hb 9.6, HCT 29.2, , urinalysis positive for nitrates, moderate leukocytes 4+ bacteria, BC and blood culture fluid pending.    Due to previous positive culture with Enterococcus faecalis in April of this year will initiate ampicillin per susceptibility and extensive discussion with pharmacist.    Review of Systems   Constitutional:  Positive for activity change, appetite change and fatigue.   HENT:  Positive for congestion.    Cardiovascular:  Positive for leg swelling.   Gastrointestinal:  Positive for abdominal distention.   Skin:  Positive for color change.   Neurological:  Positive for dizziness and weakness.   Psychiatric/Behavioral:  Positive for confusion.       Otherwise complete ROS reviewed and negative except as mentioned in the HPI.    Past Medical History:   Past Medical History:   Diagnosis Date    Alcoholism     currently drinking    Diabetes mellitus     Gout     Hepatitis C     Hypertension     Jaundice      Past Surgical History:  Past Surgical History:   Procedure Laterality Date     ENDOSCOPY N/A 5/28/2025    Procedure: ESOPHAGOGASTRODUODENOSCOPY WITH ANESTHESIA;  Surgeon: Estela Ramos MD;  Location: [facility];  Service: Gastroenterology;  Laterality: N/A;  pre op: GI bleed  post op: esophageal ulcer, esophagitis  pcp: Adrien Varghese MD    VASECTOMY       Social History:  reports that he has been smoking cigarettes. He started smoking about 15 years ago. He has a 7.1 pack-year smoking history. He has never used smokeless tobacco. He reports current alcohol use of about 12.0 standard drinks of alcohol per week. He reports current drug use. Drug: Marijuana.    Family History: family history is not on file.       Allergies:  No Known Allergies    Medications:  Prior to Admission medications    Medication Sig Start Date End Date Taking? Authorizing Provider   calcium carbonate (OS-MAGGIE) 600 MG tablet Take 1 tablet by mouth Daily.   Yes Maknena Dukes MD   FLUoxetine (PROzac) 20 MG capsule Take 1 capsule by mouth Daily. 5/2/25  Yes Adrien Varghese MD   folic acid (FOLVITE) 1 MG tablet Take 1 tablet by mouth Daily. 6/4/25  Yes Roderick Field MD   lactulose 20 GM/30ML solution solution Take 30 mL by mouth 3 (Three) Times a Day. 6/4/25  Yes Roderick Field MD   levothyroxine (Synthroid) 75 MCG tablet Take 1 tablet by mouth Daily. 5/2/25  Yes Adrien Varghese MD   midodrine (PROAMATINE) 5 MG tablet Take 1 tablet by mouth 3 (Three) Times a Day Before Meals.  Patient taking differently: Take 0.5 tablets by mouth 3 (Three) Times a Day Before Meals. 6/4/25  Yes Roderick Field MD   multivitamin with minerals (MULTIVITAMIN ADULT PO) Take 1 tablet by mouth Daily.   Yes Makenna Dukes MD   pantoprazole (PROTONIX) 40 MG EC tablet Take 1 tablet by mouth 2 (Two) Times a Day Before Meals. 6/4/25  Yes Roderick Field MD   pentoxifylline (TRENtal) 400 MG CR tablet Take 1 tablet by mouth 3 (Three) Times a Day With Meals for 74 doses.  Patient taking differently: Take  "0.5 tablets by mouth 3 (Three) Times a Day With Meals. 6/4/25 6/29/25 Yes Roderick Filed MD   sucralfate (Carafate) 1 g tablet Take 1 tablet by mouth 4 (Four) Times a Day. 6/10/25  Yes Adrien Varghese MD   thiamine (VITAMIN B1) 100 MG tablet Take 1 tablet by mouth Daily. 5/2/25  Yes Adrien Varghese MD   vitamin C (ASCORBIC ACID) 250 MG tablet Take 1 tablet by mouth Daily.   Yes Makenna Dkues MD   chlordiazePOXIDE (LIBRIUM) 5 MG capsule Take 3 capsules by mouth Daily As Needed for Anxiety for 7 days, THEN 2 capsules Daily As Needed for 7 days, THEN 1 capsule Daily for 7 days. 6/10/25 7/1/25  Adrien Varghese MD     I have utilized all available immediate resources to obtain, update, or review the patient's current medications (including all prescriptions, over-the-counter products, herbals, cannabis/cannabidiol products, and vitamin/mineral/dietary (nutritional) supplements).    Results for orders placed during the hospital encounter of 07/04/24    Adult Transthoracic Echo Complete W/ Cont if Necessary Per Protocol    Interpretation Summary    Left ventricular ejection fraction appears to be 61 - 65%.    Left ventricular diastolic function was normal.    Estimated right ventricular systolic pressure from tricuspid regurgitation is normal (<35 mmHg).       Objective     Vital Signs: /62   Pulse 74   Temp 97.6 °F (36.4 °C) (Oral)   Resp 20   Ht 190.5 cm (75\")   Wt 136 kg (300 lb)   SpO2 99%   BMI 37.50 kg/m²   Physical Exam  Vitals and nursing note reviewed.   Constitutional:       General: He is in acute distress.      Appearance: He is obese. He is ill-appearing and toxic-appearing.      Comments: Room air   HENT:      Head: Normocephalic and atraumatic.      Right Ear: Tympanic membrane normal.      Left Ear: Tympanic membrane normal.      Nose: Congestion present.      Mouth/Throat:      Mouth: Mucous membranes are dry.   Eyes:      General: Scleral icterus present.      Conjunctiva/sclera: "      Right eye: Right conjunctiva is injected.      Left eye: Left conjunctiva is injected.   Neck:      Vascular: JVD present.   Cardiovascular:      Rate and Rhythm: Normal rate. Rhythm irregular.   Pulmonary:      Effort: Accessory muscle usage present. No tachypnea or respiratory distress.      Breath sounds: Examination of the right-upper field reveals rales. Examination of the left-upper field reveals rales. Examination of the right-middle field reveals decreased breath sounds. Examination of the left-middle field reveals decreased breath sounds. Examination of the right-lower field reveals decreased breath sounds. Examination of the left-lower field reveals decreased breath sounds. Decreased breath sounds and rales present.   Abdominal:      General: Bowel sounds are decreased. There is distension.      Palpations: There is fluid wave.      Tenderness: There is generalized abdominal tenderness.   Musculoskeletal:      Cervical back: Neck supple. No rigidity or tenderness.      Right lower le+ Edema present.      Left lower le+ Edema present.   Skin:     General: Skin is cool.      Coloration: Skin is sallow. Skin is not jaundiced.   Neurological:      Mental Status: He is lethargic and disoriented.      Cranial Nerves: Cranial nerves 2-12 are intact.      Motor: Weakness present.   Psychiatric:         Attention and Perception: He is inattentive.         Mood and Affect: Affect is flat.         Speech: Speech is slurred. Speech is not delayed.         Behavior: Behavior is withdrawn.         Cognition and Memory: Cognition is impaired. Memory is impaired.        Results Reviewed:  Lab Results (last 24 hours)       Procedure Component Value Units Date/Time    Body Fluid Cell Count With Differential - Body Fluid, Peritoneum [[id number]] Collected: 25 1403    Specimen: Body Fluid from Peritoneum Updated: 25 1524            Body fluid differential - Body Fluid, Peritoneum [[id number]] Collected:  06/23/25 1403    Specimen: Body Fluid from Peritoneum Updated: 06/23/25 1524     Neutrophils, Fluid % 11 %      Lymphocytes, Fluid % 11 %      Monocytes, Fluid % 73 %      Mesothelial Cells, Fluid % 1 %      Macrophage, Fluid % 4 %     STAT Lactic Acid, Reflex [[id number]]  (Abnormal) Collected: 06/23/25 1417    Specimen: Blood Updated: 06/23/25 1509     Lactate 2.6 mmol/L     Body fluid cell count - Body Fluid, Peritoneum [[id number]] Collected: 06/23/25 1403    Specimen: Body Fluid from Peritoneum Updated: 06/23/25 1426     Color, Fluid Yellow     Appearance, Fluid Clear     RBC, Fluid 1,000 /mm3      Nucleated Cells, Fluid 57 /mm3      Method: Automated Sysmex XN Method    Albumin, Fluid - Body Fluid, Peritoneum [[id number]] Collected: 06/23/25 1403    Specimen: Body Fluid from Peritoneum Updated: 06/23/25 1410    Body Fluid Culture - Body Fluid, Peritoneum [[id number]] Collected: 06/23/25 1403    Specimen: Body Fluid from Peritoneum Updated: 06/23/25 1410    Urinalysis With Culture If Indicated - Urine, Clean Catch [[id number]]  (Abnormal) Collected: 06/23/25 1118    Specimen: Urine, Clean Catch Updated: 06/23/25 1155     Color, UA [place]     Appearance, UA Cloudy     pH, UA 5.5     Specific Gravity, UA 1.018     Glucose, UA Negative     Ketones, UA Negative     Bilirubin, UA Large (3+)     Blood, UA Negative     Protein, UA 30 mg/dL (1+)     Leuk Esterase, UA Moderate (2+)     Nitrite, UA Positive     Urobilinogen, UA 0.2 E.U./dL            Urinalysis, Microscopic Only - Urine, Clean Catch [[id number]]  (Abnormal) Collected: 06/23/25 1118    Specimen: Urine, Clean Catch Updated: 06/23/25 1154     RBC, UA 0-2 /HPF      WBC, UA 3-5 /HPF      Comment: Urine culture not indicated.        Bacteria, UA 3+ /HPF      Squamous Epithelial Cells, UA 0-2 /HPF      Hyaline Casts, UA 3-6 /LPF      Mucus, UA Trace /HPF      Methodology Manual Light Microscopy    Urine Drug Screen - Urine, Clean Catch [[id number]]  (Abnormal)  Collected: 06/23/25 1118    Specimen: Urine, Clean Catch Updated: 06/23/25 1148     THC, Screen, Urine Negative     Phencyclidine (PCP), Urine Negative     Cocaine Screen, Urine Negative     Methamphetamine, Ur Negative     Opiate Screen Negative     Amphetamine Screen, Urine Negative     Benzodiazepine Screen, Urine Positive     Tricyclic Antidepressants Screen Negative     Methadone Screen, Urine Negative     Barbiturates Screen, Urine Positive     Oxycodone Screen, Urine Negative     Buprenorphine, Screen, Urine Negative            Ammonia [[id number]]  (Abnormal) Collected: 06/23/25 1007    Specimen: Blood Updated: 06/23/25 1111     Ammonia 149 umol/L     Blood Culture - Blood, Arm, Right [[id number]] Collected: 06/23/25 1000    Specimen: Blood from Arm, Right Updated: 06/23/25 1100    Blood Culture - Blood, Arm, Left [[id number]] Collected: 06/23/25 1007    Specimen: Blood from Arm, Left Updated: 06/23/25 1100    Comprehensive Metabolic Panel [[id number]]  (Abnormal) Collected: 06/23/25 1007    Specimen: Blood from Arm, Right Updated: 06/23/25 1056     Glucose 151 mg/dL      BUN 14.1 mg/dL      Creatinine 0.71 mg/dL      Sodium 138 mmol/L      Potassium 2.9 mmol/L      Chloride 105 mmol/L      CO2 17.0 mmol/L      Calcium 8.5 mg/dL      Total Protein 5.2 g/dL      Albumin 2.7 g/dL      ALT (SGPT) 61 U/L      AST (SGOT) 133 U/L      Alkaline Phosphatase 221 U/L      Total Bilirubin 33.6 mg/dL      Globulin 2.5 gm/dL      A/G Ratio 1.1 g/dL      BUN/Creatinine Ratio 19.9     Anion Gap 16.0 mmol/L      eGFR 118.9 mL/min/1.73             Lactic Acid, Plasma [[id number]]  (Abnormal) Collected: 06/23/25 1007    Specimen: Blood from Arm, Right Updated: 06/23/25 1055     Lactate 4.7 mmol/L     Magnesium [[id number]]  (Normal) Collected: 06/23/25 1007    Specimen: Blood from Arm, Right Updated: 06/23/25 1052     Magnesium 1.9 mg/dL     Ethanol [[id number]] Collected: 06/23/25 1007    Specimen: Blood from Arm, Right Updated:  06/23/25 1049     Ethanol % <0.010 %     Narrative:      Not for legal purposes. Chain of Custody not followed.     Protime-INR [[id number]]  (Abnormal) Collected: 06/23/25 1007    Specimen: Blood from Arm, Right Updated: 06/23/25 1048     Protime 29.4 Seconds      INR 2.60    CBC & Differential [[id number]]  (Abnormal) Collected: 06/23/25 1007    Specimen: Blood from Arm, Right Updated: 06/23/25 1041            CBC Auto Differential [[id number]]  (Abnormal) Collected: 06/23/25 1007    Specimen: Blood from Arm, Right Updated: 06/23/25 1041     WBC 12.72 10*3/mm3      RBC 2.79 10*6/mm3      Hemoglobin 9.6 g/dL      Hematocrit 29.2 %      .7 fL      MCH 34.4 pg      MCHC 32.9 g/dL      RDW 21.6 %      RDW-SD 83.7 fl      MPV 11.2 fL      Platelets 128 10*3/mm3      Neutrophil % 79.4 %      Lymphocyte % 9.7 %      Monocyte % 8.1 %      Eosinophil % 1.1 %      Basophil % 0.6 %      Immature Grans % 1.1 %      Neutrophils, Absolute 10.10 10*3/mm3      Lymphocytes, Absolute 1.23 10*3/mm3      Monocytes, Absolute 1.03 10*3/mm3      Eosinophils, Absolute 0.14 10*3/mm3      Basophils, Absolute 0.08 10*3/mm3      Immature Grans, Absolute 0.14 10*3/mm3      nRBC 0.0 /100 WBC      Comment: : [id number] [id number]             Imaging Results (Last 24 Hours)       Procedure Component Value Units Date/Time    CT Head Without Contrast [[id number]] Collected: 06/23/25 1132     Updated: 06/23/25 1142    Narrative:      EXAMINATION: CT HEAD WO CONTRAST-        HISTORY:Altered mental status     In order to have a CT radiation dose as low as reasonably achievable  Automated Exposure Control was utilized for adjustment of the mA and/or  KV according to patient size.     Total DLP = 934.85 mGy.cm     The CT scan of the head is performed without intravenous contrast  enhancement.     The images are acquired in axial plane and subsequent reconstruction in  coronal and sagittal planes.     The comparison is made  with the previous study dated 5/28/2025.     There is no evidence of a mass. There is no midline shift.     There is no evidence of intracranial hemorrhage or hematoma.     The ventricles, the basal cisterns and the cortical sulci are normal.     The gray-white matter differentiation is maintained.     The posterior fossa structures appear unremarkable as visualized.  Low-density area located anteroinferiorly in the zoltan seen in the  sagittal plane images may be artifactual due to adjacent dense bone and  pulsation of the basilar artery?.     Images reviewed in bone windows show no acute skull fracture. Limited  visualized paranasal sinuses and mastoid air cells are clear.       Impression:      1. A small ill-defined low-density area located in the anterior inferior  zoltan which is only visualized in the sagittal plane images may be  artifactual. If clinically warranted, further evaluation with MR imaging  of the brain with particular attention to this area may be obtained.  2. The remaining brain is unremarkable as detailed above.                                         This report was signed and finalized on 6/23/2025 11:39 AM by Dr. Vj Hsu MD.       XR Chest 1 View [[id number]] Collected: 06/23/25 1132     Updated: 06/23/25 1135    Narrative:      EXAM: XR CHEST 1 VW-         HISTORY: Fever       COMPARISON: 5/29/2025.     TECHNIQUE:  Frontal view(s) of the chest submitted.     FINDINGS:    Right IJ central venous catheter is been removed. There are low lung  volumes with vascular crowding and probable volume overload/edema. No  effusion or pneumothorax is seen. Heart and mediastinum are  unremarkable.          Impression:         1. Low lung volumes with vascular crowding and probable volume  overload/edema.  2. Removal of the right IJ central venous catheter.     This report was signed and finalized on 6/23/2025 11:32 AM by Óscar Orozco.                  Assessment / Plan   Assessment:   Active  Hospital Problems    Diagnosis     **Hepatic encephalopathy     Acute UTI (urinary tract infection)     Alcoholic cirrhosis     History of hepatitis C     Jaundice     Alcoholism     Hypokalemia     Class 1 obesity due to excess calories without serious comorbidity with body mass index (BMI) of 34.0 to 34.9 in adult        Treatment Plan  The patient will be admitted to Dr. Boateng's service here at [facility].     Hepatic encephalopathy/alcoholic cirrhosis/hepatitis C/jaundice  - N.p.o. due to AMS  - Lactulose 300 mL enema twice daily.  - Gastroenterology consult for any additional guidance, greatly appreciated.  - End-stage disease, palliative care consult for the AM.  - Long discussion with patient's mother by phone patient has been made DNR/DNI.  - Limited abdominal ultrasound for complete evaluation and any stone involvement.    Hypokalemia  - Stat BMP to evaluate current status.  - Electrolyte protocol in place.    Alcoholism  - Currently patient has been sober for approximately 3-4 weeks with the help of Librium per PCP.  Patient has not been able to take any for the last 2 to 3 days due to altered mental status.  Will resume Librium p.o. if patient regains consciousness.    Acute UTI  - Urinalysis with moderate leukocytes, positive nitrates, 3-5 WBC and 3+ bacteria  - Recent positive urine culture for Enterococcus faecalis, reviewed susceptibilities with pharmacist to initiate antibiotics safest for patient's current state.  Will initiate ampicillin and continue to follow cultures.    Reviewed home medication list, will restart if patient is able to tolerate p.o. currently no alternatives needed other than lactulose.    Medical Decision Making  6 acute on chronic, high complexity, worsening  1 acute, high complexity, unchanged    Number and Complexity of problems: 8  Differential Diagnosis: None    Conditions and Status        Condition is worsening.     [facility] Data  External documents reviewed:  [facility] [email or web address]  Cardiac tracing (EKG, telemetry) interpretation: 6/23/2025 EKG per cardiology reviewed  Radiology interpretation: 6/23/2025 chest x-ray and ultrasound of the abdomen per radiology reviewed  Labs reviewed: 6/23/2025 reviewed   any tests that were considered but not ordered: None  Decision rules/scores evaluated (example [id number], Wells, etc): MELD Score (Original, Pre-2016, Model for End-Stage Liver Disease) - MDCalc  Calculated on Jun 23 2025 7:03 PM  30 points -> Original MELD Score (Pre-2016)*  52.6% -> Estimated 3-Month Mortality     Discussed with: Dr. Boateng, patient and his mother Marisa     Care Planning  Shared decision making: Dr. Boateng, patient and his mother Marisa  Code status and discussions: DNR/DNI after extensive discussion with patient's mother    Disposition  Social Determinants of Health that impact treatment or disposition: Possible need for hospice versus palliative care  Estimated length of stay is undetermined at this time.     I confirmed that the patient's advanced care plan is present, code status is documented, and a surrogate decision maker is listed in the patient's medical record.     The patient's surrogate decision maker is his mother Marisa.     The patient was seen and examined by me on 6/23/2025 at 1648.    Electronically signed by LÁZARO Dubon-BC, 06/23/25, 18:06 CDT.               Electronically signed by Zoltan Boateng MD at 06/24/25 1056       Current Facility-Administered Medications   Medication Dose Route Frequency Provider Last Rate Last Admin    Calcium Replacement - Follow Nurse / BPA Driven Protocol   Not Applicable PRNydia Moore APRN        furosemide (LASIX) tablet 20 mg  20 mg Oral Daily Zoltan Boateng MD   20 mg at 06/30/25 0826    lactulose solution 20 g  20 g Oral TID Vernon Morales APRN        levothyroxine (SYNTHROID, LEVOTHROID) tablet 75 mcg  75 mcg Oral Q AM Zoltan Boateng MD   75 mcg at 06/30/25 1655     Magnesium Cardiology Dose Replacement - Follow Nurse / BPA Driven Protocol   Not Applicable PRN Nydia Zurita APRN        midodrine (PROAMATINE) tablet 10 mg  10 mg Oral TID AC Zoltan Boateng MD   10 mg at 06/30/25 1156    nitroglycerin (NITROSTAT) SL tablet 0.4 mg  0.4 mg Sublingual Q5 Min PRN Nydia Zurita APRN        ondansetron ODT (ZOFRAN-ODT) disintegrating tablet 4 mg  4 mg Oral Q6H PRN Zoltan Boateng MD        Or    ondansetron (ZOFRAN) injection 4 mg  4 mg Intravenous Q6H PRN Zoltan Boateng MD        pantoprazole (PROTONIX) EC tablet 40 mg  40 mg Oral BID AC Roderick Field MD        Phosphorus Replacement - Follow Nurse / BPA Driven Protocol   Not Applicable PRN Nydia Zurita APRN        Potassium Replacement - Follow Nurse / BPA Driven Protocol   Not Applicable PRN Nydia Zurita APRN        riFAXIMin (XIFAXAN) tablet 550 mg  550 mg Oral Q12H Zoltan Boateng MD   550 mg at 06/30/25 0826    sodium chloride 0.9 % flush 10 mL  10 mL Intravenous Q12H Nydia Zurita APRN   10 mL at 06/30/25 0826    sodium chloride 0.9 % flush 10 mL  10 mL Intravenous PRN Nydia Zurita APRN        sodium chloride 0.9 % infusion 40 mL  40 mL Intravenous PRN Nydia Zurita APRN   40 mL at 06/23/25 2221    spironolactone (ALDACTONE) tablet 25 mg  25 mg Oral Daily Zoltan Boateng MD   25 mg at 06/30/25 0826    sucralfate (CARAFATE) tablet 1 g  1 g Oral 4x Daily AC & at Bedtime Zoltan Boateng MD   1 g at 06/30/25 1156    tamsulosin (FLOMAX) 24 hr capsule 0.4 mg  0.4 mg Oral Daily Zoltan Boateng MD   0.4 mg at 06/30/25 0826    thiamine (VITAMIN B-1) tablet 100 mg  100 mg Oral Daily Zoltan Boateng MD   100 mg at 06/30/25 0826        Physician Progress Notes (last 24 hours)        Vernon Morales APRN at 06/30/25 0720       Attestation signed by Ariel Busby MD at 06/30/25 0943      I have reviewed this documentation and agree.   Signing off; please recall if we may be of further service                      [facility]  Inpatient Progress Note      Date of Admission: 6/23/2025  Date of Service:  06/30/25    Reason for Follow Up:  encephalopathy    Subjective     Subjective:     Luciano Christiansen lying in bed, no family present at bedside.  Patient's midnight nurse was in the room and does not verbalize any new complaints.  Patient did refuse his nighttime lactulose medication.  Patient opens his eyes to verbal stimulation but quickly closes them back and does not say any meaningful words.        Current Facility-Administered Medications:     Calcium Replacement - Follow Nurse / BPA Driven Protocol, , Not Applicable, PRN, Nydia Zurita APRN    furosemide (LASIX) tablet 20 mg, 20 mg, Oral, Daily, Zoltan Boateng MD, 20 mg at 06/29/25 0911    lactulose solution 20 g, 20 g, Oral, TID, Zoltan Boateng MD, 20 g at 06/29/25 1823    levothyroxine (SYNTHROID, LEVOTHROID) tablet 75 mcg, 75 mcg, Oral, Q AM, Zoltan Boateng MD, 75 mcg at 06/30/25 0556    Magnesium Cardiology Dose Replacement - Follow Nurse / BPA Driven Protocol, , Not Applicable, PRN, Nyida Zurita APRN    midodrine (PROAMATINE) tablet 10 mg, 10 mg, Oral, TID Kilo CUMMINS Benjamin H, MD, 10 mg at 06/29/25 1823    nitroglycerin (NITROSTAT) SL tablet 0.4 mg, 0.4 mg, Sublingual, Q5 Min PRN, Nydia Zurita APRN    ondansetron ODT (ZOFRAN-ODT) disintegrating tablet 4 mg, 4 mg, Oral, Q6H PRN **OR** ondansetron (ZOFRAN) injection 4 mg, 4 mg, Intravenous, Q6H PRN, Zoltan Boateng MD    pantoprazole (PROTONIX) injection 40 mg, 40 mg, Intravenous, BID Kilo CUMMINS Benjamin H, MD, 40 mg at 06/29/25 1824    Phosphorus Replacement - Follow Nurse / BPA Driven Protocol, , Not Applicable, PRN, Nydia Zurita APRN    Potassium Replacement - Follow Nurse / BPA Driven Protocol, , Not Applicable, PRN, Nydia Zurita APRN    riFAXIMin (XIFAXAN)  tablet 550 mg, 550 mg, Oral, Q12H, Zoltan Boateng MD, 550 mg at 06/29/25 2045    sodium chloride 0.9 % flush 10 mL, 10 mL, Intravenous, Q12H, Nydia walter, APRN, 10 mL at 06/29/25 2045    sodium chloride 0.9 % flush 10 mL, 10 mL, Intravenous, PRN, , Nydia, APRN    sodium chloride 0.9 % infusion 40 mL, 40 mL, Intravenous, PRN, Nydia walter APRN, 40 mL at 06/23/25 2221    spironolactone (ALDACTONE) tablet 25 mg, 25 mg, Oral, Daily, Zoltan Boateng MD, 25 mg at 06/29/25 0913    sucralfate (CARAFATE) tablet 1 g, 1 g, Oral, 4x Daily AC & at Bedtime, Zoltan Boateng MD, 1 g at 06/29/25 2045    tamsulosin (FLOMAX) 24 hr capsule 0.4 mg, 0.4 mg, Oral, Daily, Zoltan Boateng MD, 0.4 mg at 06/29/25 0911    thiamine (VITAMIN B-1) tablet 100 mg, 100 mg, Oral, Daily, Zoltan Boateng MD, 100 mg at 06/29/25 0911    Review of Systems     Unobtainable due to mental status    Objective     Vital Signs  Temp:  [93.8 °F (34.3 °C)] 93.8 °F (34.3 °C)  Heart Rate:  [60-70] 60  Resp:  [16-20] 20  BP: (105-121)/(46-59) 108/54  Body mass index is 39.52 kg/m².    Intake/Output Summary (Last 24 hours) at 6/30/2025 0805  Last data filed at 6/30/2025 0439  Gross per 24 hour   Intake 600 ml   Output 700 ml   Net -100 ml     No intake/output data recorded.       Physical Exam:   General: Ill-appearing   Eyes:  scleral icterus   Neck: supple, normal ROM   Skin:  jaundiced   Cardiovascular:  no murmurs auscultated   Pulm: clear to auscultation bilaterally, regular and unlabored   Abdomen: soft, nontender, distended; normal bowel sounds   Psychiatric: Confusion         Results Review:    I have reviewed all of the patients current test results  Results from last 7 days   Lab Units 06/30/25  0400 06/29/25  0421 06/27/25  0415   WBC 10*3/mm3 6.71 5.99 10.92*   HEMOGLOBIN g/dL 8.4* 7.9* 8.5*   HEMATOCRIT % 25.8* 24.4* 26.1*   PLATELETS 10*3/mm3 71* 78* 95*       Results from last 7 days   Lab Units  06/30/25  0401 06/29/25  0421 06/27/25  1442 06/27/25 0415   SODIUM mmol/L 137 137  --  137   POTASSIUM mmol/L 3.7 3.8 3.8 3.4*   CHLORIDE mmol/L 109* 109*  --  107   CO2 mmol/L 16.0* 16.0*  --  14.0*   BUN mg/dL 24.4* 20.6*  --  17.3   CREATININE mg/dL 1.06 0.77  --  0.84   CALCIUM mg/dL 8.9 8.7  --  8.6   BILIRUBIN mg/dL 33.4* 33.3*  --  32.6*   ALK PHOS U/L 184* 181*  --  184*   ALT (SGPT) U/L 58* 58*  --  58*   AST (SGOT) U/L 116* 106*  --  117*   GLUCOSE mg/dL 106* 98  --  119*       Results from last 7 days   Lab Units 06/30/25  0401 06/29/25  0421 06/27/25 0415   INR  3.50* 3.31* 3.35*       Lab Results   Lab Value Date/Time    LIPASE 82 (H) 06/24/2025 0601    LIPASE 19 05/28/2025 1100    LIPASE 42 04/18/2025 2347    LIPASE 16 04/01/2025 1436    LIPASE 27 03/18/2025 1657    LIPASE 17 08/27/2024 1141    LIPASE 20 05/05/2024 1852    LIPASE 26 04/22/2024 0020       Radiology:    Imaging Results (Last 24 Hours)       ** No results found for the last 24 hours. **              Assessment & Plan       Hepatic encephalopathy    Class 1 obesity due to excess calories without serious comorbidity with body mass index (BMI) of 34.0 to 34.9 in adult    Hypokalemia    Alcoholism    Jaundice    Acute UTI (urinary tract infection)    Alcoholic cirrhosis    History of hepatitis C    Liver failure    Ulcerative esophagitis      Impression/Plan    Hepatic encephalopathy  Liver failure  Ulcerative esophagitis  Alcoholism    Patient's nurse from last evening stated he took earlier doses of lactulose without difficulty it was only the last dose of the day, I will see if timing can be readjusted.  Continue p.o. lactulose, continue Rifaximin.   Unfortunately patient has a very poor prognosis.  INR has elevated today.   Not much else to offer from GI perspective.       Electronically signed by LÁZARO Abreu, 06/30/25, 8:05 AM CDT.       LÁZARO Abreu  06/30/25  08:05 CDT        Electronically signed by  Ariel Busby MD at 06/30/25 0962

## 2025-06-30 NOTE — PROGRESS NOTES
Patient Name: Luciano Christiansen  Date of Admission: 6/23/2025  Today's Date: 06/30/25  Length of Stay: 7  Primary Care Physician: Adrien Varghese MD    Subjective   Chief Complaint: follow-up encephalopathy  HPI     Nurse April called me for blood pressure of 82/40.  Patient has midodrine due to  Patient has Lasix and spironolactone and list of medication.    Patient is on day of 7 of hospitalization admitted for evaluation of significant confusion associated with congestion, fatigue and weakness.  GI service signed off today.  They recommend continue lactulose, rifaximin  Dimension the prognosis is very poor and that they have nothing else to offer from GI perspective.    Patient is a very sick man.    Review of Systems   Patient unable to give any historical detail      Objective    Temp:  [93.8 °F (34.3 °C)] 93.8 °F (34.3 °C)  Heart Rate:  [57-65] 59  Resp:  [16-20] 16  BP: ()/(40-97) 82/40  Physical Exam  Vitals reviewed.   Constitutional:       Appearance: He is obese. He is ill-appearing.   HENT:      Head: Normocephalic.   Eyes:      General: Scleral icterus present.   Pulmonary:      Effort: Pulmonary effort is normal. No respiratory distress.      Comments: Diminished at bases  Abdominal:      General: There is distension.      Tenderness: There is no abdominal tenderness.   Musculoskeletal:      Right lower leg: Edema present.      Left lower leg: Edema present.   Skin:     Coloration: Skin is jaundiced.   Neurological:      Motor: Weakness present.         Results Review:  I have reviewed the labs, radiology results, and diagnostic studies.    Laboratory Data:   Results from last 7 days   Lab Units 06/30/25  0400 06/29/25  0421 06/27/25  0415   WBC 10*3/mm3 6.71 5.99 10.92*   HEMOGLOBIN g/dL 8.4* 7.9* 8.5*   HEMATOCRIT % 25.8* 24.4* 26.1*   PLATELETS 10*3/mm3 71* 78* 95*        Results from last 7 days   Lab Units 06/30/25  0401 06/29/25  0421 06/27/25  1442 06/27/25  0415   SODIUM mmol/L 137 137  --  137    POTASSIUM mmol/L 3.7 3.8 3.8 3.4*   CHLORIDE mmol/L 109* 109*  --  107   CO2 mmol/L 16.0* 16.0*  --  14.0*   BUN mg/dL 24.4* 20.6*  --  17.3   CREATININE mg/dL 1.06 0.77  --  0.84   CALCIUM mg/dL 8.9 8.7  --  8.6   BILIRUBIN mg/dL 33.4* 33.3*  --  32.6*   ALK PHOS U/L 184* 181*  --  184*   ALT (SGPT) U/L 58* 58*  --  58*   AST (SGOT) U/L 116* 106*  --  117*   GLUCOSE mg/dL 106* 98  --  119*       Culture Data:     Microbiology Results (last 10 days)       Procedure Component Value - Date/Time    Body Fluid Culture - Body Fluid, Peritoneum [[id number]] Collected: 06/23/25 1403    Lab Status: Final result Specimen: Body Fluid from Peritoneum Updated: 06/28/25 0706     Body Fluid Culture No growth at 5 days     Gram Stain No organisms seen    Blood Culture - Blood, Arm, Left [[id number]]  (Normal) Collected: 06/23/25 1007    Lab Status: Final result Specimen: Blood from Arm, Left Updated: 06/28/25 1100     Blood Culture No growth at 5 days    Blood Culture - Blood, Arm, Right [[id number]]  (Normal) Collected: 06/23/25 1000    Lab Status: Final result Specimen: Blood from Arm, Right Updated: 06/28/25 1116     Blood Culture No growth at 5 days             Radiology Data:   Imaging Results (Last 7 Days)       Procedure Component Value Units Date/Time    US Abdomen Limited [[id number]] Collected: 06/23/25 1858     Updated: 06/23/25 1902    Narrative:      EXAM/TECHNIQUE: US ABDOMEN LIMITED-     INDICATION: Please evaluate any biliary duct obstruction; K76.82-Hepatic  encephalopathy; K70.31-Alcoholic cirrhosis of liver with ascites;  E87.6-Hypokalemia; D64.9-Anemia, unspecified; R17-Unspecified jaundice     COMPARISON: None available.     FINDINGS:     Visualized portion of the abdominal aorta and IVC are unremarkable.     Visualized portion of the pancreas appears normal.     Coarsened nodular liver, in keeping with cirrhosis. Liver echogenicity  is within normal limits. No suspicious liver lesion identified.  Perihepatic  ascites.     Multiple gallstones in the gallbladder lumen. No gallbladder wall  thickening. No biliary ductal dilatation. Common bile duct is 6 mm  diameter.     Right kidney is 13.7 cm in length and demonstrates normal cortical  thickness and echogenicity. No shadowing renal calculi or  hydronephrosis.       Impression:      1.  Cirrhotic liver. Perihepatic ascites.  2.  Gallstones are present but no evidence of acute cholecystitis. No  biliary ductal dilatation.     This report was signed and finalized on 6/23/2025 6:59 PM by Dr. Reinaldo Barry MD.                     Assessment/Plan   Assessment    Medical Decision Making   Active Hospital Problems    Diagnosis     **Hepatic encephalopathy     Ulcerative esophagitis     Liver failure     Acute UTI (urinary tract infection)     Alcoholic cirrhosis     History of hepatitis C     Jaundice     Alcoholism     Hypokalemia     Class 1 obesity due to excess calories without serious comorbidity with body mass index (BMI) of 34.0 to 34.9 in adult        Treatment Plan:  Unfortunately, no meaningful improvement in clinical status  Continue PO Lactulose   Continue Rifaximin   Multiple doses of Vitamin K given; coagulopathy remains largely unchanged  Trial of low dose Aldactone/Furosemide  PO Midodrine  7.   Will repeat labs in AM  8.   MELD score is 34 - see below  9.   [facility]  Unfortunately, patient recently treated with steroids during a hospitalization for acute alcoholic hepatitis and at that time his hospital course was complicated by gastrointestinal bleed related to esophageal ulcers.  Currently not giving treatment with steroids given the circumstance.  10.  Paracentesis completed in the emergency department; ascites fluid studies do not appear consistent with SBP.  11.  Palliative care following and appreciate their assistance.  12.  Hospice consultation - notified by SW of the following:  I just heard back from  hospice and they have advised that patient's insurance does not provide hospice benefits. So if he were to go that route they would have to agree to pay privately. Sad situation   He is a hospice candidate.  13.   Very guarded and unfortunately grim prognosis.  A MELD score of 34 has an estimated 90-day mortality of 65-66%.      I have reviewed the patient's current medications.     furosemide, 20 mg, Oral, Daily  lactulose, 20 g, Oral, TID  levothyroxine, 75 mcg, Oral, Q AM  midodrine, 10 mg, Oral, TID AC  pantoprazole, 40 mg, Oral, BID AC  rifAXIMin, 550 mg, Oral, Q12H  sodium chloride, 10 mL, Intravenous, Q12H  spironolactone, 25 mg, Oral, Daily  sucralfate, 1 g, Oral, 4x Daily AC & at Bedtime  tamsulosin, 0.4 mg, Oral, Daily  thiamine, 100 mg, Oral, Daily    Conditions and Status        No significant improvement; very guarded and concerning prognosis     MDM Data  External documents reviewed: None  Cardiac tracing (EKG, telemetry) interpretation: no new EKGs  Radiology interpretation: no new radiology studies  Labs reviewed: none  Any tests that were considered but not ordered: None        Discussed with: patient      Care Planning  Shared decision making: Discussed with patient this morning  Code status and discussions: DO NOT RESUSCITATE      Disposition  Social Determinants of Health that impact treatment or disposition: Possible need for end of life care   I expect the patient to be discharged to TBD in TBD days. Very guarded and unfortunately grim prognosis.      Electronically signed by Roderick Field MD-BC, 06/30/25, 17:04 CDT.

## 2025-07-01 NOTE — PROGRESS NOTES
Nutrition Services    Patient Name:  Luciano Christiansen  YOB: 1985  MRN: [id number]  Admit Date:  6/23/2025    Inpatient nutrition services reviewed POC. Interventions align with the goal(s) to maintain comfort at this time. Pt admitted with hepatic encephalopathy, acute UTI, and hypokalemia.  Medical history includes alcoholism, cirrhosis, DM, gout,, jaundice, ascites, and anasarca.  Pt is Hep C +.  He is receiving Boost chocolate tid. Father was in room with patient at time of visit.  He is consuming only 25% of meals. Needed assistance with intake.  Last BM 6/28. Pt is noted to be jaundiced and have all over body swelling upon visit.  Inpatient nutrition services/RD available upon request.      Electronically signed by:  Kosta Connolly RD  07/01/25 12:11 CDT

## 2025-07-01 NOTE — PROGRESS NOTES
"    [facility]  INPATIENT PROGRESS NOTE    Patient Name: Luciano Christiansen  Date of Admission: 6/23/2025  Today's Date: 07/01/25  Length of Stay: 8  Primary Care Physician: Adrien Varghese MD    Subjective   Chief Complaint: Follow-up  HPI   Patient has been transition to comfort care per discussion with NP Marge.  Noted when I entered the room that he has wet vocal sound and foaming coming out of his mouth.  I requested nurse April for  scopolamine patch and suction  Patient also has some atropine for secretions.      Review of Systems   Unable to participate    Objective    Temp:  [97.7 °F (36.5 °C)-97.8 °F (36.6 °C)] 97.7 °F (36.5 °C)  Heart Rate:  [54-60] 57  Resp:  [16-18] 16  BP: ()/(47-64) 98/50  Physical Exam  He appears to have some posturing particularly right upper extremity  Deeply jaundiced  Lethargic  Obese  Distended abdomen  Edema  He appears to be in the dying process.        Results Review:  I have reviewed the labs, radiology results, and diagnostic studies.    Laboratory Data:   Results from last 7 days   Lab Units 06/30/25  0400 06/29/25  0421 06/27/25  0415   WBC 10*3/mm3 6.71 5.99 10.92*   HEMOGLOBIN g/dL 8.4* 7.9* 8.5*   HEMATOCRIT % 25.8* 24.4* 26.1*   PLATELETS 10*3/mm3 71* 78* 95*        Results from last 7 days   Lab Units 06/30/25  0401 06/29/25  0421 06/27/25  1442 06/27/25  0415   SODIUM mmol/L 137 137  --  137   POTASSIUM mmol/L 3.7 3.8 3.8 3.4*   CHLORIDE mmol/L 109* 109*  --  107   CO2 mmol/L 16.0* 16.0*  --  14.0*   BUN mg/dL 24.4* 20.6*  --  17.3   CREATININE mg/dL 1.06 0.77  --  0.84   CALCIUM mg/dL 8.9 8.7  --  8.6   BILIRUBIN mg/dL 33.4* 33.3*  --  32.6*   ALK PHOS U/L 184* 181*  --  184*   ALT (SGPT) U/L 58* 58*  --  58*   AST (SGOT) U/L 116* 106*  --  117*   GLUCOSE mg/dL 106* 98  --  119*       Culture Data:   No results found for: \"BLOODCX\", \"URINECX\", \"WOUNDCX\", \"MRSACX\", \"RESPCX\", \"STOOLCX\"    Radiology Data:   Imaging " Results (Last 24 Hours)       ** No results found for the last 24 hours. **            I have reviewed the patient's current medications.     Assessment/Plan   Assessment  Medical Decision Making  Number and Complexity of problems:  Active Hospital Problems    Diagnosis     **Hepatic encephalopathy     Ulcerative esophagitis     Liver failure     Acute UTI (urinary tract infection)     Alcoholic cirrhosis     History of hepatitis C     Jaundice     Alcoholism     Hypokalemia     Class 1 obesity due to excess calories without serious comorbidity with body mass index (BMI) of 34.0 to 34.9 in adult    In addition to above  Comfort care patient      Treatment Plan  Meds addressed as per palliative care  Case discussed with nurse April.  Suction, scopolamine patch and as needed atropine drops      Current Facility-Administered Medications:     atropine 1 % ophthalmic solution 2 drop, 2 drop, Sublingual, BID PRN, Green, Marge L, APRN, 2 drop at 07/01/25 1726    bisacodyl (DULCOLAX) suppository 10 mg, 10 mg, Rectal, Daily PRN, Green, Marge L, APRN    LORazepam (ATIVAN) tablet 2 mg, 2 mg, Oral, Q1H PRN **OR** diazePAM (VALIUM) injection 10 mg, 10 mg, Intravenous, Q1H PRN **OR** diazePAM (VALIUM) injection 10 mg, 10 mg, Intramuscular, Q1H PRN **OR** LORazepam (ATIVAN) 2 MG/ML concentrated solution 2 mg, 2 mg, Oral, Q1H PRN **OR** LORazepam (ATIVAN) 2 MG/ML concentrated solution 2 mg, 2 mg, Sublingual, Q1H PRN, Green, Marge L, APRN    LORazepam (ATIVAN) tablet 1 mg, 1 mg, Oral, Q1H PRN **OR** diazePAM (VALIUM) injection 5 mg, 5 mg, Intravenous, Q1H PRN **OR** diazePAM (VALIUM) injection 5 mg, 5 mg, Intramuscular, Q1H PRN **OR** LORazepam (ATIVAN) 2 MG/ML concentrated solution 1 mg, 1 mg, Oral, Q1H PRN **OR** LORazepam (ATIVAN) 2 MG/ML concentrated solution 1 mg, 1 mg, Sublingual, Q1H PRN, Green, Marge L, APRN    diphenhydrAMINE (BENADRYL) capsule 25 mg, 25 mg, Oral, Q6H PRN **OR** diphenhydrAMINE  (BENADRYL) injection 25 mg, 25 mg, Intravenous, Q6H PRN, Antonia Greena L, APRN    diphenoxylate-atropine (LOMOTIL) 2.5-0.025 MG per tablet 1 tablet, 1 tablet, Oral, Q2H PRN, Antonia Greena L, APRN    First Mouthwash (Magic Mouthwash) 5 mL, 5 mL, Swish & Spit, Q4H PRN, Antonia Greena L, APRN    furosemide (LASIX) injection 20 mg, 20 mg, Intravenous, Q6H PRN **OR** furosemide (LASIX) injection 20 mg, 20 mg, Subcutaneous, Q6H PRN, Marge Green, APRN    haloperidol (HALDOL) 2 MG/ML solution 1 mg, 1 mg, Sublingual, Q4H PRN **OR** haloperidol lactate (HALDOL) injection 1 mg, 1 mg, Intravenous, Q4H PRN, Antonia Greena L, APRN    haloperidol (HALDOL) 2 MG/ML solution 2 mg, 2 mg, Sublingual, Q4H PRN **OR** haloperidol lactate (HALDOL) injection 2 mg, 2 mg, Intravenous, Q4H PRN, Antonia Greena L, APRN    ipratropium-albuterol (DUO-NEB) nebulizer solution 3 mL, 3 mL, Nebulization, Q4H PRN, Antonia Greena L, APRN    morphine injection 4 mg, 4 mg, Intravenous, Q1H PRN **OR** morphine concentrated solution 10 mg, 10 mg, Sublingual, Q1H PRN, Antonia Greena L, APRN    morphine injection 6 mg, 6 mg, Intravenous, Q1H PRN **OR** morphine concentrated solution 20 mg, 20 mg, Sublingual, Q1H PRN, Antonia Greena L, APRN    ondansetron ODT (ZOFRAN-ODT) disintegrating tablet 4 mg, 4 mg, Oral, Q6H PRN **OR** ondansetron (ZOFRAN) injection 4 mg, 4 mg, Intravenous, Q6H PRN, Zoltan Boateng MD    Polyvinyl Alcohol-Povidone PF (ARTIFICIAL TEARS) 1.4-0.6 % ophthalmic solution 1 drop, 1 drop, Both Eyes, Q30 Min PRN, Marge Green APRN    prochlorperazine (COMPAZINE) injection 5 mg, 5 mg, Intravenous, Q6H PRN **OR** prochlorperazine (COMPAZINE) tablet 5 mg, 5 mg, Oral, Q6H PRN **OR** prochlorperazine (COMPAZINE) suppository 25 mg, 25 mg, Rectal, Q12H PRN, Marge Green APRN    scopolamine patch 1 mg/72 hr, 1 patch, Transdermal, Q72H PRN, Marge Green APRN    sodium chloride 0.9 % flush 10  mL, 10 mL, Intravenous, Q12H, Nydia APRN, 10 mL at 06/30/25 0826    sodium chloride 0.9 % flush 10 mL, 10 mL, Intravenous, PRN, KyleeNydia, APRN    sodium chloride 0.9 % infusion 40 mL, 40 mL, Intravenous, PRN, , Nydia, APRN, 40 mL at 06/23/25 2221              Conditions and Status  I think patient is in the dying process.       External documents reviewed: None  Cardiac tracing (EKG, telemetry) interpretation: no new EKGs  Radiology interpretation: no new radiology studies  Labs reviewed: none  Any tests that were considered but not ordered: None        Discussed with: Palliative care, nurse April     Care Planning  Shared decision making: Discussed with patient this morning  Code status and discussions: DO NOT RESUSCITATE       Disposition  Social Determinants of Health that impact treatment or disposition: Possible need for end of life care   Patient is dying.       Electronically signed by Roderick Field MD, 07/01/25, 17:38 CDT.

## 2025-07-01 NOTE — PROGRESS NOTES
[facility] Palliative Care Services    Palliative Care Daily Progress Note   Chief complaint-follow up     Code Status:   Code Status and Medical Interventions: No CPR (Do Not Attempt to Resuscitate); Limited Support; No intubation (DNI), No cardioversion; Lengthy discussion with his mother   Ordered at: 06/23/25 9006     Code Status (Patient has no pulse and is not breathing):    No CPR (Do Not Attempt to Resuscitate)     Medical Interventions (Patient has pulse or is breathing):    Limited Support     Medical Intervention Limits:    No intubation (DNI)       No cardioversion     Comments:    Lengthy discussion with his mother     Level Of Support Discussed With:    Next of Kin (If No Surrogate)      Advanced Directives: Advance Directive Status: Patient does not have advance directive   Goals of Care: Ongoing.     S: Medical record reviewed. Events noted.  GI following, notes extremely poor prognosis without liver pretransplant, spoke with both [facility] and [facility] and not a liver transplant candidate due to ongoing alcohol abuse and relapse requiring multiple admissions.  Though Pentoxifylline discontinued given increase in PT/INR, and not much benefit at this point.  Steroids not started given recent upper GI bleed with esophageal ulcers.  Recommends paracentesis as needed.  Continue lactulose and rifaximin.  Diuretic trial.  Received dose of vitamin K 6/25.      7/1-last seen by this provider on 6/26 and after discussion with patient's mother she was agreeable for hospice consult for information.  According to  patient's insurance does not provide hospice benefits and would have to pay out-of-pocket.  Multiple referrals to nursing facility hoping for Medicaid pending. Certainly a hospice candidate. Electronic communication to  to assess for potentially for hospice/palliative with Medicaid pending. Midodrine has been titrated  up over the weekend. Nursing reports coughing with medications.  Extremely somnolent, sonorous respiratory sounds, apparent sleep apnea.  Opens eyes to voice.  Is unable to formulate words.  No family currently at bedside.    Advance Care Planning spoke with patient's mother, Ghazala via telephone.  We discussed patient's unfortunately not had any improvement in his overall health this hospitalization and at this point we discussed concerns of potential aspiration events as he is now coughing on medications, extremely somnolent, and unable to successfully communicate.  We discussed pursuing comfort measures in place as placement options and hospice coverage has been extremely challenging.  Given options for continued aggressive care interventions versus comfort measures patient's mother feels comfort measures would be in patient's best interest at this juncture.  She will provide additional contact ration for patient's father, and 2 adult children once that has been identified.  Spoke with patient's son, Jg via telephone and updated on information as above.  States that his grandmother, patient's mother has been in charge of medical decision making.  Jg understands the criticality and poor prognosis and agrees to comfort measures.  Encouraged visit and family presence given poor prognosis.  Advised that he would reach out to his other 2 siblings Ki and Jossy via telephone and update on plan of care.  Questions encouraged, appreciative for conversation.       Review of Systems   Constitutional: Positive for malaise/fatigue.   Respiratory:  Positive for shortness of breath.    Musculoskeletal:  Positive for myalgias.   Gastrointestinal:  Positive for jaundice.        Abdominal distention   Neurological:  Positive for weakness.   Psychiatric/Behavioral:  Positive for memory loss.      Pain Assessment  Preferred Pain Scale: PAINAD (Pain Assessment in Advance Dementia Scale)  CPOT and PAINAD Scales: PAINAD (Pain  Assessment in Advance Dementia Scale)  PAINAD Breathin-->normal  PAINAD Negative Vocalization: 0-->none  PAINAD Facial Expression: 0-->smiling or inexpressive  PAINAD Body Language: 1-->tense, distressed pacing, fidgeting  PAINAD Consolability: 0-->no need to console  PAINAD Score: 0  Pain Location: ankle, foot  Pain Description: aching    O:     Intake/Output Summary (Last 24 hours) at 2025 1119  Last data filed at 2025 0300  Gross per 24 hour   Intake --   Output 250 ml   Net -250 ml       Diagnostics and current medications: Reviewed.      Current Facility-Administered Medications:     Calcium Replacement - Follow Nurse / BPA Driven Protocol, , Not Applicable, PRN, Nydia Zurita APRN    furosemide (LASIX) tablet 20 mg, 20 mg, Oral, Daily, Zoltan Boateng MD, 20 mg at 25 0803    lactulose solution 20 g, 20 g, Oral, TID, Vernon Morales APRN, 20 g at 25 0803    levothyroxine (SYNTHROID, LEVOTHROID) tablet 75 mcg, 75 mcg, Oral, Q AM, Zoltan Boateng MD, 75 mcg at 25 0532    Magnesium Cardiology Dose Replacement - Follow Nurse / BPA Driven Protocol, , Not Applicable, PRN, Nydia Zurita APRN    midodrine (PROAMATINE) tablet 10 mg, 10 mg, Oral, TID AC, Zoltan Boateng MD, 10 mg at 25 0803    nitroglycerin (NITROSTAT) SL tablet 0.4 mg, 0.4 mg, Sublingual, Q5 Min PRN, Nydia Zurita APRN    ondansetron ODT (ZOFRAN-ODT) disintegrating tablet 4 mg, 4 mg, Oral, Q6H PRN **OR** ondansetron (ZOFRAN) injection 4 mg, 4 mg, Intravenous, Q6H PRN, Zoltan Boateng MD    pantoprazole (PROTONIX) EC tablet 40 mg, 40 mg, Oral, BID Emir CUMMINS Glenn Riego, MD, 40 mg at 25 0803    Phosphorus Replacement - Follow Nurse / BPA Driven Protocol, , Not Applicable, PRN, , LÁZARO Stafford    Potassium Replacement - Follow Nurse / BPA Driven Protocol, , Not Applicable, PRN, , LÁZARO Stafford    riFAXIMin (XIFAXAN) tablet 550 mg, 550 mg, Oral, Q12H,  "Zoltan Boateng MD, 550 mg at 07/01/25 0803    sodium chloride 0.9 % flush 10 mL, 10 mL, Intravenous, Q12H, Nydia Zurita APRN, 10 mL at 06/30/25 0826    sodium chloride 0.9 % flush 10 mL, 10 mL, Intravenous, PRN, Nydia walter, APRN    sodium chloride 0.9 % infusion 40 mL, 40 mL, Intravenous, PRN, Nydia Zurita APRN, 40 mL at 06/23/25 2221    spironolactone (ALDACTONE) tablet 25 mg, 25 mg, Oral, Daily, Zoltan Boateng MD, 25 mg at 07/01/25 0803    sucralfate (CARAFATE) tablet 1 g, 1 g, Oral, 4x Daily AC & at Bedtime, Zoltan Boateng MD, 1 g at 07/01/25 0804    tamsulosin (FLOMAX) 24 hr capsule 0.4 mg, 0.4 mg, Oral, Daily, Zoltan Boateng MD, 0.4 mg at 07/01/25 0803    thiamine (VITAMIN B-1) tablet 100 mg, 100 mg, Oral, Daily, Zoltan Boateng MD, 100 mg at 07/01/25 0804    No Known Allergies  I have utilized all available immediate resources to obtain, update, or review the patient's current medications (including all prescriptions, over-the-counter products, herbals, cannabis/cannabidiol products, and vitamin/mineral/dietary (nutritional) supplements) for name, route of administration, type, dose and frequency.    A:    BP 96/47 (BP Location: Right arm, Patient Position: Lying)   Pulse 54   Temp 97.8 °F (36.6 °C) (Axillary)   Resp 16   Ht 190.5 cm (75\")   Wt (!) 143 kg (316 lb 3.2 oz)   SpO2 91%   BMI 39.52 kg/m²     Vitals and nursing note reviewed.   Constitutional:       Appearance: Ill-appearing and chronically ill-appearing.      Comments: Extremely somnolent   Eyes:      General: Scleral icterus.   HENT:      Head: Normocephalic.   Pulmonary:      Effort: Increased respiratory effort.      Comments: Periods of apnea and sonorous breath sounds  Cardiovascular:      Bradycardia present.   Edema:     Peripheral edema present.  Abdominal:      General: There is distension.   Skin:     General: Skin is warm.      Coloration: Skin is jaundiced.   Neurological:      " Cranial Nerves: Dysarthria present.      Comments: Garbled speech     Patient status: Disease state: Controlled with current treatments.  Current Functional status: Palliative Performance Scale Score: Performance 30% based on the following measures: Ambulation: Totally bed bound, Activity and Evidence of Disease: Unable to do any work, extensive evidence of disease, Self-Care: Total care required,  Intake: Reduced, LOC: Full, drowsy or confusion   Nutritional status: Albumin 2.4 Body mass index is 39.52 kg/m².      Palliative Care Acuity Data  Psychosocial Acuity: normal complexity  Spiritual Acuity: normal complexity  Hospital Problem List      Hepatic encephalopathy    Class 1 obesity due to excess calories without serious comorbidity with body mass index (BMI) of 34.0 to 34.9 in adult    Hypokalemia    Alcoholism    Jaundice    Acute UTI (urinary tract infection)    Alcoholic cirrhosis    History of hepatitis C    Liver failure     Impression/Problem List:     Cirrhosis of the liver due to alcoholism, MELD-34, Child Class C  Hepatic encephalopathy  Alcoholism    Suspected UTI  Hypokalemia  Diabetes mellitus  Thrombocytopenia  Recent GI bleed  Gout  Hepatitis C  Hypertension   Hypoalbuminemia       Recommendations/Plan:  1. plan: Goals of care include status no CPR/comfort measures     Family support: The patient receives support from his mother..  Advance Directives: Advance Directive Status: Patient does not have advance directive   POA/Healthcare surrogate-children and mother-next of kin.     2.  Palliative care encounter  - Prognosis is poor long-term secondary to cirrhosis of the liver, alcoholism, and comorbidities.  -Family appears to have fair prognostic awareness.      -Evaluated in ED on 4/18/2025 due to generalized abdominal pain and worsening jaundice.  CT scan abdomen pelvis revealed findings concerning for acute calculus cholecystitis with multiple stones, gallbladder distention as well as wall  thickening and mild pericholecystic fluid, liver cirrhosis with hepatosplenomegaly and recanalized periumbilical vein.  He was transferred to [facility] in [place], [place] for interventional radiology.  Discharge summary reviewed and noted he was given supportive care initially however liver enzymes failed to improve and he was started on prednisolone for acute alcoholic hepatitis.  Noted he did not require intervention for possible cholecystitis.  He was ultimately discharged home on 4/26/2025.  -Hospitalization 5/28-6/4 due to decompensated liver failure in the setting of cirrhosis, coagulopathy, anemia, GI bleed, underwent upper endoscopy with findings of grade D reflux esophagitis, esophageal ulcer, portal hypertension gastropathy.       -Most recently seen by my colleague LÁZARO Chavira during May hospitalization and at that time expressed wishes to continue full aggressive care interventions, noted to express interest in changing lifestyle and getting help outpatient.     -Bedside paracentesis performed in ED.    -Evaluated by GI and recommended transfer to tertiary facility.  Tertiary facility/hepatology/transplant service at [facility] contacted and according to chart review in order for patient to be transferred to tertiary facility would have to be in full alcohol rehabilitation before being considered for transplant.    -Patient is treated with empiric antibiotics for UTI, lactulose enema given degree of ascites no fluid bolus provided.    -CODE STATUS changes no CPR/limited support interventions, no intubation, no cardioversion per hospitalist team.      6/25-Vit K     6/24-provide CODE STATUS no CPR/limited support interventions    6/26-continue supportive measures.  -GI following, notes extremely poor prognosis without liver pretransplant, spoke with both [facility] and [facility] and not a liver transplant candidate due  to ongoing alcohol abuse and relapse requiring multiple admissions.  Continue Pentoxifylline, although may not have much benefit at this point.  Steroids not started given recent upper GI bleed with esophageal ulcers.  Recommends paracentesis as needed.  Continue lactulose and rifaximin.  Diuretic trial.    -Therapy following recommends SNF at discharge.    -Anticipating patient to discharge back home with patient's mother at time of discharge, per SW note.  -mother agreeable to hospice consult for more information. A hospice consult placed.  Electronic communication to CM and care team    7/1-transition to comfort measures per discussion with patient's mother and sonJg  -Anticipate discharge options/challenges.  Electronic communication with SW.  -Contact information updated in chart    3.  Comfort measures  - Discontinue any treatments and adjuvants in line with comfort.  Risk of aspiration and choking with medications reported by nursing.  - Palliative adjuvants as needed      Thank you for allowing us to participate in patient's plan of care. Palliative Care Team will continue to follow patient.     Marge Green, LÁZARO  7/1/2025  11:19 CDT

## 2025-07-01 NOTE — PLAN OF CARE
Goal Outcome Evaluation:  Plan of Care Reviewed With: patient        Problem: Adult Inpatient Plan of Care  Goal: Plan of Care Review  Outcome: Progressing  Flowsheets (Taken 7/1/2025 0442)  Progress: no change  Outcome Evaluation: Pt only awake when I awakend him to take his meds.  He told me his name but that was all.  He has been asleep most of the night, after turning him, he would go back to sleep.  S/SB 57-60 with first degree on tele. Pt is jaundiced. Safety maintained, care plan ongoing.  Plan of Care Reviewed With: patient

## 2025-07-01 NOTE — THERAPY DISCHARGE NOTE
Acute Care - Occupational Therapy Discharge  [facility]    Patient Name: Luciano Christiansen  : 1985    MRN: [id number]                              Today's Date: 2025       Admit Date: 2025    Visit Dx:     ICD-10-CM ICD-9-CM   1. Hepatic encephalopathy  K76.82 572.2   2. Alcoholic cirrhosis of liver with ascites  K70.31 571.2   3. Hypokalemia  E87.6 276.8   4. Anemia, unspecified type  D64.9 285.9   5. Jaundice  R17 782.4   6. Dysphagia, unspecified type  R13.10 787.20     Patient Active Problem List   Diagnosis    Wellness examination    Encounter for hepatitis C screening test for low risk patient    Class 1 obesity due to excess calories without serious comorbidity with body mass index (BMI) of 34.0 to 34.9 in adult    Fatty liver    Hyponatremia    Hypokalemia    Hypomagnesemia    Alcoholism    Transaminitis    Hypophosphatemia    Insomnia disorder related to known organic factor    Community acquired bilateral lower lobe pneumonia    Pneumonia due to Streptococcus    GI bleed    Calculus of gallbladder without cholecystitis without obstruction    Alcoholic encephalopathy    Alcohol withdrawal    Alcoholic steatohepatitis    BMI 40.0-44.9, adult    Hepatic encephalopathy    Gastroesophageal reflux disease without esophagitis    Hypothyroidism    Esophageal varices in cirrhosis    Upper GI bleed    Jaundice    Acute UTI (urinary tract infection)    Alcoholic cirrhosis    History of hepatitis C    Liver failure    Ulcerative esophagitis     Past Medical History:   Diagnosis Date    Alcoholism     currently drinking    Diabetes mellitus     Gout     Hepatitis C     Hypertension     Jaundice     UTI (urinary tract infection)      Past Surgical History:   Procedure Laterality Date    ENDOSCOPY N/A 2025    Procedure: ESOPHAGOGASTRODUODENOSCOPY WITH ANESTHESIA;  Surgeon: Estela Ramos MD;  Location: [facility];  Service: Gastroenterology;  Laterality: N/A;  pre op: GI bleed  post op: esophageal ulcer,  esophagitis  pcp: Adrien Varghese MD    VASECTOMY        General Information    No documentation.                  Mobility/ADL's    No documentation.                  Obj/Interventions    No documentation.                  Goals/Plan       Row Name 07/01/25 1443          Transfer Goal 1 (OT)    Activity/Assistive Device (Transfer Goal 1, OT) toilet;shower chair  -MESSI     [place] Level/Cues Needed (Transfer Goal 1, OT) contact guard required  -MESSI     Time Frame (Transfer Goal 1, OT) long term goal (LTG);10 days  -MESSI     Progress/Outcome (Transfer Goal 1, OT) medical status inhibited participation  -MESSI       Row Name 07/01/25 1443          Dressing Goal 1 (OT)    Activity/Device (Dressing Goal 1, OT) dressing skills, all  -MESSI     [place]/Cues Needed (Dressing Goal 1, OT) minimum assist (75% or more patient effort)  -MESSI     Time Frame (Dressing Goal 1, OT) long term goal (LTG);10 days  -MESSI     Progress/Outcome (Dressing Goal 1, OT) medical status inhibited participation  -MESSI       Row Name 07/01/25 1443          Toileting Goal 1 (OT)    Activity/Device (Toileting Goal 1, OT) toileting skills, all  -MESSI     [place] Level/Cues Needed (Toileting Goal 1, OT) minimum assist (75% or more patient effort)  -MESSI     Time Frame (Toileting Goal 1, OT) long term goal (LTG);10 days  -MESSI     Progress/Outcome (Toileting Goal 1, OT) medical status inhibited participation  -MESSI               User Key  (r) = Recorded By, (t) = Taken By, (c) = Cosigned By      Initials Name Provider Type    Izabel Candelaria, OTR/L Occupational Therapist                   Clinical Impression    No documentation.                  Outcome Measures       Row Name 07/01/25 0815          How much help from another person do you currently need...    Turning from your back to your side while in flat bed without using bedrails? 3  -ML     Moving from lying on back to sitting on the side of a flat bed without bedrails? 2  -ML     Moving to and from a  bed to a chair (including a wheelchair)? 1  -ML     Standing up from a chair using your arms (e.g., wheelchair, bedside chair)? 1  -ML     Climbing 3-5 steps with a railing? 1  -ML     To walk in hospital room? 1  -ML     AM-PAC 6 Clicks Score (PT) 9  -ML     Highest Level of Mobility Goal Sit at Edge of Bed-3  -ML               User Key  (r) = Recorded By, (t) = Taken By, (c) = Cosigned By      Initials Name Provider Type    Lauren Boyd, RN Registered Nurse                  Occupational Therapy Education       Title: PT OT SLP Therapies (In Progress)       Topic: Occupational Therapy (In Progress)       Point: Home exercise program (In Progress)       Learning Progress Summary            Father Acceptance, E,D, NR by MESSI at 6/30/2025 2503    Comment: ROM HEP instructed to father                                      User Key       Initials Effective Dates Name Provider Type Esvin SWENSON 02/28/25 -  Izabel Mcwilliams, OTR/L Occupational Therapist OT                  OT Recommendation and Plan  Therapy Frequency (OT): 5 times/wk  Plan of Care Review  Plan of Care Reviewed With: patient, father  Progress: no change  Outcome Evaluation: OT treatment ocmplete. Patient is lethargic and not able to anser orientation questions. Patient did follow some commands to reach and touch face and to pull on bedrail for rolling to side. Patient is not able to complete self care at this time due to low level of alertness. Fathe was instructed to have patient sit up higher with taking sips of drink and to encourage patient to complete simple self care such as washing face and to engage in basic ROM/reaching. Father verbalized understanding. Recommend continuation of OT POC and SNF placement upon hospital discharge.  Plan of Care Reviewed With: patient, father  Outcome Evaluation: OT treatment ocmplete. Patient is lethargic and not able to anser orientation questions. Patient did follow some commands to reach and touch face and  to pull on bedrail for rolling to side. Patient is not able to complete self care at this time due to low level of alertness. Fathe was instructed to have patient sit up higher with taking sips of drink and to encourage patient to complete simple self care such as washing face and to engage in basic ROM/reaching. Father verbalized understanding. Recommend continuation of OT POC and SNF placement upon hospital discharge.     Time Calculation:          Therapy Charges for Today       Code Description Service Date Service Provider Modifiers Qty    [id number]  OT THER PROC EA 15 MIN 6/30/2025 Izabel Mcwilliams, OTR/L GO 3               OT Discharge Summary  Anticipated Discharge Disposition (OT): other (see comments) (Patient is comfort measures,)  Reason for Discharge: Unable to participate  Outcomes Achieved: Unable to tolerate or actively participate in therapy  Discharge Destination:  (Patient changed to comfort measures)    Izabel Zamora, OTR/L  7/1/2025

## 2025-07-01 NOTE — CASE MANAGEMENT/SOCIAL WORK
"Continued Stay Note  [facility]     Patient Name: Luciano Christiansen  MRN: [id number]  Today's Date: 7/1/2025    Admit Date: 6/23/2025    Plan: [facility]   Discharge Plan       Row Name 07/01/25 0927       Plan    Plan [facility]    Patient/Family in Agreement with Plan yes    Plan Comments [facility] unable to offer pt a bed, [facility] has no openings and if they did he would not qualify for medicaid for nursing facility as he is not legally deemed disabled per Marta there.  [facility] has no openings. Left message for Maria Elena at [facility] to call back decision. Will follow.    Maria Elena from [facility] called back saying they are unable to offer pt a bed due to \"him owning a car and a house\".      1346- Did update pt's mother, she stated she spoke with palliative care here today and she is under impression he can just stay here now and not long before pt passes.  She did state she is under impression pt is comfortable so [facility] care center not an option.  Did mention [facility] and she stated this would be too far.  Again however pt does not have hospice insurance benefits and would need to be private pay or under indigent care even if a care center was an option. Will follow.                    Discharge Codes    No documentation.                       Niharika Santos, [place]    "

## 2025-07-01 NOTE — PLAN OF CARE
Goal Outcome Evaluation:  Plan of Care Reviewed With: patient        Progress: declining  Outcome Evaluation: Pt unable to stay awake. Had difficulty swallowing meds this AM. Now on comfort measures. Given atropine sublingual and scopolamine patch to help control secretions. Suctioning mouth prn. SB/S 54-61 first degree block per tele. Turning Q2 hours. Swabbing mouth prn. Bed alarm set. Safety maintained.

## 2025-07-01 NOTE — THERAPY DISCHARGE NOTE
Acute Care - Physical Therapy Discharge Summary  [facility]       Patient Name: Luciano Christiansen  : 1985  MRN: [id number]    Today's Date: 2025                 Admit Date: 2025      PT Recommendation and Plan    Visit Dx:    ICD-10-CM ICD-9-CM   1. Hepatic encephalopathy  K76.82 572.2   2. Alcoholic cirrhosis of liver with ascites  K70.31 571.2   3. Hypokalemia  E87.6 276.8   4. Anemia, unspecified type  D64.9 285.9   5. Jaundice  R17 782.4   6. Dysphagia, unspecified type  R13.10 787.20        Outcome Measures       Row Name 25 1526             How much help from another person do you currently need...    Turning from your back to your side while in flat bed without using bedrails? 1  -MF      Moving from lying on back to sitting on the side of a flat bed without bedrails? 1  -MF      Moving to and from a bed to a chair (including a wheelchair)? 1  -MF      Standing up from a chair using your arms (e.g., wheelchair, bedside chair)? 1  -MF      Climbing 3-5 steps with a railing? 1  -MF      To walk in hospital room? 1  -MF      AM-PAC 6 Clicks Score (PT) 6  -MF         Functional Assessment    Outcome Measure Options AM-PAC 6 Clicks Basic Mobility (PT)  -                User Key  (r) = Recorded By, (t) = Taken By, (c) = Cosigned By      Initials Name Provider Type     Court Bocanegra PTA Physical Therapist Assistant                         PT Rehab Goals       Row Name 25 1436             Bed Mobility Goal 1 (PT)    Activity/Assistive Device (Bed Mobility Goal 1, PT) bed mobility activities, all  -      [place] Level/Cues Needed (Bed Mobility Goal 1, PT) modified independence  -      Time Frame (Bed Mobility Goal 1, PT) by discharge  -      Progress/Outcomes (Bed Mobility Goal 1, PT) goal not met  -         Transfer Goal 1 (PT)    Activity/Assistive Device (Transfer Goal 1, PT) sit-to-stand/stand-to-sit;bed-to-chair/chair-to-bed  -      [place] Level/Cues Needed  (Transfer Goal 1, PT) modified independence  -MF      Time Frame (Transfer Goal 1, PT) by discharge  -MF      Progress/Outcome (Transfer Goal 1, PT) goal not met  -MF         Gait Training Goal 1 (PT)    Activity/Assistive Device (Gait Training Goal 1, PT) gait (walking locomotion);assistive device use  -MF      [place] Level (Gait Training Goal 1, PT) modified independence  -MF      Distance (Gait Training Goal 1, PT) 150'  -MF      Time Frame (Gait Training Goal 1, PT) other (see comments)  goal not met.  -MF         Stairs Goal 1 (PT)    Activity/Assistive Device (Stairs Goal 1, PT) stairs, all skills  -MF      [place] Level/Cues Needed (Stairs Goal 1, PT) standby assist  -MF      Number of Stairs (Stairs Goal 1, PT) 3 with 2 HRs  -MF      Time Frame (Stairs Goal 1, PT) by discharge  -MF      Progress/Outcome (Stairs Goal 1, PT) goal not met  -MF                User Key  (r) = Recorded By, (t) = Taken By, (c) = Cosigned By      Initials Name Provider Type Discipline     Court Bocanegra, PTA Physical Therapist Assistant PT                        PT Discharge Summary  Anticipated Discharge Disposition (PT): other (see comments) (comfort measures)  Reason for Discharge: Change in medical status  Outcomes Achieved: Unable to make functional progress toward goals at this time  Discharge Destination: other (comment) (comfort measures)      Court Bocanegra PTA   7/1/2025

## 2025-07-02 NOTE — DISCHARGE SUMMARY
Patient passed away at 0717-hour on July 2.  Patient was under comfort measure.  He was very sick and had very poor prognosis.  He presented with significant confusion along with congestion, fatigue and weakness.  He had hypokalemia, lactic acidosis.  He had hepatic encephalopathy from alcoholic cirrhosis.  He carries history of hepatitis C.  He was severely jaundice  In the emergency room, he had paracentesis.  According to note by Dr. Boateng studies do not appear consistent with SBP.    GI service had seen patient in consultation.  Palliative care also saw him.  He was transition to comfort measure.  He passed away comfortably.  The interested reader is referred to H&P, daily progress note and consultation report for details surrounding hospitalization.      Active Hospital Problems     Diagnosis      **Hepatic encephalopathy      Ulcerative esophagitis      Liver failure      Acute UTI (urinary tract infection)      Alcoholic cirrhosis      History of hepatitis C      Jaundice      Alcoholism      Hypokalemia      Class 1 obesity due to excess calories without serious comorbidity with body mass index (BMI) of 34.0 to 34.9 in adult

## 2025-07-02 NOTE — PROGRESS NOTES
[facility] Palliative Care Services    Palliative Care Daily Progress Note   Chief complaint-comfort measures    Code Status:   Code Status and Medical Interventions: No CPR (Do Not Attempt to Resuscitate); Comfort Measures; Marisa hernandez   Ordered at: 25 1157     Code Status (Patient has no pulse and is not breathing):    No CPR (Do Not Attempt to Resuscitate)     Medical Interventions (Patient has pulse or is breathing):    Comfort Measures     Comments:    Marisa hernandez     Level Of Support Discussed With:    Next of Kin (If No Surrogate)      Advanced Directives: Advance Directive Status: Patient does not have advance directive   Goals of Care: Ongoing.     S: Medical record reviewed. Events noted.  Transition to comfort measures .  Received 70 mg oral morphine equivalent in the form of morphine concentrate over the last 24 hours.  In addition requiring atropine and scopolamine for excess secretions and 1 dose of Ativan. Informed by nursing patient  this AM.     Pain Assessment  Preferred Pain Scale: ESAS Pain Score (Palliative)  CPOT and PAINAD Scales: PAINAD (Pain Assessment in Advance Dementia Scale)  CPOT Facial Expression: 0-->relaxed, neutral  CPOT Body Movements: 0-->absence of movements  CPOT Muscle Tension: 0-->relaxed  Ventilator Compliance/Vocalization: 0-->talking in normal tone or no sound  CPOT Score: 0  PAINAD Breathin-->normal  PAINAD Negative Vocalization: 0-->none  PAINAD Facial Expression: 0-->smiling or inexpressive  PAINAD Body Language: 0-->relaxed  PAINAD Consolability: 0-->no need to console  PAINAD Score: 0  Pain Location: ankle, foot  Pain Description: aching    O:     Intake/Output Summary (Last 24 hours) at 2025 0731  Last data filed at 2025 1501  Gross per 24 hour   Intake 180 ml   Output 300 ml   Net -120 ml       Diagnostics and current medications: Reviewed.      Current Facility-Administered Medications:     atropine 1 %  ophthalmic solution 2 drop, 2 drop, Sublingual, BID PRN, Marge Green, APRN, 2 drop at 07/01/25 1726    bisacodyl (DULCOLAX) suppository 10 mg, 10 mg, Rectal, Daily PRN, Marge Green, APRN    LORazepam (ATIVAN) tablet 2 mg, 2 mg, Oral, Q1H PRN **OR** diazePAM (VALIUM) injection 10 mg, 10 mg, Intravenous, Q1H PRN **OR** diazePAM (VALIUM) injection 10 mg, 10 mg, Intramuscular, Q1H PRN, 10 mg at 07/02/25 0045 **OR** LORazepam (ATIVAN) 2 MG/ML concentrated solution 2 mg, 2 mg, Oral, Q1H PRN **OR** LORazepam (ATIVAN) 2 MG/ML concentrated solution 2 mg, 2 mg, Sublingual, Q1H PRN, Marge Green, APRN, 2 mg at 07/02/25 0621    LORazepam (ATIVAN) tablet 1 mg, 1 mg, Oral, Q1H PRN **OR** diazePAM (VALIUM) injection 5 mg, 5 mg, Intravenous, Q1H PRN **OR** diazePAM (VALIUM) injection 5 mg, 5 mg, Intramuscular, Q1H PRN **OR** LORazepam (ATIVAN) 2 MG/ML concentrated solution 1 mg, 1 mg, Oral, Q1H PRN **OR** LORazepam (ATIVAN) 2 MG/ML concentrated solution 1 mg, 1 mg, Sublingual, Q1H PRN, Antonia Greena INGRIS, APRN    diphenhydrAMINE (BENADRYL) capsule 25 mg, 25 mg, Oral, Q6H PRN **OR** diphenhydrAMINE (BENADRYL) injection 25 mg, 25 mg, Intravenous, Q6H PRN, Antonia Greena INGRIS, APRN    diphenoxylate-atropine (LOMOTIL) 2.5-0.025 MG per tablet 1 tablet, 1 tablet, Oral, Q2H PRN, Antonia Greena L, APRN    First Mouthwash (Magic Mouthwash) 5 mL, 5 mL, Swish & Spit, Q4H PRN, Green, Marge L, APRN    furosemide (LASIX) injection 20 mg, 20 mg, Intravenous, Q6H PRN **OR** furosemide (LASIX) injection 20 mg, 20 mg, Subcutaneous, Q6H PRN, Marge Green, APRN    haloperidol (HALDOL) 2 MG/ML solution 1 mg, 1 mg, Sublingual, Q4H PRN **OR** haloperidol lactate (HALDOL) injection 1 mg, 1 mg, Intravenous, Q4H PRN, Marge Green, APRN    haloperidol (HALDOL) 2 MG/ML solution 2 mg, 2 mg, Sublingual, Q4H PRN **OR** haloperidol lactate (HALDOL) injection 2 mg, 2 mg, Intravenous, Q4H PRN, Marge Green,  APRN    ipratropium-albuterol (DUO-NEB) nebulizer solution 3 mL, 3 mL, Nebulization, Q4H PRN, Antonia Greena L, APRN    morphine injection 4 mg, 4 mg, Intravenous, Q1H PRN **OR** morphine concentrated solution 10 mg, 10 mg, Sublingual, Q1H PRN, Green, Marge L, APRN, 10 mg at 07/01/25 2031    morphine injection 6 mg, 6 mg, Intravenous, Q1H PRN **OR** morphine concentrated solution 20 mg, 20 mg, Sublingual, Q1H PRN, Green, Marge L, APRN, 20 mg at 07/02/25 0709    ondansetron ODT (ZOFRAN-ODT) disintegrating tablet 4 mg, 4 mg, Oral, Q6H PRN **OR** ondansetron (ZOFRAN) injection 4 mg, 4 mg, Intravenous, Q6H PRN, Zoltan Boateng MD    Polyvinyl Alcohol-Povidone PF (ARTIFICIAL TEARS) 1.4-0.6 % ophthalmic solution 1 drop, 1 drop, Both Eyes, Q30 Min PRN, Antonia Greena L, APRN    prochlorperazine (COMPAZINE) injection 5 mg, 5 mg, Intravenous, Q6H PRN **OR** prochlorperazine (COMPAZINE) tablet 5 mg, 5 mg, Oral, Q6H PRN **OR** prochlorperazine (COMPAZINE) suppository 25 mg, 25 mg, Rectal, Q12H PRN, GreenMiriMarge L, APRN    scopolamine patch 1 mg/72 hr, 3 patch, Transdermal, Q72H PRN, Antonia Greena L, APRN, 1 patch at 07/01/25 2025    sodium chloride 0.9 % flush 10 mL, 10 mL, Intravenous, Q12H, , Nydia, APRN, 10 mL at 06/30/25 0826    sodium chloride 0.9 % flush 10 mL, 10 mL, Intravenous, PRN, , Nydia, APRN    sodium chloride 0.9 % infusion 40 mL, 40 mL, Intravenous, PRN, , Nydia, APRN, 40 mL at 06/23/25 2221    No Known Allergies  I have utilized all available immediate resources to obtain, update, or review the patient's current medications (including all prescriptions, over-the-counter products, herbals, cannabis/cannabidiol products, and vitamin/mineral/dietary (nutritional) supplements) for name, route of administration, type, dose and frequency.    A:    BP 94/41 (BP Location: Left arm, Patient Position: Lying)   Pulse 51   Temp 94.1 °F (34.5 °C) (Axillary)    "Resp 16   Ht 190.5 cm (75\")   Wt (!) 143 kg (316 lb 3.2 oz)   SpO2 (!) 84%   BMI 39.52 kg/m²     Patient status: Disease state: Controlled with current treatments.  Current Functional status: Palliative Performance Scale Score: Performance 30% based on the following measures: Ambulation: Totally bed bound, Activity and Evidence of Disease: Unable to do any work, extensive evidence of disease, Self-Care: Total care required,  Intake: Reduced, LOC: Full, drowsy or confusion   Nutritional status: Albumin 2.4 Body mass index is 39.52 kg/m².      Palliative Care Acuity Data  Psychosocial Acuity: normal complexity  Spiritual Acuity: normal complexity  Hospital Problem List      Hepatic encephalopathy    Class 1 obesity due to excess calories without serious comorbidity with body mass index (BMI) of 34.0 to 34.9 in adult    Hypokalemia    Alcoholism    Jaundice    Acute UTI (urinary tract infection)    Alcoholic cirrhosis    History of hepatitis C    Liver failure     Impression/Problem List:     Cirrhosis of the liver due to alcoholism, MELD-34, Child Class C  Hepatic encephalopathy  Alcoholism    Suspected UTI  Hypokalemia  Diabetes mellitus  Thrombocytopenia  Recent GI bleed  Gout  Hepatitis C  Hypertension   Hypoalbuminemia       Recommendations/Plan:  1. plan: Goals of care include status no CPR/comfort measures     Family support: The patient receives support from his mother..  Advance Directives: Advance Directive Status: Patient does not have advance directive   POA/Healthcare surrogate-children and mother-next of kin.     2.  Palliative care encounter  - Prognosis is poor long-term secondary to cirrhosis of the liver, alcoholism, and comorbidities.  -Family appears to have fair prognostic awareness.      -Evaluated in ED on 4/18/2025 due to generalized abdominal pain and worsening jaundice.  CT scan abdomen pelvis revealed findings concerning for acute calculus cholecystitis with multiple stones, gallbladder " distention as well as wall thickening and mild pericholecystic fluid, liver cirrhosis with hepatosplenomegaly and recanalized periumbilical vein.  He was transferred to [facility] in [place], [place] for interventional radiology.  Discharge summary reviewed and noted he was given supportive care initially however liver enzymes failed to improve and he was started on prednisolone for acute alcoholic hepatitis.  Noted he did not require intervention for possible cholecystitis.  He was ultimately discharged home on 4/26/2025.  -Hospitalization 5/28-6/4 due to decompensated liver failure in the setting of cirrhosis, coagulopathy, anemia, GI bleed, underwent upper endoscopy with findings of grade D reflux esophagitis, esophageal ulcer, portal hypertension gastropathy.       -Most recently seen by my colleague LÁZARO Chavira during May hospitalization and at that time expressed wishes to continue full aggressive care interventions, noted to express interest in changing lifestyle and getting help outpatient.     -Bedside paracentesis performed in ED.    -Evaluated by GI and recommended transfer to tertiary facility.  Tertiary facility/hepatology/transplant service at [facility] contacted and according to chart review in order for patient to be transferred to tertiary facility would have to be in full alcohol rehabilitation before being considered for transplant.    -Patient is treated with empiric antibiotics for UTI, lactulose enema given degree of ascites no fluid bolus provided.    -CODE STATUS changes no CPR/limited support interventions, no intubation, no cardioversion per hospitalist team.      6/25-Vit K     6/24-provide CODE STATUS no CPR/limited support interventions    6/26-continue supportive measures.  -GI following, notes extremely poor prognosis without liver pretransplant, spoke with both [facility] and [facility] and not a  liver transplant candidate due to ongoing alcohol abuse and relapse requiring multiple admissions.  Continue Pentoxifylline, although may not have much benefit at this point.  Steroids not started given recent upper GI bleed with esophageal ulcers.  Recommends paracentesis as needed.  Continue lactulose and rifaximin.  Diuretic trial.    -Therapy following recommends SNF at discharge.    -Anticipating patient to discharge back home with patient's mother at time of discharge, per SW note.  -mother agreeable to hospice consult for more information. A hospice consult placed.  Electronic communication to CM and care team    -transition to comfort measures per discussion with patient's mother and son, Jg    -patient       Thank you for allowing us to participate in patient's plan of care.     Marge Green, LÁZARO  2025  07:31 CDT

## (undated) DEVICE — ARGYLE YANKAUER BULB TIP WITH VENT: Brand: ARGYLE

## (undated) DEVICE — THE CHANNEL CLEANING BRUSH IS A NYLON FLEXI BRUSH ATTACHED TO A FLEXIBLE PLASTIC SHEATH DESIGNED TO SAFELY REMOVE DEBRIS FROM FLEXIBLE ENDOSCOPES.

## (undated) DEVICE — SENSR O2 OXIMAX FNGR A/ 18IN NONSTR

## (undated) DEVICE — DEFENDO AIR WATER SUCTION AND BIOPSY VALVE KIT FOR  OLYMPUS: Brand: DEFENDO AIR/WATER/SUCTION AND BIOPSY VALVE

## (undated) DEVICE — CUFF,BP,DISP,1 TUBE,ADULT,HP: Brand: MEDLINE

## (undated) DEVICE — CONMED SCOPE SAVER BITE BLOCK, 20X27 MM: Brand: SCOPE SAVER